# Patient Record
Sex: MALE | Race: WHITE | NOT HISPANIC OR LATINO | Employment: OTHER | URBAN - NONMETROPOLITAN AREA
[De-identification: names, ages, dates, MRNs, and addresses within clinical notes are randomized per-mention and may not be internally consistent; named-entity substitution may affect disease eponyms.]

---

## 2021-01-13 ENCOUNTER — IMMUNIZATIONS (OUTPATIENT)
Dept: FAMILY MEDICINE CLINIC | Facility: HOSPITAL | Age: 56
End: 2021-01-13

## 2021-01-13 DIAGNOSIS — Z23 ENCOUNTER FOR IMMUNIZATION: ICD-10-CM

## 2021-01-13 PROCEDURE — 91301 SARS-COV-2 / COVID-19 MRNA VACCINE (MODERNA) 100 MCG: CPT

## 2021-01-13 PROCEDURE — 0011A SARS-COV-2 / COVID-19 MRNA VACCINE (MODERNA) 100 MCG: CPT

## 2021-02-08 ENCOUNTER — IMMUNIZATIONS (OUTPATIENT)
Dept: FAMILY MEDICINE CLINIC | Facility: HOSPITAL | Age: 56
End: 2021-02-08

## 2021-02-08 DIAGNOSIS — Z23 ENCOUNTER FOR IMMUNIZATION: Primary | ICD-10-CM

## 2021-02-08 PROCEDURE — 0012A SARS-COV-2 / COVID-19 MRNA VACCINE (MODERNA) 100 MCG: CPT

## 2021-02-08 PROCEDURE — 91301 SARS-COV-2 / COVID-19 MRNA VACCINE (MODERNA) 100 MCG: CPT

## 2021-12-14 ENCOUNTER — IMMUNIZATIONS (OUTPATIENT)
Dept: FAMILY MEDICINE CLINIC | Facility: HOSPITAL | Age: 56
End: 2021-12-14

## 2021-12-14 DIAGNOSIS — Z23 ENCOUNTER FOR IMMUNIZATION: Primary | ICD-10-CM

## 2021-12-14 PROCEDURE — 0064A COVID-19 MODERNA VACC 0.25 ML BOOSTER: CPT

## 2021-12-14 PROCEDURE — 91306 COVID-19 MODERNA VACC 0.25 ML BOOSTER: CPT

## 2022-01-21 ENCOUNTER — HOSPITAL ENCOUNTER (EMERGENCY)
Facility: HOSPITAL | Age: 57
Discharge: HOME | End: 2022-01-21
Attending: EMERGENCY MEDICINE | Admitting: EMERGENCY MEDICINE
Payer: MEDICARE

## 2022-01-21 VITALS
TEMPERATURE: 98 F | SYSTOLIC BLOOD PRESSURE: 172 MMHG | WEIGHT: 180 LBS | HEART RATE: 71 BPM | DIASTOLIC BLOOD PRESSURE: 94 MMHG | RESPIRATION RATE: 17 BRPM | OXYGEN SATURATION: 99 %

## 2022-01-21 DIAGNOSIS — M54.41 ACUTE RIGHT-SIDED LOW BACK PAIN WITH RIGHT-SIDED SCIATICA: Primary | ICD-10-CM

## 2022-01-21 LAB
BACTERIA UR QL AUTO: NORMAL /HPF
BILIRUB UR QL STRIP: NEGATIVE
CLARITY UR: CLEAR
COLOR UR: YELLOW
GLUCOSE UR STRIP-MCNC: ABNORMAL MG/DL
HGB UR QL STRIP.AUTO: NEGATIVE
KETONES UR STRIP-MCNC: NEGATIVE MG/DL
LEUKOCYTE ESTERASE UR QL STRIP: ABNORMAL
NITRITE UR QL STRIP: NEGATIVE
NON-SQ EPI CELLS URNS QL MICRO: NORMAL /HPF
PH UR STRIP.AUTO: 5.5 [PH]
PROT UR STRIP-MCNC: ABNORMAL MG/DL
RBC #/AREA URNS AUTO: NORMAL /HPF
SP GR UR STRIP.AUTO: 1.02 (ref 1–1.03)
UROBILINOGEN UR QL STRIP.AUTO: 0.2 E.U./DL
WBC #/AREA URNS AUTO: NORMAL /HPF

## 2022-01-21 PROCEDURE — 99283 EMERGENCY DEPT VISIT LOW MDM: CPT

## 2022-01-21 PROCEDURE — 96372 THER/PROPH/DIAG INJ SC/IM: CPT

## 2022-01-21 PROCEDURE — 81001 URINALYSIS AUTO W/SCOPE: CPT | Performed by: EMERGENCY MEDICINE

## 2022-01-21 PROCEDURE — 99285 EMERGENCY DEPT VISIT HI MDM: CPT | Performed by: EMERGENCY MEDICINE

## 2022-01-21 RX ORDER — DIAZEPAM 2 MG/1
2 TABLET ORAL EVERY 12 HOURS PRN
Qty: 10 TABLET | Refills: 0 | Status: SHIPPED | OUTPATIENT
Start: 2022-01-21 | End: 2022-06-24

## 2022-01-21 RX ORDER — MORPHINE SULFATE 10 MG/ML
6 INJECTION, SOLUTION INTRAMUSCULAR; INTRAVENOUS ONCE
Status: COMPLETED | OUTPATIENT
Start: 2022-01-21 | End: 2022-01-21

## 2022-01-21 RX ORDER — DIAZEPAM 5 MG/ML
2.5 INJECTION, SOLUTION INTRAMUSCULAR; INTRAVENOUS ONCE
Status: COMPLETED | OUTPATIENT
Start: 2022-01-21 | End: 2022-01-21

## 2022-01-21 RX ORDER — HYDROCODONE BITARTRATE AND ACETAMINOPHEN 5; 325 MG/1; MG/1
1 TABLET ORAL EVERY 6 HOURS PRN
Qty: 12 TABLET | Refills: 0 | Status: SHIPPED | OUTPATIENT
Start: 2022-01-21 | End: 2022-06-24

## 2022-01-21 RX ADMIN — DIAZEPAM 2.5 MG: 10 INJECTION, SOLUTION INTRAMUSCULAR; INTRAVENOUS at 15:23

## 2022-01-21 RX ADMIN — MORPHINE SULFATE 6 MG: 10 INJECTION, SOLUTION INTRAMUSCULAR; INTRAVENOUS at 15:23

## 2022-01-21 NOTE — ED NOTES
Pt in no acute distress  Ambulates with a steady gait   Verbalizes understanding of discharge instructions       Fatou Mendez RN  01/21/22 1042

## 2022-01-21 NOTE — ED PROVIDER NOTES
History  Chief Complaint   Patient presents with    Back Pain     presents due to R lower back pain for 2 weeks, pt states was seen at [place] and dx with "urine in kidney", pt taking toradol for pain, 9/10 pain upon arrival  hx of multiple back surgeries, pt has limp during triage pt states has been there since pain started  intermittent nausea with x1 vomiting today  pt also reports having constipation, pt having jerking movements in triage, when questioned if normal pt states "it is because of the pain for 2 weeks"     Patient is a 51-year-old male presenting to the emergency department complaining of right lower back pain radiating down his leg at times, symptoms have been present for the past 2 weeks and started after 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, he denies any fall, no numbness or tingling, no bowel or bladder dysfunction, he does occasionally get nausea and had 1 episode of vomiting when the pain was severe, he was seen at outside hospital at onset of pain, prescribed tramadol and had imaging done at the time as well as labs, he does have a known history of chronic kidney disease, he is currently taking tramadol and prednisone which is not providing any relief of symptoms, states he has a history of a cyst on his back that was surgically removed in 2003, he has not had similar pain since the surgery, he denies any fever or chills, no dysuria or gross hematuria          None       Past Medical History:   Diagnosis Date    Diabetes mellitus ([facility] [address] )     Gout     Hypertension     Renal disorder        Past Surgical History:   Procedure Laterality Date    BACK SURGERY         History reviewed  No pertinent family history  I have reviewed and agree with the history as documented      E-Cigarette/Vaping     E-Cigarette/Vaping Substances     Social History     Tobacco Use    Smoking status: Current Every Day Smoker    Smokeless tobacco: Never Used   Substance Use Topics  Alcohol use: Not Currently    Drug use: Yes     Types: Marijuana       Review of Systems   Constitutional: Negative  HENT: Negative  Eyes: Negative  Respiratory: Negative  Cardiovascular: Negative  Gastrointestinal: Positive for nausea and vomiting  Endocrine: Negative  Genitourinary: Negative  Musculoskeletal: Positive for back pain  Skin: Negative  Allergic/Immunologic: Negative  Neurological: Negative  Hematological: Negative  Psychiatric/Behavioral: Negative  Physical Exam  Physical Exam  Constitutional:       Appearance: He is well-developed  HENT:      Head: Normocephalic and atraumatic  Eyes:      Conjunctiva/sclera: Conjunctivae normal       Pupils: Pupils are equal, round, and reactive to light  Cardiovascular:      Rate and Rhythm: Normal rate  Pulmonary:      Effort: Pulmonary effort is normal    Abdominal:      Palpations: Abdomen is soft  Musculoskeletal:         General: Normal range of motion  Cervical back: Normal range of motion and neck supple  Back:       Comments: Tenderness to palpate in the paraspinal musculature of the right lumbar area down into the buttocks, pain with straight leg raise on the right, distal sensation and motor is intact, no bony deformity   Skin:     General: Skin is warm and dry  Neurological:      Mental Status: He is alert and oriented to person, place, and time           Vital Signs  ED Triage Vitals   Temperature Pulse Respirations Blood Pressure SpO2   01/21/22 1519 01/21/22 1320 01/21/22 1320 01/21/22 1320 01/21/22 1320   98 °F (36 7 °C) 83 22 169/83 98 %      Temp Source Heart Rate Source Patient Position - Orthostatic VS BP Location FiO2 (%)   01/21/22 1519 01/21/22 1554 01/21/22 1320 01/21/22 1320 --   Temporal Monitor Sitting Right arm       Pain Score       01/21/22 1320       9           Vitals:    01/21/22 1320 01/21/22 1554   BP: 169/83 (!) 172/94   Pulse: 83 71   Patient Position - Orthostatic VS: Sitting Lying           ED Medications  Medications   morphine (PF) 10 mg/mL injection 6 mg (6 mg Intramuscular Given 1/21/22 1523)   diazepam (VALIUM) injection 2 5 mg (2 5 mg Intramuscular Given 1/21/22 1523)       Diagnostic Studies  Results Reviewed     Procedure Component Value Units Date/Time    Urine Microscopic [[id number]]  (Normal) Collected: 01/21/22 1522    Lab Status: Final result Specimen: Urine, Clean Catch Updated: 01/21/22 1538     RBC, UA 1-2 /hpf      WBC, UA 1-2 /hpf      Epithelial Cells Occasional /hpf      Bacteria, UA Occasional /hpf     UA w Reflex to Microscopic w Reflex to Culture [[id number]]  (Abnormal) Collected: 01/21/22 1522    Lab Status: Final result Specimen: Urine, Clean Catch Updated: 01/21/22 1530     Color, UA Yellow     Clarity, UA Clear     Specific Gravity, UA 1 025     pH, UA 5 5     Leukocytes, UA Elevated glucose may cause decreased leukocyte values   See urine microscopic for [facility] result/     Nitrite, UA Negative     Protein,  (2+) mg/dl      Glucose, UA >=1000 (1%) mg/dl      Ketones, UA Negative mg/dl      Urobilinogen, UA 0 2 E U /dl      Bilirubin, UA Negative     Blood, UA Negative                 No orders to display                 ED Course  ED Course as of 01/21/22 1628   Fri Jan 21, 2022   1542 Patient reports feeling much better   1627 I reviewed patient's chart in epic from his recent visit to outside hospital, imaging of the lumbar spine reveals degenerative changes, physical exam findings consistent with low back strain with sciatica, he did report significant relief after being treated in the emergency department, referral was placed for follow-up with pain management for further evaluation and treatment, patient given a prescription for a small amount of medications to help control symptoms and till he can obtain follow up, patient advised to return if symptoms worsen or any additional concerns, patient acknowledges understanding and agreement with this plan                               SBIRT 20yo+      Most Recent Value   SBIRT (24 yo +)    In order to provide better care to our patients, we are screening all of our patients for alcohol and drug use  Would it be okay to ask you these screening questions? No Filed at: 01/21/2022 1519                      Disposition  Final diagnoses:   Acute right-sided low back pain with right-sided sciatica     Time reflects when diagnosis was documented in both MDM as applicable and the Disposition within this note     Time User Action Codes Description Comment    1/21/2022  3:45 PM Candida Staggers Add [M54 41] Acute right-sided low back pain with right-sided sciatica       ED Disposition     ED Disposition Condition Date/Time Comment    Discharge Stable Fri Jan 21, 2022  3:45 PM Jonas Lee Sr  discharge to home/self care              Follow-up Information     Follow up With Specialties Details Why Contact Info    Elyssa Conway DO Internal Medicine, Pediatrics  As needed [address] [address]  [phone]      Stephanie Colindres MD Pain Medicine In 3 days  [address] [place] [address]  [phone]            Discharge Medication List as of 1/21/2022  3:48 PM      START taking these medications    Details   diazepam (VALIUM) 2 mg tablet Take 1 tablet (2 mg total) by mouth every 12 (twelve) hours as needed for muscle spasms, Starting Fri 1/21/2022, Normal      HYDROcodone-acetaminophen (Norco) 5-325 mg per tablet Take 1 tablet by mouth every 6 (six) hours as needed for pain Max Daily Amount: 4 tablets, Starting Fri 1/21/2022, Normal                 PDMP Review     None          ED Provider  Electronically Signed by           Martha Meek DO  01/21/22 2571

## 2022-03-09 ENCOUNTER — APPOINTMENT (EMERGENCY)
Dept: CT IMAGING | Facility: HOSPITAL | Age: 57
End: 2022-03-09
Payer: MEDICARE

## 2022-03-09 ENCOUNTER — HOSPITAL ENCOUNTER (EMERGENCY)
Facility: HOSPITAL | Age: 57
Discharge: ANOTHER HOSPITAL | End: 2022-03-10
Attending: STUDENT IN AN ORGANIZED HEALTH CARE EDUCATION/TRAINING PROGRAM | Admitting: STUDENT IN AN ORGANIZED HEALTH CARE EDUCATION/TRAINING PROGRAM
Payer: MEDICARE

## 2022-03-09 ENCOUNTER — APPOINTMENT (EMERGENCY)
Dept: ULTRASOUND IMAGING | Facility: HOSPITAL | Age: 57
End: 2022-03-09
Payer: MEDICARE

## 2022-03-09 DIAGNOSIS — M46.46 DISCITIS OF LUMBAR REGION: ICD-10-CM

## 2022-03-09 DIAGNOSIS — N17.9 ACUTE KIDNEY INJURY (HCC): ICD-10-CM

## 2022-03-09 DIAGNOSIS — R65.20 SEVERE SEPSIS (HCC): ICD-10-CM

## 2022-03-09 DIAGNOSIS — K68.19 RETROPERITONEAL ABSCESS (HCC): ICD-10-CM

## 2022-03-09 DIAGNOSIS — A41.9 SEVERE SEPSIS (HCC): ICD-10-CM

## 2022-03-09 DIAGNOSIS — M46.20 VERTEBRAL OSTEOMYELITIS, ACUTE (HCC): Primary | ICD-10-CM

## 2022-03-09 LAB
ALBUMIN SERPL BCP-MCNC: 2.2 G/DL (ref 3.5–5)
ALP SERPL-CCNC: 130 U/L (ref 46–116)
ALT SERPL W P-5'-P-CCNC: 21 U/L (ref 12–78)
AMORPH URATE CRY URNS QL MICRO: NORMAL /HPF
ANION GAP SERPL CALCULATED.3IONS-SCNC: 11 MMOL/L (ref 4–13)
AST SERPL W P-5'-P-CCNC: 11 U/L (ref 5–45)
BACTERIA UR QL AUTO: NORMAL /HPF
BASOPHILS # BLD AUTO: 0.05 THOUSANDS/ΜL (ref 0–0.1)
BASOPHILS NFR BLD AUTO: 0 % (ref 0–1)
BILIRUB SERPL-MCNC: 0.3 MG/DL (ref 0.2–1)
BILIRUB UR QL STRIP: NEGATIVE
BUN SERPL-MCNC: 82 MG/DL (ref 5–25)
CALCIUM ALBUM COR SERPL-MCNC: 10.8 MG/DL (ref 8.3–10.1)
CALCIUM SERPL-MCNC: 9.4 MG/DL (ref 8.3–10.1)
CHLORIDE SERPL-SCNC: 96 MMOL/L (ref 100–108)
CLARITY UR: CLEAR
CO2 SERPL-SCNC: 27 MMOL/L (ref 21–32)
COLOR UR: YELLOW
CREAT SERPL-MCNC: 3.05 MG/DL (ref 0.6–1.3)
CRP SERPL QL: 60.4 MG/L
EOSINOPHIL # BLD AUTO: 0 THOUSAND/ΜL (ref 0–0.61)
EOSINOPHIL NFR BLD AUTO: 0 % (ref 0–6)
ERYTHROCYTE [DISTWIDTH] IN BLOOD BY AUTOMATED COUNT: 16.2 % (ref 11.6–15.1)
ERYTHROCYTE [SEDIMENTATION RATE] IN BLOOD: 103 MM/HOUR (ref 0–19)
GFR SERPL CREATININE-BSD FRML MDRD: 21 ML/MIN/1.73SQ M
GLUCOSE SERPL-MCNC: 266 MG/DL (ref 65–140)
GLUCOSE UR STRIP-MCNC: NEGATIVE MG/DL
HCT VFR BLD AUTO: 30.1 % (ref 36.5–49.3)
HGB BLD-MCNC: 10.1 G/DL (ref 12–17)
HGB UR QL STRIP.AUTO: NEGATIVE
IMM GRANULOCYTES # BLD AUTO: 0.44 THOUSAND/UL (ref 0–0.2)
IMM GRANULOCYTES NFR BLD AUTO: 1 % (ref 0–2)
KETONES UR STRIP-MCNC: NEGATIVE MG/DL
LACTATE SERPL-SCNC: 2.2 MMOL/L (ref 0.5–2)
LACTATE SERPL-SCNC: 3.4 MMOL/L (ref 0.5–2)
LEUKOCYTE ESTERASE UR QL STRIP: NEGATIVE
LYMPHOCYTES # BLD AUTO: 0.65 THOUSANDS/ΜL (ref 0.6–4.47)
LYMPHOCYTES NFR BLD AUTO: 2 % (ref 14–44)
MAGNESIUM SERPL-MCNC: 2.4 MG/DL (ref 1.6–2.6)
MCH RBC QN AUTO: 28 PG (ref 26.8–34.3)
MCHC RBC AUTO-ENTMCNC: 33.6 G/DL (ref 31.4–37.4)
MCV RBC AUTO: 83 FL (ref 82–98)
MONOCYTES # BLD AUTO: 0.93 THOUSAND/ΜL (ref 0.17–1.22)
MONOCYTES NFR BLD AUTO: 3 % (ref 4–12)
NEUTROPHILS # BLD AUTO: 28.8 THOUSANDS/ΜL (ref 1.85–7.62)
NEUTS SEG NFR BLD AUTO: 94 % (ref 43–75)
NITRITE UR QL STRIP: NEGATIVE
NON-SQ EPI CELLS URNS QL MICRO: NORMAL /HPF
NRBC BLD AUTO-RTO: 0 /100 WBCS
PH UR STRIP.AUTO: 5.5 [PH]
PLATELET # BLD AUTO: 409 THOUSANDS/UL (ref 149–390)
PMV BLD AUTO: 10.5 FL (ref 8.9–12.7)
POTASSIUM SERPL-SCNC: 5.9 MMOL/L (ref 3.5–5.3)
PROCALCITONIN SERPL-MCNC: 0.58 NG/ML
PROT SERPL-MCNC: 7.2 G/DL (ref 6.4–8.2)
PROT UR STRIP-MCNC: ABNORMAL MG/DL
RBC # BLD AUTO: 3.61 MILLION/UL (ref 3.88–5.62)
RBC #/AREA URNS AUTO: NORMAL /HPF
SODIUM SERPL-SCNC: 134 MMOL/L (ref 136–145)
SP GR UR STRIP.AUTO: 1.02 (ref 1–1.03)
UROBILINOGEN UR QL STRIP.AUTO: 0.2 E.U./DL
WBC # BLD AUTO: 30.87 THOUSAND/UL (ref 4.31–10.16)
WBC #/AREA URNS AUTO: NORMAL /HPF

## 2022-03-09 PROCEDURE — 80053 COMPREHEN METABOLIC PANEL: CPT | Performed by: STUDENT IN AN ORGANIZED HEALTH CARE EDUCATION/TRAINING PROGRAM

## 2022-03-09 PROCEDURE — 83605 ASSAY OF LACTIC ACID: CPT | Performed by: STUDENT IN AN ORGANIZED HEALTH CARE EDUCATION/TRAINING PROGRAM

## 2022-03-09 PROCEDURE — 99291 CRITICAL CARE FIRST HOUR: CPT | Performed by: STUDENT IN AN ORGANIZED HEALTH CARE EDUCATION/TRAINING PROGRAM

## 2022-03-09 PROCEDURE — 87040 BLOOD CULTURE FOR BACTERIA: CPT | Performed by: STUDENT IN AN ORGANIZED HEALTH CARE EDUCATION/TRAINING PROGRAM

## 2022-03-09 PROCEDURE — 96375 TX/PRO/DX INJ NEW DRUG ADDON: CPT

## 2022-03-09 PROCEDURE — 99285 EMERGENCY DEPT VISIT HI MDM: CPT

## 2022-03-09 PROCEDURE — 84145 PROCALCITONIN (PCT): CPT | Performed by: STUDENT IN AN ORGANIZED HEALTH CARE EDUCATION/TRAINING PROGRAM

## 2022-03-09 PROCEDURE — 85025 COMPLETE CBC W/AUTO DIFF WBC: CPT | Performed by: STUDENT IN AN ORGANIZED HEALTH CARE EDUCATION/TRAINING PROGRAM

## 2022-03-09 PROCEDURE — 83735 ASSAY OF MAGNESIUM: CPT | Performed by: STUDENT IN AN ORGANIZED HEALTH CARE EDUCATION/TRAINING PROGRAM

## 2022-03-09 PROCEDURE — G1004 CDSM NDSC: HCPCS

## 2022-03-09 PROCEDURE — 85652 RBC SED RATE AUTOMATED: CPT | Performed by: STUDENT IN AN ORGANIZED HEALTH CARE EDUCATION/TRAINING PROGRAM

## 2022-03-09 PROCEDURE — 36415 COLL VENOUS BLD VENIPUNCTURE: CPT | Performed by: STUDENT IN AN ORGANIZED HEALTH CARE EDUCATION/TRAINING PROGRAM

## 2022-03-09 PROCEDURE — 81001 URINALYSIS AUTO W/SCOPE: CPT | Performed by: STUDENT IN AN ORGANIZED HEALTH CARE EDUCATION/TRAINING PROGRAM

## 2022-03-09 PROCEDURE — 96365 THER/PROPH/DIAG IV INF INIT: CPT

## 2022-03-09 PROCEDURE — 87076 CULTURE ANAEROBE IDENT EACH: CPT | Performed by: STUDENT IN AN ORGANIZED HEALTH CARE EDUCATION/TRAINING PROGRAM

## 2022-03-09 PROCEDURE — 96366 THER/PROPH/DIAG IV INF ADDON: CPT

## 2022-03-09 PROCEDURE — 86140 C-REACTIVE PROTEIN: CPT | Performed by: STUDENT IN AN ORGANIZED HEALTH CARE EDUCATION/TRAINING PROGRAM

## 2022-03-09 PROCEDURE — 76870 US EXAM SCROTUM: CPT

## 2022-03-09 PROCEDURE — 87181 SC STD AGAR DILUTION PER AGT: CPT | Performed by: STUDENT IN AN ORGANIZED HEALTH CARE EDUCATION/TRAINING PROGRAM

## 2022-03-09 PROCEDURE — 96376 TX/PRO/DX INJ SAME DRUG ADON: CPT

## 2022-03-09 PROCEDURE — 51798 US URINE CAPACITY MEASURE: CPT

## 2022-03-09 PROCEDURE — 96361 HYDRATE IV INFUSION ADD-ON: CPT

## 2022-03-09 PROCEDURE — 74176 CT ABD & PELVIS W/O CONTRAST: CPT

## 2022-03-09 RX ORDER — ONDANSETRON 2 MG/ML
4 INJECTION INTRAMUSCULAR; INTRAVENOUS ONCE
Status: COMPLETED | OUTPATIENT
Start: 2022-03-09 | End: 2022-03-09

## 2022-03-09 RX ORDER — HYDROMORPHONE HCL/PF 1 MG/ML
1 SYRINGE (ML) INJECTION ONCE
Status: COMPLETED | OUTPATIENT
Start: 2022-03-09 | End: 2022-03-09

## 2022-03-09 RX ORDER — SODIUM CHLORIDE, SODIUM LACTATE, POTASSIUM CHLORIDE, CALCIUM CHLORIDE 600; 310; 30; 20 MG/100ML; MG/100ML; MG/100ML; MG/100ML
125 INJECTION, SOLUTION INTRAVENOUS CONTINUOUS
Status: DISCONTINUED | OUTPATIENT
Start: 2022-03-10 | End: 2022-03-10 | Stop reason: HOSPADM

## 2022-03-09 RX ORDER — CEFEPIME HYDROCHLORIDE 2 G/50ML
2000 INJECTION, SOLUTION INTRAVENOUS ONCE
Status: COMPLETED | OUTPATIENT
Start: 2022-03-09 | End: 2022-03-09

## 2022-03-09 RX ORDER — MORPHINE SULFATE 10 MG/ML
6 INJECTION, SOLUTION INTRAMUSCULAR; INTRAVENOUS ONCE
Status: COMPLETED | OUTPATIENT
Start: 2022-03-09 | End: 2022-03-09

## 2022-03-09 RX ADMIN — VANCOMYCIN HYDROCHLORIDE 1250 MG: 5 INJECTION, POWDER, LYOPHILIZED, FOR SOLUTION INTRAVENOUS at 22:09

## 2022-03-09 RX ADMIN — MORPHINE SULFATE 6 MG: 10 INJECTION INTRAVENOUS at 19:35

## 2022-03-09 RX ADMIN — CEFEPIME HYDROCHLORIDE 2000 MG: 2 INJECTION, SOLUTION INTRAVENOUS at 21:55

## 2022-03-09 RX ADMIN — HYDROMORPHONE HYDROCHLORIDE 1 MG: 1 INJECTION, SOLUTION INTRAMUSCULAR; INTRAVENOUS; SUBCUTANEOUS at 23:05

## 2022-03-09 RX ADMIN — SODIUM CHLORIDE 1000 ML: 0.9 INJECTION, SOLUTION INTRAVENOUS at 19:37

## 2022-03-09 RX ADMIN — ONDANSETRON 4 MG: 2 INJECTION INTRAMUSCULAR; INTRAVENOUS at 23:19

## 2022-03-09 RX ADMIN — SODIUM CHLORIDE 1000 ML: 0.9 INJECTION, SOLUTION INTRAVENOUS at 22:08

## 2022-03-09 RX ADMIN — HYDROMORPHONE HYDROCHLORIDE 1 MG: 1 INJECTION, SOLUTION INTRAMUSCULAR; INTRAVENOUS; SUBCUTANEOUS at 20:59

## 2022-03-09 RX ADMIN — MORPHINE SULFATE 2 MG: 2 INJECTION, SOLUTION INTRAMUSCULAR; INTRAVENOUS at 20:27

## 2022-03-10 VITALS
TEMPERATURE: 97.9 F | HEART RATE: 76 BPM | WEIGHT: 175 LBS | OXYGEN SATURATION: 91 % | SYSTOLIC BLOOD PRESSURE: 163 MMHG | HEIGHT: 72 IN | BODY MASS INDEX: 23.7 KG/M2 | RESPIRATION RATE: 15 BRPM | DIASTOLIC BLOOD PRESSURE: 73 MMHG

## 2022-03-10 LAB
FLUAV RNA RESP QL NAA+PROBE: NEGATIVE
FLUBV RNA RESP QL NAA+PROBE: NEGATIVE
SARS-COV-2 AG UPPER RESP QL IA: NORMAL
SARS-COV-2 RNA RESP QL NAA+PROBE: NEGATIVE

## 2022-03-10 PROCEDURE — 96361 HYDRATE IV INFUSION ADD-ON: CPT

## 2022-03-10 PROCEDURE — 96376 TX/PRO/DX INJ SAME DRUG ADON: CPT

## 2022-03-10 PROCEDURE — 87636 SARSCOV2 & INF A&B AMP PRB: CPT | Performed by: STUDENT IN AN ORGANIZED HEALTH CARE EDUCATION/TRAINING PROGRAM

## 2022-03-10 RX ORDER — HYDROMORPHONE HCL/PF 1 MG/ML
1 SYRINGE (ML) INJECTION ONCE
Status: COMPLETED | OUTPATIENT
Start: 2022-03-10 | End: 2022-03-10

## 2022-03-10 RX ADMIN — SODIUM CHLORIDE, SODIUM LACTATE, POTASSIUM CHLORIDE, AND CALCIUM CHLORIDE 125 ML/HR: .6; .31; .03; .02 INJECTION, SOLUTION INTRAVENOUS at 00:05

## 2022-03-10 RX ADMIN — HYDROMORPHONE HYDROCHLORIDE 1 MG: 1 INJECTION, SOLUTION INTRAMUSCULAR; INTRAVENOUS; SUBCUTANEOUS at 01:16

## 2022-03-10 NOTE — ED NOTES
Ortiz cath attempt unsuccessful  This RN and SALENA Talamantes both attempted with several different styles and sizes  Provider made aware        Marcelino Beyer RN  03/09/22 2129

## 2022-03-10 NOTE — ED NOTES
Family member in room  Pad alarm in place  Yellow socks and fall risk bracelet placed on pt  Pt instructed to call for assistance  Call bell within reach  AOx4        [facility]  03/09/22 2867 Normal rate, regular rhythm, normal S1, S2 heart sounds heard.

## 2022-03-10 NOTE — ED NOTES
Tx to [facility] room Alyssa Rae [address] Dr Vicenta Martini - [facility] @ 0100 report # - rapid covid, call PAC with results NELDA Garcia RN  03/10/22 0000

## 2022-03-10 NOTE — ED PROVIDER NOTES
History  Chief Complaint   Patient presents with    Back Pain     pt states he has had back pain since his stroke  states today the pain got so severe that he passed out  also c/o groin pain and painful sacral ulcers       History provided by:  Patient  Back Pain  Location:  Lumbar spine  Quality:  Shooting and stabbing  Pain severity:  Severe  Pain is:  Unable to specify  Onset quality:  Gradual  Duration:  3 days  Timing:  Constant  Progression:  Worsening  Chronicity:  New  Context: not recent illness, not recent injury and not twisting    Relieved by:  Nothing  Worsened by: Movement  Ineffective treatments:  Narcotics  Associated symptoms: abdominal pain, bowel incontinence, fever, leg pain, numbness, paresthesias, tingling and weakness    Associated symptoms: no abdominal swelling, no bladder incontinence, no chest pain, no dysuria, no headaches, no pelvic pain and no perianal numbness       64year old M  Presents to the ED with worsening lower back pain, sacral ulcers, left groin pain  The patient was admitted at an OSH in January 2022 for intractable back pain  MRI at that time showed DDD and disc herniation along with muscle spasm  Since discharge from the hospital, the patient has mostly been bed bound due to intractable lower back pain  He takes Norco with moderate relief  He is scheduled to be evaluated by a back surgeon tomorrow  He expresses mild numbness/tingling in his scrotum but denies urinary incontinence/retention  The patient had one episode of bowel incontinence last week but denies episodes of incontinence since  S/p epidural injection approximately 3 weeks ago  The patient also expresses LLQ abdominal/groin pain  Noticed a bulge in the LLQ a few days ago  Expresses decreases flatus with nausea  PO intake has been decreased  Expresses chills without documented fevers  Prior to Admission Medications   Prescriptions Last Dose Informant Patient Reported? Taking? HYDROcodone-acetaminophen (Norco) 5-325 mg per tablet   No No   Sig: Take 1 tablet by mouth every 6 (six) hours as needed for pain Max Daily Amount: 4 tablets   diazepam (VALIUM) 2 mg tablet   No No   Sig: Take 1 tablet (2 mg total) by mouth every 12 (twelve) hours as needed for muscle spasms      Facility-Administered Medications: None       Past Medical History:   Diagnosis Date    Diabetes mellitus ([facility] [address] )     Gout     Hypertension     Renal disorder     Stroke [facility])        Past Surgical History:   Procedure Laterality Date    BACK SURGERY         History reviewed  No pertinent family history  I have reviewed and agree with the history as documented  E-Cigarette/Vaping     E-Cigarette/Vaping Substances     Social History     Tobacco Use    Smoking status: Current Every Day Smoker     Packs/day: 0 50     Types: Cigarettes    Smokeless tobacco: Never Used   Substance Use Topics    Alcohol use: Not Currently    Drug use: Yes     Types: Marijuana     Review of Systems   Constitutional: Positive for activity change, appetite change, chills, fatigue and fever  HENT: Negative for congestion, rhinorrhea, sinus pressure and sinus pain  Respiratory: Negative for cough, chest tightness, shortness of breath and wheezing  Cardiovascular: Negative for chest pain and palpitations  Gastrointestinal: Positive for abdominal pain and bowel incontinence  Negative for diarrhea, nausea and vomiting  Genitourinary: Positive for decreased urine volume, difficulty urinating, penile pain and testicular pain  Negative for bladder incontinence, dysuria, flank pain, frequency, pelvic pain and urgency  Musculoskeletal: Positive for arthralgias, back pain and gait problem  Negative for neck pain and neck stiffness  Skin: Positive for color change and wound  Negative for pallor and rash  Neurological: Positive for tingling, weakness, numbness and paresthesias  Negative for headaches     Hematological: Does not bruise/bleed easily  Psychiatric/Behavioral: Negative for confusion  All other systems reviewed and are negative  Physical Exam  Physical Exam  Vitals and nursing note reviewed  Constitutional:       General: He is in acute distress  Appearance: He is not ill-appearing or toxic-appearing  HENT:      Head: Normocephalic and atraumatic  Right Ear: External ear normal       Left Ear: External ear normal       Nose: No congestion or rhinorrhea  Mouth/Throat:      Mouth: Mucous membranes are dry  Pharynx: No oropharyngeal exudate or posterior oropharyngeal erythema  Eyes:      General:         Right eye: No discharge  Left eye: No discharge  Extraocular Movements: Extraocular movements intact  Conjunctiva/sclera: Conjunctivae normal    Cardiovascular:      Rate and Rhythm: Normal rate and regular rhythm  Pulses: Normal pulses  Heart sounds: Normal heart sounds  Pulmonary:      Effort: Pulmonary effort is normal       Breath sounds: No wheezing, rhonchi or rales  Comments: Course breath sounds bilaterally   Chest:      Chest wall: No tenderness  Abdominal:      General: Bowel sounds are normal       Palpations: Abdomen is soft  Tenderness: There is abdominal tenderness  There is no right CVA tenderness, left CVA tenderness, guarding or rebound  Comments: +TTP along the left inguinal region    Genitourinary:     Comments: No testicular swelling  Penis normal  No cellulitic changes  Normal rectal tone  Stage I-II sacral ulcers  No purulence noted  Musculoskeletal:         General: Tenderness present  Cervical back: Neck supple  No tenderness  Back:       Right lower leg: No edema  Left lower leg: No edema  Comments: TTP along the midline lumbar spine with radiation of the pain into the bilateral paraspinal musculature  No cellulitic changes noted  Skin:     General: Skin is warm and dry        Capillary Refill: Capillary refill takes less than 2 seconds  Coloration: Skin is not jaundiced or pale  Findings: Erythema and lesion present  Neurological:      General: No focal deficit present  Mental Status: He is alert and oriented to person, place, and time  Mental status is at baseline  Cranial Nerves: No cranial nerve deficit  Sensory: No sensory deficit  Motor: Weakness present  Gait: Gait abnormal       Comments: 2+ bilateral patellar DTRs  5/5 muscle strength of the LLE with 4/5 muscle strength of the RLE  Limited exam due to pain  Psychiatric:         Mood and Affect: Mood normal          Behavior: Behavior normal          Thought Content:  Thought content normal          Judgment: Judgment normal        Vital Signs  ED Triage Vitals   Temperature Pulse Respirations Blood Pressure SpO2   03/09/22 1921 03/09/22 1920 03/09/22 1920 03/09/22 1920 03/09/22 1920   97 9 °F (36 6 °C) (!) 129 20 (!) 185/99 96 %      Temp Source Heart Rate Source Patient Position - Orthostatic VS BP Location FiO2 (%)   03/09/22 1921 03/09/22 1920 03/09/22 1920 03/09/22 1920 --   Temporal Monitor Lying Left arm       Pain Score       03/09/22 1920       10 - Worst Possible Pain         Vitals:    03/09/22 2245 03/09/22 2330 03/10/22 0000 03/10/22 0100   BP:  (!) 186/85 (!) 171/80 163/73   Pulse: 87 89 83 76   Patient Position - Orthostatic VS:         ED Medications  Medications   lactated ringers infusion (125 mL/hr Intravenous New Bag 3/10/22 0005)   morphine (PF) 10 mg/mL injection 6 mg (6 mg Intravenous Given 3/9/22 1935)   sodium chloride 0 9 % bolus 1,000 mL (0 mL Intravenous Stopped 3/9/22 2029)   morphine injection 2 mg (2 mg Intravenous Given 3/9/22 2027)   HYDROmorphone (DILAUDID) injection 1 mg (1 mg Intravenous Given 3/9/22 2059)   vancomycin (VANCOCIN) 1250 mg in sodium chloride 0 9% 250 mL IVPB (0 mg/kg × 79 4 kg Intravenous Stopped 3/9/22 2356)   cefepime (MAXIPIME) IVPB (premix in dextrose) 2,000 mg 50 mL (0 mg Intravenous Stopped 3/9/22 2210)   sodium chloride 0 9 % bolus 1,000 mL (0 mL Intravenous Stopped 3/9/22 2321)   HYDROmorphone (DILAUDID) injection 1 mg (1 mg Intravenous Given 3/9/22 2305)   ondansetron (ZOFRAN) injection 4 mg (4 mg Intravenous Given 3/9/22 2319)   HYDROmorphone (DILAUDID) injection 1 mg (1 mg Intravenous Given 3/10/22 0116)     Diagnostic Studies  Results Reviewed     Procedure Component Value Units Date/Time    COVID Rapid Antigen [[id number]]  (Normal) Collected: 03/10/22 0001    Lab Status: Final result Specimen: Nares from Nose Updated: 03/10/22 0101     SARS-CoV-2 Ag Negative Presumptive    Covid19 and INFLUENZA A/B PCR [[id number]] Collected: 03/10/22 0001    Lab Status: In process Specimen: Nares from Nose Updated: 03/10/22 0101    Lactic acid 2 Hours [[id number]]  (Abnormal) Collected: 03/09/22 2207    Lab Status: Final result Specimen: Blood from Arm, Left Updated: 03/09/22 2247     LACTIC ACID 2 2 mmol/L     Narrative:      Result may be elevated if tourniquet was used during collection      Urine Microscopic [[id number]] Collected: 03/09/22 2138    Lab Status: Final result Specimen: Urine, Clean Catch Updated: 03/09/22 2151     RBC, UA 0-1 /hpf      WBC, UA 0-1 /hpf      Epithelial Cells Occasional /hpf      Bacteria, UA Occasional /hpf      AMORPH URATES Occasional /hpf     UA w Reflex to Microscopic w Reflex to Culture [[id number]]  (Abnormal) Collected: 03/09/22 2138    Lab Status: Final result Specimen: Urine, Clean Catch Updated: 03/09/22 2142     Color, UA Yellow     Clarity, UA Clear     Specific Gravity, UA 1 025     pH, UA 5 5     Leukocytes, UA Negative     Nitrite, UA Negative     Protein,  (2+) mg/dl      Glucose, UA Negative mg/dl      Ketones, UA Negative mg/dl      Urobilinogen, UA 0 2 E U /dl      Bilirubin, UA Negative     Blood, UA Negative    Procalcitonin with AM Reflex [[id number]]  (Abnormal) Collected: 03/09/22 1939    Lab Status: Final result Specimen: Blood from Arm, Left Updated: 03/09/22 2013     Procalcitonin 0 58 ng/ml     Lactic acid, plasma [[id number]]  (Abnormal) Collected: 03/09/22 1939    Lab Status: Final result Specimen: Blood from Arm, Left Updated: 03/09/22 2013     LACTIC ACID 3 4 mmol/L     Narrative:      Result may be elevated if tourniquet was used during collection      Procalcitonin Reflex [[id number]]     Lab Status: No result Specimen: Blood     CBC and differential [[id number]]  (Abnormal) Collected: 03/09/22 1939    Lab Status: Final result Specimen: Blood from Arm, Left Updated: 03/09/22 2010     WBC 30 87 Thousand/uL      RBC 3 61 Million/uL      Hemoglobin 10 1 g/dL      Hematocrit 30 1 %      MCV 83 fL      MCH 28 0 pg      MCHC 33 6 g/dL      RDW 16 2 %      MPV 10 5 fL      Platelets 273 Thousands/uL      nRBC 0 /100 WBCs      Neutrophils Relative 94 %      Immat GRANS % 1 %      Lymphocytes Relative 2 %      Monocytes Relative 3 %      Eosinophils Relative 0 %      Basophils Relative 0 %      Neutrophils Absolute 28 80 Thousands/µL      Immature Grans Absolute 0 44 Thousand/uL      Lymphocytes Absolute 0 65 Thousands/µL      Monocytes Absolute 0 93 Thousand/µL      Eosinophils Absolute 0 00 Thousand/µL      Basophils Absolute 0 05 Thousands/µL     Comprehensive metabolic panel [[id number]]  (Abnormal) Collected: 03/09/22 1939    Lab Status: Final result Specimen: Blood from Arm, Left Updated: 03/09/22 2003     Sodium 134 mmol/L      Potassium 5 9 mmol/L      Chloride 96 mmol/L      CO2 27 mmol/L      ANION GAP 11 mmol/L      BUN 82 mg/dL      Creatinine 3 05 mg/dL      Glucose 266 mg/dL      Calcium 9 4 mg/dL      Corrected Calcium 10 8 mg/dL      AST 11 U/L      ALT 21 U/L      Alkaline Phosphatase 130 U/L      Total Protein 7 2 g/dL      Albumin 2 2 g/dL      Total Bilirubin 0 30 mg/dL      eGFR 21 ml/min/1 73sq m     Narrative:      [place] guidelines for Chronic Kidney Disease (CKD):     Stage 1 with normal or high GFR (GFR > 90 mL/min/1 73 square meters)    Stage 2 Mild CKD (GFR = 60-89 mL/min/1 73 square meters)    Stage 3A Moderate CKD (GFR = 45-59 mL/min/1 73 square meters)    Stage 3B Moderate CKD (GFR = 30-44 mL/min/1 73 square meters)    Stage 4 Severe CKD (GFR = 15-29 mL/min/1 73 square meters)    Stage 5 End Stage CKD (GFR <15 mL/min/1 73 square meters)  Note: GFR calculation is accurate only with a steady state creatinine    Magnesium [[id number]]  (Normal) Collected: 03/09/22 1939    Lab Status: Final result Specimen: Blood from Arm, Left Updated: 03/09/22 2003     Magnesium 2 4 mg/dL     C-reactive protein [[id number]]  (Abnormal) Collected: 03/09/22 1939    Lab Status: Final result Specimen: Blood from Arm, Left Updated: 03/09/22 2003     CRP 60 4 mg/L     Narrative:      Note: Unit of Measure change to mg/L at [facility] will show at 10 fold increase in CRP result to match network standards  Sedimentation rate, automated [[id number]]  (Abnormal) Collected: 03/09/22 1939    Lab Status: Final result Specimen: Blood from Arm, Left Updated: 03/09/22 1953     Sed Rate 103 mm/hour     Blood culture #2 [[id number]] Collected: 03/09/22 1939    Lab Status: In process Specimen: Blood from Arm, Left Updated: 03/09/22 1942    Blood culture #1 [[id number]] Collected: 03/09/22 1941    Lab Status: In process Specimen: Blood from Arm, Right Updated: 03/09/22 1942             US scrotum and testicles   Final Result by Bridget Mercedes DO (03/09 2351)       Increased Doppler flow within the left testicle suspicious for orchitis  Complex echogenic area medial to the left testicle of unclear etiology  Cannot rule out infection/abscess or mass  I personally discussed this study with Carlos Oshea on 3/9/2022 at 11:46 PM                 Workstation performed: [id number]         CT abdomen pelvis wo contrast   Final Result by Triston Damon MD (03/09 2255)         1    Findings consistent with L4-5 discitis and osteomyelitis  Faint punctate foci of gas in the dorsal epidural space are concerning for epidural abscess  Recommend emergent MRI of the lower thoracic and lumbar spine with gadolinium and neurosurgical    consultation  2   Anterolateral paraspinal inflammation and gas suggesting phlegmon at the L4-5 level  Large left retroperitoneal collection of fluid and gas extending from the diaphragmatic crura  to the inguinal canal and distending the left hemiscrotum, measuring    approximately 33 x 9 x 4 5 cm  Smaller collection in the right retroperitoneum along the psoas muscle, possibly also communicating with the L4-5 paraspinal phlegmon, measuring 15 x 4 x 7 cm, also compatible with an abscess  3   No evidence of diverticulitis, colitis, bowel obstruction or perforation  4   Few punctate foci of gas in the right gluteal muscles may be venous  No clear evidence of myositis or cellulitis  I personally discussed this study with Adriana Beal on 3/9/2022 at 10:50 PM                Workstation performed: [id number]                Procedures  CriticalCare Time  Performed by: Summer Patel DO  Authorized by:  Summer Patel DO     Critical care provider statement:     Critical care time (minutes):  65    Critical care was necessary to treat or prevent imminent or life-threatening deterioration of the following conditions:  Sepsis and dehydration    Critical care was time spent personally by me on the following activities:  Blood draw for specimens, obtaining history from patient or surrogate, development of treatment plan with patient or surrogate, discussions with consultants, evaluation of patient's response to treatment, examination of patient, review of old charts, re-evaluation of patient's condition, ordering and review of radiographic studies, ordering and review of laboratory studies and ordering and performing treatments and interventions         ED Course  ED Course as of 03/10/22 0203   Wed Mar 09, 2022   2138 PVR greater than 400   2245 If requiring transfer, the patient and wife are requesting transfer to [facility]  They were offered transfer to either [facility] or [facility]     8701 Spoke with Dr Asher Luong from Ortho spine  In addition to the spine evaluation, he recommends General Surgery evaluation  Likely admission to the IM service  Thu Mar 10, 2022   0016 Air medical declined due to weather  Ground transport scheduled for 0100       MDM     64year old M  Hx of lumbar spine DDD and herniated discs  Has been pretty much bed bound for the past few weeks  Had an epidural approximately three weeks ago by Pain Management  Presents to the ED with worsening lumbar spine pain  Mild decrease in motor function of the LLE which may be due to pain  +sacral/decubitus pressure sores  Normal rectal tone  PVR >400 mL without urinary retention discomfort  The patient expressed an episode of bowel incontinence last week but denies incontinent since  Developed left scrotal numbness today  W/u sig for leukocytosis, elevated inflammatory marks, lactic acid, procalcitonin  CMP sig for KELLY in the setting of decreased oral intake  IVF administered and pain controlled with IV opioids  Given the patient's hx of recent epidural and clinical exam, there was high suspicion for cauda equina syndrome and/or epidural abscess  IV Vancomycin and Cefepime administered  CT interpretation above  The patient and his wife were offered transfer to either [facility] or [facility]; they elected [facility]  The case was discussed with Spine Surgery and IM  Requested that the patient be transferred ASAP as he require the OR  Priority one transfer requested  [facility] medical declined due to inclement weather  Transported by ground ALS  The patient was stable while under my care       Disposition  Final diagnoses:   Vertebral osteomyelitis, acute [facility])   Discitis of lumbar region   Severe sepsis ([address] )   Retroperitoneal abscess ([address] )   Acute kidney injury ([facility] [address] )     Time reflects when diagnosis was documented in both MDM as applicable and the Disposition within this note     Time User Action Codes Description Comment    3/9/2022 11:39 PM Dierdre Pata Add [M46 20] Vertebral osteomyelitis, acute ([facility] [address] )     3/9/2022 11:40 PM Dierdre Pata Add [M46 46] Discitis of lumbar region     3/9/2022 11:52 PM Dierdre Pata Add [A41 9,  R65 20] Severe sepsis ([facility] [address] )     3/9/2022 11:52 PM Dierdre Pata Add [V17 71] Retroperitoneal abscess ([facility] [address] )     3/9/2022 11:53 PM Dierdre Pata Add [N17 9] Acute kidney injury [facility])       ED Disposition     ED Disposition Condition Date/Time Comment    Transfer to Another [address] [place] [address]  Wed Mar 9, 2022 11:39 PM Inocencio Davis Sr  should be transferred out to [facility]          MD Documentation      Most Recent Value   Patient Condition The patient has been stabilized such that within reasonable medical probability, no material deterioration of the patient condition or the condition of the unborn child(gee) is likely to result from the transfer   Reason for Transfer Level of Care needed not available at this facility   Benefits of Transfer Specialized equipment and/or services available at the receiving facility (Include comment)________________________  [facility]]   Accepting Physician [address] Name, Cristofer Camargo   Sending  [facility] [address] Name, Cristofer Camargo   Bed Assignment 657   Report Given to [facility]   Transport Mode Ambulance   Level of Care Advanced life support      Follow-up Information    None         Discharge Medication List as of 3/10/2022  1:59 AM      CONTINUE these medications which have NOT CHANGED    Details   diazepam (VALIUM) 2 mg tablet Take 1 tablet (2 mg total) by mouth every 12 (twelve) hours as needed for muscle spasms, Starting Fri 1/21/2022, Normal      HYDROcodone-acetaminophen (Norco) 5-325 mg per tablet Take 1 tablet by mouth every 6 (six) hours as needed for pain Max Daily Amount: 4 tablets, Starting Fri 1/21/2022, Normal             No discharge procedures on file      PDMP Review     None          ED Provider  Electronically Signed by           Margie Michele DO  03/10/22 9258

## 2022-03-10 NOTE — EMTALA/ACUTE CARE TRANSFER
[address]  [facility] [address]  Dept: [phone]      EMTALA TRANSFER CONSENT    NAME Cholo Sood Sr                                          1965                              MRN [id number]    I have been informed of my rights regarding examination, treatment, and transfer   by Dr Aiden Pereyra DO    Benefits: Specialized equipment and/or services available at the receiving facility (Include comment)________________________ (Spine surgery)    Risks:        Consent for Transfer:  I acknowledge that my medical condition has been evaluated and explained to me by the emergency department physician or other qualified medical person and/or my attending physician, who has recommended that I be transferred to the service of  Accepting Physician: Dr Saurabh Serrano at [address]  Name, [address] : [facility], [place], [place]  The above potential benefits of such transfer, the potential risks associated with such transfer, and the probable risks of not being transferred have been explained to me, and I fully understand them  The doctor has explained that, in my case, the benefits of transfer outweigh the risks  I agree to be transferred  I authorize the performance of emergency medical procedures and treatments upon me in both transit and upon arrival at the receiving facility  Additionally, I authorize the release of any and all medical records to the receiving facility and request they be transported with me, if possible  I understand that the safest mode of transportation during a medical emergency is an ambulance and that the Hospital advocates the use of this mode of transport  Risks of traveling to the receiving facility by car, including absence of medical control, life sustaining equipment, such as oxygen, and medical personnel has been explained to me and I fully understand them      ([address])  [  ]  I consent to the stated transfer and to be transported by ambulance/helicopter  [  ]  I consent to the stated transfer, but refuse transportation by ambulance and accept full responsibility for my transportation by car  I understand the risks of non-ambulance transfers and I exonerate the Hospital and its staff from any deterioration in my condition that results from this refusal     X___________________________________________    DATE  22  TIME________  Signature of patient or legally responsible individual signing on patient behalf           RELATIONSHIP TO PATIENT_________________________          Provider Certification    NAME Radha Mcintosh                                           1965                              MRN [id number]    A medical screening exam was performed on the above named patient  Based on the examination:    Condition Necessitating Transfer The primary encounter diagnosis was Vertebral osteomyelitis, acute ([address] )  A diagnosis of Discitis of lumbar region was also pertinent to this visit      Patient Condition: The patient has been stabilized such that within reasonable medical probability, no material deterioration of the patient condition or the condition of the unborn child(gee) is likely to result from the transfer    Reason for Transfer: Level of Care needed not available at this facility    Transfer Requirements: Micaela Anaya [address] [place] [address]   · Space available and qualified personnel available for treatment as acknowledged by    · Agreed to accept transfer and to provide appropriate medical treatment as acknowledged by       Dr Mary Cramer  · Appropriate medical records of the examination and treatment of the patient are provided at the time of transfer   [address] _______  · Transfer will be performed by qualified personnel from    and appropriate transfer equipment as required, including the use of necessary and appropriate life support measures  Provider Certification: I have examined the patient and explained the following risks and benefits of being transferred/refusing transfer to the patient/family:         Based on these reasonable risks and benefits to the patient and/or the unborn child(gee), and based upon the information available at the time of the patients examination, I certify that the medical benefits reasonably to be expected from the provision of appropriate medical treatments at another medical facility outweigh the increasing risks, if any, to the individuals medical condition, and in the case of labor to the unborn child, from effecting the transfer      X____________________________________________ DATE 03/09/22        TIME_______      ORIGINAL - SEND TO MEDICAL RECORDS   COPY - SEND WITH PATIENT DURING TRANSFER

## 2022-03-10 NOTE — ED NOTES
Rounded on pt and pt stated that he fell trying to get back into bed after using the bedside toilet       Ana Maria Back RN  03/09/22 7760

## 2022-03-10 NOTE — ED NOTES
PT requesting Toradol, provider made aware   Pt made aware we are waiting on kidney function lab to come back      Sebastian Melgar, [address] [facility]  03/09/22 3115

## 2022-03-10 NOTE — ED NOTES
Ultrasound at bedside      Helio November, [address] [facility]  03/09/22 4655

## 2022-03-14 LAB
BACTERIA BLD CULT: ABNORMAL
BACTERIA BLD CULT: ABNORMAL
GRAM STN SPEC: ABNORMAL
GRAM STN SPEC: ABNORMAL

## 2022-06-20 ENCOUNTER — APPOINTMENT (EMERGENCY)
Dept: CT IMAGING | Facility: HOSPITAL | Age: 57
DRG: 682 | End: 2022-06-20
Payer: MEDICARE

## 2022-06-20 ENCOUNTER — APPOINTMENT (EMERGENCY)
Dept: RADIOLOGY | Facility: HOSPITAL | Age: 57
DRG: 682 | End: 2022-06-20
Payer: MEDICARE

## 2022-06-20 ENCOUNTER — HOSPITAL ENCOUNTER (INPATIENT)
Facility: HOSPITAL | Age: 57
LOS: 4 days | Discharge: HOME HEALTH | DRG: 682 | End: 2022-06-24
Attending: EMERGENCY MEDICINE | Admitting: FAMILY MEDICINE
Payer: MEDICARE

## 2022-06-20 DIAGNOSIS — Z97.8 FOLEY CATHETER IN PLACE: ICD-10-CM

## 2022-06-20 DIAGNOSIS — Z79.4 TYPE 2 DIABETES MELLITUS WITH OTHER SPECIFIED COMPLICATION, WITH LONG-TERM CURRENT USE OF INSULIN (HCC): ICD-10-CM

## 2022-06-20 DIAGNOSIS — M10.9 GOUT, UNSPECIFIED CAUSE, UNSPECIFIED CHRONICITY, UNSPECIFIED SITE: ICD-10-CM

## 2022-06-20 DIAGNOSIS — R33.9 URINARY RETENTION: ICD-10-CM

## 2022-06-20 DIAGNOSIS — R11.2 NAUSEA AND VOMITING: Primary | ICD-10-CM

## 2022-06-20 DIAGNOSIS — E11.69 TYPE 2 DIABETES MELLITUS WITH OTHER SPECIFIED COMPLICATION, WITH LONG-TERM CURRENT USE OF INSULIN (HCC): ICD-10-CM

## 2022-06-20 DIAGNOSIS — M25.552 HIP PAIN, ACUTE, LEFT: ICD-10-CM

## 2022-06-20 DIAGNOSIS — R33.9 RETENTION OF URINE: ICD-10-CM

## 2022-06-20 DIAGNOSIS — Z45.2 PICC (PERIPHERALLY INSERTED CENTRAL CATHETER) REMOVAL: ICD-10-CM

## 2022-06-20 DIAGNOSIS — M46.26 OSTEOMYELITIS OF VERTEBRA OF LUMBAR REGION (HCC): ICD-10-CM

## 2022-06-20 DIAGNOSIS — N18.9 CHRONIC KIDNEY DISEASE, UNSPECIFIED CKD STAGE: ICD-10-CM

## 2022-06-20 DIAGNOSIS — E87.5 HYPERKALEMIA: ICD-10-CM

## 2022-06-20 DIAGNOSIS — N17.9 AKI (ACUTE KIDNEY INJURY) (HCC): ICD-10-CM

## 2022-06-20 PROBLEM — K52.9 GASTROENTERITIS: Status: ACTIVE | Noted: 2022-06-20

## 2022-06-20 LAB
2HR DELTA HS TROPONIN: 1 NG/L
4HR DELTA HS TROPONIN: 0 NG/L
ALBUMIN SERPL BCP-MCNC: 2.8 G/DL (ref 3.5–5)
ALBUMIN SERPL BCP-MCNC: 3.2 G/DL (ref 3.5–5)
ALP SERPL-CCNC: 109 U/L (ref 46–116)
ALP SERPL-CCNC: 138 U/L (ref 46–116)
ALT SERPL W P-5'-P-CCNC: 24 U/L (ref 12–78)
ALT SERPL W P-5'-P-CCNC: 29 U/L (ref 12–78)
AMORPH URATE CRY URNS QL MICRO: NORMAL /HPF
ANION GAP SERPL CALCULATED.3IONS-SCNC: 9 MMOL/L (ref 4–13)
ANION GAP SERPL CALCULATED.3IONS-SCNC: 9 MMOL/L (ref 4–13)
APTT PPP: 29 SECONDS (ref 23–37)
AST SERPL W P-5'-P-CCNC: 21 U/L (ref 5–45)
AST SERPL W P-5'-P-CCNC: 23 U/L (ref 5–45)
BACTERIA UR QL AUTO: NORMAL /HPF
BASE EX.OXY STD BLDV CALC-SCNC: 74.8 % (ref 60–80)
BASE EXCESS BLDV CALC-SCNC: -0.7 MMOL/L
BASOPHILS # BLD AUTO: 0.07 THOUSANDS/ΜL (ref 0–0.1)
BASOPHILS NFR BLD AUTO: 1 % (ref 0–1)
BILIRUB SERPL-MCNC: 0.32 MG/DL (ref 0.2–1)
BILIRUB SERPL-MCNC: 0.32 MG/DL (ref 0.2–1)
BILIRUB UR QL STRIP: NEGATIVE
BUN SERPL-MCNC: 54 MG/DL (ref 5–25)
BUN SERPL-MCNC: 56 MG/DL (ref 5–25)
CALCIUM ALBUM COR SERPL-MCNC: 10.9 MG/DL (ref 8.3–10.1)
CALCIUM ALBUM COR SERPL-MCNC: 11.6 MG/DL (ref 8.3–10.1)
CALCIUM SERPL-MCNC: 11 MG/DL (ref 8.3–10.1)
CALCIUM SERPL-MCNC: 9.9 MG/DL (ref 8.3–10.1)
CARDIAC TROPONIN I PNL SERPL HS: 2 NG/L
CARDIAC TROPONIN I PNL SERPL HS: 2 NG/L
CARDIAC TROPONIN I PNL SERPL HS: 3 NG/L
CHLORIDE SERPL-SCNC: 102 MMOL/L (ref 100–108)
CHLORIDE SERPL-SCNC: 99 MMOL/L (ref 100–108)
CK SERPL-CCNC: 30 U/L (ref 39–308)
CLARITY UR: CLEAR
CO2 SERPL-SCNC: 23 MMOL/L (ref 21–32)
CO2 SERPL-SCNC: 25 MMOL/L (ref 21–32)
COLOR UR: YELLOW
CREAT SERPL-MCNC: 3.89 MG/DL (ref 0.6–1.3)
CREAT SERPL-MCNC: 4.06 MG/DL (ref 0.6–1.3)
CREAT UR-MCNC: 24.6 MG/DL
EOSINOPHIL # BLD AUTO: 0.35 THOUSAND/ΜL (ref 0–0.61)
EOSINOPHIL NFR BLD AUTO: 5 % (ref 0–6)
ERYTHROCYTE [DISTWIDTH] IN BLOOD BY AUTOMATED COUNT: 15.9 % (ref 11.6–15.1)
FLUAV RNA RESP QL NAA+PROBE: NEGATIVE
FLUBV RNA RESP QL NAA+PROBE: NEGATIVE
GFR SERPL CREATININE-BSD FRML MDRD: 15 ML/MIN/1.73SQ M
GFR SERPL CREATININE-BSD FRML MDRD: 16 ML/MIN/1.73SQ M
GLUCOSE SERPL-MCNC: 173 MG/DL (ref 65–140)
GLUCOSE SERPL-MCNC: 210 MG/DL (ref 65–140)
GLUCOSE SERPL-MCNC: 217 MG/DL (ref 65–140)
GLUCOSE UR STRIP-MCNC: NEGATIVE MG/DL
HCO3 BLDV-SCNC: 23.9 MMOL/L (ref 24–30)
HCT VFR BLD AUTO: 32.1 % (ref 36.5–49.3)
HGB BLD-MCNC: 9.8 G/DL (ref 12–17)
HGB UR QL STRIP.AUTO: NEGATIVE
IMM GRANULOCYTES # BLD AUTO: 0.03 THOUSAND/UL (ref 0–0.2)
IMM GRANULOCYTES NFR BLD AUTO: 0 % (ref 0–2)
INR PPP: 0.97 (ref 0.84–1.19)
KETONES UR STRIP-MCNC: NEGATIVE MG/DL
LACTATE SERPL-SCNC: 1.1 MMOL/L (ref 0.5–2)
LEUKOCYTE ESTERASE UR QL STRIP: NEGATIVE
LIPASE SERPL-CCNC: 70 U/L (ref 73–393)
LYMPHOCYTES # BLD AUTO: 1.49 THOUSANDS/ΜL (ref 0.6–4.47)
LYMPHOCYTES NFR BLD AUTO: 20 % (ref 14–44)
MAGNESIUM SERPL-MCNC: 2.4 MG/DL (ref 1.6–2.6)
MCH RBC QN AUTO: 28 PG (ref 26.8–34.3)
MCHC RBC AUTO-ENTMCNC: 30.5 G/DL (ref 31.4–37.4)
MCV RBC AUTO: 92 FL (ref 82–98)
MICROALBUMIN UR-MCNC: 522 MG/L (ref 0–20)
MICROALBUMIN/CREAT 24H UR: 2122 MG/G CREATININE (ref 0–30)
MONOCYTES # BLD AUTO: 0.77 THOUSAND/ΜL (ref 0.17–1.22)
MONOCYTES NFR BLD AUTO: 10 % (ref 4–12)
NEUTROPHILS # BLD AUTO: 4.76 THOUSANDS/ΜL (ref 1.85–7.62)
NEUTS SEG NFR BLD AUTO: 64 % (ref 43–75)
NITRITE UR QL STRIP: NEGATIVE
NON-SQ EPI CELLS URNS QL MICRO: NORMAL /HPF
NRBC BLD AUTO-RTO: 0 /100 WBCS
O2 CT BLDV-SCNC: 11.5 ML/DL
OSMOLALITY UR: 338 MMOL/KG
PCO2 BLDV: 39.5 MM HG (ref 42–50)
PH BLDV: 7.4 [PH] (ref 7.3–7.4)
PH UR STRIP.AUTO: 8 [PH]
PLATELET # BLD AUTO: 243 THOUSANDS/UL (ref 149–390)
PLATELET # BLD AUTO: 266 THOUSANDS/UL (ref 149–390)
PMV BLD AUTO: 10.9 FL (ref 8.9–12.7)
PMV BLD AUTO: 10.9 FL (ref 8.9–12.7)
PO2 BLDV: 41.6 MM HG (ref 35–45)
POTASSIUM SERPL-SCNC: 4.9 MMOL/L (ref 3.5–5.3)
POTASSIUM SERPL-SCNC: 5 MMOL/L (ref 3.5–5.3)
POTASSIUM SERPL-SCNC: 6 MMOL/L (ref 3.5–5.3)
PROCALCITONIN SERPL-MCNC: 0.3 NG/ML
PROT SERPL-MCNC: 6.2 G/DL (ref 6.4–8.2)
PROT SERPL-MCNC: 7.3 G/DL (ref 6.4–8.2)
PROT UR STRIP-MCNC: ABNORMAL MG/DL
PROTHROMBIN TIME: 12.8 SECONDS (ref 11.6–14.5)
RBC # BLD AUTO: 3.5 MILLION/UL (ref 3.88–5.62)
RBC #/AREA URNS AUTO: NORMAL /HPF
RSV RNA RESP QL NAA+PROBE: NEGATIVE
SARS-COV-2 RNA RESP QL NAA+PROBE: NEGATIVE
SODIUM 24H UR-SCNC: 93 MOL/L
SODIUM SERPL-SCNC: 133 MMOL/L (ref 136–145)
SODIUM SERPL-SCNC: 134 MMOL/L (ref 136–145)
SP GR UR STRIP.AUTO: 1.02 (ref 1–1.03)
UROBILINOGEN UR QL STRIP.AUTO: 0.2 E.U./DL
WBC # BLD AUTO: 7.47 THOUSAND/UL (ref 4.31–10.16)
WBC #/AREA URNS AUTO: NORMAL /HPF

## 2022-06-20 PROCEDURE — 99285 EMERGENCY DEPT VISIT HI MDM: CPT

## 2022-06-20 PROCEDURE — 96375 TX/PRO/DX INJ NEW DRUG ADDON: CPT

## 2022-06-20 PROCEDURE — 84484 ASSAY OF TROPONIN QUANT: CPT | Performed by: PHYSICIAN ASSISTANT

## 2022-06-20 PROCEDURE — 85730 THROMBOPLASTIN TIME PARTIAL: CPT | Performed by: PHYSICIAN ASSISTANT

## 2022-06-20 PROCEDURE — 96365 THER/PROPH/DIAG IV INF INIT: CPT

## 2022-06-20 PROCEDURE — 94760 N-INVAS EAR/PLS OXIMETRY 1: CPT

## 2022-06-20 PROCEDURE — 36415 COLL VENOUS BLD VENIPUNCTURE: CPT | Performed by: PHYSICIAN ASSISTANT

## 2022-06-20 PROCEDURE — 96361 HYDRATE IV INFUSION ADD-ON: CPT

## 2022-06-20 PROCEDURE — 99285 EMERGENCY DEPT VISIT HI MDM: CPT | Performed by: PHYSICIAN ASSISTANT

## 2022-06-20 PROCEDURE — G1004 CDSM NDSC: HCPCS

## 2022-06-20 PROCEDURE — 84145 PROCALCITONIN (PCT): CPT | Performed by: PHYSICIAN ASSISTANT

## 2022-06-20 PROCEDURE — 82043 UR ALBUMIN QUANTITATIVE: CPT | Performed by: STUDENT IN AN ORGANIZED HEALTH CARE EDUCATION/TRAINING PROGRAM

## 2022-06-20 PROCEDURE — 84300 ASSAY OF URINE SODIUM: CPT | Performed by: STUDENT IN AN ORGANIZED HEALTH CARE EDUCATION/TRAINING PROGRAM

## 2022-06-20 PROCEDURE — 82805 BLOOD GASES W/O2 SATURATION: CPT | Performed by: PHYSICIAN ASSISTANT

## 2022-06-20 PROCEDURE — 0241U HB NFCT DS VIR RESP RNA 4 TRGT: CPT | Performed by: PHYSICIAN ASSISTANT

## 2022-06-20 PROCEDURE — 82948 REAGENT STRIP/BLOOD GLUCOSE: CPT

## 2022-06-20 PROCEDURE — 80053 COMPREHEN METABOLIC PANEL: CPT | Performed by: PHYSICIAN ASSISTANT

## 2022-06-20 PROCEDURE — 85049 AUTOMATED PLATELET COUNT: CPT | Performed by: STUDENT IN AN ORGANIZED HEALTH CARE EDUCATION/TRAINING PROGRAM

## 2022-06-20 PROCEDURE — 96372 THER/PROPH/DIAG INJ SC/IM: CPT

## 2022-06-20 PROCEDURE — 82550 ASSAY OF CK (CPK): CPT | Performed by: PHYSICIAN ASSISTANT

## 2022-06-20 PROCEDURE — 99223 1ST HOSP IP/OBS HIGH 75: CPT | Performed by: STUDENT IN AN ORGANIZED HEALTH CARE EDUCATION/TRAINING PROGRAM

## 2022-06-20 PROCEDURE — 71045 X-RAY EXAM CHEST 1 VIEW: CPT

## 2022-06-20 PROCEDURE — 83935 ASSAY OF URINE OSMOLALITY: CPT | Performed by: STUDENT IN AN ORGANIZED HEALTH CARE EDUCATION/TRAINING PROGRAM

## 2022-06-20 PROCEDURE — 85025 COMPLETE CBC W/AUTO DIFF WBC: CPT | Performed by: PHYSICIAN ASSISTANT

## 2022-06-20 PROCEDURE — 74176 CT ABD & PELVIS W/O CONTRAST: CPT

## 2022-06-20 PROCEDURE — 83735 ASSAY OF MAGNESIUM: CPT | Performed by: PHYSICIAN ASSISTANT

## 2022-06-20 PROCEDURE — 83690 ASSAY OF LIPASE: CPT | Performed by: PHYSICIAN ASSISTANT

## 2022-06-20 PROCEDURE — 81001 URINALYSIS AUTO W/SCOPE: CPT | Performed by: PHYSICIAN ASSISTANT

## 2022-06-20 PROCEDURE — 84132 ASSAY OF SERUM POTASSIUM: CPT | Performed by: STUDENT IN AN ORGANIZED HEALTH CARE EDUCATION/TRAINING PROGRAM

## 2022-06-20 PROCEDURE — 96374 THER/PROPH/DIAG INJ IV PUSH: CPT

## 2022-06-20 PROCEDURE — 94640 AIRWAY INHALATION TREATMENT: CPT

## 2022-06-20 PROCEDURE — 82570 ASSAY OF URINE CREATININE: CPT | Performed by: STUDENT IN AN ORGANIZED HEALTH CARE EDUCATION/TRAINING PROGRAM

## 2022-06-20 PROCEDURE — 93005 ELECTROCARDIOGRAM TRACING: CPT

## 2022-06-20 PROCEDURE — 83605 ASSAY OF LACTIC ACID: CPT | Performed by: PHYSICIAN ASSISTANT

## 2022-06-20 PROCEDURE — 0T9B70Z DRAINAGE OF BLADDER WITH DRAINAGE DEVICE, VIA NATURAL OR ARTIFICIAL OPENING: ICD-10-PCS | Performed by: FAMILY MEDICINE

## 2022-06-20 PROCEDURE — 87040 BLOOD CULTURE FOR BACTERIA: CPT | Performed by: PHYSICIAN ASSISTANT

## 2022-06-20 PROCEDURE — 85610 PROTHROMBIN TIME: CPT | Performed by: PHYSICIAN ASSISTANT

## 2022-06-20 RX ORDER — ALLOPURINOL 100 MG/1
200 TABLET ORAL DAILY
Status: ON HOLD | COMMUNITY
Start: 2021-11-30 | End: 2022-06-24 | Stop reason: SDUPTHER

## 2022-06-20 RX ORDER — OXYCODONE HYDROCHLORIDE 5 MG/1
5 CAPSULE ORAL EVERY 4 HOURS PRN
COMMUNITY
End: 2022-06-24

## 2022-06-20 RX ORDER — PREGABALIN 75 MG/1
75 CAPSULE ORAL 2 TIMES DAILY
Status: DISCONTINUED | OUTPATIENT
Start: 2022-06-21 | End: 2022-06-24 | Stop reason: HOSPADM

## 2022-06-20 RX ORDER — PREGABALIN 150 MG/1
150 CAPSULE ORAL 2 TIMES DAILY
COMMUNITY
Start: 2022-03-16

## 2022-06-20 RX ORDER — INSULIN GLARGINE 100 [IU]/ML
14 INJECTION, SOLUTION SUBCUTANEOUS 2 TIMES DAILY
Status: DISCONTINUED | OUTPATIENT
Start: 2022-06-21 | End: 2022-06-24 | Stop reason: HOSPADM

## 2022-06-20 RX ORDER — ALLOPURINOL 100 MG/1
200 TABLET ORAL DAILY
Status: DISCONTINUED | OUTPATIENT
Start: 2022-06-21 | End: 2022-06-24 | Stop reason: HOSPADM

## 2022-06-20 RX ORDER — ATORVASTATIN CALCIUM 40 MG/1
40 TABLET, FILM COATED ORAL
Status: DISCONTINUED | OUTPATIENT
Start: 2022-06-21 | End: 2022-06-24 | Stop reason: HOSPADM

## 2022-06-20 RX ORDER — ASPIRIN 81 MG/1
81 TABLET ORAL DAILY
COMMUNITY
Start: 2022-01-26 | End: 2022-06-24

## 2022-06-20 RX ORDER — ATORVASTATIN CALCIUM 40 MG/1
40 TABLET, FILM COATED ORAL
COMMUNITY
Start: 2022-01-03

## 2022-06-20 RX ORDER — OXYCODONE HYDROCHLORIDE 5 MG/1
5 TABLET ORAL EVERY 4 HOURS PRN
Status: DISCONTINUED | OUTPATIENT
Start: 2022-06-20 | End: 2022-06-24 | Stop reason: HOSPADM

## 2022-06-20 RX ORDER — ONDANSETRON 2 MG/ML
4 INJECTION INTRAMUSCULAR; INTRAVENOUS ONCE
Status: DISCONTINUED | OUTPATIENT
Start: 2022-06-20 | End: 2022-06-24 | Stop reason: HOSPADM

## 2022-06-20 RX ORDER — HEPARIN SODIUM 5000 [USP'U]/ML
5000 INJECTION, SOLUTION INTRAVENOUS; SUBCUTANEOUS EVERY 8 HOURS SCHEDULED
Status: DISCONTINUED | OUTPATIENT
Start: 2022-06-20 | End: 2022-06-24 | Stop reason: HOSPADM

## 2022-06-20 RX ORDER — TAMSULOSIN HYDROCHLORIDE 0.4 MG/1
CAPSULE ORAL
COMMUNITY
Start: 2022-05-26

## 2022-06-20 RX ORDER — DEXTROSE MONOHYDRATE 25 G/50ML
50 INJECTION, SOLUTION INTRAVENOUS ONCE
Status: COMPLETED | OUTPATIENT
Start: 2022-06-20 | End: 2022-06-20

## 2022-06-20 RX ORDER — SODIUM CHLORIDE 9 MG/ML
100 INJECTION, SOLUTION INTRAVENOUS CONTINUOUS
Status: DISCONTINUED | OUTPATIENT
Start: 2022-06-20 | End: 2022-06-21

## 2022-06-20 RX ORDER — LOSARTAN POTASSIUM 50 MG/1
25 TABLET ORAL DAILY
COMMUNITY
Start: 2022-03-17 | End: 2022-06-24

## 2022-06-20 RX ORDER — AMLODIPINE BESYLATE 10 MG/1
1 TABLET ORAL DAILY
COMMUNITY
Start: 2022-02-22

## 2022-06-20 RX ORDER — INSULIN GLARGINE 100 [IU]/ML
14 INJECTION, SOLUTION SUBCUTANEOUS 2 TIMES DAILY
COMMUNITY
Start: 2022-03-31 | End: 2022-06-24

## 2022-06-20 RX ORDER — AMLODIPINE BESYLATE 10 MG/1
10 TABLET ORAL DAILY
Status: DISCONTINUED | OUTPATIENT
Start: 2022-06-21 | End: 2022-06-24 | Stop reason: HOSPADM

## 2022-06-20 RX ORDER — COLCHICINE 0.6 MG/1
0.6 TABLET ORAL DAILY
COMMUNITY
End: 2022-06-24

## 2022-06-20 RX ORDER — ACETAMINOPHEN 325 MG/1
650 TABLET ORAL EVERY 4 HOURS PRN
COMMUNITY

## 2022-06-20 RX ORDER — INSULIN LISPRO 100 [IU]/ML
1-5 INJECTION, SOLUTION INTRAVENOUS; SUBCUTANEOUS
Status: DISCONTINUED | OUTPATIENT
Start: 2022-06-20 | End: 2022-06-24 | Stop reason: HOSPADM

## 2022-06-20 RX ORDER — FUROSEMIDE 10 MG/ML
20 INJECTION INTRAMUSCULAR; INTRAVENOUS ONCE
Status: COMPLETED | OUTPATIENT
Start: 2022-06-20 | End: 2022-06-20

## 2022-06-20 RX ORDER — TAMSULOSIN HYDROCHLORIDE 0.4 MG/1
0.4 CAPSULE ORAL
Status: DISCONTINUED | OUTPATIENT
Start: 2022-06-21 | End: 2022-06-24 | Stop reason: HOSPADM

## 2022-06-20 RX ORDER — TRAMADOL HYDROCHLORIDE 50 MG/1
50 TABLET ORAL 2 TIMES DAILY PRN
Status: DISCONTINUED | OUTPATIENT
Start: 2022-06-20 | End: 2022-06-24 | Stop reason: HOSPADM

## 2022-06-20 RX ORDER — ASPIRIN 81 MG/1
81 TABLET ORAL DAILY
Status: DISCONTINUED | OUTPATIENT
Start: 2022-06-21 | End: 2022-06-24 | Stop reason: HOSPADM

## 2022-06-20 RX ORDER — TRAMADOL HYDROCHLORIDE 50 MG/1
50 TABLET ORAL EVERY 4 HOURS PRN
COMMUNITY
End: 2022-06-24

## 2022-06-20 RX ORDER — SODIUM CHLORIDE 9 MG/ML
125 INJECTION, SOLUTION INTRAVENOUS CONTINUOUS
Status: DISCONTINUED | OUTPATIENT
Start: 2022-06-20 | End: 2022-06-20

## 2022-06-20 RX ADMIN — INSULIN LISPRO 2 UNITS: 100 INJECTION, SOLUTION INTRAVENOUS; SUBCUTANEOUS at 22:36

## 2022-06-20 RX ADMIN — OXYCODONE HYDROCHLORIDE 5 MG: 5 TABLET ORAL at 22:36

## 2022-06-20 RX ADMIN — DEXTROSE MONOHYDRATE 50 ML: 25 INJECTION, SOLUTION INTRAVENOUS at 15:34

## 2022-06-20 RX ADMIN — HEPARIN SODIUM 5000 UNITS: 5000 INJECTION INTRAVENOUS; SUBCUTANEOUS at 22:38

## 2022-06-20 RX ADMIN — INSULIN LISPRO 2 UNITS: 100 INJECTION, SOLUTION INTRAVENOUS; SUBCUTANEOUS at 17:10

## 2022-06-20 RX ADMIN — SODIUM CHLORIDE 1000 ML: 0.9 INJECTION, SOLUTION INTRAVENOUS at 15:31

## 2022-06-20 RX ADMIN — ALBUTEROL SULFATE 10 MG: 2.5 SOLUTION RESPIRATORY (INHALATION) at 15:25

## 2022-06-20 RX ADMIN — HEPARIN SODIUM 5000 UNITS: 5000 INJECTION INTRAVENOUS; SUBCUTANEOUS at 17:11

## 2022-06-20 RX ADMIN — PIPERACILLIN AND TAZOBACTAM 3.38 G: 36; 4.5 INJECTION, POWDER, FOR SOLUTION INTRAVENOUS at 15:32

## 2022-06-20 RX ADMIN — FUROSEMIDE 20 MG: 10 INJECTION, SOLUTION INTRAVENOUS at 15:34

## 2022-06-20 RX ADMIN — SODIUM CHLORIDE 100 ML/HR: 0.9 INJECTION, SOLUTION INTRAVENOUS at 17:11

## 2022-06-20 RX ADMIN — INSULIN HUMAN 10 UNITS: 100 INJECTION, SOLUTION PARENTERAL at 15:29

## 2022-06-20 RX ADMIN — SODIUM CHLORIDE 1000 ML: 0.9 INJECTION, SOLUTION INTRAVENOUS at 13:48

## 2022-06-20 NOTE — ASSESSMENT & PLAN NOTE
Potassium on admission noted to be 6 0  Patient received albuterol inhaler, insulin 10 units, and Lasix 20 mg IV in the ED  Repeat potassium noted to be a 5 0  Continue trending potassium every 4 hours until normal x2  Telemetry

## 2022-06-20 NOTE — ASSESSMENT & PLAN NOTE
Patient presents to the ED for nausea vomiting x1 and diarrhea x2 over the last 24 hours with associated chills and subjective fevers  CT abdomen pelvis without contrast done in the ED negative for acute GI findings    Follow-up blood cultures  Follow-up C diff and stool parasite PCR  Patient received 1 dose of Zosyn in the ED  Infectious workup so for including viral PCR, COVID, urinalysis, chest x-ray, CT abdomen negative for source of infection  No leukocytosis, afebrile, will hold off on further antibiotics for now

## 2022-06-20 NOTE — ED PROVIDER NOTES
History  Chief Complaint   Patient presents with    Nausea     Nausea, vomiting and diarrhea since yesterday  Pt due to have PICC line pulled 1 week ago but did not go to appt      The patient is a 62year old male with a past medical history of diabetes, hypertension, who presents emergency department today with the chief complaint of generalized weakness with nausea vomiting diarrhea since yesterday via EMS from home  Recent discharge from [address] [place] [address] 6/13  Patient has a history of L5/S1 discitis, osteomyletlitis  Patient was admitted to [address] [place] [address] from 4/, patient was found to a right paraspinal abscess from L1-L3 and diskitis/osteomyelitis from L5-S1 patient also had associated epidural abscess  Patient was ultimately discharged with central line to continue vancomycin until 6/13    Patient was then admitted again from 6/9-6/13, patient was found to have KELLY  Patient was switched from vancomycin to daptomycin secondary to worsening kidney function  Patient was then discharged home  Patient is scheduled tomorrow to have central line removed from IR    Came in today via EMS, spouse states last 2 days he has had multiple episodes nausea with vomiting, diarrhea  Overwhelmingly weak today  Complaining of feeling cold and having chills  Denies any abdominal pain , chest pain, back pain, shortness of breath  Admits to having some leg cramping  Patient states he has been able to urinate at home  History provided by:  Patient and EMS personnel  Nausea  The primary symptoms include nausea and vomiting  Primary symptoms do not include fever, weight loss, fatigue, abdominal pain, diarrhea, melena, hematemesis, jaundice, hematochezia, dysuria, myalgias, arthralgias or rash  The illness began yesterday  The onset was gradual  The problem has not changed since onset  Nausea began yesterday  The vomiting began yesterday  Vomiting occurs 2 to 5 times per day   The emesis contains stomach contents, undigested food and bilious material    The illness is also significant for chills  The illness does not include anorexia, dysphagia, odynophagia, bloating, constipation or back pain  Prior to Admission Medications   Prescriptions Last Dose Informant Patient Reported? Taking? HYDROcodone-acetaminophen (Norco) 5-325 mg per tablet   No No   Sig: Take 1 tablet by mouth every 6 (six) hours as needed for pain Max Daily Amount: 4 tablets   diazepam (VALIUM) 2 mg tablet   No No   Sig: Take 1 tablet (2 mg total) by mouth every 12 (twelve) hours as needed for muscle spasms      Facility-Administered Medications: None       Past Medical History:   Diagnosis Date    Diabetes mellitus ([facility] [address] )     Gout     Hypertension     Renal disorder     Stroke [facility])        Past Surgical History:   Procedure Laterality Date    BACK SURGERY         History reviewed  No pertinent family history  I have reviewed and agree with the history as documented  E-Cigarette/Vaping     E-Cigarette/Vaping Substances     Social History     Tobacco Use    Smoking status: Current Every Day Smoker     Packs/day: 0 50     Types: Cigarettes    Smokeless tobacco: Never Used   Substance Use Topics    Alcohol use: Not Currently    Drug use: Yes     Types: Marijuana       Review of Systems   Constitutional: Positive for chills  Negative for fatigue, fever and weight loss  Gastrointestinal: Positive for nausea and vomiting  Negative for abdominal pain, anorexia, bloating, constipation, diarrhea, dysphagia, hematemesis, hematochezia, jaundice and melena  Genitourinary: Negative for dysuria  Musculoskeletal: Negative for arthralgias, back pain and myalgias  Skin: Negative for rash  All other systems reviewed and are negative  Physical Exam  Physical Exam  Vitals and nursing note reviewed  Constitutional:       Appearance: He is well-developed and underweight  He is ill-appearing  HENT:      Head: Normocephalic and atraumatic        Mouth/Throat: Mouth: Mucous membranes are dry  Eyes:      Extraocular Movements: Extraocular movements intact  Conjunctiva/sclera: Conjunctivae normal       Pupils: Pupils are equal, round, and reactive to light  Cardiovascular:      Rate and Rhythm: Normal rate and regular rhythm  Pulses: Normal pulses  Heart sounds: No murmur heard  Pulmonary:      Effort: Pulmonary effort is normal  No respiratory distress  Breath sounds: Normal breath sounds  Abdominal:      Palpations: Abdomen is soft  Tenderness: There is no abdominal tenderness  Musculoskeletal:      Cervical back: Neck supple  Right lower leg: No edema  Left lower leg: No edema  Skin:     General: Skin is warm and dry  Capillary Refill: Capillary refill takes less than 2 seconds  Comments: Surgical incision healed well    Neurological:      General: No focal deficit present  Mental Status: He is alert and oriented to person, place, and time           Vital Signs  ED Triage Vitals [06/20/22 1335]   Temperature Pulse Respirations Blood Pressure SpO2   97 5 °F (36 4 °C) 81 16 (!) 182/88 98 %      Temp Source Heart Rate Source Patient Position - Orthostatic VS BP Location FiO2 (%)   Temporal Monitor Sitting Left arm --      Pain Score       7           Vitals:    06/20/22 1335 06/20/22 1350 06/20/22 1400   BP: (!) 182/88 166/84 156/93   Pulse: 81 78 81   Patient Position - Orthostatic VS: Sitting           Visual Acuity      ED Medications  Medications   ondansetron (ZOFRAN) injection 4 mg (0 mg Intravenous Hold 6/20/22 1350)   sodium chloride 0 9 % bolus 1,000 mL (1,000 mL Intravenous New Bag 6/20/22 1531)   sodium chloride 0 9 % bolus 1,000 mL (0 mL Intravenous Stopped 6/20/22 1528)   piperacillin-tazobactam (ZOSYN) 3 375 g in sodium chloride 0 9 % 100 mL IVPB (3 375 g Intravenous New Bag 6/20/22 1532)   furosemide (LASIX) injection 20 mg (20 mg Intravenous Given 6/20/22 1534)   albuterol inhalation solution 10 mg (10 mg Nebulization Given 6/20/22 1525)   insulin regular (HumuLIN R,NovoLIN R) injection 10 Units (10 Units Subcutaneous Given 6/20/22 1529)   dextrose 50 % IV solution 50 mL (50 mL Intravenous Given 6/20/22 1534)       Diagnostic Studies  Results Reviewed     Procedure Component Value Units Date/Time    Sodium, urine, random [[id number]] Collected: 06/20/22 1343    Lab Status: In process Specimen: Urine Updated: 06/20/22 1612    Osmolality, urine [[id number]] Collected: 06/20/22 1343    Lab Status: In process Specimen: Urine Updated: 06/20/22 1612    Microalbumin / creatinine urine ratio [[id number]] Collected: 06/20/22 1343    Lab Status:  In process Specimen: Urine Updated: 06/20/22 1612    Urine Microscopic [[id number]] Collected: 06/20/22 1343    Lab Status: Final result Specimen: Urine Updated: 06/20/22 1611     RBC, UA 1-2 /hpf      WBC, UA 0-5 /hpf      Epithelial Cells Occasional /hpf      Bacteria, UA Occasional /hpf      AMORPH URATES Occasional /hpf     Comprehensive metabolic panel [[id number]]     Lab Status: No result Specimen: Blood     HS Troponin I 4hr [[id number]]     Lab Status: No result Specimen: Blood     UA w Reflex to Microscopic w Reflex to Culture [[id number]]  (Abnormal) Collected: 06/20/22 1343    Lab Status: Final result Specimen: Urine Updated: 06/20/22 1533     Color, UA Yellow     Clarity, UA Clear     Specific [place], UA 1 020     pH, UA 8 0     Leukocytes, UA Negative     Nitrite, UA Negative     Protein,  (2+) mg/dl      Glucose, UA Negative mg/dl      Ketones, UA Negative mg/dl      Urobilinogen, UA 0 2 E U /dl      Bilirubin, UA Negative     Blood, UA Negative    Procalcitonin [[id number]]  (Abnormal) Collected: 06/20/22 1338    Lab Status: Final result Specimen: Blood from Arm, Right Updated: 06/20/22 1432     Procalcitonin 0 30 ng/ml     COVID/FLU/RSV - 2 hour TAT [[id number]]  (Normal) Collected: 06/20/22 1338    Lab Status: Final result Specimen: Nares from Nose Updated: 06/20/22 1430     SARS-CoV-2 Negative     INFLUENZA A PCR Negative     INFLUENZA B PCR Negative     RSV PCR Negative    Narrative:      FOR PEDIATRIC PATIENTS - copy/paste COVID Guidelines URL to browser: [email or web address]  [email or web address]    SARS-CoV-2 assay is a Nucleic Acid Amplification assay intended for the  qualitative detection of nucleic acid from SARS-CoV-2 in nasopharyngeal  swabs  Results are for the presumptive identification of SARS-CoV-2 RNA  Positive results are indicative of infection with SARS-CoV-2, the virus  causing COVID-19, but do not rule out bacterial infection or co-infection  with other viruses  Laboratories within the [place] and its  territories are required to report all positive results to the appropriate  public health authorities  Negative results do not preclude SARS-CoV-2  infection and should not be used as the sole basis for treatment or other  patient management decisions  Negative results must be combined with  clinical observations, patient history, and epidemiological information  This test has not been FDA cleared or approved  This test has been authorized by FDA under an Emergency Use Authorization  (EUA)  This test is only authorized for the duration of time the  declaration that circumstances exist justifying the authorization of the  emergency use of an in vitro diagnostic tests for detection of SARS-CoV-2  virus and/or diagnosis of COVID-19 infection under section 564(b)(1) of  the Act, 21 U  S C  201BGY-3(B)(1), unless the authorization is terminated  or revoked sooner  The test has been validated but independent review by FDA  and CLIA is pending  Test performed using [employer] GeneXpert: This RT-PCR assay targets N2,  a region unique to SARS-CoV-2  A conserved region in the E-gene was chosen  for pan-Sarbecovirus detection which includes SARS-CoV-2      Comprehensive metabolic panel [[id number]]  (Abnormal) Collected: 06/20/22 1338    Lab Status: Final result Specimen: Blood from Arm, Left Updated: 06/20/22 1417     Sodium 133 mmol/L      Potassium 6 0 mmol/L      Chloride 99 mmol/L      CO2 25 mmol/L      ANION GAP 9 mmol/L      BUN 56 mg/dL      Creatinine 4 06 mg/dL      Glucose 173 mg/dL      Calcium 11 0 mg/dL      Corrected Calcium 11 6 mg/dL      AST 23 U/L      ALT 29 U/L      Alkaline Phosphatase 138 U/L      Total Protein 7 3 g/dL      Albumin 3 2 g/dL      Total Bilirubin 0 32 mg/dL      eGFR 15 ml/min/1 73sq m     Narrative:      National Kidney Disease Foundation guidelines for Chronic Kidney Disease (CKD):     Stage 1 with normal or high GFR (GFR > 90 mL/min/1 73 square meters)    Stage 2 Mild CKD (GFR = 60-89 mL/min/1 73 square meters)    Stage 3A Moderate CKD (GFR = 45-59 mL/min/1 73 square meters)    Stage 3B Moderate CKD (GFR = 30-44 mL/min/1 73 square meters)    Stage 4 Severe CKD (GFR = 15-29 mL/min/1 73 square meters)    Stage 5 End Stage CKD (GFR <15 mL/min/1 73 square meters)  Note: GFR calculation is accurate only with a steady state creatinine    Magnesium [[id number]]  (Normal) Collected: 06/20/22 1338    Lab Status: Final result Specimen: Blood from Arm, Left Updated: 06/20/22 1417     Magnesium 2 4 mg/dL     Lipase [[id number]]  (Abnormal) Collected: 06/20/22 1338    Lab Status: Final result Specimen: Blood from Arm, Left Updated: 06/20/22 1417     Lipase 70 u/L     HS Troponin I 2hr [[id number]]     Lab Status: No result Specimen: Blood     HS Troponin 0hr (reflex protocol) [[id number]]  (Normal) Collected: 06/20/22 1338    Lab Status: Final result Specimen: Blood from Arm, Right Updated: 06/20/22 1412     hs TnI 0hr 2 ng/L     Lactic acid [[id number]]  (Normal) Collected: 06/20/22 1338    Lab Status: Final result Specimen: Blood from Arm, Right Updated: 06/20/22 1408     LACTIC ACID 1 1 mmol/L     Narrative:      Result may be elevated if tourniquet was used during collection  John Short [[id number]]  (Normal) Collected: 06/20/22 1338    Lab Status: Final result Specimen: Blood from Arm, Right Updated: 06/20/22 1400     Protime 12 8 seconds      INR 0 97    APTT [[id number]]  (Normal) Collected: 06/20/22 1338    Lab Status: Final result Specimen: Blood from Arm, Right Updated: 06/20/22 1400     PTT 29 seconds     CK Total with Reflex CKMB [[id number]] Collected: 06/20/22 1338    Lab Status: In process Specimen: Blood from Arm, Left Updated: 06/20/22 1358    Blood gas, Venous [[id number]]  (Abnormal) Collected: 06/20/22 1338    Lab Status: Final result Specimen: Blood from Arm, Right Updated: 06/20/22 1350     pH, Gen 7 400     pCO2, Gen 39 5 mm Hg      pO2, Gen 41 6 mm Hg      HCO3, Gen 23 9 mmol/L      Base Excess, Gen -0 7 mmol/L      O2 Content, Gen 11 5 ml/dL      O2 HGB, VENOUS 74 8 %     CBC and differential [[id number]]  (Abnormal) Collected: 06/20/22 1338    Lab Status: Final result Specimen: Blood from Arm, Right Updated: 06/20/22 1349     WBC 7 47 Thousand/uL      RBC 3 50 Million/uL      Hemoglobin 9 8 g/dL      Hematocrit 32 1 %      MCV 92 fL      MCH 28 0 pg      MCHC 30 5 g/dL      RDW 15 9 %      MPV 10 9 fL      Platelets 182 Thousands/uL      nRBC 0 /100 WBCs      Neutrophils Relative 64 %      Immat GRANS % 0 %      Lymphocytes Relative 20 %      Monocytes Relative 10 %      Eosinophils Relative 5 %      Basophils Relative 1 %      Neutrophils Absolute 4 76 Thousands/µL      Immature Grans Absolute 0 03 Thousand/uL      Lymphocytes Absolute 1 49 Thousands/µL      Monocytes Absolute 0 77 Thousand/µL      Eosinophils Absolute 0 35 Thousand/µL      Basophils Absolute 0 07 Thousands/µL     Blood culture #2 [[id number]] Collected: 06/20/22 1341    Lab Status: In process Specimen: Blood from Arm, Left Updated: 06/20/22 1347    Blood culture [[id number]] Collected: 06/20/22 1338    Lab Status:  In process Specimen: Blood from Arm, Right Updated: 06/20/22 1347    Blood culture #1 [[id number]] Collected: 06/20/22 1341    Lab Status: In process Specimen: Blood from Central Venous Line Updated: 06/20/22 1346                 CT abdomen pelvis wo contrast   Final Result by Zehra Braden MD (06/20 4420)      Compared to 3/9/2022, changes at L5-S1 may be sequela of prior discitis and osteomyelitis  Resolved paraspinal changes of acute infection  If there is clinical concern for recurrent spine infection, MR is recommended  New bladder distention and hydronephrosis suggesting bladder outlet obstruction  No etiology identified  Small, increased pericardial effusion  Workstation performed: [id number]         XR chest portable   Final Result by Luke Dinh MD (06/20 3489)      No acute cardiopulmonary disease  Workstation performed: [id number]                    Procedures  ECG 12 Lead Documentation Only    Date/Time: 6/20/2022 2:01 PM  Performed by: Júnior Hernandez PA-C  Authorized by: Júnior Hernandez PA-C     Indications / Diagnosis:  Gen weakness  ECG reviewed by me, the ED Provider: yes    Patient location:  ED  Previous ECG:     Previous ECG:  Unavailable  Interpretation:     Interpretation: abnormal    Rate:     ECG rate assessment: tachycardic    Rhythm:     Rhythm: sinus tachycardia               ED Course  ED Course as of 06/20/22 1615   Mon Jun 20, 2022   1517 Patient's bladder scan, revealed 800+ cc in bladder, Ortiz placed  1518 Creatinine(!): 4 06  Patient's creatinine approximately 1 week ago was 3 3 and bun 29    1518 Hemoglobin(!): 9 8  Was 8 7 on 6/13/22                               SBIRT 20yo+    Flowsheet Row Most Recent Value   SBIRT (23 yo +)    In order to provide better care to our patients, we are screening all of our patients for alcohol and drug use  Would it be okay to ask you these screening questions?  Unable to answer at this time Filed at: 06/20/2022 1344                    MDM  Number of Diagnoses or Management Options     Amount and/or Complexity of Data Reviewed  Clinical lab tests: ordered and reviewed  Tests in the radiology section of CPT®: ordered and reviewed  Decide to obtain previous medical records or to obtain history from someone other than the patient: yes  Obtain history from someone other than the patient: yes  Review and summarize past medical records: yes  Discuss the patient with other providers: yes  Independent visualization of images, tracings, or specimens: yes    Risk of Complications, Morbidity, and/or Mortality  Presenting problems: moderate  Diagnostic procedures: moderate  Management options: moderate    Patient Progress  Patient progress: stable      Disposition  Final diagnoses:   Nausea and vomiting   KELLY (acute kidney injury) ([facility] )   Hyperkalemia   Urinary retention     Time reflects when diagnosis was documented in both MDM as applicable and the Disposition within this note     Time User Action Codes Description Comment    6/20/2022  3:16 PM Waunita Lav Add [R11 2] Nausea and vomiting     6/20/2022  3:17 PM Waunita Lav Add [N17 9] KELLY (acute kidney injury) ([facility] [address] )     6/20/2022  3:17 PM Waunita Lav Add [E87 5] Hyperkalemia     6/20/2022  3:17 PM Waunita Lav Add [R33 9] Urinary retention       ED Disposition     ED Disposition   Admit    Condition   Stable    Date/Time   Mon Jun 20, 2022  3:56 PM    Comment   Case was discussed with liliana  and the patient's admission status was agreed to be Admission Status: inpatient status to the service of Dr Romy Stubbs    Follow-up Information    None         Patient's Medications   Discharge Prescriptions    No medications on file       No discharge procedures on file      PDMP Review     None          ED Provider  Electronically Signed by           Gurpreet Middleton PA-C  06/20/22 4921

## 2022-06-20 NOTE — PLAN OF CARE
Problem: Potential for Falls  Goal: Patient will remain free of falls  Description: INTERVENTIONS:  - Educate patient/family on patient safety including physical limitations  - Instruct patient to call for assistance with activity   - Consult OT/PT to assist with strengthening/mobility   - Keep Call bell within reach  - Keep bed low and locked with side rails adjusted as appropriate  - Keep care items and personal belongings within reach  - Initiate and maintain comfort rounds  - Make Fall Risk Sign visible to staff  - Offer Toileting every  Hours, in advance of need  - Initiate/Maintain alarm  - Obtain necessary fall risk management equipment:   - Apply yellow socks and bracelet for high fall risk patients  - Consider moving patient to room near nurses station  Outcome: Not Progressing     Problem: PAIN - ADULT  Goal: Verbalizes/displays adequate comfort level or baseline comfort level  Description: Interventions:  - Encourage patient to monitor pain and request assistance  - Assess pain using appropriate pain scale  - Administer analgesics based on type and severity of pain and evaluate response  - Implement non-pharmacological measures as appropriate and evaluate response  - Consider cultural and social influences on pain and pain management  - Notify physician/advanced practitioner if interventions unsuccessful or patient reports new pain  Outcome: Not Progressing     Problem: INFECTION - ADULT  Goal: Absence or prevention of progression during hospitalization  Description: INTERVENTIONS:  - Assess and monitor for signs and symptoms of infection  - Monitor lab/diagnostic results  - Monitor all insertion sites, i e  indwelling lines, tubes, and drains  - Monitor endotracheal if appropriate and nasal secretions for changes in amount and color  - [place] appropriate cooling/warming therapies per order  - Administer medications as ordered  - Instruct and encourage patient and family to use good hand hygiene technique  - Identify and instruct in appropriate isolation precautions for identified infection/condition  Outcome: Not Progressing  Goal: Absence of fever/infection during neutropenic period  Description: INTERVENTIONS:  - Monitor WBC    Outcome: Not Progressing     Problem: SAFETY ADULT  Goal: Patient will remain free of falls  Description: INTERVENTIONS:  - Educate patient/family on patient safety including physical limitations  - Instruct patient to call for assistance with activity   - Consult OT/PT to assist with strengthening/mobility   - Keep Call bell within reach  - Keep bed low and locked with side rails adjusted as appropriate  - Keep care items and personal belongings within reach  - Initiate and maintain comfort rounds  - Make Fall Risk Sign visible to staff  - Offer Toileting every  Hours, in advance of need  - Initiate/Maintain alarm  - Obtain necessary fall risk management equipment:   - Apply yellow socks and bracelet for high fall risk patients  - Consider moving patient to room near nurses station  Outcome: Not Progressing  Goal: Maintain or return to baseline ADL function  Description: INTERVENTIONS:  -  Assess patient's ability to carry out ADLs; assess patient's baseline for ADL function and identify physical deficits which impact ability to perform ADLs (bathing, care of mouth/teeth, toileting, grooming, dressing, etc )  - Assess/evaluate cause of self-care deficits   - Assess range of motion  - Assess patient's mobility; develop plan if impaired  - Assess patient's need for assistive devices and provide as appropriate  - Encourage maximum independence but intervene and supervise when necessary  - Involve family in performance of ADLs  - Assess for home care needs following discharge   - Consider OT consult to assist with ADL evaluation and planning for discharge  - Provide patient education as appropriate  Outcome: Not Progressing  Goal: Maintains/Returns to pre admission functional level  Description: INTERVENTIONS:  - Perform BMAT or MOVE assessment daily    - Set and communicate daily mobility goal to care team and patient/family/caregiver  - Collaborate with rehabilitation services on mobility goals if consulted  - Perform Range of Motion  times a day  - Reposition patient every  hours  - Dangle patient  times a day  - Stand patient  times a day  - Ambulate patient  times a day  - Out of bed to chair  times a day   - Out of bed for meals  times a day  - Out of bed for toileting  - Record patient progress and toleration of activity level   Outcome: Not Progressing     Problem: DISCHARGE PLANNING  Goal: Discharge to home or other facility with appropriate resources  Description: INTERVENTIONS:  - Identify barriers to discharge w/patient and caregiver  - Arrange for needed discharge resources and transportation as appropriate  - Identify discharge learning needs (meds, wound care, etc )  - Arrange for interpretive services to assist at discharge as needed  - Refer to Case Management Department for coordinating discharge planning if the patient needs post-hospital services based on physician/advanced practitioner order or complex needs related to functional status, cognitive ability, or social support system  Outcome: Not Progressing     Problem: Knowledge Deficit  Goal: Patient/family/caregiver demonstrates understanding of disease process, treatment plan, medications, and discharge instructions  Description: Complete learning assessment and assess knowledge base    Interventions:  - Provide teaching at level of understanding  - Provide teaching via preferred learning methods  Outcome: Not Progressing     Problem: GASTROINTESTINAL - ADULT  Goal: Minimal or absence of nausea and/or vomiting  Description: INTERVENTIONS:  - Administer IV fluids if ordered to ensure adequate hydration  - Maintain NPO status until nausea and vomiting are resolved  - Nasogastric tube if ordered  - Administer ordered antiemetic medications as needed  - Provide nonpharmacologic comfort measures as appropriate  - Advance diet as tolerated, if ordered  - Consider nutrition services referral to assist patient with adequate nutrition and appropriate food choices  Outcome: Not Progressing  Goal: Maintains or returns to baseline bowel function  Description: INTERVENTIONS:  - Assess bowel function  - Encourage oral fluids to ensure adequate hydration  - Administer IV fluids if ordered to ensure adequate hydration  - Administer ordered medications as needed  - Encourage mobilization and activity  - Consider nutritional services referral to assist patient with adequate nutrition and appropriate food choices  Outcome: Not Progressing  Goal: Maintains adequate nutritional intake  Description: INTERVENTIONS:  - Monitor percentage of each meal consumed  - Identify factors contributing to decreased intake, treat as appropriate  - Assist with meals as needed  - Monitor I&O, weight, and lab values if indicated  - Obtain nutrition services referral as needed  Outcome: Not Progressing  Goal: Oral mucous membranes remain intact  Description: INTERVENTIONS  - Assess oral mucosa and hygiene practices  - Implement preventative oral hygiene regimen  - Implement oral medicated treatments as ordered  - Initiate Nutrition services referral as needed  Outcome: Not Progressing

## 2022-06-20 NOTE — ASSESSMENT & PLAN NOTE
Lab Results   Component Value Date    HGBA1C 8 4 (H) 01/24/2022       No results for input(s): POCGLU in the last 72 hours  Blood Sugar Average: Last 72 hrs:      Will place on insulin sliding scale and adjust sugars accordingly  Resume home lantus 14 units bid  Carb consistent diet

## 2022-06-20 NOTE — ASSESSMENT & PLAN NOTE
Patient presents with acute kidney injury on CKD IV  Baseline Cr is around 2 0, Patient recently discharged from [facility] for acute kidney injury with creatinine of 3 0  Creatinine on admission noted to be 4 0  The KELLY on admission at [address] was attributed to patient being on vancomycin for osteomyelitis  He was then discharged on daptomycin which patient finished his course of antibiotics on 06/13/2022  CT abdomen showing New bladder distention and hydronephrosis suggesting bladder outlet obstruction    Suspected KELLY secondary to postrenal urinary retention  Ortiz placed in the ER  Patient received 1 L bolus in the ED will continue with gentle hydration overnight follow-up AM Cr  F/U Urine studies  Nephrology consulted for evaluation

## 2022-06-20 NOTE — H&P
[address]  H&P- Josie Carmen Sr  1965, 62 y o  male MRN: [id number]  Unit/Bed#: ED 06 Encounter: [id number]  Primary Care Provider: Edwin Flores DO   Date and time admitted to hospital: 6/20/2022  1:33 PM    * Gastroenteritis  Assessment & Plan  Patient presents to the ED for nausea vomiting x1 and diarrhea x2 over the last 24 hours with associated chills and subjective fevers  CT abdomen pelvis without contrast done in the ED negative for acute GI findings  Follow-up blood cultures  Follow-up C diff and stool parasite PCR  Patient received 1 dose of Zosyn in the ED  Infectious workup so for including viral PCR, COVID, urinalysis, chest x-ray, CT abdomen negative for source of infection  No leukocytosis, afebrile, will hold off on further antibiotics for now    KELLY (acute kidney injury) [facility])  Assessment & Plan  Patient presents with acute kidney injury on CKD IV  Baseline Cr is around 2 0, Patient recently discharged from [facility] for acute kidney injury with creatinine of 3 0  Creatinine on admission noted to be 4 0  The KELLY on admission at [facility] was attributed to patient being on vancomycin for osteomyelitis  He was then discharged on daptomycin which patient finished his course of antibiotics on 06/13/2022  CT abdomen showing New bladder distention and hydronephrosis suggesting bladder outlet obstruction    Suspected KELLY secondary to postrenal urinary retention  Ortiz placed in the ER  Patient received 1 L bolus in the ED will continue with gentle hydration overnight follow-up AM Cr  F/U Urine studies  Nephrology consulted for evaluation    Hyperkalemia  Assessment & Plan  Potassium on admission noted to be 6 0  Patient received albuterol inhaler, insulin 10 units, and Lasix 20 mg IV in the ED  Repeat potassium noted to be a 5 0  Continue trending potassium every 4 hours until normal x2  Telemetry      Osteomyelitis of vertebra of lumbar region [facility])  Assessment & Plan  Patient was recently discharged from [facility] where he was evaluated for L4-L5 diskitis/osteomyelitis  Patient was discharged on IV vancomycin, then transition to daptomycin due to [facility] abx course on 6/13  Was set to have central line removed tomorrow    History of CVA (cerebrovascular accident)  Assessment & Plan  Continue statin, aspirin 81 mg daily    Hypertension  Assessment & Plan  Resume home amlodipine  Hold losartan in setting of KELLY    Gout  Assessment & Plan  Resume home allopurinol    Diabetes mellitus ([facility] [address] )  Assessment & Plan  Lab Results   Component Value Date    HGBA1C 8 4 (H) 01/24/2022       No results for input(s): POCGLU in the last 72 hours  Blood Sugar Average: Last 72 hrs: Will place on insulin sliding scale and adjust sugars accordingly  Resume home lantus 14 units bid  Carb consistent diet    VTE Pharmacologic Prophylaxis:   Moderate Risk (Score 3-4) - Pharmacological DVT Prophylaxis Ordered: heparin  Code Status: Level 1 - Full Code   Discussion with family: Attempted to update  (wife) via phone  Unable to contact  Anticipated Length of Stay: Patient will be admitted on an inpatient basis with an anticipated length of stay of greater than 2 midnights secondary to gastroenteritis, kelly  Total Time for Visit, including Counseling / Coordination of Care: 45 minutes Greater than 50% of this total time spent on direct patient counseling and coordination of care  Chief Complaint:  Chills, nausea vomiting diarrhea    History of Present Illness:  Rivas Bailey  is a 62 y o  male with a PMH of CVA, hypertension, diabetes, vertebral osteomyelitis, gout, who presents with chills nausea, 1 episode of vomiting and 2 episodes of diarrhea in the last 24 hours  Patient unable to describe color/consistency of vomiting or diarrhea  Patient states that he has also been having chills and admits to having subjective fevers    Patient denies having any abdominal pain  Patient denies having any sick contacts or changes in diet  CT abdomen pelvis was done which was negative for any acute GI findings  On admission patient noted to have a creatinine of 4 and potassium of 6  Ortiz was placed for urinary tension noted on CT abdomen  Patient was also given 20 mg of IV Lasix and 10 units insulin with potassium improvement down to 5  Review of Systems:  Review of Systems   Constitutional: Positive for chills and fever  Gastrointestinal: Positive for diarrhea, nausea and vomiting  All other systems reviewed and are negative  Past Medical and Surgical History:   Past Medical History:   Diagnosis Date    Diabetes mellitus ([facility] [address] )     Gout     Hypertension     Renal disorder     Stroke [facility])        Past Surgical History:   Procedure Laterality Date    BACK SURGERY         Meds/Allergies:  Prior to Admission medications    Medication Sig Start Date End Date Taking?  Authorizing Provider   allopurinol (ZYLOPRIM) 100 mg tablet Take 200 mg by mouth daily 11/30/21  Yes Historical Provider, MD   amLODIPine (NORVASC) 10 mg tablet Take 1 tablet by mouth daily 2/22/22  Yes Historical Provider, MD   aspirin (ECOTRIN LOW STRENGTH) 81 mg EC tablet Take 81 mg by mouth daily 1/26/22 1/26/23 Yes Historical Provider, MD   atorvastatin (LIPITOR) 40 mg tablet Take 40 mg by mouth 1/3/22  Yes Historical Provider, MD   insulin glargine (LANTUS) 100 units/mL subcutaneous injection Inject 14 Units under the skin 2 (two) times a day 3/31/22  Yes Historical Provider, MD   losartan (COZAAR) 50 mg tablet Take 25 mg by mouth daily 3/17/22  Yes Historical Provider, MD   pregabalin (LYRICA) 150 mg capsule Take 150 mg by mouth 2 (two) times a day 3/16/22  Yes Historical Provider, MD   tamsulosin (Flomax) 0 4 mg Take by mouth 5/26/22  Yes Historical Provider, MD   diazepam (VALIUM) 2 mg tablet Take 1 tablet (2 mg total) by mouth every 12 (twelve) hours as needed for muscle spasms 1/21/22   Evens Biggs DO   HYDROcodone-acetaminophen (Norco) 5-325 mg per tablet Take 1 tablet by mouth every 6 (six) hours as needed for pain Max Daily Amount: 4 tablets 1/21/22   Evens Biggs DO     I have reviewed home medications using recent Epic encounter  Allergies: Allergies   Allergen Reactions    Metformin Other (See Comments)     Other reaction(s): kidney disease  Other reaction(s): kidney disease  Other reaction(s): kidney disease         Social History:  Marital Status: /Civil Union   Occupation: retired  Patient Pre-hospital Living Situation: Home  Patient Pre-hospital Level of Mobility: walks with walker  Patient Pre-hospital Diet Restrictions: none  Substance Use History:   Social History     Substance and Sexual Activity   Alcohol Use Not Currently     Social History     Tobacco Use   Smoking Status Current Every Day Smoker    Packs/day: 0 50    Types: Cigarettes   Smokeless Tobacco Never Used     Social History     Substance and Sexual Activity   Drug Use Yes    Types: Marijuana       Family History:  History reviewed  No pertinent family history  Physical Exam:     Vitals:   Blood Pressure: 117/61 (06/20/22 1630)  Pulse: 100 (06/20/22 1630)  Temperature: 97 7 °F (36 5 °C) (06/20/22 1502)  Temp Source: Oral (06/20/22 1502)  Respirations: 19 (06/20/22 1630)  Height: 5' 11" (180 3 cm) (06/20/22 1335)  Weight - Scale: 70 9 kg (156 lb 4 9 oz) (06/20/22 1335)  SpO2: 99 % (06/20/22 1630)    Physical Exam  Vitals reviewed  Constitutional:       Appearance: He is ill-appearing  HENT:      Head: Normocephalic and atraumatic  Right Ear: External ear normal       Left Ear: External ear normal       Nose: Nose normal       Mouth/Throat:      Mouth: Mucous membranes are moist       Pharynx: Oropharynx is clear  Eyes:      Extraocular Movements: Extraocular movements intact  Cardiovascular:      Rate and Rhythm: Regular rhythm  Tachycardia present  Pulses: Normal pulses  Heart sounds: Normal heart sounds  Pulmonary:      Effort: Pulmonary effort is normal       Breath sounds: Normal breath sounds  Abdominal:      General: Abdomen is flat  Palpations: Abdomen is soft  Tenderness: There is no abdominal tenderness  Musculoskeletal:      Cervical back: Normal range of motion  Right lower leg: No edema  Left lower leg: No edema  Skin:     General: Skin is warm and dry  Neurological:      General: No focal deficit present  Mental Status: He is alert  Mental status is at baseline  Psychiatric:         Mood and Affect: Mood normal          Behavior: Behavior normal           Additional Data:     Lab Results:  Results from last 7 days   Lab Units 06/20/22  1338   WBC Thousand/uL 7 47   HEMOGLOBIN g/dL 9 8*   HEMATOCRIT % 32 1*   PLATELETS Thousands/uL 266   NEUTROS PCT % 64   LYMPHS PCT % 20   MONOS PCT % 10   EOS PCT % 5     Results from last 7 days   Lab Units 06/20/22  1618   SODIUM mmol/L 134*   POTASSIUM mmol/L 5 0   CHLORIDE mmol/L 102   CO2 mmol/L 23   BUN mg/dL 54*   CREATININE mg/dL 3 89*   ANION GAP mmol/L 9   CALCIUM mg/dL 9 9   ALBUMIN g/dL 2 8*   TOTAL BILIRUBIN mg/dL 0 32   ALK PHOS U/L 109   ALT U/L 24   AST U/L 21   GLUCOSE RANDOM mg/dL 217*     Results from last 7 days   Lab Units 06/20/22  1338   INR  0 97             Results from last 7 days   Lab Units 06/20/22  1338   LACTIC ACID mmol/L 1 1   PROCALCITONIN ng/ml 0 30*       Imaging: Reviewed radiology reports from this admission including: abdominal/pelvic CT  CT abdomen pelvis wo contrast   Final Result by Sami Romero MD (06/20 2397)      Compared to 3/9/2022, changes at L5-S1 may be sequela of prior discitis and osteomyelitis  Resolved paraspinal changes of acute infection  If there is clinical concern for recurrent spine infection, MR is recommended  New bladder distention and hydronephrosis suggesting bladder outlet obstruction    No etiology identified  Small, increased pericardial effusion  Workstation performed: [id number]         XR chest portable   Final Result by Jackelin Giles MD (06/20 8791)      No acute cardiopulmonary disease  Workstation performed: [id number]             EKG and Other Studies Reviewed on Admission:   · EKG: No EKG obtained  ** Please Note: This note has been constructed using a voice recognition system   **

## 2022-06-20 NOTE — ASSESSMENT & PLAN NOTE
Patient was recently discharged from [facility] where he was evaluated for L4-L5 diskitis/osteomyelitis    Patient was discharged on IV vancomycin, then transition to daptomycin due to [facility] abx course on 6/13  Was set to have central line removed tomorrow

## 2022-06-21 ENCOUNTER — APPOINTMENT (INPATIENT)
Dept: ULTRASOUND IMAGING | Facility: HOSPITAL | Age: 57
DRG: 682 | End: 2022-06-21
Payer: MEDICARE

## 2022-06-21 LAB
ALBUMIN SERPL BCP-MCNC: 2.8 G/DL (ref 3.5–5)
ALP SERPL-CCNC: 105 U/L (ref 46–116)
ALT SERPL W P-5'-P-CCNC: 26 U/L (ref 12–78)
ANION GAP SERPL CALCULATED.3IONS-SCNC: 12 MMOL/L (ref 4–13)
AST SERPL W P-5'-P-CCNC: 15 U/L (ref 5–45)
BILIRUB SERPL-MCNC: 0.28 MG/DL (ref 0.2–1)
BUN SERPL-MCNC: 49 MG/DL (ref 5–25)
CALCIUM ALBUM COR SERPL-MCNC: 10.8 MG/DL (ref 8.3–10.1)
CALCIUM SERPL-MCNC: 9.8 MG/DL (ref 8.3–10.1)
CHLORIDE SERPL-SCNC: 102 MMOL/L (ref 100–108)
CO2 SERPL-SCNC: 20 MMOL/L (ref 21–32)
CREAT SERPL-MCNC: 3.8 MG/DL (ref 0.6–1.3)
ERYTHROCYTE [DISTWIDTH] IN BLOOD BY AUTOMATED COUNT: 15.7 % (ref 11.6–15.1)
GFR SERPL CREATININE-BSD FRML MDRD: 16 ML/MIN/1.73SQ M
GLUCOSE SERPL-MCNC: 131 MG/DL (ref 65–140)
GLUCOSE SERPL-MCNC: 147 MG/DL (ref 65–140)
GLUCOSE SERPL-MCNC: 160 MG/DL (ref 65–140)
GLUCOSE SERPL-MCNC: 174 MG/DL (ref 65–140)
GLUCOSE SERPL-MCNC: 192 MG/DL (ref 65–140)
HCT VFR BLD AUTO: 25.4 % (ref 36.5–49.3)
HGB BLD-MCNC: 8 G/DL (ref 12–17)
MAGNESIUM SERPL-MCNC: 2.4 MG/DL (ref 1.6–2.6)
MCH RBC QN AUTO: 28.8 PG (ref 26.8–34.3)
MCHC RBC AUTO-ENTMCNC: 31.5 G/DL (ref 31.4–37.4)
MCV RBC AUTO: 91 FL (ref 82–98)
PHOSPHATE SERPL-MCNC: 6.3 MG/DL (ref 2.7–4.5)
PLATELET # BLD AUTO: 214 THOUSANDS/UL (ref 149–390)
PMV BLD AUTO: 11 FL (ref 8.9–12.7)
POTASSIUM SERPL-SCNC: 5.3 MMOL/L (ref 3.5–5.3)
PROT SERPL-MCNC: 6.3 G/DL (ref 6.4–8.2)
RBC # BLD AUTO: 2.78 MILLION/UL (ref 3.88–5.62)
SODIUM SERPL-SCNC: 134 MMOL/L (ref 136–145)
WBC # BLD AUTO: 6.91 THOUSAND/UL (ref 4.31–10.16)

## 2022-06-21 PROCEDURE — 97163 PT EVAL HIGH COMPLEX 45 MIN: CPT

## 2022-06-21 PROCEDURE — 82948 REAGENT STRIP/BLOOD GLUCOSE: CPT

## 2022-06-21 PROCEDURE — 84132 ASSAY OF SERUM POTASSIUM: CPT | Performed by: STUDENT IN AN ORGANIZED HEALTH CARE EDUCATION/TRAINING PROGRAM

## 2022-06-21 PROCEDURE — 84100 ASSAY OF PHOSPHORUS: CPT | Performed by: STUDENT IN AN ORGANIZED HEALTH CARE EDUCATION/TRAINING PROGRAM

## 2022-06-21 PROCEDURE — 97167 OT EVAL HIGH COMPLEX 60 MIN: CPT

## 2022-06-21 PROCEDURE — 80053 COMPREHEN METABOLIC PANEL: CPT | Performed by: STUDENT IN AN ORGANIZED HEALTH CARE EDUCATION/TRAINING PROGRAM

## 2022-06-21 PROCEDURE — 76770 US EXAM ABDO BACK WALL COMP: CPT

## 2022-06-21 PROCEDURE — 99223 1ST HOSP IP/OBS HIGH 75: CPT | Performed by: NURSE PRACTITIONER

## 2022-06-21 PROCEDURE — 83735 ASSAY OF MAGNESIUM: CPT | Performed by: STUDENT IN AN ORGANIZED HEALTH CARE EDUCATION/TRAINING PROGRAM

## 2022-06-21 PROCEDURE — 99232 SBSQ HOSP IP/OBS MODERATE 35: CPT | Performed by: STUDENT IN AN ORGANIZED HEALTH CARE EDUCATION/TRAINING PROGRAM

## 2022-06-21 PROCEDURE — 85027 COMPLETE CBC AUTOMATED: CPT | Performed by: STUDENT IN AN ORGANIZED HEALTH CARE EDUCATION/TRAINING PROGRAM

## 2022-06-21 RX ORDER — SODIUM CHLORIDE, SODIUM GLUCONATE, SODIUM ACETATE, POTASSIUM CHLORIDE, MAGNESIUM CHLORIDE, SODIUM PHOSPHATE, DIBASIC, AND POTASSIUM PHOSPHATE .53; .5; .37; .037; .03; .012; .00082 G/100ML; G/100ML; G/100ML; G/100ML; G/100ML; G/100ML; G/100ML
100 INJECTION, SOLUTION INTRAVENOUS CONTINUOUS
Status: DISCONTINUED | OUTPATIENT
Start: 2022-06-21 | End: 2022-06-24 | Stop reason: HOSPADM

## 2022-06-21 RX ORDER — SEVELAMER HYDROCHLORIDE 800 MG/1
800 TABLET, FILM COATED ORAL
Status: DISCONTINUED | OUTPATIENT
Start: 2022-06-21 | End: 2022-06-24 | Stop reason: HOSPADM

## 2022-06-21 RX ADMIN — ASPIRIN 81 MG: 81 TABLET, COATED ORAL at 09:35

## 2022-06-21 RX ADMIN — SODIUM CHLORIDE, SODIUM GLUCONATE, SODIUM ACETATE, POTASSIUM CHLORIDE, MAGNESIUM CHLORIDE, SODIUM PHOSPHATE, DIBASIC, AND POTASSIUM PHOSPHATE 100 ML/HR: .53; .5; .37; .037; .03; .012; .00082 INJECTION, SOLUTION INTRAVENOUS at 10:24

## 2022-06-21 RX ADMIN — SODIUM CHLORIDE 100 ML/HR: 0.9 INJECTION, SOLUTION INTRAVENOUS at 05:07

## 2022-06-21 RX ADMIN — OXYCODONE HYDROCHLORIDE 5 MG: 5 TABLET ORAL at 10:25

## 2022-06-21 RX ADMIN — SEVELAMER HYDROCHLORIDE 800 MG: 800 TABLET, FILM COATED PARENTERAL at 16:54

## 2022-06-21 RX ADMIN — SODIUM CHLORIDE, SODIUM GLUCONATE, SODIUM ACETATE, POTASSIUM CHLORIDE, MAGNESIUM CHLORIDE, SODIUM PHOSPHATE, DIBASIC, AND POTASSIUM PHOSPHATE 100 ML/HR: .53; .5; .37; .037; .03; .012; .00082 INJECTION, SOLUTION INTRAVENOUS at 20:21

## 2022-06-21 RX ADMIN — OXYCODONE HYDROCHLORIDE 5 MG: 5 TABLET ORAL at 02:22

## 2022-06-21 RX ADMIN — INSULIN GLARGINE 14 UNITS: 100 INJECTION, SOLUTION SUBCUTANEOUS at 09:36

## 2022-06-21 RX ADMIN — TRAMADOL HYDROCHLORIDE 50 MG: 50 TABLET ORAL at 05:08

## 2022-06-21 RX ADMIN — AMLODIPINE BESYLATE 10 MG: 10 TABLET ORAL at 09:35

## 2022-06-21 RX ADMIN — INSULIN GLARGINE 14 UNITS: 100 INJECTION, SOLUTION SUBCUTANEOUS at 16:54

## 2022-06-21 RX ADMIN — INSULIN LISPRO 1 UNITS: 100 INJECTION, SOLUTION INTRAVENOUS; SUBCUTANEOUS at 16:55

## 2022-06-21 RX ADMIN — OXYCODONE HYDROCHLORIDE 5 MG: 5 TABLET ORAL at 06:15

## 2022-06-21 RX ADMIN — PREGABALIN 75 MG: 75 CAPSULE ORAL at 16:54

## 2022-06-21 RX ADMIN — ATORVASTATIN CALCIUM 40 MG: 40 TABLET, FILM COATED ORAL at 16:54

## 2022-06-21 RX ADMIN — ALLOPURINOL 200 MG: 100 TABLET ORAL at 09:35

## 2022-06-21 RX ADMIN — TAMSULOSIN HYDROCHLORIDE 0.4 MG: 0.4 CAPSULE ORAL at 16:54

## 2022-06-21 RX ADMIN — INSULIN LISPRO 1 UNITS: 100 INJECTION, SOLUTION INTRAVENOUS; SUBCUTANEOUS at 21:07

## 2022-06-21 RX ADMIN — PREGABALIN 75 MG: 75 CAPSULE ORAL at 09:35

## 2022-06-21 RX ADMIN — OXYCODONE HYDROCHLORIDE 5 MG: 5 TABLET ORAL at 16:59

## 2022-06-21 NOTE — ASSESSMENT & PLAN NOTE
Lab Results   Component Value Date    HGBA1C 8 4 (H) 01/24/2022       Recent Labs     06/20/22  2130 06/21/22  0800 06/21/22  1105   POCGLU 210* 147* 131       Blood Sugar Average: Last 72 hrs:  (P) 743 [id number]   Will place on insulin sliding scale and adjust sugars accordingly  Resume home lantus 14 units bid  Carb consistent diet

## 2022-06-21 NOTE — ASSESSMENT & PLAN NOTE
Patient presents with acute kidney injury on CKD IV  Baseline Cr is around 2 0, Patient recently discharged from [facility] for acute kidney injury with creatinine of 3 0  Creatinine on admission noted to be 4 0  The KELLY on admission at [facility] was attributed to patient being on vancomycin for osteomyelitis  He was then discharged on daptomycin which patient finished his course of antibiotics on 06/13/2022  CT abdomen showing New bladder distention and hydronephrosis suggesting bladder outlet obstruction  Suspected KELLY secondary to postrenal urinary retention  Ortiz placed in the ER  Patient received 1 L bolus in the ED will continue with gentle hydration   Nephro consulted, appreciate recommendations  Renal US - Showing Interval improvement of previous hydronephrosis  Mild residual hydronephrosis of the lower pole of the left kidney

## 2022-06-21 NOTE — CONSULTS
CONSULTATION-NEPHROLOGY   [facility]  62 [facility]  male MRN: [id number]  Unit/Bed#: -01 Encounter: [id number]        Assessment and Plan:    1  Acute kidney injury (POA) atop chronic kidney disease  · Friable kidney function due to recent acute kidney injury presumably due to vancomycin now presents with volume depletion and hydronephrosis  · Recommend continuing volume expansion, continuing indwelling urinary catheter and repeating imaging to evaluate status of hydronephrosis  Avoid potential nephrotoxins  · Hold losartan  2  Stage 4 chronic kidney disease with baseline creatinine around 2 5-2 9 mg/dL  · Follows with Dr Jacqui Pete  Undergoing evaluation for hemodialysis permanent access   3  Obstructive uropathy, bilateral hydronephrosis  · Prostate normal in size on CT scan  ? Neurogenic bladder in setting of longstanding diabetes  Indwelling urinary catheter was placed  Not much improvement in kidney function  Repeat kidney bladder ultrasound  Continue Ortiz/Flomax  Consider Urology consult  Monitor for postobstructive diuresis  4  Diabetic nephropathy  · Losartan appropriately on hold  Blood sugar management per primary team   Monoclonal gammopathy ruled out 2021  5  Mineral bone disease of chronic kidney disease  · Start Renvela 1 tab with dinner  Check PTH in a m  Due to hypercalcemia  Continue low-potassium diet  6  Recent osteomyelitis and diskitis  · Completed course of vancomycin-> daptomycin  7  Diarrheal illness  · Per primary team    HPI:    Katina Eden  is a 62 y o  male with CKD 4, recent hospitalization for L5/S1 diskitis, osteomyelitis, right paraspinal abscess L1-L3 and epidural abscess treated with vancomycin until 613 development of KELLY-> daptomycin who presented to [address] [place] [address] emergency department 6/24 weakness, nausea, vomiting and diarrhea    Initial imaging significant for mild bilateral left greater than right hydronephrosis with distended bladder status post indwelling urinary catheter placement, small pericardial effusion, sequela of diskitis and osteomyelitis noted  In addition of catheter he was volume expanded, given small dose of Lasix and Zosyn  A nephrology consultation was requested today for acute kidney injury atop chronic kidney disease  Other pertinent medical problems include suspected diabetic nephropathy, hypertension, history of CVA, gout, urinary retention  Upon discussion with the patient he relates HPI as above in addition:  Patient states he had chills but no fever  Denies NSAID use  Did notice decreased urine production  Denies shortness of breath, chest pain, dizziness  From a Nephrology perspective follows with Dr Chavarria Divers  Has stage 4 chronic kidney disease likely due to diabetic nephropathy  Was being evaluated as outpatient for permanent dialysis access  There is a right limb alert bracelet on  Patient states he has not had any intervention yet  Reason for Consult:  KELLY on CKD 4, hydronephrosis    Review of Systems:  A complete 10-point review of systems was performed  Aside from what was mentioned in the HPI, it is otherwise negative  Historical Information   Past Medical History:   Diagnosis Date    Diabetes mellitus ([facility] [address] )     Gout     Hypertension     Renal disorder     Stroke [facility])      Past Surgical History:   Procedure Laterality Date    BACK SURGERY       Social History   Social History     Substance and Sexual Activity   Alcohol Use Not Currently     Social History     Substance and Sexual Activity   Drug Use Yes    Types: Marijuana     Social History     Tobacco Use   Smoking Status Current Every Day Smoker    Packs/day: 0 50    Types: Cigarettes   Smokeless Tobacco Never Used       Family History:   History reviewed  No pertinent family history      Medications:  Pertinent medications were reviewed  Current Facility-Administered Medications   Medication Dose Route Frequency Provider Last Rate    allopurinol  200 mg Oral Daily Pandi Todhe, DO      amLODIPine  10 mg Oral Daily Pandi Todhe, DO      aspirin  81 mg Oral Daily Pandi Todhe, DO      atorvastatin  40 mg Oral Daily With Nicholas Oil, DO      heparin (porcine)  5,000 Units Subcutaneous Q8H [address] Pandi Todhe, DO      insulin glargine  14 Units Subcutaneous BID Pandi Todhe, DO      insulin lispro  1-5 Units Subcutaneous TID AC Pandi Todhe, DO      insulin lispro  1-5 Units Subcutaneous HS Pandi Todhe, DO      ondansetron  4 mg Intravenous Once Bib Horn PA-C      oxyCODONE  5 mg Oral Q4H PRN SHOAIB SimonsNP      pregabalin  75 mg Oral BID Pandi Todhe, DO      sodium chloride  100 mL/hr Intravenous Continuous Pandi Todhe,  mL/hr (06/21/22 0507)    tamsulosin  0 4 mg Oral Daily With Nicholas Oil, DO      traMADol  50 mg Oral BID PRN Suyapa Ross, SHOAIBNP           Allergies   Allergen Reactions    Metformin Other (See Comments)     Other reaction(s): kidney disease  Other reaction(s): kidney disease  Other reaction(s): kidney disease           Vitals:   /79   Pulse 79   Temp 97 7 °F (36 5 °C)   Resp (!) 25   Ht 5' 11" (1 803 m)   Wt 70 8 kg (156 lb 1 4 oz)   SpO2 96%   BMI 21 77 kg/m²   Body mass index is 21 77 kg/m²    SpO2: 96 %,   SpO2 Activity: At Rest,   O2 Device: None (Room air)      Intake/Output Summary (Last 24 hours) at 6/21/2022 0930  Last data filed at 6/21/2022 0556  Gross per 24 hour   Intake 2050 ml   Output 3900 ml   Net -1850 ml     Invasive Devices  Report    Peripherally Inserted Central Catheter Line  Duration           PICC Line 06/20/22 Right <1 day          Peripheral Intravenous Line  Duration           Peripheral IV 06/20/22 Left;Ventral (anterior) Forearm <1 day    Peripheral IV 06/20/22 Right Antecubital <1 day          Drain  Duration           Urethral Catheter Coude 16 Fr  103 days    Urethral Catheter Coude 16 Fr  <1 day                Physical Exam:  General: conscious, cooperative, in no acute distress  Eyes: conjunctivae pink, anicteric sclerae  ENT: lips and mucous membranes moist  Neck: supple, no JVD, no masses  Chest: clear breath sounds bilateral, no crackles, ronchus or wheezings  CVS: distinct S1 & S2, normal rate, regular rhythm  Abdomen: soft, non-tender, non-distended, normoactive bowel sounds  Extremities: no edema of both legs  Skin: no rash right chest wall port  Neuro: awake, alert, oriented  :  Indwelling urinary catheter intact draining clear yellow urine    Diagnostic Data:  Lab: I have personally reviewed pertinent lab results  ,   CBC:  Results from last 7 days   Lab Units 06/21/22  0559   WBC Thousand/uL 6 91   HEMOGLOBIN g/dL 8 0*   HEMATOCRIT % 25 4*   PLATELETS Thousands/uL 214      CMP:   Lab Results   Component Value Date    SODIUM 134 (L) 06/21/2022    K 5 3 06/21/2022    K 5 3 06/21/2022     06/21/2022    CO2 20 (L) 06/21/2022    BUN 49 (H) 06/21/2022    CREATININE 3 80 (H) 06/21/2022    CALCIUM 9 8 06/21/2022    AST 15 06/21/2022    ALT 26 06/21/2022    ALKPHOS 105 06/21/2022    EGFR 16 06/21/2022   ,   PT/INR:   Lab Results   Component Value Date    INR 0 97 06/20/2022   ,   Magnesium: No components found for: MAG,  Phosphorous:   Lab Results   Component Value Date    PHOS 6 3 (H) 06/21/2022       Microbiology:  @[facility],(urinecx:7)@        YURIDIA Stanley    Portions of the record may have been created with voice recognition software  Occasional wrong word or "sound a like" substitutions may have occurred due to the inherent limitations of voice recognition software  Read the chart carefully and recognize, using context, where substitutions have occurred

## 2022-06-21 NOTE — PLAN OF CARE
Problem: OCCUPATIONAL THERAPY ADULT  Goal: Performs self-care activities at highest level of function for planned discharge setting  See evaluation for individualized goals  Description: Treatment Interventions: ADL retraining, Functional transfer training, UE strengthening/ROM, Patient/family training, Cognitive reorientation, Endurance training, Equipment evaluation/education, Neuromuscular reeducation, Compensatory technique education, Continued evaluation, Energy conservation, Activityengagement          See flowsheet documentation for full assessment, interventions and recommendations  Note: Limitation: Decreased ADL status, Decreased UE strength, Decreased Safe judgement during ADL, Decreased cognition, Decreased endurance, Decreased self-care trans, Decreased high-level ADLs  Prognosis: Good  Assessment: Pt is a 62 y o  male, admitted to [address] [place] [address] 6/20/2022 d/t experiencing chills, nausea and vomiting  Dx: gastroenteritis  Pt with PMHx impacting their performance during ADL tasks, including: CVA, HTN, DM, vertebral osteomyelitis, gout  Pt with recent hospitalization and was d/c'ed on IV antibiotics  Prior to admission to the hospital Pt was performing ADLs with physical assistance  IADLs with physical assistance  Functional transfers/ambulation with physical assistance  Cognitive status was PTA was intact  OT order placed to assess Pt's ADLs, cognitive status, and performance during functional tasks in order to maximize safety and independence while making most appropriate d/c recommendations   Pt's clinical presentation is currently unstable/unpredictable given new onset deficits that effect Pt's occupational performance and ability to safely return to OF including decrease activity tolerance, decrease standing balance, decrease sitting balance, decrease performance during ADL tasks, decrease cognition, decrease safety awareness , increase impulsiveness, decrease UB MS, increased pain, decrease generalized strength, decrease activity engagement, decrease performance during functional transfers, high fall risk and limited insight to deficits combined with medical complications of pain impacting overall mobility status, abnormal renal lab values, abnormal H&H, abnormal CBC, abnormal potassium values, decreased skin integrity, multiple readmissions, impulsivity during admission and need for input for mobility technique/safety  Personal factors affecting Pt at time of initial evaluation include: inaccessible home environment, step(s) to enter environment, past experience, behavioral pattern, inability to perform current job functions, inability to perform IADLs, inability to perform ADLs, new need for AD, inability to ambulate household distances, inability to navigate community distances, limited insight into impairments, decreased initiation and engagement and questionable non-compliance  Pt will benefit from continued skilled OT services to address deficits as defined above and to maximize level independence/participation during ADLs and functional tasks to facilitate return toward PLOF and improved quality of life  From an occupational therapy standpoint, recommendation at time of d/c would be post acute rehab       OT Discharge Recommendation: Post acute rehabilitation services 1. I was told the name of the doctor(s) who took care of my child while in the hospital.    2. I have been told about any important findings on my child's plan of care.    3. The doctor clearly explained my child's diagnosis and other possible diagnoses that were considered.    4. My child's doctor explained all the tests that were done and their results (if available). I understand that some of the test results may not be ready before we go home and I was told how I can get these results. I understand that a summary of my child's hospitalization and important test results will be shared with my child's outpatient doctor.    5. My child's doctor talked to me about what I need to do when we go home.    6. I understand what signs and symptoms to watch for. I understand what symptoms I would need to call my doctor for and/or return to the hospital.    7. I have the phone number to call the hospital for results and/or questions after I leave the hospital.

## 2022-06-21 NOTE — ASSESSMENT & PLAN NOTE
Potassium on admission noted to be 6 0  Patient received albuterol inhaler, insulin 10 units, and Lasix 20 mg IV in the ED  Repeat potassium noted to be a 5 0  Continue trending potassium every 4 hours until normal x2  Telemetry  Potassium at 5 3 this morning

## 2022-06-21 NOTE — PHYSICAL THERAPY NOTE
PHYSICAL THERAPY EVALUATION  NAME:  Feliz Roach Sr  DATE: 06/21/22    AGE:   62 y o  Mrn:   [id number]  ADMIT DX:  Hyperkalemia [E87 5]  Urinary retention [R33 9]  Nausea [R11 0]  Nausea and vomiting [R11 2]  KELLY (acute kidney injury) ([facility] [address] ) [N17 9]    Past Medical History:   Diagnosis Date    Diabetes mellitus ([facility] )     Gout     Hypertension     Renal disorder     Stroke ([place] [address] )      Length Of Stay: 1  Performed at least 2 patient identifiers during session: Name and Birthday  PHYSICAL THERAPY EVALUATION :    06/21/22 0928   PT Last Visit   PT Visit Date 06/21/22   Note Type   Note type Evaluation   Pain Assessment   Pain Location/Orientation Orientation: Lower; Location: Back;Orientation: Left;Orientation: Right;Location: Leg   Pain Rating: FLACC (Rest) - Face 0   Pain Rating: FLACC (Rest) - Legs 0   Pain Rating: FLACC (Rest) - Activity 0   Pain Rating: FLACC (Rest) - Cry 0   Pain Rating: FLACC (Rest) - Consolability 0   Score: FLACC (Rest) 0   Pain Rating: FLACC (Activity) - Face 1   Pain Rating: FLACC (Activity) - Legs 1   Pain Rating: FLACC (Activity) - Activity 1   Pain Rating: FLACC (Activity) - Cry 1   Pain Rating: FLACC (Activity) - Consolability 0   Score: FLACC (Activity) 4   Restrictions/Precautions   Other Precautions Chair Alarm; Bed Alarm; Fall Risk;Pain;Multiple lines   Home Living   Type of Home House  (FF to enter R HR)   Home Layout One level;Performs ADLs on one level; Able to live on main level with bedroom/bathroom   Bathroom Shower/Tub Walk-in shower   Bathroom Toilet Raised   Bathroom Equipment Shower chair;Grab bars in [facility]; Wheelchair-electric; Wheelchair-manual   Additional Comments Reports livign in a 1 SH with FF to enter with R HR and uses a RW for mobility  Per patient's spouse, Lois Headings, she and her son assist patient with stair management and pt ambulates short distances in the home with a RW   Spouse reports typically providing CGA for patient with mobility prn  Prior Function   Level of [place] Needs assistance with ADLs and functional mobility   Lives With Spouse   Receives Help From Family   ADL Assistance Needs assistance   IADLs Needs assistance   Falls in the last 6 months 0   Vocational On disability   Comments Reports having assistance with ADLs and IADLs  Reports he is capable of completing ADL tasks, but it is "quicker and easier if she just helps me"   General   Additional Pertinent History Recently discharged from [facility]  Was diagnosed with Osteomyelitis of vertebra of lumbar region and has central line that was to be removed day after admission to this facility  Cognition   Orientation Level Oriented X4   Following Commands Follows one step commands with increased time or repetition   Comments Impulsive, decreased insight to deficits, resistive to education for safety  Subjective   Subjective "I have to lay down "   RLE Assessment   RLE Assessment WFL  (exceot hip flexion to 90 degrees  strength 3+/5 except hip flexion 2/5 and ankle DF 3-/5)   LLE Assessment   LLE Assessment WFL  (except knee ext just < 0 degrees, hip flexion > 90 degrees  strength 3-/5 except ankle DF and hip flexion 2+/5)   Coordination   Movements are Fluid and Coordinated   (impaired with ambulation)   Rapid Alternating Movements Intact   Light Touch   RLE Light Touch Impaired   RLE Light Touch Comments diminished R LE compared to left   LLE Light Touch Grossly intact   Bed Mobility   Supine to Sit 5  Supervision   Additional items Increased time required;Verbal cues;HOB elevated   Sit to Supine 5  Supervision   Additional items Impulsive;Verbal cues; Increased time required   Additional Comments HOB elevated < 30 degrees  completed supine to sit with supervision with inc time with min cues for technique and saefty  sit to supine pt impulsive to intiiate requiring cues for saftey  increased time to elevate LEs into bed     Transfers   Sit to Stand   (SBA)   Additional items Increased time required;Verbal cues   Stand to Sit   (SBA)   Additional items Increased time required;Verbal cues   Stand pivot   (minAx1-->modAx1)   Additional items Assist x 1; Impulsive;Verbal cues   Additional Comments use of RW  sit<>stand with SBA with inc time to achieve standing  cues for upright posture  spt with RW with minAx1 initially then requiring modAx1 for RW management, balance and wt shifting and safe completion as pt impulsively attempted to sit prematurely  manual cues for safe completion  Ambulation/Elevation   Gait pattern Forward Flexion; Wide NCIK; Short stride; Excessively slow; Improper Weight shift   Gait Assistance   (min-->modAx1)   Additional items Assist x 1;Verbal cues   Assistive Device Rolling walker   Distance 8'x1 with RW with min-->modAx1 with manual cues for wt shigitn and balance and mod verbal cues to stay within NICK of RW for increased UE support with LE stance  step to pattern leading with L LE  increasign assistance needed as ambulaiton increased due to fatigue and increased pain with patient reporting "I need to sit, I need to sit " max cues for safe completion  Balance   Static Sitting Good   Dynamic Sitting Fair +   Static Standing Fair -   Dynamic Standing Poor +   Ambulatory Poor   Activity Tolerance   Activity Tolerance Patient limited by fatigue;Patient limited by pain   Medical Staff Made Aware Miguel TABOR   Nurse Made Aware Snehal MARTINO   Assessment   Prognosis Fair   Problem List Decreased strength;Decreased endurance; Impaired balance;Decreased mobility; Decreased coordination; Impaired judgement;Decreased safety awareness;Pain   Barriers to Discharge Decreased caregiver support; Inaccessible home environment   Barriers to Discharge Comments stairs to enter home, increased assistance with mobility   Goals   Patient Goals "Go home"   [facility] Expiration Date 07/05/22   PT Treatment Day 0   Plan   Treatment/Interventions ADL retraining;Functional transfer training;LE strengthening/ROM; Elevations; Therapeutic exercise; Endurance training;Patient/family training;Equipment eval/education; Bed mobility;Gait training; Compensatory technique education;Spoke to nursing;Spoke to case management;OT   PT Frequency 3-5x/wk   Recommendation   PT Discharge Recommendation Post acute rehabilitation services   Equipment Recommended   (TBD by rehab)   Additional Comments should patient decline post acute rehab, will require consistent assistance with all mobility and use of wheelchair for safe access to home environment  AM-PAC Basic Mobility Inpatient   Turning in Bed Without Bedrails 3   Lying on Back to Sitting on Edge of Flat Bed 3   Moving Bed to Chair 2   Standing Up From Chair 3   Walk in Room 2   Climb 3-5 Stairs 1   Basic Mobility Inpatient Raw Score 14   Basic Mobility Standardized Score 35 55   Highest Level Of Mobility   JH-HLM Goal 4: Move to chair/commode   JH-HLM Achieved 4: Move to chair/commode   End of Consult   Patient Position at End of Consult Supine;Bed/Chair alarm activated; All needs within reach     (Please find full objective findings from PT assessment regarding body systems outlined above)  Assessment: Pt is a 62 y o  male seen for PT evaluation s/p admission to [address] [place] [address] on 6/20/2022 with Gastroenteritis  Order placed for PT services    Upon evaluation: Pt is presenting with impaired functional mobility due to pain, decreased strength, decreased endurance, impaired balance, impaired coordination, gait deviations, decreased safety awareness, impaired judgment, and fall risk requiring  supervision assistance for bed mobility, stand by to moderate assistance for transfers, and minimal to moderate assistance for ambulation with RW   Pt's clinical presentation is currently unpredictable given the functional mobility deficits above, especially weakness, decreased endurance, gait deviations, pain, decreased activity tolerance, decreased functional mobility tolerance, altered sensation, decreased safety awareness, and impaired judgement, coupled with fall risks as indicated by -PAC 6-Clicks: 38/77 as well as impulsivity, impaired balance, polypharmacy, impaired coordination, impaired judgement, decreased safety awareness and limited sensation/neuropathy and combined with medical complications of pain impacting overall mobility status, abnormal renal lab values, abnormal H&H, abnormal blood sugars, abnormal potassium values, multiple readmissions, impulsivity during admission and need for input for mobility technique/safety, gastroenteritis, KELLY  Pt's PMHx and comorbidities that may affect physical performance and progress include: DM, HTN, CVA and gout, CKD, recent osteomyelitis of lumbar vertebra L4-L5 and diskitis  Personal factors affecting pt at time of IE include: inaccessible home environment, step(s) to enter environment, inability to perform IADLs, inability to perform ADLs, inability to navigate level surfaces without external assistance, inability to ambulate household distances and limited insight into impairments  Pt will benefit from continued skilled PT services to address deficits as defined above and to maximize level of functional mobility to facilitate return toward PLOF and improved QOL  From PT/mobility standpoint, recommendation at time of d/c would be Short term rehab pending progress in order to reduce fall risk and maximize pt's functional independence and consistency with mobility in order to facilitate return to PLOF  Recommend trial with walker next 1-2 sessions and ther ex next 1-2 sessions  The patient's -[facility] Basic Mobility Inpatient Short Form Raw Score is 14  A Raw score of less than or equal to 16 suggests the patient may benefit from discharge to post-acute rehabilitation services  Please also refer to the recommendation of the Physical Therapist for safe discharge planning         Goals: Pt will: Perform bed mobility tasks with modified I to reposition in bed and prepare for transfers  Pt will perform transfers with Supervision to decrease burden of care, decrease risk for falls and improve activity tolerance and prepare for ambulation  Pt will ambulate with RW for >/= 100' with  Supervision  to decrease burden of care, decrease risk for falls, improve activity tolerance and improve gait quality and to access home environment  Pt will complete >/= 12 steps with with unilateral handrail and cane with steadying assistance to decrease burden of care, return to home with BRUCE, decrease risk for falls and improve activity tolerance  Pt will participate in objective balance assessment to determine baseline fall risk  Pt will increase B LE strength >/= 1/2 MMT grade to facilitate functional mobility        Fred Dalia, PT,DPT

## 2022-06-21 NOTE — PLAN OF CARE
Problem: PHYSICAL THERAPY ADULT  Goal: Performs mobility at highest level of function for planned discharge setting  See evaluation for individualized goals  Description: Treatment/Interventions: ADL retraining, Functional transfer training, LE strengthening/ROM, Elevations, Therapeutic exercise, Endurance training, Patient/family training, Equipment eval/education, Bed mobility, Gait training, Compensatory technique education, Spoke to nursing, Spoke to case management, OT  Equipment Recommended:  (TBD by rehab)       See flowsheet documentation for full assessment, interventions and recommendations  Note: Prognosis: Fair  Problem List: Decreased strength, Decreased endurance, Impaired balance, Decreased mobility, Decreased coordination, Impaired judgement, Decreased safety awareness, Pain  Assessment: Pt is a 62 y o  male seen for PT evaluation s/p admission to [address] [place] [address] on 6/20/2022 with Gastroenteritis  Order placed for PT services    Upon evaluation: Pt is presenting with impaired functional mobility due to pain, decreased strength, decreased endurance, impaired balance, impaired coordination, gait deviations, decreased safety awareness, impaired judgment, and fall risk requiring  supervision assistance for bed mobility, stand by to moderate assistance for transfers, and minimal to moderate assistance for ambulation with RW   Pt's clinical presentation is currently unpredictable given the functional mobility deficits above, especially weakness, decreased endurance, gait deviations, pain, decreased activity tolerance, decreased functional mobility tolerance, altered sensation, decreased safety awareness, and impaired judgement, coupled with fall risks as indicated by -PAC 6-Clicks: 36/57 as well as impulsivity, impaired balance, polypharmacy, impaired coordination, impaired judgement, decreased safety awareness and limited sensation/neuropathy and combined with medical complications of pain impacting overall mobility status, abnormal renal lab values, abnormal H&H, abnormal blood sugars, abnormal potassium values, multiple readmissions, impulsivity during admission and need for input for mobility technique/safety, gastroenteritis, KELLY  Pt's PMHx and comorbidities that may affect physical performance and progress include: DM, HTN, CVA and gout, CKD, recent osteomyelitis of lumbar vertebra L4-L5 and diskitis  Personal factors affecting pt at time of IE include: inaccessible home environment, step(s) to enter environment, inability to perform IADLs, inability to perform ADLs, inability to navigate level surfaces without external assistance, inability to ambulate household distances and limited insight into impairments  Pt will benefit from continued skilled PT services to address deficits as defined above and to maximize level of functional mobility to facilitate return toward PLOF and improved QOL  From PT/mobility standpoint, recommendation at time of d/c would be Short term rehab pending progress in order to reduce fall risk and maximize pt's functional independence and consistency with mobility in order to facilitate return to PLOF  Recommend trial with walker next 1-2 sessions and ther ex next 1-2 sessions  Barriers to Discharge: Decreased caregiver support, Inaccessible home environment  Barriers to Discharge Comments: stairs to enter home, increased assistance with mobility     PT Discharge Recommendation: Post acute rehabilitation services          See flowsheet documentation for full assessment

## 2022-06-21 NOTE — ASSESSMENT & PLAN NOTE
Patient was recently discharged from [facility] where he was evaluated for L4-L5 diskitis/osteomyelitis    Patient was discharged on IV vancomycin, then transition to daptomycin due to [facility] abx course on 6/13  Patient was supposed to have central line removed today, may need IR to remove tunneled picc

## 2022-06-21 NOTE — PLAN OF CARE
Problem: Potential for Falls  Goal: Patient will remain free of falls  Description: INTERVENTIONS:  - Educate patient/family on patient safety including physical limitations  - Instruct patient to call for assistance with activity   - Consult OT/PT to assist with strengthening/mobility   - Keep Call bell within reach  - Keep bed low and locked with side rails adjusted as appropriate  - Keep care items and personal belongings within reach  - Initiate and maintain comfort rounds  - Make Fall Risk Sign visible to staff  - Offer Toileting every 2 Hours, in advance of need  - Initiate/Maintain bed/chair alarm  - Obtain necessary fall risk management equipment:   - Apply yellow socks and bracelet for high fall risk patients  - Consider moving patient to room near nurses station  Outcome: Progressing     Problem: PAIN - ADULT  Goal: Verbalizes/displays adequate comfort level or baseline comfort level  Description: Interventions:  - Encourage patient to monitor pain and request assistance  - Assess pain using appropriate pain scale  - Administer analgesics based on type and severity of pain and evaluate response  - Implement non-pharmacological measures as appropriate and evaluate response  - Consider cultural and social influences on pain and pain management  - Notify physician/advanced practitioner if interventions unsuccessful or patient reports new pain  Outcome: Progressing     Problem: INFECTION - ADULT  Goal: Absence or prevention of progression during hospitalization  Description: INTERVENTIONS:  - Assess and monitor for signs and symptoms of infection  - Monitor lab/diagnostic results  - Monitor all insertion sites, i e  indwelling lines, tubes, and drains  - Monitor endotracheal if appropriate and nasal secretions for changes in amount and color  - [place] appropriate cooling/warming therapies per order  - Administer medications as ordered  - Instruct and encourage patient and family to use good hand hygiene technique  - Identify and instruct in appropriate isolation precautions for identified infection/condition  Outcome: Progressing  Goal: Absence of fever/infection during neutropenic period  Description: INTERVENTIONS:  - Monitor WBC    Outcome: Progressing     Problem: SAFETY ADULT  Goal: Patient will remain free of falls  Description: INTERVENTIONS:  - Educate patient/family on patient safety including physical limitations  - Instruct patient to call for assistance with activity   - Consult OT/PT to assist with strengthening/mobility   - Keep Call bell within reach  - Keep bed low and locked with side rails adjusted as appropriate  - Keep care items and personal belongings within reach  - Initiate and maintain comfort rounds  - Make Fall Risk Sign visible to staff  - Offer Toileting every 2 Hours, in advance of need  - Initiate/Maintain bed/chair alarm  - Obtain necessary fall risk management equipment:   - Apply yellow socks and bracelet for high fall risk patients  - Consider moving patient to room near nurses station  Outcome: Progressing  Goal: Maintain or return to baseline ADL function  Description: INTERVENTIONS:  - Educate patient/family on patient safety including physical limitations  - Instruct patient to call for assistance with activity   - Consult OT/PT to assist with strengthening/mobility   - Keep Call bell within reach  - Keep bed low and locked with side rails adjusted as appropriate  - Keep care items and personal belongings within reach  - Initiate and maintain comfort rounds  - Make Fall Risk Sign visible to staff  - Offer Toileting every 2 Hours, in advance of need  - Initiate/Maintain bed/chair alarm  - Obtain necessary fall risk management equipment:   - Apply yellow socks and bracelet for high fall risk patients  - Consider moving patient to room near nurses station  Outcome: Progressing  Goal: Maintains/Returns to pre admission functional level  Description: INTERVENTIONS:  - Perform BMAT or MOVE assessment daily    - Set and communicate daily mobility goal to care team and patient/family/caregiver  - Collaborate with rehabilitation services on mobility goals if consulted  - Perform Range of Motion 3 times a day  - Reposition patient every 2 hours  - Dangle patient 3 times a day  - Stand patient 3 times a day  - Ambulate patient 3 times a day  - Out of bed to chair 3 times a day   - Out of bed for meals 3 times a day  - Out of bed for toileting  - Record patient progress and toleration of activity level   Outcome: Progressing     Problem: DISCHARGE PLANNING  Goal: Discharge to home or other facility with appropriate resources  Description: INTERVENTIONS:  - Identify barriers to discharge w/patient and caregiver  - Arrange for needed discharge resources and transportation as appropriate  - Identify discharge learning needs (meds, wound care, etc )  - Arrange for interpretive services to assist at discharge as needed  - Refer to Case Management Department for coordinating discharge planning if the patient needs post-hospital services based on physician/advanced practitioner order or complex needs related to functional status, cognitive ability, or social support system  Outcome: Progressing     Problem: Knowledge Deficit  Goal: Patient/family/caregiver demonstrates understanding of disease process, treatment plan, medications, and discharge instructions  Description: Complete learning assessment and assess knowledge base    Interventions:  - Provide teaching at level of understanding  - Provide teaching via preferred learning methods  Outcome: Progressing     Problem: GASTROINTESTINAL - ADULT  Goal: Minimal or absence of nausea and/or vomiting  Description: INTERVENTIONS:  - Administer IV fluids if ordered to ensure adequate hydration  - Maintain NPO status until nausea and vomiting are resolved  - Nasogastric tube if ordered  - Administer ordered antiemetic medications as needed  - Provide nonpharmacologic comfort measures as appropriate  - Advance diet as tolerated, if ordered  - Consider nutrition services referral to assist patient with adequate nutrition and appropriate food choices  Outcome: Progressing  Goal: Maintains or returns to baseline bowel function  Description: INTERVENTIONS:  - Assess bowel function  - Encourage oral fluids to ensure adequate hydration  - Administer IV fluids if ordered to ensure adequate hydration  - Administer ordered medications as needed  - Encourage mobilization and activity  - Consider nutritional services referral to assist patient with adequate nutrition and appropriate food choices  Outcome: Progressing  Goal: Maintains adequate nutritional intake  Description: INTERVENTIONS:  - Monitor percentage of each meal consumed  - Identify factors contributing to decreased intake, treat as appropriate  - Assist with meals as needed  - Monitor I&O, weight, and lab values if indicated  - Obtain nutrition services referral as needed  Outcome: Progressing  Goal: Oral mucous membranes remain intact  Description: INTERVENTIONS  - Assess oral mucosa and hygiene practices  - Implement preventative oral hygiene regimen  - Implement oral medicated treatments as ordered  - Initiate Nutrition services referral as needed  Outcome: Progressing     Problem: Nutrition/Hydration-ADULT  Goal: Nutrient/Hydration intake appropriate for improving, restoring or maintaining nutritional needs  Description: Monitor and assess patient's nutrition/hydration status for malnutrition  Collaborate with interdisciplinary team and initiate plan and interventions as ordered  Monitor patient's weight and dietary intake as ordered or per policy  Utilize nutrition screening tool and intervene as necessary  Determine patient's food preferences and provide high-protein, high-caloric foods as appropriate       INTERVENTIONS:  - Monitor oral intake, urinary output, labs, and treatment plans  - Assess nutrition and hydration status and recommend course of action  - Evaluate amount of meals eaten  - Assist patient with eating if necessary   - Allow adequate time for meals  - Recommend/ encourage appropriate diets, oral nutritional supplements, and vitamin/mineral supplements  - Order, calculate, and assess calorie counts as needed  - Recommend, monitor, and adjust tube feedings and TPN/PPN based on assessed needs  - Assess need for intravenous fluids  - Provide specific nutrition/hydration education as appropriate  - Include patient/family/caregiver in decisions related to nutrition  Outcome: Progressing     Problem: MOBILITY - ADULT  Goal: Maintain or return to baseline ADL function  Description: INTERVENTIONS:  - Educate patient/family on patient safety including physical limitations  - Instruct patient to call for assistance with activity   - Consult OT/PT to assist with strengthening/mobility   - Keep Call bell within reach  - Keep bed low and locked with side rails adjusted as appropriate  - Keep care items and personal belongings within reach  - Initiate and maintain comfort rounds  - Make Fall Risk Sign visible to staff  - Offer Toileting every 2 Hours, in advance of need  - Initiate/Maintain bed/chair alarm  - Obtain necessary fall risk management equipment:   - Apply yellow socks and bracelet for high fall risk patients  - Consider moving patient to room near nurses station  Outcome: Progressing  Goal: Maintains/Returns to pre admission functional level  Description: INTERVENTIONS:  - Perform BMAT or MOVE assessment daily    - Set and communicate daily mobility goal to care team and patient/family/caregiver  - Collaborate with rehabilitation services on mobility goals if consulted  - Perform Range of Motion 3 times a day  - Reposition patient every 2 hours    - Dangle patient 3 times a day  - Stand patient 3 times a day  - Ambulate patient 3 times a day  - Out of bed to chair 3 times a day   - Out of bed for meals 3 times a day  - Out of bed for toileting  - Record patient progress and toleration of activity level   Outcome: Progressing     Problem: Prexisting or High Potential for Compromised Skin Integrity  Goal: Skin integrity is maintained or improved  Description: INTERVENTIONS:  - Identify patients at risk for skin breakdown  - Assess and monitor skin integrity  - Assess and monitor nutrition and hydration status  - Monitor labs   - Assess for incontinence   - Turn and reposition patient  - Assist with mobility/ambulation  - Relieve pressure over bony prominences  - Avoid friction and shearing  - Provide appropriate hygiene as needed including keeping skin clean and dry  - Evaluate need for skin moisturizer/barrier cream  - Collaborate with interdisciplinary team   - Patient/family teaching  - Consider wound care consult   Outcome: Progressing

## 2022-06-21 NOTE — PROGRESS NOTES
[address]  Progress Note - Ulysees Sheets 1965, 62 y o  male MRN: [id number]  Unit/Bed#: -01 Encounter: [id number]  Primary Care Provider: Last Alcantara DO   Date and time admitted to hospital: 6/20/2022  1:33 PM    Gastroenteritis  Assessment & Plan  Patient presents to the ED for nausea vomiting x1 and diarrhea x2 over the last 24 hours with associated chills and subjective fevers  CT abdomen pelvis without contrast done in the ED negative for acute GI findings  Follow-up blood cultures  No diarrhea since being admitted, cancelled c-diff  Patient received 1 dose of Zosyn in the ED  Infectious workup so for including viral PCR, COVID, urinalysis, chest x-ray, CT abdomen negative for source of infection  No leukocytosis, afebrile, will hold off on further antibiotics for now    * KELLY (acute kidney injury) [facility])  Assessment & Plan  Patient presents with acute kidney injury on CKD IV  Baseline Cr is around 2 0, Patient recently discharged from [facility] for acute kidney injury with creatinine of 3 0  Creatinine on admission noted to be 4 0  The KELLY on admission at [facility] was attributed to patient being on vancomycin for osteomyelitis  He was then discharged on daptomycin which patient finished his course of antibiotics on 06/13/2022  CT abdomen showing New bladder distention and hydronephrosis suggesting bladder outlet obstruction  Suspected KELLY secondary to postrenal urinary retention  Ortiz placed in the ER  Patient received 1 L bolus in the ED will continue with gentle hydration   Nephro consulted, appreciate recommendations  Renal US - Showing Interval improvement of previous hydronephrosis  Mild residual hydronephrosis of the lower pole of the left kidney       Hyperkalemia  Assessment & Plan  Potassium on admission noted to be 6 0  Patient received albuterol inhaler, insulin 10 units, and Lasix 20 mg IV in the ED  Repeat potassium noted to be a 5 0  Continue trending potassium every 4 hours until normal x2  Telemetry  Potassium at 5 3 this morning      Osteomyelitis of vertebra of lumbar region [facility])  Assessment & Plan  Patient was recently discharged from [facility] where he was evaluated for L4-L5 diskitis/osteomyelitis  Patient was discharged on IV vancomycin, then transition to daptomycin due to [facility] abx course on 6/13  Patient was supposed to have central line removed today, may need IR to remove tunneled picc    History of CVA (cerebrovascular accident)  Assessment & Plan  Continue statin, aspirin 81 mg daily    Hypertension  Assessment & Plan  Resume home amlodipine  Hold losartan in setting of KELLY    Gout  Assessment & Plan  Resume home allopurinol    Diabetes mellitus [facility])  Assessment & Plan  Lab Results   Component Value Date    HGBA1C 8 4 (H) 01/24/2022       Recent Labs     06/20/22  2130 06/21/22  0800 06/21/22  1105   POCGLU 210* 147* 131       Blood Sugar Average: Last 72 hrs:  (P) 934 [id number]   Will place on insulin sliding scale and adjust sugars accordingly  Resume home lantus 14 units bid  Carb consistent diet          VTE Pharmacologic Prophylaxis:   Moderate Risk (Score 3-4) - Pharmacological DVT Prophylaxis Ordered: heparin  Patient Centered Rounds: I performed bedside rounds with nursing staff today  Discussions with Specialists or Other Care Team Provider: nephro    Education and Discussions with Family / Patient: Updated  (wife) at bedside  Time Spent for Care: 30 minutes  More than 50% of total time spent on counseling and coordination of care as described above  Current Length of Stay: 1 day(s)  Current Patient Status: Inpatient   Certification Statement: The patient will continue to require additional inpatient hospital stay [facility]  Discharge Plan: Anticipate discharge in 48-72 hrs to rehab facility      Code Status: Level 1 - Full Code    Subjective:   Patient seen examined at bedside  No acute events overnight  Patient states that chills have resolved denies any nausea vomiting or diarrhea  Objective:     Vitals:   Temp (24hrs), Av 8 °F (36 6 °C), Min:97 4 °F (36 3 °C), Max:98 °F (36 7 °C)    Temp:  [97 4 °F (36 3 °C)-98 °F (36 7 °C)] 98 °F (36 7 °C)  HR:  [] 76  Resp:  [18-25] 23  BP: (116-152)/(61-79) 152/74  SpO2:  [95 %-100 %] 95 %  Body mass index is 21 77 kg/m²  Input and Output Summary (last 24 hours): Intake/Output Summary (Last 24 hours) at 2022 1511  Last data filed at 2022 1024  Gross per 24 hour   Intake 2730 ml   Output 4650 ml   Net -1920 ml       Physical Exam:   Physical Exam  Vitals reviewed  HENT:      Head: Normocephalic and atraumatic  Right Ear: External ear normal       Left Ear: External ear normal       Nose: Nose normal       Mouth/Throat:      Mouth: Mucous membranes are moist       Pharynx: Oropharynx is clear  Eyes:      Extraocular Movements: Extraocular movements intact  Cardiovascular:      Rate and Rhythm: Normal rate and regular rhythm  Pulses: Normal pulses  Heart sounds: Normal heart sounds  Pulmonary:      Effort: Pulmonary effort is normal       Breath sounds: Normal breath sounds  Abdominal:      General: Abdomen is flat  Palpations: Abdomen is soft  Tenderness: There is no abdominal tenderness  Musculoskeletal:      Cervical back: Normal range of motion  Right lower leg: No edema  Left lower leg: No edema  Skin:     General: Skin is warm and dry  Neurological:      Mental Status: He is alert  Mental status is at baseline     Psychiatric:         Mood and Affect: Mood normal          Behavior: Behavior normal           Additional Data:     Labs:  Results from last 7 days   Lab Units 22  0559 22  1833 22  1338   WBC Thousand/uL 6 91  --  7 47   HEMOGLOBIN g/dL 8 0*  --  9 8*   HEMATOCRIT % 25 4*  --  32 1*   PLATELETS Thousands/uL 214   < > 266   NEUTROS PCT %  --   --  64   LYMPHS PCT %  --   --  20   MONOS PCT %  --   --  10   EOS PCT %  --   --  5    < > = values in this interval not displayed  Results from last 7 days   Lab Units 06/21/22  0559   SODIUM mmol/L 134*   POTASSIUM mmol/L 5 3  5 3   CHLORIDE mmol/L 102   CO2 mmol/L 20*   BUN mg/dL 49*   CREATININE mg/dL 3 80*   ANION GAP mmol/L 12   CALCIUM mg/dL 9 8   ALBUMIN g/dL 2 8*   TOTAL BILIRUBIN mg/dL 0 28   ALK PHOS U/L 105   ALT U/L 26   AST U/L 15   GLUCOSE RANDOM mg/dL 160*     Results from last 7 days   Lab Units 06/20/22  1338   INR  0 97     Results from last 7 days   Lab Units 06/21/22  1105 06/21/22  0800 06/20/22  2130   POC GLUCOSE mg/dl 131 147* 210*         Results from last 7 days   Lab Units 06/20/22  1338   LACTIC ACID mmol/L 1 1   PROCALCITONIN ng/ml 0 30*       Lines/Drains:  Invasive Devices  Report    Peripherally Inserted Central Catheter Line  Duration           PICC Line 06/20/22 Right 1 day          Peripheral Intravenous Line  Duration           Peripheral IV 06/20/22 Right Antecubital 1 day    Peripheral IV 06/20/22 Left;Ventral (anterior) Forearm <1 day          Drain  Duration           Urethral Catheter Coude 16 Fr  <1 day              Urinary Catheter:  Goal for removal: once mary resolves         Central Line:  Goal for removal: Will need IR to remove midline         Telemetry:  Telemetry Orders (From admission, onward)             24 Hour Telemetry Monitoring  Continuous x 24 Hours (Telem)        Expiring   References:    Telemetry Guidelines   Question:  Reason for 24 Hour Telemetry  Answer:  Metabolic/Electrolyte Disturbance with High Probability of Dysrhythmia (K level <3 or >6, or KCL infusion >=10mEq/hr)                 Telemetry Reviewed: Normal Sinus Rhythm and PVCs  Indication for Continued Telemetry Use: No indication for continued use  Will discontinue                Imaging: Reviewed radiology reports from this admission including: ultrasound(s)    Recent Cultures (last 7 days):   Results from last 7 days   Lab Units 06/20/22  1341 06/20/22  1338   BLOOD CULTURE  Received in Microbiology Lab  Culture in Progress  Received in Microbiology Lab  Culture in Progress  Received in Microbiology Lab  Culture in Progress  Last 24 Hours Medication List:   Current Facility-Administered Medications   Medication Dose Route Frequency Provider Last Rate    allopurinol  200 mg Oral Daily Pandi Todhe, DO      amLODIPine  10 mg Oral Daily Pandi Todhe, DO      aspirin  81 mg Oral Daily Pandi Todhe, DO      atorvastatin  40 mg Oral Daily With Nicholas Oil, DO      heparin (porcine)  5,000 Units Subcutaneous Q8H [facility] Pandi Todhe, DO      insulin glargine  14 Units Subcutaneous BID Pandi Todhe, DO      insulin lispro  1-5 Units Subcutaneous TID AC Pandi Todhe, DO      insulin lispro  1-5 Units Subcutaneous HS Pandi Todhe, DO      multi-electrolyte  100 mL/hr Intravenous Continuous SHOAIB MarkNP 100 mL/hr (06/21/22 1024)    ondansetron  4 mg Intravenous Once Vicenta Neil PA-C      oxyCODONE  5 mg Oral Q4H PRN YURIDIA Shukla      pregabalin  75 mg Oral BID Pandi Todhe,       sevelamer  800 mg Oral Daily With FINXI, YURIDIA      tamsulosin  0 4 mg Oral Daily With [place] Oil, DO      traMADol  50 mg Oral BID PRN YURIDIA Shukla          Today, Patient Was Seen By: Romeo Grimm DO    **Please Note: This note may have been constructed using a voice recognition system  **

## 2022-06-21 NOTE — OCCUPATIONAL THERAPY NOTE
Occupational Therapy Evaluation     Patient Name: Roberto Johnston  Today's Date: 6/21/2022  Problem List  Principal Problem:    Gastroenteritis  Active Problems:    KELLY (acute kidney injury) ([facility] [address] )    Hyperkalemia    Diabetes mellitus ([email or web address] )    Gout    Hypertension    History of CVA (cerebrovascular accident)    Osteomyelitis of vertebra of lumbar region [facility])    Past Medical History  Past Medical History:   Diagnosis Date    Diabetes mellitus ([email or web address] )     Gout     Hypertension     Renal disorder     Stroke [facility])      Past Surgical History  Past Surgical History:   Procedure Laterality Date    BACK SURGERY           06/21/22 0935   Note Type   Note type Evaluation   Restrictions/Precautions   Other Precautions Bed Alarm; Fall Risk   Pain Assessment   Pain Assessment Tool FLACC   Pain Rating: FLACC (Rest) - Face 0   Pain Rating: FLACC (Rest) - Legs 0   Pain Rating: FLACC (Rest) - Activity 0   Pain Rating: FLACC (Rest) - Cry 0   Pain Rating: FLACC (Rest) - Consolability 0   Score: FLACC (Rest) 0   Pain Rating: FLACC (Activity) - Face 1   Pain Rating: FLACC (Activity) - Legs 0   Pain Rating: FLACC (Activity) - Activity 0   Pain Rating: FLACC (Activity) - Cry 1   Pain Rating: FLACC (Activity) - Consolability 0   Score: FLACC (Activity) 2   Home Living   Type of Home House  (FF to enter R HR)   Home Layout One level;Performs ADLs on one level; Able to live on main level with bedroom/bathroom   Bathroom Shower/Tub Walk-in shower   Bathroom Toilet Raised   Bathroom Equipment Shower chair;Grab bars in [facility] Walker;Electric scooter; Wheelchair-manual   Additional Comments Pt reprots living in a 1 story home with 11 BRUCE with R HR  Per Pt's wife, her self and their son are present to assist Pt with stair management at all times  Pt ambulates short distances via RW although also has a w/c and electric scooter     Prior Function   Level of [place] Needs assistance with ADLs and functional mobility   Lives With Spouse   Receives Help From Family   ADL Assistance Needs assistance   IADLs Needs assistance   Falls in the last 6 months 0   Comments Pt reports having assistance for LB ADLs and bathing tasks as well as for IADLs  pt reports "i can do it but its quicker and easier if she just helps me"  ADL   Where Assessed Edge of bed   Grooming Assistance 5  Supervision/Setup   Grooming Deficit Setup   UB Dressing Assistance 5  Supervision/Setup   UB Dressing Deficit Setup   LB Dressing Assistance 3  Moderate Assistance   LB Dressing Deficit Steadying; Requires assistive device for steadying;Verbal cueing;Supervision/safety; Increased time to complete; Don/doff R sock; Don/doff L sock; Thread RLE into pants; Thread LLE into pants;Pull up over hips   Additional Comments Pt completing ADL tasks while seated at EOB  UB Dressing and grooming tasks @ S after set-up  LB Dressing @ Mod A for balance/safety and thoroughness during CM around waist while standing with UB supported from RW  Pt able to herb socks/pants around feet while seated @ Min A    Bed Mobility   Supine to Sit 5  Supervision   Additional items Assist x 1; Increased time required;Verbal cues;HOB elevated   Sit to Supine 5  Supervision   Additional items Assist x 1; Increased time required;Verbal cues   Additional Comments Pt completing bed mobility @ S with increased time PRN and vc'ing for technique   Transfers   Sit to Stand   (SBA)   Additional items Increased time required;Verbal cues   Stand to Sit   (SBA)   Additional items Increased time required;Verbal cues   Stand pivot   (Min A degressing to Mod A wiht fatigue )   Additional items Assist x 1; Increased time required;Verbal cues   Additional Comments Pt completing functional transfers with use of RW for UB support  SBA for STS from EOB>RW with vc'ing for hand placement  Min A progressing to Mod A for SPT d/t fatigue, poor RW managemetn and decreased balance/safety   Pt noted ot be inpulsive and slightly resistive towards safety cues/techniques   Balance   Static Sitting Good   Dynamic Sitting Fair +   Static Standing Fair -   Dynamic Standing Poor +   Activity Tolerance   Activity Tolerance Patient limited by fatigue;Patient limited by pain   Medical Staff Made Aware Spoke with PTZion   Nurse Made Aware Spoke with RN, Snehal KERN Assessment   RUE Assessment WFL   LUE Assessment   LUE Assessment WFL   Hand Function   Gross Motor Coordination Functional   Fine Motor Coordination Functional   Sensation   Light Touch No apparent deficits   Additional Comments Pt slightly guarded of BUE, would not fully allow MMT d/t "it hurts too much"   Cognition   Arousal/Participation Alert; Responsive; Cooperative   Attention Attends with cues to redirect   Orientation Level Oriented X4   Memory Decreased long term memory   Following Commands Follows one step commands with increased time or repetition   Comments Pt noted to be impulsive and slightly resistive towards safety cues  pt with limited insight to deficits  Assessment   Limitation Decreased ADL status; Decreased UE strength;Decreased Safe judgement during ADL;Decreased cognition;Decreased endurance;Decreased self-care trans;Decreased high-level ADLs   Prognosis Good   Assessment Pt is a 62 y o  male, admitted to [address] [place] [address] 6/20/2022 d/t experiencing chills, nausea and vomiting  Dx: gastroenteritis  Pt with PMHx impacting their performance during ADL tasks, including: CVA, HTN, DM, vertebral osteomyelitis, gout  Pt with recent hospitalization and was d/c'ed on IV antibiotics  Prior to admission to the hospital Pt was performing ADLs with physical assistance  IADLs with physical assistance  Functional transfers/ambulation with physical assistance  Cognitive status was PTA was intact  OT order placed to assess Pt's ADLs, cognitive status, and performance during functional tasks in order to maximize safety and independence while making most appropriate d/c recommendations   Pt's clinical presentation is currently unstable/unpredictable given new onset deficits that effect Pt's occupational performance and ability to safely return to PLOF including decrease activity tolerance, decrease standing balance, decrease sitting balance, decrease performance during ADL tasks, decrease cognition, decrease safety awareness , increase impulsiveness, decrease UB MS, increased pain, decrease generalized strength, decrease activity engagement, decrease performance during functional transfers, high fall risk and limited insight to deficits combined with medical complications of pain impacting overall mobility status, abnormal renal lab values, abnormal H&H, abnormal CBC, abnormal potassium values, decreased skin integrity, multiple readmissions, impulsivity during admission and need for input for mobility technique/safety  Personal factors affecting Pt at time of initial evaluation include: inaccessible home environment, step(s) to enter environment, past experience, behavioral pattern, inability to perform current job functions, inability to perform IADLs, inability to perform ADLs, new need for AD, inability to ambulate household distances, inability to navigate community distances, limited insight into impairments, decreased initiation and engagement and questionable non-compliance  Pt will benefit from continued skilled OT services to address deficits as defined above and to maximize level independence/participation during ADLs and functional tasks to facilitate return toward PLOF and improved quality of life  From an occupational therapy standpoint, recommendation at time of d/c would be post acute rehab  Plan   Treatment Interventions ADL retraining;Functional transfer training;UE strengthening/ROM; Patient/family training;Cognitive reorientation; Endurance training;Equipment evaluation/education; Neuromuscular reeducation; Compensatory technique education;Continued evaluation; Energy conservation; Activityengagement   Goal Expiration Date 07/05/22   OT Frequency 3-5x/wk   Recommendation   OT Discharge Recommendation Post acute rehabilitation services   [facility] Daily [facility] Inpatient   Lower Body Dressing 2   Bathing 2   Toileting 2   Upper Body Dressing 3   Grooming 3   Eating 4   Daily Activity Raw Score 16   Daily Activity Standardized Score (Calc for Raw Score >=11) 35 96   AM-[facility] Applied Cognition Inpatient   Following a Speech/Presentation 2   Understanding Ordinary Conversation 3   Taking Medications 2   Remembering Where Things Are Placed or Put Away 3   Remembering List of 4-5 Errands 3   Taking Care of Complicated Tasks 2   Applied Cognition Raw Score 15   Applied Cognition Standardized Score 33 54     The patient's raw score on the AM-PAC Daily Activity inpatient short form is 16, standardized score is 35 96, less than 39 4  Patients at this level are likely to benefit from DC to post-acute rehabilitation services  Please refer to the recommendation of the Occupational Therapist for safe DC planning  Pt goals to be met by /5/2022    Pt will demonstrate ability to complete LB dressing @ S in order to increase safety and independence during meaningful tasks  Pt will demonstrate ability to herb/doff socks/shoes while sitting EOB @ S in order to increase safety and independence during meaningful tasks  Pt will demonstrate ability to complete toileting tasks including CM and pericare @ S in order to increase safety and independence during meaningful tasks  Pt will demonstrate ability to complete EOB, chair, toilet/commode transfers @ S in order to increase performance and participation during functional tasks  Pt will demonstrate ability to stand for 3-4 minutes while maintaining F+ balance with use of RW for UB support PRN    Pt will demonstrate ability to tolerate 30-35 minute OT session with no vc'ing for deep breathing or use of energy conservation techniques in order to increase activity tolerance during functional tasks  Pt will demonstrate Good carryover of use of energy conservation/compensatory strategies during ADLs and functional tasks in order to increase safety and reduce risk for falls  Pt will demonstrate Good attention and participation in continued evaluation of functional ambulation house hold distances in order to assist with safe d/c planning  Pt will attend to continued cognitive assessments 100% of the time in order to provide most appropriate d/c recommendations  Pt will follow 100% simple 2-step commands and be A&O x4 consistently with environmental cues to increase participation in functional activities  Pt will identify 3 areas of interest/hobbies and 1 intervention on how to incorporate into daily life in order to increase interaction with environment and peers as well as increase participation in meaningful tasks  Pt will demonstrate 100% carryover of BUE HEP in order to increase BUE MS and increase performance during functional tasks upon d/c home      Nba Segura OTR/L

## 2022-06-21 NOTE — ASSESSMENT & PLAN NOTE
Patient presents to the ED for nausea vomiting x1 and diarrhea x2 over the last 24 hours with associated chills and subjective fevers  CT abdomen pelvis without contrast done in the ED negative for acute GI findings    Follow-up blood cultures  No diarrhea since being admitted, cancelled c-diff  Patient received 1 dose of Zosyn in the ED  Infectious workup so for including viral PCR, COVID, urinalysis, chest x-ray, CT abdomen negative for source of infection  No leukocytosis, afebrile, will hold off on further antibiotics for now

## 2022-06-22 ENCOUNTER — APPOINTMENT (INPATIENT)
Dept: RADIOLOGY | Facility: HOSPITAL | Age: 57
DRG: 682 | End: 2022-06-22
Payer: MEDICARE

## 2022-06-22 PROBLEM — M25.552 HIP PAIN, ACUTE, LEFT: Status: ACTIVE | Noted: 2022-06-22

## 2022-06-22 PROBLEM — E43 SEVERE PROTEIN-CALORIE MALNUTRITION (HCC): Status: ACTIVE | Noted: 2022-06-22

## 2022-06-22 PROBLEM — D50.8 IRON DEFICIENCY ANEMIA SECONDARY TO INADEQUATE DIETARY IRON INTAKE: Status: ACTIVE | Noted: 2022-06-22

## 2022-06-22 LAB
ALBUMIN SERPL BCP-MCNC: 2.6 G/DL (ref 3.5–5)
ALP SERPL-CCNC: 94 U/L (ref 46–116)
ALT SERPL W P-5'-P-CCNC: 22 U/L (ref 12–78)
ANION GAP SERPL CALCULATED.3IONS-SCNC: 14 MMOL/L (ref 4–13)
AST SERPL W P-5'-P-CCNC: 13 U/L (ref 5–45)
ATRIAL RATE: 92 BPM
BILIRUB SERPL-MCNC: 0.21 MG/DL (ref 0.2–1)
BUN SERPL-MCNC: 45 MG/DL (ref 5–25)
CALCIUM ALBUM COR SERPL-MCNC: 10.1 MG/DL (ref 8.3–10.1)
CALCIUM SERPL-MCNC: 9 MG/DL (ref 8.3–10.1)
CHLORIDE SERPL-SCNC: 102 MMOL/L (ref 100–108)
CO2 SERPL-SCNC: 21 MMOL/L (ref 21–32)
CREAT SERPL-MCNC: 3.7 MG/DL (ref 0.6–1.3)
ERYTHROCYTE [DISTWIDTH] IN BLOOD BY AUTOMATED COUNT: 15.7 % (ref 11.6–15.1)
FERRITIN SERPL-MCNC: 21 NG/ML (ref 8–388)
GFR SERPL CREATININE-BSD FRML MDRD: 17 ML/MIN/1.73SQ M
GLUCOSE SERPL-MCNC: 118 MG/DL (ref 65–140)
GLUCOSE SERPL-MCNC: 134 MG/DL (ref 65–140)
GLUCOSE SERPL-MCNC: 139 MG/DL (ref 65–140)
GLUCOSE SERPL-MCNC: 142 MG/DL (ref 65–140)
GLUCOSE SERPL-MCNC: 171 MG/DL (ref 65–140)
HCT VFR BLD AUTO: 22.3 % (ref 36.5–49.3)
HCT VFR BLD AUTO: 27.2 % (ref 36.5–49.3)
HGB BLD-MCNC: 7 G/DL (ref 12–17)
HGB BLD-MCNC: 8.5 G/DL (ref 12–17)
IRON SATN MFR SERPL: 16 % (ref 20–50)
IRON SERPL-MCNC: 41 UG/DL (ref 65–175)
MAGNESIUM SERPL-MCNC: 2.5 MG/DL (ref 1.6–2.6)
MCH RBC QN AUTO: 28.5 PG (ref 26.8–34.3)
MCHC RBC AUTO-ENTMCNC: 31.4 G/DL (ref 31.4–37.4)
MCV RBC AUTO: 91 FL (ref 82–98)
P AXIS: 58 DEGREES
PHOSPHATE SERPL-MCNC: 7.1 MG/DL (ref 2.7–4.5)
PLATELET # BLD AUTO: 202 THOUSANDS/UL (ref 149–390)
PMV BLD AUTO: 10.9 FL (ref 8.9–12.7)
POTASSIUM SERPL-SCNC: 5.2 MMOL/L (ref 3.5–5.3)
PR INTERVAL: 140 MS
PROT SERPL-MCNC: 5.8 G/DL (ref 6.4–8.2)
PTH-INTACT SERPL-MCNC: 24.6 PG/ML (ref 18.4–80.1)
QRS AXIS: -41 DEGREES
QRSD INTERVAL: 92 MS
QT INTERVAL: 352 MS
QTC INTERVAL: 435 MS
RBC # BLD AUTO: 2.46 MILLION/UL (ref 3.88–5.62)
SODIUM SERPL-SCNC: 137 MMOL/L (ref 136–145)
T WAVE AXIS: 74 DEGREES
TIBC SERPL-MCNC: 260 UG/DL (ref 250–450)
VENTRICULAR RATE: 92 BPM
WBC # BLD AUTO: 5.39 THOUSAND/UL (ref 4.31–10.16)

## 2022-06-22 PROCEDURE — 83970 ASSAY OF PARATHORMONE: CPT | Performed by: NURSE PRACTITIONER

## 2022-06-22 PROCEDURE — 93010 ELECTROCARDIOGRAM REPORT: CPT | Performed by: STUDENT IN AN ORGANIZED HEALTH CARE EDUCATION/TRAINING PROGRAM

## 2022-06-22 PROCEDURE — 85014 HEMATOCRIT: CPT | Performed by: STUDENT IN AN ORGANIZED HEALTH CARE EDUCATION/TRAINING PROGRAM

## 2022-06-22 PROCEDURE — 84100 ASSAY OF PHOSPHORUS: CPT | Performed by: STUDENT IN AN ORGANIZED HEALTH CARE EDUCATION/TRAINING PROGRAM

## 2022-06-22 PROCEDURE — 83540 ASSAY OF IRON: CPT | Performed by: STUDENT IN AN ORGANIZED HEALTH CARE EDUCATION/TRAINING PROGRAM

## 2022-06-22 PROCEDURE — 02PYX3Z REMOVAL OF INFUSION DEVICE FROM GREAT VESSEL, EXTERNAL APPROACH: ICD-10-PCS | Performed by: FAMILY MEDICINE

## 2022-06-22 PROCEDURE — 82728 ASSAY OF FERRITIN: CPT | Performed by: STUDENT IN AN ORGANIZED HEALTH CARE EDUCATION/TRAINING PROGRAM

## 2022-06-22 PROCEDURE — 99232 SBSQ HOSP IP/OBS MODERATE 35: CPT | Performed by: INTERNAL MEDICINE

## 2022-06-22 PROCEDURE — 83550 IRON BINDING TEST: CPT | Performed by: STUDENT IN AN ORGANIZED HEALTH CARE EDUCATION/TRAINING PROGRAM

## 2022-06-22 PROCEDURE — 82948 REAGENT STRIP/BLOOD GLUCOSE: CPT

## 2022-06-22 PROCEDURE — 83735 ASSAY OF MAGNESIUM: CPT | Performed by: STUDENT IN AN ORGANIZED HEALTH CARE EDUCATION/TRAINING PROGRAM

## 2022-06-22 PROCEDURE — 80053 COMPREHEN METABOLIC PANEL: CPT | Performed by: NURSE PRACTITIONER

## 2022-06-22 PROCEDURE — 85018 HEMOGLOBIN: CPT | Performed by: STUDENT IN AN ORGANIZED HEALTH CARE EDUCATION/TRAINING PROGRAM

## 2022-06-22 PROCEDURE — 99232 SBSQ HOSP IP/OBS MODERATE 35: CPT | Performed by: STUDENT IN AN ORGANIZED HEALTH CARE EDUCATION/TRAINING PROGRAM

## 2022-06-22 PROCEDURE — 73522 X-RAY EXAM HIPS BI 3-4 VIEWS: CPT

## 2022-06-22 PROCEDURE — 85027 COMPLETE CBC AUTOMATED: CPT | Performed by: NURSE PRACTITIONER

## 2022-06-22 RX ORDER — METHOCARBAMOL 500 MG/1
500 TABLET, FILM COATED ORAL EVERY 6 HOURS PRN
Status: DISCONTINUED | OUTPATIENT
Start: 2022-06-22 | End: 2022-06-24 | Stop reason: HOSPADM

## 2022-06-22 RX ORDER — LIDOCAINE HYDROCHLORIDE AND EPINEPHRINE 10; 10 MG/ML; UG/ML
1 INJECTION, SOLUTION INFILTRATION; PERINEURAL ONCE
Status: COMPLETED | OUTPATIENT
Start: 2022-06-22 | End: 2022-06-22

## 2022-06-22 RX ORDER — DIAZEPAM 2 MG/1
2 TABLET ORAL ONCE
Status: COMPLETED | OUTPATIENT
Start: 2022-06-22 | End: 2022-06-22

## 2022-06-22 RX ADMIN — INSULIN GLARGINE 14 UNITS: 100 INJECTION, SOLUTION SUBCUTANEOUS at 16:06

## 2022-06-22 RX ADMIN — PREGABALIN 75 MG: 75 CAPSULE ORAL at 16:05

## 2022-06-22 RX ADMIN — OXYCODONE HYDROCHLORIDE 5 MG: 5 TABLET ORAL at 10:34

## 2022-06-22 RX ADMIN — SODIUM CHLORIDE, SODIUM GLUCONATE, SODIUM ACETATE, POTASSIUM CHLORIDE, MAGNESIUM CHLORIDE, SODIUM PHOSPHATE, DIBASIC, AND POTASSIUM PHOSPHATE 100 ML/HR: .53; .5; .37; .037; .03; .012; .00082 INJECTION, SOLUTION INTRAVENOUS at 17:58

## 2022-06-22 RX ADMIN — SEVELAMER HYDROCHLORIDE 800 MG: 800 TABLET, FILM COATED PARENTERAL at 16:05

## 2022-06-22 RX ADMIN — SODIUM CHLORIDE, SODIUM GLUCONATE, SODIUM ACETATE, POTASSIUM CHLORIDE, MAGNESIUM CHLORIDE, SODIUM PHOSPHATE, DIBASIC, AND POTASSIUM PHOSPHATE 100 ML/HR: .53; .5; .37; .037; .03; .012; .00082 INJECTION, SOLUTION INTRAVENOUS at 06:05

## 2022-06-22 RX ADMIN — HEPARIN SODIUM 5000 UNITS: 5000 INJECTION INTRAVENOUS; SUBCUTANEOUS at 21:00

## 2022-06-22 RX ADMIN — HEPARIN SODIUM 5000 UNITS: 5000 INJECTION INTRAVENOUS; SUBCUTANEOUS at 13:11

## 2022-06-22 RX ADMIN — INSULIN LISPRO 1 UNITS: 100 INJECTION, SOLUTION INTRAVENOUS; SUBCUTANEOUS at 22:00

## 2022-06-22 RX ADMIN — METHOCARBAMOL TABLETS 500 MG: 500 TABLET, COATED ORAL at 09:42

## 2022-06-22 RX ADMIN — METHOCARBAMOL TABLETS 500 MG: 500 TABLET, COATED ORAL at 21:01

## 2022-06-22 RX ADMIN — OXYCODONE HYDROCHLORIDE 5 MG: 5 TABLET ORAL at 06:04

## 2022-06-22 RX ADMIN — LIDOCAINE HYDROCHLORIDE,EPINEPHRINE BITARTRATE 1 ML: 10; .01 INJECTION, SOLUTION INFILTRATION; PERINEURAL at 14:26

## 2022-06-22 RX ADMIN — PREGABALIN 75 MG: 75 CAPSULE ORAL at 08:00

## 2022-06-22 RX ADMIN — ATORVASTATIN CALCIUM 40 MG: 40 TABLET, FILM COATED ORAL at 16:05

## 2022-06-22 RX ADMIN — INSULIN GLARGINE 14 UNITS: 100 INJECTION, SOLUTION SUBCUTANEOUS at 08:00

## 2022-06-22 RX ADMIN — OXYCODONE HYDROCHLORIDE 5 MG: 5 TABLET ORAL at 20:06

## 2022-06-22 RX ADMIN — DIAZEPAM 2 MG: 2 TABLET ORAL at 02:43

## 2022-06-22 RX ADMIN — TAMSULOSIN HYDROCHLORIDE 0.4 MG: 0.4 CAPSULE ORAL at 16:05

## 2022-06-22 RX ADMIN — ASPIRIN 81 MG: 81 TABLET, COATED ORAL at 08:00

## 2022-06-22 RX ADMIN — ALLOPURINOL 200 MG: 100 TABLET ORAL at 08:00

## 2022-06-22 RX ADMIN — AMLODIPINE BESYLATE 10 MG: 10 TABLET ORAL at 08:00

## 2022-06-22 RX ADMIN — TRAMADOL HYDROCHLORIDE 50 MG: 50 TABLET ORAL at 07:58

## 2022-06-22 NOTE — ASSESSMENT & PLAN NOTE
Malnutrition Findings:   Adult Malnutrition type: Chronic illness  Adult Degree of Malnutrition: Other severe protein calorie malnutrition  Malnutrition Characteristics: Muscle loss, Weight loss                  360 Statement: Chronic severe malnutrition related to poor appetite as evidenced by  26 8% weight loss x 9 mo (6/17/21 198lb, 3/31/22 145lb), severe muscle loss to quadriceps, gastrocnemius, interroseous muscles  Treated with: Increase CCD to level 3, order renal liberal diet for modest K/Phos restrictions  Trial Nepro daily and ensure pudding BID  Provided with diet instruction for low K/Phos diets, homemade shakes for home  Recommend daily weights  BMI Findings: Body mass index is 21 77 kg/m²

## 2022-06-22 NOTE — ASSESSMENT & PLAN NOTE
Lab Results   Component Value Date    HGBA1C 8 4 (H) 01/24/2022       Recent Labs     06/21/22  1556 06/21/22 2058 06/22/22  0711 06/22/22  1110   POCGLU 192* 174* 134 118       Blood Sugar Average: Last 72 hrs:  (P) 158   Will place on insulin sliding scale and adjust sugars accordingly  Resume home lantus 14 units bid  Carb consistent diet

## 2022-06-22 NOTE — PROCEDURES
Date: 6/22/22  Procedure: Removal of Samano catheter  Details: Patient identified by name and bracelet  Procedure discussed with patient  Time out performed  3ml 1% lidocaine with epi injected for local anesthesia  Skin suture removed  While applying nikia traction to catheter the cuff was dissected away from subcutaneous tissue with an 11 blade and a hemostat  Catheter removed intact without incident  Direct pressure applied to area for 5 minutes  4x4 and Tegaderm dressing placed  Patient tolerated procedure well       Abby Quiroz PA-C

## 2022-06-22 NOTE — ASSESSMENT & PLAN NOTE
Hemoglobin noted to be at 7 this morning  Order repeat hemoglobin which came back at 8 5    No blood was given  Follow-up iron studies

## 2022-06-22 NOTE — PROGRESS NOTES
[address]  Progress Note - Lc Ferrari 1965, 62 y o  male MRN: [id number]  Unit/Bed#: -01 Encounter: [id number]  Primary Care Provider: Tala Gunderson DO   Date and time admitted to hospital: 6/20/2022  1:33 PM    * KELLY (acute kidney injury) [facility])  Assessment & Plan  Patient presents with acute kidney injury on CKD IV  Baseline Cr is around 2 0, Patient recently discharged from [facility] for acute kidney injury with creatinine of 3 0  Creatinine on admission noted to be 4 0  The KELLY on admission at [facility] was attributed to patient being on vancomycin for osteomyelitis  He was then discharged on daptomycin which patient finished his course of antibiotics on 06/13/2022  CT abdomen showing New bladder distention and hydronephrosis suggesting bladder outlet obstruction  Suspected KELLY secondary to postrenal urinary retention  Ortiz placed in the ER  Patient received 1 L bolus in the ED will continue with gentle hydration   Nephro consulted, appreciate recommendations  Renal US - Showing Interval improvement of previous hydronephrosis  Mild residual hydronephrosis of the lower pole of the left kidney  Creatinine slowly improving down to 3 7    Gastroenteritis  Assessment & Plan  Patient presents to the ED for nausea vomiting x1 and diarrhea x2 over the last 24 hours with associated chills and subjective fevers  CT abdomen pelvis without contrast done in the ED negative for acute GI findings  Follow-up blood cultures  No diarrhea since being admitted, cancelled c-diff  Patient received 1 dose of Zosyn in the ED  Infectious workup so for including viral PCR, COVID, urinalysis, chest x-ray, CT abdomen negative for source of infection  No leukocytosis, afebrile, will hold off on further antibiotics for now  Patient has not had diarrhea will discontinue COVID PCR    Hip pain, acute, left  Assessment & Plan  Patient complaining of left hip pain    Patient had similar symptoms while admitted to [facility] where CT was negative for osteo  Will obtain hip x-rays    Hyperkalemia  Assessment & Plan  Potassium on admission noted to be 6 0  Patient received albuterol inhaler, insulin 10 units, and Lasix 20 mg IV in the ED  Repeat potassium noted to be a 5 0  Continue trending potassium every 4 hours until normal x2  Telemetry  Potassium at 5 2 this morning      Iron deficiency anemia secondary to inadequate dietary iron intake  Assessment & Plan  Hemoglobin noted to be at 7 this morning  Order repeat hemoglobin which came back at 8 5  No blood was given  Follow-up iron studies    Osteomyelitis of vertebra of lumbar region [facility])  Assessment & Plan  Patient was recently discharged from [facility] where he was evaluated for L4-L5 diskitis/osteomyelitis  Patient was discharged on IV vancomycin, then transition to daptomycin due to [facility] abx course on 6/13  Patient was supposed to have central line removed on 6/21  Discussed with IR no plan to have patient transferred for removal, will attempt to remove it while hospitalized, if unable will have outpatient f/u        Severe protein-calorie malnutrition ([address] )  Assessment & Plan  Malnutrition Findings:   Adult Malnutrition type: Chronic illness  Adult Degree of Malnutrition: Other severe protein calorie malnutrition  Malnutrition Characteristics: Muscle loss, Weight loss                  360 Statement: Chronic severe malnutrition related to poor appetite as evidenced by  26 8% weight loss x 9 mo (6/17/21 198lb, 3/31/22 145lb), severe muscle loss to quadriceps, gastrocnemius, interroseous muscles  Treated with: Increase CCD to level 3, order renal liberal diet for modest K/Phos restrictions  Trial Nepro daily and ensure pudding BID  Provided with diet instruction for low K/Phos diets, homemade shakes for home  Recommend daily weights  BMI Findings: Body mass index is 21 77 kg/m²         History of CVA (cerebrovascular accident)  Assessment & Plan  Continue statin, aspirin 81 mg daily    Hypertension  Assessment & Plan  Resume home amlodipine  Hold losartan in setting of KELLY    Gout  Assessment & Plan  Resume home allopurinol    Diabetes mellitus [facility])  Assessment & Plan  Lab Results   Component Value Date    HGBA1C 8 4 (H) 2022       Recent Labs     22  1556 22  2058 22  0711 22  1110   POCGLU 192* 174* 134 118       Blood Sugar Average: Last 72 hrs:  (P) 158   Will place on insulin sliding scale and adjust sugars accordingly  Resume home lantus 14 units bid  Carb consistent diet          VTE Pharmacologic Prophylaxis:   High Risk (Score >/= 5) - Pharmacological DVT Prophylaxis Ordered: heparin  Sequential Compression Devices Ordered  Patient Centered Rounds: I performed bedside rounds with nursing staff today  Discussions with Specialists or Other Care Team Provider: Nephro    Education and Discussions with Family / Patient: Updated  (wife) at bedside  Time Spent for Care: 30 minutes  More than 50% of total time spent on counseling and coordination of care as described above  Current Length of Stay: 2 day(s)  Current Patient Status: Inpatient   Certification Statement: The patient will continue to require additional inpatient hospital stay due to H[facility]P [facility]  Discharge Plan: Anticipate discharge in 48-72 hrs to discharge location to be determined pending rehab evaluations  Code Status: Level 1 - Full Code    Subjective:   Patient seen examined at bedside  No acute events overnight  Patient complained of left hip pain will obtain x-rays  Objective:     Vitals:   Temp (24hrs), Av 7 °F (36 5 °C), Min:97 4 °F (36 3 °C), Max:98 2 °F (36 8 °C)    Temp:  [97 4 °F (36 3 °C)-98 2 °F (36 8 °C)] 98 2 °F (36 8 °C)  HR:  [69-78] 75  Resp:  [18-19] 18  BP: (128-139)/(61-75) 132/61  SpO2:  [94 %-97 %] 96 %  Body mass index is 21 77 kg/m²       Input and Output Summary (last 24 hours): Intake/Output Summary (Last 24 hours) at 6/22/2022 1234  Last data filed at 6/22/2022 1032  Gross per 24 hour   Intake 180 ml   Output 4225 ml   Net -4045 ml       Physical Exam:   Physical Exam  Vitals reviewed  HENT:      Head: Normocephalic and atraumatic  Right Ear: External ear normal       Left Ear: External ear normal       Nose: Nose normal       Mouth/Throat:      Mouth: Mucous membranes are moist       Pharynx: Oropharynx is clear  Eyes:      Extraocular Movements: Extraocular movements intact  Cardiovascular:      Rate and Rhythm: Normal rate and regular rhythm  Pulses: Normal pulses  Heart sounds: Normal heart sounds  Pulmonary:      Effort: Pulmonary effort is normal       Breath sounds: Normal breath sounds  Abdominal:      General: Abdomen is flat  Palpations: Abdomen is soft  Tenderness: There is no abdominal tenderness  Musculoskeletal:      Cervical back: Normal range of motion  Right lower leg: No edema  Left lower leg: No edema  Skin:     General: Skin is warm and dry  Neurological:      General: No focal deficit present  Mental Status: He is alert  Mental status is at baseline  Psychiatric:         Mood and Affect: Mood normal          Behavior: Behavior normal           Additional Data:     Labs:  Results from last 7 days   Lab Units 06/22/22  0911 06/22/22  0513 06/20/22  1833 06/20/22  1338   WBC Thousand/uL  --  5 39   < > 7 47   HEMOGLOBIN g/dL 8 5* 7 0*   < > 9 8*   HEMATOCRIT % 27 2* 22 3*   < > 32 1*   PLATELETS Thousands/uL  --  202   < > 266   NEUTROS PCT %  --   --   --  64   LYMPHS PCT %  --   --   --  20   MONOS PCT %  --   --   --  10   EOS PCT %  --   --   --  5    < > = values in this interval not displayed       Results from last 7 days   Lab Units 06/22/22  0513   SODIUM mmol/L 137   POTASSIUM mmol/L 5 2   CHLORIDE mmol/L 102   CO2 mmol/L 21   BUN mg/dL 45*   CREATININE mg/dL 3 70*   ANION GAP mmol/L 14*   CALCIUM mg/dL 9 0   ALBUMIN g/dL 2 6*   TOTAL BILIRUBIN mg/dL 0 21   ALK PHOS U/L 94   ALT U/L 22   AST U/L 13   GLUCOSE RANDOM mg/dL 142*     Results from last 7 days   Lab Units 06/20/22  1338   INR  0 97     Results from last 7 days   Lab Units 06/22/22  1110 06/22/22  0711 06/21/22  2058 06/21/22  1556 06/21/22  1105 06/21/22  0800 06/20/22  2130   POC GLUCOSE mg/dl 118 134 174* 192* 131 147* 210*         Results from last 7 days   Lab Units 06/20/22  1338   LACTIC ACID mmol/L 1 1   PROCALCITONIN ng/ml 0 30*       Lines/Drains:  Invasive Devices  Report    Peripherally Inserted Central Catheter Line  Duration           PICC Line 06/20/22 Right 1 day          Peripheral Intravenous Line  Duration           Peripheral IV 06/20/22 Left;Ventral (anterior) Forearm 1 day    Peripheral IV 06/20/22 Right Antecubital 1 day          Drain  Duration           Urethral Catheter Coude 16 Fr  1 day              Urinary Catheter:  Goal for removal: once mary resolves         Central Line:  Goal for removal: No longer needed  Will place order to discontinue  Imaging: Reviewed radiology reports from this admission including: xray(s)    Recent Cultures (last 7 days):   Results from last 7 days   Lab Units 06/20/22  1341 06/20/22  1338   BLOOD CULTURE  No Growth at 24 hrs  No Growth at 24 hrs  No Growth at 24 hrs         Last 24 Hours Medication List:   Current Facility-Administered Medications   Medication Dose Route Frequency Provider Last Rate    allopurinol  200 mg Oral Daily Neymar Lind, DO      amLODIPine  10 mg Oral Daily Neymar Lind, DO      aspirin  81 mg Oral Daily Neymar Lind, DO      atorvastatin  40 mg Oral Daily With Nicholas Oil, DO      heparin (porcine)  5,000 Units Subcutaneous Q8H [facility] Neymar Lind DO      insulin glargine  14 Units Subcutaneous BID Neymar Lind DO      insulin lispro  1-5 Units Subcutaneous TID AC Neymar Lind DO      insulin lispro  1-5 Units Subcutaneous HS Neymar Lind,       methocarbamol  500 mg Oral Q6H PRN Neymar Lind DO      multi-electrolyte  100 mL/hr Intravenous Continuous YURIDIA Parisi 100 mL/hr (06/22/22 0605)    ondansetron  4 mg Intravenous Once [facility], YORDAN      oxyCODONE  5 mg Oral Q4H PRN YURIDIA Muniz      pregabalin  75 mg Oral BID Neymar Lind DO      sevelamer  800 mg Oral Daily With YURIDIA Mohr      tamsulosin  0 4 mg Oral Daily With Nicholas Oil,       traMADol  50 mg Oral BID PRN YURIDIA Muniz          Today, Patient Was Seen By: Buffy Trejo DO    **Please Note: This note may have been constructed using a voice recognition system  **

## 2022-06-22 NOTE — ASSESSMENT & PLAN NOTE
Patient was recently discharged from [facility] where he was evaluated for L4-L5 diskitis/osteomyelitis    Patient was discharged on IV vancomycin, then transition to daptomycin due to [facility] abx course on 6/13  Patient was supposed to have central line removed on 6/21  Discussed with IR no plan to have patient transferred for removal, will attempt to remove it while hospitalized, if unable will have outpatient f/u

## 2022-06-22 NOTE — ASSESSMENT & PLAN NOTE
Patient presents to the ED for nausea vomiting x1 and diarrhea x2 over the last 24 hours with associated chills and subjective fevers  CT abdomen pelvis without contrast done in the ED negative for acute GI findings    Follow-up blood cultures  No diarrhea since being admitted, cancelled c-diff  Patient received 1 dose of Zosyn in the ED  Infectious workup so for including viral PCR, COVID, urinalysis, chest x-ray, CT abdomen negative for source of infection  No leukocytosis, afebrile, will hold off on further antibiotics for now  Patient has not had diarrhea will discontinue COVID PCR

## 2022-06-22 NOTE — ASSESSMENT & PLAN NOTE
Patient presents with acute kidney injury on CKD IV  Baseline Cr is around 2 0, Patient recently discharged from [facility] for acute kidney injury with creatinine of 3 0  Creatinine on admission noted to be 4 0  The KELLY on admission at [facility] was attributed to patient being on vancomycin for osteomyelitis  He was then discharged on daptomycin which patient finished his course of antibiotics on 06/13/2022  CT abdomen showing New bladder distention and hydronephrosis suggesting bladder outlet obstruction  Suspected KELLY secondary to postrenal urinary retention  Ortiz placed in the ER  Patient received 1 L bolus in the ED will continue with gentle hydration   Nephro consulted, appreciate recommendations  Renal US - Showing Interval improvement of previous hydronephrosis  Mild residual hydronephrosis of the lower pole of the left kidney     Creatinine slowly improving down to 3 7

## 2022-06-22 NOTE — CASE MANAGEMENT
Case Management Discharge Planning Note    Patient name Michelle Olivera  Location /-69 MRN [id number]  : 1965 Date 2022       Current Admission Date: 2022  Current Admission Diagnosis:KELLY (acute kidney injury) [facility])   Patient Active Problem List    Diagnosis Date Noted    Severe protein-calorie malnutrition ([facility] ) 2022    Iron deficiency anemia secondary to inadequate dietary iron intake 2022    Hip pain, acute, left 2022    Gastroenteritis 2022    KELLY (acute kidney injury) ([facility] ) 2022    Hyperkalemia 2022    Diabetes mellitus ([facility] )     Gout     Hypertension     History of CVA (cerebrovascular accident)     Osteomyel[facility] of [facility] of [facility] ([facility] )       LOS (days): 2  Geometric Mean LOS (GMLOS) (days): 2 60  Days to GMLOS:0 5     OBJECTIVE:  Risk of Unplanned Readmission Score: 20 5         Current admission status: Inpatient   Preferred Pharmacy:   [address] [place] - [address]   [place] [address]  Phone: [phone] Fax: [phone]    Primary Care Provider: William Christine DO    Primary Insurance: MEDICARE  Secondary Insurance:     DISCHARGE DETAILS:        CM met with patient at the bedside,baseline information  was obtained  CM discussed the role of CM in helping the patient develop a discharge plan and assist the patient in carry out their plan  CM discussed FOC with patient and caretaker PT/OT recommendations for STR post hospital patient declined  Pt [facility] to [facility] was discharged lorena trivedi 22 had home health for one week and medical problem rubina him back to er  Both patient and wife want pt to return to home with [facility] home health therapy and nursing  CM concern 11 steps with rail into home      POA info provided at bedside , wife request     CM opened ecin to [facility]     Picc line removed this afternoon by surgery at bedside  CM called Joaquín Doll at [facility] infusion left message picc was pulled today in hospital at [address] [place] [address]  Discharge planning discussed with[de-identified] patient and wife at bedside  Freedom of Choice: Yes  Comments - Freedom of Choice: CM discussed FOC with patient and family  discussed pt/ot recommendations for STR post hospital and patient declined  patient wife state patient was just in [facility], discharged 6/13/22,  had home health for one week, and back to hospital with medical issues  wife agreeable with patient returning home with home health  safety risk with 11 a steps into home at this point   patient didnt walk well for therapy on eval   CM contacted family/caregiver?: Yes  Were Treatment Team discharge recommendations reviewed with patient/caregiver?: Yes  Did patient/caregiver verbalize understanding of patient care needs?: Yes  Were patient/caregiver advised of the risks associated with not following Treatment Team discharge recommendations?: Yes    Contacts  Patient Contacts: Arlyn Barillas wife  Relationship to Patient[de-identified] Family  Contact Method:  In Person  Reason/Outcome: Discharge [address]         Is the patient interested in [facility] at discharge?: Yes  [address] requested[de-identified] Nursing, Physical Therapy, Occupational [address] Name[de-identified] [address] [facility] Provider[de-identified] PCP  Home Health Services Needed[de-identified] Evaluate Functional Status and Safety, Strengthening/Theraputic Exercises to Improve Function, Diabetes Management, Other (comment) (SN assessment and medication managment)  Homebound Criteria Met[de-identified] Requires the Assistance of Another Person for Safe Ambulation or to Leave the Home, Uses an Assist Device (i e  cane, walker, etc) (deconditioned multi hospitalization recently declining STR recommendation on discharge from hospital )  Supporting Clincal Findings[de-identified] Fatigues Easliy in [employer], Limited Endurance         Other Referral/Resources/Interventions Provided:  Programs[de-identified]  (DM)

## 2022-06-22 NOTE — PLAN OF CARE
Problem: PAIN - ADULT  Goal: Verbalizes/displays adequate comfort level or baseline comfort level  Description: Interventions:  - Encourage patient to monitor pain and request assistance  - Assess pain using appropriate pain scale  - Administer analgesics based on type and severity of pain and evaluate response  - Implement non-pharmacological measures as appropriate and evaluate response  - Consider cultural and social influences on pain and pain management  - Notify physician/advanced practitioner if interventions unsuccessful or patient reports new pain  Outcome: Progressing     Problem: SAFETY ADULT  Goal: Patient will remain free of falls  Description: INTERVENTIONS:  - Educate patient/family on patient safety including physical limitations  - Instruct patient to call for assistance with activity   - Consult OT/PT to assist with strengthening/mobility   - Keep Call bell within reach  - Keep bed low and locked with side rails adjusted as appropriate  - Keep care items and personal belongings within reach  - Initiate and maintain comfort rounds  - Make Fall Risk Sign visible to staff  - Offer Toileting every 2 Hours, in advance of need  - Initiate/Maintain bed/chair alarm  - Obtain necessary fall risk management equipment:   - Apply yellow socks and bracelet for high fall risk patients  - Consider moving patient to room near nurses station  Outcome: Progressing  Goal: Maintain or return to baseline ADL function  Description: INTERVENTIONS:  - Educate patient/family on patient safety including physical limitations  - Instruct patient to call for assistance with activity   - Consult OT/PT to assist with strengthening/mobility   - Keep Call bell within reach  - Keep bed low and locked with side rails adjusted as appropriate  - Keep care items and personal belongings within reach  - Initiate and maintain comfort rounds  - Make Fall Risk Sign visible to staff  - Offer Toileting every 2 Hours, in advance of need  - Initiate/Maintain bed/chair alarm  - Obtain necessary fall risk management equipment:   - Apply yellow socks and bracelet for high fall risk patients  - Consider moving patient to room near nurses station  Outcome: Progressing  Goal: Maintains/Returns to pre admission functional level  Description: INTERVENTIONS:  - Perform BMAT or MOVE assessment daily    - Set and communicate daily mobility goal to care team and patient/family/caregiver  - Collaborate with rehabilitation services on mobility goals if consulted  - Perform Range of Motion  times a day  - Reposition patient every  hours    - Dangle patient  times a day  - Stand patient times a day  - Ambulate patient  times a day  - Out of bed to chair  times a day   - Out of bed for meals  times a day  - Out of bed for toileting  - Record patient progress and toleration of activity level   Outcome: Progressing

## 2022-06-22 NOTE — PROGRESS NOTES
Pt reports he had met with a dietitian and has been following low K, consistent carb diet  Does admit that his appetite has decreased and noticing subsequent weight loss  Says poor appetite now resolved "I ate like a moose this morning " Pt consumed 100% of omelet, toast at B  Chart review: 6/17/21 198lb, 11/30/21 185lb, 3/31/22 145lb, 6/22/22 156lb  26 8% weight loss x 9 mo significant  Noting severe muscle loss  Meets criteria for severe malnutrition  Increase CCD to level 3, order renal liberal diet for modest K/Phos restrictions  Trial Nepro daily and ensure pudding BID  Provided with diet instruction for low K/Phos diets, homemade shakes for home  Recommend daily weights

## 2022-06-22 NOTE — MALNUTRITION/BMI
This medical record reflects one or more clinical indicators suggestive of malnutrition  Malnutrition Findings:   Adult Malnutrition type: Chronic illness  Adult Degree of Malnutrition: Other severe protein calorie malnutrition  Malnutrition Characteristics: Muscle loss, Weight loss                  360 Statement: Chronic severe malnutrition related to poor appetite as evidenced by  26 8% weight loss x 9 mo (6/17/21 198lb, 3/31/22 145lb), severe muscle loss to quadriceps, gastrocnemius, interroseous muscles  Treated with: Increase CCD to level 3, order renal liberal diet for modest K/Phos restrictions  Trial Nepro daily and ensure pudding BID  Provided with diet instruction for low K/Phos diets, homemade shakes for home  Recommend daily weights  BMI Findings: Body mass index is 21 77 kg/m²  See Nutrition note dated 6/22/22 for additional details  Completed nutrition assessment is viewable in the nutrition documentation

## 2022-06-22 NOTE — PLAN OF CARE
Problem: Potential for Falls  Goal: Patient will remain free of falls  Description: INTERVENTIONS:  - Educate patient/family on patient safety including physical limitations  - Instruct patient to call for assistance with activity   - Consult OT/PT to assist with strengthening/mobility   - Keep Call bell within reach  - Keep bed low and locked with side rails adjusted as appropriate  - Keep care items and personal belongings within reach  - Initiate and maintain comfort rounds  - Make Fall Risk Sign visible to staff  - Offer Toileting every 2 Hours, in advance of need  - Initiate/Maintain bed/chair alarm  - Obtain necessary fall risk management equipment:   - Apply yellow socks and bracelet for high fall risk patients  - Consider moving patient to room near nurses station  Outcome: Progressing     Problem: PAIN - ADULT  Goal: Verbalizes/displays adequate comfort level or baseline comfort level  Description: Interventions:  - Encourage patient to monitor pain and request assistance  - Assess pain using appropriate pain scale  - Administer analgesics based on type and severity of pain and evaluate response  - Implement non-pharmacological measures as appropriate and evaluate response  - Consider cultural and social influences on pain and pain management  - Notify physician/advanced practitioner if interventions unsuccessful or patient reports new pain  Outcome: Progressing     Problem: INFECTION - ADULT  Goal: Absence or prevention of progression during hospitalization  Description: INTERVENTIONS:  - Assess and monitor for signs and symptoms of infection  - Monitor lab/diagnostic results  - Monitor all insertion sites, i e  indwelling lines, tubes, and drains  - Monitor endotracheal if appropriate and nasal secretions for changes in amount and color  - [place] appropriate cooling/warming therapies per order  - Administer medications as ordered  - Instruct and encourage patient and family to use good hand hygiene technique  - Identify and instruct in appropriate isolation precautions for identified infection/condition  Outcome: Progressing  Goal: Absence of fever/infection during neutropenic period  Description: INTERVENTIONS:  - Monitor WBC    Outcome: Progressing     Problem: SAFETY ADULT  Goal: Patient will remain free of falls  Description: INTERVENTIONS:  - Educate patient/family on patient safety including physical limitations  - Instruct patient to call for assistance with activity   - Consult OT/PT to assist with strengthening/mobility   - Keep Call bell within reach  - Keep bed low and locked with side rails adjusted as appropriate  - Keep care items and personal belongings within reach  - Initiate and maintain comfort rounds  - Make Fall Risk Sign visible to staff  - Offer Toileting every 2 Hours, in advance of need  - Initiate/Maintain bed/chair alarm  - Obtain necessary fall risk management equipment:   - Apply yellow socks and bracelet for high fall risk patients  - Consider moving patient to room near nurses station  Outcome: Progressing  Goal: Maintain or return to baseline ADL function  Description: INTERVENTIONS:  - Educate patient/family on patient safety including physical limitations  - Instruct patient to call for assistance with activity   - Consult OT/PT to assist with strengthening/mobility   - Keep Call bell within reach  - Keep bed low and locked with side rails adjusted as appropriate  - Keep care items and personal belongings within reach  - Initiate and maintain comfort rounds  - Make Fall Risk Sign visible to staff  - Offer Toileting every 2 Hours, in advance of need  - Initiate/Maintain bed/chair alarm  - Obtain necessary fall risk management equipment:   - Apply yellow socks and bracelet for high fall risk patients  - Consider moving patient to room near nurses station  Outcome: Progressing  Goal: Maintains/Returns to pre admission functional level  Description: INTERVENTIONS:  - Perform BMAT or MOVE assessment daily    - Set and communicate daily mobility goal to care team and patient/family/caregiver  - Collaborate with rehabilitation services on mobility goals if consulted  - Perform Range of Motion 3 times a day  - Reposition patient every 2 hours  - Dangle patient 3 times a day  - Stand patient 3 times a day  - Ambulate patient 3 times a day  - Out of bed to chair 3 times a day   - Out of bed for meals 3 times a day  - Out of bed for toileting  - Record patient progress and toleration of activity level   Outcome: Progressing     Problem: DISCHARGE PLANNING  Goal: Discharge to home or other facility with appropriate resources  Description: INTERVENTIONS:  - Identify barriers to discharge w/patient and caregiver  - Arrange for needed discharge resources and transportation as appropriate  - Identify discharge learning needs (meds, wound care, etc )  - Arrange for interpretive services to assist at discharge as needed  - Refer to Case Management Department for coordinating discharge planning if the patient needs post-hospital services based on physician/advanced practitioner order or complex needs related to functional status, cognitive ability, or social support system  Outcome: Progressing     Problem: Knowledge Deficit  Goal: Patient/family/caregiver demonstrates understanding of disease process, treatment plan, medications, and discharge instructions  Description: Complete learning assessment and assess knowledge base    Interventions:  - Provide teaching at level of understanding  - Provide teaching via preferred learning methods  Outcome: Progressing     Problem: GASTROINTESTINAL - ADULT  Goal: Minimal or absence of nausea and/or vomiting  Description: INTERVENTIONS:  - Administer IV fluids if ordered to ensure adequate hydration  - Maintain NPO status until nausea and vomiting are resolved  - Nasogastric tube if ordered  - Administer ordered antiemetic medications as needed  - Provide nonpharmacologic comfort measures as appropriate  - Advance diet as tolerated, if ordered  - Consider nutrition services referral to assist patient with adequate nutrition and appropriate food choices  Outcome: Progressing  Goal: Maintains or returns to baseline bowel function  Description: INTERVENTIONS:  - Assess bowel function  - Encourage oral fluids to ensure adequate hydration  - Administer IV fluids if ordered to ensure adequate hydration  - Administer ordered medications as needed  - Encourage mobilization and activity  - Consider nutritional services referral to assist patient with adequate nutrition and appropriate food choices  Outcome: Progressing  Goal: Maintains adequate nutritional intake  Description: INTERVENTIONS:  - Monitor percentage of each meal consumed  - Identify factors contributing to decreased intake, treat as appropriate  - Assist with meals as needed  - Monitor I&O, weight, and lab values if indicated  - Obtain nutrition services referral as needed  Outcome: Progressing  Goal: Oral mucous membranes remain intact  Description: INTERVENTIONS  - Assess oral mucosa and hygiene practices  - Implement preventative oral hygiene regimen  - Implement oral medicated treatments as ordered  - Initiate Nutrition services referral as needed  Outcome: Progressing     Problem: Nutrition/Hydration-ADULT  Goal: Nutrient/Hydration intake appropriate for improving, restoring or maintaining nutritional needs  Description: Monitor and assess patient's nutrition/hydration status for malnutrition  Collaborate with interdisciplinary team and initiate plan and interventions as ordered  Monitor patient's weight and dietary intake as ordered or per policy  Utilize nutrition screening tool and intervene as necessary  Determine patient's food preferences and provide high-protein, high-caloric foods as appropriate       INTERVENTIONS:  - Monitor oral intake, urinary output, labs, and treatment plans  - Assess nutrition and hydration status and recommend course of action  - Evaluate amount of meals eaten  - Assist patient with eating if necessary   - Allow adequate time for meals  - Recommend/ encourage appropriate diets, oral nutritional supplements, and vitamin/mineral supplements  - Order, calculate, and assess calorie counts as needed  - Recommend, monitor, and adjust tube feedings and TPN/PPN based on assessed needs  - Assess need for intravenous fluids  - Provide specific nutrition/hydration education as appropriate  - Include patient/family/caregiver in decisions related to nutrition  Outcome: Progressing     Problem: MOBILITY - ADULT  Goal: Maintain or return to baseline ADL function  Description: INTERVENTIONS:  - Educate patient/family on patient safety including physical limitations  - Instruct patient to call for assistance with activity   - Consult OT/PT to assist with strengthening/mobility   - Keep Call bell within reach  - Keep bed low and locked with side rails adjusted as appropriate  - Keep care items and personal belongings within reach  - Initiate and maintain comfort rounds  - Make Fall Risk Sign visible to staff  - Offer Toileting every 2 Hours, in advance of need  - Initiate/Maintain bed/chair alarm  - Obtain necessary fall risk management equipment:   - Apply yellow socks and bracelet for high fall risk patients  - Consider moving patient to room near nurses station  Outcome: Progressing  Goal: Maintains/Returns to pre admission functional level  Description: INTERVENTIONS:  - Perform BMAT or MOVE assessment daily    - Set and communicate daily mobility goal to care team and patient/family/caregiver  - Collaborate with rehabilitation services on mobility goals if consulted  - Perform Range of Motion 3 times a day  - Reposition patient every 2 hours    - Dangle patient 3 times a day  - Stand patient 3 times a day  - Ambulate patient 3 times a day  - Out of bed to chair 3 times a day   - Out of bed for meals 3 times a day  - Out of bed for toileting  - Record patient progress and toleration of activity level   Outcome: Progressing     Problem: Prexisting or High Potential for Compromised Skin Integrity  Goal: Skin integrity is maintained or improved  Description: INTERVENTIONS:  - Identify patients at risk for skin breakdown  - Assess and monitor skin integrity  - Assess and monitor nutrition and hydration status  - Monitor labs   - Assess for incontinence   - Turn and reposition patient  - Assist with mobility/ambulation  - Relieve pressure over bony prominences  - Avoid friction and shearing  - Provide appropriate hygiene as needed including keeping skin clean and dry  - Evaluate need for skin moisturizer/barrier cream  - Collaborate with interdisciplinary team   - Patient/family teaching  - Consider wound care consult   Outcome: Progressing

## 2022-06-22 NOTE — PROGRESS NOTES
Progress Note - Nephrology   Jaime Lopez Sr  62 y o  male MRN: [id number]  Unit/Bed#: -01 Encounter: [id number]    A/P:  1  Acute kidney injury present on admission superimposed on chronic kidney disease   - patient presented with volume depletion and hydronephrosis  - had been on antibiotics vancomycin --> daptomycin for diskiits   - has a Ortiz draining clear yellow urine for obstructive uropathy   -  nonoliguric :680/3975  (-5 145)    - kidney function has improved slightly from 3 89-- > 3 7 mg/dL     - continue to monitor and avoid nephrotoxins   - BP appropriate   - Fena 14% indicating acute on chronic tubulointerstitial disease   - appears euvolemic today    2  Stage 4 chronic kidney disease   - baseline creatinine is 2 5-2 9 mg/dL   - follows with Dr Rhonda Javier  -  Undergoing dialysis education/fistula creation   - has underlying longstanding diabetic nephropathy   - workup for monoclonal gammopathy was negative  3  Obstructive uropathy   -   Requiring Ortiz drainage - possibly due to neurogenic bladder   - will need to be discharged with a leg  Bag and follow-up with Urology    4  Left hip pain    - anterior iliac crest tenderness last night which was unremitting and severe  Radiates down his left thigh anteriorly   - will need imaging  Case discussed with primary team    5  Mineral bone disease of chronic kidney disease   - corrected calcium improved   - phosphorus was 7 1 mg/dL and started on sevelamer  with meals    6  Anemia   - received 1 unit of packed cells and hemoglobin is 8 5   - check an iron saturation     7  Gastroenteritis   - improved    8  Hyperkalemia   - potassium dropped from 6 --> 5 2 today -    - Will need potassium restriction in the diet    - continue to hold losartan on discharge    9  Osteomyelitis of vertebrae of lumbar region   - status post treatment with vancomycin and daptomycin    10  Hypertension   - blood pressure is controlled at 132/61 today    11   Diabetes mellitus type 2   - sugars are being covered with Humalog          Follow up reason for today's visit:     KELLY (acute kidney injury) [facility])    Patient Active Problem List   Diagnosis    Gastroenteritis    KE[facility]) [facility] [address] )    Hyperkalemia    Diabetes mellitus ([facility] [address] )    Gout    Hypertension    History of CVA (cerebrovascular accident)    Osteomyelitis of vertebra of lumbar region [facility])         Subjective:   Denies headaches, dizziness, chest pain, shortness of breath, abdominal pain, nausea vomiting says diarrhea is better  He has obstructive uropathy and has a Ortiz draining  He has severe left hip pain and he points to his anterior iliac crest   He says it radiates down his leg and started last night and is unrelenting    Objective:     Vitals: Blood pressure 132/61, pulse 75, temperature 98 2 °F (36 8 °C), resp  rate 18, height 5' 11" (1 803 m), weight 70 8 kg (156 lb 1 4 oz), SpO2 96 %  ,Body mass index is 21 77 kg/m²  Weight (last 2 days)     Date/Time Weight    06/22/22 0808 70 8 (156 09)    06/22/22 0600 70 8 (156 09)    06/21/22 0600 70 8 (156 09)    06/20/22 1335 70 9 (156 31)            Intake/Output Summary (Last 24 hours) at 6/22/2022 0934  Last data filed at 6/22/2022 0507  Gross per 24 hour   Intake 500 ml   Output 3975 ml   Net -3475 ml     I/O last 3 completed shifts: In: 65 [P O :180; I V :500]  Out: 6775 [Urine:6775]    Urethral Catheter Coude 16 Fr   (Active)   Reasons to continue Urinary Catheter  Acute urinary retention/obstruction failing urinary retention protocol 06/22/22 0718   Goal for Removal Voiding trial when ambulation improves 06/22/22 0718   Site Assessment Clean;Skin intact 06/22/22 0718   Ortiz Care Done 06/22/22 0808   Collection Container Standard drainage bag 06/22/22 0718   Securement Method Other (Comment) 06/22/22 0718   Output (mL) 1400 mL 06/22/22 0507       Physical Exam: /61   Pulse 75   Temp 98 2 °F (36 8 °C)   Resp 18   Ht 5' 11" (1 803 m)   Wt 70 8 kg (156 lb 1 4 oz)   SpO2 96%   BMI 21 77 kg/m²     General Appearance:    Alert, cooperative, no distress, appears stated age   Head:    Normocephalic, without obvious abnormality, atraumatic   Eyes:    Conjunctiva/corneas clear   Ears:    Normal external ears   Nose:   Nares normal, septum midline, mucosa normal, no drainage    or sinus tenderness   Throat:   Lips, mucosa, and tongue normal; teeth and gums normal   Neck:   Supple, symmetrical, trachea midline, no adenopathy;        thyroid:  No enlargement/tenderness/nodules; no carotid    bruit or JVD   Back:     Symmetric, no curvature, ROM normal, no CVA tenderness   Lungs:     Clear to auscultation bilaterally, respirations unlabored   Chest wall:    No tenderness or deformity   Heart:    Regular rate and rhythm, S1 and S2 normal, no murmur, rub   or gallop   Abdomen:     Soft, non-tender, bowel sounds active Ortiz draining clear yellow urine  Extremities:   Extremities normal, atraumatic, no cyanosis or edema Has pain of left anterior hip   Skin:   Skin color, texture, turgor normal, no rashes or lesions   Lymph nodes:   Cervical normal   Neurologic:   CNII-XII intact            Lab, Imaging and other studies: I have personally reviewed pertinent labs  CBC:   Lab Results   Component Value Date    WBC 5 39 06/22/2022    HGB 8 5 (L) 06/22/2022    HCT 27 2 (L) 06/22/2022    MCV 91 06/22/2022     06/22/2022    MCH 28 5 06/22/2022    MCHC 31 4 06/22/2022    RDW 15 7 (H) 06/22/2022    MPV 10 9 06/22/2022     CMP:   Lab Results   Component Value Date    K 5 2 06/22/2022     06/22/2022    CO2 21 06/22/2022    BUN 45 (H) 06/22/2022    CREATININE 3 70 (H) 06/22/2022    CALCIUM 9 0 06/22/2022    AST 13 06/22/2022    ALT 22 06/22/2022    ALKPHOS 94 06/22/2022    EGFR 17 06/22/2022           Results from last 7 days   Lab Units 06/22/22  0513 06/21/22  0559 06/21/22  0205 06/20/22 2012 06/20/22  1618   POTASSIUM mmol/L 5 2 5 3  5 3 5 3 < > 5 0   CHLORIDE mmol/L 102 102  --   --  102   CO2 mmol/L 21 20*  --   --  23   BUN mg/dL 45* 49*  --   --  54*   CREATININE mg/dL 3 70* 3 80*  --   --  3 89*   CALCIUM mg/dL 9 0 9 8  --   --  9 9   ALK PHOS U/L 94 105  --   --  109   ALT U/L 22 26  --   --  24   AST U/L 13 15  --   --  21    < > = values in this interval not displayed  Phosphorus:   Lab Results   Component Value Date    PHOS 7 1 (H) 06/22/2022     Magnesium:   Lab Results   Component Value Date    MG 2 5 06/22/2022     Urinalysis: No results found for: Dewaine Mailman, SPECGRAV, PHUR, LEUKOCYTESUR, NITRITE, PROTEINUA, GLUCOSEU, KETONESU, BILIRUBINUR, BLOODU  Ionized Calcium: No results found for: CAION  Coagulation: No results found for: PT, INR, APTT  Troponin: No results found for: TROPONINI  ABG: No results found for: PHART, DOC7NHB, PO2ART, KWY9NST, K8VCVYXD, BEART, SOURCE  Radiology review:     IMAGING  Procedure: CT abdomen pelvis wo contrast    Result Date: 6/20/2022  Narrative: CT ABDOMEN AND PELVIS WITHOUT IV CONTRAST INDICATION:   Sepsis n/v/d, sepsis work up  Per ED notes, history of L5-S1 discitis and osteomyelitis, history of diabetes  Chief complaint of generalized weakness with nausea and vomiting and diarrhea since yesterday  Patient to have PICC line pulled one week ago, but did not go to the appointment  COMPARISON:  3/9/2022 TECHNIQUE:  CT examination of the abdomen and pelvis was performed  Axial, sagittal, and coronal 2D reformatted images were created from the source data and submitted for interpretation  Radiation dose length product (DLP) for this visit:  687 mGy-cm   This examination, like all CT scans performed in the [facility], was performed utilizing techniques to minimize radiation dose exposure, including the use of iterative reconstruction and automated exposure control   IV Contrast:  Because of a critical nationwide intravenous contrast shortage, the study was performed without intravenous contrast  Enteric Contrast:  Enteric contrast was not administered  FINDINGS: ABDOMEN LOWER CHEST:  Mild dependent atelectasis  Atherosclerosis  Small, increased pericardial effusion  LIVER/BILIARY TREE:  Within normal limits  GALLBLADDER:  Within normal limits  SPLEEN:  Within normal limits  PANCREAS:  Within normal limits  ADRENAL GLANDS:  Within normal limits  KIDNEYS/URETERS:  New, mild, bilateral, left greater than right hydronephrosis, likely secondary to distended bladder  No ureteral stone or other obstructing lesion  No perinephric fluid or fatty stranding  STOMACH AND BOWEL:  Within normal limits  APPENDIX:  Within normal limits  ABDOMINOPELVIC CAVITY:  No abnormal air, fluid or enlarged lymph nodes  Resolved bilateral retroperitoneal fluid collections  VESSELS:  Limited evaluation without intravenous contrast   No aneurysm  Atherosclerosis  PELVIS: REPRODUCTIVE ORGANS:  prostate and seminal vesicles within normal limits  URINARY BLADDER:  Distended  No wall thickening, stone or mass identified  The penis is within normal limits  ABDOMINAL WALL/INGUINAL REGIONS:  Surgical changes  OSSEOUS STRUCTURES: There are 5 nonrib-bearing vertebral bodies  Increased loss of disc height with destructive endplate changes at O8-U9  Improved paraspinal findings of infection  New decompressive surgical change  Opacity in the central canal is not well evaluated by CT  The study was marked in [facility] for immediate notification  Impression: Compared to 3/9/2022, changes at L5-S1 may be sequela of prior discitis and osteomyelitis  Resolved paraspinal changes of acute infection  If there is clinical concern for recurrent spine infection, MR is recommended  New bladder distention and hydronephrosis suggesting bladder outlet obstruction  No etiology identified  Small, increased pericardial effusion   Workstation performed: [id number]     Procedure: XR chest portable    Result Date: 6/20/2022  Narrative: CHEST INDICATION:   sepsis  COMPARISON:  None EXAM PERFORMED/VIEWS:  XR CHEST PORTABLE FINDINGS: Cardiomediastinal silhouette appears unremarkable  A right chest venous catheter with tip overlying the distal SVC region noted  Mild aortic arch calcific lesions are also seen  The lungs are clear  No pneumothorax or pleural effusion  Osseous structures appear within normal limits for patient age  Old healed left posterior 6th rib fracture deformity with callus formation is seen  No free air in the upper abdomen noted  Impression: No acute cardiopulmonary disease  Workstation performed: [id number]     Procedure: US kidney and bladder    Result Date: 6/21/2022  Narrative: RENAL ULTRASOUND INDICATION:   mary, bilateral hydro s/p camarena  COMPARISON: CT scan from yesterday TECHNIQUE:   Ultrasound of the retroperitoneum was performed with a curvilinear transducer utilizing volumetric sweeps and still imaging techniques  FINDINGS: KIDNEYS: Symmetric and normal size  Right kidney:  11 9 x 5 9 x 6 7 cm  Volume 243 0 mL Left kidney:  11 3 x 6 3 x 5 5 cm  Volume 206 5 mL Right kidney Normal echogenicity and contour  No mass is identified  No hydronephrosis  No shadowing calculi  No perinephric fluid collections  Left kidney Normal echogenicity and contour  No mass is identified  1 4 cm cyst Mild hydronephrosis of the lower pole  No shadowing calculi  No perinephric fluid collections  URETERS: Nonvisualized  BLADDER: A camarena catheter is in place, decompressing the bladder and limiting its evaluation  Impression: Interval improvement of previous hydronephrosis  Mild residual hydronephrosis of the lower pole of the left kidney   Workstation performed: [id number]       Current Facility-Administered Medications   Medication Dose Route Frequency    allopurinol (ZYLOPRIM) tablet 200 mg  200 mg Oral Daily    amLODIPine (NORVASC) tablet 10 mg  10 mg Oral Daily    aspirin (ECOTRIN LOW STRENGTH) EC tablet 81 mg  81 mg Oral Daily  atorvastatin (LIPITOR) tablet 40 mg  40 mg Oral Daily With Dinner    heparin (porcine) subcutaneous injection 5,000 Units  5,000 Units Subcutaneous Q8H [address]    insulin glargine (LANTUS) subcutaneous injection 14 Units 0 14 mL  14 Units Subcutaneous BID    insulin lispro (HumaLOG) 100 units/mL subcutaneous injection 1-5 Units  1-5 Units Subcutaneous TID AC    insulin lispro (HumaLOG) 100 units/mL subcutaneous injection 1-5 Units  1-5 Units Subcutaneous HS    methocarbamol (ROBAXIN) tablet 500 mg  500 mg Oral Q6H PRN    multi-electrolyte (PLASMALYTE-A/ISOLYTE-S PH 7 4) IV solution  100 mL/hr Intravenous Continuous    ondansetron (ZOFRAN) injection 4 mg  4 mg Intravenous Once    oxyCODONE (ROXICODONE) IR tablet 5 mg  5 mg Oral Q4H PRN    pregabalin (LYRICA) capsule 75 mg  75 mg Oral BID    sevelamer (RENAGEL) tablet 800 mg  800 mg Oral Daily With Dinner    tamsulosin (FLOMAX) capsule 0 4 mg  0 4 mg Oral Daily With Dinner    traMADol (ULTRAM) tablet 50 mg  50 mg Oral BID PRN     Medications Discontinued During This Encounter   Medication Reason    sodium chloride 0 9 % bolus 1,000 mL     sodium chloride 0 9 % bolus 1,000 mL     sodium chloride 0 9 % bolus 1,000 mL     sodium chloride 0 9 % infusion     insulin detemir (LEVEMIR) 100 units/mL subcutaneous injection Error    sodium chloride 0 9 % infusion        Samantha Pan MD      This progress note was produced in part using a dictation device which may document imprecise wording from author's original intent

## 2022-06-22 NOTE — ASSESSMENT & PLAN NOTE
Patient complaining of left hip pain    Patient had similar symptoms while admitted to [facility] where CT was negative for osteo  Will obtain hip x-rays

## 2022-06-22 NOTE — ASSESSMENT & PLAN NOTE
Potassium on admission noted to be 6 0  Patient received albuterol inhaler, insulin 10 units, and Lasix 20 mg IV in the ED  Repeat potassium noted to be a 5 0  Continue trending potassium every 4 hours until normal x2  Telemetry  Potassium at 5 2 this morning

## 2022-06-23 LAB
ALBUMIN SERPL BCP-MCNC: 3.2 G/DL (ref 3.5–5)
ALP SERPL-CCNC: 116 U/L (ref 46–116)
ALT SERPL W P-5'-P-CCNC: 30 U/L (ref 12–78)
ANION GAP SERPL CALCULATED.3IONS-SCNC: 14 MMOL/L (ref 4–13)
AST SERPL W P-5'-P-CCNC: 15 U/L (ref 5–45)
BASOPHILS # BLD AUTO: 0.08 THOUSANDS/ΜL (ref 0–0.1)
BASOPHILS NFR BLD AUTO: 1 % (ref 0–1)
BILIRUB SERPL-MCNC: 0.35 MG/DL (ref 0.2–1)
BUN SERPL-MCNC: 45 MG/DL (ref 5–25)
CALCIUM ALBUM COR SERPL-MCNC: 11.2 MG/DL (ref 8.3–10.1)
CALCIUM SERPL-MCNC: 10.6 MG/DL (ref 8.3–10.1)
CHLORIDE SERPL-SCNC: 101 MMOL/L (ref 100–108)
CO2 SERPL-SCNC: 24 MMOL/L (ref 21–32)
CREAT SERPL-MCNC: 4.07 MG/DL (ref 0.6–1.3)
EOSINOPHIL # BLD AUTO: 0.33 THOUSAND/ΜL (ref 0–0.61)
EOSINOPHIL NFR BLD AUTO: 5 % (ref 0–6)
ERYTHROCYTE [DISTWIDTH] IN BLOOD BY AUTOMATED COUNT: 15.6 % (ref 11.6–15.1)
GFR SERPL CREATININE-BSD FRML MDRD: 15 ML/MIN/1.73SQ M
GLUCOSE SERPL-MCNC: 113 MG/DL (ref 65–140)
GLUCOSE SERPL-MCNC: 115 MG/DL (ref 65–140)
GLUCOSE SERPL-MCNC: 149 MG/DL (ref 65–140)
GLUCOSE SERPL-MCNC: 156 MG/DL (ref 65–140)
GLUCOSE SERPL-MCNC: 90 MG/DL (ref 65–140)
HCT VFR BLD AUTO: 28.7 % (ref 36.5–49.3)
HGB BLD-MCNC: 9 G/DL (ref 12–17)
IMM GRANULOCYTES # BLD AUTO: 0.02 THOUSAND/UL (ref 0–0.2)
IMM GRANULOCYTES NFR BLD AUTO: 0 % (ref 0–2)
LYMPHOCYTES # BLD AUTO: 1.62 THOUSANDS/ΜL (ref 0.6–4.47)
LYMPHOCYTES NFR BLD AUTO: 26 % (ref 14–44)
MCH RBC QN AUTO: 28 PG (ref 26.8–34.3)
MCHC RBC AUTO-ENTMCNC: 31.4 G/DL (ref 31.4–37.4)
MCV RBC AUTO: 89 FL (ref 82–98)
MONOCYTES # BLD AUTO: 0.6 THOUSAND/ΜL (ref 0.17–1.22)
MONOCYTES NFR BLD AUTO: 10 % (ref 4–12)
NEUTROPHILS # BLD AUTO: 3.67 THOUSANDS/ΜL (ref 1.85–7.62)
NEUTS SEG NFR BLD AUTO: 58 % (ref 43–75)
NRBC BLD AUTO-RTO: 0 /100 WBCS
PLATELET # BLD AUTO: 256 THOUSANDS/UL (ref 149–390)
PMV BLD AUTO: 10.4 FL (ref 8.9–12.7)
POTASSIUM SERPL-SCNC: 4.4 MMOL/L (ref 3.5–5.3)
PROT SERPL-MCNC: 7.1 G/DL (ref 6.4–8.2)
RBC # BLD AUTO: 3.22 MILLION/UL (ref 3.88–5.62)
SODIUM SERPL-SCNC: 139 MMOL/L (ref 136–145)
WBC # BLD AUTO: 6.32 THOUSAND/UL (ref 4.31–10.16)

## 2022-06-23 PROCEDURE — 82948 REAGENT STRIP/BLOOD GLUCOSE: CPT

## 2022-06-23 PROCEDURE — 97530 THERAPEUTIC ACTIVITIES: CPT

## 2022-06-23 PROCEDURE — 80053 COMPREHEN METABOLIC PANEL: CPT | Performed by: PHYSICIAN ASSISTANT

## 2022-06-23 PROCEDURE — 85025 COMPLETE CBC W/AUTO DIFF WBC: CPT | Performed by: PHYSICIAN ASSISTANT

## 2022-06-23 PROCEDURE — 99232 SBSQ HOSP IP/OBS MODERATE 35: CPT | Performed by: FAMILY MEDICINE

## 2022-06-23 PROCEDURE — 99232 SBSQ HOSP IP/OBS MODERATE 35: CPT | Performed by: PHYSICIAN ASSISTANT

## 2022-06-23 RX ORDER — HYDROMORPHONE HCL IN WATER/PF 6 MG/30 ML
0.2 PATIENT CONTROLLED ANALGESIA SYRINGE INTRAVENOUS ONCE
Status: DISCONTINUED | OUTPATIENT
Start: 2022-06-23 | End: 2022-06-23

## 2022-06-23 RX ORDER — LIDOCAINE 50 MG/G
1 PATCH TOPICAL DAILY
Status: DISCONTINUED | OUTPATIENT
Start: 2022-06-23 | End: 2022-06-24 | Stop reason: HOSPADM

## 2022-06-23 RX ADMIN — OXYCODONE HYDROCHLORIDE 5 MG: 5 TABLET ORAL at 13:42

## 2022-06-23 RX ADMIN — INSULIN LISPRO 1 UNITS: 100 INJECTION, SOLUTION INTRAVENOUS; SUBCUTANEOUS at 17:13

## 2022-06-23 RX ADMIN — AMLODIPINE BESYLATE 10 MG: 10 TABLET ORAL at 09:25

## 2022-06-23 RX ADMIN — PREGABALIN 75 MG: 75 CAPSULE ORAL at 17:11

## 2022-06-23 RX ADMIN — SODIUM CHLORIDE, SODIUM GLUCONATE, SODIUM ACETATE, POTASSIUM CHLORIDE, MAGNESIUM CHLORIDE, SODIUM PHOSPHATE, DIBASIC, AND POTASSIUM PHOSPHATE 100 ML/HR: .53; .5; .37; .037; .03; .012; .00082 INJECTION, SOLUTION INTRAVENOUS at 10:04

## 2022-06-23 RX ADMIN — OXYCODONE HYDROCHLORIDE 5 MG: 5 TABLET ORAL at 05:21

## 2022-06-23 RX ADMIN — OXYCODONE HYDROCHLORIDE 5 MG: 5 TABLET ORAL at 23:49

## 2022-06-23 RX ADMIN — SODIUM CHLORIDE, SODIUM GLUCONATE, SODIUM ACETATE, POTASSIUM CHLORIDE, MAGNESIUM CHLORIDE, SODIUM PHOSPHATE, DIBASIC, AND POTASSIUM PHOSPHATE 100 ML/HR: .53; .5; .37; .037; .03; .012; .00082 INJECTION, SOLUTION INTRAVENOUS at 21:58

## 2022-06-23 RX ADMIN — SODIUM CHLORIDE, SODIUM GLUCONATE, SODIUM ACETATE, POTASSIUM CHLORIDE, MAGNESIUM CHLORIDE, SODIUM PHOSPHATE, DIBASIC, AND POTASSIUM PHOSPHATE 100 ML/HR: .53; .5; .37; .037; .03; .012; .00082 INJECTION, SOLUTION INTRAVENOUS at 04:15

## 2022-06-23 RX ADMIN — METHOCARBAMOL TABLETS 500 MG: 500 TABLET, COATED ORAL at 03:38

## 2022-06-23 RX ADMIN — PREGABALIN 75 MG: 75 CAPSULE ORAL at 09:25

## 2022-06-23 RX ADMIN — ALLOPURINOL 200 MG: 100 TABLET ORAL at 09:25

## 2022-06-23 RX ADMIN — SEVELAMER HYDROCHLORIDE 800 MG: 800 TABLET, FILM COATED PARENTERAL at 17:11

## 2022-06-23 RX ADMIN — INSULIN GLARGINE 14 UNITS: 100 INJECTION, SOLUTION SUBCUTANEOUS at 17:13

## 2022-06-23 RX ADMIN — LIDOCAINE 5% 1 PATCH: 700 PATCH TOPICAL at 12:53

## 2022-06-23 RX ADMIN — TAMSULOSIN HYDROCHLORIDE 0.4 MG: 0.4 CAPSULE ORAL at 17:11

## 2022-06-23 RX ADMIN — HEPARIN SODIUM 5000 UNITS: 5000 INJECTION INTRAVENOUS; SUBCUTANEOUS at 05:21

## 2022-06-23 RX ADMIN — OXYCODONE HYDROCHLORIDE 5 MG: 5 TABLET ORAL at 00:51

## 2022-06-23 RX ADMIN — OXYCODONE HYDROCHLORIDE 5 MG: 5 TABLET ORAL at 18:42

## 2022-06-23 RX ADMIN — ATORVASTATIN CALCIUM 40 MG: 40 TABLET, FILM COATED ORAL at 17:11

## 2022-06-23 RX ADMIN — HEPARIN SODIUM 5000 UNITS: 5000 INJECTION INTRAVENOUS; SUBCUTANEOUS at 12:56

## 2022-06-23 RX ADMIN — ASPIRIN 81 MG: 81 TABLET, COATED ORAL at 09:25

## 2022-06-23 RX ADMIN — OXYCODONE HYDROCHLORIDE 5 MG: 5 TABLET ORAL at 09:42

## 2022-06-23 NOTE — PLAN OF CARE
Problem: PHYSICAL THERAPY ADULT  Goal: Performs mobility at highest level of function for planned discharge setting  See evaluation for individualized goals  Description: Treatment/Interventions: ADL retraining, Functional transfer training, LE strengthening/ROM, Elevations, Therapeutic exercise, Endurance training, Patient/family training, Equipment eval/education, Bed mobility, Gait training, Compensatory technique education, Spoke to nursing, Spoke to case management, OT  Equipment Recommended:  (TBD by rehab)       See flowsheet documentation for full assessment, interventions and recommendations  Outcome: Not Progressing  Note: Prognosis: Fair  Problem List: Decreased strength, Decreased endurance, Impaired balance, Decreased mobility, Decreased safety awareness, Pain  Assessment: Pt is a 62 y o  male seen for PT evaluation s/p admission to [address] [place] [address] on 6/20/2022 with Gastroenteritis  Order placed for PT services    Upon evaluation: Pt is presenting with impaired functional mobility due to pain, decreased strength, decreased endurance, impaired balance, impaired coordination, gait deviations, decreased safety awareness, impaired judgment, and fall risk requiring  supervision assistance for bed mobility, stand by to moderate assistance for transfers, and minimal to moderate assistance for ambulation with RW   Pt's clinical presentation is currently unpredictable given the functional mobility deficits above, especially weakness, decreased endurance, gait deviations, pain, decreased activity tolerance, decreased functional mobility tolerance, altered sensation, decreased safety awareness, and impaired judgement, coupled with fall risks as indicated by AM-PAC 6-Clicks: 94/49 as well as impulsivity, impaired balance, polypharmacy, impaired coordination, impaired judgement, decreased safety awareness and limited sensation/neuropathy and combined with medical complications of pain impacting overall mobility status, abnormal renal lab values, abnormal H&H, abnormal blood sugars, abnormal potassium values, multiple readmissions, impulsivity during admission and need for input for mobility technique/safety, gastroenteritis, KELLY  Pt's PMHx and comorbidities that may affect physical performance and progress include: DM, HTN, CVA and gout, CKD, recent osteomyelitis of lumbar vertebra L4-L5 and diskitis  Personal factors affecting pt at time of IE include: inaccessible home environment, step(s) to enter environment, inability to perform IADLs, inability to perform ADLs, inability to navigate level surfaces without external assistance, inability to ambulate household distances and limited insight into impairments  Pt will benefit from continued skilled PT services to address deficits as defined above and to maximize level of functional mobility to facilitate return toward PLOF and improved QOL  From PT/mobility standpoint, recommendation at time of d/c would be Short term rehab pending progress in order to reduce fall risk and maximize pt's functional independence and consistency with mobility in order to facilitate return to PLOF  Recommend trial with walker next 1-2 sessions and ther ex next 1-2 sessions  Barriers to Discharge: Inaccessible home environment, Decreased caregiver support  Barriers to Discharge Comments: BRUCE, increased pain     PT Discharge Recommendation: Post acute rehabilitation services          See flowsheet documentation for full assessment

## 2022-06-23 NOTE — ASSESSMENT & PLAN NOTE
Malnutrition Findings:   Adult Malnutrition type: Chronic illness  Adult Degree of Malnutrition: Other severe protein calorie malnutrition  Malnutrition Characteristics: Muscle loss, Weight loss                  360 Statement: Chronic severe malnutrition related to poor appetite as evidenced by  26 8% weight loss x 9 mo (6/17/21 198lb, 3/31/22 145lb), severe muscle loss to quadriceps, gastrocnemius, interroseous muscles  Treated with: Increase CCD to level 3, order renal liberal diet for modest K/Phos restrictions  Trial Nepro daily and ensure pudding BID  Provided with diet instruction for low K/Phos diets, homemade shakes for home  Recommend daily weights  BMI Findings: Body mass index is 21 25 kg/m²

## 2022-06-23 NOTE — PLAN OF CARE
Problem: Potential for Falls  Goal: Patient will remain free of falls  Description: INTERVENTIONS:  - Educate patient/family on patient safety including physical limitations  - Instruct patient to call for assistance with activity   - Consult OT/PT to assist with strengthening/mobility   - Keep Call bell within reach  - Keep bed low and locked with side rails adjusted as appropriate  - Keep care items and personal belongings within reach  - Initiate and maintain comfort rounds  - Make Fall Risk Sign visible to staff  - Offer Toileting every 2 Hours, in advance of need  - Initiate/Maintain bed/chair alarm  - Obtain necessary fall risk management equipment:   - Apply yellow socks and bracelet for high fall risk patients  - Consider moving patient to room near nurses station  Outcome: Progressing     Problem: PAIN - ADULT  Goal: Verbalizes/displays adequate comfort level or baseline comfort level  Description: Interventions:  - Encourage patient to monitor pain and request assistance  - Assess pain using appropriate pain scale  - Administer analgesics based on type and severity of pain and evaluate response  - Implement non-pharmacological measures as appropriate and evaluate response  - Consider cultural and social influences on pain and pain management  - Notify physician/advanced practitioner if interventions unsuccessful or patient reports new pain  Outcome: Progressing     Problem: INFECTION - ADULT  Goal: Absence or prevention of progression during hospitalization  Description: INTERVENTIONS:  - Assess and monitor for signs and symptoms of infection  - Monitor lab/diagnostic results  - Monitor all insertion sites, i e  indwelling lines, tubes, and drains  - Monitor endotracheal if appropriate and nasal secretions for changes in amount and color  - Hurley appropriate cooling/warming therapies per order  - Administer medications as ordered  - Instruct and encourage patient and family to use good hand hygiene technique  - Identify and instruct in appropriate isolation precautions for identified infection/condition  Outcome: Progressing  Goal: Absence of fever/infection during neutropenic period  Description: INTERVENTIONS:  - Monitor WBC    Outcome: Progressing     Problem: SAFETY ADULT  Goal: Patient will remain free of falls  Description: INTERVENTIONS:  - Educate patient/family on patient safety including physical limitations  - Instruct patient to call for assistance with activity   - Consult OT/PT to assist with strengthening/mobility   - Keep Call bell within reach  - Keep bed low and locked with side rails adjusted as appropriate  - Keep care items and personal belongings within reach  - Initiate and maintain comfort rounds  - Make Fall Risk Sign visible to staff  - Offer Toileting every 2 Hours, in advance of need  - Initiate/Maintain bed/chair alarm  - Obtain necessary fall risk management equipment:   - Apply yellow socks and bracelet for high fall risk patients  - Consider moving patient to room near nurses station  Outcome: Progressing  Goal: Maintain or return to baseline ADL function  Description: INTERVENTIONS:  - Educate patient/family on patient safety including physical limitations  - Instruct patient to call for assistance with activity   - Consult OT/PT to assist with strengthening/mobility   - Keep Call bell within reach  - Keep bed low and locked with side rails adjusted as appropriate  - Keep care items and personal belongings within reach  - Initiate and maintain comfort rounds  - Make Fall Risk Sign visible to staff  - Offer Toileting every 2 Hours, in advance of need  - Initiate/Maintain bed/chair alarm  - Obtain necessary fall risk management equipment:   - Apply yellow socks and bracelet for high fall risk patients  - Consider moving patient to room near nurses station  Outcome: Progressing  Goal: Maintains/Returns to pre admission functional level  Description: INTERVENTIONS:  - Perform BMAT or MOVE assessment daily    - Set and communicate daily mobility goal to care team and patient/family/caregiver  - Collaborate with rehabilitation services on mobility goals if consulted  - Perform Range of Motion 4 times a day  - Reposition patient every 2 hours  - Dangle patient 3 times a day  - Stand patient 3 times a day  - Ambulate patient 3 times a day  - Out of bed to chair 3 times a day   - Out of bed for meals 3 times a day  - Out of bed for toileting  - Record patient progress and toleration of activity level   Outcome: Progressing     Problem: DISCHARGE PLANNING  Goal: Discharge to home or other facility with appropriate resources  Description: INTERVENTIONS:  - Identify barriers to discharge w/patient and caregiver  - Arrange for needed discharge resources and transportation as appropriate  - Identify discharge learning needs (meds, wound care, etc )  - Arrange for interpretive services to assist at discharge as needed  - Refer to Case Management Department for coordinating discharge planning if the patient needs post-hospital services based on physician/advanced practitioner order or complex needs related to functional status, cognitive ability, or social support system  Outcome: Progressing     Problem: Knowledge Deficit  Goal: Patient/family/caregiver demonstrates understanding of disease process, treatment plan, medications, and discharge instructions  Description: Complete learning assessment and assess knowledge base    Interventions:  - Provide teaching at level of understanding  - Provide teaching via preferred learning methods  Outcome: Progressing     Problem: GASTROINTESTINAL - ADULT  Goal: Minimal or absence of nausea and/or vomiting  Description: INTERVENTIONS:  - Administer IV fluids if ordered to ensure adequate hydration  - Maintain NPO status until nausea and vomiting are resolved  - Nasogastric tube if ordered  - Administer ordered antiemetic medications as needed  - Provide nonpharmacologic comfort measures as appropriate  - Advance diet as tolerated, if ordered  - Consider nutrition services referral to assist patient with adequate nutrition and appropriate food choices  Outcome: Progressing  Goal: Maintains or returns to baseline bowel function  Description: INTERVENTIONS:  - Assess bowel function  - Encourage oral fluids to ensure adequate hydration  - Administer IV fluids if ordered to ensure adequate hydration  - Administer ordered medications as needed  - Encourage mobilization and activity  - Consider nutritional services referral to assist patient with adequate nutrition and appropriate food choices  Outcome: Progressing  Goal: Maintains adequate nutritional intake  Description: INTERVENTIONS:  - Monitor percentage of each meal consumed  - Identify factors contributing to decreased intake, treat as appropriate  - Assist with meals as needed  - Monitor I&O, weight, and lab values if indicated  - Obtain nutrition services referral as needed  Outcome: Progressing  Goal: Oral mucous membranes remain intact  Description: INTERVENTIONS  - Assess oral mucosa and hygiene practices  - Implement preventative oral hygiene regimen  - Implement oral medicated treatments as ordered  - Initiate Nutrition services referral as needed  Outcome: Progressing     Problem: Nutrition/Hydration-ADULT  Goal: Nutrient/Hydration intake appropriate for improving, restoring or maintaining nutritional needs  Description: Monitor and assess patient's nutrition/hydration status for malnutrition  Collaborate with interdisciplinary team and initiate plan and interventions as ordered  Monitor patient's weight and dietary intake as ordered or per policy  Utilize nutrition screening tool and intervene as necessary  Determine patient's food preferences and provide high-protein, high-caloric foods as appropriate       INTERVENTIONS:  - Monitor oral intake, urinary output, labs, and treatment plans  - Assess nutrition and hydration status and recommend course of action  - Evaluate amount of meals eaten  - Assist patient with eating if necessary   - Allow adequate time for meals  - Recommend/ encourage appropriate diets, oral nutritional supplements, and vitamin/mineral supplements  - Order, calculate, and assess calorie counts as needed  - Recommend, monitor, and adjust tube feedings and TPN/PPN based on assessed needs  - Assess need for intravenous fluids  - Provide specific nutrition/hydration education as appropriate  - Include patient/family/caregiver in decisions related to nutrition  Outcome: Progressing     Problem: MOBILITY - ADULT  Goal: Maintain or return to baseline ADL function  Description: INTERVENTIONS:  - Educate patient/family on patient safety including physical limitations  - Instruct patient to call for assistance with activity   - Consult OT/PT to assist with strengthening/mobility   - Keep Call bell within reach  - Keep bed low and locked with side rails adjusted as appropriate  - Keep care items and personal belongings within reach  - Initiate and maintain comfort rounds  - Make Fall Risk Sign visible to staff  - Offer Toileting every 2 Hours, in advance of need  - Initiate/Maintain bed/chair alarm  - Obtain necessary fall risk management equipment:   - Apply yellow socks and bracelet for high fall risk patients  - Consider moving patient to room near nurses station  Outcome: Progressing  Goal: Maintains/Returns to pre admission functional level  Description: INTERVENTIONS:  - Perform BMAT or MOVE assessment daily    - Set and communicate daily mobility goal to care team and patient/family/caregiver  - Collaborate with rehabilitation services on mobility goals if consulted  - Perform Range of Motion 4 times a day  - Reposition patient every 2 hours    - Dangle patient 3 times a day  - Stand patient 3 times a day  - Ambulate patient 3 times a day  - Out of bed to chair 3 times a day   - Out of bed for meals 3 times a day  - Out of bed for toileting  - Record patient progress and toleration of activity level   Outcome: Progressing     Problem: Prexisting or High Potential for Compromised Skin Integrity  Goal: Skin integrity is maintained or improved  Description: INTERVENTIONS:  - Identify patients at risk for skin breakdown  - Assess and monitor skin integrity  - Assess and monitor nutrition and hydration status  - Monitor labs   - Assess for incontinence   - Turn and reposition patient  - Assist with mobility/ambulation  - Relieve pressure over bony prominences  - Avoid friction and shearing  - Provide appropriate hygiene as needed including keeping skin clean and dry  - Evaluate need for skin moisturizer/barrier cream  - Collaborate with interdisciplinary team   - Patient/family teaching  - Consider wound care consult   Outcome: Progressing

## 2022-06-23 NOTE — PROGRESS NOTES
Progress Note - Nephrology   Lonne Estimable Sr  62 y o  male MRN: [id number]  Unit/Bed#: -01 Encounter: [id number]    Assessment and Plan    1  Acute kidney injury, present on admission, superimposed on chronic kidney disease  · Will request updated labs today  Overall, renal function is improving from peak creatinine 4 06 mg/dL on 06/20/2022 down to 3 7 mg/dL  Fraction excretion of sodium was 14%, consistent with acute on chronic tubular interstitial disease  In addition, patient has obstructive uropathy and has been on vancomycin  He denies any poor p o  intake or GI losses  He is nonoliguric with 2 3 L urine output  He is hemodynamically appropriate, but continue to monitor hemoglobin and transfuse if needed  Okay to continue Plasmalyte 100 mL/hr  Monitor for signs of volume overload  Continue to optimize overall care, avoid nephrotoxins, renally dose medications and monitor for urinary retention  2  Chronic kidney disease, stage IV baseline creatinine 2 5-2 9 mg/dL with diabetic nephropathy  · Will need outpatient nephrology follow-up with primary nephrologist, Dr Jennifer Ramos  3  Obstructive uropathy  · Renal ultrasound on 06/21/2022 reveals interval improvement of prior hydronephrosis with only mild residual hydronephrosis of lower pole of left kidney since Ortiz catheter placement  On tamsulosin  4  Neurogenic bladder possibly  · Ortiz catheter per Urology  5  Anemia  · Continue to monitor and replete as needed  IV iron contraindicated given recent infection  Should have outpatient consideration of IV iron and/or initiation of erythropoietin stimulating agent, as indicated  Will defer to primary nephrologist   Continue transfuse as needed for hemoglobin less than 7 0 grams/deciliter  6  Hypertension  · At goal   Continue amlodipine 10 mg daily  7  Mineral bone disease  · Continue sevelamer with meals      Follow up reason for today's visit:     EKLLY (acute kidney injury) [facility])    Patient Active Problem List   Diagnosis    Gastroenteritis    KELLY (acute kidney injury) ([facility] )    Hyperkalemia    Diabetes mellitus ([place] [address] )    Gout    Hypertension    History of CVA (cerebrovascular accident)    Osteomyelitis of vertebra of lumbar region ([place] [address] )    Severe protein-calorie malnutrition (HCC)    Iron deficiency anemia secondary to inadequate dietary iron intake    Hip pain, acute, left         Subjective:   "Sad" and "overwhelmed" from "months of back pain" and his general condition  A complete 10 point review of systems was performed and is otherwise negative  Objective:     Vitals: Blood pressure 134/78, pulse 68, temperature 98 1 °F (36 7 °C), resp  rate 18, height 5' 11" (1 803 m), weight 69 1 kg (152 lb 5 4 oz), SpO2 99 %  ,Body mass index is 21 25 kg/m²  Weight (last 2 days)     Date/Time Weight    06/23/22 0600 69 1 (152 34)    06/22/22 0808 70 8 (156 09)    06/22/22 0600 70 8 (156 09)    06/21/22 0600 70 8 (156 09)            Intake/Output Summary (Last 24 hours) at 6/23/2022 0902  Last data filed at 6/23/2022 0208  Gross per 24 hour   Intake 1380 ml   Output 2350 ml   Net -970 ml     I/O last 3 completed shifts: In: 1380 [P O :480; I V :900]  Out: 5575 [Urine:5575]    Urethral Catheter Coude 16 Fr  (Active)   Reasons to continue Urinary Catheter  Acute urinary retention/obstruction failing urinary retention protocol 06/22/22 0718   Goal for Removal Voiding trial when ambulation improves 06/22/22 0718   Site Assessment Clean;Skin intact 06/22/22 0718   Ortiz Care Done 06/23/22 0814   Collection Container Standard drainage bag 06/22/22 0718   Securement Method Other (Comment) 06/22/22 0718   Output (mL) 750 mL 06/23/22 0208       Physical Exam: /78   Pulse 68   Temp 98 1 °F (36 7 °C)   Resp 18   Ht 5' 11" (1 803 m)   Wt 69 1 kg (152 lb 5 4 oz)   SpO2 99%   BMI 21 25 kg/m²     General Appearance:    No acute distress  Cooperative  Appears stated age  Head:    Normocephalic  Atraumatic  Normal jaw occlusion  Eyes:    Lids, conjunctiva normal  No scleral icterus  Ears:    Normal external ears  Nose:   Nares normal  No drainage  Mouth:   Lips, tongue normal  Mucosa normal  Phonation normal    Neck:   Supple  Symmetrical    Back:     Symmetric  No CVA tenderness  Lungs:     Normal respiratory effort  Clear to auscultation bilaterally  Chest wall:    No tenderness or deformity  Heart:    Regular rate and rhythm  Normal S1 and S2  No murmur  No JVD  No edema  Abdomen:     Soft  Non-tender  Bowel sounds active  Genitourinary: Ortiz catheter present with yellow urine output  Extremities:   Extremities normal  Atraumatic  No cyanosis  Skin:   Warm and dry  No pallor, jaundice, rash, ecchymoses  Neurologic:   Alert and oriented to person, place, time  No focal deficit  Lab, Imaging and other studies: I have personally reviewed pertinent labs  CBC:   Lab Results   Component Value Date    HGB 8 5 (L) 06/22/2022    HCT 27 2 (L) 06/22/2022     CMP: No results found for: NA, K, CL, CO2, ANIONGAP, BUN, CREATININE, GLUCOSE, CALCIUM, AST, ALT, ALKPHOS, PROT, BILITOT, EGFR      Results from last 7 days   Lab Units 06/22/22  0513 06/21/22  0559 06/21/22  0205 06/20/22 2012 06/20/22  1618   POTASSIUM mmol/L 5 2 5 3  5 3 5 3   < > 5 0   CHLORIDE mmol/L 102 102  --   --  102   CO2 mmol/L 21 20*  --   --  23   BUN mg/dL 45* 49*  --   --  54*   CREATININE mg/dL 3 70* 3 80*  --   --  3 89*   CALCIUM mg/dL 9 0 9 8  --   --  9 9   ALK PHOS U/L 94 105  --   --  109   ALT U/L 22 26  --   --  24   AST U/L 13 15  --   --  21    < > = values in this interval not displayed           Phosphorus: No results found for: PHOS  Magnesium: No results found for: MG  Urinalysis: No results found for: COLORU, CLARITYU, SPECGRAV, PHUR, LEUKOCYTESUR, NITRITE, PROTEINUA, GLUCOSEU, KETONESU, BILIRUBINUR, BLOODU  Ionized Calcium: No results found for: CAION  Coagulation: No results found for: PT, INR, APTT  Troponin: No results found for: TROPONINI  ABG: No results found for: PHART, ADO0FLU, PO2ART, NAR3YBK, Y6XTSEWN, BEART, SOURCE  Radiology review:     IMAGING  Procedure: CT abdomen pelvis wo contrast    Result Date: 6/20/2022  Narrative: CT ABDOMEN AND PELVIS WITHOUT IV CONTRAST INDICATION:   Sepsis n/v/d, sepsis work up  Per ED notes, history of L5-S1 discitis and osteomyelitis, history of diabetes  Chief complaint of generalized weakness with nausea and vomiting and diarrhea since yesterday  Patient to have PICC line pulled one week ago, but did not go to the appointment  COMPARISON:  3/9/2022 TECHNIQUE:  CT examination of the abdomen and pelvis was performed  Axial, sagittal, and coronal 2D reformatted images were created from the source data and submitted for interpretation  Radiation dose length product (DLP) for this visit:  687 mGy-cm   This examination, like all CT scans performed in the [facility], was performed utilizing techniques to minimize radiation dose exposure, including the use of iterative reconstruction and automated exposure control  IV Contrast:  Because of a critical nationwide intravenous contrast shortage, the study was performed without intravenous contrast  Enteric Contrast:  Enteric contrast was not administered  FINDINGS: ABDOMEN LOWER CHEST:  Mild dependent atelectasis  Atherosclerosis  Small, increased pericardial effusion  LIVER/BILIARY TREE:  Within normal limits  GALLBLADDER:  Within normal limits  SPLEEN:  Within normal limits  PANCREAS:  Within normal limits  ADRENAL GLANDS:  Within normal limits  KIDNEYS/URETERS:  New, mild, bilateral, left greater than right hydronephrosis, likely secondary to distended bladder  No ureteral stone or other obstructing lesion  No perinephric fluid or fatty stranding  STOMACH AND BOWEL:  Within normal limits  APPENDIX:  Within normal limits   ABDOMINOPELVIC CAVITY:  No abnormal air, fluid or enlarged lymph nodes  Resolved bilateral retroperitoneal fluid collections  VESSELS:  Limited evaluation without intravenous contrast   No aneurysm  Atherosclerosis  PELVIS: REPRODUCTIVE ORGANS:  prostate and seminal vesicles within normal limits  URINARY BLADDER:  Distended  No wall thickening, stone or mass identified  The penis is within normal limits  ABDOMINAL WALL/INGUINAL REGIONS:  Surgical changes  OSSEOUS STRUCTURES: There are 5 nonrib-bearing vertebral bodies  Increased loss of disc height with destructive endplate changes at O4-X3  Improved paraspinal findings of infection  New decompressive surgical change  Opacity in the central canal is not well evaluated by CT  The study was marked in [facility] for immediate notification  Impression: Compared to 3/9/2022, changes at L5-S1 may be sequela of prior discitis and osteomyelitis  Resolved paraspinal changes of acute infection  If there is clinical concern for recurrent spine infection, MR is recommended  New bladder distention and hydronephrosis suggesting bladder outlet obstruction  No etiology identified  Small, increased pericardial effusion  Workstation performed: [id number]     Procedure: XR chest portable    Result Date: 6/20/2022  Narrative: CHEST INDICATION:   sepsis  COMPARISON:  None EXAM PERFORMED/VIEWS:  XR CHEST PORTABLE FINDINGS: Cardiomediastinal silhouette appears unremarkable  A right chest venous catheter with tip overlying the distal SVC region noted  Mild aortic arch calcific lesions are also seen  The lungs are clear  No pneumothorax or pleural effusion  Osseous structures appear within normal limits for patient age  Old healed left posterior 6th rib fracture deformity with callus formation is seen  No free air in the upper abdomen noted  Impression: No acute cardiopulmonary disease   Workstation performed: [id number]     Procedure: US kidney and bladder    Result Date: 6/21/2022  Narrative: RENAL ULTRASOUND INDICATION:   mary, bilateral hydro s/p camarena  COMPARISON: CT scan from yesterday TECHNIQUE:   Ultrasound of the retroperitoneum was performed with a curvilinear transducer utilizing volumetric sweeps and still imaging techniques  FINDINGS: KIDNEYS: Symmetric and normal size  Right kidney:  11 9 x 5 9 x 6 7 cm  Volume 243 0 mL Left kidney:  11 3 x 6 3 x 5 5 cm  Volume 206 5 mL Right kidney Normal echogenicity and contour  No mass is identified  No hydronephrosis  No shadowing calculi  No perinephric fluid collections  Left kidney Normal echogenicity and contour  No mass is identified  1 4 cm cyst Mild hydronephrosis of the lower pole  No shadowing calculi  No perinephric fluid collections  URETERS: Nonvisualized  BLADDER: A camarena catheter is in place, decompressing the bladder and limiting its evaluation  Impression: Interval improvement of previous hydronephrosis  Mild residual hydronephrosis of the lower pole of the left kidney  Workstation performed: [id number]     Procedure: XR hips bilateral 3-4 vw w pelvis if performed    Result Date: 6/22/2022  Narrative: BILATERAL HIPS AND PELVIS INDICATION:   Hip pain, history of bacteremia  COMPARISON:  CT 6/20/2022 and 3/9/2022 VIEWS:  XR HIPS BILATERAL 3-4 VW W PELVIS IF PERFORMED  FINDINGS: The bony pelvis appears intact  Known L4-L5 discitis osteomyelitis is better evaluated on prior CT  Partially visualized bladder catheter  Retroperitoneal surgical clips  LEFT HIP: There is no acute fracture or dislocation  Bony protuberance of the superolateral femoral head suggestive of CAM type femoro-acetabular impingement  Mild degenerative changes  No lytic or blastic osseous lesion  There are atherosclerotic calcifications  Soft tissues are otherwise unremarkable  RIGHT HIP: There is no acute fracture or dislocation  Bony protuberance of the superolateral femoral head suggestive of CAM type femoro-acetabular impingement  Mild degenerative changes  No lytic or blastic osseous lesion   There are atherosclerotic calcifications  Soft tissues are otherwise unremarkable  Impression: No acute osseous abnormality  Additional findings as above   Workstation performed: [id number]       Current Facility-Administered Medications   Medication Dose Route Frequency    allopurinol (ZYLOPRIM) tablet 200 mg  200 mg Oral Daily    amLODIPine (NORVASC) tablet 10 mg  10 mg Oral Daily    aspirin (ECOTRIN LOW STRENGTH) EC tablet 81 mg  81 mg Oral Daily    atorvastatin (LIPITOR) tablet 40 mg  40 mg Oral Daily With Dinner    heparin (porcine) subcutaneous injection 5,000 Units  5,000 Units Subcutaneous Q8H [address]    insulin glargine (LANTUS) subcutaneous injection 14 Units 0 14 mL  14 Units Subcutaneous BID    insulin lispro (HumaLOG) 100 units/mL subcutaneous injection 1-5 Units  1-5 Units Subcutaneous TID AC    insulin lispro (HumaLOG) 100 units/mL subcutaneous injection 1-5 Units  1-5 Units Subcutaneous HS    methocarbamol (ROBAXIN) tablet 500 mg  500 mg Oral Q6H PRN    multi-electrolyte (PLASMALYTE-A/ISOLYTE-S PH 7 4) IV solution  100 mL/hr Intravenous Continuous    ondansetron (ZOFRAN) injection 4 mg  4 mg Intravenous Once    oxyCODONE (ROXICODONE) IR tablet 5 mg  5 mg Oral Q4H PRN    pregabalin (LYRICA) capsule 75 mg  75 mg Oral BID    sevelamer (RENAGEL) tablet 800 mg  800 mg Oral Daily With Dinner    tamsulosin (FLOMAX) capsule 0 4 mg  0 4 mg Oral Daily With Dinner    traMADol (ULTRAM) tablet 50 mg  50 mg Oral BID PRN     Medications Discontinued During This Encounter   Medication Reason    sodium chloride 0 9 % bolus 1,000 mL     sodium chloride 0 9 % bolus 1,000 mL     sodium chloride 0 9 % bolus 1,000 mL     sodium chloride 0 9 % infusion     insulin detemir (LEVEMIR) 100 units/mL subcutaneous injection Error    sodium chloride 0 9 % infusion     HYDROmorphone HCl (DILAUDID) injection 0 2 mg        Radha Jackson PA-C    Portions of the record may have been created with voice recognition software  Occasional wrong word or "sound a like" substitutions may have occurred due to the inherent limitations of voice recognition software  Read the chart carefully and recognize, using context, where substitutions have occurred

## 2022-06-23 NOTE — PROGRESS NOTES
[address]  Progress Note - Sameer Patten 1965, 62 y o  male MRN: [id number]  Unit/Bed#: -01 Encounter: [id number]  Primary Care Provider: Jayden Mancilla DO   Date and time admitted to hospital: 6/20/2022  1:33 PM    * KELLY (acute kidney injury) [facility])  Assessment & Plan  Patient presents with acute kidney injury on CKD IV  Baseline Cr is around 2 0, Patient recently discharged from [facility] for acute kidney injury with creatinine of 3 0  Creatinine on admission noted to be 4 0  The KELLY on admission at [facility] was attributed to patient being on vancomycin for osteomyelitis  He was then discharged on daptomycin which patient finished his course of antibiotics on 06/13/2022  CT abdomen showing New bladder distention and hydronephrosis suggesting bladder outlet obstruction  Suspected KELLY secondary to postrenal urinary retention  Ortiz placed in the ER  Patient received 1 L bolus in the ED will continue with gentle hydration   Nephro consulted, appreciate recommendations  Renal US - Showing Interval improvement of previous hydronephrosis  Mild residual hydronephrosis of the lower pole of the left kidney  Creatinine slowly improving down to 3 7    Hip pain, acute, left  Assessment & Plan  Patient complaining of left hip pain  Patient had similar symptoms while admitted to [facility] where CT was negative for osteo  Hip x-ray:Bony protuberance of the superolateral femoral head suggestive of CAM type femoro-acetabular impingement   Mild degenerative changes  No lytic or blastic osseous lesion  Iron deficiency anemia secondary to inadequate dietary iron intake  Assessment & Plan  Hemoglobin noted to be at 7 this morning  Order repeat hemoglobin which came back at 8 5    No blood was given  Follow-up iron studies    Severe protein-calorie malnutrition [facility])  Assessment & Plan  Malnutrition Findings:   Adult Malnutrition type: Chronic illness  Adult Degree of Malnutrition: Other severe protein calorie malnutrition  Malnutrition Characteristics: Muscle loss, Weight loss                  360 Statement: Chronic severe malnutrition related to poor appetite as evidenced by  26 8% weight loss x 9 mo (6/17/21 198lb, 3/31/22 145lb), severe muscle loss to quadriceps, gastrocnemius, interroseous muscles  Treated with: Increase CCD to level 3, order renal liberal diet for modest K/Phos restrictions  Trial Nepro daily and ensure pudding BID  Provided with diet instruction for low K/Phos diets, homemade shakes for home  Recommend daily weights  BMI Findings: Body mass index is 21 25 kg/m²  Osteomyelitis of vertebra of lumbar region [facility])  Assessment & Plan  Patient was recently discharged from [facility] where he was evaluated for L4-L5 diskitis/osteomyelitis  Patient was discharged on IV vancomycin, then transition to daptomycin due to [facility] abx course on 6/13  Patient was supposed to have central line removed on 6/21  Discussed with IR no plan to have patient transferred for removal, will attempt to remove it while hospitalized, if unable will have outpatient f/u        History of CVA (cerebrovascular accident)  Assessment & Plan  Continue statin, aspirin 81 mg daily    Hypertension  Assessment & Plan  Resume home amlodipine    Hold losartan in setting of KELLY    Gout  Assessment & Plan  Resume home allopurinol    Diabetes mellitus [facility])  Assessment & Plan  Lab Results   Component Value Date    HGBA1C 8 4 (H) 01/24/2022       Recent Labs     06/22/22  2113 06/23/22  0702 06/23/22  1106 06/23/22  1609   POCGLU 171* 90 113 156*       Blood Sugar Average: Last 72 hrs:  (P) 270 [id number]   Will place on insulin sliding scale and adjust sugars accordingly  Resume home lantus 14 units bid  Carb consistent diet    Hyperkalemia  Assessment & Plan  Potassium on admission noted to be 6 0  Patient received albuterol inhaler, insulin 10 units, and Lasix 20 mg IV in the ED  Potassium normalized    Gastroenteritis  Assessment & Plan  Patient presents to the ED for nausea vomiting x1 and diarrhea x2 over the last 24 hours with associated chills and subjective fevers  CT abdomen pelvis without contrast done in the ED negative for acute GI findings  Follow-up blood cultures  No diarrhea since being admitted, cancelled c-diff  Patient received 1 dose of Zosyn in the ED  Infectious workup so for including viral PCR, COVID, urinalysis, chest x-ray, CT abdomen negative for source of infection  No leukocytosis, afebrile, will hold off on further antibiotics for now  Patient has not had diarrhea will discontinue COVID PCR          VTE Pharmacologic Prophylaxis:   Moderate Risk (Score 3-4) - Pharmacological DVT Prophylaxis Ordered: heparin  Patient Centered Rounds: I performed bedside rounds with nursing staff today  Discussions with Specialists or Other Care Team Provider:  Nephrology    Education and Discussions with Family / Patient: Updated  (wife) at bedside  Time Spent for Care: 15 minutes  More than 50% of total time spent on counseling and coordination of care as described above  Current Length of Stay: 3 day(s)  Current Patient Status: Inpatient   Certification Statement: The patient will continue to require additional inpatient hospital stay due to To monitor above condition  Discharge Plan: Anticipate discharge in 24-48 hrs to home  Code Status: Level 1 - Full Code    Subjective:   Seen and evaluated during the round  Patient is still complaining of left hip pain, sharp in nature, nonradiating, deep-seated  Denies any trouble with urination or defecation      Objective:     Vitals:   Temp (24hrs), Av 8 °F (36 6 °C), Min:97 5 °F (36 4 °C), Max:98 1 °F (36 7 °C)    Temp:  [97 5 °F (36 4 °C)-98 1 °F (36 7 °C)] 97 5 °F (36 4 °C)  HR:  [66-69] 66  Resp:  [18] 18  BP: (134-140)/(70-82) 134/82  SpO2:  [97 %-99 %] 97 %  Body mass index is 21 25 kg/m²  Input and Output Summary (last 24 hours): Intake/Output Summary (Last 24 hours) at 6/23/2022 1652  Last data filed at 6/23/2022 1134  Gross per 24 hour   Intake 2825 ml   Output 2050 ml   Net 775 ml       Physical Exam:   Physical Exam  Vitals and nursing note reviewed  Exam conducted with a chaperone present  Constitutional:       Appearance: Normal appearance  He is not ill-appearing or diaphoretic  HENT:      Head: Normocephalic  Nose: Nose normal  No congestion  Mouth/Throat:      Mouth: Mucous membranes are moist       Pharynx: Oropharynx is clear  No oropharyngeal exudate  Eyes:      General: No scleral icterus  Left eye: No discharge  Conjunctiva/sclera: Conjunctivae normal       Pupils: Pupils are equal, round, and reactive to light  Cardiovascular:      Rate and Rhythm: Normal rate  Heart sounds: No murmur heard  No friction rub  No gallop  Pulmonary:      Effort: Pulmonary effort is normal  No respiratory distress  Breath sounds: No stridor  No wheezing or rhonchi  Abdominal:      General: Abdomen is flat  Bowel sounds are normal  There is no distension  Palpations: There is no mass  Tenderness: There is no abdominal tenderness  Hernia: No hernia is present  Musculoskeletal:         General: Tenderness (Left groin and hip area) present  Cervical back: Normal range of motion  Right lower leg: No edema  Left lower leg: No edema  Skin:     General: Skin is warm  Capillary Refill: Capillary refill takes less than 2 seconds  Coloration: Skin is not jaundiced or pale  Findings: No bruising or erythema  Neurological:      General: No focal deficit present  Mental Status: He is alert and oriented to person, place, and time  Cranial Nerves: No cranial nerve deficit  Sensory: No sensory deficit  Motor: No weakness        Coordination: Coordination normal  Additional Data:     Labs:  Results from last 7 days   Lab Units 06/23/22  0935   WBC Thousand/uL 6 32   HEMOGLOBIN g/dL 9 0*   HEMATOCRIT % 28 7*   PLATELETS Thousands/uL 256   NEUTROS PCT % 58   LYMPHS PCT % 26   MONOS PCT % 10   EOS PCT % 5     Results from last 7 days   Lab Units 06/23/22  0935   SODIUM mmol/L 139   POTASSIUM mmol/L 4 4   CHLORIDE mmol/L 101   CO2 mmol/L 24   BUN mg/dL 45*   CREATININE mg/dL 4 07*   ANION GAP mmol/L 14*   CALCIUM mg/dL 10 6*   ALBUMIN g/dL 3 2*   TOTAL BILIRUBIN mg/dL 0 35   ALK PHOS U/L 116   ALT U/L 30   AST U/L 15   GLUCOSE RANDOM mg/dL 115     Results from last 7 days   Lab Units 06/20/22  1338   INR  0 97     Results from last 7 days   Lab Units 06/23/22  1609 06/23/22  1106 06/23/22  0702 06/22/22  2113 06/22/22  1600 06/22/22  1110 06/22/22  0711 06/21/22  2058 06/21/22  1556 06/21/22  1105 06/21/22  0800 06/20/22  2130   POC GLUCOSE mg/dl 156* 113 90 171* 139 118 134 174* 192* 131 147* 210*         Results from last 7 days   Lab Units 06/20/22  1338   LACTIC ACID mmol/L 1 1   PROCALCITONIN ng/ml 0 30*       Lines/Drains:  Invasive Devices  Report    Peripheral Intravenous Line  Duration           Peripheral IV 06/23/22 Left Antecubital <1 day          Drain  Duration           Urethral Catheter Coude 16 Fr  3 days              Urinary Catheter:  Goal for removal: Voiding trial when ambulation improves               Imaging: No pertinent imaging reviewed  Recent Cultures (last 7 days):   Results from last 7 days   Lab Units 06/20/22  1341 06/20/22  1338   BLOOD CULTURE  No Growth at 48 hrs  No Growth at 48 hrs  No Growth at 48 hrs         Last 24 Hours Medication List:   Current Facility-Administered Medications   Medication Dose Route Frequency Provider Last Rate    allopurinol  200 mg Oral Daily Pandi Todhe, DO      amLODIPine  10 mg Oral Daily Pandi Todhe, DO      aspirin  81 mg Oral Daily Pandi Todhe, DO      atorvastatin  40 mg Oral Daily With [employer] Pandi Tonico, DO      heparin (porcine)  5,000 Units Subcutaneous Q8H [address] Pandi Totroye, DO      insulin glargine  14 Units Subcutaneous BID Twylai Diogo, DO      insulin lispro  1-5 Units Subcutaneous TID AC Pandi Totroye, DO      insulin lispro  1-5 Units Subcutaneous HS Twylai Tonico, DO      lidocaine  1 patch Topical Daily Madison Ely MD      methocarbamol  500 mg Oral Q6H PRN Neymar Lind, DO      multi-electrolyte  100 mL/hr Intravenous Continuous Johnny NickelSHOAIB caceresNP 100 mL/hr (06/23/22 1004)    ondansetron  4 mg Intravenous Once Kamala Ferguson PA-C      oxyCODONE  5 mg Oral Q4H PRN YURIDIA Pennington      pregabalin  75 mg Oral BID Neymar Lind,       sevelamer  800 mg Oral Daily With [employer]      tamsulosin  0 4 mg Oral Daily With Nicholas Oil, DO      traMADol  50 mg Oral BID PRN YURIDIA Pennington          Today, Patient Was Seen By: Terrell Spear MD    **Please Note: This note may have been constructed using a voice recognition system  **

## 2022-06-23 NOTE — PLAN OF CARE
Problem: OCCUPATIONAL THERAPY ADULT  Goal: Performs self-care activities at highest level of function for planned discharge setting  See evaluation for individualized goals  Description: Treatment Interventions: ADL retraining, Functional transfer training, UE strengthening/ROM, Patient/family training, Cognitive reorientation, Endurance training, Equipment evaluation/education, Neuromuscular reeducation, Compensatory technique education, Continued evaluation, Energy conservation, Activityengagement          See flowsheet documentation for full assessment, interventions and recommendations  Outcome: Not Progressing  Note: Limitation: Decreased ADL status, Decreased UE strength, Decreased Safe judgement during ADL, Decreased cognition, Decreased endurance, Decreased self-care trans, Decreased high-level ADLs  Prognosis: Good  Assessment: Pt completed OT tx session #1 on this date focused on functional mobility  Pt initally agreeable to participate but then declining ADLs and functional mobility after 2 steps d/t increased back pain  Pt provided encouragement, education on importance of completing therapy and concerns for returning to home at current functional status  Pt continued to decline further tasks but agreeable to complete STS while bed was being made and lidocaine patch was being placed  Pt currently performing UB ADLs @ S/set-up and LB ADLs @ Mod A  Spouse assists with LB ADLs and functional mobility at home PRN  Pt demonstrates Fair potential to achieve goals and is currently limited by increased pain, decreased motivation, decreased standing balance/tolerance, decreased activity tolerance, decreased endurance and decreased LB ADL performance  Continue to recommend post acute rehabilitation services at this time to increase safety and independence in ADL tasks and funcitonal mobility  If family continues to decline post acute rehabilitation services, recommend [address] [place] [address]'[facility] services upon discharge       OT Discharge Recommendation: Post acute rehabilitation services

## 2022-06-23 NOTE — ASSESSMENT & PLAN NOTE
Patient was recently discharged from [facility] where he was evaluated for L4-L5 diskitis/osteomyelitis    Patient was discharged on IV vancomycin, then transition to daptomycin due to [facility] abx course on 6/13  Patient was supposed to have central line removed on 6/21  Discussed with IR no plan to have patient transferred for removal, will attempt to remove it while hospitalized, if unable will have outpatient f/u

## 2022-06-23 NOTE — ASSESSMENT & PLAN NOTE
Patient complaining of left hip pain  Patient had similar symptoms while admitted to [facility] where CT was negative for osteo  Hip x-ray:Bony protuberance of the superolateral femoral head suggestive of CAM type femoro-acetabular impingement   Mild degenerative changes  No lytic or blastic osseous lesion

## 2022-06-23 NOTE — PLAN OF CARE
Problem: Potential for Falls  Goal: Patient will remain free of falls  Description: INTERVENTIONS:  - Educate patient/family on patient safety including physical limitations  - Instruct patient to call for assistance with activity   - Consult OT/PT to assist with strengthening/mobility   - Keep Call bell within reach  - Keep bed low and locked with side rails adjusted as appropriate  - Keep care items and personal belongings within reach  - Initiate and maintain comfort rounds  - Make Fall Risk Sign visible to staff  - Offer Toileting every 2 Hours, in advance of need  - Initiate/Maintain bed/chair alarm  - Obtain necessary fall risk management equipment:   - Apply yellow socks and bracelet for high fall risk patients  - Consider moving patient to room near nurses station  Outcome: Progressing     Problem: PAIN - ADULT  Goal: Verbalizes/displays adequate comfort level or baseline comfort level  Description: Interventions:  - Encourage patient to monitor pain and request assistance  - Assess pain using appropriate pain scale  - Administer analgesics based on type and severity of pain and evaluate response  - Implement non-pharmacological measures as appropriate and evaluate response  - Consider cultural and social influences on pain and pain management  - Notify physician/advanced practitioner if interventions unsuccessful or patient reports new pain  Outcome: Progressing     Problem: INFECTION - ADULT  Goal: Absence or prevention of progression during hospitalization  Description: INTERVENTIONS:  - Assess and monitor for signs and symptoms of infection  - Monitor lab/diagnostic results  - Monitor all insertion sites, i e  indwelling lines, tubes, and drains  - Monitor endotracheal if appropriate and nasal secretions for changes in amount and color  - [place] appropriate cooling/warming therapies per order  - Administer medications as ordered  - Instruct and encourage patient and family to use good hand hygiene technique  - Identify and instruct in appropriate isolation precautions for identified infection/condition  Outcome: Progressing  Goal: Absence of fever/infection during neutropenic period  Description: INTERVENTIONS:  - Monitor WBC    Outcome: Progressing     Problem: SAFETY ADULT  Goal: Patient will remain free of falls  Description: INTERVENTIONS:  - Educate patient/family on patient safety including physical limitations  - Instruct patient to call for assistance with activity   - Consult OT/PT to assist with strengthening/mobility   - Keep Call bell within reach  - Keep bed low and locked with side rails adjusted as appropriate  - Keep care items and personal belongings within reach  - Initiate and maintain comfort rounds  - Make Fall Risk Sign visible to staff  - Offer Toileting every 2 Hours, in advance of need  - Initiate/Maintain bed/chair alarm  - Obtain necessary fall risk management equipment:   - Apply yellow socks and bracelet for high fall risk patients  - Consider moving patient to room near nurses station  Outcome: Progressing  Goal: Maintain or return to baseline ADL function  Description: INTERVENTIONS:  - Educate patient/family on patient safety including physical limitations  - Instruct patient to call for assistance with activity   - Consult OT/PT to assist with strengthening/mobility   - Keep Call bell within reach  - Keep bed low and locked with side rails adjusted as appropriate  - Keep care items and personal belongings within reach  - Initiate and maintain comfort rounds  - Make Fall Risk Sign visible to staff  - Offer Toileting every 2 Hours, in advance of need  - Initiate/Maintain bed/chair alarm  - Obtain necessary fall risk management equipment:   - Apply yellow socks and bracelet for high fall risk patients  - Consider moving patient to room near nurses station  Outcome: Progressing  Goal: Maintains/Returns to pre admission functional level  Description: INTERVENTIONS:  - Perform BMAT or MOVE assessment daily    - Set and communicate daily mobility goal to care team and patient/family/caregiver  - Collaborate with rehabilitation services on mobility goals if consulted  - Perform Range of Motion 4 times a day  - Reposition patient every 2 hours  - Dangle patient 3 times a day  - Stand patient 3 times a day  - Ambulate patient 3 times a day  - Out of bed to chair 3 times a day   - Out of bed for meals 3 times a day  - Out of bed for toileting  - Record patient progress and toleration of activity level   Outcome: Progressing     Problem: DISCHARGE PLANNING  Goal: Discharge to home or other facility with appropriate resources  Description: INTERVENTIONS:  - Identify barriers to discharge w/patient and caregiver  - Arrange for needed discharge resources and transportation as appropriate  - Identify discharge learning needs (meds, wound care, etc )  - Arrange for interpretive services to assist at discharge as needed  - Refer to Case Management Department for coordinating discharge planning if the patient needs post-hospital services based on physician/advanced practitioner order or complex needs related to functional status, cognitive ability, or social support system  Outcome: Progressing     Problem: Knowledge Deficit  Goal: Patient/family/caregiver demonstrates understanding of disease process, treatment plan, medications, and discharge instructions  Description: Complete learning assessment and assess knowledge base    Interventions:  - Provide teaching at level of understanding  - Provide teaching via preferred learning methods  Outcome: Progressing     Problem: GASTROINTESTINAL - ADULT  Goal: Minimal or absence of nausea and/or vomiting  Description: INTERVENTIONS:  - Administer IV fluids if ordered to ensure adequate hydration  - Maintain NPO status until nausea and vomiting are resolved  - Nasogastric tube if ordered  - Administer ordered antiemetic medications as needed  - Provide nonpharmacologic comfort measures as appropriate  - Advance diet as tolerated, if ordered  - Consider nutrition services referral to assist patient with adequate nutrition and appropriate food choices  Outcome: Progressing  Goal: Maintains or returns to baseline bowel function  Description: INTERVENTIONS:  - Assess bowel function  - Encourage oral fluids to ensure adequate hydration  - Administer IV fluids if ordered to ensure adequate hydration  - Administer ordered medications as needed  - Encourage mobilization and activity  - Consider nutritional services referral to assist patient with adequate nutrition and appropriate food choices  Outcome: Progressing  Goal: Maintains adequate nutritional intake  Description: INTERVENTIONS:  - Monitor percentage of each meal consumed  - Identify factors contributing to decreased intake, treat as appropriate  - Assist with meals as needed  - Monitor I&O, weight, and lab values if indicated  - Obtain nutrition services referral as needed  Outcome: Progressing  Goal: Oral mucous membranes remain intact  Description: INTERVENTIONS  - Assess oral mucosa and hygiene practices  - Implement preventative oral hygiene regimen  - Implement oral medicated treatments as ordered  - Initiate Nutrition services referral as needed  Outcome: Progressing     Problem: Nutrition/Hydration-ADULT  Goal: Nutrient/Hydration intake appropriate for improving, restoring or maintaining nutritional needs  Description: Monitor and assess patient's nutrition/hydration status for malnutrition  Collaborate with interdisciplinary team and initiate plan and interventions as ordered  Monitor patient's weight and dietary intake as ordered or per policy  Utilize nutrition screening tool and intervene as necessary  Determine patient's food preferences and provide high-protein, high-caloric foods as appropriate       INTERVENTIONS:  - Monitor oral intake, urinary output, labs, and treatment plans  - Assess nutrition and hydration status and recommend course of action  - Evaluate amount of meals eaten  - Assist patient with eating if necessary   - Allow adequate time for meals  - Recommend/ encourage appropriate diets, oral nutritional supplements, and vitamin/mineral supplements  - Order, calculate, and assess calorie counts as needed  - Recommend, monitor, and adjust tube feedings and TPN/PPN based on assessed needs  - Assess need for intravenous fluids  - Provide specific nutrition/hydration education as appropriate  - Include patient/family/caregiver in decisions related to nutrition  Outcome: Progressing     Problem: MOBILITY - ADULT  Goal: Maintain or return to baseline ADL function  Description: INTERVENTIONS:  - Educate patient/family on patient safety including physical limitations  - Instruct patient to call for assistance with activity   - Consult OT/PT to assist with strengthening/mobility   - Keep Call bell within reach  - Keep bed low and locked with side rails adjusted as appropriate  - Keep care items and personal belongings within reach  - Initiate and maintain comfort rounds  - Make Fall Risk Sign visible to staff  - Offer Toileting every 2 Hours, in advance of need  - Initiate/Maintain bed/chair alarm  - Obtain necessary fall risk management equipment:   - Apply yellow socks and bracelet for high fall risk patients  - Consider moving patient to room near nurses station  Outcome: Progressing  Goal: Maintains/Returns to pre admission functional level  Description: INTERVENTIONS:  - Perform BMAT or MOVE assessment daily    - Set and communicate daily mobility goal to care team and patient/family/caregiver  - Collaborate with rehabilitation services on mobility goals if consulted  - Perform Range of Motion 4 times a day  - Reposition patient every 2 hours    - Dangle patient 3 times a day  - Stand patient 3 times a day  - Ambulate patient 3 times a day  - Out of bed to chair 3 times a day   - Out of bed for meals 3 times a day  - Out of bed for toileting  - Record patient progress and toleration of activity level   Outcome: Progressing     Problem: Prexisting or High Potential for Compromised Skin Integrity  Goal: Skin integrity is maintained or improved  Description: INTERVENTIONS:  - Identify patients at risk for skin breakdown  - Assess and monitor skin integrity  - Assess and monitor nutrition and hydration status  - Monitor labs   - Assess for incontinence   - Turn and reposition patient  - Assist with mobility/ambulation  - Relieve pressure over bony prominences  - Avoid friction and shearing  - Provide appropriate hygiene as needed including keeping skin clean and dry  - Evaluate need for skin moisturizer/barrier cream  - Collaborate with interdisciplinary team   - Patient/family teaching  - Consider wound care consult   Outcome: Progressing

## 2022-06-23 NOTE — OCCUPATIONAL THERAPY NOTE
Occupational Therapy Progress Note     Patient Name: Edwige Sosa  Today's Date: 6/23/2022  Problem List  Principal Problem:    KELLY (acute kidney injury) ([facility] [address] )  Active Problems:    Gastroenteritis    Hyperkalemia    Diabetes mellitus ([facility] )    Gout    Hypertension    History of CVA (cerebrovascular accident)    Osteomyelitis of vertebra of lumbar region [facility])    Severe protein-calorie malnutrition ([facility] [address] )    Iron deficiency anemia secondary to inadequate dietary iron intake    Hip pain, acute, left       06/23/22 1246   Note Type   Note Type Treatment   Restrictions/Precautions   Other Precautions Chair Alarm; Bed Alarm; Fall Risk;Pain;Multiple lines  (camarena catheter)   Pain Assessment   Pain Assessment Tool 0-10   Pain Score 10 - Worst Possible Pain   Pain Location/Orientation Orientation: Lower;Orientation: Right;Location: Back; Location: Buttocks   Pain Onset/Description Frequency: Constant/Continuous  (RN aware and administered lidocaine patch)   Bed Mobility   Supine to Sit 5  Supervision   Additional items Increased time required;Verbal cues; Bedrails   Sit to Supine 5  Supervision   Additional items Increased time required;Verbal cues   Additional Comments Pt supine in bed at beginning of session  Supine <> sit @ S with HOB flat and increased time  Pt supine in bed with RN present in room at end of session, all needs met  Transfers   Sit to Stand   (SBA)   Additional items Increased time required;Verbal cues   Stand to Sit   (SBA)   Additional items Increased time required;Verbal cues   Additional Comments STS from EOB to RW @ SBA  Pt initally agreeable to walk into bathroom to toilet  Pt taking two steps and reporting increased back pain and unable to complete further functional mobility  Pt declining ADLs seated at EOB but completed multiple STS from EOB to RW @ SBA for bed to be made and lidocaine patch to be placed  Cognition   Overall Cognitive Status WFL   Arousal/Participation Alert; Responsive Attention Attends with cues to redirect   Orientation Level Oriented X4   Memory Decreased long term memory   Following Commands Follows one step commands with increased time or repetition   Activity Tolerance   Activity Tolerance Patient limited by pain   Medical Staff Made Aware Spoke with PT Henry Duran and SALENA Gray   Assessment   Assessment Pt completed OT tx session #1 on this date focused on functional mobility  Pt initally agreeable to participate but then declining ADLs and functional mobility after 2 steps d/t increased back pain  Pt provided encouragement, education on importance of completing therapy and concerns for returning to home at current functional status  Pt continued to decline further tasks but agreeable to complete STS while bed was being made and lidocaine patch was being placed  Pt currently performing UB ADLs @ S/set-up and LB ADLs @ Mod A  Spouse assists with LB ADLs and functional mobility at home PRN  Pt demonstrates Fair potential to achieve goals and is currently limited by increased pain, decreased motivation, decreased standing balance/tolerance, decreased activity tolerance, decreased endurance and decreased LB ADL performance  Continue to recommend post acute rehabilitation services at this time to increase safety and independence in ADL tasks and funcitonal mobility  If family continues to decline post acute rehabilitation services, recommend [address] [place] [address] services upon discharge     Plan   Treatment Interventions Functional transfer training   Goal Expiration Date 07/05/22   OT Treatment Day 1   OT Frequency 3-5x/wk   Recommendation   OT Discharge Recommendation Post acute rehabilitation services   AM-PAC Daily Activity Inpatient   Lower Body Dressing 2   Bathing 2   Toileting 2   Upper Body Dressing 3   Grooming 3   Eating 4   Daily Activity Raw Score 16   Daily Activity Standardized Score (Calc for Raw Score >=11) 35 96   AM-PAC Applied Cognition Inpatient   Following a Speech/Presentation 3 Understanding Ordinary Conversation 3   Taking Medications 2   Remembering Where Things Are Placed or Put Away 3   Remembering List of 4-5 Errands 3   Taking Care of Complicated Tasks 2   Applied Cognition Raw Score 16   Applied Cognition Standardized Score 35 03        Pt goals to be met by /5/2022     Pt will demonstrate ability to complete LB dressing @ S in order to increase safety and independence during meaningful tasks  Pt will demonstrate ability to herb/doff socks/shoes while sitting EOB @ S in order to increase safety and independence during meaningful tasks  Pt will demonstrate ability to complete toileting tasks including CM and pericare @ S in order to increase safety and independence during meaningful tasks  Pt will demonstrate ability to complete EOB, chair, toilet/commode transfers @ S in order to increase performance and participation during functional tasks  Pt will demonstrate ability to stand for 3-4 minutes while maintaining F+ balance with use of RW for UB support PRN  Pt will demonstrate ability to tolerate 30-35 minute OT session with no vc'ing for deep breathing or use of energy conservation techniques in order to increase activity tolerance during functional tasks  Pt will demonstrate Good carryover of use of energy conservation/compensatory strategies during ADLs and functional tasks in order to increase safety and reduce risk for falls  Pt will demonstrate Good attention and participation in continued evaluation of functional ambulation house hold distances in order to assist with safe d/c planning  Pt will attend to continued cognitive assessments 100% of the time in order to provide most appropriate d/c recommendations  Pt will follow 100% simple 2-step commands and be A&O x4 consistently with environmental cues to increase participation in functional activities     Pt will identify 3 areas of interest/hobbies and 1 intervention on how to incorporate into daily life in order to increase interaction with environment and peers as well as increase participation in meaningful tasks  Pt will demonstrate 100% carryover of BUE HEP in order to increase BUE MS and increase performance during functional tasks upon d/c home      Jennifer Teague OTR/L

## 2022-06-23 NOTE — ASSESSMENT & PLAN NOTE
Potassium on admission noted to be 6 0  Patient received albuterol inhaler, insulin 10 units, and Lasix 20 mg IV in the ED  Potassium normalized

## 2022-06-23 NOTE — ASSESSMENT & PLAN NOTE
Patient presents with acute kidney injury on CKD IV  Baseline Cr is around 2 0, Patient recently discharged from [facility] for acute kidney injury with creatinine of 3 0  Creatinine on admission noted to be 4 0  The KELLY on admission at [facility] was attributed to patient being on vancomycin for osteomyelitis  He was then discharged on daptomycin which patient finished his course of antibiotics on 06/13/2022  CT abdomen showing New bladder distention and hydronephrosis suggesting bladder outlet obstruction  Suspected KELLY secondary to postrenal urinary retention  Ortiz placed in the ER  Patient received 1 L bolus in the ED will continue with gentle hydration   Nephro consulted, appreciate recommendations  Renal US - Showing Interval improvement of previous hydronephrosis  Mild residual hydronephrosis of the lower pole of the left kidney     Creatinine slowly improving down to 3 7

## 2022-06-23 NOTE — PHYSICAL THERAPY NOTE
PHYSICAL THERAPY TREATMENT NOTE  NAME:  Ben Lainez Sr  DATE: 06/23/22    Length Of Stay: 3  Performed at least 2 patient identifiers during session: Name and Birthday    TREATMENT FLOW SHEET:    06/23/22 1245   PT Last Visit   PT Visit Date 06/23/22   Note Type   Note Type Treatment   Pain Assessment   Pain Assessment Tool 0-10   Pain Score 9   Pain Location/Orientation Location: Buttocks;Orientation: Left;Orientation: Lower; Location: Back   Pain Rating: FLACC (Rest) - Face 0   Pain Rating: FLACC (Rest) - Legs 0   Pain Rating: FLACC (Rest) - Activity 0   Pain Rating: FLACC (Rest) - Cry 0   Pain Rating: FLACC (Rest) - Consolability 0   Score: FLACC (Rest) 0   Pain Rating: FLACC (Activity) - Face 1   Pain Rating: FLACC (Activity) - Legs 0   Pain Rating: FLACC (Activity) - Activity 1   Pain Rating: FLACC (Activity) - Cry 1   Pain Rating: FLACC (Activity) - Consolability 0   Score: FLACC (Activity) 3   Restrictions/Precautions   Other Precautions Chair Alarm; Bed Alarm; Fall Risk;Pain   General   Chart Reviewed Yes   Cognition   Overall Cognitive Status WFL   Arousal/Participation Alert;Arousable   Attention Attends with cues to redirect   Orientation Level Oriented X4   Memory Within functional limits   Following Commands Follows one step commands without difficulty   Subjective   Subjective "I had a really bad morning"   Bed Mobility   Rolling R 5  Supervision   Additional items Assist x 1;Bedrails; Impulsive   Rolling L 5  Supervision   Additional items Assist x 1; Impulsive; Bedrails   Supine to Sit 5  Supervision   Additional items Assist x 1;Bedrails; Impulsive;Verbal cues   Sit to Supine 5  Supervision   Additional items Assist x 1;Bedrails; Impulsive;Verbal cues   Additional Comments Patient completed rolling MI/S with bedrails  Supine <> sit with bed rail and VC's S level  Patient somwhat impulsive with mobility  Transfers   Sit to Stand   (SBA)   Additional items Assist x 1; Increased time required; Impulsive;Verbal cues   Stand to Sit 5  Supervision   Additional items Assist x 1; Impulsive;Verbal cues   Additional Comments Patient completed STS EOB <> RW x 3 trials at S level  VC's for impulsivity and proper hand placement  Patient declined SPT EOB <> bedside commode   Ambulation/Elevation   Gait pattern Improper Weight shift;Decreased foot clearance; Foward flexed   Gait Assistance   (steadying assist)   Additional items Assist x 1;Verbal cues   Assistive Device Rolling walker   Distance Gait training with RW 2-3' forward SBA x 1  Patient began leaning on the RW with forward felxion and stated " I don't think I can right now"  Patient referencing 10/10 pain in the L buttock/hip area/LB area  Patient then provided with steadying assist D/T decreased safety and began ambulating backward to EOB  Patient declined additional ambulation attempts referencing pain, fatigue and "having had a bad moring"   Balance   Static Sitting Good   Dynamic Sitting Fair   Static Standing Fair   Dynamic Standing Fair -   Ambulatory Poor +   Activity Tolerance   Activity Tolerance Patient limited by fatigue;Patient limited by pain   Medical Staff Made Aware OT, [address] [place] [address]   Nurse Made Aware RN   Assessment   Prognosis Fair   Problem List Decreased strength;Decreased endurance; Impaired balance;Decreased mobility; Decreased safety awareness;Pain   Barriers to Discharge Inaccessible home environment;Decreased caregiver support   Barriers to Discharge Comments BRUCE, increased pain   Goals   Patient Goals "to go home"   STG Expiration Date 07/05/22   PT Treatment Day 1   Plan   Treatment/Interventions ADL retraining;Functional transfer training;LE strengthening/ROM; Therapeutic exercise;Elevations; Endurance training;Equipment eval/education;Patient/family training;Bed mobility;Gait training;OT   Progress   (slow progress, limited by pain    Somewhat self limiting)   PT Frequency 3-5x/wk   Recommendation   PT Discharge Recommendation Post acute rehabilitation services   Equipment Recommended   (has RW, powerchair)   Additional Comments Patient will require assistance nad use of RW for transition to home should he decline PAR  HHPT recommended if PAR declined   AM-PAC Basic Mobility Inpatient   Turning in Bed Without Bedrails 3   Lying on Back to Sitting on Edge of Flat Bed 3   Moving Bed to Chair 3   Standing Up From Chair 3   Walk in Room 2   Climb 3-5 Stairs 1   Basic Mobility Inpatient Raw Score 15   Basic Mobility Standardized Score 36 97   Highest Level Of Mobility   [facility] Goal 4: Move to chair/commode   -[facility] Achieved 5: Stand (1 or more minutes)   End of Consult   Patient Position at End of Consult Supine;Bed/Chair alarm activated; All needs within reach       The patient's AM-PAC Basic Mobility Inpatient Short Form Raw Score is 15  A Raw score of less than or equal to 16 suggests the patient may benefit from discharge to post-acute rehabilitation services  Should patient decline PAR, he would require consistent A with mobility and benefit from HHPT  Patient has all needed equipment  Please also refer to the recommendation of the Physical Therapist for safe discharge planning  Upon arrival this pm patient initially requesting therapy come back later d/t pain and fatigue  Pt  however, agreeable with encouragement  Pt completed BM S level with bed rails and VC's for sequencing and safety  Gait training with RW 2-3' forward SBA x 1  Patient began leaning on the RW with forward flexion and stated " I don't think I can right now"  Patient referencing 10/10 pain in the L buttock/hip area/LB area  Patient then provided with steadying assist D/T decreased safety and began ambulating backward to EOB  Patient declined additional ambulation attempts referencing pain, fatigue and "having had a bad morning"    Patient completed additional  STS with S to RW for greater than 1' each time and was able to tolerate weight shifting R <> L   Nurse provided patient with Lidocane patch and patient returned to bed  Alarm in place and all needs within reach            Andria Stahl, PT, MSPT

## 2022-06-23 NOTE — ASSESSMENT & PLAN NOTE
Lab Results   Component Value Date    HGBA1C 8 4 (H) 01/24/2022       Recent Labs     06/22/22  2113 06/23/22  0702 06/23/22  1106 06/23/22  1609   POCGLU 171* 90 113 156*       Blood Sugar Average: Last 72 hrs:  (P) 808 [id number]   Will place on insulin sliding scale and adjust sugars accordingly  Resume home lantus 14 units bid  Carb consistent diet

## 2022-06-24 ENCOUNTER — TELEPHONE (OUTPATIENT)
Dept: UROLOGY | Facility: MEDICAL CENTER | Age: 57
End: 2022-06-24

## 2022-06-24 VITALS
HEIGHT: 71 IN | SYSTOLIC BLOOD PRESSURE: 132 MMHG | TEMPERATURE: 97.8 F | OXYGEN SATURATION: 98 % | RESPIRATION RATE: 19 BRPM | DIASTOLIC BLOOD PRESSURE: 69 MMHG | BODY MASS INDEX: 22.47 KG/M2 | WEIGHT: 160.5 LBS | HEART RATE: 67 BPM

## 2022-06-24 PROBLEM — R33.9 RETENTION OF URINE: Status: ACTIVE | Noted: 2022-06-24

## 2022-06-24 LAB
ANION GAP SERPL CALCULATED.3IONS-SCNC: 13 MMOL/L (ref 4–13)
BASOPHILS # BLD AUTO: 0.06 THOUSANDS/ΜL (ref 0–0.1)
BASOPHILS NFR BLD AUTO: 1 % (ref 0–1)
BUN SERPL-MCNC: 43 MG/DL (ref 5–25)
CALCIUM SERPL-MCNC: 9.7 MG/DL (ref 8.3–10.1)
CHLORIDE SERPL-SCNC: 102 MMOL/L (ref 100–108)
CO2 SERPL-SCNC: 22 MMOL/L (ref 21–32)
CREAT SERPL-MCNC: 3.69 MG/DL (ref 0.6–1.3)
EOSINOPHIL # BLD AUTO: 0.28 THOUSAND/ΜL (ref 0–0.61)
EOSINOPHIL NFR BLD AUTO: 6 % (ref 0–6)
ERYTHROCYTE [DISTWIDTH] IN BLOOD BY AUTOMATED COUNT: 15.6 % (ref 11.6–15.1)
GFR SERPL CREATININE-BSD FRML MDRD: 17 ML/MIN/1.73SQ M
GLUCOSE SERPL-MCNC: 103 MG/DL (ref 65–140)
GLUCOSE SERPL-MCNC: 130 MG/DL (ref 65–140)
GLUCOSE SERPL-MCNC: 154 MG/DL (ref 65–140)
HCT VFR BLD AUTO: 25.3 % (ref 36.5–49.3)
HGB BLD-MCNC: 7.9 G/DL (ref 12–17)
IMM GRANULOCYTES # BLD AUTO: 0.02 THOUSAND/UL (ref 0–0.2)
IMM GRANULOCYTES NFR BLD AUTO: 0 % (ref 0–2)
LYMPHOCYTES # BLD AUTO: 1.46 THOUSANDS/ΜL (ref 0.6–4.47)
LYMPHOCYTES NFR BLD AUTO: 30 % (ref 14–44)
MCH RBC QN AUTO: 28 PG (ref 26.8–34.3)
MCHC RBC AUTO-ENTMCNC: 31.2 G/DL (ref 31.4–37.4)
MCV RBC AUTO: 90 FL (ref 82–98)
MONOCYTES # BLD AUTO: 0.7 THOUSAND/ΜL (ref 0.17–1.22)
MONOCYTES NFR BLD AUTO: 15 % (ref 4–12)
NEUTROPHILS # BLD AUTO: 2.31 THOUSANDS/ΜL (ref 1.85–7.62)
NEUTS SEG NFR BLD AUTO: 48 % (ref 43–75)
NRBC BLD AUTO-RTO: 0 /100 WBCS
PLATELET # BLD AUTO: 223 THOUSANDS/UL (ref 149–390)
PMV BLD AUTO: 11 FL (ref 8.9–12.7)
POTASSIUM SERPL-SCNC: 4.5 MMOL/L (ref 3.5–5.3)
RBC # BLD AUTO: 2.82 MILLION/UL (ref 3.88–5.62)
SODIUM SERPL-SCNC: 137 MMOL/L (ref 136–145)
WBC # BLD AUTO: 4.83 THOUSAND/UL (ref 4.31–10.16)

## 2022-06-24 PROCEDURE — 99232 SBSQ HOSP IP/OBS MODERATE 35: CPT | Performed by: PHYSICIAN ASSISTANT

## 2022-06-24 PROCEDURE — 99239 HOSP IP/OBS DSCHRG MGMT >30: CPT | Performed by: FAMILY MEDICINE

## 2022-06-24 PROCEDURE — 85025 COMPLETE CBC W/AUTO DIFF WBC: CPT | Performed by: PHYSICIAN ASSISTANT

## 2022-06-24 PROCEDURE — 82948 REAGENT STRIP/BLOOD GLUCOSE: CPT

## 2022-06-24 PROCEDURE — 80048 BASIC METABOLIC PNL TOTAL CA: CPT | Performed by: PHYSICIAN ASSISTANT

## 2022-06-24 RX ORDER — LIDOCAINE 50 MG/G
1 PATCH TOPICAL DAILY
Qty: 6 PATCH | Refills: 0 | Status: SHIPPED | OUTPATIENT
Start: 2022-06-25

## 2022-06-24 RX ORDER — TRAMADOL HYDROCHLORIDE 50 MG/1
50 TABLET ORAL 2 TIMES DAILY PRN
Qty: 20 TABLET | Refills: 0 | Status: SHIPPED | OUTPATIENT
Start: 2022-06-24

## 2022-06-24 RX ORDER — SEVELAMER HYDROCHLORIDE 800 MG/1
800 TABLET, FILM COATED ORAL
Qty: 30 TABLET | Refills: 0 | Status: SHIPPED | OUTPATIENT
Start: 2022-06-24 | End: 2022-07-24

## 2022-06-24 RX ORDER — ALLOPURINOL 100 MG/1
200 TABLET ORAL DAILY
Qty: 60 TABLET | Refills: 0 | Status: SHIPPED | OUTPATIENT
Start: 2022-06-24 | End: 2022-07-24

## 2022-06-24 RX ORDER — OXYCODONE HYDROCHLORIDE 5 MG/1
5 TABLET ORAL EVERY 4 HOURS PRN
Qty: 18 TABLET | Refills: 0 | Status: SHIPPED | OUTPATIENT
Start: 2022-06-24

## 2022-06-24 RX ORDER — METHOCARBAMOL 500 MG/1
500 TABLET, FILM COATED ORAL EVERY 6 HOURS PRN
Qty: 30 TABLET | Refills: 0 | Status: SHIPPED | OUTPATIENT
Start: 2022-06-24

## 2022-06-24 RX ADMIN — LIDOCAINE 5% 1 PATCH: 700 PATCH TOPICAL at 08:56

## 2022-06-24 RX ADMIN — INSULIN LISPRO 1 UNITS: 100 INJECTION, SOLUTION INTRAVENOUS; SUBCUTANEOUS at 11:48

## 2022-06-24 RX ADMIN — OXYCODONE HYDROCHLORIDE 5 MG: 5 TABLET ORAL at 04:10

## 2022-06-24 RX ADMIN — AMLODIPINE BESYLATE 10 MG: 10 TABLET ORAL at 08:56

## 2022-06-24 RX ADMIN — OXYCODONE HYDROCHLORIDE 5 MG: 5 TABLET ORAL at 08:56

## 2022-06-24 RX ADMIN — ALLOPURINOL 200 MG: 100 TABLET ORAL at 08:56

## 2022-06-24 RX ADMIN — ASPIRIN 81 MG: 81 TABLET, COATED ORAL at 08:56

## 2022-06-24 RX ADMIN — INSULIN GLARGINE 14 UNITS: 100 INJECTION, SOLUTION SUBCUTANEOUS at 08:56

## 2022-06-24 RX ADMIN — PREGABALIN 75 MG: 75 CAPSULE ORAL at 08:56

## 2022-06-24 NOTE — CASE MANAGEMENT
Case Management Discharge Planning Note    Patient name Alex Morfin  Location /-77 MRN [id number]  : 1965 Date 2022       Current Admission Date: 2022  Current Admission Diagnosis:KELLY (acute kidney injury) [facility])   Patient Active Problem List    Diagnosis Date Noted    Severe protein-calorie malnutrition ([facility] [address] ) 2022    Iron deficiency anemia secondary to inadequate dietary iron intake 2022    Hip pain, acute, left 2022    Gastroenteritis 2022    KELLY (acute kidney injury) ([facility] [address] ) 2022    Hyperkalemia 2022    Diabetes mellitus ([place] [address] )     Gout     Hypertension     History of CVA (cerebrovascular accident)     Osteomyelitis of vertebra of lumbar region ([facility] )       LOS (days): 4  Geometric Mean LOS (GMLOS) (days): 4 30  Days to GMLOS:0 5     OBJECTIVE:  Risk of Unplanned Readmission Score: 21 19         Current admission status: Inpatient   Preferred Pharmacy:   [address]  [address] [place] [address]  Phone: [phone] Fax: [phone]    Primary Care Provider: Benson Corral DO    Primary Insurance: MEDICARE  Secondary Insurance:     DISCHARGE DETAILS:          Patient discussed in huddle, stable for discharge today  CM spoke with patient at bedside, he still refuses STR post hospitalization  PT is agreeable with resuming  [facility]  CM updated [facility] via ecin, aware patient has camarena and to watch for trial voiding orders  Patient stated he will be good with steps into his home has rail and wife will assist        CM placed call to Dr Darrian Gonzales urology office in [facility] for nursing appt follow up camarena management with nursing post hospital   CM spoke with Tal and she will have to call patient back with appt date and time for nursing appt        Patient asking for ortho physicians at [facility] for his left hip follow up  Cm provided Dr Pallavi Roland information  Patient stated he will call and make appt                                                                              IMM Given (Date):: 06/24/22  IMM Given to[de-identified] Patient

## 2022-06-24 NOTE — ASSESSMENT & PLAN NOTE
Patient presents to the ED for nausea vomiting x1 and diarrhea x2 over the last 24 hours with associated chills and subjective fevers  CT abdomen pelvis without contrast done in the ED negative for acute GI findings    Resolved

## 2022-06-24 NOTE — DISCHARGE SUMMARY
[address]  Discharge- Violetta Bunn Sr  1965, 62 y o  male MRN: [id number]  Unit/Bed#: -01 Encounter: [id number]  Primary Care Provider: Nidia Miller DO   Date and time admitted to hospital: 6/20/2022  1:33 PM    * KELLY (acute kidney injury) [facility])  Assessment & Plan  Patient presents with acute kidney injury on CKD IV  Baseline Cr is around 2 5-2 9, Patient recently discharged from [facility] for acute kidney injury with creatinine of 3 0  Creatinine on admission noted to be 4 0  The KELLY on admission at [address] was attributed to patient being on vancomycin for osteomyelitis  He was then discharged on daptomycin which patient finished his course of antibiotics on 06/13/2022  CT abdomen showing New bladder distention and hydronephrosis suggesting bladder outlet obstruction  Suspected KELLY secondary to postrenal urinary retention-status post Camarena in place  Nephro consulted, appreciate recommendations  Renal US - Showing Interval improvement of previous hydronephrosis  Mild residual hydronephrosis of the lower pole of the left kidney  Nephrology consult appreciated, creatinine is trending down, as per Nephrology, slow improvement will be expected  Patient is stable from renal point, patient can be discharged home with follow-up with patient own nephrologist, Dr Mak Jones  Treatment plan of care discussed in details with patient and his wife over the phone  Verbalizes to understand and agrees  Retention of urine s/p camarena in place  Assessment & Plan  Continue Camarena  Patient be discharged with Camarena at home, can initiate voiding trial at home with home health nursing assistance  Also referral of urology provided-urology office also notified about the follow-up for Camarena check in 1 week  As per note, urology office will contact with patient to get an appointment      Hip pain, acute, left  Assessment & Plan  Patient complaining of left hip pain   Patient had similar symptoms while admitted to [facility] where CT was negative for osteo  Hip x-ray:Bony protuberance of the superolateral femoral head suggestive of CAM type femoro-acetabular impingement   Mild degenerative changes  No lytic or blastic osseous lesion  Patient advised to follow-up with orthopedic on outpatient basis  Patient evaluated by PT OT, recommendation was post acute rehab  Patient refused to go to rehab  Patient be discharged home with home health aide  Iron deficiency anemia secondary to inadequate dietary iron intake  Assessment & Plan  Hemoglobin noted to be at 7 this morning  Order repeat hemoglobin which came back at 8 5  No blood was given  Lab Results   Component Value Date    IRON 41 (L) 06/22/2022    TIBC 260 06/22/2022    FERRITIN 21 06/22/2022         Severe protein-calorie malnutrition ([address] )  Assessment & Plan  Malnutrition Findings:   Adult Malnutrition type: Chronic illness  Adult Degree of Malnutrition: Other severe protein calorie malnutrition  Malnutrition Characteristics: Muscle loss, Weight loss                  360 Statement: Chronic severe malnutrition related to poor appetite as evidenced by  26 8% weight loss x 9 mo (6/17/21 198lb, 3/31/22 145lb), severe muscle loss to quadriceps, gastrocnemius, interroseous muscles  Treated with: Increase CCD to level 3, order renal liberal diet for modest K/Phos restrictions  Trial Nepro daily and ensure pudding BID  Provided with diet instruction for low K/Phos diets, homemade shakes for home  Recommend daily weights  BMI Findings: Body mass index is 22 38 kg/m²  Osteomyelitis of vertebra of lumbar region [facility])  Assessment & Plan  Patient was recently discharged from [facility] where he was evaluated for L4-L5 diskitis/osteomyelitis    Patient was discharged on IV vancomycin, then transition to daptomycin due to [facility] abx course on 6/13  Central line removed on 6/22/22 by general surge          History of CVA (cerebrovascular accident)  Assessment & Plan  Continue statin, aspirin 81 mg daily    Hypertension  Assessment & Plan  Resume home amlodipine  Hold losartan in setting of KELLY    Gout  Assessment & Plan  Resume home allopurinol    Diabetes mellitus [facility])  Assessment & Plan  Lab Results   Component Value Date    HGBA1C 8 4 (H) 01/24/2022       Recent Labs     06/23/22  1609 06/23/22  2109 06/24/22  0708 06/24/22  1054   POCGLU 156* 149* 130 154*       Blood Sugar Average: Last 72 hrs:  (P) 142 [id number]   Resume home medication    Hyperkalemia  Assessment & Plan  Potassium on admission noted to be 6 0  Patient received albuterol inhaler, insulin 10 units, and Lasix 20 mg IV in the ED  Potassium normalized    Gastroenteritis  Assessment & Plan  Patient presents to the ED for nausea vomiting x1 and diarrhea x2 over the last 24 hours with associated chills and subjective fevers  CT abdomen pelvis without contrast done in the ED negative for acute GI findings  Resolved    Medical Problems             Resolved Problems  Date Reviewed: 6/22/2022   None               Discharging Physician / Practitioner: Brett Cuevas MD  PCP: Shilpa Maldonado DO  Admission Date:   Admission Orders (From admission, onward)     Ordered        06/20/22 1604  Inpatient Admission  Once                      Discharge Date: 06/24/22    Consultations During Hospital Stay:  · Nephrology  · PT/OT  · General surgery    Procedures Performed:   CT abdomen pelvis wo contrast    Result Date: 6/20/2022  Narrative: CT ABDOMEN AND PELVIS WITHOUT IV CONTRAST INDICATION:   Sepsis n/v/d, sepsis work up  Per ED notes, history of L5-S1 discitis and osteomyelitis, history of diabetes  Chief complaint of generalized weakness with nausea and vomiting and diarrhea since yesterday  Patient to have PICC line pulled one week ago, but did not go to the appointment   COMPARISON:  3/9/2022 TECHNIQUE:  CT examination of the abdomen and pelvis was performed  Axial, sagittal, and coronal 2D reformatted images were created from the source data and submitted for interpretation  Radiation dose length product (DLP) for this visit:  687 mGy-cm   This examination, like all CT scans performed in the [facility], was performed utilizing techniques to minimize radiation dose exposure, including the use of iterative reconstruction and automated exposure control  IV Contrast:  Because of a critical nationwide intravenous contrast shortage, the study was performed without intravenous contrast  Enteric Contrast:  Enteric contrast was not administered  FINDINGS: ABDOMEN LOWER CHEST:  Mild dependent atelectasis  Atherosclerosis  Small, increased pericardial effusion  LIVER/BILIARY TREE:  Within normal limits  GALLBLADDER:  Within normal limits  SPLEEN:  Within normal limits  PANCREAS:  Within normal limits  ADRENAL GLANDS:  Within normal limits  KIDNEYS/URETERS:  New, mild, bilateral, left greater than right hydronephrosis, likely secondary to distended bladder  No ureteral stone or other obstructing lesion  No perinephric fluid or fatty stranding  STOMACH AND BOWEL:  Within normal limits  APPENDIX:  Within normal limits  ABDOMINOPELVIC CAVITY:  No abnormal air, fluid or enlarged lymph nodes  Resolved bilateral retroperitoneal fluid collections  VESSELS:  Limited evaluation without intravenous contrast   No aneurysm  Atherosclerosis  PELVIS: REPRODUCTIVE ORGANS:  prostate and seminal vesicles within normal limits  URINARY BLADDER:  Distended  No wall thickening, stone or mass identified  The penis is within normal limits  ABDOMINAL WALL/INGUINAL REGIONS:  Surgical changes  OSSEOUS STRUCTURES: There are 5 nonrib-bearing vertebral bodies  Increased loss of disc height with destructive endplate changes at K0-K6  Improved paraspinal findings of infection  New decompressive surgical change    Opacity in the central canal is not well evaluated by CT  The study was marked in [facility] for immediate notification  Impression: Compared to 3/9/2022, changes at L5-S1 may be sequela of prior discitis and osteomyelitis  Resolved paraspinal changes of acute infection  If there is clinical concern for recurrent spine infection, MR is recommended  New bladder distention and hydronephrosis suggesting bladder outlet obstruction  No etiology identified  Small, increased pericardial effusion  Workstation performed: [id number]     XR chest portable    Result Date: 6/20/2022  Narrative: CHEST INDICATION:   sepsis  COMPARISON:  None EXAM PERFORMED/VIEWS:  XR CHEST PORTABLE FINDINGS: Cardiomediastinal silhouette appears unremarkable  A right chest venous catheter with tip overlying the distal SVC region noted  Mild aortic arch calcific lesions are also seen  The lungs are clear  No pneumothorax or pleural effusion  Osseous structures appear within normal limits for patient age  Old healed left posterior 6th rib fracture deformity with callus formation is seen  No free air in the upper abdomen noted  Impression: No acute cardiopulmonary disease  Workstation performed: [id number]     US kidney and bladder    Result Date: 6/21/2022  Narrative: RENAL ULTRASOUND INDICATION:   mary, bilateral hydro s/p camarena  COMPARISON: CT scan from yesterday TECHNIQUE:   Ultrasound of the retroperitoneum was performed with a curvilinear transducer utilizing volumetric sweeps and still imaging techniques  FINDINGS: KIDNEYS: Symmetric and normal size  Right kidney:  11 9 x 5 9 x 6 7 cm  Volume 243 0 mL Left kidney:  11 3 x 6 3 x 5 5 cm  Volume 206 5 mL Right kidney Normal echogenicity and contour  No mass is identified  No hydronephrosis  No shadowing calculi  No perinephric fluid collections  Left kidney Normal echogenicity and contour  No mass is identified  1 4 cm cyst Mild hydronephrosis of the lower pole  No shadowing calculi  No perinephric fluid collections   URETERS: Nonvisualized  BLADDER: A camarena catheter is in place, decompressing the bladder and limiting its evaluation  Impression: Interval improvement of previous hydronephrosis  Mild residual hydronephrosis of the lower pole of the left kidney  Workstation performed: [id number]     XR hips bilateral 3-4 vw w pelvis if performed    Result Date: 6/22/2022  Narrative: BILATERAL HIPS AND PELVIS INDICATION:   Hip pain, history of bacteremia  COMPARISON:  CT 6/20/2022 and 3/9/2022 VIEWS:  XR HIPS BILATERAL 3-4 VW W PELVIS IF PERFORMED  FINDINGS: The bony pelvis appears intact  Known L4-L5 discitis osteomyelitis is better evaluated on prior CT  Partially visualized bladder catheter  Retroperitoneal surgical clips  LEFT HIP: There is no acute fracture or dislocation  Bony protuberance of the superolateral femoral head suggestive of CAM type femoro-acetabular impingement  Mild degenerative changes  No lytic or blastic osseous lesion  There are atherosclerotic calcifications  Soft tissues are otherwise unremarkable  RIGHT HIP: There is no acute fracture or dislocation  Bony protuberance of the superolateral femoral head suggestive of CAM type femoro-acetabular impingement  Mild degenerative changes  No lytic or blastic osseous lesion  There are atherosclerotic calcifications  Soft tissues are otherwise unremarkable  · Impression: No acute osseous abnormality  Additional findings as above   Workstation performed: [id number]   ·     Significant Findings / Test Results:   Lab Results   Component Value Date    WBC 4 83 06/24/2022    HGB 7 9 (L) 06/24/2022    HCT 25 3 (L) 06/24/2022    MCV 90 06/24/2022     06/24/2022   ·   Lab Results   Component Value Date    SODIUM 137 06/24/2022    K 4 5 06/24/2022     06/24/2022    CO2 22 06/24/2022    AGAP 13 06/24/2022    BUN 43 (H) 06/24/2022    CREATININE 3 69 (H) 06/24/2022    GLUC 103 06/24/2022    CALCIUM 9 7 06/24/2022    AST 15 06/23/2022    ALT 30 06/23/2022    ALKPHOS 116 06/23/2022    TP 7 1 06/23/2022    TBILI 0 35 06/23/2022    EGFR 17 06/24/2022   ·   Lab Results   Component Value Date    IRON 41 (L) 06/22/2022    TIBC 260 06/22/2022    FERRITIN 21 06/22/2022   ·   ·     Incidental Findings:   · As mentioned above     Test Results Pending at Discharge (will require follow up): · None     Outpatient Tests Requested:  · None    Complications:  None    Reason for Admission:  Chills, nausea vomiting and diarrhea    Hospital Course:   Yesenia Tate  is a 62 y o  male patient who originally presented to the hospital on 6/20/2022 due to chills, nausea vomiting and diarrhea  Patient initially admitted under the diagnosis of KELLY secondary to medication side effect as well as recent gastroenteritis and hypokalemia  Patient also found retention of urine, status post Ortiz in place  Patient received treatment as per protocol, nephrology consulted, recommendation was followed  Patient medication losartan remain on hold  With the treatment, patient's condition and creatinine function was improving  Patient also complaining of lower back pain and hip pain, imaging done which shows femero-aceta tubular impingement  No signs or symptoms of infection  Patient evaluated by PT/OT, recommendation was to go to post acute rehab  Patient refuse  Patient prefers to go home with home health aide  During the hospitalization, patient PICC line removed by General surgery  Patient has Ortiz in place, patient be discharged with Ortiz, voiding trial can initiated at home with home health aide  Patient also provided a referral to did urology to follow up with Ortiz catheter in 1 week  Urologist office will call patient is to make an appointment  Patient advised to follow-up with pain management as well as orthopedic surgeon on outpatient basis, referral provided      All lab results, imaging findings, treatment plan of care discussed in details with patient and his wife on the bedside  Verbalizes to understand and agrees  Please see above list of diagnoses and related plan for additional information  Condition at Discharge: stable    Discharge Day Visit / Exam:   Subjective:  Seen and evaluated during the round  Patient resting comfortably  Still complaining of pain on the left hip but denies any tingling, numbness, weakness in the extremities  Vitals: Blood Pressure: 132/69 (06/24/22 0710)  Pulse: 67 (06/24/22 0710)  Temperature: 97 8 °F (36 6 °C) (06/24/22 0710)  Temp Source: Oral (06/20/22 2304)  Respirations: 19 (06/24/22 0710)  Height: 5' 11" (180 3 cm) (06/22/22 0946)  Weight - Scale: 72 8 kg (160 lb 7 9 oz) (06/24/22 0543)  SpO2: 98 % (06/24/22 0710)  Exam:   Physical Exam  Vitals and nursing note reviewed  Constitutional:       Appearance: Normal appearance  He is not ill-appearing or diaphoretic  HENT:      Nose: Nose normal  No congestion or rhinorrhea  Mouth/Throat:      Mouth: Mucous membranes are moist       Pharynx: Oropharynx is clear  No oropharyngeal exudate or posterior oropharyngeal erythema  Eyes:      General: No scleral icterus  Extraocular Movements: Extraocular movements intact  Conjunctiva/sclera: Conjunctivae normal       Pupils: Pupils are equal, round, and reactive to light  Neck:      Vascular: No carotid bruit  Cardiovascular:      Rate and Rhythm: Normal rate  Pulses: Normal pulses  Heart sounds: Normal heart sounds  No murmur heard  No friction rub  No gallop  Pulmonary:      Effort: Pulmonary effort is normal  No respiratory distress  Breath sounds: No stridor  No wheezing or rhonchi  Abdominal:      General: Abdomen is flat  Bowel sounds are normal  There is no distension  Palpations: There is no mass  Tenderness: There is no abdominal tenderness  Hernia: No hernia is present     Genitourinary:     Comments: Ortiz in place  Musculoskeletal:         General: Tenderness (Left hip) present  No swelling  Cervical back: Normal range of motion  Right lower leg: No edema  Left lower leg: No edema  Lymphadenopathy:      Cervical: No cervical adenopathy  Skin:     General: Skin is warm and dry  Capillary Refill: Capillary refill takes less than 2 seconds  Coloration: Skin is not jaundiced or pale  Findings: No erythema  Neurological:      General: No focal deficit present  Mental Status: He is alert and oriented to person, place, and time  Cranial Nerves: No cranial nerve deficit  Sensory: No sensory deficit  Motor: No weakness  Discussion with Family: Updated  (wife) via phone  Discharge instructions/Information to patient and family:   See after visit summary for information provided to patient and family  Provisions for Follow-Up Care:  See after visit summary for information related to follow-up care and any pertinent home health orders  Disposition:   Home with VNA Services (Reminder: Complete face to face encounter)    Planned Readmission:  If condition get worse     Discharge Statement:  I spent >35 minutes discharging the patient  This time was spent on the day of discharge  I had direct contact with the patient on the day of discharge  Greater than 50% of the total time was spent examining patient, answering all patient questions, arranging and discussing plan of care with patient as well as directly providing post-discharge instructions  Additional time then spent on discharge activities  Discharge Medications:  See after visit summary for reconciled discharge medications provided to patient and/or family        **Please Note: This note may have been constructed using a voice recognition system**

## 2022-06-24 NOTE — ASSESSMENT & PLAN NOTE
Lab Results   Component Value Date    HGBA1C 8 4 (H) 01/24/2022       Recent Labs     06/23/22  1609 06/23/22  2109 06/24/22  0708 06/24/22  1054   POCGLU 156* 149* 130 154*       Blood Sugar Average: Last 72 hrs:  (P) 142 [id number]   Resume home medication

## 2022-06-24 NOTE — PROGRESS NOTES
Pt with good intake, completing % of meals  Pt says he is not drinking the Nepro supplement "It tastes funny " Says the ensure pudding is tolerable  Will d/c Nepro and continue to send ensure pudding BID  K levels WNL, pending new Phos level, last noted to be 7 1 on 6/22  Continue diet as ordered  Pt had no questions regarding low K/low Phos diet for home

## 2022-06-24 NOTE — ASSESSMENT & PLAN NOTE
Patient complaining of left hip pain  Patient had similar symptoms while admitted to [facility] where CT was negative for osteo  Hip x-ray:Bony protuberance of the superolateral femoral head suggestive of CAM type femoro-acetabular impingement   Mild degenerative changes  No lytic or blastic osseous lesion  Patient advised to follow-up with orthopedic on outpatient basis  Patient evaluated by PT OT, recommendation was post acute rehab  Patient refused to go to rehab  Patient be discharged home with home health aide

## 2022-06-24 NOTE — ASSESSMENT & PLAN NOTE
Patient presents with acute kidney injury on CKD IV  Baseline Cr is around 2 5-2 9, Patient recently discharged from [facility] for acute kidney injury with creatinine of 3 0  Creatinine on admission noted to be 4 0  The KELLY on admission at [facility] was attributed to patient being on vancomycin for osteomyelitis  He was then discharged on daptomycin which patient finished his course of antibiotics on 06/13/2022  CT abdomen showing New bladder distention and hydronephrosis suggesting bladder outlet obstruction  Suspected KELLY secondary to postrenal urinary retention-status post Ortiz in place  Nephro consulted, appreciate recommendations  Renal US - Showing Interval improvement of previous hydronephrosis  Mild residual hydronephrosis of the lower pole of the left kidney  Nephrology consult appreciated, creatinine is trending down, as per Nephrology, slow improvement will be expected  Patient is stable from renal point, patient can be discharged home with follow-up with patient own nephrologist, Dr Patti Simon  Treatment plan of care discussed in details with patient and his wife over the phone  Verbalizes to understand and agrees

## 2022-06-24 NOTE — ASSESSMENT & PLAN NOTE
Malnutrition Findings:   Adult Malnutrition type: Chronic illness  Adult Degree of Malnutrition: Other severe protein calorie malnutrition  Malnutrition Characteristics: Muscle loss, Weight loss                  360 Statement: Chronic severe malnutrition related to poor appetite as evidenced by  26 8% weight loss x 9 mo (6/17/21 198lb, 3/31/22 145lb), severe muscle loss to quadriceps, gastrocnemius, interroseous muscles  Treated with: Increase CCD to level 3, order renal liberal diet for modest K/Phos restrictions  Trial Nepro daily and ensure pudding BID  Provided with diet instruction for low K/Phos diets, homemade shakes for home  Recommend daily weights  BMI Findings: Body mass index is 22 38 kg/m²

## 2022-06-24 NOTE — PROGRESS NOTES
Progress Note - Nephrology   Tobi Schaumann Sr  62 y o  male MRN: [id number]  Unit/Bed#: -01 Encounter: [id number]    Assessment and Plan    1  Acute kidney injury, present on admission, superimposed on chronic kidney disease  · Renal function is improved from peak creatinine 4 06 mg/dL on 06/20/2022 down to 3 69 mg/dL today  Fraction excretion of sodium is 14%, consistent with acute on chronic tubular interstitial disease  Expect slow improvement  Patient is stable for discharge from a renal standpoint  Recommend holding losartan at discharge and close follow-up with primary nephrologist   2  Chronic kidney disease, stage IV baseline creatinine 2 5-2 9 mg/dL with diabetic nephropathy  ? Will need outpatient nephrology follow-up with Dr Walt Boston  Hold losartan until outpatient follow-up  3  Obstructive uropathy  ? Renal ultrasound on 06/21/2022 reveals interval improvement of prior hydronephrosis with only mild residual hydronephrosis of lower pole of left kidney since Ortiz catheter placement  Continue tamsulosin  4  Neurogenic bladder possibly  ? Ortiz catheter management per Urology  5  Anemia  ? Hemoglobin decreased to 7 9 grams/deciliter on 06/24/2022  IV iron contraindicated given recent infection  Should have outpatient consideration of IV iron and/or initiation of erythropoietin stimulating agent, as indicated  Will defer to primary nephrologist   Transfuse as needed for hemoglobin less than 7 0 grams/deciliter  6  Hypertension  ? At goal   Continue amlodipine 10 mg daily  Hold pre-hospital losartan  7  Mineral bone disease  · Continue sevelamer phosphorus binders with meals      Follow up reason for today's visit:  Acute kidney injury    KELLY (acute kidney injury) [facility])    Patient Active Problem List   Diagnosis    Gastroenteritis    [facility]acute kidney injury) [facility]    Hyperkalemia    Diabetes mellitus ([facility] )    Gout    Hypertension    History of CVA (cerebrovascular accident)    Osteomyelitis of vertebra of lumbar region ([facility] )    Severe protein-calorie malnutrition (HCC)    Iron deficiency anemia secondary to inadequate dietary iron intake    Hip pain, acute, left         Subjective:   "Is my hip shot?" Patient is pleased to have some direction towards getting care  He tells me his plans to follow-up with a hip doctor  He is ready for discharge today  A complete 10 point review of systems was performed and is otherwise negative  Objective:     Vitals: Blood pressure 132/69, pulse 67, temperature 97 8 °F (36 6 °C), resp  rate 19, height 5' 11" (1 803 m), weight 72 8 kg (160 lb 7 9 oz), SpO2 98 %  ,Body mass index is 22 38 kg/m²  Weight (last 2 days)     Date/Time Weight    06/24/22 0543 72 8 (160 5)    06/23/22 0600 69 1 (152 34)    06/22/22 0808 70 8 (156 09)    06/22/22 0600 70 8 (156 09)            Intake/Output Summary (Last 24 hours) at 6/24/2022 0910  Last data filed at 6/24/2022 0300  Gross per 24 hour   Intake 1745 ml   Output 3500 ml   Net -1755 ml     I/O last 3 completed shifts: In: 2174 [I V :1745]  Out: 4250 [Urine:4250]    Urethral Catheter Coude 16 Fr  (Active)   Reasons to continue Urinary Catheter  Acute urinary retention/obstruction failing urinary retention protocol 06/23/22 2000   Goal for Removal N/A- discharging with camarena 06/23/22 2000   Site Assessment Clean;Skin intact 06/23/22 2000   Camarena Care Done 06/23/22 2100   Collection Container Standard drainage bag 06/23/22 2000   Securement Method Other (Comment) 06/23/22 2000   Output (mL) 550 mL 06/24/22 0300       Physical Exam: /69   Pulse 67   Temp 97 8 °F (36 6 °C)   Resp 19   Ht 5' 11" (1 803 m)   Wt 72 8 kg (160 lb 7 9 oz)   SpO2 98%   BMI 22 38 kg/m²     General Appearance:    No acute distress  Cooperative  Appears stated age  Head:    Normocephalic  Atraumatic  Normal jaw occlusion  Eyes:    Lids, conjunctiva normal  No scleral icterus  Ears:    Normal external ears     Nose:   Nares normal  No drainage  Mouth:   Lips, tongue normal  Mucosa normal  Phonation normal    Neck:   Supple  Symmetrical    Back:     Symmetric  No CVA tenderness  Lungs:     Normal respiratory effort  Clear to auscultation bilaterally  Chest wall:    No tenderness or deformity  Heart:    Regular rate and rhythm  Normal S1 and S2  No murmur  No JVD  No edema  Abdomen:     Soft  Non-tender  Bowel sounds active  Genitourinary: Ortiz catheter present with clear, yellow urine output  Extremities:   Extremities normal  Atraumatic  No cyanosis  Skin:   Warm and dry  No pallor, jaundice, rash, ecchymoses  Neurologic:   Alert and oriented to person, place, time  No focal deficit  Lab, Imaging and other studies: I have personally reviewed pertinent labs  CBC:   Lab Results   Component Value Date    WBC 4 83 06/24/2022    HGB 7 9 (L) 06/24/2022    HCT 25 3 (L) 06/24/2022    MCV 90 06/24/2022     06/24/2022    MCH 28 0 06/24/2022    MCHC 31 2 (L) 06/24/2022    RDW 15 6 (H) 06/24/2022    MPV 11 0 06/24/2022    NRBC 0 06/24/2022     CMP:   Lab Results   Component Value Date    K 4 5 06/24/2022     06/24/2022    CO2 22 06/24/2022    BUN 43 (H) 06/24/2022    CREATININE 3 69 (H) 06/24/2022    CALCIUM 9 7 06/24/2022    AST 15 06/23/2022    ALT 30 06/23/2022    ALKPHOS 116 06/23/2022    EGFR 17 06/24/2022           Results from last 7 days   Lab Units 06/24/22  0453 06/23/22  0935 06/22/22  0513 06/21/22  0559   POTASSIUM mmol/L 4 5 4 4 5 2 5 3  5 3   CHLORIDE mmol/L 102 101 102 102   CO2 mmol/L 22 24 21 20*   BUN mg/dL 43* 45* 45* 49*   CREATININE mg/dL 3 69* 4 07* 3 70* 3 80*   CALCIUM mg/dL 9 7 10 6* 9 0 9 8   ALK PHOS U/L  --  116 94 105   ALT U/L  --  30 22 26   AST U/L  --  15 13 15         Phosphorus: No results found for: PHOS  Magnesium: No results found for: MG  Urinalysis: No results found for: Robi Wells, [facility], [facility], LEUKOCYTESUR, NITRITE, PROTEINUA, GLUCOSEU, KETONESU, Reji Melgar  Ionized Calcium: No results found for: CAION  Coagulation: No results found for: PT, INR, APTT  Troponin: No results found for: TROPONINI  ABG: No results found for: PHART, IZC8LRY, PO2ART, ZGU2AAK, D4ITRPOG, BEART, SOURCE  Radiology review:     IMAGING  Procedure: US kidney and bladder    Result Date: 6/21/2022  Narrative: RENAL ULTRASOUND INDICATION:   mary, bilateral hydro s/p camarena  COMPARISON: CT scan from yesterday TECHNIQUE:   Ultrasound of the retroperitoneum was performed with a curvilinear transducer utilizing volumetric sweeps and still imaging techniques  FINDINGS: KIDNEYS: Symmetric and normal size  Right kidney:  11 9 x 5 9 x 6 7 cm  Volume 243 0 mL Left kidney:  11 3 x 6 3 x 5 5 cm  Volume 206 5 mL Right kidney Normal echogenicity and contour  No mass is identified  No hydronephrosis  No shadowing calculi  No perinephric fluid collections  Left kidney Normal echogenicity and contour  No mass is identified  1 4 cm cyst Mild hydronephrosis of the lower pole  No shadowing calculi  No perinephric fluid collections  URETERS: Nonvisualized  BLADDER: A camarena catheter is in place, decompressing the bladder and limiting its evaluation  Impression: Interval improvement of previous hydronephrosis  Mild residual hydronephrosis of the lower pole of the left kidney  Workstation performed: [id number]     Procedure: XR hips bilateral 3-4 vw w pelvis if performed    Result Date: 6/22/2022  Narrative: BILATERAL HIPS AND PELVIS INDICATION:   Hip pain, history of bacteremia  COMPARISON:  CT 6/20/2022 and 3/9/2022 VIEWS:  XR HIPS BILATERAL 3-4 VW W PELVIS IF PERFORMED  FINDINGS: The bony pelvis appears intact  Known L4-L5 discitis osteomyelitis is better evaluated on prior CT  Partially visualized bladder catheter  Retroperitoneal surgical clips  LEFT HIP: There is no acute fracture or dislocation   Bony protuberance of the superolateral femoral head suggestive of CAM type femoro-acetabular impingement  Mild degenerative changes  No lytic or blastic osseous lesion  There are atherosclerotic calcifications  Soft tissues are otherwise unremarkable  RIGHT HIP: There is no acute fracture or dislocation  Bony protuberance of the superolateral femoral head suggestive of CAM type femoro-acetabular impingement  Mild degenerative changes  No lytic or blastic osseous lesion  There are atherosclerotic calcifications  Soft tissues are otherwise unremarkable  Impression: No acute osseous abnormality  Additional findings as above   Workstation performed: [id number]       Current Facility-Administered Medications   Medication Dose Route Frequency    allopurinol (ZYLOPRIM) tablet 200 mg  200 mg Oral Daily    amLODIPine (NORVASC) tablet 10 mg  10 mg Oral Daily    aspirin (ECOTRIN LOW STRENGTH) EC tablet 81 mg  81 mg Oral Daily    atorvastatin (LIPITOR) tablet 40 mg  40 mg Oral Daily With Dinner    heparin (porcine) subcutaneous injection 5,000 Units  5,000 Units Subcutaneous Q8H [address]    insulin glargine (LANTUS) subcutaneous injection 14 Units 0 14 mL  14 Units Subcutaneous BID    insulin lispro (HumaLOG) 100 units/mL subcutaneous injection 1-5 Units  1-5 Units Subcutaneous TID AC    insulin lispro (HumaLOG) 100 units/mL subcutaneous injection 1-5 Units  1-5 Units Subcutaneous HS    lidocaine (LIDODERM) 5 % patch 1 patch  1 patch Topical Daily    methocarbamol (ROBAXIN) tablet 500 mg  500 mg Oral Q6H PRN    multi-electrolyte (PLASMALYTE-A/ISOLYTE-S PH 7 4) IV solution  100 mL/hr Intravenous Continuous    ondansetron (ZOFRAN) injection 4 mg  4 mg Intravenous Once    oxyCODONE (ROXICODONE) IR tablet 5 mg  5 mg Oral Q4H PRN    pregabalin (LYRICA) capsule 75 mg  75 mg Oral BID    sevelamer (RENAGEL) tablet 800 mg  800 mg Oral Daily With Dinner    tamsulosin (FLOMAX) capsule 0 4 mg  0 4 mg Oral Daily With Dinner    traMADol (ULTRAM) tablet 50 mg  50 mg Oral BID PRN     Medications Discontinued During This Encounter   Medication Reason    sodium chloride 0 9 % bolus 1,000 mL     sodium chloride 0 9 % bolus 1,000 mL     sodium chloride 0 9 % bolus 1,000 mL     sodium chloride 0 9 % infusion     insulin detemir (LEVEMIR) 100 units/mL subcutaneous injection Error    sodium chloride 0 9 % infusion     HYDROmorphone HCl (DILAUDID) injection 0 2 mg        Selena Sow PA-C    Portions of the record may have been created with voice recognition software  Occasional wrong word or "sound a like" substitutions may have occurred due to the inherent limitations of voice recognition software  Read the chart carefully and recognize, using context, where substitutions have occurred

## 2022-06-24 NOTE — INCIDENTAL FINDINGS
The following findings require follow up:  Radiographic finding   Finding: CT abdomen pelvis wo contrast: Compared to 3/9/2022, changes at L5-S1 may be sequela of prior discitis and osteomyelitis  Resolved paraspinal changes of acute infection  If there is clinical concern for recurrent spine infection, MR is recommended  , New bladder distention and hydronephrosis suggesting bladder outlet obstruction       Follow up required: yes   Follow up should be done within 1 week(s)    Please notify the following clinician to assist with the follow up:     Please for the report to PCP:  Cinda Miranda, [address] [place] [address] Internal Medicine    [phone]

## 2022-06-24 NOTE — NURSING NOTE
Camarena care reviewed w pt and spouse, emphasized gravity and keeping bag/leg bag below bladder, looking for kinks in flow and monitoring bag for urine output; also educated no lotions or creams to catheter and to keep it clean and intact until seen by urology on Monday-both verbalize understanding of same, copy of camarena care provided w TAMARA TY'ed to home w [facility] AT [facility] in stable condition

## 2022-06-24 NOTE — ASSESSMENT & PLAN NOTE
Continue Ortiz  Patient be discharged with Ortiz at home, can initiate voiding trial at home with home health nursing assistance  Also referral of urology provided-urology office also notified about the follow-up for Ortiz check in 1 week  As per note, urology office will contact with patient to get an appointment

## 2022-06-24 NOTE — TELEPHONE ENCOUNTER
LVM with patient regarding appointment changing to 6/27 with Dr Aisha Ross;  asked to call and confirm

## 2022-06-24 NOTE — ASSESSMENT & PLAN NOTE
Hemoglobin noted to be at 7 this morning  Order repeat hemoglobin which came back at 8 5    No blood was given  Lab Results   Component Value Date    IRON 41 (L) 06/22/2022    TIBC 260 06/22/2022    FERRITIN 21 06/22/2022

## 2022-06-24 NOTE — TELEPHONE ENCOUNTER
Mayra Peyton from [address] [place], [place] [address]joellefanny [facility]'s called to schedule and appt for Christopher camarena check  Pt discharged from the hospital today and needs a nurse visit in 1 week       Pt can be reached at [phone]

## 2022-06-24 NOTE — PLAN OF CARE
Problem: Potential for Falls  Goal: Patient will remain free of falls  Description: INTERVENTIONS:  - Educate patient/family on patient safety including physical limitations  - Instruct patient to call for assistance with activity   - Consult OT/PT to assist with strengthening/mobility   - Keep Call bell within reach  - Keep bed low and locked with side rails adjusted as appropriate  - Keep care items and personal belongings within reach  - Initiate and maintain comfort rounds  - Make Fall Risk Sign visible to staff  - Offer Toileting every 2 Hours, in advance of need  - Initiate/Maintain bed/chair alarm  - Obtain necessary fall risk management equipment:   - Apply yellow socks and bracelet for high fall risk patients  - Consider moving patient to room near nurses station  Outcome: Progressing     Problem: PAIN - ADULT  Goal: Verbalizes/displays adequate comfort level or baseline comfort level  Description: Interventions:  - Encourage patient to monitor pain and request assistance  - Assess pain using appropriate pain scale  - Administer analgesics based on type and severity of pain and evaluate response  - Implement non-pharmacological measures as appropriate and evaluate response  - Consider cultural and social influences on pain and pain management  - Notify physician/advanced practitioner if interventions unsuccessful or patient reports new pain  Outcome: Progressing     Problem: INFECTION - ADULT  Goal: Absence or prevention of progression during hospitalization  Description: INTERVENTIONS:  - Assess and monitor for signs and symptoms of infection  - Monitor lab/diagnostic results  - Monitor all insertion sites, i e  indwelling lines, tubes, and drains  - Monitor endotracheal if appropriate and nasal secretions for changes in amount and color  - [place] appropriate cooling/warming therapies per order  - Administer medications as ordered  - Instruct and encourage patient and family to use good hand hygiene technique  - Identify and instruct in appropriate isolation precautions for identified infection/condition  Outcome: Progressing  Goal: Absence of fever/infection during neutropenic period  Description: INTERVENTIONS:  - Monitor WBC    Outcome: Progressing     Problem: SAFETY ADULT  Goal: Patient will remain free of falls  Description: INTERVENTIONS:  - Educate patient/family on patient safety including physical limitations  - Instruct patient to call for assistance with activity   - Consult OT/PT to assist with strengthening/mobility   - Keep Call bell within reach  - Keep bed low and locked with side rails adjusted as appropriate  - Keep care items and personal belongings within reach  - Initiate and maintain comfort rounds  - Make Fall Risk Sign visible to staff  - Offer Toileting every 2 Hours, in advance of need  - Initiate/Maintain bed/chair alarm  - Obtain necessary fall risk management equipment:   - Apply yellow socks and bracelet for high fall risk patients  - Consider moving patient to room near nurses station  Outcome: Progressing  Goal: Maintain or return to baseline ADL function  Description: INTERVENTIONS:  - Educate patient/family on patient safety including physical limitations  - Instruct patient to call for assistance with activity   - Consult OT/PT to assist with strengthening/mobility   - Keep Call bell within reach  - Keep bed low and locked with side rails adjusted as appropriate  - Keep care items and personal belongings within reach  - Initiate and maintain comfort rounds  - Make Fall Risk Sign visible to staff  - Offer Toileting every 2 Hours, in advance of need  - Initiate/Maintain bed/chair alarm  - Obtain necessary fall risk management equipment:   - Apply yellow socks and bracelet for high fall risk patients  - Consider moving patient to room near nurses station  Outcome: Progressing  Goal: Maintains/Returns to pre admission functional level  Description: INTERVENTIONS:  - Perform BMAT or MOVE assessment daily    - Set and communicate daily mobility goal to care team and patient/family/caregiver  - Collaborate with rehabilitation services on mobility goals if consulted  - Perform Range of Motion 3 times a day  - Reposition patient every 2 hours  - Dangle patient 3 times a day  - Stand patient 3 times a day  - Ambulate patient 3 times a day  - Out of bed to chair 3 times a day   - Out of bed for meals 3 times a day  - Out of bed for toileting  - Record patient progress and toleration of activity level   Outcome: Progressing     Problem: DISCHARGE PLANNING  Goal: Discharge to home or other facility with appropriate resources  Description: INTERVENTIONS:  - Identify barriers to discharge w/patient and caregiver  - Arrange for needed discharge resources and transportation as appropriate  - Identify discharge learning needs (meds, wound care, etc )  - Arrange for interpretive services to assist at discharge as needed  - Refer to Case Management Department for coordinating discharge planning if the patient needs post-hospital services based on physician/advanced practitioner order or complex needs related to functional status, cognitive ability, or social support system  Outcome: Progressing     Problem: Knowledge Deficit  Goal: Patient/family/caregiver demonstrates understanding of disease process, treatment plan, medications, and discharge instructions  Description: Complete learning assessment and assess knowledge base    Interventions:  - Provide teaching at level of understanding  - Provide teaching via preferred learning methods  Outcome: Progressing     Problem: GASTROINTESTINAL - ADULT  Goal: Minimal or absence of nausea and/or vomiting  Description: INTERVENTIONS:  - Administer IV fluids if ordered to ensure adequate hydration  - Maintain NPO status until nausea and vomiting are resolved  - Nasogastric tube if ordered  - Administer ordered antiemetic medications as needed  - Provide nonpharmacologic comfort measures as appropriate  - Advance diet as tolerated, if ordered  - Consider nutrition services referral to assist patient with adequate nutrition and appropriate food choices  Outcome: Progressing  Goal: Maintains or returns to baseline bowel function  Description: INTERVENTIONS:  - Assess bowel function  - Encourage oral fluids to ensure adequate hydration  - Administer IV fluids if ordered to ensure adequate hydration  - Administer ordered medications as needed  - Encourage mobilization and activity  - Consider nutritional services referral to assist patient with adequate nutrition and appropriate food choices  Outcome: Progressing  Goal: Maintains adequate nutritional intake  Description: INTERVENTIONS:  - Monitor percentage of each meal consumed  - Identify factors contributing to decreased intake, treat as appropriate  - Assist with meals as needed  - Monitor I&O, weight, and lab values if indicated  - Obtain nutrition services referral as needed  Outcome: Progressing  Goal: Oral mucous membranes remain intact  Description: INTERVENTIONS  - Assess oral mucosa and hygiene practices  - Implement preventative oral hygiene regimen  - Implement oral medicated treatments as ordered  - Initiate Nutrition services referral as needed  Outcome: Progressing     Problem: Nutrition/Hydration-ADULT  Goal: Nutrient/Hydration intake appropriate for improving, restoring or maintaining nutritional needs  Description: Monitor and assess patient's nutrition/hydration status for malnutrition  Collaborate with interdisciplinary team and initiate plan and interventions as ordered  Monitor patient's weight and dietary intake as ordered or per policy  Utilize nutrition screening tool and intervene as necessary  Determine patient's food preferences and provide high-protein, high-caloric foods as appropriate       INTERVENTIONS:  - Monitor oral intake, urinary output, labs, and treatment plans  - Assess nutrition and hydration status and recommend course of action  - Evaluate amount of meals eaten  - Assist patient with eating if necessary   - Allow adequate time for meals  - Recommend/ encourage appropriate diets, oral nutritional supplements, and vitamin/mineral supplements  - Order, calculate, and assess calorie counts as needed  - Recommend, monitor, and adjust tube feedings and TPN/PPN based on assessed needs  - Assess need for intravenous fluids  - Provide specific nutrition/hydration education as appropriate  - Include patient/family/caregiver in decisions related to nutrition  Outcome: Progressing     Problem: MOBILITY - ADULT  Goal: Maintain or return to baseline ADL function  Description: INTERVENTIONS:  - Educate patient/family on patient safety including physical limitations  - Instruct patient to call for assistance with activity   - Consult OT/PT to assist with strengthening/mobility   - Keep Call bell within reach  - Keep bed low and locked with side rails adjusted as appropriate  - Keep care items and personal belongings within reach  - Initiate and maintain comfort rounds  - Make Fall Risk Sign visible to staff  - Offer Toileting every 2 Hours, in advance of need  - Initiate/Maintain bed/chair alarm  - Obtain necessary fall risk management equipment:   - Apply yellow socks and bracelet for high fall risk patients  - Consider moving patient to room near nurses station  Outcome: Progressing  Goal: Maintains/Returns to pre admission functional level  Description: INTERVENTIONS:  - Perform BMAT or MOVE assessment daily    - Set and communicate daily mobility goal to care team and patient/family/caregiver  - Collaborate with rehabilitation services on mobility goals if consulted  - Perform Range of Motion 3 times a day  - Reposition patient every 2 hours    - Dangle patient 3 times a day  - Stand patient 3 times a day  - Ambulate patient 3 times a day  - Out of bed to chair 3 times a day   - Out of bed for meals 3 times a day  - Out of bed for toileting  - Record patient progress and toleration of activity level   Outcome: Progressing     Problem: Prexisting or High Potential for Compromised Skin Integrity  Goal: Skin integrity is maintained or improved  Description: INTERVENTIONS:  - Identify patients at risk for skin breakdown  - Assess and monitor skin integrity  - Assess and monitor nutrition and hydration status  - Monitor labs   - Assess for incontinence   - Turn and reposition patient  - Assist with mobility/ambulation  - Relieve pressure over bony prominences  - Avoid friction and shearing  - Provide appropriate hygiene as needed including keeping skin clean and dry  - Evaluate need for skin moisturizer/barrier cream  - Collaborate with interdisciplinary team   - Patient/family teaching  - Consider wound care consult   Outcome: Progressing

## 2022-06-24 NOTE — ASSESSMENT & PLAN NOTE
Patient was recently discharged from [facility] where he was evaluated for L4-L5 diskitis/osteomyelitis    Patient was discharged on IV vancomycin, then transition to daptomycin due to [facility] abx course on 6/13  Central line removed on 6/22/22 by general surge

## 2022-06-25 LAB
BACTERIA BLD CULT: NORMAL

## 2022-06-26 ENCOUNTER — TELEPHONE (OUTPATIENT)
Dept: OTHER | Facility: OTHER | Age: 57
End: 2022-06-26

## 2022-06-27 NOTE — TELEPHONE ENCOUNTER
Appointment for tomorrow has been cancelled, patient would liketo be seen in [place] - please call to reschedule

## 2022-07-12 ENCOUNTER — TELEPHONE (OUTPATIENT)
Dept: CARDIOLOGY CLINIC | Facility: CLINIC | Age: 57
End: 2022-07-12

## 2022-09-12 ENCOUNTER — APPOINTMENT (EMERGENCY)
Dept: CT IMAGING | Facility: HOSPITAL | Age: 57
DRG: 698 | End: 2022-09-12
Payer: MEDICARE

## 2022-09-12 ENCOUNTER — HOSPITAL ENCOUNTER (INPATIENT)
Facility: HOSPITAL | Age: 57
LOS: 2 days | Discharge: HOME HEALTH | DRG: 698 | End: 2022-09-14
Attending: EMERGENCY MEDICINE | Admitting: FAMILY MEDICINE
Payer: MEDICARE

## 2022-09-12 DIAGNOSIS — N18.31 STAGE 3A CHRONIC KIDNEY DISEASE (HCC): ICD-10-CM

## 2022-09-12 DIAGNOSIS — R33.9 URINARY RETENTION: ICD-10-CM

## 2022-09-12 DIAGNOSIS — N18.9 CHRONIC KIDNEY DISEASE: ICD-10-CM

## 2022-09-12 DIAGNOSIS — M46.26 OSTEOMYELITIS OF VERTEBRA OF LUMBAR REGION (HCC): ICD-10-CM

## 2022-09-12 DIAGNOSIS — E83.41 HYPERMAGNESEMIA: ICD-10-CM

## 2022-09-12 DIAGNOSIS — M79.605 LEG PAIN, LEFT: ICD-10-CM

## 2022-09-12 DIAGNOSIS — N13.30 BILATERAL HYDRONEPHROSIS: Primary | ICD-10-CM

## 2022-09-12 DIAGNOSIS — M46.26 OSTEOMYELITIS OF LUMBAR SPINE (HCC): ICD-10-CM

## 2022-09-12 DIAGNOSIS — E83.52 HYPERCALCEMIA: ICD-10-CM

## 2022-09-12 DIAGNOSIS — M46.46 DISCITIS OF LUMBAR REGION: ICD-10-CM

## 2022-09-12 DIAGNOSIS — M54.42 LEFT-SIDED LOW BACK PAIN WITH LEFT-SIDED SCIATICA, UNSPECIFIED CHRONICITY: ICD-10-CM

## 2022-09-12 PROBLEM — R26.2 AMBULATORY DYSFUNCTION: Status: ACTIVE | Noted: 2022-09-12

## 2022-09-12 PROBLEM — M54.50 LOW BACK PAIN: Status: ACTIVE | Noted: 2022-09-12

## 2022-09-12 LAB
ALBUMIN SERPL BCP-MCNC: 3 G/DL (ref 3.5–5)
ALP SERPL-CCNC: 109 U/L (ref 46–116)
ALT SERPL W P-5'-P-CCNC: 9 U/L (ref 12–78)
ANION GAP SERPL CALCULATED.3IONS-SCNC: 5 MMOL/L (ref 4–13)
APTT PPP: 30 SECONDS (ref 23–37)
AST SERPL W P-5'-P-CCNC: 9 U/L (ref 5–45)
BACTERIA UR QL AUTO: ABNORMAL /HPF
BASOPHILS # BLD AUTO: 0.05 THOUSANDS/ΜL (ref 0–0.1)
BASOPHILS NFR BLD AUTO: 1 % (ref 0–1)
BILIRUB SERPL-MCNC: 0.16 MG/DL (ref 0.2–1)
BILIRUB UR QL STRIP: NEGATIVE
BUN SERPL-MCNC: 51 MG/DL (ref 5–25)
CALCIUM ALBUM COR SERPL-MCNC: 12.6 MG/DL (ref 8.3–10.1)
CALCIUM SERPL-MCNC: 11.8 MG/DL (ref 8.3–10.1)
CHLORIDE SERPL-SCNC: 97 MMOL/L (ref 96–108)
CK SERPL-CCNC: 24 U/L (ref 39–308)
CLARITY UR: ABNORMAL
CO2 SERPL-SCNC: 32 MMOL/L (ref 21–32)
COLOR UR: YELLOW
CREAT SERPL-MCNC: 3.26 MG/DL (ref 0.6–1.3)
CRP SERPL QL: 13.6 MG/L
EOSINOPHIL # BLD AUTO: 0.22 THOUSAND/ΜL (ref 0–0.61)
EOSINOPHIL NFR BLD AUTO: 3 % (ref 0–6)
ERYTHROCYTE [DISTWIDTH] IN BLOOD BY AUTOMATED COUNT: 18 % (ref 11.6–15.1)
ERYTHROCYTE [SEDIMENTATION RATE] IN BLOOD: 55 MM/HOUR (ref 0–19)
GFR SERPL CREATININE-BSD FRML MDRD: 19 ML/MIN/1.73SQ M
GLUCOSE SERPL-MCNC: 131 MG/DL (ref 65–140)
GLUCOSE SERPL-MCNC: 203 MG/DL (ref 65–140)
GLUCOSE SERPL-MCNC: 264 MG/DL (ref 65–140)
GLUCOSE UR STRIP-MCNC: NEGATIVE MG/DL
HCT VFR BLD AUTO: 36.4 % (ref 36.5–49.3)
HGB BLD-MCNC: 11.5 G/DL (ref 12–17)
HGB UR QL STRIP.AUTO: ABNORMAL
IMM GRANULOCYTES # BLD AUTO: 0.04 THOUSAND/UL (ref 0–0.2)
IMM GRANULOCYTES NFR BLD AUTO: 1 % (ref 0–2)
INR PPP: 0.89 (ref 0.84–1.19)
KETONES UR STRIP-MCNC: NEGATIVE MG/DL
LACTATE SERPL-SCNC: 0.9 MMOL/L (ref 0.5–2)
LEUKOCYTE ESTERASE UR QL STRIP: ABNORMAL
LYMPHOCYTES # BLD AUTO: 1.5 THOUSANDS/ΜL (ref 0.6–4.47)
LYMPHOCYTES NFR BLD AUTO: 23 % (ref 14–44)
MAGNESIUM SERPL-MCNC: 3.2 MG/DL (ref 1.6–2.6)
MCH RBC QN AUTO: 28.1 PG (ref 26.8–34.3)
MCHC RBC AUTO-ENTMCNC: 31.6 G/DL (ref 31.4–37.4)
MCV RBC AUTO: 89 FL (ref 82–98)
MONOCYTES # BLD AUTO: 0.73 THOUSAND/ΜL (ref 0.17–1.22)
MONOCYTES NFR BLD AUTO: 11 % (ref 4–12)
NEUTROPHILS # BLD AUTO: 4.14 THOUSANDS/ΜL (ref 1.85–7.62)
NEUTS SEG NFR BLD AUTO: 61 % (ref 43–75)
NITRITE UR QL STRIP: NEGATIVE
NON-SQ EPI CELLS URNS QL MICRO: ABNORMAL /HPF
NRBC BLD AUTO-RTO: 0 /100 WBCS
PH UR STRIP.AUTO: 7 [PH]
PLATELET # BLD AUTO: 248 THOUSANDS/UL (ref 149–390)
PMV BLD AUTO: 10.2 FL (ref 8.9–12.7)
POTASSIUM SERPL-SCNC: 4.1 MMOL/L (ref 3.5–5.3)
PROT SERPL-MCNC: 6.9 G/DL (ref 6.4–8.4)
PROT UR STRIP-MCNC: ABNORMAL MG/DL
PROTHROMBIN TIME: 12.1 SECONDS (ref 11.6–14.5)
RBC # BLD AUTO: 4.09 MILLION/UL (ref 3.88–5.62)
RBC #/AREA URNS AUTO: ABNORMAL /HPF
SODIUM SERPL-SCNC: 134 MMOL/L (ref 135–147)
SP GR UR STRIP.AUTO: 1.01 (ref 1–1.03)
UROBILINOGEN UR QL STRIP.AUTO: 0.2 E.U./DL
WBC # BLD AUTO: 6.68 THOUSAND/UL (ref 4.31–10.16)
WBC #/AREA URNS AUTO: ABNORMAL /HPF

## 2022-09-12 PROCEDURE — 99223 1ST HOSP IP/OBS HIGH 75: CPT | Performed by: FAMILY MEDICINE

## 2022-09-12 PROCEDURE — 86140 C-REACTIVE PROTEIN: CPT | Performed by: EMERGENCY MEDICINE

## 2022-09-12 PROCEDURE — 83735 ASSAY OF MAGNESIUM: CPT | Performed by: EMERGENCY MEDICINE

## 2022-09-12 PROCEDURE — 36415 COLL VENOUS BLD VENIPUNCTURE: CPT | Performed by: EMERGENCY MEDICINE

## 2022-09-12 PROCEDURE — 96374 THER/PROPH/DIAG INJ IV PUSH: CPT

## 2022-09-12 PROCEDURE — 87086 URINE CULTURE/COLONY COUNT: CPT

## 2022-09-12 PROCEDURE — 99285 EMERGENCY DEPT VISIT HI MDM: CPT

## 2022-09-12 PROCEDURE — 87186 SC STD MICRODIL/AGAR DIL: CPT

## 2022-09-12 PROCEDURE — 96361 HYDRATE IV INFUSION ADD-ON: CPT

## 2022-09-12 PROCEDURE — 85025 COMPLETE CBC W/AUTO DIFF WBC: CPT | Performed by: EMERGENCY MEDICINE

## 2022-09-12 PROCEDURE — 82948 REAGENT STRIP/BLOOD GLUCOSE: CPT

## 2022-09-12 PROCEDURE — 87077 CULTURE AEROBIC IDENTIFY: CPT

## 2022-09-12 PROCEDURE — 85652 RBC SED RATE AUTOMATED: CPT | Performed by: EMERGENCY MEDICINE

## 2022-09-12 PROCEDURE — 85730 THROMBOPLASTIN TIME PARTIAL: CPT | Performed by: EMERGENCY MEDICINE

## 2022-09-12 PROCEDURE — 87040 BLOOD CULTURE FOR BACTERIA: CPT | Performed by: EMERGENCY MEDICINE

## 2022-09-12 PROCEDURE — 85610 PROTHROMBIN TIME: CPT | Performed by: EMERGENCY MEDICINE

## 2022-09-12 PROCEDURE — 81001 URINALYSIS AUTO W/SCOPE: CPT

## 2022-09-12 PROCEDURE — 80053 COMPREHEN METABOLIC PANEL: CPT | Performed by: EMERGENCY MEDICINE

## 2022-09-12 PROCEDURE — 82550 ASSAY OF CK (CPK): CPT | Performed by: EMERGENCY MEDICINE

## 2022-09-12 PROCEDURE — 74176 CT ABD & PELVIS W/O CONTRAST: CPT

## 2022-09-12 PROCEDURE — 99285 EMERGENCY DEPT VISIT HI MDM: CPT | Performed by: EMERGENCY MEDICINE

## 2022-09-12 PROCEDURE — 83605 ASSAY OF LACTIC ACID: CPT | Performed by: EMERGENCY MEDICINE

## 2022-09-12 RX ORDER — CYCLOBENZAPRINE HCL 10 MG
5 TABLET ORAL 3 TIMES DAILY
Status: DISCONTINUED | OUTPATIENT
Start: 2022-09-12 | End: 2022-09-14 | Stop reason: HOSPADM

## 2022-09-12 RX ORDER — INSULIN LISPRO 100 [IU]/ML
4 INJECTION, SOLUTION INTRAVENOUS; SUBCUTANEOUS
Status: DISCONTINUED | OUTPATIENT
Start: 2022-09-12 | End: 2022-09-12

## 2022-09-12 RX ORDER — ACETAMINOPHEN 325 MG/1
650 TABLET ORAL EVERY 6 HOURS PRN
Status: DISCONTINUED | OUTPATIENT
Start: 2022-09-12 | End: 2022-09-14 | Stop reason: HOSPADM

## 2022-09-12 RX ORDER — AMOXICILLIN 250 MG
1 CAPSULE ORAL 2 TIMES DAILY
Status: DISCONTINUED | OUTPATIENT
Start: 2022-09-12 | End: 2022-09-14 | Stop reason: HOSPADM

## 2022-09-12 RX ORDER — SODIUM BICARBONATE 650 MG/1
650 TABLET ORAL 2 TIMES DAILY
Status: ON HOLD | COMMUNITY
End: 2022-09-14 | Stop reason: SDUPTHER

## 2022-09-12 RX ORDER — LANOLIN ALCOHOL/MO/W.PET/CERES
100 CREAM (GRAM) TOPICAL DAILY
Status: DISCONTINUED | OUTPATIENT
Start: 2022-09-13 | End: 2022-09-14 | Stop reason: HOSPADM

## 2022-09-12 RX ORDER — POLYETHYLENE GLYCOL 3350 17 G/17G
17 POWDER, FOR SOLUTION ORAL 2 TIMES DAILY
COMMUNITY

## 2022-09-12 RX ORDER — CARVEDILOL 6.25 MG/1
6.25 TABLET ORAL 2 TIMES DAILY WITH MEALS
Status: DISCONTINUED | OUTPATIENT
Start: 2022-09-12 | End: 2022-09-14 | Stop reason: HOSPADM

## 2022-09-12 RX ORDER — FENTANYL CITRATE 50 UG/ML
50 INJECTION, SOLUTION INTRAMUSCULAR; INTRAVENOUS ONCE
Status: COMPLETED | OUTPATIENT
Start: 2022-09-12 | End: 2022-09-12

## 2022-09-12 RX ORDER — AMLODIPINE BESYLATE 10 MG/1
10 TABLET ORAL DAILY
Status: DISCONTINUED | OUTPATIENT
Start: 2022-09-13 | End: 2022-09-14 | Stop reason: HOSPADM

## 2022-09-12 RX ORDER — ALLOPURINOL 100 MG/1
200 TABLET ORAL DAILY
Status: DISCONTINUED | OUTPATIENT
Start: 2022-09-13 | End: 2022-09-12

## 2022-09-12 RX ORDER — LIDOCAINE 50 MG/G
1 PATCH TOPICAL DAILY
Status: DISCONTINUED | OUTPATIENT
Start: 2022-09-13 | End: 2022-09-14 | Stop reason: HOSPADM

## 2022-09-12 RX ORDER — VENLAFAXINE HYDROCHLORIDE 37.5 MG/1
37.5 CAPSULE, EXTENDED RELEASE ORAL 3 TIMES DAILY
Status: DISCONTINUED | OUTPATIENT
Start: 2022-09-12 | End: 2022-09-14 | Stop reason: HOSPADM

## 2022-09-12 RX ORDER — SODIUM BICARBONATE 650 MG/1
650 TABLET ORAL 2 TIMES DAILY
Status: DISCONTINUED | OUTPATIENT
Start: 2022-09-12 | End: 2022-09-14 | Stop reason: HOSPADM

## 2022-09-12 RX ORDER — DOCUSATE SODIUM 100 MG/1
100 CAPSULE, LIQUID FILLED ORAL 2 TIMES DAILY
Status: DISCONTINUED | OUTPATIENT
Start: 2022-09-12 | End: 2022-09-14 | Stop reason: HOSPADM

## 2022-09-12 RX ORDER — CARVEDILOL 6.25 MG/1
6.25 TABLET ORAL 2 TIMES DAILY WITH MEALS
COMMUNITY

## 2022-09-12 RX ORDER — ONDANSETRON 2 MG/ML
4 INJECTION INTRAMUSCULAR; INTRAVENOUS EVERY 6 HOURS PRN
Status: DISCONTINUED | OUTPATIENT
Start: 2022-09-12 | End: 2022-09-14 | Stop reason: HOSPADM

## 2022-09-12 RX ORDER — OXYCODONE HYDROCHLORIDE 5 MG/1
5 TABLET ORAL EVERY 4 HOURS PRN
Status: DISCONTINUED | OUTPATIENT
Start: 2022-09-12 | End: 2022-09-14 | Stop reason: HOSPADM

## 2022-09-12 RX ORDER — DOCUSATE SODIUM 100 MG/1
100 CAPSULE, LIQUID FILLED ORAL 2 TIMES DAILY
COMMUNITY
End: 2022-10-24

## 2022-09-12 RX ORDER — INSULIN LISPRO 100 [IU]/ML
1-6 INJECTION, SOLUTION INTRAVENOUS; SUBCUTANEOUS
Status: DISCONTINUED | OUTPATIENT
Start: 2022-09-12 | End: 2022-09-14 | Stop reason: HOSPADM

## 2022-09-12 RX ORDER — FERROUS SULFATE 325(65) MG
325 TABLET ORAL
Status: DISCONTINUED | OUTPATIENT
Start: 2022-09-13 | End: 2022-09-14 | Stop reason: HOSPADM

## 2022-09-12 RX ORDER — INSULIN LISPRO 100 [IU]/ML
4 INJECTION, SOLUTION INTRAVENOUS; SUBCUTANEOUS
Status: DISCONTINUED | OUTPATIENT
Start: 2022-09-13 | End: 2022-09-14 | Stop reason: HOSPADM

## 2022-09-12 RX ORDER — LANOLIN ALCOHOL/MO/W.PET/CERES
6 CREAM (GRAM) TOPICAL DAILY PRN
Status: DISCONTINUED | OUTPATIENT
Start: 2022-09-12 | End: 2022-09-14 | Stop reason: HOSPADM

## 2022-09-12 RX ORDER — PREGABALIN 75 MG/1
150 CAPSULE ORAL 2 TIMES DAILY
Status: DISCONTINUED | OUTPATIENT
Start: 2022-09-12 | End: 2022-09-14 | Stop reason: HOSPADM

## 2022-09-12 RX ORDER — TAMSULOSIN HYDROCHLORIDE 0.4 MG/1
0.4 CAPSULE ORAL
Status: DISCONTINUED | OUTPATIENT
Start: 2022-09-12 | End: 2022-09-14 | Stop reason: HOSPADM

## 2022-09-12 RX ORDER — FERROUS SULFATE 325(65) MG
325 TABLET ORAL 2 TIMES DAILY WITH MEALS
Status: DISCONTINUED | OUTPATIENT
Start: 2022-09-12 | End: 2022-09-12

## 2022-09-12 RX ORDER — HYDRALAZINE HYDROCHLORIDE 25 MG/1
50 TABLET, FILM COATED ORAL EVERY 8 HOURS SCHEDULED
Status: DISCONTINUED | OUTPATIENT
Start: 2022-09-12 | End: 2022-09-12

## 2022-09-12 RX ORDER — SEVELAMER HYDROCHLORIDE 800 MG/1
800 TABLET, FILM COATED ORAL
Status: DISCONTINUED | OUTPATIENT
Start: 2022-09-12 | End: 2022-09-12

## 2022-09-12 RX ORDER — ATORVASTATIN CALCIUM 40 MG/1
40 TABLET, FILM COATED ORAL
Status: DISCONTINUED | OUTPATIENT
Start: 2022-09-12 | End: 2022-09-14 | Stop reason: HOSPADM

## 2022-09-12 RX ORDER — CYCLOBENZAPRINE HCL 5 MG
5 TABLET ORAL 3 TIMES DAILY
COMMUNITY

## 2022-09-12 RX ORDER — POLYETHYLENE GLYCOL 3350 17 G/17G
17 POWDER, FOR SOLUTION ORAL 2 TIMES DAILY
Status: DISCONTINUED | OUTPATIENT
Start: 2022-09-12 | End: 2022-09-14 | Stop reason: HOSPADM

## 2022-09-12 RX ORDER — HEPARIN SODIUM 5000 [USP'U]/ML
5000 INJECTION, SOLUTION INTRAVENOUS; SUBCUTANEOUS EVERY 8 HOURS SCHEDULED
Status: DISCONTINUED | OUTPATIENT
Start: 2022-09-12 | End: 2022-09-14 | Stop reason: HOSPADM

## 2022-09-12 RX ORDER — SODIUM CHLORIDE 9 MG/ML
125 INJECTION, SOLUTION INTRAVENOUS CONTINUOUS
Status: DISCONTINUED | OUTPATIENT
Start: 2022-09-12 | End: 2022-09-14 | Stop reason: HOSPADM

## 2022-09-12 RX ORDER — VENLAFAXINE HYDROCHLORIDE 37.5 MG/1
37.5 CAPSULE, EXTENDED RELEASE ORAL 3 TIMES DAILY
COMMUNITY

## 2022-09-12 RX ORDER — FERROUS SULFATE 325(65) MG
325 TABLET ORAL 2 TIMES DAILY WITH MEALS
COMMUNITY
End: 2022-10-24

## 2022-09-12 RX ORDER — METHOCARBAMOL 500 MG/1
500 TABLET, FILM COATED ORAL EVERY 6 HOURS PRN
Status: DISCONTINUED | OUTPATIENT
Start: 2022-09-12 | End: 2022-09-14 | Stop reason: HOSPADM

## 2022-09-12 RX ADMIN — POLYETHYLENE GLYCOL 3350 17 G: 17 POWDER, FOR SOLUTION ORAL at 17:25

## 2022-09-12 RX ADMIN — FERROUS SULFATE TAB 325 MG (65 MG ELEMENTAL FE) 325 MG: 325 (65 FE) TAB at 17:24

## 2022-09-12 RX ADMIN — PREGABALIN 150 MG: 75 CAPSULE ORAL at 17:11

## 2022-09-12 RX ADMIN — SODIUM CHLORIDE 1000 ML: 0.9 INJECTION, SOLUTION INTRAVENOUS at 15:15

## 2022-09-12 RX ADMIN — SODIUM BICARBONATE 650 MG TABLET 650 MG: at 17:25

## 2022-09-12 RX ADMIN — SODIUM CHLORIDE 100 ML/HR: 0.9 INJECTION, SOLUTION INTRAVENOUS at 17:18

## 2022-09-12 RX ADMIN — FENTANYL CITRATE 50 MCG: 0.05 INJECTION, SOLUTION INTRAMUSCULAR; INTRAVENOUS at 14:44

## 2022-09-12 RX ADMIN — ATORVASTATIN CALCIUM 40 MG: 40 TABLET, FILM COATED ORAL at 17:11

## 2022-09-12 RX ADMIN — CARVEDILOL 6.25 MG: 6.25 TABLET, FILM COATED ORAL at 17:24

## 2022-09-12 RX ADMIN — INSULIN LISPRO 4 UNITS: 100 INJECTION, SOLUTION INTRAVENOUS; SUBCUTANEOUS at 17:32

## 2022-09-12 RX ADMIN — TAMSULOSIN HYDROCHLORIDE 0.4 MG: 0.4 CAPSULE ORAL at 17:11

## 2022-09-12 RX ADMIN — DOCUSATE SODIUM 100 MG: 100 CAPSULE ORAL at 17:25

## 2022-09-12 RX ADMIN — INSULIN LISPRO 2 UNITS: 100 INJECTION, SOLUTION INTRAVENOUS; SUBCUTANEOUS at 17:31

## 2022-09-12 RX ADMIN — INSULIN DETEMIR 20 UNITS: 100 INJECTION, SOLUTION SUBCUTANEOUS at 21:22

## 2022-09-12 RX ADMIN — DOCUSATE SODIUM AND SENNOSIDES 1 TABLET: 8.6; 5 TABLET, FILM COATED ORAL at 17:11

## 2022-09-12 RX ADMIN — CYCLOBENZAPRINE 5 MG: 10 TABLET, FILM COATED ORAL at 21:22

## 2022-09-12 RX ADMIN — VENLAFAXINE HYDROCHLORIDE 37.5 MG: 37.5 CAPSULE, EXTENDED RELEASE ORAL at 21:22

## 2022-09-12 NOTE — ASSESSMENT & PLAN NOTE
Lab Results   Component Value Date    EGFR 19 09/12/2022    EGFR 17 06/24/2022    EGFR 15 06/23/2022    CREATININE 3 26 (H) 09/12/2022    CREATININE 3 69 (H) 06/24/2022    CREATININE 4 07 (H) 06/23/2022   Baseline creatinine 3 6-3 9  Currently below baseline  Continue to monitor  Avoid nephrotoxic agents, NSAIDs, hypotension

## 2022-09-12 NOTE — ASSESSMENT & PLAN NOTE
· Has history of same in June of this year - discharge with Ortiz and outpatient voiding trial  · ED Noted retention on bladder scan  · CT abd/pelvis - moderate urinary bladder distention with mild persistent bilateral hydronephrosis    No obstructing calculi  · Continue Flomax  · Never had a urology outpatient follow up after hospitalization - will ask urology to see

## 2022-09-12 NOTE — ASSESSMENT & PLAN NOTE
· History of chronic lumbar diskitis with chronic osteomyelitis - has chronic pain but last 2 weeks has been acute worse when radiation to left leg increased pain/numbness and weakness in left leg   · Since March has been unable to walk, for years has had back pain with history of multiple surgeries   · CT abdomen pelvis - L5-S1 diskitis/osteomyelitis seen again    Similar endplate destructive changes and irregularity, however decreased component of mineralized material long intervertebral disc space noted at this level compared to prior, unsure significance, if relevant consider MRI  · CT lumbar recon - no acute osseous abnormality of lumbar spine, chronic L4-L5 diskitis/osteomyelitis with unchanged mild vertebral body height loss of L4 on L5 s/p L4-L5 decompressive laminectomy changes  · 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

## 2022-09-12 NOTE — ED NOTES
Pt transported to unit   No s/s of distress, VS stable, A&Ox4     Elisabet Iglesias RN  09/12/22 [id number]

## 2022-09-12 NOTE — ASSESSMENT & PLAN NOTE
· Since March due to chronic osteomyelitis in disc changes  · History of chronic back pain and multiple surgeries    · Several hospitalizations/workups without specific cause  · Patient endorses he is not able to walk at all  · Pain is getting worse    · Will ask PT/OT to evaluate

## 2022-09-12 NOTE — ASSESSMENT & PLAN NOTE
· History of hypercalcemia-was recently worked up while inpatient in July at [facility]  · Workup negative, thought to be due to immobilization  · Will treat with fluid and monitor

## 2022-09-12 NOTE — PLAN OF CARE
Problem: MOBILITY - ADULT  Goal: Maintain or return to baseline ADL function  Description: INTERVENTIONS:  -  Assess patient's ability to carry out ADLs; assess patient's baseline for ADL function and identify physical deficits which impact ability to perform ADLs (bathing, care of mouth/teeth, toileting, grooming, dressing, etc )  - Assess/evaluate cause of self-care deficits   - Assess range of motion  - Assess patient's mobility; develop plan if impaired  - Assess patient's need for assistive devices and provide as appropriate  - Encourage maximum independence but intervene and supervise when necessary  - Involve family in performance of ADLs  - Assess for home care needs following discharge   - Consider OT consult to assist with ADL evaluation and planning for discharge  - Provide patient education as appropriate  Outcome: Progressing  Goal: Maintains/Returns to pre admission functional level  Description: INTERVENTIONS:  - Perform BMAT or MOVE assessment daily    - Set and communicate daily mobility goal to care team and patient/family/caregiver  - Collaborate with rehabilitation services on mobility goals if consulted  - Perform Range of Motion   times a day  - Reposition patient every   hours  - Dangle patient   times a day  - Stand patient   times a day  - Ambulate patient   times a day  - Out of bed to chair   times a day   - Out of bed for meals     times a day  - Out of bed for toileting  - Record patient progress and toleration of activity level   Outcome: Progressing     Problem: Nutrition/Hydration-ADULT  Goal: Nutrient/Hydration intake appropriate for improving, restoring or maintaining nutritional needs  Description: Monitor and assess patient's nutrition/hydration status for malnutrition  Collaborate with interdisciplinary team and initiate plan and interventions as ordered  Monitor patient's weight and dietary intake as ordered or per policy   Utilize nutrition screening tool and intervene as necessary  Determine patient's food preferences and provide high-protein, high-caloric foods as appropriate  INTERVENTIONS:  - Monitor oral intake, urinary output, labs, and treatment plans  - Assess nutrition and hydration status and recommend course of action  - Evaluate amount of meals eaten  - Assist patient with eating if necessary   - Allow adequate time for meals  - Recommend/ encourage appropriate diets, oral nutritional supplements, and vitamin/mineral supplements  - Order, calculate, and assess calorie counts as needed  - Recommend, monitor, and adjust tube feedings and TPN/PPN based on assessed needs  - Assess need for intravenous fluids  - Provide specific nutrition/hydration education as appropriate  - Include patient/family/caregiver in decisions related to nutrition  Outcome: Progressing     Problem: Potential for Falls  Goal: Patient will remain free of falls  Description: INTERVENTIONS:  - Educate patient/family on patient safety including physical limitations  - Instruct patient to call for assistance with activity   - Consult OT/PT to assist with strengthening/mobility   - Keep Call bell within reach  - Keep bed low and locked with side rails adjusted as appropriate  - Keep care items and personal belongings within reach  - Initiate and maintain comfort rounds  - Make Fall Risk Sign visible to staff  - Offer Toileting every   Hours, in advance of need  - Initiate/Maintain   alarm  - Obtain necessary fall risk management equipment:      - Apply yellow socks and bracelet for high fall risk patients  - Consider moving patient to room near nurses station  Outcome: Progressing     Problem: Prexisting or High Potential for Compromised Skin Integrity  Goal: Skin integrity is maintained or improved  Description: INTERVENTIONS:  - Identify patients at risk for skin breakdown  - Assess and monitor skin integrity  - Assess and monitor nutrition and hydration status  - Monitor labs   - Assess for incontinence   - Turn and reposition patient  - Assist with mobility/ambulation  - Relieve pressure over bony prominences  - Avoid friction and shearing  - Provide appropriate hygiene as needed including keeping skin clean and dry  - Evaluate need for skin moisturizer/barrier cream  - Collaborate with interdisciplinary team   - Patient/family teaching  - Consider wound care consult   Outcome: Progressing     Problem: GENITOURINARY - ADULT  Goal: Maintains or returns to baseline urinary function  Description: INTERVENTIONS:  - Assess urinary function  - Encourage oral fluids to ensure adequate hydration if ordered  - Administer IV fluids as ordered to ensure adequate hydration  - Administer ordered medications as needed  - Offer frequent toileting  - Follow urinary retention protocol if ordered  Outcome: Progressing  Goal: Absence of urinary retention  Description: INTERVENTIONS:  - Assess patient's ability to void and empty bladder  - Monitor I/O  - Bladder scan as needed  - Discuss with physician/AP medications to alleviate retention as needed  - Discuss catheterization for long term situations as appropriate  Outcome: Progressing  Goal: Urinary catheter remains patent  Description: INTERVENTIONS:  - Assess patency of urinary catheter  - If patient has a chronic camarena, consider changing catheter if non-functioning  - Follow guidelines for intermittent irrigation of non-functioning urinary catheter  Outcome: Progressing     Problem: METABOLIC, FLUID AND ELECTROLYTES - ADULT  Goal: Electrolytes maintained within normal limits  Description: INTERVENTIONS:  - Monitor labs and assess patient for signs and symptoms of electrolyte imbalances  - Administer electrolyte replacement as ordered  - Monitor response to electrolyte replacements, including repeat lab results as appropriate  - Instruct patient on fluid and nutrition as appropriate  Outcome: Progressing  Goal: Fluid balance maintained  Description: INTERVENTIONS:  - Monitor labs   - Monitor I/O and WT  - Instruct patient on fluid and nutrition as appropriate  - Assess for signs & symptoms of volume excess or deficit  Outcome: Progressing  Goal: Glucose maintained within target range  Description: INTERVENTIONS:  - Monitor Blood Glucose as ordered  - Assess for signs and symptoms of hyperglycemia and hypoglycemia  - Administer ordered medications to maintain glucose within target range  - Assess nutritional intake and initiate nutrition service referral as needed  Outcome: Progressing     Problem: SKIN/TISSUE INTEGRITY - ADULT  Goal: Skin Integrity remains intact(Skin Breakdown Prevention)  Description: Assess:  -Perform David assessment every    -Clean and moisturize skin every    -Inspect skin when repositioning, toileting, and assisting with ADLS  -Assess under medical devices such as   every    -Assess extremities for adequate circulation and sensation     Bed Management:  -Have minimal linens on bed & keep smooth, unwrinkled  -Change linens as needed when moist or perspiring  -Avoid sitting or lying in one position for more than   hours while in bed  -Keep HOB at  degrees     Toileting:  -Offer bedside commode  -Assess for incontinence every   -Use incontinent care products after each incontinent episode such as   Activity:  -Mobilize patient   times a day  -Encourage activity and walks on unit  -Encourage or provide ROM exercises   -Turn and reposition patient every   Hours  -Use appropriate equipment to lift or move patient in bed  -Instruct/ Assist with weight shifting every   when out of bed in chair  -Consider limitation of chair time    hour intervals    Skin Care:  -Avoid use of baby powder, tape, friction and shearing, hot water or constrictive clothing  -Relieve pressure over bony prominences using    -Do not massage red bony areas    Next Steps:  -Teach patient strategies to minimize risks such as     -Consider consults to  interdisciplinary teams such as    Outcome: Progressing  Goal: Incision(s), wounds(s) or drain site(s) healing without S/S of infection  Description: INTERVENTIONS  - Assess and document dressing, incision, wound bed, drain sites and surrounding tissue  - Provide patient and family education  - Perform skin care/dressing changes every   Outcome: Progressing  Goal: Pressure injury heals and does not worsen  Description: Interventions:  - Implement low air loss mattress or specialty surface (Criteria met)  - Apply silicone foam dressing  - Instruct/assist with weight shifting every   minutes when in chair   - Limit chair time to   hour intervals  - Use special pressure reducing interventions such as   when in chair   - Apply fecal or urinary incontinence containment device   - Perform passive or active ROM every   - Turn and reposition patient & offload bony prominences every   hours   - Utilize friction reducing device or surface for transfers   - Consider consults to  interdisciplinary teams such as    - Use incontinent care products after each incontinent episode such as      - Consider nutrition services referral as needed  Outcome: Progressing     Problem: MUSCULOSKELETAL - ADULT  Goal: Maintain or return mobility to safest level of function  Description: INTERVENTIONS:  - Assess patient's ability to carry out ADLs; assess patient's baseline for ADL function and identify physical deficits which impact ability to perform ADLs (bathing, care of mouth/teeth, toileting, grooming, dressing, etc )  - Assess/evaluate cause of self-care deficits   - Assess range of motion  - Assess patient's mobility  - Assess patient's need for assistive devices and provide as appropriate  - Encourage maximum independence but intervene and supervise when necessary  - Involve family in performance of ADLs  - Assess for home care needs following discharge   - Consider OT consult to assist with ADL evaluation and planning for discharge  - Provide patient education as appropriate  Outcome: Progressing  Goal: Maintain proper alignment of affected body part  Description: INTERVENTIONS:  - Support, maintain and protect limb and body alignment  - Provide patient/ family with appropriate education  Outcome: Progressing     Problem: PAIN - ADULT  Goal: Verbalizes/displays adequate comfort level or baseline comfort level  Description: Interventions:  - Encourage patient to monitor pain and request assistance  - Assess pain using appropriate pain scale  - Administer analgesics based on type and severity of pain and evaluate response  - Implement non-pharmacological measures as appropriate and evaluate response  - Consider cultural and social influences on pain and pain management  - Notify physician/advanced practitioner if interventions unsuccessful or patient reports new pain  Outcome: Progressing     Problem: INFECTION - ADULT  Goal: Absence or prevention of progression during hospitalization  Description: INTERVENTIONS:  - Assess and monitor for signs and symptoms of infection  - Monitor lab/diagnostic results  - Monitor all insertion sites, i e  indwelling lines, tubes, and drains  - Monitor endotracheal if appropriate and nasal secretions for changes in amount and color  - Wells appropriate cooling/warming therapies per order  - Administer medications as ordered  - Instruct and encourage patient and family to use good hand hygiene technique  - Identify and instruct in appropriate isolation precautions for identified infection/condition  Outcome: Progressing  Goal: Absence of fever/infection during neutropenic period  Description: INTERVENTIONS:  - Monitor WBC    Outcome: Progressing     Problem: SAFETY ADULT  Goal: Maintain or return to baseline ADL function  Description: INTERVENTIONS:  -  Assess patient's ability to carry out ADLs; assess patient's baseline for ADL function and identify physical deficits which impact ability to perform ADLs (bathing, care of mouth/teeth, toileting, grooming, dressing, etc )  - Assess/evaluate cause of self-care deficits   - Assess range of motion  - Assess patient's mobility; develop plan if impaired  - Assess patient's need for assistive devices and provide as appropriate  - Encourage maximum independence but intervene and supervise when necessary  - Involve family in performance of ADLs  - Assess for home care needs following discharge   - Consider OT consult to assist with ADL evaluation and planning for discharge  - Provide patient education as appropriate  Outcome: Progressing  Goal: Maintains/Returns to pre admission functional level  Description: INTERVENTIONS:  - Perform BMAT or MOVE assessment daily    - Set and communicate daily mobility goal to care team and patient/family/caregiver  - Collaborate with rehabilitation services on mobility goals if consulted  - Perform Range of Motion   times a day  - Reposition patient every   hours  - Dangle patient   times a day  - Stand patient   times a day  - Ambulate patient   times a day  - Out of bed to chair   times a day   - Out of bed for meals     times a day  - Out of bed for toileting  - Record patient progress and toleration of activity level   Outcome: Progressing  Goal: Patient will remain free of falls  Description: INTERVENTIONS:  - Educate patient/family on patient safety including physical limitations  - Instruct patient to call for assistance with activity   - Consult OT/PT to assist with strengthening/mobility   - Keep Call bell within reach  - Keep bed low and locked with side rails adjusted as appropriate  - Keep care items and personal belongings within reach  - Initiate and maintain comfort rounds  - Make Fall Risk Sign visible to staff  - Offer Toileting every   Hours, in advance of need  - Initiate/Maintain   alarm  - Obtain necessary fall risk management equipment:      - Apply yellow socks and bracelet for high fall risk patients  - Consider moving patient to room near nurses station  Outcome: Progressing     Problem: DISCHARGE PLANNING  Goal: Discharge to home or other facility with appropriate resources  Description: INTERVENTIONS:  - Identify barriers to discharge w/patient and caregiver  - Arrange for needed discharge resources and transportation as appropriate  - Identify discharge learning needs (meds, wound care, etc )  - Arrange for interpretive services to assist at discharge as needed  - Refer to Case Management Department for coordinating discharge planning if the patient needs post-hospital services based on physician/advanced practitioner order or complex needs related to functional status, cognitive ability, or social support system  Outcome: Progressing     Problem: Knowledge Deficit  Goal: Patient/family/caregiver demonstrates understanding of disease process, treatment plan, medications, and discharge instructions  Description: Complete learning assessment and assess knowledge base    Interventions:  - Provide teaching at level of understanding  - Provide teaching via preferred learning methods  Outcome: Progressing

## 2022-09-12 NOTE — ED NOTES
Ortiz catheter placed  Urine return started as clear and yellow, turned to cloudy and yellow  Cath clamped after 1000mL       Di SALENA Moran  09/12/22 [id number]

## 2022-09-12 NOTE — ASSESSMENT & PLAN NOTE
Has history of chronic osteomyelitis since March   CRP 13 6 - is decreased from last measured value in March of this year  CT imaging shows no acute osteomyelitis, same chronic osteomyelitis of L4-L5

## 2022-09-12 NOTE — H&P
[address]  H&P- Brain Nikkie Sr  1965, 62 y o  male MRN: [id number]  Unit/Bed#: -Rosmer[place] Encounter: [id number]  Primary Care Provider: Deanne Hines DO   Date and time admitted to hospital: 9/12/2022  2:02 PM    * Low back pain  Assessment & Plan  · History of chronic lumbar diskitis with chronic osteomyelitis - has chronic pain but last 2 weeks has been acute worse when radiation to left leg increased pain/numbness and weakness in left leg   · Since March has been unable to walk, for years has had back pain with history of multiple surgeries   · CT abdomen pelvis - L5-S1 diskitis/osteomyelitis seen again  Similar endplate destructive changes and irregularity, however decreased component of mineralized material long intervertebral disc space noted at this level compared to prior, unsure significance, if relevant consider MRI  · CT lumbar recon - no acute osseous abnormality of lumbar spine, chronic L4-L5 diskitis/osteomyelitis with unchanged mild vertebral body height loss of L4 on L5 s/p L4-L5 decompressive laminectomy changes  · 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  · Had similar admission in July of this year at [facility] - was found to have fluid collection in L4-L5 space which was initially [place] suspicious for epidural abscess and was ruled out    Will continue home pain med regimen - PDMP reviewed   · oxycodone 5 mg every 4 hours  · Belbuca film 150 mcg BID  · Pregabalin 75 mg BID  Will check MRI lumbar spine with and without contrast    Retention of urine s/p camarena in place  Assessment & Plan  · Has history of same in June of this year - discharge with Camarena and outpatient voiding trial  · ED Noted retention on bladder scan  · CT abd/pelvis - moderate urinary bladder distention with mild persistent bilateral hydronephrosis    No obstructing calculi  · Continue Flomax  · Never had a urology outpatient follow up after hospitalization - will ask urology to see    Hypercalcemia  Assessment & Plan  · History of hypercalcemia-was recently worked up while inpatient in July at [facility] AT THE [facility]  · Workup negative, thought to be due to immobilization  · Will treat with fluid and monitor    CKD (chronic kidney disease)  Assessment & Plan  Lab Results   Component Value Date    EGFR 19 09/12/2022    EGFR 17 06/24/2022    EGFR 15 06/23/2022    CREATININE 3 26 (H) 09/12/2022    CREATININE 3 69 (H) 06/24/2022    CREATININE 4 07 (H) 06/23/2022   Baseline creatinine 3 6-3 9  Currently below baseline  Continue to monitor  Avoid nephrotoxic agents, NSAIDs, hypotension    Ambulatory dysfunction  Assessment & Plan  · Since March due to chronic osteomyelitis in disc changes  · History of chronic back pain and multiple surgeries    · Several hospitalizations/workups without specific cause  · Patient endorses he is not able to walk at all  · Pain is getting worse    · Will ask PT/OT to evaluate    Iron deficiency anemia secondary to inadequate dietary iron intake  Assessment & Plan  Hemoglobin 11 5 - higher than baseline of 8-9      Osteomyelitis of vertebra of lumbar region [facility])  Assessment & Plan  Has history of chronic osteomyelitis since March   CRP 13 6 - is decreased from last measured value in March of this year  CT imaging shows no acute osteomyelitis, same chronic osteomyelitis of L4-L5    History of CVA (cerebrovascular accident)  Assessment & Plan  Continue aspirin and statin daily    Hypertension  Assessment & Plan  Continue coreg and amlodipine   Continue to monitor BP    Diabetes mellitus ([facility] [address] )  Assessment & Plan  Lab Results   Component Value Date    HGBA1C 6 0 (H) 07/05/2022       No results for input(s): POCGLU in the last 72 hours      Blood Sugar Average: Last 72 hrs:  ·  Outpatient regimen includes 36 units Levemir daily, 8 units with meals and bedtime with sliding scale  · Continue diabetic diet  · Continue insulin at slightly lower dose than outpatient    VTE Pharmacologic Prophylaxis: VTE Score: 3 High Risk (Score >/= 5) - Pharmacological DVT Prophylaxis Ordered: heparin  Sequential Compression Devices Ordered  Code Status: Level 1 - Full Code Level 1  Discussion with family: Updated  (wife) at bedside  Anticipated Length of Stay: Patient will be admitted on an inpatient basis with an anticipated length of stay of greater than 2 midnights secondary to acute low back pain  Total Time for Visit, including Counseling / Coordination of Care: 60 minutes Greater than 50% of this total time spent on direct patient counseling and coordination of care  Chief Complaint: low back pain     History of Present Illness:  Kaycee Bravo  is a 62 y o  male with a PMH of diabetes, CKD, hypertension, chronic lumbar osteomyelitis who presents with worsening of chronic low back pain in the last 2 weeks  Has been having sacral pain with radiation to left leg and increased numbness and weakness in left leg  No fevers or chills at home  No injury  History begins at the beginning of the year when started to have worsening of chronic lower back pain  In March developed osteomyelitis and since has not been able to ambulate  Has history of longstanding chronic low back pain with multiple surgeries  Has had exacerbations of low back pain in the past requiring hospitalization without any acute findings  Currently maintained on outpatient pain regimen of Oxycodone, Belbuca and pregabalin  Has not noticed out right urinary tension however has had decrease in amount of urine made/frequency  Review of Systems:  Review of Systems   Constitutional: Negative for activity change, chills and fever  HENT: Negative for congestion, rhinorrhea and sore throat  Eyes: Negative for visual disturbance  Respiratory: Negative for cough, chest tightness and shortness of breath  Cardiovascular: Negative for chest pain and palpitations     Gastrointestinal: Positive for constipation  Negative for abdominal pain, diarrhea, nausea and vomiting  Genitourinary: Positive for decreased urine volume  Negative for difficulty urinating, dysuria, frequency and urgency  Musculoskeletal: Positive for back pain  Negative for arthralgias and myalgias  Skin: Negative for rash  Neurological: Positive for weakness and numbness  Negative for seizures, syncope and headaches  All other systems reviewed and are negative  Past Medical and Surgical History:   Past Medical History:   Diagnosis Date    Diabetes mellitus ([facility] [address] )     Gout     Hypertension     Renal disorder     Stroke [facility])        Past Surgical History:   Procedure Laterality Date    BACK SURGERY         Meds/Allergies:  Prior to Admission medications    Medication Sig Start Date End Date Taking? Authorizing Provider   acetaminophen (TYLENOL) 325 mg tablet Take 650 mg by mouth every 4 (four) hours as needed for mild pain    Historical Provider, MD   allopurinol (ZYLOPRIM) 100 mg tablet Take 2 tablets (200 mg total) by mouth daily 6/24/22 7/24/22  Valeriano Ren MD   amLODIPine (NORVASC) 10 mg tablet Take 1 tablet by mouth daily 2/22/22   Historical Provider, MD   atorvastatin (LIPITOR) 40 mg tablet Take 40 mg by mouth 1/3/22   Historical Provider, MD   colchicine (COLCRYS) 0 6 mg tablet Take 1 tablet by mouth as needed    Historical Provider, MD   hydrALAZINE (APRESOLINE) 50 mg tablet Take 50 mg by mouth Three times a day 7/14/22 7/14/23  Historical Provider, MD   insulin detemir (LEVEMIR) 100 units/mL subcutaneous injection Inject 36 Units under the skin    Historical Provider, MD   insulin lispro (HumaLOG) 100 units/mL injection Inject 8 Units under the skin 4 (four) times a day (before meals and at bedtime) 2 units per 50 mg/dl over 150   Hold insulin if fingerstick blood glucoses < 90 6/24/22   Madison Ely MD   lidocaine (LIDODERM) 5 % Apply 1 patch topically daily Remove & Discard patch within 12 hours or as directed by MD 6/25/22   Estella Comer MD   melatonin 3 mg Take 10 5 mg by mouth daily as needed 3/16/22   Historical Provider, MD   methocarbamol (ROBAXIN) 500 mg tablet Take 1 tablet (500 mg total) by mouth every 6 (six) hours as needed for muscle spasms 6/24/22   Estella Comer MD   oxyCODONE (ROXICODONE) 5 immediate release tablet Take 1 tablet (5 mg total) by mouth every 4 (four) hours as needed for severe pain Max Daily Amount: 30 mg 6/24/22   Estella Comer MD   pregabalin (LYRICA) 150 mg capsule Take 150 mg by mouth 2 (two) times a day 3/16/22   Historical Provider, MD   senna-docusate sodium (SENOKOT S) 8 6-50 mg per tablet Take 1 tablet by mouth 2 (two) times a day 3/17/22   Historical Provider, MD   sevelamer (RENAGEL) 800 mg tablet Take 1 tablet (800 mg total) by mouth daily with dinner 6/24/22 7/24/22  Estella Comer MD   tamsulosin (FLOMAX) 0 4 mg Take by mouth 5/26/22   Historical Provider, MD   thiamine 100 MG tablet Take 100 mg by mouth daily 3/17/22   Historical Provider, MD   traMADol (ULTRAM) 50 mg tablet Take 1 tablet (50 mg total) by mouth 2 (two) times a day as needed for moderate pain 6/24/22   Estella Comer MD     I have reviewed home medications with patient personally  Allergies:    Allergies   Allergen Reactions    Bupropion Delirium     "went nuts," prior to 2012  "went nuts," prior to 2012  "went nuts," prior to 2012  "went nuts," prior to 2012      Metformin Other (See Comments)     Other reaction(s): kidney disease  Other reaction(s): kidney disease  Other reaction(s): kidney disease      Medical Tape Rash       Social History:  Marital Status: /Civil Union   Occupation: None  Patient Pre-hospital Living Situation: Home  Patient Pre-hospital Level of Mobility: non-ambulatory/bed bound  Patient Pre-hospital Diet Restrictions: Diabetic  Substance Use History:   Social History     Substance and Sexual Activity   Alcohol Use Not Currently     Social History Tobacco Use   Smoking Status Current Every Day Smoker    Packs/day: 0 50    Types: Cigarettes   Smokeless Tobacco Never Used     Social History     Substance and Sexual Activity   Drug Use Yes    Types: Marijuana       Family History:  History reviewed  No pertinent family history  Physical Exam:     Vitals:   Blood Pressure: (!) 173/95 (09/12/22 1627)  Pulse: 58 (09/12/22 1627)  Temperature: 97 9 °F (36 6 °C) (09/12/22 1627)  Temp Source: Temporal (09/12/22 1405)  Respirations: 18 (09/12/22 1627)  Height: 5' 11" (180 3 cm) (09/12/22 1405)  Weight - Scale: 71 1 kg (156 lb 12 oz) (09/12/22 1405)  SpO2: 96 % (09/12/22 1638)    Physical Exam  Vitals and nursing note reviewed  Constitutional:       General: He is not in acute distress  Appearance: Normal appearance  HENT:      Head: Normocephalic and atraumatic  Nose: No congestion  Mouth/Throat:      Mouth: Mucous membranes are moist    Eyes:      Conjunctiva/sclera: Conjunctivae normal    Cardiovascular:      Rate and Rhythm: Normal rate and regular rhythm  Pulses: Normal pulses  Heart sounds: Normal heart sounds  No murmur heard  Pulmonary:      Effort: Pulmonary effort is normal  No respiratory distress  Breath sounds: Normal breath sounds  Abdominal:      General: Bowel sounds are normal       Palpations: Abdomen is soft  Tenderness: There is no abdominal tenderness  Musculoskeletal:         General: Normal range of motion  Right lower leg: No edema  Left lower leg: No edema  Skin:     General: Skin is warm and dry  Neurological:      Mental Status: He is alert and oriented to person, place, and time  Sensory: Sensation is intact  Motor: Weakness (L>R lower extremity weakness) present        Comments: Able to move bilateral extremities, strength is less in left lower extremity, upper extremity unaffected           Additional Data:     Lab Results:  Results from last 7 days   Lab Units 09/12/22  1422   WBC Thousand/uL 6 68   HEMOGLOBIN g/dL 11 5*   HEMATOCRIT % 36 4*   PLATELETS Thousands/uL 248   NEUTROS PCT % 61   LYMPHS PCT % 23   MONOS PCT % 11   EOS PCT % 3     Results from last 7 days   Lab Units 09/12/22  1422   SODIUM mmol/L 134*   POTASSIUM mmol/L 4 1   CHLORIDE mmol/L 97   CO2 mmol/L 32   BUN mg/dL 51*   CREATININE mg/dL 3 26*   ANION GAP mmol/L 5   CALCIUM mg/dL 11 8*   ALBUMIN g/dL 3 0*   TOTAL BILIRUBIN mg/dL 0 16*   ALK PHOS U/L 109   ALT U/L 9*   AST U/L 9   GLUCOSE RANDOM mg/dL 264*     Results from last 7 days   Lab Units 09/12/22  1422   INR  0 89             Results from last 7 days   Lab Units 09/12/22  1422   LACTIC ACID mmol/L 0 9       Imaging: Reviewed radiology reports from this admission including: abdominal/pelvic CT and lumbar CT  CT abdomen pelvis wo contrast   Final Result by Elly Aleman DO (09/12 1530)      Findings of L5-S1 discitis/osteomyelitis again seen  Overall similar endplate destructive changes and irregularity, however, decreased component of mineralized material along the intervertebral disc space noted at this level compared to prior exam      Significance of this change, if any, is uncertain  If relevant, consider MRI with and without IV contrast for further evaluation  Moderate urinary bladder distention with mild persistent bilateral hydronephrosis, the latter perhaps related to bladder distention  No obstructing calculi  Cannot exclude some debris within the urinary bladder  Correlate with urinalysis  Moderate amount of colonic stool may reflect constipation  Otherwise, no acute intra-abdominal or pelvic pathology  Limited study without IV or oral contrast       Workstation performed: [id number]         CT recon only lumbar spine   Final Result by Vega Napier MD (09/12 6079)      No acute osseous abnormality of lumbar spine        Chronic L4-L5 discitis/osteomyelitis with unchanged mild vertebral body height loss of L4 and L5 status post L4-L5 decompressive laminectomy changes  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

## 2022-09-12 NOTE — ASSESSMENT & PLAN NOTE
Lab Results   Component Value Date    HGBA1C 6 0 (H) 07/05/2022       No results for input(s): POCGLU in the last 72 hours      Blood Sugar Average: Last 72 hrs:  ·  Outpatient regimen includes 36 units Levemir daily, 8 units with meals and bedtime with sliding scale  · Continue diabetic diet  · Continue insulin at slightly lower dose than outpatient

## 2022-09-12 NOTE — ED PROVIDER NOTES
History  Chief Complaint   Patient presents with    Numbness     Pt reports LLE numbness and pain for the past 2 weeks, pt states it started near his sacrum and moved to his leg  Patient is a 44-year-old male with history of diabetes chronic kidney disease prior stroke chronically bedbound with history of lumbar diskitis and osteomyelitis who presents emergency department complaining of lower back pain in the sacral region radiating into left leg with increased pain numbness and weakness in the left leg over the past 2 weeks  Patient denies any fevers or chills  History provided by:  Patient  Back Pain  Location:  Lumbar spine and sacro-iliac joint  Quality:  Aching and cramping  Radiates to:  L posterior upper leg, L thigh and L knee  Pain severity:  Moderate  Onset quality:  Gradual  Duration:  2 weeks  Timing:  Constant  Progression:  Worsening  Chronicity:  Recurrent  Associated symptoms: weakness    Associated symptoms: no abdominal pain, no chest pain, no dysuria, no fever, no headaches and no numbness        Prior to Admission Medications   Prescriptions Last Dose Informant Patient Reported?  Taking?   acetaminophen (TYLENOL) 325 mg tablet   Yes No   Sig: Take 650 mg by mouth every 4 (four) hours as needed for mild pain   allopurinol (ZYLOPRIM) 100 mg tablet   No No   Sig: Take 2 tablets (200 mg total) by mouth daily   amLODIPine (NORVASC) 10 mg tablet   Yes No   Sig: Take 1 tablet by mouth daily   atorvastatin (LIPITOR) 40 mg tablet   Yes No   Sig: Take 40 mg by mouth   colchicine (COLCRYS) 0 6 mg tablet   Yes No   Sig: Take 1 tablet by mouth as needed   hydrALAZINE (APRESOLINE) 50 mg tablet   Yes No   Sig: Take 50 mg by mouth Three times a day   insulin detemir (LEVEMIR) 100 units/mL subcutaneous injection   Yes No   Sig: Inject 36 Units under the skin   insulin lispro (HumaLOG) 100 units/mL injection   No No   Sig: Inject 8 Units under the skin 4 (four) times a day (before meals and at bedtime) 2 units per 50 mg/dl over 150  Hold insulin if fingerstick blood glucoses < 90   lidocaine (LIDODERM) 5 %   No No   Sig: Apply 1 patch topically daily Remove & Discard patch within 12 hours or as directed by MD   melatonin 3 mg   Yes No   Sig: Take 10 5 mg by mouth daily as needed   methocarbamol (ROBAXIN) 500 mg tablet   No No   Sig: Take 1 tablet (500 mg total) by mouth every 6 (six) hours as needed for muscle spasms   oxyCODONE (ROXICODONE) 5 immediate release tablet   No No   Sig: Take 1 tablet (5 mg total) by mouth every 4 (four) hours as needed for severe pain Max Daily Amount: 30 mg   pregabalin (LYRICA) 150 mg capsule   Yes No   Sig: Take 150 mg by mouth 2 (two) times a day   senna-docusate sodium (SENOKOT S) 8 6-50 mg per tablet   Yes No   Sig: Take 1 tablet by mouth 2 (two) times a day   sevelamer (RENAGEL) 800 mg tablet   No No   Sig: Take 1 tablet (800 mg total) by mouth daily with dinner   tamsulosin (FLOMAX) 0 4 mg   Yes No   Sig: Take by mouth   thiamine 100 MG tablet   Yes No   Sig: Take 100 mg by mouth daily   traMADol (ULTRAM) 50 mg tablet   No No   Sig: Take 1 tablet (50 mg total) by mouth 2 (two) times a day as needed for moderate pain      Facility-Administered Medications: None       Past Medical History:   Diagnosis Date    Diabetes mellitus ([facility] [address] )     Gout     Hypertension     Renal disorder     Stroke [facility])        Past Surgical History:   Procedure Laterality Date    BACK SURGERY         History reviewed  No pertinent family history  I have reviewed and agree with the history as documented  E-Cigarette/Vaping     E-Cigarette/Vaping Substances     Social History     Tobacco Use    Smoking status: Current Every Day Smoker     Packs/day: 0 50     Types: Cigarettes    Smokeless tobacco: Never Used   Substance Use Topics    Alcohol use: Not Currently    Drug use: Yes     Types: Marijuana       Review of Systems   Constitutional: Positive for fatigue   Negative for activity change, appetite change, chills and fever  HENT: Negative for congestion, ear pain, rhinorrhea and sore throat  Eyes: Negative for discharge, redness and visual disturbance  Respiratory: Negative for cough, chest tightness, shortness of breath and wheezing  Cardiovascular: Negative for chest pain and palpitations  Gastrointestinal: Negative for abdominal pain, constipation, diarrhea, nausea and vomiting  Endocrine: Negative for polydipsia and polyuria  Genitourinary: Negative for difficulty urinating, dysuria, frequency, hematuria and urgency  Musculoskeletal: Positive for back pain  Negative for arthralgias and myalgias  Left leg pain   Skin: Negative for color change, pallor and rash  Neurological: Positive for weakness  Negative for dizziness, light-headedness, numbness and headaches  Hematological: Negative for adenopathy  Does not bruise/bleed easily  All other systems reviewed and are negative  Physical Exam  Physical Exam  Vitals and nursing note reviewed  Constitutional:       Appearance: He is well-developed  HENT:      Head: Normocephalic and atraumatic  Right Ear: External ear normal       Left Ear: External ear normal       Nose: Nose normal    Eyes:      Conjunctiva/sclera: Conjunctivae normal       Pupils: Pupils are equal, round, and reactive to light  Cardiovascular:      Rate and Rhythm: Normal rate and regular rhythm  Heart sounds: Normal heart sounds  Pulmonary:      Effort: Pulmonary effort is normal  No respiratory distress  Breath sounds: Normal breath sounds  No wheezing or rales  Chest:      Chest wall: No tenderness  Abdominal:      General: Bowel sounds are normal  There is no distension  Palpations: Abdomen is soft  Tenderness: There is no abdominal tenderness  There is no guarding  Musculoskeletal:         General: Normal range of motion  Cervical back: Normal range of motion and neck supple        Comments: Decreased range of motion in the left leg patient able to move and lift leg on bed slightly with low Strength chronically bedbound  Sensation intact  Peripheral pulses normal      Skin:     General: Skin is warm and dry  Neurological:      Mental Status: He is alert and oriented to person, place, and time  Cranial Nerves: No cranial nerve deficit  Sensory: No sensory deficit  Motor: Weakness present  Vital Signs  ED Triage Vitals [09/12/22 1405]   Temperature Pulse Respirations Blood Pressure SpO2   (!) 97 °F (36 1 °C) 74 20 131/77 97 %      Temp Source Heart Rate Source Patient Position - Orthostatic VS BP Location FiO2 (%)   Temporal Monitor Sitting Left arm --      Pain Score       8           Vitals:    09/12/22 1430 09/12/22 1500 09/12/22 1515 09/12/22 1530   BP: 144/81      Pulse: 65 59 60 58   Patient Position - Orthostatic VS:             Visual Acuity      ED Medications  Medications   sodium chloride 0 9 % bolus 1,000 mL (1,000 mL Intravenous New Bag 9/12/22 1515)   fentanyl citrate (PF) 100 MCG/2ML 50 mcg (50 mcg Intravenous Given 9/12/22 1444)       Diagnostic Studies  Results Reviewed     Procedure Component Value Units Date/Time    Lactic acid [[id number]]  (Normal) Collected: 09/12/22 1422    Lab Status: Final result Specimen: Blood from Arm, Left Updated: 09/12/22 1453     LACTIC ACID 0 9 mmol/L     Narrative:      Result may be elevated if tourniquet was used during collection      Comprehensive metabolic panel [[id number]]  (Abnormal) Collected: 09/12/22 1422    Lab Status: Final result Specimen: Blood from Arm, Left Updated: 09/12/22 1453     Sodium 134 mmol/L      Potassium 4 1 mmol/L      Chloride 97 mmol/L      CO2 32 mmol/L      ANION GAP 5 mmol/L      BUN 51 mg/dL      Creatinine 3 26 mg/dL      Glucose 264 mg/dL      Calcium 11 8 mg/dL      Corrected Calcium 12 6 mg/dL      AST 9 U/L      ALT 9 U/L      Alkaline Phosphatase 109 U/L      Total Protein 6 9 g/dL      Albumin 3 0 g/dL      Total Bilirubin 0 16 mg/dL      eGFR 19 ml/min/1 73sq m     Narrative:      [place] guidelines for Chronic Kidney Disease (CKD):     Stage 1 with normal or high GFR (GFR > 90 mL/min/1 73 square meters)    Stage 2 Mild CKD (GFR = 60-89 mL/min/1 73 square meters)    Stage 3A Moderate CKD (GFR = 45-59 mL/min/1 73 square meters)    Stage 3B Moderate CKD (GFR = 30-44 mL/min/1 73 square meters)    Stage 4 Severe CKD (GFR = 15-29 mL/min/1 73 square meters)    Stage 5 End Stage CKD (GFR <15 mL/min/1 73 square meters)  Note: GFR calculation is accurate only with a steady state creatinine    C-reactive protein [[id number]]  (Abnormal) Collected: 09/12/22 1422    Lab Status: Final result Specimen: Blood from Arm, Left Updated: 09/12/22 1447     CRP 13 6 mg/L     Magnesium [[id number]]  (Abnormal) Collected: 09/12/22 1422    Lab Status: Final result Specimen: Blood from Arm, Left Updated: 09/12/22 1447     Magnesium 3 2 mg/dL     CK Total with Reflex CKMB [[id number]]  (Abnormal) Collected: 09/12/22 1422    Lab Status: Final result Specimen: Blood from Arm, Left Updated: 09/12/22 1447     Total CK 24 U/L     Sedimentation rate, automated [[id number]]  (Abnormal) Collected: 09/12/22 1422    Lab Status: Final result Specimen: Blood from Arm, Left Updated: 09/12/22 1447     Sed Rate 55 mm/hour     Protime-INR [[id number]]  (Normal) Collected: 09/12/22 1422    Lab Status: Final result Specimen: Blood from Arm, Left Updated: 09/12/22 1441     Protime 12 1 seconds      INR 0 89    APTT [[id number]]  (Normal) Collected: 09/12/22 1422    Lab Status: Final result Specimen: Blood from Arm, Left Updated: 09/12/22 1441     PTT 30 seconds     CBC and differential [[id number]]  (Abnormal) Collected: 09/12/22 1422    Lab Status: Final result Specimen: Blood from Arm, Left Updated: 09/12/22 1429     WBC 6 68 Thousand/uL      RBC 4 09 Million/uL      Hemoglobin 11 5 g/dL      Hematocrit 36 4 %      MCV 89 fL MCH 28 1 pg      MCHC 31 6 g/dL      RDW 18 0 %      MPV 10 2 fL      Platelets 150 Thousands/uL      nRBC 0 /100 WBCs      Neutrophils Relative 61 %      Immat GRANS % 1 %      Lymphocytes Relative 23 %      Monocytes Relative 11 %      Eosinophils Relative 3 %      Basophils Relative 1 %      Neutrophils Absolute 4 14 Thousands/µL      Immature Grans Absolute 0 04 Thousand/uL      Lymphocytes Absolute 1 50 Thousands/µL      Monocytes Absolute 0 73 Thousand/µL      Eosinophils Absolute 0 22 Thousand/µL      Basophils Absolute 0 05 Thousands/µL     Blood culture #1 [[id number]] Collected: 09/12/22 1422    Lab Status: In process Specimen: Blood from Arm, Left Updated: 09/12/22 1427    Blood culture #2 [[id number]] Collected: 09/12/22 1422    Lab Status: In process Specimen: Blood from Arm, Right Updated: 09/12/22 1426    UA w Reflex to Microscopic w Reflex to Culture [[id number]]     Lab Status: No result Specimen: Urine                  CT abdomen pelvis wo contrast   Final Result by Hilary aNylor DO (09/12 1530)      Findings of L5-S1 discitis/osteomyelitis again seen  Overall similar endplate destructive changes and irregularity, however, decreased component of mineralized material along the intervertebral disc space noted at this level compared to prior exam      Significance of this change, if any, is uncertain  If relevant, consider MRI with and without IV contrast for further evaluation  Moderate urinary bladder distention with mild persistent bilateral hydronephrosis, the latter perhaps related to bladder distention  No obstructing calculi  Cannot exclude some debris within the urinary bladder  Correlate with urinalysis  Moderate amount of colonic stool may reflect constipation  Otherwise, no acute intra-abdominal or pelvic pathology        Limited study without IV or oral contrast       Workstation performed: [id number]         CT recon only lumbar spine   Final Result by Sheri Andreina Reno MD (09/12 1514)      No acute osseous abnormality of lumbar spine  Chronic L4-L5 discitis/osteomyelitis with unchanged mild vertebral body height loss of L4 and L5 status post L4-L5 decompressive laminectomy changes  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  Please see same-day CT abdomen pelvis for further evaluation  The study was marked in [facility] for immediate notification  Workstation performed: [id number]                    Procedures  ECG 12 Lead Documentation Only    Date/Time: 9/12/2022 2:38 PM  Performed by: Lorenzo Monsalve DO  Authorized by: Lorenzo Monsalve DO     ECG reviewed by me, the ED Provider: yes    Patient location:  ED  Previous ECG:     Comparison to cardiac monitor: Yes    Quality:     Tracing quality:  Limited by artifact  Rate:     ECG rate:  63    ECG rate assessment: normal    Rhythm:     Rhythm: sinus rhythm    QRS:     QRS axis:  Left    QRS intervals:  Normal  Conduction:     Conduction: normal    ST segments:     ST segments:  Normal  T waves:     T waves: non-specific and flattening               ED Course  ED Course as of 09/12/22 1554   Mon Sep 12, 2022   1525 CT findings in the lumbar spine show chronic diskitis/osteomyelitis no acute osseous abnormality  Will hold on antibiotics at this time no leukocytosis or fever will continue with IV hydration for electrolyte abnormalities and pain control as needed and place Ortiz catheter in the ED for urinary retention  36 Spoke with Dr Todd Strickland hospitalist on-call reviewed case and findings in the emergency department accepts for admission                                               MDM  Number of Diagnoses or Management Options  Bilateral hydronephrosis: new and requires workup  Chronic kidney disease: new and requires workup  Discitis of lumbar region: new and requires workup  Hypercalcemia: new and requires workup  Hypermagnesemia: new and requires workup  Leg pain, left: new and requires workup  Osteomyelitis of lumbar spine [facility]): new and requires workup  Urinary retention: new and requires workup     Amount and/or Complexity of Data Reviewed  Clinical lab tests: reviewed and ordered  Tests in the radiology section of CPT®: ordered and reviewed  Tests in the medicine section of CPT®: ordered and reviewed  Decide to obtain previous medical records or to obtain history from someone other than the patient: yes  Review and summarize past medical records: yes  Independent visualization of images, tracings, or specimens: yes    Risk of Complications, Morbidity, and/or Mortality  Presenting problems: moderate  Diagnostic procedures: moderate  Management options: moderate    Patient Progress  Patient progress: stable      Disposition  Final diagnoses:   Bilateral hydronephrosis   Urinary retention   Discitis of lumbar region - Chronic   Osteomyelitis of lumbar spine (HCC) - Chronic   Leg pain, left   Hypercalcemia   Hypermagnesemia   Chronic kidney disease     Time reflects when diagnosis was documented in both MDM as applicable and the Disposition within this note     Time User Action Codes Description Comment    9/12/2022  3:50 PM Tien Perone Add [N13 30] Bilateral hydronephrosis     9/12/2022  3:50 PM [place]oxi Perone Add [R33 9] Urinary retention     9/12/2022  3:50 PM [place] Perone Add [M46 46] Discitis of lumbar region     9/12/2022  3:50 PM Tien Perone Modify [M46 46] Discitis of lumbar region Chronic    9/12/2022  3:50 PM [place] Perone Add [M46 26] Osteomyelitis of lumbar spine ([facility] [address] )     9/12/2022  3:50 PM [place] Perone Modify [M46 26] Osteomyelitis of lumbar spine ([facility] [address] ) Chronic    9/12/2022  3:50 PM Tien Perone Add [M79 605] Leg pain, left     9/12/2022  3:50 PM Tien Perone Add [E83 52] Hypercalcemia     9/12/2022  3:50 PM Tien Perone Add [E83 41] Hypermagnesemia     9/12/2022  3:51 PM [place] Perone Add [N18 9] Chronic kidney disease       ED Disposition     ED Disposition   Admit    Condition   Stable    Date/Time   Mon Sep 12, 2022  3:51 PM    Comment   Case was discussed with Dr Elgin Mendoza and the patient's admission status was agreed to be Admission Status: inpatient status to the service of Dr Elgin Mendoza  Follow-up Information    None         Patient's Medications   Discharge Prescriptions    No medications on file       No discharge procedures on file      PDMP Review       Value Time User    PDMP Reviewed  Yes 6/24/2022 12:14 PM Savita Jacob MD          ED Provider  Electronically Signed by           Philip Paulino DO  09/12/22 1554

## 2022-09-13 ENCOUNTER — APPOINTMENT (OUTPATIENT)
Dept: MRI IMAGING | Facility: HOSPITAL | Age: 57
DRG: 698 | End: 2022-09-13
Payer: MEDICARE

## 2022-09-13 ENCOUNTER — TELEPHONE (OUTPATIENT)
Dept: OTHER | Facility: HOSPITAL | Age: 57
End: 2022-09-13

## 2022-09-13 LAB
25(OH)D3 SERPL-MCNC: 30.8 NG/ML (ref 30–100)
ALBUMIN SERPL BCP-MCNC: 2.6 G/DL (ref 3.5–5)
ALP SERPL-CCNC: 104 U/L (ref 46–116)
ALT SERPL W P-5'-P-CCNC: 7 U/L (ref 12–78)
ANION GAP SERPL CALCULATED.3IONS-SCNC: 7 MMOL/L (ref 4–13)
AST SERPL W P-5'-P-CCNC: 8 U/L (ref 5–45)
BILIRUB SERPL-MCNC: 0.2 MG/DL (ref 0.2–1)
BUN SERPL-MCNC: 43 MG/DL (ref 5–25)
CALCIUM ALBUM COR SERPL-MCNC: 11.7 MG/DL (ref 8.3–10.1)
CALCIUM SERPL-MCNC: 10.6 MG/DL (ref 8.3–10.1)
CHLORIDE SERPL-SCNC: 103 MMOL/L (ref 96–108)
CO2 SERPL-SCNC: 28 MMOL/L (ref 21–32)
CREAT SERPL-MCNC: 2.9 MG/DL (ref 0.6–1.3)
ERYTHROCYTE [DISTWIDTH] IN BLOOD BY AUTOMATED COUNT: 18.2 % (ref 11.6–15.1)
GFR SERPL CREATININE-BSD FRML MDRD: 22 ML/MIN/1.73SQ M
GLUCOSE SERPL-MCNC: 105 MG/DL (ref 65–140)
GLUCOSE SERPL-MCNC: 114 MG/DL (ref 65–140)
GLUCOSE SERPL-MCNC: 176 MG/DL (ref 65–140)
GLUCOSE SERPL-MCNC: 228 MG/DL (ref 65–140)
GLUCOSE SERPL-MCNC: 92 MG/DL (ref 65–140)
HCT VFR BLD AUTO: 32.5 % (ref 36.5–49.3)
HGB BLD-MCNC: 10.3 G/DL (ref 12–17)
MAGNESIUM SERPL-MCNC: 2.6 MG/DL (ref 1.6–2.6)
MCH RBC QN AUTO: 28.4 PG (ref 26.8–34.3)
MCHC RBC AUTO-ENTMCNC: 31.7 G/DL (ref 31.4–37.4)
MCV RBC AUTO: 90 FL (ref 82–98)
PLATELET # BLD AUTO: 228 THOUSANDS/UL (ref 149–390)
PMV BLD AUTO: 10.3 FL (ref 8.9–12.7)
POTASSIUM SERPL-SCNC: 4.3 MMOL/L (ref 3.5–5.3)
PROT SERPL-MCNC: 6 G/DL (ref 6.4–8.4)
PTH-INTACT SERPL-MCNC: 18.8 PG/ML (ref 18.4–80.1)
RBC # BLD AUTO: 3.63 MILLION/UL (ref 3.88–5.62)
SODIUM SERPL-SCNC: 138 MMOL/L (ref 135–147)
WBC # BLD AUTO: 7.35 THOUSAND/UL (ref 4.31–10.16)

## 2022-09-13 PROCEDURE — 99222 1ST HOSP IP/OBS MODERATE 55: CPT | Performed by: PHYSICIAN ASSISTANT

## 2022-09-13 PROCEDURE — 99232 SBSQ HOSP IP/OBS MODERATE 35: CPT

## 2022-09-13 PROCEDURE — NC001 PR NO CHARGE: Performed by: PHYSICIAN ASSISTANT

## 2022-09-13 PROCEDURE — 83970 ASSAY OF PARATHORMONE: CPT | Performed by: FAMILY MEDICINE

## 2022-09-13 PROCEDURE — 85027 COMPLETE CBC AUTOMATED: CPT

## 2022-09-13 PROCEDURE — 97167 OT EVAL HIGH COMPLEX 60 MIN: CPT

## 2022-09-13 PROCEDURE — 72148 MRI LUMBAR SPINE W/O DYE: CPT

## 2022-09-13 PROCEDURE — 82948 REAGENT STRIP/BLOOD GLUCOSE: CPT

## 2022-09-13 PROCEDURE — 83735 ASSAY OF MAGNESIUM: CPT

## 2022-09-13 PROCEDURE — 82306 VITAMIN D 25 HYDROXY: CPT | Performed by: FAMILY MEDICINE

## 2022-09-13 PROCEDURE — 80053 COMPREHEN METABOLIC PANEL: CPT

## 2022-09-13 PROCEDURE — G1004 CDSM NDSC: HCPCS

## 2022-09-13 PROCEDURE — 97163 PT EVAL HIGH COMPLEX 45 MIN: CPT

## 2022-09-13 RX ORDER — CEFTRIAXONE 1 G/50ML
1000 INJECTION, SOLUTION INTRAVENOUS EVERY 24 HOURS
Status: DISCONTINUED | OUTPATIENT
Start: 2022-09-13 | End: 2022-09-14 | Stop reason: HOSPADM

## 2022-09-13 RX ORDER — PREDNISONE 20 MG/1
40 TABLET ORAL DAILY
Status: DISCONTINUED | OUTPATIENT
Start: 2022-09-13 | End: 2022-09-14 | Stop reason: HOSPADM

## 2022-09-13 RX ADMIN — INSULIN LISPRO 2 UNITS: 100 INJECTION, SOLUTION INTRAVENOUS; SUBCUTANEOUS at 21:12

## 2022-09-13 RX ADMIN — POLYETHYLENE GLYCOL 3350 17 G: 17 POWDER, FOR SOLUTION ORAL at 08:14

## 2022-09-13 RX ADMIN — THIAMINE HCL TAB 100 MG 100 MG: 100 TAB at 08:13

## 2022-09-13 RX ADMIN — DOCUSATE SODIUM 100 MG: 100 CAPSULE ORAL at 08:13

## 2022-09-13 RX ADMIN — LIDOCAINE 5% 1 PATCH: 700 PATCH TOPICAL at 08:14

## 2022-09-13 RX ADMIN — CARVEDILOL 6.25 MG: 6.25 TABLET, FILM COATED ORAL at 16:48

## 2022-09-13 RX ADMIN — SODIUM BICARBONATE 650 MG TABLET 650 MG: at 16:51

## 2022-09-13 RX ADMIN — AMLODIPINE BESYLATE 10 MG: 10 TABLET ORAL at 08:14

## 2022-09-13 RX ADMIN — DOCUSATE SODIUM AND SENNOSIDES 1 TABLET: 8.6; 5 TABLET, FILM COATED ORAL at 16:51

## 2022-09-13 RX ADMIN — INSULIN LISPRO 4 UNITS: 100 INJECTION, SOLUTION INTRAVENOUS; SUBCUTANEOUS at 16:50

## 2022-09-13 RX ADMIN — VENLAFAXINE HYDROCHLORIDE 37.5 MG: 37.5 CAPSULE, EXTENDED RELEASE ORAL at 08:13

## 2022-09-13 RX ADMIN — HEPARIN SODIUM 5000 UNITS: 5000 INJECTION INTRAVENOUS; SUBCUTANEOUS at 13:05

## 2022-09-13 RX ADMIN — CARVEDILOL 6.25 MG: 6.25 TABLET, FILM COATED ORAL at 08:13

## 2022-09-13 RX ADMIN — CYCLOBENZAPRINE 5 MG: 10 TABLET, FILM COATED ORAL at 21:07

## 2022-09-13 RX ADMIN — CEFTRIAXONE 1000 MG: 1 INJECTION, SOLUTION INTRAVENOUS at 08:14

## 2022-09-13 RX ADMIN — CYCLOBENZAPRINE 5 MG: 10 TABLET, FILM COATED ORAL at 16:49

## 2022-09-13 RX ADMIN — SODIUM CHLORIDE 125 ML/HR: 0.9 INJECTION, SOLUTION INTRAVENOUS at 01:37

## 2022-09-13 RX ADMIN — SODIUM CHLORIDE 125 ML/HR: 0.9 INJECTION, SOLUTION INTRAVENOUS at 18:33

## 2022-09-13 RX ADMIN — VENLAFAXINE HYDROCHLORIDE 37.5 MG: 37.5 CAPSULE, EXTENDED RELEASE ORAL at 21:07

## 2022-09-13 RX ADMIN — PREDNISONE 40 MG: 20 TABLET ORAL at 13:05

## 2022-09-13 RX ADMIN — HEPARIN SODIUM 5000 UNITS: 5000 INJECTION INTRAVENOUS; SUBCUTANEOUS at 21:07

## 2022-09-13 RX ADMIN — BUPRENORPHINE HYDROCHLORIDE 150 MCG: 150 FILM, SOLUBLE BUCCAL at 21:07

## 2022-09-13 RX ADMIN — ATORVASTATIN CALCIUM 40 MG: 40 TABLET, FILM COATED ORAL at 16:48

## 2022-09-13 RX ADMIN — VENLAFAXINE HYDROCHLORIDE 37.5 MG: 37.5 CAPSULE, EXTENDED RELEASE ORAL at 16:49

## 2022-09-13 RX ADMIN — TAMSULOSIN HYDROCHLORIDE 0.4 MG: 0.4 CAPSULE ORAL at 16:50

## 2022-09-13 RX ADMIN — PREGABALIN 150 MG: 75 CAPSULE ORAL at 08:13

## 2022-09-13 RX ADMIN — SODIUM BICARBONATE 650 MG TABLET 650 MG: at 08:13

## 2022-09-13 RX ADMIN — CYCLOBENZAPRINE 5 MG: 10 TABLET, FILM COATED ORAL at 08:13

## 2022-09-13 RX ADMIN — DOCUSATE SODIUM AND SENNOSIDES 1 TABLET: 8.6; 5 TABLET, FILM COATED ORAL at 08:13

## 2022-09-13 RX ADMIN — BUPRENORPHINE HYDROCHLORIDE 150 MCG: 150 FILM, SOLUBLE BUCCAL at 11:00

## 2022-09-13 RX ADMIN — INSULIN DETEMIR 20 UNITS: 100 INJECTION, SOLUTION SUBCUTANEOUS at 21:11

## 2022-09-13 RX ADMIN — FERROUS SULFATE TAB 325 MG (65 MG ELEMENTAL FE) 325 MG: 325 (65 FE) TAB at 08:13

## 2022-09-13 RX ADMIN — HEPARIN SODIUM 5000 UNITS: 5000 INJECTION INTRAVENOUS; SUBCUTANEOUS at 05:16

## 2022-09-13 RX ADMIN — PREGABALIN 150 MG: 75 CAPSULE ORAL at 16:50

## 2022-09-13 RX ADMIN — DOCUSATE SODIUM 100 MG: 100 CAPSULE ORAL at 16:51

## 2022-09-13 RX ADMIN — INSULIN LISPRO 1 UNITS: 100 INJECTION, SOLUTION INTRAVENOUS; SUBCUTANEOUS at 16:50

## 2022-09-13 NOTE — ASSESSMENT & PLAN NOTE
Malnutrition Findings:   Adult Malnutrition type: Chronic illness  Adult Degree of Malnutrition: Other severe protein calorie malnutrition  Malnutrition Characteristics: Inadequate energy, Weight loss                  360 Statement: Chronic severe malnutrition related to decreased appetite, stomach upset, constipation as evidenced by < 75% energy intake > 1 mo, 21 6% weight loss x 5 mo (11/30/21 185lb, 4/11/22 145lb)  Treated with: Will maintain CCD 2 at this time, consider CCD 3 at follow up  +Glucerna TID, vanilla flavor per pt preference  Recommend daily weights for nutrition monitoring  Pt believes stomach upset and constipation due to medication regimen, adjustment of meds as indicated per MD, noting pt is on ferrous sulfate  BMI Findings: Body mass index is 21 76 kg/m²

## 2022-09-13 NOTE — MALNUTRITION/BMI
This medical record reflects one or more clinical indicators suggestive of malnutrition  Malnutrition Findings:   Adult Malnutrition type: Chronic illness  Adult Degree of Malnutrition: Other severe protein calorie malnutrition  Malnutrition Characteristics: Inadequate energy, Weight loss                  360 Statement: Chronic severe malnutrition related to decreased appetite, stomach upset, constipation as evidenced by < 75% energy intake > 1 mo, 21 6% weight loss x 5 mo (11/30/21 185lb, 4/11/22 145lb)  Treated with: Will maintain CCD 2 at this time, consider CCD 3 at follow up  +Glucerna TID, vanilla flavor per pt preference  Recommend daily weights for nutrition monitoring  Pt believes stomach upset and constipation due to medication regimen, adjustment of meds as indicated per MD, noting pt is on ferrous sulfate  BMI Findings: Body mass index is 21 76 kg/m²  See Nutrition note dated 9/13/22 for additional details  Completed nutrition assessment is viewable in the nutrition documentation

## 2022-09-13 NOTE — ASSESSMENT & PLAN NOTE
Lab Results   Component Value Date    EGFR 22 09/13/2022    EGFR 19 09/12/2022    EGFR 17 06/24/2022    CREATININE 2 90 (H) 09/13/2022    CREATININE 3 26 (H) 09/12/2022    CREATININE 3 69 (H) 06/24/2022   Baseline creatinine 3 6-3 9  Currently below baseline  Continue to monitor  Avoid nephrotoxic agents, NSAIDs, hypotension

## 2022-09-13 NOTE — CASE MANAGEMENT
Case Management Progress Note    Patient name Jaswinder Foley  Location /-49 MRN [id number]  : 1965 Date 2022       LOS (days): 1  Geometric Mean LOS (GMLOS) (days): 2 90  Days to GMLOS:2        OBJECTIVE:        Current admission status: Inpatient  Preferred Pharmacy:   [address]  [address] [id number]  Phone: [phone] Fax: [phone]    Primary Care Provider: Rebeca Jones DO    Primary Insurance: MEDICARE  Secondary Insurance:     PROGRESS NOTE:  Pt will require a semi electric hospital bed  Pt requires frequent and immediate changes in body positioning secondary to pain, which is not possible with a ordinary bed    Pt require a trapeze, as pt is unable to reposition self related to pain

## 2022-09-13 NOTE — ASSESSMENT & PLAN NOTE
· History of chronic lumbar diskitis with chronic osteomyelitis - has chronic pain but last 2 weeks has been acute worse when radiation to left leg increased pain/numbness and weakness in left leg   · Since March has been unable to walk, for years has had back pain with history of multiple surgeries   · CT abdomen pelvis - L5-S1 diskitis/osteomyelitis seen again  Similar endplate destructive changes and irregularity, however decreased component of mineralized material long intervertebral disc space noted at this level compared to prior, unsure significance, if relevant consider MRI  · CT lumbar recon - no acute osseous abnormality of lumbar spine, chronic L4-L5 diskitis/osteomyelitis with unchanged mild vertebral body height loss of L4 on L5 s/p L4-L5 decompressive laminectomy changes  · 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  · Had similar admission in July of this year at [address] [place] [address] [address] - was found to have fluid collection in L4-L5 space which was initially [facility] suspicious for epidural abscess and was ruled out  · MRI lumbar without contrast (contast contraindicated due to GFR)   · Stable postoperative change at the L4-5 level consistent with endplate sclerosis, as well as a postop seroma posteriorly  The possibility of residual discitis and osteomyelitis not excluded  · Overall there does appear to be improving compared to the prior examination  · There is dural thickening within the thecal sac thinning of the L4-5 level and extending into the sacral canal with an abnormal appearance of the nerve roots and an internal septation within the thecal sac at the level of S1, suggestive of scarring and arachnoiditis, 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      Will continue home pain med regimen - PDMP reviewed   · oxycodone 5 mg every 4 hours  · Bel[place] film 150 mcg BID  · Pregabalin 75 mg BID  Patient has reported having no pain yesterday and this morning to nursing staff  Continue PT/OT  Trial of prednisone

## 2022-09-13 NOTE — PLAN OF CARE
Problem: MOBILITY - ADULT  Goal: Maintain or return to baseline ADL function  Description: INTERVENTIONS:  -  Assess patient's ability to carry out ADLs; assess patient's baseline for ADL function and identify physical deficits which impact ability to perform ADLs (bathing, care of mouth/teeth, toileting, grooming, dressing, etc )  - Assess/evaluate cause of self-care deficits   - Assess range of motion  - Assess patient's mobility; develop plan if impaired  - Assess patient's need for assistive devices and provide as appropriate  - Encourage maximum independence but intervene and supervise when necessary  - Involve family in performance of ADLs  - Assess for home care needs following discharge   - Consider OT consult to assist with ADL evaluation and planning for discharge  - Provide patient education as appropriate  9/13/2022 0721 by Glo Salas RN  Outcome: Progressing  9/12/2022 1730 by Glo Salas RN  Outcome: Progressing  Goal: Maintains/Returns to pre admission functional level  Description: INTERVENTIONS:  - Perform BMAT or MOVE assessment daily    - Set and communicate daily mobility goal to care team and patient/family/caregiver  - Collaborate with rehabilitation services on mobility goals if consulted  - Perform Range of Motion   times a day  - Reposition patient every   hours  - Dangle patient   times a day  - Stand patient   times a day  - Ambulate patient   times a day  - Out of bed to chair   times a day   - Out of bed for meals     times a day  - Out of bed for toileting  - Record patient progress and toleration of activity level   9/13/2022 0721 by Glo Salas RN  Outcome: Progressing  9/12/2022 1730 by Glo Salas RN  Outcome: Progressing     Problem: Nutrition/Hydration-ADULT  Goal: Nutrient/Hydration intake appropriate for improving, restoring or maintaining nutritional needs  Description: Monitor and assess patient's nutrition/hydration status for malnutrition   Collaborate with interdisciplinary team and initiate plan and interventions as ordered  Monitor patient's weight and dietary intake as ordered or per policy  Utilize nutrition screening tool and intervene as necessary  Determine patient's food preferences and provide high-protein, high-caloric foods as appropriate  INTERVENTIONS:  - Monitor oral intake, urinary output, labs, and treatment plans  - Assess nutrition and hydration status and recommend course of action  - Evaluate amount of meals eaten  - Assist patient with eating if necessary   - Allow adequate time for meals  - Recommend/ encourage appropriate diets, oral nutritional supplements, and vitamin/mineral supplements  - Order, calculate, and assess calorie counts as needed  - Recommend, monitor, and adjust tube feedings and TPN/PPN based on assessed needs  - Assess need for intravenous fluids  - Provide specific nutrition/hydration education as appropriate  - Include patient/family/caregiver in decisions related to nutrition  9/13/2022 0721 by Toyin Zambrano RN  Outcome: Progressing  9/12/2022 1730 by Toyin Zambrano RN  Outcome: Progressing     Problem: Potential for Falls  Goal: Patient will remain free of falls  Description: INTERVENTIONS:  - Educate patient/family on patient safety including physical limitations  - Instruct patient to call for assistance with activity   - Consult OT/PT to assist with strengthening/mobility   - Keep Call bell within reach  - Keep bed low and locked with side rails adjusted as appropriate  - Keep care items and personal belongings within reach  - Initiate and maintain comfort rounds  - Make Fall Risk Sign visible to staff  - Offer Toileting every   Hours, in advance of need  - Initiate/Maintain   alarm  - Obtain necessary fall risk management equipment:      - Apply yellow socks and bracelet for high fall risk patients  - Consider moving patient to room near nurses station  9/13/2022 0721 by Toyin Zambrano RN  Outcome: Progressing  9/12/2022 1730 by Chauncey Birch RN  Outcome: Progressing     Problem: Prexisting or High Potential for Compromised Skin Integrity  Goal: Skin integrity is maintained or improved  Description: INTERVENTIONS:  - Identify patients at risk for skin breakdown  - Assess and monitor skin integrity  - Assess and monitor nutrition and hydration status  - Monitor labs   - Assess for incontinence   - Turn and reposition patient  - Assist with mobility/ambulation  - Relieve pressure over bony prominences  - Avoid friction and shearing  - Provide appropriate hygiene as needed including keeping skin clean and dry  - Evaluate need for skin moisturizer/barrier cream  - Collaborate with interdisciplinary team   - Patient/family teaching  - Consider wound care consult   9/13/2022 0721 by Chauncey Birch RN  Outcome: Progressing  9/12/2022 1730 by Chauncey Birch RN  Outcome: Progressing     Problem: GENITOURINARY - ADULT  Goal: Maintains or returns to baseline urinary function  Description: INTERVENTIONS:  - Assess urinary function  - Encourage oral fluids to ensure adequate hydration if ordered  - Administer IV fluids as ordered to ensure adequate hydration  - Administer ordered medications as needed  - Offer frequent toileting  - Follow urinary retention protocol if ordered  9/13/2022 0721 by Chauncey Birch RN  Outcome: Progressing  9/12/2022 1730 by Chauncey Birch RN  Outcome: Progressing  Goal: Absence of urinary retention  Description: INTERVENTIONS:  - Assess patient's ability to void and empty bladder  - Monitor I/O  - Bladder scan as needed  - Discuss with physician/AP medications to alleviate retention as needed  - Discuss catheterization for long term situations as appropriate  9/13/2022 0721 by Chauncey Birch RN  Outcome: Progressing  9/12/2022 1730 by Chauncey Birch RN  Outcome: Progressing  Goal: Urinary catheter remains patent  Description: INTERVENTIONS:  - Assess patency of urinary catheter  - If patient has a chronic camarena, consider changing catheter if non-functioning  - Follow guidelines for intermittent irrigation of non-functioning urinary catheter  9/13/2022 4421 by Briseida Phan RN  Outcome: Progressing  9/12/2022 1730 by Briseida Phan RN  Outcome: Progressing     Problem: METABOLIC, FLUID AND ELECTROLYTES - ADULT  Goal: Electrolytes maintained within normal limits  Description: INTERVENTIONS:  - Monitor labs and assess patient for signs and symptoms of electrolyte imbalances  - Administer electrolyte replacement as ordered  - Monitor response to electrolyte replacements, including repeat lab results as appropriate  - Instruct patient on fluid and nutrition as appropriate  9/13/2022 0721 by Briseida Phan RN  Outcome: Progressing  9/12/2022 1730 by Briseida Phan RN  Outcome: Progressing  Goal: Fluid balance maintained  Description: INTERVENTIONS:  - Monitor labs   - Monitor I/O and WT  - Instruct patient on fluid and nutrition as appropriate  - Assess for signs & symptoms of volume excess or deficit  9/13/2022 0721 by Briseida Phan RN  Outcome: Progressing  9/12/2022 1730 by Briseida Phan RN  Outcome: Progressing  Goal: Glucose maintained within target range  Description: INTERVENTIONS:  - Monitor Blood Glucose as ordered  - Assess for signs and symptoms of hyperglycemia and hypoglycemia  - Administer ordered medications to maintain glucose within target range  - Assess nutritional intake and initiate nutrition service referral as needed  9/13/2022 0721 by Briseida Phan RN  Outcome: Progressing  9/12/2022 1730 by Briseida Phan RN  Outcome: Progressing     Problem: SKIN/TISSUE INTEGRITY - ADULT  Goal: Skin Integrity remains intact(Skin Breakdown Prevention)  Description: Assess:  -Perform David assessment every    -Clean and moisturize skin every    -Inspect skin when repositioning, toileting, and assisting with ADLS  -Assess under medical devices such as   every    -Assess extremities for adequate circulation and sensation     Bed Management:  -Have minimal linens on bed & keep smooth, unwrinkled  -Change linens as needed when moist or perspiring  -Avoid sitting or lying in one position for more than   hours while in bed  -Keep HOB at  degrees     Toileting:  -Offer bedside commode  -Assess for incontinence every   -Use incontinent care products after each incontinent episode such as   Activity:  -Mobilize patient   times a day  -Encourage activity and walks on unit  -Encourage or provide ROM exercises   -Turn and reposition patient every   Hours  -Use appropriate equipment to lift or move patient in bed  -Instruct/ Assist with weight shifting every   when out of bed in chair  -Consider limitation of chair time   hour intervals    Skin Care:  -Avoid use of baby powder, tape, friction and shearing, hot water or constrictive clothing  -Relieve pressure over bony prominences using    -Do not massage red bony areas    Next Steps:  -Teach patient strategies to minimize risks such as     -Consider consults to  interdisciplinary teams such as     9/13/2022 0721 by Luann Lewis RN  Outcome: Progressing  9/12/2022 1730 by Luann Lewis RN  Outcome: Progressing  Goal: Incision(s), wounds(s) or drain site(s) healing without S/S of infection  Description: INTERVENTIONS  - Assess and document dressing, incision, wound bed, drain sites and surrounding tissue  - Provide patient and family education  - Perform skin care/dressing changes every   9/13/2022 0721 by Luann Lewis RN  Outcome: Progressing  9/12/2022 1730 by Luann Lewis RN  Outcome: Progressing  Goal: Pressure injury heals and does not worsen  Description: Interventions:  - Implement low air loss mattress or specialty surface (Criteria met)  - Apply silicone foam dressing  - Instruct/assist with weight shifting every    minutes when in chair   - Limit chair time to   hour intervals  - Use special pressure reducing interventions such as   when in chair   - Apply fecal or urinary incontinence containment device   - Perform passive or active ROM every   - Turn and reposition patient & offload bony prominences every   hours   - Utilize friction reducing device or surface for transfers   - Consider consults to  interdisciplinary teams such as    - Use incontinent care products after each incontinent episode such as      - Consider nutrition services referral as needed  9/13/2022 0721 by Toyin Zambrano RN  Outcome: Progressing  9/12/2022 1730 by Toyin Zambrano RN  Outcome: Progressing     Problem: MUSCULOSKELETAL - ADULT  Goal: Maintain or return mobility to safest level of function  Description: INTERVENTIONS:  - Assess patient's ability to carry out ADLs; assess patient's baseline for ADL function and identify physical deficits which impact ability to perform ADLs (bathing, care of mouth/teeth, toileting, grooming, dressing, etc )  - Assess/evaluate cause of self-care deficits   - Assess range of motion  - Assess patient's mobility  - Assess patient's need for assistive devices and provide as appropriate  - Encourage maximum independence but intervene and supervise when necessary  - Involve family in performance of ADLs  - Assess for home care needs following discharge   - Consider OT consult to assist with ADL evaluation and planning for discharge  - Provide patient education as appropriate  9/13/2022 0721 by Toyin Zambrano RN  Outcome: Progressing  9/12/2022 1730 by Toyin Zambrano RN  Outcome: Progressing  Goal: Maintain proper alignment of affected body part  Description: INTERVENTIONS:  - Support, maintain and protect limb and body alignment  - Provide patient/ family with appropriate education  9/13/2022 0721 by Toyin Zambrano RN  Outcome: Progressing  9/12/2022 1730 by Toyin Zambrano RN  Outcome: Progressing     Problem: PAIN - ADULT  Goal: Verbalizes/displays adequate comfort level or baseline comfort level  Description: Interventions:  - Encourage patient to monitor pain and request assistance  - Assess pain using appropriate pain scale  - Administer analgesics based on type and severity of pain and evaluate response  - Implement non-pharmacological measures as appropriate and evaluate response  - Consider cultural and social influences on pain and pain management  - Notify physician/advanced practitioner if interventions unsuccessful or patient reports new pain  9/13/2022 0721 by Briseida Phan RN  Outcome: Progressing  9/12/2022 1730 by Briseida Phan RN  Outcome: Progressing     Problem: INFECTION - ADULT  Goal: Absence or prevention of progression during hospitalization  Description: INTERVENTIONS:  - Assess and monitor for signs and symptoms of infection  - Monitor lab/diagnostic results  - Monitor all insertion sites, i e  indwelling lines, tubes, and drains  - Monitor endotracheal if appropriate and nasal secretions for changes in amount and color  - Gilchrist appropriate cooling/warming therapies per order  - Administer medications as ordered  - Instruct and encourage patient and family to use good hand hygiene technique  - Identify and instruct in appropriate isolation precautions for identified infection/condition  9/13/2022 0721 by Briseida Phan RN  Outcome: Progressing  9/12/2022 1730 by Briseida Phan RN  Outcome: Progressing  Goal: Absence of fever/infection during neutropenic period  Description: INTERVENTIONS:  - Monitor WBC    9/13/2022 0721 by Briseida Phan RN  Outcome: Progressing  9/12/2022 1730 by Briseida Phan RN  Outcome: Progressing     Problem: SAFETY ADULT  Goal: Maintain or return to baseline ADL function  Description: INTERVENTIONS:  -  Assess patient's ability to carry out ADLs; assess patient's baseline for ADL function and identify physical deficits which impact ability to perform ADLs (bathing, care of mouth/teeth, toileting, grooming, dressing, etc )  - Assess/evaluate cause of self-care deficits   - Assess range of motion  - Assess patient's mobility; develop plan if impaired  - Assess patient's need for assistive devices and provide as appropriate  - Encourage maximum independence but intervene and supervise when necessary  - Involve family in performance of ADLs  - Assess for home care needs following discharge   - Consider OT consult to assist with ADL evaluation and planning for discharge  - Provide patient education as appropriate  9/13/2022 0721 by Monty Moses RN  Outcome: Progressing  9/12/2022 1730 by Monty Moses RN  Outcome: Progressing  Goal: Maintains/Returns to pre admission functional level  Description: INTERVENTIONS:  - Perform BMAT or MOVE assessment daily    - Set and communicate daily mobility goal to care team and patient/family/caregiver  - Collaborate with rehabilitation services on mobility goals if consulted  - Perform Range of Motion   times a day  - Reposition patient every   hours  - Dangle patient   times a day  - Stand patient   times a day  - Ambulate patient   times a day  - Out of bed to chair   times a day   - Out of bed for meals     times a day  - Out of bed for toileting  - Record patient progress and toleration of activity level   9/13/2022 0721 by Monty Moses RN  Outcome: Progressing  9/12/2022 1730 by Monty Moses RN  Outcome: Progressing  Goal: Patient will remain free of falls  Description: INTERVENTIONS:  - Educate patient/family on patient safety including physical limitations  - Instruct patient to call for assistance with activity   - Consult OT/PT to assist with strengthening/mobility   - Keep Call bell within reach  - Keep bed low and locked with side rails adjusted as appropriate  - Keep care items and personal belongings within reach  - Initiate and maintain comfort rounds  - Make Fall Risk Sign visible to staff  - Offer Toileting every   Hours, in advance of need  - Initiate/Maintain   alarm  - Obtain necessary fall risk management equipment:      - Apply yellow socks and bracelet for high fall risk patients  - Consider moving patient to room near nurses station  9/13/2022 0721 by Geetha Kumar RN  Outcome: Progressing  9/12/2022 1730 by Geetha Kumar RN  Outcome: Progressing     Problem: DISCHARGE PLANNING  Goal: Discharge to home or other facility with appropriate resources  Description: INTERVENTIONS:  - Identify barriers to discharge w/patient and caregiver  - Arrange for needed discharge resources and transportation as appropriate  - Identify discharge learning needs (meds, wound care, etc )  - Arrange for interpretive services to assist at discharge as needed  - Refer to Case Management Department for coordinating discharge planning if the patient needs post-hospital services based on physician/advanced practitioner order or complex needs related to functional status, cognitive ability, or social support system  9/13/2022 0721 by Geetha Kumar RN  Outcome: Progressing  9/12/2022 1730 by Geetha Kumar RN  Outcome: Progressing     Problem: Knowledge Deficit  Goal: Patient/family/caregiver demonstrates understanding of disease process, treatment plan, medications, and discharge instructions  Description: Complete learning assessment and assess knowledge base    Interventions:  - Provide teaching at level of understanding  - Provide teaching via preferred learning methods  9/13/2022 0721 by Geetha Kumar RN  Outcome: Progressing  9/12/2022 1730 by Geetha Kumar RN  Outcome: Progressing

## 2022-09-13 NOTE — CASE MANAGEMENT
Case Management Progress Note    Patient name Monica Holly  Location /-91 MRN [id number]  : 1965 Date 2022       LOS (days): 1  Geometric Mean LOS (GMLOS) (days): 2 90  Days to GMLOS:2        OBJECTIVE:        Current admission status: Inpatient  Preferred Pharmacy:   [address] [place] - [address]  [address] [facility] [address]  Phone: [phone] Fax: [phone]    Primary Care Provider: Sergey Ibarra DO    Primary Insurance: MEDICARE  Secondary Insurance:     PROGRESS NOTE:      A hospital bed with trapeze has been recommended for pt at time of discharge  CM discussing DME with patient, patient does not have a preference to any DME company  CM placing order in [place]  [employer] of choice was provided in regards to needing a home medical equipment company, pt does not have preference  Pt is aware that we frequently utilized [employer] as we have a working relationship with this company  Patients choice is to use [employer]  [facility] has been recommended for pt at time of dc  Pt sts he is active with [employer] and would like a KELSY placed in [place], as per request, referral placed     A post acute care recommendation was made by your care team for [facility]  Discussed [employer] of [employer] with patient  List of agencies given to patient via in person  patient aware the list is custom filtered for them by preference  and that [facility] post acute providers are designated      [facility] has accepted pt at time of discharge

## 2022-09-13 NOTE — CASE MANAGEMENT
Case Management Assessment & Discharge Planning Note    Patient name Opal Crespo  Location /-96 MRN [id number]  : 1965 Date 2022       Current Admission Date: 2022  Current Admission Diagnosis:Low back pain   Patient Active Problem List    Diagnosis Date Noted    Hypercalcemia 2022    Low back pain 2022    Ambulatory dysfunction 2022    CKD (chronic kidney disease) 2022    Retention of urine s/p camarena in place 2022    Severe protein-calorie malnutrition ([facility] ) 2022    Iron deficiency anemia secondary to inadequate dietary iron intake 2022    Hip pain, acute, left 2022    Gastroenteritis 2022    KELLY (acute kidney injury) ([facility] ) 2022    Hyperkalemia 2022    Diabetes mellitus ([place] [address] )     Gout     Hypertension     History of CVA (cerebrovascular accident)     Osteomyelitis of vertebra of lumbar region ([facility] )       LOS (days): 1  Geometric Mean LOS (GMLOS) (days): 2 90  Days to GMLOS:2     OBJECTIVE:    Risk of Unplanned Readmission Score: 22 68         Current admission status: Inpatient       Preferred Pharmacy:   [facility]ERNON Khan [address] [place] [id number]  Phone: [phone] Fax: [phone]    Primary Care Provider: Iglesia Colunga DO    Primary Insurance: MEDICARE  Secondary Insurance:     ASSESSMENT:  [address] Proxies    There are no active Health Care Proxies on file         Advance Directives  Does patient have a [address] [place] [address] Avenue?: No  Does patient currently have a Health Care decision maker?: Yes, please see Health Care Proxy section (wife, Yovanny Marin)  Does patient have Advance Directives?: No  Primary Contact: moi owens              Patient Information  Admitted from[de-identified] Home  Mental Status: Alert  During Assessment patient was accompanied by: Not accompanied during assessment  Assessment information provided by[de-identified] Patient  Primary Caregiver: Family  Caregiver's Name[de-identified] roman, wife  Caregiver's Relationship to Patient[de-identified] Family Member  Support Systems: Spouse/significant other, Children, Family members  [place] of Residence: [address] [place] [address] entry access options   Select all that apply : Stairs  Number of steps to enter home : One Flight (pt has stair glide)  Type of Current Residence: [address] [place] [address]  Upon entering residence, is there a bedroom on the main floor (no further steps)?: Yes  Upon entering residence, is there a bathroom on the main floor (no further steps)?: Yes  In the last 12 months, was there a time when you were not able to pay the mortgage or rent on time?: No  In the last 12 months, how many places have you lived?: 1  In the last 12 months, was there a time when you did not have a steady place to sleep or slept in a shelter (including now)?: No  Living Arrangements: Lives w/ Spouse/significant other    Activities of Daily Living Prior to Admission  Functional Status: Assistance  Completes ADLs independently?: No  Level of ADL dependence: Assistance  Ambulates independently?: Yes  Does patient use assisted devices?: Yes  Assisted Devices (DME) used: Alicia Estephanie, Wheelchair  Does patient currently own DME?: Yes  What DME does the patient currently own?: Alicia Estephanie, Wheelchair  Does patient have a history of Outpatient Therapy (PT/OT)?: No  Does the patient have a history of Short-Term Rehab?: No  Does patient have a history of HHC?: Yes  Does patient currently have [facility]?: Yes (nancy)    [facility]  Type of Current Home Care Services: Home OT, Home PT, Nurse visit  [address][de-identified] [facility] Provider[de-identified] PCP    Patient Information Continued  Does patient have prescription coverage?: Yes  Within the past 12 months, you worried that your food would run out before you got the money to buy more : Never true  Within the past 12 months, the food you bought just didn't last and you didn't have money to get more : Never true  Food insecurity resource given?: No  Does patient receive dialysis treatments?: No  Does patient have a history of substance abuse?: No  Does patient have a history of Mental Health Diagnosis?: No         Means of Transportation  Means of Transport to Appts[de-identified] Family transport  In the past 12 months, has lack of transportation kept you from medical appointments or from getting medications?: No  In the past 12 months, has lack of transportation kept you from meetings, work, or from getting things needed for daily living?: No        DISCHARGE DETAILS:    Discharge planning discussed with[de-identified] pt and pts wife  Freedom of Choice: Yes     CM contacted family/caregiver?: Yes  Were Treatment Team discharge recommendations reviewed with patient/caregiver?: Yes  Did patient/caregiver verbalize understanding of patient care needs?: Yes  Were patient/caregiver advised of the risks associated with not following Treatment Team discharge recommendations?: Yes    Contacts  Patient Contacts: roman wife  Relationship to Patient[de-identified] Family  Contact Method:  In Person  Reason/Outcome: Discharge [address]         Is the patient interested in [place] [address] at discharge?: Yes  [address] requested[de-identified] Nursing, Occupational Therapy, Physical [address] Name[de-identified] [address] [facility] Provider[de-identified] PCP  Home Health Services Needed[de-identified] Evaluate Functional Status and Safety, Gait/ADL Training, Strengthening/Theraputic Exercises to Improve Function, Other (comment) (medication management)  Homebound Criteria Met[de-identified] Requires the Assistance of Another Person for Safe Ambulation or to Leave the Home  Supporting Clincal Findings[de-identified] Limited Endurance    DME Referral Provided  Referral made for DME?: Yes  DME referral completed for the following items[de-identified] Hospital Bed  DME Supplier Name[de-identified] [employer]         Would you like to participate in our [address] service program?  : No - Declined    Treatment Team Recommendation: Home with [facility]  Discharge Destination Plan[de-identified] Home with [facility]

## 2022-09-13 NOTE — PLAN OF CARE
Problem: OCCUPATIONAL THERAPY ADULT  Goal: Performs self-care activities at highest level of function for planned discharge setting  See evaluation for individualized goals  Description: Treatment Interventions: ADL retraining, Functional transfer training, UE strengthening/ROM, Endurance training, Patient/family training, Equipment evaluation/education, Neuromuscular reeducation, Compensatory technique education, Continued evaluation, Energy conservation, Activityengagement          See flowsheet documentation for full assessment, interventions and recommendations  Note: Limitation: Decreased ADL status, Decreased UE strength, Decreased Safe judgement during ADL, Decreased endurance, Decreased high-level ADLs  Prognosis: Good  Assessment: Pt is a 62 y o  male, admitted to [address] [place] [address] 9/12/2022 d/t experiencing worsening chronic low back pain  Dx: low back pain  Pt with PMHx impacting their performance during ADL tasks, including: DM, CKD, HTN, chronic lumbar osteomyelitis, gout, hx CVA  Prior to admission to the hospital Pt was performing ADLs with  physical assistance  IADLs with physical assistance  Functional transfers/ambulation without physical assistance  Cognitive status was PTA was intact  OT order placed to assess Pt's ADLs, cognitive status, and performance during functional tasks in order to maximize safety and independence while making most appropriate d/c recommendations   Pt's clinical presentation is currently unstable/unpredictable given new onset deficits that effect Pt's occupational performance and ability to safely return to OF including decrease activity tolerance, decrease standing balance, decrease sitting balance, decrease performance during ADL tasks, decrease safety awareness , decrease UB MS, increased pain, decrease generalized strength, decrease activity engagement, decrease performance during functional transfers, high fall risk and limited insight to deficits combined with medical complications of hypertension , pain impacting overall mobility status, abnormal renal lab values, abnormal CBC, wounds, decreased skin integrity, incontinence, fear/retreat and need for input for mobility technique/safety  Personal factors affecting Pt at time of initial evaluation include: availability as recommended, past experience, behavioral pattern, inability to perform current job functions, inability to perform IADLs, inability to perform ADLs, new need for AD, inability to ambulate household distances, inability to navigate community distances, limited insight into impairments, decreased initiation and engagement, recent fall(s)/fall history and questionable non-compliance  Pt will benefit from continued skilled OT services to address deficits as defined above and to maximize level independence/participation during ADLs and functional tasks to facilitate return toward PLOF and improved quality of life  From an occupational therapy standpoint, recommendation at time of d/c would be HHOT       OT Discharge Recommendation: Home with home health rehabilitation

## 2022-09-13 NOTE — ASSESSMENT & PLAN NOTE
· Since March due to chronic osteomyelitis in disc changes  · History of chronic back pain and multiple surgeries    · Several hospitalizations/workups without specific cause  · Patient endorses he is not able to walk at all  · Pain is getting worse    · Will ask PT/OT to evaluate  · [facility]

## 2022-09-13 NOTE — PLAN OF CARE
Problem: PHYSICAL THERAPY ADULT  Goal: Performs mobility at highest level of function for planned discharge setting  See evaluation for individualized goals  Description: Treatment/Interventions: ADL retraining, Functional transfer training, LE strengthening/ROM, Therapeutic exercise, Endurance training, Patient/family training, Equipment eval/education, Bed mobility, Compensatory technique education, Spoke to nursing, Spoke to case management, OT  Equipment Recommended: (S) Other (Comment) [facility] bed  pt has wheelchair)       See flowsheet documentation for full assessment, interventions and recommendations  Note: Prognosis: Fair  Problem List: Decreased strength, Decreased range of motion, Decreased endurance, Impaired balance, Decreased mobility, Decreased coordination, Decreased safety awareness, Pain  Assessment: Pt is a 62 y o  male seen for PT evaluation s/p admission to [address] [place] [address] on 9/12/2022 with Low back pain  Order placed for PT services  Upon evaluation: Pt is presenting with impaired functional mobility due to pain, decreased strength, decreased ROM, decreased endurance, impaired balance, impaired coordination, decreased safety awareness, and fall risk requiring  stand by to moderate assistance for bed mobility and stand by assistance for transfers   Pt's clinical presentation is currently unpredictable given the functional mobility deficits above, especially weakness, decreased ROM, decreased endurance, impaired coordination, pain, decreased activity tolerance, decreased functional mobility tolerance, and decreased safety awareness, coupled with fall risks as indicated by AM-PAC 6-Clicks: 31/84 as well as hx of falls, impaired balance, polypharmacy, and decreased safety awareness and combined with medical complications of abnormal renal lab values, abnormal H&H, abnormal sodium values, need for input for mobility technique/safety and abnormal CRP, acute urinary retention s/p camarena, L1-S1 diskitis/osteomyelitis, PATTY, acute hypercalcemia, MRI L spine showing possibility of residual discitis and osteomyelitis not excluded and dural thickening within the thecal sac thinning of the L4-5 level and extending into the sacral canal with an abnormal appearance of the nerve roots and an internal septation within the thecal sac at the level of S1, suggestive of scarring and Arachnoiditis, 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  Pt's PMHx and comorbidities that may affect physical performance and progress include: CKD, DM, HTN, CVA and osteomyelitis of lumbar spine L4-L5  Personal factors affecting pt at time of IE include: limited home support, inability to perform IADLs, inability to perform ADLs, inability to navigate level surfaces without external assistance and recent fall(s)/fall history  Pt will benefit from continued skilled PT services to address deficits as defined above and to maximize level of functional mobility to facilitate return toward PLOF and improved QOL  From PT/mobility standpoint, recommendation at time of d/c would be Home PT, home with family support and with wheelchair and hospital bed pending progress in order to reduce fall risk and maximize pt's functional independence and consistency with mobility in order to facilitate return to PLOF  Recommend ther ex next 1-2 sessions and trial with WC next 1-2 sessions  Barriers to Discharge: Decreased caregiver support  Barriers to Discharge Comments: pt reports spouse is available to assist prn, but there are times she is unavailable  PT Discharge Recommendation: Home with home health rehabilitation    See flowsheet documentation for full assessment

## 2022-09-13 NOTE — ASSESSMENT & PLAN NOTE
· Potential neurogenic bladder   · Has history of same in June of this year - discharge with Camarena and outpatient voiding trial  · ED Noted retention on bladder scan  · CT abd/pelvis - moderate urinary bladder distention with mild persistent bilateral hydronephrosis    No obstructing calculi  · Continue Flomax  · Never had a urology outpatient follow up after hospitalization - will ask urology to see  · Will set up outpatient follow up   · DC with camarena

## 2022-09-13 NOTE — CONSULTS
Consultation - Urology   Jian Hui  62 y o  male MRN: [id number]  Unit/Bed#: -01 Encounter: [id number]      Assessment/Plan      Assessment:  Urinary retention  Mild persistent bilateral hydronephrosis on CT  CKD4, follow with [facility]  DM Type 2  Chronic HepC  Hx of CVA  Chronic lumbar osteo  Chronic opioid dependence d/t low back pain  Tobacco use      Plan:  Maintain camarena catheter at time of discharge  Continue Flomax 0 4mg daily  Follow up with Dr Madeline Alcantara at [address], 10-14 days after discharge for voiding trial  Management of co-morbidities per primary team      History of Present Illness   Attending: Demi Muro *  Reason for Consult / Principal Problem: urinary retention  HPI: Jian Hui  is a 62y o  year old male who presented to the ED yesterday with complaint of low back pain  While in the ED he was bladder scanned and a camarena was placed d/t high volume  Over 1L clear yellow urine drained into bag at that time  The patient is known to the urology service from a previous admission requiring camarena placement  He had a camarena placed at [address] [place] [address] in June d/t retention  Prior to being seen for voiding trial after discharge he was admitted to [address] [address] with a UTI  The camarena was removed at that visit  Since camarena removal the patient complains of urinary frequency, decreased volume and incomplete emptying  He denies dysuria or hematuria  Inpatient consult to Urology  Consult performed by: Mary Doan PA-C  Consult ordered by: Tamanna Taylor PA-C        Review of Systems   Constitutional: Negative  HENT: Negative  Eyes: Negative  Respiratory: Negative  Cardiovascular: Negative  Gastrointestinal: Positive for constipation  Endocrine: Negative  Genitourinary: Positive for decreased urine volume, difficulty urinating and frequency  Musculoskeletal: Positive for back pain  Skin: Negative      Allergic/Immunologic: Negative  Neurological: Positive for weakness  Hematological: Negative  Psychiatric/Behavioral: Negative          Historical Information   Past Medical History:   Diagnosis Date    Diabetes mellitus ([facility] [address] )     Gout     Hypertension     Renal disorder     Stroke [facility])      Past Surgical History:   Procedure Laterality Date    BACK SURGERY       Social History   Social History     Substance and Sexual Activity   Alcohol Use Not Currently     @DRUGHX  E-Cigarette/Vaping     E-Cigarette/Vaping Substances     Social History     Tobacco Use   Smoking Status Current Every Day Smoker    Packs/day: 0 50    Types: Cigarettes   Smokeless Tobacco Never Used     Family History: non-contributory    Meds/Allergies   all current active meds have been reviewed,   current meds:   Current Facility-Administered Medications   Medication Dose Route Frequency    acetaminophen (TYLENOL) tablet 650 mg  650 mg Oral Q6H PRN    amLODIPine (NORVASC) tablet 10 mg  10 mg Oral Daily    atorvastatin (LIPITOR) tablet 40 mg  40 mg Oral Daily With Dinner    carvedilol (COREG) tablet 6 25 mg  6 25 mg Oral BID With Meals    cefTRIAXone (ROCEPHIN) IVPB (premix in dextrose) 1,000 mg 50 mL  1,000 mg Intravenous Q24H    cyclobenzaprine (FLEXERIL) tablet 5 mg  5 mg Oral TID    docusate sodium (COLACE) capsule 100 mg  100 mg Oral BID    ferrous sulfate tablet 325 mg  325 mg Oral Daily With Breakfast    heparin (porcine) subcutaneous injection 5,000 Units  5,000 Units Subcutaneous Q8H [address]    insulin detemir (LEVEMIR) subcutaneous injection 20 Units  20 Units Subcutaneous HS    insulin lispro (HumaLOG) 100 units/mL subcutaneous injection 1-6 Units  1-6 Units Subcutaneous 4x Daily (AC & HS)    insulin lispro (HumaLOG) 100 units/mL subcutaneous injection 4 Units  4 Units Subcutaneous TID With Meals    lidocaine (LIDODERM) 5 % patch 1 patch  1 patch Topical Daily    melatonin tablet 6 mg  6 mg Oral Daily PRN    methocarbamol (ROBAXIN) tablet 500 mg  500 mg Oral Q6H PRN    nicotine (NICODERM CQ) 7 mg/24hr TD 24 hr patch 1 patch  1 patch Transdermal Daily    NON FORMULARY  150 mcg Sublingual BID    ondansetron (ZOFRAN) injection 4 mg  4 mg Intravenous Q6H PRN    oxyCODONE (ROXICODONE) IR tablet 5 mg  5 mg Oral Q4H PRN    polyethylene glycol (MIRALAX) packet 17 g  17 g Oral BID    pregabalin (LYRICA) capsule 150 mg  150 mg Oral BID    senna-docusate sodium (SENOKOT S) 8 6-50 mg per tablet 1 tablet  1 tablet Oral BID    sodium bicarbonate tablet 650 mg  650 mg Oral BID    sodium chloride 0 9 % infusion  125 mL/hr Intravenous Continuous    tamsulosin (FLOMAX) capsule 0 4 mg  0 4 mg Oral Daily With Dinner    thiamine tablet 100 mg  100 mg Oral Daily    venlafaxine (EFFEXOR-XR) 24 hr capsule 37 5 mg  37 5 mg Oral TID     and PTA meds:   Prior to Admission Medications   Prescriptions Last Dose Informant Patient Reported?  Taking?   acetaminophen (TYLENOL) 325 mg tablet More than a month at Unknown time  Yes No   Sig: Take 650 mg by mouth every 4 (four) hours as needed for mild pain   allopurinol (ZYLOPRIM) 100 mg tablet   No No   Sig: Take 2 tablets (200 mg total) by mouth daily   amLODIPine (NORVASC) 10 mg tablet 9/12/2022 at Unknown time  Yes Yes   Sig: Take 1 tablet by mouth daily   atorvastatin (LIPITOR) 40 mg tablet 9/11/2022 at Unknown time  Yes Yes   Sig: Take 40 mg by mouth   carvedilol (COREG) 6 25 mg tablet 9/12/2022 at Unknown time  Yes Yes   Sig: Take 6 25 mg by mouth 2 (two) times a day with meals   colchicine (COLCRYS) 0 6 mg tablet More than a month at Unknown time  Yes No   Sig: Take 1 tablet by mouth as needed   cyclobenzaprine (FLEXERIL) 5 mg tablet 9/12/2022 at Unknown time  Yes Yes   Sig: Take 5 mg by mouth 3 (three) times a day   docusate sodium (COLACE) 100 mg capsule 9/12/2022 at Unknown time  Yes Yes   Sig: Take 100 mg by mouth 2 (two) times a day   ferrous sulfate 325 (65 Fe) mg tablet 9/12/2022 at Unknown time  Yes Yes   Sig: Take 325 mg by mouth 2 (two) times a day with meals Lunch and dinner   insulin detemir (LEVEMIR) 100 units/mL subcutaneous injection 9/11/2022 at Unknown time  Yes Yes   Sig: Inject 36 Units under the skin   insulin lispro (HumaLOG) 100 units/mL injection 9/11/2022 at Unknown time  No Yes   Sig: Inject 8 Units under the skin 4 (four) times a day (before meals and at bedtime) 2 units per 50 mg/dl over 150   Hold insulin if fingerstick blood glucoses < 90   lidocaine (LIDODERM) 5 % Unknown at Unknown time  No No   Sig: Apply 1 patch topically daily Remove & Discard patch within 12 hours or as directed by MD   melatonin 3 mg More than a month at Unknown time  Yes No   Sig: Take 10 5 mg by mouth daily as needed   oxyCODONE (ROXICODONE) 5 immediate release tablet 9/12/2022 at Unknown time  No Yes   Sig: Take 1 tablet (5 mg total) by mouth every 4 (four) hours as needed for severe pain Max Daily Amount: 30 mg   polyethylene glycol (MIRALAX) 17 g packet 9/12/2022 at Unknown time  Yes Yes   Sig: Take 17 g by mouth 2 (two) times a day   pregabalin (LYRICA) 150 mg capsule 9/12/2022 at Unknown time  Yes Yes   Sig: Take 150 mg by mouth 2 (two) times a day   senna-docusate sodium (SENOKOT S) 8 6-50 mg per tablet 9/12/2022 at Unknown time  Yes Yes   Sig: Take 1 tablet by mouth 2 (two) times a day   sevelamer (RENAGEL) 800 mg tablet   No No   Sig: Take 1 tablet (800 mg total) by mouth daily with dinner   sodium bicarbonate 650 mg tablet 9/12/2022 at Unknown time  Yes Yes   Sig: Take 650 mg by mouth 2 (two) times a day   tamsulosin (FLOMAX) 0 4 mg 9/12/2022 at Unknown time  Yes Yes   Sig: Take by mouth   thiamine 100 MG tablet Unknown at Unknown time  Yes No   Sig: Take 100 mg by mouth daily   venlafaxine (EFFEXOR-XR) 37 5 mg 24 hr capsule 9/12/2022 at Unknown time Spouse/Significant Other Yes Yes   Sig: Take 37 5 mg by mouth 3 (three) times a day      Facility-Administered Medications: None     Allergies   Allergen Reactions    Bupropion Delirium     "went nuts," prior to 2012  "went nuts," prior to 2012  "went nuts," prior to 2012  "went nuts," prior to 2012      Metformin Other (See Comments)     Other reaction(s): kidney disease  Other reaction(s): kidney disease  Other reaction(s): kidney disease      Medical Tape Rash       Objective   Vitals: Blood pressure 145/78, pulse 65, temperature 98 2 °F (36 8 °C), resp  rate 18, height 5' 11" (1 803 m), weight 71 1 kg (156 lb 12 oz), SpO2 97 %  I/O last 24 hours: In: -   Out: 2250 [Urine:2250]    Invasive Devices  Report    Peripheral Intravenous Line  Duration           Peripheral IV 09/12/22 Left Antecubital <1 day          Drain  Duration           Urethral Catheter Latex 18 Fr  <1 day                Physical Exam  Vitals and nursing note reviewed  Constitutional:       General: He is not in acute distress  Appearance: Normal appearance  He is normal weight  He is not ill-appearing  HENT:      Head: Normocephalic and atraumatic  Right Ear: External ear normal       Left Ear: External ear normal       Nose: Nose normal       Mouth/Throat:      Mouth: Mucous membranes are moist       Pharynx: Oropharynx is clear  Eyes:      General:         Right eye: No discharge  Left eye: No discharge  Conjunctiva/sclera: Conjunctivae normal    Cardiovascular:      Rate and Rhythm: Normal rate and regular rhythm  Pulses: Normal pulses  Heart sounds: Normal heart sounds  Pulmonary:      Effort: Pulmonary effort is normal  No respiratory distress  Breath sounds: Normal breath sounds  Abdominal:      General: Abdomen is flat  Bowel sounds are normal  There is no distension  Palpations: Abdomen is soft  Tenderness: There is no abdominal tenderness  There is no right CVA tenderness or left CVA tenderness     Genitourinary:     Comments: Ortiz catheter in place draining clear yellow urine, no pyuria or hemturia  Musculoskeletal: Cervical back: Neck supple  Right lower leg: No edema  Left lower leg: No edema  Skin:     General: Skin is warm and dry  Capillary Refill: Capillary refill takes less than 2 seconds  Neurological:      General: No focal deficit present  Mental Status: He is alert and oriented to person, place, and time  Motor: Weakness present  Psychiatric:         Mood and Affect: Mood normal          Behavior: Behavior normal          Thought Content: Thought content normal          Judgment: Judgment normal          Lab Results:   I have personally reviewed pertinent reports  CBC:   Lab Results   Component Value Date    WBC 7 35 09/13/2022    HGB 10 3 (L) 09/13/2022    HCT 32 5 (L) 09/13/2022    MCV 90 09/13/2022     09/13/2022    MCH 28 4 09/13/2022    MCHC 31 7 09/13/2022    RDW 18 2 (H) 09/13/2022    MPV 10 3 09/13/2022    NRBC 0 09/12/2022     CMP:   Lab Results   Component Value Date    SODIUM 138 09/13/2022     09/13/2022    CO2 28 09/13/2022    BUN 43 (H) 09/13/2022    CREATININE 2 90 (H) 09/13/2022    CALCIUM 10 6 (H) 09/13/2022    AST 8 09/13/2022    ALT 7 (L) 09/13/2022    ALKPHOS 104 09/13/2022    EGFR 22 09/13/2022     Urinalysis:   Lab Results   Component Value Date    COLORU Yellow 09/12/2022    CLARITYU Turbid 09/12/2022    SPECGRAV 1 015 09/12/2022    PHUR 7 0 09/12/2022    LEUKOCYTESUR Large (A) 09/12/2022    NITRITE Negative 09/12/2022    GLUCOSEU Negative 09/12/2022    KETONESU Negative 09/12/2022    BILIRUBINUR Negative 09/12/2022    BLOODU Moderate (A) 09/12/2022       Imaging Studies: I have personally reviewed pertinent reports  EKG, Pathology, and Other Studies: I have personally reviewed pertinent reports  VTE Prophylaxis: Heparin    Code Status: Level 1 - Full Code    Counseling / Coordination of Care  Total floor / unit time spent today 40 minutes   Greater than 50% of total time was spent with the patient and / or family counseling and / or coordination of care  A description of the counseling / coordination of care: review of labs and imaging, obtaining history, performing exam, discussion of treatment plan      Krysten Ramírez PA-C

## 2022-09-13 NOTE — ASSESSMENT & PLAN NOTE
Has history of chronic osteomyelitis since March   · Has completed antibiotics and is no longer taking   CRP 13 6 - is decreased from last measured value in March of this year  CT imaging shows no acute osteomyelitis, same chronic osteomyelitis of L4-L5  MRI with continued osteomyelitis - unchanged/slightly improved from previous imaging

## 2022-09-13 NOTE — PROGRESS NOTES
Pt reports poor oral intake for at least 3-4 days due to upset stomach and constipation  Says constipation as been ongoing and believes it is related to medications he is taking  Reports because of this appetite has decreased for several months  Pt unable to describe usual intake  Chart review of weight hx: 7/7/21 184lb, 11/30/21 185lb, 4/11/22 145lb, 9/12/22 156lb  Previous 21 6% weight loss x 5 mo, weight increased as of recent  Pt unable to describe weight hx/UBW  Does have moderate fat/muscle loss to temporalis muscles and orbitals  Meets 2 criteria for severe chronic malnutrition  Will maintain CCD 2 at this time, consider CCD 3 at follow up    +Glucerna TID, vanilla flavor per pt preference  Recommend daily weights for nutrition monitoring  Pt believes stomach upset and constipation due to medication regimen, adjustment of meds as indicated per MD, noting pt is on ferrous sulfate

## 2022-09-13 NOTE — PLAN OF CARE
Problem: PAIN - ADULT  Goal: Verbalizes/displays adequate comfort level or baseline comfort level  Description: Interventions:  - Encourage patient to monitor pain and request assistance  - Assess pain using appropriate pain scale  - Administer analgesics based on type and severity of pain and evaluate response  - Implement non-pharmacological measures as appropriate and evaluate response  - Consider cultural and social influences on pain and pain management  - Notify physician/advanced practitioner if interventions unsuccessful or patient reports new pain  Outcome: Progressing     Problem: SAFETY ADULT  Goal: Maintain or return to baseline ADL function  Description: INTERVENTIONS:  -  Assess patient's ability to carry out ADLs; assess patient's baseline for ADL function and identify physical deficits which impact ability to perform ADLs (bathing, care of mouth/teeth, toileting, grooming, dressing, etc )  - Assess/evaluate cause of self-care deficits   - Assess range of motion  - Assess patient's mobility; develop plan if impaired  - Assess patient's need for assistive devices and provide as appropriate  - Encourage maximum independence but intervene and supervise when necessary  - Involve family in performance of ADLs  - Assess for home care needs following discharge   - Consider OT consult to assist with ADL evaluation and planning for discharge  - Provide patient education as appropriate  Outcome: Progressing  Goal: Maintains/Returns to pre admission functional level  Description: INTERVENTIONS:  - Perform BMAT or MOVE assessment daily    - Set and communicate daily mobility goal to care team and patient/family/caregiver  - Collaborate with rehabilitation services on mobility goals if consulted  - Perform Range of Motion   times a day  - Reposition patient every   hours  - Dangle patient   times a day  - Stand patient   times a day  - Ambulate patient   times a day  - Out of bed to chair    times a day   - Out of bed for meals     times a day  - Out of bed for toileting  - Record patient progress and toleration of activity level   Outcome: Progressing  Goal: Patient will remain free of falls  Description: INTERVENTIONS:  - Educate patient/family on patient safety including physical limitations  - Instruct patient to call for assistance with activity   - Consult OT/PT to assist with strengthening/mobility   - Keep Call bell within reach  - Keep bed low and locked with side rails adjusted as appropriate  - Keep care items and personal belongings within reach  - Initiate and maintain comfort rounds  - Make Fall Risk Sign visible to staff  - Offer Toileting every   Hours, in advance of need  - Initiate/Maintain   alarm  - Obtain necessary fall risk management equipment:      - Apply yellow socks and bracelet for high fall risk patients  - Consider moving patient to room near nurses station  Outcome: Progressing

## 2022-09-13 NOTE — PHYSICAL THERAPY NOTE
PHYSICAL THERAPY EVALUATION  NAME:  Radha Mcintosh Sr  DATE: 09/13/22    AGE:   62 y o  Mrn:   [id number]  ADMIT DX:  Hypercalcemia [E83 52]  Discitis of lumbar region [M46 46]  Hypermagnesemia [E83 41]  Urinary retention [R33 9]  Numbness [R20 0]  Chronic kidney disease [N18 9]  Bilateral hydronephrosis [N13 30]  Leg pain, left [M79 605]  Osteomyelitis of lumbar spine (HCC) [M46 26]    Past Medical History:   Diagnosis Date    Diabetes mellitus ([facility] )     Gout     Hypertension     Renal disorder     Stroke ([facility] )      Length Of Stay: 1  Performed at least 2 patient identifiers during session: Name and Birthday  PHYSICAL THERAPY EVALUATION :    09/13/22 1051   PT Last Visit   PT Visit Date 09/13/22   Note Type   Note type Evaluation   Pain Assessment   Pain Assessment Tool FLACC   Pain Score   (at rest no pain)   Pain Location/Orientation Orientation: Left; Location: Leg;Orientation: Lower; Location: Back   Pain Rating: FLACC (Rest) - Face 0   Pain Rating: FLACC (Rest) - Legs 0   Pain Rating: FLACC (Rest) - Activity 0   Pain Rating: FLACC (Rest) - Cry 0   Pain Rating: FLACC (Rest) - Consolability 0   Score: FLACC (Rest) 0   Pain Rating: FLACC (Activity) - Face 1   Pain Rating: FLACC (Activity) - Legs 1   Pain Rating: FLACC (Activity) - Activity 2   Pain Rating: FLACC (Activity) - Cry 1   Pain Rating: FLACC (Activity) - Consolability 1   Score: FLACC (Activity) 6   Restrictions/Precautions   Other Precautions Bed Alarm; Fall Risk;Pain;Multiple lines  (Ortiz catheter)   Home Living   Type of [facility] ADLs on one level; Able to live on main level with bedroom/bathroom   [facility]   Additional Comments Reports living with his wife in a 1st floor set up  Reports he has been nonambulatory since March 2022 and since then has been completing lateral squat pivot/scoot transfers to/from wheelchair and toilet  Reports increased pain with WB on LEs and increased weakness  Hasn't stood since March 2022  Prior Function   Level of [place] Modified independent with wheelchair   Lives With Spouse   Receives Help From Family   ADL Assistance Independent   IADLs Needs assistance   Falls in the last 6 months 1 to 4   Comments Pt reports completing transfers without assistance from spouse as he transfers at times when she isn't home, but she is available to provide assistance prn  Reports completing ADLs with assistance from spouse prn  Spouse assists with IADLs and community mobility  General   Additional Pertinent History MRI lumbar spine: Stable postoperative change at the L4-5 level consistent with endplate sclerosis, as well as a postop seroma posteriorly  The possibility of residual discitis and osteomyelitis not excluded  Overall there does appear to be improving compared to the   prior examination  There is dural thickening within the thecal sac thinning of the L4-5 level and extending into the sacral canal with an abnormal appearance of the nerve roots and an internal septation within the thecal sac at the level of S1, suggestive of scarring and   arachnoiditis, 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  Cognition   Orientation Level Oriented X4   Following Commands Follows one step commands without difficulty   Subjective   Subjective "I wish they can do something about this pain "   RLE Assessment   RLE Assessment X   RLE Overall AROM   R Hip Flexion < 90 degrees due to pain   R Hip ABduction supine AROM WFL   R Knee Flexion WFL supine   R Knee Extension supine 0 degrees   R Ankle Dorsiflexion just < neutral   Strength RLE   RLE Overall Strength 2/5   LLE Assessment   LLE Assessment X   LLE Overall AROM   L Hip Flexion significantly < 90 degrees due to pain   L Hip ABduction limited due to pain   L Knee Flexion < 60 degrees supine limited by pain     L Knee Extension supine 0 degrees   L Ankle Dorsiflexion minimal AROM   Strength LLE   LLE Overall Strength 2-/5  (significant muscle atrophy left LE > right LE)   Light Touch   RLE Light Touch Grossly intact   LLE Light Touch Grossly intact   Bed Mobility   Rolling R 6  Modified independent   Additional items Bedrails   Rolling L 6  Modified independent   Additional items Bedrails   Supine to Sit   (SBA)   Additional items Assist x 1; Increased time required;Verbal cues; Bedrails   Sit to Supine 3  Moderate assistance   Additional items Assist x 1; Increased time required;LE management   Additional Comments HOB flat with bedrail  SBA with bedrail with increased time to complete with increased c/o pain in low back and left LE  returned to supine with modAx1 of LEs  Transfers   Sliding Board transfer   (lateral scooting, SBA to patient's right and then left to/from drop arm recliner)   Additional items Increased time required;Verbal cues   Additional Comments significantly increased time to complete with multiple scoot transfers to achieve sitting in recliner and then back to bed due to increased pain  pt stopping transfer due to pain with reassurance and encouragement, able to complete with increased time  pt with wt shifting slihgtly onto LEs, and manual cues wiht UEs for LE management toward chair and back to bed  SBA for safe completion due to pt wiht increasing trunk flexion and right lateral lean quickly with onset of sharp pain  Ambulation/Elevation   Distance pt has been nonambulatory since March 2022  Balance   Static Sitting Fair +   Dynamic Sitting Fair   Activity Tolerance   Activity Tolerance Patient limited by fatigue;Patient limited by pain   Medical Staff Made Aware Edgar TABOR   Nurse Made Aware RN, Josie   Assessment   Prognosis Fair   Problem List Decreased strength;Decreased range of motion;Decreased endurance; Impaired balance;Decreased mobility; Decreased coordination;Decreased safety awareness;Pain   Barriers to Discharge Decreased caregiver support   Barriers to Discharge Comments pt reports spouse is available to assist prn, but there are times she is unavailable  Goals   Patient Goals "Figure out something for my pain "   PT Treatment Day 0   Plan   Treatment/Interventions ADL retraining;Functional transfer training;LE strengthening/ROM; Therapeutic exercise; Endurance training;Patient/family training;Equipment eval/education; Bed mobility; Compensatory technique education;Spoke to nursing;Spoke to case management;OT   PT Frequency 1-2x/wk   Recommendation   PT Discharge Recommendation Home with home health rehabilitation   Equipment Recommended (S)  Other (Comment)  [facility] bed  pt has wheelchair)   Additional Comments Pt would benefit from [facility] bed for respositioning  AM-PAC Basic Mobility Inpatient   Turning in Bed Without Bedrails 3   Lying on Back to Sitting on Edge of Flat Bed 3   Moving Bed to Chair 3   Standing Up From Chair 1   Walk in Room 1   Climb 3-5 Stairs 1   Basic Mobility Inpatient Raw Score 12   Basic Mobility Standardized Score 32 23   Highest Level Of Mobility   -[facility] Goal 4: Move to chair/commode   -[facility] Achieved 4: Move to chair/commode   End of Consult   Patient Position at End of Consult Supine;Bed/Chair alarm activated; All needs within reach     Pt may/will require a hospital bed for the following reasons:Pt has a medical condtion which requires positioning of the body in ways not feasible with an ordinary bed   (Elevation of the head/upper body less that 30 degrees does not usually require the use of hospital bed), Pt requries positioning of the body in ways not feasible with an ordinary bed in order to alleviate pain, Pt requires a bed height different than a fixed height hospital bed to permit transfers to chair, WC or standing position, Pt requires frequent change in body position and/or has an immediate need for a change in position, and A trapeze is recommended as records support that pt needs the device to sit up and to change body position for other medical reasons, or to get in or out of bed     (Please find full objective findings from PT assessment regarding body systems outlined above)  Assessment: Pt is a 62 y o  male seen for PT evaluation s/p admission to [address] [place] [address] on 9/12/2022 with Low back pain  Order placed for PT services  Upon evaluation: Pt is presenting with impaired functional mobility due to pain, decreased strength, decreased ROM, decreased endurance, impaired balance, impaired coordination, decreased safety awareness, and fall risk requiring  stand by to moderate assistance for bed mobility and stand by assistance for transfers  Pt's clinical presentation is currently unpredictable given the functional mobility deficits above, especially weakness, decreased ROM, decreased endurance, impaired coordination, pain, decreased activity tolerance, decreased functional mobility tolerance, and decreased safety awareness, coupled with fall risks as indicated by AM-PAC 6-Clicks: 09/40 as well as hx of falls, impaired balance, polypharmacy, and decreased safety awareness and combined with medical complications of abnormal renal lab values, abnormal H&H, abnormal sodium values, need for input for mobility technique/safety and abnormal CRP, acute urinary retention s/p camarena, L1-S1 diskitis/osteomyelitis, PATTY, acute hypercalcemia, MRI L spine showing possibility of residual discitis and osteomyelitis not excluded and dural thickening within the thecal sac thinning of the L4-5 level and extending into the sacral canal with an abnormal appearance of the nerve roots and an internal septation within the thecal sac at the level of S1, suggestive of scarring and Arachnoiditis, 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  Pt's PMHx and comorbidities that may affect physical performance and progress include: CKD, DM, HTN, CVA and osteomyelitis of lumbar spine L4-L5   Personal factors affecting pt at time of IE include: limited home support, inability to perform IADLs, inability to perform ADLs, inability to navigate level surfaces without external assistance and recent fall(s)/fall history  Pt will benefit from continued skilled PT services to address deficits as defined above and to maximize level of functional mobility to facilitate return toward PLOF and improved QOL  From PT/mobility standpoint, recommendation at time of d/c would be Home PT, home with family support and with wheelchair and hospital bed pending progress in order to reduce fall risk and maximize pt's functional independence and consistency with mobility in order to facilitate return to PLOF  Recommend ther ex next 1-2 sessions and trial with WC next 1-2 sessions  The patient's AM-PAC Basic Mobility Inpatient Short Form Raw Score is 12  A Raw score of less than or equal to 16 suggests the patient may benefit from discharge to post-acute rehabilitation services  Please also refer to the recommendation of the Physical Therapist for safe discharge planning  However, patient doesn't complete sit<>stand and spt transfers, only lateral scooting transfers since March 2022  Pt recommended to return to home with HHPT and use of wheelchair as he has been doing and increased assistance from spouse  Goals for 9/27/22: Pt will: Perform bed mobility tasks with modified I to reposition in bed and prepare for transfers  Pt will perform lateral transfers with modified I to decrease risk for falls, improve ease of transfers and improve activity tolerance  Pt will perform sit<>stand transfers with LRAD with modAx2 as pain allows to facilitate wt shifting for pressure relief  Pt will self propel manual wheelchair with B UEs >/= 150' modified independently to access home environment  Pt will increase B LE strength >/= 1/2 MMT grade to facilitate functional mobility        Jonh St, PT,DPT

## 2022-09-13 NOTE — TELEPHONE ENCOUNTER
Patient was seen in consultation by surgical PA for Urology  Patient needs TOV in 10-14 days (from now, not discharge date is fine)  Should be able to schedule since consult was performed       Thanks

## 2022-09-13 NOTE — ASSESSMENT & PLAN NOTE
Lab Results   Component Value Date    HGBA1C 6 0 (H) 07/05/2022       Recent Labs     09/12/22  1723 09/12/22  2054 09/13/22  0735 09/13/22  1118   POCGLU 203* 131 92 114       Blood Sugar Average: Last 72 hrs:  · (P) 135 Outpatient regimen includes 36 units Levemir daily, 8 units with meals and bedtime with sliding scale  · Continue diabetic diet  · Continue insulin at slightly lower dose than outpatient

## 2022-09-13 NOTE — OCCUPATIONAL THERAPY NOTE
Occupational Therapy Evaluation     Patient Name: Feliciano Wood  Today's Date: 9/13/2022  Problem List  Principal Problem:    Low back pain  Active Problems:    Diabetes mellitus ([facility] )    Hypertension    History of CVA (cerebrovascular accident)    Osteomyelitis of vertebra of lumbar region [facility])    Iron deficiency anemia secondary to inadequate dietary iron intake    Retention of urine s/p camarena in place    Hypercalcemia    Ambulatory dysfunction    CKD (chronic kidney disease)    Past Medical History  Past Medical History:   Diagnosis Date    Diabetes mellitus ([facility] )     Gout     Hypertension     Renal disorder     Stroke [facility])      Past Surgical History  Past Surgical History:   Procedure Laterality Date    BACK SURGERY          09/13/22 1050   Note Type   Note type Evaluation   Restrictions/Precautions   Other Precautions Bed Alarm; Fall Risk;Pain;Multiple lines  (camarena cath)   Pain Assessment   Pain Assessment Tool FLACC   Pain Rating: FLACC (Rest) - Face 0   Pain Rating: FLACC (Rest) - Legs 0   Pain Rating: FLACC (Rest) - Activity 0   Pain Rating: FLACC (Rest) - Cry 0   Pain Rating: FLACC (Rest) - Consolability 0   Score: FLACC (Rest) 0   Pain Rating: FLACC (Activity) - Face 2   Pain Rating: FLACC (Activity) - Legs 1   Pain Rating: FLACC (Activity) - Activity 1   Pain Rating: FLACC (Activity) - Cry 1   Pain Rating: FLACC (Activity) - Consolability 0   Score: FLACC (Activity) 5   Home Living   Type of Home House   Home Layout One level;Performs ADLs on one level; Able to live on main level with bedroom/bathroom   [facility]   Additional Comments Pt reports living at home with his wife with a 1st floor set-up  Pt has been non-ambulatory since March 2022  Since then has been completing lateral scoot transfers to<>from bed, w/c, and toilet  Pt reports it is too painful to stand on his legs and that they are too weak     Prior Function   Level of [place] Independent with ADLs and functional mobility   Lives With Spouse   Receives Help From Family   ADL Assistance Independent   IADLs Needs assistance   Falls in the last 6 months 1 to 4   Comments Pt reports he has been able to complete transfers and ADLs @ Mod I although it appears Pt may have assistance from his wife  Pt's wife assist with IADLs and community mobility  Pt is mobile via w/c at baseline   ADL   Where Assessed Edge of bed   Eating Assistance 6  Modified independent   Eating Deficit Setup   Grooming Assistance 6  Modified Independent   Grooming Deficit Setup   UB Dressing Assistance 6  Modified independent   UB Dressing Deficit Setup   LB Dressing Assistance 3  Moderate Assistance   LB Dressing Deficit Steadying;Verbal cueing;Supervision/safety; Increased time to complete; Don/doff R sock; Don/doff L sock; Thread RLE into pants; Thread LLE into pants;Pull up over hips   Additional Comments Pt completin ADL tasks while seated at EOB  UB Dressing and grooming tasks @ Mod I after set-up  LB Dressing @ Mod A for doning socks/pants around feet  Pt reports it causes increased pain with bending forward, his wife asist with LB dressing at home  Pt shifting from side to side to complete CM around waist @ Min A   Bed Mobility   Supine to Sit   (SBA)   Additional items Verbal cues; Increased time required   Sit to Supine 3  Moderate assistance   Additional items Increased time required;Verbal cues;LE management   Additional Comments Pt completing supine>sit @ SBA with increased time/effort noted and use of bedrails  Pt then sitting at EOB while maintaining F+ balance although Pt reports increased pain in BLE (L more so than R)  Pt then completing sit>supine @ Mod A for LE management   Transfers   Sliding Board transfer   (LATERAL SCOOT TRANSFER - SBA)   Additional items Assist x 1; Increased time required;Verbal cues   Additional Comments Pt completing lateral scoot transfer from EOB to drop arm recliner   Pt achieved midline in recliner with 3 scoots  SBA and occasional vc'ing for technique/safety although no physical assistance required  Pt c/o increased leg pain and request getting back to bed  Pt then compelting lateral scoot back to bed @ SBA  Pt deferred from attempting to stand this date d/t "I cant"   Balance   Static Sitting Fair +   Dynamic Sitting Fair   Activity Tolerance   Activity Tolerance Patient limited by fatigue;Patient limited by pain   Medical Staff Made Aware Spoke with PT, Arlene Galvez   Nurse Made Aware Spoke with RNJosie   RUE Assessment   RUE Assessment WFL   LUE Assessment   LUE Assessment WFL   Hand Function   Gross Motor Coordination Functional   Fine Motor Coordination Functional   Sensation   Light Touch No apparent deficits   Cognition   Overall Cognitive Status WFL   Arousal/Participation Alert; Responsive; Cooperative   Attention Attends with cues to redirect   Orientation Level Oriented X4   Memory Within functional limits   Following Commands Follows one step commands without difficulty   Assessment   Limitation Decreased ADL status; Decreased UE strength;Decreased Safe judgement during ADL;Decreased endurance;Decreased high-level ADLs   Prognosis Good   Assessment Pt is a 62 y o  male, admitted to [address] [place] [address] 9/12/2022 d/t experiencing worsening chronic low back pain  Dx: low back pain  Pt with PMHx impacting their performance during ADL tasks, including: DM, CKD, HTN, chronic lumbar osteomyelitis, gout, hx CVA  Prior to admission to the hospital Pt was performing ADLs with  physical assistance  IADLs with physical assistance  Functional transfers/ambulation without physical assistance  Cognitive status was PTA was intact  OT order placed to assess Pt's ADLs, cognitive status, and performance during functional tasks in order to maximize safety and independence while making most appropriate d/c recommendations   Pt's clinical presentation is currently unstable/unpredictable given new onset deficits that effect Pt's occupational performance and ability to safely return to PLOF including decrease activity tolerance, decrease standing balance, decrease sitting balance, decrease performance during ADL tasks, decrease safety awareness , decrease UB MS, increased pain, decrease generalized strength, decrease activity engagement, decrease performance during functional transfers, high fall risk and limited insight to deficits combined with medical complications of hypertension , pain impacting overall mobility status, abnormal renal lab values, abnormal CBC, wounds, decreased skin integrity, incontinence, fear/retreat and need for input for mobility technique/safety  Personal factors affecting Pt at time of initial evaluation include: availability as recommended, past experience, behavioral pattern, inability to perform current job functions, inability to perform IADLs, inability to perform ADLs, new need for AD, inability to ambulate household distances, inability to navigate community distances, limited insight into impairments, decreased initiation and engagement, recent fall(s)/fall history and questionable non-compliance  Pt will benefit from continued skilled OT services to address deficits as defined above and to maximize level independence/participation during ADLs and functional tasks to facilitate return toward PLOF and improved quality of life  From an occupational therapy standpoint, recommendation at time of d/c would be HHOT  Plan   Treatment Interventions ADL retraining;Functional transfer training;UE strengthening/ROM; Endurance training;Patient/family training;Equipment evaluation/education; Neuromuscular reeducation; Compensatory technique education;Continued evaluation; Energy conservation; Activityengagement   Goal Expiration Date 09/27/22   OT Frequency 1-2x/wk   Recommendation   OT Discharge Recommendation Home with home health rehabilitation   AM-PAC Daily Activity Inpatient   Lower Body Dressing 2   Bathing 2   Toileting 3 Upper Body Dressing 4   Grooming 4   Eating 4   Daily Activity Raw Score 19   Daily Activity Standardized Score (Calc for Raw Score >=11) 40 22   AM-[facility] Applied Cognition Inpatient   Following a Speech/Presentation 4   Understanding Ordinary Conversation 4   Taking Medications 4   Remembering Where Things Are Placed or Put Away 4   Remembering List of 4-5 Errands 4   Taking Care of Complicated Tasks 4   Applied Cognition Raw Score 24   Applied Cognition Standardized Score 62 21        The patient's raw score on the AM-PAC Daily Activity inpatient short form is 19, standardized score is 40 22, greater than 39 4  Patients at this level are likely to benefit from DC to home  Please refer to the recommendation of the Occupational Therapist for safe DC planning  Pt goals to be met by 9/27/2022    1  Pt will demonstrate ability to complete LB dressing @ S in order to increase safety and independence during meaningful tasks  2  Pt will demonstrate ability to herb/doff socks/shoes while sitting EOB @ S in order to increase safety and independence during meaningful tasks  3  Pt will demonstrate ability to complete toileting tasks including CM and pericare @ S in order to increase safety and independence during meaningful tasks  4  Pt will demonstrate ability to complete EOB, w/c, chair, toilet/commode transfers @ S in order to increase performance and participation during functional tasks  5  Pt will demonstrate ability to tolerate 30-35 minute OT session with no vc'ing for deep breathing or use of energy conservation techniques in order to increase activity tolerance during functional tasks  6  Pt will demonstrate Good carryover of use of energy conservation/compensatory strategies during ADLs and functional tasks in order to increase safety and reduce risk for falls     7  Pt will demonstrate Good attention and participation in continued evaluation of functional ambulation house hold distances in order to assist with safe d/c planning  8  Pt will attend to continued cognitive assessments 100% of the time in order to provide most appropriate d/c recommendations  9  Pt will follow 100% simple 2-step commands and be A&O x4 consistently with environmental cues to increase participation in functional activities  10  Pt will identify 3 areas of interest/hobbies and 1 intervention on how to incorporate into daily life in order to increase interaction with environment and peers as well as increase participation in meaningful tasks  11  Pt will demonstrate 100% carryover of BUE HEP in order to increase BUE MS and increase performance during functional tasks upon d/c home      Caron Livings OTR/L

## 2022-09-13 NOTE — PROGRESS NOTES
[address]  Progress Note - Emi Brooke 1965, 62 y o  male MRN: [id number]  Unit/Bed#: -[facility] Encounter: [id number]  Primary Care Provider: Olivia Russell DO   Date and time admitted to hospital: 9/12/2022  2:02 PM    * Low back pain  Assessment & Plan  · History of chronic lumbar diskitis with chronic osteomyelitis - has chronic pain but last 2 weeks has been acute worse when radiation to left leg increased pain/numbness and weakness in left leg   · Since March has been unable to walk, for years has had back pain with history of multiple surgeries   · CT abdomen pelvis - L5-S1 diskitis/osteomyelitis seen again  Similar endplate destructive changes and irregularity, however decreased component of mineralized material long intervertebral disc space noted at this level compared to prior, unsure significance, if relevant consider MRI  · CT lumbar recon - no acute osseous abnormality of lumbar spine, chronic L4-L5 diskitis/osteomyelitis with unchanged mild vertebral body height loss of L4 on L5 s/p L4-L5 decompressive laminectomy changes  · 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 THE [facility] - was found to have fluid collection in L4-L5 space which was initially [place] suspicious for epidural abscess and was ruled out  · MRI lumbar without contrast (contast contraindicated due to GFR)   · Stable postoperative change at the L4-5 level consistent with endplate sclerosis, as well as a postop seroma posteriorly  The possibility of residual discitis and osteomyelitis not excluded  · Overall there does appear to be improving compared to the prior examination    · There is dural thickening within the thecal sac thinning of the L4-5 level and extending into the sacral canal with an abnormal appearance of the nerve roots and an internal septation within the thecal sac at the level of S1, suggestive of scarring and arachnoiditis, 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  Will continue home pain med regimen - PDMP reviewed   · oxycodone 5 mg every 4 hours  · Belbuca film 150 mcg BID  · Pregabalin 75 mg BID  Patient has reported having no pain yesterday and this morning to nursing staff  Continue PT/OT  Trial of prednisone     Retention of urine s/p camarena in place  Assessment & Plan  · Potential neurogenic bladder   · Has history of same in June of this year - discharge with Camarena and outpatient voiding trial  · ED Noted retention on bladder scan  · CT abd/pelvis - moderate urinary bladder distention with mild persistent bilateral hydronephrosis    No obstructing calculi  · Continue Flomax  · Never had a urology outpatient follow up after hospitalization - will ask urology to see  · Will set up outpatient follow up   · DC with camarena     Hypercalcemia  Assessment & Plan  · History of hypercalcemia-was recently worked up while inpatient in July at [facility] AT THE [facility]  · Workup negative, thought to be due to immobilization - PTH intact this admission wnl  · Will treat with fluid and monitor, improving     CKD (chronic kidney disease)  Assessment & Plan  Lab Results   Component Value Date    EGFR 22 09/13/2022    EGFR 19 09/12/2022    EGFR 17 06/24/2022    CREATININE 2 90 (H) 09/13/2022    CREATININE 3 26 (H) 09/12/2022    CREATININE 3 69 (H) 06/24/2022   Baseline creatinine 3 6-3 9  Currently below baseline  Continue to monitor  Avoid nephrotoxic agents, NSAIDs, hypotension    Ambulatory dysfunction  Assessment & Plan  · Since March due to chronic osteomyelitis in disc changes  · History of chronic back pain and multiple surgeries    · Several hospitalizations/workups without specific cause  · Patient endorses he is not able to walk at all  · Pain is getting worse    · Will ask PT/OT to evaluate  · Recommending [facility]    Iron deficiency anemia secondary to inadequate dietary iron intake  Assessment & Plan  Hemoglobin 11 5 - higher than baseline of 8-9 likely due to dehydration   Stable       Severe protein-calorie malnutrition ([address] )  Assessment & Plan  Malnutrition Findings:   Adult Malnutrition type: Chronic illness  Adult Degree of Malnutrition: Other severe protein calorie malnutrition  Malnutrition Characteristics: Inadequate energy, Weight loss                  360 Statement: Chronic severe malnutrition related to decreased appetite, stomach upset, constipation as evidenced by < 75% energy intake > 1 mo, 21 6% weight loss x 5 mo (11/30/21 185lb, 4/11/22 145lb)  Treated with: Will maintain CCD 2 at this time, consider CCD 3 at follow up  +Glucerna TID, vanilla flavor per pt preference  Recommend daily weights for nutrition monitoring  Pt believes stomach upset and constipation due to medication regimen, adjustment of meds as indicated per MD, noting pt is on ferrous sulfate  BMI Findings: Body mass index is 21 76 kg/m²         Osteomyelitis of vertebra of lumbar region [facility])  Assessment & Plan  Has history of chronic osteomyelitis since March   · Has completed antibiotics and is no longer taking   CRP 13 6 - is decreased from last measured value in March of this year  CT imaging shows no acute osteomyelitis, same chronic osteomyelitis of L4-L5  MRI with continued osteomyelitis - unchanged/slightly improved from previous imaging     History of CVA (cerebrovascular accident)  Assessment & Plan  Continue aspirin and statin daily    Hypertension  Assessment & Plan  Continue coreg and amlodipine   Continue to monitor BP    Diabetes mellitus [facility])  Assessment & Plan  Lab Results   Component Value Date    HGBA1C 6 0 (H) 07/05/2022       Recent Labs     09/12/22  1723 09/12/22  2054 09/13/22  0735 09/13/22  1118   POCGLU 203* 131 92 114       Blood Sugar Average: Last 72 hrs:  · (P) 135 Outpatient regimen includes 36 units Levemir daily, 8 units with meals and bedtime with sliding scale  · Continue diabetic diet  · Continue insulin at slightly lower dose than outpatient        VTE Pharmacologic Prophylaxis: VTE Score: 3 Moderate Risk (Score 3-4) - Pharmacological DVT Prophylaxis Ordered: heparin  Patient Centered Rounds: I performed bedside rounds with nursing staff today  Discussions with Specialists or Other Care Team Provider: JOHN    Education and Discussions with Family / Patient: Attempted to call patient's wife, work phone only number available but she was not at work        Time Spent for Care: 30 minutes  More than 50% of total time spent on counseling and coordination of care as described above  Current Length of Stay: 1 day(s)  Current Patient Status: Inpatient   Certification Statement: The patient will continue to require additional inpatient hospital stay due to MRI, pending therapy evals  Discharge Plan: Anticipate discharge in 48 hrs to discharge location to be determined pending rehab evaluations  Code Status: Level 1 - Full Code    Subjective:   Seen and examined  Endorsing continued pain to me hover to nursing overnight and this morning denied pain and has not asked for pain medication  Endorses frustration with his situation and is looking forward to MRI  Objective:     Vitals:   Temp (24hrs), Av 8 °F (36 6 °C), Min:97 °F (36 1 °C), Max:98 2 °F (36 8 °C)    Temp:  [97 °F (36 1 °C)-98 2 °F (36 8 °C)] 98 2 °F (36 8 °C)  HR:  [58-74] 65  Resp:  [18-20] 18  BP: (131-173)/(75-95) 145/78  SpO2:  [94 %-97 %] 97 %  Body mass index is 21 76 kg/m²  Input and Output Summary (last 24 hours): Intake/Output Summary (Last 24 hours) at 2022 1319  Last data filed at 2022 0900  Gross per 24 hour   Intake 180 ml   Output 2250 ml   Net -2070 ml       Physical Exam:   Physical Exam  Vitals and nursing note reviewed  Constitutional:       General: He is not in acute distress  Appearance: Normal appearance  HENT:      Head: Normocephalic and atraumatic  Nose: No congestion        Mouth/Throat:      Mouth: Mucous membranes are moist    Eyes:      Conjunctiva/sclera: Conjunctivae normal    Cardiovascular:      Rate and Rhythm: Normal rate and regular rhythm  Pulses: Normal pulses  Heart sounds: Normal heart sounds  No murmur heard  Pulmonary:      Effort: Pulmonary effort is normal  No respiratory distress  Breath sounds: Normal breath sounds  Abdominal:      General: Bowel sounds are normal       Palpations: Abdomen is soft  Tenderness: There is no abdominal tenderness  Musculoskeletal:         General: Normal range of motion  Right lower leg: No edema  Left lower leg: No edema  Skin:     General: Skin is warm and dry  Neurological:      Mental Status: He is alert and oriented to person, place, and time  Sensory: Sensation is intact  Motor: Weakness (L>R) present            Additional Data:     Labs:  Results from last 7 days   Lab Units 09/13/22  0533 09/12/22  1422   WBC Thousand/uL 7 35 6 68   HEMOGLOBIN g/dL 10 3* 11 5*   HEMATOCRIT % 32 5* 36 4*   PLATELETS Thousands/uL 228 248   NEUTROS PCT %  --  61   LYMPHS PCT %  --  23   MONOS PCT %  --  11   EOS PCT %  --  3     Results from last 7 days   Lab Units 09/13/22  0533   SODIUM mmol/L 138   POTASSIUM mmol/L 4 3   CHLORIDE mmol/L 103   CO2 mmol/L 28   BUN mg/dL 43*   CREATININE mg/dL 2 90*   ANION GAP mmol/L 7   CALCIUM mg/dL 10 6*   ALBUMIN g/dL 2 6*   TOTAL BILIRUBIN mg/dL 0 20   ALK PHOS U/L 104   ALT U/L 7*   AST U/L 8   GLUCOSE RANDOM mg/dL 105     Results from last 7 days   Lab Units 09/12/22  1422   INR  0 89     Results from last 7 days   Lab Units 09/13/22  1118 09/13/22  0735 09/12/22  2054 09/12/22  1723   POC GLUCOSE mg/dl 114 92 131 203*         Results from last 7 days   Lab Units 09/12/22  1422   LACTIC ACID mmol/L 0 9       Lines/Drains:  Invasive Devices  Report    Peripheral Intravenous Line  Duration           Peripheral IV 09/12/22 Left Antecubital <1 day          Drain  Duration           Urethral Catheter Latex 18 Fr  <1 day              Urinary Catheter:  Goal for removal: N/A- Discharging with Ortiz               Imaging: Reviewed radiology reports from this admission including: abdominal/pelvic CT    Recent Cultures (last 7 days):   Results from last 7 days   Lab Units 09/12/22  1422   BLOOD CULTURE  Received in Microbiology Lab  Culture in Progress  Received in Microbiology Lab  Culture in Progress         Last 24 Hours Medication List:   Current Facility-Administered Medications   Medication Dose Route Frequency Provider Last Rate    acetaminophen  650 mg Oral Q6H PRN Ashish Larson PA-C      amLODIPine  10 mg Oral Daily Ashish Larson PA-C      atorvastatin  40 mg Oral Daily With Nic BlackYORDAN      Buprenorphine HCl  150 mcg Sublingual BID Ashish Larson PA-C      carvedilol  6 25 mg Oral BID With Meals Ashish Larson PA-C      cefTRIAXone  1,000 mg Intravenous Q24H VERNON Mcgrath-C 1,000 mg (09/13/22 0814)    cyclobenzaprine  5 mg Oral TID Ashish Larson PA-C      docusate sodium  100 mg Oral BID Ashish Larson PA-C      ferrous sulfate  325 mg Oral Daily With Breakfast Mary Cullen MD      heparin (porcine)  5,000 Units Subcutaneous [facility] Ashish Larson PA-C      insulin detemir  20 Units Subcutaneous HS Ashish Larson PA-C      insulin lispro  1-6 Units Subcutaneous 4x Daily (AC & HS) Ashish Larson PA-C      insulin lispro  4 Units Subcutaneous TID With Meals Janece Blades, CRNP      lidocaine  1 patch Topical Daily Ashish Larson PA-C      melatonin  6 mg Oral Daily PRN Ashish Larson PA-C      methocarbamol  500 mg Oral Q6H PRN Ashish Larson PA-C      nicotine  1 patch Transdermal Daily Ashish Larson PA-C      ondansetron  4 mg Intravenous Q6H PRN Ashish Larson PA-C      oxyCODONE  5 mg Oral Q4H PRN Ashish Larson PA-C      polyethylene glycol  17 g Oral BID Ashish Larson PA-C      predniSONE  40 mg Oral Daily Ashish Larson PA-C      pregabalin  150 mg Oral BID Rebecca Cope Hussain Briscoe PA-C      senna-docusate sodium  1 tablet Oral BID Sharyl Aase, PA-C      sodium bicarbonate  650 mg Oral BID Sharyl Aase, PA-C      sodium chloride  125 mL/hr Intravenous Continuous Bernadine Pompa  mL/hr (09/13/22 0137)    tamsulosin  0 4 mg Oral Daily With Richard Quan PA-C      thiamine  100 mg Oral Daily Sharyl Aase, PA-C      venlafaxine  37 5 mg Oral TID Sharyl Aase, PA-C          Today, Patient Was Seen By: Sharyl Aase, PA-C    **Please Note: This note may have been constructed using a voice recognition system  **

## 2022-09-14 VITALS
SYSTOLIC BLOOD PRESSURE: 176 MMHG | WEIGHT: 156.75 LBS | RESPIRATION RATE: 18 BRPM | HEART RATE: 71 BPM | HEIGHT: 71 IN | OXYGEN SATURATION: 94 % | DIASTOLIC BLOOD PRESSURE: 79 MMHG | TEMPERATURE: 99 F | BODY MASS INDEX: 21.94 KG/M2

## 2022-09-14 PROBLEM — N18.31 STAGE 3A CHRONIC KIDNEY DISEASE (HCC): Status: ACTIVE | Noted: 2022-09-12

## 2022-09-14 PROBLEM — R33.8 ACUTE URINARY RETENTION: Status: ACTIVE | Noted: 2022-06-24

## 2022-09-14 LAB
ALBUMIN SERPL BCP-MCNC: 2.5 G/DL (ref 3.5–5)
ALP SERPL-CCNC: 93 U/L (ref 46–116)
ALT SERPL W P-5'-P-CCNC: 11 U/L (ref 12–78)
ANION GAP SERPL CALCULATED.3IONS-SCNC: 9 MMOL/L (ref 4–13)
AST SERPL W P-5'-P-CCNC: 5 U/L (ref 5–45)
BILIRUB SERPL-MCNC: 0.14 MG/DL (ref 0.2–1)
BUN SERPL-MCNC: 41 MG/DL (ref 5–25)
CALCIUM ALBUM COR SERPL-MCNC: 11.6 MG/DL (ref 8.3–10.1)
CALCIUM SERPL-MCNC: 10.4 MG/DL (ref 8.3–10.1)
CHLORIDE SERPL-SCNC: 105 MMOL/L (ref 96–108)
CO2 SERPL-SCNC: 24 MMOL/L (ref 21–32)
CREAT SERPL-MCNC: 2.87 MG/DL (ref 0.6–1.3)
DME PARACHUTE DELIVERY DATE ACTUAL: NORMAL
DME PARACHUTE DELIVERY DATE EXPECTED: NORMAL
DME PARACHUTE DELIVERY DATE REQUESTED: NORMAL
DME PARACHUTE DELIVERY NOTE: NORMAL
DME PARACHUTE ITEM DESCRIPTION: NORMAL
DME PARACHUTE ORDER STATUS: NORMAL
DME PARACHUTE SUPPLIER NAME: NORMAL
DME PARACHUTE SUPPLIER PHONE: NORMAL
ERYTHROCYTE [DISTWIDTH] IN BLOOD BY AUTOMATED COUNT: 17.6 % (ref 11.6–15.1)
GFR SERPL CREATININE-BSD FRML MDRD: 23 ML/MIN/1.73SQ M
GLUCOSE SERPL-MCNC: 101 MG/DL (ref 65–140)
GLUCOSE SERPL-MCNC: 125 MG/DL (ref 65–140)
GLUCOSE SERPL-MCNC: 129 MG/DL (ref 65–140)
GLUCOSE SERPL-MCNC: 222 MG/DL (ref 65–140)
HCT VFR BLD AUTO: 31 % (ref 36.5–49.3)
HGB BLD-MCNC: 9.8 G/DL (ref 12–17)
MCH RBC QN AUTO: 27.8 PG (ref 26.8–34.3)
MCHC RBC AUTO-ENTMCNC: 31.6 G/DL (ref 31.4–37.4)
MCV RBC AUTO: 88 FL (ref 82–98)
PLATELET # BLD AUTO: 217 THOUSANDS/UL (ref 149–390)
PMV BLD AUTO: 10.8 FL (ref 8.9–12.7)
POTASSIUM SERPL-SCNC: 4.1 MMOL/L (ref 3.5–5.3)
PROT SERPL-MCNC: 5.9 G/DL (ref 6.4–8.4)
RBC # BLD AUTO: 3.52 MILLION/UL (ref 3.88–5.62)
SODIUM SERPL-SCNC: 138 MMOL/L (ref 135–147)
WBC # BLD AUTO: 7.26 THOUSAND/UL (ref 4.31–10.16)

## 2022-09-14 PROCEDURE — 82948 REAGENT STRIP/BLOOD GLUCOSE: CPT

## 2022-09-14 PROCEDURE — 85027 COMPLETE CBC AUTOMATED: CPT

## 2022-09-14 PROCEDURE — 99239 HOSP IP/OBS DSCHRG MGMT >30: CPT | Performed by: FAMILY MEDICINE

## 2022-09-14 PROCEDURE — 80053 COMPREHEN METABOLIC PANEL: CPT

## 2022-09-14 RX ORDER — SODIUM BICARBONATE 650 MG/1
650 TABLET ORAL DAILY
Qty: 30 TABLET | Refills: 0 | Status: SHIPPED | OUTPATIENT
Start: 2022-09-14 | End: 2022-10-14

## 2022-09-14 RX ORDER — CEPHALEXIN 500 MG/1
500 CAPSULE ORAL EVERY 12 HOURS SCHEDULED
Qty: 10 CAPSULE | Refills: 0 | Status: SHIPPED | OUTPATIENT
Start: 2022-09-14 | End: 2022-09-19

## 2022-09-14 RX ORDER — PREDNISONE 20 MG/1
40 TABLET ORAL DAILY
Qty: 10 TABLET | Refills: 0 | Status: SHIPPED | OUTPATIENT
Start: 2022-09-15 | End: 2022-09-20

## 2022-09-14 RX ORDER — METHOCARBAMOL 500 MG/1
500 TABLET, FILM COATED ORAL EVERY 6 HOURS PRN
Qty: 30 TABLET | Refills: 0 | Status: SHIPPED | OUTPATIENT
Start: 2022-09-14 | End: 2022-10-24

## 2022-09-14 RX ADMIN — HEPARIN SODIUM 5000 UNITS: 5000 INJECTION INTRAVENOUS; SUBCUTANEOUS at 05:00

## 2022-09-14 RX ADMIN — PREGABALIN 150 MG: 75 CAPSULE ORAL at 08:36

## 2022-09-14 RX ADMIN — CARVEDILOL 6.25 MG: 6.25 TABLET, FILM COATED ORAL at 08:37

## 2022-09-14 RX ADMIN — LIDOCAINE 5% 1 PATCH: 700 PATCH TOPICAL at 08:37

## 2022-09-14 RX ADMIN — PREDNISONE 40 MG: 20 TABLET ORAL at 08:37

## 2022-09-14 RX ADMIN — SODIUM CHLORIDE 125 ML/HR: 0.9 INJECTION, SOLUTION INTRAVENOUS at 02:31

## 2022-09-14 RX ADMIN — AMLODIPINE BESYLATE 10 MG: 10 TABLET ORAL at 08:36

## 2022-09-14 RX ADMIN — THIAMINE HCL TAB 100 MG 100 MG: 100 TAB at 08:36

## 2022-09-14 RX ADMIN — DOCUSATE SODIUM AND SENNOSIDES 1 TABLET: 8.6; 5 TABLET, FILM COATED ORAL at 08:37

## 2022-09-14 RX ADMIN — INSULIN LISPRO 4 UNITS: 100 INJECTION, SOLUTION INTRAVENOUS; SUBCUTANEOUS at 13:16

## 2022-09-14 RX ADMIN — VENLAFAXINE HYDROCHLORIDE 37.5 MG: 37.5 CAPSULE, EXTENDED RELEASE ORAL at 08:37

## 2022-09-14 RX ADMIN — CYCLOBENZAPRINE 5 MG: 10 TABLET, FILM COATED ORAL at 08:37

## 2022-09-14 RX ADMIN — CEFTRIAXONE 1000 MG: 1 INJECTION, SOLUTION INTRAVENOUS at 08:37

## 2022-09-14 RX ADMIN — BUPRENORPHINE HYDROCHLORIDE 150 MCG: 150 FILM, SOLUBLE BUCCAL at 08:37

## 2022-09-14 RX ADMIN — FERROUS SULFATE TAB 325 MG (65 MG ELEMENTAL FE) 325 MG: 325 (65 FE) TAB at 08:36

## 2022-09-14 RX ADMIN — SODIUM BICARBONATE 650 MG TABLET 650 MG: at 08:36

## 2022-09-14 NOTE — ASSESSMENT & PLAN NOTE
Hemoglobin stable around 9 8-10    Patient probably has element of iron deficiency anemia and also anemia of chronic disease due to CKD stage 3

## 2022-09-14 NOTE — ASSESSMENT & PLAN NOTE
· History of chronic lumbar diskitis with chronic osteomyelitis - has chronic pain but last 2 weeks has been acute worse when radiation to left leg increased pain/numbness and weakness in left leg   · Since March has been unable to walk, for years has had back pain with history of multiple surgeries   · CT abdomen pelvis - L5-S1 diskitis/osteomyelitis seen again  Similar endplate destructive changes and irregularity, however decreased component of mineralized material long intervertebral disc space noted at this level compared to prior, unsure significance, if relevant consider MRI  · CT lumbar recon - no acute osseous abnormality of lumbar spine, chronic L4-L5 diskitis/osteomyelitis with unchanged mild vertebral body height loss of L4 on L5 s/p L4-L5 decompressive laminectomy changes  · 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  · Had similar admission in July of this year at [facility] - was found to have fluid collection in L4-L5 space which was initially [facility] suspicious for epidural abscess and was ruled out  · MRI lumbar without contrast (contast contraindicated due to GFR)   · Stable postoperative change at the L4-5 level consistent with endplate sclerosis, as well as a postop seroma posteriorly  The possibility of residual discitis and osteomyelitis not excluded  · Overall there does appear to be improving compared to the prior examination  · There is dural thickening within the thecal sac thinning of the L4-5 level and extending into the sacral canal with an abnormal appearance of the nerve roots and an internal septation within the thecal sac at the level of S1, suggestive of scarring and arachnoiditis, 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  Will continue home pain med regimen - PDMP reviewed   · oxycodone 5 mg every 4 hours  · Belbuca film 150 mcg BID  · Pregabalin 75 mg BID  Patient has reported having no pain yesterday and this morning to nursing staff  Continue PT/OT    Discharge home with home health today  Trial of prednisone     Clinically improving

## 2022-09-14 NOTE — ASSESSMENT & PLAN NOTE
Continue coreg and amlodipine   Blood pressure little elevated today but otherwise usually controlled

## 2022-09-14 NOTE — ASSESSMENT & PLAN NOTE
Lab Results   Component Value Date    EGFR 23 09/14/2022    EGFR 22 09/13/2022    EGFR 19 09/12/2022    CREATININE 2 87 (H) 09/14/2022    CREATININE 2 90 (H) 09/13/2022    CREATININE 3 26 (H) 09/12/2022   Baseline creatinine 3 6-3 9    Admitted with creatinine of 3 2  Currently below baseline with creatinine of 2 8 since Ortiz catheter placed  Continue to monitor  Avoid nephrotoxic agents, NSAIDs, hypotension

## 2022-09-14 NOTE — DISCHARGE INSTR - AVS FIRST PAGE
Please avoid any calcium supplements and avoid any multivitamins  Avoid dairy products including milk, yogurt, cheese due to your high calcium levels    Please follow-up with your kidney doctor is in 2 weeks and also follow up with Urology  Continue Otriz catheter use at home until instructed otherwise

## 2022-09-14 NOTE — PLAN OF CARE
Problem: MOBILITY - ADULT  Goal: Maintain or return to baseline ADL function  Description: INTERVENTIONS:  -  Assess patient's ability to carry out ADLs; assess patient's baseline for ADL function and identify physical deficits which impact ability to perform ADLs (bathing, care of mouth/teeth, toileting, grooming, dressing, etc )  - Assess/evaluate cause of self-care deficits   - Assess range of motion  - Assess patient's mobility; develop plan if impaired  - Assess patient's need for assistive devices and provide as appropriate  - Encourage maximum independence but intervene and supervise when necessary  - Involve family in performance of ADLs  - Assess for home care needs following discharge   - Consider OT consult to assist with ADL evaluation and planning for discharge  - Provide patient education as appropriate  Outcome: Progressing  Goal: Maintains/Returns to pre admission functional level  Description: INTERVENTIONS:  - Perform BMAT or MOVE assessment daily    - Set and communicate daily mobility goal to care team and patient/family/caregiver  - Collaborate with rehabilitation services on mobility goals if consulted  - Perform Range of Motion   times a day  - Reposition patient every   hours  - Dangle patient   times a day  - Stand patient   times a day  - Ambulate patient   times a day  - Out of bed to chair   times a day   - Out of bed for meals     times a day  - Out of bed for toileting  - Record patient progress and toleration of activity level   Outcome: Progressing     Problem: Nutrition/Hydration-ADULT  Goal: Nutrient/Hydration intake appropriate for improving, restoring or maintaining nutritional needs  Description: Monitor and assess patient's nutrition/hydration status for malnutrition  Collaborate with interdisciplinary team and initiate plan and interventions as ordered  Monitor patient's weight and dietary intake as ordered or per policy   Utilize nutrition screening tool and intervene as necessary  Determine patient's food preferences and provide high-protein, high-caloric foods as appropriate  INTERVENTIONS:  - Monitor oral intake, urinary output, labs, and treatment plans  - Assess nutrition and hydration status and recommend course of action  - Evaluate amount of meals eaten  - Assist patient with eating if necessary   - Allow adequate time for meals  - Recommend/ encourage appropriate diets, oral nutritional supplements, and vitamin/mineral supplements  - Order, calculate, and assess calorie counts as needed  - Recommend, monitor, and adjust tube feedings and TPN/PPN based on assessed needs  - Assess need for intravenous fluids  - Provide specific nutrition/hydration education as appropriate  - Include patient/family/caregiver in decisions related to nutrition  Outcome: Progressing     Problem: Potential for Falls  Goal: Patient will remain free of falls  Description: INTERVENTIONS:  - Educate patient/family on patient safety including physical limitations  - Instruct patient to call for assistance with activity   - Consult OT/PT to assist with strengthening/mobility   - Keep Call bell within reach  - Keep bed low and locked with side rails adjusted as appropriate  - Keep care items and personal belongings within reach  - Initiate and maintain comfort rounds  - Make Fall Risk Sign visible to staff  - Offer Toileting every   Hours, in advance of need  - Initiate/Maintain   alarm  - Obtain necessary fall risk management equipment:      - Apply yellow socks and bracelet for high fall risk patients  - Consider moving patient to room near nurses station  Outcome: Progressing     Problem: Prexisting or High Potential for Compromised Skin Integrity  Goal: Skin integrity is maintained or improved  Description: INTERVENTIONS:  - Identify patients at risk for skin breakdown  - Assess and monitor skin integrity  - Assess and monitor nutrition and hydration status  - Monitor labs   - Assess for incontinence   - Turn and reposition patient  - Assist with mobility/ambulation  - Relieve pressure over bony prominences  - Avoid friction and shearing  - Provide appropriate hygiene as needed including keeping skin clean and dry  - Evaluate need for skin moisturizer/barrier cream  - Collaborate with interdisciplinary team   - Patient/family teaching  - Consider wound care consult   Outcome: Progressing     Problem: GENITOURINARY - ADULT  Goal: Maintains or returns to baseline urinary function  Description: INTERVENTIONS:  - Assess urinary function  - Encourage oral fluids to ensure adequate hydration if ordered  - Administer IV fluids as ordered to ensure adequate hydration  - Administer ordered medications as needed  - Offer frequent toileting  - Follow urinary retention protocol if ordered  Outcome: Progressing  Goal: Absence of urinary retention  Description: INTERVENTIONS:  - Assess patient's ability to void and empty bladder  - Monitor I/O  - Bladder scan as needed  - Discuss with physician/AP medications to alleviate retention as needed  - Discuss catheterization for long term situations as appropriate  Outcome: Progressing  Goal: Urinary catheter remains patent  Description: INTERVENTIONS:  - Assess patency of urinary catheter  - If patient has a chronic camarena, consider changing catheter if non-functioning  - Follow guidelines for intermittent irrigation of non-functioning urinary catheter  Outcome: Progressing     Problem: METABOLIC, FLUID AND ELECTROLYTES - ADULT  Goal: Electrolytes maintained within normal limits  Description: INTERVENTIONS:  - Monitor labs and assess patient for signs and symptoms of electrolyte imbalances  - Administer electrolyte replacement as ordered  - Monitor response to electrolyte replacements, including repeat lab results as appropriate  - Instruct patient on fluid and nutrition as appropriate  Outcome: Progressing  Goal: Fluid balance maintained  Description: INTERVENTIONS:  - Monitor labs   - Monitor I/O and WT  - Instruct patient on fluid and nutrition as appropriate  - Assess for signs & symptoms of volume excess or deficit  Outcome: Progressing  Goal: Glucose maintained within target range  Description: INTERVENTIONS:  - Monitor Blood Glucose as ordered  - Assess for signs and symptoms of hyperglycemia and hypoglycemia  - Administer ordered medications to maintain glucose within target range  - Assess nutritional intake and initiate nutrition service referral as needed  Outcome: Progressing     Problem: SKIN/TISSUE INTEGRITY - ADULT  Goal: Skin Integrity remains intact(Skin Breakdown Prevention)  Description: Assess:  -Perform David assessment every    -Clean and moisturize skin every    -Inspect skin when repositioning, toileting, and assisting with ADLS  -Assess under medical devices such as   every    -Assess extremities for adequate circulation and sensation     Bed Management:  -Have minimal linens on bed & keep smooth, unwrinkled  -Change linens as needed when moist or perspiring  -Avoid sitting or lying in one position for more than   hours while in bed  -Keep HOB at  degrees     Toileting:  -Offer bedside commode  -Assess for incontinence every   -Use incontinent care products after each incontinent episode such as   Activity:  -Mobilize patient   times a day  -Encourage activity and walks on unit  -Encourage or provide ROM exercises   -Turn and reposition patient every   Hours  -Use appropriate equipment to lift or move patient in bed  -Instruct/ Assist with weight shifting every   when out of bed in chair  -Consider limitation of chair time    hour intervals    Skin Care:  -Avoid use of baby powder, tape, friction and shearing, hot water or constrictive clothing  -Relieve pressure over bony prominences using    -Do not massage red bony areas    Next Steps:  -Teach patient strategies to minimize risks such as     -Consider consults to  interdisciplinary teams such as    Outcome: Progressing  Goal: Incision(s), wounds(s) or drain site(s) healing without S/S of infection  Description: INTERVENTIONS  - Assess and document dressing, incision, wound bed, drain sites and surrounding tissue  - Provide patient and family education  - Perform skin care/dressing changes every   Outcome: Progressing  Goal: Pressure injury heals and does not worsen  Description: Interventions:  - Implement low air loss mattress or specialty surface (Criteria met)  - Apply silicone foam dressing  - Instruct/assist with weight shifting every   minutes when in chair   - Limit chair time to   hour intervals  - Use special pressure reducing interventions such as   when in chair   - Apply fecal or urinary incontinence containment device   - Perform passive or active ROM every   - Turn and reposition patient & offload bony prominences every   hours   - Utilize friction reducing device or surface for transfers   - Consider consults to  interdisciplinary teams such as    - Use incontinent care products after each incontinent episode such as      - Consider nutrition services referral as needed  Outcome: Progressing     Problem: MUSCULOSKELETAL - ADULT  Goal: Maintain or return mobility to safest level of function  Description: INTERVENTIONS:  - Assess patient's ability to carry out ADLs; assess patient's baseline for ADL function and identify physical deficits which impact ability to perform ADLs (bathing, care of mouth/teeth, toileting, grooming, dressing, etc )  - Assess/evaluate cause of self-care deficits   - Assess range of motion  - Assess patient's mobility  - Assess patient's need for assistive devices and provide as appropriate  - Encourage maximum independence but intervene and supervise when necessary  - Involve family in performance of ADLs  - Assess for home care needs following discharge   - Consider OT consult to assist with ADL evaluation and planning for discharge  - Provide patient education as appropriate  Outcome: Progressing  Goal: Maintain proper alignment of affected body part  Description: INTERVENTIONS:  - Support, maintain and protect limb and body alignment  - Provide patient/ family with appropriate education  Outcome: Progressing     Problem: PAIN - ADULT  Goal: Verbalizes/displays adequate comfort level or baseline comfort level  Description: Interventions:  - Encourage patient to monitor pain and request assistance  - Assess pain using appropriate pain scale  - Administer analgesics based on type and severity of pain and evaluate response  - Implement non-pharmacological measures as appropriate and evaluate response  - Consider cultural and social influences on pain and pain management  - Notify physician/advanced practitioner if interventions unsuccessful or patient reports new pain  Outcome: Progressing     Problem: INFECTION - ADULT  Goal: Absence or prevention of progression during hospitalization  Description: INTERVENTIONS:  - Assess and monitor for signs and symptoms of infection  - Monitor lab/diagnostic results  - Monitor all insertion sites, i e  indwelling lines, tubes, and drains  - Monitor endotracheal if appropriate and nasal secretions for changes in amount and color  - Cerulean appropriate cooling/warming therapies per order  - Administer medications as ordered  - Instruct and encourage patient and family to use good hand hygiene technique  - Identify and instruct in appropriate isolation precautions for identified infection/condition  Outcome: Progressing  Goal: Absence of fever/infection during neutropenic period  Description: INTERVENTIONS:  - Monitor WBC    Outcome: Progressing     Problem: SAFETY ADULT  Goal: Maintain or return to baseline ADL function  Description: INTERVENTIONS:  -  Assess patient's ability to carry out ADLs; assess patient's baseline for ADL function and identify physical deficits which impact ability to perform ADLs (bathing, care of mouth/teeth, toileting, grooming, dressing, etc )  - Assess/evaluate cause of self-care deficits   - Assess range of motion  - Assess patient's mobility; develop plan if impaired  - Assess patient's need for assistive devices and provide as appropriate  - Encourage maximum independence but intervene and supervise when necessary  - Involve family in performance of ADLs  - Assess for home care needs following discharge   - Consider OT consult to assist with ADL evaluation and planning for discharge  - Provide patient education as appropriate  Outcome: Progressing  Goal: Maintains/Returns to pre admission functional level  Description: INTERVENTIONS:  - Perform BMAT or MOVE assessment daily    - Set and communicate daily mobility goal to care team and patient/family/caregiver  - Collaborate with rehabilitation services on mobility goals if consulted  - Perform Range of Motion   times a day  - Reposition patient every   hours  - Dangle patient   times a day  - Stand patient   times a day  - Ambulate patient   times a day  - Out of bed to chair   times a day   - Out of bed for meals     times a day  - Out of bed for toileting  - Record patient progress and toleration of activity level   Outcome: Progressing  Goal: Patient will remain free of falls  Description: INTERVENTIONS:  - Educate patient/family on patient safety including physical limitations  - Instruct patient to call for assistance with activity   - Consult OT/PT to assist with strengthening/mobility   - Keep Call bell within reach  - Keep bed low and locked with side rails adjusted as appropriate  - Keep care items and personal belongings within reach  - Initiate and maintain comfort rounds  - Make Fall Risk Sign visible to staff  - Offer Toileting every   Hours, in advance of need  - Initiate/Maintain   alarm  - Obtain necessary fall risk management equipment:      - Apply yellow socks and bracelet for high fall risk patients  - Consider moving patient to room near nurses station  Outcome: Progressing     Problem: DISCHARGE PLANNING  Goal: Discharge to home or other facility with appropriate resources  Description: INTERVENTIONS:  - Identify barriers to discharge w/patient and caregiver  - Arrange for needed discharge resources and transportation as appropriate  - Identify discharge learning needs (meds, wound care, etc )  - Arrange for interpretive services to assist at discharge as needed  - Refer to Case Management Department for coordinating discharge planning if the patient needs post-hospital services based on physician/advanced practitioner order or complex needs related to functional status, cognitive ability, or social support system  Outcome: Progressing     Problem: Knowledge Deficit  Goal: Patient/family/caregiver demonstrates understanding of disease process, treatment plan, medications, and discharge instructions  Description: Complete learning assessment and assess knowledge base    Interventions:  - Provide teaching at level of understanding  - Provide teaching via preferred learning methods  Outcome: Progressing

## 2022-09-14 NOTE — ASSESSMENT & PLAN NOTE
· Potential neurogenic bladder   · Has history of same in June of this year - discharge with Camarena and outpatient voiding trial  · ED Noted retention on bladder scan  · CT abd/pelvis - moderate urinary bladder distention with mild persistent bilateral hydronephrosis  No obstructing calculi  · Continue Flomax  · Never had a urology outpatient follow up after hospitalization -appreciate urology input  · Will set up outpatient follow up   · DC with camarena     May need long-term Camarena catheter use

## 2022-09-14 NOTE — NURSING NOTE
Instructions reviewed with patient and wife  Verbalized understanding  No questions or concerns at this time

## 2022-09-14 NOTE — ASSESSMENT & PLAN NOTE
Lab Results   Component Value Date    HGBA1C 6 0 (H) 07/05/2022       Recent Labs     09/13/22  1118 09/13/22  1548 09/13/22 2111 09/14/22  0728   POCGLU 114 176* 228* 101       Blood Sugar Average: Last 72 hrs:  · (P) 160 [id number] Outpatient regimen includes 36 units Levemir daily, 8 units with meals and bedtime with sliding scale  · Continue diabetic diet  · Continue insulin at slightly lower dose than outpatient    Blood sugars are controlled

## 2022-09-14 NOTE — ASSESSMENT & PLAN NOTE
· History of hypercalcemia-was recently worked up while inpatient in July at [facility]  · Workup negative, thought to be due to immobilization - PTH intact this admission wnl  · Will treat with fluid and monitor, improving   Avoid calcium in the diet and calcium supplements    Repeat BMP outpatient in 2 weeks encourage oral hydration

## 2022-09-14 NOTE — DISCHARGE SUMMARY
[address]  Discharge- Jordana Bending Sr  1965, 62 y o  male MRN: [id number]  Unit/Bed#: -01 Encounter: [id number]  Primary Care Provider: Iglesia Colunga DO   Date and time admitted to hospital: 9/12/2022  2:02 PM    * Low back pain  Assessment & Plan  · History of chronic lumbar diskitis with chronic osteomyelitis - has chronic pain but last 2 weeks has been acute worse when radiation to left leg increased pain/numbness and weakness in left leg   · Since March has been unable to walk, for years has had back pain with history of multiple surgeries   · CT abdomen pelvis - L5-S1 diskitis/osteomyelitis seen again  Similar endplate destructive changes and irregularity, however decreased component of mineralized material long intervertebral disc space noted at this level compared to prior, unsure significance, if relevant consider MRI  · CT lumbar recon - no acute osseous abnormality of lumbar spine, chronic L4-L5 diskitis/osteomyelitis with unchanged mild vertebral body height loss of L4 on L5 s/p L4-L5 decompressive laminectomy changes  · 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  · Had similar admission in July of this year at [address] [place] [address] [address] - was found to have fluid collection in L4-L5 space which was initially [place] suspicious for epidural abscess and was ruled out  · MRI lumbar without contrast (contast contraindicated due to GFR)   · Stable postoperative change at the L4-5 level consistent with endplate sclerosis, as well as a postop seroma posteriorly  The possibility of residual discitis and osteomyelitis not excluded  · Overall there does appear to be improving compared to the prior examination    · There is dural thickening within the thecal sac thinning of the L4-5 level and extending into the sacral canal with an abnormal appearance of the nerve roots and an internal septation within the thecal sac at the level of S1, suggestive of scarring and arachnoiditis, 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  Will continue home pain med regimen - PDMP reviewed   · oxycodone 5 mg every 4 hours  · Belbuca film 150 mcg BID  · Pregabalin 75 mg BID  Patient has reported having no pain yesterday and this morning to nursing staff  Continue PT/OT  Discharge home with home health today  Trial of prednisone   Clinically improving    Acute urinary retention  Assessment & Plan  · Potential neurogenic bladder   · Has history of same in June of this year - discharge with Camarena and outpatient voiding trial  · ED Noted retention on bladder scan  · CT abd/pelvis - moderate urinary bladder distention with mild persistent bilateral hydronephrosis  No obstructing calculi  · Continue Flomax  · Never had a urology outpatient follow up after hospitalization -appreciate urology input  · Will set up outpatient follow up   · DC with camarena   May need long-term Camarena catheter use    Osteomyelitis of vertebra of lumbar region [facility])  Assessment & Plan  Has history of chronic osteomyelitis since March   · Has completed antibiotics and is no longer taking   CRP 13 6 - is decreased from last measured value in March of this year  CT imaging shows no acute osteomyelitis, same chronic osteomyelitis of L4-L5  MRI with continued osteomyelitis - unchanged/slightly improved from previous imaging     Stage 3a chronic kidney disease [facility])  Assessment & Plan  Lab Results   Component Value Date    EGFR 23 09/14/2022    EGFR 22 09/13/2022    EGFR 19 09/12/2022    CREATININE 2 87 (H) 09/14/2022    CREATININE 2 90 (H) 09/13/2022    CREATININE 3 26 (H) 09/12/2022   Baseline creatinine 3 6-3 9    Admitted with creatinine of 3 2  Currently below baseline with creatinine of 2 8 since Camarena catheter placed  Continue to monitor  Avoid nephrotoxic agents, NSAIDs, hypotension    Hypercalcemia  Assessment & Plan  · History of hypercalcemia-was recently worked up while inpatient in July at [facility]  · Workup negative, thought to be due to immobilization - PTH intact this admission wnl  · Will treat with fluid and monitor, improving   Avoid calcium in the diet and calcium supplements  Repeat BMP outpatient in 2 weeks encourage oral hydration    Iron deficiency anemia secondary to inadequate dietary iron intake  Assessment & Plan  Hemoglobin stable around 9 8-10  Patient probably has element of iron deficiency anemia and also anemia of chronic disease due to CKD stage 3      Severe protein-calorie malnutrition (HCC)  Assessment & Plan  Malnutrition Findings:   Adult Malnutrition type: Chronic illness  Adult Degree of Malnutrition: Other severe protein calorie malnutrition  Malnutrition Characteristics: Inadequate energy, Weight loss                  360 Statement: Chronic severe malnutrition related to decreased appetite, stomach upset, constipation as evidenced by < 75% energy intake > 1 mo, 21 6% weight loss x 5 mo (11/30/21 185lb, 4/11/22 145lb)  Treated with: Will maintain CCD 2 at this time, consider CCD 3 at follow up  +Glucerna TID, vanilla flavor per pt preference  Recommend daily weights for nutrition monitoring  Pt believes stomach upset and constipation due to medication regimen, adjustment of meds as indicated per MD, noting pt is on ferrous sulfate  BMI Findings: Body mass index is 21 76 kg/m²         History of CVA (cerebrovascular accident)  Assessment & Plan  Continue aspirin and statin daily    Essential hypertension  Assessment & Plan  Continue coreg and amlodipine   Blood pressure little elevated today but otherwise usually controlled    Diabetes mellitus type 2, insulin dependent [facility])  Assessment & Plan  Lab Results   Component Value Date    HGBA1C 6 0 (H) 07/05/2022       Recent Labs     09/13/22  1118 09/13/22  1548 09/13/22  2111 09/14/22  0728   POCGLU 114 176* 228* 101       Blood Sugar Average: Last 72 hrs:  · (P) 149 [id number] Outpatient regimen includes 36 units Levemir daily, 8 units with meals and bedtime with sliding scale  · Continue diabetic diet  · Continue insulin at slightly lower dose than outpatient  Blood sugars are controlled      Discharging Physician / Practitioner: Mario Griffin MD  PCP: Yannick Tapia DO  Admission Date:   Admission Orders (From admission, onward)     Ordered        09/12/22 1551  INPATIENT ADMISSION  Once                      Discharge Date: 09/14/22    Medical Problems             Resolved Problems  Date Reviewed: 9/14/2022   None                 Consultations During Hospital Stay:  · Urology, physical therapy and occupational therapy    Procedures Performed:   · Ortiz catheter placement on admission    Significant Findings / Test Results:   CT abdomen pelvis wo contrast    Result Date: 9/12/2022  Impression: Findings of L5-S1 discitis/osteomyelitis again seen  Overall similar endplate destructive changes and irregularity, however, decreased component of mineralized material along the intervertebral disc space noted at this level compared to prior exam   Significance of this change, if any, is uncertain  If relevant, consider MRI with and without IV contrast for further evaluation  Moderate urinary bladder distention with mild persistent bilateral hydronephrosis, the latter perhaps related to bladder distention  No obstructing calculi  Cannot exclude some debris within the urinary bladder  Correlate with urinalysis  Moderate amount of colonic stool may reflect constipation  Otherwise, no acute intra-abdominal or pelvic pathology  Limited study without IV or oral contrast  Workstation performed: [id number]     MRI lumbar spine wo contrast    Result Date: 9/13/2022  Impression: Stable postoperative change at the L4-5 level consistent with endplate sclerosis, as well as a postop seroma posteriorly  The possibility of residual discitis and osteomyelitis not excluded  Overall there does appear to be improving compared to the prior examination   There is dural thickening within the thecal sac thinning of the L4-5 level and extending into the sacral canal with an abnormal appearance of the nerve roots and an internal septation within the thecal sac at the level of S1, suggestive of scarring and arachnoiditis, 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  Workstation performed: [id number]     CT recon only lumbar spine    Result Date: 9/12/2022  Impression: No acute osseous abnormality of lumbar spine  Chronic L4-L5 discitis/osteomyelitis with unchanged mild vertebral body height loss of L4 and L5 status post L4-L5 decompressive laminectomy changes  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  Please see same-day CT abdomen pelvis for further evaluation  The study was marked in [facility] for immediate notification  Workstation performed: [id number]     Incidental Findings:   · None     Test Results Pending at Discharge (will require follow up): · None     Outpatient Tests Requested:  · CMP in 2 weeks    Complications:  None    Reason for Admission:  Lower back pain    Hospital Course:     Karan Villalobos  is a 62 y o  male patient who originally presented to the hospital on 9/12/2022 due to acute on chronic lower back pain with left-sided weakness and neuropathy  Also having acute urinary retention with moderate bladder distention and hydronephrosis noted  Creatinine improved after Ortiz catheter placement  Also had hypercalcemia received IV fluid hydration and is now clinically improving  Recommend good oral intake hydration avoid calcium supplementation repeat labs in 2 weeks and outpatient follow-up with Urology as this is his 2nd episode of requiring a Ortiz catheter in the last few months    Will have outpatient voiding trial done but will need to be mindful as this might be secondary to neurogenic bladder and might need long-term urinary catheter    Patient's back pain is improving he had MRI lumbar spine done which was negative for acute infection or abscess  Shows findings of chronic osteomyelitis L4-L5 level        Please see above list of diagnoses and related plan for additional information  Condition at Discharge: good     Discharge Day Visit / Exam:     Subjective:  Patient denies any chest pain or shortness of breath  He feels his back pain is better and he is agreeable to going home with home health  Vitals: Blood Pressure: (!) 176/79 (09/14/22 0733)  Pulse: 71 (09/14/22 0733)  Temperature: 99 °F (37 2 °C) (09/14/22 0733)  Temp Source: Temporal (09/12/22 1405)  Respirations: 18 (09/14/22 0733)  Height: 5' 11" (180 3 cm) (09/13/22 1049)  Weight - Scale: 71 1 kg (156 lb 12 oz) (09/12/22 1405)  SpO2: 94 % (09/14/22 0733)  Exam:   Physical Exam  Vitals and nursing note reviewed  Constitutional:       Appearance: Normal appearance  HENT:      Head: Normocephalic and atraumatic  Right Ear: External ear normal       Left Ear: External ear normal       Nose: Nose normal       Mouth/Throat:      Pharynx: Oropharynx is clear  Eyes:      Pupils: Pupils are equal, round, and reactive to light  Cardiovascular:      Rate and Rhythm: Normal rate and regular rhythm  Heart sounds: Normal heart sounds  Pulmonary:      Effort: Pulmonary effort is normal       Breath sounds: Normal breath sounds  Abdominal:      General: Bowel sounds are normal       Palpations: Abdomen is soft  Tenderness: There is no abdominal tenderness  Genitourinary:     Comments: Ortiz catheter  Musculoskeletal:      Cervical back: Normal range of motion and neck supple  Comments: Mild tenderness in the lower back with radiation to the left leg   Skin:     General: Skin is warm and dry  Capillary Refill: Capillary refill takes less than 2 seconds  Neurological:      General: No focal deficit present  Mental Status: He is alert and oriented to person, place, and time     Psychiatric:         Mood and Affect: Mood normal          Discussion with Family:  Will update family    Discharge instructions/Information to patient and family:   See after visit summary for information provided to patient and family  Provisions for Follow-Up Care:  See after visit summary for information related to follow-up care and any pertinent home health orders  Disposition:     Home with [facility]NA Services (Reminder: Complete face to face encounter)    For Discharges to [facility]:   · Not Applicable to this Patient - Not Applicable to this Patient    Planned Readmission:  None     Discharge Statement:  I spent 35 minutes discharging the patient  This time was spent on the day of discharge  I had direct contact with the patient on the day of discharge  Greater than 50% of the total time was spent examining patient, answering all patient questions, arranging and discussing plan of care with patient as well as directly providing post-discharge instructions  Additional time then spent on discharge activities  Discharge Medications:  See after visit summary for reconciled discharge medications provided to patient and family        ** Please Note: This note has been constructed using a voice recognition system **

## 2022-09-14 NOTE — CASE MANAGEMENT
Case Management Discharge Planning Note    Patient name Edelmira Carrasquillo  Location /-54 MRN [id number]  : 1965 Date 2022       Current Admission Date: 2022  Current Admission Diagnosis:Low back pain   Patient Active Problem List    Diagnosis Date Noted    Hypercalcemia 2022    Low back pain 2022    Ambulatory dysfunction 2022    Stage 3a chronic kidney disease ([facility] [address] ) 2022    Acute urinary retention 2022    Severe protein-calorie malnutrition ([facility] [address] ) 2022    Iron deficiency anemia secondary to inadequate dietary iron intake 2022    Hip pain, acute, left 2022    Gastroenteritis 2022    KELLY (acute kidney injury) ([facility] ) 2022    Hyperkalemia 2022    Diabetes mellitus type 2, insulin dependent ([place] [address] )     Gout     Essential hypertension     History of CVA (cerebrovascular accident)     Osteomyelitis of vertebra of lumbar region ([facility] )       LOS (days): 2  Geometric Mean LOS (GMLOS) (days): 2 90  Days to GMLOS:1 1     OBJECTIVE:  Risk of Unplanned Readmission Score: 24 6         Current admission status: Inpatient   Preferred Pharmacy:   [address]  [address] [place] [address]  Phone: [phone] Fax: [phone]    Primary Care Provider: Gloria Hoff DO    Primary Insurance: MEDICARE  Secondary Insurance:     DISCHARGE DETAILS:                                                                                        IMM Given (Date):: 22  IMM Given to[de-identified] Patient                  CM was unaware of discharge today  CM spoke to patient at the bedside, reviewed DC IMM with patient and informed that patient can stay an additional 4 hours for reconsidering appealing the discharge as the medicare rights were review on the day of discharge  Pt verbalized understanding and feels ready to go home and does not intend to stay 4 hours to reconsider

## 2022-09-15 LAB — BACTERIA UR CULT: ABNORMAL

## 2022-09-17 LAB
BACTERIA BLD CULT: NORMAL
BACTERIA BLD CULT: NORMAL

## 2022-09-21 NOTE — TELEPHONE ENCOUNTER
Josep for nursing to remove Ortiz catheter in the morning and present in the afternoon for a void trial

## 2022-09-21 NOTE — TELEPHONE ENCOUNTER
Called and spoke with wife states he is in to much pain and unable to come in   States he does have a nurse that comes in daily to check on him   Wondering if camarena can be removed in the am and she just bring him back in the PM

## 2022-09-21 NOTE — TELEPHONE ENCOUNTER
Called and spoke with wife aware nurse can remove camarena in the am and apt scheduled for 9/26 2:30 for void trial

## 2022-09-21 NOTE — TELEPHONE ENCOUNTER
Pt's wife called the office stated that pt is in a lot of pain and it is hard for him to get up in the morning  Wife called to see if they can come later in the day       German Sheldon can be reached at [phone]

## 2022-09-26 NOTE — TELEPHONE ENCOUNTER
Patient wife called patient is complaining pain in his leg and hip  Does not think he will be able to make the trip to the office this afternoon for the bladder scan  Would like to reschedule that voiding trial   Patient has not had a catheter change though  The nurse is at the patients house so she is able to change the catheter is needing        Please advise and call back at [phone]

## 2022-09-26 NOTE — TELEPHONE ENCOUNTER
Plan was to perform void trial verses maintain indwelling Ortiz catheter  Can reschedule void trial due to issues with transportation

## 2022-09-26 NOTE — TELEPHONE ENCOUNTER
Called number listed below she was not a work, attempted to contact cell unable to leave message vm full

## 2022-09-26 NOTE — TELEPHONE ENCOUNTER
Patient was to come in the afternoon for void trial but unable to get into office  He has not had cath changed? Nurse there and wanted to know is they are able to change

## 2022-10-11 PROBLEM — K52.9 GASTROENTERITIS: Status: RESOLVED | Noted: 2022-06-20 | Resolved: 2022-10-11

## 2022-10-20 ENCOUNTER — APPOINTMENT (EMERGENCY)
Dept: CT IMAGING | Facility: HOSPITAL | Age: 57
DRG: 698 | End: 2022-10-20
Payer: MEDICARE

## 2022-10-20 ENCOUNTER — HOSPITAL ENCOUNTER (INPATIENT)
Facility: HOSPITAL | Age: 57
LOS: 4 days | Discharge: NURSING, REHAB OR LONG-TERM CARE | DRG: 698 | End: 2022-10-24
Attending: EMERGENCY MEDICINE | Admitting: FAMILY MEDICINE
Payer: MEDICARE

## 2022-10-20 ENCOUNTER — APPOINTMENT (EMERGENCY)
Dept: RADIOLOGY | Facility: HOSPITAL | Age: 57
DRG: 698 | End: 2022-10-20
Payer: MEDICARE

## 2022-10-20 DIAGNOSIS — L97.421 SKIN ULCER OF LEFT HEEL, LIMITED TO BREAKDOWN OF SKIN (HCC): ICD-10-CM

## 2022-10-20 DIAGNOSIS — M25.552 HIP PAIN, ACUTE, LEFT: ICD-10-CM

## 2022-10-20 DIAGNOSIS — L03.317 CELLULITIS OF RIGHT BUTTOCK: ICD-10-CM

## 2022-10-20 DIAGNOSIS — N39.0 UTI (URINARY TRACT INFECTION) DUE TO URINARY INDWELLING CATHETER (HCC): ICD-10-CM

## 2022-10-20 DIAGNOSIS — T83.511A UTI (URINARY TRACT INFECTION) DUE TO URINARY INDWELLING CATHETER (HCC): ICD-10-CM

## 2022-10-20 DIAGNOSIS — Z79.4 DIABETES MELLITUS TYPE 2, INSULIN DEPENDENT (HCC): ICD-10-CM

## 2022-10-20 DIAGNOSIS — N39.0 UTI (URINARY TRACT INFECTION): ICD-10-CM

## 2022-10-20 DIAGNOSIS — M54.42 LEFT-SIDED LOW BACK PAIN WITH LEFT-SIDED SCIATICA, UNSPECIFIED CHRONICITY: ICD-10-CM

## 2022-10-20 DIAGNOSIS — N18.31 STAGE 3A CHRONIC KIDNEY DISEASE (HCC): ICD-10-CM

## 2022-10-20 DIAGNOSIS — D50.8 IRON DEFICIENCY ANEMIA SECONDARY TO INADEQUATE DIETARY IRON INTAKE: ICD-10-CM

## 2022-10-20 DIAGNOSIS — M10.9 GOUT, UNSPECIFIED CAUSE, UNSPECIFIED CHRONICITY, UNSPECIFIED SITE: ICD-10-CM

## 2022-10-20 DIAGNOSIS — R11.10 VOMITING: Primary | ICD-10-CM

## 2022-10-20 DIAGNOSIS — E11.9 DIABETES MELLITUS TYPE 2, INSULIN DEPENDENT (HCC): ICD-10-CM

## 2022-10-20 DIAGNOSIS — M46.26 OSTEOMYELITIS OF VERTEBRA OF LUMBAR REGION (HCC): ICD-10-CM

## 2022-10-20 LAB
ALBUMIN SERPL BCP-MCNC: 3.3 G/DL (ref 3.5–5)
ALP SERPL-CCNC: 107 U/L (ref 46–116)
ALT SERPL W P-5'-P-CCNC: 16 U/L (ref 12–78)
AMORPH URATE CRY URNS QL MICRO: ABNORMAL /HPF
ANION GAP SERPL CALCULATED.3IONS-SCNC: 7 MMOL/L (ref 4–13)
AST SERPL W P-5'-P-CCNC: 10 U/L (ref 5–45)
BACTERIA UR QL AUTO: ABNORMAL /HPF
BASOPHILS # BLD AUTO: 0.08 THOUSANDS/ÂΜL (ref 0–0.1)
BASOPHILS NFR BLD AUTO: 1 % (ref 0–1)
BILIRUB SERPL-MCNC: 0.32 MG/DL (ref 0.2–1)
BILIRUB UR QL STRIP: NEGATIVE
BUN SERPL-MCNC: 38 MG/DL (ref 5–25)
CALCIUM ALBUM COR SERPL-MCNC: 12.1 MG/DL (ref 8.3–10.1)
CALCIUM SERPL-MCNC: 11.5 MG/DL (ref 8.3–10.1)
CHLORIDE SERPL-SCNC: 97 MMOL/L (ref 96–108)
CLARITY UR: ABNORMAL
CO2 SERPL-SCNC: 32 MMOL/L (ref 21–32)
COLOR UR: ABNORMAL
CREAT SERPL-MCNC: 3 MG/DL (ref 0.6–1.3)
CRP SERPL QL: 15.2 MG/L
EOSINOPHIL # BLD AUTO: 0.27 THOUSAND/ÂΜL (ref 0–0.61)
EOSINOPHIL NFR BLD AUTO: 3 % (ref 0–6)
ERYTHROCYTE [DISTWIDTH] IN BLOOD BY AUTOMATED COUNT: 18.6 % (ref 11.6–15.1)
ERYTHROCYTE [SEDIMENTATION RATE] IN BLOOD: 64 MM/HOUR (ref 0–19)
GFR SERPL CREATININE-BSD FRML MDRD: 22 ML/MIN/1.73SQ M
GLUCOSE SERPL-MCNC: 111 MG/DL (ref 65–140)
GLUCOSE SERPL-MCNC: 173 MG/DL (ref 65–140)
GLUCOSE SERPL-MCNC: 96 MG/DL (ref 65–140)
GLUCOSE UR STRIP-MCNC: NEGATIVE MG/DL
HCT VFR BLD AUTO: 41.6 % (ref 36.5–49.3)
HGB BLD-MCNC: 13.2 G/DL (ref 12–17)
HGB UR QL STRIP.AUTO: ABNORMAL
IMM GRANULOCYTES # BLD AUTO: 0.05 THOUSAND/UL (ref 0–0.2)
IMM GRANULOCYTES NFR BLD AUTO: 1 % (ref 0–2)
KETONES UR STRIP-MCNC: ABNORMAL MG/DL
LACTATE SERPL-SCNC: 0.8 MMOL/L (ref 0.5–2)
LEUKOCYTE ESTERASE UR QL STRIP: ABNORMAL
LYMPHOCYTES # BLD AUTO: 2.17 THOUSANDS/ÂΜL (ref 0.6–4.47)
LYMPHOCYTES NFR BLD AUTO: 21 % (ref 14–44)
MCH RBC QN AUTO: 29.1 PG (ref 26.8–34.3)
MCHC RBC AUTO-ENTMCNC: 31.7 G/DL (ref 31.4–37.4)
MCV RBC AUTO: 92 FL (ref 82–98)
MONOCYTES # BLD AUTO: 0.98 THOUSAND/ÂΜL (ref 0.17–1.22)
MONOCYTES NFR BLD AUTO: 9 % (ref 4–12)
NEUTROPHILS # BLD AUTO: 6.83 THOUSANDS/ÂΜL (ref 1.85–7.62)
NEUTS SEG NFR BLD AUTO: 65 % (ref 43–75)
NITRITE UR QL STRIP: NEGATIVE
NON-SQ EPI CELLS URNS QL MICRO: ABNORMAL /HPF
NRBC BLD AUTO-RTO: 0 /100 WBCS
PH UR STRIP.AUTO: 7 [PH]
PLATELET # BLD AUTO: 279 THOUSANDS/UL (ref 149–390)
PMV BLD AUTO: 10.8 FL (ref 8.9–12.7)
POTASSIUM SERPL-SCNC: 3.5 MMOL/L (ref 3.5–5.3)
PROT SERPL-MCNC: 7.2 G/DL (ref 6.4–8.4)
PROT UR STRIP-MCNC: ABNORMAL MG/DL
RBC # BLD AUTO: 4.54 MILLION/UL (ref 3.88–5.62)
RBC #/AREA URNS AUTO: ABNORMAL /HPF
SODIUM SERPL-SCNC: 136 MMOL/L (ref 135–147)
SP GR UR STRIP.AUTO: 1.01 (ref 1–1.03)
UROBILINOGEN UR QL STRIP.AUTO: 0.2 E.U./DL
WBC # BLD AUTO: 10.38 THOUSAND/UL (ref 4.31–10.16)
WBC #/AREA URNS AUTO: ABNORMAL /HPF

## 2022-10-20 PROCEDURE — 86140 C-REACTIVE PROTEIN: CPT | Performed by: EMERGENCY MEDICINE

## 2022-10-20 PROCEDURE — 99285 EMERGENCY DEPT VISIT HI MDM: CPT | Performed by: EMERGENCY MEDICINE

## 2022-10-20 PROCEDURE — 99285 EMERGENCY DEPT VISIT HI MDM: CPT

## 2022-10-20 PROCEDURE — 72131 CT LUMBAR SPINE W/O DYE: CPT

## 2022-10-20 PROCEDURE — 87040 BLOOD CULTURE FOR BACTERIA: CPT | Performed by: EMERGENCY MEDICINE

## 2022-10-20 PROCEDURE — 71045 X-RAY EXAM CHEST 1 VIEW: CPT

## 2022-10-20 PROCEDURE — 96365 THER/PROPH/DIAG IV INF INIT: CPT

## 2022-10-20 PROCEDURE — 82948 REAGENT STRIP/BLOOD GLUCOSE: CPT

## 2022-10-20 PROCEDURE — 96375 TX/PRO/DX INJ NEW DRUG ADDON: CPT

## 2022-10-20 PROCEDURE — 83605 ASSAY OF LACTIC ACID: CPT | Performed by: EMERGENCY MEDICINE

## 2022-10-20 PROCEDURE — 85652 RBC SED RATE AUTOMATED: CPT | Performed by: EMERGENCY MEDICINE

## 2022-10-20 PROCEDURE — 85025 COMPLETE CBC W/AUTO DIFF WBC: CPT | Performed by: EMERGENCY MEDICINE

## 2022-10-20 PROCEDURE — 99222 1ST HOSP IP/OBS MODERATE 55: CPT | Performed by: FAMILY MEDICINE

## 2022-10-20 PROCEDURE — 96368 THER/DIAG CONCURRENT INF: CPT

## 2022-10-20 PROCEDURE — 96376 TX/PRO/DX INJ SAME DRUG ADON: CPT

## 2022-10-20 PROCEDURE — 36415 COLL VENOUS BLD VENIPUNCTURE: CPT | Performed by: EMERGENCY MEDICINE

## 2022-10-20 PROCEDURE — 80053 COMPREHEN METABOLIC PANEL: CPT | Performed by: EMERGENCY MEDICINE

## 2022-10-20 PROCEDURE — 74176 CT ABD & PELVIS W/O CONTRAST: CPT

## 2022-10-20 PROCEDURE — 81001 URINALYSIS AUTO W/SCOPE: CPT | Performed by: EMERGENCY MEDICINE

## 2022-10-20 PROCEDURE — G1004 CDSM NDSC: HCPCS

## 2022-10-20 RX ORDER — ACETAMINOPHEN 325 MG/1
650 TABLET ORAL EVERY 6 HOURS PRN
Status: DISCONTINUED | OUTPATIENT
Start: 2022-10-20 | End: 2022-10-20 | Stop reason: SDUPTHER

## 2022-10-20 RX ORDER — ONDANSETRON 2 MG/ML
4 INJECTION INTRAMUSCULAR; INTRAVENOUS ONCE
Status: COMPLETED | OUTPATIENT
Start: 2022-10-20 | End: 2022-10-20

## 2022-10-20 RX ORDER — ONDANSETRON 2 MG/ML
4 INJECTION INTRAMUSCULAR; INTRAVENOUS EVERY 6 HOURS PRN
Status: DISCONTINUED | OUTPATIENT
Start: 2022-10-20 | End: 2022-10-24 | Stop reason: HOSPADM

## 2022-10-20 RX ORDER — ACETAMINOPHEN 325 MG/1
650 TABLET ORAL EVERY 6 HOURS PRN
Status: DISCONTINUED | OUTPATIENT
Start: 2022-10-20 | End: 2022-10-22

## 2022-10-20 RX ORDER — ALLOPURINOL 100 MG/1
200 TABLET ORAL DAILY
Status: DISCONTINUED | OUTPATIENT
Start: 2022-10-20 | End: 2022-10-24 | Stop reason: HOSPADM

## 2022-10-20 RX ORDER — DOCUSATE SODIUM 100 MG/1
100 CAPSULE, LIQUID FILLED ORAL 2 TIMES DAILY
Status: DISCONTINUED | OUTPATIENT
Start: 2022-10-20 | End: 2022-10-24 | Stop reason: HOSPADM

## 2022-10-20 RX ORDER — ATORVASTATIN CALCIUM 40 MG/1
40 TABLET, FILM COATED ORAL
Status: DISCONTINUED | OUTPATIENT
Start: 2022-10-20 | End: 2022-10-24 | Stop reason: HOSPADM

## 2022-10-20 RX ORDER — CEFTRIAXONE 1 G/50ML
1000 INJECTION, SOLUTION INTRAVENOUS EVERY 24 HOURS
Status: DISCONTINUED | OUTPATIENT
Start: 2022-10-21 | End: 2022-10-20

## 2022-10-20 RX ORDER — SEVELAMER HYDROCHLORIDE 800 MG/1
800 TABLET, FILM COATED ORAL
Status: DISCONTINUED | OUTPATIENT
Start: 2022-10-20 | End: 2022-10-24 | Stop reason: HOSPADM

## 2022-10-20 RX ORDER — VENLAFAXINE 37.5 MG/1
37.5 TABLET ORAL
Status: DISCONTINUED | OUTPATIENT
Start: 2022-10-20 | End: 2022-10-24 | Stop reason: HOSPADM

## 2022-10-20 RX ORDER — INSULIN LISPRO 100 [IU]/ML
1-6 INJECTION, SOLUTION INTRAVENOUS; SUBCUTANEOUS
Status: DISCONTINUED | OUTPATIENT
Start: 2022-10-20 | End: 2022-10-24 | Stop reason: HOSPADM

## 2022-10-20 RX ORDER — CALCIUM CARBONATE 200(500)MG
1000 TABLET,CHEWABLE ORAL DAILY PRN
Status: DISCONTINUED | OUTPATIENT
Start: 2022-10-20 | End: 2022-10-21

## 2022-10-20 RX ORDER — LANOLIN ALCOHOL/MO/W.PET/CERES
100 CREAM (GRAM) TOPICAL DAILY
Status: DISCONTINUED | OUTPATIENT
Start: 2022-10-20 | End: 2022-10-24 | Stop reason: HOSPADM

## 2022-10-20 RX ORDER — CARVEDILOL 6.25 MG/1
6.25 TABLET ORAL 2 TIMES DAILY WITH MEALS
Status: DISCONTINUED | OUTPATIENT
Start: 2022-10-20 | End: 2022-10-24 | Stop reason: HOSPADM

## 2022-10-20 RX ORDER — CEFTRIAXONE 1 G/50ML
1000 INJECTION, SOLUTION INTRAVENOUS ONCE
Status: COMPLETED | OUTPATIENT
Start: 2022-10-20 | End: 2022-10-20

## 2022-10-20 RX ORDER — VENLAFAXINE HYDROCHLORIDE 37.5 MG/1
37.5 CAPSULE, EXTENDED RELEASE ORAL 3 TIMES DAILY
Status: DISCONTINUED | OUTPATIENT
Start: 2022-10-20 | End: 2022-10-20 | Stop reason: CLARIF

## 2022-10-20 RX ORDER — SODIUM CHLORIDE 9 MG/ML
125 INJECTION, SOLUTION INTRAVENOUS CONTINUOUS
Status: DISCONTINUED | OUTPATIENT
Start: 2022-10-20 | End: 2022-10-21

## 2022-10-20 RX ORDER — PREGABALIN 75 MG/1
150 CAPSULE ORAL 2 TIMES DAILY
Status: DISCONTINUED | OUTPATIENT
Start: 2022-10-20 | End: 2022-10-24 | Stop reason: HOSPADM

## 2022-10-20 RX ORDER — FERROUS SULFATE 325(65) MG
325 TABLET ORAL
Status: DISCONTINUED | OUTPATIENT
Start: 2022-10-21 | End: 2022-10-24 | Stop reason: HOSPADM

## 2022-10-20 RX ORDER — OXYCODONE HYDROCHLORIDE 5 MG/1
5 TABLET ORAL EVERY 6 HOURS PRN
Status: DISCONTINUED | OUTPATIENT
Start: 2022-10-20 | End: 2022-10-24 | Stop reason: HOSPADM

## 2022-10-20 RX ORDER — TAMSULOSIN HYDROCHLORIDE 0.4 MG/1
0.4 CAPSULE ORAL
Status: DISCONTINUED | OUTPATIENT
Start: 2022-10-20 | End: 2022-10-24 | Stop reason: HOSPADM

## 2022-10-20 RX ORDER — AMLODIPINE BESYLATE 10 MG/1
10 TABLET ORAL DAILY
Status: DISCONTINUED | OUTPATIENT
Start: 2022-10-20 | End: 2022-10-24 | Stop reason: HOSPADM

## 2022-10-20 RX ORDER — COLCHICINE 0.6 MG/1
0.6 TABLET ORAL DAILY
Status: DISCONTINUED | OUTPATIENT
Start: 2022-10-20 | End: 2022-10-22

## 2022-10-20 RX ORDER — CEFEPIME HYDROCHLORIDE 1 G/50ML
1000 INJECTION, SOLUTION INTRAVENOUS EVERY 12 HOURS
Status: DISCONTINUED | OUTPATIENT
Start: 2022-10-20 | End: 2022-10-24 | Stop reason: HOSPADM

## 2022-10-20 RX ORDER — METHOCARBAMOL 500 MG/1
500 TABLET, FILM COATED ORAL EVERY 6 HOURS PRN
Status: DISCONTINUED | OUTPATIENT
Start: 2022-10-20 | End: 2022-10-22

## 2022-10-20 RX ORDER — BUPRENORPHINE HYDROCHLORIDE 150 UG/1
150 FILM, SOLUBLE BUCCAL EVERY MORNING
COMMUNITY
Start: 2022-08-15 | End: 2022-10-20 | Stop reason: CLARIF

## 2022-10-20 RX ORDER — CYCLOBENZAPRINE HCL 10 MG
5 TABLET ORAL 3 TIMES DAILY
Status: DISCONTINUED | OUTPATIENT
Start: 2022-10-20 | End: 2022-10-24 | Stop reason: HOSPADM

## 2022-10-20 RX ORDER — INSULIN LISPRO 100 [IU]/ML
2-12 INJECTION, SOLUTION INTRAVENOUS; SUBCUTANEOUS
Status: DISCONTINUED | OUTPATIENT
Start: 2022-10-20 | End: 2022-10-24 | Stop reason: HOSPADM

## 2022-10-20 RX ORDER — HEPARIN SODIUM 5000 [USP'U]/ML
5000 INJECTION, SOLUTION INTRAVENOUS; SUBCUTANEOUS EVERY 8 HOURS SCHEDULED
Status: DISCONTINUED | OUTPATIENT
Start: 2022-10-20 | End: 2022-10-24 | Stop reason: HOSPADM

## 2022-10-20 RX ORDER — SODIUM BICARBONATE 650 MG/1
650 TABLET ORAL DAILY
Status: DISCONTINUED | OUTPATIENT
Start: 2022-10-20 | End: 2022-10-24 | Stop reason: HOSPADM

## 2022-10-20 RX ORDER — BUPRENORPHINE HYDROCHLORIDE 300 UG/1
300 FILM, SOLUBLE BUCCAL EVERY MORNING
Status: ON HOLD | COMMUNITY
End: 2022-10-24 | Stop reason: SDUPTHER

## 2022-10-20 RX ADMIN — MORPHINE SULFATE 2 MG: 2 INJECTION, SOLUTION INTRAMUSCULAR; INTRAVENOUS at 13:05

## 2022-10-20 RX ADMIN — CYCLOBENZAPRINE 5 MG: 10 TABLET, FILM COATED ORAL at 20:04

## 2022-10-20 RX ADMIN — SODIUM BICARBONATE 650 MG TABLET 650 MG: at 16:24

## 2022-10-20 RX ADMIN — INSULIN LISPRO 1 UNITS: 100 INJECTION, SOLUTION INTRAVENOUS; SUBCUTANEOUS at 21:26

## 2022-10-20 RX ADMIN — ONDANSETRON 4 MG: 2 INJECTION INTRAMUSCULAR; INTRAVENOUS at 11:34

## 2022-10-20 RX ADMIN — SEVELAMER HYDROCHLORIDE 800 MG: 800 TABLET, FILM COATED PARENTERAL at 16:24

## 2022-10-20 RX ADMIN — OXYCODONE HYDROCHLORIDE 5 MG: 5 TABLET ORAL at 20:03

## 2022-10-20 RX ADMIN — DOCUSATE SODIUM 100 MG: 100 CAPSULE ORAL at 17:52

## 2022-10-20 RX ADMIN — CARVEDILOL 6.25 MG: 6.25 TABLET, FILM COATED ORAL at 16:24

## 2022-10-20 RX ADMIN — ATORVASTATIN CALCIUM 40 MG: 40 TABLET, FILM COATED ORAL at 16:24

## 2022-10-20 RX ADMIN — PREGABALIN 150 MG: 75 CAPSULE ORAL at 17:52

## 2022-10-20 RX ADMIN — CEFTRIAXONE 1000 MG: 1 INJECTION, SOLUTION INTRAVENOUS at 12:51

## 2022-10-20 RX ADMIN — VENLAFAXINE 37.5 MG: 37.5 TABLET ORAL at 16:24

## 2022-10-20 RX ADMIN — SODIUM CHLORIDE, SODIUM LACTATE, POTASSIUM CHLORIDE, AND CALCIUM CHLORIDE 1000 ML: .6; .31; .03; .02 INJECTION, SOLUTION INTRAVENOUS at 12:51

## 2022-10-20 RX ADMIN — SODIUM CHLORIDE 125 ML/HR: 0.9 INJECTION, SOLUTION INTRAVENOUS at 16:17

## 2022-10-20 RX ADMIN — THIAMINE HCL TAB 100 MG 100 MG: 100 TAB at 16:24

## 2022-10-20 RX ADMIN — CYCLOBENZAPRINE 5 MG: 10 TABLET, FILM COATED ORAL at 16:24

## 2022-10-20 RX ADMIN — CEFEPIME HYDROCHLORIDE 1000 MG: 1 INJECTION, SOLUTION INTRAVENOUS at 16:15

## 2022-10-20 RX ADMIN — AMLODIPINE BESYLATE 10 MG: 10 TABLET ORAL at 16:24

## 2022-10-20 RX ADMIN — MORPHINE SULFATE 2 MG: 2 INJECTION, SOLUTION INTRAMUSCULAR; INTRAVENOUS at 11:35

## 2022-10-20 RX ADMIN — TAMSULOSIN HYDROCHLORIDE 0.4 MG: 0.4 CAPSULE ORAL at 16:24

## 2022-10-20 RX ADMIN — COLCHICINE 0.6 MG: 0.6 TABLET ORAL at 16:24

## 2022-10-20 NOTE — ED PROVIDER NOTES
History  Chief Complaint   Patient presents with   • Vomiting     Presents due to vomiting for a few days and WBC 14, states does smoke marijuana      Patient is a 59-year-old male with a history of diabetes, gout, hypertension, renal disorder, stroke and history of epidural abscess in March this year for which he was treated at [facility]  Patient reports that he has been having nausea vomiting for the last for 5 days  Had laboratory testing performed by PCP  Was noted to have a leukocytosis and was sent to the emergency room for further evaluation  Patient reporting no fever or chills  He does report that he has some very localized right-sided chest pain  No ear pain  No sore throat  Patient does report to me that he has a left heel lesion  He also has a sacral excoriation  Patient also has an indwelling Ortiz catheter  Patient reports that he is essentially bed-bound since his operation in March  Patient does use marijuana  History provided by:  Patient  Vomiting  Severity:  Moderate  Timing:  Intermittent  Quality:  Unable to specify  Progression:  Unchanged  Chronicity:  New  Relieved by:  None tried  Worsened by:  Nothing  Ineffective treatments:  None tried  Associated symptoms: no abdominal pain, no arthralgias, no chills, no cough, no diarrhea, no fever, no headaches, no myalgias and no sore throat        Prior to Admission Medications   Prescriptions Last Dose Informant Patient Reported?  Taking?   acetaminophen (TYLENOL) 325 mg tablet   Yes No   Sig: Take 650 mg by mouth every 4 (four) hours as needed for mild pain   allopurinol (ZYLOPRIM) 100 mg tablet   No No   Sig: Take 2 tablets (200 mg total) by mouth daily   amLODIPine (NORVASC) 10 mg tablet   Yes No   Sig: Take 1 tablet by mouth daily   atorvastatin (LIPITOR) 40 mg tablet   Yes No   Sig: Take 40 mg by mouth   carvedilol (COREG) 6 25 mg tablet   Yes No   Sig: Take 6 25 mg by mouth 2 (two) times a day with meals   colchicine (COLCRYS) 0 6 mg tablet   Yes No   Sig: Take 1 tablet by mouth as needed   cyclobenzaprine (FLEXERIL) 5 mg tablet   Yes No   Sig: Take 5 mg by mouth 3 (three) times a day   docusate sodium (COLACE) 100 mg capsule   Yes No   Sig: Take 100 mg by mouth 2 (two) times a day   ferrous sulfate 325 (65 Fe) mg tablet   Yes No   Sig: Take 325 mg by mouth 2 (two) times a day with meals Lunch and dinner   insulin detemir (LEVEMIR) 100 units/mL subcutaneous injection   Yes No   Sig: Inject 36 Units under the skin   insulin lispro (HumaLOG) 100 units/mL injection   No No   Sig: Inject 8 Units under the skin 4 (four) times a day (before meals and at bedtime) 2 units per 50 mg/dl over 150   Hold insulin if fingerstick blood glucoses < 90   lidocaine (LIDODERM) 5 %   No No   Sig: Apply 1 patch topically daily Remove & Discard patch within 12 hours or as directed by MD   melatonin 3 mg   Yes No   Sig: Take 10 5 mg by mouth daily as needed   methocarbamol (ROBAXIN) 500 mg tablet   No No   Sig: Take 1 tablet (500 mg total) by mouth every 6 (six) hours as needed for muscle spasms   oxyCODONE (ROXICODONE) 5 immediate release tablet   No No   Sig: Take 1 tablet (5 mg total) by mouth every 4 (four) hours as needed for severe pain Max Daily Amount: 30 mg   polyethylene glycol (MIRALAX) 17 g packet   Yes No   Sig: Take 17 g by mouth 2 (two) times a day   pregabalin (LYRICA) 150 mg capsule   Yes No   Sig: Take 150 mg by mouth 2 (two) times a day   senna-docusate sodium (SENOKOT S) 8 6-50 mg per tablet   Yes No   Sig: Take 1 tablet by mouth 2 (two) times a day   sevelamer (RENAGEL) 800 mg tablet   No No   Sig: Take 1 tablet (800 mg total) by mouth daily with dinner   tamsulosin (FLOMAX) 0 4 mg   Yes No   Sig: Take by mouth   thiamine 100 MG tablet   Yes No   Sig: Take 100 mg by mouth daily   venlafaxine (EFFEXOR-XR) 37 5 mg 24 hr capsule  Spouse/Significant Other Yes No   Sig: Take 37 5 mg by mouth 3 (three) times a day Facility-Administered Medications: None       Past Medical History:   Diagnosis Date   • Diabetes mellitus ([facility] [address] )    • Gout    • Hypertension    • Renal disorder    • Stroke [facility])        Past Surgical History:   Procedure Laterality Date   • BACK SURGERY         History reviewed  No pertinent family history  I have reviewed and agree with the history as documented  E-Cigarette/Vaping     E-Cigarette/Vaping Substances     Social History     Tobacco Use   • Smoking status: Current Every Day Smoker     Packs/day: 0 50     Types: Cigarettes   • Smokeless tobacco: Never Used   Substance Use Topics   • Alcohol use: Not Currently   • Drug use: Yes     Types: Marijuana       Review of Systems   Constitutional: Negative  Negative for activity change, chills, fatigue and fever  HENT: Negative  Negative for congestion, ear pain, rhinorrhea, sinus pressure and sore throat  Eyes: Negative  Respiratory: Negative  Negative for cough, chest tightness, shortness of breath and wheezing  Cardiovascular: Positive for chest pain (Very localized right-sided chest pain)  Negative for palpitations and leg swelling  Gastrointestinal: Positive for vomiting  Negative for abdominal pain, diarrhea, nausea and rectal pain  Endocrine: Negative  Genitourinary: Negative  Indwelling Ortiz catheter   Musculoskeletal: Negative for arthralgias and myalgias  Skin: Positive for wound  Negative for color change and rash  Allergic/Immunologic: Negative  Neurological: Negative for dizziness, weakness, light-headedness and headaches  Hematological: Negative  Psychiatric/Behavioral: Negative  All other systems reviewed and are negative  Physical Exam  Physical Exam  Vitals and nursing note reviewed  Constitutional:       Appearance: Normal appearance  He is well-developed and normal weight  He is not ill-appearing or toxic-appearing  HENT:      Head: Normocephalic and atraumatic   Hair is normal  Jaw: No pain on movement  Right Ear: External ear normal       Left Ear: External ear normal       Nose: Nose normal  No congestion  Mouth/Throat:      Mouth: Mucous membranes are moist    Eyes:      General: Lids are normal  No scleral icterus  Extraocular Movements: Extraocular movements intact  Conjunctiva/sclera: Conjunctivae normal       Pupils: Pupils are equal, round, and reactive to light  Cardiovascular:      Rate and Rhythm: Normal rate and regular rhythm  Heart sounds: Normal heart sounds  No murmur heard  Pulmonary:      Effort: Pulmonary effort is normal  No respiratory distress  Breath sounds: Normal breath sounds  No decreased breath sounds, wheezing, rhonchi or rales  Abdominal:      General: Abdomen is flat  There is no distension  Palpations: Abdomen is soft  Abdomen is not rigid  Tenderness: There is no abdominal tenderness  There is no right CVA tenderness, left CVA tenderness, guarding or rebound  Musculoskeletal:         General: No swelling, deformity or signs of injury  Normal range of motion  Cervical back: Normal, normal range of motion and neck supple  Thoracic back: Normal       Lumbar back: Tenderness and bony tenderness present  Back:    Skin:     General: Skin is warm and dry  Coloration: Skin is not pale  Findings: Abrasion and wound present  No rash  Neurological:      General: No focal deficit present  Mental Status: He is alert and oriented to person, place, and time  Mental status is at baseline     Psychiatric:         Attention and Perception: Attention normal          Mood and Affect: Mood normal          Speech: Speech normal          Behavior: Behavior normal          Vital Signs  ED Triage Vitals [10/20/22 1117]   Temperature Pulse Respirations Blood Pressure SpO2   97 7 °F (36 5 °C) 78 18 148/94 96 %      Temp Source Heart Rate Source Patient Position - Orthostatic VS BP Location FiO2 (%)   Temporal -- Lying Left arm --      Pain Score       5           Vitals:    10/20/22 1117 10/20/22 1130 10/20/22 1345   BP: 148/94 (!) 184/89 143/77   Pulse: 78 63 65   Patient Position - Orthostatic VS: Lying           Visual Acuity      ED Medications  Medications   ondansetron (ZOFRAN) injection 4 mg (4 mg Intravenous Given 10/20/22 1134)   morphine injection 2 mg (2 mg Intravenous Given 10/20/22 1135)   lactated ringers bolus 1,000 mL (0 mL Intravenous Stopped 10/20/22 1351)   cefTRIAXone (ROCEPHIN) IVPB (premix in dextrose) 1,000 mg 50 mL (0 mg Intravenous Stopped 10/20/22 1321)   morphine injection 2 mg (2 mg Intravenous Given 10/20/22 1305)       Diagnostic Studies  Results Reviewed     Procedure Component Value Units Date/Time    Blood culture #1 [[id number]] Collected: 10/20/22 1248    Lab Status: In process Specimen: Blood from Arm, Right Updated: 10/20/22 1251    Blood culture #2 [[id number]] Collected: 10/20/22 1248    Lab Status: In process Specimen: Blood from Arm, Left Updated: 10/20/22 1251    Lactic acid [[id number]]  (Normal) Collected: 10/20/22 1135    Lab Status: Final result Specimen: Blood from Arm, Left Updated: 10/20/22 1223     LACTIC ACID 0 8 mmol/L     Narrative:      Result may be elevated if tourniquet was used during collection      Comprehensive metabolic panel [[id number]]  (Abnormal) Collected: 10/20/22 1135    Lab Status: Final result Specimen: Blood from Arm, Left Updated: 10/20/22 1214     Sodium 136 mmol/L      Potassium 3 5 mmol/L      Chloride 97 mmol/L      CO2 32 mmol/L      ANION GAP 7 mmol/L      BUN 38 mg/dL      Creatinine 3 00 mg/dL      Glucose 96 mg/dL      Calcium 11 5 mg/dL      Corrected Calcium 12 1 mg/dL      AST 10 U/L      ALT 16 U/L      Alkaline Phosphatase 107 U/L      Total Protein 7 2 g/dL      Albumin 3 3 g/dL      Total Bilirubin 0 32 mg/dL      eGFR 22 ml/min/1 73sq m     Narrative:      [place] guidelines for Chronic Kidney Disease (CKD):   •  Stage 1 with normal or high GFR (GFR > 90 mL/min/1 73 square meters)  •  Stage 2 Mild CKD (GFR = 60-89 mL/min/1 73 square meters)  •  Stage 3A Moderate CKD (GFR = 45-59 mL/min/1 73 square meters)  •  Stage 3B Moderate CKD (GFR = 30-44 mL/min/1 73 square meters)  •  Stage 4 Severe CKD (GFR = 15-29 mL/min/1 73 square meters)  •  Stage 5 End Stage CKD (GFR <15 mL/min/1 73 square meters)  Note: GFR calculation is accurate only with a steady state creatinine    UA (URINE) with reflex to Scope [[id number]]  (Abnormal) Collected: 10/20/22 1140    Lab Status: Final result Specimen: Urine, Indwelling Ortiz Catheter Updated: 10/20/22 1209     Color, UA Light Yellow     Clarity, UA Cloudy     Specific Gravity, UA 1 015     pH, UA 7 0     Leukocytes, UA Large     Nitrite, UA Negative     Protein,  (2+) mg/dl      Glucose, UA Negative mg/dl      Ketones, UA Trace mg/dl      Urobilinogen, UA 0 2 E U /dl      Bilirubin, UA Negative     Occult Blood, UA Moderate    Urine Microscopic [[id number]]  (Abnormal) Collected: 10/20/22 1140    Lab Status: Final result Specimen: Urine, Indwelling Ortiz Catheter Updated: 10/20/22 1208     RBC, UA       Field obscured, unable to enumerate     /hpf     WBC, UA Innumerable /hpf      Epithelial Cells       Field obscured, unable to enumerate     /hpf     Bacteria, UA Innumerable /hpf      AMORPH URATES Occasional /hpf     Sedimentation rate, automated [[id number]]  (Abnormal) Collected: 10/20/22 1135    Lab Status: Final result Specimen: Blood from Arm, Left Updated: 10/20/22 1204     Sed Rate 64 mm/hour     C-reactive protein [[id number]]  (Abnormal) Collected: 10/20/22 1135    Lab Status: Final result Specimen: Blood from Arm, Left Updated: 10/20/22 1159     CRP 15 2 mg/L     CBC and differential [[id number]]  (Abnormal) Collected: 10/20/22 1135    Lab Status: Final result Specimen: Blood from Arm, Left Updated: 10/20/22 1144     WBC 10 38 Thousand/uL      RBC 4 54 Million/uL      Hemoglobin 13 2 g/dL      Hematocrit 41 6 %      MCV 92 fL      MCH 29 1 pg      MCHC 31 7 g/dL      RDW 18 6 %      MPV 10 8 fL      Platelets 214 Thousands/uL      nRBC 0 /100 WBCs      Neutrophils Relative 65 %      Immat GRANS % 1 %      Lymphocytes Relative 21 %      Monocytes Relative 9 %      Eosinophils Relative 3 %      Basophils Relative 1 %      Neutrophils Absolute 6 83 Thousands/µL      Immature Grans Absolute 0 05 Thousand/uL      Lymphocytes Absolute 2 17 Thousands/µL      Monocytes Absolute 0 98 Thousand/µL      Eosinophils Absolute 0 27 Thousand/µL      Basophils Absolute 0 08 Thousands/µL                  CT abdomen pelvis wo contrast   Final Result by Wesly Wilson MD (10/20 3000)   Addendum 1 of 1 by Wesly Wilson MD (10/20 8510)   ADDENDUM:      Please note I neglected to add an additional finding in the IMPRESSION    section  There is a small pericardial effusion, increased compared to 9/12/2022  The study was marked in [facility] for immediate notification  Final      No acute findings in the abdomen and pelvis  Evidence of left medial buttock cellulitis and ulceration compared to    9/12/2022  No abscess  Stable osseous findings  Known, treated L4-L5 osteodiscitis  Other nonemergent findings above  Workstation performed: [id number]         CT spine lumbar without contrast   Final Result by Wesly Wilson MD (10/20 5930)      No acute change compared to 9/12/2022  Similar findings of chronic osteodiscitis at L4-L5  Workstation performed: [id number]         XR chest 1 view portable   ED Interpretation by Alberto Elliott DO (10/20 1246)   nad      Final Result by Wesly Wilson MD (10/20 1472)   No acute cardiopulmonary findings                     Workstation performed: [id number]                    Procedures  Procedures         ED Course  ED Course as of 10/20/22 1420   Thu Oct 20, 2022   1215 Leukocytes, UA(!): Large   1215 Bacteria, UA(!): Innumerable   1215 WBC(!): 10 38   1215 Urinary tract infection   1216 CORRECTED CALCIUM(!!): 12 1  Patient has chronic calcium elevation  46 Given laboratory findings, will obtain additional imaging of the patient's abdomen and pelvis  46 CT reveals the patient have cellulitis of his right buttocks  No new findings on lumbar spine imaging  Consistent with old findings  1358 Patient and wife updated with regards to findings  Discussing case with hospitalist service  [address] of this patient was discussed with hospitalist and patient will be admitted to their service  MDM  Number of Diagnoses or Management Options  Cellulitis of right buttock: new and requires workup  UTI (urinary tract infection): new and requires workup  Vomiting: new and requires workup  Diagnosis management comments: Patient presented to the emergency department and a MSE was performed  The patient was evaluated and diagnosed with acute cellulitis and urinary tract infection  This is a new issue that will require additional planned work-up and treatment in a hospitalized setting  As may have been required as part of this evaluation, clinical laboratory test, radiology imaging and medical testing (I e  EKG) were ordered as necessitated by the patient's presentation  I independently reviewed these studies, imaging and testing  This patient's case is considered to be a considerable risk secondary to the above listed disease process and poses a threat to the patient's well-being and baseline function  Further in-patient diagnostic testing and management, which may include the administration of parenteral medications, is required           Amount and/or Complexity of Data Reviewed  Clinical lab tests: ordered and reviewed  Tests in the radiology section of CPT®: ordered and reviewed  Decide to obtain previous medical records or to obtain history from someone other than the patient: yes  Independent visualization of images, tracings, or specimens: yes    Risk of Complications, Morbidity, and/or Mortality  Presenting problems: high  Diagnostic procedures: moderate  Management options: moderate    Patient Progress  Patient progress: stable      Disposition  Final diagnoses:   Vomiting   Cellulitis of right buttock   UTI (urinary tract infection)     Time reflects when diagnosis was documented in both MDM as applicable and the Disposition within this note     Time User Action Codes Description Comment    10/20/2022  1:59 PM Jordan Bo Add [R11 10] Vomiting     10/20/2022  1:59 PM Jordan Bo Add [L03 317] Cellulitis of right buttock     10/20/2022  1:59 PM Jordan Bo Add [N39 0] UTI (urinary tract infection)       ED Disposition     ED Disposition   Admit    Condition   Stable    Date/Time   u Oct 20, 2022  2:18 PM    Comment   --         Follow-up Information    None         Patient's Medications   Discharge Prescriptions    No medications on file       No discharge procedures on file      PDMP Review       Value Time User    PDMP Reviewed  Yes 9/12/2022  4:23 PM Cecil Cedillo PA-C          ED Provider  Electronically Signed by           Alberto Elliott DO  10/20/22 1422

## 2022-10-20 NOTE — ASSESSMENT & PLAN NOTE
Malnutrition Findings:                          bmi is 19 86  BMI Findings: Body mass index is 19 86 kg/m²

## 2022-10-20 NOTE — ASSESSMENT & PLAN NOTE
Lab Results   Component Value Date    EGFR 22 10/20/2022    EGFR 23 09/14/2022    EGFR 22 09/13/2022    CREATININE 3 00 (H) 10/20/2022    CREATININE 2 87 (H) 09/14/2022    CREATININE 2 90 (H) 09/13/2022   baseline creatinine 2 8-3  avoid nephrotoxic agents and hypotension

## 2022-10-20 NOTE — H&P
[address]  H&P- Manish Hyatt Sr  1965, 62 y o  male MRN: [id number]  Unit/Bed#: ED 09 Encounter: [id number]  Primary Care Provider: Neymar Hicks DO   Date and time admitted to hospital: 10/20/2022 11:16 AM    * UTI (urinary tract infection) due to urinary indwelling catheter [facility])  Assessment & Plan  Poa ua abnormal urine and blood culture pending  2 day history of nausea and ua abnormal change camarena catheter  Placed on iv cefepime  Hypercalcemia  Assessment & Plan  Calcium level is 12 1  Will place iv fluid hydration  recheck bmp tomorrow    Osteomyelitis of vertebra of lumbar region [facility])  Assessment & Plan  No acute change compared to 9/12/2022  Similar findings of chronic osteodiscitis at L4-L5  Skin ulcer of left heel, limited to breakdown of skin [facility])  Assessment & Plan  Consult podiatry for left heel ulcer  Stage 3a chronic kidney disease [facility])  Assessment & Plan  Lab Results   Component Value Date    EGFR 22 10/20/2022    EGFR 23 09/14/2022    EGFR 22 09/13/2022    CREATININE 3 00 (H) 10/20/2022    CREATININE 2 87 (H) 09/14/2022    CREATININE 2 90 (H) 09/13/2022   baseline creatinine 2 8-3  avoid nephrotoxic agents and hypotension    Ambulatory dysfunction  Assessment & Plan  Pt/ot eval family requesting rehab assessment    Severe protein-calorie malnutrition ([address] )  Assessment & Plan  Malnutrition Findings:                          bmi is 19 86  BMI Findings: Body mass index is 19 86 kg/m²  Essential hypertension  Assessment & Plan  Cont home regimen of norvasc and coreg    Diabetes mellitus type 2, insulin dependent (HCC)  Assessment & Plan  Lab Results   Component Value Date    HGBA1C 6 0 (H) 07/05/2022       No results for input(s): POCGLU in the last 72 hours      Blood Sugar Average: Last 72 hrs:  hold long acting insulin and place on iss and diabetic diet      VTE Prophylaxis: Heparin  / sequential compression device   Code Status: full code  POLST: There is no POLST form on file for this patient (pre-hospital)  Discussion with family: discussed with wife    Anticipated Length of Stay:  Patient will be admitted on an Inpatient basis with an anticipated length of stay of  More than 2 midnights  Justification for Hospital Stay: acute uti chronic indwelling camarena    Total Time for Visit, including Counseling / Coordination of Care: 60 minutes  Greater than 50% of this total time spent on direct patient counseling and coordination of care  Chief Complaint:   nausea    History of Present Illness:    Josiah Chi  is a 62 y o  male who presents with persistent nausea for the last 5 days  Episodes of 1-2 vomiting over the last 2 days  Patient seen by PCP and had labs which were abnormal and asked to go to pcp   no fevers or chills  Also has lesion on the heel and excoriation on the sacrum  Not on any antibiotic    Review of Systems:    Review of Systems   Constitutional: Positive for appetite change and fatigue  Negative for chills and fever  HENT: Negative for hearing loss, sore throat and trouble swallowing  Eyes: Negative for photophobia, discharge and visual disturbance  Respiratory: Negative for chest tightness and shortness of breath  Cardiovascular: Negative for chest pain and palpitations  Gastrointestinal: Positive for nausea and vomiting  Negative for abdominal pain and blood in stool  Endocrine: Negative for polydipsia and polyuria  Genitourinary: Negative for difficulty urinating, dysuria, flank pain and hematuria  Musculoskeletal: Negative for back pain and gait problem  Skin: Negative for rash  Allergic/Immunologic: Negative for environmental allergies and food allergies  Neurological: Positive for weakness  Negative for dizziness, seizures, syncope and headaches  Hematological: Does not bruise/bleed easily  Psychiatric/Behavioral: Negative for behavioral problems     All other systems reviewed and are negative  Past Medical and Surgical History:     Past Medical History:   Diagnosis Date   • Diabetes mellitus ([facility] [address] )    • Gout    • Hypertension    • Renal disorder    • Stroke [facility])        Past Surgical History:   Procedure Laterality Date   • BACK SURGERY         Meds/Allergies:    Prior to Admission medications    Medication Sig Start Date End Date Taking? Authorizing Provider   acetaminophen (TYLENOL) 325 mg tablet Take 650 mg by mouth every 4 (four) hours as needed for mild pain    Historical Provider, MD   allopurinol (ZYLOPRIM) 100 mg tablet Take 2 tablets (200 mg total) by mouth daily 6/24/22 7/24/22  Martínez Linder MD   amLODIPine (NORVASC) 10 mg tablet Take 1 tablet by mouth daily 2/22/22   Historical Provider, MD   atorvastatin (LIPITOR) 40 mg tablet Take 40 mg by mouth 1/3/22   Historical Provider, MD   carvedilol (COREG) 6 25 mg tablet Take 6 25 mg by mouth 2 (two) times a day with meals    Historical Provider, MD   colchicine (COLCRYS) 0 6 mg tablet Take 1 tablet by mouth as needed    Historical Provider, MD   cyclobenzaprine (FLEXERIL) 5 mg tablet Take 5 mg by mouth 3 (three) times a day    Historical Provider, MD   docusate sodium (COLACE) 100 mg capsule Take 100 mg by mouth 2 (two) times a day    Historical Provider, MD   ferrous sulfate 325 (65 Fe) mg tablet Take 325 mg by mouth 2 (two) times a day with meals Lunch and dinner    Historical Provider, MD   insulin detemir (LEVEMIR) 100 units/mL subcutaneous injection Inject 36 Units under the skin    Historical Provider, MD   insulin lispro (HumaLOG) 100 units/mL injection Inject 8 Units under the skin 4 (four) times a day (before meals and at bedtime) 2 units per 50 mg/dl over 150   Hold insulin if fingerstick blood glucoses < 90 6/24/22   Madison Ely MD   lidocaine (LIDODERM) 5 % Apply 1 patch topically daily Remove & Discard patch within 12 hours or as directed by MD 6/25/22   Martínez Linder MD   melatonin 3 mg Take 10 5 mg by mouth daily as needed 3/16/22   Historical Provider, MD   methocarbamol (ROBAXIN) 500 mg tablet Take 1 tablet (500 mg total) by mouth every 6 (six) hours as needed for muscle spasms 9/14/22   Tracie Feliz MD   oxyCODONE (ROXICODONE) 5 immediate release tablet Take 1 tablet (5 mg total) by mouth every 4 (four) hours as needed for severe pain Max Daily Amount: 30 mg 6/24/22   Aicha Deras MD   polyethylene glycol (MIRALAX) 17 g packet Take 17 g by mouth 2 (two) times a day    Historical Provider, MD   pregabalin (LYRICA) 150 mg capsule Take 150 mg by mouth 2 (two) times a day 3/16/22   Historical Provider, MD   senna-docusate sodium (SENOKOT S) 8 6-50 mg per tablet Take 1 tablet by mouth 2 (two) times a day 3/17/22   Historical Provider, MD   sevelamer (RENAGEL) 800 mg tablet Take 1 tablet (800 mg total) by mouth daily with dinner 6/24/22 7/24/22  Aicha Deras MD   tamsulosin (FLOMAX) 0 4 mg Take by mouth 5/26/22   Historical Provider, MD   thiamine 100 MG tablet Take 100 mg by mouth daily 3/17/22   Historical Provider, MD   venlafaxine (EFFEXOR-XR) 37 5 mg 24 hr capsule Take 37 5 mg by mouth 3 (three) times a day    Historical Provider, MD     I have reviewed home medications with patient personally  Allergies:    Allergies   Allergen Reactions   • Bupropion Delirium     "went nuts," prior to 2012  "went nuts," prior to 2012  "went nuts," prior to 2012  "went nuts," prior to 2012     • Metformin Other (See Comments)     Other reaction(s): kidney disease  Other reaction(s): kidney disease  Other reaction(s): kidney disease     • Medical Tape Rash       Social History:     Marital Status: /Civil Union   Social History     Substance and Sexual Activity   Alcohol Use Not Currently     Social History     Tobacco Use   Smoking Status Current Every Day Smoker   • Packs/day: 0 50   • Types: Cigarettes   Smokeless Tobacco Never Used     Social History     Substance and Sexual Activity   Drug Use Yes   • Types: Marijuana       Family History:    Family History   Problem Relation Age of Onset   • Hypertension Father        Physical Exam:     Vitals:   Blood Pressure: 160/85 (10/20/22 1400)  Pulse: 75 (10/20/22 1400)  Temperature: 97 7 °F (36 5 °C) (10/20/22 1117)  Temp Source: Temporal (10/20/22 1117)  Respirations: 18 (10/20/22 1117)  Weight - Scale: 64 6 kg (142 lb 6 7 oz) (10/20/22 1117)  SpO2: 96 % (10/20/22 1400)    Physical Exam  Vitals and nursing note reviewed  Constitutional:       Appearance: He is ill-appearing  HENT:      Head: Normocephalic and atraumatic  Right Ear: External ear normal       Left Ear: External ear normal       Nose: Nose normal       Mouth/Throat:      Pharynx: Oropharynx is clear  Eyes:      Pupils: Pupils are equal, round, and reactive to light  Cardiovascular:      Rate and Rhythm: Normal rate and regular rhythm  Heart sounds: Normal heart sounds  Pulmonary:      Effort: Pulmonary effort is normal       Breath sounds: Normal breath sounds  Abdominal:      General: Bowel sounds are normal       Palpations: Abdomen is soft  Tenderness: There is no abdominal tenderness  Genitourinary:     Comments: migue  Musculoskeletal:         General: Normal range of motion  Cervical back: Normal range of motion and neck supple  Comments: Left heel ulcer  Sacral ulcer   Skin:     General: Skin is warm and dry  Capillary Refill: Capillary refill takes less than 2 seconds  Neurological:      General: No focal deficit present  Mental Status: He is alert and oriented to person, place, and time  Psychiatric:         Mood and Affect: Mood normal            Additional Data:     Lab Results: I have personally reviewed pertinent reports        Results from last 7 days   Lab Units 10/20/22  1135   WBC Thousand/uL 10 38*   HEMOGLOBIN g/dL 13 2   HEMATOCRIT % 41 6   PLATELETS Thousands/uL 279   NEUTROS PCT % 65   LYMPHS PCT % 21   MONOS PCT % 9   EOS PCT % 3 Results from last 7 days   Lab Units 10/20/22  1135   SODIUM mmol/L 136   POTASSIUM mmol/L 3 5   CHLORIDE mmol/L 97   CO2 mmol/L 32   BUN mg/dL 38*   CREATININE mg/dL 3 00*   ANION GAP mmol/L 7   CALCIUM mg/dL 11 5*   ALBUMIN g/dL 3 3*   TOTAL BILIRUBIN mg/dL 0 32   ALK PHOS U/L 107   ALT U/L 16   AST U/L 10   GLUCOSE RANDOM mg/dL 96                 Results from last 7 days   Lab Units 10/20/22  1135   LACTIC ACID mmol/L 0 8       Imaging: I have personally reviewed pertinent reports  CT abdomen pelvis wo contrast   Final Result by Andre Cowan MD (10/20 1448)   Addendum 1 of 1 by Andre Cowan MD (10/20 5834)   ADDENDUM:      Please note I neglected to add an additional finding in the IMPRESSION    section  There is a small pericardial effusion, increased compared to 9/12/2022  The study was marked in [facility] for immediate notification  Final      No acute findings in the abdomen and pelvis  Evidence of left medial buttock cellulitis and ulceration compared to    9/12/2022  No abscess  Stable osseous findings  Known, treated L4-L5 osteodiscitis  Other nonemergent findings above  Workstation performed: [id number]         CT spine lumbar without contrast   Final Result by Andre Cowan MD (10/20 8232)      No acute change compared to 9/12/2022  Similar findings of chronic osteodiscitis at L4-L5  Workstation performed: [id number]         XR chest 1 view portable   ED Interpretation by Chucho Faustin DO (10/20 1249)   nad      Final Result by Andre Cowan MD (10/20 5383)   No acute cardiopulmonary findings  Workstation performed: [id number]             EKG, Pathology, and Other Studies Reviewed on Admission:   · EKG: sinus rhythm    Allscripts / Epic Records Reviewed: Yes     ** Please Note: This note has been constructed using a voice recognition system   **

## 2022-10-20 NOTE — ASSESSMENT & PLAN NOTE
Lab Results   Component Value Date    HGBA1C 6 0 (H) 07/05/2022       No results for input(s): POCGLU in the last 72 hours      Blood Sugar Average: Last 72 hrs:  hold long acting insulin and place on iss and diabetic diet

## 2022-10-21 ENCOUNTER — APPOINTMENT (INPATIENT)
Dept: RADIOLOGY | Facility: HOSPITAL | Age: 57
DRG: 698 | End: 2022-10-21
Payer: MEDICARE

## 2022-10-21 LAB
ALBUMIN SERPL BCP-MCNC: 2.7 G/DL (ref 3.5–5)
ALP SERPL-CCNC: 85 U/L (ref 46–116)
ALT SERPL W P-5'-P-CCNC: 7 U/L (ref 12–78)
ANION GAP SERPL CALCULATED.3IONS-SCNC: 6 MMOL/L (ref 4–13)
AST SERPL W P-5'-P-CCNC: 7 U/L (ref 5–45)
BILIRUB SERPL-MCNC: 0.3 MG/DL (ref 0.2–1)
BUN SERPL-MCNC: 34 MG/DL (ref 5–25)
CALCIUM ALBUM COR SERPL-MCNC: 12.1 MG/DL (ref 8.3–10.1)
CALCIUM SERPL-MCNC: 11.1 MG/DL (ref 8.3–10.1)
CHLORIDE SERPL-SCNC: 101 MMOL/L (ref 96–108)
CO2 SERPL-SCNC: 29 MMOL/L (ref 21–32)
CREAT SERPL-MCNC: 2.74 MG/DL (ref 0.6–1.3)
ERYTHROCYTE [DISTWIDTH] IN BLOOD BY AUTOMATED COUNT: 18.2 % (ref 11.6–15.1)
GFR SERPL CREATININE-BSD FRML MDRD: 24 ML/MIN/1.73SQ M
GLUCOSE SERPL-MCNC: 117 MG/DL (ref 65–140)
GLUCOSE SERPL-MCNC: 133 MG/DL (ref 65–140)
GLUCOSE SERPL-MCNC: 153 MG/DL (ref 65–140)
GLUCOSE SERPL-MCNC: 155 MG/DL (ref 65–140)
GLUCOSE SERPL-MCNC: 172 MG/DL (ref 65–140)
HCT VFR BLD AUTO: 33.9 % (ref 36.5–49.3)
HGB BLD-MCNC: 10.7 G/DL (ref 12–17)
MCH RBC QN AUTO: 28.8 PG (ref 26.8–34.3)
MCHC RBC AUTO-ENTMCNC: 31.6 G/DL (ref 31.4–37.4)
MCV RBC AUTO: 91 FL (ref 82–98)
PLATELET # BLD AUTO: 217 THOUSANDS/UL (ref 149–390)
PMV BLD AUTO: 10.7 FL (ref 8.9–12.7)
POTASSIUM SERPL-SCNC: 3.8 MMOL/L (ref 3.5–5.3)
PROT SERPL-MCNC: 5.8 G/DL (ref 6.4–8.4)
RBC # BLD AUTO: 3.72 MILLION/UL (ref 3.88–5.62)
SODIUM SERPL-SCNC: 136 MMOL/L (ref 135–147)
TSH SERPL DL<=0.05 MIU/L-ACNC: 0.54 UIU/ML (ref 0.45–4.5)
WBC # BLD AUTO: 8.85 THOUSAND/UL (ref 4.31–10.16)

## 2022-10-21 PROCEDURE — 82948 REAGENT STRIP/BLOOD GLUCOSE: CPT

## 2022-10-21 PROCEDURE — 99232 SBSQ HOSP IP/OBS MODERATE 35: CPT | Performed by: STUDENT IN AN ORGANIZED HEALTH CARE EDUCATION/TRAINING PROGRAM

## 2022-10-21 PROCEDURE — 80053 COMPREHEN METABOLIC PANEL: CPT | Performed by: FAMILY MEDICINE

## 2022-10-21 PROCEDURE — 97163 PT EVAL HIGH COMPLEX 45 MIN: CPT

## 2022-10-21 PROCEDURE — G0427 INPT/ED TELECONSULT70: HCPCS | Performed by: INTERNAL MEDICINE

## 2022-10-21 PROCEDURE — 99232 SBSQ HOSP IP/OBS MODERATE 35: CPT | Performed by: FAMILY MEDICINE

## 2022-10-21 PROCEDURE — 85027 COMPLETE CBC AUTOMATED: CPT | Performed by: FAMILY MEDICINE

## 2022-10-21 PROCEDURE — 97167 OT EVAL HIGH COMPLEX 60 MIN: CPT

## 2022-10-21 PROCEDURE — 73630 X-RAY EXAM OF FOOT: CPT

## 2022-10-21 PROCEDURE — 84443 ASSAY THYROID STIM HORMONE: CPT | Performed by: FAMILY MEDICINE

## 2022-10-21 RX ORDER — INSULIN GLARGINE 100 [IU]/ML
10 INJECTION, SOLUTION SUBCUTANEOUS
Status: DISCONTINUED | OUTPATIENT
Start: 2022-10-21 | End: 2022-10-24 | Stop reason: HOSPADM

## 2022-10-21 RX ADMIN — CEFEPIME HYDROCHLORIDE 1000 MG: 1 INJECTION, SOLUTION INTRAVENOUS at 02:22

## 2022-10-21 RX ADMIN — ATORVASTATIN CALCIUM 40 MG: 40 TABLET, FILM COATED ORAL at 17:01

## 2022-10-21 RX ADMIN — DOCUSATE SODIUM 100 MG: 100 CAPSULE ORAL at 17:01

## 2022-10-21 RX ADMIN — CARVEDILOL 6.25 MG: 6.25 TABLET, FILM COATED ORAL at 08:11

## 2022-10-21 RX ADMIN — CYCLOBENZAPRINE 5 MG: 10 TABLET, FILM COATED ORAL at 17:01

## 2022-10-21 RX ADMIN — SODIUM CHLORIDE 125 ML/HR: 0.9 INJECTION, SOLUTION INTRAVENOUS at 09:49

## 2022-10-21 RX ADMIN — VENLAFAXINE 37.5 MG: 37.5 TABLET ORAL at 17:01

## 2022-10-21 RX ADMIN — SEVELAMER HYDROCHLORIDE 800 MG: 800 TABLET, FILM COATED PARENTERAL at 17:01

## 2022-10-21 RX ADMIN — OXYCODONE HYDROCHLORIDE 5 MG: 5 TABLET ORAL at 21:40

## 2022-10-21 RX ADMIN — THIAMINE HCL TAB 100 MG 100 MG: 100 TAB at 08:11

## 2022-10-21 RX ADMIN — INSULIN LISPRO 2 UNITS: 100 INJECTION, SOLUTION INTRAVENOUS; SUBCUTANEOUS at 08:14

## 2022-10-21 RX ADMIN — VENLAFAXINE 37.5 MG: 37.5 TABLET ORAL at 08:11

## 2022-10-21 RX ADMIN — CYCLOBENZAPRINE 5 MG: 10 TABLET, FILM COATED ORAL at 08:10

## 2022-10-21 RX ADMIN — FERROUS SULFATE TAB 325 MG (65 MG ELEMENTAL FE) 325 MG: 325 (65 FE) TAB at 08:11

## 2022-10-21 RX ADMIN — HEPARIN SODIUM 5000 UNITS: 5000 INJECTION INTRAVENOUS; SUBCUTANEOUS at 05:07

## 2022-10-21 RX ADMIN — SODIUM BICARBONATE 650 MG TABLET 650 MG: at 08:11

## 2022-10-21 RX ADMIN — OXYCODONE HYDROCHLORIDE 5 MG: 5 TABLET ORAL at 08:10

## 2022-10-21 RX ADMIN — TAMSULOSIN HYDROCHLORIDE 0.4 MG: 0.4 CAPSULE ORAL at 17:01

## 2022-10-21 RX ADMIN — CEFEPIME HYDROCHLORIDE 1000 MG: 1 INJECTION, SOLUTION INTRAVENOUS at 14:37

## 2022-10-21 RX ADMIN — DOCUSATE SODIUM 100 MG: 100 CAPSULE ORAL at 08:11

## 2022-10-21 RX ADMIN — VENLAFAXINE 37.5 MG: 37.5 TABLET ORAL at 11:35

## 2022-10-21 RX ADMIN — CYCLOBENZAPRINE 5 MG: 10 TABLET, FILM COATED ORAL at 21:28

## 2022-10-21 RX ADMIN — AMLODIPINE BESYLATE 10 MG: 10 TABLET ORAL at 08:11

## 2022-10-21 RX ADMIN — PREGABALIN 150 MG: 75 CAPSULE ORAL at 17:01

## 2022-10-21 RX ADMIN — PREGABALIN 150 MG: 75 CAPSULE ORAL at 08:11

## 2022-10-21 RX ADMIN — COLCHICINE 0.6 MG: 0.6 TABLET ORAL at 08:11

## 2022-10-21 RX ADMIN — INSULIN LISPRO 2 UNITS: 100 INJECTION, SOLUTION INTRAVENOUS; SUBCUTANEOUS at 11:35

## 2022-10-21 RX ADMIN — CARVEDILOL 6.25 MG: 6.25 TABLET, FILM COATED ORAL at 17:01

## 2022-10-21 RX ADMIN — SODIUM CHLORIDE 125 ML/HR: 0.9 INJECTION, SOLUTION INTRAVENOUS at 01:22

## 2022-10-21 RX ADMIN — INSULIN GLARGINE 10 UNITS: 100 INJECTION, SOLUTION SUBCUTANEOUS at 21:30

## 2022-10-21 RX ADMIN — ALLOPURINOL 200 MG: 100 TABLET ORAL at 08:11

## 2022-10-21 RX ADMIN — OXYCODONE HYDROCHLORIDE 5 MG: 5 TABLET ORAL at 14:44

## 2022-10-21 NOTE — WOUND OSTOMY CARE
Consult Note - Wound   Ray Jackson  62 y o  male MRN: [id number]  Unit/Bed#: -01 Encounter: [id number]        History and Present Illness:  62year old male admitted UTI, stage 3 CKD  Patient seen by Podaitry for foot ulcer   Indwelling camarena catheter  Followed by ID  Independent repositioning inbed  Alert oriented   Assessment Findings:   1)Buttocks non blanchable red dry  flakey skin  2)Left heel POA unstageable pressure injury to left heel black eschar intact dry no drainage no evidence of edges lifting      Skin care Plan:  1-Protect sacrum w/Allevyn foam, iva with T for treatment , change q3d and PRN, check skin q-shift  2-Turn/reposition q2h or when medically stable for pressure re-distribution on skin   3-Elevate heels to offload pressure  4-Moisturize skin daily with skin nourishing cream  5-Ehob cushion in chair when out of bed  6-Hydraguard to bilateral heels BID and PRN  7- Left heel per podiatry betadine adaptic dsd strict off loading  prevalon boot or float heel with 2 pillows      Wounds:  Wound 10/20/22 Pressure Injury Heel Left (Active)   Wound Image   10/21/22 0955   Wound Description Intact; Black 10/21/22 0955   Pressure Injury Stage DTPI 10/21/22 0955   [place]-wound Assessment Erythema;Callus 10/21/22 0955   Wound Length (cm) 4 cm 10/21/22 0955   Wound Width (cm) 5 cm 10/21/22 0955   Wound Surface Area (cm^2) 20 cm^2 10/21/22 0955   Drainage Amount None 10/21/22 0955   Treatments Elevated;Site care 10/21/22 0955   Dressing Foam, Silicon (eg  Allevyn, etc) 10/21/22 0955   Dressing Changed New 10/21/22 0955   Patient Tolerance Tolerated well 10/21/22 0955   Dressing Status Clean;Dry; Intact 10/21/22 1009       Wound 10/21/22 Pressure Injury Coccyx (Active)   Wound Image   10/21/22 0958   Wound Description Beefy red;Non-blanchable erythema;Light purple 10/21/22 0958   Pressure Injury Stage 2 10/21/22 0958   [place]-wound Assessment Denuded;Scaly 10/21/22 0958   Wound Length (cm) 8 cm 10/21/22 0958   Wound Width (cm) 20 cm 10/21/22 0958   Wound Surface Area (cm^2) 160 cm^2 10/21/22 0958   Treatments Cleansed;Site care 10/21/22 0958   Dressing Foam, Silicon (eg   Allevyn, etc) 10/21/22 0958   Dressing Changed New 10/21/22 0958   Patient Tolerance Tolerated well 10/21/22 0958     Wound Care will sign off  Call or Tigertext with any questions    Tabitha OCHOAN RN

## 2022-10-21 NOTE — ASSESSMENT & PLAN NOTE
Lab Results   Component Value Date    HGBA1C 6 0 (H) 07/05/2022       Recent Labs     10/20/22  1614 10/20/22  2125 10/21/22  0728 10/21/22  1103   POCGLU 111 173* 153* 172*       Blood Sugar Average: Last 72 hrs:  (P) 152 25 place on iss and diabetic diet    Restart Lantus low-dose

## 2022-10-21 NOTE — MALNUTRITION/BMI
This medical record reflects one or more clinical indicators suggestive of malnutrition and/or morbid obesity  Malnutrition Findings:   Adult Malnutrition type: Chronic illness  Adult Degree of Malnutrition: Other severe protein calorie malnutrition  Malnutrition Characteristics: Weight loss, Inadequate energy                  360 Statement: malnutrition in the context of chronic illness, evidenced by poor po intake <75% energy intake compared to estimated energy needs for > 1 month,  weight loss of  25% %  x 9 mo (1/30/21 185 lbs  to 10/20/22 138 6 lbs), treated with diet and supplements    BMI Findings: Body mass index is 19 34 kg/m²  See Nutrition note dated 10/21/22 for additional details  Completed nutrition assessment is viewable in the nutrition documentation

## 2022-10-21 NOTE — PLAN OF CARE
Problem: OCCUPATIONAL THERAPY ADULT  Goal: Performs self-care activities at highest level of function for planned discharge setting  See evaluation for individualized goals  Description: Treatment Interventions: ADL retraining, Functional transfer training, UE strengthening/ROM, Endurance training, Patient/family training, Equipment evaluation/education, Neuromuscular reeducation, Compensatory technique education, Continued evaluation, Energy conservation, Activityengagement          See flowsheet documentation for full assessment, interventions and recommendations  Note: Limitation: Decreased ADL status, Decreased UE strength, Decreased Safe judgement during ADL, Decreased endurance, Decreased self-care trans, Decreased high-level ADLs  Prognosis: Good  Assessment: Pt is a 62 y o  male, admitted to [address] [place] [address] 10/20/2022 d/t experiencing nausea and increased time  Dx: UTI  Pt with PMHx impacting their performance during ADL tasks, including: DM, gout, HTn, renal disorder, CVA  Prior to admission to the hospital Pt was performing ADLs with physical assistance  IADLs with physical assistance  Functional transfers/ambulation with physical assistance  Cognitive status was PTA was inatct  OT order placed to assess Pt's ADLs, cognitive status, and performance during functional tasks in order to maximize safety and independence while making most appropriate d/c recommendations   Pt's clinical presentation is currently unstable/unpredictable given new onset deficits that effect Pt's occupational performance and ability to safely return to [facility] including decrease activity tolerance, decrease standing balance, decrease performance during ADL tasks, decrease safety awareness , decrease UB MS, increased pain, decrease generalized strength, decrease activity engagement, decrease performance during functional transfers, high fall risk and limited insight to deficits combined with medical complications of hypertension , pain impacting overall mobility status, abnormal renal lab values, decreased skin integrity, multiple readmissions, incontinence, fear/retreat and need for input for mobility technique/safety  Personal factors affecting Pt at time of initial evaluation include: availability as recommended, limited home support, past experience, behavioral pattern, inability to perform current job functions, inability to perform IADLs, inability to perform ADLs, inability to ambulate household distances, inability to navigate community distances, limited insight into impairments, decreased initiation and engagement and questionable non-compliance  Pt will benefit from continued skilled OT services to address deficits as defined above and to maximize level independence/participation during ADLs and functional tasks to facilitate return toward PLOF and improved quality of life  From an occupational therapy standpoint, recommendation at time of d/c would be post acute rehab       OT Discharge Recommendation: Post acute rehabilitation services

## 2022-10-21 NOTE — ASSESSMENT & PLAN NOTE
Malnutrition Findings:                          bmi is 19 86  BMI Findings: Body mass index is 19 34 kg/m²

## 2022-10-21 NOTE — CASE MANAGEMENT
Case Management Assessment & Discharge Planning Note    Patient name Sera Camargo  Location /-34 MRN [id number]  : 1965 Date 10/21/2022       Current Admission Date: 10/20/2022  Current Admission Diagnosis:UTI (urinary tract infection) due to urinary indwelling catheter [facility])   Patient Active Problem List    Diagnosis Date Noted   • UTI (urinary tract infection) due to urinary indwelling catheter ([facility] ) 10/20/2022   • Skin ulcer of left heel, limited to breakdown of skin ([facility] ) 10/20/2022   • Hypercalcemia 2022   • Low back pain 2022   • Ambulatory dysfunction 2022   • Stage 3a chronic kidney disease ([facility] ) 2022   • Acute urinary retention 2022   • Severe protein-calorie malnutrition ([facility] ) 2022   • Iron deficiency anemia secondary to inadequate dietary iron intake 2022   • Hip pain, acute, left 2022   • KELLY (acute kidney injury) ([facility] ) 2022   • Hyperkalemia 2022   • Diabetes mellitus type 2, insulin dependent ([facility] )    • Gout    • Essential hypertension    • History of CVA (cerebrovascular accident)    • Osteomyelitis of vertebra of lumbar region ([facility] )       LOS (days): 1  Geometric Mean LOS (GMLOS) (days): 4 80  Days to GMLOS:3 8     OBJECTIVE:    Risk of Unplanned Readmission Score: 24 63         Current admission status: Inpatient       Preferred Pharmacy:   [address] [place] - [address]  [address] [address]  Phone: [phone] Fax: [phone]    Primary Care Provider: Rachell Schultz DO    Primary Insurance: MEDICARE  Secondary Insurance:     ASSESSMENT:  [address] Proxies    There are no active Health Care Proxies on file         Advance Directives  Does patient have a [address] [place] [address] Avenue?: No  Was patient offered paperwork?: Yes (spouse declined)  Does patient currently have a Health Care decision maker?: Yes, please see Health Care Proxy section  Does patient have Advance Directives?: No  Was patient offered paperwork?: Yes (spouse declined)  Primary Contact: Jose Cuello, spouse         Readmission Root Cause  30 Day Readmission: No    Patient Information  Admitted from[de-identified] Home  Mental Status: Other (Comment)  During Assessment patient was accompanied by: Not accompanied during assessment  Assessment information provided by[de-identified] Spouse  Primary Caregiver: Spouse  Caregiver's Name[de-identified] Jose Cuello, spouse  Caregiver's Relationship to Patient[de-identified] Family Member  Caregiver's Telephone Number[de-identified] see face sheet  Support Systems: Spouse/significant other, Children, Family members  [place] of Residence: [facility] do you live in?: [address] entry access options  Select all that apply : Other access (Comment) (chair lift)  Type of Current Residence: 3 story home  Upon entering residence, is there a bedroom on the main floor (no further steps)?: Yes  Upon entering residence, is there a bathroom on the main floor (no further steps)?: No  Indicate which floors of current residence have a bathroom (select all the apply):: 2nd Floor (chair lift)  Number of steps to 2nd floor from main floor: One Flight (chair lift)  In the last 12 months, was there a time when you were not able to pay the mortgage or rent on time?: No  In the last 12 months, how many places have you lived?: 4  In the last 12 months, was there a time when you did not have a steady place to sleep or slept in a shelter (including now)?: No  Homeless/housing insecurity resource given?: N/A  Living Arrangements: Lives w/ Spouse/significant other  Is patient a ?: Yes ([employer])  Is patient active with [facility])?: No    Activities of Daily Living Prior to Admission  Functional Status: Total dependent  Completes ADLs independently?: No  Level of ADL dependence: Total Dependent  Ambulates independently?: No  Level of ambulatory dependence:  Total Dependent  Does patient use assisted devices?: Yes  Assisted Devices (DME) used: Stair Chair/Cheney, Wheelchair, [employer] wheelchair, [employer], Other (Comment) (trapeze)  Does patient currently own DME?: Yes  What DME does the patient currently own?: Electric Wheelchair, [employer], Stair Chair/Glide, Wheelchair, Rozann [place], Other (Comment) (trapeze)  Does patient have a history of Outpatient Therapy (PT/OT)?: No  Does the patient have a history of Short-Term Rehab?: Yes ([facility], [facility] acute rehab, [facility])  Does patient have a history of [facility]?: Yes ([address])  Does patient currently have [facility]?: Yes ([address])    [facility]  Type of Current Home Care Services: Home PT, Nurse visit  [address] [facility] [address][de-identified] [facility] Provider[de-identified] PCP    Patient Information Continued  Income Source: SSI/SSD  Does patient have prescription coverage?: Yes  Within the past 12 months, you worried that your food would run out before you got the money to buy more : Never true  Within the past 12 months, the food you bought just didn't last and you didn't have money to get more : Never true  Food insecurity resource given?: N/A  Does patient receive dialysis treatments?: No  Does patient have a history of substance abuse?: No  Does patient have a history of Mental Health Diagnosis?: No         Means of Transportation  Means of Transport to Appts[de-identified] Family transport  In the past 12 months, has lack of transportation kept you from medical appointments or from getting medications?: No  In the past 12 months, has lack of transportation kept you from meetings, work, or from getting things needed for daily living?: No  Was application for public transport provided?: N/A        DISCHARGE DETAILS:    Discharge planning discussed with[de-identified] spouse, Cintia Mackenzie of Choice: Yes     CM contacted family/caregiver?: Yes             Contacts  Patient Contacts: Eliseo Lantigua, spouse  Relationship to Patient[de-identified] Family  Contact Method: Phone  Phone Number: see face sheet  Reason/Outcome: Continuity of Care, Discharge Planning         CM contacted spouse, baseline information was obtained  CM discussed the role of CM in helping the patient develop a discharge plan and assist the patient in carry out their plan  Patient lives at home with spouse in 3 story home, chair lift for entrance and to 2nd floor of home  Patient has hospital bed on 1st floor of home with trapeze, WC and precious  Patient active with [address] for PT and VN  Spouse reports patient has had series of medical conditions since March 2022 requiring numerous hospitalizations and rehabs for patient  Patient has been bed bound since March 2022 although made progress at [facility] acute rehab but declined again following medical complication  Spouse feels patient has never fully received intense rehab, enough to address spinal cord issues, as patient continues to experience medical conditions resulting in set backs with therapy progress  CM discussed with spouse recommendation for SNF from PT/OT  Spouse requested blanket referrals to both SNFs and acute rehabs in attempt to secure therapy for patient priro to discharge home  A post acute care recommendation was made by your care team for SNF  Discussed Freedom of Choice with caregiver  Offered caregiver blanket referral for facilities via telephone  caregiver aware the list is custom by insurance and patient's medical needs  Spouse agreed to blanket SNF referrals and requested acute referrals  CM completed blanket SNF and acte rehab referrals in [address] [place] [address]  CM also made AIDIN referral to [address] who patient is active with

## 2022-10-21 NOTE — PLAN OF CARE
Problem: PHYSICAL THERAPY ADULT  Goal: Performs mobility at highest level of function for planned discharge setting  See evaluation for individualized goals  Description: Treatment/Interventions: ADL retraining, Functional transfer training, LE strengthening/ROM, Therapeutic exercise, Endurance training, Patient/family training, Equipment eval/education, Bed mobility, Compensatory technique education, Spoke to nursing, Spoke to case management, OT  Equipment Recommended:  (TBD by rehab)       See flowsheet documentation for full assessment, interventions and recommendations  Note: Prognosis: Fair  Problem List: Decreased strength, Decreased range of motion, Decreased endurance, Impaired balance, Decreased mobility, Decreased coordination, Impaired judgement, Decreased safety awareness, Decreased skin integrity, Pain  Assessment: Pt is a 62 y o  male seen for PT evaluation s/p admission to [address] [place] [address] on 10/20/2022 with UTI (urinary tract infection) due to urinary indwelling [facility]  Order placed for PT services  Upon evaluation: Pt is presenting with impaired functional mobility due to pain, decreased strength, decreased ROM, decreased endurance, impaired balance, altered sensation, decreased safety awareness, impaired judgment, fall risk, and impaired skin integrity requiring  supervision assistance for bed mobility and moderate assistance for transfers via lateral scooting   Pt's clinical presentation is currently unpredictable given the functional mobility deficits above, especially weakness, decreased ROM, decreased skin integrity, decreased endurance, impaired coordination, pain, decreased activity tolerance, decreased functional mobility tolerance, decreased safety awareness, and impaired judgement, coupled with fall risks as indicated by AM-PAC 6-Clicks: 00/42 as well as hx of falls, impaired balance, polypharmacy, impaired coordination, impaired judgement, decreased safety awareness and limited sensation/neuropathy and combined with medical complications of pain impacting overall mobility status, abnormal renal lab values, abnormal H&H, abnormal WBCs, abnormal blood sugars, multiple readmissions, need for input for mobility technique/safety and abnormal CRP, UTI due to urinary indwelling catheter, hypercalcemia, left heel ulcer  Pt's PMHx and comorbidities that may affect physical performance and progress include: CKD and osteromyelitis of lumbar region with chronic osteodiscitis at L4-L5, gout, HTN, CVA, DM  Personal factors affecting pt at time of IE include: inaccessible home environment, limited home support, inability to perform IADLs, inability to perform ADLs, inability to navigate level surfaces without external assistance and recent fall(s)/fall history  Pt will benefit from continued skilled PT services to address deficits as defined above and to maximize level of functional mobility to facilitate return toward PLOF and improved QOL  From PT/mobility standpoint, recommendation at time of d/c would be Short term rehab pending progress in order to reduce fall risk and maximize pt's functional independence and consistency with mobility in order to facilitate return to PLOF  Recommend ther ex next 1-2 sessions and mechanical conveyance from nursing standpoint for OOB mobility versus stretcher board  Barriers to Discharge: Decreased caregiver support, Inaccessible home environment  Barriers to Discharge Comments: poor sitting tolerance, requires increased assistance with all mobility  PT Discharge Recommendation: Post acute rehabilitation services    See flowsheet documentation for full assessment

## 2022-10-21 NOTE — CONSULTS
Consult - Podiatry   Abram Arreola Sr  62 y o  male MRN: [id number]  Unit/Bed#: -01 Encounter: [id number]    Assessment/Plan     Assessment:  1  Left heel pressure ulceration    Plan:  - Left heel wound appears with intact eschar, no surrounding SOI  Recommend daily betadine, adaptic, dsd and strict pressure offloading  XR left heel pending    - XR left foot negative for osseous erosion or BRUCE   - LEADS B/L LE ordered to assess for perfusion   - Strict heel offloading with prevalon boot or two pillows (float heels)  - Thank you for consultation  Rest of care per primary team     Please call with questions, change in clinical status or if tests recommended above are abnormal      History of Present Illness     HPI:  Jesus Mejia  is a 62 y o  male s/ PMH significant for DM, CKD 3, ambulatory dysfunction admitted for UTI and consulted podiatry for left heel wound  Unsure how long he has had the heel wound  Has not had arterial testing  Inpatient consult to Podiatry  Consult performed by: Shant Cheek DPM  Consult ordered by: Shirley Rhodes MD        Review of Systems   Constitutional: Negative  HENT: Negative  Eyes: Negative  Respiratory: Negative  Cardiovascular: Negative  Gastrointestinal: Negative  Musculoskeletal: Negative   Skin: left heel wound  Neurological: Negative  Psych: negative         Historical Information   Past Medical History:   Diagnosis Date   • Diabetes mellitus [facility]    • Gout    • Hypertension    • Renal disorder    • Stroke [facility])      Past Surgical History:   Procedure Laterality Date   • BACK SURGERY       Social History   Social History     Substance and Sexual Activity   Alcohol Use Not Currently     Social History     Substance and Sexual Activity   Drug Use Yes   • Types: Marijuana     Social History     Tobacco Use   Smoking Status Current Every Day Smoker   • Packs/day: 0 50   • Types: Cigarettes   Smokeless Tobacco Never Used     Family History: Family History   Problem Relation Age of Onset   • Hypertension Father        Meds/Allergies   Medications Prior to Admission   Medication   • acetaminophen (TYLENOL) 325 mg tablet   • allopurinol (ZYLOPRIM) 100 mg tablet   • amLODIPine (NORVASC) 10 mg tablet   • atorvastatin (LIPITOR) 40 mg tablet   • Buprenorphine HCl (Belbuca) 300 MCG FILM   • carvedilol (COREG) 6 25 mg tablet   • colchicine (COLCRYS) 0 6 mg tablet   • cyclobenzaprine (FLEXERIL) 5 mg tablet   • docusate sodium (COLACE) 100 mg capsule   • ferrous sulfate 325 (65 Fe) mg tablet   • insulin detemir (LEVEMIR) 100 units/mL subcutaneous injection   • insulin lispro (HumaLOG) 100 units/mL injection   • lidocaine (LIDODERM) 5 %   • melatonin 3 mg   • methocarbamol (ROBAXIN) 500 mg tablet   • oxyCODONE (ROXICODONE) 5 immediate release tablet   • polyethylene glycol (MIRALAX) 17 g packet   • pregabalin (LYRICA) 150 mg capsule   • senna-docusate sodium (SENOKOT S) 8 6-50 mg per tablet   • sevelamer (RENAGEL) 800 mg tablet   • tamsulosin (FLOMAX) 0 4 mg   • thiamine 100 MG tablet   • venlafaxine (EFFEXOR-XR) 37 5 mg 24 hr capsule     Allergies   Allergen Reactions   • Bupropion Delirium     "went nuts," prior to 2012  "went nuts," prior to 2012  "went nuts," prior to 2012  "went nuts," prior to 2012     • Metformin Other (See Comments)     Other reaction(s): kidney disease  Other reaction(s): kidney disease  Other reaction(s): kidney disease     • Medical Tape Rash       Objective   First Vitals:   Blood Pressure: 148/94 (10/20/22 1117)  Pulse: 78 (10/20/22 1117)  Temperature: 97 7 °F (36 5 °C) (10/20/22 1117)  Temp Source: Temporal (10/20/22 1117)  Respirations: 18 (10/20/22 1117)  Height: 5' 11" (180 3 cm) (10/20/22 2151)  Weight - Scale: 64 6 kg (142 lb 6 7 oz) (10/20/22 1117)  SpO2: 96 % (10/20/22 1117)    Current Vitals:   Blood Pressure: 138/79 (10/21/22 0859)  Pulse: 70 (10/21/22 0859)  Temperature: 98 1 °F (36 7 °C) (10/21/22 0859)  Temp Source: Temporal (10/20/22 2151)  Respirations: 19 (10/21/22 0735)  Height: 5' 11" (180 3 cm) (10/20/22 2151)  Weight - Scale: 62 9 kg (138 lb 10 7 oz) (10/21/22 0551)  SpO2: 94 % (10/21/22 0859)        /79   Pulse 70   Temp 98 1 °F (36 7 °C)   Resp 19   Ht 5' 11" (1 803 m)   Wt 62 9 kg (138 lb 10 7 oz)   SpO2 94%   BMI 19 34 kg/m²      General Appearance:    Alert, cooperative, no distress   Head:    Normocephalic, without obvious abnormality, atraumatic   Eyes:    PERRL, conjunctiva/corneas clear, EOM's intact        Nose:   Moist mucous membranes   Neck:   Supple, symmetrical, trachea midline   Back:     Ulceration   Lungs:     Respirations unlabored   Heart:    No chest pain   Abdomen:     Soft, non-tender    B/L LE EXAM   Extremities:   B/L LE are atrophic in appearance, thin  Nonambulatory  MMT deferred  Pulses:   Nonpalpable pedal pulses  Legs to toes warm to cool  Pedla hair absent  Mild varicosities noted  Skin:   B/L LE skin is atrophic, thin, dry and shiny  No open wounds to RLE   Left heel with pressure ulceration  Adhered eschar intact, no loosening, no SOI surrounding, no bogginess  Neurologic:   Gross sensation is diminished to feet  Protective sensation deferred                 Lab Results:   Admission on 10/20/2022   Component Date Value   • WBC 10/20/2022 10 38 (A)   • RBC 10/20/2022 4 54    • Hemoglobin 10/20/2022 13 2    • Hematocrit 10/20/2022 41 6    • MCV 10/20/2022 92    • MCH 10/20/2022 29 1    • MCHC 10/20/2022 31 7    • RDW 10/20/2022 18 6 (A)   • MPV 10/20/2022 10 8    • Platelets 75/36/5497 279    • nRBC 10/20/2022 0    • Neutrophils Relative 10/20/2022 65    • Immat GRANS % 10/20/2022 1    • Lymphocytes Relative 10/20/2022 21    • Monocytes Relative 10/20/2022 9    • Eosinophils Relative 10/20/2022 3    • Basophils Relative 10/20/2022 1    • Neutrophils Absolute 10/20/2022 6 83    • Immature Grans Absolute 10/20/2022 0 05    • Lymphocytes Absolute 10/20/2022 2 17    • Monocytes Absolute 10/20/2022 0 98    • Eosinophils Absolute 10/20/2022 0 27    • Basophils Absolute 10/20/2022 0 08    • Sodium 10/20/2022 136    • Potassium 10/20/2022 3 5    • Chloride 10/20/2022 97    • CO2 10/20/2022 32    • ANION GAP 10/20/2022 7    • BUN 10/20/2022 38 (A)   • Creatinine 10/20/2022 3 00 (A)   • Glucose 10/20/2022 96    • Calcium 10/20/2022 11 5 (A)   • Corrected Calcium 10/20/2022 12 1 (A)   • AST 10/20/2022 10    • ALT 10/20/2022 16    • Alkaline Phosphatase 10/20/2022 107    • Total Protein 10/20/2022 7 2    • Albumin 10/20/2022 3 3 (A)   • Total Bilirubin 10/20/2022 0 32    • eGFR 10/20/2022 22    • LACTIC ACID 10/20/2022 0 8    • Sed Rate 10/20/2022 64 (A)   • CRP 10/20/2022 15 2 (A)   • Color, UA 10/20/2022 Light Yellow    • Clarity, UA 10/20/2022 Cloudy    • Specific Gravity, UA 10/20/2022 1 015    • pH, UA 10/20/2022 7 0    • Leukocytes, UA 10/20/2022 Large (A)   • Nitrite, UA 10/20/2022 Negative    • Protein, UA 10/20/2022 100 (2+) (A)   • Glucose, UA 10/20/2022 Negative    • Ketones, UA 10/20/2022 Trace (A)   • Urobilinogen, UA 10/20/2022 0 2    • Bilirubin, UA 10/20/2022 Negative    • Occult Blood, UA 10/20/2022 Moderate (A)   • RBC, UA 10/20/2022 Field obscured, unable to enumerate (A)   • WBC, UA 10/20/2022 Innumerable (A)   • Epithelial Cells 10/20/2022 Field obscured, unable to enumerate (A)   • Bacteria, UA 10/20/2022 Innumerable (A)   • AMORPH URATES 10/20/2022 Occasional    • Blood Culture 10/20/2022 Received in Microbiology Lab  Culture in Progress  • Blood Culture 10/20/2022 Received in Microbiology Lab  Culture in Progress      • POC Glucose 10/20/2022 111    • POC Glucose 10/20/2022 173 (A)   • TSH 3RD GENERATON 10/21/2022 0 540    • Sodium 10/21/2022 136    • Potassium 10/21/2022 3 8    • Chloride 10/21/2022 101    • CO2 10/21/2022 29    • ANION GAP 10/21/2022 6    • BUN 10/21/2022 34 (A)   • Creatinine 10/21/2022 2 74 (A)   • Glucose 10/21/2022 155 (A)   • Calcium 10/21/2022 11 1 (A)   • Corrected Calcium 10/21/2022 12 1 (A)   • AST 10/21/2022 7    • ALT 10/21/2022 7 (A)   • Alkaline Phosphatase 10/21/2022 85    • Total Protein 10/21/2022 5 8 (A)   • Albumin 10/21/2022 2 7 (A)   • Total Bilirubin 10/21/2022 0 30    • eGFR 10/21/2022 24    • WBC 10/21/2022 8 85    • RBC 10/21/2022 3 72 (A)   • Hemoglobin 10/21/2022 10 7 (A)   • Hematocrit 10/21/2022 33 9 (A)   • MCV 10/21/2022 91    • MCH 10/21/2022 28 8    • MCHC 10/21/2022 31 6    • RDW 10/21/2022 18 2 (A)   • Platelets 75/61/8523 217    • MPV 10/21/2022 10 7    • POC Glucose 10/21/2022 153 (A)             Results from last 7 days   Lab Units 10/20/22  1248   BLOOD CULTURE  Received in Microbiology Lab  Culture in Progress  Received in Microbiology Lab  Culture in Progress  Invalid input(s): LABAEARO            Imaging: I have personally reviewed pertinent films in PACS  EKG, Pathology, and Other Studies: I have personally reviewed pertinent reports        Code Status: Level 1 - Full Code  Advance Directive and Living Will:      Power of :    POLST:

## 2022-10-21 NOTE — ASSESSMENT & PLAN NOTE
Lab Results   Component Value Date    EGFR 24 10/21/2022    EGFR 22 10/20/2022    EGFR 23 09/14/2022    CREATININE 2 74 (H) 10/21/2022    CREATININE 3 00 (H) 10/20/2022    CREATININE 2 87 (H) 09/14/2022   baseline creatinine 2 8-3  avoid nephrotoxic agents and hypotension

## 2022-10-21 NOTE — ASSESSMENT & PLAN NOTE
Calcium level is 12 1  Will place iv fluid hydration  recheck calcium level is still the same    Will stop IV fluids and place on lactose-free diet and observe for now

## 2022-10-21 NOTE — PLAN OF CARE
Problem: Nutrition/Hydration-ADULT  Goal: Nutrient/Hydration intake appropriate for improving, restoring or maintaining nutritional needs  Description: Monitor and assess patient's nutrition/hydration status for malnutrition  Collaborate with interdisciplinary team and initiate plan and interventions as ordered  Monitor patient's weight and dietary intake as ordered or per policy  Utilize nutrition screening tool and intervene as necessary  Determine patient's food preferences and provide high-protein, high-caloric foods as appropriate       INTERVENTIONS:  - Monitor oral intake, urinary output, labs, and treatment plans  - Assess nutrition and hydration status and recommend course of action  - Evaluate amount of meals eaten  - Assist patient with eating if necessary   - Allow adequate time for meals  - Recommend/ encourage appropriate diets, oral nutritional supplements, and vitamin/mineral supplements  - Order, calculate, and assess calorie counts as needed  - Recommend, monitor, and adjust tube feedings and TPN/PPN based on assessed needs  - Assess need for intravenous fluids  - Provide specific nutrition/hydration education as appropriate  - Include patient/family/caregiver in decisions related to nutrition  Outcome: Progressing     Problem: MOBILITY - ADULT  Goal: Maintain or return to baseline ADL function  Description: INTERVENTIONS:  -  Assess patient's ability to carry out ADLs; assess patient's baseline for ADL function and identify physical deficits which impact ability to perform ADLs (bathing, care of mouth/teeth, toileting, grooming, dressing, etc )  - Assess/evaluate cause of self-care deficits   - Assess range of motion  - Assess patient's mobility; develop plan if impaired  - Assess patient's need for assistive devices and provide as appropriate  - Encourage maximum independence but intervene and supervise when necessary  - Involve family in performance of ADLs  - Assess for home care needs following discharge   - Consider OT consult to assist with ADL evaluation and planning for discharge  - Provide patient education as appropriate  Outcome: Progressing  Goal: Maintains/Returns to pre admission functional level  Description: INTERVENTIONS:  - Perform BMAT or MOVE assessment daily    - Set and communicate daily mobility goal to care team and patient/family/caregiver  - Collaborate with rehabilitation services on mobility goals if consulted  - Perform Range of Motion  times a day  - Reposition patient every  hours    - Dangle patient  times a day  - Stand patient  times a day  - Ambulate patient  times a day  - Out of bed to chair times a day   - Out of bed for meals  times a day  - Out of bed for toileting  - Record patient progress and toleration of activity level   Outcome: Progressing     Problem: Potential for Falls  Goal: Patient will remain free of falls  Description: INTERVENTIONS:  - Educate patient/family on patient safety including physical limitations  - Instruct patient to call for assistance with activity   - Consult OT/PT to assist with strengthening/mobility   - Keep Call bell within reach  - Keep bed low and locked with side rails adjusted as appropriate  - Keep care items and personal belongings within reach  - Initiate and maintain comfort rounds  - Make Fall Risk Sign visible to staff  - Offer Toileting every  Hours, in advance of need  - Initiate/Maintain alarm  - Obtain necessary fall risk management equipment  - Apply yellow socks and bracelet for high fall risk patients  - Consider moving patient to room near nurses station  Outcome: Progressing     Problem: Prexisting or High Potential for Compromised Skin Integrity  Goal: Skin integrity is maintained or improved  Description: INTERVENTIONS:  - Identify patients at risk for skin breakdown  - Assess and monitor skin integrity  - Assess and monitor nutrition and hydration status  - Monitor labs   - Assess for incontinence   - Turn and reposition patient  - Assist with mobility/ambulation  - Relieve pressure over bony prominences  - Avoid friction and shearing  - Provide appropriate hygiene as needed including keeping skin clean and dry  - Evaluate need for skin moisturizer/barrier cream  - Collaborate with interdisciplinary team   - Patient/family teaching  - Consider wound care consult   Outcome: Progressing     Problem: PAIN - ADULT  Goal: Verbalizes/displays adequate comfort level or baseline comfort level  Description: Interventions:  - Encourage patient to monitor pain and request assistance  - Assess pain using appropriate pain scale  - Administer analgesics based on type and severity of pain and evaluate response  - Implement non-pharmacological measures as appropriate and evaluate response  - Consider cultural and social influences on pain and pain management  - Notify physician/advanced practitioner if interventions unsuccessful or patient reports new pain  Outcome: Progressing     Problem: INFECTION - ADULT  Goal: Absence or prevention of progression during hospitalization  Description: INTERVENTIONS:  - Assess and monitor for signs and symptoms of infection  - Monitor lab/diagnostic results  - Monitor all insertion sites, i e  indwelling lines, tubes, and drains  - Monitor endotracheal if appropriate and nasal secretions for changes in amount and color  - Moran appropriate cooling/warming therapies per order  - Administer medications as ordered  - Instruct and encourage patient and family to use good hand hygiene technique  - Identify and instruct in appropriate isolation precautions for identified infection/condition  Outcome: Progressing  Goal: Absence of fever/infection during neutropenic period  Description: INTERVENTIONS:  - Monitor WBC    Outcome: Progressing     Problem: SAFETY ADULT  Goal: Maintain or return to baseline ADL function  Description: INTERVENTIONS:  -  Assess patient's ability to carry out ADLs; assess patient's baseline for ADL function and identify physical deficits which impact ability to perform ADLs (bathing, care of mouth/teeth, toileting, grooming, dressing, etc )  - Assess/evaluate cause of self-care deficits   - Assess range of motion  - Assess patient's mobility; develop plan if impaired  - Assess patient's need for assistive devices and provide as appropriate  - Encourage maximum independence but intervene and supervise when necessary  - Involve family in performance of ADLs  - Assess for home care needs following discharge   - Consider OT consult to assist with ADL evaluation and planning for discharge  - Provide patient education as appropriate  Outcome: Progressing  Goal: Maintains/Returns to pre admission functional level  Description: INTERVENTIONS:  - Perform BMAT or MOVE assessment daily    - Set and communicate daily mobility goal to care team and patient/family/caregiver  - Collaborate with rehabilitation services on mobility goals if consulted  - Perform Range of Motion  times a day  - Reposition patient every  hours    - Dangle patient  times a day  - Stand patient  times a day  - Ambulate patient times a day  - Out of bed to chair  times a day   - Out of bed for meals  times a day  - Out of bed for toileting  - Record patient progress and toleration of activity level   Outcome: Progressing  Goal: Patient will remain free of falls  Description: INTERVENTIONS:  - Educate patient/family on patient safety including physical limitations  - Instruct patient to call for assistance with activity   - Consult OT/PT to assist with strengthening/mobility   - Keep Call bell within reach  - Keep bed low and locked with side rails adjusted as appropriate  - Keep care items and personal belongings within reach  - Initiate and maintain comfort rounds  - Make Fall Risk Sign visible to staff  - Offer Toileting every  Hours, in advance of need  - Initiate/Maintain alarm  - Obtain necessary fall risk management equipment:   - Apply yellow socks and bracelet for high fall risk patients  - Consider moving patient to room near nurses station  Outcome: Progressing     Problem: DISCHARGE PLANNING  Goal: Discharge to home or other facility with appropriate resources  Description: INTERVENTIONS:  - Identify barriers to discharge w/patient and caregiver  - Arrange for needed discharge resources and transportation as appropriate  - Identify discharge learning needs (meds, wound care, etc )  - Arrange for interpretive services to assist at discharge as needed  - Refer to Case Management Department for coordinating discharge planning if the patient needs post-hospital services based on physician/advanced practitioner order or complex needs related to functional status, cognitive ability, or social support system  Outcome: Progressing     Problem: Knowledge Deficit  Goal: Patient/family/caregiver demonstrates understanding of disease process, treatment plan, medications, and discharge instructions  Description: Complete learning assessment and assess knowledge base    Interventions:  - Provide teaching at level of understanding  - Provide teaching via preferred learning methods  Outcome: Progressing

## 2022-10-21 NOTE — PHYSICAL THERAPY NOTE
PHYSICAL THERAPY EVALUATION  NAME:  aNkul Daigle Sr  DATE: 10/21/22    AGE:   62 y o  Mrn:   [id number]  ADMIT DX:  Vomiting [R11 10]  UTI (urinary tract infection) [N39 0]  Cellulitis of right buttock [L03 317]  Skin ulcer of left heel, limited to breakdown of skin ([address] ) [L97 421]  UTI (urinary tract infection) due to urinary indwelling catheter ([address] ) [T83 511A, N39 0]    Past Medical History:   Diagnosis Date   • Diabetes mellitus ([address] )    • Gout    • Hypertension    • Renal disorder    • Stroke [facility])      Length Of Stay: 1  Performed at least 2 patient identifiers during session: Name and Birthday  PHYSICAL THERAPY EVALUATION :    10/21/22 0713   PT Last Visit   PT Visit Date 10/21/22   Note Type   Note type Evaluation   Pain Assessment   Pain Assessment Tool FLACC   Pain Location/Orientation Location: Generalized;Orientation: Lower; Location: Back; Location: Leg  ("just about everywhere")   Pain Rating: FLACC (Rest) - Face 0   Pain Rating: FLACC (Rest) - Legs 0   Pain Rating: FLACC (Rest) - Activity 0   Pain Rating: FLACC (Rest) - Cry 1   Pain Rating: FLACC (Rest) - Consolability 0   Score: FLACC (Rest) 1   Pain Rating: FLACC (Activity) - Face 2   Pain Rating: FLACC (Activity) - Legs 1   Pain Rating: FLACC (Activity) - Activity 2   Pain Rating: FLACC (Activity) - Cry 2   Pain Rating: FLACC (Activity) - Consolability 1   Score: FLACC (Activity) 8   Restrictions/Precautions   Other Precautions Chair Alarm; Bed Alarm; Fall Risk;Pain;Multiple lines  (camarena catheter)   Home Living   Type of Home House  (stair lift to enter home)   Home Layout One level;Performs ADLs on one level; Able to live on main level with bedroom/bathroom   Bathroom Shower/Tub   (sponge bathes)   Bathroom Toilet   ("I go in my diaper and my wife cleans me up ")   Home Equipment Electric scooter; Wheelchair-manual;Hospital bed   Additional Comments Reports living in a [facility] with stair lift to enter home and using a wheelchair for primary mode of mobility at baseline  He is nonambulatory since March 2022 at least (notes when seen in September 2022) and has assistance for all functional transfers OOB to wheelchair  Prior Function   Level of [place] Needs assistance with ADLs; Needs assistance with functional mobility; Needs assistance with IADLS   Lives With Spouse   Receives Help From Family   IADLs Family/Friend/Other provides transportation; Family/Friend/Other provides meals; Family/Friend/Other provides medication management   Falls in the last 6 months 1 to 4   Comments Reports having assistance with ADLs, IADLs and transfers from his wife and children prn   General   Additional Pertinent History CT abdomen and pelvis showing Evidence of left medial buttock cellulitis and ulceration compared to 9/12/2022  No abscess  Stable osseous findings  Known, treated L4-L5 osteodiscitis  Pt with left heel wound  Pt recently admitted 9/12/2022 to 9/14/22 due to acute on chronic lower back pain with left-sided weakness and neuropathy  Also having acute urinary retention  discharged to home with HHPT  Cognition   Orientation Level Oriented X4   Following Commands Follows one step commands without difficulty   Subjective   Subjective "I will try "   RLE Assessment   RLE Assessment X  (WFL except ankle DF < neutral  PROM WNL, strength 2+/5 except ankle DF 2-/5)   LLE Assessment   LLE Assessment X  (hip flexion and knee flexion WFL, hip abduction minimal <10 degrees, ankle DF no AROM, no trace contraction  PROM ankle DF WNL   strength 2/5 except hip abd 2-/5 and ankle DF 0/5)   Coordination   Movements are Fluid and Coordinated 0   Light Touch   RLE Light Touch Grossly intact   LLE Light Touch Impaired   LLE Light Touch Comments diminished left LE compared to right   Proprioception   RLE Proprioception Grossly intact   LLE Proprioception Grossly Intact   Bed Mobility   Rolling R 5  Supervision   Additional items Impulsive;Verbal cues   Rolling L 5  Supervision Additional items Impulsive;Verbal cues   Supine to Sit 5  Supervision   Additional items Increased time required;Verbal cues   Sit to Supine 4  Minimal assistance   Additional items Assist x 1; Increased time required;Verbal cues;LE management   Additional Comments HOB slightly elevated ~ 15 degrees  use of bedrails prn  Pt sleeps in hospital bed at home  Pt using R LE to advance L LE to EOB to complete bed mobility to his left  achieved sitting EOB wiht supervision  pt wiht inc lateral lean to right and then left due to pain  cues for use of bedrail for UE support when sitting  Transfers   Sit to Stand Unable to assess   Sliding Board transfer 3  Moderate assistance  (lateral scoot transfer  started with miNAx1--->modAx1, to pt's right to drop arm recliner, then modAx1-->minAx1 at best to patient's left  majority of transfer required modAx1)   Additional items Assist x 1; Increased time required;Verbal cues   Additional Comments transfer with drop arm recliner to patient's right and then left bed<>chair with mostly modAx1  minAx1 at times with improved wt shifting and technique with verbal cues for hand placement and manual cues for LE placement  required mod verbal cues for technique with trunk management due to inc lateral and posteiror lean  increased time with multiple scooting completed with increased verbal cues for safety and technique  pt delcined sitting in recliner chair due to significantly increased pain in back and legs R LE > L LE  returned to supine for safety  poor sitting tolerance due to pain  Pt with minimal use of LEs during transfer  Decreased use of R LE compared to left LE  Ambulation/Elevation   Distance not applicable   Balance   Static Sitting Fair -   Dynamic Sitting Poor +   Activity Tolerance   Activity Tolerance Patient limited by pain; Patient limited by fatigue   Medical Staff Made Aware Shima TABOR   Nurse Made Aware RN   Assessment   Prognosis Fair   Problem List Decreased strength;Decreased range of motion;Decreased endurance; Impaired balance;Decreased mobility; Decreased coordination; Impaired judgement;Decreased safety awareness;Decreased skin integrity;Pain   Barriers to Discharge Decreased caregiver support; Inaccessible home environment   Barriers to Discharge Comments poor sitting tolerance, requires increased assistance with all mobility   Goals   Patient Goals "Go back to bed "   [facility] Expiration Date 11/04/22   PT Treatment Day 0   Plan   Treatment/Interventions ADL retraining;Functional transfer training;LE strengthening/ROM; Therapeutic exercise; Endurance training;Patient/family training;Equipment eval/education; Bed mobility; Compensatory technique education;Spoke to nursing;Spoke to case management;OT   PT Frequency 2-3x/wk   Recommendation   PT Discharge Recommendation Post acute rehabilitation services   Equipment Recommended   (TBD by rehab)   Additional Comments should patient return to home would require assistance wiht all mobility   AM-PAC Basic Mobility Inpatient   Turning in Bed Without Bedrails 3   Lying on Back to Sitting on Edge of Flat Bed 3   Moving Bed to Chair 2   Standing Up From Chair 1   Walk in Room 1   Climb 3-5 Stairs 1   Basic Mobility Inpatient Raw Score 11   Basic Mobility Standardized Score 30 25   Highest Level Of Mobility   -HLM Goal 4: Move to chair/commode   -HLM Achieved 4: Move to chair/commode   End of Consult   Patient Position at End of Consult Supine;Bed/Chair alarm activated; All needs within reach     (Please find full objective findings from PT assessment regarding body systems outlined above)  Assessment: Pt is a 62 y o  male seen for PT evaluation s/p admission to [address] [place] [address] on 10/20/2022 with UTI (urinary tract infection) due to urinary indwelling catheter ([facility] [address] )  Order placed for PT services    Upon evaluation: Pt is presenting with impaired functional mobility due to pain, decreased strength, decreased ROM, decreased endurance, impaired balance, altered sensation, decreased safety awareness, impaired judgment, fall risk, and impaired skin integrity requiring  supervision assistance for bed mobility and moderate assistance for transfers via lateral scooting  Pt's clinical presentation is currently unpredictable given the functional mobility deficits above, especially weakness, decreased ROM, decreased skin integrity, decreased endurance, impaired coordination, pain, decreased activity tolerance, decreased functional mobility tolerance, decreased safety awareness, and impaired judgement, coupled with fall risks as indicated by AM-PAC 6-Clicks: 35/45 as well as hx of falls, impaired balance, polypharmacy, impaired coordination, impaired judgement, decreased safety awareness and limited sensation/neuropathy and combined with medical complications of pain impacting overall mobility status, abnormal renal lab values, abnormal H&H, abnormal WBCs, abnormal blood sugars, multiple readmissions, need for input for mobility technique/safety and abnormal CRP, UTI due to urinary indwelling catheter, hypercalcemia, left heel ulcer  Pt's PMHx and comorbidities that may affect physical performance and progress include: CKD and osteromyelitis of lumbar region with chronic osteodiscitis at L4-L5, gout, HTN, CVA, DM  Personal factors affecting pt at time of IE include: inaccessible home environment, limited home support, inability to perform IADLs, inability to perform ADLs, inability to navigate level surfaces without external assistance and recent fall(s)/fall history  Pt will benefit from continued skilled PT services to address deficits as defined above and to maximize level of functional mobility to facilitate return toward PLOF and improved QOL   From PT/mobility standpoint, recommendation at time of d/c would be Short term rehab pending progress in order to reduce fall risk and maximize pt's functional independence and consistency with mobility in order to facilitate return to PLOF  Recommend ther ex next 1-2 sessions and mechanical conveyance from nursing standpoint for OOB mobility versus stretcher board  The patient's AM-PAC Basic Mobility Inpatient Short Form Raw Score is 11  A Raw score of less than or equal to 16 suggests the patient may benefit from discharge to post-acute rehabilitation services  Please also refer to the recommendation of the Physical Therapist for safe discharge planning  Goals: Pt will: Perform bed mobility tasks with modified I to reposition in bed and prepare for transfers  Pt will perform lateral transfers with least restrictive device/technique with Supervision to decrease burden of care, decrease risk for falls, improve ease of transfers and improve activity tolerance  Pt will self propel manual wheelchair >/= 150' to access living environment  Pt will demonstrate Fair + static and dynamic sitting balance to facilitate transfers  Pt will increase B LE strength >/= 1/2 MMT grade to facilitate functional mobility        Valdene Counts

## 2022-10-21 NOTE — ASSESSMENT & PLAN NOTE
Poa ua abnormal urine and blood culture pending  2 day history of nausea and ua abnormal   Discontinue camarena catheter  Placed on voiding trial  Placed on iv cefepime

## 2022-10-21 NOTE — CONSULTS
Consultation - Infectious Disease   Alverto Queen  62 y o  male MRN: [id number]  Unit/Bed#: -01 Encounter: [id number]      Inpatient consult to Infectious Diseases  Consult performed by: Blaire Spangler MD  Consult ordered by: Karyle Peak, MD            REQUIRED DOCUMENTATION:     1  This service was provided via Telemedicine  2  Provider located at [facility]  3  TeleMed provider: Blaire Spangler MD   4  Identify all parties in room with patient during tele consult:RN  5  After connecting through [employer], patient was identified by name and date of birth and assistant checked wristband  Patient was then informed that this was a Telemedicine visit and that the exam was being conducted confidentially over secure lines  My office door was closed  No one else was in the room  Patient acknowledged consent and understanding of privacy and security of the Telemedicine visit, and gave us permission to have the assistant stay in the room in order to assist with the history and to conduct the exam   I informed the patient that I have reviewed their record in Epic and presented the opportunity for them to ask any questions regarding the visit today  The patient agreed to participate  Assessment/Recommendations     1  Acute GI illness, leukocytosis  - Infectious causes include viral GI illness v/s UTI  No evidence of progression of spinal osteo/recurrence of abscess  - Improving    · Continue cefepime  · Fu CX  · Continue supportive care  · Monitor clinical course    2  Possible acute complicated CA-UTI  - In the setting of neurogenic bladder  - Recent urine cx with Pseudomonas    · Continue cefepime  · FU bld cx  · Please check urine culture  · Recommend urology evaluation for camarena management v/s voiding trial   · Anticipate completing 3 days of therapy through 10/22    3   Acute on chronic left hip and low back pain  - CT L-spine and recent MRI without evidence of progression of infection, possible sequela from recent infection    · Consider neurosurgery evaluation  · Pain management per primary team    4  Hx anerobic bacteremia, 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, epidural and bilateral retroperitoneal abscess, left psoas and scrotal abscess  - s/p b/l retroperitoneal drain placement, inguinal washout and L orchiectomy, cx data not available  - Completed 6 weeks of meropenem/dapto   - CT and MRI findings stable without progression, small seroma s/p aspiration in July with negative cx  - ESR 64, ongoing back pain but uncertain if this is due to residual infection    · Continue to monitor, request records of previous culture results    5  Gluteal rash  - Likely moisture associated skin damage, no clinical evidence of cellulitis  - CT without abscess    · Recommend wound care consult, continue supportive care    6  Chronic left heel ulcer  - XR negative for osteo, gas  No local signs of infection    · Continue wound care, await podiatry evaluation    7  Type 2 diabetes, malnutrition  - Risk factor for infection, poor wound healing    · Recommend strict glycemic control to aid with wound healing    8  CKD stage 4  - creatinine stable    · Continue renal dosing of abx      Thank you for involving me in the care of your patient  Please call with questions, change in clinical status or if tests recommended above are abnormal      Discussed with the primary service  History     Reason for Consult: UTI, cellulitis  HPI: Sheri Richardson  is a 62y o  year old male with type 2 diabetes, CKD stage 4, TIA  He was admitted to [facility] in March with acute on chronic back pain and noted to have L4/5 osteo/disciitis, epidural and B/L retroperitoneal abscess with L psoas abscess extending to left scrotum  Initial bld cx grew Peptoniphilus He underwent bilateral IR guided drainage of retroperitoneal abscess and had left inguinal/scrotal washout and 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   He completed 6 weeks of iv meropenem in May and at some point was also started on vanc which was switched to daptomycin due to acute kidney injury, he completed therapy on 6/13  ID notes and culture data is not available in EMR  He was then admitted in July to [facility] for left hip pain  Spine imaging at the time raised concern for possibly fluid collection at L4-5 space, this was aspirated by IR, cx were negative and antibiotics were stopped  He was admitted in September for low back pain and urinary retention  Urine cx at the time grew Pseudomonas  Spinal imaging showed no new focus of infection  He has been bedbound since March with a chronic indwelling camarena for suspected neurogenic bladder  He reports a chronic heel wound  The patient presented to the ER on 10/20 with few days of nausea, vomiting with outpatient labs that noted new leukocytosis  In the ER, vitals were stable   Labs were notable for pyuria, leukocytosis and elevated creatinine with hypercalcemia  EKG noted no acute ST changes  CXR showed no infiltrate and CT abdomen showed a left medial buttock ulcer and cellulitis with stable osseus findings  No fluid collection or abscess was noted  Patient was admitted on Cefepime  Overnight has had no fever or leukocytosis  The patient currently has no complaints  Denies fevers, chills, or sweats  Denies nausea, vomiting, or diarrhea  Infectious disease is being consulted for diagnostic work up and antibiotic management  Review of Systems  Pertinent positives and negatives as noted in HPI  Rest complete 12 point system-based review of systems is otherwise negative      PAST MEDICAL HISTORY:  Past Medical History:   Diagnosis Date   • Diabetes mellitus ([facility] [address] )    • Gout    • Hypertension    • Renal disorder    • Stroke [facility])      Past Surgical History:   Procedure Laterality Date   • BACK SURGERY         FAMILY HISTORY:  Non-contributory    SOCIAL HISTORY:  Social History   /Civil Union  Social History     Substance and Sexual Activity   Alcohol Use Not Currently     Social History     Substance and Sexual Activity   Drug Use Yes   • Types: Marijuana     Social History     Tobacco Use   Smoking Status Current Every Day Smoker   • Packs/day: 0 50   • Types: Cigarettes   Smokeless Tobacco Never Used       ALLERGIES:  Allergies   Allergen Reactions   • Bupropion Delirium     "went nuts," prior to   "went nuts," prior to   "went nuts," prior to   "went nuts," prior to      • Metformin Other (See Comments)     Other reaction(s): kidney disease  Other reaction(s): kidney disease  Other reaction(s): kidney disease     • Medical Tape Rash       MEDICATIONS:  All current active medications have been reviewed      Physical Exam     Temp:  [97 7 °F (36 5 °C)-98 4 °F (36 9 °C)] 98 1 °F (36 7 °C)  HR:  [63-84] 70  Resp:  [18-19] 19  BP: (119-184)/(77-94) 138/79  SpO2:  [94 %-98 %] 94 %  Temp (24hrs), Av 1 °F (36 7 °C), Min:97 7 °F (36 5 °C), Max:98 4 °F (36 9 °C)  Current: Temperature: 98 1 °F (36 7 °C)    Intake/Output Summary (Last 24 hours) at 10/21/2022 1714  Last data filed at 10/21/2022 0200  Gross per 24 hour   Intake 1050 ml   Output 1200 ml   Net -150 ml         Physical exam findings reported by bedside and primary medical team staff    General Appearance:  Appearing chronically ill, nontoxic, and in distress from pain, appears stated age   Head:  Normocephalic, without obvious abnormality, atraumatic   Eyes:  PERRL, conjunctiva pink and sclera anicteric, both eyes   Nose: Nares normal, mucosa normal, no drainage   Throat: Oropharynx moist without lesions; lips, mucosa, and tongue normal; teeth and gums normal   Neck: Supple, symmetrical, trachea midline, no adenopathy, no tenderness/mass/nodules   Back:   Symmetric, no curvature, ROM normal, no CVA tenderness   Lungs:   Diminished bilaterally, no audible wheezes, rhonchi and rales, respirations unlabored   Chest Wall:  No tenderness or deformity   Heart:  Regular rate and rhythm, S1, S2 normal, no murmur, rub or gallop   Abdomen:   Soft, non-tender, non-distended, positive bowel sounds, no masses, no organomegaly    No CVA tenderness  Ortiz in place   Extremities: Extremities normal, atraumatic, no cyanosis, clubbing or edema   Skin: Skin color, texture, turgor normal  Excoriation and dry skin over sacrum and gluteal region  L heel ulcer , superficial without periwound erythema   Lymph nodes: Cervical, supraclavicular, and axillary nodes normal   Neurologic: Alert and oriented times 3, neuro exam not assessed due to pain       Invasive Devices:   Peripheral IV Left;Ventral (anterior) Forearm (Active)   Site Assessment Clean;WDL;Dry; Intact 10/21/22 0800   Dressing Type Transparent 10/21/22 0800   Line Status Infusing 10/21/22 0800   Dressing Status Clean;Dry; Intact 10/21/22 0800   Dressing Change Due 10/24/22 10/21/22 0800   Reason Not Rotated Not due 10/21/22 0800       Urethral Catheter Latex 18 Fr  (Active)   Output (mL) 1200 mL 10/21/22 0200       Labs, Imaging, & Other Studies     Lab Results:    I have personally reviewed pertinent labs  Results from last 7 days   Lab Units 10/21/22  0502 10/20/22  1135   WBC Thousand/uL 8 85 10 38*   HEMOGLOBIN g/dL 10 7* 13 2   PLATELETS Thousands/uL 217 279     Results from last 7 days   Lab Units 10/21/22  0502 10/20/22  1135   POTASSIUM mmol/L 3 8 3 5   CHLORIDE mmol/L 101 97   CO2 mmol/L 29 32   BUN mg/dL 34* 38*   CREATININE mg/dL 2 74* 3 00*   EGFR ml/min/1 73sq m 24 22   CALCIUM mg/dL 11 1* 11 5*   AST U/L 7 10   ALT U/L 7* 16   ALK PHOS U/L 85 107     Results from last 7 days   Lab Units 10/20/22  1248   BLOOD CULTURE  Received in Microbiology Lab  Culture in Progress  Received in Microbiology Lab  Culture in Progress  Imaging Studies:   I have personally reviewed pertinent imaging study reports and images in PACS        EKG, Pathology, and Other Studies:   I have personally reviewed pertinent reports  Counseling/Coordination of care: Total 70 minutes communication with the patient via telehealth  Labs, medical tests and imaging studies were independently and extensively reviewed by me as noted above in HPI and old records were obtained and summarized as noted above in HPI  My recommendations were discussed with the patient in detail who verbalized understanding

## 2022-10-21 NOTE — DISCHARGE INSTR - OTHER ORDERS
Skin care Plan:  1-Protect sacrum w/Allevyn foam, iva with T for treatment , change q3d and PRN, check skin q-shift  2-Turn/reposition q2h or when medically stable for pressure re-distribution on skin   3-Elevate heels to offload pressure  4-Moisturize skin daily with skin nourishing cream  5-Ehob cushion in chair when out of bed  6-Hydraguard to bilateral heels BID and PRN    7- Left heel per podiatry betadine adaptic dsd strict off loading  prevalon boot or float heel with 2 pillows

## 2022-10-21 NOTE — PROGRESS NOTES
[address]  Progress Note - Lawrence Lucio 1965, 62 y o  male MRN: [id number]  Unit/Bed#: -01 Encounter: [id number]  Primary Care Provider: Rashaun Plascencia DO   Date and time admitted to hospital: 10/20/2022 11:16 AM    * UTI (urinary tract infection) due to urinary indwelling catheter ([address] )  Assessment & Plan  Poa ua abnormal urine and blood culture pending  2 day history of nausea and ua abnormal   Discontinue camarena catheter  Placed on voiding trial  Placed on iv cefepime  Hypercalcemia  Assessment & Plan  Calcium level is 12 1  Will place iv fluid hydration  recheck calcium level is still the same  Will stop IV fluids and place on lactose-free diet and observe for now    Osteomyelitis of vertebra of lumbar region [facility])  Assessment & Plan  No acute change compared to 9/12/2022  Similar findings of chronic osteodiscitis at L4-L5  Complaining of weakness and difficulty ambulating at home  PT OT eval done recommend post acute rehab stay    Skin ulcer of left heel, limited to breakdown of skin [facility])  Assessment & Plan  Consult podiatry for left heel ulcer  Appreciate input    Stage 3a chronic kidney disease [facility])  Assessment & Plan  Lab Results   Component Value Date    EGFR 24 10/21/2022    EGFR 22 10/20/2022    EGFR 23 09/14/2022    CREATININE 2 74 (H) 10/21/2022    CREATININE 3 00 (H) 10/20/2022    CREATININE 2 87 (H) 09/14/2022   baseline creatinine 2 8-3  avoid nephrotoxic agents and hypotension    Ambulatory dysfunction  Assessment & Plan  Pt/ot eval   Need subacute rehab    Severe protein-calorie malnutrition (HCC)  Assessment & Plan  Malnutrition Findings:                          bmi is 19 86  BMI Findings: Body mass index is 19 34 kg/m²         Essential hypertension  Assessment & Plan  Cont home regimen of norvasc and coreg  Blood pressure controlled    Diabetes mellitus type 2, insulin dependent [facility])  Assessment & Plan  Lab Results Component Value Date    HGBA1C 6 0 (H) 2022       Recent Labs     10/20/22  1614 10/20/22  2125 10/21/22  0728 10/21/22  1103   POCGLU 111 173* 153* 172*       Blood Sugar Average: Last 72 hrs:  (P) 152 25 place on iss and diabetic diet  Restart Lantus low-dose      VTE Pharmacologic Prophylaxis:   Pharmacologic: Heparin  Mechanical VTE Prophylaxis in Place: Yes    Patient Centered Rounds: I have performed bedside rounds with nursing staff today  Discussions with Specialists or Other Care Team Provider:  Discussed with Infectious Disease    Education and Discussions with Family / Patient:  Discussed with patient at bedside will update wife  Time Spent for Care: 30 minutes  More than 50% of total time spent on counseling and coordination of care as described above  Current Length of Stay: 1 day(s)    Current Patient Status: Inpatient   Certification Statement: The patient will continue to require additional inpatient hospital stay due to Acute UTI due to chronic indwelling Ortiz catheter    Discharge Plan:  Anticipate discharge after 48 hours    Code Status: Level 1 - Full Code      Subjective:   Patient denies any chest pain or shortness of breath  Complains of weakness and back pain and inability to walk  States his nausea is much better today    Objective:     Vitals:   Temp (24hrs), Av 2 °F (36 8 °C), Min:98 1 °F (36 7 °C), Max:98 4 °F (36 9 °C)    Temp:  [98 1 °F (36 7 °C)-98 4 °F (36 9 °C)] 98 1 °F (36 7 °C)  HR:  [65-84] 70  Resp:  [18-19] 19  BP: (119-162)/(77-87) 138/79  SpO2:  [94 %-96 %] 94 %  Body mass index is 19 34 kg/m²  Input and Output Summary (last 24 hours): Intake/Output Summary (Last 24 hours) at 10/21/2022 1240  Last data filed at 10/21/2022 1053  Gross per 24 hour   Intake 1050 ml   Output 1600 ml   Net -550 ml       Physical Exam:     Physical Exam  Vitals and nursing note reviewed  Constitutional:       Appearance: Normal appearance     HENT:      Head: Normocephalic and atraumatic  Right Ear: External ear normal       Left Ear: External ear normal       Nose: Nose normal       Mouth/Throat:      Pharynx: Oropharynx is clear  Cardiovascular:      Rate and Rhythm: Normal rate and regular rhythm  Heart sounds: Normal heart sounds  Pulmonary:      Effort: Pulmonary effort is normal       Breath sounds: Normal breath sounds  Abdominal:      General: Bowel sounds are normal       Palpations: Abdomen is soft  Tenderness: There is no abdominal tenderness  Genitourinary:     Comments: Ortiz  Musculoskeletal:      Cervical back: Normal range of motion and neck supple  Comments: weakness of bilateral lower extremity reported   Skin:     General: Skin is warm and dry  Capillary Refill: Capillary refill takes less than 2 seconds  Neurological:      General: No focal deficit present  Mental Status: He is alert and oriented to person, place, and time  Psychiatric:         Mood and Affect: Mood normal            Additional Data:     Labs:    Results from last 7 days   Lab Units 10/21/22  0502 10/20/22  1135   WBC Thousand/uL 8 85 10 38*   HEMOGLOBIN g/dL 10 7* 13 2   HEMATOCRIT % 33 9* 41 6   PLATELETS Thousands/uL 217 279   NEUTROS PCT %  --  65   LYMPHS PCT %  --  21   MONOS PCT %  --  9   EOS PCT %  --  3     Results from last 7 days   Lab Units 10/21/22  0502   SODIUM mmol/L 136   POTASSIUM mmol/L 3 8   CHLORIDE mmol/L 101   CO2 mmol/L 29   BUN mg/dL 34*   CREATININE mg/dL 2 74*   ANION GAP mmol/L 6   CALCIUM mg/dL 11 1*   ALBUMIN g/dL 2 7*   TOTAL BILIRUBIN mg/dL 0 30   ALK PHOS U/L 85   ALT U/L 7*   AST U/L 7   GLUCOSE RANDOM mg/dL 155*         Results from last 7 days   Lab Units 10/21/22  1103 10/21/22  0728 10/20/22  2125 10/20/22  1614   POC GLUCOSE mg/dl 172* 153* 173* 111         Results from last 7 days   Lab Units 10/20/22  1135   LACTIC ACID mmol/L 0 8           * I Have Reviewed All Lab Data Listed Above    * Additional Pertinent Lab Tests Reviewed: [address] Admission Reviewed    Imaging:    Imaging Reports Reviewed Today Include:  CT abdomen pelvis and CT lumbar spine  Imaging Personally Reviewed by Myself Includes:  CT abd pelvis    Recent Cultures (last 7 days):     Results from last 7 days   Lab Units 10/20/22  1248   BLOOD CULTURE  Received in Microbiology Lab  Culture in Progress  Received in Microbiology Lab  Culture in Progress         Last 24 Hours Medication List:   Current Facility-Administered Medications   Medication Dose Route Frequency Provider Last Rate   • acetaminophen  650 mg Oral Q6H PRN Dagoberto Padilla MD     • allopurinol  200 mg Oral Daily Dagoberto Padilla MD     • amLODIPine  10 mg Oral Daily Dagoberto Padilla MD     • atorvastatin  40 mg Oral Daily With Andrew Horn MD     • calcium carbonate  1,000 mg Oral Daily PRN Dagoberto Padilla MD     • carvedilol  6 25 mg Oral BID With Meals Dagoberto Padilla MD     • cefepime  1,000 mg Intravenous Q12H Dagoberto Padilla MD 1,000 mg (10/21/22 0222)   • colchicine  0 6 mg Oral Daily Dagoberto Padilla MD     • cyclobenzaprine  5 mg Oral TID Dagoberto Padilla MD     • docusate sodium  100 mg Oral BID Dagoberto Padilla MD     • ferrous sulfate  325 mg Oral Daily With Breakfast Dagoberto Padilla MD     • heparin (porcine)  5,000 Units Subcutaneous Q8H [facility] Dagoberto Padilla MD     • insulin lispro  1-6 Units Subcutaneous HS Dagoberto Padilla MD     • insulin lispro  2-12 Units Subcutaneous TID AC Dagoberto Padilla MD     • methocarbamol  500 mg Oral Q6H PRN Dagoberto Padilla MD     • nicotine  1 patch Transdermal Daily Dagoberto Padilla MD     • ondansetron  4 mg Intravenous Q6H PRN Dagoberto Padilla MD     • oxyCODONE  5 mg Oral Q6H PRN Dagoberto Padilla MD     • pregabalin  150 mg Oral BID Dagoberto Padilla MD     • sevelamer  800 mg Oral Daily With Andrew Horn MD     • sodium bicarbonate  650 mg Oral Daily Dagoberto Padilla MD     • sodium chloride  125 mL/hr Intravenous Continuous Dagoberto Padilla  mL/hr (10/21/22 1210) • tamsulosin  0 4 mg Oral Daily With Blease Arlet Horn MD     • thiamine  100 mg Oral Daily Juan Kumar MD     • venlafaxine  37 5 mg Oral TID With Meals Juan Kumar MD          Today, Patient Was Seen By: Juan Kumar    ** Please Note: Dictation voice to text software may have been used in the creation of this document   **

## 2022-10-21 NOTE — ASSESSMENT & PLAN NOTE
No acute change compared to 9/12/2022  Similar findings of chronic osteodiscitis at L4-L5  Complaining of weakness and difficulty ambulating at home    PT OT eval done recommend post acute rehab stay

## 2022-10-21 NOTE — OCCUPATIONAL THERAPY NOTE
Occupational Therapy Evaluation     Patient Name: Jacob Cornell  Today's Date: 10/21/2022  Problem List  Principal Problem:    UTI (urinary tract infection) due to urinary indwelling catheter ([facility])  Active Problems:    Diabetes mellitus type 2, insulin dependent ([facility])    Essential hypertension    Osteomyelitis of vertebra of lumbar region ([facility] )    Severe protein-calorie malnutrition ([facility])    Hypercalcemia    Ambulatory dysfunction    Stage 3a chronic kidney disease ([place] [address] )    Skin ulcer of left heel, limited to breakdown of skin [facility])    Past Medical History  Past Medical History:   Diagnosis Date    Diabetes mellitus ([place] [address] )     Gout     Hypertension     Renal disorder     Stroke [facility])      Past Surgical History  Past Surgical History:   Procedure Laterality Date    BACK SURGERY           10/21/22 0712   Note Type   Note type Evaluation   Pain Assessment   Pain Assessment Tool FLACC   Pain Location/Orientation Location: Generalized  (" just about everywhere")   Pain Rating: FLACC (Rest) - Face 0   Pain Rating: FLACC (Rest) - Legs 0   Pain Rating: FLACC (Rest) - Activity 0   Pain Rating: FLACC (Rest) - Cry 0   Pain Rating: FLACC (Rest) - Consolability 0   Score: FLACC (Rest) 0   Pain Rating: FLACC (Activity) - Face 2   Pain Rating: FLACC (Activity) - Legs 1   Pain Rating: FLACC (Activity) - Activity 2   Pain Rating: FLACC (Activity) - Cry 2   Pain Rating: FLACC (Activity) - Consolability 1   Score: FLACC (Activity) 8   Restrictions/Precautions   Other Precautions Chair Alarm; Bed Alarm; Fall Risk;Multiple lines   Home Living   Type of Home House  (stair lift to enter home)   Home Layout One level;Performs ADLs on one level; Able to live on main level with bedroom/bathroom   Bathroom Shower/Tub   (sponge bathes)   Bathroom Toilet   ("i go in my diaper and my wife cleans me up")   Home Equipment Electric scooter; Wheelchair-manual;Hospital bed   Additional Comments Pt reports living in a 1 story home with stair lift to enter  Pt is non ambulatory at baseline and is mobile via w/c at baseline  Pt has assistance for functional transfers  Prior Function   Level of [place] Needs assistance with ADLs; Needs assistance with functional mobility; Needs assistance with IADLS   Lives With Spouse   Receives Help From Family   IADLs Family/Friend/Other provides transportation; Family/Friend/Other provides meals; Family/Friend/Other provides medication management   Falls in the last 6 months 1 to 4   Comments Pt reports having assistance with most ADLs, IADLs and functional transfers from his wife and his children PRN   ADL   Grooming Assistance 5  Supervision/Setup   Grooming Deficit Setup   UB Dressing Assistance 5  Supervision/Setup   UB Dressing Deficit Setup   LB Dressing Assistance 2  Maximal Assistance   LB Dressing Deficit Steadying; Requires assistive device for steadying;Verbal cueing;Supervision/safety; Increased time to complete; Don/doff R sock; Don/doff L sock; Thread RLE into pants; Thread LLE into pants;Pull up over hips   Additional Comments ADL tasks completed while seated at EOB  UB Dressing and grooming tasks @ S afte rset-up  LB Dressin @ Max A including donning socks/pants around feet and CM around waist  Pt reports he does not stand and typicall completes LB dressing at EOB or supine in bed and weight shifts/rolls side to side  Pt with increased difficulty completing tasks at this time and ultimately requiring Mod-Max A for LB tasks  Bed Mobility   Supine to Sit 5  Supervision   Additional items Increased time required;Verbal cues   Sit to Supine 4  Minimal assistance   Additional items Assist x 1; Increased time required;Verbal cues;LE management   Additional Comments HOB flat, no bedrails   Transfers   Sit to Stand Unable to assess   Sliding Board transfer 3  Moderate assistance  (lateral scoot tx)   Additional items Assist x 1; Increased time required;Verbal cues   Additional Comments Pt completing lateral scoot transfer from EOB to drop arm recliner with increased time and vc'ing forasfety/technique  Pt reports pain in back during task although still able to participate  Pt reuqirin g4 scoots to [facility] and wishes to return to bed  Pt then completing lateal scoot transfer back to EOB @ Mod A with increased time  Balance   Static Sitting Fair +   Dynamic Sitting Fair   Activity Tolerance   Activity Tolerance Patient limited by fatigue;Patient limited by pain   Medical Staff Made Aware Spoke with PT, Kaycee Lenz   Nurse Made Aware Spoke with RN   RUE Assessment   RUE Assessment WFL   LUE Assessment   LUE Assessment WFL   Hand Function   Gross Motor Coordination Functional   Fine Motor Coordination Functional   Sensation   Light Touch No apparent deficits   Cognition   Arousal/Participation Alert; Responsive; Cooperative   Attention Attends with cues to redirect   Orientation Level Oriented X4   Memory Within functional limits   Following Commands Follows one step commands without difficulty   Assessment   Limitation Decreased ADL status; Decreased UE strength;Decreased Safe judgement during ADL;Decreased endurance;Decreased self-care trans;Decreased high-level ADLs   Prognosis Good   Assessment Pt is a 62 y o  male, admitted to [address] [place] [address] 10/20/2022 d/t experiencing nausea and increased time  Dx: UTI  Pt with PMHx impacting their performance during ADL tasks, including: DM, gout, HTn, renal disorder, CVA  Prior to admission to the hospital Pt was performing ADLs with physical assistance  IADLs with physical assistance  Functional transfers/ambulation with physical assistance  Cognitive status was PTA was inatct  OT order placed to assess Pt's ADLs, cognitive status, and performance during functional tasks in order to maximize safety and independence while making most appropriate d/c recommendations   Pt's clinical presentation is currently unstable/unpredictable given new onset deficits that effect Pt's occupational performance and ability to safely return to PLOF including decrease activity tolerance, decrease standing balance, decrease performance during ADL tasks, decrease safety awareness , decrease UB MS, increased pain, decrease generalized strength, decrease activity engagement, decrease performance during functional transfers, high fall risk and limited insight to deficits combined with medical complications of hypertension , pain impacting overall mobility status, abnormal renal lab values, decreased skin integrity, multiple readmissions, incontinence, fear/retreat and need for input for mobility technique/safety  Personal factors affecting Pt at time of initial evaluation include: availability as recommended, limited home support, past experience, behavioral pattern, inability to perform current job functions, inability to perform IADLs, inability to perform ADLs, inability to ambulate household distances, inability to navigate community distances, limited insight into impairments, decreased initiation and engagement and questionable non-compliance  Pt will benefit from continued skilled OT services to address deficits as defined above and to maximize level independence/participation during ADLs and functional tasks to facilitate return toward PLOF and improved quality of life  From an occupational therapy standpoint, recommendation at time of d/c would be post acute rehab  Plan   Treatment Interventions ADL retraining;Functional transfer training;UE strengthening/ROM; Endurance training;Patient/family training;Equipment evaluation/education; Neuromuscular reeducation; Compensatory technique education;Continued evaluation; Energy conservation; Activityengagement   Goal Expiration Date 11/04/22   OT Frequency 3-5x/wk   Recommendation   OT Discharge Recommendation Post acute rehabilitation services   AM-PAC Daily Activity Inpatient   Lower Body Dressing 2   Bathing 2   Toileting 2 Upper Body Dressing 3   Grooming 3   Eating 4   Daily Activity Raw Score 16   Daily Activity Standardized Score (Calc for Raw Score >=11) 35 96   AM-PAC Applied Cognition Inpatient   Following a Speech/Presentation 4   Understanding Ordinary Conversation 4   Taking Medications 3   Remembering Where Things Are Placed or Put Away 4   Remembering List of 4-5 Errands 4   Taking Care of Complicated Tasks 3   Applied Cognition Raw Score 22   Applied Cognition Standardized Score 47 83     The patient's raw score on the AM-PAC Daily Activity inpatient short form is 16, standardized score is 35 96, less than 39 4  Patients at this level are likely to benefit from DC to post-acute rehabilitation services  Please refer to the recommendation of the Occupational Therapist for safe DC planning  Pt goals to be met by 11/4/2022    Pt will demonstrate ability to complete LB dressing @ Mod I in order to increase safety and independence during meaningful tasks  Pt will demonstrate ability to herb/doff socks/shoes while sitting EOB @ Mod I in order to increase safety and independence during meaningful tasks  Pt will demonstrate ability to complete toileting tasks including CM and pericare @ Mod I in order to increase safety and independence during meaningful tasks  Pt will demonstrate ability to complete EOB, chair, toilet/commode transfers @ Mod I in order to increase performance and participation during functional tasks  Pt will demonstrate ability to tolerate 30-35 minute OT session with no vc'ing for deep breathing or use of energy conservation techniques in order to increase activity tolerance during functional tasks  Pt will demonstrate Good carryover of use of energy conservation/compensatory strategies during ADLs and functional tasks in order to increase safety and reduce risk for falls     Pt will demonstrate Good attention and participation in continued evaluation of functional ambulation house hold distances in order to assist with safe d/c planning  Pt will attend to continued cognitive assessments 100% of the time in order to provide most appropriate d/c recommendations  Pt will follow 100% simple 2-step commands and be A&O x4 consistently with environmental cues to increase participation in functional activities  Pt will identify 3 areas of interest/hobbies and 1 intervention on how to incorporate into daily life in order to increase interaction with environment and peers as well as increase participation in meaningful tasks  Pt will demonstrate 100% carryover of BUE HEP in order to increase BUE MS and increase performance during functional tasks upon d/c home      Franko Camargo OTR/L

## 2022-10-22 LAB
ANION GAP SERPL CALCULATED.3IONS-SCNC: 7 MMOL/L (ref 4–13)
BUN SERPL-MCNC: 31 MG/DL (ref 5–25)
CALCIUM SERPL-MCNC: 10.1 MG/DL (ref 8.3–10.1)
CHLORIDE SERPL-SCNC: 104 MMOL/L (ref 96–108)
CO2 SERPL-SCNC: 29 MMOL/L (ref 21–32)
CREAT SERPL-MCNC: 2.73 MG/DL (ref 0.6–1.3)
GFR SERPL CREATININE-BSD FRML MDRD: 24 ML/MIN/1.73SQ M
GLUCOSE SERPL-MCNC: 104 MG/DL (ref 65–140)
GLUCOSE SERPL-MCNC: 133 MG/DL (ref 65–140)
GLUCOSE SERPL-MCNC: 155 MG/DL (ref 65–140)
GLUCOSE SERPL-MCNC: 162 MG/DL (ref 65–140)
GLUCOSE SERPL-MCNC: 167 MG/DL (ref 65–140)
POTASSIUM SERPL-SCNC: 3.5 MMOL/L (ref 3.5–5.3)
PTH-INTACT SERPL-MCNC: 17.2 PG/ML (ref 18.4–80.1)
SODIUM SERPL-SCNC: 140 MMOL/L (ref 135–147)
URATE SERPL-MCNC: 9.2 MG/DL (ref 3.5–8.5)

## 2022-10-22 PROCEDURE — 83970 ASSAY OF PARATHORMONE: CPT | Performed by: FAMILY MEDICINE

## 2022-10-22 PROCEDURE — 80048 BASIC METABOLIC PNL TOTAL CA: CPT | Performed by: FAMILY MEDICINE

## 2022-10-22 PROCEDURE — 84550 ASSAY OF BLOOD/URIC ACID: CPT | Performed by: FAMILY MEDICINE

## 2022-10-22 PROCEDURE — 82948 REAGENT STRIP/BLOOD GLUCOSE: CPT

## 2022-10-22 PROCEDURE — 99232 SBSQ HOSP IP/OBS MODERATE 35: CPT | Performed by: FAMILY MEDICINE

## 2022-10-22 RX ORDER — ACETAMINOPHEN 325 MG/1
650 TABLET ORAL EVERY 8 HOURS SCHEDULED
Status: DISCONTINUED | OUTPATIENT
Start: 2022-10-22 | End: 2022-10-24 | Stop reason: HOSPADM

## 2022-10-22 RX ORDER — COLCHICINE 0.6 MG/1
0.3 TABLET ORAL DAILY
Status: DISCONTINUED | OUTPATIENT
Start: 2022-10-23 | End: 2022-10-24 | Stop reason: HOSPADM

## 2022-10-22 RX ADMIN — TAMSULOSIN HYDROCHLORIDE 0.4 MG: 0.4 CAPSULE ORAL at 17:11

## 2022-10-22 RX ADMIN — DOCUSATE SODIUM 100 MG: 100 CAPSULE ORAL at 17:13

## 2022-10-22 RX ADMIN — SEVELAMER HYDROCHLORIDE 800 MG: 800 TABLET, FILM COATED PARENTERAL at 17:11

## 2022-10-22 RX ADMIN — INSULIN LISPRO 2 UNITS: 100 INJECTION, SOLUTION INTRAVENOUS; SUBCUTANEOUS at 11:54

## 2022-10-22 RX ADMIN — VENLAFAXINE 37.5 MG: 37.5 TABLET ORAL at 08:45

## 2022-10-22 RX ADMIN — CARVEDILOL 6.25 MG: 6.25 TABLET, FILM COATED ORAL at 17:11

## 2022-10-22 RX ADMIN — VENLAFAXINE 37.5 MG: 37.5 TABLET ORAL at 17:11

## 2022-10-22 RX ADMIN — INSULIN GLARGINE 10 UNITS: 100 INJECTION, SOLUTION SUBCUTANEOUS at 21:14

## 2022-10-22 RX ADMIN — CYCLOBENZAPRINE 5 MG: 10 TABLET, FILM COATED ORAL at 15:00

## 2022-10-22 RX ADMIN — SODIUM BICARBONATE 650 MG TABLET 650 MG: at 08:45

## 2022-10-22 RX ADMIN — PREGABALIN 150 MG: 75 CAPSULE ORAL at 17:11

## 2022-10-22 RX ADMIN — CEFEPIME HYDROCHLORIDE 1000 MG: 1 INJECTION, SOLUTION INTRAVENOUS at 14:59

## 2022-10-22 RX ADMIN — ATORVASTATIN CALCIUM 40 MG: 40 TABLET, FILM COATED ORAL at 17:11

## 2022-10-22 RX ADMIN — VENLAFAXINE 37.5 MG: 37.5 TABLET ORAL at 11:56

## 2022-10-22 RX ADMIN — HEPARIN SODIUM 5000 UNITS: 5000 INJECTION INTRAVENOUS; SUBCUTANEOUS at 05:27

## 2022-10-22 RX ADMIN — CARVEDILOL 6.25 MG: 6.25 TABLET, FILM COATED ORAL at 08:30

## 2022-10-22 RX ADMIN — ACETAMINOPHEN 650 MG: 325 TABLET ORAL at 21:14

## 2022-10-22 RX ADMIN — FERROUS SULFATE TAB 325 MG (65 MG ELEMENTAL FE) 325 MG: 325 (65 FE) TAB at 08:30

## 2022-10-22 RX ADMIN — PREGABALIN 150 MG: 75 CAPSULE ORAL at 08:40

## 2022-10-22 RX ADMIN — CYCLOBENZAPRINE 5 MG: 10 TABLET, FILM COATED ORAL at 21:14

## 2022-10-22 RX ADMIN — OXYCODONE HYDROCHLORIDE 5 MG: 5 TABLET ORAL at 04:09

## 2022-10-22 RX ADMIN — ONDANSETRON 4 MG: 2 INJECTION INTRAMUSCULAR; INTRAVENOUS at 11:52

## 2022-10-22 RX ADMIN — CEFEPIME HYDROCHLORIDE 1000 MG: 1 INJECTION, SOLUTION INTRAVENOUS at 02:40

## 2022-10-22 RX ADMIN — COLCHICINE 0.3 MG: 0.6 TABLET ORAL at 08:38

## 2022-10-22 RX ADMIN — OXYCODONE HYDROCHLORIDE 5 MG: 5 TABLET ORAL at 18:05

## 2022-10-22 RX ADMIN — CYCLOBENZAPRINE 5 MG: 10 TABLET, FILM COATED ORAL at 08:40

## 2022-10-22 RX ADMIN — DOCUSATE SODIUM 100 MG: 100 CAPSULE ORAL at 08:41

## 2022-10-22 RX ADMIN — OXYCODONE HYDROCHLORIDE 5 MG: 5 TABLET ORAL at 11:56

## 2022-10-22 RX ADMIN — AMLODIPINE BESYLATE 10 MG: 10 TABLET ORAL at 08:40

## 2022-10-22 RX ADMIN — THIAMINE HCL TAB 100 MG 100 MG: 100 TAB at 08:45

## 2022-10-22 NOTE — ASSESSMENT & PLAN NOTE
Consult podiatry for left heel ulcer    Appreciate input  Foot x-ray reviewed no acute infection at this time  Arterial Doppler pending at this time

## 2022-10-22 NOTE — ASSESSMENT & PLAN NOTE
No acute change compared to 9/12/2022  Similar findings of chronic osteodiscitis at L4-L5  Complaining of weakness and difficulty ambulating at home    PT OT eval done recommend post acute rehab stay  Also recommend outpatient follow-up with Neurosurgery at [facility]

## 2022-10-22 NOTE — ASSESSMENT & PLAN NOTE
Lab Results   Component Value Date    HGBA1C 6 0 (H) 07/05/2022       Recent Labs     10/21/22  1103 10/21/22  1611 10/21/22  2114 10/22/22  0729   POCGLU 172* 117 133 133       Blood Sugar Average: Last 72 hrs:  (P) 141 [id number] place on iss and diabetic diet  Restart Lantus low-dose    Blood sugars are well controlled

## 2022-10-22 NOTE — PLAN OF CARE
Problem: Nutrition/Hydration-ADULT  Goal: Nutrient/Hydration intake appropriate for improving, restoring or maintaining nutritional needs  Description: Monitor and assess patient's nutrition/hydration status for malnutrition  Collaborate with interdisciplinary team and initiate plan and interventions as ordered  Monitor patient's weight and dietary intake as ordered or per policy  Utilize nutrition screening tool and intervene as necessary  Determine patient's food preferences and provide high-protein, high-caloric foods as appropriate       INTERVENTIONS:  - Monitor oral intake, urinary output, labs, and treatment plans  - Assess nutrition and hydration status and recommend course of action  - Evaluate amount of meals eaten  - Assist patient with eating if necessary   - Allow adequate time for meals  - Recommend/ encourage appropriate diets, oral nutritional supplements, and vitamin/mineral supplements  - Order, calculate, and assess calorie counts as needed  - Recommend, monitor, and adjust tube feedings and TPN/PPN based on assessed needs  - Assess need for intravenous fluids  - Provide specific nutrition/hydration education as appropriate  - Include patient/family/caregiver in decisions related to nutrition  Outcome: Progressing     Problem: MOBILITY - ADULT  Goal: Maintain or return to baseline ADL function  Description: INTERVENTIONS:  -  Assess patient's ability to carry out ADLs; assess patient's baseline for ADL function and identify physical deficits which impact ability to perform ADLs (bathing, care of mouth/teeth, toileting, grooming, dressing, etc )  - Assess/evaluate cause of self-care deficits   - Assess range of motion  - Assess patient's mobility; develop plan if impaired  - Assess patient's need for assistive devices and provide as appropriate  - Encourage maximum independence but intervene and supervise when necessary  - Involve family in performance of ADLs  - Assess for home care needs following discharge   - Consider OT consult to assist with ADL evaluation and planning for discharge  - Provide patient education as appropriate  Outcome: Progressing  Goal: Maintains/Returns to pre admission functional level  Description: INTERVENTIONS:  - Perform BMAT or MOVE assessment daily    - Set and communicate daily mobility goal to care team and patient/family/caregiver  - Collaborate with rehabilitation services on mobility goals if consulted  - Perform Range of Motion 3 times a day  - Reposition patient every 2 hours    - Dangle patient 3 times a day  - Stand patient 3 times a day  - Ambulate patient 3 times a day  - Out of bed to chair 3 times a day   - Out of bed for meals 3 times a day  - Out of bed for toileting  - Record patient progress and toleration of activity level   Outcome: Progressing     Problem: Potential for Falls  Goal: Patient will remain free of falls  Description: INTERVENTIONS:  - Educate patient/family on patient safety including physical limitations  - Instruct patient to call for assistance with activity   - Consult OT/PT to assist with strengthening/mobility   - Keep Call bell within reach  - Keep bed low and locked with side rails adjusted as appropriate  - Keep care items and personal belongings within reach  - Initiate and maintain comfort rounds  - Make Fall Risk Sign visible to staff  - Offer Toileting every 2 Hours, in advance of need  - Initiate/Maintain bed alarm  - Obtain necessary fall risk management equipment: fall bracelet, non-skid socks  - Apply yellow socks and bracelet for high fall risk patients  - Consider moving patient to room near nurses station  Outcome: Progressing     Problem: Prexisting or High Potential for Compromised Skin Integrity  Goal: Skin integrity is maintained or improved  Description: INTERVENTIONS:  - Identify patients at risk for skin breakdown  - Assess and monitor skin integrity  - Assess and monitor nutrition and hydration status  - Monitor labs   - Assess for incontinence   - Turn and reposition patient  - Assist with mobility/ambulation  - Relieve pressure over bony prominences  - Avoid friction and shearing  - Provide appropriate hygiene as needed including keeping skin clean and dry  - Evaluate need for skin moisturizer/barrier cream  - Collaborate with interdisciplinary team   - Patient/family teaching  - Consider wound care consult   Outcome: Progressing     Problem: PAIN - ADULT  Goal: Verbalizes/displays adequate comfort level or baseline comfort level  Description: Interventions:  - Encourage patient to monitor pain and request assistance  - Assess pain using appropriate pain scale  - Administer analgesics based on type and severity of pain and evaluate response  - Implement non-pharmacological measures as appropriate and evaluate response  - Consider cultural and social influences on pain and pain management  - Notify physician/advanced practitioner if interventions unsuccessful or patient reports new pain  Outcome: Progressing     Problem: INFECTION - ADULT  Goal: Absence or prevention of progression during hospitalization  Description: INTERVENTIONS:  - Assess and monitor for signs and symptoms of infection  - Monitor lab/diagnostic results  - Monitor all insertion sites, i e  indwelling lines, tubes, and drains  - Monitor endotracheal if appropriate and nasal secretions for changes in amount and color  - Wilbur appropriate cooling/warming therapies per order  - Administer medications as ordered  - Instruct and encourage patient and family to use good hand hygiene technique  - Identify and instruct in appropriate isolation precautions for identified infection/condition  Outcome: Progressing  Goal: Absence of fever/infection during neutropenic period  Description: INTERVENTIONS:  - Monitor WBC    Outcome: Progressing     Problem: SAFETY ADULT  Goal: Maintain or return to baseline ADL function  Description: INTERVENTIONS:  - Assess patient's ability to carry out ADLs; assess patient's baseline for ADL function and identify physical deficits which impact ability to perform ADLs (bathing, care of mouth/teeth, toileting, grooming, dressing, etc )  - Assess/evaluate cause of self-care deficits   - Assess range of motion  - Assess patient's mobility; develop plan if impaired  - Assess patient's need for assistive devices and provide as appropriate  - Encourage maximum independence but intervene and supervise when necessary  - Involve family in performance of ADLs  - Assess for home care needs following discharge   - Consider OT consult to assist with ADL evaluation and planning for discharge  - Provide patient education as appropriate  Outcome: Progressing  Goal: Maintains/Returns to pre admission functional level  Description: INTERVENTIONS:  - Perform BMAT or MOVE assessment daily    - Set and communicate daily mobility goal to care team and patient/family/caregiver  - Collaborate with rehabilitation services on mobility goals if consulted  - Perform Range of Motion 3 times a day  - Reposition patient every 2 hours    - Dangle patient 3 times a day  - Stand patient 3 times a day  - Ambulate patient 3 times a day  - Out of bed to chair 3 times a day   - Out of bed for meals 3 times a day  - Out of bed for toileting  - Record patient progress and toleration of activity level   Outcome: Progressing  Goal: Patient will remain free of falls  Description: INTERVENTIONS:  - Educate patient/family on patient safety including physical limitations  - Instruct patient to call for assistance with activity   - Consult OT/PT to assist with strengthening/mobility   - Keep Call bell within reach  - Keep bed low and locked with side rails adjusted as appropriate  - Keep care items and personal belongings within reach  - Initiate and maintain comfort rounds  - Make Fall Risk Sign visible to staff  - Offer Toileting every 2 Hours, in advance of need  - Initiate/Maintain bed alarm  - Obtain necessary fall risk management equipment: fall bracelet, nonskid socks  - Apply yellow socks and bracelet for high fall risk patients  - Consider moving patient to room near nurses station  Outcome: Progressing     Problem: DISCHARGE PLANNING  Goal: Discharge to home or other facility with appropriate resources  Description: INTERVENTIONS:  - Identify barriers to discharge w/patient and caregiver  - Arrange for needed discharge resources and transportation as appropriate  - Identify discharge learning needs (meds, wound care, etc )  - Arrange for interpretive services to assist at discharge as needed  - Refer to Case Management Department for coordinating discharge planning if the patient needs post-hospital services based on physician/advanced practitioner order or complex needs related to functional status, cognitive ability, or social support system  Outcome: Progressing     Problem: Knowledge Deficit  Goal: Patient/family/caregiver demonstrates understanding of disease process, treatment plan, medications, and discharge instructions  Description: Complete learning assessment and assess knowledge base    Interventions:  - Provide teaching at level of understanding  - Provide teaching via preferred learning methods  Outcome: Progressing

## 2022-10-22 NOTE — ASSESSMENT & PLAN NOTE
Calcium level is 12 1 on admission  Will place iv fluid hydration  recheck calcium level is improving    Off IV fluids now  Check vitamin-D level intact PTH level and repeat BMP tomorrow

## 2022-10-22 NOTE — CASE MANAGEMENT
Case Management Discharge Planning Note    Patient name Ozzie Askew  Location /-70 MRN [id number]  : 1965 Date 10/22/2022       Current Admission Date: 10/20/2022  Current Admission Diagnosis:UTI (urinary tract infection) due to urinary indwelling catheter [facility])   Patient Active Problem List    Diagnosis Date Noted   • UTI (urinary tract infection) due to urinary indwelling catheter ([facility] ) 10/20/2022   • Skin ulcer of left heel, limited to breakdown of skin ([facility] ) 10/20/2022   • Hypercalcemia 2022   • Low back pain 2022   • Ambulatory dysfunction 2022   • Stage 3a chronic kidney disease ([facility] ) 2022   • Acute urinary retention 2022   • Severe protein-calorie malnutrition ([facility] ) 2022   • Iron deficiency anemia secondary to inadequate dietary iron intake 2022   • Hip pain, acute, left 2022   • KELLY (acute kidney injury) ([facility] ) 2022   • Hyperkalemia 2022   • Diabetes mellitus type 2, insulin dependent ([facility] )    • Gout    • Essential hypertension    • History of CVA (cerebrovascular accident)    • Osteomyelitis of vertebra of lumbar region ([facility] )       LOS (days): 2  Geometric Mean LOS (GMLOS) (days): 4 80  Days to GMLOS:2 7     OBJECTIVE:  Risk of Unplanned Readmission Score: 24 54         Current admission status: Inpatient   Preferred Pharmacy:   [facility]/pharmacy #[id number] - [address] [facility] [place]  Brando Varela [address] [place] [address]  Phone: [phone] Fax: [phone]    Primary Care Provider: Elvin Weldon DO    Primary Insurance: MEDICARE  Secondary Insurance:     DISCHARGE DETAILS:        CM contacted spouse to review available SNF providers thus far: [address] and [address] and [facility] reviewing  Spouse does not want [address] as patient has already been  CM sent facilities [facility] response inquiring on bed availability for Monday  CM to follow for response

## 2022-10-22 NOTE — ASSESSMENT & PLAN NOTE
Lab Results   Component Value Date    EGFR 24 10/22/2022    EGFR 24 10/21/2022    EGFR 22 10/20/2022    CREATININE 2 73 (H) 10/22/2022    CREATININE 2 74 (H) 10/21/2022    CREATININE 3 00 (H) 10/20/2022   baseline creatinine 2 8-3  avoid nephrotoxic agents and hypotension

## 2022-10-22 NOTE — ASSESSMENT & PLAN NOTE
Malnutrition Findings:   Adult Malnutrition type: Chronic illness  Adult Degree of Malnutrition: Other severe protein calorie malnutrition  Malnutrition Characteristics: Weight loss, Inadequate energy                bmi is 19 86   360 Statement: malnutrition in the context of chronic illness, evidenced by poor po intake <75% energy intake compared to estimated energy needs for > 1 month,  weight loss of  25% %  x 9 mo (1/30/21 185 lbs  to 10/20/22 138 6 lbs), treated with diet and supplements    BMI Findings: Body mass index is 18 97 kg/m²

## 2022-10-22 NOTE — PROGRESS NOTES
[address]  Progress Note - Khoa Villanueva 1965, 62 y o  male MRN: [id number]  Unit/Bed#: -Rosmery Encounter: [id number]  Primary Care Provider: Jefferson Urrutia DO   Date and time admitted to hospital: 10/20/2022 11:16 AM    * UTI (urinary tract infection) due to urinary indwelling catheter ([address] )  Assessment & Plan  Poa ua abnormal urine and blood culture pending  2 day history of nausea and ua abnormal   Discontinue camarena catheter  Placed on voiding trial  Placed on iv cefepime  Hypercalcemia  Assessment & Plan  Calcium level is 12 1 on admission  Will place iv fluid hydration  recheck calcium level is improving  Off IV fluids now  Check vitamin-D level intact PTH level and repeat BMP tomorrow    Osteomyelitis of vertebra of lumbar region [facility])  Assessment & Plan  No acute change compared to 9/12/2022  Similar findings of chronic osteodiscitis at L4-L5  Complaining of weakness and difficulty ambulating at home  PT OT eval done recommend post acute rehab stay  Also recommend outpatient follow-up with Neurosurgery at [facility]    Skin ulcer of left heel, limited to breakdown of skin [facility])  Norma [facility] podiatry for left heel ulcer  Appreciate input  Foot x-ray reviewed no acute infection at this time  Arterial Doppler pending at this time    Stage 3a chronic kidney disease [facility])  Assessment & Plan  Lab Results   Component Value Date    EGFR 24 10/22/2022    EGFR 24 10/21/2022    EGFR 22 10/20/2022    CREATININE 2 73 (H) 10/22/2022    CREATININE 2 74 (H) 10/21/2022    CREATININE 3 00 (H) 10/20/2022   baseline creatinine 2 8-3  avoid nephrotoxic agents and hypotension    Ambulatory dysfunction  Assessment & Plan  Pt/ot eval   Need subacute rehab    Severe protein-calorie malnutrition (HCC)  Assessment & Plan  Malnutrition Findings:   Adult Malnutrition type: Chronic illness  Adult Degree of Malnutrition: Other severe protein calorie malnutrition  Malnutrition Characteristics: Weight loss, Inadequate energy                bmi is 19 86   360 Statement: malnutrition in the context of chronic illness, evidenced by poor po intake <75% energy intake compared to estimated energy needs for > 1 month,  weight loss of  25% %  x 9 mo (1/30/21 185 lbs  to 10/20/22 138 6 lbs), treated with diet and supplements    BMI Findings: Body mass index is 18 97 kg/m²  Essential hypertension  Assessment & Plan  Cont home regimen of norvasc and coreg  Blood pressure controlled    Diabetes mellitus type 2, insulin dependent [facility])  Assessment & Plan  Lab Results   Component Value Date    HGBA1C 6 0 (H) 07/05/2022       Recent Labs     10/21/22  1103 10/21/22  1611 10/21/22  2114 10/22/22  0729   POCGLU 172* 117 133 133       Blood Sugar Average: Last 72 hrs:  (P) 141 [id number] place on iss and diabetic diet  Restart Lantus low-dose  Blood sugars are well controlled      VTE Pharmacologic Prophylaxis:   Pharmacologic: Heparin  Mechanical VTE Prophylaxis in Place: Yes    Patient Centered Rounds: I have performed bedside rounds with nursing staff today  Discussions with Specialists or Other Care Team Provider:  None    Education and Discussions with Family / Patient:  Discussed with patient and wife     Time Spent for Care: 30 minutes  More than 50% of total time spent on counseling and coordination of care as described above  Current Length of Stay: 2 day(s)    Current Patient Status: Inpatient   Certification Statement: The patient will continue to require additional inpatient hospital stay due to Acute UTI with chronic indwelling Ortiz catheter    Discharge Plan:  Anticipate discharge on Monday to rehab    Code Status: Level 1 - Full Code      Subjective:   Patient denies any chest pain or shortness of breath or abdominal pain    Complains that he has lower back pain which is a little bit better today compared to previous    Objective:     Vitals:   Temp (24hrs), Av 4 °F (36 9 °C), Min:98 1 °F (36 7 °C), Max:98 6 °F (37 °C)    Temp:  [98 1 °F (36 7 °C)-98 6 °F (37 °C)] 98 4 °F (36 9 °C)  HR:  [65-67] 66  Resp:  [17-18] 18  BP: (117-154)/(67-83) 154/83  SpO2:  [93 %-95 %] 93 %  Body mass index is 18 97 kg/m²  Input and Output Summary (last 24 hours): Intake/Output Summary (Last 24 hours) at 10/22/2022 1028  Last data filed at 10/22/2022 0830  Gross per 24 hour   Intake 300 ml   Output 1525 ml   Net -1225 ml       Physical Exam:     Physical Exam  Vitals and nursing note reviewed  Constitutional:       Appearance: Normal appearance  HENT:      Head: Normocephalic and atraumatic  Right Ear: External ear normal       Left Ear: External ear normal       Nose: Nose normal       Mouth/Throat:      Pharynx: Oropharynx is clear  Cardiovascular:      Rate and Rhythm: Normal rate and regular rhythm  Heart sounds: Normal heart sounds  Pulmonary:      Effort: Pulmonary effort is normal       Breath sounds: Normal breath sounds  Abdominal:      General: Bowel sounds are normal       Palpations: Abdomen is soft  Tenderness: There is no abdominal tenderness  Musculoskeletal:      Cervical back: Normal range of motion and neck supple  Comments: Number range of motion of bilateral upper extremity  Right leg power is 4 x 5 and left leg is 3 5 x 5   Significant Muscle wasting of bilateral lower extremity noted   Skin:     General: Skin is warm and dry  Capillary Refill: Capillary refill takes less than 2 seconds  Neurological:      Mental Status: He is alert and oriented to person, place, and time  Mental status is at baseline     Psychiatric:         Mood and Affect: Mood normal            Additional Data:     Labs:    Results from last 7 days   Lab Units 10/21/22  0502 10/20/22  1135   WBC Thousand/uL 8 85 10 38*   HEMOGLOBIN g/dL 10 7* 13 2   HEMATOCRIT % 33 9* 41 6   PLATELETS Thousands/uL 217 279   NEUTROS PCT %  --  65   LYMPHS PCT % --  21   MONOS PCT %  --  9   EOS PCT %  --  3     Results from last 7 days   Lab Units 10/22/22  0531 10/21/22  0502   SODIUM mmol/L 140 136   POTASSIUM mmol/L 3 5 3 8   CHLORIDE mmol/L 104 101   CO2 mmol/L 29 29   BUN mg/dL 31* 34*   CREATININE mg/dL 2 73* 2 74*   ANION GAP mmol/L 7 6   CALCIUM mg/dL 10 1 11 1*   ALBUMIN g/dL  --  2 7*   TOTAL BILIRUBIN mg/dL  --  0 30   ALK PHOS U/L  --  85   ALT U/L  --  7*   AST U/L  --  7   GLUCOSE RANDOM mg/dL 155* 155*         Results from last 7 days   Lab Units 10/22/22  0729 10/21/22  2114 10/21/22  1611 10/21/22  1103 10/21/22  0728 10/20/22  2125 10/20/22  1614   POC GLUCOSE mg/dl 133 133 117 172* 153* 173* 111         Results from last 7 days   Lab Units 10/20/22  1135   LACTIC ACID mmol/L 0 8           * I Have Reviewed All Lab Data Listed Above  * Additional Pertinent Lab Tests Reviewed: [address] Admission Reviewed    Imaging:    Imaging Reports Reviewed Today Include:  X-ray left foot  Imaging Personally Reviewed by Myself Includes:  X-ray left foot    Recent Cultures (last 7 days):     Results from last 7 days   Lab Units 10/20/22  1248   BLOOD CULTURE  No Growth at 24 hrs  No Growth at 24 hrs         Last 24 Hours Medication List:   Current Facility-Administered Medications   Medication Dose Route Frequency Provider Last Rate   • acetaminophen  650 mg Oral Q8H [facility] Marielle Ortega MD     • allopurinol  200 mg Oral Daily Marielle Ortega MD     • amLODIPine  10 mg Oral Daily Marielle Ortega MD     • atorvastatin  40 mg Oral Daily With Gume Horn MD     • carvedilol  6 25 mg Oral BID With Meals Marielle Ortega MD     • cefepime  1,000 mg Intravenous Q12H Marielle Ortega MD 1,000 mg (10/22/22 0240)   • [START ON 10/23/2022] colchicine  0 3 mg Oral Daily Marielle Ortega MD     • cyclobenzaprine  5 mg Oral TID Marielle Ortega MD     • docusate sodium  100 mg Oral BID Marielle Ortega MD     • ferrous sulfate  325 mg Oral Daily With Breakfast Marielle Ortega MD     • heparin (porcine)  5,000 Units Subcutaneous Q8H [facility] Margarito Alvarez MD     • insulin glargine  10 Units Subcutaneous HS Margarito Alvarez MD     • insulin lispro  1-6 Units Subcutaneous HS Margarito Alvarez MD     • insulin lispro  2-12 Units Subcutaneous TID AC Margarito Alvarez MD     • nicotine  1 patch Transdermal Daily Margarito Alvarez MD     • ondansetron  4 mg Intravenous Q6H PRN Margarito Alvarez MD     • oxyCODONE  5 mg Oral Q6H PRN Margarito Alvarez MD     • pregabalin  150 mg Oral BID Margarito Alvarez MD     • sevelamer  800 mg Oral Daily With Hedy Horn MD     • sodium bicarbonate  650 mg Oral Daily Margarito Alvarez MD     • tamsulosin  0 4 mg Oral Daily With Hedy Horn MD     • thiamine  100 mg Oral Daily Margarito Alvarez MD     • venlafaxine  37 5 mg Oral TID With Meals Margarito Alvarez MD          Today, Patient Was Seen By: Margarito Alvarez    ** Please Note: Dictation voice to text software may have been used in the creation of this document   **

## 2022-10-23 LAB
25(OH)D3 SERPL-MCNC: 31.4 NG/ML (ref 30–100)
ANION GAP SERPL CALCULATED.3IONS-SCNC: 6 MMOL/L (ref 4–13)
BUN SERPL-MCNC: 28 MG/DL (ref 5–25)
CALCIUM SERPL-MCNC: 10.5 MG/DL (ref 8.3–10.1)
CHLORIDE SERPL-SCNC: 104 MMOL/L (ref 96–108)
CO2 SERPL-SCNC: 29 MMOL/L (ref 21–32)
CREAT SERPL-MCNC: 2.65 MG/DL (ref 0.6–1.3)
GFR SERPL CREATININE-BSD FRML MDRD: 25 ML/MIN/1.73SQ M
GLUCOSE SERPL-MCNC: 133 MG/DL (ref 65–140)
GLUCOSE SERPL-MCNC: 140 MG/DL (ref 65–140)
GLUCOSE SERPL-MCNC: 142 MG/DL (ref 65–140)
GLUCOSE SERPL-MCNC: 175 MG/DL (ref 65–140)
GLUCOSE SERPL-MCNC: 202 MG/DL (ref 65–140)
POTASSIUM SERPL-SCNC: 3.7 MMOL/L (ref 3.5–5.3)
SODIUM SERPL-SCNC: 139 MMOL/L (ref 135–147)

## 2022-10-23 PROCEDURE — 99232 SBSQ HOSP IP/OBS MODERATE 35: CPT | Performed by: FAMILY MEDICINE

## 2022-10-23 PROCEDURE — 82306 VITAMIN D 25 HYDROXY: CPT | Performed by: FAMILY MEDICINE

## 2022-10-23 PROCEDURE — 80048 BASIC METABOLIC PNL TOTAL CA: CPT | Performed by: FAMILY MEDICINE

## 2022-10-23 PROCEDURE — 82948 REAGENT STRIP/BLOOD GLUCOSE: CPT

## 2022-10-23 RX ADMIN — ACETAMINOPHEN 650 MG: 325 TABLET ORAL at 21:05

## 2022-10-23 RX ADMIN — DOCUSATE SODIUM 100 MG: 100 CAPSULE ORAL at 09:08

## 2022-10-23 RX ADMIN — CARVEDILOL 6.25 MG: 6.25 TABLET, FILM COATED ORAL at 15:34

## 2022-10-23 RX ADMIN — VENLAFAXINE 37.5 MG: 37.5 TABLET ORAL at 09:00

## 2022-10-23 RX ADMIN — FERROUS SULFATE TAB 325 MG (65 MG ELEMENTAL FE) 325 MG: 325 (65 FE) TAB at 08:30

## 2022-10-23 RX ADMIN — VENLAFAXINE 37.5 MG: 37.5 TABLET ORAL at 15:34

## 2022-10-23 RX ADMIN — CEFEPIME HYDROCHLORIDE 1000 MG: 1 INJECTION, SOLUTION INTRAVENOUS at 15:30

## 2022-10-23 RX ADMIN — PREGABALIN 150 MG: 75 CAPSULE ORAL at 17:14

## 2022-10-23 RX ADMIN — PREGABALIN 150 MG: 75 CAPSULE ORAL at 09:09

## 2022-10-23 RX ADMIN — DOCUSATE SODIUM 100 MG: 100 CAPSULE ORAL at 17:14

## 2022-10-23 RX ADMIN — COLCHICINE 0.3 MG: 0.6 TABLET ORAL at 09:10

## 2022-10-23 RX ADMIN — HEPARIN SODIUM 5000 UNITS: 5000 INJECTION INTRAVENOUS; SUBCUTANEOUS at 05:53

## 2022-10-23 RX ADMIN — CEFEPIME HYDROCHLORIDE 1000 MG: 1 INJECTION, SOLUTION INTRAVENOUS at 02:51

## 2022-10-23 RX ADMIN — VENLAFAXINE 37.5 MG: 37.5 TABLET ORAL at 12:53

## 2022-10-23 RX ADMIN — CYCLOBENZAPRINE 5 MG: 10 TABLET, FILM COATED ORAL at 09:08

## 2022-10-23 RX ADMIN — SEVELAMER HYDROCHLORIDE 800 MG: 800 TABLET, FILM COATED PARENTERAL at 15:34

## 2022-10-23 RX ADMIN — SODIUM BICARBONATE 650 MG TABLET 650 MG: at 09:07

## 2022-10-23 RX ADMIN — TAMSULOSIN HYDROCHLORIDE 0.4 MG: 0.4 CAPSULE ORAL at 15:34

## 2022-10-23 RX ADMIN — OXYCODONE HYDROCHLORIDE 5 MG: 5 TABLET ORAL at 09:18

## 2022-10-23 RX ADMIN — INSULIN GLARGINE 10 UNITS: 100 INJECTION, SOLUTION SUBCUTANEOUS at 21:05

## 2022-10-23 RX ADMIN — CYCLOBENZAPRINE 5 MG: 10 TABLET, FILM COATED ORAL at 21:05

## 2022-10-23 RX ADMIN — OXYCODONE HYDROCHLORIDE 5 MG: 5 TABLET ORAL at 21:05

## 2022-10-23 RX ADMIN — OXYCODONE HYDROCHLORIDE 5 MG: 5 TABLET ORAL at 03:03

## 2022-10-23 RX ADMIN — ACETAMINOPHEN 650 MG: 325 TABLET ORAL at 05:52

## 2022-10-23 RX ADMIN — THIAMINE HCL TAB 100 MG 100 MG: 100 TAB at 09:12

## 2022-10-23 RX ADMIN — ATORVASTATIN CALCIUM 40 MG: 40 TABLET, FILM COATED ORAL at 15:34

## 2022-10-23 RX ADMIN — ALLOPURINOL 200 MG: 100 TABLET ORAL at 09:12

## 2022-10-23 RX ADMIN — INSULIN LISPRO 2 UNITS: 100 INJECTION, SOLUTION INTRAVENOUS; SUBCUTANEOUS at 21:07

## 2022-10-23 RX ADMIN — CYCLOBENZAPRINE 5 MG: 10 TABLET, FILM COATED ORAL at 15:30

## 2022-10-23 RX ADMIN — ACETAMINOPHEN 650 MG: 325 TABLET ORAL at 15:00

## 2022-10-23 RX ADMIN — INSULIN LISPRO 2 UNITS: 100 INJECTION, SOLUTION INTRAVENOUS; SUBCUTANEOUS at 12:24

## 2022-10-23 NOTE — ASSESSMENT & PLAN NOTE
Malnutrition Findings:   Adult Malnutrition type: Chronic illness  Adult Degree of Malnutrition: Other severe protein calorie malnutrition  Malnutrition Characteristics: Weight loss, Inadequate energy                bmi is 19 86   360 Statement: malnutrition in the context of chronic illness, evidenced by poor po intake <75% energy intake compared to estimated energy needs for > 1 month,  weight loss of  25% %  x 9 mo (1/30/21 185 lbs  to 10/20/22 138 6 lbs), treated with diet and supplements    BMI Findings: Body mass index is 19 09 kg/m²

## 2022-10-23 NOTE — PROGRESS NOTES
[address]  Progress Note - Phillip Huerta 1965, 62 y o  male MRN: [id number]  Unit/Bed#: -Rosmery Encounter: [id number]  Primary Care Provider: Padmini Caraballo DO   Date and time admitted to hospital: 10/20/2022 11:16 AM    * UTI (urinary tract infection) due to urinary indwelling catheter ([address] )  Assessment & Plan  Poa ua abnormal urine and blood culture pending  2 day history of nausea and ua abnormal   Discontinue camarena catheter  Placed on voiding trial  Placed on iv cefepime  Urine culture did not reflex  Will place on oral antibiotics upon discharge    Hypercalcemia  Assessment & Plan  Calcium level is 12 1 on admission  Initially received iv fluid hydration  recheck calcium level is improving  Off IV fluids now  Check vitamin-D level  intact PTH level is low at 17 and repeat BMP tomorrow  No history of underlying malignancy  Normal TSH, normal protein level  No history of myeloma  Monitor for now and place on low calcium diet    Osteomyelitis of vertebra of lumbar region [facility])  Assessment & Plan  No acute change compared to 9/12/2022  Similar findings of chronic osteodiscitis at L4-L5  Complaining of weakness and difficulty ambulating at home  PT OT eval done recommend post acute rehab stay  Also recommend outpatient follow-up with Neurosurgery at [facility]    Skin ulcer of left heel, limited to breakdown of skin [facility])  Norma [facility] [address] [facility] podiatry for left heel ulcer  Appreciate input  Foot x-ray reviewed no acute infection at this time  Arterial Doppler pending at this time    Stage 3a chronic kidney disease [facility])  Assessment & Plan  Lab Results   Component Value Date    EGFR 25 10/23/2022    EGFR 24 10/22/2022    EGFR 24 10/21/2022    CREATININE 2 65 (H) 10/23/2022    CREATININE 2 73 (H) 10/22/2022    CREATININE 2 74 (H) 10/21/2022   baseline creatinine 2 8-3  avoid nephrotoxic agents and hypotension  Creatinine plateau to 2 6    Ambulatory dysfunction  Assessment & Plan  Pt/ot eval   Need subacute rehab    Severe protein-calorie malnutrition (HCC)  Assessment & Plan  Malnutrition Findings:   Adult Malnutrition type: Chronic illness  Adult Degree of Malnutrition: Other severe protein calorie malnutrition  Malnutrition Characteristics: Weight loss, Inadequate energy                bmi is 19 86   360 Statement: malnutrition in the context of chronic illness, evidenced by poor po intake <75% energy intake compared to estimated energy needs for > 1 month,  weight loss of  25% %  x 9 mo (1/30/21 185 lbs  to 10/20/22 138 6 lbs), treated with diet and supplements    BMI Findings: Body mass index is 19 09 kg/m²  Essential hypertension  Assessment & Plan  Cont home regimen of norvasc and coreg  Blood pressure controlled    Diabetes mellitus type 2, insulin dependent [facility])  Assessment & Plan  Lab Results   Component Value Date    HGBA1C 6 0 (H) 07/05/2022       Recent Labs     10/22/22  1114 10/22/22  1559 10/22/22  2123 10/23/22  0727   POCGLU 167* 104 162* 133       Blood Sugar Average: Last 72 hrs:  (P) 141 [id number] place on iss and diabetic diet  Restart Lantus low-dose  Blood sugars are well controlled      VTE Pharmacologic Prophylaxis:   Pharmacologic: Heparin  Mechanical VTE Prophylaxis in Place: Yes    Patient Centered Rounds: I have performed bedside rounds with nursing staff today  Discussions with Specialists or Other Care Team Provider:  None    Education and Discussions with Family / Patient:  Discussed with patient bedside    Time Spent for Care: 30 minutes  More than 50% of total time spent on counseling and coordination of care as described above      Current Length of Stay: 3 day(s)    Current Patient Status: Inpatient   Certification Statement: The patient will continue to require additional inpatient hospital stay due to Acute cystitis    Discharge Plan:  Anticipate discharge to rehab tomorrow    Code Status: Level 1 - Full Code      Subjective:   Patient denies any complaints today  Feeling well agreeable to rehab tomorrow  Angel was removed this admission and currently on voiding trial which appears to be successful    Objective:     Vitals:   Temp (24hrs), Av 3 °F (36 8 °C), Min:98 1 °F (36 7 °C), Max:98 8 °F (37 1 °C)    Temp:  [98 1 °F (36 7 °C)-98 8 °F (37 1 °C)] 98 1 °F (36 7 °C)  HR:  [66-69] 66  Resp:  [18-19] 18  BP: (113-146)/(61-85) 113/61  SpO2:  [87 %-96 %] 92 %  Body mass index is 19 09 kg/m²  Input and Output Summary (last 24 hours): Intake/Output Summary (Last 24 hours) at 10/23/2022 0938  Last data filed at 10/23/2022 1353  Gross per 24 hour   Intake 1320 ml   Output 1100 ml   Net 220 ml       Physical Exam:     Physical Exam  Vitals and nursing note reviewed  Constitutional:       Appearance: Normal appearance  HENT:      Head: Normocephalic and atraumatic  Right Ear: External ear normal       Left Ear: External ear normal       Nose: Nose normal       Mouth/Throat:      Pharynx: Oropharynx is clear  Cardiovascular:      Rate and Rhythm: Normal rate and regular rhythm  Heart sounds: Normal heart sounds  Pulmonary:      Effort: Pulmonary effort is normal       Breath sounds: Normal breath sounds  Abdominal:      General: Bowel sounds are normal       Palpations: Abdomen is soft  Tenderness: There is no abdominal tenderness  Musculoskeletal:      Cervical back: Normal range of motion and neck supple  Comments: Weakness bilateral lower extremity   Skin:     General: Skin is warm and dry  Capillary Refill: Capillary refill takes less than 2 seconds  Neurological:      Mental Status: He is alert and oriented to person, place, and time  Mental status is at baseline     Psychiatric:         Mood and Affect: Mood normal            Additional Data:     Labs:    Results from last 7 days   Lab Units 10/21/22  0502 10/20/22  1135   WBC Thousand/uL 8 85 10 38* HEMOGLOBIN g/dL 10 7* 13 2   HEMATOCRIT % 33 9* 41 6   PLATELETS Thousands/uL 217 279   NEUTROS PCT %  --  65   LYMPHS PCT %  --  21   MONOS PCT %  --  9   EOS PCT %  --  3     Results from last 7 days   Lab Units 10/23/22  0556 10/22/22  0531 10/21/22  0502   SODIUM mmol/L 139   < > 136   POTASSIUM mmol/L 3 7   < > 3 8   CHLORIDE mmol/L 104   < > 101   CO2 mmol/L 29   < > 29   BUN mg/dL 28*   < > 34*   CREATININE mg/dL 2 65*   < > 2 74*   ANION GAP mmol/L 6   < > 6   CALCIUM mg/dL 10 5*   < > 11 1*   ALBUMIN g/dL  --   --  2 7*   TOTAL BILIRUBIN mg/dL  --   --  0 30   ALK PHOS U/L  --   --  85   ALT U/L  --   --  7*   AST U/L  --   --  7   GLUCOSE RANDOM mg/dL 142*   < > 155*    < > = values in this interval not displayed  Results from last 7 days   Lab Units 10/23/22  0727 10/22/22  2123 10/22/22  1559 10/22/22  1114 10/22/22  0729 10/21/22  2114 10/21/22  1611 10/21/22  1103 10/21/22  0728 10/20/22  2125 10/20/22  1614   POC GLUCOSE mg/dl 133 162* 104 167* 133 133 117 172* 153* 173* 111         Results from last 7 days   Lab Units 10/20/22  1135   LACTIC ACID mmol/L 0 8           * I Have Reviewed All Lab Data Listed Above  * Additional Pertinent Lab Tests Reviewed: [address] Admission Reviewed    Imaging:    Imaging Reports Reviewed Today Include:  None today  Imaging Personally Reviewed by Myself Includes:  None today    Recent Cultures (last 7 days):     Results from last 7 days   Lab Units 10/20/22  1248   BLOOD CULTURE  No Growth at 48 hrs  No Growth at 48 hrs         Last 24 Hours Medication List:   Current Facility-Administered Medications   Medication Dose Route Frequency Provider Last Rate   • acetaminophen  650 mg Oral Q8H [address] Marian Romero MD     • allopurinol  200 mg Oral Daily Marian Romero MD     • amLODIPine  10 mg Oral Daily Marian Romero MD     • atorvastatin  40 mg Oral Daily With [place] Kory, MD     • carvedilol  6 25 mg Oral BID With Meals Marian Romero MD • cefepime  1,000 mg Intravenous Q12H Maldonado Conley MD 1,000 mg (10/23/22 0251)   • colchicine  0 3 mg Oral Daily Maldonado Conley MD     • cyclobenzaprine  5 mg Oral TID Maldonado Conley MD     • docusate sodium  100 mg Oral BID Maldonado Conley MD     • ferrous sulfate  325 mg Oral Daily With Breakfast Maldonado Conley MD     • heparin (porcine)  5,000 Units Subcutaneous Q8H [address] Maldonado Conley MD     • insulin glargine  10 Units Subcutaneous HS Maldonado Conley MD     • insulin lispro  1-6 Units Subcutaneous HS Maldonado Conley MD     • insulin lispro  2-12 Units Subcutaneous TID AC Maldonado Conley MD     • nicotine  1 patch Transdermal Daily Maldonado Conley MD     • ondansetron  4 mg Intravenous Q6H PRN Maldonado Conley MD     • oxyCODONE  5 mg Oral Q6H PRN Maldonado Conley MD     • pregabalin  150 mg Oral BID Maldonado Conley MD     • sevelamer  800 mg Oral Daily With Arnold Solomon MD     • sodium bicarbonate  650 mg Oral Daily Maldonado Conley MD     • tamsulosin  0 4 mg Oral Daily With Arnold Solomon MD     • thiamine  100 mg Oral Daily Maldonado Conley MD     • venlafaxine  37 5 mg Oral TID With Meals Maldonado Conley MD          Today, Patient Was Seen By: Maldonado Conley    ** Please Note: Dictation voice to text software may have been used in the creation of this document   **

## 2022-10-23 NOTE — ASSESSMENT & PLAN NOTE
Lab Results   Component Value Date    HGBA1C 6 0 (H) 07/05/2022       Recent Labs     10/22/22  1114 10/22/22  1559 10/22/22  2123 10/23/22  0727   POCGLU 167* 104 162* 133       Blood Sugar Average: Last 72 hrs:  (P) 416 [id number] place on iss and diabetic diet  Restart Lantus low-dose    Blood sugars are well controlled

## 2022-10-23 NOTE — ASSESSMENT & PLAN NOTE
Calcium level is 12 1 on admission  Initially received iv fluid hydration  recheck calcium level is improving  Off IV fluids now  Check vitamin-D level  intact PTH level is low at 17 and repeat BMP tomorrow  No history of underlying malignancy  Normal TSH, normal protein level  No history of myeloma    Monitor for now and place on low calcium diet

## 2022-10-23 NOTE — PLAN OF CARE
Problem: Potential for Falls  Goal: Patient will remain free of falls  Description: INTERVENTIONS:  -  Assess patient's ability to carry out ADLs; assess patient's baseline for ADL function and identify physical deficits which impact ability to perform ADLs (bathing, care of mouth/teeth, toileting, grooming, dressing, etc )  - Assess/evaluate cause of self-care deficits   - Assess range of motion  - Assess patient's mobility; develop plan if impaired  - Assess patient's need for assistive devices and provide as appropriate  - Encourage maximum independence but intervene and supervise when necessary  - Involve family in performance of ADLs  - Assess for home care needs following discharge   - Consider OT consult to assist with ADL evaluation and planning for discharge  - Provide patient education as appropriate  Outcome: Progressing     Problem: PAIN - ADULT  Goal: Verbalizes/displays adequate comfort level or baseline comfort level  Description: Interventions:  - Encourage patient to monitor pain and request assistance  - Assess pain using appropriate pain scale0-10  - Administer analgesics based on type and severity of pain and evaluate response  - Implement non-pharmacological measures as appropriate and evaluate response  - Consider cultural and social influences on pain and pain management  - Notify physician/advanced practitioner if interventions unsuccessful or patient reports new pain  Outcome: Progressing     Problem: Knowledge Deficit  Goal: Patient/family/caregiver demonstrates understanding of disease process, treatment plan, medications, and discharge instructions  Description: Complete learning assessment and assess knowledge base    Interventions:  - Provide teaching at level of understanding  - Provide teaching via preferred learning methods  Outcome: Progressing

## 2022-10-23 NOTE — ASSESSMENT & PLAN NOTE
Poa ua abnormal urine and blood culture pending  2 day history of nausea and ua abnormal   Discontinue camarena catheter  Placed on voiding trial  Placed on iv cefepime  Urine culture did not reflex    Will place on oral antibiotics upon discharge

## 2022-10-23 NOTE — ASSESSMENT & PLAN NOTE
Lab Results   Component Value Date    EGFR 25 10/23/2022    EGFR 24 10/22/2022    EGFR 24 10/21/2022    CREATININE 2 65 (H) 10/23/2022    CREATININE 2 73 (H) 10/22/2022    CREATININE 2 74 (H) 10/21/2022   baseline creatinine 2 8-3  avoid nephrotoxic agents and hypotension  Creatinine plateau to 2 6

## 2022-10-24 ENCOUNTER — APPOINTMENT (INPATIENT)
Dept: NON INVASIVE DIAGNOSTICS | Facility: HOSPITAL | Age: 57
DRG: 698 | End: 2022-10-24
Payer: MEDICARE

## 2022-10-24 VITALS
DIASTOLIC BLOOD PRESSURE: 78 MMHG | RESPIRATION RATE: 18 BRPM | HEART RATE: 69 BPM | TEMPERATURE: 98.4 F | WEIGHT: 138.01 LBS | BODY MASS INDEX: 19.32 KG/M2 | OXYGEN SATURATION: 93 % | HEIGHT: 71 IN | SYSTOLIC BLOOD PRESSURE: 141 MMHG

## 2022-10-24 LAB
FLUAV RNA RESP QL NAA+PROBE: NEGATIVE
FLUBV RNA RESP QL NAA+PROBE: NEGATIVE
GLUCOSE SERPL-MCNC: 103 MG/DL (ref 65–140)
GLUCOSE SERPL-MCNC: 181 MG/DL (ref 65–140)
RSV RNA RESP QL NAA+PROBE: NEGATIVE
SARS-COV-2 RNA RESP QL NAA+PROBE: NEGATIVE

## 2022-10-24 PROCEDURE — 99239 HOSP IP/OBS DSCHRG MGMT >30: CPT | Performed by: FAMILY MEDICINE

## 2022-10-24 PROCEDURE — G0408 INPT/TELE FOLLOW UP 35: HCPCS | Performed by: INTERNAL MEDICINE

## 2022-10-24 PROCEDURE — 0241U HB NFCT DS VIR RESP RNA 4 TRGT: CPT | Performed by: FAMILY MEDICINE

## 2022-10-24 PROCEDURE — 82948 REAGENT STRIP/BLOOD GLUCOSE: CPT

## 2022-10-24 RX ORDER — ALLOPURINOL 100 MG/1
100 TABLET ORAL DAILY
Qty: 30 TABLET | Refills: 0
Start: 2022-10-24 | End: 2022-11-23

## 2022-10-24 RX ORDER — INSULIN GLARGINE 100 [IU]/ML
10 INJECTION, SOLUTION SUBCUTANEOUS
Qty: 10 ML | Refills: 0
Start: 2022-10-24

## 2022-10-24 RX ORDER — OXYCODONE HYDROCHLORIDE 5 MG/1
5 TABLET ORAL EVERY 4 HOURS PRN
Qty: 18 TABLET | Refills: 0 | Status: SHIPPED | OUTPATIENT
Start: 2022-10-24 | End: 2022-11-03

## 2022-10-24 RX ORDER — OXYCODONE HYDROCHLORIDE 5 MG/1
5 TABLET ORAL EVERY 4 HOURS PRN
Status: DISCONTINUED | OUTPATIENT
Start: 2022-10-24 | End: 2022-10-24 | Stop reason: HOSPADM

## 2022-10-24 RX ORDER — INSULIN LISPRO 100 [IU]/ML
2-12 INJECTION, SOLUTION INTRAVENOUS; SUBCUTANEOUS
Refills: 0
Start: 2022-10-24

## 2022-10-24 RX ORDER — BUPRENORPHINE HYDROCHLORIDE 300 UG/1
300 FILM, SOLUBLE BUCCAL EVERY MORNING
Qty: 10 EACH | Refills: 0 | Status: SHIPPED | OUTPATIENT
Start: 2022-10-24 | End: 2022-11-03

## 2022-10-24 RX ORDER — CEPHALEXIN 500 MG/1
500 CAPSULE ORAL EVERY 12 HOURS SCHEDULED
Qty: 8 CAPSULE | Refills: 0
Start: 2022-10-24 | End: 2022-10-28

## 2022-10-24 RX ORDER — OXYCODONE HYDROCHLORIDE 5 MG/1
5 TABLET ORAL EVERY 4 HOURS PRN
Qty: 18 TABLET | Refills: 0 | Status: CANCELLED | OUTPATIENT
Start: 2022-10-24 | End: 2022-11-03

## 2022-10-24 RX ORDER — COLCHICINE 0.6 MG/1
0.3 TABLET ORAL DAILY
Refills: 0
Start: 2022-10-25

## 2022-10-24 RX ORDER — FERROUS SULFATE 325(65) MG
325 TABLET ORAL
Refills: 0
Start: 2022-10-25

## 2022-10-24 RX ORDER — SODIUM BICARBONATE 650 MG/1
650 TABLET ORAL DAILY
Refills: 0
Start: 2022-10-25

## 2022-10-24 RX ADMIN — THIAMINE HCL TAB 100 MG 100 MG: 100 TAB at 08:28

## 2022-10-24 RX ADMIN — FERROUS SULFATE TAB 325 MG (65 MG ELEMENTAL FE) 325 MG: 325 (65 FE) TAB at 08:31

## 2022-10-24 RX ADMIN — DOCUSATE SODIUM 100 MG: 100 CAPSULE ORAL at 08:27

## 2022-10-24 RX ADMIN — SODIUM BICARBONATE 650 MG TABLET 650 MG: at 08:28

## 2022-10-24 RX ADMIN — CEFEPIME HYDROCHLORIDE 1000 MG: 1 INJECTION, SOLUTION INTRAVENOUS at 02:25

## 2022-10-24 RX ADMIN — INSULIN LISPRO 2 UNITS: 100 INJECTION, SOLUTION INTRAVENOUS; SUBCUTANEOUS at 12:08

## 2022-10-24 RX ADMIN — VENLAFAXINE 37.5 MG: 37.5 TABLET ORAL at 08:28

## 2022-10-24 RX ADMIN — CARVEDILOL 6.25 MG: 6.25 TABLET, FILM COATED ORAL at 08:28

## 2022-10-24 RX ADMIN — PREGABALIN 150 MG: 75 CAPSULE ORAL at 08:28

## 2022-10-24 RX ADMIN — OXYCODONE HYDROCHLORIDE 5 MG: 5 TABLET ORAL at 08:27

## 2022-10-24 RX ADMIN — ACETAMINOPHEN 650 MG: 325 TABLET ORAL at 06:05

## 2022-10-24 RX ADMIN — COLCHICINE 0.3 MG: 0.6 TABLET ORAL at 08:29

## 2022-10-24 RX ADMIN — AMLODIPINE BESYLATE 10 MG: 10 TABLET ORAL at 08:31

## 2022-10-24 RX ADMIN — ALLOPURINOL 200 MG: 100 TABLET ORAL at 08:28

## 2022-10-24 RX ADMIN — VENLAFAXINE 37.5 MG: 37.5 TABLET ORAL at 12:07

## 2022-10-24 RX ADMIN — CYCLOBENZAPRINE 5 MG: 10 TABLET, FILM COATED ORAL at 08:32

## 2022-10-24 NOTE — ASSESSMENT & PLAN NOTE
Consult podiatry for left heel ulcer    Appreciate input  Foot x-ray reviewed no acute infection at this time  Arterial Doppler outpt

## 2022-10-24 NOTE — ASSESSMENT & PLAN NOTE
Lab Results   Component Value Date    EGFR 25 10/23/2022    EGFR 24 10/22/2022    EGFR 24 10/21/2022    CREATININE 2 65 (H) 10/23/2022    CREATININE 2 73 (H) 10/22/2022    CREATININE 2 74 (H) 10/21/2022   baseline creatinine 2 8-3  avoid nephrotoxic agents and hypotension  Creatinine plateau to 2 6  Follow up outpatient with Nephrology

## 2022-10-24 NOTE — CASE MANAGEMENT
Case Management Discharge Planning Note    Patient name Patsy Jimenez  Location /-78 MRN [id number]  : 1965 Date 10/24/2022       Current Admission Date: 10/20/2022  Current Admission Diagnosis:UTI (urinary tract infection) due to urinary indwelling catheter [facility])   Patient Active Problem List    Diagnosis Date Noted   • UTI (urinary tract infection) due to urinary indwelling catheter ([facility] ) 10/20/2022   • Skin ulcer of left heel, limited to breakdown of skin ([facility] ) 10/20/2022   • Hypercalcemia 2022   • Low back pain 2022   • Ambulatory dysfunction 2022   • Stage 3a chronic kidney disease ([facility] ) 2022   • Acute urinary retention 2022   • Severe protein-calorie malnutrition ([facility] ) 2022   • Iron deficiency anemia secondary to inadequate dietary iron intake 2022   • Hip pain, acute, left 2022   • KELLY (acute kidney injury) ([facility] ) 2022   • Hyperkalemia 2022   • Diabetes mellitus type 2, insulin dependent ([facility] )    • Gout    • Essential hypertension    • History of CVA (cerebrovascular accident)    • Osteomyelitis of vertebra of lumbar region ([facility] )       LOS (days): 4  Geometric Mean LOS (GMLOS) (days): 4 80  Days to GMLOS:1     OBJECTIVE:  Risk of Unplanned Readmission Score: 23 1         Current admission status: Inpatient   Preferred Pharmacy:   [address] [place] [facility] [address]  [address] [place] [address]  Phone: [phone] Fax: [phone]    Primary Care Provider: Mitchel Mcintosh DO    Primary Insurance: MEDICARE  Secondary Insurance:     DISCHARGE DETAILS:    Discharge planning discussed with[de-identified] spouse, Silver Contras, and patient  Freedom of Choice: Yes  Comments - [facility] of Choice: [facility]  CM contacted family/caregiver?: Yes  Were Treatment Team discharge recommendations reviewed with patient/caregiver?: Yes  Did patient/caregiver verbalize understanding of patient care needs?: Yes  Were patient/caregiver advised of the risks associated with not following Treatment Team discharge recommendations?: Yes    Contacts  Patient Contacts: Bong Aguayo, spouse  Relationship to Patient[de-identified] Family  Contact Method: Phone  Phone Number: see face sheet  Reason/Outcome: Continuity of Care, Discharge [address]         Is the patient interested in [facility] at discharge?: No    DME Referral Provided  Referral made for DME?: No    Other Referral/Resources/Interventions Provided:  Interventions: Short Term Rehab  Referral Comments: [facility]    Would you like to participate in our [address] service program?  : No - Declined    Treatment Team Recommendation: SNF  Discharge Destination Plan[de-identified] SNF  Transport at Discharge : BLS Ambulance              IMM Given (Date):: 10/24/22  IMM Given to[de-identified] Patient  Family notified[de-identified] appeal rights reviewed with patient       Accepting Facility Name, [address] : [facility]  Receiving Facility/Agency Phone Number: [phone]  Facility/Agency Fax Number: [phone]     CM contacted spouse to let her know of SNF bed availability  [facility] has bed available for today, [facility] would have bed available later in week, [facility] and [address] [facility] has not responded, [address] [facility] [address] of [address] [facility] [address] and [address] cannot accept patient  Spouse agreeable to [facility]  CM submitted [employer] referral for BLS transport to [facility] today  CM met with patient to discuss discharge today to SNF  Patient agreeable  CM was unaware of discharge today  CM spoke to patient at the bedside, reviewed DC IMM with patient and informed that patient can stay an additional 4 hours for reconsidering appealing the discharge as the medicare rights were review on the day of discharge  Pt verbalized understanding and feels ready to go SNF and does not intend to stay 4 hours to reconsider   IMM placed in bin for filing

## 2022-10-24 NOTE — ASSESSMENT & PLAN NOTE
Poa ua abnormal urine and blood culture pending  2 day history of nausea and ua abnormal   Discontinue camarena catheter  Placed on voiding trial   So far successful  Placed on iv cefepime  Urine culture did not reflex    Will place on oral antibiotics upon discharge for 5 more days

## 2022-10-24 NOTE — DISCHARGE SUMMARY
[address]  Discharge- Edwlidia Hernandez Sr  1965, 62 y o  male MRN: [id number]  Unit/Bed#: -01 Encounter: [id number]  Primary Care Provider: Xiomara Ann DO   Date and time admitted to hospital: 10/20/2022 11:16 AM    * UTI (urinary tract infection) due to urinary indwelling catheter [facility])  Assessment & Plan  Poa ua abnormal urine and blood culture pending  2 day history of nausea and ua abnormal   Discontinue camarena catheter  Placed on voiding trial   So far successful  Placed on iv cefepime  Urine culture did not reflex  Will place on oral antibiotics upon discharge for 5 more days    Hypercalcemia  Assessment & Plan  Calcium level is 12 1 on admission  Initially received iv fluid hydration  recheck calcium level is improving  Off IV fluids now  Check vitamin-D level  intact PTH level is low at 17 and repeat BMP tomorrow  No history of underlying malignancy  Normal TSH, normal protein level  No history of myeloma  Monitor for now and place on low calcium diet    Osteomyelitis of vertebra of lumbar region [facility])  Assessment & Plan  No acute change compared to 9/12/2022  Similar findings of chronic osteodiscitis at L4-L5  Complaining of weakness and difficulty ambulating at home  PT OT eval done recommend post acute rehab stay  Also recommend outpatient follow-up with Neurosurgery at [facility]    Skin ulcer of left heel, limited to breakdown of skin [facility])  [facility] podiatry for left heel ulcer  Appreciate input  Foot x-ray reviewed no acute infection at this time  Arterial Doppler outpt    Stage 3a chronic kidney disease [facility])  Assessment & Plan  Lab Results   Component Value Date    EGFR 25 10/23/2022    EGFR 24 10/22/2022    EGFR 24 10/21/2022    CREATININE 2 65 (H) 10/23/2022    CREATININE 2 73 (H) 10/22/2022    CREATININE 2 74 (H) 10/21/2022   baseline creatinine 2 8-3  avoid nephrotoxic agents and hypotension  Creatinine plateau to 2 6  Follow up outpatient with Nephrology    Ambulatory dysfunction  Assessment & Plan  Pt/ot eval   Need subacute rehab    Severe protein-calorie malnutrition ([address] )  Assessment & Plan  Malnutrition Findings:   Adult Malnutrition type: Chronic illness  Adult Degree of Malnutrition: Other severe protein calorie malnutrition  Malnutrition Characteristics: Weight loss, Inadequate energy                bmi is 19 86   360 Statement: malnutrition in the context of chronic illness, evidenced by poor po intake <75% energy intake compared to estimated energy needs for > 1 month,  weight loss of  25% %  x 9 mo (1/30/21 185 lbs  to 10/20/22 138 6 lbs), treated with diet and supplements    BMI Findings: Body mass index is 19 25 kg/m²  Essential hypertension  Assessment & Plan  Cont home regimen of norvasc and coreg  Blood pressure controlled    Diabetes mellitus type 2, insulin dependent [facility])  Assessment & Plan  Lab Results   Component Value Date    HGBA1C 6 0 (H) 07/05/2022       Recent Labs     10/23/22  1114 10/23/22  1616 10/23/22  2102 10/24/22  0747   POCGLU 175* 140 202* 103       Blood Sugar Average: Last 72 hrs:  (P) 145 [id number] place on iss and diabetic diet  Restart Lantus low-dose  Blood sugars are well controlled      Discharging Physician / Practitioner: Marielle Ortega  PCP: Elvin Weldon DO  Admission Date:   Admission Orders (From admission, onward)     Ordered        10/20/22 1419  [facility] [place]  Once                      Discharge Date: 10/24/22    Medical Problems             Resolved Problems  Date Reviewed: 10/24/2022   None                 Consultations During Hospital Stay:  · Infectious disease    Procedures Performed:   · none    Significant Findings / Test Results:   CT abdomen pelvis wo contrast    Addendum Date: 10/20/2022    ADDENDUM: Please note I neglected to add an additional finding in the IMPRESSION section   There is a small pericardial effusion, increased compared to 9/12/2022  The study was marked in [facility] for immediate notification  Result Date: 10/20/2022  Impression: No acute findings in the abdomen and pelvis  Evidence of left medial buttock cellulitis and ulceration compared to 9/12/2022  No abscess  Stable osseous findings  Known, treated L4-L5 osteodiscitis  Other nonemergent findings above  Workstation performed: [id number]     XR chest 1 view portable    Result Date: 10/20/2022  Impression: No acute cardiopulmonary findings  Workstation performed: [id number]     XR foot 3+ vw left    Result Date: 10/21/2022  Impression: No acute osseous abnormality  Mild soft tissue swelling at the left heel without soft tissue gas  Workstation performed: [id number]     CT spine lumbar without contrast    Result Date: 10/20/2022  Impression: No acute change compared to 9/12/2022  Similar findings of chronic osteodiscitis at L4-L5  Workstation performed: [id number]     Incidental Findings:   · none    Test Results Pending at Discharge (will require follow up):   · none     Outpatient Tests Requested:  · outpt follow up with neurosurgery     Complications:  none    Reason for Admission:  Acute cystitis due to chronic indwelling Ortiz catheter    Hospital Course:     Belen Galaviz  is a 62 y o  male patient who originally presented to the hospital on 10/20/2022 due to acute UTI/cystitis due to chronic indwelling Ortiz catheter  Patient's Ortiz was removed was placed on voiding trial which she has completed successfully  Initially placed on IV cefepime   UA was abnormal but did not reflex to culture  Clinically he is doing better with receiving IV antibiotics  Will discharge on a course of oral antibiotics  Please note patient has history of osteomyelitis of lumbar region L4-L5  Since then he has issues with weakness of his legs and back pain  Recommend outpatient follow-up with Neurosurgery to see if anything can be done to help improve his functional status  Will send to subacute rehab for course of rehab at this time    Please see above list of diagnoses and related plan for additional information  Condition at Discharge: good     Discharge Day Visit / Exam:     Subjective:  Patient denies any chest pain or shortness of breath or abdominal pain  complains of some back pain and feeling a little shaky today  Vitals: Blood Pressure: 141/78 (10/24/22 0747)  Pulse: 69 (10/24/22 0747)  Temperature: 98 4 °F (36 9 °C) (10/24/22 0747)  Temp Source: Temporal (10/22/22 2252)  Respirations: 18 (10/24/22 0747)  Height: 5' 11" (180 3 cm) (10/20/22 2151)  Weight - Scale: 62 6 kg (138 lb 0 1 oz) (10/24/22 0549)  SpO2: 93 % (10/24/22 0747)  Exam:   Physical Exam  Vitals and nursing note reviewed  Constitutional:       Appearance: Normal appearance  HENT:      Head: Normocephalic and atraumatic  Right Ear: External ear normal       Left Ear: External ear normal       Nose: Nose normal       Mouth/Throat:      Pharynx: Oropharynx is clear  Eyes:      Pupils: Pupils are equal, round, and reactive to light  Cardiovascular:      Rate and Rhythm: Normal rate and regular rhythm  Heart sounds: Normal heart sounds  Pulmonary:      Effort: Pulmonary effort is normal       Breath sounds: Normal breath sounds  Abdominal:      General: Bowel sounds are normal       Palpations: Abdomen is soft  Tenderness: There is no abdominal tenderness  Musculoskeletal:      Cervical back: Normal range of motion and neck supple  Comments: Power is 3 5 x 5 bilateral lower extremity   Skin:     General: Skin is warm and dry  Capillary Refill: Capillary refill takes less than 2 seconds  Neurological:      General: No focal deficit present  Mental Status: He is alert and oriented to person, place, and time     Psychiatric:         Mood and Affect: Mood normal          Discussion with Family:  Updated wife    Discharge instructions/Information to patient and family: See after visit summary for information provided to patient and family  Provisions for Follow-Up Care:  See after visit summary for information related to follow-up care and any pertinent home health orders  Disposition:     Other [facility] at  [address] [place] [address] to [facility] SNF:   · Not Applicable to this Patient - Not Applicable to this Patient    Planned Readmission:  None     Discharge Statement:  I spent 35 minutes discharging the patient  This time was spent on the day of discharge  I had direct contact with the patient on the day of discharge  Greater than 50% of the total time was spent examining patient, answering all patient questions, arranging and discussing plan of care with patient as well as directly providing post-discharge instructions  Additional time then spent on discharge activities  Discharge Medications:  See after visit summary for reconciled discharge medications provided to patient and family        ** Please Note: This note has been constructed using a voice recognition system **

## 2022-10-24 NOTE — ASSESSMENT & PLAN NOTE
Lab Results   Component Value Date    HGBA1C 6 0 (H) 07/05/2022       Recent Labs     10/23/22  1114 10/23/22  1616 10/23/22  2102 10/24/22  0747   POCGLU 175* 140 202* 103       Blood Sugar Average: Last 72 hrs:  (P) 326 [id number] place on iss and diabetic diet  Restart Lantus low-dose    Blood sugars are well controlled

## 2022-10-24 NOTE — CASE MANAGEMENT
Case Management Discharge Planning Note    Patient name Ananya Subramanian  Location /-96 MRN [id number]  : 1965 Date 10/24/2022       Current Admission Date: 10/20/2022  Current Admission Diagnosis:UTI (urinary tract infection) due to urinary indwelling catheter [facility])   Patient Active Problem List    Diagnosis Date Noted   • UTI (urinary tract infection) due to urinary indwelling catheter ([facility] ) 10/20/2022   • Skin ulcer of left heel, limited to breakdown of skin ([facility] ) 10/20/2022   • Hypercalcemia 2022   • Low back pain 2022   • Ambulatory dysfunction 2022   • Stage 3a chronic kidney disease ([facility] ) 2022   • Acute urinary retention 2022   • Severe protein-calorie malnutrition ([facility] ) 2022   • Iron deficiency anemia secondary to inadequate dietary iron intake 2022   • Hip pain, acute, left 2022   • KELLY (acute kidney injury) ([facility] ) 2022   • Hyperkalemia 2022   • Diabetes mellitus type 2, insulin dependent ([facility] )    • Gout    • Essential hypertension    • History of CVA (cerebrovascular accident)    • Osteomyelitis of vertebra of lumbar region ([facility] )       LOS (days): 4  Geometric Mean LOS (GMLOS) (days): 4 80  Days to GMLOS:0 9     OBJECTIVE:  Risk of Unplanned Readmission Score: 23 1         Current admission status: Inpatient   Preferred Pharmacy:   [address]  [address] [address]  Phone: [phone] Fax: [phone]    Primary Care Provider: Kayla Cardoza DO    Primary Insurance: MEDICARE  Secondary Insurance:     DISCHARGE DETAILS:        CM contacted spouse to let her know patient will be picked up at 2:00 PM for transport to [facility]  CM updated [facility] in [place] on transport time

## 2022-10-24 NOTE — PLAN OF CARE
Problem: Nutrition/Hydration-ADULT  Goal: Nutrient/Hydration intake appropriate for improving, restoring or maintaining nutritional needs  Description: Monitor and assess patient's nutrition/hydration status for malnutrition  Collaborate with interdisciplinary team and initiate plan and interventions as ordered  Monitor patient's weight and dietary intake as ordered or per policy  Utilize nutrition screening tool and intervene as necessary  Determine patient's food preferences and provide high-protein, high-caloric foods as appropriate       INTERVENTIONS:  - Monitor oral intake, urinary output, labs, and treatment plans  - Assess nutrition and hydration status and recommend course of action  - Evaluate amount of meals eaten  - Assist patient with eating if necessary   - Allow adequate time for meals  - Recommend/ encourage appropriate diets, oral nutritional supplements, and vitamin/mineral supplements  - Order, calculate, and assess calorie counts as needed  - Recommend, monitor, and adjust tube feedings and TPN/PPN based on assessed needs  - Assess need for intravenous fluids  - Provide specific nutrition/hydration education as appropriate  - Include patient/family/caregiver in decisions related to nutrition  Outcome: Progressing     Problem: MOBILITY - ADULT  Goal: Maintain or return to baseline ADL function  Description: INTERVENTIONS:  -  Assess patient's ability to carry out ADLs; assess patient's baseline for ADL function and identify physical deficits which impact ability to perform ADLs (bathing, care of mouth/teeth, toileting, grooming, dressing, etc )  - Assess/evaluate cause of self-care deficits   - Assess range of motion  - Assess patient's mobility; develop plan if impaired  - Assess patient's need for assistive devices and provide as appropriate  - Encourage maximum independence but intervene and supervise when necessary  - Involve family in performance of ADLs  - Assess for home care needs following discharge   - Consider OT consult to assist with ADL evaluation and planning for discharge  - Provide patient education as appropriate  Outcome: Progressing  Goal: Maintains/Returns to pre admission functional level  Description: INTERVENTIONS:  - Perform BMAT or MOVE assessment daily    - Set and communicate daily mobility goal to care team and patient/family/caregiver  - Collaborate with rehabilitation services on mobility goals if consulted  - Perform Range of Motion - times a day  - Reposition patient every - hours    - Dangle patient - times a day  - Stand patient - times a day  - Ambulate patient - times a day  - Out of bed to chair - times a day   - Out of bed for meals - times a day  - Out of bed for toileting  - Record patient progress and toleration of activity level   Outcome: Progressing     Problem: Potential for Falls  Goal: Patient will remain free of falls  Description: INTERVENTIONS:  - Educate patient/family on patient safety including physical limitations  - Instruct patient to call for assistance with activity   - Consult OT/PT to assist with strengthening/mobility   - Keep Call bell within reach  - Keep bed low and locked with side rails adjusted as appropriate  - Keep care items and personal belongings within reach  - Initiate and maintain comfort rounds  - Make Fall Risk Sign visible to staff  - Offer Toileting every 2 Hours, in advance of need  - Initiate/Maintain alarm  - Obtain necessary fall risk management equipment  - Apply yellow socks and bracelet for high fall risk patients  - Consider moving patient to room near nurses station  Outcome: Progressing     Problem: Prexisting or High Potential for Compromised Skin Integrity  Goal: Skin integrity is maintained or improved  Description: INTERVENTIONS:  - Identify patients at risk for skin breakdown  - Assess and monitor skin integrity  - Assess and monitor nutrition and hydration status  - Monitor labs   - Assess for incontinence   - Turn and reposition patient  - Assist with mobility/ambulation  - Relieve pressure over bony prominences  - Avoid friction and shearing  - Provide appropriate hygiene as needed including keeping skin clean and dry  - Evaluate need for skin moisturizer/barrier cream  - Collaborate with interdisciplinary team   - Patient/family teaching  - Consider wound care consult   Outcome: Progressing     Problem: PAIN - ADULT  Goal: Verbalizes/displays adequate comfort level or baseline comfort level  Description: Interventions:  - Encourage patient to monitor pain and request assistance  - Assess pain using appropriate pain scale0-10  - Administer analgesics based on type and severity of pain and evaluate response  - Implement non-pharmacological measures as appropriate and evaluate response  - Consider cultural and social influences on pain and pain management  - Notify physician/advanced practitioner if interventions unsuccessful or patient reports new pain  Outcome: Progressing     Problem: INFECTION - ADULT  Goal: Absence or prevention of progression during hospitalization  Description: INTERVENTIONS:  - Assess and monitor for signs and symptoms of infection  - Monitor lab/diagnostic results  - Monitor all insertion sites, i e  indwelling lines, tubes, and drains  - Monitor endotracheal if appropriate and nasal secretions for changes in amount and color  - [place] appropriate cooling/warming therapies per order  - Administer medications as ordered  - Instruct and encourage patient and family to use good hand hygiene technique  - Identify and instruct in appropriate isolation precautions for identified infection/condition  Outcome: Progressing  Goal: Absence of fever/infection during neutropenic period  Description: INTERVENTIONS:  - Monitor WBC    Outcome: Progressing     Problem: SAFETY ADULT  Goal: Maintain or return to baseline ADL function  Description: INTERVENTIONS:  -  Assess patient's ability to carry out ADLs; assess patient's baseline for ADL function and identify physical deficits which impact ability to perform ADLs (bathing, care of mouth/teeth, toileting, grooming, dressing, etc )  - Assess/evaluate cause of self-care deficits   - Assess range of motion  - Assess patient's mobility; develop plan if impaired  - Assess patient's need for assistive devices and provide as appropriate  - Encourage maximum independence but intervene and supervise when necessary  - Involve family in performance of ADLs  - Assess for home care needs following discharge   - Consider OT consult to assist with ADL evaluation and planning for discharge  - Provide patient education as appropriate  Outcome: Progressing  Goal: Maintains/Returns to pre admission functional level  Description: INTERVENTIONS:  - Perform BMAT or MOVE assessment daily    - Set and communicate daily mobility goal to care team and patient/family/caregiver  - Collaborate with rehabilitation services on mobility goals if consulted  - Perform Range of Motion - times a day  - Reposition patient every - hours    - Dangle patient - times a day  - Stand patient - times a day  - Ambulate patient - times a day  - Out of bed to chair - times a day   - Out of bed for meals - times a day  - Out of bed for toileting  - Record patient progress and toleration of activity level   Outcome: Progressing  Goal: Patient will remain free of falls  Description: INTERVENTIONS:  - Educate patient/family on patient safety including physical limitations  - Instruct patient to call for assistance with activity   - Consult OT/PT to assist with strengthening/mobility   - Keep Call bell within reach  - Keep bed low and locked with side rails adjusted as appropriate  - Keep care items and personal belongings within reach  - Initiate and maintain comfort rounds  - Make Fall Risk Sign visible to staff  - Offer Toileting every 2 Hours, in advance of need  - Initiate/Maintain alarm  - Obtain necessary fall risk management equipment  - Apply yellow socks and bracelet for high fall risk patients  - Consider moving patient to room near nurses station  Outcome: Progressing     Problem: DISCHARGE PLANNING  Goal: Discharge to home or other facility with appropriate resources  Description: INTERVENTIONS:  - Identify barriers to discharge w/patient and caregiver  - Arrange for needed discharge resources and transportation as appropriate  - Identify discharge learning needs (meds, wound care, etc )  - Arrange for interpretive services to assist at discharge as needed  - Refer to Case Management Department for coordinating discharge planning if the patient needs post-hospital services based on physician/advanced practitioner order or complex needs related to functional status, cognitive ability, or social support system  Outcome: Progressing     Problem: Knowledge Deficit  Goal: Patient/family/caregiver demonstrates understanding of disease process, treatment plan, medications, and discharge instructions  Description: Complete learning assessment and assess knowledge base    Interventions:  - Provide teaching at level of understanding  - Provide teaching via preferred learning methods  Outcome: Progressing

## 2022-10-24 NOTE — PROGRESS NOTES
Progress Note - Infectious Disease   Ros Oquendo  62 y o  male MRN: [id number]  Unit/Bed#: -01 Encounter: [id number]        REQUIRED DOCUMENTATION:     1  This service was provided via Telemedicine  2  Provider located at [facility]  3  TeleMed provider: Jennifer Llanos MD   4  Identify all parties in room with patient during tele consult:RN  5  After connecting through [employer], patient was identified by name and date of birth and assistant checked wristband  Patient was then informed that this was a Telemedicine visit and that the exam was being conducted confidentially over secure lines  My office door was closed  No one else was in the room  Patient acknowledged consent and understanding of privacy and security of the Telemedicine visit, and gave us permission to have the assistant stay in the room in order to assist with the history and to conduct the exam   I informed the patient that I have reviewed their record in Epic and presented the opportunity for them to ask any questions regarding the visit today  The patient agreed to participate  Assessment/Recommendations     1  Acute GI illness, leukocytosis  - Infectious causes include viral GI illness v/s UTI  No evidence of progression of spinal osteo/recurrence of abscess  - Resolved    · Management as below     2  Possible acute complicated CA-UTI  - In the setting of neurogenic bladder, indwelling camarena  - Recent urine cx with Pseudomonas, UA with pyuria but did not reflex to cx  - Improved     · Completed 3 days of iv cefepime , no additional therapy recommended  · Tolerating voiding trial, recommend outpatient urology fu     3   Acute on chronic left hip and low back pain  - CT L-spine and recent MRI without evidence of progression of infection, possible sequela from recent infection  - ESR elevated at 64 but no evidence of residual infection with negative recent aspirate cx     · Recommend outpatient neurosurgery and pain management evaluation     4  Hx anerobic bacteremia, 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, epidural and bilateral retroperitoneal abscess, left psoas and scrotal abscess  - s/p b/l retroperitoneal drain placement, inguinal washout and L orchiectomy, cx data not available  - Completed 6 weeks of meropenem/dapto   - CT and MRI findings stable without progression, small seroma s/p aspiration in July with negative cx     · Continue to monitor, request records of previous culture results, recommend fu with [facility]      5  Gluteal rash  - Likely moisture associated skin damage, no clinical evidence of cellulitis  - CT without abscess     · Recommend wound care consult, continue supportive care     6  Chronic left heel ulcer  - XR negative for osteo, gas  No local signs of infection     · Continue wound care, await podiatry evaluation     7  Type 2 diabetes, malnutrition  - A1C 6 0  - Risk factor for infection, poor wound healing     · Recommend strict glycemic control to aid with wound healing     8  CKD stage 4  - creatinine stable     · Continue renal dosing of abx       Discussed with the primary service  History       Subjective: The patient has no new complaints  States his back and leg pain is mildly improved  Voiding well without camarena  Denies fevers, chills, or sweats  Denies nausea, vomiting, or diarrhea      Antibiotics:  cefepime      Physical Exam     Temp:  [97 9 °F (36 6 °C)-98 4 °F (36 9 °C)] 98 4 °F (36 9 °C)  HR:  [69] 69  Resp:  [18-19] 18  BP: (129-153)/(71-81) 141/78  SpO2:  [87 %-95 %] 93 %  Temp (24hrs), Av 2 °F (36 8 °C), Min:97 9 °F (36 6 °C), Max:98 4 °F (36 9 °C)  Current: Temperature: 98 4 °F (36 9 °C)    Intake/Output Summary (Last 24 hours) at 10/24/2022 1117  Last data filed at 10/24/2022 0548  Gross per 24 hour   Intake 597 ml   Output 1650 ml   Net -1053 ml       Physical exam findings reported by bedside and primary medical team staff      General Appearance:  Appearing chronically illl, nontoxic, and in no distress, appears stated age   Throat: Oropharynx moist without lesions; lips, mucosa, and tongue normal; teeth and gums normal   Lungs:   Diminished bilaterally, no audible wheezes, rhonchi and rales, respirations unlabored   Heart:  Regular rate and rhythm, S1, S2 normal, no murmur, rub or gallop   Abdomen:   Soft, non-tender, non-distended, positive bowel sounds, no masses, no organomegaly    No CVA tenderness   Extremities: Extremities normal, atraumatic, no cyanosis, clubbing or edema   Skin: Skin color, texture, turgor normal, no rashes   L heel ulcer, stable  Excoriation and dry skin over sacrum and gluteal region  Neurologic: Alert and oriented times 3, neuro exam not assessed today's visit         Invasive Devices:   Peripheral IV (Ped) 10/22/22 Right Forearm (Active)   Site Assessment WDL 10/23/22 2115   Line Status Flushed 10/23/22 2115   Dressing Type Transparent 10/23/22 2115   Dressing Status Clean;Dry; Intact 10/23/22 2115   Dressing Change Due 10/26/22 10/23/22 2115       Labs, Imaging, & Other Studies     Lab Results:    I have personally reviewed pertinent labs  Results from last 7 days   Lab Units 10/21/22  0502 10/20/22  1135   WBC Thousand/uL 8 85 10 38*   HEMOGLOBIN g/dL 10 7* 13 2   PLATELETS Thousands/uL 217 279     Results from last 7 days   Lab Units 10/23/22  0556 10/22/22  0531 10/21/22  0502 10/20/22  1135   POTASSIUM mmol/L 3 7   < > 3 8 3 5   CHLORIDE mmol/L 104   < > 101 97   CO2 mmol/L 29   < > 29 32   BUN mg/dL 28*   < > 34* 38*   CREATININE mg/dL 2 65*   < > 2 74* 3 00*   EGFR ml/min/1 73sq m 25   < > 24 22   CALCIUM mg/dL 10 5*   < > 11 1* 11 5*   AST U/L  --   --  7 10   ALT U/L  --   --  7* 16   ALK PHOS U/L  --   --  85 107    < > = values in this interval not displayed  Results from last 7 days   Lab Units 10/20/22  1248   BLOOD CULTURE  No Growth at 72 hrs  No Growth at 72 hrs         Imaging Studies:   I have personally reviewed pertinent imaging study reports and images in PACS  EKG, Pathology, and Other Studies:   I have personally reviewed pertinent reports  Counseling/Coordination of care: Total 35 minutes communication with the patient via telehealth  Labs, medical tests and imaging studies were independently reviewed by me as noted above

## 2022-10-24 NOTE — NURSING NOTE
Patients IV Removed for discharge  Patient belongings packed in a bag and patient has discharge instructions and paper script  Discharge instructions faxed to [facility] and report called to [facility] LPN  All questions answered  Patient scheduled to be picked up at 1400 via transportation

## 2022-10-24 NOTE — ASSESSMENT & PLAN NOTE
Malnutrition Findings:   Adult Malnutrition type: Chronic illness  Adult Degree of Malnutrition: Other severe protein calorie malnutrition  Malnutrition Characteristics: Weight loss, Inadequate energy                bmi is 19 86   360 Statement: malnutrition in the context of chronic illness, evidenced by poor po intake <75% energy intake compared to estimated energy needs for > 1 month,  weight loss of  25% %  x 9 mo (1/30/21 185 lbs  to 10/20/22 138 6 lbs), treated with diet and supplements    BMI Findings: Body mass index is 19 25 kg/m²

## 2022-10-25 LAB
BACTERIA BLD CULT: NORMAL
BACTERIA BLD CULT: NORMAL

## 2022-11-16 ENCOUNTER — HOSPITAL ENCOUNTER (INPATIENT)
Facility: HOSPITAL | Age: 57
LOS: 6 days | Discharge: HOME HEALTH | End: 2022-11-23
Attending: EMERGENCY MEDICINE | Admitting: FAMILY MEDICINE

## 2022-11-16 ENCOUNTER — APPOINTMENT (EMERGENCY)
Dept: RADIOLOGY | Facility: HOSPITAL | Age: 57
End: 2022-11-16

## 2022-11-16 DIAGNOSIS — S91.309A OPEN WOUND OF HEEL: ICD-10-CM

## 2022-11-16 DIAGNOSIS — B33.8 RESPIRATORY SYNCYTIAL VIRUS: ICD-10-CM

## 2022-11-16 DIAGNOSIS — M46.26 OSTEOMYELITIS OF VERTEBRA OF LUMBAR REGION (HCC): ICD-10-CM

## 2022-11-16 DIAGNOSIS — R79.81 LOW OXYGEN SATURATION: Primary | ICD-10-CM

## 2022-11-16 DIAGNOSIS — E11.9 DIABETES MELLITUS TYPE 2, INSULIN DEPENDENT (HCC): ICD-10-CM

## 2022-11-16 DIAGNOSIS — Z86.73 HISTORY OF CVA (CEREBROVASCULAR ACCIDENT): ICD-10-CM

## 2022-11-16 DIAGNOSIS — N39.0 UTI (URINARY TRACT INFECTION): ICD-10-CM

## 2022-11-16 DIAGNOSIS — E87.5 HYPERKALEMIA: ICD-10-CM

## 2022-11-16 DIAGNOSIS — E83.52 HYPERCALCEMIA: ICD-10-CM

## 2022-11-16 DIAGNOSIS — N17.9 AKI (ACUTE KIDNEY INJURY) (HCC): ICD-10-CM

## 2022-11-16 DIAGNOSIS — Z79.4 DIABETES MELLITUS TYPE 2, INSULIN DEPENDENT (HCC): ICD-10-CM

## 2022-11-16 DIAGNOSIS — E44.0 MODERATE PROTEIN-CALORIE MALNUTRITION (HCC): ICD-10-CM

## 2022-11-16 DIAGNOSIS — L97.429 CHRONIC HEEL ULCER, LEFT, WITH UNSPECIFIED SEVERITY (HCC): ICD-10-CM

## 2022-11-16 PROBLEM — D64.9 CHRONIC ANEMIA: Status: ACTIVE | Noted: 2022-11-16

## 2022-11-16 PROBLEM — J96.01 ACUTE RESPIRATORY FAILURE WITH HYPOXIA (HCC): Status: ACTIVE | Noted: 2022-11-16

## 2022-11-16 LAB
4HR DELTA HS TROPONIN: 0 NG/L
ALBUMIN SERPL BCP-MCNC: 2.8 G/DL (ref 3.5–5)
ALP SERPL-CCNC: 95 U/L (ref 46–116)
ALT SERPL W P-5'-P-CCNC: 13 U/L (ref 12–78)
ANION GAP SERPL CALCULATED.3IONS-SCNC: 6 MMOL/L (ref 4–13)
AST SERPL W P-5'-P-CCNC: 11 U/L (ref 5–45)
BACTERIA UR QL AUTO: ABNORMAL /HPF
BASOPHILS # BLD AUTO: 0.07 THOUSANDS/ÂΜL (ref 0–0.1)
BASOPHILS NFR BLD AUTO: 1 % (ref 0–1)
BILIRUB SERPL-MCNC: 0.27 MG/DL (ref 0.2–1)
BILIRUB UR QL STRIP: NEGATIVE
BUN SERPL-MCNC: 44 MG/DL (ref 5–25)
CA-I BLD-SCNC: 1.57 MMOL/L (ref 1.12–1.32)
CALCIUM ALBUM COR SERPL-MCNC: 13.7 MG/DL (ref 8.3–10.1)
CALCIUM SERPL-MCNC: 12.7 MG/DL (ref 8.3–10.1)
CARDIAC TROPONIN I PNL SERPL HS: 4 NG/L
CARDIAC TROPONIN I PNL SERPL HS: 4 NG/L
CHLORIDE SERPL-SCNC: 100 MMOL/L (ref 96–108)
CK SERPL-CCNC: 18 U/L (ref 39–308)
CLARITY UR: ABNORMAL
CO2 SERPL-SCNC: 32 MMOL/L (ref 21–32)
COLOR UR: YELLOW
CREAT SERPL-MCNC: 3.48 MG/DL (ref 0.6–1.3)
CRP SERPL QL: 79.7 MG/L
D DIMER PPP FEU-MCNC: 0.97 UG/ML FEU
EOSINOPHIL # BLD AUTO: 0.49 THOUSAND/ÂΜL (ref 0–0.61)
EOSINOPHIL NFR BLD AUTO: 6 % (ref 0–6)
ERYTHROCYTE [DISTWIDTH] IN BLOOD BY AUTOMATED COUNT: 17.4 % (ref 11.6–15.1)
FLUAV RNA RESP QL NAA+PROBE: NEGATIVE
FLUBV RNA RESP QL NAA+PROBE: NEGATIVE
GFR SERPL CREATININE-BSD FRML MDRD: 18 ML/MIN/1.73SQ M
GLUCOSE SERPL-MCNC: 115 MG/DL (ref 65–140)
GLUCOSE SERPL-MCNC: 94 MG/DL (ref 65–140)
GLUCOSE UR STRIP-MCNC: NEGATIVE MG/DL
HCT VFR BLD AUTO: 34.8 % (ref 36.5–49.3)
HGB BLD-MCNC: 10.9 G/DL (ref 12–17)
HGB UR QL STRIP.AUTO: ABNORMAL
IMM GRANULOCYTES # BLD AUTO: 0.03 THOUSAND/UL (ref 0–0.2)
IMM GRANULOCYTES NFR BLD AUTO: 0 % (ref 0–2)
INR PPP: 0.99 (ref 0.84–1.19)
KETONES UR STRIP-MCNC: NEGATIVE MG/DL
LACTATE SERPL-SCNC: 0.9 MMOL/L (ref 0.5–2)
LEUKOCYTE ESTERASE UR QL STRIP: ABNORMAL
LYMPHOCYTES # BLD AUTO: 0.77 THOUSANDS/ÂΜL (ref 0.6–4.47)
LYMPHOCYTES NFR BLD AUTO: 10 % (ref 14–44)
MAGNESIUM SERPL-MCNC: 2.4 MG/DL (ref 1.6–2.6)
MCH RBC QN AUTO: 29.1 PG (ref 26.8–34.3)
MCHC RBC AUTO-ENTMCNC: 31.3 G/DL (ref 31.4–37.4)
MCV RBC AUTO: 93 FL (ref 82–98)
MONOCYTES # BLD AUTO: 1.21 THOUSAND/ÂΜL (ref 0.17–1.22)
MONOCYTES NFR BLD AUTO: 15 % (ref 4–12)
NEUTROPHILS # BLD AUTO: 5.31 THOUSANDS/ÂΜL (ref 1.85–7.62)
NEUTS SEG NFR BLD AUTO: 68 % (ref 43–75)
NITRITE UR QL STRIP: POSITIVE
NON-SQ EPI CELLS URNS QL MICRO: ABNORMAL /HPF
NRBC BLD AUTO-RTO: 0 /100 WBCS
NT-PROBNP SERPL-MCNC: 763 PG/ML
PH UR STRIP.AUTO: 7 [PH]
PLATELET # BLD AUTO: 170 THOUSANDS/UL (ref 149–390)
PMV BLD AUTO: 11.6 FL (ref 8.9–12.7)
POTASSIUM SERPL-SCNC: 3.5 MMOL/L (ref 3.5–5.3)
PROCALCITONIN SERPL-MCNC: 0.47 NG/ML
PROT SERPL-MCNC: 6.9 G/DL (ref 6.4–8.4)
PROT UR STRIP-MCNC: ABNORMAL MG/DL
PROTHROMBIN TIME: 13.2 SECONDS (ref 11.6–14.5)
RBC # BLD AUTO: 3.75 MILLION/UL (ref 3.88–5.62)
RBC #/AREA URNS AUTO: ABNORMAL /HPF
RSV RNA RESP QL NAA+PROBE: POSITIVE
SARS-COV-2 RNA RESP QL NAA+PROBE: NEGATIVE
SODIUM SERPL-SCNC: 138 MMOL/L (ref 135–147)
SP GR UR STRIP.AUTO: 1.01 (ref 1–1.03)
UROBILINOGEN UR QL STRIP.AUTO: 0.2 E.U./DL
WBC # BLD AUTO: 7.88 THOUSAND/UL (ref 4.31–10.16)
WBC #/AREA URNS AUTO: ABNORMAL /HPF

## 2022-11-16 RX ORDER — INSULIN LISPRO 100 [IU]/ML
1-6 INJECTION, SOLUTION INTRAVENOUS; SUBCUTANEOUS
Status: DISCONTINUED | OUTPATIENT
Start: 2022-11-16 | End: 2022-11-23 | Stop reason: HOSPADM

## 2022-11-16 RX ORDER — AMOXICILLIN 250 MG
1 CAPSULE ORAL 2 TIMES DAILY
Status: DISCONTINUED | OUTPATIENT
Start: 2022-11-16 | End: 2022-11-23 | Stop reason: HOSPADM

## 2022-11-16 RX ORDER — ACETAMINOPHEN 325 MG/1
650 TABLET ORAL EVERY 6 HOURS PRN
Status: DISCONTINUED | OUTPATIENT
Start: 2022-11-16 | End: 2022-11-23 | Stop reason: HOSPADM

## 2022-11-16 RX ORDER — ATORVASTATIN CALCIUM 40 MG/1
40 TABLET, FILM COATED ORAL
Status: DISCONTINUED | OUTPATIENT
Start: 2022-11-17 | End: 2022-11-23 | Stop reason: HOSPADM

## 2022-11-16 RX ORDER — SODIUM BICARBONATE 650 MG/1
650 TABLET ORAL DAILY
Status: DISCONTINUED | OUTPATIENT
Start: 2022-11-16 | End: 2022-11-17

## 2022-11-16 RX ORDER — CEFTRIAXONE 1 G/50ML
1000 INJECTION, SOLUTION INTRAVENOUS EVERY 24 HOURS
Status: DISCONTINUED | OUTPATIENT
Start: 2022-11-17 | End: 2022-11-19

## 2022-11-16 RX ORDER — TAMSULOSIN HYDROCHLORIDE 0.4 MG/1
0.4 CAPSULE ORAL
Status: DISCONTINUED | OUTPATIENT
Start: 2022-11-17 | End: 2022-11-23 | Stop reason: HOSPADM

## 2022-11-16 RX ORDER — HEPARIN SODIUM 5000 [USP'U]/ML
5000 INJECTION, SOLUTION INTRAVENOUS; SUBCUTANEOUS EVERY 8 HOURS SCHEDULED
Status: DISCONTINUED | OUTPATIENT
Start: 2022-11-17 | End: 2022-11-23 | Stop reason: HOSPADM

## 2022-11-16 RX ORDER — INSULIN LISPRO 100 [IU]/ML
1-6 INJECTION, SOLUTION INTRAVENOUS; SUBCUTANEOUS
Status: DISCONTINUED | OUTPATIENT
Start: 2022-11-17 | End: 2022-11-23 | Stop reason: HOSPADM

## 2022-11-16 RX ORDER — ALLOPURINOL 100 MG/1
100 TABLET ORAL DAILY
Status: DISCONTINUED | OUTPATIENT
Start: 2022-11-17 | End: 2022-11-17

## 2022-11-16 RX ORDER — FERROUS SULFATE 325(65) MG
325 TABLET ORAL
Status: DISCONTINUED | OUTPATIENT
Start: 2022-11-17 | End: 2022-11-23 | Stop reason: HOSPADM

## 2022-11-16 RX ORDER — POLYETHYLENE GLYCOL 3350 17 G/17G
17 POWDER, FOR SOLUTION ORAL DAILY
Status: DISCONTINUED | OUTPATIENT
Start: 2022-11-17 | End: 2022-11-23 | Stop reason: HOSPADM

## 2022-11-16 RX ORDER — CARVEDILOL 6.25 MG/1
6.25 TABLET ORAL 2 TIMES DAILY WITH MEALS
Status: DISCONTINUED | OUTPATIENT
Start: 2022-11-17 | End: 2022-11-23 | Stop reason: HOSPADM

## 2022-11-16 RX ORDER — OXYCODONE HYDROCHLORIDE AND ACETAMINOPHEN 5; 325 MG/1; MG/1
1 TABLET ORAL ONCE
Status: COMPLETED | OUTPATIENT
Start: 2022-11-16 | End: 2022-11-16

## 2022-11-16 RX ORDER — IPRATROPIUM BROMIDE AND ALBUTEROL SULFATE 2.5; .5 MG/3ML; MG/3ML
3 SOLUTION RESPIRATORY (INHALATION) ONCE
Status: COMPLETED | OUTPATIENT
Start: 2022-11-16 | End: 2022-11-16

## 2022-11-16 RX ORDER — POLYETHYLENE GLYCOL 3350 17 G/17G
17 POWDER, FOR SOLUTION ORAL DAILY PRN
Status: DISCONTINUED | OUTPATIENT
Start: 2022-11-16 | End: 2022-11-16

## 2022-11-16 RX ORDER — ONDANSETRON 2 MG/ML
4 INJECTION INTRAMUSCULAR; INTRAVENOUS EVERY 8 HOURS PRN
Status: DISCONTINUED | OUTPATIENT
Start: 2022-11-16 | End: 2022-11-23 | Stop reason: HOSPADM

## 2022-11-16 RX ORDER — LANOLIN ALCOHOL/MO/W.PET/CERES
100 CREAM (GRAM) TOPICAL DAILY
Status: DISCONTINUED | OUTPATIENT
Start: 2022-11-17 | End: 2022-11-23 | Stop reason: HOSPADM

## 2022-11-16 RX ORDER — OXYCODONE HYDROCHLORIDE 5 MG/1
5 CAPSULE ORAL EVERY 4 HOURS PRN
COMMUNITY

## 2022-11-16 RX ORDER — OXYCODONE HYDROCHLORIDE 5 MG/1
5 TABLET ORAL EVERY 4 HOURS PRN
Status: DISCONTINUED | OUTPATIENT
Start: 2022-11-16 | End: 2022-11-23 | Stop reason: HOSPADM

## 2022-11-16 RX ORDER — SODIUM CHLORIDE 9 MG/ML
150 INJECTION, SOLUTION INTRAVENOUS CONTINUOUS
Status: DISCONTINUED | OUTPATIENT
Start: 2022-11-16 | End: 2022-11-17

## 2022-11-16 RX ORDER — PREGABALIN 75 MG/1
150 CAPSULE ORAL 2 TIMES DAILY
Status: DISCONTINUED | OUTPATIENT
Start: 2022-11-16 | End: 2022-11-23 | Stop reason: HOSPADM

## 2022-11-16 RX ORDER — BUPRENORPHINE HYDROCHLORIDE 300 UG/1
300 FILM, SOLUBLE BUCCAL 2 TIMES DAILY
COMMUNITY
Start: 2022-10-24

## 2022-11-16 RX ORDER — AMLODIPINE BESYLATE 10 MG/1
10 TABLET ORAL DAILY
Status: DISCONTINUED | OUTPATIENT
Start: 2022-11-17 | End: 2022-11-17

## 2022-11-16 RX ORDER — SEVELAMER HYDROCHLORIDE 800 MG/1
800 TABLET, FILM COATED ORAL
Status: DISCONTINUED | OUTPATIENT
Start: 2022-11-17 | End: 2022-11-17

## 2022-11-16 RX ORDER — VENLAFAXINE HYDROCHLORIDE 37.5 MG/1
37.5 CAPSULE, EXTENDED RELEASE ORAL 3 TIMES DAILY
Status: DISCONTINUED | OUTPATIENT
Start: 2022-11-16 | End: 2022-11-23 | Stop reason: HOSPADM

## 2022-11-16 RX ORDER — CEFTRIAXONE 1 G/50ML
1000 INJECTION, SOLUTION INTRAVENOUS ONCE
Status: COMPLETED | OUTPATIENT
Start: 2022-11-16 | End: 2022-11-16

## 2022-11-16 RX ADMIN — SODIUM CHLORIDE 150 ML/HR: 0.9 INJECTION, SOLUTION INTRAVENOUS at 19:57

## 2022-11-16 RX ADMIN — SENNOSIDES, DOCUSATE SODIUM 1 TABLET: 8.6; 5 TABLET ORAL at 21:00

## 2022-11-16 RX ADMIN — CEFTRIAXONE 1000 MG: 1 INJECTION, SOLUTION INTRAVENOUS at 19:58

## 2022-11-16 RX ADMIN — OXYCODONE HYDROCHLORIDE 5 MG: 5 TABLET ORAL at 21:05

## 2022-11-16 RX ADMIN — SODIUM BICARBONATE 650 MG TABLET 650 MG: at 21:00

## 2022-11-16 RX ADMIN — IPRATROPIUM BROMIDE AND ALBUTEROL SULFATE 3 ML: .5; 3 SOLUTION RESPIRATORY (INHALATION) at 18:41

## 2022-11-16 RX ADMIN — BUPRENORPHINE HYDROCHLORIDE 300 MCG: 300 FILM, SOLUBLE BUCCAL at 22:38

## 2022-11-16 RX ADMIN — VENLAFAXINE HYDROCHLORIDE 37.5 MG: 37.5 CAPSULE, EXTENDED RELEASE ORAL at 21:00

## 2022-11-16 RX ADMIN — OXYCODONE HYDROCHLORIDE AND ACETAMINOPHEN 1 TABLET: 5; 325 TABLET ORAL at 19:57

## 2022-11-16 RX ADMIN — SODIUM CHLORIDE 1000 ML: 0.9 INJECTION, SOLUTION INTRAVENOUS at 18:41

## 2022-11-16 RX ADMIN — PREGABALIN 150 MG: 75 CAPSULE ORAL at 21:00

## 2022-11-16 NOTE — ED PROVIDER NOTES
History  Chief Complaint   Patient presents with   • Decreased Oxygen Level     Pt presented to this ED via EMS c/o low O2 levels at home in the 80's  Per EMS upon arrival, o2 84% and placed on 2L NC -95%  Patient is a 58-year-old male brought to the emergency department by EMS secondary to cough and congestion with shortness of breath and low pulse ox measured at today, he denies chest pain, he does report that he has coughing fits which occasionally lead to vomiting but has no abdominal discomfort, no diarrhea, he is a smoker, having cut down to 1 cigarette a month at this time, patient denies any sick contacts, he does not wear oxygen at home, he does have a diagnosis of COPD          Prior to Admission Medications   Prescriptions Last Dose Informant Patient Reported? Taking? Buprenorphine HCl (Belbuca) 300 MCG FILM   Yes Yes   Sig: Apply 300 mcg to cheek 2 (two) times a day   acetaminophen (TYLENOL) 325 mg tablet   Yes No   Sig: Take 650 mg by mouth every 4 (four) hours as needed for mild pain   allopurinol (ZYLOPRIM) 100 mg tablet   No No   Sig: Take 1 tablet (100 mg total) by mouth daily   amLODIPine (NORVASC) 10 mg tablet   Yes No   Sig: Take 1 tablet by mouth daily   atorvastatin (LIPITOR) 40 mg tablet   Yes No   Sig: Take 40 mg by mouth   carvedilol (COREG) 6 25 mg tablet   Yes No   Sig: Take 6 25 mg by mouth 2 (two) times a day with meals   colchicine (COLCRYS) 0 6 mg tablet   No No   Sig: Take 0 5 tablets (0 3 mg total) by mouth daily Do not start before October 25, 2022  cyclobenzaprine (FLEXERIL) 5 mg tablet   Yes No   Sig: Take 5 mg by mouth 3 (three) times a day   ferrous sulfate 325 (65 Fe) mg tablet   No No   Sig: Take 1 tablet (325 mg total) by mouth daily with breakfast Do not start before October 25, 2022     insulin glargine (LANTUS) 100 units/mL subcutaneous injection   No No   Sig: Inject 10 Units under the skin daily at bedtime   insulin lispro (HumaLOG) 100 units/mL injection   No No Sig: Inject 2-12 Units under the skin 3 (three) times a day before meals   lidocaine (LIDODERM) 5 %   No No   Sig: Apply 1 patch topically daily Remove & Discard patch within 12 hours or as directed by MD   nicotine (NICODERM CQ) 7 mg/24hr TD 24 hr patch   No No   Sig: Place 1 patch on the skin daily Do not start before October 25, 2022  oxyCODONE (OXY-IR) 5 MG capsule   Yes Yes   Sig: Take 5 mg by mouth every 4 (four) hours as needed for moderate pain or severe pain   polyethylene glycol (MIRALAX) 17 g packet   Yes No   Sig: Take 17 g by mouth 2 (two) times a day   pregabalin (LYRICA) 150 mg capsule   Yes No   Sig: Take 150 mg by mouth 2 (two) times a day   senna-docusate sodium (SENOKOT S) 8 6-50 mg per tablet   Yes No   Sig: Take 1 tablet by mouth 2 (two) times a day   sevelamer (RENAGEL) 800 mg tablet   No No   Sig: Take 1 tablet (800 mg total) by mouth daily with dinner   sodium bicarbonate 650 mg tablet   No No   Sig: Take 1 tablet (650 mg total) by mouth in the morning Do not start before October 25, 2022  tamsulosin (FLOMAX) 0 4 mg   Yes No   Sig: Take by mouth   thiamine 100 MG tablet   Yes No   Sig: Take 100 mg by mouth daily   venlafaxine (EFFEXOR-XR) 37 5 mg 24 hr capsule   Yes No   Sig: Take 37 5 mg by mouth 3 (three) times a day      Facility-Administered Medications: None       Past Medical History:   Diagnosis Date   • Chronic kidney disease    • Diabetes mellitus ([facility] [address] )    • Gout    • Hypertension    • Renal disorder    • Retroperitoneal abscess (HCC)    • Stroke (HCC)    • Vertebral osteomyelitis (HCC)        Past Surgical History:   Procedure Laterality Date   • BACK SURGERY     • ORCHIECTOMY         Family History   Problem Relation Age of Onset   • Hypertension Father      I have reviewed and agree with the history as documented      E-Cigarette/Vaping   • E-Cigarette Use Never User      E-Cigarette/Vaping Substances     Social History     Tobacco Use   • Smoking status: Every Day Packs/day: 0 25     Types: Cigarettes   • Smokeless tobacco: Never   Vaping Use   • Vaping Use: Never used   Substance Use Topics   • Alcohol use: Not Currently   • Drug use: Yes     Types: Marijuana       Review of Systems   Constitutional: Negative  HENT: Positive for congestion  Eyes: Negative  Respiratory: Positive for cough, shortness of breath and wheezing  Cardiovascular: Negative  Gastrointestinal: Positive for vomiting  Endocrine: Negative  Genitourinary: Negative  Musculoskeletal: Negative  Skin: Negative  Allergic/Immunologic: Negative  Neurological: Negative  Hematological: Negative  Psychiatric/Behavioral: Negative  Physical Exam  Physical Exam  Constitutional:       Appearance: He is well-developed and well-nourished  HENT:      Head: Normocephalic and atraumatic  Eyes:      Extraocular Movements: EOM normal       Conjunctiva/sclera: Conjunctivae normal       Pupils: Pupils are equal, round, and reactive to light  Cardiovascular:      Rate and Rhythm: Normal rate  Pulmonary:      Effort: Pulmonary effort is normal       Breath sounds: Transmitted upper airway sounds present  Wheezing present  Abdominal:      Palpations: Abdomen is soft  Musculoskeletal:         General: Normal range of motion  Cervical back: Normal range of motion and neck supple  Skin:     General: Skin is warm and dry  Neurological:      Mental Status: He is alert and oriented to person, place, and time     Psychiatric:         Mood and Affect: Mood and affect normal          Vital Signs  ED Triage Vitals   Temperature Pulse Respirations Blood Pressure SpO2   11/16/22 1643 11/16/22 1643 11/16/22 1643 11/16/22 1643 11/16/22 1640   98 4 °F (36 9 °C) 74 20 134/66 95 %      Temp Source Heart Rate Source Patient Position - Orthostatic VS BP Location FiO2 (%)   11/16/22 1643 11/16/22 1643 11/16/22 1643 11/16/22 1643 --   Oral Monitor Lying Left arm       Pain Score 11/16/22 1643       No Pain           Vitals:    11/16/22 1915 11/16/22 1930 11/16/22 2000 11/16/22 2043   BP: 160/75 155/72 134/58 149/69   Pulse: 80 78 83 81   Patient Position - Orthostatic VS: Lying Lying Lying          Visual Acuity      ED Medications  Medications   sodium chloride 0 9 % infusion (150 mL/hr Intravenous New Bag 11/16/22 1957)   cefTRIAXone (ROCEPHIN) IVPB (premix in dextrose) 1,000 mg 50 mL (has no administration in time range)   heparin (porcine) subcutaneous injection 5,000 Units (has no administration in time range)   insulin lispro (HumaLOG) 100 units/mL subcutaneous injection 1-6 Units (1 Units Subcutaneous Not Given 11/16/22 2100)   insulin lispro (HumaLOG) 100 units/mL subcutaneous injection 1-6 Units (has no administration in time range)   acetaminophen (TYLENOL) tablet 650 mg (has no administration in time range)   allopurinol (ZYLOPRIM) tablet 100 mg (has no administration in time range)   amLODIPine (NORVASC) tablet 10 mg (has no administration in time range)   atorvastatin (LIPITOR) tablet 40 mg (has no administration in time range)   carvedilol (COREG) tablet 6 25 mg (has no administration in time range)   ferrous sulfate tablet 325 mg (has no administration in time range)   pregabalin (LYRICA) capsule 150 mg (150 mg Oral Given 11/16/22 2100)   senna-docusate sodium (SENOKOT S) 8 6-50 mg per tablet 1 tablet (1 tablet Oral Given 11/16/22 2100)   sevelamer (RENAGEL) tablet 800 mg (has no administration in time range)   sodium bicarbonate tablet 650 mg (650 mg Oral Given 11/16/22 2100)   tamsulosin (FLOMAX) capsule 0 4 mg (has no administration in time range)   thiamine tablet 100 mg (has no administration in time range)   venlafaxine (EFFEXOR-XR) 24 hr capsule 37 5 mg (37 5 mg Oral Given 11/16/22 2100)   oxyCODONE (ROXICODONE) IR tablet 5 mg (5 mg Oral Given 11/16/22 2105)   polyethylene glycol (MIRALAX) packet 17 g (has no administration in time range)   ondansetron (ZOFRAN) injection 4 mg (has no administration in time range)   sodium chloride 0 9 % bolus 1,000 mL (0 mL Intravenous Stopped 11/16/22 1956)   ipratropium-albuterol (DUO-NEB) 0 5-2 5 mg/3 mL inhalation solution 3 mL (3 mL Nebulization Given 11/16/22 1841)   cefTRIAXone (ROCEPHIN) IVPB (premix in dextrose) 1,000 mg 50 mL (1,000 mg Intravenous New Bag 11/16/22 1958)   oxyCODONE-acetaminophen (PERCOCET) 5-325 mg per tablet 1 tablet (1 tablet Oral Given 11/16/22 1957)       Diagnostic Studies  Results Reviewed     Procedure Component Value Units Date/Time    Calcium, ionized [[id number]] Collected: 11/16/22 2101    Lab Status: In process Specimen: Blood from Arm, Right Updated: 11/16/22 2107    HS Troponin I 4hr [[id number]] Collected: 11/16/22 2101    Lab Status:  In process Specimen: Blood from Arm, Right Updated: 11/16/22 2107    Urine Microscopic [[id number]]  (Abnormal) Collected: 11/16/22 1838    Lab Status: Final result Specimen: Urine, Straight Cath Updated: 11/16/22 1855     RBC, UA 0-1 /hpf      WBC, UA 4-10 /hpf      Epithelial Cells Occasional /hpf      Bacteria, UA Moderate /hpf     UA w Reflex to Microscopic w Reflex to Culture [[id number]]  (Abnormal) Collected: 11/16/22 1838    Lab Status: Final result Specimen: Urine, Straight Cath Updated: 11/16/22 1844     Color, UA Yellow     Clarity, UA Slightly Cloudy     Specific Gravity, UA 1 015     pH, UA 7 0     Leukocytes, UA Large     Nitrite, UA Positive     Protein,  (2+) mg/dl      Glucose, UA Negative mg/dl      Ketones, UA Negative mg/dl      Urobilinogen, UA 0 2 E U /dl      Bilirubin, UA Negative     Occult Blood, UA Small    Procalcitonin [[id number]]  (Abnormal) Collected: 11/16/22 1713    Lab Status: Final result Specimen: Blood from Arm, Left Updated: 11/16/22 1819     Procalcitonin 0 47 ng/ml     HS Troponin 0hr (reflex protocol) [[id number]]  (Normal) Collected: 11/16/22 1713    Lab Status: Final result Specimen: Blood from Arm, Left Updated: 11/16/22 1816     hs TnI 0hr 4 ng/L     D-dimer, quantitative [[id number]]  (Abnormal) Collected: 11/16/22 1713    Lab Status: Final result Specimen: Blood from Arm, Left Updated: 11/16/22 1802     D-Dimer, Quant 0 97 ug/ml FEU     Narrative: In the evaluation for possible pulmonary embolism, in the appropriate (Well's Score of 4 or less) patient, the age adjusted d-dimer cutoff for this patient can be calculated as:    Age x 0 01 (in ug/mL) for Age-adjusted D-dimer exclusion threshold for a patient over 50 years  FLU/RSV/COVID - if FLU/RSV clinically relevant [[id number]]  (Abnormal) Collected: 11/16/22 1711    Lab Status: Final result Specimen: Nares from Nose Updated: 11/16/22 1801     SARS-CoV-2 Negative     INFLUENZA A PCR Negative     INFLUENZA B PCR Negative     RSV PCR Positive    Narrative:      FOR PEDIATRIC PATIENTS - copy/paste COVID Guidelines URL to browser: [email or web address]  [facility]    SARS-CoV-2 assay is a Nucleic Acid Amplification assay intended for the  qualitative detection of nucleic acid from SARS-CoV-2 in nasopharyngeal  swabs  Results are for the presumptive identification of SARS-CoV-2 RNA  Positive results are indicative of infection with SARS-CoV-2, the virus  causing COVID-19, but do not rule out bacterial infection or co-infection  with other viruses  Laboratories within the [place] and its  territories are required to report all positive results to the appropriate  public health authorities  Negative results do not preclude SARS-CoV-2  infection and should not be used as the sole basis for treatment or other  patient management decisions  Negative results must be combined with  clinical observations, patient history, and epidemiological information  This test has not been FDA cleared or approved  This test has been authorized by FDA under an Emergency Use Authorization  (EUA)   This test is only authorized for the duration of time the  declaration that circumstances exist justifying the authorization of the  emergency use of an in vitro diagnostic tests for detection of SARS-CoV-2  virus and/or diagnosis of COVID-19 infection under section 564(b)(1) of  the Act, 21 U  S C  617JVE-1(V)(3), unless the authorization is terminated  or revoked sooner  The test has been validated but independent review by FDA  and CLIA is pending  Test performed using [employer] GeneXpert: This RT-PCR assay targets N2,  a region unique to SARS-CoV-2  A conserved region in the E-gene was chosen  for pan-Sarbecovirus detection which includes SARS-CoV-2  According to CMS-2020-01-R, this platform meets the definition of high-throughput technology      Comprehensive metabolic panel [[id number]]  (Abnormal) Collected: 11/16/22 1713    Lab Status: Final result Specimen: Blood from Arm, Left Updated: 11/16/22 1756     Sodium 138 mmol/L      Potassium 3 5 mmol/L      Chloride 100 mmol/L      CO2 32 mmol/L      ANION GAP 6 mmol/L      BUN 44 mg/dL      Creatinine 3 48 mg/dL      Glucose 94 mg/dL      Calcium 12 7 mg/dL      Corrected Calcium 13 7 mg/dL      AST 11 U/L      ALT 13 U/L      Alkaline Phosphatase 95 U/L      Total Protein 6 9 g/dL      Albumin 2 8 g/dL      Total Bilirubin 0 27 mg/dL      eGFR 18 ml/min/1 73sq m     Narrative:      [place] guidelines for Chronic Kidney Disease (CKD):   •  Stage 1 with normal or high GFR (GFR > 90 mL/min/1 73 square meters)  •  Stage 2 Mild CKD (GFR = 60-89 mL/min/1 73 square meters)  •  Stage 3A Moderate CKD (GFR = 45-59 mL/min/1 73 square meters)  •  Stage 3B Moderate CKD (GFR = 30-44 mL/min/1 73 square meters)  •  Stage 4 Severe CKD (GFR = 15-29 mL/min/1 73 square meters)  •  Stage 5 End Stage CKD (GFR <15 mL/min/1 73 square meters)  Note: GFR calculation is accurate only with a steady state creatinine    Magnesium [[id number]]  (Normal) Collected: 11/16/22 1713    Lab Status: Final result Specimen: Blood from Arm, Left Updated: 11/16/22 1752     Magnesium 2 4 mg/dL     C-reactive protein [[id number]]  (Abnormal) Collected: 11/16/22 1713    Lab Status: Final result Specimen: Blood from Arm, Left Updated: 11/16/22 1752     CRP 79 7 mg/L     NT-BNP PRO [[id number]]  (Abnormal) Collected: 11/16/22 1713    Lab Status: Final result Specimen: Blood from Arm, Left Updated: 11/16/22 1752     NT-proBNP 763 pg/mL     CK (with reflex to MB) [[id number]]  (Abnormal) Collected: 11/16/22 1713    Lab Status: Final result Specimen: Blood from Arm, Left Updated: 11/16/22 1752     Total CK 18 U/L     Lactic acid, plasma [[id number]]  (Normal) Collected: 11/16/22 1713    Lab Status: Final result Specimen: Blood from Arm, Left Updated: 11/16/22 1748     LACTIC ACID 0 9 mmol/L     Narrative:      Result may be elevated if tourniquet was used during collection  Protime-INR [[id number]]  (Normal) Collected: 11/16/22 1713    Lab Status: Final result Specimen: Blood from Arm, Left Updated: 11/16/22 1736     Protime 13 2 seconds      INR 0 99    CBC and differential [[id number]]  (Abnormal) Collected: 11/16/22 1713    Lab Status: Final result Specimen: Blood from Arm, Left Updated: 11/16/22 1724     WBC 7 88 Thousand/uL      RBC 3 75 Million/uL      Hemoglobin 10 9 g/dL      Hematocrit 34 8 %      MCV 93 fL      MCH 29 1 pg      MCHC 31 3 g/dL      RDW 17 4 %      MPV 11 6 fL      Platelets 784 Thousands/uL      nRBC 0 /100 WBCs      Neutrophils Relative 68 %      Immat GRANS % 0 %      Lymphocytes Relative 10 %      Monocytes Relative 15 %      Eosinophils Relative 6 %      Basophils Relative 1 %      Neutrophils Absolute 5 31 Thousands/µL      Immature Grans Absolute 0 03 Thousand/uL      Lymphocytes Absolute 0 77 Thousands/µL      Monocytes Absolute 1 21 Thousand/µL      Eosinophils Absolute 0 49 Thousand/µL      Basophils Absolute 0 07 Thousands/µL     Blood culture #1 [[id number]] Collected: 11/16/22 1713    Lab Status:  In process Specimen: Blood from Arm, Left Updated: 11/16/22 1722    Blood culture #2 [[id number]] Collected: 11/16/22 1715    Lab Status: In process Specimen: Blood from Arm, Left Updated: 11/16/22 1722                 XR chest 1 view portable   Final Result by Kelly England MD (11/16 1735)      No acute cardiopulmonary disease  Workstation performed: [id number]                    Procedures  ECG 12 Lead Documentation Only    Date/Time: 11/16/2022 7:30 PM  Performed by: Elian Beard DO  Authorized by: Elian Beard DO     Indications / Diagnosis:  Shortness of breath  ECG reviewed by me, the ED Provider: yes    Patient location:  ED  Previous ECG:     Previous ECG:  Unavailable    Comparison to cardiac monitor: Yes    Interpretation:     Interpretation: normal    Rate:     ECG rate:  79    ECG rate assessment: normal    Rhythm:     Rhythm: sinus rhythm    Ectopy:     Ectopy: none    QRS:     QRS axis:  Left    QRS intervals:  Normal  Conduction:     Conduction: normal    ST segments:     ST segments:  Normal  T waves:     T waves: inverted      Inverted:  V4, V5 and V6             ED Course  ED Course as of 11/16/22 2110   Wed Nov 16, 2022 2108 Patient with evidence of UTI and other lab abnormalities with worsening KELLY, therefore observation admission recommended, hospitals in agreement, patient and agreement                               SBIRT 22yo+    Flowsheet Row Most Recent Value   SBIRT (23 yo +)    In order to provide better care to our patients, we are screening all of our patients for alcohol and drug use  Would it be okay to ask you these screening questions? Yes Filed at: 11/16/2022 1722   Initial Alcohol Screen: US AUDIT-C     1  How often do you have a drink containing alcohol? 0 Filed at: 11/16/2022 1722   2  How many drinks containing alcohol do you have on a typical day you are drinking? 0 Filed at: 11/16/2022 1722   3a  Male UNDER 65:  How often do you have five or more drinks on one occasion? 0 Filed at: 11/16/2022 1722   3b  FEMALE Any Age, or MALE 65+: How often do you have 4 or more drinks on one occassion? 0 Filed at: 11/16/2022 1722   Audit-C Score 0 Filed at: 11/16/2022 1722   JOANNE: How many times in the past year have you    Used an illegal drug or used a prescription medication for non-medical reasons?  Never Filed at: 11/16/2022 1722                      Disposition  Final diagnoses:   Low oxygen saturation   KELLY (acute kidney injury) ([facility] [address] )   Respiratory syncytial virus   Hypercalcemia   UTI (urinary tract infection)     Time reflects when diagnosis was documented in both MDM as applicable and the Disposition within this note     Time User Action Codes Description Comment    11/16/2022  7:31 PM Baldomero Punt Add [R79 81] Low oxygen saturation     11/16/2022  7:31 PM Baldomero Punt Add [N17 9] KELLY (acute kidney injury) ([facility] )     11/16/2022  7:31 PM PolanczykRolando Dasen Add [B33 8] Respiratory syncytial virus     11/16/2022  7:32 PM Polanczyk Rolando Dasen Add [E83 52] Hypercalcemia     11/16/2022  7:32 PM PolanczykRolando Dasen Add [N39 0] UTI (urinary tract infection)       ED Disposition     ED Disposition   Admit    Condition   Stable    Date/Time   Wed Nov 16, 2022  7:31 PM    Comment   Case was discussed with Dr Joshua Hoover and the patient's admission status was agreed to be Admission Status: observation status to the service of Dr Joshua Hoover             Follow-up Information    None         Current Discharge Medication List      CONTINUE these medications which have NOT CHANGED    Details   Buprenorphine HCl (Belbuca) 300 MCG FILM Apply 300 mcg to cheek 2 (two) times a day      oxyCODONE (OXY-IR) 5 MG capsule Take 5 mg by mouth every 4 (four) hours as needed for moderate pain or severe pain      acetaminophen (TYLENOL) 325 mg tablet Take 650 mg by mouth every 4 (four) hours as needed for mild pain      allopurinol (ZYLOPRIM) 100 mg tablet Take 1 tablet (100 mg total) by mouth daily  Qty: 30 tablet, Refills: 0    Associated Diagnoses: Gout, unspecified cause, unspecified chronicity, unspecified site      amLODIPine (NORVASC) 10 mg tablet Take 1 tablet by mouth daily      atorvastatin (LIPITOR) 40 mg tablet Take 40 mg by mouth      carvedilol (COREG) 6 25 mg tablet Take 6 25 mg by mouth 2 (two) times a day with meals      colchicine (COLCRYS) 0 6 mg tablet Take 0 5 tablets (0 3 mg total) by mouth daily Do not start before October 25, 2022  Refills: 0    Associated Diagnoses: Left-sided low back pain with left-sided sciatica, unspecified chronicity      cyclobenzaprine (FLEXERIL) 5 mg tablet Take 5 mg by mouth 3 (three) times a day      ferrous sulfate 325 (65 Fe) mg tablet Take 1 tablet (325 mg total) by mouth daily with breakfast Do not start before October 25, 2022  Refills: 0    Associated Diagnoses: Iron deficiency anemia secondary to inadequate dietary iron intake      insulin glargine (LANTUS) 100 units/mL subcutaneous injection Inject 10 Units under the skin daily at bedtime  Qty: 10 mL, Refills: 0    Associated Diagnoses: Diabetes mellitus type 2, insulin dependent (HCC)      insulin lispro (HumaLOG) 100 units/mL injection Inject 2-12 Units under the skin 3 (three) times a day before meals  Refills: 0    Associated Diagnoses: Diabetes mellitus type 2, insulin dependent (HCC)      lidocaine (LIDODERM) 5 % Apply 1 patch topically daily Remove & Discard patch within 12 hours or as directed by MD  Qty: 6 patch, Refills: 0    Associated Diagnoses: Hip pain, acute, left; Osteomyelitis of vertebra of lumbar region ([facility] [address] )      nicotine (NICODERM CQ) 7 mg/24hr TD 24 hr patch Place 1 patch on the skin daily Do not start before October 25, 2022    Qty: 28 patch, Refills: 0    Associated Diagnoses: Diabetes mellitus type 2, insulin dependent (HCC)      polyethylene glycol (MIRALAX) 17 g packet Take 17 g by mouth 2 (two) times a day      pregabalin (LYRICA) 150 mg capsule Take 150 mg by mouth 2 (two) times a day      senna-docusate sodium (SENOKOT S) 8 6-50 mg per tablet Take 1 tablet by mouth 2 (two) times a day      sevelamer (RENAGEL) 800 mg tablet Take 1 tablet (800 mg total) by mouth daily with dinner  Qty: 30 tablet, Refills: 0    Associated Diagnoses: Chronic kidney disease, unspecified CKD stage      sodium bicarbonate 650 mg tablet Take 1 tablet (650 mg total) by mouth in the morning Do not start before October 25, 2022  Refills: 0    Associated Diagnoses: Stage 3a chronic kidney disease (HCC)      tamsulosin (FLOMAX) 0 4 mg Take by mouth      thiamine 100 MG tablet Take 100 mg by mouth daily      venlafaxine (EFFEXOR-XR) 37 5 mg 24 hr capsule Take 37 5 mg by mouth 3 (three) times a day             No discharge procedures on file      PDMP Review       Value Time User    PDMP Reviewed  Yes 11/16/2022  8:48 PM Gabby Watkins, [address] [facility] Provider  Electronically Signed by           Elian Beard DO  11/16/22 9014

## 2022-11-17 PROBLEM — N30.00 ACUTE CYSTITIS: Status: ACTIVE | Noted: 2022-11-17

## 2022-11-17 LAB
ALBUMIN SERPL BCP-MCNC: 2.3 G/DL (ref 3.5–5)
ALP SERPL-CCNC: 78 U/L (ref 46–116)
ALT SERPL W P-5'-P-CCNC: <6 U/L (ref 12–78)
ANION GAP SERPL CALCULATED.3IONS-SCNC: 11 MMOL/L (ref 4–13)
AST SERPL W P-5'-P-CCNC: 10 U/L (ref 5–45)
ATRIAL RATE: 79 BPM
BASOPHILS # BLD AUTO: 0.04 THOUSANDS/ÂΜL (ref 0–0.1)
BASOPHILS NFR BLD AUTO: 1 % (ref 0–1)
BILIRUB SERPL-MCNC: 0.23 MG/DL (ref 0.2–1)
BUN SERPL-MCNC: 41 MG/DL (ref 5–25)
CALCIUM ALBUM COR SERPL-MCNC: 12.8 MG/DL (ref 8.3–10.1)
CALCIUM SERPL-MCNC: 11.4 MG/DL (ref 8.3–10.1)
CHLORIDE SERPL-SCNC: 106 MMOL/L (ref 96–108)
CO2 SERPL-SCNC: 24 MMOL/L (ref 21–32)
CREAT SERPL-MCNC: 3.09 MG/DL (ref 0.6–1.3)
EOSINOPHIL # BLD AUTO: 0.39 THOUSAND/ÂΜL (ref 0–0.61)
EOSINOPHIL NFR BLD AUTO: 6 % (ref 0–6)
ERYTHROCYTE [DISTWIDTH] IN BLOOD BY AUTOMATED COUNT: 17.3 % (ref 11.6–15.1)
EST. AVERAGE GLUCOSE BLD GHB EST-MCNC: 143 MG/DL
GFR SERPL CREATININE-BSD FRML MDRD: 21 ML/MIN/1.73SQ M
GLUCOSE P FAST SERPL-MCNC: 143 MG/DL (ref 65–99)
GLUCOSE SERPL-MCNC: 126 MG/DL (ref 65–140)
GLUCOSE SERPL-MCNC: 135 MG/DL (ref 65–140)
GLUCOSE SERPL-MCNC: 143 MG/DL (ref 65–140)
GLUCOSE SERPL-MCNC: 166 MG/DL (ref 65–140)
GLUCOSE SERPL-MCNC: 167 MG/DL (ref 65–140)
HBA1C MFR BLD: 6.6 %
HCT VFR BLD AUTO: 28.7 % (ref 36.5–49.3)
HGB BLD-MCNC: 9 G/DL (ref 12–17)
IMM GRANULOCYTES # BLD AUTO: 0.02 THOUSAND/UL (ref 0–0.2)
IMM GRANULOCYTES NFR BLD AUTO: 0 % (ref 0–2)
LYMPHOCYTES # BLD AUTO: 0.88 THOUSANDS/ÂΜL (ref 0.6–4.47)
LYMPHOCYTES NFR BLD AUTO: 14 % (ref 14–44)
MAGNESIUM SERPL-MCNC: 2.1 MG/DL (ref 1.6–2.6)
MCH RBC QN AUTO: 29.2 PG (ref 26.8–34.3)
MCHC RBC AUTO-ENTMCNC: 31.4 G/DL (ref 31.4–37.4)
MCV RBC AUTO: 93 FL (ref 82–98)
MONOCYTES # BLD AUTO: 1.19 THOUSAND/ÂΜL (ref 0.17–1.22)
MONOCYTES NFR BLD AUTO: 20 % (ref 4–12)
NEUTROPHILS # BLD AUTO: 3.57 THOUSANDS/ÂΜL (ref 1.85–7.62)
NEUTS SEG NFR BLD AUTO: 59 % (ref 43–75)
NRBC BLD AUTO-RTO: 0 /100 WBCS
P AXIS: 81 DEGREES
PHOSPHATE SERPL-MCNC: 4.3 MG/DL (ref 2.7–4.5)
PLATELET # BLD AUTO: 143 THOUSANDS/UL (ref 149–390)
PMV BLD AUTO: 11.2 FL (ref 8.9–12.7)
POTASSIUM SERPL-SCNC: 3.4 MMOL/L (ref 3.5–5.3)
PR INTERVAL: 154 MS
PROT SERPL-MCNC: 5.5 G/DL (ref 6.4–8.4)
QRS AXIS: -37 DEGREES
QRSD INTERVAL: 110 MS
QT INTERVAL: 396 MS
QTC INTERVAL: 454 MS
RBC # BLD AUTO: 3.08 MILLION/UL (ref 3.88–5.62)
SODIUM SERPL-SCNC: 141 MMOL/L (ref 135–147)
T WAVE AXIS: 81 DEGREES
VENTRICULAR RATE: 79 BPM
WBC # BLD AUTO: 6.09 THOUSAND/UL (ref 4.31–10.16)

## 2022-11-17 RX ORDER — ALLOPURINOL 100 MG/1
50 TABLET ORAL DAILY
Status: DISCONTINUED | OUTPATIENT
Start: 2022-11-18 | End: 2022-11-23 | Stop reason: HOSPADM

## 2022-11-17 RX ORDER — POTASSIUM CHLORIDE 20 MEQ/1
20 TABLET, EXTENDED RELEASE ORAL ONCE
Status: COMPLETED | OUTPATIENT
Start: 2022-11-17 | End: 2022-11-17

## 2022-11-17 RX ORDER — AMLODIPINE BESYLATE 10 MG/1
10 TABLET ORAL DAILY
Status: DISCONTINUED | OUTPATIENT
Start: 2022-11-18 | End: 2022-11-23 | Stop reason: HOSPADM

## 2022-11-17 RX ORDER — SODIUM CHLORIDE, SODIUM GLUCONATE, SODIUM ACETATE, POTASSIUM CHLORIDE, MAGNESIUM CHLORIDE, SODIUM PHOSPHATE, DIBASIC, AND POTASSIUM PHOSPHATE .53; .5; .37; .037; .03; .012; .00082 G/100ML; G/100ML; G/100ML; G/100ML; G/100ML; G/100ML; G/100ML
125 INJECTION, SOLUTION INTRAVENOUS CONTINUOUS
Status: DISCONTINUED | OUTPATIENT
Start: 2022-11-17 | End: 2022-11-18

## 2022-11-17 RX ADMIN — VENLAFAXINE HYDROCHLORIDE 37.5 MG: 37.5 CAPSULE, EXTENDED RELEASE ORAL at 08:21

## 2022-11-17 RX ADMIN — SENNOSIDES, DOCUSATE SODIUM 1 TABLET: 8.6; 5 TABLET ORAL at 08:21

## 2022-11-17 RX ADMIN — INSULIN LISPRO 1 UNITS: 100 INJECTION, SOLUTION INTRAVENOUS; SUBCUTANEOUS at 16:49

## 2022-11-17 RX ADMIN — SODIUM CHLORIDE 150 ML/HR: 0.9 INJECTION, SOLUTION INTRAVENOUS at 03:58

## 2022-11-17 RX ADMIN — VENLAFAXINE HYDROCHLORIDE 37.5 MG: 37.5 CAPSULE, EXTENDED RELEASE ORAL at 20:34

## 2022-11-17 RX ADMIN — ATORVASTATIN CALCIUM 40 MG: 40 TABLET, FILM COATED ORAL at 16:48

## 2022-11-17 RX ADMIN — HEPARIN SODIUM 5000 UNITS: 5000 INJECTION INTRAVENOUS; SUBCUTANEOUS at 05:25

## 2022-11-17 RX ADMIN — BUPRENORPHINE HYDROCHLORIDE 300 MCG: 300 FILM, SOLUBLE BUCCAL at 20:34

## 2022-11-17 RX ADMIN — CARVEDILOL 6.25 MG: 6.25 TABLET, FILM COATED ORAL at 08:21

## 2022-11-17 RX ADMIN — PREGABALIN 150 MG: 75 CAPSULE ORAL at 08:21

## 2022-11-17 RX ADMIN — FERROUS SULFATE TAB 325 MG (65 MG ELEMENTAL FE) 325 MG: 325 (65 FE) TAB at 08:21

## 2022-11-17 RX ADMIN — SODIUM CHLORIDE 150 ML/HR: 0.9 INJECTION, SOLUTION INTRAVENOUS at 10:48

## 2022-11-17 RX ADMIN — VENLAFAXINE HYDROCHLORIDE 37.5 MG: 37.5 CAPSULE, EXTENDED RELEASE ORAL at 16:49

## 2022-11-17 RX ADMIN — THIAMINE HCL TAB 100 MG 100 MG: 100 TAB at 08:21

## 2022-11-17 RX ADMIN — OXYCODONE HYDROCHLORIDE 5 MG: 5 TABLET ORAL at 13:02

## 2022-11-17 RX ADMIN — BUPRENORPHINE HYDROCHLORIDE 300 MCG: 300 FILM, SOLUBLE BUCCAL at 09:57

## 2022-11-17 RX ADMIN — INSULIN LISPRO 1 UNITS: 100 INJECTION, SOLUTION INTRAVENOUS; SUBCUTANEOUS at 11:43

## 2022-11-17 RX ADMIN — SODIUM BICARBONATE 650 MG TABLET 650 MG: at 08:21

## 2022-11-17 RX ADMIN — CARVEDILOL 6.25 MG: 6.25 TABLET, FILM COATED ORAL at 16:48

## 2022-11-17 RX ADMIN — SENNOSIDES, DOCUSATE SODIUM 1 TABLET: 8.6; 5 TABLET ORAL at 16:47

## 2022-11-17 RX ADMIN — HEPARIN SODIUM 5000 UNITS: 5000 INJECTION INTRAVENOUS; SUBCUTANEOUS at 22:38

## 2022-11-17 RX ADMIN — POLYETHYLENE GLYCOL 3350 17 G: 17 POWDER, FOR SOLUTION ORAL at 08:20

## 2022-11-17 RX ADMIN — SODIUM CHLORIDE, SODIUM GLUCONATE, SODIUM ACETATE, POTASSIUM CHLORIDE, MAGNESIUM CHLORIDE, SODIUM PHOSPHATE, DIBASIC, AND POTASSIUM PHOSPHATE 125 ML/HR: .53; .5; .37; .037; .03; .012; .00082 INJECTION, SOLUTION INTRAVENOUS at 13:20

## 2022-11-17 RX ADMIN — OXYCODONE HYDROCHLORIDE 5 MG: 5 TABLET ORAL at 18:43

## 2022-11-17 RX ADMIN — TAMSULOSIN HYDROCHLORIDE 0.4 MG: 0.4 CAPSULE ORAL at 16:48

## 2022-11-17 RX ADMIN — HEPARIN SODIUM 5000 UNITS: 5000 INJECTION INTRAVENOUS; SUBCUTANEOUS at 13:02

## 2022-11-17 RX ADMIN — CEFTRIAXONE 1000 MG: 1 INJECTION, SOLUTION INTRAVENOUS at 20:34

## 2022-11-17 RX ADMIN — SODIUM CHLORIDE, SODIUM GLUCONATE, SODIUM ACETATE, POTASSIUM CHLORIDE, MAGNESIUM CHLORIDE, SODIUM PHOSPHATE, DIBASIC, AND POTASSIUM PHOSPHATE 125 ML/HR: .53; .5; .37; .037; .03; .012; .00082 INJECTION, SOLUTION INTRAVENOUS at 22:01

## 2022-11-17 RX ADMIN — POTASSIUM CHLORIDE 20 MEQ: 1500 TABLET, EXTENDED RELEASE ORAL at 13:28

## 2022-11-17 RX ADMIN — ALLOPURINOL 100 MG: 100 TABLET ORAL at 08:21

## 2022-11-17 RX ADMIN — PREGABALIN 150 MG: 75 CAPSULE ORAL at 16:48

## 2022-11-17 RX ADMIN — AMLODIPINE BESYLATE 10 MG: 10 TABLET ORAL at 08:21

## 2022-11-17 NOTE — ASSESSMENT & PLAN NOTE
· Creatinine 3 48 on admission  · Baseline creatinine 2 65  · Renally dose medication  · UTI-empiric ceftriaxone  · Urine culture pending  · Trend creatinine  · Appreciate urology consultation  · Intake output, daily weights

## 2022-11-17 NOTE — ASSESSMENT & PLAN NOTE
Lab Results   Component Value Date    HGBA1C 6 0 (H) 07/05/2022       Recent Labs     11/16/22 2049   POCGLU 115       Blood Sugar Average: Last 72 hrs:  (P) 115     · PTA basal insulin 10 units HS- on hold due to poor oral intake, resume when able  · Sliding scale insulin coverage with Accu-Cheks  · Hypoglycemia protocol  · Carbohydrate controlled diet

## 2022-11-17 NOTE — ASSESSMENT & PLAN NOTE
· Creatinine 3 48 on admission, improved to 3 09 to near baseline with IV fluids  · Baseline creatinine 2 5-2 9  · In setting of mild volume depletion, UTI  · UTI-empiric ceftriaxone, urine culture pending  · Appreciate Nephroloy consultation  · Monitor BMP

## 2022-11-17 NOTE — PLAN OF CARE
Problem: Potential for Falls  Goal: Patient will remain free of falls  Description: INTERVENTIONS:  - Educate patient/family on patient safety including physical limitations  - Instruct patient to call for assistance with activity   - Consult OT/PT to assist with strengthening/mobility   - Keep Call bell within reach  - Keep bed low and locked with side rails adjusted as appropriate  - Keep care items and personal belongings within reach  - Initiate and maintain comfort rounds  - Make Fall Risk Sign visible to staff  - Offer Toileting every  Hours, in advance of need  - Initiate/Maintain alarm  - Obtain necessary fall risk management equipment  - Apply yellow socks and bracelet for high fall risk patients  - Consider moving patient to room near nurses station  Outcome: Progressing     Problem: Nutrition/Hydration-ADULT  Goal: Nutrient/Hydration intake appropriate for improving, restoring or maintaining nutritional needs  Description: Monitor and assess patient's nutrition/hydration status for malnutrition  Collaborate with interdisciplinary team and initiate plan and interventions as ordered  Monitor patient's weight and dietary intake as ordered or per policy  Utilize nutrition screening tool and intervene as necessary  Determine patient's food preferences and provide high-protein, high-caloric foods as appropriate       INTERVENTIONS:  - Monitor oral intake, urinary output, labs, and treatment plans  - Assess nutrition and hydration status and recommend course of action  - Evaluate amount of meals eaten  - Assist patient with eating if necessary   - Allow adequate time for meals  - Recommend/ encourage appropriate diets, oral nutritional supplements, and vitamin/mineral supplements  - Order, calculate, and assess calorie counts as needed  - Recommend, monitor, and adjust tube feedings and TPN/PPN based on assessed needs  - Assess need for intravenous fluids  - Provide specific nutrition/hydration education as appropriate  - Include patient/family/caregiver in decisions related to nutrition  Outcome: Progressing     Problem: MOBILITY - ADULT  Goal: Maintain or return to baseline ADL function  Description: INTERVENTIONS:  -  Assess patient's ability to carry out ADLs; assess patient's baseline for ADL function and identify physical deficits which impact ability to perform ADLs (bathing, care of mouth/teeth, toileting, grooming, dressing, etc )  - Assess/evaluate cause of self-care deficits   - Assess range of motion  - Assess patient's mobility; develop plan if impaired  - Assess patient's need for assistive devices and provide as appropriate  - Encourage maximum independence but intervene and supervise when necessary  - Involve family in performance of ADLs  - Assess for home care needs following discharge   - Consider OT consult to assist with ADL evaluation and planning for discharge  - Provide patient education as appropriate  Outcome: Progressing  Goal: Maintains/Returns to pre admission functional level  Description: INTERVENTIONS:  - Perform BMAT or MOVE assessment daily    - Set and communicate daily mobility goal to care team and patient/family/caregiver  - Collaborate with rehabilitation services on mobility goals if consulted  - Perform Range of Motion  times a day  - Reposition patient every  hours    - Dangle patient  times a day  - Stand patient  times a day  - Ambulate patient  times a day  - Out of bed to chair  times a day   - Out of bed for meals  times a day  - Out of bed for toileting  - Record patient progress and toleration of activity level   Outcome: Progressing     Problem: Prexisting or High Potential for Compromised Skin Integrity  Goal: Skin integrity is maintained or improved  Description: INTERVENTIONS:  - Identify patients at risk for skin breakdown  - Assess and monitor skin integrity  - Assess and monitor nutrition and hydration status  - Monitor labs   - Assess for incontinence   - Turn and reposition patient  - Assist with mobility/ambulation  - Relieve pressure over bony prominences  - Avoid friction and shearing  - Provide appropriate hygiene as needed including keeping skin clean and dry  - Evaluate need for skin moisturizer/barrier cream  - Collaborate with interdisciplinary team   - Patient/family teaching  - Consider wound care consult   Outcome: Progressing

## 2022-11-17 NOTE — CONSULTS
CONSULTATION-NEPHROLOGY   Husam Vyas Sr  62 y o  male MRN: [id number]  Unit/Bed#: -01 Encounter: [id number]        Assessment and Plan:    1  Acute kidney injury (POA) atop chronic kidney disease  · Creatinine improved to high side of baseline  Etiology likely prerenal azotemia from moderate hypercalcemia, decreased oral intake  Continue volume expansion with balanced salt solution  Hold binder and sodium bicarbonate tablets at this time as neither indicated  Continue to avoid potential nephrotoxins  See below regarding hypercalcemia  · Does have a history of obstructive uropathy requiring indwelling urinary catheter placement  Per patient, catheter was removed yesterday  Monitor for urinary retention  Bladder scan ordered  2  Stage 4 chronic kidney disease with baseline creatinine around 2 5-2 9 mg/dL  · Outpatient follow-up with Dr Carlita Slaughter  3  Moderate symptomatic acute on chronic hypercalcemia  · Will conduct complete workup including PTH, PTHrP, SPEP/UPEP, vitamin-D 1, 25 dihydroxy, skeletal survey  He has severe bone pain to back and hip  He does have a history of significant spinal issues including osteomyelitis and diskitis however there is lymphadenopathy on imaging and suppressed PTH  Concern for underlying malignancy  Continue aggressive volume expansion with balanced salt solution  Avoid thiazide diuretics and exogenous calcium  · Addendum: Discussed imaging with Dr Bella Cope radiologist   We went over all of his recent imaging  Will defer bone scan for now and continue with serological evaluation  4  Volume depletion  · Continue volume expansion with Isolyte  5  Probable diabetic nephropathy  · Losartan remains on hold  Blood sugar management per primary team  6  Acute cystitis  · Receiving ceftriaxone per primary team  7  RSV  · On room air  8   Anemia  · Monitor for continued hemoglobin decrease with aggressive volume expansion    HPI:    Jeferson Foreman  is a 62 y o  male with stage 4 chronic kidney disease follows with Dr Jarad Perez, probable diabetic nephropathy in the setting of longstanding type 2 diabetes mellitus, history of osteomyelitis and L5/S1 diskitis, right paraspinal abscess and epidural abscess, history obstructive uropathy presented 11/16 per advice of his daughter for respiratory distress  He is found to be acutely hypoxic with oxygen saturations 80% by EMS  RSV positive in ER with concomitant acute kidney injury and hypercalcemia  Received aggressive volume expansion with normal saline  Nephrology consultation requested today for same  Upon discussion with the patient he relates HPI as above in addition:  Patient states he had a lot of shortness of breath prior to presentation which is now improved  He did vomit a few times  Denies diarrhea or trouble urinating  Does endorse severe constant bone pain in back and hip  Complains of weakness    From a Nephrology perspective, follows with Dr Jarad Perez for stage 4 chronic kidney disease with baseline creatinine around 2 5-2 9 mg/dL  Will need outpatient follow-up once discharged  Reason for Consult:  Acute kidney injury atop chronic kidney disease, hypercalcemia    Review of Systems:  A complete 10-point review of systems was performed  Aside from what was mentioned in the HPI, it is otherwise negative        Historical Information   Past Medical History:   Diagnosis Date   • Chronic kidney disease    • Diabetes mellitus ([facility] [address] )    • Gout    • Hypertension    • Renal disorder    • Retroperitoneal abscess ([facility] [address] )    • Stroke ([facility] [address] )    • Vertebral osteomyelitis ([facility] [address] )      Past Surgical History:   Procedure Laterality Date   • BACK SURGERY     • ORCHIECTOMY       Social History   Social History     Substance and Sexual Activity   Alcohol Use Not Currently     Social History     Substance and Sexual Activity   Drug Use Yes   • Types: Marijuana     Social History     Tobacco Use   Smoking Status Every Day   • Packs/day: 0 25   • Types: Cigarettes   Smokeless Tobacco Never       Family History:   Family History   Problem Relation Age of Onset   • Hypertension Father        Medications:  Pertinent medications were reviewed  Current Facility-Administered Medications   Medication Dose Route Frequency Provider Last Rate   • acetaminophen  650 mg Oral Q6H PRN Thao S June, CRNP     • [START ON 11/18/2022] allopurinol  50 mg Oral Daily Atif Schooling, CRNP     • [START ON 11/18/2022] amLODIPine  10 mg Oral Daily Atif Schooling, CRNP     • atorvastatin  40 mg Oral Daily With Dinner Thao S June, CRNP     • Buprenorphine HCl  300 mcg Buccal BID Thao S June, CRNP     • carvedilol  6 25 mg Oral BID With Meals Thao S June, CRNP     • cefTRIAXone  1,000 mg Intravenous Q24H Thao S June, CRNP     • ferrous sulfate  325 mg Oral Daily With Breakfast Thao S June, CRNP     • heparin (porcine)  5,000 Units Subcutaneous Q8H [address] Thao S June, CRNP     • insulin lispro  1-6 Units Subcutaneous HS Thao S June, CRNP     • insulin lispro  1-6 Units Subcutaneous TID AC Thao S June, CRNP     • multi-electrolyte  125 mL/hr Intravenous Continuous Atif Schooling, CRNP     • ondansetron  4 mg Intravenous Q8H PRN Thao S June, CRNP     • oxyCODONE  5 mg Oral Q4H PRN Thao S June, CRNP     • polyethylene glycol  17 g Oral Daily Thao S June, CRNP     • potassium chloride  20 mEq Oral Once Atif Schooling, CRNP     • pregabalin  150 mg Oral BID Thao S June, CRNP     • senna-docusate sodium  1 tablet Oral BID Thao S June, CRNP     • tamsulosin  0 4 mg Oral Daily With Dinner Thao S June, CRNP     • thiamine  100 mg Oral Daily Thao S June, CRNP     • venlafaxine  37 5 mg Oral TID Thao S June, CRNP           Allergies   Allergen Reactions   • Bupropion Delirium     "went nuts," prior to 2012  "went nuts," prior to 2012  "went nuts," prior to 2012  "went nuts," prior to 2012     • Metformin Other (See Comments)     Other reaction(s): kidney disease  Other reaction(s): kidney disease  Other reaction(s): kidney disease     • Medical Tape Rash         Vitals:   /69   Pulse 69   Temp 98 8 °F (37 1 °C)   Resp 18   Ht 5' 11" (1 803 m)   Wt 63 5 kg (139 lb 15 9 oz)   SpO2 91%   BMI 19 52 kg/m²   Body mass index is 19 52 kg/m²  SpO2: 91 %,   SpO2 Activity: At Rest,   O2 Device: None (Room air)      Intake/Output Summary (Last 24 hours) at 11/17/2022 1312  Last data filed at 11/17/2022 1304  Gross per 24 hour   Intake 1120 ml   Output 3183 ml   Net -2063 ml     Invasive Devices     Peripheral Intravenous Line  Duration           Peripheral IV 11/16/22 Dorsal (posterior); Left Forearm <1 day    Peripheral IV 11/16/22 Left Antecubital <1 day                Physical Exam:  General: conscious, cooperative, in no acute distress  Eyes: conjunctivae pink, anicteric sclerae  ENT: lips and mucous membranes moist  Neck: supple, no JVD, no masses  Chest: clear breath sounds bilateral, no crackles, ronchus or wheezings  CVS: distinct S1 & S2, normal rate, regular rhythm  Abdomen: soft, non-tender, non-distended, normoactive bowel sounds cachectic  Extremities: no edema of both legs  Skin: no rash  Neuro: awake, alert, oriented      Diagnostic Data:  Lab: I have personally reviewed pertinent lab results  ,   CBC:  Results from last 7 days   Lab Units 11/17/22  0526   WBC Thousand/uL 6 09   HEMOGLOBIN g/dL 9 0*   HEMATOCRIT % 28 7*   PLATELETS Thousands/uL 143*      CMP:   Lab Results   Component Value Date    SODIUM 141 11/17/2022    K 3 4 (L) 11/17/2022     11/17/2022    CO2 24 11/17/2022    BUN 41 (H) 11/17/2022    CREATININE 3 09 (H) 11/17/2022    CALCIUM 11 4 (H) 11/17/2022    AST 10 11/17/2022    ALT <6 (L) 11/17/2022    ALKPHOS 78 11/17/2022    EGFR 21 11/17/2022   ,   PT/INR:   Lab Results   Component Value Date    INR 0 99 11/16/2022   ,   Magnesium: No components found for: MAG,  Phosphorous:   Lab Results   Component Value Date    PHOS 4 3 11/17/2022 Microbiology:  @LABNT,(urinecx:7)@        Judi Severe, CRNP    Portions of the record may have been created with voice recognition software  Occasional wrong word or "sound a like" substitutions may have occurred due to the inherent limitations of voice recognition software  Read the chart carefully and recognize, using context, where substitutions have occurred

## 2022-11-17 NOTE — ASSESSMENT & PLAN NOTE
Lab Results   Component Value Date    HGBA1C 6 6 (H) 11/17/2022       Recent Labs     11/16/22 2049 11/17/22  0621 11/17/22  1056   POCGLU 115 126 166*       Blood Sugar Average: Last 72 hrs:  (P) 370 [id number]     · PTA basal insulin 10 units HS- on hold due to poor oral intake, resume when able  · Sliding scale insulin coverage with Accu-Cheks  · Hypoglycemia protocol  · Carbohydrate controlled diet

## 2022-11-17 NOTE — ASSESSMENT & PLAN NOTE
· RSV positive on admission  · Presented with respiratory failure requiring 2 L supplemental oxygen, cough and hypoxia at home  · Continue supportive care  · Maintain isolation

## 2022-11-17 NOTE — PROGRESS NOTES
[address]  Progress Note - Fatoumata Cord  1965, 62 y o  male MRN: [id number]  Unit/Bed#: -01 Encounter: [id number]  Primary Care Provider: Angelique Berrios DO   Date and time admitted to hospital: 11/16/2022  4:34 PM    * KELLY (acute kidney injury) ([facility] [address] )  Assessment & Plan  · Creatinine 3 48 on admission, improved to 3 09 to near baseline with IV fluids  · Baseline creatinine 2 5-2 9  · In setting of mild volume depletion, UTI  · UTI-empiric ceftriaxone, urine culture pending  · Appreciate Nephroloy consultation  · Monitor BMP      Acute cystitis  Assessment & Plan  · Recurrent UTI due to urinary retention   · Has had Ortiz catheter placement in the past  · Urinary retention protocol  · Empiric ceftriaxone  · Urine culture pending    RSV infection  Assessment & Plan  · RSV positive on admission  · Exposure via family members  · Presented with respiratory failure requiring 2 L supplemental oxygen, cough and hypoxia at home - hypoxia has resolved  · Continue supportive care  · Maintain isolation    Hypercalcemia  Assessment & Plan  · Corrected calcium 13 2 on admission with hypovolemia  · Mildly elevated ionized calcium  · Improving with IV hydration, continue  · No history of underlying malignancy/multiple myeloma    · Normal TSH, normal protein level during last admission  · Trend calcium levels  · EKG no changes  · Appreciate nephrology input, continue to follow recommendations    Essential hypertension  Assessment & Plan  · Continue carvedilol and amlodipine home regimen  · Trend blood pressures    Diabetes mellitus type 2, insulin dependent [facility])  Assessment & Plan  Lab Results   Component Value Date    HGBA1C 6 6 (H) 11/17/2022       Recent Labs     11/16/22  2049 11/17/22  0621 11/17/22  1056   POCGLU 115 126 166*       Blood Sugar Average: Last 72 hrs:  (P) 858 [id number]     · PTA basal insulin 10 units HS- on hold due to poor oral intake, resume when able  · Sliding scale insulin coverage with Accu-Cheks  · Hypoglycemia protocol  · Carbohydrate controlled diet        VTE Pharmacologic Prophylaxis: VTE Score: 5 High Risk (Score >/= 5) - Pharmacological DVT Prophylaxis Ordered: heparin  Sequential Compression Devices Ordered  Patient Centered Rounds: I performed bedside rounds with nursing staff today  Discussions with Specialists or Other Care Team Provider:  Nephrology    Education and Discussions with Family / Patient: Patient  Time Spent for Care: 30 minutes  More than 50% of total time spent on counseling and coordination of care as described above  Current Length of Stay: 0 day(s)  Current Patient Status: Inpatient   Certification Statement: The patient, admitted on an observation basis, will now require > 2 midnight hospital stay due to Need for close monitoring, infectious workup, IV treatments  Discharge Plan: Anticipate discharge in 24-48 hrs to home  Code Status: Level 1 - Full Code    Subjective:   Patient seen and examined  He reports feeling improved since admission  He was able to transition to room air  Objective:     Vitals:   Temp (24hrs), Av 7 °F (37 1 °C), Min:98 4 °F (36 9 °C), Max:99 °F (37 2 °C)    Temp:  [98 4 °F (36 9 °C)-99 °F (37 2 °C)] 98 8 °F (37 1 °C)  HR:  [69-83] 69  Resp:  [16-20] 18  BP: (116-160)/(58-76) 116/69  SpO2:  [87 %-95 %] 91 %  Body mass index is 19 52 kg/m²  Input and Output Summary (last 24 hours): Intake/Output Summary (Last 24 hours) at 2022 1505  Last data filed at 2022 1304  Gross per 24 hour   Intake 1342 ml   Output 3183 ml   Net -1841 ml       Physical Exam:   Physical Exam  Constitutional:       General: He is not in acute distress  HENT:      Head: Normocephalic and atraumatic  Nose: No congestion  Mouth/Throat:      Pharynx: Oropharynx is clear     Eyes:      Conjunctiva/sclera: Conjunctivae normal    Cardiovascular:      Rate and Rhythm: Normal rate and regular rhythm  Heart sounds: No murmur heard  Pulmonary:      Effort: No respiratory distress  Breath sounds: No wheezing or rales  Abdominal:      General: There is no distension  Tenderness: There is no abdominal tenderness  There is no guarding  Musculoskeletal:      Right lower leg: No edema  Left lower leg: No edema  Skin:     General: Skin is warm and dry  Neurological:      Mental Status: He is oriented to person, place, and time  Psychiatric:         Mood and Affect: Mood normal           Additional Data:     Labs:  Results from last 7 days   Lab Units 11/17/22  0526   WBC Thousand/uL 6 09   HEMOGLOBIN g/dL 9 0*   HEMATOCRIT % 28 7*   PLATELETS Thousands/uL 143*   NEUTROS PCT % 59   LYMPHS PCT % 14   MONOS PCT % 20*   EOS PCT % 6     Results from last 7 days   Lab Units 11/17/22  0526   SODIUM mmol/L 141   POTASSIUM mmol/L 3 4*   CHLORIDE mmol/L 106   CO2 mmol/L 24   BUN mg/dL 41*   CREATININE mg/dL 3 09*   ANION GAP mmol/L 11   CALCIUM mg/dL 11 4*   ALBUMIN g/dL 2 3*   TOTAL BILIRUBIN mg/dL 0 23   ALK PHOS U/L 78   ALT U/L <6*   AST U/L 10   GLUCOSE RANDOM mg/dL 143*     Results from last 7 days   Lab Units 11/16/22  1713   INR  0 99     Results from last 7 days   Lab Units 11/17/22  1056 11/17/22  0621 11/16/22  2049   POC GLUCOSE mg/dl 166* 126 115     Results from last 7 days   Lab Units 11/17/22  0526   HEMOGLOBIN A1C % 6 6*     Results from last 7 days   Lab Units 11/16/22  1713   LACTIC ACID mmol/L 0 9   PROCALCITONIN ng/ml 0 47*       Lines/Drains:  Invasive Devices     Peripheral Intravenous Line  Duration           Peripheral IV 11/16/22 Dorsal (posterior); Left Forearm <1 day    Peripheral IV 11/16/22 Left Antecubital <1 day                      Imaging: Reviewed radiology reports from this admission including: chest xray    Recent Cultures (last 7 days):   Results from last 7 days   Lab Units 11/16/22  1715 11/16/22  1713   BLOOD CULTURE  Received in Microbiology Lab  Culture in Progress  Received in Microbiology Lab  Culture in Progress  Last 24 Hours Medication List:   Current Facility-Administered Medications   Medication Dose Route Frequency Provider Last Rate   • acetaminophen  650 mg Oral Q6H PRN Thao S June, CRNP     • [START ON 11/18/2022] allopurinol  50 mg Oral Daily Jessica Halt, CRNP     • [START ON 11/18/2022] amLODIPine  10 mg Oral Daily Crenshaw Halt, CRNP     • atorvastatin  40 mg Oral Daily With Dinner Thao S June, CRNP     • Buprenorphine HCl  300 mcg Buccal BID Thao S June, CRNP     • carvedilol  6 25 mg Oral BID With Meals Thao S June, CRNP     • cefTRIAXone  1,000 mg Intravenous Q24H Thao S June, CRNP     • ferrous sulfate  325 mg Oral Daily With Breakfast Thao S June, CRNP     • heparin (porcine)  5,000 Units Subcutaneous Q8H [facility] Thao S June, CRNP     • insulin lispro  1-6 Units Subcutaneous HS Thao S June, CRNP     • insulin lispro  1-6 Units Subcutaneous TID AC Thao S June, CRNP     • multi-electrolyte  125 mL/hr Intravenous Continuous Crenshaw Halt, CRNP 125 mL/hr (11/17/22 1320)   • ondansetron  4 mg Intravenous Q8H PRN Thao S June, CRNP     • oxyCODONE  5 mg Oral Q4H PRN Thao S June, CRNP     • polyethylene glycol  17 g Oral Daily Thao S June, CRNP     • pregabalin  150 mg Oral BID Thao S June, CRNP     • senna-docusate sodium  1 tablet Oral BID Thao S June, CRNP     • tamsulosin  0 4 mg Oral Daily With Dinner Thao S June, CRNP     • thiamine  100 mg Oral Daily Thao S June, CRNP     • venlafaxine  37 5 mg Oral TID Thao S June, CRNP          Today, Patient Was Seen By: Selena Trujillo MD    **Please Note: This note may have been constructed using a voice recognition system  **

## 2022-11-17 NOTE — PLAN OF CARE
Problem: Potential for Falls  Goal: Patient will remain free of falls  Description: INTERVENTIONS:  - Educate patient/family on patient safety including physical limitations  - Instruct patient to call for assistance with activity   - Consult OT/PT to assist with strengthening/mobility   - Keep Call bell within reach  - Keep bed low and locked with side rails adjusted as appropriate  - Keep care items and personal belongings within reach  - Initiate and maintain comfort rounds  - Make Fall Risk Sign visible to staff  - Offer Toileting every   Hours, in advance of need  - Initiate/Maintain   alarm  - Obtain necessary fall risk management equipment:      - Apply yellow socks and bracelet for high fall risk patients  - Consider moving patient to room near nurses station  Outcome: Progressing     Problem: Nutrition/Hydration-ADULT  Goal: Nutrient/Hydration intake appropriate for improving, restoring or maintaining nutritional needs  Description: Monitor and assess patient's nutrition/hydration status for malnutrition  Collaborate with interdisciplinary team and initiate plan and interventions as ordered  Monitor patient's weight and dietary intake as ordered or per policy  Utilize nutrition screening tool and intervene as necessary  Determine patient's food preferences and provide high-protein, high-caloric foods as appropriate       INTERVENTIONS:  - Monitor oral intake, urinary output, labs, and treatment plans  - Assess nutrition and hydration status and recommend course of action  - Evaluate amount of meals eaten  - Assist patient with eating if necessary   - Allow adequate time for meals  - Recommend/ encourage appropriate diets, oral nutritional supplements, and vitamin/mineral supplements  - Order, calculate, and assess calorie counts as needed  - Recommend, monitor, and adjust tube feedings and TPN/PPN based on assessed needs  - Assess need for intravenous fluids  - Provide specific nutrition/hydration education as appropriate  - Include patient/family/caregiver in decisions related to nutrition  Outcome: Progressing     Problem: MOBILITY - ADULT  Goal: Maintain or return to baseline ADL function  Description: INTERVENTIONS:  -  Assess patient's ability to carry out ADLs; assess patient's baseline for ADL function and identify physical deficits which impact ability to perform ADLs (bathing, care of mouth/teeth, toileting, grooming, dressing, etc )  - Assess/evaluate cause of self-care deficits   - Assess range of motion  - Assess patient's mobility; develop plan if impaired  - Assess patient's need for assistive devices and provide as appropriate  - Encourage maximum independence but intervene and supervise when necessary  - Involve family in performance of ADLs  - Assess for home care needs following discharge   - Consider OT consult to assist with ADL evaluation and planning for discharge  - Provide patient education as appropriate  Outcome: Progressing  Goal: Maintains/Returns to pre admission functional level  Description: INTERVENTIONS:  - Perform BMAT or MOVE assessment daily    - Set and communicate daily mobility goal to care team and patient/family/caregiver  - Collaborate with rehabilitation services on mobility goals if consulted  - Perform Range of Motion   times a day  - Reposition patient every   hours  - Dangle patient   times a day  - Stand patient   times a day  - Ambulate patient   times a day  - Out of bed to chair   times a day   - Out of bed for meals     times a day  - Out of bed for toileting  - Record patient progress and toleration of activity level   Outcome: Progressing     Problem: Prexisting or High Potential for Compromised Skin Integrity  Goal: Skin integrity is maintained or improved  Description: INTERVENTIONS:  - Identify patients at risk for skin breakdown  - Assess and monitor skin integrity  - Assess and monitor nutrition and hydration status  - Monitor labs   - Assess for incontinence   - Turn and reposition patient  - Assist with mobility/ambulation  - Relieve pressure over bony prominences  - Avoid friction and shearing  - Provide appropriate hygiene as needed including keeping skin clean and dry  - Evaluate need for skin moisturizer/barrier cream  - Collaborate with interdisciplinary team   - Patient/family teaching  - Consider wound care consult   Outcome: Progressing     Problem: GENITOURINARY - ADULT  Goal: Maintains or returns to baseline urinary function  Description: INTERVENTIONS:  - Assess urinary function  - Encourage oral fluids to ensure adequate hydration if ordered  - Administer IV fluids as ordered to ensure adequate hydration  - Administer ordered medications as needed  - Offer frequent toileting  - Follow urinary retention protocol if ordered  Outcome: Progressing  Goal: Absence of urinary retention  Description: INTERVENTIONS:  - Assess patient's ability to void and empty bladder  - Monitor I/O  - Bladder scan as needed  - Discuss with physician/AP medications to alleviate retention as needed  - Discuss catheterization for long term situations as appropriate  Outcome: Progressing  Goal: Urinary catheter remains patent  Description: INTERVENTIONS:  - Assess patency of urinary catheter  - If patient has a chronic camarena, consider changing catheter if non-functioning  - Follow guidelines for intermittent irrigation of non-functioning urinary catheter  Outcome: Progressing     Problem: METABOLIC, FLUID AND ELECTROLYTES - ADULT  Goal: Electrolytes maintained within normal limits  Description: INTERVENTIONS:  - Monitor labs and assess patient for signs and symptoms of electrolyte imbalances  - Administer electrolyte replacement as ordered  - Monitor response to electrolyte replacements, including repeat lab results as appropriate  - Instruct patient on fluid and nutrition as appropriate  Outcome: Progressing  Goal: Fluid balance maintained  Description: INTERVENTIONS:  - Monitor labs   - Monitor I/O and WT  - Instruct patient on fluid and nutrition as appropriate  - Assess for signs & symptoms of volume excess or deficit  Outcome: Progressing  Goal: Glucose maintained within target range  Description: INTERVENTIONS:  - Monitor Blood Glucose as ordered  - Assess for signs and symptoms of hyperglycemia and hypoglycemia  - Administer ordered medications to maintain glucose within target range  - Assess nutritional intake and initiate nutrition service referral as needed  Outcome: Progressing

## 2022-11-17 NOTE — CASE MANAGEMENT
Case Management Discharge Planning Note    Patient name Vince Sweet  Location /-10 MRN [id number]  : 1965 Date 2022       Current Admission Date: 2022  Current Admission Diagnosis:KELLY (acute kidney injury) [facility])   Patient Active Problem List    Diagnosis Date Noted   • Acute cystitis 2022   • Chronic anemia 2022   • RSV infection 2022   • Acute respiratory failure with hypoxia ([address] ) 2022   • UTI (urinary tract infection) due to urinary indwelling catheter ([address] ) 10/20/2022   • Skin ulcer of left heel, limited to breakdown of skin ([address] ) 10/20/2022   • Hypercalcemia 2022   • Low back pain 2022   • Ambulatory dysfunction 2022   • Stage 3a chronic kidney disease ([address] ) 2022   • Acute urinary retention 2022   • Severe protein-calorie malnutrition ([address] ) 2022   • Iron deficiency anemia secondary to inadequate dietary iron intake 2022   • Hip pain, acute, left 2022   • KELLY (acute kidney injury) ([address] ) 2022   • Hyperkalemia 2022   • Diabetes mellitus type 2, insulin dependent ([address] )    • Gout    • Essential hypertension    • History of CVA (cerebrovascular accident)    • Osteomyelitis of vertebra of lumbar region ([address] )       LOS (days): 0  Geometric Mean LOS (GMLOS) (days):   Days to GMLOS:     OBJECTIVE:            Current admission status: Inpatient   Preferred Pharmacy:   [address] [place], [place] - [address] [address]  Phone: [phone] Fax: [phone]    Primary Care Provider: Aurora Hercules DO    Primary Insurance: MEDICARE  Secondary Insurance:     DISCHARGE DETAILS:    Discharge planning discussed with[de-identified] spouse, [place] of Choice: Yes     CM contacted family/caregiver?: Yes             Contacts  Patient Contacts: Melecio, spouse  Relationship to Patient[de-identified] Family  Contact Method: Phone  Phone Number: see face sheet  Reason/Outcome: Continuity of Care, Discharge [address]         Is the patient interested in [facility] at discharge?: Yes  [address] requested[de-identified] Nursing, Occupational Therapy, Physical [address]  Provider[de-identified] PCP  Home Health Services Needed[de-identified] Evaluate Functional Status and Safety, Gait/ADL Training, Other (comment) (Medication)  Homebound Criteria Met[de-identified] Requires the Assistance of Another Person for Safe Ambulation or to Leave the Home, Uses an Assist Device (i e  cane, walker, etc)  Supporting Clincal Findings[de-identified] Fatigues Easliy in Short Distances    DME Referral Provided  Referral made for DME?: No    Other Referral/Resources/Interventions Provided:  Interventions: [facility]    Would you like to participate in our [address] service program?  : No - Declined    [facility]: [facility]  Discharge Destination Plan[de-identified] Home with Tiffanie at Discharge : BLS Ambulance            CM received Dr Dolores Rivera office, inquiring if patient is able to go to acute rehab following discharge from [facility]  CM explained patient does not meet criteria for acute rehab admission and was already in [facility] and acute rehab this year  Mitchel Sargent also inquired if [facility] has local podiatry care for patient  [place] [facility]

## 2022-11-17 NOTE — ASSESSMENT & PLAN NOTE
· Presents with 2 L supplemental oxygen for hypoxia ED% on room air at home  · Daughter is a nurse and found him in respiratory distress, checked his pulse oximetry it was 80%, this was verified by EMS  · No history of supplemental oxygen use  · Wean to room air as able

## 2022-11-17 NOTE — CASE MANAGEMENT
Case Management Assessment & Discharge Planning Note    Patient name Ariana Rick  Location /-77 MRN [id number]  : 1965 Date 2022       Current Admission Date: 2022  Current Admission Diagnosis:KELLY (acute kidney injury) [facility])   Patient Active Problem List    Diagnosis Date Noted   • Acute cystitis 2022   • Chronic anemia 2022   • RSV infection 2022   • Acute respiratory failure with hypoxia ([address] ) 2022   • UTI (urinary tract infection) due to urinary indwelling catheter ([address] ) 10/20/2022   • Skin ulcer of left heel, limited to breakdown of skin ([address] ) 10/20/2022   • Hypercalcemia 2022   • Low back pain 2022   • Ambulatory dysfunction 2022   • Stage 3a chronic kidney disease ([address] ) 2022   • Acute urinary retention 2022   • Severe protein-calorie malnutrition ([address] ) 2022   • Iron deficiency anemia secondary to inadequate dietary iron intake 2022   • Hip pain, acute, left 2022   • KELLY (acute kidney injury) ([address] ) 2022   • Hyperkalemia 2022   • Diabetes mellitus type 2, insulin dependent ([address] )    • Gout    • Essential hypertension    • History of CVA (cerebrovascular accident)    • Osteomyelitis of vertebra of lumbar region ([address] )       LOS (days): 0  Geometric Mean LOS (GMLOS) (days):   Days to GMLOS:     OBJECTIVE:  PATIENT READMITTED TO HOSPITAL            Current admission status: Inpatient       Preferred Pharmacy:   [id number] [address] [facility] - [address] [place] [address]  Phone: [phone] Fax: [phone]    Primary Care Provider: Eric Forbes DO    Primary Insurance: MEDICARE  Secondary Insurance:     ASSESSMENT:  Meghna Sarah Proxies    There are no active Health Care Proxies on file         Advance Directives  Does patient have a Health Care POA?: No  Was patient offered paperwork?: Yes (spouse declined)  Does patient currently have a Health Care decision maker?: Yes, please see Health Care Proxy section  Does patient have Advance Directives?: No  Was patient offered paperwork?: Yes (spouse declined)  Primary Contact: Meghan Aly, spouse         Readmission Root Cause  30 Day Readmission: Yes  Who directed you to return to the hospital?: Family  Did you understand whom to contact if you had questions or problems?: Yes  Did you get your prescriptions before you left the hospital?: Yes  Were you able to get your prescriptions filled when you left the hospital?: Yes  Did you take your medications as prescribed?: Yes  Were you able to get to your follow-up appointments?: Yes  During previous admission, was a post-acute recommendation made?: Yes  What post-acute resources were offered?: STR  Patient was readmitted due to: UTI, RSV infection  Action Plan: home with [facility] [facility]    Patient Information  Admitted from[de-identified] Home  Mental Status: Other (Comment)  During Assessment patient was accompanied by: Other-Comment  Assessment information provided by[de-identified] Spouse  Primary Caregiver: Spouse  Caregiver's Name[de-identified] Meghan Marsvielka, spouse  Caregiver's Relationship to Patient[de-identified] Family Member  Caregiver's Telephone Number[de-identified] see face sheet  Support Systems: Spouse/significant other, Children, Family members  [place] of Residence: [address] do you live in?: [address] entry access options   Select all that apply : Other access (Comment) (stair chair)  Type of Current Residence: 3 story home  Upon entering residence, is there a bedroom on the main floor (no further steps)?: Yes  Upon entering residence, is there a bathroom on the main floor (no further steps)?: Yes  In the last 12 months, was there a time when you were not able to pay the mortgage or rent on time?: No  In the last 12 months, how many places have you lived?: 5  In the last 12 months, was there a time when you did not have a steady place to sleep or slept in a shelter (including now)?: No  Homeless/housing insecurity resource given?: N/A  Living Arrangements: Lives w/ Spouse/significant other  Is patient a ?: No    Activities of Daily Living Prior to Admission  Functional Status: Total dependent  Completes ADLs independently?: No  Level of ADL dependence: Total Dependent  Ambulates independently?: No  Level of ambulatory dependence:  Total Dependent  Does patient use assisted devices?: Yes  Assisted Devices (DME) used: Stair Chair/Buckner, Wheelchair, [employer] wheelchair, [employer], Dayday Jimy, Other (Comment) (Trapeze)  Does patient currently own DME?: Yes  What DME does the patient currently own?: Electric Wheelchair, [employer], Stair Chair/Glide, Other (Comment), Wheelchair, Dayday Ahn (Trapeze)  Does patient have a history of Outpatient Therapy (PT/OT)?: No  Does the patient have a history of Short-Term Rehab?: Yes ([facility], [facility] acute rehab, [facility], "[employer]")  Does patient have a history of HHC?: Yes ([address])  Does patient currently have [address]?: No         Patient Information Continued  Income Source: SSI/SSD  Does patient have prescription coverage?: Yes  Within the past 12 months, you worried that your food would run out before you got the money to buy more : Never true  Within the past 12 months, the food you bought just didn't last and you didn't have money to get more : Never true  Food insecurity resource given?: N/A  Does patient receive dialysis treatments?: No  Does patient have a history of substance abuse?: No  Does patient have a history of Mental Health Diagnosis?: No    PHQ 2/9 Screening   Reviewed PHQ 2/9 Depression Screening Score?: No    Means of Transportation  Means of Transport to Appts[de-identified] Family transport  In the past 12 months, has lack of transportation kept you from medical appointments or from getting medications?: No  In the past 12 months, has lack of transportation kept you from meetings, work, or from getting things needed for daily living?: No  Was application for public transport provided?: N/A        DISCHARGE DETAILS:    Discharge planning discussed with[de-identified] spouse, [place] of Choice: Yes     CM contacted family/caregiver?: Yes             Contacts  Patient Contacts: Lambert Bosworth, spouse  Relationship to Patient[de-identified] Family  Contact Method: Phone  Phone Number: see face sheet  Reason/Outcome: Continuity of Care, Discharge [address]         Is the patient interested in [facility] at discharge?: Yes  [address] requested[de-identified] Nursing, Occupational Therapy, Physical [address] [facility] Provider[de-identified] PCP  Home Health Services Needed[de-identified] Evaluate Functional Status and Safety, Gait/ADL Training, Other (comment) (Medication)  Homebound Criteria Met[de-identified] Requires the Assistance of Another Person for Safe Ambulation or to Leave the Home, Uses an Assist Device (i e  cane, walker, etc)  Supporting Clincal Findings[de-identified] Fatigues Easliy in Short Distances    DME Referral Provided  Referral made for DME?: No    Other Referral/Resources/Interventions Provided:  Interventions: [facility]    Would you like to participate in our [address] service program?  : No - Declined    Treatment Team Recommendation: Home with [address]  Discharge Destination Plan[de-identified] Home with Ethelyanpawel at Discharge : BLS Ambulance                  CM contacted spouse,baseline information was obtained for patient  CM discussed the role of CM in helping the patient develop a discharge plan and assist the patient in carry out their plan  Patient lives at home with spouse in 3 story home, chair lift for entrance into home  Patient has hospital bed on 1st floor of home with chiquis, TETO and precious  Patient has had series of medical conditions since March 2022 requiring numerous hospitalizations and rehabs for patient  Patient has been bed bound since March 2022   Patient was last discharged from [address] [place] [address] to [facility] where spouse indicated he remained for 9 days  [facility] assisted patient with balancing on edge of bed and transfer to [facility]  Spouse reported [facility] finished services this week for patient  Spouse requested resumption of [facility] services with another agency, if available, upon discharge          CM will follow for discharge referrals and submit [facility] referral when patient closer to discharge and recommendations known for patient

## 2022-11-17 NOTE — ASSESSMENT & PLAN NOTE
· Recurrent UTI due to urinary retention   · Has had Ortiz catheter placement in the past  · Urinary retention protocol  · Empiric ceftriaxone  · Urine culture pending

## 2022-11-17 NOTE — ASSESSMENT & PLAN NOTE
· RSV positive on admission  · Exposure via family members  · Presented with respiratory failure requiring 2 L supplemental oxygen, cough and hypoxia at home - hypoxia has resolved  · Continue supportive care  · Maintain isolation

## 2022-11-17 NOTE — ASSESSMENT & PLAN NOTE
Malnutrition Findings: Loss of subcutaneous fat in orbital, triceps, ribcage areas  Loss of muscle at temples, clavicles, scapula, extremities  Consultation to dietitian       BMI Findings: Body mass index is 19 52 kg/m²

## 2022-11-17 NOTE — H&P
[address]  H&P- Devon Mccormick Sr  1965, 62 y o  male MRN: [id number]  Unit/Bed#: -Ros[facility] Encounter: [id number]  Primary Care Provider: Shilpa Maldonado DO   Date and time admitted to hospital: 11/16/2022  4:34 PM    * KELLY (acute kidney injury) ([facility] )  Assessment & Plan  · Creatinine 3 48 on admission  · Baseline creatinine 2 65  · Renally dose medication  · UTI-empiric ceftriaxone  · Urine culture pending  · Trend creatinine  · Appreciate urology consultation  · Intake output, daily weights    Acute cystitis  Assessment & Plan  · Recurrent UTI due to urinary retention   · Has had Ortiz catheter placement in the past  · Urinary retention protocol  · Empiric ceftriaxone  · Urine culture pending    Acute respiratory failure with hypoxia ([facility] )  Assessment & Plan  · Presents with 2 L supplemental oxygen for hypoxia ED% on room air at home  · Daughter is a nurse and found him in respiratory distress, checked his pulse oximetry it was 80%, this was verified by EMS  · No history of supplemental oxygen use  · Wean to room air as able    RSV infection  Assessment & Plan  · RSV positive on admission  · Presented with respiratory failure requiring 2 L supplemental oxygen, cough and hypoxia at home  · Continue supportive care  · Maintain isolation    Chronic anemia  Assessment & Plan  · Hemoglobin 10 9 on admission in setting of chronic kidney disease  · Hemoglobin at baseline  · Trend hemoglobin    Hypercalcemia  Assessment & Plan  · Corrected calcium 13 2 on admission with hypovolemia  · Check ionized calcium  · Continue IV hydration- this has been successful in the past for his hypercalcemia  · No history of underlying malignancy/multiple myeloma    · Normal TSH, normal protein level during last admission  · Trend calcium levels  · EKG no changes    Severe protein-calorie malnutrition (HCC)  Assessment & Plan  Malnutrition Findings: Loss of subcutaneous fat in orbital, triceps, ribcage areas   Loss of muscle at temples, clavicles, scapula, extremities  Consultation to dietitian       BMI Findings: Body mass index is 19 52 kg/m²  Essential hypertension  Assessment & Plan  · Continue carvedilol and amlodipine home regimen  · Trend blood pressures    Gout  Assessment & Plan  · Continue allopurinol    Diabetes mellitus type 2, insulin dependent (HCC)  Assessment & Plan  Lab Results   Component Value Date    HGBA1C 6 0 (H) 07/05/2022       Recent Labs     11/16/22 2049   POCGLU 115       Blood Sugar Average: Last 72 hrs:  (P) 115     · PTA basal insulin 10 units HS- on hold due to poor oral intake, resume when able  · Sliding scale insulin coverage with Accu-Cheks  · Hypoglycemia protocol  · Carbohydrate controlled diet    VTE Pharmacologic Prophylaxis: VTE Score: 5 High Risk (Score >/= 5) - Pharmacological DVT Prophylaxis Ordered: heparin  Sequential Compression Devices Ordered  Code Status: Level 1 - Full Code   Discussion with family: Patient declined call to   Anticipated Length of Stay: Patient will be admitted on an inpatient basis with an anticipated length of stay of greater than 2 midnights secondary to Respiratory failure  Total Time for Visit, including Counseling / Coordination of Care: 30 minutes Greater than 50% of this total time spent on direct patient counseling and coordination of care  Chief Complaint:  Hypoxia    History of Present Illness:  Eduarda Greenfield  is a 62 y o  male with a PMH of recent multiple hospital admissions, urinary retention, recurrent urinary tract infection, hypercalcemia, chronic osteomyelitis of lumbar region, left heel ulcer, CKD 4, ambulatory dysfunction, protein calorie malnutrition, hypertension, diabetes mellitus who presents from home after being found by his daughter who is a nurse in respiratory distress    Patient reports his daughter was visiting and was concerned due to his breathing, checked his pulse oximetry and found to 80 %  EMS was called and verified this finding placement patient on supplemental oxygen  In the emergency department patient was found to have RSV positive and again have elevation of his creatinine and calcium levels due to poor oral intake over the past few days  Presented to the hospital service for further evaluation and treatment  Review of Systems:  Review of Systems   Constitutional: Positive for activity change, appetite change and fatigue  Negative for chills and fever  HENT: Negative for ear pain and sore throat  Eyes: Negative for pain and visual disturbance  Respiratory: Positive for cough and shortness of breath  Cardiovascular: Negative for chest pain and palpitations  Gastrointestinal: Negative for abdominal pain and vomiting  Genitourinary: Negative for dysuria and hematuria  Musculoskeletal: Negative for arthralgias and back pain  Skin: Negative for color change and rash  Neurological: Positive for weakness  Negative for seizures and syncope  All other systems reviewed and are negative  Past Medical and Surgical History:   Past Medical History:   Diagnosis Date   • Chronic kidney disease    • Diabetes mellitus ([place] [address] )    • Gout    • Hypertension    • Renal disorder    • Retroperitoneal abscess ([place] [address] )    • Stroke [facility])    • Vertebral osteomyelitis ([place] [address] )        Past Surgical History:   Procedure Laterality Date   • BACK SURGERY     • ORCHIECTOMY         Meds/Allergies:  Prior to Admission medications    Medication Sig Start Date End Date Taking?  Authorizing Provider   Buprenorphine HCl (Belbuca) 300 MCG FILM Apply 300 mcg to cheek 2 (two) times a day 10/24/22  Yes Historical Provider, MD   oxyCODONE (OXY-IR) 5 MG capsule Take 5 mg by mouth every 4 (four) hours as needed for moderate pain or severe pain   Yes Historical Provider, MD   acetaminophen (TYLENOL) 325 mg tablet Take 650 mg by mouth every 4 (four) hours as needed for mild pain    Historical Provider, MD   allopurinol (ZYLOPRIM) 100 mg tablet Take 1 tablet (100 mg total) by mouth daily 10/24/22 11/23/22  Juan Gómez MD   amLODIPine (NORVASC) 10 mg tablet Take 1 tablet by mouth daily 2/22/22   Historical Provider, MD   atorvastatin (LIPITOR) 40 mg tablet Take 40 mg by mouth 1/3/22   Historical Provider, MD   carvedilol (COREG) 6 25 mg tablet Take 6 25 mg by mouth 2 (two) times a day with meals    Historical Provider, MD   colchicine (COLCRYS) 0 6 mg tablet Take 0 5 tablets (0 3 mg total) by mouth daily Do not start before October 25, 2022  10/25/22   Juan Gómez MD   cyclobenzaprine (FLEXERIL) 5 mg tablet Take 5 mg by mouth 3 (three) times a day    Historical Provider, MD   ferrous sulfate 325 (65 Fe) mg tablet Take 1 tablet (325 mg total) by mouth daily with breakfast Do not start before October 25, 2022  10/25/22   Juan Gómez MD   insulin glargine (LANTUS) 100 units/mL subcutaneous injection Inject 10 Units under the skin daily at bedtime 10/24/22   Juan Gómez MD   insulin lispro (HumaLOG) 100 units/mL injection Inject 2-12 Units under the skin 3 (three) times a day before meals 10/24/22   Juan Gómez MD   lidocaine (LIDODERM) 5 % Apply 1 patch topically daily Remove & Discard patch within 12 hours or as directed by MD 6/25/22   Sowmya Mclaughlin MD   nicotine (NICODERM CQ) 7 mg/24hr TD 24 hr patch Place 1 patch on the skin daily Do not start before October 25, 2022  10/25/22   Juan Gómez MD   polyethylene glycol (MIRALAX) 17 g packet Take 17 g by mouth 2 (two) times a day    Historical Provider, MD   pregabalin (LYRICA) 150 mg capsule Take 150 mg by mouth 2 (two) times a day 3/16/22   Historical Provider, MD   senna-docusate sodium (SENOKOT S) 8 6-50 mg per tablet Take 1 tablet by mouth 2 (two) times a day 3/17/22   Historical Provider, MD   sevelamer (RENAGEL) 800 mg tablet Take 1 tablet (800 mg total) by mouth daily with dinner 6/24/22 7/24/22  Sowmya Mclaughlin MD   sodium bicarbonate 650 mg tablet Take 1 tablet (650 mg total) by mouth in the morning Do not start before October 25, 2022  10/25/22   Elkin Luevano MD   tamsulosin [facility]) 0 4 mg Take by mouth 5/26/22   Historical Provider, MD   thiamine 100 MG tablet Take 100 mg by mouth daily 3/17/22   Historical Provider, MD   venlafaxine (EFFEXOR-XR) 37 5 mg 24 hr capsule Take 37 5 mg by mouth 3 (three) times a day    Historical Provider, MD     I have reviewed home medications with patient personally  Allergies: Allergies   Allergen Reactions   • Bupropion Delirium     "went nuts," prior to 2012  "went nuts," prior to 2012  "went nuts," prior to 2012  "went nuts," prior to 2012     • Metformin Other (See Comments)     Other reaction(s): kidney disease  Other reaction(s): kidney disease  Other reaction(s): kidney disease     • Medical Tape Rash       Social History:  Marital Status: /Civil Union   Occupation: retired  Patient Pre-hospital Living Situation: With spouse  Patient Pre-hospital Level of Mobility: unable to be assessed at time of evaluation  Patient Pre-hospital Diet Restrictions: none  Substance Use History:   Social History     Substance and Sexual Activity   Alcohol Use Not Currently     Social History     Tobacco Use   Smoking Status Every Day   • Packs/day: 0 25   • Types: Cigarettes   Smokeless Tobacco Never     Social History     Substance and Sexual Activity   Drug Use Yes   • Types: Marijuana       Family History:  Family History   Problem Relation Age of Onset   • Hypertension Father        Physical Exam:     Vitals:   Blood Pressure: 138/69 (11/16/22 2351)  Pulse: 74 (11/16/22 2351)  Temperature: 98 6 °F (37 °C) (11/16/22 2351)  Temp Source: Oral (11/16/22 1643)  Respirations: 18 (11/16/22 2351)  Height: 5' 11" (180 3 cm) (11/16/22 1643)  Weight - Scale: 63 5 kg (139 lb 15 9 oz) (11/17/22 0339)  SpO2: 91 % (11/16/22 2351)    Physical Exam  Vitals and nursing note reviewed     Constitutional:       General: He is not in acute distress  Appearance: He is cachectic  He is ill-appearing  HENT:      Head: Normocephalic and atraumatic  Eyes:      Conjunctiva/sclera: Conjunctivae normal    Cardiovascular:      Rate and Rhythm: Normal rate and regular rhythm  Heart sounds: No murmur heard  Pulmonary:      Effort: Pulmonary effort is normal  No respiratory distress  Breath sounds: Rhonchi present  Abdominal:      Palpations: Abdomen is soft  Tenderness: There is no abdominal tenderness  Musculoskeletal:         General: No swelling  Cervical back: Neck supple  Skin:     General: Skin is warm and dry  Capillary Refill: Capillary refill takes less than 2 seconds  Neurological:      General: No focal deficit present  Mental Status: He is alert and oriented to person, place, and time  Psychiatric:         Mood and Affect: Mood normal          Behavior: Behavior normal           Additional Data:     Lab Results:  Results from last 7 days   Lab Units 11/16/22  1713   WBC Thousand/uL 7 88   HEMOGLOBIN g/dL 10 9*   HEMATOCRIT % 34 8*   PLATELETS Thousands/uL 170   NEUTROS PCT % 68   LYMPHS PCT % 10*   MONOS PCT % 15*   EOS PCT % 6     Results from last 7 days   Lab Units 11/16/22  1713   SODIUM mmol/L 138   POTASSIUM mmol/L 3 5   CHLORIDE mmol/L 100   CO2 mmol/L 32   BUN mg/dL 44*   CREATININE mg/dL 3 48*   ANION GAP mmol/L 6   CALCIUM mg/dL 12 7*   ALBUMIN g/dL 2 8*   TOTAL BILIRUBIN mg/dL 0 27   ALK PHOS U/L 95   ALT U/L 13   AST U/L 11   GLUCOSE RANDOM mg/dL 94     Results from last 7 days   Lab Units 11/16/22  1713   INR  0 99     Results from last 7 days   Lab Units 11/16/22  2049   POC GLUCOSE mg/dl 115         Results from last 7 days   Lab Units 11/16/22  1713   LACTIC ACID mmol/L 0 9   PROCALCITONIN ng/ml 0 47*       Lines/Drains:  Invasive Devices     Peripheral Intravenous Line  Duration           Peripheral IV 11/16/22 Dorsal (posterior); Left Forearm <1 day    Peripheral IV 11/16/22 Left Antecubital <1 day                    Imaging: Reviewed radiology reports from this admission including: chest xray  XR chest 1 view portable   Final Result by Jay Quigley MD (11/16 0308)      No acute cardiopulmonary disease  Workstation performed: [id number]             EKG and Other Studies Reviewed on Admission:   · EKG: NSR  HR No ST elevation  ** Please Note: This note has been constructed using a voice recognition system   **

## 2022-11-17 NOTE — ASSESSMENT & PLAN NOTE
· Corrected calcium 13 2 on admission with hypovolemia  · Mildly elevated ionized calcium  · Improving with IV hydration, continue  · No history of underlying malignancy/multiple myeloma    · Normal TSH, normal protein level during last admission  · Trend calcium levels  · EKG no changes  · Appreciate nephrology input, continue to follow recommendations

## 2022-11-17 NOTE — ASSESSMENT & PLAN NOTE
· Hemoglobin 10 9 on admission in setting of chronic kidney disease  · Hemoglobin at baseline  · Trend hemoglobin

## 2022-11-17 NOTE — ASSESSMENT & PLAN NOTE
· Corrected calcium 13 2 on admission with hypovolemia  · Check ionized calcium  · Continue IV hydration- this has been successful in the past for his hypercalcemia  · No history of underlying malignancy/multiple myeloma    · Normal TSH, normal protein level during last admission  · Trend calcium levels  · EKG no changes

## 2022-11-18 ENCOUNTER — APPOINTMENT (INPATIENT)
Dept: RADIOLOGY | Facility: HOSPITAL | Age: 57
End: 2022-11-18

## 2022-11-18 PROBLEM — L97.429 CHRONIC ULCER OF LEFT HEEL (HCC): Status: ACTIVE | Noted: 2022-10-20

## 2022-11-18 LAB
ANION GAP SERPL CALCULATED.3IONS-SCNC: 7 MMOL/L (ref 4–13)
BASOPHILS # BLD AUTO: 0.05 THOUSANDS/ÂΜL (ref 0–0.1)
BASOPHILS NFR BLD AUTO: 1 % (ref 0–1)
BUN SERPL-MCNC: 35 MG/DL (ref 5–25)
CA-I BLD-SCNC: 1.4 MMOL/L (ref 1.12–1.32)
CALCIUM SERPL-MCNC: 10.5 MG/DL (ref 8.3–10.1)
CHLORIDE SERPL-SCNC: 104 MMOL/L (ref 96–108)
CO2 SERPL-SCNC: 27 MMOL/L (ref 21–32)
CREAT SERPL-MCNC: 2.69 MG/DL (ref 0.6–1.3)
CREAT UR-MCNC: 20.7 MG/DL
EOSINOPHIL # BLD AUTO: 0.43 THOUSAND/ÂΜL (ref 0–0.61)
EOSINOPHIL NFR BLD AUTO: 6 % (ref 0–6)
ERYTHROCYTE [DISTWIDTH] IN BLOOD BY AUTOMATED COUNT: 17 % (ref 11.6–15.1)
GFR SERPL CREATININE-BSD FRML MDRD: 25 ML/MIN/1.73SQ M
GLUCOSE SERPL-MCNC: 125 MG/DL (ref 65–140)
GLUCOSE SERPL-MCNC: 138 MG/DL (ref 65–140)
GLUCOSE SERPL-MCNC: 152 MG/DL (ref 65–140)
GLUCOSE SERPL-MCNC: 154 MG/DL (ref 65–140)
GLUCOSE SERPL-MCNC: 165 MG/DL (ref 65–140)
HCT VFR BLD AUTO: 32 % (ref 36.5–49.3)
HGB BLD-MCNC: 10.2 G/DL (ref 12–17)
IMM GRANULOCYTES # BLD AUTO: 0.04 THOUSAND/UL (ref 0–0.2)
IMM GRANULOCYTES NFR BLD AUTO: 1 % (ref 0–2)
LYMPHOCYTES # BLD AUTO: 1.07 THOUSANDS/ÂΜL (ref 0.6–4.47)
LYMPHOCYTES NFR BLD AUTO: 16 % (ref 14–44)
MAGNESIUM SERPL-MCNC: 1.9 MG/DL (ref 1.6–2.6)
MCH RBC QN AUTO: 28.8 PG (ref 26.8–34.3)
MCHC RBC AUTO-ENTMCNC: 31.9 G/DL (ref 31.4–37.4)
MCV RBC AUTO: 90 FL (ref 82–98)
MONOCYTES # BLD AUTO: 1.19 THOUSAND/ÂΜL (ref 0.17–1.22)
MONOCYTES NFR BLD AUTO: 18 % (ref 4–12)
NEUTROPHILS # BLD AUTO: 3.94 THOUSANDS/ÂΜL (ref 1.85–7.62)
NEUTS SEG NFR BLD AUTO: 58 % (ref 43–75)
NRBC BLD AUTO-RTO: 0 /100 WBCS
PHOSPHATE SERPL-MCNC: 3.5 MG/DL (ref 2.7–4.5)
PLATELET # BLD AUTO: 163 THOUSANDS/UL (ref 149–390)
PMV BLD AUTO: 11.4 FL (ref 8.9–12.7)
POTASSIUM SERPL-SCNC: 3.4 MMOL/L (ref 3.5–5.3)
PTH-INTACT SERPL-MCNC: 19.6 PG/ML (ref 18.4–80.1)
RBC # BLD AUTO: 3.54 MILLION/UL (ref 3.88–5.62)
SODIUM 24H UR-SCNC: 100 MOL/L
SODIUM SERPL-SCNC: 138 MMOL/L (ref 135–147)
WBC # BLD AUTO: 6.72 THOUSAND/UL (ref 4.31–10.16)

## 2022-11-18 RX ORDER — POTASSIUM CHLORIDE 20 MEQ/1
20 TABLET, EXTENDED RELEASE ORAL
Status: COMPLETED | OUTPATIENT
Start: 2022-11-18 | End: 2022-11-18

## 2022-11-18 RX ORDER — SODIUM CHLORIDE, SODIUM GLUCONATE, SODIUM ACETATE, POTASSIUM CHLORIDE, MAGNESIUM CHLORIDE, SODIUM PHOSPHATE, DIBASIC, AND POTASSIUM PHOSPHATE .53; .5; .37; .037; .03; .012; .00082 G/100ML; G/100ML; G/100ML; G/100ML; G/100ML; G/100ML; G/100ML
75 INJECTION, SOLUTION INTRAVENOUS CONTINUOUS
Status: DISCONTINUED | OUTPATIENT
Start: 2022-11-18 | End: 2022-11-20

## 2022-11-18 RX ADMIN — INSULIN LISPRO 1 UNITS: 100 INJECTION, SOLUTION INTRAVENOUS; SUBCUTANEOUS at 12:09

## 2022-11-18 RX ADMIN — POTASSIUM CHLORIDE 20 MEQ: 1500 TABLET, EXTENDED RELEASE ORAL at 14:13

## 2022-11-18 RX ADMIN — SODIUM CHLORIDE, SODIUM GLUCONATE, SODIUM ACETATE, POTASSIUM CHLORIDE, MAGNESIUM CHLORIDE, SODIUM PHOSPHATE, DIBASIC, AND POTASSIUM PHOSPHATE 75 ML/HR: .53; .5; .37; .037; .03; .012; .00082 INJECTION, SOLUTION INTRAVENOUS at 12:08

## 2022-11-18 RX ADMIN — VENLAFAXINE HYDROCHLORIDE 37.5 MG: 37.5 CAPSULE, EXTENDED RELEASE ORAL at 17:05

## 2022-11-18 RX ADMIN — PREGABALIN 150 MG: 75 CAPSULE ORAL at 17:05

## 2022-11-18 RX ADMIN — FERROUS SULFATE TAB 325 MG (65 MG ELEMENTAL FE) 325 MG: 325 (65 FE) TAB at 08:35

## 2022-11-18 RX ADMIN — HEPARIN SODIUM 5000 UNITS: 5000 INJECTION INTRAVENOUS; SUBCUTANEOUS at 14:13

## 2022-11-18 RX ADMIN — AMLODIPINE BESYLATE 10 MG: 10 TABLET ORAL at 08:35

## 2022-11-18 RX ADMIN — BUPRENORPHINE HYDROCHLORIDE 300 MCG: 300 FILM, SOLUBLE BUCCAL at 22:16

## 2022-11-18 RX ADMIN — HEPARIN SODIUM 5000 UNITS: 5000 INJECTION INTRAVENOUS; SUBCUTANEOUS at 22:16

## 2022-11-18 RX ADMIN — BUPRENORPHINE HYDROCHLORIDE 300 MCG: 300 FILM, SOLUBLE BUCCAL at 12:06

## 2022-11-18 RX ADMIN — HEPARIN SODIUM 5000 UNITS: 5000 INJECTION INTRAVENOUS; SUBCUTANEOUS at 05:03

## 2022-11-18 RX ADMIN — THIAMINE HCL TAB 100 MG 100 MG: 100 TAB at 08:35

## 2022-11-18 RX ADMIN — VENLAFAXINE HYDROCHLORIDE 37.5 MG: 37.5 CAPSULE, EXTENDED RELEASE ORAL at 08:34

## 2022-11-18 RX ADMIN — VENLAFAXINE HYDROCHLORIDE 37.5 MG: 37.5 CAPSULE, EXTENDED RELEASE ORAL at 22:16

## 2022-11-18 RX ADMIN — SENNOSIDES, DOCUSATE SODIUM 1 TABLET: 8.6; 5 TABLET ORAL at 17:04

## 2022-11-18 RX ADMIN — INSULIN LISPRO 1 UNITS: 100 INJECTION, SOLUTION INTRAVENOUS; SUBCUTANEOUS at 17:03

## 2022-11-18 RX ADMIN — CEFTRIAXONE 1000 MG: 1 INJECTION, SOLUTION INTRAVENOUS at 22:16

## 2022-11-18 RX ADMIN — POTASSIUM CHLORIDE 20 MEQ: 1500 TABLET, EXTENDED RELEASE ORAL at 17:05

## 2022-11-18 RX ADMIN — ALLOPURINOL 50 MG: 100 TABLET ORAL at 08:34

## 2022-11-18 RX ADMIN — ATORVASTATIN CALCIUM 40 MG: 40 TABLET, FILM COATED ORAL at 17:05

## 2022-11-18 RX ADMIN — PREGABALIN 150 MG: 75 CAPSULE ORAL at 08:34

## 2022-11-18 RX ADMIN — SENNOSIDES, DOCUSATE SODIUM 1 TABLET: 8.6; 5 TABLET ORAL at 08:34

## 2022-11-18 RX ADMIN — CARVEDILOL 6.25 MG: 6.25 TABLET, FILM COATED ORAL at 08:35

## 2022-11-18 RX ADMIN — POLYETHYLENE GLYCOL 3350 17 G: 17 POWDER, FOR SOLUTION ORAL at 08:32

## 2022-11-18 RX ADMIN — OXYCODONE HYDROCHLORIDE 5 MG: 5 TABLET ORAL at 08:47

## 2022-11-18 RX ADMIN — POTASSIUM CHLORIDE 20 MEQ: 1500 TABLET, EXTENDED RELEASE ORAL at 12:11

## 2022-11-18 RX ADMIN — CARVEDILOL 6.25 MG: 6.25 TABLET, FILM COATED ORAL at 17:05

## 2022-11-18 RX ADMIN — TAMSULOSIN HYDROCHLORIDE 0.4 MG: 0.4 CAPSULE ORAL at 17:05

## 2022-11-18 RX ADMIN — INSULIN LISPRO 1 UNITS: 100 INJECTION, SOLUTION INTRAVENOUS; SUBCUTANEOUS at 22:18

## 2022-11-18 NOTE — ASSESSMENT & PLAN NOTE
Lab Results   Component Value Date    HGBA1C 6 6 (H) 11/17/2022       Recent Labs     11/17/22  1545 11/17/22  2153 11/18/22  0652 11/18/22  1132   POCGLU 167* 135 138 165*       Blood Sugar Average: Last 72 hrs:  (P) 144 [id number]     · PTA basal insulin 10 units HS- on hold due to poor oral intake, resume when able  · Sliding scale insulin coverage with Accu-Cheks  · Hypoglycemia protocol  · Carbohydrate controlled diet

## 2022-11-18 NOTE — ASSESSMENT & PLAN NOTE
· Creatinine 3 48 on admission, improved to 2 69 to baseline  · Baseline creatinine 2 5-2 9  · In setting of mild volume depletion, UTI  · UTI-empiric ceftriaxone, urine culture pending  · Appreciate Nephroloy consultation  · Monitor BMP

## 2022-11-18 NOTE — DISCHARGE INSTR - OTHER ORDERS
Skin care plans:  1-Cleanse sacro-buttocks with soap and water  Apply TRIAD paste to open areas BID  2-Hydraguard to bilateral heel BID and PRN  3-Elevate heels to offload pressure use 2 pillows  4-Ehob cushion when out of bed  5-Turn/repoisiton q2h or when medically stable for pressure re-distribution on skin    6-Moisturize skin daily with skin nourishing cream   7-Care of left heel per podiatry   Wound care will follow weekly for sacrum

## 2022-11-18 NOTE — WOUND OSTOMY CARE
Consult Note - Wound   Yesenia Tate  62 y o  male MRN: [id number]  Unit/Bed#: -01 Encounter: [id number]        History and Present Illness:62year old male admitted with KELLY  Wound consult for left heel  Patient has chronic wound to left heel   Was seen by Podiatry at this campus 10/21/22   Left heel wound appears unchanged   Patient states he follows with his PCP for care of wound, with his spouse applying dsd daily  Assessment Findings:   Supine in bed, patient alert and oriented x4  Urinal with urine, but patient linens saturated with urine and also incontinent of bowel  Pt did not communicate he was soaked with urine  Dependent for bed change  1)POA Left heel dry intact black eschar  No evidence of lifting  Distal end red beefy tissue  No active drainage  cleansed and with normal saline and betadine then adaptic non adherent applied as previously ordered by Podiatry  Covered with dsd and wrapped with emmanuel  Offloaded on 2 pillows  2)POA mix of MASD and partial thickness skin loss  Painful to palpation  Pink tissue with white maceration  Cleansed and Triad paste applied  positioned on side with green foam wedge  Skin care plans:  1-Cleanse sacro-buttocks with soap and water  Apply TRIAD paste to open areas BID  2-Hydraguard to bilateral heel BID and PRN  3-Elevate heels to offload pressure use 2 pillows  4-Ehob cushion when out of bed  5-Turn/repoisiton q2h or when medically stable for pressure re-distribution on skin  6-Moisturize skin daily with skin nourishing cream   7-Care of left heel per podiatry   Wound care will follow weekly for sacrum       Wounds:  Wound 10/20/22 Pressure Injury Heel Left (Active)   Wound Image   11/18/22 1035   Wound Description Intact; Black 11/18/22 1035   Pressure Injury Stage U 11/18/22 1035   [place]-wound Assessment Dry; Intact 11/18/22 1035   Wound Length (cm) 2 5 cm 11/18/22 1035   Wound Width (cm) 2 5 cm 11/18/22 1035   Wound Surface Area (cm^2) 6 25 cm^2 11/18/22 1035   Drainage Amount None 11/18/22 1035   Treatments Elevated;Site care 11/18/22 1035   Dressing Dry dressing;Non adherent; Other (Comment) 11/18/22 1035   Dressing Changed Changed 11/18/22 1035   Patient Tolerance Tolerated well 11/18/22 1035   Dressing Status Clean;Dry; Intact 11/18/22 1035       Wound 10/21/22 Pressure Injury Coccyx (Active)   Wound Image    11/18/22 0946   Wound Description Beefy red;Fragile;Pink;Pale;Non-blanchable erythema; White 11/18/22 0946   Pressure Injury Stage 2 11/18/22 0946   [place]-wound Assessment Maceration; White 11/18/22 0946   Wound Length (cm) 2 cm 11/18/22 0946   Wound Width (cm) 0 5 cm 11/18/22 0946   Wound Depth (cm) 0 1 cm 11/18/22 0946   Wound Surface Area (cm^2) 1 cm^2 11/18/22 0946   Wound Volume (cm^3) 0 1 cm^3 11/18/22 0946   Calculated Wound Volume (cm^3) 0 1 cm^3 11/18/22 0946   Drainage Amount Scant 11/18/22 0946   Drainage Description [place] 11/18/22 0946   Treatments Other (Comment) 11/18/22 0946   Dressing Other (Comment) 11/18/22 0946   Dressing Changed New 11/18/22 0946   Patient Tolerance Tolerated well 11/18/22 0946   Dressing Status Remoistened 11/18/22 0946         Call or tigertext with any questions  Wound Care will continue to follow      Kelly OCHOAN RN

## 2022-11-18 NOTE — PROGRESS NOTES
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 and not taking care of himself well  Pt reports he is eating well at home now, 3 meals per day  Pt did not describe timeframe to author  Pt says he "loves" glucerna supplements, particularly the vanilla flavor  Pt says does not follow diet at home because he feels it is too restrictive  Chart review of weight hx: 7/29/21 185lb, 4/11/22 145lb, 7/5/22 151lb, 11/17/22 139lb, total of 24 9% weight loss x > 1 year including 7 9% weight loss x 3 mo, significant  Pt says he previously weighed 250-260lb, unintentionally lost this weight though believes overworking has contributed to this  Noting moderate muscle and fat loss  Pt meets criteria for chronic moderate malnutrition  Increase diet to CCD to level 3  Will add glucerna BID  Encouraged PRO rich foods at home, pt declining further diet ed discussion  Recommend daily weights for nutrition monitoring

## 2022-11-18 NOTE — PROGRESS NOTES
[address]  Progress Note - Dahiana Hollins  1965, 62 y o  male MRN: [id number]  Unit/Bed#: -01 Encounter: [id number]  Primary Care Provider: Esther Quintanilla DO   Date and time admitted to hospital: 11/16/2022  4:34 PM    * KELLY (acute kidney injury) ([facility] )  Assessment & Plan  · Creatinine 3 48 on admission, improved to 2 69 to baseline  · Baseline creatinine 2 5-2 9  · In setting of mild volume depletion, UTI  · UTI-empiric ceftriaxone, urine culture pending  · Appreciate Nephroloy consultation  · Monitor BMP      Acute cystitis  Assessment & Plan  · Recurrent UTI due to urinary retention   · Has had Ortiz catheter placement in the past  · Urinary retention protocol  · Empiric ceftriaxone  · Urine culture pending    Acute respiratory failure with hypoxia (HCC)  Assessment & Plan  · Presents with 2 L supplemental oxygen for hypoxia ED% on room air at home  · Daughter is a nurse and found him in respiratory distress, checked his pulse oximetry it was 80%, this was verified by EMS  · No history of supplemental oxygen use  · Weaned off to room air    RSV infection  Assessment & Plan  · RSV positive on admission  · Exposure via family members  · Presented with respiratory failure requiring 2 L supplemental oxygen, cough and hypoxia at home - hypoxia has resolved  · Continue supportive care  · Maintain isolation    Chronic anemia  Assessment & Plan  · In setting of chronic kidney disease  · Hemoglobin at baseline  · Trend hemoglobin    Chronic ulcer of left heel ([facility] )  Assessment & Plan  Appreciate Podiatry input  Chronic ulcer, however increased in size  Will follow Podiatry recommendations for wound care  Monitor with serial exams    Ambulatory dysfunction  Assessment & Plan  Appears due to deconditioning  No focal deficits on examination  Patient has been in and out of rehabs since March  Will request PT/OT evaluation    Hypercalcemia  Assessment & Plan  · Corrected calcium 13 2 on admission with hypovolemia  · Mildly elevated ionized calcium  · Improving with IV hydration, however still elevated  · No history of underlying malignancy/multiple myeloma    · Normal TSH, normal protein level during last admission  · PTHrP, SPEP/UPEP ordered by Nephrology and pending  · Continue to monitor calcium level    Severe protein-calorie malnutrition ([address] )  Assessment & Plan  Malnutrition Findings: Loss of subcutaneous fat in orbital, triceps, ribcage areas  Loss of muscle at temples, clavicles, scapula, extremities  Consultation to dietitian       BMI Findings: Body mass index is 19 52 kg/m²  Essential hypertension  Assessment & Plan  · Continue carvedilol and amlodipine home regimen  · Monitor blood pressure trends     Gout  Assessment & Plan  · Continue allopurinol    Diabetes mellitus type 2, insulin dependent [facility])  Assessment & Plan  Lab Results   Component Value Date    HGBA1C 6 6 (H) 11/17/2022       Recent Labs     11/17/22  1545 11/17/22  2153 11/18/22  0652 11/18/22  1132   POCGLU 167* 135 138 165*       Blood Sugar Average: Last 72 hrs:  (P) 144 [id number]     · PTA basal insulin 10 units HS- on hold due to poor oral intake, resume when able  · Sliding scale insulin coverage with Accu-Cheks  · Hypoglycemia protocol  · Carbohydrate controlled diet      VTE Pharmacologic Prophylaxis: VTE Score: 5 High Risk (Score >/= 5) - Pharmacological DVT Prophylaxis Ordered: heparin  Sequential Compression Devices Ordered  Patient Centered Rounds: I performed bedside rounds with nursing staff today  Discussions with Specialists or Other Care Team Provider:  Multidisciplinary meeting    Education and Discussions with Family / Patient: Patient  Time Spent for Care: 30 minutes  More than 50% of total time spent on counseling and coordination of care as described above      Current Length of Stay: 1 day(s)  Current Patient Status: Inpatient   Certification Statement: The patient will continue to require additional inpatient hospital stay due to Close monitoring  Discharge Plan: Anticipate discharge in 24-48 hrs to home with home services  versus rehab    Code Status: Level 1 - Full Code    Subjective:   Patient seen and examined  Overall he reports feeling improved and voices no significant complaints  No overnight events  Objective:     Vitals:   Temp (24hrs), Av 3 °F (36 8 °C), Min:98 1 °F (36 7 °C), Max:98 6 °F (37 °C)    Temp:  [98 1 °F (36 7 °C)-98 6 °F (37 °C)] 98 2 °F (36 8 °C)  HR:  [62-74] 74  Resp:  [12-18] 16  BP: (129-149)/(70-76) 137/75  SpO2:  [90 %-95 %] 91 %  Body mass index is 19 52 kg/m²  Input and Output Summary (last 24 hours): Intake/Output Summary (Last 24 hours) at 2022 1523  Last data filed at 2022 1139  Gross per 24 hour   Intake 3250 34 ml   Output 2050 ml   Net 1200 34 ml       Physical Exam:   Physical Exam  Constitutional:       General: He is not in acute distress  HENT:      Head: Normocephalic and atraumatic  Nose: No congestion  Mouth/Throat:      Pharynx: Oropharynx is clear  Cardiovascular:      Rate and Rhythm: Normal rate and regular rhythm  Pulmonary:      Effort: No respiratory distress  Breath sounds: No wheezing or rales  Abdominal:      General: There is no distension  Tenderness: There is no abdominal tenderness  There is no guarding  Musculoskeletal:      Right lower leg: No edema  Left lower leg: No edema  Skin:     General: Skin is warm and dry  Comments: L heel necrotic circular lesion    Neurological:      Mental Status: He is oriented to person, place, and time  Cranial Nerves: No cranial nerve deficit  Motor: No weakness     Psychiatric:         Mood and Affect: Mood normal           Additional Data:     Labs:  Results from last 7 days   Lab Units 22  0512   WBC Thousand/uL 6 72   HEMOGLOBIN g/dL 10 2*   HEMATOCRIT % 32 0*   PLATELETS Thousands/uL 163 NEUTROS PCT % 58   LYMPHS PCT % 16   MONOS PCT % 18*   EOS PCT % 6     Results from last 7 days   Lab Units 11/18/22  0512 11/17/22  0526   SODIUM mmol/L 138 141   POTASSIUM mmol/L 3 4* 3 4*   CHLORIDE mmol/L 104 106   CO2 mmol/L 27 24   BUN mg/dL 35* 41*   CREATININE mg/dL 2 69* 3 09*   ANION GAP mmol/L 7 11   CALCIUM mg/dL 10 5* 11 4*   ALBUMIN g/dL  --  2 3*   TOTAL BILIRUBIN mg/dL  --  0 23   ALK PHOS U/L  --  78   ALT U/L  --  <6*   AST U/L  --  10   GLUCOSE RANDOM mg/dL 125 143*     Results from last 7 days   Lab Units 11/16/22  1713   INR  0 99     Results from last 7 days   Lab Units 11/18/22  1132 11/18/22  0652 11/17/22  2153 11/17/22  1545 11/17/22  1056 11/17/22  0621 11/16/22  2049   POC GLUCOSE mg/dl 165* 138 135 167* 166* 126 115     Results from last 7 days   Lab Units 11/17/22  0526   HEMOGLOBIN A1C % 6 6*     Results from last 7 days   Lab Units 11/16/22  1713   LACTIC ACID mmol/L 0 9   PROCALCITONIN ng/ml 0 47*       Lines/Drains:  Invasive Devices     Peripheral Intravenous Line  Duration           Peripheral IV 11/16/22 Dorsal (posterior); Left Forearm 1 day    Peripheral IV 11/16/22 Left Antecubital 1 day                Imaging: no new    Recent Cultures (last 7 days):   Results from last 7 days   Lab Units 11/16/22  1838 11/16/22  1715 11/16/22  1713   BLOOD CULTURE   --  No Growth at 24 hrs  No Growth at 24 hrs  URINE CULTURE  Culture results to follow    --   --        Last 24 Hours Medication List:   Current Facility-Administered Medications   Medication Dose Route Frequency Provider Last Rate   • acetaminophen  650 mg Oral Q6H PRN Thao S June, CRNP     • allopurinol  50 mg Oral Daily Joe Purpura, CRNP     • amLODIPine  10 mg Oral Daily Joe Purpura, CRNP     • atorvastatin  40 mg Oral Daily With Dinner Thao S June, CRNP     • Buprenorphine HCl  300 mcg Buccal BID Thao S June, CRNP     • carvedilol  6 25 mg Oral BID With Meals Thao S June, YURIDIA     • cefTRIAXone  1,000 mg Intravenous Q24H Thao S June, CRNP 1,000 mg (11/17/22 2034)   • ferrous sulfate  325 mg Oral Daily With Breakfast Thao S June, CRNP     • heparin (porcine)  5,000 Units Subcutaneous Q8H [address] Thao S June, CRNP     • insulin lispro  1-6 Units Subcutaneous HS Thao S June, CRNP     • insulin lispro  1-6 Units Subcutaneous TID AC Thao S June, CRNP     • multi-electrolyte  75 mL/hr Intravenous Continuous Venia [place], DO 75 mL/hr (11/18/22 1208)   • ondansetron  4 mg Intravenous Q8H PRN Thao S June, CRNP     • oxyCODONE  5 mg Oral Q4H PRN Thao S June, CRNP     • polyethylene glycol  17 g Oral Daily Thao S June, CRNP     • potassium chloride  20 mEq Oral Q3H Raz Amado, DO     • pregabalin  150 mg Oral BID Thao S June, CRNP     • senna-docusate sodium  1 tablet Oral BID Thao S June, CRNP     • tamsulosin  0 4 mg Oral Daily With Dinner Thao S June, CRNP     • thiamine  100 mg Oral Daily Thao S June, CRNP     • venlafaxine  37 5 mg Oral TID Thao S June, CRNP          Today, Patient Was Seen By: Mariel Chen MD    **Please Note: This note may have been constructed using a voice recognition system  **

## 2022-11-18 NOTE — PROGRESS NOTES
NEPHROLOGY PROGRESS NOTE   Tin Lennon Sr  62 y o  male MRN: [id number]  Unit/Bed#: -01 Encounter: [id number]    Assessment/Plan:    61 yo man with CKD 4 follows with Dr Boyd Seen, history of osteomyelitis and L5/S1 diskitis, paraspinal abscess and epidural abscess, history of obstructive uropathy, presented 11/16 due to shortness of breath found to be acutely hypoxic  RSV positive in ER  KELLY and hypercalcemia noted on lab work prompting a Nephrology consultation-plan outlined below    1  Acute kidney injury (POA) atop chronic kidney disease  ? Acute kidney injury resolved and is within typical baseline creatinine  He follows with Dr Boyd Seen as an outpatient will need referral back once discharged from hospital stay  Not a candidate for Ace/Arb or SGL T2 given advanced kidney dysfunction  Continue to periodically monitor bladder scans as he does have a history of obstructive uropathy and bilateral hydronephrosis  Continue to avoid potential nephrotoxins, maximize hemodynamics  ? Binder therapy and sodium bicarbonate tablets remain on hold due to appropriate phosphorus and acid/base balance, respectively  2  Stage 4 chronic kidney disease with baseline creatinine around 2 5-2 9 mg/dL  3  Moderate symptomatic acute on chronic hypercalcemia  ? Calcium slowly improving with aggressive volume expansion however still remains elevated  Patient complaining of severe back and hip pain yesterday  Discussed previous imaging with radiologist to noted no suspicious osteolytic lesions and felt that lymphadenopathy merely reactive  ? Workup thus far reveals PTH 19 6 pg/mL, elevated ionized calcium but improve main, normal phosphorus, normal magnesium  PTHrP, vitamin-D 1, dihydroxy, SPEP/UPEP in process  ? May require further Endocrinology versus Hematology depending on results of workup  ? Continue volume expansion however reduced to rate of 75 mL/hour  ?  Continue to avoid endogenous calcium and thiazide diuretics  4  Volume depletion  ? Isolate reduced to 75 mL/hour  5  Probable diabetic nephropathy  ? Losartan remains on hold and was on hold as an outpatient  Continue follow-up with primary nephrologist   SPEP and UPEP remain pending due to persistent hypercalcemia however was previously negative in 2021  6  Acute cystitis  ? Ceftriaxone per primary team  7  RSV  ? On room air  8  Hypokalemia  ? Potassium supplementation per primary team        ROS  Examined lying in bed  Denies any further nausea, vomiting, diarrhea  Denies dysuria urgency, frequency  States fatigue is mildly improved  A complete 10 point review of systems have been performed and are otherwise negative         Historical Information   Past Medical History:   Diagnosis Date   • Chronic kidney disease    • Diabetes mellitus ([facility] )    • Gout    • Hypertension    • Renal disorder    • Retroperitoneal abscess ([facility] )    • Stroke ([facility] )    • Vertebral osteomyelitis ([facility] )      Past Surgical History:   Procedure Laterality Date   • BACK SURGERY     • ORCHIECTOMY       Social History   Social History     Substance and Sexual Activity   Alcohol Use Not Currently     Social History     Substance and Sexual Activity   Drug Use Yes   • Types: Marijuana     Social History     Tobacco Use   Smoking Status Every Day   • Packs/day: 0 25   • Types: Cigarettes   Smokeless Tobacco Never       Family History:   Family History   Problem Relation Age of Onset   • Hypertension Father        Medications:  Pertinent medications were reviewed  Current Facility-Administered Medications   Medication Dose Route Frequency Provider Last Rate   • acetaminophen  650 mg Oral Q6H PRN Thao Watkins, CRNP     • allopurinol  50 mg Oral Daily Maury Salcido, CRNP     • amLODIPine  10 mg Oral Daily Maury Berumena, CRNP     • atorvastatin  40 mg Oral Daily With Dinner Thao Leonex, CRNP     • Buprenorphine HCl  300 mcg Buccal BID Thao Watkins, CRNP     • carvedilol  6 25 mg Oral BID With Meals Thao S June, CRNP     • cefTRIAXone  1,000 mg Intravenous Q24H Thao S June, CRNP 1,000 mg (11/17/22 2034)   • ferrous sulfate  325 mg Oral Daily With Breakfast Thao S June, CRNP     • heparin (porcine)  5,000 Units Subcutaneous Q8H [facility] Thao S June, CRNP     • insulin lispro  1-6 Units Subcutaneous HS Thao S June, CRNP     • insulin lispro  1-6 Units Subcutaneous TID AC Thao S June, CRNP     • multi-electrolyte  75 mL/hr Intravenous Continuous Razmorris Ruizs, DO     • ondansetron  4 mg Intravenous Q8H PRN Thao S June, CRNP     • oxyCODONE  5 mg Oral Q4H PRN Thao S June, CRNP     • polyethylene glycol  17 g Oral Daily Thao S June, CRNP     • potassium chloride  20 mEq Oral Q3H Raz Pimentelelis, DO     • pregabalin  150 mg Oral BID Thao S June, CRNP     • senna-docusate sodium  1 tablet Oral BID Thao S June, CRNP     • tamsulosin  0 4 mg Oral Daily With Dinner Thao S June, CRNP     • thiamine  100 mg Oral Daily Thao S June, CRNP     • venlafaxine  37 5 mg Oral TID Thao S June, CRNP           Allergies   Allergen Reactions   • Bupropion Delirium     "went nuts," prior to 2012  "went nuts," prior to 2012  "went nuts," prior to 2012  "went nuts," prior to 2012     • Metformin Other (See Comments)     Other reaction(s): kidney disease  Other reaction(s): kidney disease  Other reaction(s): kidney disease     • Medical Tape Rash         Vitals:   /75   Pulse 74   Temp 98 2 °F (36 8 °C)   Resp 16   Ht 5' 11" (1 803 m)   Wt 63 5 kg (139 lb 15 9 oz)   SpO2 91%   BMI 19 52 kg/m²   Body mass index is 19 52 kg/m²  SpO2: 91 %,   SpO2 Activity: At Rest,   O2 Device: None (Room air)      Intake/Output Summary (Last 24 hours) at 11/18/2022 1148  Last data filed at 11/18/2022 1139  Gross per 24 hour   Intake 3592 34 ml   Output 2983 ml   Net 609 34 ml     Invasive Devices     Peripheral Intravenous Line  Duration           Peripheral IV 11/16/22 Dorsal (posterior); Left Forearm 1 day    Peripheral IV 11/16/22 Left Antecubital 1 day                Physical Exam  General: conscious, cooperative, in no acute distress  Eyes: conjunctivae pink, anicteric sclerae  ENT: lips and mucous membranes moist  Neck: supple, no JVD, no masses  Chest: clear breath sounds bilaterally, no crackles, ronchus or wheezings  CVS: S1 & S2, normal rate, regular rhythm  Abdomen: soft, non-tender, non-distended, normoactive bowel sounds cachectic  Extremities: no edema of both legs  Skin: no rash  Neuro: awake, alert, oriented      Diagnostic Data:  Lab: I have personally reviewed pertinent lab results  ,   CBC:  Results from last 7 days   Lab Units 11/18/22  0512   WBC Thousand/uL 6 72   HEMOGLOBIN g/dL 10 2*   HEMATOCRIT % 32 0*   PLATELETS Thousands/uL 163      CMP:   Lab Results   Component Value Date    SODIUM 138 11/18/2022    K 3 4 (L) 11/18/2022     11/18/2022    CO2 27 11/18/2022    BUN 35 (H) 11/18/2022    CREATININE 2 69 (H) 11/18/2022    CALCIUM 10 5 (H) 11/18/2022    EGFR 25 11/18/2022   ,   PT/INR: No results found for: PT, INR,   Magnesium: No components found for: MAG,  Phosphorous:   Lab Results   Component Value Date    PHOS 3 5 11/18/2022       Microbiology:  @[email or web address],(urinecx:7)@        YURIDIA [place]    Portions of the record may have been created with voice recognition software  Occasional wrong word or "sound a like" substitutions may have occurred due to the inherent limitations of voice recognition software  Read the chart carefully and recognize, using context, where substitutions have occurred

## 2022-11-18 NOTE — ASSESSMENT & PLAN NOTE
· Corrected calcium 13 2 on admission with hypovolemia  · Mildly elevated ionized calcium  · Improving with IV hydration, however still elevated  · No history of underlying malignancy/multiple myeloma    · Normal TSH, normal protein level during last admission  · PTHrP, SPEP/UPEP ordered by Nephrology and pending  · Continue to monitor calcium level

## 2022-11-18 NOTE — ASSESSMENT & PLAN NOTE
Appears due to deconditioning  No focal deficits on examination  Patient has been in and out of rehabs since March  Will request PT/OT evaluation

## 2022-11-18 NOTE — CONSULTS
REQUIRED DOCUMENTATION:     1  This service was provided via Telemedicine  2  Provider located at [employer]  3  TeleMed provider: Sparkle Oliver DPM   4  Identify all parties in room with patient during tele consult: MA  5 Patient was then informed that this was a Telemedicine visit and that the exam was being conducted confidentially over secure lines  My office door was closed  No one else was in the room  Patient acknowledged consent and understanding of privacy and security of the Telemedicine visit, and gave us permission to have the assistant stay in the room in order to assist with the history and to conduct the exam   I informed the patient that I have reviewed their record in Epic and presented the opportunity for them to ask any questions regarding the visit today  The patient agreed to participate  Consult - Podiatry   [facility]  62 y o  male MRN: [id number]  Unit/Bed#: -01 Encounter: [id number]    Assessment/Plan     Assessment:  1  Left heel necrotic ulceration  2  DM 2    Plan:  - Left heel XR 11/18/22 (also compared to previous XR), no acute BRUCE/osseous erosion noted, vascular calcification is noted however  B/L LEADs pending    - Per pictures in media, looks stable however larger in size    - Recommend daily betadine, adaptic, dsd and strict pressure offloading   - Thank you for consultation, will see Monday if still in house    History of Present Illness     HPI:  Katina Eden  is a 62 y o  male who presents as consultation for left heel wound  I saw him last month for the same  Has not followed with a podiatrist at d/c  Has not been offloading as much as he should per his hx  Feels well  Patient denies nausea, vomiting, chest pain, shortness of breath, chills, fever  Inpatient consult to Podiatry  Consult performed by: Sparkle Oliver DPM  Consult ordered by: John Christianson MD        Review of Systems   Constitutional: Negative      HENT: Negative  Eyes: Negative  Respiratory: Negative  Cardiovascular: Negative  Gastrointestinal: Negative  Musculoskeletal: No left heel pain   Skin: Left heel wound   Neurological: Neuroapthy  Psych: negative         Historical Information   Past Medical History:   Diagnosis Date   • Chronic kidney disease    • Diabetes mellitus ([facility] )    • Gout    • Hypertension    • Renal disorder    • Retroperitoneal abscess ([facility] )    • Stroke ([facility] )    • Vertebral osteomyelitis ([place] [address] )      Past Surgical History:   Procedure Laterality Date   • BACK SURGERY     • ORCHIECTOMY       Social History   Social History     Substance and Sexual Activity   Alcohol Use Not Currently     Social History     Substance and Sexual Activity   Drug Use Yes   • Types: Marijuana     Social History     Tobacco Use   Smoking Status Every Day   • Packs/day: 0 25   • Types: Cigarettes   Smokeless Tobacco Never     Family History:   Family History   Problem Relation Age of Onset   • Hypertension Father        Meds/Allergies   Medications Prior to Admission   Medication   • Buprenorphine HCl (Belbuca) 300 MCG FILM   • oxyCODONE (OXY-IR) 5 MG capsule   • acetaminophen (TYLENOL) 325 mg tablet   • allopurinol (ZYLOPRIM) 100 mg tablet   • amLODIPine (NORVASC) 10 mg tablet   • atorvastatin (LIPITOR) 40 mg tablet   • carvedilol (COREG) 6 25 mg tablet   • colchicine (COLCRYS) 0 6 mg tablet   • cyclobenzaprine (FLEXERIL) 5 mg tablet   • ferrous sulfate 325 (65 Fe) mg tablet   • insulin glargine (LANTUS) 100 units/mL subcutaneous injection   • insulin lispro (HumaLOG) 100 units/mL injection   • lidocaine (LIDODERM) 5 %   • nicotine (NICODERM CQ) 7 mg/24hr TD 24 hr patch   • polyethylene glycol (MIRALAX) 17 g packet   • pregabalin (LYRICA) 150 mg capsule   • senna-docusate sodium (SENOKOT S) 8 6-50 mg per tablet   • sevelamer (RENAGEL) 800 mg tablet   • sodium bicarbonate 650 mg tablet   • tamsulosin (FLOMAX) 0 4 mg   • thiamine 100 MG tablet   • venlafaxine (EFFEXOR-XR) 37 5 mg 24 hr capsule     Allergies   Allergen Reactions   • Bupropion Delirium     "went nuts," prior to 2012  "went nuts," prior to 2012  "went nuts," prior to 2012  "went nuts," prior to 2012     • Metformin Other (See Comments)     Other reaction(s): kidney disease  Other reaction(s): kidney disease  Other reaction(s): kidney disease     • Medical Tape Rash       Objective   First Vitals:   Blood Pressure: 134/66 (11/16/22 1643)  Pulse: 74 (11/16/22 1643)  Temperature: 98 4 °F (36 9 °C) (11/16/22 1643)  Temp Source: Oral (11/16/22 1643)  Respirations: 20 (11/16/22 1643)  Height: 5' 11" (180 3 cm) (11/16/22 1643)  Weight - Scale: 66 2 kg (145 lb 15 1 oz) (11/16/22 1643)  SpO2: 95 % (11/16/22 1640)    Current Vitals:   Blood Pressure: 137/75 (11/18/22 0654)  Pulse: 74 (11/18/22 0654)  Temperature: 98 2 °F (36 8 °C) (11/18/22 0654)  Temp Source: Oral (11/16/22 1643)  Respirations: 16 (11/18/22 0654)  Height: 5' 11" (180 3 cm) (11/18/22 1034)  Weight - Scale: 63 5 kg (139 lb 15 9 oz) (11/17/22 0555)  SpO2: 91 % (11/18/22 0837)        /75   Pulse 74   Temp 98 2 °F (36 8 °C)   Resp 16   Ht 5' 11" (1 803 m)   Wt 63 5 kg (139 lb 15 9 oz)   SpO2 91%   BMI 19 52 kg/m²      General Appearance:    Alert, cooperative, no distress   Head:    Normocephalic, without obvious abnormality, atraumatic   Eyes:    PERRL, conjunctiva/corneas clear, EOM's intact        Nose:   Moist mucous membranes   Neck:   Supple, symmetrical, trachea midline   Back:     Symmetric   Lungs:     Respirations unlabored   Heart:    Regular rate and rhythm, S1 and S2 normal, no murmur, rub   or gallop    B/L LE EXAM    Extremities:   B/L LE are atrophic in appearance, thin  Nonambulatory  MMT deferred  Pulses:   Nonpalpable pedal pulses  Legs to toes warm to cool  Pedla hair absent  Mild varicosities noted  Skin:   B/L LE skin is atrophic, thin, dry and shiny   No open wounds to RLE   Left heel with pressure ulceration  Adhered eschar intact, no loosening, no SOI surrounding, no bogginess  Larger than previous exam (one month ago)  Neurologic:   Gross sensation is diminished to feet  Protective sensation deferred  left heel        Lab Results:   Admission on 11/16/2022   Component Date Value   • SARS-CoV-2 11/16/2022 Negative    • INFLUENZA A PCR 11/16/2022 Negative    • INFLUENZA B PCR 11/16/2022 Negative    • RSV PCR 11/16/2022 Positive (A)    • WBC 11/16/2022 7 88    • RBC 11/16/2022 3 75 (L)    • Hemoglobin 11/16/2022 10 9 (L)    • Hematocrit 11/16/2022 34 8 (L)    • MCV 11/16/2022 93    • MCH 11/16/2022 29 1    • MCHC 11/16/2022 31 3 (L)    • RDW 11/16/2022 17 4 (H)    • MPV 11/16/2022 11 6    • Platelets 90/24/0291 170    • nRBC 11/16/2022 0    • Neutrophils Relative 11/16/2022 68    • Immat GRANS % 11/16/2022 0    • Lymphocytes Relative 11/16/2022 10 (L)    • Monocytes Relative 11/16/2022 15 (H)    • Eosinophils Relative 11/16/2022 6    • Basophils Relative 11/16/2022 1    • Neutrophils Absolute 11/16/2022 5 31    • Immature Grans Absolute 11/16/2022 0 03    • Lymphocytes Absolute 11/16/2022 0 77    • Monocytes Absolute 11/16/2022 1 21    • Eosinophils Absolute 11/16/2022 0 49    • Basophils Absolute 11/16/2022 0 07    • Sodium 11/16/2022 138    • Potassium 11/16/2022 3 5    • Chloride 11/16/2022 100    • CO2 11/16/2022 32    • ANION GAP 11/16/2022 6    • BUN 11/16/2022 44 (H)    • Creatinine 11/16/2022 3 48 (H)    • Glucose 11/16/2022 94    • Calcium 11/16/2022 12 7 (HH)    • Corrected Calcium 11/16/2022 13 7 (HH)    • AST 11/16/2022 11    • ALT 11/16/2022 13    • Alkaline Phosphatase 11/16/2022 95    • Total Protein 11/16/2022 6 9    • Albumin 11/16/2022 2 8 (L)    • Total Bilirubin 11/16/2022 0 27    • eGFR 11/16/2022 18    • LACTIC ACID 11/16/2022 0 9    • Blood Culture 11/16/2022 No Growth at 24 hrs  • Blood Culture 11/16/2022 No Growth at 24 hrs      • Magnesium 11/16/2022 2  4    • CRP 11/16/2022 79 7 (H)    • NT-proBNP 11/16/2022 763 (H)    • hs TnI 0hr 11/16/2022 4    • Protime 11/16/2022 13 2    • INR 11/16/2022 0 99    • Total CK 11/16/2022 18 (L)    • Procalcitonin 11/16/2022 0 47 (H)    • D-Dimer, Quant 11/16/2022 0 97 (H)    • Color, UA 11/16/2022 Yellow    • Clarity, UA 11/16/2022 Slightly Cloudy    • Specific Gravity, UA 11/16/2022 1 015    • pH, UA 11/16/2022 7 0    • Leukocytes, UA 11/16/2022 Large (A)    • Nitrite, UA 11/16/2022 Positive (A)    • Protein, UA 11/16/2022 100 (2+) (A)    • Glucose, UA 11/16/2022 Negative    • Ketones, UA 11/16/2022 Negative    • Urobilinogen, UA 11/16/2022 0 2    • Bilirubin, UA 11/16/2022 Negative    • Occult Blood, UA 11/16/2022 Small (A)    • RBC, UA 11/16/2022 0-1    • WBC, UA 11/16/2022 4-10 (A)    • Epithelial Cells 11/16/2022 Occasional    • Bacteria, UA 11/16/2022 Moderate (A)    • hs TnI 4hr 11/16/2022 4    • Delta 4hr hsTnI 11/16/2022 0    • Calcium, Ionized 11/16/2022 1 57 (H)    • POC Glucose 11/16/2022 115    • Magnesium 11/17/2022 2 1    • Sodium 11/17/2022 141    • Potassium 11/17/2022 3 4 (L)    • Chloride 11/17/2022 106    • CO2 11/17/2022 24    • ANION GAP 11/17/2022 11    • BUN 11/17/2022 41 (H)    • Creatinine 11/17/2022 3 09 (H)    • Glucose 11/17/2022 143 (H)    • Glucose, Fasting 11/17/2022 143 (H)    • Calcium 11/17/2022 11 4 (H)    • Corrected Calcium 11/17/2022 12 8 (HH)    • AST 11/17/2022 10    • ALT 11/17/2022 <6 (L)    • Alkaline Phosphatase 11/17/2022 78    • Total Protein 11/17/2022 5 5 (L)    • Albumin 11/17/2022 2 3 (L)    • Total Bilirubin 11/17/2022 0 23    • eGFR 11/17/2022 21    • WBC 11/17/2022 6 09    • RBC 11/17/2022 3 08 (L)    • Hemoglobin 11/17/2022 9 0 (L)    • Hematocrit 11/17/2022 28 7 (L)    • MCV 11/17/2022 93    • MCH 11/17/2022 29 2    • MCHC 11/17/2022 31 4    • RDW 11/17/2022 17 3 (H)    • MPV 11/17/2022 11 2    • Platelets 57/91/0917 143 (L)    • nRBC 11/17/2022 0    • Neutrophils Relative 11/17/2022 59    • Immat GRANS % 11/17/2022 0    • Lymphocytes Relative 11/17/2022 14    • Monocytes Relative 11/17/2022 20 (H)    • Eosinophils Relative 11/17/2022 6    • Basophils Relative 11/17/2022 1    • Neutrophils Absolute 11/17/2022 3 57    • Immature Grans Absolute 11/17/2022 0 02    • Lymphocytes Absolute 11/17/2022 0 88    • Monocytes Absolute 11/17/2022 1 19    • Eosinophils Absolute 11/17/2022 0 39    • Basophils Absolute 11/17/2022 0 04    • Phosphorus 11/17/2022 4 3    • Hemoglobin A1C 11/17/2022 6 6 (H)    • EAG 11/17/2022 143    • Urine Culture 11/16/2022 Culture results to follow      • POC Glucose 11/17/2022 126    • Ventricular Rate 11/16/2022 79    • Atrial Rate 11/16/2022 79    • TN Interval 11/16/2022 154    • QRSD Interval 11/16/2022 110    • QT Interval 11/16/2022 396    • QTC Interval 11/16/2022 454    • P Axis 11/16/2022 81    • QRS Axis 11/16/2022 -37    • T Wave Axis 11/16/2022 81    • POC Glucose 11/17/2022 166 (H)    • Sodium, Ur 11/18/2022 100    • Creatinine, Ur 11/18/2022 20 7    • POC Glucose 11/17/2022 167 (H)    • POC Glucose 11/17/2022 135    • Sodium 11/18/2022 138    • Potassium 11/18/2022 3 4 (L)    • Chloride 11/18/2022 104    • CO2 11/18/2022 27    • ANION GAP 11/18/2022 7    • BUN 11/18/2022 35 (H)    • Creatinine 11/18/2022 2 69 (H)    • Glucose 11/18/2022 125    • Calcium 11/18/2022 10 5 (H)    • eGFR 11/18/2022 25    • Magnesium 11/18/2022 1 9    • Phosphorus 11/18/2022 3 5    • Calcium, Ionized 11/18/2022 1 40 (H)    • WBC 11/18/2022 6 72    • RBC 11/18/2022 3 54 (L)    • Hemoglobin 11/18/2022 10 2 (L)    • Hematocrit 11/18/2022 32 0 (L)    • MCV 11/18/2022 90    • MCH 11/18/2022 28 8    • MCHC 11/18/2022 31 9    • RDW 11/18/2022 17 0 (H)    • MPV 11/18/2022 11 4    • Platelets 38/83/7812 163    • nRBC 11/18/2022 0    • Neutrophils Relative 11/18/2022 58    • Immat GRANS % 11/18/2022 1    • Lymphocytes Relative 11/18/2022 16    • Monocytes Relative 11/18/2022 18 (H) • Eosinophils Relative 11/18/2022 6    • Basophils Relative 11/18/2022 1    • Neutrophils Absolute 11/18/2022 3 94    • Immature Grans Absolute 11/18/2022 0 04    • Lymphocytes Absolute 11/18/2022 1 07    • Monocytes Absolute 11/18/2022 1 19    • Eosinophils Absolute 11/18/2022 0 43    • Basophils Absolute 11/18/2022 0 05    • PTH 11/18/2022 19 6    • POC Glucose 11/18/2022 138    • POC Glucose 11/18/2022 165 (H)              Results from last 7 days   Lab Units 11/16/22  1715 11/16/22  1713   BLOOD CULTURE  No Growth at 24 hrs  No Growth at 24 hrs  Invalid input(s): LABAEARO            Imaging: I have personally reviewed pertinent films in PACS  EKG, Pathology, and Other Studies: I have personally reviewed pertinent reports        Code Status: Level 1 - Full Code  Advance Directive and Living Will:      Power of :    POLST:

## 2022-11-18 NOTE — ASSESSMENT & PLAN NOTE
Appreciate Podiatry input  Chronic ulcer, however increased in size  Will follow Podiatry recommendations for wound care  Monitor with serial exams

## 2022-11-18 NOTE — MALNUTRITION/BMI
This medical record reflects one or more clinical indicators suggestive of malnutrition  Malnutrition Findings:   Adult Malnutrition type: Chronic illness  Adult Degree of Malnutrition: Malnutrition of moderate degree  Malnutrition Characteristics: Muscle loss, Fat loss, Weight loss                  360 Statement: Chronic moderate malnutrition related to suspected inadequate intake as evidenced by 7 9% weight loss x 3 mo (7/5/22 151lb, 11/17/22 139lb), moderate muscle loss to temporalis, pectoralis, deltoid, interroseous muscles; moderate fat loss to buccal pads and triceps  Treated with: Increase diet to CCD to level 3  Will add glucerna BID  Encouraged PRO rich foods at home, pt declining further diet ed discussion  Recommend daily weights for nutrition monitoring  BMI Findings: Body mass index is 19 52 kg/m²  See Nutrition note dated 11/18/22 for additional details  Completed nutrition assessment is viewable in the nutrition documentation

## 2022-11-19 PROBLEM — E44.0 MODERATE PROTEIN-CALORIE MALNUTRITION (HCC): Status: ACTIVE | Noted: 2022-11-19

## 2022-11-19 LAB
ANION GAP SERPL CALCULATED.3IONS-SCNC: 6 MMOL/L (ref 4–13)
BACTERIA UR CULT: ABNORMAL
BACTERIA UR CULT: ABNORMAL
BASOPHILS # BLD AUTO: 0.04 THOUSANDS/ÂΜL (ref 0–0.1)
BASOPHILS NFR BLD AUTO: 1 % (ref 0–1)
BUN SERPL-MCNC: 33 MG/DL (ref 5–25)
CA-I BLD-SCNC: 1.36 MMOL/L (ref 1.12–1.32)
CALCIUM SERPL-MCNC: 9.9 MG/DL (ref 8.3–10.1)
CHLORIDE SERPL-SCNC: 105 MMOL/L (ref 96–108)
CO2 SERPL-SCNC: 27 MMOL/L (ref 21–32)
CREAT SERPL-MCNC: 2.6 MG/DL (ref 0.6–1.3)
EOSINOPHIL # BLD AUTO: 0.41 THOUSAND/ÂΜL (ref 0–0.61)
EOSINOPHIL NFR BLD AUTO: 7 % (ref 0–6)
ERYTHROCYTE [DISTWIDTH] IN BLOOD BY AUTOMATED COUNT: 16.8 % (ref 11.6–15.1)
GFR SERPL CREATININE-BSD FRML MDRD: 26 ML/MIN/1.73SQ M
GLUCOSE SERPL-MCNC: 118 MG/DL (ref 65–140)
GLUCOSE SERPL-MCNC: 121 MG/DL (ref 65–140)
GLUCOSE SERPL-MCNC: 126 MG/DL (ref 65–140)
GLUCOSE SERPL-MCNC: 134 MG/DL (ref 65–140)
HCT VFR BLD AUTO: 31.4 % (ref 36.5–49.3)
HGB BLD-MCNC: 10.2 G/DL (ref 12–17)
IMM GRANULOCYTES # BLD AUTO: 0.02 THOUSAND/UL (ref 0–0.2)
IMM GRANULOCYTES NFR BLD AUTO: 0 % (ref 0–2)
LYMPHOCYTES # BLD AUTO: 1.21 THOUSANDS/ÂΜL (ref 0.6–4.47)
LYMPHOCYTES NFR BLD AUTO: 20 % (ref 14–44)
MCH RBC QN AUTO: 28.7 PG (ref 26.8–34.3)
MCHC RBC AUTO-ENTMCNC: 32.5 G/DL (ref 31.4–37.4)
MCV RBC AUTO: 89 FL (ref 82–98)
MONOCYTES # BLD AUTO: 0.88 THOUSAND/ÂΜL (ref 0.17–1.22)
MONOCYTES NFR BLD AUTO: 15 % (ref 4–12)
NEUTROPHILS # BLD AUTO: 3.38 THOUSANDS/ÂΜL (ref 1.85–7.62)
NEUTS SEG NFR BLD AUTO: 57 % (ref 43–75)
NRBC BLD AUTO-RTO: 0 /100 WBCS
PLATELET # BLD AUTO: 170 THOUSANDS/UL (ref 149–390)
PMV BLD AUTO: 10.3 FL (ref 8.9–12.7)
POTASSIUM SERPL-SCNC: 4.5 MMOL/L (ref 3.5–5.3)
PROCALCITONIN SERPL-MCNC: 0.32 NG/ML
RBC # BLD AUTO: 3.55 MILLION/UL (ref 3.88–5.62)
SODIUM SERPL-SCNC: 138 MMOL/L (ref 135–147)
WBC # BLD AUTO: 5.94 THOUSAND/UL (ref 4.31–10.16)

## 2022-11-19 RX ORDER — CEPHALEXIN 500 MG/1
500 CAPSULE ORAL EVERY 6 HOURS SCHEDULED
Status: DISCONTINUED | OUTPATIENT
Start: 2022-11-19 | End: 2022-11-19 | Stop reason: DRUGHIGH

## 2022-11-19 RX ORDER — CEPHALEXIN 500 MG/1
500 CAPSULE ORAL EVERY 8 HOURS SCHEDULED
Status: DISCONTINUED | OUTPATIENT
Start: 2022-11-19 | End: 2022-11-21

## 2022-11-19 RX ADMIN — VENLAFAXINE HYDROCHLORIDE 37.5 MG: 37.5 CAPSULE, EXTENDED RELEASE ORAL at 21:06

## 2022-11-19 RX ADMIN — BUPRENORPHINE HYDROCHLORIDE 300 MCG: 300 FILM, SOLUBLE BUCCAL at 08:27

## 2022-11-19 RX ADMIN — CARVEDILOL 6.25 MG: 6.25 TABLET, FILM COATED ORAL at 08:10

## 2022-11-19 RX ADMIN — SODIUM CHLORIDE, SODIUM GLUCONATE, SODIUM ACETATE, POTASSIUM CHLORIDE, MAGNESIUM CHLORIDE, SODIUM PHOSPHATE, DIBASIC, AND POTASSIUM PHOSPHATE 75 ML/HR: .53; .5; .37; .037; .03; .012; .00082 INJECTION, SOLUTION INTRAVENOUS at 12:07

## 2022-11-19 RX ADMIN — HEPARIN SODIUM 5000 UNITS: 5000 INJECTION INTRAVENOUS; SUBCUTANEOUS at 06:50

## 2022-11-19 RX ADMIN — TAMSULOSIN HYDROCHLORIDE 0.4 MG: 0.4 CAPSULE ORAL at 15:54

## 2022-11-19 RX ADMIN — VENLAFAXINE HYDROCHLORIDE 37.5 MG: 37.5 CAPSULE, EXTENDED RELEASE ORAL at 08:09

## 2022-11-19 RX ADMIN — HEPARIN SODIUM 5000 UNITS: 5000 INJECTION INTRAVENOUS; SUBCUTANEOUS at 21:06

## 2022-11-19 RX ADMIN — PREGABALIN 150 MG: 75 CAPSULE ORAL at 17:37

## 2022-11-19 RX ADMIN — HEPARIN SODIUM 5000 UNITS: 5000 INJECTION INTRAVENOUS; SUBCUTANEOUS at 13:54

## 2022-11-19 RX ADMIN — ALLOPURINOL 50 MG: 100 TABLET ORAL at 08:09

## 2022-11-19 RX ADMIN — AMLODIPINE BESYLATE 10 MG: 10 TABLET ORAL at 08:09

## 2022-11-19 RX ADMIN — SENNOSIDES, DOCUSATE SODIUM 1 TABLET: 8.6; 5 TABLET ORAL at 08:10

## 2022-11-19 RX ADMIN — CARVEDILOL 6.25 MG: 6.25 TABLET, FILM COATED ORAL at 15:53

## 2022-11-19 RX ADMIN — OXYCODONE HYDROCHLORIDE 5 MG: 5 TABLET ORAL at 21:14

## 2022-11-19 RX ADMIN — VENLAFAXINE HYDROCHLORIDE 37.5 MG: 37.5 CAPSULE, EXTENDED RELEASE ORAL at 15:54

## 2022-11-19 RX ADMIN — CEPHALEXIN 500 MG: 500 CAPSULE ORAL at 21:06

## 2022-11-19 RX ADMIN — ATORVASTATIN CALCIUM 40 MG: 40 TABLET, FILM COATED ORAL at 15:53

## 2022-11-19 RX ADMIN — POLYETHYLENE GLYCOL 3350 17 G: 17 POWDER, FOR SOLUTION ORAL at 08:15

## 2022-11-19 RX ADMIN — PREGABALIN 150 MG: 75 CAPSULE ORAL at 08:09

## 2022-11-19 RX ADMIN — THIAMINE HCL TAB 100 MG 100 MG: 100 TAB at 08:10

## 2022-11-19 RX ADMIN — SENNOSIDES, DOCUSATE SODIUM 1 TABLET: 8.6; 5 TABLET ORAL at 17:37

## 2022-11-19 RX ADMIN — BUPRENORPHINE HYDROCHLORIDE 300 MCG: 300 FILM, SOLUBLE BUCCAL at 21:02

## 2022-11-19 RX ADMIN — FERROUS SULFATE TAB 325 MG (65 MG ELEMENTAL FE) 325 MG: 325 (65 FE) TAB at 08:10

## 2022-11-19 RX ADMIN — CEPHALEXIN 500 MG: 500 CAPSULE ORAL at 13:54

## 2022-11-19 RX ADMIN — OXYCODONE HYDROCHLORIDE 5 MG: 5 TABLET ORAL at 12:05

## 2022-11-19 RX ADMIN — OXYCODONE HYDROCHLORIDE 5 MG: 5 TABLET ORAL at 08:08

## 2022-11-19 NOTE — ASSESSMENT & PLAN NOTE
· Recurrent UTI due to urinary retention   · Has had Ortiz catheter placement in the past  · Urinary retention protocol  · Keflex thru the 21st and then stop

## 2022-11-19 NOTE — ASSESSMENT & PLAN NOTE
Malnutrition Findings: Loss of subcutaneous fat in orbital, triceps, ribcage areas  Loss of muscle at temples, clavicles, scapula, extremities  Consultation to dietitian       BMI Findings: Body mass index is 19 77 kg/m²

## 2022-11-19 NOTE — ASSESSMENT & PLAN NOTE
Lab Results   Component Value Date    HGBA1C 6 6 (H) 11/17/2022       Recent Labs     11/18/22  0652 11/18/22  1132 11/18/22  1551 11/18/22 2202   POCGLU 138 165* 154* 152*       Blood Sugar Average: Last 72 hrs:  (P) 869 [id number]     · PTA basal insulin 10 units HS- on hold due to poor oral intake, resume when able  · Sliding scale insulin coverage with Accu-Cheks  · Hypoglycemia protocol  · Carbohydrate controlled diet

## 2022-11-19 NOTE — ASSESSMENT & PLAN NOTE
Malnutrition Findings:   Adult Malnutrition type: Chronic illness  Adult Degree of Malnutrition: Malnutrition of moderate degree  Malnutrition Characteristics: Muscle loss, Fat loss, Weight loss                  360 Statement: Chronic moderate malnutrition related to suspected inadequate intake as evidenced by 7 9% weight loss x 3 mo (7/5/22 151lb, 11/17/22 139lb), moderate muscle loss to temporalis, pectoralis, deltoid, interroseous muscles; moderate fat loss to buccal pads and triceps  Treated with: Increase diet to CCD to level 3  Will add glucerna BID  Encouraged PRO rich foods at home, pt declining further diet ed discussion  Recommend daily weights for nutrition monitoring  BMI Findings: Body mass index is 19 77 kg/m²

## 2022-11-19 NOTE — CASE MANAGEMENT
Case Management Discharge Planning Note    Patient name Deejay Brown  Location /-02 MRN [id number]  : 1965 Date 2022       Current Admission Date: 2022  Current Admission Diagnosis:KELLY (acute kidney injury) [facility])   Patient Active Problem List    Diagnosis Date Noted   • Moderate protein-calorie malnutrition ([address] ) 2022   • Acute cystitis 2022   • Chronic anemia 2022   • RSV infection 2022   • Acute respiratory failure with hypoxia ([address] ) 2022   • UTI (urinary tract infection) due to urinary indwelling catheter ([address] ) 10/20/2022   • Chronic ulcer of left heel ([address] ) 10/20/2022   • Hypercalcemia 2022   • Low back pain 2022   • Ambulatory dysfunction 2022   • Stage 3a chronic kidney disease ([address] ) 2022   • Acute urinary retention 2022   • Severe protein-calorie malnutrition ([address] ) 2022   • Iron deficiency anemia secondary to inadequate dietary iron intake 2022   • Hip pain, acute, left 2022   • KELLY (acute kidney injury) ([address] ) 2022   • Hyperkalemia 2022   • Diabetes mellitus type 2, insulin dependent ([address] )    • Gout    • Essential hypertension    • History of CVA (cerebrovascular accident)    • Osteomyelitis of vertebra of lumbar region ([address] )       LOS (days): 2  Geometric Mean LOS (GMLOS) (days): 4 30  Days to GMLOS:2 4     OBJECTIVE:  Risk of Unplanned Readmission Score: 29 59         Current admission status: Inpatient   Preferred Pharmacy:   [address] [place] - [address]  [address] [place] [address]  Phone: [phone] Fax: [phone]    Primary Care Provider: Declan Gerber DO    Primary Insurance: MEDICARE  Secondary Insurance:     DISCHARGE DETAILS:           CM met with patient and spouse to discuss therapy recommendation for STR  Spouse not sure if she wants to pursue STR for patient vs outpatient PT with a provider in the home  Patient very happy with [facility] used by therapy this AM   CM to explore DME through [place]  A post acute care recommendation was made by your care team for STR  Discussed Freedom of Choice with both patient and caregiver  Offered both patient and caregiver blanket referral for facilities via in person  both patient and caregiver aware the list is custom by insurance and patient's medical needs  Spouse and patient agreeable to blanket STR referrals for patient to review options  CM submitted blanket STR referral for patient in  [address], larger mileage area

## 2022-11-19 NOTE — OCCUPATIONAL THERAPY NOTE
Occupational Therapy Evaluation     Evaluation: [id number]  Treatment: [id number]    Patient Name: Heather Torre  Today's Date: 11/19/2022  Problem List  Principal Problem:    KELLY (acute kidney injury) ([facility] )  Active Problems:    Diabetes mellitus type 2, insulin dependent ([facility] )    Gout    Essential hypertension    Severe protein-calorie malnutrition (HCC)    Hypercalcemia    Ambulatory dysfunction    Chronic ulcer of left heel (HCC)    Chronic anemia    RSV infection    Acute respiratory failure with hypoxia (HCC)    Acute cystitis    Moderate protein-calorie malnutrition (HCC)    Past Medical History  Past Medical History:   Diagnosis Date    Chronic kidney disease     Diabetes mellitus ([facility] )     Gout     Hypertension     Renal disorder     Retroperitoneal abscess ([facility] )     Stroke ([facility] )     Vertebral osteomyelitis ([facility] )      Past Surgical History  Past Surgical History:   Procedure Laterality Date    BACK SURGERY      ORCHIECTOMY             11/19/22 1052   Note Type   Note type Evaluation   Pain Assessment   Pain Assessment Tool FLACC   Pain Location/Orientation Orientation: Left; Location: Hip;Location: Leg;Location: Buttocks; Location: Back   Pain Rating: FLACC (Rest) - Face 0   Pain Rating: FLACC (Rest) - Legs 0   Pain Rating: FLACC (Rest) - Activity 0   Pain Rating: FLACC (Rest) - Cry 0   Pain Rating: FLACC (Rest) - Consolability 0   Score: FLACC (Rest) 0   Pain Rating: FLACC (Activity) - Face 1   Pain Rating: FLACC (Activity) - Legs 1   Pain Rating: FLACC (Activity) - Activity 1   Pain Rating: FLACC (Activity) - Cry 1   Pain Rating: FLACC (Activity) - Consolability 0   Score: FLACC (Activity) 4   Restrictions/Precautions   Weight Bearing Precautions Per Order Yes   LLE Weight Bearing Per Order NWB  (through heel)   Other Precautions Bed Alarm; Fall Risk;Pain;Multiple lines;Droplet precautions;Contact/isolation   Home Living   Type of Home House  (stair glide to enter)   Home Layout One level;1/2 bath on main level;Performs ADLs on one level; Able to live on main level with bedroom/bathroom   Bathroom Shower/Tub   (has been sponge bathing on 1st floor)   [address] Wheelchair-manual;Wheelchair-electric;Electric scooter   Additional Comments Pt reports living at home with his wife in a 2 story home with stair glide to enter  Pt reports staying on 1st floor with 1/2 bath available  Prior Function   Level of [place] Needs assistance with ADLs; Needs assistance with functional mobility   Lives With Spouse   Receives Help From Family   IADLs Family/Friend/Other provides transportation; Family/Friend/Other provides meals; Family/Friend/Other provides medication management   Falls in the last 6 months 1 to 4   Comments Pt reports having assistance from his wife for ADLs, IADLs, and functional transfers  ADL   Where Assessed Edge of bed   Grooming Assistance 5  Supervision/Setup   Grooming Deficit Setup   UB Dressing Assistance 5  Supervision/Setup   UB Dressing Deficit Setup   LB Dressing Assistance 3  Moderate Assistance   LB Dressing Deficit Steadying; Requires assistive device for steadying;Verbal cueing;Supervision/safety; Increased time to complete; Don/doff R sock; Don/doff L sock; Thread RLE into pants; Thread LLE into pants;Pull up over hips   Additional Comments Pt completing ADL tasks while seated at EOB  UB Dressing and grooming tasks @ S after set-up  LB Dressing @ Mod A for donning socks/pants around feet and for CM around waist while weight shifting from side to side at EOB  Pt also standing at [facility] device with BUE supported while therapist completed CM around waist    Bed Mobility   Supine to Sit 5  Supervision   Additional items Increased time required;Verbal cues   Sit to Supine 5  Supervision   Additional items Increased time required;Verbal cues   Additional Comments S when pain is controlled   towards end of session when pain was exacerbated, Pt requiring Min A for LE management for sit>supine   Transfers   Sit to Stand 4  Minimal assistance  (with use of Quick Move)   Additional items Assist x 2; Increased time required;Verbal cues   Stand to Sit 4  Minimal assistance   Additional items Assist x 2; Increased time required;Verbal cues   Stand pivot Unable to assess   Additional Comments Pt initially completing lateral scooting at EOB with Min A for safety/balance and to maintain heel offloading on LLE  Pt easily fatigued and pain trigered by movement of LE and weight baring  d/t spastic movemetns at times and sudden onset of pain, STS with use of RW was deferred d/t safety concerns  please refer to treatment note for performance during quick move transfers  Balance   Static Sitting Good   Dynamic Sitting Fair +   Activity Tolerance   Activity Tolerance Patient limited by fatigue;Patient limited by pain   Medical Staff Made Aware Spoke with JOSÉ, Kenji Hwang   Nurse Made Aware Spoke with RN, Majorie Boas Assessment   RUE Assessment WFL   LUE Assessment   LUE Assessment WFL   Hand Function   Gross Motor Coordination Functional   Fine Motor Coordination Functional   Sensation   Light Touch No apparent deficits   Cognition   Overall Cognitive Status WFL   Arousal/Participation Alert; Responsive; Cooperative   Attention Attends with cues to redirect   Orientation Level Oriented X4   Memory Within functional limits   Following Commands Follows one step commands without difficulty   Assessment   Limitation Decreased ADL status; Decreased UE strength;Decreased Safe judgement during ADL;Decreased endurance;Decreased self-care trans;Decreased high-level ADLs   Prognosis Good;Fair   Assessment Pt is a 62 y o  male, admitted to [address] [place] [address] 11/16/2022 d/t experiencing respiratory distress  Dx: KELLY   Pt with PMHx impacting their performance during ADL tasks, including: multiple hospitalizations, urinary retention, recurrent UTis, hypercapnia, chronic osteomyelitis of lumbar region, L heel ulcer, CKD, ambulatory dysfunction, protein calorie malnutrition, HTN, DM  Prior to admission to the hospital Pt was performing ADLs with physical assistance  IADLs with physical assistance  Functional transfers/ambulation with physical assistance  Cognitive status was PTA was intact  OT order placed to assess Pt's ADLs, cognitive status, and performance during functional tasks in order to maximize safety and independence while making most appropriate d/c recommendations  Pt's clinical presentation is currently unstable/unpredictable given new onset deficits that effect Pt's occupational performance and ability to safely return to OF including decrease activity tolerance, decrease standing balance, decrease performance during ADL tasks, decrease safety awareness , decrease UB MS, increased pain, decrease generalized strength, decrease activity engagement, decrease performance during functional transfers, high fall risk and limited insight to deficits combined with medical complications of pain impacting overall mobility status, abnormal renal lab values, abnormal CBC, wounds, decreased skin integrity, multiple readmissions, incontinence, fear/retreat and need for input for mobility technique/safety  Pt is significantly limited by pain in L leg, hip and buttocks/back when weight baring occurs  Pt's difficulties do not appears to be d/t muscle deficits or ROM  Personal factors affecting Pt at time of initial evaluation include: past experience, behavioral pattern, inability to perform current job functions, inability to perform IADLs, inability to perform ADLs, new need for AD, inability to ambulate household distances, inability to navigate community distances, limited insight into impairments, decreased initiation and engagement and questionable non-compliance   Pt will benefit from continued skilled OT services to address deficits as defined above and to maximize level independence/participation during ADLs and functional tasks to facilitate return toward PLOF and improved quality of life  From an occupational therapy standpoint, recommendation at time of d/c would be post acute rehab  Plan   Treatment Interventions ADL retraining;Functional transfer training;UE strengthening/ROM; Endurance training;Patient/family training;Equipment evaluation/education; Neuromuscular reeducation; Compensatory technique education;Continued evaluation; Energy conservation; Activityengagement   Goal Expiration Date 12/03/22   OT Treatment Day 1   OT Frequency 3-5x/wk   Recommendation   OT Discharge Recommendation Post acute rehabilitation services   Additional Comments  (S)  Should Pt return home, Pt would benefit from [facility] services  Pt would also benefit from a Quick Move device in the home to assist with functional transfers and to facilitate WB through legs (offloading L heel)   AM-PAC Daily Activity Inpatient   Lower Body Dressing 2   Bathing 2   Toileting 2   Upper Body Dressing 3   Grooming 3   Eating 4   Daily Activity Raw Score 16   Daily Activity Standardized Score (Calc for Raw Score >=11) 35 96   AM-PAC Applied Cognition Inpatient   Following a Speech/Presentation 4   Understanding Ordinary Conversation 4   Taking Medications 4   Remembering Where Things Are Placed or Put Away 4   Remembering List of 4-5 Errands 4   Taking Care of Complicated Tasks 4   Applied Cognition Raw Score 24   Applied Cognition Standardized Score 62 21   Additional Treatment Session   Start Time 1112   End Time 1125   Treatment Assessment Pt seen for treatment session #1 this date  Pt alert and agreeable to participate at this time  Pt seated at EOB and completing STS transfer with use of Quick Move device  Pt able to place B feet on device and pull from grab bar with B feet and B knees supported  Pt then completing full stand with Min x's 2 although d/t sudden onset of pain, Pt impulsively sitting down back to EOB  Pt was able to regroup and attempt for a 2nd time   Pt then completing STS again with Min x's 2 and able to maintain standing for 30-45 seconds with BUE and SBA for balance/safety  cues to maintain heel offloading on LUE  Pt tolerating well ans then requiring a seated rest break back to EOB  Pt then completing sit>supine @ Min A for LE management d/t pain  Pt supine in bed at end of session with call bell in reach, bed alarm intact and all needs met at [facility] dav[facility]  Additional Treatment Day 1     The patient's raw score on the AM-PAC Daily Activity inpatient short form is 16, standardized score is 35 96, less than 39 4  Patients at this level are likely to benefit from DC to post-acute rehabilitation services  Please refer to the recommendation of the Occupational Therapist for safe DC planning  Pt goals to be met by 12/3/2022    Pt will demonstrate ability to complete LB dressing @ Min A in order to increase safety and independence during meaningful tasks  Pt will demonstrate ability to herb/doff socks/shoes @ Min A in order to increase safety and independence during meaningful tasks  Pt will demonstrate ability to complete toileting tasks including CM and pericare @ Min A in order to increase safety and independence during meaningful tasks  Pt will demonstrate ability to complete EOB, chair, toilet/commode transfers @ Min A in order to increase performance and participation during functional tasks  Pt will demonstrate ability to stand for 1-2 minutes while maintaining F+ balance with use of least restrictive device for UB support PRN  Pt will demonstrate ability to tolerate 30-35 minute OT session with no vc'ing for deep breathing or use of energy conservation techniques in order to increase activity tolerance during functional tasks  Pt will demonstrate Good carryover of use of energy conservation/compensatory strategies during ADLs and functional tasks in order to increase safety and reduce risk for falls     Pt will demonstrate Good attention and participation in continued evaluation of functional ambulation house hold distances in order to assist with safe d/c planning  Pt will attend to continued cognitive assessments 100% of the time in order to provide most appropriate d/c recommendations  Pt will follow 100% simple 2-step commands and be A&O x4 consistently with environmental cues to increase participation in functional activities  Pt will identify 3 areas of interest/hobbies and 1 intervention on how to incorporate into daily life in order to increase interaction with environment and peers as well as increase participation in meaningful tasks  Pt will demonstrate 100% carryover of BUE HEP in order to increase BUE MS and increase performance during functional tasks upon d/c home      Tone Solis OTR/L

## 2022-11-19 NOTE — PROGRESS NOTES
[address]  Progress Note - Reza Sánchez 1965, 62 y o  male MRN: [id number]  Unit/Bed#: -01 Encounter: [id number]  Primary Care Provider: Petra Guerrero DO   Date and time admitted to hospital: 11/16/2022  4:34 PM    * KELLY (acute kidney injury) [facility])  Assessment & Plan  · Creatinine 3 48 on admission, improved to 2 69 to baseline  · Baseline creatinine 2 5-2 9  · In setting of mild volume depletion, UTI  · Rx urinary tract infection  Antibiotics for 2 more days  · Appreciate Nephroloy consultation  · Monitor BMP      Moderate protein-calorie malnutrition ([address] )  Assessment & Plan  Malnutrition Findings:   Adult Malnutrition type: Chronic illness  Adult Degree of Malnutrition: Malnutrition of moderate degree  Malnutrition Characteristics: Muscle loss, Fat loss, Weight loss     360 Statement: Chronic moderate malnutrition related to suspected inadequate intake as evidenced by 7 9% weight loss x 3 mo (7/5/22 151lb, 11/17/22 139lb), moderate muscle loss to temporalis, pectoralis, deltoid, interroseous muscles; moderate fat loss to buccal pads and triceps  Treated with: Increase diet to CCD to level 3  Will add glucerna BID  Encouraged PRO rich foods at home, pt declining further diet ed discussion  Recommend daily weights for nutrition monitoring  BMI Findings: Body mass index is 19 77 kg/m²         Acute cystitis  Assessment & Plan  · Recurrent UTI due to urinary retention   · Has had Ortiz catheter placement in the past  · Urinary retention protocol  · Keflex thru the 21st and then stop    Acute respiratory failure with hypoxia (HCC)  Assessment & Plan  · Presents with 2 L supplemental oxygen for hypoxia ED% on room air at home  · Daughter is a nurse and found him in respiratory distress, checked his pulse oximetry it was 80%, this was verified by EMS  · No history of supplemental oxygen use  · Weaned off to room air    RSV infection  Assessment & Plan  · RSV positive on admission  · Exposure via family members  · Presented with respiratory failure requiring 2 L supplemental oxygen, cough and hypoxia at home - hypoxia has resolved  · Continue supportive care  · Maintain isolation    Chronic ulcer of left heel (HCC)  Assessment & Plan  Appreciate Podiatry input  Chronic ulcer, however increased in size  Will follow Podiatry recommendations for wound care  Monitor with serial exams    Hypercalcemia  Assessment & Plan  · Corrected calcium 13 2 on admission with hypovolemia  · Mildly elevated ionized calcium  · Improving with IV hydration, however still elevated  · No history of underlying malignancy/multiple myeloma    · Normal TSH, normal protein level during last admission  · PTHrP, SPEP/UPEP ordered by Nephrology and pending  · Continue to monitor calcium level    Severe protein-calorie malnutrition ([address] )  Assessment & Plan  Malnutrition Findings: Loss of subcutaneous fat in orbital, triceps, ribcage areas  Loss of muscle at temples, clavicles, scapula, extremities  Consultation to dietitian       BMI Findings: Body mass index is 19 77 kg/m²         Essential hypertension  Assessment & Plan  · Continue carvedilol and amlodipine home regimen  · Monitor blood pressure trends     Gout  Assessment & Plan  · Continue allopurinol    Diabetes mellitus type 2, insulin dependent [facility])  Assessment & Plan  Lab Results   Component Value Date    HGBA1C 6 6 (H) 11/17/2022       Recent Labs     11/18/22  0652 11/18/22  1132 11/18/22  1551 11/18/22  2202   POCGLU 138 165* 154* 152*       Blood Sugar Average: Last 72 hrs:  (P) 974 [id number]     · PTA basal insulin 10 units HS- on hold due to poor oral intake, resume when able  · Sliding scale insulin coverage with Accu-Cheks  · Hypoglycemia protocol  · Carbohydrate controlled diet      VTE Pharmacologic Prophylaxis:   Pharmacologic: Heparin  Mechanical VTE Prophylaxis in Place: No    Patient Centered Rounds: I have performed bedside rounds with nursing staff today  Time Spent for Care: 15 minutes  More than 50% of total time spent on counseling and coordination of care as described above  Current Length of Stay: 2 day(s)    Current Patient Status: Inpatient       Code Status: Level 1 - Full Code      Subjective:   nad    Objective:     Vitals:   Temp (24hrs), Av 3 °F (36 8 °C), Min:98 1 °F (36 7 °C), Max:98 4 °F (36 9 °C)    Temp:  [98 1 °F (36 7 °C)-98 4 °F (36 9 °C)] 98 1 °F (36 7 °C)  HR:  [64-74] 74  Resp:  [18-20] 20  BP: (137-168)/(75-76) 137/75  SpO2:  [91 %-94 %] 94 %  Body mass index is 19 77 kg/m²  Input and Output Summary (last 24 hours): Intake/Output Summary (Last 24 hours) at 2022 1147  Last data filed at 2022 0900  Gross per 24 hour   Intake 1190 ml   Output 2680 ml   Net -1490 ml       Physical Exam:     Physical Exam  Constitutional:       Appearance: Normal appearance  Cardiovascular:      Rate and Rhythm: Normal rate and regular rhythm  Pulmonary:      Effort: Pulmonary effort is normal       Breath sounds: Wheezing present  Skin:     General: Skin is warm and dry  Neurological:      General: No focal deficit present  Mental Status: He is alert and oriented to person, place, and time     Psychiatric:         Mood and Affect: Mood normal          Additional Data:     Labs:    Results from last 7 days   Lab Units 22  0612   WBC Thousand/uL 5 94   HEMOGLOBIN g/dL 10 2*   HEMATOCRIT % 31 4*   PLATELETS Thousands/uL 170   NEUTROS PCT % 57   LYMPHS PCT % 20   MONOS PCT % 15*   EOS PCT % 7*     Results from last 7 days   Lab Units 22  0612 22  0512 22  0526   POTASSIUM mmol/L 4 5   < > 3 4*   CHLORIDE mmol/L 105   < > 106   CO2 mmol/L 27   < > 24   BUN mg/dL 33*   < > 41*   CREATININE mg/dL 2 60*   < > 3 09*   CALCIUM mg/dL 9 9   < > 11 4*   ALK PHOS U/L  --   --  78   ALT U/L  --   --  <6*   AST U/L  --   --  10    < > = values in this interval not displayed  Results from last 7 days   Lab Units 11/16/22  1713   INR  0 99       * I Have Reviewed All Lab Data Listed Above  * Additional Pertinent Lab Tests Reviewed: All Labs Within Last 24 Hours Reviewed        Recent Cultures (last 7 days):     Results from last 7 days   Lab Units 11/16/22  1838 11/16/22  1715 11/16/22  1713   BLOOD CULTURE   --  No Growth at 48 hrs  No Growth at 48 hrs     URINE CULTURE  20,000-29,000 cfu/ml Klebsiella pneumoniae*  <10,000 cfu/ml Klebsiella pneumoniae*  --   --        Last 24 Hours Medication List:   Current Facility-Administered Medications   Medication Dose Route Frequency Provider Last Rate   • acetaminophen  650 mg Oral Q6H PRN Thao S June, CRNP     • allopurinol  50 mg Oral Daily Atif Schooling, CRNP     • amLODIPine  10 mg Oral Daily Atif Schooling, CRNP     • atorvastatin  40 mg Oral Daily With Dinner Thao S June, CRNP     • Buprenorphine HCl  300 mcg Buccal BID Thao S June, CRNP     • carvedilol  6 25 mg Oral BID With Meals Thao S June, CRNP     • cephalexin  500 mg Oral Q6H [facility] Hetul Klein, DO     • ferrous sulfate  325 mg Oral Daily With Breakfast Thao S June, CRNP     • heparin (porcine)  5,000 Units Subcutaneous Q8H [facility] Thao S June, CRNP     • insulin lispro  1-6 Units Subcutaneous HS Thao S June, CRNP     • insulin lispro  1-6 Units Subcutaneous TID AC Thao S June, CRNP     • multi-electrolyte  75 mL/hr Intravenous Continuous Tamsen Saint, DO 75 mL/hr (11/18/22 1208)   • ondansetron  4 mg Intravenous Q8H PRN Thao S Jnue, CRNP     • oxyCODONE  5 mg Oral Q4H PRN Thao S June, CRNP     • polyethylene glycol  17 g Oral Daily Thao S June, CRNP     • pregabalin  150 mg Oral BID Thao S June, CRNP     • senna-docusate sodium  1 tablet Oral BID Thao S June, CRNP     • tamsulosin  0 4 mg Oral Daily With Dinner Thao S June, CRNP     • thiamine  100 mg Oral Daily Thao S June, CRNP     • venlafaxine  37 5 mg Oral TID Thao S YURIDIA Watkins          Today, Patient Was Seen By: Tony Gore DO    ** Please Note: Dictation voice to text software may have been used in the creation of this document   **

## 2022-11-19 NOTE — ASSESSMENT & PLAN NOTE
· Presents with 2 L supplemental oxygen for hypoxia ED% on room air at home  · Daughter is a nurse and found him in respiratory distress, checked his pulse oximetry it was 80%, this was verified by EMS  · No history of supplemental oxygen use  · Weaned off to room air

## 2022-11-19 NOTE — PLAN OF CARE
Problem: OCCUPATIONAL THERAPY ADULT  Goal: Performs self-care activities at highest level of function for planned discharge setting  See evaluation for individualized goals  Description: Treatment Interventions: ADL retraining, Functional transfer training, UE strengthening/ROM, Endurance training, Patient/family training, Equipment evaluation/education, Neuromuscular reeducation, Compensatory technique education, Continued evaluation, Energy conservation, Activityengagement          See flowsheet documentation for full assessment, interventions and recommendations  Note: Limitation: Decreased ADL status, Decreased UE strength, Decreased Safe judgement during ADL, Decreased endurance, Decreased self-care trans, Decreased high-level ADLs  Prognosis: Good, Fair  Assessment: Pt is a 62 y o  male, admitted to [address] [place] [address] 11/16/2022 d/t experiencing respiratory distress  Dx: KELLY  Pt with PMHx impacting their performance during ADL tasks, including: multiple hospitalizations, urinary retention, recurrent UTis, hypercapnia, chronic osteomyelitis of lumbar region, L heel ulcer, CKD, ambulatory dysfunction, protein calorie malnutrition, HTN, DM  Prior to admission to the hospital Pt was performing ADLs with physical assistance  IADLs with physical assistance  Functional transfers/ambulation with physical assistance  Cognitive status was PTA was intact  OT order placed to assess Pt's ADLs, cognitive status, and performance during functional tasks in order to maximize safety and independence while making most appropriate d/c recommendations   Pt's clinical presentation is currently unstable/unpredictable given new onset deficits that effect Pt's occupational performance and ability to safely return to OF including decrease activity tolerance, decrease standing balance, decrease performance during ADL tasks, decrease safety awareness , decrease UB MS, increased pain, decrease generalized strength, decrease activity engagement, decrease performance during functional transfers, high fall risk and limited insight to deficits combined with medical complications of pain impacting overall mobility status, abnormal renal lab values, abnormal CBC, wounds, decreased skin integrity, multiple readmissions, incontinence, fear/retreat and need for input for mobility technique/safety  Pt is significantly limited by pain in L leg, hip and buttocks/back when weight baring occurs  Pt's difficulties do not appears to be d/t muscle deficits or ROM  Personal factors affecting Pt at time of initial evaluation include: past experience, behavioral pattern, inability to perform current job functions, inability to perform IADLs, inability to perform ADLs, new need for AD, inability to ambulate household distances, inability to navigate community distances, limited insight into impairments, decreased initiation and engagement and questionable non-compliance  Pt will benefit from continued skilled OT services to address deficits as defined above and to maximize level independence/participation during ADLs and functional tasks to facilitate return toward PLOF and improved quality of life  From an occupational therapy standpoint, recommendation at time of d/c would be post acute rehab       OT Discharge Recommendation: Post acute rehabilitation services

## 2022-11-19 NOTE — PROGRESS NOTES
Discussed case with case management  Discussed with wife and patient at bedside  Physical therapy recommending rehab to recover from RSV/UTI   Unfortunately, given patient's multiple rehab placements this year he may be out of days for therapy with insurance  Case Management to investigate availability and coverage with insurance  Patient's wife reports that she currently is in the process of investigating home physical therapy as well  Will likely discharge in next 1 to 2 days pending disposition planning regarding the above  Wife reports that up until May he was very active prior to his epidural abscess/surgery at [facility] in [place]  She wishes to pursue aggressive neuro rehab to optimize mobility

## 2022-11-19 NOTE — NURSING NOTE
Patient has intermittently been refusing turn and repositioning throughout the day  Patient is able to reposition self slightly, has accepted some turn and repositioning by nursing

## 2022-11-20 PROBLEM — J96.01 ACUTE RESPIRATORY FAILURE WITH HYPOXIA (HCC): Status: RESOLVED | Noted: 2022-11-16 | Resolved: 2022-11-20

## 2022-11-20 LAB
ANION GAP SERPL CALCULATED.3IONS-SCNC: 7 MMOL/L (ref 4–13)
BASOPHILS # BLD AUTO: 0.06 THOUSANDS/ÂΜL (ref 0–0.1)
BASOPHILS NFR BLD AUTO: 1 % (ref 0–1)
BUN SERPL-MCNC: 32 MG/DL (ref 5–25)
CALCIUM SERPL-MCNC: 10.9 MG/DL (ref 8.3–10.1)
CHLORIDE SERPL-SCNC: 104 MMOL/L (ref 96–108)
CO2 SERPL-SCNC: 29 MMOL/L (ref 21–32)
CREAT SERPL-MCNC: 2.54 MG/DL (ref 0.6–1.3)
EOSINOPHIL # BLD AUTO: 0.39 THOUSAND/ÂΜL (ref 0–0.61)
EOSINOPHIL NFR BLD AUTO: 6 % (ref 0–6)
ERYTHROCYTE [DISTWIDTH] IN BLOOD BY AUTOMATED COUNT: 16.8 % (ref 11.6–15.1)
GFR SERPL CREATININE-BSD FRML MDRD: 26 ML/MIN/1.73SQ M
GLUCOSE SERPL-MCNC: 100 MG/DL (ref 65–140)
GLUCOSE SERPL-MCNC: 111 MG/DL (ref 65–140)
GLUCOSE SERPL-MCNC: 118 MG/DL (ref 65–140)
GLUCOSE SERPL-MCNC: 137 MG/DL (ref 65–140)
GLUCOSE SERPL-MCNC: 200 MG/DL (ref 65–140)
HCT VFR BLD AUTO: 33.5 % (ref 36.5–49.3)
HGB BLD-MCNC: 10.6 G/DL (ref 12–17)
IMM GRANULOCYTES # BLD AUTO: 0.02 THOUSAND/UL (ref 0–0.2)
IMM GRANULOCYTES NFR BLD AUTO: 0 % (ref 0–2)
LYMPHOCYTES # BLD AUTO: 1.55 THOUSANDS/ÂΜL (ref 0.6–4.47)
LYMPHOCYTES NFR BLD AUTO: 25 % (ref 14–44)
MCH RBC QN AUTO: 28.3 PG (ref 26.8–34.3)
MCHC RBC AUTO-ENTMCNC: 31.6 G/DL (ref 31.4–37.4)
MCV RBC AUTO: 89 FL (ref 82–98)
MONOCYTES # BLD AUTO: 0.6 THOUSAND/ÂΜL (ref 0.17–1.22)
MONOCYTES NFR BLD AUTO: 10 % (ref 4–12)
NEUTROPHILS # BLD AUTO: 3.58 THOUSANDS/ÂΜL (ref 1.85–7.62)
NEUTS SEG NFR BLD AUTO: 58 % (ref 43–75)
NRBC BLD AUTO-RTO: 0 /100 WBCS
PLATELET # BLD AUTO: 184 THOUSANDS/UL (ref 149–390)
PMV BLD AUTO: 10.2 FL (ref 8.9–12.7)
POTASSIUM SERPL-SCNC: 4.6 MMOL/L (ref 3.5–5.3)
RBC # BLD AUTO: 3.75 MILLION/UL (ref 3.88–5.62)
SODIUM SERPL-SCNC: 140 MMOL/L (ref 135–147)
WBC # BLD AUTO: 6.2 THOUSAND/UL (ref 4.31–10.16)

## 2022-11-20 RX ADMIN — OXYCODONE HYDROCHLORIDE 5 MG: 5 TABLET ORAL at 08:11

## 2022-11-20 RX ADMIN — PREGABALIN 150 MG: 75 CAPSULE ORAL at 16:17

## 2022-11-20 RX ADMIN — INSULIN LISPRO 2 UNITS: 100 INJECTION, SOLUTION INTRAVENOUS; SUBCUTANEOUS at 16:20

## 2022-11-20 RX ADMIN — BUPRENORPHINE HYDROCHLORIDE 300 MCG: 300 FILM, SOLUBLE BUCCAL at 21:15

## 2022-11-20 RX ADMIN — CEPHALEXIN 500 MG: 500 CAPSULE ORAL at 14:09

## 2022-11-20 RX ADMIN — BUPRENORPHINE HYDROCHLORIDE 300 MCG: 300 FILM, SOLUBLE BUCCAL at 09:31

## 2022-11-20 RX ADMIN — HEPARIN SODIUM 5000 UNITS: 5000 INJECTION INTRAVENOUS; SUBCUTANEOUS at 14:09

## 2022-11-20 RX ADMIN — AMLODIPINE BESYLATE 10 MG: 10 TABLET ORAL at 08:12

## 2022-11-20 RX ADMIN — HEPARIN SODIUM 5000 UNITS: 5000 INJECTION INTRAVENOUS; SUBCUTANEOUS at 05:40

## 2022-11-20 RX ADMIN — VENLAFAXINE HYDROCHLORIDE 37.5 MG: 37.5 CAPSULE, EXTENDED RELEASE ORAL at 21:13

## 2022-11-20 RX ADMIN — CARVEDILOL 6.25 MG: 6.25 TABLET, FILM COATED ORAL at 16:17

## 2022-11-20 RX ADMIN — OXYCODONE HYDROCHLORIDE 5 MG: 5 TABLET ORAL at 21:13

## 2022-11-20 RX ADMIN — THIAMINE HCL TAB 100 MG 100 MG: 100 TAB at 08:12

## 2022-11-20 RX ADMIN — CEPHALEXIN 500 MG: 500 CAPSULE ORAL at 21:13

## 2022-11-20 RX ADMIN — VENLAFAXINE HYDROCHLORIDE 37.5 MG: 37.5 CAPSULE, EXTENDED RELEASE ORAL at 16:18

## 2022-11-20 RX ADMIN — ALLOPURINOL 50 MG: 100 TABLET ORAL at 08:11

## 2022-11-20 RX ADMIN — HEPARIN SODIUM 5000 UNITS: 5000 INJECTION INTRAVENOUS; SUBCUTANEOUS at 21:14

## 2022-11-20 RX ADMIN — SENNOSIDES, DOCUSATE SODIUM 1 TABLET: 8.6; 5 TABLET ORAL at 08:12

## 2022-11-20 RX ADMIN — FERROUS SULFATE TAB 325 MG (65 MG ELEMENTAL FE) 325 MG: 325 (65 FE) TAB at 08:11

## 2022-11-20 RX ADMIN — CEPHALEXIN 500 MG: 500 CAPSULE ORAL at 05:40

## 2022-11-20 RX ADMIN — ATORVASTATIN CALCIUM 40 MG: 40 TABLET, FILM COATED ORAL at 16:18

## 2022-11-20 RX ADMIN — TAMSULOSIN HYDROCHLORIDE 0.4 MG: 0.4 CAPSULE ORAL at 16:18

## 2022-11-20 RX ADMIN — SENNOSIDES, DOCUSATE SODIUM 1 TABLET: 8.6; 5 TABLET ORAL at 16:17

## 2022-11-20 RX ADMIN — VENLAFAXINE HYDROCHLORIDE 37.5 MG: 37.5 CAPSULE, EXTENDED RELEASE ORAL at 08:11

## 2022-11-20 RX ADMIN — CARVEDILOL 6.25 MG: 6.25 TABLET, FILM COATED ORAL at 08:11

## 2022-11-20 RX ADMIN — POLYETHYLENE GLYCOL 3350 17 G: 17 POWDER, FOR SOLUTION ORAL at 08:10

## 2022-11-20 RX ADMIN — PREGABALIN 150 MG: 75 CAPSULE ORAL at 08:11

## 2022-11-20 RX ADMIN — OXYCODONE HYDROCHLORIDE 5 MG: 5 TABLET ORAL at 16:17

## 2022-11-20 NOTE — PROGRESS NOTES
[address]  Progress Note - Alex Morfin 1965, 62 y o  male MRN: [id number]  Unit/Bed#: -01 Encounter: [id number]  Primary Care Provider: Benson Corral DO   Date and time admitted to hospital: 11/16/2022  4:34 PM    * KELLY (acute kidney injury) [facility])  Assessment & Plan  · Creatinine 3 48 on admission  · Baseline creatinine 2 5-2 9  Lab Results   Component Value Date    CREATININE 2 54 (H) 11/20/2022    CREATININE 2 60 (H) 11/19/2022    CREATININE 2 69 (H) 11/18/2022     · In setting of mild volume depletion, UTI  · UTI-empiric ceftriaxone, urine culture with klebsiella pneumonia  · Susceptible to keflex- transition to oral 11/19  · Day 5 of abx  · Appreciate Nephroloy consultation  · DC IVF  · Monitor BMP      Hypercalcemia  Assessment & Plan  · Corrected calcium 13 2 on admission with hypovolemia  · Mildly elevated ionized calcium  · Improving with IV hydration, however still elevated  Lab Results   Component Value Date    CALCIUM 10 9 (H) 11/20/2022    CALCIUM 9 9 11/19/2022    CALCIUM 10 5 (H) 11/18/2022     · No history of underlying malignancy/multiple myeloma    · Normal TSH, normal protein level during last admission  · PTHrP, SPEP/UPEP ordered by Nephrology and pending  · Continue to monitor calcium level    Moderate protein-calorie malnutrition ([address] )  Assessment & Plan  Malnutrition Findings:   Adult Malnutrition type: Chronic illness  Adult Degree of Malnutrition: Malnutrition of moderate degree  Malnutrition Characteristics: Muscle loss, Fat loss, Weight loss                  360 Statement: Chronic moderate malnutrition related to suspected inadequate intake as evidenced by 7 9% weight loss x 3 mo (7/5/22 151lb, 11/17/22 139lb), moderate muscle loss to temporalis, pectoralis, deltoid, interroseous muscles; moderate fat loss to buccal pads and triceps  Treated with: Increase diet to CCD to level 3  Will add glucerna BID   Encouraged PRO rich foods at home, pt declining further diet ed discussion  Recommend daily weights for nutrition monitoring  BMI Findings: Body mass index is 19 53 kg/m²  Acute cystitis  Assessment & Plan  · Recurrent UTI due to urinary retention   · Has had Ortiz catheter placement in the past  · Urinary retention protocol  · Keflex thru the 21st and then stop    RSV infection  Assessment & Plan  · RSV positive on admission  · Exposure via family members  · Presented with respiratory failure requiring 2 L supplemental oxygen, cough and hypoxia at home - hypoxia has resolved  · Continue supportive care  · Maintain isolation    Chronic anemia  Assessment & Plan  · In setting of chronic kidney disease  · Hemoglobin at baseline  · Trend hemoglobin    Chronic ulcer of left heel (HCC)  Assessment & Plan  · Appreciate Podiatry input  · Chronic ulcer, however increased in size  · Will follow Podiatry recommendations for wound care  · Monitor with serial exams    Ambulatory dysfunction  Assessment & Plan  · Appears due to deconditioning  · No focal deficits on examination  · Patient has been in and out of rehabs since March  · Will request PT/OT evaluation    Severe protein-calorie malnutrition ([address] )  Assessment & Plan  Malnutrition Findings: Loss of subcutaneous fat in orbital, triceps, ribcage areas  Loss of muscle at temples, clavicles, scapula, extremities  Consultation to dietitian       BMI Findings: Body mass index is 19 53 kg/m²         Essential hypertension  Assessment & Plan  · Continue carvedilol and amlodipine home regimen  · Monitor blood pressure trends     Gout  Assessment & Plan  · Continue allopurinol    Diabetes mellitus type 2, insulin dependent [facility])  Assessment & Plan  Lab Results   Component Value Date    HGBA1C 6 6 (H) 11/17/2022       Recent Labs     11/19/22  1114 11/19/22  1535 11/19/22  2041 11/20/22  0728   POCGLU 134 118 121 100       Blood Sugar Average: Last 72 hrs:  (P) 546 [id number] · PTA basal insulin 10 units HS- on hold due to poor oral intake, resume when able  · Intake improved slightly but still without hyperglycemia - continue to hold insulin   · Sliding scale insulin coverage with Accu-Cheks  · Hypoglycemia protocol  · Carbohydrate controlled diet    Acute respiratory failure with hypoxia (HCC)-resolved as of 2022  Assessment & Plan  · Presents with 2 L supplemental oxygen for hypoxia ED% on room air at home  · Daughter is a nurse and found him in respiratory distress, checked his pulse oximetry it was 80%, this was verified by EMS  · No history of supplemental oxygen use  · Weaned off to room air      VTE Pharmacologic Prophylaxis: VTE Score: 5 High Risk (Score >/= 5) - Pharmacological DVT Prophylaxis Ordered: heparin  Sequential Compression Devices Ordered  Patient Centered Rounds: I performed bedside rounds with nursing staff today  Discussions with Specialists or Other Care Team Provider: None    Education and Discussions with Family / Patient: Updated  (wife) via phone  Time Spent for Care: 30 minutes  More than 50% of total time spent on counseling and coordination of care as described above  Current Length of Stay: 3 day(s)  Current Patient Status: Inpatient   Certification Statement: The patient will continue to require additional inpatient hospital stay due to discharge planning  Discharge Plan: Anticipate discharge in 24-48 hrs to rehab vs home with [facility]    Code Status: Level 1 - Full Code    Subjective:   Seen examined, endorses no acute concerns  Still having difficulty with ambulation  Objective:     Vitals:   Temp (24hrs), Av 6 °F (37 °C), Min:98 2 °F (36 8 °C), Max:98 8 °F (37 1 °C)    Temp:  [98 2 °F (36 8 °C)-98 8 °F (37 1 °C)] 98 2 °F (36 8 °C)  HR:  [67-78] 67  Resp:  [18-20] 19  BP: (131-155)/(78-86) 155/86  SpO2:  [93 %-97 %] 94 %  Body mass index is 19 53 kg/m²       Input and Output Summary (last 24 hours): Intake/Output Summary (Last 24 hours) at 11/20/2022 1232  Last data filed at 11/20/2022 0813  Gross per 24 hour   Intake 1927 ml   Output 2875 ml   Net -948 ml       Physical Exam:   Physical Exam  Vitals and nursing note reviewed  Constitutional:       General: He is not in acute distress  Appearance: Normal appearance  HENT:      Head: Normocephalic and atraumatic  Nose: No congestion  Mouth/Throat:      Mouth: Mucous membranes are moist    Eyes:      Conjunctiva/sclera: Conjunctivae normal    Cardiovascular:      Rate and Rhythm: Normal rate and regular rhythm  Pulses: Normal pulses  Heart sounds: Normal heart sounds  No murmur heard  Pulmonary:      Effort: Pulmonary effort is normal  No respiratory distress  Breath sounds: Normal breath sounds  Abdominal:      General: Bowel sounds are normal       Palpations: Abdomen is soft  Tenderness: There is no abdominal tenderness  Musculoskeletal:         General: Normal range of motion  Right lower leg: No edema  Left lower leg: No edema  Comments: Right heel ucler   Skin:     General: Skin is warm and dry  Neurological:      Mental Status: He is alert and oriented to person, place, and time            Additional Data:     Labs:  Results from last 7 days   Lab Units 11/20/22  0446   WBC Thousand/uL 6 20   HEMOGLOBIN g/dL 10 6*   HEMATOCRIT % 33 5*   PLATELETS Thousands/uL 184   NEUTROS PCT % 58   LYMPHS PCT % 25   MONOS PCT % 10   EOS PCT % 6     Results from last 7 days   Lab Units 11/20/22  0446 11/18/22  0512 11/17/22  0526   SODIUM mmol/L 140   < > 141   POTASSIUM mmol/L 4 6   < > 3 4*   CHLORIDE mmol/L 104   < > 106   CO2 mmol/L 29   < > 24   BUN mg/dL 32*   < > 41*   CREATININE mg/dL 2 54*   < > 3 09*   ANION GAP mmol/L 7   < > 11   CALCIUM mg/dL 10 9*   < > 11 4*   ALBUMIN g/dL  --   --  2 3*   TOTAL BILIRUBIN mg/dL  --   --  0 23   ALK PHOS U/L  --   --  78   ALT U/L  --   --  <6*   AST U/L  --   -- 10   GLUCOSE RANDOM mg/dL 111   < > 143*    < > = values in this interval not displayed  Results from last 7 days   Lab Units 11/16/22  1713   INR  0 99     Results from last 7 days   Lab Units 11/20/22  1123 11/20/22  0728 11/19/22  2041 11/19/22  1535 11/19/22  1114 11/18/22  2202 11/18/22  1551 11/18/22  1132 11/18/22  0652 11/17/22  2153 11/17/22  1545 11/17/22  1056   POC GLUCOSE mg/dl 118 100 121 118 134 152* 154* 165* 138 135 167* 166*     Results from last 7 days   Lab Units 11/17/22  0526   HEMOGLOBIN A1C % 6 6*     Results from last 7 days   Lab Units 11/19/22  0612 11/16/22  1713   LACTIC ACID mmol/L  --  0 9   PROCALCITONIN ng/ml 0 32* 0 47*       Lines/Drains:  Invasive Devices     Peripheral Intravenous Line  Duration           Peripheral IV 11/16/22 Dorsal (posterior); Left Forearm 3 days    Peripheral IV 11/16/22 Left Antecubital 3 days                      Imaging: Reviewed radiology reports from this admission including: xray(s)    Recent Cultures (last 7 days):   Results from last 7 days   Lab Units 11/16/22  1838 11/16/22  1715 11/16/22  1713   BLOOD CULTURE   --  No Growth at 72 hrs  No Growth at 72 hrs     URINE CULTURE  20,000-29,000 cfu/ml Klebsiella pneumoniae*  <10,000 cfu/ml Klebsiella pneumoniae*  --   --        Last 24 Hours Medication List:   Current Facility-Administered Medications   Medication Dose Route Frequency Provider Last Rate   • acetaminophen  650 mg Oral Q6H PRN Thao S June, CRNP     • allopurinol  50 mg Oral Daily Jessica Marie, CRNP     • amLODIPine  10 mg Oral Daily YURIDIA Shrestha     • atorvastatin  40 mg Oral Daily With Dinner Thao S June, CRNP     • Buprenorphine HCl  300 mcg Buccal BID Thao S June, CRNP     • carvedilol  6 25 mg Oral BID With Meals Thao S June, CRNP     • cephalexin  500 mg Oral Q8H [facility] Jaya Klein DO     • ferrous sulfate  325 mg Oral Daily With Breakfast Thao S June, CRNP     • heparin (porcine)  5,000 Units Subcutaneous [facility] Thao S June, CRNP     • insulin lispro  1-6 Units Subcutaneous HS Thao S June, CRNP     • insulin lispro  1-6 Units Subcutaneous TID AC Thao S June, CRNP     • ondansetron  4 mg Intravenous Q8H PRN Thao S June, CRNP     • oxyCODONE  5 mg Oral Q4H PRN Thao S June, CRNP     • polyethylene glycol  17 g Oral Daily Thao S June, CRNP     • pregabalin  150 mg Oral BID Thao S June, CRNP     • senna-docusate sodium  1 tablet Oral BID Thao S June, CRNP     • tamsulosin  0 4 mg Oral Daily With Dinner Thao S June, CRNP     • thiamine  100 mg Oral Daily Thao S June, CRNP     • venlafaxine  37 5 mg Oral TID Thao S June, CRNP          Today, Patient Was Seen By: Agapito Enciso PA-C    **Please Note: This note may have been constructed using a voice recognition system  **

## 2022-11-20 NOTE — ASSESSMENT & PLAN NOTE
· Corrected calcium 13 2 on admission with hypovolemia  · Mildly elevated ionized calcium  · Improving with IV hydration, however still elevated  Lab Results   Component Value Date    CALCIUM 10 9 (H) 11/20/2022    CALCIUM 9 9 11/19/2022    CALCIUM 10 5 (H) 11/18/2022     · No history of underlying malignancy/multiple myeloma    · Normal TSH, normal protein level during last admission  · PTHrP, SPEP/UPEP ordered by Nephrology and pending  · Continue to monitor calcium level

## 2022-11-20 NOTE — ASSESSMENT & PLAN NOTE
· Appreciate Podiatry input  · Chronic ulcer, however increased in size  · Will follow Podiatry recommendations for wound care  · Monitor with serial exams

## 2022-11-20 NOTE — PLAN OF CARE
Problem: Potential for Falls  Goal: Patient will remain free of falls  Description: INTERVENTIONS:  - Educate patient/family on patient safety including physical limitations  - Instruct patient to call for assistance with activity   - Consult OT/PT to assist with strengthening/mobility   - Keep Call bell within reach  - Keep bed low and locked with side rails adjusted as appropriate  - Keep care items and personal belongings within reach  - Initiate and maintain comfort rounds  - Make Fall Risk Sign visible to staff  - Offer Toileting every   Hours, in advance of need  - Initiate/Maintain   alarm  - Obtain necessary fall risk management equipment:      - Apply yellow socks and bracelet for high fall risk patients  - Consider moving patient to room near nurses station  Outcome: Progressing     Problem: Prexisting or High Potential for Compromised Skin Integrity  Goal: Skin integrity is maintained or improved  Description: INTERVENTIONS:  - Identify patients at risk for skin breakdown  - Assess and monitor skin integrity  - Assess and monitor nutrition and hydration status  - Monitor labs   - Assess for incontinence   - Turn and reposition patient  - Assist with mobility/ambulation  - Relieve pressure over bony prominences  - Avoid friction and shearing  - Provide appropriate hygiene as needed including keeping skin clean and dry  - Evaluate need for skin moisturizer/barrier cream  - Collaborate with interdisciplinary team   - Patient/family teaching  - Consider wound care consult   Outcome: Progressing     Problem: GENITOURINARY - ADULT  Goal: Maintains or returns to baseline urinary function  Description: INTERVENTIONS:  - Assess urinary function  - Encourage oral fluids to ensure adequate hydration if ordered  - Administer IV fluids as ordered to ensure adequate hydration  - Administer ordered medications as needed  - Offer frequent toileting  - Follow urinary retention protocol if ordered  Outcome: Progressing

## 2022-11-20 NOTE — ASSESSMENT & PLAN NOTE
· Recurrent UTI due to urinary retention   · Has had Ortiz catheter placement in the past  · Urinary retention protocol  · Keflex thru the 21st and then stop no

## 2022-11-20 NOTE — ASSESSMENT & PLAN NOTE
· Creatinine 3 48 on admission  · Baseline creatinine 2 5-2 9  Lab Results   Component Value Date    CREATININE 2 54 (H) 11/20/2022    CREATININE 2 60 (H) 11/19/2022    CREATININE 2 69 (H) 11/18/2022     · In setting of mild volume depletion, UTI  · UTI-empiric ceftriaxone, urine culture with klebsiella pneumonia  · Susceptible to keflex- transition to oral 11/19  · Day 5 of abx  · Appreciate Nephroloy consultation  · DC IVF  · Monitor BMP

## 2022-11-20 NOTE — ASSESSMENT & PLAN NOTE
Lab Results   Component Value Date    HGBA1C 6 6 (H) 11/17/2022       Recent Labs     11/19/22  1114 11/19/22  1535 11/19/22  2041 11/20/22  0728   POCGLU 134 118 121 100       Blood Sugar Average: Last 72 hrs:  (P) 506 [id number]     · PTA basal insulin 10 units HS- on hold due to poor oral intake, resume when able  · Intake improved slightly but still without hyperglycemia - continue to hold insulin   · Sliding scale insulin coverage with Accu-Cheks  · Hypoglycemia protocol  · Carbohydrate controlled diet

## 2022-11-20 NOTE — PROGRESS NOTES
Progress Note - Nephrology   Flori Gaspar  62 y o  male MRN: [id number]  Unit/Bed#: -71 Encounter: [id number]    1  Acute kidney injury on top of chronic kidney disease                resolved at this time, patient about his baseline  2  Chronic kidney disease stage 4 with baseline creatinine between 2 5-2 9 mg/dL          as mentioned previously, patient follows with Nephrology in outpatient setting, continue to optimize care and avoid potential nephrotoxins  3  Hypercalcemia      calcium level had improved, now slightly elevated once again at 10 9 mg/dL, continue monitor for now, avoid thiazide or thiazide like diuretics  4  Volume depletion     patient appears to be euvolemic at this time will discontinue IV fluids  5  Proteinuria       continue monitor patient labs is a return, being ruled out for monoclonal gammopathy at this time  6  Acidosis    Bicarbonate level appropriate, continue to defer re-initiation of sodium bicarbonate supplement at this time  7  RSV infection  8  Acute cystitis   Patient transition to Keflex 500 mg Every 8 hours, likely treatment according to hospitalist colleagues      Follow up reason for today's visit:  Acute kidney injury/chronic kidney disease/mild hypercalcemia/volume depletion    KELLY (acute kidney injury) [facility])    Patient Active Problem List   Diagnosis   • KELLY (acute kidney injury) ([facility] )   • Hyperkalemia   • Diabetes mellitus type 2, insulin dependent ([facility] [address] )   • Gout   • Essential hypertension   • History of CVA (cerebrovascular accident)   • Osteomyelitis of vertebra of lumbar region [facility])   • Severe protein-calorie malnutrition (HCC)   • Iron deficiency anemia secondary to inadequate dietary iron intake   • Hip pain, acute, left   • Acute urinary retention   • Hypercalcemia   • Low back pain   • Ambulatory dysfunction   • Stage 3a chronic kidney disease (HCC)   • UTI (urinary tract infection) due to urinary indwelling catheter (HCC)   • Chronic ulcer of left heel ([facility] )   • Chronic anemia   • RSV infection   • Acute respiratory failure with hypoxia (HCC)   • Acute cystitis   • Moderate protein-calorie malnutrition (HCC)         Subjective:   Patient continues have a poor appetite, eating and drinking with no nausea vomiting however  Objective:     Vitals: Blood pressure 155/86, pulse 67, temperature 98 2 °F (36 8 °C), resp  rate 19, height 5' 11" (1 803 m), weight 63 5 kg (140 lb), SpO2 94 %  ,Body mass index is 19 53 kg/m²  Weight (last 2 days)     Date/Time Weight    11/20/22 0600 63 5 (140)    11/20/22 0500 63 5 (140)    11/19/22 0613 64 3 (141 76)            Intake/Output Summary (Last 24 hours) at 11/20/2022 0923  Last data filed at 11/20/2022 0813  Gross per 24 hour   Intake 3135 75 ml   Output 3175 ml   Net -39 25 ml     I/O last 3 completed shifts:   In: 2578 8 [P O :780; I V :1798 8]  Out: 0 [Urine:4280]         Physical Exam: /86   Pulse 67   Temp 98 2 °F (36 8 °C)   Resp 19   Ht 5' 11" (1 803 m)   Wt 63 5 kg (140 lb)   SpO2 94%   BMI 19 53 kg/m²     General Appearance:    Alert, cooperative, no distress, appears stated age   Head:    Normocephalic, without obvious abnormality, atraumatic   Eyes:    Conjunctiva/corneas clear   Ears:    Normal external ears   Nose:   Nares normal, septum midline, mucosa normal, no drainage    or sinus tenderness   Throat:   Lips, mucosa, and tongue normal; teeth and gums normal   Neck:   Supple   Back:     Symmetric, no curvature, ROM normal, no CVA tenderness   Lungs:     Clear to auscultation bilaterally, respirations unlabored   Chest wall:    No tenderness or deformity   Heart:    Regular rate and rhythm, S1 and S2 normal, no murmur, rub   or gallop   Abdomen:     Soft, non-tender, bowel sounds active   Extremities:   Extremities normal, atraumatic, no cyanosis or edema   Skin:   Skin color, texture, turgor normal, no rashes or lesions   Lymph nodes:   Cervical normal   Neurologic:   CNII-XII intact Lab, Imaging and other studies: I have personally reviewed pertinent labs  CBC:   Lab Results   Component Value Date    WBC 6 20 11/20/2022    HGB 10 6 (L) 11/20/2022    HCT 33 5 (L) 11/20/2022    MCV 89 11/20/2022     11/20/2022    MCH 28 3 11/20/2022    MCHC 31 6 11/20/2022    RDW 16 8 (H) 11/20/2022    MPV 10 2 11/20/2022    NRBC 0 11/20/2022     CMP:   Lab Results   Component Value Date    K 4 6 11/20/2022     11/20/2022    CO2 29 11/20/2022    BUN 32 (H) 11/20/2022    CREATININE 2 54 (H) 11/20/2022    CALCIUM 10 9 (H) 11/20/2022    EGFR 26 11/20/2022         Results from last 7 days   Lab Units 11/20/22  0446 11/19/22  0612 11/18/22  0512 11/17/22  0526 11/16/22  1713   POTASSIUM mmol/L 4 6 4 5 3 4* 3 4* 3 5   CHLORIDE mmol/L 104 105 104 106 100   CO2 mmol/L 29 27 27 24 32   BUN mg/dL 32* 33* 35* 41* 44*   CREATININE mg/dL 2 54* 2 60* 2 69* 3 09* 3 48*   CALCIUM mg/dL 10 9* 9 9 10 5* 11 4* 12 7*   ALK PHOS U/L  --   --   --  78 95   ALT U/L  --   --   --  <6* 13   AST U/L  --   --   --  10 11         Phosphorus: No results found for: PHOS  Magnesium: No results found for: MG  Urinalysis: No results found for: COLORU, CLARITYU, SPECGRAV, PHUR, LEUKOCYTESUR, NITRITE, PROTEINUA, GLUCOSEU, KETONESU, BILIRUBINUR, BLOODU  Ionized Calcium: No results found for: CAION  Coagulation: No results found for: PT, INR, APTT  Troponin: No results found for: TROPONINI  ABG: No results found for: PHART, DRB2WGW, PO2ART, CPS8QCU, J0ZBQWVV, BEART, SOURCE  Radiology review:     IMAGING  Procedure: XR heel / calcaneus 2+ vw left    Result Date: 11/18/2022  Narrative: LEFT HEEL INDICATION:   wound  Left necrotic heel wound  COMPARISON:  10/21/2022 foot radiographs VIEWS:  XR HEEL / CALCANEUS 2+ VW LEFT FINDINGS: Heel soft tissue swelling  No radiopaque foreign body or soft tissue gas  Vascular calcification  No osseous erosive or destructive change  Bones are intact  Alignment is preserved   No acute change identified  Impression: No acute osseous abnormality   Workstation performed: [id number]       Current Facility-Administered Medications   Medication Dose Route Frequency   • acetaminophen (TYLENOL) tablet 650 mg  650 mg Oral Q6H PRN   • allopurinol (ZYLOPRIM) tablet 50 mg  50 mg Oral Daily   • amLODIPine (NORVASC) tablet 10 mg  10 mg Oral Daily   • atorvastatin (LIPITOR) tablet 40 mg  40 mg Oral Daily With Dinner   • Buprenorphine HCl FILM 300 mcg  300 mcg Buccal BID   • carvedilol (COREG) tablet 6 25 mg  6 25 mg Oral BID With Meals   • cephalexin (KEFLEX) capsule 500 mg  500 mg Oral Q8H [address]   • ferrous sulfate tablet 325 mg  325 mg Oral Daily With Breakfast   • heparin (porcine) subcutaneous injection 5,000 Units  5,000 Units Subcutaneous Q8H [address]   • insulin lispro (HumaLOG) 100 units/mL subcutaneous injection 1-6 Units  1-6 Units Subcutaneous HS   • insulin lispro (HumaLOG) 100 units/mL subcutaneous injection 1-6 Units  1-6 Units Subcutaneous TID AC   • multi-electrolyte (PLASMALYTE-A/ISOLYTE-S PH 7 4) IV solution  75 mL/hr Intravenous Continuous   • ondansetron (ZOFRAN) injection 4 mg  4 mg Intravenous Q8H PRN   • oxyCODONE (ROXICODONE) IR tablet 5 mg  5 mg Oral Q4H PRN   • polyethylene glycol (MIRALAX) packet 17 g  17 g Oral Daily   • pregabalin (LYRICA) capsule 150 mg  150 mg Oral BID   • senna-docusate sodium (SENOKOT S) 8 6-50 mg per tablet 1 tablet  1 tablet Oral BID   • tamsulosin (FLOMAX) capsule 0 4 mg  0 4 mg Oral Daily With Dinner   • thiamine tablet 100 mg  100 mg Oral Daily   • venlafaxine (EFFEXOR-XR) 24 hr capsule 37 5 mg  37 5 mg Oral TID     Medications Discontinued During This Encounter   Medication Reason   • polyethylene glycol (MIRALAX) packet 17 g    • sodium chloride 0 9 % infusion    • sevelamer (RENAGEL) tablet 800 mg    • allopurinol (ZYLOPRIM) tablet 100 mg    • amLODIPine (NORVASC) tablet 10 mg    • sodium bicarbonate tablet 650 mg    • multi-electrolyte (PLASMALYTE-A/ISOLYTE-S PH 7  4) IV solution    • cefTRIAXone (ROCEPHIN) IVPB (premix in dextrose) 1,000 mg 50 mL    • cephalexin (KEFLEX) capsule 500 mg Dose adjustment       Juanita Power,       This progress note was produced in part using a dictation device which may document imprecise wording from author's original intent

## 2022-11-20 NOTE — ASSESSMENT & PLAN NOTE
Malnutrition Findings:   Adult Malnutrition type: Chronic illness  Adult Degree of Malnutrition: Malnutrition of moderate degree  Malnutrition Characteristics: Muscle loss, Fat loss, Weight loss                  360 Statement: Chronic moderate malnutrition related to suspected inadequate intake as evidenced by 7 9% weight loss x 3 mo (7/5/22 151lb, 11/17/22 139lb), moderate muscle loss to temporalis, pectoralis, deltoid, interroseous muscles; moderate fat loss to buccal pads and triceps  Treated with: Increase diet to CCD to level 3  Will add glucerna BID  Encouraged PRO rich foods at home, pt declining further diet ed discussion  Recommend daily weights for nutrition monitoring  BMI Findings: Body mass index is 19 53 kg/m²

## 2022-11-20 NOTE — ASSESSMENT & PLAN NOTE
· Appears due to deconditioning  · No focal deficits on examination  · Patient has been in and out of rehabs since March  · Will request PT/OT evaluation

## 2022-11-20 NOTE — ASSESSMENT & PLAN NOTE
Malnutrition Findings: Loss of subcutaneous fat in orbital, triceps, ribcage areas  Loss of muscle at temples, clavicles, scapula, extremities  Consultation to dietitian       BMI Findings: Body mass index is 19 53 kg/m²

## 2022-11-21 ENCOUNTER — APPOINTMENT (INPATIENT)
Dept: NON INVASIVE DIAGNOSTICS | Facility: HOSPITAL | Age: 57
End: 2022-11-21

## 2022-11-21 LAB
1,25(OH)2D3 SERPL-MCNC: 6.5 PG/ML (ref 24.8–81.5)
ALBUMIN SERPL ELPH-MCNC: 3.36 G/DL (ref 3.5–5)
ALBUMIN SERPL ELPH-MCNC: 61.1 % (ref 52–65)
ALBUMIN UR ELPH-MCNC: 62.9 %
ALPHA1 GLOB MFR UR ELPH: 13.1 %
ALPHA1 GLOB SERPL ELPH-MCNC: 0.39 G/DL (ref 0.1–0.4)
ALPHA1 GLOB SERPL ELPH-MCNC: 7.1 % (ref 2.5–5)
ALPHA2 GLOB MFR UR ELPH: 6.6 %
ALPHA2 GLOB SERPL ELPH-MCNC: 1.01 G/DL (ref 0.4–1.2)
ALPHA2 GLOB SERPL ELPH-MCNC: 18.3 % (ref 7–13)
ANION GAP SERPL CALCULATED.3IONS-SCNC: 6 MMOL/L (ref 4–13)
B-GLOBULIN MFR UR ELPH: 9.2 %
BETA GLOB ABNORMAL SERPL ELPH-MCNC: 0.23 G/DL (ref 0.4–0.8)
BETA1 GLOB SERPL ELPH-MCNC: 4.2 % (ref 5–13)
BETA2 GLOB SERPL ELPH-MCNC: 4 % (ref 2–8)
BETA2+GAMMA GLOB SERPL ELPH-MCNC: 0.22 G/DL (ref 0.2–0.5)
BUN SERPL-MCNC: 33 MG/DL (ref 5–25)
CALCIUM SERPL-MCNC: 10.7 MG/DL (ref 8.3–10.1)
CHLORIDE SERPL-SCNC: 104 MMOL/L (ref 96–108)
CO2 SERPL-SCNC: 28 MMOL/L (ref 21–32)
CREAT SERPL-MCNC: 2.76 MG/DL (ref 0.6–1.3)
GAMMA GLOB ABNORMAL SERPL ELPH-MCNC: 0.29 G/DL (ref 0.5–1.6)
GAMMA GLOB MFR UR ELPH: 8.2 %
GAMMA GLOB SERPL ELPH-MCNC: 5.3 % (ref 12–22)
GFR SERPL CREATININE-BSD FRML MDRD: 24 ML/MIN/1.73SQ M
GLUCOSE SERPL-MCNC: 132 MG/DL (ref 65–140)
GLUCOSE SERPL-MCNC: 157 MG/DL (ref 65–140)
GLUCOSE SERPL-MCNC: 157 MG/DL (ref 65–140)
GLUCOSE SERPL-MCNC: 171 MG/DL (ref 65–140)
GLUCOSE SERPL-MCNC: 96 MG/DL (ref 65–140)
IGG/ALB SER: 1.57 {RATIO} (ref 1.1–1.8)
POTASSIUM SERPL-SCNC: 4.9 MMOL/L (ref 3.5–5.3)
PROT PATTERN SERPL ELPH-IMP: ABNORMAL
PROT PATTERN UR ELPH-IMP: ABNORMAL
PROT SERPL-MCNC: 5.5 G/DL (ref 6.4–8.2)
PROT UR-MCNC: 67 MG/DL
SODIUM SERPL-SCNC: 138 MMOL/L (ref 135–147)

## 2022-11-21 RX ORDER — CEPHALEXIN 500 MG/1
500 CAPSULE ORAL EVERY 8 HOURS SCHEDULED
Status: COMPLETED | OUTPATIENT
Start: 2022-11-21 | End: 2022-11-21

## 2022-11-21 RX ADMIN — CEPHALEXIN 500 MG: 500 CAPSULE ORAL at 05:04

## 2022-11-21 RX ADMIN — AMLODIPINE BESYLATE 10 MG: 10 TABLET ORAL at 09:24

## 2022-11-21 RX ADMIN — ATORVASTATIN CALCIUM 40 MG: 40 TABLET, FILM COATED ORAL at 18:09

## 2022-11-21 RX ADMIN — PREGABALIN 150 MG: 75 CAPSULE ORAL at 18:09

## 2022-11-21 RX ADMIN — ALLOPURINOL 50 MG: 100 TABLET ORAL at 09:25

## 2022-11-21 RX ADMIN — INSULIN LISPRO 1 UNITS: 100 INJECTION, SOLUTION INTRAVENOUS; SUBCUTANEOUS at 18:10

## 2022-11-21 RX ADMIN — CARVEDILOL 6.25 MG: 6.25 TABLET, FILM COATED ORAL at 18:09

## 2022-11-21 RX ADMIN — INSULIN LISPRO 1 UNITS: 100 INJECTION, SOLUTION INTRAVENOUS; SUBCUTANEOUS at 09:25

## 2022-11-21 RX ADMIN — PREGABALIN 150 MG: 75 CAPSULE ORAL at 09:24

## 2022-11-21 RX ADMIN — HEPARIN SODIUM 5000 UNITS: 5000 INJECTION INTRAVENOUS; SUBCUTANEOUS at 21:00

## 2022-11-21 RX ADMIN — INSULIN LISPRO 1 UNITS: 100 INJECTION, SOLUTION INTRAVENOUS; SUBCUTANEOUS at 21:00

## 2022-11-21 RX ADMIN — SENNOSIDES, DOCUSATE SODIUM 1 TABLET: 8.6; 5 TABLET ORAL at 18:09

## 2022-11-21 RX ADMIN — CARVEDILOL 6.25 MG: 6.25 TABLET, FILM COATED ORAL at 09:25

## 2022-11-21 RX ADMIN — BUPRENORPHINE HYDROCHLORIDE 300 MCG: 300 FILM, SOLUBLE BUCCAL at 09:24

## 2022-11-21 RX ADMIN — THIAMINE HCL TAB 100 MG 100 MG: 100 TAB at 09:25

## 2022-11-21 RX ADMIN — VENLAFAXINE HYDROCHLORIDE 37.5 MG: 37.5 CAPSULE, EXTENDED RELEASE ORAL at 09:25

## 2022-11-21 RX ADMIN — FERROUS SULFATE TAB 325 MG (65 MG ELEMENTAL FE) 325 MG: 325 (65 FE) TAB at 09:25

## 2022-11-21 RX ADMIN — VENLAFAXINE HYDROCHLORIDE 37.5 MG: 37.5 CAPSULE, EXTENDED RELEASE ORAL at 18:09

## 2022-11-21 RX ADMIN — CEPHALEXIN 500 MG: 500 CAPSULE ORAL at 21:00

## 2022-11-21 RX ADMIN — BUPRENORPHINE HYDROCHLORIDE 300 MCG: 300 FILM, SOLUBLE BUCCAL at 20:55

## 2022-11-21 RX ADMIN — HEPARIN SODIUM 5000 UNITS: 5000 INJECTION INTRAVENOUS; SUBCUTANEOUS at 13:43

## 2022-11-21 RX ADMIN — VENLAFAXINE HYDROCHLORIDE 37.5 MG: 37.5 CAPSULE, EXTENDED RELEASE ORAL at 20:55

## 2022-11-21 RX ADMIN — HEPARIN SODIUM 5000 UNITS: 5000 INJECTION INTRAVENOUS; SUBCUTANEOUS at 05:04

## 2022-11-21 RX ADMIN — POLYETHYLENE GLYCOL 3350 17 G: 17 POWDER, FOR SOLUTION ORAL at 09:24

## 2022-11-21 RX ADMIN — OXYCODONE HYDROCHLORIDE 5 MG: 5 TABLET ORAL at 13:43

## 2022-11-21 RX ADMIN — CEPHALEXIN 500 MG: 500 CAPSULE ORAL at 13:43

## 2022-11-21 RX ADMIN — SENNOSIDES, DOCUSATE SODIUM 1 TABLET: 8.6; 5 TABLET ORAL at 09:25

## 2022-11-21 RX ADMIN — TAMSULOSIN HYDROCHLORIDE 0.4 MG: 0.4 CAPSULE ORAL at 18:09

## 2022-11-21 NOTE — PLAN OF CARE
Problem: PHYSICAL THERAPY ADULT  Goal: Performs mobility at highest level of function for planned discharge setting  See evaluation for individualized goals  Description: Treatment/Interventions: Functional transfer training, LE strengthening/ROM, Therapeutic exercise, Endurance training, Patient/family training, Equipment eval/education, Bed mobility, Gait training, Compensatory technique education, Spoke to nursing, OT  Equipment Recommended:  (TBD by rehab)       See flowsheet documentation for full assessment, interventions and recommendations  Note: Prognosis: Fair  Problem List: Decreased strength, Decreased range of motion, Decreased endurance, Impaired balance, Decreased mobility, Impaired judgement, Decreased safety awareness, Pain, Decreased skin integrity  Assessment: Pt is a 62 y o  male seen for PT evaluation s/p admission to [address] [place] [address] on 11/16/2022 with KELLY (acute kidney injury) ([facility] )  Order placed for PT services  Upon evaluation: Pt is presenting with impaired functional mobility due to pain, decreased strength, decreased ROM, decreased endurance, impaired balance, decreased safety awareness, impaired judgment, orthopedic restrictions, fall risk and impaired skin integrity requiring supervision assistance for bed mobility and mod x 1 assistance for transfers  Pt's clinical presentation is currently unstable/unpredictable given the functional mobility deficits above coupled with fall risks as indicated by AM-PAC 6-Clicks: 15/95 as well as hx of falls, impaired balance, polypharmacy, impaired judgement and decreased safety awareness and combined with medical complications of hypertension , abnormal renal lab values, abnormal H&H and need for input for mobility technique/safety  Pt's PMHx and comorbidities that may affect physical performance and progress include: CKD, DM, HTN, CVA and hx of vertebral osteomyelitis and chronic L heel ulcer   Personal factors affecting pt at time of IE include: multi-level environment, past experience, inability to perform IADLs, inability to perform ADLs, inability to navigate level surfaces without external assistance, inability to ambulate household distances, inability to navigate community distances, decreased initiation and engagement and recent fall(s)/fall history  Pt will benefit from continued skilled PT services to address deficits as defined above and to maximize level of functional mobility to facilitate return toward PLOF and improved QOL  From PT/mobility standpoint, recommendation at time of d/c would be Short term rehab pending progress in order to reduce fall risk and maximize pt's functional independence and consistency with mobility in order to facilitate return to PLOF  Recommend ther ex next 1-2 sessions, trial with WC next 1-2 sessions and trial with quick move next 1-2 sessions  Barriers to Discharge: Inaccessible home environment, Decreased caregiver support  Barriers to Discharge Comments: Pt requires increased assistance for mobility, currently very pain limited  PT Discharge Recommendation: Post acute rehabilitation services    See flowsheet documentation for full assessment

## 2022-11-21 NOTE — PROGRESS NOTES
NEPHROLOGY PROGRESS NOTE   Suzbobbynnsimón Friday Sr  62 y o  male MRN: [id number]  Unit/Bed#: -01 Encounter: [id number]    Assessment/Plan:    63 yo man with CKD 4 follows with Dr Edwige Lu, history of osteomyelitis and L5/S1 diskitis, paraspinal abscess and epidural abscess, history of obstructive uropathy, presented 11/16 due to shortness of breath being treated for RSV and UTI  Nephrology following along for KELLY, CKD 4, hypercalcemia, volume status, mineral bone disease, acid-base     1  Acute kidney injury (POA) atop chronic kidney disease  ? Creatinine remains within typical baseline  Appears to be eating and drinking appropriately, if this should decline, offer volume expansion  Continue to encourage appropriate nutrition  Periodically monitor PVR  ? Of note, binder and sodium bicarbonate tablets remain on hold  ? Outpatient follow-up with Dr Edwige Lu  2  Stage 4 chronic kidney disease with baseline creatinine around 2 5-2 9 mg/dL  3  Moderate symptomatic acute on chronic hypercalcemia  ? PTHrP, vitamin-D 1, 25 dihydroxy, SPEP/UPEP remains pending at this time  Asymptomatic  Avoid exogenous calcium and thiazide diuretics  Avoid calcium containing binders  May warrant outpatient follow-up with Hematology versus Endocrinology depending on workup results  4  Volume depletion  ? Examines euvolemic  Continue to monitor off volume expansion  5  Probable diabetic nephropathy  ? Repeat SPEP/UPEP pending however likely negative  Previously was -21 21  Not currently a candidate for Ace/Arb or SGL T2 inhibitor given advanced chronic kidney disease  6  Acute cystitis  ? Management per primary team  7  RSV  ? On room air  Management per primary team  8  Acidosis  ? Bicarbonate remains stable  Monitor off of sodium bicarbonate tablets at this time              ROS  Complains of rectal pain and inability to walk  A complete 10 point review of systems have been performed and are otherwise negative         Historical Information   Past Medical History:   Diagnosis Date   • Chronic kidney disease    • Diabetes mellitus ([facility] )    • Gout    • Hypertension    • Renal disorder    • Retroperitoneal abscess ([facility] )    • Stroke ([facility] )    • Vertebral osteomyelitis ([facility] )      Past Surgical History:   Procedure Laterality Date   • BACK SURGERY     • ORCHIECTOMY       Social History   Social History     Substance and Sexual Activity   Alcohol Use Not Currently     Social History     Substance and Sexual Activity   Drug Use Yes   • Types: Marijuana     Social History     Tobacco Use   Smoking Status Every Day   • Packs/day: 0 25   • Types: Cigarettes   Smokeless Tobacco Never       Family History:   Family History   Problem Relation Age of Onset   • Hypertension Father        Medications:  Pertinent medications were reviewed  Current Facility-Administered Medications   Medication Dose Route Frequency Provider Last Rate   • acetaminophen  650 mg Oral Q6H PRN Thao S June, CRNP     • allopurinol  50 mg Oral Daily Nellene Pipes, CRNP     • amLODIPine  10 mg Oral Daily Nellene Pipes, CRNP     • atorvastatin  40 mg Oral Daily With Dinner Thao S June, CRNP     • Buprenorphine HCl  300 mcg Buccal BID Thao S June, CRNP     • carvedilol  6 25 mg Oral BID With Meals Thao S June, CRNP     • cephalexin  500 mg Oral Q8H [facility] Arthurul Klein, DO     • ferrous sulfate  325 mg Oral Daily With Breakfast Thao S June, CRNP     • heparin (porcine)  5,000 Units Subcutaneous Q8H [facility] Thao S June, CRNP     • insulin lispro  1-6 Units Subcutaneous HS Thao S June, CRNP     • insulin lispro  1-6 Units Subcutaneous TID AC Thao S June, CRNP     • ondansetron  4 mg Intravenous Q8H PRN Thao S June, CRNP     • oxyCODONE  5 mg Oral Q4H PRN Thao S June, CRNP     • polyethylene glycol  17 g Oral Daily Thao S June, CRNP     • pregabalin  150 mg Oral BID Thao S June, CRNP     • senna-docusate sodium  1 tablet Oral BID Thao S June, CRNP     • tamsulosin  0 4 mg Oral Daily With [employer] S June, CRNP     • thiamine  100 mg Oral Daily Thao S June, CRNP     • venlafaxine  37 5 mg Oral TID Thao S June, CRNP           Allergies   Allergen Reactions   • Bupropion Delirium     "went nuts," prior to 2012  "went nuts," prior to 2012  "went nuts," prior to 2012  "went nuts," prior to 2012     • Metformin Other (See Comments)     Other reaction(s): kidney disease  Other reaction(s): kidney disease  Other reaction(s): kidney disease     • Medical Tape Rash         Vitals:   /79   Pulse 72   Temp 98 1 °F (36 7 °C)   Resp 16   Ht 5' 11" (1 803 m)   Wt 63 3 kg (139 lb 8 8 oz)   SpO2 94%   BMI 19 46 kg/m²   Body mass index is 19 46 kg/m²  SpO2: 94 %,   SpO2 Activity: At Rest,   O2 Device: None (Room air)      Intake/Output Summary (Last 24 hours) at 11/21/2022 0841  Last data filed at 11/21/2022 0816  Gross per 24 hour   Intake 480 ml   Output 2200 ml   Net -1720 ml     Invasive Devices     Peripheral Intravenous Line  Duration           Peripheral IV 11/20/22 Distal;Dorsal (posterior); Right Forearm <1 day                Physical Exam  General: conscious, cooperative, in no acute distress  Eyes: conjunctivae pink, anicteric sclerae  ENT: lips and mucous membranes moist  Neck: supple, no JVD, no masses  Chest:  Diminished breath sounds bilaterally, no crackles, ronchus or wheezings  CVS: S1 & S2, normal rate, regular rhythm  Abdomen: soft, non-tender, non-distended, normoactive bowel sounds cachectic  Extremities: no edema of both legs  Skin: no rash  Neuro: awake, alert, oriented      Diagnostic Data:  Lab: I have personally reviewed pertinent lab results  ,   CBC:  Results from last 7 days   Lab Units 11/20/22  0446   WBC Thousand/uL 6 20   HEMOGLOBIN g/dL 10 6*   HEMATOCRIT % 33 5*   PLATELETS Thousands/uL 184      CMP:   Lab Results   Component Value Date    SODIUM 138 11/21/2022    K 4 9 11/21/2022     11/21/2022    CO2 28 11/21/2022    BUN 33 (H) 11/21/2022 CREATININE 2 76 (H) 11/21/2022    CALCIUM 10 7 (H) 11/21/2022    EGFR 24 11/21/2022   ,   PT/INR: No results found for: PT, INR,   Magnesium: No components found for: MAG,  Phosphorous: No results found for: PHOS    Microbiology:  @LABRCNTIP,(urinecx:7)@        YURIDIA Parisi    Portions of the record may have been created with voice recognition software  Occasional wrong word or "sound a like" substitutions may have occurred due to the inherent limitations of voice recognition software  Read the chart carefully and recognize, using context, where substitutions have occurred

## 2022-11-21 NOTE — ASSESSMENT & PLAN NOTE
· Creatinine 3 48 on admission  · Baseline creatinine 2 5-2 9  Lab Results   Component Value Date    CREATININE 2 76 (H) 11/21/2022    CREATININE 2 54 (H) 11/20/2022    CREATININE 2 60 (H) 11/19/2022     · In setting of mild volume depletion, UTI  · UTI-empiric ceftriaxone, urine culture with klebsiella pneumonia  · Susceptible to keflex- transition to oral 11/19  · Day 6 of abx  · Appreciate Nephroloy consultation  · Now remaining within his baseline  · DC IVF  · Monitor BMP

## 2022-11-21 NOTE — ASSESSMENT & PLAN NOTE
Lab Results   Component Value Date    HGBA1C 6 6 (H) 11/17/2022       Recent Labs     11/20/22  1615 11/20/22  2051 11/21/22  0737 11/21/22  1120   POCGLU 200* 137 157* 96       Blood Sugar Average: Last 72 hrs:  (P) 441 [id number]     · PTA basal insulin 10 units HS- on hold due to poor oral intake, resume when able  · Intake improved slightly but still without hyperglycemia - continue to hold insulin   · Sliding scale insulin coverage with Accu-Cheks  · Hypoglycemia protocol  · Carbohydrate controlled diet - liberated to regular diet due to mild nutrition

## 2022-11-21 NOTE — CASE MANAGEMENT
Case Management Discharge Planning Note    Patient name Beryle Frieze  Location /-65 MRN [id number]  : 1965 Date 2022       Current Admission Date: 2022  Current Admission Diagnosis:KELLY (acute kidney injury) [facility])   Patient Active Problem List    Diagnosis Date Noted   • Moderate protein-calorie malnutrition ([address] ) 2022   • Acute cystitis 2022   • Chronic anemia 2022   • RSV infection 2022   • UTI (urinary tract infection) due to urinary indwelling catheter ([address] ) 10/20/2022   • Chronic ulcer of left heel ([address] ) 10/20/2022   • Hypercalcemia 2022   • Low back pain 2022   • Ambulatory dysfunction 2022   • Stage 3a chronic kidney disease ([address] ) 2022   • Acute urinary retention 2022   • Severe protein-calorie malnutrition ([address] ) 2022   • Iron deficiency anemia secondary to inadequate dietary iron intake 2022   • Hip pain, acute, left 2022   • KELLY (acute kidney injury) ([address] ) 2022   • Hyperkalemia 2022   • Diabetes mellitus type 2, insulin dependent ([address] )    • Gout    • Essential hypertension    • History of CVA (cerebrovascular accident)    • Osteomyelitis of vertebra of lumbar region ([facility] )       LOS (days): 4  Geometric Mean LOS (GMLOS) (days): 4 30  Days to GMLOS:0 3     OBJECTIVE:  Risk of Unplanned Readmission Score: 27 83         Current admission status: Inpatient   Preferred Pharmacy:   [address] [place] - [address] [place] [address]  Phone: [phone] Fax: [phone]    Primary Care Provider: Luis Enrique Valles DO    Primary Insurance: MEDICARE  Secondary Insurance:     DISCHARGE DETAILS:        CM received information from [facility] referrals indicating patient has 2 days left at 100% copay for SNF, next 80 days $194 50 per day  CM reviewed above information with spouse   Spouse does not feel financially able to pursue SNF for patient at this time  CM encouraged spouse to pursue secondary health insurance for patient with SNF day benefits  CM discussed with spouse [facility] referral vs outpatient PT referral with [facility]  [facility] was referred to spouse as a provider who provides outpatient PT in the home  Spouse feels patient will benefit most from [facility] at this time due to need for VN for foot  Spouse will pursue [facility] services at a later time  CM submitted blanket [facility] referral for patient in [address]  CM discussed with patient option for [facility]/Sit to Stand    CM provided spouse with contact number for [facility] [phone] #[phone] to discuss this DME and cost

## 2022-11-21 NOTE — PROGRESS NOTES
[address] Jennifer Gan  Progress Note - [facility] 1965, 62 y o  male MRN: [id number]  Unit/Bed#: -01 Encounter: [id number]  Primary Care Provider: William Christine DO   Date and time admitted to hospital: 11/16/2022  4:34 PM    * KELLY (acute kidney injury) [facility])  Assessment & Plan  · Creatinine 3 48 on admission  · Baseline creatinine 2 5-2 9  Lab Results   Component Value Date    CREATININE 2 76 (H) 11/21/2022    CREATININE 2 54 (H) 11/20/2022    CREATININE 2 60 (H) 11/19/2022     · In setting of mild volume depletion, UTI  · UTI-empiric ceftriaxone, urine culture with klebsiella pneumonia  · Susceptible to keflex- transition to oral 11/19  · Day 6 of abx  · Appreciate Nephroloy consultation  · Now remaining within his baseline  · DC IVF  · Monitor BMP      Hypercalcemia  Assessment & Plan  · Corrected calcium 13 2 on admission with hypovolemia  · Mildly elevated ionized calcium  · Improving with IV hydration, however still elevated  Lab Results   Component Value Date    CALCIUM 10 7 (H) 11/21/2022    CALCIUM 10 9 (H) 11/20/2022    CALCIUM 9 9 11/19/2022     · No history of underlying malignancy/multiple myeloma    · Normal TSH, normal protein level during last admission  · PTHrP, SPEP/UPEP ordered by Nephrology and pending  · Continue to monitor calcium level  · May warrant outpatient follow-up with Hematology versus Endocrinology depending on workup results       Acute cystitis  Assessment & Plan  · Recurrent UTI due to urinary retention   · Has had Ortiz catheter placement in the past  · Urinary retention protocol  · Keflex thru the 21st and then stop    RSV infection  Assessment & Plan  · RSV positive on admission  · Exposure via family members  · Presented with respiratory failure requiring 2 L supplemental oxygen, cough and hypoxia at home - hypoxia has resolved  · Continue supportive care  · Maintain isolation    Chronic anemia  Assessment & Plan  · In setting of chronic kidney disease  · Hemoglobin at baseline  · Trend hemoglobin    Chronic ulcer of left heel (HCC)  Assessment & Plan  · Appreciate Podiatry input  · Chronic ulcer, however increased in size  · Will follow Podiatry recommendations for wound care  -LEADs pending  · Monitor with serial exams    Ambulatory dysfunction  Assessment & Plan  · Appears due to deconditioning  · No focal deficits on examination  · Patient has been in and out of rehabs since March  · Will request PT/OT evaluation- recommending rehab however patient has no more cough for days by insurance, price of rehab stay per day is too expensive for patient to bear  · Patient's wife is looking into an outpatient PT service that is done in home vs a new [employer] agency, as well as a quick move device for which they will pay out-of-pocket  · Will make referrals on discharge    Severe protein-calorie malnutrition ([facility] [address] )  Assessment & Plan  Malnutrition Findings: Loss of subcutaneous fat in orbital, triceps, ribcage areas  Loss of muscle at temples, clavicles, scapula, extremities  Consultation to dietitian     Previously noted to have moderate calorie malnutrition, has been upgraded to severe  Liberate diet to regular from carb controlled    BMI Findings: Body mass index is 19 46 kg/m²         Essential hypertension  Assessment & Plan  · Continue carvedilol and amlodipine home regimen  · Monitor blood pressure trends     Gout  Assessment & Plan  · Continue allopurinol    Diabetes mellitus type 2, insulin dependent [facility])  Assessment & Plan  Lab Results   Component Value Date    HGBA1C 6 6 (H) 11/17/2022       Recent Labs     11/20/22  1615 11/20/22  2051 11/21/22  0737 11/21/22  1120   POCGLU 200* 137 157* 96       Blood Sugar Average: Last 72 hrs:  (P) 204 [id number]     · PTA basal insulin 10 units HS- on hold due to poor oral intake, resume when able  · Intake improved slightly but still without hyperglycemia - continue to hold insulin   · Sliding scale insulin coverage with Accu-Cheks  · Hypoglycemia protocol  · Carbohydrate controlled diet - liberated to regular diet due to mild nutrition      VTE Pharmacologic Prophylaxis: VTE Score: 5 High Risk (Score >/= 5) - Pharmacological DVT Prophylaxis Ordered: heparin  Sequential Compression Devices Ordered  Patient Centered Rounds: I performed bedside rounds with nursing staff today  Discussions with Specialists or Other Care Team Provider: JOHN    Education and Discussions with Family / Patient: Updated  (wife) at bedside  Time Spent for Care: 30 minutes  More than 50% of total time spent on counseling and coordination of care as described above  Current Length of Stay: 4 day(s)  Current Patient Status: Inpatient   Certification Statement: The patient will continue to require additional inpatient hospital stay due to Discharge planning, lower extremity ulceration, pending arterial studies  Discharge Plan: Anticipate discharge tomorrow to home with home services  Code Status: Level 1 - Full Code    Subjective:   Seen examined, feeling overwhelmed due to frequent hospitalizations  Slight persistent cough  Endorsing continued back pain today    Objective:     Vitals:   Temp (24hrs), Av 2 °F (36 8 °C), Min:98 1 °F (36 7 °C), Max:98 2 °F (36 8 °C)    Temp:  [98 1 °F (36 7 °C)-98 2 °F (36 8 °C)] 98 1 °F (36 7 °C)  HR:  [72-81] 72  Resp:  [16-17] 16  BP: (131)/(79) 131/79  SpO2:  [91 %-95 %] 94 %  Body mass index is 19 46 kg/m²  Input and Output Summary (last 24 hours): Intake/Output Summary (Last 24 hours) at 2022 1543  Last data filed at 2022 1343  Gross per 24 hour   Intake 600 ml   Output 2400 ml   Net -1800 ml       Physical Exam:   Physical Exam  Vitals and nursing note reviewed  Constitutional:       General: He is not in acute distress  Appearance: Normal appearance  He is ill-appearing  HENT:      Head: Normocephalic and atraumatic        Nose: No congestion  Mouth/Throat:      Mouth: Mucous membranes are moist    Eyes:      Conjunctiva/sclera: Conjunctivae normal    Cardiovascular:      Rate and Rhythm: Normal rate and regular rhythm  Pulses: Normal pulses  Heart sounds: Normal heart sounds  No murmur heard  Pulmonary:      Effort: Pulmonary effort is normal  No respiratory distress  Breath sounds: Normal breath sounds  Abdominal:      General: Bowel sounds are normal       Palpations: Abdomen is soft  Tenderness: There is no abdominal tenderness  Musculoskeletal:         General: Normal range of motion  Right lower leg: No edema  Left lower leg: No edema  Skin:     General: Skin is warm and dry  Findings: Lesion present  Neurological:      Mental Status: He is alert and oriented to person, place, and time  Additional Data:     Labs:  Results from last 7 days   Lab Units 11/20/22  0446   WBC Thousand/uL 6 20   HEMOGLOBIN g/dL 10 6*   HEMATOCRIT % 33 5*   PLATELETS Thousands/uL 184   NEUTROS PCT % 58   LYMPHS PCT % 25   MONOS PCT % 10   EOS PCT % 6     Results from last 7 days   Lab Units 11/21/22  0507 11/18/22  0512 11/17/22  0526   SODIUM mmol/L 138   < > 141   POTASSIUM mmol/L 4 9   < > 3 4*   CHLORIDE mmol/L 104   < > 106   CO2 mmol/L 28   < > 24   BUN mg/dL 33*   < > 41*   CREATININE mg/dL 2 76*   < > 3 09*   ANION GAP mmol/L 6   < > 11   CALCIUM mg/dL 10 7*   < > 11 4*   ALBUMIN g/dL  --   --  2 3*   TOTAL BILIRUBIN mg/dL  --   --  0 23   ALK PHOS U/L  --   --  78   ALT U/L  --   --  <6*   AST U/L  --   --  10   GLUCOSE RANDOM mg/dL 132   < > 143*    < > = values in this interval not displayed       Results from last 7 days   Lab Units 11/16/22  1713   INR  0 99     Results from last 7 days   Lab Units 11/21/22  1120 11/21/22  0737 11/20/22  2051 11/20/22  1615 11/20/22  1123 11/20/22  0728 11/19/22  2041 11/19/22  1535 11/19/22  1114 11/18/22  2202 11/18/22  1551 11/18/22  1132   POC GLUCOSE mg/dl 96 157* 137 200* 118 100 121 118 134 152* 154* 165*     Results from last 7 days   Lab Units 11/17/22  0526   HEMOGLOBIN A1C % 6 6*     Results from last 7 days   Lab Units 11/19/22  0612 11/16/22  1713   LACTIC ACID mmol/L  --  0 9   PROCALCITONIN ng/ml 0 32* 0 47*       Lines/Drains:  Invasive Devices     Peripheral Intravenous Line  Duration           Peripheral IV 11/20/22 Distal;Dorsal (posterior); Right Forearm 1 day                      Imaging: No pertinent imaging reviewed  Recent Cultures (last 7 days):   Results from last 7 days   Lab Units 11/16/22  1838 11/16/22  1715 11/16/22  1713   BLOOD CULTURE   --  No Growth After 4 Days  No Growth After 4 Days     URINE CULTURE  20,000-29,000 cfu/ml Klebsiella pneumoniae*  <10,000 cfu/ml Klebsiella pneumoniae*  --   --        Last 24 Hours Medication List:   Current Facility-Administered Medications   Medication Dose Route Frequency Provider Last Rate   • acetaminophen  650 mg Oral Q6H PRN Thao S June, CRNP     • allopurinol  50 mg Oral Daily Jsesica Horn, CRNP     • amLODIPine  10 mg Oral Daily Jessica Marie, CRNP     • atorvastatin  40 mg Oral Daily With Dinner Thao S June, CRNP     • Buprenorphine HCl  300 mcg Buccal BID Thao S June, CRNP     • carvedilol  6 25 mg Oral BID With Meals Thao S June, CRNP     • cephalexin  500 mg Oral [facility] Maine Sierra PA-C     • ferrous sulfate  325 mg Oral Daily With Breakfast Thao S June, CRNP     • heparin (porcine)  5,000 Units Subcutaneous Q8H [facility] Thao S June, CRNP     • insulin lispro  1-6 Units Subcutaneous HS Thao S June, CRNP     • insulin lispro  1-6 Units Subcutaneous TID AC Thao S June, CRNP     • ondansetron  4 mg Intravenous Q8H PRN Thao S June, CRNP     • oxyCODONE  5 mg Oral Q4H PRN Thao S June, CRNP     • polyethylene glycol  17 g Oral Daily Thao S June, CRNP     • pregabalin  150 mg Oral BID Thao S June, CRNP     • senna-docusate sodium  1 tablet Oral BID Thao S June, CRNP • tamsulosin  0 4 mg Oral Daily With Dinner Thao S June, CRNP     • thiamine  100 mg Oral Daily Thao S June, CRNP     • venlafaxine  37 5 mg Oral TID Thao S June, YURIDIA          Today, Patient Was Seen By: Maine Sierra PA-C    **Please Note: This note may have been constructed using a voice recognition system  **

## 2022-11-21 NOTE — PROGRESS NOTES
Progress Note - Podiatry  Varsha Newton Sr  62 y o  male MRN: [id number]  Unit/Bed#: -63 Encounter: [id number]    Assessment:  1  Left heel necrotic ulceration  2  DM 2    Plan:  - Left heel necrotic pressure/DM wound appears dry and stable without bogginess or surrounding SOI  F/u LEADs to assess perfusion  - XR left heel 11/18/22 negative for BRUCE or OM  - LEADs pending  - Recommend daily betadine, adaptic, dsd and strict pressure offloading  Subjective/Objective   Chief Complaint:   Chief Complaint   Patient presents with   • Decreased Oxygen Level     Pt presented to this ED via EMS c/o low O2 levels at home in the 80's  Per EMS upon arrival, o2 84% and placed on 2L NC -95%  Subjective: 62 y o  y/o male was seen and evaluated at bedside  Blood pressure 131/79, pulse 72, temperature 98 1 °F (36 7 °C), resp  rate 16, height 5' 11" (1 803 m), weight 63 3 kg (139 lb 8 8 oz), SpO2 94 %  ,Body mass index is 19 46 kg/m²  Invasive Devices     Peripheral Intravenous Line  Duration           Peripheral IV 11/20/22 Distal;Dorsal (posterior); Right Forearm <1 day                Physical Exam:   General: Alert, cooperative and no distress  Lungs: Non labored breathing  Heart: Positive S1, S2  Abdomen: Soft, non-tender  Extremity: Gross msk, nvs baseline B/L LE  No open wounds RLE  B/L LE skin is atrophic  B/L LE muscle tone diminished  Left heel with dry, necrotic wound  No bogginess, malodor, erythema, purulence, crepitus, fluctuance                Lab, Imaging and other studies:   CBC: No results found for: WBC, HGB, HCT, MCV, PLT, ADJUSTEDWBC, MCH, MCHC, RDW, MPV, NRBC, CMP:   Lab Results   Component Value Date    SODIUM 138 11/21/2022    K 4 9 11/21/2022     11/21/2022    CO2 28 11/21/2022    BUN 33 (H) 11/21/2022    CREATININE 2 76 (H) 11/21/2022    CALCIUM 10 7 (H) 11/21/2022    EGFR 24 11/21/2022       Imaging: I have personally reviewed pertinent films in PACS  EKG, Pathology, and Other Studies: I have personally reviewed pertinent reports

## 2022-11-21 NOTE — ASSESSMENT & PLAN NOTE
Malnutrition Findings: Loss of subcutaneous fat in orbital, triceps, ribcage areas  Loss of muscle at temples, clavicles, scapula, extremities  Consultation to dietitian     Previously noted to have moderate calorie malnutrition, has been upgraded to severe  Liberate diet to regular from carb controlled    BMI Findings: Body mass index is 19 46 kg/m²

## 2022-11-21 NOTE — DISCHARGE INSTRUCTIONS
Apply betadine soaked adaptic (nonadherant), 4x4, light emmanuel to left heel wound  Change daily or every other day    Elevate/float heels off of bed with 2 pillows or prevalon boot  Report to [facility] wound care center for f/u

## 2022-11-21 NOTE — ASSESSMENT & PLAN NOTE
· Appreciate Podiatry input  · Chronic ulcer, however increased in size  · Will follow Podiatry recommendations for wound care  -LEADs pending  · Monitor with serial exams

## 2022-11-21 NOTE — PROGRESS NOTES
Pt reports he enjoys the meals offered at [address] [place] [address] though appetite is not the best  Says was craving particular foods not offered on [facility] restricted diet  Fair BG levels, intermittently receives sliding scale insulin  Discussed with PA, will d/c carb restriction and order regular diet to optimize oral intake  Pt enjoys glucerna supplement, only consuming 0-75% meals at this time, will continue with glucerna BID  If intake continues to be inadequate will consider ensure pudding v ensure clear at follow up, pt is agreeable to both  Pt is also interested in appetite stimulant if medically appropriate, relayed to PA

## 2022-11-21 NOTE — ASSESSMENT & PLAN NOTE
· Appears due to deconditioning  · No focal deficits on examination  · Patient has been in and out of rehabs since March  · Will request PT/OT evaluation- recommending rehab however patient has no more cough for days by insurance, price of rehab stay per day is too expensive for patient to bear  · Patient's wife is looking into an outpatient PT service that is done in home vs a new [facility], as well as a quick move device for which they will pay out-of-pocket  · Will make referrals on discharge

## 2022-11-21 NOTE — ASSESSMENT & PLAN NOTE
· Corrected calcium 13 2 on admission with hypovolemia  · Mildly elevated ionized calcium  · Improving with IV hydration, however still elevated  Lab Results   Component Value Date    CALCIUM 10 7 (H) 11/21/2022    CALCIUM 10 9 (H) 11/20/2022    CALCIUM 9 9 11/19/2022     · No history of underlying malignancy/multiple myeloma    · Normal TSH, normal protein level during last admission  · PTHrP, SPEP/UPEP ordered by Nephrology and pending  · Continue to monitor calcium level  · May warrant outpatient follow-up with Hematology versus Endocrinology depending on workup results

## 2022-11-21 NOTE — PHYSICAL THERAPY NOTE
PHYSICAL THERAPY EVALUATION  NAME:  Carolina Jackson Sr  DATE: 11/21/22    AGE:   62 y o  Mrn:   [id number]  ADMIT DX:  Hypercalcemia [E83 52]  UTI (urinary tract infection) [N39 0]  Respiratory syncytial virus [B33 8]  KELLY (acute kidney injury) ([place] [address] ) [N17 9]  Low oxygen saturation [R79 81]  Abnormal blood oxygen level [R79 81]    Past Medical History:   Diagnosis Date   • Chronic kidney disease    • Diabetes mellitus ([place] [address] )    • Gout    • Hypertension    • Renal disorder    • Retroperitoneal abscess ([place] [address] )    • Stroke [facility])    • Vertebral osteomyelitis ([place] [address] )      Length Of Stay: 4  Performed at least 2 patient identifiers during session: Name and Birthday  PHYSICAL THERAPY EVALUATION :        11/21/22 5764   Note Type   Note type Evaluation   Pain Assessment   Pain Assessment Tool 0-10   Pain Score 8   Pain Location/Orientation Orientation: Left; Location: Back; Location: Hip;Location: Leg   Restrictions/Precautions   Weight Bearing Precautions Per Order Yes   LLE Weight Bearing Per Order NWB  (through heel)   Other Precautions Bed Alarm; Fall Risk;Pain;contact/isolation;droplet ( pt is positive for RSV)   Home Living   Type of Home House  (stairglide to enter)   Home Layout Performs ADLs on one level; Able to live on main level with bedroom/bathroom; Two level   Bathroom Shower/Tub   (sponge bathing)   Bathroom Toilet Raised   Home Equipment Wheelchair-manual;Wheelchair-electric;Electric scooter   Additional Comments Pt reports living with spouse in 2  with first floor setup, pt reports he has not walked in months but unable to state specifics of length  Primarily completes SPT to/from wheelchair with assistance from spouse   Prior Function   Level of [place] Needs assistance with ADLs; Needs assistance with functional mobility; Needs assistance with IADLS   Lives With Spouse   Receives Help From Family   IADLs Family/Friend/Other provides meals; Family/Friend/Other provides transportation; Family/Friend/Other provides medication management   Falls in the last 6 months 1 to 4   Comments Pt reports his spouse assists with ADLs, IADLs, mobility, and transportation, "she helps me with everything"   Cognition   Overall Cognitive Status WFL   Orientation Level Oriented to person;Oriented to place; Disoriented to time;Oriented to situation   Following Commands Follows one step commands without difficulty   RLE Assessment   RLE Assessment X  (grossly assessed due to increased back pain ~2/5 strength, 25-50% AROM)   LLE Assessment   LLE Assessment WFL  (grossly assessed due to increased back pain ~2/5 strength, 25-50% AROM)   Bed Mobility   Supine to Sit 5  Supervision   Additional items Increased time required;Verbal cues;HOB elevated; Bedrails   Sit to Supine 5  Supervision   Additional items Bedrails; Increased time required;Verbal cues   Additional Comments HOB elevated for supine > sit, supervision for safety, increased time due to pain, VC for safety, educated pt in log roll technique with pt reporting he does it sometimes but it doesn't always help, pt declining log roll technique this session   Transfers   Sit to Stand 3  Moderate assistance   Additional items Assist x 1; Increased time required;Verbal cues   Stand to Sit 3  Moderate assistance   Additional items Assist x 1; Increased time required;Verbal cues   Stand pivot Unable to assess   Additional Comments Educated pt on NWB on L heel prior to OOB mobility  Pt completed STS with mod x 1 for force production to quick move  VC for weight shifting and to maintain NWB to L heel, able to maintain for majority of session  3 STS trials completed able to maintain for 10-30 sec each trial with BUE support on quick move and maintain NWB during standing with min VC   Deferred RW transfer this session due to safety concerns   Balance   Static Sitting Good   Dynamic Sitting Fair   Static Standing Poor +   Endurance Deficit   Endurance Deficit Yes   Endurance Deficit Description pain, fatigue   Activity Tolerance   Activity Tolerance Patient limited by pain; Patient limited by fatigue   Nurse Made Aware RN, Sharmaine   Assessment   Prognosis Fair   Problem List Decreased strength;Decreased range of motion;Decreased endurance; Impaired balance;Decreased mobility; Impaired judgement;Decreased safety awareness;Pain;Decreased skin integrity   Barriers to Discharge Inaccessible home environment;Decreased caregiver support   Barriers to Discharge Comments Pt requires increased assistance for mobility, currently very pain limited   Goals   Patient Goals Get pain down   STG Expiration Date 12/05/22   Plan   Treatment/Interventions Functional transfer training;LE strengthening/ROM; Therapeutic exercise; Endurance training;Patient/family training;Equipment eval/education; Bed mobility;Gait training; Compensatory technique education;Spoke to nursing;OT   PT Frequency 3-5x/wk   Recommendation   PT Discharge Recommendation Post acute rehabilitation services   Equipment Recommended   (TBD by rehab)   AM-PAC Basic Mobility Inpatient   Turning in Bed Without Bedrails 3   Lying on Back to Sitting on Edge of Flat Bed 3   Moving Bed to Chair 1   Standing Up From Chair 2   Walk in Room 1   Climb 3-5 Stairs 1   Basic Mobility Inpatient Raw Score 11   Basic Mobility Standardized Score 30 25   Highest Level Of Mobility   -HL Goal 4: Move to chair/commode   JH-HLM Achieved 3: Sit at edge of bed   End of Consult   Patient Position at End of Consult Supine;Bed/Chair alarm activated; All needs within reach     The patient's AM-PAC Basic Mobility Inpatient Short Form Raw Score is 11    A Raw score of less than or equal to 16 suggests the patient may benefit from discharge to post-acute rehabilitation services  Please also refer to the recommendation of the Physical Therapist for safe discharge planning  (Please find full objective findings from PT assessment regarding body systems outlined above)       Assessment: Pt is a 57 y o  male seen for PT evaluation s/p admission to [address] [place] [address] 18 on 11/16/2022 with KELLY (acute kidney injury) ([facility] [address] )  Order placed for PT services  Upon evaluation: Pt is presenting with impaired functional mobility due to pain, decreased strength, decreased ROM, decreased endurance, impaired balance, decreased safety awareness, impaired judgment, orthopedic restrictions, fall risk and impaired skin integrity requiring supervision assistance for bed mobility and mod x 1 assistance for transfers  Pt's clinical presentation is currently unstable/unpredictable given the functional mobility deficits above coupled with fall risks as indicated by AM-PAC 6-Clicks: 28/31 as well as hx of falls, impaired balance, polypharmacy, impaired judgement and decreased safety awareness and combined with medical complications of hypertension , abnormal renal lab values, abnormal H&H and need for input for mobility technique/safety  Pt's PMHx and comorbidities that may affect physical performance and progress include: CKD, DM, HTN, CVA and hx of vertebral osteomyelitis and chronic L heel ulcer  Personal factors affecting pt at time of IE include: multi-level environment, past experience, inability to perform IADLs, inability to perform ADLs, inability to navigate level surfaces without external assistance, inability to ambulate household distances, inability to navigate community distances, decreased initiation and engagement and recent fall(s)/fall history  Pt will benefit from continued skilled PT services to address deficits as defined above and to maximize level of functional mobility to facilitate return toward PLOF and improved QOL  From PT/mobility standpoint, recommendation at time of d/c would be Short term rehab pending progress in order to reduce fall risk and maximize pt's functional independence and consistency with mobility in order to facilitate return to PLOF    Recommend ther ex next 1-2 sessions, trial with WC next 1-2 sessions and trial with quick move next 1-2 sessions  Goals: Pt will: Perform bed mobility tasks with modified I to reposition in bed and prepare for transfers  Pt will perform transfers with Supervision to increase Indep in home environment and prepare for ambulation  Pt will ambulate with RW for >/= 10' with  consistent min A of 1  to improve gait quality and promote proper use of assistive device and to access home environment  Increase bilateral LE strength 1/2 grade to facilitate independent mobility and Increase all balance 1/2 grade to decrease risk for falls          Kim Charles, PT,DPT

## 2022-11-22 LAB
ANION GAP SERPL CALCULATED.3IONS-SCNC: 4 MMOL/L (ref 4–13)
ANION GAP SERPL CALCULATED.3IONS-SCNC: 7 MMOL/L (ref 4–13)
ANION GAP SERPL CALCULATED.3IONS-SCNC: 7 MMOL/L (ref 4–13)
ATRIAL RATE: 69 BPM
BACTERIA BLD CULT: NORMAL
BACTERIA BLD CULT: NORMAL
BUN SERPL-MCNC: 41 MG/DL (ref 5–25)
BUN SERPL-MCNC: 42 MG/DL (ref 5–25)
BUN SERPL-MCNC: 42 MG/DL (ref 5–25)
CALCIUM SERPL-MCNC: 10.7 MG/DL (ref 8.3–10.1)
CALCIUM SERPL-MCNC: 11 MG/DL (ref 8.3–10.1)
CALCIUM SERPL-MCNC: 11.1 MG/DL (ref 8.3–10.1)
CHLORIDE SERPL-SCNC: 100 MMOL/L (ref 96–108)
CHLORIDE SERPL-SCNC: 102 MMOL/L (ref 96–108)
CHLORIDE SERPL-SCNC: 104 MMOL/L (ref 96–108)
CO2 SERPL-SCNC: 26 MMOL/L (ref 21–32)
CO2 SERPL-SCNC: 28 MMOL/L (ref 21–32)
CO2 SERPL-SCNC: 29 MMOL/L (ref 21–32)
CREAT SERPL-MCNC: 3.21 MG/DL (ref 0.6–1.3)
CREAT SERPL-MCNC: 3.24 MG/DL (ref 0.6–1.3)
CREAT SERPL-MCNC: 3.29 MG/DL (ref 0.6–1.3)
GFR SERPL CREATININE-BSD FRML MDRD: 19 ML/MIN/1.73SQ M
GFR SERPL CREATININE-BSD FRML MDRD: 20 ML/MIN/1.73SQ M
GFR SERPL CREATININE-BSD FRML MDRD: 20 ML/MIN/1.73SQ M
GLUCOSE SERPL-MCNC: 142 MG/DL (ref 65–140)
GLUCOSE SERPL-MCNC: 149 MG/DL (ref 65–140)
GLUCOSE SERPL-MCNC: 152 MG/DL (ref 65–140)
GLUCOSE SERPL-MCNC: 153 MG/DL (ref 65–140)
GLUCOSE SERPL-MCNC: 172 MG/DL (ref 65–140)
GLUCOSE SERPL-MCNC: 172 MG/DL (ref 65–140)
GLUCOSE SERPL-MCNC: 181 MG/DL (ref 65–140)
GLUCOSE SERPL-MCNC: 208 MG/DL (ref 65–140)
P AXIS: 51 DEGREES
POTASSIUM SERPL-SCNC: 5.6 MMOL/L (ref 3.5–5.3)
POTASSIUM SERPL-SCNC: 5.7 MMOL/L (ref 3.5–5.3)
POTASSIUM SERPL-SCNC: 5.7 MMOL/L (ref 3.5–5.3)
PR INTERVAL: 152 MS
QRS AXIS: -46 DEGREES
QRSD INTERVAL: 96 MS
QT INTERVAL: 384 MS
QTC INTERVAL: 411 MS
SODIUM SERPL-SCNC: 135 MMOL/L (ref 135–147)
SODIUM SERPL-SCNC: 135 MMOL/L (ref 135–147)
SODIUM SERPL-SCNC: 137 MMOL/L (ref 135–147)
T WAVE AXIS: 81 DEGREES
VENTRICULAR RATE: 69 BPM

## 2022-11-22 RX ORDER — SODIUM POLYSTYRENE SULFONATE 4.1 MEQ/G
15 POWDER, FOR SUSPENSION ORAL; RECTAL ONCE
Status: COMPLETED | OUTPATIENT
Start: 2022-11-22 | End: 2022-11-22

## 2022-11-22 RX ORDER — DEXTROSE MONOHYDRATE 25 G/50ML
25 INJECTION, SOLUTION INTRAVENOUS ONCE
Status: COMPLETED | OUTPATIENT
Start: 2022-11-22 | End: 2022-11-22

## 2022-11-22 RX ORDER — SODIUM CHLORIDE 9 MG/ML
100 INJECTION, SOLUTION INTRAVENOUS CONTINUOUS
Status: DISCONTINUED | OUTPATIENT
Start: 2022-11-22 | End: 2022-11-23 | Stop reason: HOSPADM

## 2022-11-22 RX ORDER — CALCIUM GLUCONATE 20 MG/ML
1 INJECTION, SOLUTION INTRAVENOUS ONCE
Status: COMPLETED | OUTPATIENT
Start: 2022-11-22 | End: 2022-11-22

## 2022-11-22 RX ORDER — BISACODYL 10 MG
10 SUPPOSITORY, RECTAL RECTAL ONCE
Status: DISCONTINUED | OUTPATIENT
Start: 2022-11-22 | End: 2022-11-22

## 2022-11-22 RX ADMIN — INSULIN LISPRO 2 UNITS: 100 INJECTION, SOLUTION INTRAVENOUS; SUBCUTANEOUS at 12:25

## 2022-11-22 RX ADMIN — ATORVASTATIN CALCIUM 40 MG: 40 TABLET, FILM COATED ORAL at 16:30

## 2022-11-22 RX ADMIN — BUPRENORPHINE HYDROCHLORIDE 300 MCG: 300 FILM, SOLUBLE BUCCAL at 08:45

## 2022-11-22 RX ADMIN — SODIUM CHLORIDE 100 ML/HR: 0.9 INJECTION, SOLUTION INTRAVENOUS at 19:24

## 2022-11-22 RX ADMIN — OXYCODONE HYDROCHLORIDE 5 MG: 5 TABLET ORAL at 12:24

## 2022-11-22 RX ADMIN — ALLOPURINOL 50 MG: 100 TABLET ORAL at 08:44

## 2022-11-22 RX ADMIN — CARVEDILOL 6.25 MG: 6.25 TABLET, FILM COATED ORAL at 08:45

## 2022-11-22 RX ADMIN — HEPARIN SODIUM 5000 UNITS: 5000 INJECTION INTRAVENOUS; SUBCUTANEOUS at 21:47

## 2022-11-22 RX ADMIN — SENNOSIDES, DOCUSATE SODIUM 1 TABLET: 8.6; 5 TABLET ORAL at 16:30

## 2022-11-22 RX ADMIN — DEXTROSE MONOHYDRATE 25 ML: 25 INJECTION, SOLUTION INTRAVENOUS at 08:46

## 2022-11-22 RX ADMIN — HEPARIN SODIUM 5000 UNITS: 5000 INJECTION INTRAVENOUS; SUBCUTANEOUS at 05:13

## 2022-11-22 RX ADMIN — VENLAFAXINE HYDROCHLORIDE 37.5 MG: 37.5 CAPSULE, EXTENDED RELEASE ORAL at 16:30

## 2022-11-22 RX ADMIN — VENLAFAXINE HYDROCHLORIDE 37.5 MG: 37.5 CAPSULE, EXTENDED RELEASE ORAL at 21:47

## 2022-11-22 RX ADMIN — SODIUM POLYSTYRENE SULFONATE 15 G: 1 POWDER ORAL; RECTAL at 17:32

## 2022-11-22 RX ADMIN — THIAMINE HCL TAB 100 MG 100 MG: 100 TAB at 08:45

## 2022-11-22 RX ADMIN — OXYCODONE HYDROCHLORIDE 5 MG: 5 TABLET ORAL at 05:13

## 2022-11-22 RX ADMIN — PREGABALIN 150 MG: 75 CAPSULE ORAL at 16:31

## 2022-11-22 RX ADMIN — CARVEDILOL 6.25 MG: 6.25 TABLET, FILM COATED ORAL at 16:30

## 2022-11-22 RX ADMIN — CALCIUM GLUCONATE 1 G: 20 INJECTION, SOLUTION INTRAVENOUS at 08:45

## 2022-11-22 RX ADMIN — OXYCODONE HYDROCHLORIDE 5 MG: 5 TABLET ORAL at 19:24

## 2022-11-22 RX ADMIN — FERROUS SULFATE TAB 325 MG (65 MG ELEMENTAL FE) 325 MG: 325 (65 FE) TAB at 08:45

## 2022-11-22 RX ADMIN — VENLAFAXINE HYDROCHLORIDE 37.5 MG: 37.5 CAPSULE, EXTENDED RELEASE ORAL at 08:44

## 2022-11-22 RX ADMIN — TAMSULOSIN HYDROCHLORIDE 0.4 MG: 0.4 CAPSULE ORAL at 16:31

## 2022-11-22 RX ADMIN — HEPARIN SODIUM 5000 UNITS: 5000 INJECTION INTRAVENOUS; SUBCUTANEOUS at 14:12

## 2022-11-22 RX ADMIN — SENNOSIDES, DOCUSATE SODIUM 1 TABLET: 8.6; 5 TABLET ORAL at 08:44

## 2022-11-22 RX ADMIN — INSULIN LISPRO 1 UNITS: 100 INJECTION, SOLUTION INTRAVENOUS; SUBCUTANEOUS at 21:48

## 2022-11-22 RX ADMIN — POLYETHYLENE GLYCOL 3350 17 G: 17 POWDER, FOR SOLUTION ORAL at 08:45

## 2022-11-22 RX ADMIN — SODIUM CHLORIDE 100 ML/HR: 0.9 INJECTION, SOLUTION INTRAVENOUS at 08:44

## 2022-11-22 RX ADMIN — AMLODIPINE BESYLATE 10 MG: 10 TABLET ORAL at 08:44

## 2022-11-22 RX ADMIN — INSULIN HUMAN 10 UNITS: 100 INJECTION, SOLUTION PARENTERAL at 08:45

## 2022-11-22 RX ADMIN — BUPRENORPHINE HYDROCHLORIDE 300 MCG: 300 FILM, SOLUBLE BUCCAL at 21:46

## 2022-11-22 RX ADMIN — PREGABALIN 150 MG: 75 CAPSULE ORAL at 08:44

## 2022-11-22 NOTE — PROGRESS NOTES
[address]  Progress Note - Carlos Presleycaleb 1965, 62 y o  male MRN: [id number]  Unit/Bed#: -01 Encounter: [id number]  Primary Care Provider: Marisol Ruvalcaba DO   Date and time admitted to hospital: 11/16/2022  4:34 PM    * KELLY (acute kidney injury) [facility])  Assessment & Plan  · Creatinine 3 48 on admission  · Baseline creatinine 2 5-2 9  Lab Results   Component Value Date    CREATININE 3 24 (H) 11/22/2022    CREATININE 2 76 (H) 11/21/2022    CREATININE 2 54 (H) 11/20/2022     · In setting of mild volume depletion, UTI  · UTI-empiric ceftriaxone, urine culture with klebsiella pneumonia  · Susceptible to keflex- transition to oral 11/19 - finished course  · Appreciate Nephroloy consultation  · Had trended down and was remaining within his baseline, IV fluids were discontinued  · Today had increased to 3 24 - potentially in setting of urinary retention, dehydration, constipation (although did have large BM this AM)  · Will monitor on lab work this afternoon after IV fluids  · Checking bladder scan - PVR around 50  · Continue IV fluids  · Monitor BMP      Hyperkalemia  Assessment & Plan  Noted to be 5 7 on morning labs  EKG done today without any acute abnormalities or changes from hyperkalemia  Given insulin, dextrose and calcium gluconate - repeat done is again 5 7  Continue IV fluids  Recheck again this afternoon until less than 5  Nephrology recommending potassium restricted diet    Hypercalcemia  Assessment & Plan  · Corrected calcium 13 2 on admission with hypovolemia  · Mildly elevated ionized calcium  · Improving with IV hydration, however still elevated  Lab Results   Component Value Date    CALCIUM 10 7 (H) 11/22/2022    CALCIUM 10 7 (H) 11/21/2022    CALCIUM 10 9 (H) 11/20/2022     · No history of underlying malignancy/multiple myeloma    · Normal TSH, normal protein level during last admission  · PTHrP, SPEP/UPEP ordered by Nephrology and pending  · Continue to monitor calcium level  · May warrant outpatient follow-up with Hematology versus Endocrinology depending on workup results  · Remaining stable    Acute cystitis  Assessment & Plan  · Recurrent UTI due to urinary retention   · Has had Ortiz catheter placement in the past  · Urinary retention protocol  · Keflex thru the 21st and then stop - finished course    RSV infection  Assessment & Plan  · RSV positive on admission  · Exposure via family members  · Presented with respiratory failure requiring 2 L supplemental oxygen, cough and hypoxia at home - hypoxia has resolved  · Continue supportive care  · Maintain isolation    Chronic anemia  Assessment & Plan  · In setting of chronic kidney disease  · Hemoglobin at baseline  · Trend hemoglobin    Chronic ulcer of left heel (HCC)  Assessment & Plan  · Appreciate Podiatry input  · Chronic ulcer, however increased in size  · Will follow Podiatry recommendations for wound care  -LEADs pending  · Monitor with serial exams    Ambulatory dysfunction  Assessment & Plan  · Appears due to deconditioning  · No focal deficits on examination  · Patient has been in and out of rehabs since March  · Will request PT/OT evaluation- recommending rehab however patient has no more cough for days by insurance, price of rehab stay per day is too expensive for patient to bear  · After discussion with patient's wife, will pursue home health - she is looking into other options as far as outpatient physical therapy in the home however will hold off at this time due to patient's continued pain and likelihood of getting out of the house is low  · Will make referrals on discharge    Severe protein-calorie malnutrition ([address] )  Assessment & Plan  Malnutrition Findings: Loss of subcutaneous fat in orbital, triceps, ribcage areas  Loss of muscle at temples, clavicles, scapula, extremities    Consultation to dietitian     Previously noted to have moderate calorie malnutrition, has been upgraded to severe  Liberate diet to regular from carb controlled    BMI Findings: Body mass index is 19 46 kg/m²  Essential hypertension  Assessment & Plan  · Continue carvedilol and amlodipine home regimen  · Monitor blood pressure trends     Gout  Assessment & Plan  · Continue allopurinol    Diabetes mellitus type 2, insulin dependent [facility])  Assessment & Plan  Lab Results   Component Value Date    HGBA1C 6 6 (H) 11/17/2022       Recent Labs     11/21/22  2053 11/22/22  0707 11/22/22  0834 11/22/22  1202   POCGLU 171* 142* 172* 208*       Blood Sugar Average: Last 72 hrs:  (P) 145 [id number]     · PTA basal insulin 10 units HS- on hold due to poor oral intake, resume when able  · Intake improved slightly but still without hyperglycemia - continue to hold insulin   · Sliding scale insulin coverage with Accu-Cheks  · Hypoglycemia protocol  · Carbohydrate controlled diet - liberated to regular diet due to mild nutrition      VTE Pharmacologic Prophylaxis: VTE Score: 5 High Risk (Score >/= 5) - Pharmacological DVT Prophylaxis Ordered: heparin  Sequential Compression Devices Ordered  Patient Centered Rounds: I performed bedside rounds with nursing staff today  Discussions with Specialists or Other Care Team Provider: CM, neph    Education and Discussions with Family / Patient: Updated  (wife) via phone  Time Spent for Care: 30 minutes  More than 50% of total time spent on counseling and coordination of care as described above  Current Length of Stay: 5 day(s)  Current Patient Status: Inpatient   Certification Statement: The patient will continue to require additional inpatient hospital stay due to KELLY  Discharge Plan: Anticipate discharge tomorrow to home with home services  Code Status: Level 1 - Full Code    Subjective:   Seen and examined  Disappointed about not going home  understanding of needing to stay for KELLY and hyperkalemia   No acute concerns actually feels better today had large BM this AM    Objective:     Vitals:   Temp (24hrs), Av 2 °F (36 8 °C), Min:97 °F (36 1 °C), Max:99 °F (37 2 °C)    Temp:  [97 °F (36 1 °C)-99 °F (37 2 °C)] 99 °F (37 2 °C)  HR:  [76-79] 76  Resp:  [14-20] 14  BP: (119-132)/(72-81) 119/72  SpO2:  [88 %-93 %] 88 %  Body mass index is 19 46 kg/m²  Input and Output Summary (last 24 hours): Intake/Output Summary (Last 24 hours) at 2022 1447  Last data filed at 2022 1316  Gross per 24 hour   Intake 1040 ml   Output 2200 ml   Net -1160 ml       Physical Exam:   Physical Exam  Vitals and nursing note reviewed  Constitutional:       General: He is not in acute distress  Appearance: Normal appearance  HENT:      Head: Normocephalic and atraumatic  Nose: No congestion  Mouth/Throat:      Mouth: Mucous membranes are moist    Eyes:      Conjunctiva/sclera: Conjunctivae normal    Cardiovascular:      Rate and Rhythm: Normal rate and regular rhythm  Pulses: Normal pulses  Heart sounds: Normal heart sounds  No murmur heard  Pulmonary:      Effort: Pulmonary effort is normal  No respiratory distress  Breath sounds: Normal breath sounds  Abdominal:      General: Bowel sounds are normal       Palpations: Abdomen is soft  Tenderness: There is no abdominal tenderness  Musculoskeletal:         General: Normal range of motion  Right lower leg: No edema  Left lower leg: No edema  Skin:     General: Skin is warm and dry  Neurological:      Mental Status: He is alert and oriented to person, place, and time            Additional Data:     Labs:  Results from last 7 days   Lab Units 22  0446   WBC Thousand/uL 6 20   HEMOGLOBIN g/dL 10 6*   HEMATOCRIT % 33 5*   PLATELETS Thousands/uL 184   NEUTROS PCT % 58   LYMPHS PCT % 25   MONOS PCT % 10   EOS PCT % 6     Results from last 7 days   Lab Units 22  1201 22  0512 22  0526   SODIUM mmol/L 135   < > 141   POTASSIUM mmol/L 5 7*   < > 3 4*   CHLORIDE mmol/L 100   < > 106   CO2 mmol/L 28   < > 24   BUN mg/dL 42*   < > 41*   CREATININE mg/dL 3 29*   < > 3 09*   ANION GAP mmol/L 7   < > 11   CALCIUM mg/dL 11 1*   < > 11 4*   ALBUMIN g/dL  --   --  2 3*   TOTAL BILIRUBIN mg/dL  --   --  0 23   ALK PHOS U/L  --   --  78   ALT U/L  --   --  <6*   AST U/L  --   --  10   GLUCOSE RANDOM mg/dL 181*   < > 143*    < > = values in this interval not displayed  Results from last 7 days   Lab Units 11/16/22  1713   INR  0 99     Results from last 7 days   Lab Units 11/22/22  1202 11/22/22  0834 11/22/22  0707 11/21/22  2053 11/21/22  1547 11/21/22  1120 11/21/22  0737 11/20/22  2051 11/20/22  1615 11/20/22  1123 11/20/22  0728 11/19/22  2041   POC GLUCOSE mg/dl 208* 172* 142* 171* 157* 96 157* 137 200* 118 100 121     Results from last 7 days   Lab Units 11/17/22  0526   HEMOGLOBIN A1C % 6 6*     Results from last 7 days   Lab Units 11/19/22  0612 11/16/22  1713   LACTIC ACID mmol/L  --  0 9   PROCALCITONIN ng/ml 0 32* 0 47*       Lines/Drains:  Invasive Devices     Peripheral Intravenous Line  Duration           Peripheral IV 11/20/22 Distal;Dorsal (posterior); Right Forearm 2 days                      Imaging: No pertinent imaging reviewed  Recent Cultures (last 7 days):   Results from last 7 days   Lab Units 11/16/22  1838 11/16/22  1715 11/16/22  1713   BLOOD CULTURE   --  No Growth After 5 Days  No Growth After 5 Days     URINE CULTURE  20,000-29,000 cfu/ml Klebsiella pneumoniae*  <10,000 cfu/ml Klebsiella pneumoniae*  --   --        Last 24 Hours Medication List:   Current Facility-Administered Medications   Medication Dose Route Frequency Provider Last Rate   • acetaminophen  650 mg Oral Q6H PRN Thao S June, CRNP     • allopurinol  50 mg Oral Daily Johnny Nickels, CRNP     • amLODIPine  10 mg Oral Daily Johnny Nickels, CRNP     • atorvastatin  40 mg Oral Daily With Dinner YURIDIA Chirinos     • Buprenorphine HCl  300 mcg Buccal BID Thao Watkins, CRNP     • carvedilol  6 25 mg Oral BID With Meals Thao S June, CRNP     • ferrous sulfate  325 mg Oral Daily With Breakfast Thao S June, CRNP     • heparin (porcine)  5,000 Units Subcutaneous Q8H [address] Thao S June, CRNP     • insulin lispro  1-6 Units Subcutaneous HS Thao S June, CRNP     • insulin lispro  1-6 Units Subcutaneous TID AC Thao S June, CRNP     • ondansetron  4 mg Intravenous Q8H PRN Thao S June, CRNP     • oxyCODONE  5 mg Oral Q4H PRN Thao S June, CRNP     • polyethylene glycol  17 g Oral Daily Thao S June, CRNP     • pregabalin  150 mg Oral BID Thao S June, CRNP     • senna-docusate sodium  1 tablet Oral BID Thao S June, CRNP     • sodium chloride  100 mL/hr Intravenous Continuous Jesus Chicas PA-C 100 mL/hr (11/22/22 6072)   • tamsulosin  0 4 mg Oral Daily With Dinner Thao S June, CRNP     • thiamine  100 mg Oral Daily Thao S June, CRNP     • venlafaxine  37 5 mg Oral TID Thao S June, CRNP          Today, Patient Was Seen By: Jesus Chicas PA-C    **Please Note: This note may have been constructed using a voice recognition system  **

## 2022-11-22 NOTE — ASSESSMENT & PLAN NOTE
· Appears due to deconditioning  · No focal deficits on examination  · Patient has been in and out of rehabs since March  · Will request PT/OT evaluation- recommending rehab however patient has no more cough for days by insurance, price of rehab stay per day is too expensive for patient to bear  · After discussion with patient's wife, will pursue home health - she is looking into other options as far as outpatient physical therapy in the home however will hold off at this time due to patient's continued pain and likelihood of getting out of the house is low  · Will make referrals on discharge

## 2022-11-22 NOTE — ASSESSMENT & PLAN NOTE
· Corrected calcium 13 2 on admission with hypovolemia  · Mildly elevated ionized calcium  · Improving with IV hydration, however still elevated  Lab Results   Component Value Date    CALCIUM 10 7 (H) 11/22/2022    CALCIUM 10 7 (H) 11/21/2022    CALCIUM 10 9 (H) 11/20/2022     · No history of underlying malignancy/multiple myeloma    · Normal TSH, normal protein level during last admission  · PTHrP, SPEP/UPEP ordered by Nephrology and pending  · Continue to monitor calcium level  · May warrant outpatient follow-up with Hematology versus Endocrinology depending on workup results     · Remaining stable

## 2022-11-22 NOTE — PLAN OF CARE
Problem: Potential for Falls  Goal: Patient will remain free of falls  Description: INTERVENTIONS:  - Educate patient/family on patient safety including physical limitations  - Instruct patient to call for assistance with activity   - Consult OT/PT to assist with strengthening/mobility   - Keep Call bell within reach  - Keep bed low and locked with side rails adjusted as appropriate  - Keep care items and personal belongings within reach  - Initiate and maintain comfort rounds  - Make Fall Risk Sign visible to staff  - Offer Toileting every   Hours, in advance of need  - Initiate/Maintain   alarm  - Obtain necessary fall risk management equipment:      - Apply yellow socks and bracelet for high fall risk patients  - Consider moving patient to room near nurses station  Outcome: Progressing     Problem: Nutrition/Hydration-ADULT  Goal: Nutrient/Hydration intake appropriate for improving, restoring or maintaining nutritional needs  Description: Monitor and assess patient's nutrition/hydration status for malnutrition  Collaborate with interdisciplinary team and initiate plan and interventions as ordered  Monitor patient's weight and dietary intake as ordered or per policy  Utilize nutrition screening tool and intervene as necessary  Determine patient's food preferences and provide high-protein, high-caloric foods as appropriate       INTERVENTIONS:  - Monitor oral intake, urinary output, labs, and treatment plans  - Assess nutrition and hydration status and recommend course of action  - Evaluate amount of meals eaten  - Assist patient with eating if necessary   - Allow adequate time for meals  - Recommend/ encourage appropriate diets, oral nutritional supplements, and vitamin/mineral supplements  - Order, calculate, and assess calorie counts as needed  - Recommend, monitor, and adjust tube feedings and TPN/PPN based on assessed needs  - Assess need for intravenous fluids  - Provide specific nutrition/hydration education as appropriate  - Include patient/family/caregiver in decisions related to nutrition  Outcome: Progressing

## 2022-11-22 NOTE — ASSESSMENT & PLAN NOTE
· Recurrent UTI due to urinary retention   · Has had Ortiz catheter placement in the past  · Urinary retention protocol  · Keflex thru the 21st and then stop - finished course

## 2022-11-22 NOTE — CASE MANAGEMENT
Case Management Discharge Planning Note    Patient name Jeane Varela  Location /-86 MRN [id number]  : 1965 Date 2022       Current Admission Date: 2022  Current Admission Diagnosis:KELLY (acute kidney injury) [facility])   Patient Active Problem List    Diagnosis Date Noted   • Moderate protein-calorie malnutrition ([address] ) 2022   • Acute cystitis 2022   • Chronic anemia 2022   • RSV infection 2022   • UTI (urinary tract infection) due to urinary indwelling catheter ([address] ) 10/20/2022   • Chronic ulcer of left heel ([address] ) 10/20/2022   • Hypercalcemia 2022   • Low back pain 2022   • Ambulatory dysfunction 2022   • Stage 3a chronic kidney disease ([address] ) 2022   • Acute urinary retention 2022   • Severe protein-calorie malnutrition ([address] ) 2022   • Iron deficiency anemia secondary to inadequate dietary iron intake 2022   • Hip pain, acute, left 2022   • KELLY (acute kidney injury) ([address] ) 2022   • Hyperkalemia 2022   • Diabetes mellitus type 2, insulin dependent ([address] )    • Gout    • Essential hypertension    • History of CVA (cerebrovascular accident)    • Osteomyelitis of vertebra of lumbar region ([address] )       LOS (days): 5  Geometric Mean LOS (GMLOS) (days): 4 30  Days to GMLOS:-0 5     OBJECTIVE:  Risk of Unplanned Readmission Score: 28 89         Current admission status: Inpatient   Preferred Pharmacy:   [address] [place] - [place] [address] [place], [place] [address]  Phone: [phone] Fax: [phone]    Primary Care Provider: Ricky Jaffe DO    Primary Insurance: MEDICARE  Secondary Insurance:     DISCHARGE DETAILS:    Discharge planning discussed with[de-identified] spouse, Sherkorina Lee of Choice: Yes  Comments - Freedom of Choice: [facility]  CM contacted family/caregiver?: Yes  Were Treatment Team discharge recommendations reviewed with patient/caregiver?: Yes  Did patient/caregiver verbalize understanding of patient care needs?: Yes  Were patient/caregiver advised of the risks associated with not following Treatment Team discharge recommendations?: Yes    Contacts  Patient Contacts: Lucrecia Herbert, spouse  Relationship to Patient[de-identified] Family  Contact Method: Phone  Phone Number: see face sheet  Reason/Outcome: Continuity of Care, Discharge [address]         Is the patient interested in [facility] at discharge?: Yes  [address] requested[de-identified] Physical Therapy, Nursing, Occupational [address] Name[de-identified] Other ([facility])  [address] Provider[de-identified] PCP  Home Health Services Needed[de-identified] Evaluate Functional Status and Safety, Other (comment), Strengthening/Theraputic Exercises to Improve Function (Medication, Wound Care)  Homebound Criteria Met[de-identified] Requires the Assistance of Another Person for Safe Ambulation or to Leave the Home, Uses an Assist Device (i e  cane, walker, etc)  Supporting Clincal Findings[de-identified] Fatigues Easliy in Short Distances    DME Referral Provided  Referral made for DME?: Yes  DME referral completed for the following items[de-identified] Other (CM provided spouse with Rony DouglassLoomis telephone number to discuss [employer])  DME Supplier Name[de-identified] [employer]    Other Referral/Resources/Interventions Provided:  Interventions: [facility]  Referral Comments: [facility]    Would you like to participate in our [address] service program?  : No - Declined    Treatment Team Recommendation: Home with [facility]  Discharge Destination Plan[de-identified] Home with Tiffanie at Discharge : [facility]             CM contacted spouse to discuss [facility] Provider options: Isabel Taylor, [facility] and [facility]  Spouse chose [facility]  CM made [address] providers aware of spouse choice

## 2022-11-22 NOTE — ASSESSMENT & PLAN NOTE
Lab Results   Component Value Date    HGBA1C 6 6 (H) 11/17/2022       Recent Labs     11/21/22  2053 11/22/22  0707 11/22/22  0834 11/22/22  1202   POCGLU 171* 142* 172* 208*       Blood Sugar Average: Last 72 hrs:  (P) 145 [id number]     · PTA basal insulin 10 units HS- on hold due to poor oral intake, resume when able  · Intake improved slightly but still without hyperglycemia - continue to hold insulin   · Sliding scale insulin coverage with Accu-Cheks  · Hypoglycemia protocol  · Carbohydrate controlled diet - liberated to regular diet due to mild nutrition

## 2022-11-22 NOTE — ASSESSMENT & PLAN NOTE
· Creatinine 3 48 on admission  · Baseline creatinine 2 5-2 9  Lab Results   Component Value Date    CREATININE 3 24 (H) 11/22/2022    CREATININE 2 76 (H) 11/21/2022    CREATININE 2 54 (H) 11/20/2022     · In setting of mild volume depletion, UTI  · UTI-empiric ceftriaxone, urine culture with klebsiella pneumonia  · Susceptible to keflex- transition to oral 11/19 - finished course  · Appreciate Nephroloy consultation  · Had trended down and was remaining within his baseline, IV fluids were discontinued  · Today had increased to 3 24 - potentially in setting of urinary retention, dehydration, constipation (although did have large BM this AM)  · Will monitor on lab work this afternoon after IV fluids  · Checking bladder scan - PVR around 50  · Continue IV fluids  · Monitor BMP

## 2022-11-22 NOTE — ASSESSMENT & PLAN NOTE
Noted to be 5 7 on morning labs  EKG done today without any acute abnormalities or changes from hyperkalemia  Given insulin, dextrose and calcium gluconate - repeat done is again 5 7  Continue IV fluids  Recheck again this afternoon until less than 5  Nephrology recommending potassium restricted diet

## 2022-11-22 NOTE — PROGRESS NOTES
Progress Note - Nephrology   Roberto Clock  62 y o  male MRN: [id number]  Unit/Bed#: MS [facility]-40 Encounter: [id number]    A/P:  1  Hyperkalemia, mild, treated with IV calcium and insulin this AM  No EKG from today  Started on  IVF to help with kailuresis  Recheck this afternoon until < 5 5    2 gram K diet   Check bladder scan for UR    2  KELLY on CKD   Cr trending up 2 79->3 2 etiology 2/2 prerenal state vs UR with constipation  Appears euvolemic-no LE edema, no rales, MMM  Repeat chem this afternoon to assess potassium  UOP 1 6 L/24 hr       3  Mild -mod chronic hypercalcemia  Workup ordered  PTH suppressed which is appropriate response  1,25 vitamin D low which goes against granulomatous process of hypercalcemia-sarcoid  upep nonselective proteinemia   spep pending  Follow uncorrected Ca  Would screen for paraproteinemia,malignancy   Not on calcium binders  4  Proteinuria--2+ proteinuria  Not an SGLT-2i or ACE/ARB candidate at this time with KELLY/hyperkalemia    5  CKD IV baseline 2 5-2 9   Follow with Dr Garza Primes  6  HTN renal disease under reasonable control   Follow on coreg, amlodipine     Follow up reason for today's visit:     KELLY (acute kidney injury) ([facility] [address] )      Subjective:   Patient seen and examined today  Denies cp/sob/n/v/d  He is hopeful to go home for thanksgiving  Potassium checked x 2 this AM and was 5 7  He was constipated until this AM    A complete 10 point review of systems was performed and is otherwise negative  Objective:     Vitals: Blood pressure 132/81, pulse 79, temperature (!) 97 °F (36 1 °C), resp  rate 19, height 5' 11" (1 803 m), weight 63 3 kg (139 lb 8 8 oz), SpO2 90 %  ,Body mass index is 19 46 kg/m²      Weight (last 2 days)     Date/Time Weight    11/22/22 0550 63 3 (139 55)    11/21/22 0501 63 3 (139 55)    11/20/22 0600 63 5 (140)    11/20/22 0500 63 5 (140)            Intake/Output Summary (Last 24 hours) at 11/22/2022 1404  Last data filed at 11/22/2022 1316  Gross per 24 hour   Intake 1040 ml   Output 2200 ml   Net -1160 ml     I/O last 3 completed shifts: In: 1160 [P O :1160]  Out: 2200 [Urine:2200]         Physical Exam: /81   Pulse 79   Temp (!) 97 °F (36 1 °C)   Resp 19   Ht 5' 11" (1 803 m)   Wt 63 3 kg (139 lb 8 8 oz)   SpO2 90%   BMI 19 46 kg/m²     General Appearance:    Alert, cooperative, no distress, appears stated age   Head:    Normocephalic, without obvious abnormality, atraumatic   Eyes:    Conjunctiva/corneas clear   Ears:    Normal external ears   Nose:   Nares normal, septum midline, mucosa normal, no drainage    or sinus tenderness   Throat:   Lips, mucosa, and tongue normal; teeth and gums normal   Neck:    Back:      Lungs:     Clear to auscultation bilaterally, respirations unlabored   Chest wall:    No tenderness or deformity   Heart:    Regular rate and rhythm, S1 and S2 normal, no murmur, rub   or gallop   Abdomen:     Soft, non-tender, bowel sounds active   Extremities:   Extremities normal, atraumatic, no cyanosis or edema   Skin:   Skin color, texture, turgor normal, no rashes or lesions   Lymph nodes:   Cervical normal   Neurologic:   CNII-XII intact            Lab, Imaging and other studies: I have personally reviewed pertinent labs  CBC: No results found for: WBC, HGB, HCT, MCV, PLT, ADJUSTEDWBC, MCH, MCHC, RDW, MPV, NRBC  CMP:   Lab Results   Component Value Date    K 5 7 (H) 11/22/2022     11/22/2022    CO2 28 11/22/2022    BUN 42 (H) 11/22/2022    CREATININE 3 29 (H) 11/22/2022    CALCIUM 11 1 (H) 11/22/2022    EGFR 19 11/22/2022           Results from last 7 days   Lab Units 11/22/22  1201 11/22/22  0450 11/21/22  0507 11/18/22  0512 11/17/22  0526 11/16/22  1713   POTASSIUM mmol/L 5 7* 5 7* 4 9   < > 3 4* 3 5   CHLORIDE mmol/L 100 102 104   < > 106 100   CO2 mmol/L 28 26 28   < > 24 32   BUN mg/dL 42* 41* 33*   < > 41* 44*   CREATININE mg/dL 3 29* 3 24* 2 76*   < > 3 09* 3 48*   CALCIUM mg/dL 11 1* 10 7* 10 7*   < > 11 4* 12 7*   ALK PHOS U/L  --   --   --   --  78 95   ALT U/L  --   --   --   --  <6* 13   AST U/L  --   --   --   --  10 11    < > = values in this interval not displayed  Phosphorus: No results found for: PHOS  Magnesium: No results found for: MG  Urinalysis: No results found for: Kellen Kendall, SPECGRAV, PHUR, LEUKOCYTESUR, NITRITE, PROTEINUA, GLUCOSEU, KETONESU, BILIRUBINUR, BLOODU  Ionized Calcium: No results found for: CAION  Coagulation: No results found for: PT, INR, APTT  Troponin: No results found for: TROPONINI  ABG: No results found for: PHART, ZAL8KWB, PO2ART, NLQ2ULX, A1JETDIQ, BEART, SOURCE  Radiology review:     IMAGING  No results found      Current Facility-Administered Medications   Medication Dose Route Frequency   • acetaminophen (TYLENOL) tablet 650 mg  650 mg Oral Q6H PRN   • allopurinol (ZYLOPRIM) tablet 50 mg  50 mg Oral Daily   • amLODIPine (NORVASC) tablet 10 mg  10 mg Oral Daily   • atorvastatin (LIPITOR) tablet 40 mg  40 mg Oral Daily With Dinner   • Buprenorphine HCl FILM 300 mcg  300 mcg Buccal BID   • carvedilol (COREG) tablet 6 25 mg  6 25 mg Oral BID With Meals   • ferrous sulfate tablet 325 mg  325 mg Oral Daily With Breakfast   • heparin (porcine) subcutaneous injection 5,000 Units  5,000 Units Subcutaneous Q8H [facility]   • insulin lispro (HumaLOG) 100 units/mL subcutaneous injection 1-6 Units  1-6 Units Subcutaneous HS   • insulin lispro (HumaLOG) 100 units/mL subcutaneous injection 1-6 Units  1-6 Units Subcutaneous TID AC   • ondansetron (ZOFRAN) injection 4 mg  4 mg Intravenous Q8H PRN   • oxyCODONE (ROXICODONE) IR tablet 5 mg  5 mg Oral Q4H PRN   • polyethylene glycol (MIRALAX) packet 17 g  17 g Oral Daily   • pregabalin (LYRICA) capsule 150 mg  150 mg Oral BID   • senna-docusate sodium (SENOKOT S) 8 6-50 mg per tablet 1 tablet  1 tablet Oral BID   • sodium chloride 0 9 % infusion  100 mL/hr Intravenous Continuous   • tamsulosin (FLOMAX) capsule 0 4 mg 0 4 mg Oral Daily With Dinner   • thiamine tablet 100 mg  100 mg Oral Daily   • venlafaxine (EFFEXOR-XR) 24 hr capsule 37 5 mg  37 5 mg Oral TID     Medications Discontinued During This Encounter   Medication Reason   • polyethylene glycol (MIRALAX) packet 17 g    • sodium chloride 0 9 % infusion    • sevelamer (RENAGEL) tablet 800 mg    • allopurinol (ZYLOPRIM) tablet 100 mg    • amLODIPine (NORVASC) tablet 10 mg    • sodium bicarbonate tablet 650 mg    • multi-electrolyte (PLASMALYTE-A/ISOLYTE-S PH 7 4) IV solution    • cefTRIAXone (ROCEPHIN) IVPB (premix in dextrose) 1,000 mg 50 mL    • cephalexin (KEFLEX) capsule 500 mg Dose adjustment   • multi-electrolyte (PLASMALYTE-A/ISOLYTE-S PH 7 4) IV solution    • cephalexin (KEFLEX) capsule 500 mg        [place], DO      This progress note was produced in part using a dictation device which may document imprecise wording from author's original intent

## 2022-11-22 NOTE — PROGRESS NOTES
-- Patient:  -- MRN: [id number]  -- Aidin Request ID: [id number]  -- Level of care reserved: [address] [place] [address]  -- Partner Reserved: / [address], [place], [address] Moris Marci [phone]  -- Clinical needs requested:  -- Geography searched: [id number]  -- Start of Service:  -- Request sent: 3:48pm EST on 11/21/2022 by Anderson Canseco  -- Partner reserved: 8:52am EST on 11/22/2022 by Anderson Canseco  -- Choice list shared:

## 2022-11-23 VITALS
OXYGEN SATURATION: 97 % | RESPIRATION RATE: 18 BRPM | SYSTOLIC BLOOD PRESSURE: 130 MMHG | DIASTOLIC BLOOD PRESSURE: 65 MMHG | HEART RATE: 72 BPM | TEMPERATURE: 98.1 F | HEIGHT: 71 IN | BODY MASS INDEX: 19.17 KG/M2 | WEIGHT: 136.91 LBS

## 2022-11-23 LAB
ANION GAP SERPL CALCULATED.3IONS-SCNC: 8 MMOL/L (ref 4–13)
BUN SERPL-MCNC: 40 MG/DL (ref 5–25)
CALCIUM SERPL-MCNC: 10.4 MG/DL (ref 8.3–10.1)
CHLORIDE SERPL-SCNC: 104 MMOL/L (ref 96–108)
CO2 SERPL-SCNC: 24 MMOL/L (ref 21–32)
CREAT SERPL-MCNC: 3.1 MG/DL (ref 0.6–1.3)
GFR SERPL CREATININE-BSD FRML MDRD: 21 ML/MIN/1.73SQ M
GLUCOSE SERPL-MCNC: 122 MG/DL (ref 65–140)
GLUCOSE SERPL-MCNC: 123 MG/DL (ref 65–140)
GLUCOSE SERPL-MCNC: 152 MG/DL (ref 65–140)
POTASSIUM SERPL-SCNC: 5.2 MMOL/L (ref 3.5–5.3)
SODIUM SERPL-SCNC: 136 MMOL/L (ref 135–147)

## 2022-11-23 RX ORDER — INSULIN GLARGINE 100 [IU]/ML
5 INJECTION, SOLUTION SUBCUTANEOUS
Qty: 10 ML | Refills: 0 | Status: SHIPPED | OUTPATIENT
Start: 2022-11-23

## 2022-11-23 RX ADMIN — BUPRENORPHINE HYDROCHLORIDE 300 MCG: 300 FILM, SOLUBLE BUCCAL at 08:26

## 2022-11-23 RX ADMIN — SENNOSIDES, DOCUSATE SODIUM 1 TABLET: 8.6; 5 TABLET ORAL at 08:26

## 2022-11-23 RX ADMIN — SODIUM CHLORIDE 100 ML/HR: 0.9 INJECTION, SOLUTION INTRAVENOUS at 05:23

## 2022-11-23 RX ADMIN — OXYCODONE HYDROCHLORIDE 5 MG: 5 TABLET ORAL at 09:55

## 2022-11-23 RX ADMIN — FERROUS SULFATE TAB 325 MG (65 MG ELEMENTAL FE) 325 MG: 325 (65 FE) TAB at 08:26

## 2022-11-23 RX ADMIN — OXYCODONE HYDROCHLORIDE 5 MG: 5 TABLET ORAL at 05:23

## 2022-11-23 RX ADMIN — ALLOPURINOL 50 MG: 100 TABLET ORAL at 08:26

## 2022-11-23 RX ADMIN — POLYETHYLENE GLYCOL 3350 17 G: 17 POWDER, FOR SOLUTION ORAL at 08:27

## 2022-11-23 RX ADMIN — AMLODIPINE BESYLATE 10 MG: 10 TABLET ORAL at 08:26

## 2022-11-23 RX ADMIN — PREGABALIN 150 MG: 75 CAPSULE ORAL at 08:26

## 2022-11-23 RX ADMIN — CARVEDILOL 6.25 MG: 6.25 TABLET, FILM COATED ORAL at 08:26

## 2022-11-23 RX ADMIN — VENLAFAXINE HYDROCHLORIDE 37.5 MG: 37.5 CAPSULE, EXTENDED RELEASE ORAL at 08:26

## 2022-11-23 RX ADMIN — INSULIN LISPRO 1 UNITS: 100 INJECTION, SOLUTION INTRAVENOUS; SUBCUTANEOUS at 08:31

## 2022-11-23 RX ADMIN — THIAMINE HCL TAB 100 MG 100 MG: 100 TAB at 08:26

## 2022-11-23 NOTE — DISCHARGE INSTR - AVS FIRST PAGE
Follow a low-potassium diet, continue to hydrate well  Have BMP - labwork on Monday  Follow-up Dr Bishop Cha your appointment with your neurosurgeon    Your being discharged with home health care    Follow-up with podiatry as an outpatient

## 2022-11-23 NOTE — ASSESSMENT & PLAN NOTE
· Creatinine 3 48 on admission  · Baseline creatinine 2 5-2 9  Lab Results   Component Value Date    CREATININE 3 10 (H) 11/23/2022    CREATININE 3 21 (H) 11/22/2022    CREATININE 3 29 (H) 11/22/2022     · In setting of mild volume depletion, UTI  · UTI-empiric ceftriaxone, urine culture with klebsiella pneumonia  · Susceptible to keflex- transition to oral 11/19 - finished course  · Appreciate Nephroloy consultation  · Had trended down and was remaining within his baseline, IV fluids were discontinued  · yesterday had increased to 3 24 - potentially in setting of urinary retention, dehydration, constipation (although did have large BM this AM)  · Slightly improved today, spoke with patient about needing better hydration orally and to follow-up with outpatient nephrologist as well as get labs by by Monday - patient agreeable  · Checking bladder scan - PVR around 50  · Continue IV fluids  · Monitor BMP

## 2022-11-23 NOTE — ASSESSMENT & PLAN NOTE
Malnutrition Findings: Loss of subcutaneous fat in orbital, triceps, ribcage areas  Loss of muscle at temples, clavicles, scapula, extremities  Consultation to dietitian     Previously noted to have moderate calorie malnutrition, has been upgraded to severe  Liberate diet to regular from carb controlled    BMI Findings: Body mass index is 19 09 kg/m²

## 2022-11-23 NOTE — PROGRESS NOTES
NEPHROLOGY PROGRESS NOTE   Yovana Desai Sr  62 y o  male MRN: [id number]  Unit/Bed#: -01 Encounter: [id number]    Assessment/Plan:    61 yo man with CKD 4 follows with Dr Ambrose, history of osteomyelitis and L5/S1 diskitis, paraspinal abscess and epidural abscess, history of obstructive uropathy, presented 11/16 due to shortness of breath being treated for RSV and UTI  Nephrology following along for KELLY, CKD 4, hypercalcemia, volume status, mineral bone disease, acid-base     1  Acute kidney injury (POA) atop chronic kidney disease  ? Secondary rise in creatinine noted likely due to volume depletion status post volume expansion  Patient requesting to be discharged today  This is fine from a Nephrology perspective the following recommendations:  Encourage appropriate hydration, lab work no later than Monday, low-potassium diet  He needs close follow-up with Dr Jacqui Peet  2  Stage 4 chronic kidney disease with baseline creatinine around 2 5-2 9 mg/dL  3  Moderate symptomatic acute on chronic hypercalcemia  ? PTHrP pending  Recommend close follow-up with his nephrologist and leave to the discretion whether to refer to endocrinology versus Hematology for further evaluation  Continue to encourage adequate hydration  Avoid exogenous calcium including avoiding calcium based binders  4  Volume depletion  ? Patient wishes to leave today  I have encouraged him to hydrate appropriately  5  Hyperkalemia  · Due to decreased distal delivery of sodium  Improved with volume expansion and Kayexalate  Avoid further Kayexalate if able  Recommend low-potassium diet into the outpatient setting-discussed with patient  6  Acidosis  · Acid-base balance remains stable bicarbonate tablets  Lab work no later than Monday  7  Hyperphosphatemia  · Binders have been on hold due to appropriate phosphorus level  Avoid calcium based binders        ROS  Patient states he wants to leave today    A complete 10 point review of systems have been performed and are otherwise negative         Historical Information   Past Medical History:   Diagnosis Date   • Chronic kidney disease    • Diabetes mellitus ([facility] )    • Gout    • Hypertension    • Renal disorder    • Retroperitoneal abscess ([facility] )    • Stroke ([facility] )    • Vertebral osteomyelitis ([facility] )      Past Surgical History:   Procedure Laterality Date   • BACK SURGERY     • ORCHIECTOMY       Social History   Social History     Substance and Sexual Activity   Alcohol Use Not Currently     Social History     Substance and Sexual Activity   Drug Use Yes   • Types: Marijuana     Social History     Tobacco Use   Smoking Status Every Day   • Packs/day: 0 25   • Types: Cigarettes   Smokeless Tobacco Never       Family History:   Family History   Problem Relation Age of Onset   • Hypertension Father        Medications:  Pertinent medications were reviewed  Current Facility-Administered Medications   Medication Dose Route Frequency Provider Last Rate   • acetaminophen  650 mg Oral Q6H PRN Thao S June, CRNP     • allopurinol  50 mg Oral Daily Kayla Megan, CRNP     • amLODIPine  10 mg Oral Daily Kayla Megan, CRNP     • atorvastatin  40 mg Oral Daily With Dinner Thao S June, CRNP     • Buprenorphine HCl  300 mcg Buccal BID Thao S June, CRNP     • carvedilol  6 25 mg Oral BID With Meals Thao S June, CRNP     • ferrous sulfate  325 mg Oral Daily With Breakfast Thao S June, CRNP     • heparin (porcine)  5,000 Units Subcutaneous Q8H [address] Thao S June, CRNP     • insulin lispro  1-6 Units Subcutaneous HS Thao S June, CRNP     • insulin lispro  1-6 Units Subcutaneous TID AC Thao S June, CRNP     • ondansetron  4 mg Intravenous Q8H PRN Thao S June, CRNP     • oxyCODONE  5 mg Oral Q4H PRN Thao S June, CRNP     • polyethylene glycol  17 g Oral Daily Thao S June, CRNP     • pregabalin  150 mg Oral BID Thao S June, CRNP     • senna-docusate sodium  1 tablet Oral BID Thao S June, CRNP     • sodium chloride  100 mL/hr Intravenous Continuous Jesus Chicas PA-C 100 mL/hr (11/23/22 3405)   • tamsulosin  0 4 mg Oral Daily With Dinner Thao S June, CRNP     • thiamine  100 mg Oral Daily Thao S June, CRNP     • venlafaxine  37 5 mg Oral TID Thao S June, CRNP           Allergies   Allergen Reactions   • Bupropion Delirium     "went nuts," prior to 2012  "went nuts," prior to 2012  "went nuts," prior to 2012  "went nuts," prior to 2012     • Metformin Other (See Comments)     Other reaction(s): kidney disease  Other reaction(s): kidney disease  Other reaction(s): kidney disease     • Medical Tape Rash         Vitals:   /65   Pulse 72   Temp 98 1 °F (36 7 °C)   Resp 18   Ht 5' 11" (1 803 m)   Wt 62 1 kg (136 lb 14 5 oz)   SpO2 97%   BMI 19 09 kg/m²   Body mass index is 19 09 kg/m²  SpO2: 97 %,   SpO2 Activity: At Rest,   O2 Device: None (Room air)      Intake/Output Summary (Last 24 hours) at 11/23/2022 1005  Last data filed at 11/23/2022 0831  Gross per 24 hour   Intake 600 ml   Output 2350 ml   Net -1750 ml     Invasive Devices     Peripheral Intravenous Line  Duration           Peripheral IV 11/20/22 Distal;Dorsal (posterior); Right Forearm 2 days                Physical Exam  General: conscious, cooperative, in no acute distress  Eyes: conjunctivae pink, anicteric sclerae  ENT: lips and mucous membranes moist  Neck: supple, no JVD, no masses  Chest: clear breath sounds bilaterally, no crackles, ronchus or wheezings  CVS: S1 & S2, normal rate, regular rhythm  Abdomen: soft, non-tender, non-distended, normoactive bowel sounds  Extremities: no edema of both legs  Skin: no rash  Neuro: awake, alert, oriented      Diagnostic Data:  Lab: I have personally reviewed pertinent lab results  ,   CBC:  Results from last 7 days   Lab Units 11/20/22  0446   WBC Thousand/uL 6 20   HEMOGLOBIN g/dL 10 6*   HEMATOCRIT % 33 5*   PLATELETS Thousands/uL 184      CMP:   Lab Results   Component Value Date    SODIUM 136 11/23/2022    K 5 2 11/23/2022     11/23/2022    CO2 24 11/23/2022    BUN 40 (H) 11/23/2022    CREATININE 3 10 (H) 11/23/2022    CALCIUM 10 4 (H) 11/23/2022    EGFR 21 11/23/2022   ,   PT/INR: No results found for: PT, INR,   Magnesium: No components found for: MAG,  Phosphorous: No results found for: PHOS    Microbiology:  @LABRCNTIP,(urinecx:7)@        YURIDIA Villarreal    Portions of the record may have been created with voice recognition software  Occasional wrong word or "sound a like" substitutions may have occurred due to the inherent limitations of voice recognition software  Read the chart carefully and recognize, using context, where substitutions have occurred

## 2022-11-23 NOTE — ASSESSMENT & PLAN NOTE
· Corrected calcium 13 2 on admission with hypovolemia  · Mildly elevated ionized calcium  · Improving with IV hydration, however still elevated  Lab Results   Component Value Date    CALCIUM 10 4 (H) 11/23/2022    CALCIUM 11 0 (H) 11/22/2022    CALCIUM 11 1 (H) 11/22/2022     · No history of underlying malignancy/multiple myeloma    · Normal TSH, normal protein level during last admission  · PTHrP, SPEP/UPEP ordered by Nephrology and pending  · Continue to monitor calcium level  · May warrant outpatient follow-up with Hematology versus Endocrinology depending on workup results     · Remaining stable

## 2022-11-23 NOTE — PLAN OF CARE
Problem: Potential for Falls  Goal: Patient will remain free of falls  Description: INTERVENTIONS:  - Educate patient/family on patient safety including physical limitations  - Instruct patient to call for assistance with activity   - Consult OT/PT to assist with strengthening/mobility   - Keep Call bell within reach  - Keep bed low and locked with side rails adjusted as appropriate  - Keep care items and personal belongings within reach  - Initiate and maintain comfort rounds  - Make Fall Risk Sign visible to staff  - Offer Toileting every   Hours, in advance of need  - Initiate/Maintain   alarm  - Obtain necessary fall risk management equipment:      - Apply yellow socks and bracelet for high fall risk patients  - Consider moving patient to room near nurses station  11/22/2022 2032 by Tatiana Adamson, RN  Outcome: Progressing  11/22/2022 2032 by Tatiana Adamson, RN  Outcome: Progressing

## 2022-11-23 NOTE — ASSESSMENT & PLAN NOTE
Noted to be 5 7 on morning labs  EKG done today without any acute abnormalities or changes from hyperkalemia  Given insulin, dextrose and calcium gluconate - repeat done is again 5 7  Continue IV fluids  Recheck again this afternoon until less than 5  Nephrology recommending potassium restricted diet    Resolved, continue potassium strict diet

## 2022-11-23 NOTE — ASSESSMENT & PLAN NOTE
Lab Results   Component Value Date    HGBA1C 6 6 (H) 11/17/2022       Recent Labs     11/22/22  1624 11/22/22  2114 11/23/22  0626 11/23/22  1054   POCGLU 149* 172* 152* 123       Blood Sugar Average: Last 72 hrs:  (P) 164 [id number]     · PTA basal insulin 10 units HS- on hold due to poor oral intake, resume when able  · Intake improved slightly but still without hyperglycemia - continue to hold insulin   · Sliding scale insulin coverage with Accu-Cheks  · Hypoglycemia protocol  · Carbohydrate controlled diet - liberated to regular diet due to mild nutrition    Patient can resume 5 units insulin HS on discharge and follow up with PCP

## 2022-11-23 NOTE — NURSING NOTE
Reviewed discharge instructions with patient  Verbalized understanding of same  Prescriptions escribed  Referral for home health and lab prescritpion given to patient  Belonging returned to patient  Questions answered

## 2022-11-23 NOTE — DISCHARGE SUMMARY
[address]  Discharge- Devon Mccormick Sr  1965, 62 y o  male MRN: [id number]  Unit/Bed#: -01 Encounter: [id number]  Primary Care Provider: Shilpa Maldonado DO   Date and time admitted to hospital: 11/16/2022  4:34 PM    * KELLY (acute kidney injury) [facility])  Assessment & Plan  · Creatinine 3 48 on admission  · Baseline creatinine 2 5-2 9  Lab Results   Component Value Date    CREATININE 3 10 (H) 11/23/2022    CREATININE 3 21 (H) 11/22/2022    CREATININE 3 29 (H) 11/22/2022     · In setting of mild volume depletion, UTI  · UTI-empiric ceftriaxone, urine culture with klebsiella pneumonia  · Susceptible to keflex- transition to oral 11/19 - finished course  · Appreciate Nephroloy consultation  · Had trended down and was remaining within his baseline, IV fluids were discontinued  · yesterday had increased to 3 24 - potentially in setting of urinary retention, dehydration, constipation (although did have large BM this AM)  · Slightly improved today, spoke with patient about needing better hydration orally and to follow-up with outpatient nephrologist as well as get labs by by Monday - patient agreeable  · Checking bladder scan - PVR around 50  · Continue IV fluids  · Monitor BMP      Hyperkalemia  Assessment & Plan  Noted to be 5 7 on morning labs  EKG done today without any acute abnormalities or changes from hyperkalemia  Given insulin, dextrose and calcium gluconate - repeat done is again 5 7  Continue IV fluids  Recheck again this afternoon until less than 5  Nephrology recommending potassium restricted diet    Resolved, continue potassium strict diet    Hypercalcemia  Assessment & Plan  · Corrected calcium 13 2 on admission with hypovolemia  · Mildly elevated ionized calcium  · Improving with IV hydration, however still elevated  Lab Results   Component Value Date    CALCIUM 10 4 (H) 11/23/2022    CALCIUM 11 0 (H) 11/22/2022    CALCIUM 11 1 (H) 11/22/2022     · No history of underlying malignancy/multiple myeloma    · Normal TSH, normal protein level during last admission  · PTHrP, SPEP/UPEP ordered by Nephrology and pending  · Continue to monitor calcium level  · May warrant outpatient follow-up with Hematology versus Endocrinology depending on workup results  · Remaining stable    Acute cystitis  Assessment & Plan  · Recurrent UTI due to urinary retention   · Has had Ortiz catheter placement in the past  · Urinary retention protocol  · Keflex thru the 21st and then stop - finished course    RSV infection  Assessment & Plan  · RSV positive on admission  · Exposure via family members  · Presented with respiratory failure requiring 2 L supplemental oxygen, cough and hypoxia at home - hypoxia has resolved  · Continue supportive care  · Maintain isolation    Chronic anemia  Assessment & Plan  · In setting of chronic kidney disease  · Hemoglobin at baseline  · Trend hemoglobin    Chronic ulcer of left heel (HCC)  Assessment & Plan  · Appreciate Podiatry input  · Chronic ulcer, however increased in size  · Will follow Podiatry recommendations for wound care  -LEADs pending  · Monitor with serial exams    Ambulatory dysfunction  Assessment & Plan  · Appears due to deconditioning  · No focal deficits on examination  · Patient has been in and out of rehabs since March  · Will request PT/OT evaluation- recommending rehab however patient has no more cough for days by insurance, price of rehab stay per day is too expensive for patient to bear  · After discussion with patient's wife, will pursue home health - she is looking into other options as far as outpatient physical therapy in the home however will hold off at this time due to patient's continued pain and likelihood of getting out of the house is low  · Will make referrals on discharge    Severe protein-calorie malnutrition ([address] )  Assessment & Plan  Malnutrition Findings: Loss of subcutaneous fat in orbital, triceps, ribcage areas    Loss of muscle at temples, clavicles, scapula, extremities  Consultation to dietitian     Previously noted to have moderate calorie malnutrition, has been upgraded to severe  Liberate diet to regular from carb controlled    BMI Findings: Body mass index is 19 09 kg/m²  Essential hypertension  Assessment & Plan  · Continue carvedilol and amlodipine home regimen  · Monitor blood pressure trends     Gout  Assessment & Plan  · Continue allopurinol    Diabetes mellitus type 2, insulin dependent [facility])  Assessment & Plan  Lab Results   Component Value Date    HGBA1C 6 6 (H) 11/17/2022       Recent Labs     11/22/22  1624 11/22/22  2114 11/23/22  0626 11/23/22  1054   POCGLU 149* 172* 152* 123       Blood Sugar Average: Last 72 hrs:  (P) 352 [id number]     · PTA basal insulin 10 units HS- on hold due to poor oral intake, resume when able  · Intake improved slightly but still without hyperglycemia - continue to hold insulin   · Sliding scale insulin coverage with Accu-Cheks  · Hypoglycemia protocol  · Carbohydrate controlled diet - liberated to regular diet due to mild nutrition    Patient can resume 5 units insulin HS on discharge and follow up with PCP    Medical Problems     Resolved Problems  Date Reviewed: 11/18/2022          Resolved    Acute respiratory failure with hypoxia ([address] ) 11/20/2022     Resolved by  Maine Sierra PA-C        Discharging Physician / Practitioner: Maine Sierra PA-C  PCP: Javon Mcdaniel DO  Admission Date:   Admission Orders (From admission, onward)     Ordered        11/17/22 1450  Inpatient Admission  Once            11/16/22 1933  Place in Observation  Once                      Discharge Date: 11/23/22    Consultations During Hospital Stay:  · Nephrology    Procedures Performed:   · None    Significant Findings / Test Results:   XR chest 1 view portable - Result Date: 11/16/2022  · No acute cardiopulmonary disease   Workstation performed: [id number]     Lab Results Component Value Date    CALCIUM 10 4 (H) 11/23/2022    CALCIUM 11 0 (H) 11/22/2022    CALCIUM 11 1 (H) 11/22/2022    CALCIUM 10 7 (H) 11/22/2022    CALCIUM 10 7 (H) 11/21/2022     Lab Results   Component Value Date    CREATININE 3 10 (H) 11/23/2022    CREATININE 3 21 (H) 11/22/2022    CREATININE 3 29 (H) 11/22/2022    CREATININE 3 24 (H) 11/22/2022    CREATININE 2 76 (H) 11/21/2022       Incidental Findings:   · None    Test Results Pending at Discharge (will require follow up): · None     Outpatient Tests Requested:  · BMP in 1 week    Complications:  None    Reason for Admission:  KELLY    Hospital Course:   Moralesraad Russell  is a 62 y o  male patient past medical history of chronic anemia, hypertension, diabetes type 2, chronic back pain who originally presented to the hospital on 11/16/2022 due to respiratory distress, pulse ox tract at home and found to be 80%  Tested positive for RSV in the ER and had elevation of creatinine and calcium levels due to poor oral intake over the past 2 days  Was admitted and given IV fluids, started on oxygen and respiratory protocol  Patient's respiratory status continued to improve as well as his kidney function slowly improved  Nephrology was following due to hypercalcemia and KELLY  Patient continued to slowly improve and was medically ready on day of discharge  Was seen by PT and OT and recommended again for acute rehab however due to difficulties with insurance and is having used all of cover days, patient not able to go to rehab at this time  Cm work with patient, and will discharge with home health at this time  Please see above list of diagnoses and related plan for additional information  Condition at Discharge: fair    Discharge Day Visit / Exam:   Subjective:  Seen examined    Endorses wished to go home today, gave instructions as far as maintaining oral hydration, low-potassium diet and having close follow-up with his nephrologist, patient agreeable  Vitals: Blood Pressure: 130/65 (11/23/22 0626)  Pulse: 72 (11/23/22 0626)  Temperature: 98 1 °F (36 7 °C) (11/23/22 0626)  Temp Source: Temporal (11/19/22 2225)  Respirations: 18 (11/23/22 0626)  Height: 5' 11" (180 3 cm) (11/18/22 1034)  Weight - Scale: 62 1 kg (136 lb 14 5 oz) (11/23/22 0600)  SpO2: 97 % (11/23/22 5096)  Exam:   Physical Exam  Vitals and nursing note reviewed  Constitutional:       General: He is not in acute distress  Appearance: Normal appearance  He is ill-appearing  HENT:      Head: Normocephalic and atraumatic  Nose: No congestion  Mouth/Throat:      Mouth: Mucous membranes are moist    Eyes:      Conjunctiva/sclera: Conjunctivae normal    Cardiovascular:      Rate and Rhythm: Normal rate and regular rhythm  Pulses: Normal pulses  Heart sounds: Normal heart sounds  No murmur heard  Pulmonary:      Effort: Pulmonary effort is normal  No respiratory distress  Breath sounds: Normal breath sounds  Abdominal:      General: Bowel sounds are normal       Palpations: Abdomen is soft  Tenderness: There is no abdominal tenderness  Musculoskeletal:         General: Normal range of motion  Right lower leg: No edema  Left lower leg: No edema  Skin:     General: Skin is warm and dry  Neurological:      Mental Status: He is alert and oriented to person, place, and time  Discussion with Family: Updated  (wife) via phone  Discharge instructions/Information to patient and family:   See after visit summary for information provided to patient and family  Provisions for Follow-Up Care:  See after visit summary for information related to follow-up care and any pertinent home health orders  Disposition:   Home with VNA Services (Reminder: Complete face to face encounter)    Planned Readmission: None     Discharge Statement:  I spent 45 minutes discharging the patient   This time was spent on the day of discharge  I had direct contact with the patient on the day of discharge  Greater than 50% of the total time was spent examining patient, answering all patient questions, arranging and discussing plan of care with patient as well as directly providing post-discharge instructions  Additional time then spent on discharge activities  Discharge Medications:  See after visit summary for reconciled discharge medications provided to patient and/or family        **Please Note: This note may have been constructed using a voice recognition system**

## 2022-11-28 ENCOUNTER — LAB REQUISITION (OUTPATIENT)
Dept: LAB | Facility: HOSPITAL | Age: 57
End: 2022-11-28

## 2022-11-28 DIAGNOSIS — N17.9 ACUTE KIDNEY FAILURE, UNSPECIFIED (HCC): ICD-10-CM

## 2022-11-28 DIAGNOSIS — E11.8 TYPE 2 DIABETES MELLITUS WITH UNSPECIFIED COMPLICATIONS (HCC): ICD-10-CM

## 2022-11-28 LAB
ANION GAP SERPL CALCULATED.3IONS-SCNC: 11 MMOL/L (ref 4–13)
BUN SERPL-MCNC: 39 MG/DL (ref 5–25)
CALCIUM SERPL-MCNC: 12 MG/DL (ref 8.3–10.1)
CHLORIDE SERPL-SCNC: 101 MMOL/L (ref 96–108)
CO2 SERPL-SCNC: 28 MMOL/L (ref 21–32)
CREAT SERPL-MCNC: 3.41 MG/DL (ref 0.6–1.3)
GFR SERPL CREATININE-BSD FRML MDRD: 18 ML/MIN/1.73SQ M
GLUCOSE P FAST SERPL-MCNC: 154 MG/DL (ref 65–99)
POTASSIUM SERPL-SCNC: 4.3 MMOL/L (ref 3.5–5.3)
SODIUM SERPL-SCNC: 140 MMOL/L (ref 135–147)

## 2022-11-29 LAB — PTH RELATED PROT SERPL-SCNC: <2 PMOL/L

## 2022-12-06 ENCOUNTER — TELEPHONE (OUTPATIENT)
Dept: PODIATRY | Age: 57
End: 2022-12-06

## 2022-12-06 NOTE — TELEPHONE ENCOUNTER
Caller: Brigette Kraft    Doctor/Office: [facility]    CB#: [phone]    Escalation: This is an update from [address] needs to see a Neurosurgeon, Dr Catrachito Villegas with [facility]  He will then need to see Vascular  Yahaira Maza can only handle one doctor appointment at a time  Corin with [facility] assured me that she is taking care of him and she said he will come to see Podiatry after his other appointments  If his foot looks worse she will contact the office

## 2022-12-19 PROBLEM — T83.511A UTI (URINARY TRACT INFECTION) DUE TO URINARY INDWELLING CATHETER (HCC): Status: RESOLVED | Noted: 2022-10-20 | Resolved: 2022-12-19

## 2022-12-19 PROBLEM — N39.0 UTI (URINARY TRACT INFECTION) DUE TO URINARY INDWELLING CATHETER (HCC): Status: RESOLVED | Noted: 2022-10-20 | Resolved: 2022-12-19

## 2022-12-23 ENCOUNTER — APPOINTMENT (INPATIENT)
Dept: CT IMAGING | Facility: HOSPITAL | Age: 57
End: 2022-12-23

## 2022-12-23 ENCOUNTER — HOSPITAL ENCOUNTER (INPATIENT)
Facility: HOSPITAL | Age: 57
LOS: 5 days | Discharge: HOME HEALTH | End: 2022-12-28
Attending: EMERGENCY MEDICINE | Admitting: FAMILY MEDICINE

## 2022-12-23 ENCOUNTER — APPOINTMENT (EMERGENCY)
Dept: RADIOLOGY | Facility: HOSPITAL | Age: 57
End: 2022-12-23

## 2022-12-23 DIAGNOSIS — G89.29 CHRONIC BACK PAIN, UNSPECIFIED BACK LOCATION, UNSPECIFIED BACK PAIN LATERALITY: ICD-10-CM

## 2022-12-23 DIAGNOSIS — D64.9 CHRONIC ANEMIA: ICD-10-CM

## 2022-12-23 DIAGNOSIS — J18.9 PNEUMONIA: ICD-10-CM

## 2022-12-23 DIAGNOSIS — R33.8 ACUTE URINARY RETENTION: ICD-10-CM

## 2022-12-23 DIAGNOSIS — J44.1 COPD EXACERBATION (HCC): ICD-10-CM

## 2022-12-23 DIAGNOSIS — L97.429 CHRONIC HEEL ULCER, LEFT, WITH UNSPECIFIED SEVERITY (HCC): ICD-10-CM

## 2022-12-23 DIAGNOSIS — E44.0 MODERATE PROTEIN-CALORIE MALNUTRITION (HCC): Primary | ICD-10-CM

## 2022-12-23 DIAGNOSIS — K66.8 PNEUMOPERITONEUM: ICD-10-CM

## 2022-12-23 DIAGNOSIS — M54.42 LEFT-SIDED LOW BACK PAIN WITH LEFT-SIDED SCIATICA, UNSPECIFIED CHRONICITY: ICD-10-CM

## 2022-12-23 DIAGNOSIS — N17.9 AKI (ACUTE KIDNEY INJURY) (HCC): ICD-10-CM

## 2022-12-23 DIAGNOSIS — M54.9 CHRONIC BACK PAIN, UNSPECIFIED BACK LOCATION, UNSPECIFIED BACK PAIN LATERALITY: ICD-10-CM

## 2022-12-23 DIAGNOSIS — E83.52 HYPERCALCEMIA: ICD-10-CM

## 2022-12-23 DIAGNOSIS — R26.2 AMBULATORY DYSFUNCTION: ICD-10-CM

## 2022-12-23 DIAGNOSIS — R09.02 HYPOXIA: ICD-10-CM

## 2022-12-23 DIAGNOSIS — J15.212 PNEUMONIA OF BOTH LUNGS DUE TO METHICILLIN RESISTANT STAPHYLOCOCCUS AUREUS (MRSA), UNSPECIFIED PART OF LUNG (HCC): ICD-10-CM

## 2022-12-23 DIAGNOSIS — N18.31 STAGE 3A CHRONIC KIDNEY DISEASE (HCC): ICD-10-CM

## 2022-12-23 LAB
ALBUMIN SERPL BCP-MCNC: 2.5 G/DL (ref 3.5–5)
ALP SERPL-CCNC: 92 U/L (ref 46–116)
ALT SERPL W P-5'-P-CCNC: 13 U/L (ref 12–78)
ANION GAP SERPL CALCULATED.3IONS-SCNC: 10 MMOL/L (ref 4–13)
AST SERPL W P-5'-P-CCNC: 12 U/L (ref 5–45)
BASOPHILS # BLD AUTO: 0.08 THOUSANDS/ÂΜL (ref 0–0.1)
BASOPHILS NFR BLD AUTO: 1 % (ref 0–1)
BILIRUB SERPL-MCNC: 0.25 MG/DL (ref 0.2–1)
BUN SERPL-MCNC: 54 MG/DL (ref 5–25)
CA-I BLD-SCNC: 1.44 MMOL/L (ref 1.12–1.32)
CALCIUM ALBUM COR SERPL-MCNC: 13 MG/DL (ref 8.3–10.1)
CALCIUM SERPL-MCNC: 11.8 MG/DL (ref 8.3–10.1)
CARDIAC TROPONIN I PNL SERPL HS: 4 NG/L
CHLORIDE SERPL-SCNC: 99 MMOL/L (ref 96–108)
CK SERPL-CCNC: 12 U/L (ref 39–308)
CO2 SERPL-SCNC: 28 MMOL/L (ref 21–32)
CREAT SERPL-MCNC: 4.1 MG/DL (ref 0.6–1.3)
CRP SERPL QL: 107.5 MG/L
D DIMER PPP FEU-MCNC: 2.45 UG/ML FEU
EOSINOPHIL # BLD AUTO: 0.9 THOUSAND/ÂΜL (ref 0–0.61)
EOSINOPHIL NFR BLD AUTO: 6 % (ref 0–6)
ERYTHROCYTE [DISTWIDTH] IN BLOOD BY AUTOMATED COUNT: 16.4 % (ref 11.6–15.1)
FLUAV RNA RESP QL NAA+PROBE: NEGATIVE
FLUBV RNA RESP QL NAA+PROBE: NEGATIVE
GFR SERPL CREATININE-BSD FRML MDRD: 15 ML/MIN/1.73SQ M
GLUCOSE SERPL-MCNC: 199 MG/DL (ref 65–140)
HCT VFR BLD AUTO: 31.5 % (ref 36.5–49.3)
HGB BLD-MCNC: 9.9 G/DL (ref 12–17)
IMM GRANULOCYTES # BLD AUTO: 0.11 THOUSAND/UL (ref 0–0.2)
IMM GRANULOCYTES NFR BLD AUTO: 1 % (ref 0–2)
INR PPP: 1.05 (ref 0.84–1.19)
LACTATE SERPL-SCNC: 1 MMOL/L (ref 0.5–2)
LYMPHOCYTES # BLD AUTO: 1.23 THOUSANDS/ÂΜL (ref 0.6–4.47)
LYMPHOCYTES NFR BLD AUTO: 8 % (ref 14–44)
MAGNESIUM SERPL-MCNC: 2.4 MG/DL (ref 1.6–2.6)
MCH RBC QN AUTO: 29.1 PG (ref 26.8–34.3)
MCHC RBC AUTO-ENTMCNC: 31.4 G/DL (ref 31.4–37.4)
MCV RBC AUTO: 93 FL (ref 82–98)
MONOCYTES # BLD AUTO: 1.44 THOUSAND/ÂΜL (ref 0.17–1.22)
MONOCYTES NFR BLD AUTO: 9 % (ref 4–12)
NEUTROPHILS # BLD AUTO: 12.68 THOUSANDS/ÂΜL (ref 1.85–7.62)
NEUTS SEG NFR BLD AUTO: 75 % (ref 43–75)
NRBC BLD AUTO-RTO: 0 /100 WBCS
NT-PROBNP SERPL-MCNC: 815 PG/ML
PLATELET # BLD AUTO: 244 THOUSANDS/UL (ref 149–390)
PMV BLD AUTO: 10.9 FL (ref 8.9–12.7)
POTASSIUM SERPL-SCNC: 4.6 MMOL/L (ref 3.5–5.3)
PROCALCITONIN SERPL-MCNC: 0.58 NG/ML
PROT SERPL-MCNC: 6.4 G/DL (ref 6.4–8.4)
PROTHROMBIN TIME: 13.8 SECONDS (ref 11.6–14.5)
RBC # BLD AUTO: 3.4 MILLION/UL (ref 3.88–5.62)
RSV RNA RESP QL NAA+PROBE: NEGATIVE
SARS-COV-2 RNA RESP QL NAA+PROBE: NEGATIVE
SODIUM SERPL-SCNC: 137 MMOL/L (ref 135–147)
WBC # BLD AUTO: 16.44 THOUSAND/UL (ref 4.31–10.16)

## 2022-12-23 RX ORDER — POLYETHYLENE GLYCOL 3350 17 G/17G
17 POWDER, FOR SOLUTION ORAL 2 TIMES DAILY
Status: DISCONTINUED | OUTPATIENT
Start: 2022-12-24 | End: 2022-12-24

## 2022-12-23 RX ORDER — INSULIN GLARGINE 100 [IU]/ML
5 INJECTION, SOLUTION SUBCUTANEOUS
Status: DISCONTINUED | OUTPATIENT
Start: 2022-12-24 | End: 2022-12-24

## 2022-12-23 RX ORDER — AMOXICILLIN 250 MG
1 CAPSULE ORAL 2 TIMES DAILY
Status: DISCONTINUED | OUTPATIENT
Start: 2022-12-24 | End: 2022-12-24

## 2022-12-23 RX ORDER — CYCLOBENZAPRINE HCL 10 MG
5 TABLET ORAL 3 TIMES DAILY
Status: DISCONTINUED | OUTPATIENT
Start: 2022-12-24 | End: 2022-12-24

## 2022-12-23 RX ORDER — AMLODIPINE BESYLATE 10 MG/1
10 TABLET ORAL DAILY
Status: DISCONTINUED | OUTPATIENT
Start: 2022-12-24 | End: 2022-12-24

## 2022-12-23 RX ORDER — DEXAMETHASONE SODIUM PHOSPHATE 4 MG/ML
6 INJECTION, SOLUTION INTRA-ARTICULAR; INTRALESIONAL; INTRAMUSCULAR; INTRAVENOUS; SOFT TISSUE ONCE
Status: COMPLETED | OUTPATIENT
Start: 2022-12-23 | End: 2022-12-23

## 2022-12-23 RX ORDER — CEFTRIAXONE 1 G/50ML
1000 INJECTION, SOLUTION INTRAVENOUS ONCE
Status: COMPLETED | OUTPATIENT
Start: 2022-12-23 | End: 2022-12-23

## 2022-12-23 RX ORDER — HEPARIN SODIUM 5000 [USP'U]/ML
5000 INJECTION, SOLUTION INTRAVENOUS; SUBCUTANEOUS EVERY 8 HOURS SCHEDULED
Status: DISCONTINUED | OUTPATIENT
Start: 2022-12-24 | End: 2022-12-28 | Stop reason: HOSPADM

## 2022-12-23 RX ORDER — ALLOPURINOL 100 MG/1
100 TABLET ORAL DAILY
Status: DISCONTINUED | OUTPATIENT
Start: 2022-12-24 | End: 2022-12-24

## 2022-12-23 RX ORDER — OXYCODONE HYDROCHLORIDE AND ACETAMINOPHEN 5; 325 MG/1; MG/1
1 TABLET ORAL ONCE
Status: COMPLETED | OUTPATIENT
Start: 2022-12-23 | End: 2022-12-23

## 2022-12-23 RX ORDER — ALBUTEROL SULFATE 2.5 MG/3ML
2.5 SOLUTION RESPIRATORY (INHALATION) EVERY 4 HOURS PRN
Status: DISCONTINUED | OUTPATIENT
Start: 2022-12-23 | End: 2022-12-28 | Stop reason: HOSPADM

## 2022-12-23 RX ORDER — INSULIN LISPRO 100 [IU]/ML
1-6 INJECTION, SOLUTION INTRAVENOUS; SUBCUTANEOUS
Status: DISCONTINUED | OUTPATIENT
Start: 2022-12-24 | End: 2022-12-24

## 2022-12-23 RX ORDER — SEVELAMER CARBONATE 800 MG/1
800 TABLET, FILM COATED ORAL DAILY
COMMUNITY

## 2022-12-23 RX ORDER — SODIUM BICARBONATE 650 MG/1
650 TABLET ORAL 2 TIMES DAILY
COMMUNITY

## 2022-12-23 RX ORDER — CEFTRIAXONE 1 G/50ML
1000 INJECTION, SOLUTION INTRAVENOUS EVERY 24 HOURS
Status: DISCONTINUED | OUTPATIENT
Start: 2022-12-24 | End: 2022-12-28

## 2022-12-23 RX ORDER — TAMSULOSIN HYDROCHLORIDE 0.4 MG/1
0.4 CAPSULE ORAL
Status: DISCONTINUED | OUTPATIENT
Start: 2022-12-24 | End: 2022-12-24

## 2022-12-23 RX ORDER — ACETAMINOPHEN 325 MG/1
650 TABLET ORAL EVERY 4 HOURS PRN
Status: DISCONTINUED | OUTPATIENT
Start: 2022-12-23 | End: 2022-12-24

## 2022-12-23 RX ORDER — IPRATROPIUM BROMIDE AND ALBUTEROL SULFATE 2.5; .5 MG/3ML; MG/3ML
3 SOLUTION RESPIRATORY (INHALATION) ONCE
Status: COMPLETED | OUTPATIENT
Start: 2022-12-23 | End: 2022-12-23

## 2022-12-23 RX ORDER — INSULIN LISPRO 100 [IU]/ML
1-6 INJECTION, SOLUTION INTRAVENOUS; SUBCUTANEOUS
Status: DISCONTINUED | OUTPATIENT
Start: 2022-12-23 | End: 2022-12-24

## 2022-12-23 RX ORDER — OXYCODONE HYDROCHLORIDE 5 MG/1
5 TABLET ORAL EVERY 4 HOURS PRN
Status: DISCONTINUED | OUTPATIENT
Start: 2022-12-23 | End: 2022-12-24

## 2022-12-23 RX ORDER — CARVEDILOL 6.25 MG/1
6.25 TABLET ORAL 2 TIMES DAILY WITH MEALS
Status: DISCONTINUED | OUTPATIENT
Start: 2022-12-24 | End: 2022-12-24

## 2022-12-23 RX ORDER — VENLAFAXINE HYDROCHLORIDE 37.5 MG/1
37.5 CAPSULE, EXTENDED RELEASE ORAL 3 TIMES DAILY
Status: DISCONTINUED | OUTPATIENT
Start: 2022-12-24 | End: 2022-12-24

## 2022-12-23 RX ORDER — SODIUM CHLORIDE 9 MG/ML
100 INJECTION, SOLUTION INTRAVENOUS CONTINUOUS
Status: DISCONTINUED | OUTPATIENT
Start: 2022-12-24 | End: 2022-12-24

## 2022-12-23 RX ORDER — COLCHICINE 0.6 MG/1
0.6 TABLET ORAL DAILY
COMMUNITY

## 2022-12-23 RX ORDER — ATORVASTATIN CALCIUM 40 MG/1
40 TABLET, FILM COATED ORAL
Status: DISCONTINUED | OUTPATIENT
Start: 2022-12-24 | End: 2022-12-24

## 2022-12-23 RX ORDER — DOXYCYCLINE HYCLATE 100 MG/1
100 CAPSULE ORAL ONCE
Status: COMPLETED | OUTPATIENT
Start: 2022-12-23 | End: 2022-12-23

## 2022-12-23 RX ORDER — PREGABALIN 75 MG/1
75 CAPSULE ORAL DAILY
Status: DISCONTINUED | OUTPATIENT
Start: 2022-12-24 | End: 2022-12-24

## 2022-12-23 RX ADMIN — DOXYCYCLINE 100 MG: 100 CAPSULE ORAL at 23:14

## 2022-12-23 RX ADMIN — IPRATROPIUM BROMIDE AND ALBUTEROL SULFATE 3 ML: .5; 3 SOLUTION RESPIRATORY (INHALATION) at 22:13

## 2022-12-23 RX ADMIN — OXYCODONE HYDROCHLORIDE AND ACETAMINOPHEN 1 TABLET: 5; 325 TABLET ORAL at 22:39

## 2022-12-23 RX ADMIN — DEXAMETHASONE SODIUM PHOSPHATE 6 MG: 4 INJECTION, SOLUTION INTRAMUSCULAR; INTRAVENOUS at 22:15

## 2022-12-23 RX ADMIN — CEFTRIAXONE 1000 MG: 1 INJECTION, SOLUTION INTRAVENOUS at 23:15

## 2022-12-23 RX ADMIN — SODIUM CHLORIDE 1000 ML: 0.9 INJECTION, SOLUTION INTRAVENOUS at 23:16

## 2022-12-23 NOTE — Clinical Note
Case was discussed with NP June and the patient's admission status was agreed to be Admission Status: inpatient status to the service of Dr Annamaria Gavin   PATs 130

## 2022-12-24 ENCOUNTER — APPOINTMENT (INPATIENT)
Dept: CT IMAGING | Facility: HOSPITAL | Age: 57
End: 2022-12-24

## 2022-12-24 PROBLEM — M54.9 CHRONIC BACK PAIN: Status: ACTIVE | Noted: 2022-12-24

## 2022-12-24 PROBLEM — G89.29 CHRONIC BACK PAIN: Status: ACTIVE | Noted: 2022-12-24

## 2022-12-24 PROBLEM — J18.9 PNEUMONIA: Status: ACTIVE | Noted: 2022-12-24

## 2022-12-24 LAB
4HR DELTA HS TROPONIN: -1 NG/L
ALBUMIN SERPL BCP-MCNC: 2.4 G/DL (ref 3.5–5)
ALBUMIN SERPL BCP-MCNC: 2.4 G/DL (ref 3.5–5)
ALP SERPL-CCNC: 91 U/L (ref 46–116)
ALT SERPL W P-5'-P-CCNC: 13 U/L (ref 12–78)
ANION GAP SERPL CALCULATED.3IONS-SCNC: 11 MMOL/L (ref 4–13)
ANISOCYTOSIS BLD QL SMEAR: PRESENT
AST SERPL W P-5'-P-CCNC: 10 U/L (ref 5–45)
BACTERIA UR QL AUTO: ABNORMAL /HPF
BASOPHILS # BLD MANUAL: 0 THOUSAND/UL (ref 0–0.1)
BASOPHILS # BLD MANUAL: 0 THOUSAND/UL (ref 0–0.1)
BASOPHILS NFR MAR MANUAL: 0 % (ref 0–1)
BASOPHILS NFR MAR MANUAL: 0 % (ref 0–1)
BILIRUB SERPL-MCNC: 0.21 MG/DL (ref 0.2–1)
BILIRUB UR QL STRIP: NEGATIVE
BUN SERPL-MCNC: 53 MG/DL (ref 5–25)
CALCIUM ALBUM COR SERPL-MCNC: 12.8 MG/DL (ref 8.3–10.1)
CALCIUM PRE 500 MG CA PO UR-SCNC: <5 MG/DL
CALCIUM SERPL-MCNC: 11.5 MG/DL (ref 8.3–10.1)
CARDIAC TROPONIN I PNL SERPL HS: 3 NG/L
CHLORIDE SERPL-SCNC: 100 MMOL/L (ref 96–108)
CLARITY UR: CLEAR
CO2 SERPL-SCNC: 25 MMOL/L (ref 21–32)
COLOR UR: YELLOW
CREAT SERPL-MCNC: 3.98 MG/DL (ref 0.6–1.3)
CREAT UR-MCNC: 47.3 MG/DL
EOSINOPHIL # BLD MANUAL: 0 THOUSAND/UL (ref 0–0.4)
EOSINOPHIL # BLD MANUAL: 0 THOUSAND/UL (ref 0–0.4)
EOSINOPHIL NFR BLD MANUAL: 0 % (ref 0–6)
EOSINOPHIL NFR BLD MANUAL: 0 % (ref 0–6)
ERYTHROCYTE [DISTWIDTH] IN BLOOD BY AUTOMATED COUNT: 16.1 % (ref 11.6–15.1)
ERYTHROCYTE [DISTWIDTH] IN BLOOD BY AUTOMATED COUNT: 16.3 % (ref 11.6–15.1)
GFR SERPL CREATININE-BSD FRML MDRD: 15 ML/MIN/1.73SQ M
GLUCOSE SERPL-MCNC: 102 MG/DL (ref 65–140)
GLUCOSE SERPL-MCNC: 166 MG/DL (ref 65–140)
GLUCOSE SERPL-MCNC: 194 MG/DL (ref 65–140)
GLUCOSE SERPL-MCNC: 213 MG/DL (ref 65–140)
GLUCOSE SERPL-MCNC: 213 MG/DL (ref 65–140)
GLUCOSE UR STRIP-MCNC: NEGATIVE MG/DL
HCT VFR BLD AUTO: 30.1 % (ref 36.5–49.3)
HCT VFR BLD AUTO: 33.4 % (ref 36.5–49.3)
HGB BLD-MCNC: 10.7 G/DL (ref 12–17)
HGB BLD-MCNC: 9.5 G/DL (ref 12–17)
HGB UR QL STRIP.AUTO: ABNORMAL
KETONES UR STRIP-MCNC: NEGATIVE MG/DL
LEUKOCYTE ESTERASE UR QL STRIP: ABNORMAL
LYMPHOCYTES # BLD AUTO: 1.08 THOUSAND/UL (ref 0.6–4.47)
LYMPHOCYTES # BLD AUTO: 1.27 THOUSAND/UL (ref 0.6–4.47)
LYMPHOCYTES # BLD AUTO: 8 % (ref 14–44)
LYMPHOCYTES # BLD AUTO: 8 % (ref 14–44)
MACROCYTES BLD QL AUTO: PRESENT
MAGNESIUM SERPL-MCNC: 2.4 MG/DL (ref 1.6–2.6)
MCH RBC QN AUTO: 29.1 PG (ref 26.8–34.3)
MCH RBC QN AUTO: 29.4 PG (ref 26.8–34.3)
MCHC RBC AUTO-ENTMCNC: 31.6 G/DL (ref 31.4–37.4)
MCHC RBC AUTO-ENTMCNC: 32 G/DL (ref 31.4–37.4)
MCV RBC AUTO: 92 FL (ref 82–98)
MCV RBC AUTO: 92 FL (ref 82–98)
MONOCYTES # BLD AUTO: 0.16 THOUSAND/UL (ref 0–1.22)
MONOCYTES # BLD AUTO: 0.41 THOUSAND/UL (ref 0–1.22)
MONOCYTES NFR BLD: 1 % (ref 4–12)
MONOCYTES NFR BLD: 3 % (ref 4–12)
NEUTROPHILS # BLD MANUAL: 12.06 THOUSAND/UL (ref 1.85–7.62)
NEUTROPHILS # BLD MANUAL: 14.42 THOUSAND/UL (ref 1.85–7.62)
NEUTS SEG NFR BLD AUTO: 89 % (ref 43–75)
NEUTS SEG NFR BLD AUTO: 91 % (ref 43–75)
NITRITE UR QL STRIP: NEGATIVE
NON-SQ EPI CELLS URNS QL MICRO: ABNORMAL /HPF
OVALOCYTES BLD QL SMEAR: PRESENT
PH UR STRIP.AUTO: 7.5 [PH]
PHOSPHATE SERPL-MCNC: 5.5 MG/DL (ref 2.7–4.5)
PLATELET # BLD AUTO: 236 THOUSANDS/UL (ref 149–390)
PLATELET # BLD AUTO: 239 THOUSANDS/UL (ref 149–390)
PLATELET BLD QL SMEAR: ADEQUATE
PLATELET BLD QL SMEAR: ADEQUATE
PMV BLD AUTO: 10.6 FL (ref 8.9–12.7)
PMV BLD AUTO: 10.7 FL (ref 8.9–12.7)
POIKILOCYTOSIS BLD QL SMEAR: PRESENT
POLYCHROMASIA BLD QL SMEAR: PRESENT
POTASSIUM SERPL-SCNC: 5.2 MMOL/L (ref 3.5–5.3)
PROT SERPL-MCNC: 6.5 G/DL (ref 6.4–8.4)
PROT UR STRIP-MCNC: ABNORMAL MG/DL
RBC # BLD AUTO: 3.26 MILLION/UL (ref 3.88–5.62)
RBC # BLD AUTO: 3.64 MILLION/UL (ref 3.88–5.62)
RBC #/AREA URNS AUTO: ABNORMAL /HPF
RBC MORPH BLD: PRESENT
ROULEAUX BLD QL SMEAR: PRESENT
SODIUM 24H UR-SCNC: 42 MOL/L
SODIUM SERPL-SCNC: 136 MMOL/L (ref 135–147)
SP GR UR STRIP.AUTO: 1.01 (ref 1–1.03)
UROBILINOGEN UR QL STRIP.AUTO: 0.2 E.U./DL
WBC # BLD AUTO: 13.55 THOUSAND/UL (ref 4.31–10.16)
WBC # BLD AUTO: 15.85 THOUSAND/UL (ref 4.31–10.16)
WBC #/AREA URNS AUTO: ABNORMAL /HPF

## 2022-12-24 PROCEDURE — 0T9B70Z DRAINAGE OF BLADDER WITH DRAINAGE DEVICE, VIA NATURAL OR ARTIFICIAL OPENING: ICD-10-PCS | Performed by: INTERNAL MEDICINE

## 2022-12-24 RX ORDER — HYDROMORPHONE HCL/PF 1 MG/ML
0.5 SYRINGE (ML) INJECTION EVERY 4 HOURS PRN
Status: DISCONTINUED | OUTPATIENT
Start: 2022-12-24 | End: 2022-12-28

## 2022-12-24 RX ORDER — SODIUM CHLORIDE 9 MG/ML
125 INJECTION, SOLUTION INTRAVENOUS CONTINUOUS
Status: DISCONTINUED | OUTPATIENT
Start: 2022-12-24 | End: 2022-12-25

## 2022-12-24 RX ORDER — HYDROMORPHONE HCL/PF 1 MG/ML
0.5 SYRINGE (ML) INJECTION EVERY 4 HOURS PRN
Status: DISCONTINUED | OUTPATIENT
Start: 2022-12-24 | End: 2022-12-24

## 2022-12-24 RX ORDER — INSULIN LISPRO 100 [IU]/ML
1-6 INJECTION, SOLUTION INTRAVENOUS; SUBCUTANEOUS EVERY 6 HOURS
Status: DISCONTINUED | OUTPATIENT
Start: 2022-12-24 | End: 2022-12-26

## 2022-12-24 RX ORDER — METRONIDAZOLE 500 MG/100ML
500 INJECTION, SOLUTION INTRAVENOUS EVERY 8 HOURS
Status: DISCONTINUED | OUTPATIENT
Start: 2022-12-24 | End: 2022-12-28

## 2022-12-24 RX ADMIN — METRONIDAZOLE 500 MG: 500 INJECTION, SOLUTION INTRAVENOUS at 23:06

## 2022-12-24 RX ADMIN — SODIUM CHLORIDE 125 ML/HR: 0.9 INJECTION, SOLUTION INTRAVENOUS at 22:15

## 2022-12-24 RX ADMIN — SODIUM CHLORIDE 100 ML/HR: 0.9 INJECTION, SOLUTION INTRAVENOUS at 01:10

## 2022-12-24 RX ADMIN — HYDROMORPHONE HYDROCHLORIDE 0.5 MG: 1 INJECTION, SOLUTION INTRAMUSCULAR; INTRAVENOUS; SUBCUTANEOUS at 10:01

## 2022-12-24 RX ADMIN — METRONIDAZOLE 500 MG: 500 INJECTION, SOLUTION INTRAVENOUS at 08:22

## 2022-12-24 RX ADMIN — INSULIN LISPRO 1 UNITS: 100 INJECTION, SOLUTION INTRAVENOUS; SUBCUTANEOUS at 12:15

## 2022-12-24 RX ADMIN — HEPARIN SODIUM 5000 UNITS: 5000 INJECTION INTRAVENOUS; SUBCUTANEOUS at 22:19

## 2022-12-24 RX ADMIN — HEPARIN SODIUM 5000 UNITS: 5000 INJECTION INTRAVENOUS; SUBCUTANEOUS at 14:22

## 2022-12-24 RX ADMIN — CEFTRIAXONE 1000 MG: 1 INJECTION, SOLUTION INTRAVENOUS at 22:15

## 2022-12-24 RX ADMIN — HYDROMORPHONE HYDROCHLORIDE 0.5 MG: 1 INJECTION, SOLUTION INTRAMUSCULAR; INTRAVENOUS; SUBCUTANEOUS at 15:52

## 2022-12-24 RX ADMIN — BUPRENORPHINE HYDROCHLORIDE 450 MCG: 450 FILM, SOLUBLE BUCCAL at 20:08

## 2022-12-24 RX ADMIN — INSULIN GLARGINE 5 UNITS: 100 INJECTION, SOLUTION SUBCUTANEOUS at 01:22

## 2022-12-24 RX ADMIN — SODIUM CHLORIDE 125 ML/HR: 0.9 INJECTION, SOLUTION INTRAVENOUS at 14:20

## 2022-12-24 RX ADMIN — METRONIDAZOLE 500 MG: 500 INJECTION, SOLUTION INTRAVENOUS at 15:54

## 2022-12-24 RX ADMIN — INSULIN LISPRO 2 UNITS: 100 INJECTION, SOLUTION INTRAVENOUS; SUBCUTANEOUS at 01:22

## 2022-12-24 RX ADMIN — CYCLOBENZAPRINE 5 MG: 10 TABLET, FILM COATED ORAL at 01:23

## 2022-12-24 RX ADMIN — HYDROMORPHONE HYDROCHLORIDE 0.5 MG: 1 INJECTION, SOLUTION INTRAMUSCULAR; INTRAVENOUS; SUBCUTANEOUS at 22:19

## 2022-12-24 RX ADMIN — OXYCODONE HYDROCHLORIDE 5 MG: 5 TABLET ORAL at 04:04

## 2022-12-24 NOTE — ASSESSMENT & PLAN NOTE
· Requiring nasal cannula oxygen 4 L on admission  · Recently required supplemental oxygen in November during RSV bronchitis was but discharged home on room air  · Secondary to pneumonia  · Elevated D-dimer but unable to obtain CTA chest secondary to KELLY  · Check CT chest without contrast

## 2022-12-24 NOTE — CONSULTS
Consultation - General Surgery   Nichole Hernandes  62 y o  male MRN: [id number]  Unit/Bed#: -01 Encounter: [id number]    Assessment/Plan     Assessment:  Small amount of free air on imaging  Possibly secondary to a sigmoid colon diverticulum  History of renal insufficiency  History of hypercalcemia  Admitted with pneumonia  History of chronic back pain with recent back surgery  Plan:  The patient was thoroughly examined and imaging was reviewed by me  He is completely pain-free in his abdomen  He has no abdominal pain he has no guarding he has no rigidity he has no masses  In light of his completely benign physical exam I am recommending observation at this time  The patient is an extremely high risk for surgery  I will closely watch him throughout the next 24 hours  If the patient's exam changes at all then we will proceed with urgent surgery  We will also monitor his labs  This was discussed at length with the patient and his wife  They understand and agree with the plan  History of Present Illness     HPI:  Nichole Hernandes is a 62 y o  male who presents with complaint of respiratory issues  He has had cough and chest pain  He denies any abdominal pain nausea or vomiting  He does have a history of constipation  He denies any diarrhea  He denies any stomach ulcers  He denies any prior abdominal surgery  He did have back surgery and also approached through the abdomen with a retroperitoneal approach  This was done approximately a month ago       Consults    Review of Systems   Constitutional: Positive for activity change  Respiratory: Positive for cough and shortness of breath  Cardiovascular: Negative  Gastrointestinal: Positive for constipation  Negative for abdominal distention, abdominal pain, blood in stool, diarrhea, nausea and vomiting  Genitourinary: Negative  Musculoskeletal: Positive for back pain  Psychiatric/Behavioral: Negative          Historical Information   Past Medical History:   Diagnosis Date   • Chronic kidney disease    • Diabetes mellitus ([place] [address] )    • Gout    • Hypertension    • Renal disorder    • Retroperitoneal abscess ([place] [address] )    • Stroke ([place] [address] )    • Vertebral osteomyelitis ([place] [address] )      Past Surgical History:   Procedure Laterality Date   • BACK SURGERY     • ORCHIECTOMY       Social History   Social History     Substance and Sexual Activity   Alcohol Use Not Currently     Social History     Substance and Sexual Activity   Drug Use Yes   • Types: Marijuana     E-Cigarette/Vaping   • E-Cigarette Use Never User      E-Cigarette/Vaping Substances     Social History     Tobacco Use   Smoking Status Every Day   • Packs/day: 0 25   • Types: Cigarettes   Smokeless Tobacco Never     Family History:   Family History   Problem Relation Age of Onset   • Hypertension Father        Meds/Allergies   current meds:   Current Facility-Administered Medications   Medication Dose Route Frequency   • acetaminophen (TYLENOL) tablet 650 mg  650 mg Oral Q4H PRN   • albuterol inhalation solution 2 5 mg  2 5 mg Nebulization Q4H PRN   • allopurinol (ZYLOPRIM) tablet 100 mg  100 mg Oral Daily   • amLODIPine (NORVASC) tablet 10 mg  10 mg Oral Daily   • atorvastatin (LIPITOR) tablet 40 mg  40 mg Oral Daily With Dinner   • Buprenorphine HCl FILM 300 mcg  300 mcg Buccal BID   • carvedilol (COREG) tablet 6 25 mg  6 25 mg Oral BID With Meals   • cefTRIAXone (ROCEPHIN) IVPB (premix in dextrose) 1,000 mg 50 mL  1,000 mg Intravenous Q24H   • cyclobenzaprine (FLEXERIL) tablet 5 mg  5 mg Oral TID   • heparin (porcine) subcutaneous injection 5,000 Units  5,000 Units Subcutaneous Q8H [address]   • insulin glargine (LANTUS) subcutaneous injection 5 Units 0 05 mL  5 Units Subcutaneous HS   • insulin lispro (HumaLOG) 100 units/mL subcutaneous injection 1-6 Units  1-6 Units Subcutaneous TID AC   • insulin lispro (HumaLOG) 100 units/mL subcutaneous injection 1-6 Units  1-6 Units Subcutaneous HS   • metroNIDAZOLE (FLAGYL) IVPB (premix) 500 mg 100 mL  500 mg Intravenous Q8H   • oxyCODONE (ROXICODONE) IR tablet 5 mg  5 mg Oral Q4H PRN   • polyethylene glycol (MIRALAX) packet 17 g  17 g Oral BID   • pregabalin (LYRICA) capsule 75 mg  75 mg Oral Daily   • senna-docusate sodium (SENOKOT S) 8 6-50 mg per tablet 1 tablet  1 tablet Oral BID   • sodium chloride 0 9 % infusion  100 mL/hr Intravenous Continuous   • tamsulosin (FLOMAX) capsule 0 4 mg  0 4 mg Oral Daily With Dinner   • venlafaxine (EFFEXOR-XR) 24 hr capsule 37 5 mg  37 5 mg Oral TID    and PTA meds:   Prior to Admission Medications   Prescriptions Last Dose Informant Patient Reported? Taking? Buprenorphine HCl ([place]) 300 MCG FILM 12/23/2022  Yes Yes   Sig: Apply 300 mcg to cheek 2 (two) times a day   acetaminophen (TYLENOL) 325 mg tablet 12/23/2022  Yes Yes   Sig: Take 650 mg by mouth every 4 (four) hours as needed for mild pain   allopurinol (ZYLOPRIM) 100 mg tablet   No No   Sig: Take 1 tablet (100 mg total) by mouth daily   amLODIPine (NORVASC) 10 mg tablet 12/23/2022  Yes Yes   Sig: Take 1 tablet by mouth daily   atorvastatin (LIPITOR) 40 mg tablet 12/23/2022  Yes Yes   Sig: Take 40 mg by mouth   carvedilol (COREG) 6 25 mg tablet 12/23/2022  Yes Yes   Sig: Take 6 25 mg by mouth 2 (two) times a day with meals   colchicine (COLCRYS) 0 6 mg tablet 12/23/2022  Yes Yes   Sig: Take 0 6 mg by mouth daily   cyclobenzaprine (FLEXERIL) 5 mg tablet 12/23/2022  Yes Yes   Sig: Take 5 mg by mouth 3 (three) times a day   ferrous sulfate 325 (65 Fe) mg tablet Not Taking  No No   Sig: Take 1 tablet (325 mg total) by mouth daily with breakfast Do not start before October 25, 2022     Patient not taking: Reported on 12/23/2022   insulin glargine (LANTUS) 100 units/mL subcutaneous injection 12/23/2022  No Yes   Sig: Inject 5 Units under the skin daily at bedtime   insulin lispro (HumaLOG) 100 units/mL injection 12/23/2022  No Yes   Sig: Inject 2-12 Units under the skin 3 (three) times a day before meals   lidocaine (LIDODERM) 5 % 12/23/2022  No Yes   Sig: Apply 1 patch topically daily Remove & Discard patch within 12 hours or as directed by MD   nicotine (NICODERM CQ) 7 mg/24hr TD 24 hr patch Not Taking  No No   Sig: Place 1 patch on the skin daily Do not start before October 25, 2022     Patient not taking: Reported on 12/23/2022   oxyCODONE (OXY-IR) 5 MG capsule 12/23/2022  Yes Yes   Sig: Take 5 mg by mouth every 4 (four) hours as needed for moderate pain or severe pain   polyethylene glycol (MIRALAX) 17 g packet 12/23/2022  Yes Yes   Sig: Take 17 g by mouth 2 (two) times a day   pregabalin (LYRICA) 150 mg capsule 12/23/2022  Yes Yes   Sig: Take 150 mg by mouth 2 (two) times a day   senna-docusate sodium (SENOKOT S) 8 6-50 mg per tablet 12/23/2022  Yes Yes   Sig: Take 1 tablet by mouth 2 (two) times a day   sevelamer carbonate (RENVELA) 800 mg tablet 12/23/2022  Yes Yes   Sig: Take 800 mg by mouth in the morning   sodium bicarbonate 650 mg tablet 12/23/2022  Yes Yes   Sig: Take 650 mg by mouth 2 (two) times a day   tamsulosin (FLOMAX) 0 4 mg 12/23/2022  Yes Yes   Sig: Take by mouth   thiamine 100 MG tablet Not Taking  Yes No   Sig: Take 100 mg by mouth daily   Patient not taking: Reported on 12/23/2022   venlafaxine (EFFEXOR-XR) 37 5 mg 24 hr capsule 12/23/2022  Yes Yes   Sig: Take 37 5 mg by mouth 3 (three) times a day      Facility-Administered Medications: None     Allergies   Allergen Reactions   • Bupropion Delirium     "went nuts," prior to 2012  "went nuts," prior to 2012  "went nuts," prior to 2012  "went nuts," prior to 2012     • Metformin Other (See Comments)     Other reaction(s): kidney disease  Other reaction(s): kidney disease  Other reaction(s): kidney disease     • Medical Tape Rash       Objective   First Vitals:   Blood Pressure: 124/68 (12/23/22 2153)  Pulse: 89 (12/23/22 2150)  Temperature: 98 °F (36 7 °C) (12/23/22 2150)  Temp Source: Temporal (12/24/22 0022)  Respirations: 19 (12/23/22 2150)  Height: 5' 11" (180 3 cm) (12/23/22 2150)  Weight - Scale: 64 kg (141 lb 1 5 oz) (12/23/22 2150)  SpO2: (!) 87 % (12/23/22 2150)    Current Vitals:   Blood Pressure: 126/68 (12/24/22 0022)  Pulse: 80 (12/24/22 0022)  Temperature: 97 9 °F (36 6 °C) (12/24/22 0022)  Temp Source: Temporal (12/24/22 0022)  Respirations: 18 (12/24/22 0022)  Height: 5' 11" (180 3 cm) (12/23/22 2150)  Weight - Scale: 63 kg (138 lb 14 2 oz) (12/23/22 2346)  SpO2: 94 % (12/24/22 0022)      Intake/Output Summary (Last 24 hours) at 12/24/2022 0655  Last data filed at 12/24/2022 0504  Gross per 24 hour   Intake --   Output 1125 ml   Net -1125 ml       Invasive Devices     Peripheral Intravenous Line  Duration           Peripheral IV 12/23/22 Left Antecubital <1 day          Drain  Duration           Urethral Catheter Coude 16 Fr  <1 day                Physical Exam  Constitutional:       Comments: Patient appears frail and cachectic  HENT:      Head: Normocephalic and atraumatic  Mouth/Throat:      Mouth: Mucous membranes are dry  Cardiovascular:      Rate and Rhythm: Normal rate and regular rhythm  Pulses: Normal pulses  Heart sounds: Normal heart sounds  Pulmonary:      Effort: Pulmonary effort is normal       Breath sounds: Rhonchi present  Abdominal:      General: Abdomen is flat  There is no distension  Palpations: Abdomen is soft  There is no mass  Tenderness: There is no abdominal tenderness  There is no guarding or rebound  Hernia: No hernia is present  Musculoskeletal:         General: Normal range of motion  Skin:     General: Skin is warm and dry  Neurological:      General: No focal deficit present  Mental Status: He is oriented to person, place, and time           Lab Results:   CBC:   Lab Results   Component Value Date    WBC 15 85 (H) 12/24/2022    HGB 10 7 (L) 12/24/2022    HCT 33 4 (L) 12/24/2022    MCV 92 12/24/2022     12/24/2022 MCH 29 4 12/24/2022    MCHC 32 0 12/24/2022    RDW 16 1 (H) 12/24/2022    MPV 10 6 12/24/2022    NRBC 0 12/23/2022   , CMP:   Lab Results   Component Value Date    SODIUM 137 12/23/2022    K 4 6 12/23/2022    CL 99 12/23/2022    CO2 28 12/23/2022    BUN 54 (H) 12/23/2022    CREATININE 4 10 (H) 12/23/2022    CALCIUM 11 8 (H) 12/23/2022    AST 12 12/23/2022    ALT 13 12/23/2022    ALKPHOS 92 12/23/2022    EGFR 15 12/23/2022     Imaging: I have personally reviewed pertinent films in PACS  EKG, Pathology, and Other Studies: I have personally reviewed pertinent reports  Counseling / Coordination of Care  Total floor / unit time spent today 40 minutes  Greater than 50% of total time was spent with the patient and / or family counseling and / or coordination of care  A description of the counseling / coordination of care: This was discussed with the advanced practitioner and with the patient and the patient's wife  Sancho Thony

## 2022-12-24 NOTE — PLAN OF CARE
Problem: Nutrition/Hydration-ADULT  Goal: Nutrient/Hydration intake appropriate for improving, restoring or maintaining nutritional needs  Description: Monitor and assess patient's nutrition/hydration status for malnutrition  Collaborate with interdisciplinary team and initiate plan and interventions as ordered  Monitor patient's weight and dietary intake as ordered or per policy  Utilize nutrition screening tool and intervene as necessary  Determine patient's food preferences and provide high-protein, high-caloric foods as appropriate       INTERVENTIONS:  - Monitor oral intake, urinary output, labs, and treatment plans  - Assess nutrition and hydration status and recommend course of action  - Evaluate amount of meals eaten  - Assist patient with eating if necessary   - Allow adequate time for meals  - Recommend/ encourage appropriate diets, oral nutritional supplements, and vitamin/mineral supplements  - Order, calculate, and assess calorie counts as needed  - Recommend, monitor, and adjust tube feedings and TPN/PPN based on assessed needs  - Assess need for intravenous fluids  - Provide specific nutrition/hydration education as appropriate  - Include patient/family/caregiver in decisions related to nutrition  Outcome: Progressing     Problem: RESPIRATORY - ADULT  Goal: Achieves optimal ventilation and oxygenation  Description: INTERVENTIONS:  - Assess for changes in respiratory status  - Assess for changes in mentation and behavior  - Position to facilitate oxygenation and minimize respiratory effort  - Oxygen administered by appropriate delivery if ordered  - Initiate smoking cessation education as indicated  - Encourage broncho-pulmonary hygiene including cough, deep breathe, Incentive Spirometry  - Assess the need for suctioning and aspirate as needed  - Assess and instruct to report SOB or any respiratory difficulty  - Respiratory Therapy support as indicated  Outcome: Progressing     Problem: DISCHARGE PLANNING  Goal: Discharge to home or other facility with appropriate resources  Description: INTERVENTIONS:  - Identify barriers to discharge w/patient and caregiver  - Arrange for needed discharge resources and transportation as appropriate  - Identify discharge learning needs (meds, wound care, etc )  - Arrange for interpretive services to assist at discharge as needed  - Refer to Case Management Department for coordinating discharge planning if the patient needs post-hospital services based on physician/advanced practitioner order or complex needs related to functional status, cognitive ability, or social support system  Outcome: Progressing     Problem: INFECTION - ADULT  Goal: Absence or prevention of progression during hospitalization  Description: INTERVENTIONS:  - Assess and monitor for signs and symptoms of infection  - Monitor lab/diagnostic results  - Monitor all insertion sites, i e  indwelling lines, tubes, and drains  - Monitor endotracheal if appropriate and nasal secretions for changes in amount and color  - Merritt appropriate cooling/warming therapies per order  - Administer medications as ordered  - Instruct and encourage patient and family to use good hand hygiene technique  - Identify and instruct in appropriate isolation precautions for identified infection/condition  Outcome: Progressing  Goal: Absence of fever/infection during neutropenic period  Description: INTERVENTIONS:  - Monitor WBC    Outcome: Progressing

## 2022-12-24 NOTE — CONSULTS
Consultation - Nephrology   Alea Chapin  62 y o  male MRN: [id number]  Unit/Bed#: -01 Encounter: [id number]      A/P:  1  Acute kidney injury chronic kidney disease stage IV   - He follows with Dr Cheng Mosley and was to be evaluated for a permanent access for hemodialysis   - Baseline creatinine 2 5 to 2 9 mg/dL    -has a history of obstructive uropathy with bilateral nephrosis in the past -he has a history of a neurogenic bladder due to diabetes  - Longstanding history of with diabetic nephropathy   - Check urine studies and continue IV fluids     2  Hypercalcemia   - ionized calcium 1 4    - PTH suppressed, PTHrP low   - 1,25 D normal   - may be related to volume depletion   - will continue normal saline and check for urinary retention   - has a history  of of urinary retention   - if calcium not lowered by tomorrow consider calcitonin   - SPEP and UPEP were normal in the past    3  Chronic kidney disease stage IV   - due to diabetic nephropathy    4  Acute on chronic anemia   - hemoglobin adequate at 10 4     5  Pneumonia  Groundglass, reticulonodular, and alveolar opacities noted bilaterally, most prominently in the inferior left lingula and left lower lobe, as described  Suspected multifocal infectious or inflammatory pneumonitis  Correlation with the patient's   symptoms and laboratory values recommended    continue doxycycline    6  Abdominal pain    - has a ruptured divertic and has a small amount of free air   - evaluated by General Surgery                 Thank you for allowing us to participate in the care of your patient  Please feel free to contact us regarding the care of this patient, or any other questions/concerns that may be applicable      Patient Active Problem List   Diagnosis   • KELLY (acute kidney injury) ([address] )   • Hyperkalemia   • Diabetes mellitus type 2, insulin dependent ([address] )   • Gout   • Essential hypertension   • History of CVA (cerebrovascular accident)   • Osteomyelitis of vertebra of lumbar region [facility])   • Severe protein-calorie malnutrition ([address] )   • Iron deficiency anemia secondary to inadequate dietary iron intake   • Hip pain, acute, left   • Acute urinary retention   • Hypercalcemia   • Low back pain   • Ambulatory dysfunction   • Stage 3a chronic kidney disease (HCC)   • Chronic ulcer of left heel (HCC)   • Acute on chronic anemia   • RSV infection   • Acute respiratory failure with hypoxia (HCC)   • Acute cystitis   • Moderate protein-calorie malnutrition (HCC)   • Pneumonia   • Chronic back pain       History of Present Illness   Physician Requesting Consult: Yamila Sigala MD  Reason for Consult / Principal Problem: acute kidney injury with hypercalcemia  Hx and PE limited by:   HPI: Lida Clemons  is a 62y o  year old male who presents with increasing dyspnea, productive cough with brown mucus and abdominal pain with in the rectal area on coughing  He has severe chronic low back pain and does not walk  He noted difficulty urinating and constipation as well as cough and shortness of breath and increasing weakness  He says his appetite was worse  On admission he was found to have hypercalcemia  His creatinine had risen to 4 1 mg/dL    Has a history of recent hospitalization for L5/S1 discitis, osteomyelitis, right paraspinal abscess L1-L3 and epidural abscess treated with vancomycin until June 13  He developed acute kidney injury and was changed to daptomycin  He has a history of a CVA, gout and urinary retention as well as primary hypertension    History obtained from chart review and the patient    Constitutional ROS- Has fatigue, , weight changes  HEENT ROS- Denies history of eye surgeries, glaucoma, headaches or history of trauma, has blurred vision     Endocrine ROS- Long history diabetes mellitus no thyroid disease  Cardiovascular ROS- Denies chest pain, palpitation, had dyspnea exertion, no orthopnea, claudication    Pulmonary ROS- Denies history of COPD, asthma  Complains of cough and shortness of breath   GI ROS-Had rectal pain on coughing No , diarrhea, nausea, , vomiting, had constipation, no blood in stools, had decreased appetite f  Hematological ROS- Denies history of easy bruising,   Genitourinary ROS-had  problems urinating  Lymphatic ROS- Denies lymphadenopathy  Musculoskeletal ROS- Denies history of muscle weakness, joint pain  Dermatological ROS- Denies rash, wounds, ulcers, itching, jaundice  Psychiatric ROS- Denies anxiety, depression,   Neurological ROS- No stroke or TIA symptoms        Historical Information   Past Medical History:   Diagnosis Date   • Chronic kidney disease    • Diabetes mellitus ([facility] )    • Gout    • Hypertension    • Renal disorder    • Retroperitoneal abscess ([place] [address] )    • Stroke ([place] [address] )    • Vertebral osteomyelitis ([place] [address] )      Past Surgical History:   Procedure Laterality Date   • BACK SURGERY     • ORCHIECTOMY       Social History   Social History     Substance and Sexual Activity   Alcohol Use Not Currently     Social History     Substance and Sexual Activity   Drug Use Yes   • Types: Marijuana     Social History     Tobacco Use   Smoking Status Every Day   • Packs/day: 0 25   • Types: Cigarettes   Smokeless Tobacco Never     Family History   Problem Relation Age of Onset   • Hypertension Father        Meds/Allergies   all current active meds have been reviewed, current meds:   Current Facility-Administered Medications   Medication Dose Route Frequency   • acetaminophen (TYLENOL) tablet 650 mg  650 mg Oral Q4H PRN   • albuterol inhalation solution 2 5 mg  2 5 mg Nebulization Q4H PRN   • atorvastatin (LIPITOR) tablet 40 mg  40 mg Oral Daily With Dinner   • Buprenorphine HCl FILM 450 mcg  450 mcg Buccal BID   • cefTRIAXone (ROCEPHIN) IVPB (premix in dextrose) 1,000 mg 50 mL  1,000 mg Intravenous Q24H   • heparin (porcine) subcutaneous injection 5,000 Units  5,000 Units Subcutaneous Q8H [address]   • insulin lispro (HumaLOG) 100 units/mL subcutaneous injection 1-6 Units  1-6 Units Subcutaneous Q6H   • metroNIDAZOLE (FLAGYL) IVPB (premix) 500 mg 100 mL  500 mg Intravenous Q8H   • sodium chloride 0 9 % infusion  100 mL/hr Intravenous Continuous    and PTA meds:    Medications Prior to Admission   Medication   • acetaminophen (TYLENOL) 325 mg tablet   • amLODIPine (NORVASC) 10 mg tablet   • atorvastatin (LIPITOR) 40 mg tablet   • Buprenorphine HCl (Belbuca) 300 MCG FILM   • carvedilol (COREG) 6 25 mg tablet   • colchicine (COLCRYS) 0 6 mg tablet   • cyclobenzaprine (FLEXERIL) 5 mg tablet   • insulin glargine (LANTUS) 100 units/mL subcutaneous injection   • insulin lispro (HumaLOG) 100 units/mL injection   • lidocaine (LIDODERM) 5 %   • oxyCODONE (OXY-IR) 5 MG capsule   • polyethylene glycol (MIRALAX) 17 g packet   • pregabalin (LYRICA) 150 mg capsule   • senna-docusate sodium (SENOKOT S) 8 6-50 mg per tablet   • sevelamer carbonate (RENVELA) 800 mg tablet   • sodium bicarbonate 650 mg tablet   • tamsulosin (FLOMAX) 0 4 mg   • venlafaxine (EFFEXOR-XR) 37 5 mg 24 hr capsule   • allopurinol (ZYLOPRIM) 100 mg tablet   • ferrous sulfate 325 (65 Fe) mg tablet   • nicotine (NICODERM CQ) 7 mg/24hr TD 24 hr patch   • thiamine 100 MG tablet         Allergies   Allergen Reactions   • Bupropion Delirium     "went nuts," prior to 2012  "went nuts," prior to 2012  "went nuts," prior to 2012  "went nuts," prior to 2012     • Metformin Other (See Comments)     Other reaction(s): kidney disease  Other reaction(s): kidney disease  Other reaction(s): kidney disease     • Medical Tape Rash       Objective     Intake/Output Summary (Last 24 hours) at 12/24/2022 1140  Last data filed at 12/24/2022 0504  Gross per 24 hour   Intake --   Output 1125 ml   Net -1125 ml       Invasive Devices:   Urethral Catheter Coude 16 Fr   (Active)   Amt returned on insertion(mL) 125 mL 12/24/22 0450   Reasons to continue Urinary Catheter  Acute urinary retention/obstruction failing urinary retention protocol 12/24/22 0450   Goal for Removal Will consult urology 12/24/22 0450   Site Assessment Clean;Skin intact 12/24/22 0450   Ortiz Care Done 12/24/22 0450   Collection Container Standard drainage bag 12/24/22 0450   Securement Method Securing device (Describe) 12/24/22 0450   Securing Device Change Date 12/31/22 12/24/22 0450   Output (mL) 1000 mL 12/24/22 0504   CAUTI Time-out performed before Urine Culture Yes 12/24/22 0450       Physical Exam      I/O last 3 completed shifts:  In: -   Out: 1125 [Urine:1125]    Vitals:    12/24/22 0746   BP: 125/68   Pulse: 76   Resp: 16   Temp: 97 7 °F (36 5 °C)   SpO2: 95%       General Appearance:    No acute distress  Cooperative  Appears stated age  Head:    Normocephalic  Atraumatic  Normal jaw occlusion  Eyes:    Lids, conjunctiva normal  No scleral icterus  Ears:    Normal external ears  Nose:   Nares normal  No drainage  Mouth:   Lips, tongue normal  Mucosa normal  Phonation normal    Neck:   Supple  Symmetrical    Back:     Symmetric  No CVA tenderness  Lungs:     Normal respiratory effort  Clear to auscultation bilaterally  Chest wall:    No tenderness or deformity  Heart:    Regular rate and rhythm  Normal S1 and S2  No murmur  No JVD  No edema  Abdomen:     Soft  -tender  Bowel sounds active  Genitourinary:   No Ortiz catheter present  Extremities:   Extremities wrapped left heel  Skin:   Warm and dry  No pallor, jaundice, rash, ecchymoses  Neurologic:   Alert and oriented to person, place, time  No focal deficit  Current Weight: Weight - Scale: 63 kg (138 lb 14 2 oz)  First Weight: Weight - Scale: 64 kg (141 lb 1 5 oz)    Lab Results:  I have personally reviewed pertinent labs      CBC:   Lab Results   Component Value Date    WBC 15 85 (H) 12/24/2022    HGB 10 7 (L) 12/24/2022    HCT 33 4 (L) 12/24/2022    MCV 92 12/24/2022     12/24/2022    MCH 29 4 12/24/2022    MCHC 32 0 12/24/2022    RDW 16 1 (H) 12/24/2022    MPV 10 6 12/24/2022    NRBC 0 12/23/2022     CMP:   Lab Results   Component Value Date    K 5 2 12/24/2022     12/24/2022    CO2 25 12/24/2022    BUN 53 (H) 12/24/2022    CREATININE 3 98 (H) 12/24/2022    CALCIUM 11 5 (H) 12/24/2022    AST 10 12/24/2022    ALT 13 12/24/2022    ALKPHOS 91 12/24/2022    EGFR 15 12/24/2022     Phosphorus:   Lab Results   Component Value Date    PHOS 5 5 (H) 12/24/2022     Magnesium:   Lab Results   Component Value Date    MG 2 4 12/24/2022     Urinalysis:   Lab Results   Component Value Date    COLORU Yellow 12/24/2022    CLARITYU Clear 12/24/2022    SPECGRAV 1 015 12/24/2022    PHUR 7 5 12/24/2022    LEUKOCYTESUR Large (A) 12/24/2022    NITRITE Negative 12/24/2022    GLUCOSEU Negative 12/24/2022    KETONESU Negative 12/24/2022    BILIRUBINUR Negative 12/24/2022    BLOODU Trace-Intact (A) 12/24/2022     Ionized Calcium: No results found for: CAION  Coagulation:   Lab Results   Component Value Date    INR 1 05 12/23/2022     Troponin: No results found for: TROPONINI  ABG: No results found for: PHART, OWX7DIX, PO2ART, FQU7UYU, X4EPPPCY, BEART, SOURCE    Results from last 7 days   Lab Units 12/24/22  3077 12/23/22  2217   POTASSIUM mmol/L 5 2 4 6   CHLORIDE mmol/L 100 99   CO2 mmol/L 25 28   BUN mg/dL 53* 54*   CREATININE mg/dL 3 98* 4 10*   CALCIUM mg/dL 11 5* 11 8*   ALK PHOS U/L 91 92   ALT U/L 13 13   AST U/L 10 12       Radiology review:  Procedure: CT abdomen pelvis wo contrast    Result Date: 12/24/2022  Narrative: CT ABDOMEN AND PELVIS WITHOUT IV CONTRAST INDICATION:   evaluate free air on CT chest  COMPARISON:  CT chest 12/23/2022 TECHNIQUE:  CT examination of the abdomen and pelvis was performed without intravenous contrast  Axial, sagittal, and coronal 2D reformatted images were created from the source data and submitted for interpretation  Radiation dose length product (DLP) for this visit:  638 mGy-cm     This examination, like all CT scans performed in the [facility], was performed utilizing techniques to minimize radiation dose exposure, including the use of iterative reconstruction and automated exposure control  Enteric contrast was not administered  FINDINGS: ABDOMEN LOWER CHEST:  Airspace disease redemonstrated in the bilateral lower lobes, left greater than right, suspicious for infectious or inflammatory pneumonitis; better evaluated on the recent prior chest CT  Mild coronary atherosclerosis  Small pericardial effusion  LIVER/BILIARY TREE:  Unremarkable  GALLBLADDER:  No calcified gallstones  No pericholecystic inflammatory change  SPLEEN:  Unremarkable  PANCREAS:  Unremarkable  ADRENAL GLANDS:  Unremarkable  KIDNEYS/URETERS:  Mild bilateral hydronephrosis 1 6 cm cyst in the midportion of the left kidney  Bilateral kidneys otherwise appear grossly unremarkable  STOMACH AND BOWEL:  Moderate amount of stool in the colon  Focal diverticulum suspected in the distal descending/proximal sigmoid colon (coronal image 68, series 601  Small amount of fluid and intraperitoneal air is seen in this region in the left hemipelvis, otherwise grossly unremarkable  APPENDIX:  Normal caliber appendix  ABDOMINOPELVIC CAVITY:  Small amount of intraperitoneal free air  Most prominent dependently, redemonstrated from recent prior CT  No bulky adenopathy  VESSELS:  Moderate atherosclerosis; no aortic aneurysm  PELVIS REPRODUCTIVE ORGANS:  Unremarkable for patient's age  URINARY BLADDER:  Distended bladder with bladder volume estimated at 1050 mL, consider urinary retention  If the patient cannot spontaneously void, consider catheter decompression ABDOMINAL WALL/INGUINAL REGIONS:  Mild body wall edema OSSEOUS STRUCTURES:  Prominent disc space space narrowing and erosive changes at L4/L5, suspect old discitis/osteomyelitis  Status post laminectomy at L4-S1  No acute fracture is seen  Impression: Pneumoperitoneum redemonstrated, as described   Focal diverticulum suspected in the distal descending/proximal sigmoid colon (coronal image 68, series 601)  Small amount of fluid and intraperitoneal air is seen in this region in the left hemipelvis, nonspecific but perforated diverticulum is considered as a source of the pneumoperitoneum  Airspace disease redemonstrated in the bilateral lower lobes, left greater than right, suspicious for infectious or inflammatory pneumonitis; better evaluated on the recent prior chest CT  Distended bladder with bladder volume estimated at 1050 mL, with bilateral mild hydroureteronephrosis; consider urinary retention  If the patient cannot spontaneously void, consider catheter decompression  Left renal cyst, coronary atherosclerosis, small pericardial effusion, and other findings as above  The study was marked in [facility] for immediate notification  Workstation performed: [id number]     Procedure: CT chest wo contrast    Result Date: 12/24/2022  Narrative: CT CHEST WITHOUT IV CONTRAST INDICATION:   Pneumonia, unresolved pneumonia  COMPARISON:  CT abdomen pelvis dated 10/20/2022 TECHNIQUE: CT examination of the chest was performed without intravenous contrast  Axial, sagittal, and coronal 2D reformatted images were created from the source data and submitted for interpretation  Radiation dose length product (DLP) for this visit:  214 mGy-cm   This examination, like all CT scans performed in the [facility], was performed utilizing techniques to minimize radiation dose exposure, including the use of iterative reconstruction and automated exposure control  FINDINGS: LUNGS:  Scattered groundglass and reticular nodular opacities are seen in the right lower lobe  There are more prominent reticulonodular, groundglass, alveolar opacities seen in the inferior left lingula as well as throughout the left lower lobe, suspect multifocal infectious or inflammatory pneumonitis    Correlation with the patient's symptoms and laboratory values recommended  PLEURA:  Unremarkable  HEART/GREAT VESSELS: Moderate coronary atherosclerosis  Heart appears normal in size  Mild aortic atherosclerosis  No aortic aneurysm  Small pericardial effusion  No thoracic aortic aneurysm  MEDIASTINUM AND ELTON:  Shotty mediastinal lymph nodes, nonspecific, possibly reactive  CHEST WALL AND LOWER NECK:  Unremarkable  VISUALIZED STRUCTURES IN THE UPPER ABDOMEN:  Small amount of intraperitoneal free air is noted, in the absence of recent surgical intervention, this raises the suspicion for perforated viscus  Correlation with the patient's symptoms and history recommended  OSSEOUS STRUCTURES:  No acute fracture or destructive osseous lesion  Impression: Groundglass, reticulonodular, and alveolar opacities noted bilaterally, most prominently in the inferior left lingula and left lower lobe, as described  Suspected multifocal infectious or inflammatory pneumonitis  Correlation with the patient's symptoms and laboratory values recommended  Small amount of intraperitoneal free air is noted, in the absence of recent surgical intervention, this raises the suspicion for perforated viscus  Correlation with the patient's symptoms and history recommended  Additional imaging may be considered as  clinically warranted  Coronary atherosclerosis, small pericardial effusion, and other findings as above  I personally discussed this study with VERNON Gutierrez on 12/24/2022 at 3:05 AM  Workstation performed: [id number]         EKG, Pathology, and Other Studies: Reviewed old studies and care everywhere for additional data  I reviewed Dr Janeen Duran note of December for      Counseling / Coordination of Care  Total ADDITIONAL floor / unit time spent today 40 minutes  Greater than 50% of total time was spent with the patient and / or family counseling and / or coordination of care   A description of the counseling / coordination of care: Discussed with patient vinh Moffett Ignacio Steiner MD      This consultation note was produced in part using a dictation device which may document imprecise wording from author's original intent

## 2022-12-24 NOTE — ASSESSMENT & PLAN NOTE
· POA with cough productive for brown mucus  · CXR with infiltrate  · Check CT chest   · Empiric ceftriaxone   · BLC and sputum cx pending   · MRSA bronchitis and November

## 2022-12-24 NOTE — H&P
[address] Amose Malik  H&P- [facility]  1965, 62 y o  male MRN: [id number]  Unit/Bed#: -01 Encounter: [id number]  Primary Care Provider: Kandi Mcarthur DO   Date and time admitted to hospital: 12/23/2022  9:51 PM    KELLY (acute kidney injury) [facility])  Assessment & Plan  · Acute kidney injury on chronic kidney disease  · Appreciate nephrology consultation  · IV hydration  · Trend creatinine  · Check urinalysis  · Renally dose medication  · Urinary retention  · Place Ortiz catheter    * Pneumonia  Assessment & Plan  · POA with cough productive for brown mucus  · CXR with infiltrate  · Check CT chest   · Empiric ceftriaxone   · BLC and sputum cx pending   · MRSA bronchitis and November    Chronic back pain  Assessment & Plan  · Chronic back pain, history osteomyelitis  · Continue home buprenorphine, oxycodone, Lyrica  · PDMP reviewed    Acute respiratory failure with hypoxia (HCC)  Assessment & Plan  · Requiring nasal cannula oxygen 4 L on admission  · Recently required supplemental oxygen in November during RSV bronchitis was but discharged home on room air  · Secondary to pneumonia  · Elevated D-dimer but unable to obtain CTA chest secondary to KELLY  · Check CT chest without contrast    Acute on chronic anemia  Assessment & Plan  · Chronic normocytic anemia in setting of chronic kidney disease  · Iron deficient anemia history of poor oral intake  · Hemoglobin 9 9 on admission slightly down from baseline 10 6  · Trend    Hypercalcemia  Assessment & Plan  · History of hypercalcemia  · Corrected calcium 13, ionized calcium 1 44 on admission  · IV hydration with saline  · Appreciate nephrology consultation  · SPEP interpretation from last admission revealed no monoclonal bands    Severe protein-calorie malnutrition (HCC)  Assessment & Plan  Malnutrition Findings: Loss of subcutaneous fat in orbital, triceps, ribcage areas    Loss of muscle at temples, clavicles, scapula, extremities  Consultation to dietitian        BMI Findings: Body mass index is 19 37 kg/m²  VTE Pharmacologic Prophylaxis: VTE Score: 3 Moderate Risk (Score 3-4) - Pharmacological DVT Prophylaxis Ordered: heparin  Code Status: Level 1 - Full Code     Anticipated Length of Stay: Patient will be admitted on an inpatient basis with an anticipated length of stay of greater than 2 midnights secondary to Pneumonia, respiratory failure  Total Time for Visit, including Counseling / Coordination of Care: 30 minutes Greater than 50% of this total time spent on direct patient counseling and coordination of care  Chief Complaint: Sary Corning sputum    History of Present Illness:  Snow Harmon  is a 62 y o  male with a PMH of CKD, chronic back pain on chronic narcotics, ambulatory dysfunction, urinary retention, recent admission for RSV bronchitis in November requiring supplemental oxygen but was discharged home on room air who presents with 2 to 3 days of dyspnea, wheezing, productive cough of brown mucus  Admits to vaping  RSV/flu/COVID-negative  Denies fevers, chills, nausea, vomiting, diarrhea  Appears significantly cachectic on exam     Discussed with wife chronically wasting due to severe chronic back pain, patient is bedbound with very poor oral intake and continues to lose weight  Review of Systems:  Review of Systems   Constitutional: Positive for activity change and appetite change  Negative for chills and fever  HENT: Negative for ear pain and sore throat  Eyes: Negative for pain and visual disturbance  Respiratory: Positive for cough, shortness of breath and wheezing  Cardiovascular: Negative for chest pain and palpitations  Gastrointestinal: Positive for constipation  Negative for abdominal pain, diarrhea, nausea and vomiting  Genitourinary: Positive for difficulty urinating  Negative for dysuria and hematuria  Musculoskeletal: Negative for arthralgias and back pain  Skin: Negative for color change and rash  Neurological: Positive for weakness  Negative for seizures and syncope  All other systems reviewed and are negative  Past Medical and Surgical History:   Past Medical History:   Diagnosis Date   • Chronic kidney disease    • Diabetes mellitus ([place] [address] )    • Gout    • Hypertension    • Renal disorder    • Retroperitoneal abscess ([place] [address] )    • Stroke [facility])    • Vertebral osteomyelitis ([place] [address] )        Past Surgical History:   Procedure Laterality Date   • BACK SURGERY     • ORCHIECTOMY         Meds/Allergies:  Prior to Admission medications    Medication Sig Start Date End Date Taking?  Authorizing Provider   acetaminophen (TYLENOL) 325 mg tablet Take 650 mg by mouth every 4 (four) hours as needed for mild pain   Yes Historical Provider, MD   amLODIPine (NORVASC) 10 mg tablet Take 1 tablet by mouth daily 2/22/22  Yes Historical Provider, MD   atorvastatin (LIPITOR) 40 mg tablet Take 40 mg by mouth 1/3/22  Yes Historical Provider, MD   Buprenorphine HCl (Belbuca) 300 MCG FILM Apply 300 mcg to cheek 2 (two) times a day 10/24/22  Yes Historical Provider, MD   carvedilol (COREG) 6 25 mg tablet Take 6 25 mg by mouth 2 (two) times a day with meals   Yes Historical Provider, MD   colchicine (COLCRYS) 0 6 mg tablet Take 0 6 mg by mouth daily   Yes Historical Provider, MD   cyclobenzaprine (FLEXERIL) 5 mg tablet Take 5 mg by mouth 3 (three) times a day   Yes Historical Provider, MD   insulin glargine (LANTUS) 100 units/mL subcutaneous injection Inject 5 Units under the skin daily at bedtime 11/23/22  Yes Shannan Pereyra PA-C   insulin lispro (HumaLOG) 100 units/mL injection Inject 2-12 Units under the skin 3 (three) times a day before meals 10/24/22  Yes Kady Carbajal MD   lidocaine (LIDODERM) 5 % Apply 1 patch topically daily Remove & Discard patch within 12 hours or as directed by MD 6/25/22  Yes Yesica Pedro MD   oxyCODONE (OXY-IR) 5 MG capsule Take 5 mg by mouth every 4 (four) hours as needed for moderate pain or severe pain   Yes Historical Provider, MD   polyethylene glycol (MIRALAX) 17 g packet Take 17 g by mouth 2 (two) times a day   Yes Historical Provider, MD   pregabalin (LYRICA) 150 mg capsule Take 150 mg by mouth 2 (two) times a day 3/16/22  Yes Historical Provider, MD   senna-docusate sodium (SENOKOT S) 8 6-50 mg per tablet Take 1 tablet by mouth 2 (two) times a day 3/17/22  Yes Historical Provider, MD   sevelamer carbonate (RENVELA) 800 mg tablet Take 800 mg by mouth in the morning   Yes Historical Provider, MD   sodium bicarbonate 650 mg tablet Take 650 mg by mouth 2 (two) times a day   Yes Historical Provider, MD   tamsulosin (FLOMAX) 0 4 mg Take by mouth 5/26/22  Yes Historical Provider, MD   venlafaxine (EFFEXOR-XR) 37 5 mg 24 hr capsule Take 37 5 mg by mouth 3 (three) times a day   Yes Historical Provider, MD   allopurinol (ZYLOPRIM) 100 mg tablet Take 1 tablet (100 mg total) by mouth daily 10/24/22 11/23/22  Kayleigh Lee MD   ferrous sulfate 325 (65 Fe) mg tablet Take 1 tablet (325 mg total) by mouth daily with breakfast Do not start before October 25, 2022  Patient not taking: Reported on 12/23/2022 10/25/22   Kayleigh Lee MD   nicotine (NICODERM CQ) 7 mg/24hr TD 24 hr patch Place 1 patch on the skin daily Do not start before October 25, 2022  Patient not taking: Reported on 12/23/2022 10/25/22   Kayleigh Lee MD   thiamine 100 MG tablet Take 100 mg by mouth daily  Patient not taking: Reported on 12/23/2022 3/17/22   Historical Provider, MD     I have reviewed home medications with patient personally  Allergies:    Allergies   Allergen Reactions   • Bupropion Delirium     "went nuts," prior to 2012  "went nuts," prior to 2012  "went nuts," prior to 2012  "went nuts," prior to 2012     • Metformin Other (See Comments)     Other reaction(s): kidney disease  Other reaction(s): kidney disease  Other reaction(s): kidney disease     • Medical Tape Rash       Social History:  Marital Status: /Civil Union   Patient Pre-hospital Living Situation: Home  Patient Pre-hospital Level of Mobility: unable to be assessed at time of evaluation  Patient Pre-hospital Diet Restrictions: none  Substance Use History:   Social History     Substance and Sexual Activity   Alcohol Use Not Currently     Social History     Tobacco Use   Smoking Status Every Day   • Packs/day: 0 25   • Types: Cigarettes   Smokeless Tobacco Never     Social History     Substance and Sexual Activity   Drug Use Yes   • Types: Marijuana       Family History:  Family History   Problem Relation Age of Onset   • Hypertension Father        Physical Exam:     Vitals:   Blood Pressure: 126/68 (12/24/22 0022)  Pulse: 80 (12/24/22 0022)  Temperature: 97 9 °F (36 6 °C) (12/24/22 0022)  Temp Source: Temporal (12/24/22 0022)  Respirations: 18 (12/24/22 0022)  Height: 5' 11" (180 3 cm) (12/23/22 2150)  Weight - Scale: 63 kg (138 lb 14 2 oz) (12/23/22 2346)  SpO2: 94 % (12/24/22 0022)    Physical Exam  Vitals and nursing note reviewed  Constitutional:       General: He is not in acute distress  Appearance: He is well-developed  He is cachectic  He is ill-appearing and toxic-appearing  HENT:      Head: Normocephalic and atraumatic  Eyes:      Conjunctiva/sclera: Conjunctivae normal    Cardiovascular:      Rate and Rhythm: Normal rate and regular rhythm  Heart sounds: No murmur heard  Pulmonary:      Effort: Pulmonary effort is normal  No respiratory distress  Breath sounds: Wheezing and rhonchi present  Abdominal:      General: There is distension  Palpations: Abdomen is soft  Tenderness: There is abdominal tenderness  There is no guarding  Comments: Palpable bladder   Musculoskeletal:         General: No swelling  Cervical back: Neck supple  Skin:     General: Skin is warm and dry  Capillary Refill: Capillary refill takes less than 2 seconds     Neurological:      General: No focal deficit present  Mental Status: He is alert and oriented to person, place, and time  Psychiatric:         Mood and Affect: Mood normal         Additional Data:     Lab Results:  Results from last 7 days   Lab Units 12/23/22  2217   WBC Thousand/uL 16 44*   HEMOGLOBIN g/dL 9 9*   HEMATOCRIT % 31 5*   PLATELETS Thousands/uL 244   NEUTROS PCT % 75   LYMPHS PCT % 8*   MONOS PCT % 9   EOS PCT % 6     Results from last 7 days   Lab Units 12/23/22  2217   SODIUM mmol/L 137   POTASSIUM mmol/L 4 6   CHLORIDE mmol/L 99   CO2 mmol/L 28   BUN mg/dL 54*   CREATININE mg/dL 4 10*   ANION GAP mmol/L 10   CALCIUM mg/dL 11 8*   ALBUMIN g/dL 2 5*   TOTAL BILIRUBIN mg/dL 0 25   ALK PHOS U/L 92   ALT U/L 13   AST U/L 12   GLUCOSE RANDOM mg/dL 199*     Results from last 7 days   Lab Units 12/23/22  2217   INR  1 05     Results from last 7 days   Lab Units 12/24/22  0103   POC GLUCOSE mg/dl 194*         Results from last 7 days   Lab Units 12/23/22  2217   LACTIC ACID mmol/L 1 0   PROCALCITONIN ng/ml 0 58*       Lines/Drains:  Invasive Devices     Peripheral Intravenous Line  Duration           Peripheral IV 12/23/22 Left Antecubital <1 day                    Imaging: Reviewed radiology reports from this admission including: chest xray and chest CT scan  XR chest 1 view portable    (Results Pending)   CT abdomen pelvis wo contrast    (Results Pending)       EKG and Other Studies Reviewed on Admission:   · EKG: NSR  HR 83     ** Please Note: This note has been constructed using a voice recognition system   **

## 2022-12-24 NOTE — ASSESSMENT & PLAN NOTE
· Chronic back pain, history osteomyelitis  · Continue home buprenorphine, oxycodone, Lyrica  · PDMP reviewed

## 2022-12-24 NOTE — CASE MANAGEMENT
Case Management Assessment & Discharge Planning Note    Patient name Jessica Arciniega  Location /-78 MRN [id number]  : 1965 Date 2022       Current Admission Date: 2022  Current Admission Diagnosis:Pneumonia   Patient Active Problem List    Diagnosis Date Noted   • Pneumonia 2022   • Chronic back pain 2022   • Moderate protein-calorie malnutrition ([address] ) 2022   • Acute cystitis 2022   • Acute on chronic anemia 2022   • RSV infection 2022   • Acute respiratory failure with hypoxia ([address] ) 2022   • Chronic ulcer of left heel ([address] ) 10/20/2022   • Hypercalcemia 2022   • Low back pain 2022   • Ambulatory dysfunction 2022   • Stage 3a chronic kidney disease ([address] ) 2022   • Acute urinary retention 2022   • Severe protein-calorie malnutrition ([address] ) 2022   • Iron deficiency anemia secondary to inadequate dietary iron intake 2022   • Hip pain, acute, left 2022   • KELLY (acute kidney injury) ([address] ) 2022   • Hyperkalemia 2022   • Diabetes mellitus type 2, insulin dependent ([address] )    • Gout    • Essential hypertension    • History of CVA (cerebrovascular accident)    • Osteomyelitis of vertebra of lumbar region ([address] )       LOS (days): 1  Geometric Mean LOS (GMLOS) (days):   Days to GMLOS:     OBJECTIVE:  PATIENT READMITTED TO HOSPITAL  Risk of Unplanned Readmission Score: 30 84         Current admission status: Inpatient       Preferred Pharmacy:   [address]  [address] [place] [address]  Phone: [phone] Fax: [phone]    Primary Care Provider: Julio Augustin DO    Primary Insurance: MEDICARE  Secondary Insurance:     ASSESSMENT:  [facility] [place] Representative - Spouse   Primary Phone: [phone] (Home)                         Readmission Root Cause  30 Day Readmission: Yes  Who directed you to return to the hospital?: Self  Did you understand whom to contact if you had questions or problems?: Yes  Did you get your prescriptions before you left the hospital?: No  Reason[de-identified] Not preferred pharmacy  Were you able to get your prescriptions filled when you left the hospital?: Yes  Did you take your medications as prescribed?: Yes  Were you able to get to your follow-up appointments?: No  Reason[de-identified] Readmitted prior to appointment  During previous admission, was a post-acute recommendation made?: Yes  What post-acute resources were offered?: [facility]  Patient was readmitted due to: KELLY, PNA  Action Plan: tbd    Patient Information  Admitted from[de-identified] Facility  Mental Status: Alert  During Assessment patient was accompanied by: Not accompanied during assessment  Assessment information provided by[de-identified] Patient  Primary Caregiver: Private caregiver  Caregiver's Name[de-identified] roman, wife  Caregiver's Relationship to Patient[de-identified] Family Member  Support Systems: Spouse/significant other, Children, Family members  Home entry access options  Select all that apply : Other access (Comment) (has stair chair)  Type of Current Residence: [address] home  Upon entering residence, is there a bedroom on the main floor (no further steps)?: Yes (pt has first floor set up)  Upon entering residence, is there a bathroom on the main floor (no further steps)?: Yes  In the last 12 months, was there a time when you were not able to pay the mortgage or rent on time?: No  In the last 12 months, was there a time when you did not have a steady place to sleep or slept in a shelter (including now)?: Yes  Homeless/housing insecurity resource given?: No  Living Arrangements: Lives w/ Spouse/significant other    Activities of Daily Living Prior to Admission  Functional Status: Total dependent  Completes ADLs independently?: No  Level of ADL dependence: Total Dependent  Ambulates independently?: No  Level of ambulatory dependence:  Total Dependent  Does patient use assisted devices?: Yes  Assisted Devices (DME) used:  Wheelchair  Does patient currently own DME?: Yes  What DME does the patient currently own?: Wheelchair, Hospital Bed, Walker  Does patient have a history of Outpatient Therapy (PT/OT)?: No  Does the patient have a history of Short-Term Rehab?: Yes ([facility], [place])  Does patient have a history of HHC?: Yes ([facility])  Does patient currently have [address]?: Yes ([facility])    Current Home Health Care  Type of Current Home Care Services: Nurse visit, [address][de-identified] Other (please enter name in comment) ([employer])  [facility] Provider[de-identified] PCP    Patient Information Continued  Does patient have prescription coverage?: Yes  Within the past 12 months, you worried that your food would run out before you got the money to buy more : Never true  Within the past 12 months, the food you bought just didn't last and you didn't have money to get more : Never true  Food insecurity resource given?: No  Does patient receive dialysis treatments?: No  Does patient have a history of Mental Health Diagnosis?: No         Means of Transportation  Means of Transport to Appts[de-identified] Family transport  In the past 12 months, has lack of transportation kept you from medical appointments or from getting medications?: No  In the past 12 months, has lack of transportation kept you from meetings, work, or from getting things needed for daily living?: No        DISCHARGE DETAILS:    Discharge planning discussed with[de-identified] patinet  Freedom of Choice: Yes     CM contacted family/caregiver?: No- see comments (declined)             Contacts  Patient Contacts: wife, roman  Relationship to Patient[de-identified] Family                   Would you like to participate in our [address] service program?  : No - Declined              pt is "unsure" is he is still active with [facility], CM placing a message to [facility] in [employer] to inquire if pt is active with their services            12/26/22 pt is active with [facility] for PT and nursing

## 2022-12-24 NOTE — QUICK NOTE
· CT abdomen pelvis with suspected ruptured diverticulum as source of pneumoperitoneum  · Remains hemodynamically stable  · Abdominal exam soft, mild tenderness generalized, no guarding  · Ortiz catheter placed for urinary retention  · Discussed with general surgery who will evaluate and provide further recommendations

## 2022-12-24 NOTE — ED PROVIDER NOTES
History  Chief Complaint   Patient presents with   • Shortness of Breath     Pt having wheezing and coughing up brown phlem x several days  Patient is a 55-year-old male brought to the emergency department by EMS for cough cold and congestion with shortness of breath this been going on for 2 to 3 days, states that he is recently switched from smoking cigarettes to vaping, he denies any fevers, no known sick contacts, he had a negative home COVID test today, he denies any previous knowledge of a diagnosis of COPD, he does not wear oxygen at home but EMS reports his pulse ox was 80 to 82% on room air, he arrived wearing nasal cannula at 4 L, denies any chest pain, no nausea vomiting or diarrhea, his cough is productive of yellowish or brownish phlegm          Prior to Admission Medications   Prescriptions Last Dose Informant Patient Reported? Taking? Buprenorphine HCl (Belbuca) 300 MCG FILM 12/23/2022  Yes Yes   Sig: Apply 300 mcg to cheek 2 (two) times a day   acetaminophen (TYLENOL) 325 mg tablet 12/23/2022  Yes Yes   Sig: Take 650 mg by mouth every 4 (four) hours as needed for mild pain   allopurinol (ZYLOPRIM) 100 mg tablet   No No   Sig: Take 1 tablet (100 mg total) by mouth daily   amLODIPine (NORVASC) 10 mg tablet 12/23/2022  Yes Yes   Sig: Take 1 tablet by mouth daily   atorvastatin (LIPITOR) 40 mg tablet 12/23/2022  Yes Yes   Sig: Take 40 mg by mouth   carvedilol (COREG) 6 25 mg tablet 12/23/2022  Yes Yes   Sig: Take 6 25 mg by mouth 2 (two) times a day with meals   colchicine (COLCRYS) 0 6 mg tablet 12/23/2022  Yes Yes   Sig: Take 0 6 mg by mouth daily   cyclobenzaprine (FLEXERIL) 5 mg tablet 12/23/2022  Yes Yes   Sig: Take 5 mg by mouth 3 (three) times a day   ferrous sulfate 325 (65 Fe) mg tablet Not Taking  No No   Sig: Take 1 tablet (325 mg total) by mouth daily with breakfast Do not start before October 25, 2022     Patient not taking: Reported on 12/23/2022   insulin glargine (LANTUS) 100 units/mL subcutaneous injection 12/23/2022  No Yes   Sig: Inject 5 Units under the skin daily at bedtime   insulin lispro (HumaLOG) 100 units/mL injection 12/23/2022  No Yes   Sig: Inject 2-12 Units under the skin 3 (three) times a day before meals   lidocaine (LIDODERM) 5 % 12/23/2022  No Yes   Sig: Apply 1 patch topically daily Remove & Discard patch within 12 hours or as directed by MD   nicotine (NICODERM CQ) 7 mg/24hr TD 24 hr patch Not Taking  No No   Sig: Place 1 patch on the skin daily Do not start before October 25, 2022     Patient not taking: Reported on 12/23/2022   oxyCODONE (OXY-IR) 5 MG capsule 12/23/2022  Yes Yes   Sig: Take 5 mg by mouth every 4 (four) hours as needed for moderate pain or severe pain   polyethylene glycol (MIRALAX) 17 g packet 12/23/2022  Yes Yes   Sig: Take 17 g by mouth 2 (two) times a day   pregabalin (LYRICA) 150 mg capsule 12/23/2022  Yes Yes   Sig: Take 150 mg by mouth 2 (two) times a day   senna-docusate sodium (SENOKOT S) 8 6-50 mg per tablet 12/23/2022  Yes Yes   Sig: Take 1 tablet by mouth 2 (two) times a day   sevelamer carbonate (RENVELA) 800 mg tablet 12/23/2022  Yes Yes   Sig: Take 800 mg by mouth in the morning   sodium bicarbonate 650 mg tablet 12/23/2022  Yes Yes   Sig: Take 650 mg by mouth 2 (two) times a day   tamsulosin (FLOMAX) 0 4 mg 12/23/2022  Yes Yes   Sig: Take by mouth   thiamine 100 MG tablet Not Taking  Yes No   Sig: Take 100 mg by mouth daily   Patient not taking: Reported on 12/23/2022   venlafaxine (EFFEXOR-XR) 37 5 mg 24 hr capsule 12/23/2022  Yes Yes   Sig: Take 37 5 mg by mouth 3 (three) times a day      Facility-Administered Medications: None       Past Medical History:   Diagnosis Date   • Chronic kidney disease    • Diabetes mellitus ([place] [address] )    • Gout    • Hypertension    • Renal disorder    • Retroperitoneal abscess ([place] [address] )    • Stroke [facility])    • Vertebral osteomyelitis ([address] )        Past Surgical History:   Procedure Laterality Date   • BACK SURGERY • ORCHIECTOMY         Family History   Problem Relation Age of Onset   • Hypertension Father      I have reviewed and agree with the history as documented  E-Cigarette/Vaping   • E-Cigarette Use Never User      E-Cigarette/Vaping Substances     Social History     Tobacco Use   • Smoking status: Every Day     Packs/day: 0 25     Types: Cigarettes   • Smokeless tobacco: Never   Vaping Use   • Vaping Use: Never used   Substance Use Topics   • Alcohol use: Not Currently   • Drug use: Yes     Types: Marijuana       Review of Systems   Constitutional: Positive for fatigue  HENT: Positive for congestion  Eyes: Negative  Respiratory: Positive for cough and shortness of breath  Cardiovascular: Negative  Gastrointestinal: Negative  Endocrine: Negative  Genitourinary: Negative  Musculoskeletal: Negative  Skin: Negative  Allergic/Immunologic: Negative  Neurological: Negative  Hematological: Negative  Psychiatric/Behavioral: Negative  Physical Exam  Physical Exam  Constitutional:       Appearance: He is well-developed  HENT:      Head: Normocephalic and atraumatic  Mouth/Throat:      Mouth: Mucous membranes are dry  Eyes:      Conjunctiva/sclera: Conjunctivae normal       Pupils: Pupils are equal, round, and reactive to light  Cardiovascular:      Rate and Rhythm: Normal rate  Pulmonary:      Effort: Pulmonary effort is normal       Breath sounds: Wheezing and rhonchi present  Abdominal:      Palpations: Abdomen is soft  Musculoskeletal:         General: Normal range of motion  Cervical back: Normal range of motion and neck supple  Skin:     General: Skin is warm and dry  Coloration: Skin is pale  Neurological:      Mental Status: He is alert and oriented to person, place, and time           Vital Signs  ED Triage Vitals   Temperature Pulse Respirations Blood Pressure SpO2   12/23/22 2150 12/23/22 2150 12/23/22 2150 12/23/22 2153 12/23/22 2150   98 °F (36 7 °C) 89 19 124/68 (!) 87 %      Temp src Heart Rate Source Patient Position - Orthostatic VS BP Location FiO2 (%)   -- 12/23/22 2150 12/23/22 2150 12/23/22 2150 --    Monitor Lying Right arm       Pain Score       12/23/22 2239       10 - Worst Possible Pain           Vitals:    12/23/22 2300 12/23/22 2315 12/23/22 2330 12/24/22 0022   BP:   120/66 126/68   Pulse: 80 81 78 80   Patient Position - Orthostatic VS:                   ED Medications  Medications   insulin lispro (HumaLOG) 100 units/mL subcutaneous injection 1-6 Units (has no administration in time range)   insulin lispro (HumaLOG) 100 units/mL subcutaneous injection 1-6 Units (has no administration in time range)   sodium chloride 0 9 % infusion (has no administration in time range)   heparin (porcine) subcutaneous injection 5,000 Units (has no administration in time range)   cefTRIAXone (ROCEPHIN) IVPB (premix in dextrose) 1,000 mg 50 mL (has no administration in time range)   acetaminophen (TYLENOL) tablet 650 mg (has no administration in time range)   allopurinol (ZYLOPRIM) tablet 100 mg (has no administration in time range)   amLODIPine (NORVASC) tablet 10 mg (has no administration in time range)   atorvastatin (LIPITOR) tablet 40 mg (has no administration in time range)   carvedilol (COREG) tablet 6 25 mg (has no administration in time range)   Buprenorphine HCl FILM 300 mcg (has no administration in time range)   cyclobenzaprine (FLEXERIL) tablet 5 mg (has no administration in time range)   insulin glargine (LANTUS) subcutaneous injection 5 Units 0 05 mL (has no administration in time range)   oxyCODONE (ROXICODONE) IR tablet 5 mg (has no administration in time range)   polyethylene glycol (MIRALAX) packet 17 g (has no administration in time range)   pregabalin (LYRICA) capsule 75 mg (has no administration in time range)   senna-docusate sodium (SENOKOT S) 8 6-50 mg per tablet 1 tablet (has no administration in time range)   tamsulosin [facility]) capsule 0 4 mg (has no administration in time range)   venlafaxine (EFFEXOR-XR) 24 hr capsule 37 5 mg (has no administration in time range)   albuterol inhalation solution 2 5 mg (has no administration in time range)   ipratropium-albuterol (DUO-NEB) 0 5-2 5 mg/3 mL inhalation solution 3 mL (3 mL Nebulization Given 12/23/22 2213)   dexamethasone (DECADRON) injection 6 mg (6 mg Intravenous Given 12/23/22 2215)   oxyCODONE-acetaminophen (PERCOCET) 5-325 mg per tablet 1 tablet (1 tablet Oral Given 12/23/22 2239)   cefTRIAXone (ROCEPHIN) IVPB (premix in dextrose) 1,000 mg 50 mL (1,000 mg Intravenous New Bag 12/23/22 2315)   doxycycline hyclate (VIBRAMYCIN) capsule 100 mg (100 mg Oral Given 12/23/22 2314)   sodium chloride 0 9 % bolus 1,000 mL (1,000 mL Intravenous New Bag 12/23/22 2316)       Diagnostic Studies  Results Reviewed     Procedure Component Value Units Date/Time    HS Troponin I 4hr [[id number]]     Lab Status: No result Specimen: Blood     Sputum culture and Gram stain [[id number]]     Lab Status: No result Specimen: Expectorated Sputum     UA w Reflex to Microscopic w Reflex to Culture [[id number]]     Lab Status: No result Specimen: Urine     FLU/RSV/COVID - if FLU/RSV clinically relevant [[id number]]  (Normal) Collected: 12/23/22 2217    Lab Status: Final result Specimen: Nares from Nose Updated: 12/23/22 2307     SARS-CoV-2 Negative     INFLUENZA A PCR Negative     INFLUENZA B PCR Negative     RSV PCR Negative    Narrative:      FOR PEDIATRIC PATIENTS - copy/paste COVID Guidelines URL to browser: [email or web address]    SARS-CoV-2 assay is a Nucleic Acid Amplification assay intended for the  qualitative detection of nucleic acid from SARS-CoV-2 in nasopharyngeal  swabs  Results are for the presumptive identification of SARS-CoV-2 RNA      Positive results are indicative of infection with SARS-CoV-2, the virus  causing COVID-19, but do not rule out bacterial infection or co-infection  with other viruses  Laboratories within the [place] and its  territories are required to report all positive results to the appropriate  public health authorities  Negative results do not preclude SARS-CoV-2  infection and should not be used as the sole basis for treatment or other  patient management decisions  Negative results must be combined with  clinical observations, patient history, and epidemiological information  This test has not been FDA cleared or approved  This test has been authorized by FDA under an Emergency Use Authorization  (EUA)  This test is only authorized for the duration of time the  declaration that circumstances exist justifying the authorization of the  emergency use of an in vitro diagnostic tests for detection of SARS-CoV-2  virus and/or diagnosis of COVID-19 infection under section 564(b)(1) of  the Act, 21 U  S C  062JOK-3(B)(4), unless the authorization is terminated  or revoked sooner  The test has been validated but independent review by FDA  and CLIA is pending  Test performed using [employer] GeneXpert: This RT-PCR assay targets N2,  a region unique to SARS-CoV-2  A conserved region in the E-gene was chosen  for pan-Sarbecovirus detection which includes SARS-CoV-2  According to CMS-2020-01-R, this platform meets the definition of high-throughput technology      Comprehensive metabolic panel [[id number]]  (Abnormal) Collected: 12/23/22 2217    Lab Status: Final result Specimen: Blood from Arm, Left Updated: 12/23/22 2304     Sodium 137 mmol/L      Potassium 4 6 mmol/L      Chloride 99 mmol/L      CO2 28 mmol/L      ANION GAP 10 mmol/L      BUN 54 mg/dL      Creatinine 4 10 mg/dL      Glucose 199 mg/dL      Calcium 11 8 mg/dL      Corrected Calcium 13 0 mg/dL      AST 12 U/L      ALT 13 U/L      Alkaline Phosphatase 92 U/L      Total Protein 6 4 g/dL      Albumin 2 5 g/dL      Total Bilirubin 0 25 mg/dL      eGFR 15 ml/min/1 73sq m Narrative:      National Kidney Disease Foundation guidelines for Chronic Kidney Disease (CKD):   •  Stage 1 with normal or high GFR (GFR > 90 mL/min/1 73 square meters)  •  Stage 2 Mild CKD (GFR = 60-89 mL/min/1 73 square meters)  •  Stage 3A Moderate CKD (GFR = 45-59 mL/min/1 73 square meters)  •  Stage 3B Moderate CKD (GFR = 30-44 mL/min/1 73 square meters)  •  Stage 4 Severe CKD (GFR = 15-29 mL/min/1 73 square meters)  •  Stage 5 End Stage CKD (GFR <15 mL/min/1 73 square meters)  Note: GFR calculation is accurate only with a steady state creatinine    HS Troponin I 2hr [[id number]]     Lab Status: No result Specimen: Blood     HS Troponin 0hr (reflex protocol) [[id number]]  (Normal) Collected: 12/23/22 2217    Lab Status: Final result Specimen: Blood from Arm, Left Updated: 12/23/22 2258     hs TnI 0hr 4 ng/L     Procalcitonin [[id number]]  (Abnormal) Collected: 12/23/22 2217    Lab Status: Final result Specimen: Blood from Arm, Left Updated: 12/23/22 2252     Procalcitonin 0 58 ng/ml     Lactic acid, plasma [[id number]]  (Normal) Collected: 12/23/22 2217    Lab Status: Final result Specimen: Blood from Arm, Left Updated: 12/23/22 2252     LACTIC ACID 1 0 mmol/L     Narrative:      Result may be elevated if tourniquet was used during collection  D-dimer, quantitative [[id number]]  (Abnormal) Collected: 12/23/22 2217    Lab Status: Final result Specimen: Blood from Arm, Left Updated: 12/23/22 2251     D-Dimer, Quant 2 45 ug/ml FEU     Narrative: In the evaluation for possible pulmonary embolism, in the appropriate (Well's Score of 4 or less) patient, the age adjusted d-dimer cutoff for this patient can be calculated as:    Age x 0 01 (in ug/mL) for Age-adjusted D-dimer exclusion threshold for a patient over 50 years      Magnesium [[id number]]  (Normal) Collected: 12/23/22 2217    Lab Status: Final result Specimen: Blood from Arm, Left Updated: 12/23/22 2251     Magnesium 2 4 mg/dL     C-reactive protein [[id number]]  (Abnormal) Collected: 12/23/22 2217    Lab Status: Final result Specimen: Blood from Arm, Left Updated: 12/23/22 2251      5 mg/L     NT-BNP PRO [[id number]]  (Abnormal) Collected: 12/23/22 2217    Lab Status: Final result Specimen: Blood from Arm, Left Updated: 12/23/22 2251     NT-proBNP 815 pg/mL     CK (with reflex to MB) [[id number]]  (Abnormal) Collected: 12/23/22 2217    Lab Status: Final result Specimen: Blood from Arm, Left Updated: 12/23/22 2251     Total CK 12 U/L     Protime-INR [[id number]]  (Normal) Collected: 12/23/22 2217    Lab Status: Final result Specimen: Blood from Arm, Left Updated: 12/23/22 2240     Protime 13 8 seconds      INR 1 05    Calcium, ionized [[id number]]  (Abnormal) Collected: 12/23/22 2217    Lab Status: Final result Specimen: Blood from Arm, Left Updated: 12/23/22 2231     Calcium, Ionized 1 44 mmol/L     CBC and differential [[id number]]  (Abnormal) Collected: 12/23/22 2217    Lab Status: Final result Specimen: Blood from Arm, Left Updated: 12/23/22 2228     WBC 16 44 Thousand/uL      RBC 3 40 Million/uL      Hemoglobin 9 9 g/dL      Hematocrit 31 5 %      MCV 93 fL      MCH 29 1 pg      MCHC 31 4 g/dL      RDW 16 4 %      MPV 10 9 fL      Platelets 396 Thousands/uL      nRBC 0 /100 WBCs      Neutrophils Relative 75 %      Immat GRANS % 1 %      Lymphocytes Relative 8 %      Monocytes Relative 9 %      Eosinophils Relative 6 %      Basophils Relative 1 %      Neutrophils Absolute 12 68 Thousands/µL      Immature Grans Absolute 0 11 Thousand/uL      Lymphocytes Absolute 1 23 Thousands/µL      Monocytes Absolute 1 44 Thousand/µL      Eosinophils Absolute 0 90 Thousand/µL      Basophils Absolute 0 08 Thousands/µL     Blood culture #1 [[id number]] Collected: 12/23/22 2217    Lab Status: In process Specimen: Blood from Arm, Left Updated: 12/23/22 2227    Blood culture #2 [[id number]] Collected: 12/23/22 2217    Lab Status:  In process Specimen: Blood from Hand, Left Updated: 12/23/22 2227                 XR chest 1 view portable    (Results Pending)   CT chest wo contrast    (Results Pending)              Procedures  ECG 12 Lead Documentation Only    Date/Time: 12/23/2022 9:57 PM  Performed by: Erik Leventhal, DO  Authorized by: Erik Leventhal, DO     Indications / Diagnosis:  Shortness of breath  ECG reviewed by me, the ED Provider: yes    Patient location:  ED  Previous ECG:     Previous ECG:  Unavailable    Comparison to cardiac monitor: Yes    Interpretation:     Interpretation: normal    Quality:     Tracing quality:  Limited by artifact  Rate:     ECG rate:  83    ECG rate assessment: normal    Rhythm:     Rhythm: sinus rhythm    QRS:     QRS axis:  Normal    QRS intervals:  Normal  Conduction:     Conduction: normal    ST segments:     ST segments:  Normal  T waves:     T waves: normal               ED Course  ED Course as of 12/24/22 0049   Sat Dec 24, 2022   0048 Patient discussed with hospitalist service who agrees to admit for further evaluation and treatment                               SBIRT 20yo+    Flowsheet Row Most Recent Value   SBIRT (23 yo +)    In order to provide better care to our patients, we are screening all of our patients for alcohol and drug use  Would it be okay to ask you these screening questions?  No Filed at: 12/23/2022 2153                    MDM  Number of Diagnoses or Management Options  KELLY (acute kidney injury) ([facility] [address] ): established and worsening  COPD exacerbation ([facility] [address] ): established and worsening  Hypercalcemia: established and worsening  Hypoxia: established and worsening  Pneumonia: new and requires workup  Diagnosis management comments: Patient was hypoxia on room air at home, requiring oxygen in route and to maintain oxygen saturation above 90%, imaging findings concerning for pneumonia, multiple lab abnormalities with worsening kidney dysfunction, acute on chronic kidney injury and hypercalcemia requiring admission to the hospital for further evaluation and treatment       Amount and/or Complexity of Data Reviewed  Clinical lab tests: ordered and reviewed  Tests in the radiology section of CPT®: ordered and reviewed  Review and summarize past medical records: yes  Discuss the patient with other providers: yes  Independent visualization of images, tracings, or specimens: yes    Risk of Complications, Morbidity, and/or Mortality  Presenting problems: high  Diagnostic procedures: high  Management options: high        Disposition  Final diagnoses:   Hypoxia   COPD exacerbation ([facility] )   Pneumonia   Hypercalcemia   KELLY (acute kidney injury) ([place] [address] )     Time reflects when diagnosis was documented in both MDM as applicable and the Disposition within this note     Time User Action Codes Description Comment    12/23/2022 11:29 PM Lulla Anon Add [R09 02] Hypoxia     12/23/2022 11:29 PM Lulla Anon Add [J44 1] COPD exacerbation ([facility] )     12/23/2022 11:29 PM Lulla Anon Add [J18 9] Pneumonia     12/23/2022 11:29 PM PolanczYoselin echevarria Add [E83 52] Hypercalcemia     12/23/2022 11:29 PM PolYoselin moya Add [N17 9] KELLY (acute kidney injury) [facility])       ED Disposition     ED Disposition   Admit    Condition   Stable    Date/Time   Fri Dec 23, 2022 11:30 PM    Comment   Case was discussed with NP June and the patient's admission status was agreed to be Admission Status: inpatient status to the service of Dr Valeriano Rico              Follow-up Information    None         Current Discharge Medication List      CONTINUE these medications which have NOT CHANGED    Details   acetaminophen (TYLENOL) 325 mg tablet Take 650 mg by mouth every 4 (four) hours as needed for mild pain      amLODIPine (NORVASC) 10 mg tablet Take 1 tablet by mouth daily      atorvastatin (LIPITOR) 40 mg tablet Take 40 mg by mouth      Buprenorphine HCl (Belbuca) 300 MCG FILM Apply 300 mcg to cheek 2 (two) times a day      carvedilol (COREG) 6 25 mg tablet Take 6 25 mg by mouth 2 (two) times a day with meals      colchicine (COLCRYS) 0 6 mg tablet Take 0 6 mg by mouth daily      cyclobenzaprine (FLEXERIL) 5 mg tablet Take 5 mg by mouth 3 (three) times a day      insulin glargine (LANTUS) 100 units/mL subcutaneous injection Inject 5 Units under the skin daily at bedtime  Qty: 10 mL, Refills: 0    Associated Diagnoses: Diabetes mellitus type 2, insulin dependent ([facility])      insulin lispro (HumaLOG) 100 units/mL injection Inject 2-12 Units under the skin 3 (three) times a day before meals  Refills: 0    Associated Diagnoses: Diabetes mellitus type 2, insulin dependent ([facility])      lidocaine (LIDODERM) 5 % Apply 1 patch topically daily Remove & Discard patch within 12 hours or as directed by MD  Qty: 6 patch, Refills: 0    Associated Diagnoses: Hip pain, acute, left; Osteomyelitis of vertebra of lumbar region ([facility])      oxyCODONE (OXY-IR) 5 MG capsule Take 5 mg by mouth every 4 (four) hours as needed for moderate pain or severe pain      polyethylene glycol (MIRALAX) 17 g packet Take 17 g by mouth 2 (two) times a day      pregabalin (LYRICA) 150 mg capsule Take 150 mg by mouth 2 (two) times a day      senna-docusate sodium (SENOKOT S) 8 6-50 mg per tablet Take 1 tablet by mouth 2 (two) times a day      sevelamer carbonate (RENVELA) 800 mg tablet Take 800 mg by mouth in the morning      sodium bicarbonate 650 mg tablet Take 650 mg by mouth 2 (two) times a day      tamsulosin (FLOMAX) 0 4 mg Take by mouth      venlafaxine (EFFEXOR-XR) 37 5 mg 24 hr capsule Take 37 5 mg by mouth 3 (three) times a day      allopurinol (ZYLOPRIM) 100 mg tablet Take 1 tablet (100 mg total) by mouth daily  Qty: 30 tablet, Refills: 0    Associated Diagnoses: Gout, unspecified cause, unspecified chronicity, unspecified site      ferrous sulfate 325 (65 Fe) mg tablet Take 1 tablet (325 mg total) by mouth daily with breakfast Do not start before October 25, 2022    Refills: 0    Associated Diagnoses: Iron deficiency anemia secondary to inadequate dietary iron intake      nicotine (NICODERM CQ) 7 mg/24hr TD 24 hr patch Place 1 patch on the skin daily Do not start before October 25, 2022  Qty: 28 patch, Refills: 0    Associated Diagnoses: Diabetes mellitus type 2, insulin dependent (HCC)      thiamine 100 MG tablet Take 100 mg by mouth daily             No discharge procedures on file      PDMP Review       Value Time User    PDMP Reviewed  Yes 11/16/2022  8:48 PM YURIDIA Arevalo          ED Provider  Electronically Signed by           Osmin Espinoza DO  12/24/22 736 [facility]   12/24/22 [id number]

## 2022-12-24 NOTE — ASSESSMENT & PLAN NOTE
· Chronic normocytic anemia in setting of chronic kidney disease  · Iron deficient anemia history of poor oral intake  · Hemoglobin 9 9 on admission slightly down from baseline 10 6  · Trend

## 2022-12-24 NOTE — RESPIRATORY THERAPY NOTE
RT Protocol Note  Jean Manuel Sr  62 y o  male MRN: [id number]  Unit/Bed#: -01 Encounter: [id number]    Assessment    Active Problems:    * No active hospital problems  *      Home Pulmonary Medications:  none  Home Devices/Therapy: Home O2    Past Medical History:   Diagnosis Date   • Chronic kidney disease    • Diabetes mellitus ([facility] )    • Gout    • Hypertension    • Renal disorder    • Retroperitoneal abscess (HCC)    • Stroke ([facility] )    • Vertebral osteomyelitis ([facility] )      Social History     Socioeconomic History   • Marital status: /Civil Union     Spouse name: Not on file   • Number of children: Not on file   • Years of education: Not on file   • Highest education level: Not on file   Occupational History   • Not on file   Tobacco Use   • Smoking status: Every Day     Packs/day: 0 25     Types: Cigarettes   • Smokeless tobacco: Never   Vaping Use   • Vaping Use: Never used   Substance and Sexual Activity   • Alcohol use: Not Currently   • Drug use: Yes     Types: Marijuana   • Sexual activity: Not on file   Other Topics Concern   • Not on file   Social History Narrative   • Not on file     Social Determinants of Health     Financial Resource Strain: Not on file   Food Insecurity: No Food Insecurity   • Worried About Running Out of Food in the Last Year: Never true   • Ran Out of Food in the Last Year: Never true   Transportation Needs: No Transportation Needs   • Lack of Transportation (Medical): No   • Lack of Transportation (Non-Medical): No   Physical Activity: Not on file   Stress: Not on file   Social Connections: Not on file   Intimate Partner Violence: Not on file   Housing Stability: High Risk   • Unable to Pay for Housing in the Last Year: No   • Number of Places Lived in the Last Year: 5   • Unstable Housing in the Last Year: No       Subjective     Patient is smoker who switched from cigarettes to vaping  He has a cough producing brown sputum for three days    Denied O2 use to ED physician but reported ot nurse he does use O2 at times  Curretnly sleeping soundly, no sign of disterss    Objective    Physical Exam:   Assessment Type: Assess only  General Appearance: Sleeping  Respiratory Pattern: Normal  Chest Assessment: Chest expansion symmetrical  Bilateral Breath Sounds: Coarse (at bases)  Cough: Productive  O2 Device: 4L nasal cannula    Vitals:  Blood pressure 126/68, pulse 80, temperature 97 9 °F (36 6 °C), temperature source Temporal, resp  rate 18, height 5' 11" (1 803 m), weight 63 kg (138 lb 14 2 oz), SpO2 94 %  Imaging and other studies: Scattered groundglass and reticular nodular opacities are seen in the right lower lobe  There are more prominent reticulonodular, groundglass, alveolar opacities seen in the inferior left lingula as well as throughout the left lower lobe,   suspect multifocal infectious or inflammatory pneumonitis       O2 Device: 4L nasal cannula     Plan    Respiratory Plan: Mild Distress pathway        Resp Comments: nurse reports patient was able to produce a large amount of sputum with minimum effort for lab

## 2022-12-24 NOTE — QUICK NOTE
The patient is reexamined  He is sitting in the chair and appears completely comfortable and denies any abdominal pain nausea or vomiting  Physical exam: The patient is awake alert and oriented x3 in no distress  His abdomen is soft  It is scaphoid with no tympany and no tenderness no rigidity guarding or rebound or masses noted  Plan: The patient's leukocytosis is slowly improving  I recommend keeping the patient n p o  and serial exams and serial labs  The patient understands and agrees with the plan

## 2022-12-24 NOTE — ASSESSMENT & PLAN NOTE
Malnutrition Findings: Loss of subcutaneous fat in orbital, triceps, ribcage areas  Loss of muscle at temples, clavicles, scapula, extremities  Consultation to dietitian        BMI Findings: Body mass index is 19 37 kg/m²

## 2022-12-24 NOTE — ASSESSMENT & PLAN NOTE
· History of hypercalcemia  · Corrected calcium 13, ionized calcium 1 44 on admission  · IV hydration with saline  · Appreciate nephrology consultation  · SPEP interpretation from last admission revealed no monoclonal bands

## 2022-12-25 PROBLEM — K66.8 PNEUMOPERITONEUM: Status: ACTIVE | Noted: 2022-12-25

## 2022-12-25 LAB
ANION GAP SERPL CALCULATED.3IONS-SCNC: 11 MMOL/L (ref 4–13)
BASOPHILS # BLD AUTO: 0.07 THOUSANDS/ÂΜL (ref 0–0.1)
BASOPHILS NFR BLD AUTO: 1 % (ref 0–1)
BUN SERPL-MCNC: 52 MG/DL (ref 5–25)
CA-I BLD-SCNC: 1.41 MMOL/L (ref 1.12–1.32)
CALCIUM SERPL-MCNC: 11.2 MG/DL (ref 8.3–10.1)
CHLORIDE SERPL-SCNC: 108 MMOL/L (ref 96–108)
CO2 SERPL-SCNC: 24 MMOL/L (ref 21–32)
CREAT SERPL-MCNC: 3.31 MG/DL (ref 0.6–1.3)
EOSINOPHIL # BLD AUTO: 0.12 THOUSAND/ÂΜL (ref 0–0.61)
EOSINOPHIL NFR BLD AUTO: 1 % (ref 0–6)
ERYTHROCYTE [DISTWIDTH] IN BLOOD BY AUTOMATED COUNT: 16.1 % (ref 11.6–15.1)
GFR SERPL CREATININE-BSD FRML MDRD: 19 ML/MIN/1.73SQ M
GLUCOSE SERPL-MCNC: 102 MG/DL (ref 65–140)
GLUCOSE SERPL-MCNC: 125 MG/DL (ref 65–140)
GLUCOSE SERPL-MCNC: 144 MG/DL (ref 65–140)
GLUCOSE SERPL-MCNC: 72 MG/DL (ref 65–140)
GLUCOSE SERPL-MCNC: 87 MG/DL (ref 65–140)
GLUCOSE SERPL-MCNC: 94 MG/DL (ref 65–140)
HCT VFR BLD AUTO: 31.6 % (ref 36.5–49.3)
HGB BLD-MCNC: 9.7 G/DL (ref 12–17)
IMM GRANULOCYTES # BLD AUTO: 0.09 THOUSAND/UL (ref 0–0.2)
IMM GRANULOCYTES NFR BLD AUTO: 1 % (ref 0–2)
LYMPHOCYTES # BLD AUTO: 1.91 THOUSANDS/ÂΜL (ref 0.6–4.47)
LYMPHOCYTES NFR BLD AUTO: 14 % (ref 14–44)
MCH RBC QN AUTO: 28.4 PG (ref 26.8–34.3)
MCHC RBC AUTO-ENTMCNC: 30.7 G/DL (ref 31.4–37.4)
MCV RBC AUTO: 92 FL (ref 82–98)
MONOCYTES # BLD AUTO: 0.81 THOUSAND/ÂΜL (ref 0.17–1.22)
MONOCYTES NFR BLD AUTO: 6 % (ref 4–12)
NEUTROPHILS # BLD AUTO: 10.27 THOUSANDS/ÂΜL (ref 1.85–7.62)
NEUTS SEG NFR BLD AUTO: 77 % (ref 43–75)
NRBC BLD AUTO-RTO: 0 /100 WBCS
PLATELET # BLD AUTO: 246 THOUSANDS/UL (ref 149–390)
PMV BLD AUTO: 9.7 FL (ref 8.9–12.7)
POTASSIUM SERPL-SCNC: 4.4 MMOL/L (ref 3.5–5.3)
RBC # BLD AUTO: 3.42 MILLION/UL (ref 3.88–5.62)
SODIUM SERPL-SCNC: 143 MMOL/L (ref 135–147)
WBC # BLD AUTO: 13.27 THOUSAND/UL (ref 4.31–10.16)

## 2022-12-25 RX ORDER — SODIUM CHLORIDE 9 MG/ML
125 INJECTION, SOLUTION INTRAVENOUS CONTINUOUS
Status: DISCONTINUED | OUTPATIENT
Start: 2022-12-25 | End: 2022-12-25

## 2022-12-25 RX ORDER — LORAZEPAM 2 MG/ML
0.5 INJECTION INTRAMUSCULAR EVERY 8 HOURS PRN
Status: DISCONTINUED | OUTPATIENT
Start: 2022-12-25 | End: 2022-12-28 | Stop reason: HOSPADM

## 2022-12-25 RX ORDER — LORAZEPAM 2 MG/ML
1 INJECTION INTRAMUSCULAR ONCE
Status: COMPLETED | OUTPATIENT
Start: 2022-12-25 | End: 2022-12-25

## 2022-12-25 RX ORDER — HYDROMORPHONE HCL/PF 1 MG/ML
0.5 SYRINGE (ML) INJECTION ONCE
Status: COMPLETED | OUTPATIENT
Start: 2022-12-25 | End: 2022-12-25

## 2022-12-25 RX ORDER — CALCITONIN SALMON 200 [IU]/.09ML
1 SPRAY, METERED NASAL DAILY
Status: DISCONTINUED | OUTPATIENT
Start: 2022-12-25 | End: 2022-12-28

## 2022-12-25 RX ORDER — DEXTROSE AND SODIUM CHLORIDE 5; .9 G/100ML; G/100ML
125 INJECTION, SOLUTION INTRAVENOUS CONTINUOUS
Status: DISCONTINUED | OUTPATIENT
Start: 2022-12-25 | End: 2022-12-26

## 2022-12-25 RX ADMIN — DEXTROSE AND SODIUM CHLORIDE 125 ML/HR: 5; .9 INJECTION, SOLUTION INTRAVENOUS at 13:38

## 2022-12-25 RX ADMIN — METRONIDAZOLE 500 MG: 500 INJECTION, SOLUTION INTRAVENOUS at 23:26

## 2022-12-25 RX ADMIN — BUPRENORPHINE HYDROCHLORIDE 450 MCG: 450 FILM, SOLUBLE BUCCAL at 20:01

## 2022-12-25 RX ADMIN — HYDROMORPHONE HYDROCHLORIDE 0.5 MG: 1 INJECTION, SOLUTION INTRAMUSCULAR; INTRAVENOUS; SUBCUTANEOUS at 10:47

## 2022-12-25 RX ADMIN — BUPRENORPHINE HYDROCHLORIDE 450 MCG: 450 FILM, SOLUBLE BUCCAL at 09:03

## 2022-12-25 RX ADMIN — HYDROMORPHONE HYDROCHLORIDE 0.5 MG: 1 INJECTION, SOLUTION INTRAMUSCULAR; INTRAVENOUS; SUBCUTANEOUS at 18:37

## 2022-12-25 RX ADMIN — CEFTRIAXONE 1000 MG: 1 INJECTION, SOLUTION INTRAVENOUS at 21:51

## 2022-12-25 RX ADMIN — HYDROMORPHONE HYDROCHLORIDE 0.5 MG: 1 INJECTION, SOLUTION INTRAMUSCULAR; INTRAVENOUS; SUBCUTANEOUS at 03:18

## 2022-12-25 RX ADMIN — SODIUM CHLORIDE 125 ML/HR: 0.9 INJECTION, SOLUTION INTRAVENOUS at 06:22

## 2022-12-25 RX ADMIN — HEPARIN SODIUM 5000 UNITS: 5000 INJECTION INTRAVENOUS; SUBCUTANEOUS at 05:48

## 2022-12-25 RX ADMIN — CALCITONIN SALMON 1 SPRAY: 200 SPRAY, METERED NASAL at 13:36

## 2022-12-25 RX ADMIN — HYDROMORPHONE HYDROCHLORIDE 0.5 MG: 1 INJECTION, SOLUTION INTRAMUSCULAR; INTRAVENOUS; SUBCUTANEOUS at 06:20

## 2022-12-25 RX ADMIN — DEXTROSE AND SODIUM CHLORIDE 125 ML/HR: 5; .9 INJECTION, SOLUTION INTRAVENOUS at 23:39

## 2022-12-25 RX ADMIN — HEPARIN SODIUM 5000 UNITS: 5000 INJECTION INTRAVENOUS; SUBCUTANEOUS at 13:34

## 2022-12-25 RX ADMIN — LORAZEPAM 0.5 MG: 2 INJECTION INTRAMUSCULAR; INTRAVENOUS at 15:32

## 2022-12-25 RX ADMIN — METRONIDAZOLE 500 MG: 500 INJECTION, SOLUTION INTRAVENOUS at 15:04

## 2022-12-25 RX ADMIN — METRONIDAZOLE 500 MG: 500 INJECTION, SOLUTION INTRAVENOUS at 09:04

## 2022-12-25 RX ADMIN — HEPARIN SODIUM 5000 UNITS: 5000 INJECTION INTRAVENOUS; SUBCUTANEOUS at 21:52

## 2022-12-25 RX ADMIN — LORAZEPAM 1 MG: 2 INJECTION INTRAMUSCULAR; INTRAVENOUS at 23:58

## 2022-12-25 RX ADMIN — HYDROMORPHONE HYDROCHLORIDE 0.5 MG: 1 INJECTION, SOLUTION INTRAMUSCULAR; INTRAVENOUS; SUBCUTANEOUS at 14:57

## 2022-12-25 RX ADMIN — HYDROMORPHONE HYDROCHLORIDE 0.5 MG: 1 INJECTION, SOLUTION INTRAMUSCULAR; INTRAVENOUS; SUBCUTANEOUS at 23:16

## 2022-12-25 NOTE — ASSESSMENT & PLAN NOTE
· History of hypercalcemia  · Corrected calcium 13, ionized calcium 1 44 on admission-trending down  · IV hydration with saline  · Appreciate nephrology consultation  · SPEP interpretation from last admission revealed no monoclonal bands

## 2022-12-25 NOTE — PLAN OF CARE
Problem: Potential for Falls  Goal: Patient will remain free of falls  Description: INTERVENTIONS:  - Educate patient/family on patient safety including physical limitations  - Instruct patient to call for assistance with activity   - Consult OT/PT to assist with strengthening/mobility   - Keep Call bell within reach  - Keep bed low and locked with side rails adjusted as appropriate  - Keep care items and personal belongings within reach  - Initiate and maintain comfort rounds  - Make Fall Risk Sign visible to staff  - Offer Toileting every 2 Hours, in advance of need  - Initiate/Maintain alarm  - Obtain necessary fall risk management equipment  - Apply yellow socks and bracelet for high fall risk patients  - Consider moving patient to room near nurses station  Outcome: Progressing     Problem: Nutrition/Hydration-ADULT  Goal: Nutrient/Hydration intake appropriate for improving, restoring or maintaining nutritional needs  Description: Monitor and assess patient's nutrition/hydration status for malnutrition  Collaborate with interdisciplinary team and initiate plan and interventions as ordered  Monitor patient's weight and dietary intake as ordered or per policy  Utilize nutrition screening tool and intervene as necessary  Determine patient's food preferences and provide high-protein, high-caloric foods as appropriate       INTERVENTIONS:  - Monitor oral intake, urinary output, labs, and treatment plans  - Assess nutrition and hydration status and recommend course of action  - Evaluate amount of meals eaten  - Assist patient with eating if necessary   - Allow adequate time for meals  - Recommend/ encourage appropriate diets, oral nutritional supplements, and vitamin/mineral supplements  - Order, calculate, and assess calorie counts as needed  - Recommend, monitor, and adjust tube feedings and TPN/PPN based on assessed needs  - Assess need for intravenous fluids  - Provide specific nutrition/hydration education as appropriate  - Include patient/family/caregiver in decisions related to nutrition  Outcome: Progressing     Problem: MOBILITY - ADULT  Goal: Maintain or return to baseline ADL function  Description: INTERVENTIONS:  -  Assess patient's ability to carry out ADLs; assess patient's baseline for ADL function and identify physical deficits which impact ability to perform ADLs (bathing, care of mouth/teeth, toileting, grooming, dressing, etc )  - Assess/evaluate cause of self-care deficits   - Assess range of motion  - Assess patient's mobility; develop plan if impaired  - Assess patient's need for assistive devices and provide as appropriate  - Encourage maximum independence but intervene and supervise when necessary  - Involve family in performance of ADLs  - Assess for home care needs following discharge   - Consider OT consult to assist with ADL evaluation and planning for discharge  - Provide patient education as appropriate  Outcome: Progressing     Problem: MOBILITY - ADULT  Goal: Maintain or return to baseline ADL function  Description: INTERVENTIONS:  -  Assess patient's ability to carry out ADLs; assess patient's baseline for ADL function and identify physical deficits which impact ability to perform ADLs (bathing, care of mouth/teeth, toileting, grooming, dressing, etc )  - Assess/evaluate cause of self-care deficits   - Assess range of motion  - Assess patient's mobility; develop plan if impaired  - Assess patient's need for assistive devices and provide as appropriate  - Encourage maximum independence but intervene and supervise when necessary  - Involve family in performance of ADLs  - Assess for home care needs following discharge   - Consider OT consult to assist with ADL evaluation and planning for discharge  - Provide patient education as appropriate  Outcome: Progressing  Goal: Maintains/Returns to pre admission functional level  Description: INTERVENTIONS:  - Perform BMAT or MOVE assessment daily     - Set and communicate daily mobility goal to care team and patient/family/caregiver  - Collaborate with rehabilitation services on mobility goals if consulted  - Perform Range of Motion - times a day  - Reposition patient every - hours    - Dangle patient - times a day  - Stand patient - times a day  - Ambulate patient - times a day  - Out of bed to chair - times a day   - Out of bed for meals - times a day  - Out of bed for toileting  - Record patient progress and toleration of activity level   Outcome: Progressing     Problem: Prexisting or High Potential for Compromised Skin Integrity  Goal: Skin integrity is maintained or improved  Description: INTERVENTIONS:  - Identify patients at risk for skin breakdown  - Assess and monitor skin integrity  - Assess and monitor nutrition and hydration status  - Monitor labs   - Assess for incontinence   - Turn and reposition patient  - Assist with mobility/ambulation  - Relieve pressure over bony prominences  - Avoid friction and shearing  - Provide appropriate hygiene as needed including keeping skin clean and dry  - Evaluate need for skin moisturizer/barrier cream  - Collaborate with interdisciplinary team   - Patient/family teaching  - Consider wound care consult   Outcome: Progressing     Problem: PAIN - ADULT  Goal: Verbalizes/displays adequate comfort level or baseline comfort level  Description: Interventions:  - Encourage patient to monitor pain and request assistance  - Assess pain using appropriate pain scale  - Administer analgesics based on type and severity of pain and evaluate response  - Implement non-pharmacological measures as appropriate and evaluate response  - Consider cultural and social influences on pain and pain management  - Notify physician/advanced practitioner if interventions unsuccessful or patient reports new pain  Outcome: Progressing     Problem: INFECTION - ADULT  Goal: Absence or prevention of progression during hospitalization  Description: INTERVENTIONS:  - Assess and monitor for signs and symptoms of infection  - Monitor lab/diagnostic results  - Monitor all insertion sites, i e  indwelling lines, tubes, and drains  - Monitor endotracheal if appropriate and nasal secretions for changes in amount and color  - [place] appropriate cooling/warming therapies per order  - Administer medications as ordered  - Instruct and encourage patient and family to use good hand hygiene technique  - Identify and instruct in appropriate isolation precautions for identified infection/condition  Outcome: Progressing  Goal: Absence of fever/infection during neutropenic period  Description: INTERVENTIONS:  - Monitor WBC    Outcome: Progressing     Problem: DISCHARGE PLANNING  Goal: Discharge to home or other facility with appropriate resources  Description: INTERVENTIONS:  - Identify barriers to discharge w/patient and caregiver  - Arrange for needed discharge resources and transportation as appropriate  - Identify discharge learning needs (meds, wound care, etc )  - Arrange for interpretive services to assist at discharge as needed  - Refer to Case Management Department for coordinating discharge planning if the patient needs post-hospital services based on physician/advanced practitioner order or complex needs related to functional status, cognitive ability, or social support system  Outcome: Progressing     Problem: Knowledge Deficit  Goal: Patient/family/caregiver demonstrates understanding of disease process, treatment plan, medications, and discharge instructions  Description: Complete learning assessment and assess knowledge base    Interventions:  - Provide teaching at level of understanding  - Provide teaching via preferred learning methods  Outcome: Progressing     Problem: RESPIRATORY - ADULT  Goal: Achieves optimal ventilation and oxygenation  Description: INTERVENTIONS:  - Assess for changes in respiratory status  - Assess for changes in mentation and behavior  - Position to facilitate oxygenation and minimize respiratory effort  - Oxygen administered by appropriate delivery if ordered  - Initiate smoking cessation education as indicated  - Encourage broncho-pulmonary hygiene including cough, deep breathe, Incentive Spirometry  - Assess the need for suctioning and aspirate as needed  - Assess and instruct to report SOB or any respiratory difficulty  - Respiratory Therapy support as indicated  Outcome: Progressing

## 2022-12-25 NOTE — ASSESSMENT & PLAN NOTE
· Chronic normocytic anemia in setting of chronic kidney disease  · Iron deficient anemia history of poor oral intake  · Hemoglobin remaining stable  · No active bleeding noted  · Continue to monitor since patient remained on heparin infusion

## 2022-12-25 NOTE — ASSESSMENT & PLAN NOTE
· Requiring nasal cannula oxygen 4 L on admission  · Recently required supplemental oxygen in November during RSV bronchitis was but discharged home on room air  · Secondary to pneumonia  · Elevated D-dimer but unable to obtain CTA chest secondary to KELLY  · Continue respiratory protocol

## 2022-12-25 NOTE — PLAN OF CARE
Problem: Potential for Falls  Goal: Patient will remain free of falls  Description: INTERVENTIONS:  - Educate patient/family on patient safety including physical limitations  - Instruct patient to call for assistance with activity   - Consult OT/PT to assist with strengthening/mobility   - Keep Call bell within reach  - Keep bed low and locked with side rails adjusted as appropriate  - Keep care items and personal belongings within reach  - Initiate and maintain comfort rounds  - Make Fall Risk Sign visible to staff  - Offer Toileting every 2 Hours, in advance of need  - Initiate/Maintain bed/chair alarm  - Apply yellow socks and bracelet for high fall risk patients  - Consider moving patient to room near nurses station  Outcome: Progressing     Problem: Nutrition/Hydration-ADULT  Goal: Nutrient/Hydration intake appropriate for improving, restoring or maintaining nutritional needs  Description: Monitor and assess patient's nutrition/hydration status for malnutrition  Collaborate with interdisciplinary team and initiate plan and interventions as ordered  Monitor patient's weight and dietary intake as ordered or per policy  Utilize nutrition screening tool and intervene as necessary  Determine patient's food preferences and provide high-protein, high-caloric foods as appropriate       INTERVENTIONS:  - Monitor oral intake, urinary output, labs, and treatment plans  - Assess nutrition and hydration status and recommend course of action  - Evaluate amount of meals eaten  - Assist patient with eating if necessary   - Allow adequate time for meals  - Recommend/ encourage appropriate diets, oral nutritional supplements, and vitamin/mineral supplements  - Order, calculate, and assess calorie counts as needed  - Recommend, monitor, and adjust tube feedings and TPN/PPN based on assessed needs  - Assess need for intravenous fluids  - Provide specific nutrition/hydration education as appropriate  - Include patient/family/caregiver in decisions related to nutrition  Outcome: Progressing     Problem: MOBILITY - ADULT  Goal: Maintain or return to baseline ADL function  Description: INTERVENTIONS:  -  Assess patient's ability to carry out ADLs; assess patient's baseline for ADL function and identify physical deficits which impact ability to perform ADLs (bathing, care of mouth/teeth, toileting, grooming, dressing, etc )  - Assess/evaluate cause of self-care deficits   - Assess range of motion  - Assess patient's mobility; develop plan if impaired  - Assess patient's need for assistive devices and provide as appropriate  - Encourage maximum independence but intervene and supervise when necessary  - Involve family in performance of ADLs  - Assess for home care needs following discharge   - Consider OT consult to assist with ADL evaluation and planning for discharge  - Provide patient education as appropriate  Outcome: Progressing  Goal: Maintains/Returns to pre admission functional level  Description: INTERVENTIONS:  - Perform BMAT or MOVE assessment daily    - Set and communicate daily mobility goal to care team and patient/family/caregiver  - Collaborate with rehabilitation services on mobility goals if consulted  - Perform Range of Motion 3 times a day  - Reposition patient every 2 hours    - Out of bed for toileting  - Record patient progress and toleration of activity level   Outcome: Progressing     Problem: Prexisting or High Potential for Compromised Skin Integrity  Goal: Skin integrity is maintained or improved  Description: INTERVENTIONS:  - Identify patients at risk for skin breakdown  - Assess and monitor skin integrity  - Assess and monitor nutrition and hydration status  - Monitor labs   - Assess for incontinence   - Turn and reposition patient  - Assist with mobility/ambulation  - Relieve pressure over bony prominences  - Avoid friction and shearing  - Provide appropriate hygiene as needed including keeping skin clean and dry  - Evaluate need for skin moisturizer/barrier cream  - Collaborate with interdisciplinary team   - Patient/family teaching  - Consider wound care consult   Outcome: Progressing     Problem: PAIN - ADULT  Goal: Verbalizes/displays adequate comfort level or baseline comfort level  Description: Interventions:  - Encourage patient to monitor pain and request assistance  - Assess pain using appropriate pain scale  - Administer analgesics based on type and severity of pain and evaluate response  - Implement non-pharmacological measures as appropriate and evaluate response  - Consider cultural and social influences on pain and pain management  - Notify physician/advanced practitioner if interventions unsuccessful or patient reports new pain  Outcome: Progressing     Problem: INFECTION - ADULT  Goal: Absence or prevention of progression during hospitalization  Description: INTERVENTIONS:  - Assess and monitor for signs and symptoms of infection  - Monitor lab/diagnostic results  - Monitor all insertion sites, i e  indwelling lines, tubes, and drains  - Monitor endotracheal if appropriate and nasal secretions for changes in amount and color  - [place] appropriate cooling/warming therapies per order  - Administer medications as ordered  - Instruct and encourage patient and family to use good hand hygiene technique  - Identify and instruct in appropriate isolation precautions for identified infection/condition  Outcome: Progressing  Goal: Absence of fever/infection during neutropenic period  Description: INTERVENTIONS:  - Monitor WBC    Outcome: Progressing     Problem: DISCHARGE PLANNING  Goal: Discharge to home or other facility with appropriate resources  Description: INTERVENTIONS:  - Identify barriers to discharge w/patient and caregiver  - Arrange for needed discharge resources and transportation as appropriate  - Identify discharge learning needs (meds, wound care, etc )  - Arrange for interpretive services to assist at discharge as needed  - Refer to Case Management Department for coordinating discharge planning if the patient needs post-hospital services based on physician/advanced practitioner order or complex needs related to functional status, cognitive ability, or social support system  Outcome: Progressing     Problem: Knowledge Deficit  Goal: Patient/family/caregiver demonstrates understanding of disease process, treatment plan, medications, and discharge instructions  Description: Complete learning assessment and assess knowledge base    Interventions:  - Provide teaching at level of understanding  - Provide teaching via preferred learning methods  Outcome: Progressing     Problem: RESPIRATORY - ADULT  Goal: Achieves optimal ventilation and oxygenation  Description: INTERVENTIONS:  - Assess for changes in respiratory status  - Assess for changes in mentation and behavior  - Position to facilitate oxygenation and minimize respiratory effort  - Oxygen administered by appropriate delivery if ordered  - Initiate smoking cessation education as indicated  - Encourage broncho-pulmonary hygiene including cough, deep breathe, Incentive Spirometry  - Assess the need for suctioning and aspirate as needed  - Assess and instruct to report SOB or any respiratory difficulty  - Respiratory Therapy support as indicated  Outcome: Progressing

## 2022-12-25 NOTE — PROGRESS NOTES
[address]  Progress Note - Syliva Ahumada 1965, 62 y o  male MRN: [id number]  Unit/Bed#: -01 Encounter: [id number]  Primary Care Provider: Quita Ceballos DO   Date and time admitted to hospital: 12/23/2022  9:51 PM    Pneumoperitoneum  Assessment & Plan  Secondary to perforated sigmoid diverticulum  Patient remained asymptomatic on clinical exam  Neurosurgery consult appreciated-commend continue conservative management since patient remains high risk for any kind of surgery  Since WBC is trending up, repeat CT scan abdomen in a m  for further planning      Pneumonia  Assessment & Plan  · POA with cough productive for brown mucus  · CXR with infiltrate  · Empiric ceftriaxone   · Pending blood culture  · From culture reviewed  · MRSA bronchitis and November    KELLY (acute kidney injury) [facility])  Assessment & Plan  · Acute kidney injury on chronic kidney disease  · Appreciate nephrology consultation  · IV hydration  · Place Ortiz catheter  · Continue treatment as per nephrology recommendation    * Acute respiratory failure with hypoxia ([address] )  Assessment & Plan  · Requiring nasal cannula oxygen 4 L on admission  · Recently required supplemental oxygen in November during RSV bronchitis was but discharged home on room air  · Secondary to pneumonia  · Elevated D-dimer but unable to obtain CTA chest secondary to KELLY  · Continue respiratory protocol    Chronic back pain  Assessment & Plan  · Chronic back pain, history osteomyelitis  · Continue home buprenorphine, oxycodone, Lyrica  · PDMP reviewed    Acute on chronic anemia  Assessment & Plan  · Chronic normocytic anemia in setting of chronic kidney disease  · Iron deficient anemia history of poor oral intake  · Hemoglobin remaining stable  · No active bleeding noted  · Continue to monitor since patient remained on heparin infusion    Hypercalcemia  Assessment & Plan  · History of hypercalcemia  · Corrected calcium 13, ionized calcium 1 44 on admission-trending down  · IV hydration with saline  · Appreciate nephrology consultation  · SPEP interpretation from last admission revealed no monoclonal bands    Severe protein-calorie malnutrition ([address] )  Assessment & Plan  Malnutrition Findings: Loss of subcutaneous fat in orbital, triceps, ribcage areas  Loss of muscle at temples, clavicles, scapula, extremities  Consultation to dietitian        BMI Findings: Body mass index is 19 74 kg/m²  VTE Pharmacologic Prophylaxis: VTE Score: 3 Moderate Risk (Score 3-4) - Pharmacological DVT Prophylaxis Ordered: heparin  Patient Centered Rounds: I performed bedside rounds with nursing staff today  Discussions with Specialists or Other Care Team Provider: General surgery, nephrology    Education and Discussions with Family / Patient: Updated  (wife) via phone  Time Spent for Care: 15 minutes  More than 50% of total time spent on counseling and coordination of care as described above  Current Length of Stay: 2 day(s)  Current Patient Status: Inpatient   Certification Statement: The patient will continue to require additional inpatient hospital stay due to To monitor overall condition  Discharge Plan: Anticipate discharge in >72 hrs to To be determined    Code Status: Level 1 - Full Code    Subjective:   Seen and ambulated during the run  Resting comfortably  Complaining of lower back pain  Denies any nausea, vomiting  Denies any abdominal pain, denies any bowel movement  Objective:     Vitals:   Temp (24hrs), Av 1 °F (36 7 °C), Min:97 7 °F (36 5 °C), Max:98 6 °F (37 °C)    Temp:  [97 7 °F (36 5 °C)-98 6 °F (37 °C)] 97 7 °F (36 5 °C)  HR:  [71-80] 71  Resp:  [18-19] 18  BP: (143-147)/(72-77) 147/77  SpO2:  [86 %-94 %] 93 %  Body mass index is 19 74 kg/m²  Input and Output Summary (last 24 hours):      Intake/Output Summary (Last 24 hours) at 2022 1223  Last data filed at 2022 1375  Gross per 24 hour   Intake 2737 5 ml   Output 1800 ml   Net 937 5 ml       Physical Exam:   Physical Exam  Vitals and nursing note reviewed  Constitutional:       Appearance: Normal appearance  He is not ill-appearing or diaphoretic  Comments: Cachectic   HENT:      Nose: Nose normal       Mouth/Throat:      Mouth: Mucous membranes are moist       Pharynx: Oropharynx is clear  No oropharyngeal exudate  Eyes:      General: No scleral icterus  Left eye: No discharge  Extraocular Movements: Extraocular movements intact  Conjunctiva/sclera: Conjunctivae normal       Pupils: Pupils are equal, round, and reactive to light  Cardiovascular:      Rate and Rhythm: Normal rate  Heart sounds: No murmur heard  No friction rub  No gallop  Pulmonary:      Effort: Pulmonary effort is normal  No respiratory distress  Breath sounds: No stridor  No wheezing or rhonchi  Abdominal:      General: Abdomen is flat  Bowel sounds are normal  There is no distension  Palpations: There is no mass  Tenderness: There is no abdominal tenderness  Hernia: No hernia is present  Musculoskeletal:         General: Tenderness (lower back) present  No swelling, deformity or signs of injury  Normal range of motion  Cervical back: Normal range of motion  No rigidity  Lymphadenopathy:      Cervical: No cervical adenopathy  Skin:     General: Skin is warm  Neurological:      General: No focal deficit present  Mental Status: He is alert and oriented to person, place, and time  Cranial Nerves: No cranial nerve deficit  Sensory: No sensory deficit  Motor: No weakness        Coordination: Coordination normal          Additional Data:     Labs:  Results from last 7 days   Lab Units 12/25/22  0643   WBC Thousand/uL 13 27*   HEMOGLOBIN g/dL 9 7*   HEMATOCRIT % 31 6*   PLATELETS Thousands/uL 246   NEUTROS PCT % 77*   LYMPHS PCT % 14   MONOS PCT % 6   EOS PCT % 1     Results from last 7 days   Lab Units 12/25/22  0537 12/24/22  0637   SODIUM mmol/L 143 136   POTASSIUM mmol/L 4 4 5 2   CHLORIDE mmol/L 108 100   CO2 mmol/L 24 25   BUN mg/dL 52* 53*   CREATININE mg/dL 3 31* 3 98*   ANION GAP mmol/L 11 11   CALCIUM mg/dL 11 2* 11 5*   ALBUMIN g/dL  --  2 4*  2 4*   TOTAL BILIRUBIN mg/dL  --  0 21   ALK PHOS U/L  --  91   ALT U/L  --  13   AST U/L  --  10   GLUCOSE RANDOM mg/dL 94 213*     Results from last 7 days   Lab Units 12/23/22  2217   INR  1 05     Results from last 7 days   Lab Units 12/25/22  0016 12/24/22  1842 12/24/22  1155 12/24/22  0747 12/24/22  0103   POC GLUCOSE mg/dl 102 102 166* 213* 194*         Results from last 7 days   Lab Units 12/23/22  2217   LACTIC ACID mmol/L 1 0   PROCALCITONIN ng/ml 0 58*       Lines/Drains:  Invasive Devices     Peripheral Intravenous Line  Duration           Peripheral IV 12/23/22 Left Antecubital 1 day    Peripheral IV 12/24/22 Right;Ventral (anterior) Forearm 1 day          Drain  Duration           Urethral Catheter Coude 16 Fr  1 day              Urinary Catheter:  Goal for removal: Voiding trial when ambulation improves               Imaging: No pertinent imaging reviewed  Recent Cultures (last 7 days):   Results from last 7 days   Lab Units 12/24/22  0519 12/24/22  0049 12/23/22 2217   BLOOD CULTURE   --   --  Received in Microbiology Lab  Culture in Progress  Received in Microbiology Lab  Culture in Progress     SPUTUM CULTURE   --  4+ Growth of Staphylococcus aureus*  --    GRAM STAIN RESULT   --  1+ Epithelial Cells*  Rare Polys*  2+ Gram positive cocci in pairs*  1+ Gram negative rods*  --    URINE CULTURE  Culture too young- will reincubate  --   --        Last 24 Hours Medication List:   Current Facility-Administered Medications   Medication Dose Route Frequency Provider Last Rate   • albuterol  2 5 mg Nebulization Q4H PRN YURIDIA Chirinos     • Buprenorphine HCl  450 mcg Buccal BID Karyle Mariner, MD     • calcitonin (salmon) 1 spray Alternating Nares Daily Zac Millan MD     • cefTRIAXone  1,000 mg Intravenous Q24H Thao S June, CRNP 1,000 mg (12/24/22 8815)   • heparin (porcine)  5,000 Units Subcutaneous Q8H [facility] Thao S June, CRNP     • HYDROmorphone  0 5 mg Intravenous Q4H PRN Yolanda Ely MD     • insulin lispro  1-6 Units Subcutaneous Q6H Thao S June, CRNP     • metroNIDAZOLE  500 mg Intravenous Q8H Thao S June, CRNP 500 mg (12/25/22 1412)   • sodium chloride  125 mL/hr Intravenous Continuous Eleazar Lim MD          Today, Patient Was Seen By: Eleazar Lim MD    **Please Note: This note may have been constructed using a voice recognition system  **

## 2022-12-25 NOTE — ASSESSMENT & PLAN NOTE
Malnutrition Findings: Loss of subcutaneous fat in orbital, triceps, ribcage areas  Loss of muscle at temples, clavicles, scapula, extremities  Consultation to dietitian        BMI Findings: Body mass index is 19 74 kg/m²

## 2022-12-25 NOTE — ASSESSMENT & PLAN NOTE
· Acute kidney injury on chronic kidney disease  · Appreciate nephrology consultation  · IV hydration  · Place Ortiz catheter  · Continue treatment as per nephrology recommendation

## 2022-12-25 NOTE — ASSESSMENT & PLAN NOTE
· POA with cough productive for brown mucus  · CXR with infiltrate  · Empiric ceftriaxone   · Pending blood culture  · From culture reviewed  · MRSA bronchitis and November

## 2022-12-25 NOTE — PROGRESS NOTES
Progress Note - General Surgery   Jean Manuel Sr  62 y o  male MRN: [id number]  Unit/Bed#: -01 Encounter: [id number]    Assessment:  61 yo M with free air on CT, likely 2/2 diverticulum  Pneumonia, AVSS on RA  WBC 13 (13, 15)  KELLY, Cr improving  3 3 (3 9, 4 1)    Plan:  - cont conservative management for pneumoperitoneum  - IV abx  - trend WBC  - serial abd exams  - monitor Cr  - prn pain, antiemetic regimen  - dvt ppx    Rest of care per primary    Subjective/Objective     Subjective: c/o back pain, extremity pain  Denies any abd pain, nausea, vomiting  Feels hungry    Objective:     Blood pressure 143/76, pulse 78, temperature 98 1 °F (36 7 °C), resp  rate 19, height 5' 11" (1 803 m), weight 64 2 kg (141 lb 8 6 oz), SpO2 92 %  ,Body mass index is 19 74 kg/m²  Intake/Output Summary (Last 24 hours) at 12/25/2022 2638  Last data filed at 12/25/2022 0546  Gross per 24 hour   Intake 938 33 ml   Output 2200 ml   Net -1261 67 ml       Invasive Devices     Peripheral Intravenous Line  Duration           Peripheral IV 12/23/22 Left Antecubital 1 day    Peripheral IV 12/24/22 Right;Ventral (anterior) Forearm <1 day          Drain  Duration           Urethral Catheter Coude 16 Fr  1 day                Physical Exam  Vitals and nursing note reviewed  Constitutional:       General: He is not in acute distress  Appearance: Normal appearance  He is normal weight  He is not ill-appearing, toxic-appearing or diaphoretic  HENT:      Head: Normocephalic and atraumatic  Eyes:      Extraocular Movements: Extraocular movements intact  Cardiovascular:      Rate and Rhythm: Normal rate  Pulmonary:      Effort: Pulmonary effort is normal  No respiratory distress  Abdominal:      General: Abdomen is flat  There is no distension  Palpations: Abdomen is soft  Tenderness: There is no abdominal tenderness  There is no guarding or rebound  Skin:     General: Skin is warm        Capillary Refill: Capillary refill takes less than 2 seconds  Neurological:      General: No focal deficit present  Mental Status: He is alert and oriented to person, place, and time  Psychiatric:         Mood and Affect: Mood normal          Behavior: Behavior normal             Scheduled Meds:  Current Facility-Administered Medications   Medication Dose Route Frequency Provider Last Rate   • albuterol  2 5 mg Nebulization Q4H PRN Thao S June, CRNP     • Buprenorphine HCl  450 mcg Buccal BID Marly Ely MD     • cefTRIAXone  1,000 mg Intravenous Q24H Thao S June, CRNP 1,000 mg (12/24/22 2215)   • heparin (porcine)  5,000 Units Subcutaneous Q8H [address] Thao S June, CRNP     • HYDROmorphone  0 5 mg Intravenous Q4H PRN Marly Ely MD     • insulin lispro  1-6 Units Subcutaneous Q6H Thao S June, CRNP     • metroNIDAZOLE  500 mg Intravenous Q8H Thao S June, CRNP 500 mg (12/24/22 2306)   • sodium chloride  125 mL/hr Intravenous Continuous Mariya Almonte  mL/hr (12/25/22 0622)     Continuous Infusions:sodium chloride, 125 mL/hr, Last Rate: 125 mL/hr (12/25/22 0622)      PRN Meds: •  albuterol  •  HYDROmorphone      Lab, Imaging and other studies:  I have personally reviewed pertinent lab results    , CBC:   Lab Results   Component Value Date    WBC 13 55 (H) 12/24/2022    HGB 9 5 (L) 12/24/2022    HCT 30 1 (L) 12/24/2022    MCV 92 12/24/2022     12/24/2022    MCH 29 1 12/24/2022    MCHC 31 6 12/24/2022    RDW 16 3 (H) 12/24/2022    MPV 10 7 12/24/2022   , CMP:   Lab Results   Component Value Date    SODIUM 143 12/25/2022    K 4 4 12/25/2022     12/25/2022    CO2 24 12/25/2022    BUN 52 (H) 12/25/2022    CREATININE 3 31 (H) 12/25/2022    CALCIUM 11 2 (H) 12/25/2022    AST 10 12/24/2022    ALT 13 12/24/2022    ALKPHOS 91 12/24/2022    EGFR 19 12/25/2022   , Coagulation: No results found for: PT, INR, APTT, Urinalysis: No results found for: Jennifer Gutierrez, [facility] [address] [facility], LEUKOCYTESUR, NITRITE, PROTEINUA, LUZ MARIA Melton, BLOODU  VTE Pharmacologic Prophylaxis: Heparin  VTE Mechanical Prophylaxis: sequential compression device      Ciara Magallanes PA-C  12/25/2022 6:27 AM

## 2022-12-25 NOTE — PROGRESS NOTES
Progress Note - Nephrology   Romina Marie Sr  62 y o  male MRN: [id number]  Unit/Bed#: -01 Encounter: [id number]    A/P:  1  Acute kidney injury superimposed on chronic kidney disease stage IV   - He follows with Dr Juan Castro and was to be evaluated for permanent access for dialysis   - His baseline creatinine is 2 5 to 2 9 mg/dL   - He does have a history of obstructive uropathy with bilateral hydronephrosis and a neurogenic bladder due to diabetes   - Long history of diabetic nephropathy   - Admitted with a creatinine of 4 31  Treated with IV fluids with improvement to 3 31 mg per test   - Continue IV fluids and daily studies     2  Hypercalcemia   -Work-up in the past was unrevealing   - Calcium is 11 2 today (11 8)   - Will administer Miacalcin nasal spray and recheck in the morning   - SPEP, UPEP, 120 5D, PTH and PTH RP were all low in the past    3  Chronic kidney disease stage IV   - Due to underlying diabetic nephropathy    4  Acute on chronic anemia   - Hemoglobin is adequate no indication for ZACKARY    5  Pneumonia   - Groundglass reticulonodular and alveolar opacities noted bilaterally most prominent in the inferior left lingula and left lower lobe as described   - Patient receiving doxycycline    6    Abdominal pain   - Ruptured diverticulum with a small amount of free   - Seen by general surgery-conservative treatment      Follow up reason for today's visit: KELLY and hypercalcemia    Pneumonia    Patient Active Problem List   Diagnosis   • KELLY (acute kidney injury) ([facility] [address] )   • Hyperkalemia   • Diabetes mellitus type 2, insulin dependent ([facility] [address] )   • Gout   • Essential hypertension   • History of CVA (cerebrovascular accident)   • Osteomyelitis of vertebra of lumbar region [facility])   • Severe protein-calorie malnutrition (HCC)   • Iron deficiency anemia secondary to inadequate dietary iron intake   • Hip pain, acute, left   • Acute urinary retention   • Hypercalcemia   • Low back pain   • Ambulatory dysfunction   • Stage 3a chronic kidney disease (HCC)   • Chronic ulcer of left heel (HCC)   • Acute on chronic anemia   • RSV infection   • Acute respiratory failure with hypoxia (HCC)   • Acute cystitis   • Moderate protein-calorie malnutrition (HCC)   • Pneumonia   • Chronic back pain         Subjective:   A 10 point review of systems is unremarkable  He is voiding well clear yellow urine    Objective:     Vitals: Blood pressure 147/77, pulse 71, temperature 97 7 °F (36 5 °C), resp  rate 18, height 5' 11" (1 803 m), weight 64 2 kg (141 lb 8 6 oz), SpO2 93 %  ,Body mass index is 19 74 kg/m²  Weight (last 2 days)     Date/Time Weight    12/25/22 0542 64 2 (141 54)    12/23/22 2346 63 (138 89)    12/23/22 2150 64 (141 09)            Intake/Output Summary (Last 24 hours) at 12/25/2022 1125  Last data filed at 12/25/2022 1862  Gross per 24 hour   Intake 3675 83 ml   Output 2200 ml   Net 1475 83 ml     I/O last 3 completed shifts: In: 938 3 [I V :938 3]  Out: 8113 [Urine:3325]    Urethral Catheter Coude 16 Fr   (Active)   Amt returned on insertion(mL) 125 mL 12/24/22 0450   Reasons to continue Urinary Catheter  Acute urinary retention/obstruction failing urinary retention protocol 12/25/22 0922   Goal for Removal Will consult urology 12/24/22 1952   Site Assessment Clean;Skin intact 12/25/22 0922   Ortiz Care Other (Comment) 12/25/22 0814   Collection Container Standard drainage bag 12/25/22 0922   Securement Method Securing device (Describe) 12/25/22 0922   Securing Device Change Date 12/31/22 12/25/22 0922   Output (mL) 1800 mL 12/25/22 0546   CAUTI Time-out performed before Urine Culture Yes 12/24/22 0450       Physical Exam: /77   Pulse 71   Temp 97 7 °F (36 5 °C)   Resp 18   Ht 5' 11" (1 803 m)   Wt 64 2 kg (141 lb 8 6 oz)   SpO2 93%   BMI 19 74 kg/m²     General Appearance:    Alert, cooperative, obese no distress, appears stated age   Head:    Normocephalic, without obvious abnormality, atraumatic   Eyes:    Conjunctiva/corneas clear   Ears:    Normal external ears   Nose:   Nares normal, septum midline, mucosa normal, no drainage    or sinus tenderness   Throat:   Lips, mucosa, and tongue normal; teeth and gums normal   Neck:   Supple, symmetrical, trachea midline, no adenopathy;        thyroid:  No enlargement/tenderness/nodules; no carotid    bruit or JVD   Back:     Symmetric, no curvature, ROM normal, no CVA tenderness   Lungs:     Clear to auscultation bilaterally, respirations unlabored   Chest wall:    No tenderness or deformity   Heart:    Regular rate and rhythm, S1 and S2 normal, no murmur, rub   or gallop   Abdomen:     Soft, non-tender, bowel sounds active   Extremities:   Extremities normal, atraumatic, no cyanosis or edema   Skin:   Skin color, texture, turgor normal, no rashes or lesions   Lymph nodes:   Cervical normal   Neurologic:   CNII-XII intact            Lab, Imaging and other studies: I have personally reviewed pertinent labs  CBC:   Lab Results   Component Value Date    WBC 13 27 (H) 12/25/2022    HGB 9 7 (L) 12/25/2022    HCT 31 6 (L) 12/25/2022    MCV 92 12/25/2022     12/25/2022    MCH 28 4 12/25/2022    MCHC 30 7 (L) 12/25/2022    RDW 16 1 (H) 12/25/2022    MPV 9 7 12/25/2022    NRBC 0 12/25/2022     CMP:   Lab Results   Component Value Date    K 4 4 12/25/2022     12/25/2022    CO2 24 12/25/2022    BUN 52 (H) 12/25/2022    CREATININE 3 31 (H) 12/25/2022    CALCIUM 11 2 (H) 12/25/2022    EGFR 19 12/25/2022           Results from last 7 days   Lab Units 12/25/22  0537 12/24/22  0637 12/23/22  2217   POTASSIUM mmol/L 4 4 5 2 4 6   CHLORIDE mmol/L 108 100 99   CO2 mmol/L 24 25 28   BUN mg/dL 52* 53* 54*   CREATININE mg/dL 3 31* 3 98* 4 10*   CALCIUM mg/dL 11 2* 11 5* 11 8*   ALK PHOS U/L  --  91 92   ALT U/L  --  13 13   AST U/L  --  10 12         Phosphorus: No results found for: PHOS  Magnesium: No results found for: MG  Urinalysis: No results found for: Shannan Safer, SPECGRAV, PHUR, LEUKOCYTESUR, NITRITE, PROTEINUA, GLUCOSEU, KETONESU, BILIRUBINUR, BLOODU  Ionized Calcium: No results found for: CAION  Coagulation: No results found for: PT, INR, APTT  Troponin: No results found for: TROPONINI  ABG: No results found for: PHART, DRQ1FHI, PO2ART, TLM5MHW, W1TNHEJG, BEART, SOURCE  Radiology review:     IMAGING  Procedure: CT abdomen pelvis wo contrast    Result Date: 12/24/2022  Narrative: CT ABDOMEN AND PELVIS WITHOUT IV CONTRAST INDICATION:   evaluate free air on CT chest  COMPARISON:  CT chest 12/23/2022 TECHNIQUE:  CT examination of the abdomen and pelvis was performed without intravenous contrast  Axial, sagittal, and coronal 2D reformatted images were created from the source data and submitted for interpretation  Radiation dose length product (DLP) for this visit:  638 mGy-cm   This examination, like all CT scans performed in the [facility], was performed utilizing techniques to minimize radiation dose exposure, including the use of iterative reconstruction and automated exposure control  Enteric contrast was not administered  FINDINGS: ABDOMEN LOWER CHEST:  Airspace disease redemonstrated in the bilateral lower lobes, left greater than right, suspicious for infectious or inflammatory pneumonitis; better evaluated on the recent prior chest CT  Mild coronary atherosclerosis  Small pericardial effusion  LIVER/BILIARY TREE:  Unremarkable  GALLBLADDER:  No calcified gallstones  No pericholecystic inflammatory change  SPLEEN:  Unremarkable  PANCREAS:  Unremarkable  ADRENAL GLANDS:  Unremarkable  KIDNEYS/URETERS:  Mild bilateral hydronephrosis 1 6 cm cyst in the midportion of the left kidney  Bilateral kidneys otherwise appear grossly unremarkable  STOMACH AND BOWEL:  Moderate amount of stool in the colon  Focal diverticulum suspected in the distal descending/proximal sigmoid colon (coronal image 68, series 601    Small amount of fluid and intraperitoneal air is seen in this region in the left hemipelvis, otherwise grossly unremarkable  APPENDIX:  Normal caliber appendix  ABDOMINOPELVIC CAVITY:  Small amount of intraperitoneal free air  Most prominent dependently, redemonstrated from recent prior CT  No bulky adenopathy  VESSELS:  Moderate atherosclerosis; no aortic aneurysm  PELVIS REPRODUCTIVE ORGANS:  Unremarkable for patient's age  URINARY BLADDER:  Distended bladder with bladder volume estimated at 1050 mL, consider urinary retention  If the patient cannot spontaneously void, consider catheter decompression ABDOMINAL WALL/INGUINAL REGIONS:  Mild body wall edema OSSEOUS STRUCTURES:  Prominent disc space space narrowing and erosive changes at L4/L5, suspect old discitis/osteomyelitis  Status post laminectomy at L4-S1  No acute fracture is seen  Impression: Pneumoperitoneum redemonstrated, as described  Focal diverticulum suspected in the distal descending/proximal sigmoid colon (coronal image 68, series 601)  Small amount of fluid and intraperitoneal air is seen in this region in the left hemipelvis, nonspecific but perforated diverticulum is considered as a source of the pneumoperitoneum  Airspace disease redemonstrated in the bilateral lower lobes, left greater than right, suspicious for infectious or inflammatory pneumonitis; better evaluated on the recent prior chest CT  Distended bladder with bladder volume estimated at 1050 mL, with bilateral mild hydroureteronephrosis; consider urinary retention  If the patient cannot spontaneously void, consider catheter decompression  Left renal cyst, coronary atherosclerosis, small pericardial effusion, and other findings as above  The study was marked in [facility] for immediate notification  Workstation performed: [id number]     Procedure: XR chest 1 view portable    Result Date: 12/24/2022  Narrative: CHEST INDICATION:   sob  COMPARISON:  Chest radiograph November 16, 2022    Correlation with subsequent CT chest and CT abdomen pelvis EXAM PERFORMED/VIEWS:  XR CHEST PORTABLE FINDINGS:  Pneumoperitoneum  Cardiomediastinal silhouette appears unremarkable  Reticular opacities in the lower lungs, left greater than right  No pleural effusion or pneumothorax  Osseous structures appear within normal limits for patient age  Impression: Pneumoperitoneum  Reticular opacities in the lower lungs correspond to bronchial wall thickening evident on the subsequently performed CT Workstation performed: [id number]     Procedure: CT chest wo contrast    Result Date: 12/24/2022  Narrative: CT CHEST WITHOUT IV CONTRAST INDICATION:   Pneumonia, unresolved pneumonia  COMPARISON:  CT abdomen pelvis dated 10/20/2022 TECHNIQUE: CT examination of the chest was performed without intravenous contrast  Axial, sagittal, and coronal 2D reformatted images were created from the source data and submitted for interpretation  Radiation dose length product (DLP) for this visit:  214 mGy-cm   This examination, like all CT scans performed in the [facility], was performed utilizing techniques to minimize radiation dose exposure, including the use of iterative reconstruction and automated exposure control  FINDINGS: LUNGS:  Scattered groundglass and reticular nodular opacities are seen in the right lower lobe  There are more prominent reticulonodular, groundglass, alveolar opacities seen in the inferior left lingula as well as throughout the left lower lobe, suspect multifocal infectious or inflammatory pneumonitis  Correlation with the patient's symptoms and laboratory values recommended  PLEURA:  Unremarkable  HEART/GREAT VESSELS: Moderate coronary atherosclerosis  Heart appears normal in size  Mild aortic atherosclerosis  No aortic aneurysm  Small pericardial effusion  No thoracic aortic aneurysm  MEDIASTINUM AND ELTON:  Shotty mediastinal lymph nodes, nonspecific, possibly reactive   CHEST WALL AND LOWER NECK: Unremarkable  VISUALIZED STRUCTURES IN THE UPPER ABDOMEN:  Small amount of intraperitoneal free air is noted, in the absence of recent surgical intervention, this raises the suspicion for perforated viscus  Correlation with the patient's symptoms and history recommended  OSSEOUS STRUCTURES:  No acute fracture or destructive osseous lesion  Impression: Groundglass, reticulonodular, and alveolar opacities noted bilaterally, most prominently in the inferior left lingula and left lower lobe, as described  Suspected multifocal infectious or inflammatory pneumonitis  Correlation with the patient's symptoms and laboratory values recommended  Small amount of intraperitoneal free air is noted, in the absence of recent surgical intervention, this raises the suspicion for perforated viscus  Correlation with the patient's symptoms and history recommended  Additional imaging may be considered as  clinically warranted  Coronary atherosclerosis, small pericardial effusion, and other findings as above    I personally discussed this study with VERNON Anderson on 12/24/2022 at 3:05 AM  Workstation performed: [id number]       Current Facility-Administered Medications   Medication Dose Route Frequency   • albuterol inhalation solution 2 5 mg  2 5 mg Nebulization Q4H PRN   • Buprenorphine HCl FILM 450 mcg  450 mcg Buccal BID   • calcitonin (salmon) (MIACALCIN) 200 units/act nasal spray 1 spray  1 spray Alternating Nares Daily   • cefTRIAXone (ROCEPHIN) IVPB (premix in dextrose) 1,000 mg 50 mL  1,000 mg Intravenous Q24H   • heparin (porcine) subcutaneous injection 5,000 Units  5,000 Units Subcutaneous Q8H [facility]   • HYDROmorphone (DILAUDID) injection 0 5 mg  0 5 mg Intravenous Q4H PRN   • insulin lispro (HumaLOG) 100 units/mL subcutaneous injection 1-6 Units  1-6 Units Subcutaneous Q6H   • metroNIDAZOLE (FLAGYL) IVPB (premix) 500 mg 100 mL  500 mg Intravenous Q8H   • sodium chloride 0 9 % infusion  125 mL/hr Intravenous Continuous Medications Discontinued During This Encounter   Medication Reason   • Buprenorphine HCl FILM 300 mcg    • oxyCODONE (ROXICODONE) IR tablet 5 mg    • allopurinol (ZYLOPRIM) tablet 100 mg    • amLODIPine (NORVASC) tablet 10 mg    • carvedilol (COREG) tablet 6 25 mg    • cyclobenzaprine (FLEXERIL) tablet 5 mg    • insulin glargine (LANTUS) subcutaneous injection 5 Units 0 05 mL    • polyethylene glycol (MIRALAX) packet 17 g    • pregabalin (LYRICA) capsule 75 mg    • senna-docusate sodium (SENOKOT S) 8 6-50 mg per tablet 1 tablet    • tamsulosin (FLOMAX) capsule 0 4 mg    • venlafaxine (EFFEXOR-XR) 24 hr capsule 37 5 mg    • insulin lispro (HumaLOG) 100 units/mL subcutaneous injection 1-6 Units    • insulin lispro (HumaLOG) 100 units/mL subcutaneous injection 1-6 Units    • HYDROmorphone (DILAUDID) injection 0 5 mg    • HYDROmorphone (DILAUDID) injection 0 5 mg    • Buprenorphine HCl FILM 300 mcg Formulary change   • sodium chloride 0 9 % infusion    • atorvastatin (LIPITOR) tablet 40 mg    • acetaminophen (TYLENOL) tablet 650 mg        Malik Patel MD      This progress note was produced in part using a dictation device which may document imprecise wording from author's original intent

## 2022-12-26 ENCOUNTER — APPOINTMENT (INPATIENT)
Dept: RADIOLOGY | Facility: HOSPITAL | Age: 57
End: 2022-12-26

## 2022-12-26 ENCOUNTER — APPOINTMENT (INPATIENT)
Dept: CT IMAGING | Facility: HOSPITAL | Age: 57
End: 2022-12-26

## 2022-12-26 LAB
ALBUMIN SERPL BCP-MCNC: 2.5 G/DL (ref 3.5–5)
ALP SERPL-CCNC: 77 U/L (ref 46–116)
ALT SERPL W P-5'-P-CCNC: <6 U/L (ref 12–78)
ANION GAP SERPL CALCULATED.3IONS-SCNC: 14 MMOL/L (ref 4–13)
AST SERPL W P-5'-P-CCNC: 8 U/L (ref 5–45)
ATRIAL RATE: 83 BPM
BACTERIA SPT RESP CULT: ABNORMAL
BACTERIA SPT RESP CULT: ABNORMAL
BASOPHILS # BLD AUTO: 0.05 THOUSANDS/ÂΜL (ref 0–0.1)
BASOPHILS NFR BLD AUTO: 1 % (ref 0–1)
BILIRUB SERPL-MCNC: 0.27 MG/DL (ref 0.2–1)
BUN SERPL-MCNC: 39 MG/DL (ref 5–25)
CA-I BLD-SCNC: 1.37 MMOL/L (ref 1.12–1.32)
CALCIUM ALBUM COR SERPL-MCNC: 11.7 MG/DL (ref 8.3–10.1)
CALCIUM SERPL-MCNC: 10.5 MG/DL (ref 8.3–10.1)
CHLORIDE SERPL-SCNC: 109 MMOL/L (ref 96–108)
CO2 SERPL-SCNC: 22 MMOL/L (ref 21–32)
CREAT SERPL-MCNC: 2.85 MG/DL (ref 0.6–1.3)
EOSINOPHIL # BLD AUTO: 0.2 THOUSAND/ÂΜL (ref 0–0.61)
EOSINOPHIL NFR BLD AUTO: 2 % (ref 0–6)
ERYTHROCYTE [DISTWIDTH] IN BLOOD BY AUTOMATED COUNT: 16 % (ref 11.6–15.1)
GFR SERPL CREATININE-BSD FRML MDRD: 23 ML/MIN/1.73SQ M
GLUCOSE SERPL-MCNC: 166 MG/DL (ref 65–140)
GLUCOSE SERPL-MCNC: 183 MG/DL (ref 65–140)
GLUCOSE SERPL-MCNC: 201 MG/DL (ref 65–140)
GLUCOSE SERPL-MCNC: 204 MG/DL (ref 65–140)
GLUCOSE SERPL-MCNC: 234 MG/DL (ref 65–140)
GRAM STN SPEC: ABNORMAL
HCT VFR BLD AUTO: 31.2 % (ref 36.5–49.3)
HGB BLD-MCNC: 10 G/DL (ref 12–17)
IMM GRANULOCYTES # BLD AUTO: 0.12 THOUSAND/UL (ref 0–0.2)
IMM GRANULOCYTES NFR BLD AUTO: 1 % (ref 0–2)
LYMPHOCYTES # BLD AUTO: 1.13 THOUSANDS/ÂΜL (ref 0.6–4.47)
LYMPHOCYTES NFR BLD AUTO: 11 % (ref 14–44)
MCH RBC QN AUTO: 29.3 PG (ref 26.8–34.3)
MCHC RBC AUTO-ENTMCNC: 32.1 G/DL (ref 31.4–37.4)
MCV RBC AUTO: 92 FL (ref 82–98)
MONOCYTES # BLD AUTO: 0.62 THOUSAND/ÂΜL (ref 0.17–1.22)
MONOCYTES NFR BLD AUTO: 6 % (ref 4–12)
NEUTROPHILS # BLD AUTO: 8.5 THOUSANDS/ÂΜL (ref 1.85–7.62)
NEUTS SEG NFR BLD AUTO: 79 % (ref 43–75)
NRBC BLD AUTO-RTO: 0 /100 WBCS
P AXIS: 79 DEGREES
PLATELET # BLD AUTO: 256 THOUSANDS/UL (ref 149–390)
PMV BLD AUTO: 10 FL (ref 8.9–12.7)
POTASSIUM SERPL-SCNC: 3.3 MMOL/L (ref 3.5–5.3)
PR INTERVAL: 170 MS
PROT SERPL-MCNC: 6.3 G/DL (ref 6.4–8.4)
QRS AXIS: -18 DEGREES
QRSD INTERVAL: 90 MS
QT INTERVAL: 358 MS
QTC INTERVAL: 420 MS
RBC # BLD AUTO: 3.41 MILLION/UL (ref 3.88–5.62)
SODIUM SERPL-SCNC: 145 MMOL/L (ref 135–147)
T WAVE AXIS: 100 DEGREES
VENTRICULAR RATE: 83 BPM
WBC # BLD AUTO: 10.62 THOUSAND/UL (ref 4.31–10.16)

## 2022-12-26 RX ORDER — INSULIN LISPRO 100 [IU]/ML
1-5 INJECTION, SOLUTION INTRAVENOUS; SUBCUTANEOUS
Status: DISCONTINUED | OUTPATIENT
Start: 2022-12-26 | End: 2022-12-28 | Stop reason: HOSPADM

## 2022-12-26 RX ORDER — TAMSULOSIN HYDROCHLORIDE 0.4 MG/1
0.4 CAPSULE ORAL
Status: DISCONTINUED | OUTPATIENT
Start: 2022-12-26 | End: 2022-12-28 | Stop reason: HOSPADM

## 2022-12-26 RX ORDER — AMLODIPINE BESYLATE 10 MG/1
10 TABLET ORAL DAILY
Status: DISCONTINUED | OUTPATIENT
Start: 2022-12-27 | End: 2022-12-28 | Stop reason: HOSPADM

## 2022-12-26 RX ORDER — ATORVASTATIN CALCIUM 40 MG/1
40 TABLET, FILM COATED ORAL
Status: DISCONTINUED | OUTPATIENT
Start: 2022-12-26 | End: 2022-12-28 | Stop reason: HOSPADM

## 2022-12-26 RX ORDER — CYCLOBENZAPRINE HCL 10 MG
5 TABLET ORAL 3 TIMES DAILY
Status: DISCONTINUED | OUTPATIENT
Start: 2022-12-26 | End: 2022-12-28 | Stop reason: HOSPADM

## 2022-12-26 RX ORDER — VENLAFAXINE HYDROCHLORIDE 37.5 MG/1
37.5 CAPSULE, EXTENDED RELEASE ORAL 3 TIMES DAILY
Status: DISCONTINUED | OUTPATIENT
Start: 2022-12-26 | End: 2022-12-28 | Stop reason: HOSPADM

## 2022-12-26 RX ORDER — VANCOMYCIN HYDROCHLORIDE 1 G/200ML
15 INJECTION, SOLUTION INTRAVENOUS EVERY 24 HOURS
Status: DISCONTINUED | OUTPATIENT
Start: 2022-12-26 | End: 2022-12-26

## 2022-12-26 RX ORDER — PREGABALIN 75 MG/1
75 CAPSULE ORAL DAILY
Status: DISCONTINUED | OUTPATIENT
Start: 2022-12-26 | End: 2022-12-28 | Stop reason: HOSPADM

## 2022-12-26 RX ORDER — MEGESTROL ACETATE 40 MG/ML
400 SUSPENSION ORAL DAILY
Status: DISCONTINUED | OUTPATIENT
Start: 2022-12-26 | End: 2022-12-28 | Stop reason: HOSPADM

## 2022-12-26 RX ORDER — OXYCODONE HYDROCHLORIDE 5 MG/1
5 TABLET ORAL EVERY 6 HOURS PRN
Status: DISCONTINUED | OUTPATIENT
Start: 2022-12-26 | End: 2022-12-28 | Stop reason: HOSPADM

## 2022-12-26 RX ORDER — SODIUM CHLORIDE, SODIUM GLUCONATE, SODIUM ACETATE, POTASSIUM CHLORIDE, MAGNESIUM CHLORIDE, SODIUM PHOSPHATE, DIBASIC, AND POTASSIUM PHOSPHATE .53; .5; .37; .037; .03; .012; .00082 G/100ML; G/100ML; G/100ML; G/100ML; G/100ML; G/100ML; G/100ML
100 INJECTION, SOLUTION INTRAVENOUS CONTINUOUS
Status: DISCONTINUED | OUTPATIENT
Start: 2022-12-26 | End: 2022-12-27

## 2022-12-26 RX ORDER — POTASSIUM CHLORIDE 20 MEQ/1
20 TABLET, EXTENDED RELEASE ORAL ONCE
Status: COMPLETED | OUTPATIENT
Start: 2022-12-26 | End: 2022-12-26

## 2022-12-26 RX ORDER — INSULIN LISPRO 100 [IU]/ML
1-6 INJECTION, SOLUTION INTRAVENOUS; SUBCUTANEOUS
Status: DISCONTINUED | OUTPATIENT
Start: 2022-12-26 | End: 2022-12-28 | Stop reason: HOSPADM

## 2022-12-26 RX ORDER — CARVEDILOL 6.25 MG/1
6.25 TABLET ORAL 2 TIMES DAILY WITH MEALS
Status: DISCONTINUED | OUTPATIENT
Start: 2022-12-26 | End: 2022-12-28 | Stop reason: HOSPADM

## 2022-12-26 RX ORDER — VANCOMYCIN HYDROCHLORIDE 500 MG/100ML
10 INJECTION, SOLUTION INTRAVENOUS ONCE AS NEEDED
Status: DISCONTINUED | OUTPATIENT
Start: 2022-12-27 | End: 2022-12-27

## 2022-12-26 RX ADMIN — HYDROMORPHONE HYDROCHLORIDE 0.5 MG: 1 INJECTION, SOLUTION INTRAMUSCULAR; INTRAVENOUS; SUBCUTANEOUS at 15:13

## 2022-12-26 RX ADMIN — INSULIN LISPRO 1 UNITS: 100 INJECTION, SOLUTION INTRAVENOUS; SUBCUTANEOUS at 06:15

## 2022-12-26 RX ADMIN — CEFTRIAXONE 1000 MG: 1 INJECTION, SOLUTION INTRAVENOUS at 22:02

## 2022-12-26 RX ADMIN — MEGESTROL ACETATE 400 MG: 400 SUSPENSION ORAL at 15:14

## 2022-12-26 RX ADMIN — BUPRENORPHINE HYDROCHLORIDE 450 MCG: 450 FILM, SOLUBLE BUCCAL at 08:27

## 2022-12-26 RX ADMIN — LORAZEPAM 0.5 MG: 2 INJECTION INTRAMUSCULAR; INTRAVENOUS at 15:50

## 2022-12-26 RX ADMIN — INSULIN LISPRO 1 UNITS: 100 INJECTION, SOLUTION INTRAVENOUS; SUBCUTANEOUS at 13:05

## 2022-12-26 RX ADMIN — METRONIDAZOLE 500 MG: 500 INJECTION, SOLUTION INTRAVENOUS at 15:14

## 2022-12-26 RX ADMIN — METRONIDAZOLE 500 MG: 500 INJECTION, SOLUTION INTRAVENOUS at 08:28

## 2022-12-26 RX ADMIN — HEPARIN SODIUM 5000 UNITS: 5000 INJECTION INTRAVENOUS; SUBCUTANEOUS at 05:32

## 2022-12-26 RX ADMIN — CYCLOBENZAPRINE 5 MG: 10 TABLET, FILM COATED ORAL at 21:54

## 2022-12-26 RX ADMIN — BUPRENORPHINE HYDROCHLORIDE 450 MCG: 450 FILM, SOLUBLE BUCCAL at 21:55

## 2022-12-26 RX ADMIN — VANCOMYCIN HYDROCHLORIDE 1500 MG: 1 INJECTION, POWDER, LYOPHILIZED, FOR SOLUTION INTRAVENOUS at 13:04

## 2022-12-26 RX ADMIN — HEPARIN SODIUM 5000 UNITS: 5000 INJECTION INTRAVENOUS; SUBCUTANEOUS at 21:55

## 2022-12-26 RX ADMIN — DEXTROSE AND SODIUM CHLORIDE 125 ML/HR: 5; .9 INJECTION, SOLUTION INTRAVENOUS at 06:39

## 2022-12-26 RX ADMIN — METRONIDAZOLE 500 MG: 500 INJECTION, SOLUTION INTRAVENOUS at 23:45

## 2022-12-26 RX ADMIN — SODIUM CHLORIDE, SODIUM GLUCONATE, SODIUM ACETATE, POTASSIUM CHLORIDE AND MAGNESIUM CHLORIDE 100 ML/HR: 526; 502; 368; 37; 30 INJECTION, SOLUTION INTRAVENOUS at 13:04

## 2022-12-26 RX ADMIN — POTASSIUM CHLORIDE 20 MEQ: 1500 TABLET, EXTENDED RELEASE ORAL at 13:04

## 2022-12-26 RX ADMIN — INSULIN LISPRO 3 UNITS: 100 INJECTION, SOLUTION INTRAVENOUS; SUBCUTANEOUS at 16:53

## 2022-12-26 RX ADMIN — VENLAFAXINE HYDROCHLORIDE 37.5 MG: 37.5 CAPSULE, EXTENDED RELEASE ORAL at 21:54

## 2022-12-26 RX ADMIN — HEPARIN SODIUM 5000 UNITS: 5000 INJECTION INTRAVENOUS; SUBCUTANEOUS at 13:04

## 2022-12-26 RX ADMIN — CALCITONIN SALMON 1 SPRAY: 200 SPRAY, METERED NASAL at 08:27

## 2022-12-26 RX ADMIN — INSULIN LISPRO 1 UNITS: 100 INJECTION, SOLUTION INTRAVENOUS; SUBCUTANEOUS at 21:55

## 2022-12-26 NOTE — ASSESSMENT & PLAN NOTE
· Acute kidney injury on chronic kidney disease  · Appreciate nephrology consultation  · IV hydration  · Place Ortiz catheter  · Continue treatment as per nephrology recommendation  · Improving with IVF    Lab Results   Component Value Date    CREATININE 2 85 (H) 12/26/2022    CREATININE 3 31 (H) 12/25/2022    CREATININE 3 98 (H) 12/24/2022

## 2022-12-26 NOTE — PROGRESS NOTES
[address]  Progress Note - Marc Schwab 1965, 62 y o  male MRN: [id number]  Unit/Bed#: -01 Encounter: [id number]  Primary Care Provider: Korene Aase, DO   Date and time admitted to hospital: 12/23/2022  9:51 PM    KELLY (acute kidney injury) [facility])  Assessment & Plan  · Acute kidney injury on chronic kidney disease  · Appreciate nephrology consultation  · IV hydration  · Place Ortiz catheter  · Continue treatment as per nephrology recommendation  · Improving with IVF    Lab Results   Component Value Date    CREATININE 2 85 (H) 12/26/2022    CREATININE 3 31 (H) 12/25/2022    CREATININE 3 98 (H) 12/24/2022         Hypercalcemia  Assessment & Plan  · History of hypercalcemia  · Corrected calcium 13, ionized calcium 1 44 on admission-trending down  · IV hydration with saline  · Appreciate nephrology consultation  · SPEP interpretation from last admission revealed no monoclonal bands  · Improving with calcitonin nasal spray     Lab Results   Component Value Date    CALCIUM 10 5 (H) 12/26/2022    CALCIUM 11 2 (H) 12/25/2022    CALCIUM 11 5 (H) 12/24/2022         Pneumoperitoneum  Assessment & Plan  · Secondary to perforated sigmoid diverticulum  · Patient remained asymptomatic on clinical exam  · Surgery consult appreciated-commend continue conservative management since patient remains high risk for any kind of surgery  · Advance to clear liquid diet pending results of CT   · Since WBC is trending up but now down  · Repeat CT scan abdomen in process    Chronic back pain  Assessment & Plan  · Chronic back pain, history osteomyelitis  · Continue home buprenorphine, oxycodone, Lyrica  · PDMP reviewed    Pneumonia  Assessment & Plan  · POA with cough productive for brown mucus  · CXR with infiltrate  · Empiric ceftriaxone   · Pending blood culture  · Sputum culture with MRSA  · MRSA bronchitis and November  · Add vanco for now, will likely transition to Bactrim to complete treatment     Acute on chronic anemia  Assessment & Plan  · Chronic normocytic anemia in setting of chronic kidney disease  · Iron deficient anemia history of poor oral intake  · Hemoglobin remaining stable  · No active bleeding noted  · Continue to monitor since patient remained on heparin infusion    Chronic ulcer of left heel ([address] )  Assessment & Plan  Patient with chronic left heel wound  Was seen by podiatry on last admission, wanted leads and conservative treatment with wound care  Patient missed outpatient appointment follow-up since, leads not done secondary to RSV status at last admission    Patient seen by podiatry and telemetry consult today, imaging studies ordered  Continue wound care until then  Continue to follow podiatry's recommendations after imaging studies  PT/OT consults canceled for today and pending podiatry recommendations    Severe protein-calorie malnutrition ([facility] )  Assessment & Plan  Malnutrition Findings: Loss of subcutaneous fat in orbital, triceps, ribcage areas  Loss of muscle at temples, clavicles, scapula, extremities  Consultation to dietitian  Will add appetite stimulant        Adult Malnutrition type: Chronic illnessBMI Findings:  Adult BMI Classifications: Underweight < 18 5        Body mass index is 18 36 kg/m²         Diabetes mellitus type 2, insulin dependent [facility])  Assessment & Plan  Lab Results   Component Value Date    HGBA1C 6 6 (H) 11/17/2022       Recent Labs     12/25/22  2055 12/25/22  2355 12/26/22  0602 12/26/22  1123   POCGLU 125 144* 183* 166*       Blood Sugar Average: Last 72 hrs:  (P) 141 [id number]   Patient is maintained as OP on lantus 5 units HS   Currently on correction scale only  Monitor with advancement of diet     * Acute respiratory failure with hypoxia (HCC)  Assessment & Plan  · Requiring nasal cannula oxygen 4 L on admission  · Now on room air  · Recently required supplemental oxygen in November during RSV bronchitis was but discharged home on room air  · Secondary to pneumonia  · Elevated D-dimer but unable to obtain CTA chest secondary to KELLY  · Continue respiratory protocol      VTE Pharmacologic Prophylaxis: VTE Score: 3 Moderate Risk (Score 3-4) - Pharmacological DVT Prophylaxis Ordered: heparin  Patient Centered Rounds: I performed bedside rounds with nursing staff today  Discussions with Specialists or Other Care Team Provider: CM, pod, wound care, nutrition, surgery      Education and Discussions with Family / Patient: Patient on phone with wife during exam      Time Spent for Care: 30 minutes  More than 50% of total time spent on counseling and coordination of care as described above  Current Length of Stay: 3 day(s)  Current Patient Status: Inpatient   Certification Statement: The patient will continue to require additional inpatient hospital stay due to Pneumonia, heel wound, pneumoperitoneum  Discharge Plan: Anticipate discharge in 48 hrs to home  Code Status: Level 1 - Full Code     Subjective:   Seen and examined, endorses feeling okay however is persistent about advancing diet to drink liquids  Discussed risk versus benefit, patient does not wish to wait until after CT is obtained, will order clear liquid diet  Objective:     Vitals:   Temp (24hrs), Av 6 °F (37 °C), Min:97 9 °F (36 6 °C), Max:99 °F (37 2 °C)    Temp:  [97 9 °F (36 6 °C)-99 °F (37 2 °C)] 99 °F (37 2 °C)  HR:  [] 85  Resp:  [18-20] 20  BP: (148-184)/() 165/84  SpO2:  [93 %-95 %] 95 %  Body mass index is 18 36 kg/m²  Input and Output Summary (last 24 hours): Intake/Output Summary (Last 24 hours) at 2022 1642  Last data filed at 2022 1611  Gross per 24 hour   Intake 3850 ml   Output 4600 ml   Net -750 ml       Physical Exam:   Physical Exam  Vitals and nursing note reviewed  Constitutional:       General: He is not in acute distress  Appearance: Normal appearance  He is cachectic     HENT:      Head: Normocephalic and atraumatic  Nose: No congestion  Mouth/Throat:      Mouth: Mucous membranes are moist    Eyes:      Conjunctiva/sclera: Conjunctivae normal    Cardiovascular:      Rate and Rhythm: Normal rate and regular rhythm  Pulses: Normal pulses  Heart sounds: Normal heart sounds  No murmur heard  Pulmonary:      Effort: Pulmonary effort is normal  No respiratory distress  Breath sounds: Normal breath sounds  Abdominal:      General: Bowel sounds are normal       Palpations: Abdomen is soft  Tenderness: There is no abdominal tenderness  Musculoskeletal:         General: Normal range of motion  Right lower leg: No edema  Left lower leg: No edema  Skin:     General: Skin is warm and dry  Comments: Left heel boggy, necrotic heel wound   Neurological:      Mental Status: He is alert and oriented to person, place, and time            Additional Data:     Labs:  Results from last 7 days   Lab Units 12/26/22  0536   WBC Thousand/uL 10 62*   HEMOGLOBIN g/dL 10 0*   HEMATOCRIT % 31 2*   PLATELETS Thousands/uL 256   NEUTROS PCT % 79*   LYMPHS PCT % 11*   MONOS PCT % 6   EOS PCT % 2     Results from last 7 days   Lab Units 12/26/22  0536   SODIUM mmol/L 145   POTASSIUM mmol/L 3 3*   CHLORIDE mmol/L 109*   CO2 mmol/L 22   BUN mg/dL 39*   CREATININE mg/dL 2 85*   ANION GAP mmol/L 14*   CALCIUM mg/dL 10 5*   ALBUMIN g/dL 2 5*   TOTAL BILIRUBIN mg/dL 0 27   ALK PHOS U/L 77   ALT U/L <6*   AST U/L 8   GLUCOSE RANDOM mg/dL 204*     Results from last 7 days   Lab Units 12/23/22  2217   INR  1 05     Results from last 7 days   Lab Units 12/26/22  1629 12/26/22  1123 12/26/22  0602 12/25/22  2355 12/25/22  2055 12/25/22  1746 12/25/22  1221 12/25/22  0016 12/24/22  1842 12/24/22  1155 12/24/22  0747 12/24/22  0103   POC GLUCOSE mg/dl 234* 166* 183* 144* 125 87 72 102 102 166* 213* 194*         Results from last 7 days   Lab Units 12/23/22  2217   LACTIC ACID mmol/L 1 0   PROCALCITONIN ng/ml 0 58* Lines/Drains:  Invasive Devices     Peripheral Intravenous Line  Duration           Peripheral IV 12/24/22 Right;Ventral (anterior) Forearm 2 days    Peripheral IV 12/25/22 Dorsal (posterior); Left Forearm <1 day          Drain  Duration           Urethral Catheter Coude 16 Fr  2 days              Urinary Catheter:  Goal for removal: N/A- Discharging with Ortiz               Imaging: Reviewed radiology reports from this admission including: abdominal/pelvic CT    Recent Cultures (last 7 days):   Results from last 7 days   Lab Units 12/24/22  0519 12/24/22  0049 12/23/22  2217   BLOOD CULTURE   --   --  No Growth at 48 hrs  No Growth at 48 hrs     SPUTUM CULTURE   --  4+ Growth of Methicillin Resistant Staphylococcus aureus*  3+ Growth of  --    GRAM STAIN RESULT   --  1+ Epithelial Cells*  Rare Polys*  2+ Gram positive cocci in pairs*  1+ Gram negative rods*  --    URINE CULTURE  10,000-19,000 cfu/ml Oxidase Positive gram negative apolinar*  --   --        Last 24 Hours Medication List:   Current Facility-Administered Medications   Medication Dose Route Frequency Provider Last Rate   • albuterol  2 5 mg Nebulization Q4H PRN YURIDIA Chirinos     • Buprenorphine HCl  450 mcg Buccal BID Missael Ely MD     • calcitonin (salmon)  1 spray Alternating Nares Daily Adriana Del Rio MD     • cefTRIAXone  1,000 mg Intravenous Q24H Thao Watkins, CRNP 1,000 mg (12/25/22 2151)   • heparin (porcine)  5,000 Units Subcutaneous Q8H [address] YURIDIA Chirinos     • HYDROmorphone  0 5 mg Intravenous Q4H PRN Missael Ely MD     • insulin lispro  1-5 Units Subcutaneous HS Mina Sr PA-C     • insulin lispro  1-6 Units Subcutaneous TID With Meals Mina Sr PA-C     • LORazepam  0 5 mg Intravenous Q8H PRN YURIDIA Javed     • megestrol  400 mg Oral Daily Mina Sr PA-C     • metroNIDAZOLE  500 mg Intravenous Q8H Thao S June CRNP 500 mg (12/26/22 1514)   • multi-electrolyte  100 mL/hr Intravenous Continuous Wesley Sams PA-C 100 mL/hr (12/26/22 1304)   • [START ON 12/27/2022] vancomycin  10 mg/kg Intravenous Once PRN Wesley Sams PA-C          Today, Patient Was Seen By: Wesley Sams PA-C    **Please Note: This note may have been constructed using a voice recognition system  **

## 2022-12-26 NOTE — ASSESSMENT & PLAN NOTE
Patient with chronic left heel wound  Was seen by podiatry on last admission, wanted leads and conservative treatment with wound care  Patient missed outpatient appointment follow-up since, leads not done secondary to RSV status at last admission    Patient seen by podiatry and telemetry consult today, imaging studies ordered  Continue wound care until then  Continue to follow podiatry's recommendations after imaging studies  PT/OT consults canceled for today and pending podiatry recommendations

## 2022-12-26 NOTE — PROGRESS NOTES
Progress Note - Nephrology   Ino Notice Sr  62 y o  male MRN: [id number]  Unit/Bed#: -01 Encounter: [id number]    A/P:  1  Acute kidney injury superimposed on chronic kidney disease stage i V   - He follows with Dr Feroz Herrera and has an appointment   - History of obstructive uropathy with bilateral hydronephrosis and a neurogenic bladder due to diabetes   - Long history of diabetic nephrosclerosis   -Admitted with a creatinine of 4 31 and treated with IV fluids with marked improvement to a creatinine of 2 85 mg/dL today    2  Hypokalemia   -K  Dur given    3  Hypercalcemia    - Work-up was totally negative in the past   - May be due to immobilization as the patient does not walk around   - Treated with Miacalcin nasal spray and corrected calcium is improved to 11 7   - Will need outpatient evaluation    4  Abdominal pain due to the ruptured diverticulum   - Resolved and he is drinking clear liquids    5  Pneumonia   - Groundglass reticulonodular and alveolar opacities noted bilaterally most prominent in the inferior left lingula and left lower lobe as described   - Treated with antibiotics with improvement    6    Acute on chronic anemia   -hemoglobin adequate at 10 g    Follow up reason for today's visit: Chronic kidney disease stage V    Acute respiratory failure with hypoxia [facility])    Patient Active Problem List   Diagnosis   • KELLY (acute kidney injury) ([facility] [address] )   • Hyperkalemia   • Diabetes mellitus type 2, insulin dependent ([facility] [address] )   • Gout   • Essential hypertension   • History of CVA (cerebrovascular accident)   • Osteomyelitis of vertebra of lumbar region [facility])   • Severe protein-calorie malnutrition (HCC)   • Iron deficiency anemia secondary to inadequate dietary iron intake   • Hip pain, acute, left   • Acute urinary retention   • Hypercalcemia   • Low back pain   • Ambulatory dysfunction   • Stage 3a chronic kidney disease (HCC)   • Chronic ulcer of left heel (HCC)   • Acute on chronic anemia   • RSV infection   • Acute respiratory failure with hypoxia (HCC)   • Acute cystitis   • Moderate protein-calorie malnutrition (HCC)   • Pneumonia   • Chronic back pain   • Pneumoperitoneum         Subjective:   A 10 point ROS is negative  He feels much improved and says he will increase his diet    Objective:     Vitals: Blood pressure 165/84, pulse 85, temperature 99 °F (37 2 °C), resp  rate 20, height 5' 11" (1 803 m), weight 59 7 kg (131 lb 9 8 oz), SpO2 95 %  ,Body mass index is 18 36 kg/m²  Weight (last 2 days)     Date/Time Weight    12/26/22 0600 59 7 (131 61)    12/25/22 0542 64 2 (141 54)            Intake/Output Summary (Last 24 hours) at 12/26/2022 1536  Last data filed at 12/26/2022 1303  Gross per 24 hour   Intake 3400 ml   Output 3650 ml   Net -250 ml     I/O last 3 completed shifts: In: 4587 5 [I V :4487 5; IV Piggyback:100]  Out: 5300 [Urine:5300]    Urethral Catheter Coude 16 Fr   (Active)   Amt returned on insertion(mL) 125 mL 12/24/22 0450   Reasons to continue Urinary Catheter  Acute urinary retention/obstruction failing urinary retention protocol 12/25/22 0922   Goal for Removal Will consult urology 12/24/22 1952   Site Assessment Clean;Skin intact 12/25/22 0922   Ortiz Care Done 12/26/22 0900   Collection Container Standard drainage bag 12/25/22 0922   Securement Method Securing device (Describe) 12/25/22 0922   Securing Device Change Date 12/31/22 12/25/22 8705   Output (mL) 1000 mL 12/26/22 1120   CAUTI Time-out performed before Urine Culture Yes 12/24/22 0450       Physical Exam: /84   Pulse 85   Temp 99 °F (37 2 °C)   Resp 20   Ht 5' 11" (1 803 m)   Wt 59 7 kg (131 lb 9 8 oz)   SpO2 95%   BMI 18 36 kg/m²     General Appearance:    Alert, cooperative, no distress, appears stated age   Head:    Normocephalic, without obvious abnormality, atraumatic   Eyes:    Conjunctiva/corneas clear   Ears:    Normal external ears   Nose:   Nares normal, septum midline, mucosa normal, no drainage or sinus tenderness   Throat:   Lips, mucosa, and tongue normal; teeth and gums normal   Neck:   Supple, symmetrical, trachea midline, no adenopathy;        thyroid:  No enlargement/tenderness/nodules; no carotid    bruit or JVD   Back:     Symmetric, no curvature, ROM normal, no CVA tenderness   Lungs:     Clear to auscultation bilaterally, respirations unlabored   Chest wall:    No tenderness or deformity   Heart:    Regular rate and rhythm, S1 and S2 normal, no murmur, rub   or gallop   Abdomen:     Soft, non-tender, bowel sounds active voiding clear yellow urine without difficulty via Ortiz   Extremities:   Extremities normal, atraumatic, no cyanosis or edema   Skin:   Skin color, texture, turgor normal, no rashes or lesions   Lymph nodes:   Cervical normal   Neurologic:   CNII-XII intact            Lab, Imaging and other studies: I have personally reviewed pertinent labs  CBC:   Lab Results   Component Value Date    WBC 10 62 (H) 12/26/2022    HGB 10 0 (L) 12/26/2022    HCT 31 2 (L) 12/26/2022    MCV 92 12/26/2022     12/26/2022    MCH 29 3 12/26/2022    MCHC 32 1 12/26/2022    RDW 16 0 (H) 12/26/2022    MPV 10 0 12/26/2022    NRBC 0 12/26/2022     CMP:   Lab Results   Component Value Date    K 3 3 (L) 12/26/2022     (H) 12/26/2022    CO2 22 12/26/2022    BUN 39 (H) 12/26/2022    CREATININE 2 85 (H) 12/26/2022    CALCIUM 10 5 (H) 12/26/2022    AST 8 12/26/2022    ALT <6 (L) 12/26/2022    ALKPHOS 77 12/26/2022    EGFR 23 12/26/2022           Results from last 7 days   Lab Units 12/26/22  0536 12/25/22  0537 12/24/22  0637 12/23/22  2217   POTASSIUM mmol/L 3 3* 4 4 5 2 4 6   CHLORIDE mmol/L 109* 108 100 99   CO2 mmol/L 22 24 25 28   BUN mg/dL 39* 52* 53* 54*   CREATININE mg/dL 2 85* 3 31* 3 98* 4 10*   CALCIUM mg/dL 10 5* 11 2* 11 5* 11 8*   ALK PHOS U/L 77  --  91 92   ALT U/L <6*  --  13 13   AST U/L 8  --  10 12         Phosphorus: No results found for: PHOS  Magnesium: No results found for: MG  Urinalysis: No results found for: Carolene Gato, SPECGRAV, PHUR, LEUKOCYTESUR, NITRITE, PROTEINUA, GLUCOSEU, KETONESU, BILIRUBINUR, BLOODU  Ionized Calcium: No results found for: CAION  Coagulation: No results found for: PT, INR, APTT  Troponin: No results found for: TROPONINI  ABG: No results found for: PHART, JFL4VAP, PO2ART, NRY1TMF, G1DRUDUT, BEART, SOURCE  Radiology review:     IMAGING  Procedure: CT abdomen pelvis wo contrast    Result Date: 12/24/2022  Narrative: CT ABDOMEN AND PELVIS WITHOUT IV CONTRAST INDICATION:   evaluate free air on CT chest  COMPARISON:  CT chest 12/23/2022 TECHNIQUE:  CT examination of the abdomen and pelvis was performed without intravenous contrast  Axial, sagittal, and coronal 2D reformatted images were created from the source data and submitted for interpretation  Radiation dose length product (DLP) for this visit:  638 mGy-cm   This examination, like all CT scans performed in the [facility], was performed utilizing techniques to minimize radiation dose exposure, including the use of iterative reconstruction and automated exposure control  Enteric contrast was not administered  FINDINGS: ABDOMEN LOWER CHEST:  Airspace disease redemonstrated in the bilateral lower lobes, left greater than right, suspicious for infectious or inflammatory pneumonitis; better evaluated on the recent prior chest CT  Mild coronary atherosclerosis  Small pericardial effusion  LIVER/BILIARY TREE:  Unremarkable  GALLBLADDER:  No calcified gallstones  No pericholecystic inflammatory change  SPLEEN:  Unremarkable  PANCREAS:  Unremarkable  ADRENAL GLANDS:  Unremarkable  KIDNEYS/URETERS:  Mild bilateral hydronephrosis 1 6 cm cyst in the midportion of the left kidney  Bilateral kidneys otherwise appear grossly unremarkable  STOMACH AND BOWEL:  Moderate amount of stool in the colon    Focal diverticulum suspected in the distal descending/proximal sigmoid colon (coronal image 68, series 601   Small amount of fluid and intraperitoneal air is seen in this region in the left hemipelvis, otherwise grossly unremarkable  APPENDIX:  Normal caliber appendix  ABDOMINOPELVIC CAVITY:  Small amount of intraperitoneal free air  Most prominent dependently, redemonstrated from recent prior CT  No bulky adenopathy  VESSELS:  Moderate atherosclerosis; no aortic aneurysm  PELVIS REPRODUCTIVE ORGANS:  Unremarkable for patient's age  URINARY BLADDER:  Distended bladder with bladder volume estimated at 1050 mL, consider urinary retention  If the patient cannot spontaneously void, consider catheter decompression ABDOMINAL WALL/INGUINAL REGIONS:  Mild body wall edema OSSEOUS STRUCTURES:  Prominent disc space space narrowing and erosive changes at L4/L5, suspect old discitis/osteomyelitis  Status post laminectomy at L4-S1  No acute fracture is seen  Impression: Pneumoperitoneum redemonstrated, as described  Focal diverticulum suspected in the distal descending/proximal sigmoid colon (coronal image 68, series 601)  Small amount of fluid and intraperitoneal air is seen in this region in the left hemipelvis, nonspecific but perforated diverticulum is considered as a source of the pneumoperitoneum  Airspace disease redemonstrated in the bilateral lower lobes, left greater than right, suspicious for infectious or inflammatory pneumonitis; better evaluated on the recent prior chest CT  Distended bladder with bladder volume estimated at 1050 mL, with bilateral mild hydroureteronephrosis; consider urinary retention  If the patient cannot spontaneously void, consider catheter decompression  Left renal cyst, coronary atherosclerosis, small pericardial effusion, and other findings as above  The study was marked in [facility] for immediate notification  Workstation performed: [id number]     Procedure: XR chest 1 view portable    Result Date: 12/24/2022  Narrative: CHEST INDICATION:   sob   COMPARISON:  Chest radiograph November 16, 2022  Correlation with subsequent CT chest and CT abdomen pelvis EXAM PERFORMED/VIEWS:  XR CHEST PORTABLE FINDINGS:  Pneumoperitoneum  Cardiomediastinal silhouette appears unremarkable  Reticular opacities in the lower lungs, left greater than right  No pleural effusion or pneumothorax  Osseous structures appear within normal limits for patient age  Impression: Pneumoperitoneum  Reticular opacities in the lower lungs correspond to bronchial wall thickening evident on the subsequently performed CT Workstation performed: [id number]     Procedure: CT chest wo contrast    Result Date: 12/24/2022  Narrative: CT CHEST WITHOUT IV CONTRAST INDICATION:   Pneumonia, unresolved pneumonia  COMPARISON:  CT abdomen pelvis dated 10/20/2022 TECHNIQUE: CT examination of the chest was performed without intravenous contrast  Axial, sagittal, and coronal 2D reformatted images were created from the source data and submitted for interpretation  Radiation dose length product (DLP) for this visit:  214 mGy-cm   This examination, like all CT scans performed in the [facility], was performed utilizing techniques to minimize radiation dose exposure, including the use of iterative reconstruction and automated exposure control  FINDINGS: LUNGS:  Scattered groundglass and reticular nodular opacities are seen in the right lower lobe  There are more prominent reticulonodular, groundglass, alveolar opacities seen in the inferior left lingula as well as throughout the left lower lobe, suspect multifocal infectious or inflammatory pneumonitis  Correlation with the patient's symptoms and laboratory values recommended  PLEURA:  Unremarkable  HEART/GREAT VESSELS: Moderate coronary atherosclerosis  Heart appears normal in size  Mild aortic atherosclerosis  No aortic aneurysm  Small pericardial effusion  No thoracic aortic aneurysm  MEDIASTINUM AND ELTON:  Shotty mediastinal lymph nodes, nonspecific, possibly reactive  CHEST WALL AND LOWER NECK:  Unremarkable  VISUALIZED STRUCTURES IN THE UPPER ABDOMEN:  Small amount of intraperitoneal free air is noted, in the absence of recent surgical intervention, this raises the suspicion for perforated viscus  Correlation with the patient's symptoms and history recommended  OSSEOUS STRUCTURES:  No acute fracture or destructive osseous lesion  Impression: Groundglass, reticulonodular, and alveolar opacities noted bilaterally, most prominently in the inferior left lingula and left lower lobe, as described  Suspected multifocal infectious or inflammatory pneumonitis  Correlation with the patient's symptoms and laboratory values recommended  Small amount of intraperitoneal free air is noted, in the absence of recent surgical intervention, this raises the suspicion for perforated viscus  Correlation with the patient's symptoms and history recommended  Additional imaging may be considered as  clinically warranted  Coronary atherosclerosis, small pericardial effusion, and other findings as above    I personally discussed this study with VERNON Hansen on 12/24/2022 at 3:05 AM  Workstation performed: [id number]       Current Facility-Administered Medications   Medication Dose Route Frequency   • albuterol inhalation solution 2 5 mg  2 5 mg Nebulization Q4H PRN   • Buprenorphine HCl FILM 450 mcg  450 mcg Buccal BID   • calcitonin (salmon) (MIACALCIN) 200 units/act nasal spray 1 spray  1 spray Alternating Nares Daily   • cefTRIAXone (ROCEPHIN) IVPB (premix in dextrose) 1,000 mg 50 mL  1,000 mg Intravenous Q24H   • heparin (porcine) subcutaneous injection 5,000 Units  5,000 Units Subcutaneous Q8H [address]   • HYDROmorphone (DILAUDID) injection 0 5 mg  0 5 mg Intravenous Q4H PRN   • insulin lispro (HumaLOG) 100 units/mL subcutaneous injection 1-5 Units  1-5 Units Subcutaneous HS   • insulin lispro (HumaLOG) 100 units/mL subcutaneous injection 1-6 Units  1-6 Units Subcutaneous TID With Meals   • LORazepam (ATIVAN) injection 0 5 mg  0 5 mg Intravenous Q8H PRN   • megestrol (MEGACE) oral suspension 400 mg  400 mg Oral Daily   • metroNIDAZOLE (FLAGYL) IVPB (premix) 500 mg 100 mL  500 mg Intravenous Q8H   • multi-electrolyte (PLASMALYTE-A/ISOLYTE-S PH 7 4) IV solution  100 mL/hr Intravenous Continuous   • [START ON 12/27/2022] vancomycin (VANCOCIN) IVPB (premix in dextrose) 500 mg 100 mL  10 mg/kg Intravenous Once PRN     Medications Discontinued During This Encounter   Medication Reason   • Buprenorphine HCl FILM 300 mcg    • oxyCODONE (ROXICODONE) IR tablet 5 mg    • allopurinol (ZYLOPRIM) tablet 100 mg    • amLODIPine (NORVASC) tablet 10 mg    • carvedilol (COREG) tablet 6 25 mg    • cyclobenzaprine (FLEXERIL) tablet 5 mg    • insulin glargine (LANTUS) subcutaneous injection 5 Units 0 05 mL    • polyethylene glycol (MIRALAX) packet 17 g    • pregabalin (LYRICA) capsule 75 mg    • senna-docusate sodium (SENOKOT S) 8 6-50 mg per tablet 1 tablet    • tamsulosin (FLOMAX) capsule 0 4 mg    • venlafaxine (EFFEXOR-XR) 24 hr capsule 37 5 mg    • insulin lispro (HumaLOG) 100 units/mL subcutaneous injection 1-6 Units    • insulin lispro (HumaLOG) 100 units/mL subcutaneous injection 1-6 Units    • HYDROmorphone (DILAUDID) injection 0 5 mg    • HYDROmorphone (DILAUDID) injection 0 5 mg    • Buprenorphine HCl FILM 300 mcg Formulary change   • sodium chloride 0 9 % infusion    • atorvastatin (LIPITOR) tablet 40 mg    • acetaminophen (TYLENOL) tablet 650 mg    • sodium chloride 0 9 % infusion    • sodium chloride 0 9 % infusion    • dextrose 5 % and sodium chloride 0 9 % infusion    • insulin lispro (HumaLOG) 100 units/mL subcutaneous injection 1-6 Units    • vancomycin (VANCOCIN) IVPB (premix in dextrose) 1,000 mg 200 mL        Gera Hawk MD      This progress note was produced in part using a dictation device which may document imprecise wording from author's original intent

## 2022-12-26 NOTE — WOUND OSTOMY CARE
Consult Note - Wound   Arian Perez  62 y o  male MRN: [id number]  Unit/Bed#: -01 Encounter: [id number]        History and Present Illness:62year old admitted with acute respiratory failure, PMH: KELLY  DM Type 2, gout, chronic ulcer of left heel  Moderate protein calorie malnutrition  Recently upgraded to clear liquid diet  Assessment Findings:   Alert and orient x4  Ortiz catheter in place, independent repositioning  Pt spouse present, pt had missed his scheduled F/U appointment with Podiatry  Had seen podiatry last hospitalization in November  1)Left heel, black eschar in softer with increased boggyness  Edges are slightly macerated and white with erythema  Adaptic non adherent removed no drainage or malodor noted, though adaptic was moist ? Clear drainage?   Betadine applied ABD and offloaded  Right heel dry intact no redness,Allevyn foam applied as preventative measure  2)Sacrum dry flaky peeling skin, distal end peeled partially off with cleansing reveal pink wound bed, no induration   Appears due to MASD  Allevyn applied   Pt positioned on side with foam wedge  Discussed MASD and preventative measure with pt and pt spouse  Skin care Plan:  1-Protect sacrum w/Allevyn foam, iva with P, change q3d and PRN, check skin q-shift  2-Turn/reposition q2h or when medically stable for pressure re-distribution on skin   3-Elevate heels to offload pressure, keep left heel offload at all times   4-Moisturize skin daily with skin nourishing cream  5-Ehob cushion in chair when out of bed  6-Gently cleanse with normal saline, apply Betadine, cover with DSD ABD wrap with emmanuel, change daily, Keep heel offloaded at all times  Recommending a podiatry consult due to changes of left heel  Wounds:  Wound 10/20/22 Pressure Injury Heel Left (Active)   Wound Image   12/26/22 1132   Wound Description Non-blanchable erythema;Slough; Beefy red;Edema; Necrotic 12/26/22 1132   Pressure Injury Stage U 12/26/22 1132   [place]-wound Assessment Denuded;Erythema; Maceration; Purple; Swelling 12/26/22 1132   Drainage Amount None 12/26/22 1132   Treatments Site care;Elevated 12/26/22 1132   Dressing Dry dressing;ABD;Other (Comment) 12/26/22 1132   Dressing Changed Changed 12/26/22 1132   Patient Tolerance Tolerated well 12/26/22 1132   Dressing Status Clean;Dry; Intact 12/26/22 1132       Wound 10/21/22 Pressure Injury Coccyx (Active)   Wound Image   12/26/22 1126   Wound Description Dry; Intact; Beefy red 12/26/22 1126   [place]-wound Assessment Maceration;Dry; White 12/26/22 1126   Wound Length (cm) 3 cm 12/26/22 1126   Wound Width (cm) 0 1 cm 12/26/22 1126   Wound Depth (cm) 0 1 cm 12/26/22 1126   Wound Surface Area (cm^2) 0 3 cm^2 12/26/22 1126   Wound Volume (cm^3) 0 03 cm^3 12/26/22 1126   Calculated Wound Volume (cm^3) 0 03 cm^3 12/26/22 1126   Change in Wound Size % 70 12/26/22 1126   Drainage Amount None 12/26/22 1126   Non-staged Wound Description Partial thickness 12/26/22 1126   Treatments Site care 12/26/22 1126   Dressing Foam, Silicon (eg  Allevyn, etc) 12/26/22 1126   Dressing Changed Changed 12/26/22 1126   Patient Tolerance Tolerated well 12/26/22 1126   Dressing Status Clean;Dry; Intact 12/26/22 1126    Call or tigertext with any questions  Wound Care will continue to follow    Amy OCHOAN RN

## 2022-12-26 NOTE — ASSESSMENT & PLAN NOTE
· POA with cough productive for brown mucus  · CXR with infiltrate  · Empiric ceftriaxone   · Pending blood culture  · Sputum culture with MRSA  · MRSA bronchitis and November  · Add vanco for now, will likely transition to Bactrim to complete treatment

## 2022-12-26 NOTE — PLAN OF CARE
Problem: Potential for Falls  Goal: Patient will remain free of falls  Description: INTERVENTIONS:  - Educate patient/family on patient safety including physical limitations  - Instruct patient to call for assistance with activity   - Consult OT/PT to assist with strengthening/mobility   - Keep Call bell within reach  - Keep bed low and locked with side rails adjusted as appropriate  - Keep care items and personal belongings within reach  - Initiate and maintain comfort rounds  - Make Fall Risk Sign visible to staff  - Offer Toileting every  Hours, in advance of need  - Initiate/Maintain alarm  - Obtain necessary fall risk management equipment:   - Apply yellow socks and bracelet for high fall risk patients  - Consider moving patient to room near nurses station  Outcome: Progressing     Problem: PAIN - ADULT  Goal: Verbalizes/displays adequate comfort level or baseline comfort level  Description: Interventions:  - Encourage patient to monitor pain and request assistance  - Assess pain using appropriate pain scale  - Administer analgesics based on type and severity of pain and evaluate response  - Implement non-pharmacological measures as appropriate and evaluate response  - Consider cultural and social influences on pain and pain management  - Notify physician/advanced practitioner if interventions unsuccessful or patient reports new pain  Outcome: Not Progressing

## 2022-12-26 NOTE — PLAN OF CARE
Problem: Potential for Falls  Goal: Patient will remain free of falls  Description: INTERVENTIONS:  - Educate patient/family on patient safety including physical limitations  - Instruct patient to call for assistance with activity   - Consult OT/PT to assist with strengthening/mobility   - Keep Call bell within reach  - Keep bed low and locked with side rails adjusted as appropriate  - Keep care items and personal belongings within reach  - Initiate and maintain comfort rounds  - Make Fall Risk Sign visible to staff  - Offer Toileting every 2 Hours, in advance of need  - Initiate/Maintain bed alarm  - Apply yellow socks and bracelet for high fall risk patients  - Consider moving patient to room near nurses station  Outcome: Progressing    Problem: Nutrition/Hydration-ADULT  Goal: Nutrient/Hydration intake appropriate for improving, restoring or maintaining nutritional needs  Description: Monitor and assess patient's nutrition/hydration status for malnutrition  Collaborate with interdisciplinary team and initiate plan and interventions as ordered  Monitor patient's weight and dietary intake as ordered or per policy  Utilize nutrition screening tool and intervene as necessary  Determine patient's food preferences and provide high-protein, high-caloric foods as appropriate       INTERVENTIONS:  - Monitor oral intake, urinary output, labs, and treatment plans  - Assess nutrition and hydration status and recommend course of action  - Evaluate amount of meals eaten  - Assist patient with eating if necessary   - Allow adequate time for meals  - Recommend/ encourage appropriate diets, oral nutritional supplements, and vitamin/mineral supplements  - Order, calculate, and assess calorie counts as needed  - Recommend, monitor, and adjust tube feedings and TPN/PPN based on assessed needs  - Assess need for intravenous fluids  - Provide specific nutrition/hydration education as appropriate  - Include patient/family/caregiver in decisions related to nutrition  Outcome: Progressing     Problem: MOBILITY - ADULT  Goal: Maintain or return to baseline ADL function  Description: INTERVENTIONS:  -  Assess patient's ability to carry out ADLs; assess patient's baseline for ADL function and identify physical deficits which impact ability to perform ADLs (bathing, care of mouth/teeth, toileting, grooming, dressing, etc )  - Assess/evaluate cause of self-care deficits   - Assess range of motion  - Assess patient's mobility; develop plan if impaired  - Assess patient's need for assistive devices and provide as appropriate  - Encourage maximum independence but intervene and supervise when necessary  - Involve family in performance of ADLs  - Assess for home care needs following discharge   - Consider OT consult to assist with ADL evaluation and planning for discharge  - Provide patient education as appropriate  Outcome: Progressing  Goal: Maintains/Returns to pre admission functional level  Description: INTERVENTIONS:  - Perform BMAT or MOVE assessment daily    - Set and communicate daily mobility goal to care team and patient/family/caregiver  - Collaborate with rehabilitation services on mobility goals if consulted  - Perform Range of Motion 4 times a day  - Reposition patient every 2 hours    - Out of bed for toileting  - Record patient progress and toleration of activity level   Outcome: Progressing     Problem: PAIN - ADULT  Goal: Verbalizes/displays adequate comfort level or baseline comfort level  Description: Interventions:  - Encourage patient to monitor pain and request assistance  - Assess pain using appropriate pain scale  - Administer analgesics based on type and severity of pain and evaluate response  - Implement non-pharmacological measures as appropriate and evaluate response  - Consider cultural and social influences on pain and pain management  - Notify physician/advanced practitioner if interventions unsuccessful or patient reports new pain  Outcome: Progressing     Problem: INFECTION - ADULT  Goal: Absence or prevention of progression during hospitalization  Description: INTERVENTIONS:  - Assess and monitor for signs and symptoms of infection  - Monitor lab/diagnostic results  - Monitor all insertion sites, i e  indwelling lines, tubes, and drains  - Monitor endotracheal if appropriate and nasal secretions for changes in amount and color  - [place] appropriate cooling/warming therapies per order  - Administer medications as ordered  - Instruct and encourage patient and family to use good hand hygiene technique  - Identify and instruct in appropriate isolation precautions for identified infection/condition  Outcome: Progressing     Problem: DISCHARGE PLANNING  Goal: Discharge to home or other facility with appropriate resources  Description: INTERVENTIONS:  - Identify barriers to discharge w/patient and caregiver  - Arrange for needed discharge resources and transportation as appropriate  - Identify discharge learning needs (meds, wound care, etc )  - Arrange for interpretive services to assist at discharge as needed  - Refer to Case Management Department for coordinating discharge planning if the patient needs post-hospital services based on physician/advanced practitioner order or complex needs related to functional status, cognitive ability, or social support system  Outcome: Progressing     Problem: Knowledge Deficit  Goal: Patient/family/caregiver demonstrates understanding of disease process, treatment plan, medications, and discharge instructions  Description: Complete learning assessment and assess knowledge base    Interventions:  - Provide teaching at level of understanding  - Provide teaching via preferred learning methods  Outcome: Progressing     Problem: RESPIRATORY - ADULT  Goal: Achieves optimal ventilation and oxygenation  Description: INTERVENTIONS:  - Assess for changes in respiratory status  - Assess for changes in mentation and behavior  - Position to facilitate oxygenation and minimize respiratory effort  - Oxygen administered by appropriate delivery if ordered  - Initiate smoking cessation education as indicated  - Encourage broncho-pulmonary hygiene including cough, deep breathe, Incentive Spirometry  - Assess the need for suctioning and aspirate as needed  - Assess and instruct to report SOB or any respiratory difficulty  - Respiratory Therapy support as indicated  Outcome: Progressing     Problem: GASTROINTESTINAL - ADULT  Goal: Maintains or returns to baseline bowel function  Description: INTERVENTIONS:  - Assess bowel function  - Encourage oral fluids to ensure adequate hydration  - Administer IV fluids if ordered to ensure adequate hydration  - Administer ordered medications as needed  - Encourage mobilization and activity  - Consider nutritional services referral to assist patient with adequate nutrition and appropriate food choices  Outcome: Progressing  Goal: Maintains adequate nutritional intake  Description: INTERVENTIONS:  - Monitor percentage of each meal consumed  - Identify factors contributing to decreased intake, treat as appropriate  - Assist with meals as needed  - Monitor I&O, weight, and lab values if indicated  - Obtain nutrition services referral as needed  Outcome: Progressing     Problem: METABOLIC, FLUID AND ELECTROLYTES - ADULT  Goal: Electrolytes maintained within normal limits  Description: INTERVENTIONS:  - Monitor labs and assess patient for signs and symptoms of electrolyte imbalances  - Administer electrolyte replacement as ordered  - Monitor response to electrolyte replacements, including repeat lab results as appropriate  - Instruct patient on fluid and nutrition as appropriate  Outcome: Progressing  Goal: Glucose maintained within target range  Description: INTERVENTIONS:  - Monitor Blood Glucose as ordered  - Assess for signs and symptoms of hyperglycemia and hypoglycemia  - Administer ordered medications to maintain glucose within target range  - Assess nutritional intake and initiate nutrition service referral as needed  Outcome: Progressing     Problem: SKIN/TISSUE INTEGRITY - ADULT  Goal: Incision(s), wounds(s) or drain site(s) healing without S/S of infection  Description: INTERVENTIONS  - Assess and document dressing, incision, wound bed, drain sites and surrounding tissue  - Provide patient and family education  - Perform skin care/dressing changes   Outcome: Progressing

## 2022-12-26 NOTE — PROGRESS NOTES
Howard Santos Sr  is a 62 y o  male who is currently ordered Vancomycin IV with management by the Pharmacy Consult service  Relevant clinical data and objective / subjective history reviewed  Vancomycin Assessment:  Indication and Goal AUC/Trough: Pneumonia (goal -600, trough >10)  Clinical Status:  improving  Micro:     Renal Function:  SCr: 2 85 mg/dL  CrCl: 24 1 mL/min  Renal replacement: Not on dialysis  Days of Therapy: 1  Current Dose: 1500 mg IV once  Vancomycin Plan:  New Dosin mg IV once prn random level <15  Estimated AUC: N/A for pulse dosing  Estimated Trough: N/A for pulse dosing  Next Level: 22 at 0600  Renal Function Monitoring: Daily BMP and [place] will continue to follow closely for s/sx of nephrotoxicity, infusion reactions and appropriateness of therapy  BMP and CBC will be ordered per protocol  We will continue to follow the patient’s culture results and clinical progress daily      Dong Hollins, Pharmacist

## 2022-12-26 NOTE — ASSESSMENT & PLAN NOTE
· Secondary to perforated sigmoid diverticulum  · Patient remained asymptomatic on clinical exam  · Surgery consult appreciated-commend continue conservative management since patient remains high risk for any kind of surgery  · Advance to clear liquid diet pending results of CT   · Since WBC is trending up but now down  · Repeat CT scan abdomen in process

## 2022-12-26 NOTE — ASSESSMENT & PLAN NOTE
Lab Results   Component Value Date    HGBA1C 6 6 (H) 11/17/2022       Recent Labs     12/25/22 2055 12/25/22  2355 12/26/22  0602 12/26/22  1123   POCGLU 125 144* 183* 166*       Blood Sugar Average: Last 72 hrs:  (P) 141 [id number]   Patient is maintained as OP on lantus 5 units HS   Currently on correction scale only  Monitor with advancement of diet

## 2022-12-26 NOTE — NURSING NOTE
Pt irritable, anxious, and emotional since beginning of shift  Pt threatening to leave AMA  Pt stating, "I am in agony"  This nurse calmly spoke with patient regarding POC and the importance of staying at hospital  Pt given emotional support  Pt given PRN and routine pain medication when due  Pt repositioned  Pt still expressing increased pain, wanting water, and missing family  On call SLIM into see patient  Education given why patient needs to be NPO  Mouth rendered  SLIM allowed pt to have a small amount of ice chips  Pt was given ativan IV  Pt afterwards, stated feeling better

## 2022-12-26 NOTE — CONSULTS
Consult - Podiatry   Sidra Franz   62 y o  male MRN: [id number]  Unit/Bed#: -01 Encounter: [id number]      REQUIRED DOCUMENTATION:     1  This service was provided via Telemedicine  2  Provider located at [place], [place]   3  TeleMed provider: Leda Best DPM   4  Identify all parties in room with patient during tele consult:  RN  5  Patient was then informed that this was a Telemedicine visit and that the exam was being conducted confidentially over secure lines  My office door was closed  No one else was in the room  Patient acknowledged consent and understanding of privacy and security of the Telemedicine visit, and gave us permission to have the assistant stay in the room in order to assist with the history and to conduct the exam   I informed the patient that I have reviewed their record in Epic and presented the opportunity for them to ask any questions regarding the visit today  The patient agreed to participate  Assessment:  1  Left diabetic heel ulcer  2  Type 2 diabetes with neuropathy    Plan:    -Left diabetic heel ulcer with some bogginess and necrosis noted  Will obtain an x-ray for evaluation of underlying bone involvement  Patient at risk of nonhealing ulceration, infection and limb loss given diabetes and constant pressure   -I recommend local wound care at the moment with betadine soaked gauze to the ulcer and dry sterile dressing   -Strict offloading of the left heels with Prevalon boots  -Left heel x-rays pending  - LEADs pending   -rest of care per primary service    History of Present Illness     HPI:  Eben Alexander  is a 62 y o  male with PMH significant for CKD, chronic back pain on chronic narcotics, ambulatory dysfunction, urinary retention, presents with left heel ulceration  Patient has had this ulceration since months  Unfortunately had no outpatient follow-ups       Inpatient consult to Podiatry  Consult performed by: Leda Best DPM  Consult ordered by: Gail Seth Mel Alexandre PA-C        Review of Systems   Constitutional: Negative  HENT: Negative  Eyes: Negative  Respiratory: Negative  Cardiovascular: Negative  Gastrointestinal: Negative  Musculoskeletal: Negative  Skin: Left heel ulcer  Neurological: Negative  Psych: negative         Historical Information   Past Medical History:   Diagnosis Date   • Chronic kidney disease    • Diabetes mellitus ([facility] )    • Gout    • Hypertension    • Renal disorder    • Retroperitoneal abscess ([facility] )    • Stroke ([facility] )    • Vertebral osteomyelitis ([facility] )      Past Surgical History:   Procedure Laterality Date   • BACK SURGERY     • ORCHIECTOMY       Social History   Social History     Substance and Sexual Activity   Alcohol Use Not Currently     Social History     Substance and Sexual Activity   Drug Use Yes   • Types: Marijuana     Social History     Tobacco Use   Smoking Status Every Day   • Packs/day: 0 25   • Types: Cigarettes   Smokeless Tobacco Never     Family History:   Family History   Problem Relation Age of Onset   • Hypertension Father        Meds/Allergies   Medications Prior to Admission   Medication   • acetaminophen (TYLENOL) 325 mg tablet   • amLODIPine (NORVASC) 10 mg tablet   • atorvastatin (LIPITOR) 40 mg tablet   • Buprenorphine HCl ([place]) 300 MCG FILM   • carvedilol (COREG) 6 25 mg tablet   • colchicine (COLCRYS) 0 6 mg tablet   • cyclobenzaprine (FLEXERIL) 5 mg tablet   • insulin glargine (LANTUS) 100 units/mL subcutaneous injection   • insulin lispro (HumaLOG) 100 units/mL injection   • lidocaine (LIDODERM) 5 %   • oxyCODONE (OXY-IR) 5 MG capsule   • polyethylene glycol (MIRALAX) 17 g packet   • pregabalin (LYRICA) 150 mg capsule   • senna-docusate sodium (SENOKOT S) 8 6-50 mg per tablet   • sevelamer carbonate (RENVELA) 800 mg tablet   • sodium bicarbonate 650 mg tablet   • tamsulosin (FLOMAX) 0 4 mg   • venlafaxine (EFFEXOR-XR) 37 5 mg 24 hr capsule   • allopurinol (ZYLOPRIM) 100 mg tablet   • ferrous sulfate 325 (65 Fe) mg tablet   • nicotine (NICODERM CQ) 7 mg/24hr TD 24 hr patch   • thiamine 100 MG tablet     Allergies   Allergen Reactions   • Bupropion Delirium     "went nuts," prior to 2012  "went nuts," prior to 2012  "went nuts," prior to 2012  "went nuts," prior to 2012     • Metformin Other (See Comments)     Other reaction(s): kidney disease  Other reaction(s): kidney disease  Other reaction(s): kidney disease     • Medical Tape Rash       Objective   First Vitals:   Blood Pressure: 124/68 (12/23/22 2153)  Pulse: 89 (12/23/22 2150)  Temperature: 98 °F (36 7 °C) (12/23/22 2150)  Temp Source: Temporal (12/24/22 0022)  Respirations: 19 (12/23/22 2150)  Height: 5' 11" (180 3 cm) (12/23/22 2150)  Weight - Scale: 64 kg (141 lb 1 5 oz) (12/23/22 2150)  SpO2: (!) 87 % (12/23/22 2150)    Current Vitals:   Blood Pressure: (!) 184/102 (12/26/22 0730)  Pulse: 102 (12/26/22 0730)  Temperature: 99 °F (37 2 °C) (12/26/22 0730)  Temp Source: Temporal (12/25/22 1514)  Respirations: 20 (12/26/22 0730)  Height: 5' 11" (180 3 cm) (12/26/22 1401)  Weight - Scale: 59 7 kg (131 lb 9 8 oz) (12/26/22 0600)  SpO2: 95 % (12/26/22 0730)    BP (!) 184/102   Pulse 102   Temp 99 °F (37 2 °C)   Resp 20   Ht 5' 11" (1 803 m)   Wt 59 7 kg (131 lb 9 8 oz)   SpO2 95%   BMI 18 36 kg/m²      Physical Exam  Physical exam as per RN under my instructions  General Appearance:    Alert, cooperative, no distress   Head:    Normocephalic, without obvious abnormality, atraumatic   Neck:   Supple, symmetrical, trachea midline   Lungs:     Respirations unlabored, no audible wheezes   Abdomen:     Soft, non-tender   Lower Extremities:    Vascular:   Right DP pulse is 0/4, Right PT pulse is 0/4, Left DP pulse is 0/4, Left PT pulse is 0/4  +2 pitting edema noted at bilateral lower extremities  Skin temperature is warm bilaterally  Musculoskeletal: B/L LE are atrophic in appearance, thin  Nonambulatory   MMT deferred  Dermatological: Left heel necrotic ulceration Posterior plantarly with mild bogginess and periwound erythema  Wound bed is 90% necrotic and 10% fibrotic  Necrotic tissue is well adhered to the wound bed  Neurological:   Protective sensation is diminished  Lab Results:   No results displayed because visit has over 200 results  Results from last 7 days   Lab Units 12/24/22  0049   GRAM STAIN RESULT  1+ Epithelial Cells*  Rare Polys*  2+ Gram positive cocci in pairs*  1+ Gram negative rods*       Results from last 7 days   Lab Units 12/23/22  2217   BLOOD CULTURE  No Growth at 24 hrs  No Growth at 24 hrs  Imaging: I have personally reviewed pertinent films in PACS  EKG, Pathology, and Other Studies: I have personally reviewed pertinent reports  Code Status: Level 1 - Full Code      Portions of the record may have been created with voice recognition software  Occasional wrong word or "sound a like" substitutions may have occurred due to the inherent limitations of voice recognition software  Read the chart carefully and recognize, using context, where substitutions have occurred

## 2022-12-26 NOTE — PHYSICAL THERAPY NOTE
PHYSICAL THERAPY NOTE          Patient Name: Dejah Brown Sr  Today's Date: 12/26/2022 12/26/22 2957   Note Type   Note type Evaluation; Cancelled Session   Cancel Reasons Medical status     Received order for PT consult  Chart reviewed  Pt admitted with diagnosis acute respiratory failure with hypoxia  Patient with black eschar left heel now pending podiatry consult for changes of chronic left heel ulcer  Will cancel this date an await podiatry recommendations      Vale Jose, PT,DPT

## 2022-12-26 NOTE — PROGRESS NOTES
Progress Note - General Surgery   Deveron Stairs Sr  62 y o  male MRN: [id number]  Unit/Bed#: -01 Encounter: [id number]    Assessment:  61 yo M with free air on CT, likely 2/2 perfed diverticulum  PNA, AVSS on RA  WBC 10 (13, 13)  KELLY, improving  Cr 2 8 (3 3, 3 9)    Plan:  - cont conservative management  will ck CTAP today  pending results, will consider advancement to CLD   - cont abx  - trend white count  - monitor for sx  - dvt ppx    Rest of care per primary    Subjective/Objective     Subjective: doing well, no acute events or complaints overnight  Asking for liquids  Denies abd pain  Objective:     Blood pressure (!) 184/102, pulse 102, temperature 99 °F (37 2 °C), resp  rate 20, height 5' 11" (1 803 m), weight 59 7 kg (131 lb 9 8 oz), SpO2 95 %  ,Body mass index is 18 36 kg/m²  Intake/Output Summary (Last 24 hours) at 12/26/2022 1002  Last data filed at 12/26/2022 0639  Gross per 24 hour   Intake 1850 ml   Output 3500 ml   Net -1650 ml       Invasive Devices     Peripheral Intravenous Line  Duration           Peripheral IV 12/24/22 Right;Ventral (anterior) Forearm 2 days    Peripheral IV 12/25/22 Dorsal (posterior); Left Forearm <1 day          Drain  Duration           Urethral Catheter Coude 16 Fr  2 days                Physical Exam  Vitals and nursing note reviewed  Constitutional:       General: He is not in acute distress  Appearance: Normal appearance  He is normal weight  He is not ill-appearing, toxic-appearing or diaphoretic  HENT:      Head: Normocephalic and atraumatic  Eyes:      Extraocular Movements: Extraocular movements intact  Cardiovascular:      Rate and Rhythm: Normal rate  Pulmonary:      Effort: No respiratory distress  Abdominal:      General: Abdomen is flat  There is no distension  Palpations: Abdomen is soft  Tenderness: There is no abdominal tenderness  There is no guarding or rebound  Musculoskeletal:         General: No swelling     Skin: General: Skin is warm  Capillary Refill: Capillary refill takes less than 2 seconds  Neurological:      General: No focal deficit present  Mental Status: He is alert and oriented to person, place, and time  Psychiatric:         Mood and Affect: Mood normal          Behavior: Behavior normal             Scheduled Meds:  Current Facility-Administered Medications   Medication Dose Route Frequency Provider Last Rate   • albuterol  2 5 mg Nebulization Q4H PRN Thao S June, CRNP     • Buprenorphine HCl  450 mcg Buccal BID Laina Willoughby MD     • calcitonin (salmon)  1 spray Alternating Nares Daily Yamile Godoy MD     • cefTRIAXone  1,000 mg Intravenous Q24H Thao S June, CRNP 1,000 mg (12/25/22 2151)   • dextrose 5 % and sodium chloride 0 9 %  125 mL/hr Intravenous Continuous Laina Willoughby  mL/hr (12/26/22 3201)   • heparin (porcine)  5,000 Units Subcutaneous Q8H [address] Thao S June, CRNP     • HYDROmorphone  0 5 mg Intravenous Q4H PRN Aditi Ely MD     • insulin lispro  1-6 Units Subcutaneous Q6H Thao S June, CRNP     • LORazepam  0 5 mg Intravenous Q8H PRN YURIDIA Dumont     • metroNIDAZOLE  500 mg Intravenous Q8H Thao S June, CRNP 500 mg (12/26/22 0828)     Continuous Infusions:dextrose 5 % and sodium chloride 0 9 %, 125 mL/hr, Last Rate: 125 mL/hr (12/26/22 0639)      PRN Meds: •  albuterol  •  HYDROmorphone  •  LORazepam      Lab, Imaging and other studies:  I have personally reviewed pertinent lab results    , CBC:   Lab Results   Component Value Date    WBC 10 62 (H) 12/26/2022    HGB 10 0 (L) 12/26/2022    HCT 31 2 (L) 12/26/2022    MCV 92 12/26/2022     12/26/2022    MCH 29 3 12/26/2022    MCHC 32 1 12/26/2022    RDW 16 0 (H) 12/26/2022    MPV 10 0 12/26/2022    NRBC 0 12/26/2022   , CMP:   Lab Results   Component Value Date    SODIUM 145 12/26/2022    K 3 3 (L) 12/26/2022     (H) 12/26/2022    CO2 22 12/26/2022    BUN 39 (H) 12/26/2022    CREATININE 2 85 (H) 12/26/2022    CALCIUM 10 5 (H) 12/26/2022    AST 8 12/26/2022    ALT <6 (L) 12/26/2022    ALKPHOS 77 12/26/2022    EGFR 23 12/26/2022   , Coagulation: No results found for: PT, INR, APTT, Urinalysis: No results found for: COLORU, CLARITYU, SPECGRAV, PHUR, LEUKOCYTESUR, NITRITE, PROTEINUA, GLUCOSEU, KETONESU, BILIRUBINUR, BLOODU  VTE Pharmacologic Prophylaxis: Heparin  VTE Mechanical Prophylaxis: sequential compression device      Arnav Gunderson PA-C  12/26/2022 10:02 AM

## 2022-12-26 NOTE — MALNUTRITION/BMI
This medical record reflects one or more clinical indicators suggestive of malnutrition and underweight  Malnutrition Findings:   Adult Malnutrition type: Chronic illness  Adult Degree of Malnutrition: Other severe protein calorie malnutrition  Malnutrition Characteristics: Muscle loss, Fat loss, Inadequate energy, Weight loss                  360 Statement: Chronic severe malnutrition related to decreased appetite, pain as evidenced by < 75% estimated energy needs > 1 mo; severe muscle loss to temporalis, pectoralis, deltoid, interroseous muscles; severe fat loss to orbitals, buccal pads, triceps; 13 2% weight loss x 5 mo (7/5/22 151lb, 11/17/22 139lb, 12/26/22 131lb)  Treated with: Advance diet as medically appropriate to CCD 3, consider regular diet to promote good oral intake  Will order ensure clear BID while on clear liquids, consider glucerna vanilla supplement as diet advances  Recommend daily weights for nutrition monitoring  Recommend appetite stimulant if medically appropriate  Did provide diet ed for high kcal/high PRO nutrition therapy, 75-90 grams PRO daily, discussed foods to incorporate into daily intake, aim for 3 meals + snacks/supplements daily  BMI Findings:  Adult BMI Classifications: Underweight < 18 5        Body mass index is 18 36 kg/m²  See Nutrition note dated 12/26/22 for additional details  Completed nutrition assessment is viewable in the nutrition documentation

## 2022-12-26 NOTE — ASSESSMENT & PLAN NOTE
· Requiring nasal cannula oxygen 4 L on admission  · Now on room air  · Recently required supplemental oxygen in November during RSV bronchitis was but discharged home on room air  · Secondary to pneumonia  · Elevated D-dimer but unable to obtain CTA chest secondary to KELLY  · Continue respiratory protocol

## 2022-12-26 NOTE — CONSULTS
Consult received for weight loss, wounds  Pt reports poor appetite along with pain from back and foot  Pt reports he wants to have surgery on both his back and foot to improve his pain, "Then I will start gaining weight for sure " Says he is eating 2 meals per day, suspect very limited amounts at meals  When asked pt what foods he believes may help with weight gain stated "Well, I like apples  I just got to eat more!" Previously noted to enjoy foods offered at [address] [place] [address] along with vanilla glucerna supplements  Chart review of weight hx: 7/29/21 185lb, 7/5/22 151lb, 11/17/22 139lb, 12/26/22 131lb  29 2% weight loss per chart review, includes 13 2% weight loss x 5 mo significant  Pt stating "I believe I have been gaining weight " Pt with severe fat and muscle wasting  Meets criteria for chronic severe malnutrition  Advance diet as medically appropriate to CCD 3, consider regular diet to promote good oral intake  Will order ensure clear BID while on clear liquids, consider glucerna vanilla supplement as diet advances  Recommend daily weights for nutrition monitoring  Recommend appetite stimulant if medically appropriate  Discussed with pt that he has not been gaining weight like he thinks, continues to lose weight each time Darnell Monroy sees him  Did provide diet ed for high kcal/high PRO nutrition therapy, 75-90 grams PRO daily, discussed foods to incorporate into daily intake, aim for 3 meals + snacks/supplements daily  Provided with high kcalhigh PRO nutrition therapy handout and written instruction  Thank you for the consult, will follow up

## 2022-12-26 NOTE — ASSESSMENT & PLAN NOTE
· History of hypercalcemia  · Corrected calcium 13, ionized calcium 1 44 on admission-trending down  · IV hydration with saline  · Appreciate nephrology consultation  · SPEP interpretation from last admission revealed no monoclonal bands  · Improving with calcitonin nasal spray     Lab Results   Component Value Date    CALCIUM 10 5 (H) 12/26/2022    CALCIUM 11 2 (H) 12/25/2022    CALCIUM 11 5 (H) 12/24/2022

## 2022-12-26 NOTE — ASSESSMENT & PLAN NOTE
Malnutrition Findings: Loss of subcutaneous fat in orbital, triceps, ribcage areas  Loss of muscle at temples, clavicles, scapula, extremities  Consultation to dietitian  Will add appetite stimulant        Adult Malnutrition type: Chronic illnessBMI Findings:  Adult BMI Classifications: Underweight < 18 5        Body mass index is 18 36 kg/m²

## 2022-12-26 NOTE — DISCHARGE INSTR - OTHER ORDERS
Skin care Plan:  1-Protect sacrum w/Allevyn foam, iva with P, change q3d and PRN, check skin q-shift  2-Turn/reposition q2h or when medically stable for pressure re-distribution on skin   3-Elevate heels to offload pressure, keep left heel offload at all times   4-Moisturize skin daily with skin nourishing cream  5-Ehob cushion in chair when out of bed  6-Gently cleanse with normal saline, apply Betadine, cover with DSD ABD wrap with emmanuel, change daily, Keep heel offloaded at all times

## 2022-12-27 ENCOUNTER — APPOINTMENT (INPATIENT)
Dept: NON INVASIVE DIAGNOSTICS | Facility: HOSPITAL | Age: 57
End: 2022-12-27

## 2022-12-27 LAB
ANION GAP SERPL CALCULATED.3IONS-SCNC: 11 MMOL/L (ref 4–13)
BACTERIA UR CULT: ABNORMAL
BUN SERPL-MCNC: 26 MG/DL (ref 5–25)
CA-I BLD-SCNC: 1.35 MMOL/L (ref 1.12–1.32)
CALCIUM SERPL-MCNC: 10.4 MG/DL (ref 8.3–10.1)
CHLORIDE SERPL-SCNC: 107 MMOL/L (ref 96–108)
CO2 SERPL-SCNC: 25 MMOL/L (ref 21–32)
CREAT SERPL-MCNC: 2.32 MG/DL (ref 0.6–1.3)
ERYTHROCYTE [DISTWIDTH] IN BLOOD BY AUTOMATED COUNT: 15.8 % (ref 11.6–15.1)
GFR SERPL CREATININE-BSD FRML MDRD: 30 ML/MIN/1.73SQ M
GLUCOSE SERPL-MCNC: 149 MG/DL (ref 65–140)
GLUCOSE SERPL-MCNC: 171 MG/DL (ref 65–140)
GLUCOSE SERPL-MCNC: 182 MG/DL (ref 65–140)
GLUCOSE SERPL-MCNC: 208 MG/DL (ref 65–140)
GLUCOSE SERPL-MCNC: 243 MG/DL (ref 65–140)
HCT VFR BLD AUTO: 32.3 % (ref 36.5–49.3)
HGB BLD-MCNC: 10.4 G/DL (ref 12–17)
MCH RBC QN AUTO: 28.9 PG (ref 26.8–34.3)
MCHC RBC AUTO-ENTMCNC: 32.2 G/DL (ref 31.4–37.4)
MCV RBC AUTO: 90 FL (ref 82–98)
PLATELET # BLD AUTO: 253 THOUSANDS/UL (ref 149–390)
PMV BLD AUTO: 9.9 FL (ref 8.9–12.7)
POTASSIUM SERPL-SCNC: 2.9 MMOL/L (ref 3.5–5.3)
POTASSIUM SERPL-SCNC: 3.3 MMOL/L (ref 3.5–5.3)
RBC # BLD AUTO: 3.6 MILLION/UL (ref 3.88–5.62)
SODIUM SERPL-SCNC: 143 MMOL/L (ref 135–147)
VANCOMYCIN SERPL-MCNC: 15 UG/ML (ref 10–20)
WBC # BLD AUTO: 10.72 THOUSAND/UL (ref 4.31–10.16)

## 2022-12-27 RX ORDER — POTASSIUM CHLORIDE 20 MEQ/1
40 TABLET, EXTENDED RELEASE ORAL ONCE
Status: COMPLETED | OUTPATIENT
Start: 2022-12-27 | End: 2022-12-27

## 2022-12-27 RX ORDER — INSULIN GLARGINE 100 [IU]/ML
5 INJECTION, SOLUTION SUBCUTANEOUS
Status: DISCONTINUED | OUTPATIENT
Start: 2022-12-27 | End: 2022-12-28 | Stop reason: HOSPADM

## 2022-12-27 RX ORDER — POTASSIUM CHLORIDE 14.9 MG/ML
20 INJECTION INTRAVENOUS
Status: DISCONTINUED | OUTPATIENT
Start: 2022-12-27 | End: 2022-12-27

## 2022-12-27 RX ADMIN — OXYCODONE HYDROCHLORIDE 5 MG: 5 TABLET ORAL at 15:14

## 2022-12-27 RX ADMIN — METRONIDAZOLE 500 MG: 500 INJECTION, SOLUTION INTRAVENOUS at 23:18

## 2022-12-27 RX ADMIN — HEPARIN SODIUM 5000 UNITS: 5000 INJECTION INTRAVENOUS; SUBCUTANEOUS at 21:12

## 2022-12-27 RX ADMIN — HYDROMORPHONE HYDROCHLORIDE 0.5 MG: 1 INJECTION, SOLUTION INTRAMUSCULAR; INTRAVENOUS; SUBCUTANEOUS at 12:47

## 2022-12-27 RX ADMIN — INSULIN LISPRO 2 UNITS: 100 INJECTION, SOLUTION INTRAVENOUS; SUBCUTANEOUS at 12:32

## 2022-12-27 RX ADMIN — VENLAFAXINE HYDROCHLORIDE 37.5 MG: 37.5 CAPSULE, EXTENDED RELEASE ORAL at 08:04

## 2022-12-27 RX ADMIN — POTASSIUM CHLORIDE 40 MEQ: 1500 TABLET, EXTENDED RELEASE ORAL at 08:15

## 2022-12-27 RX ADMIN — CALCITONIN SALMON 1 SPRAY: 200 SPRAY, METERED NASAL at 08:13

## 2022-12-27 RX ADMIN — INSULIN GLARGINE 5 UNITS: 100 INJECTION, SOLUTION SUBCUTANEOUS at 22:15

## 2022-12-27 RX ADMIN — AMLODIPINE BESYLATE 10 MG: 10 TABLET ORAL at 08:04

## 2022-12-27 RX ADMIN — CYCLOBENZAPRINE 5 MG: 10 TABLET, FILM COATED ORAL at 08:04

## 2022-12-27 RX ADMIN — INSULIN LISPRO 1 UNITS: 100 INJECTION, SOLUTION INTRAVENOUS; SUBCUTANEOUS at 22:15

## 2022-12-27 RX ADMIN — PREGABALIN 75 MG: 75 CAPSULE ORAL at 08:04

## 2022-12-27 RX ADMIN — TAMSULOSIN HYDROCHLORIDE 0.4 MG: 0.4 CAPSULE ORAL at 17:50

## 2022-12-27 RX ADMIN — CARVEDILOL 6.25 MG: 6.25 TABLET, FILM COATED ORAL at 08:05

## 2022-12-27 RX ADMIN — HEPARIN SODIUM 5000 UNITS: 5000 INJECTION INTRAVENOUS; SUBCUTANEOUS at 15:13

## 2022-12-27 RX ADMIN — HEPARIN SODIUM 5000 UNITS: 5000 INJECTION INTRAVENOUS; SUBCUTANEOUS at 05:38

## 2022-12-27 RX ADMIN — SODIUM CHLORIDE, SODIUM GLUCONATE, SODIUM ACETATE, POTASSIUM CHLORIDE AND MAGNESIUM CHLORIDE 100 ML/HR: 526; 502; 368; 37; 30 INJECTION, SOLUTION INTRAVENOUS at 08:02

## 2022-12-27 RX ADMIN — BUPRENORPHINE HYDROCHLORIDE 450 MCG: 450 FILM, SOLUBLE BUCCAL at 08:47

## 2022-12-27 RX ADMIN — OXYCODONE HYDROCHLORIDE 5 MG: 5 TABLET ORAL at 22:26

## 2022-12-27 RX ADMIN — MEGESTROL ACETATE 400 MG: 400 SUSPENSION ORAL at 08:13

## 2022-12-27 RX ADMIN — METRONIDAZOLE 500 MG: 500 INJECTION, SOLUTION INTRAVENOUS at 15:40

## 2022-12-27 RX ADMIN — METRONIDAZOLE 500 MG: 500 INJECTION, SOLUTION INTRAVENOUS at 07:59

## 2022-12-27 RX ADMIN — HYDROMORPHONE HYDROCHLORIDE 0.5 MG: 1 INJECTION, SOLUTION INTRAMUSCULAR; INTRAVENOUS; SUBCUTANEOUS at 00:25

## 2022-12-27 RX ADMIN — VENLAFAXINE HYDROCHLORIDE 37.5 MG: 37.5 CAPSULE, EXTENDED RELEASE ORAL at 21:10

## 2022-12-27 RX ADMIN — POTASSIUM CHLORIDE 40 MEQ: 1500 TABLET, EXTENDED RELEASE ORAL at 15:14

## 2022-12-27 RX ADMIN — VENLAFAXINE HYDROCHLORIDE 37.5 MG: 37.5 CAPSULE, EXTENDED RELEASE ORAL at 15:41

## 2022-12-27 RX ADMIN — OXYCODONE HYDROCHLORIDE 5 MG: 5 TABLET ORAL at 08:14

## 2022-12-27 RX ADMIN — ATORVASTATIN CALCIUM 40 MG: 40 TABLET, FILM COATED ORAL at 17:50

## 2022-12-27 RX ADMIN — BUPRENORPHINE HYDROCHLORIDE 450 MCG: 450 FILM, SOLUBLE BUCCAL at 20:29

## 2022-12-27 RX ADMIN — CARVEDILOL 6.25 MG: 6.25 TABLET, FILM COATED ORAL at 17:50

## 2022-12-27 RX ADMIN — HYDROMORPHONE HYDROCHLORIDE 0.5 MG: 1 INJECTION, SOLUTION INTRAMUSCULAR; INTRAVENOUS; SUBCUTANEOUS at 06:18

## 2022-12-27 RX ADMIN — INSULIN LISPRO 3 UNITS: 100 INJECTION, SOLUTION INTRAVENOUS; SUBCUTANEOUS at 08:07

## 2022-12-27 RX ADMIN — POTASSIUM CHLORIDE 20 MEQ: 14.9 INJECTION, SOLUTION INTRAVENOUS at 08:17

## 2022-12-27 RX ADMIN — CEFTRIAXONE 1000 MG: 1 INJECTION, SOLUTION INTRAVENOUS at 22:15

## 2022-12-27 RX ADMIN — CYCLOBENZAPRINE 5 MG: 10 TABLET, FILM COATED ORAL at 21:10

## 2022-12-27 RX ADMIN — VANCOMYCIN HYDROCHLORIDE 750 MG: 750 INJECTION, SOLUTION INTRAVENOUS at 10:27

## 2022-12-27 RX ADMIN — CYCLOBENZAPRINE 5 MG: 10 TABLET, FILM COATED ORAL at 15:41

## 2022-12-27 NOTE — OCCUPATIONAL THERAPY NOTE
Occupational Therapy Cancellation Note       Patient Name: Mike Delgado Sr  Today's Date: 12/27/2022  Problem List  Principal Problem:    Acute respiratory failure with hypoxia (HCC)  Active Problems:    KELLY (acute kidney injury) ([address] )    Diabetes mellitus type 2, insulin dependent (HCC)    Severe protein-calorie malnutrition (HCC)    Hypercalcemia    Chronic ulcer of left heel (HCC)    Acute on chronic anemia    Pneumonia    Chronic back pain    Pneumoperitoneum          12/27/22 1144   Note Type   Note type Evaluation; Cancelled Session   Cancel Reasons Medical status       OT orders received  Chart review completed  Pt admitted to [address] [place] [address] 12/23/2022 with Dx: acute respiratory failure with hypoxia  Podiatry consulted and recommended XR of L foot to rule out any underlying bone involvement - results currently pending  Pt also with arterial duplex pending  Will hold OT services until tests are resulted with updated podiatry notes with WB status if appropriate         Isauro Vazquez OTR/L

## 2022-12-27 NOTE — ASSESSMENT & PLAN NOTE
· Acute kidney injury on chronic kidney disease  · In setting of urinary retention and acute cystitis   · Appreciate nephrology consultation  · IV hydration  · Place Camarena catheter secondary to retention  · Continue treatment as per nephrology recommendation  · Improving with IVF and camarena decompression, will refer to urology for voiding trial in 1 week    Lab Results   Component Value Date    CREATININE 2 32 (H) 12/27/2022    CREATININE 2 85 (H) 12/26/2022    CREATININE 3 31 (H) 12/25/2022

## 2022-12-27 NOTE — PLAN OF CARE
Problem: Potential for Falls  Goal: Patient will remain free of falls  Description: INTERVENTIONS:  - Educate patient/family on patient safety including physical limitations  - Instruct patient to call for assistance with activity   - Consult OT/PT to assist with strengthening/mobility   - Keep Call bell within reach  - Keep bed low and locked with side rails adjusted as appropriate  - Keep care items and personal belongings within reach  - Initiate and maintain comfort rounds  - Make Fall Risk Sign visible to staff  - Offer Toileting every 2 Hours, in advance of need  - Initiate/Maintain bed alarm  - Obtain necessary fall risk management equipment  - Apply yellow socks and bracelet for high fall risk patients  - Consider moving patient to room near nurses station  Outcome: Progressing     Problem: Nutrition/Hydration-ADULT  Goal: Nutrient/Hydration intake appropriate for improving, restoring or maintaining nutritional needs  Description: Monitor and assess patient's nutrition/hydration status for malnutrition  Collaborate with interdisciplinary team and initiate plan and interventions as ordered  Monitor patient's weight and dietary intake as ordered or per policy  Utilize nutrition screening tool and intervene as necessary  Determine patient's food preferences and provide high-protein, high-caloric foods as appropriate       INTERVENTIONS:  - Monitor oral intake, urinary output, labs, and treatment plans  - Assess nutrition and hydration status and recommend course of action  - Evaluate amount of meals eaten  - Assist patient with eating if necessary   - Allow adequate time for meals  - Recommend/ encourage appropriate diets, oral nutritional supplements, and vitamin/mineral supplements  - Order, calculate, and assess calorie counts as needed  - Recommend, monitor, and adjust tube feedings and TPN/PPN based on assessed needs  - Assess need for intravenous fluids  - Provide specific nutrition/hydration education as appropriate  - Include patient/family/caregiver in decisions related to nutrition  Outcome: Progressing     Problem: MOBILITY - ADULT  Goal: Maintain or return to baseline ADL function  Description: INTERVENTIONS:  -  Assess patient's ability to carry out ADLs; assess patient's baseline for ADL function and identify physical deficits which impact ability to perform ADLs (bathing, care of mouth/teeth, toileting, grooming, dressing, etc )  - Assess/evaluate cause of self-care deficits   - Assess range of motion  - Assess patient's mobility; develop plan if impaired  - Assess patient's need for assistive devices and provide as appropriate  - Encourage maximum independence but intervene and supervise when necessary  - Involve family in performance of ADLs  - Assess for home care needs following discharge   - Consider OT consult to assist with ADL evaluation and planning for discharge  - Provide patient education as appropriate  Outcome: Progressing  Goal: Maintains/Returns to pre admission functional level  Description: INTERVENTIONS:  - Perform BMAT or MOVE assessment daily    - Set and communicate daily mobility goal to care team and patient/family/caregiver  - Collaborate with rehabilitation services on mobility goals if consulted  - Perform Range of Motion 4 times a day  - Reposition patient every 2 hours    - Out of bed for toileting  - Record patient progress and toleration of activity level   Outcome: Progressing     Problem: Prexisting or High Potential for Compromised Skin Integrity  Goal: Skin integrity is maintained or improved  Description: INTERVENTIONS:  - Identify patients at risk for skin breakdown  - Assess and monitor skin integrity  - Assess and monitor nutrition and hydration status  - Monitor labs   - Assess for incontinence   - Turn and reposition patient  - Assist with mobility/ambulation  - Relieve pressure over bony prominences  - Avoid friction and shearing  - Provide appropriate hygiene as needed including keeping skin clean and dry  - Evaluate need for skin moisturizer/barrier cream  - Collaborate with interdisciplinary team   - Patient/family teaching  - Consider wound care consult   Outcome: Progressing     Problem: PAIN - ADULT  Goal: Verbalizes/displays adequate comfort level or baseline comfort level  Description: Interventions:  - Encourage patient to monitor pain and request assistance  - Assess pain using appropriate pain scale  - Administer analgesics based on type and severity of pain and evaluate response  - Implement non-pharmacological measures as appropriate and evaluate response  - Consider cultural and social influences on pain and pain management  - Notify physician/advanced practitioner if interventions unsuccessful or patient reports new pain  Outcome: Progressing     Problem: INFECTION - ADULT  Goal: Absence or prevention of progression during hospitalization  Description: INTERVENTIONS:  - Assess and monitor for signs and symptoms of infection  - Monitor lab/diagnostic results  - Monitor all insertion sites, i e  indwelling lines, tubes, and drains  - Monitor endotracheal if appropriate and nasal secretions for changes in amount and color  - [place] appropriate cooling/warming therapies per order  - Administer medications as ordered  - Instruct and encourage patient and family to use good hand hygiene technique  - Identify and instruct in appropriate isolation precautions for identified infection/condition  Outcome: Progressing     Problem: DISCHARGE PLANNING  Goal: Discharge to home or other facility with appropriate resources  Description: INTERVENTIONS:  - Identify barriers to discharge w/patient and caregiver  - Arrange for needed discharge resources and transportation as appropriate  - Identify discharge learning needs (meds, wound care, etc )  - Arrange for interpretive services to assist at discharge as needed  - Refer to Case Management Department for coordinating discharge planning if the patient needs post-hospital services based on physician/advanced practitioner order or complex needs related to functional status, cognitive ability, or social support system  Outcome: Progressing     Problem: Knowledge Deficit  Goal: Patient/family/caregiver demonstrates understanding of disease process, treatment plan, medications, and discharge instructions  Description: Complete learning assessment and assess knowledge base    Interventions:  - Provide teaching at level of understanding  - Provide teaching via preferred learning methods  Outcome: Progressing     Problem: RESPIRATORY - ADULT  Goal: Achieves optimal ventilation and oxygenation  Description: INTERVENTIONS:  - Assess for changes in respiratory status  - Assess for changes in mentation and behavior  - Position to facilitate oxygenation and minimize respiratory effort  - Oxygen administered by appropriate delivery if ordered  - Initiate smoking cessation education as indicated  - Encourage broncho-pulmonary hygiene including cough, deep breathe, Incentive Spirometry  - Assess the need for suctioning and aspirate as needed  - Assess and instruct to report SOB or any respiratory difficulty  - Respiratory Therapy support as indicated  Outcome: Progressing     Problem: GASTROINTESTINAL - ADULT  Goal: Maintains or returns to baseline bowel function  Description: INTERVENTIONS:  - Assess bowel function  - Encourage oral fluids to ensure adequate hydration  - Administer IV fluids if ordered to ensure adequate hydration  - Administer ordered medications as needed  - Encourage mobilization and activity  - Consider nutritional services referral to assist patient with adequate nutrition and appropriate food choices  Outcome: Progressing  Goal: Maintains adequate nutritional intake  Description: INTERVENTIONS:  - Monitor percentage of each meal consumed  - Identify factors contributing to decreased intake, treat as appropriate  - Assist with meals as needed  - Monitor I&O, weight, and lab values if indicated  - Obtain nutrition services referral as needed  Outcome: Progressing     Problem: METABOLIC, FLUID AND ELECTROLYTES - ADULT  Goal: Electrolytes maintained within normal limits  Description: INTERVENTIONS:  - Monitor labs and assess patient for signs and symptoms of electrolyte imbalances  - Administer electrolyte replacement as ordered  - Monitor response to electrolyte replacements, including repeat lab results as appropriate  - Instruct patient on fluid and nutrition as appropriate  Outcome: Progressing  Goal: Glucose maintained within target range  Description: INTERVENTIONS:  - Monitor Blood Glucose as ordered  - Assess for signs and symptoms of hyperglycemia and hypoglycemia  - Administer ordered medications to maintain glucose within target range  - Assess nutritional intake and initiate nutrition service referral as needed  Outcome: Progressing     Problem: SKIN/TISSUE INTEGRITY - ADULT  Goal: Incision(s), wounds(s) or drain site(s) healing without S/S of infection  Description: INTERVENTIONS  - Assess and document dressing, incision, wound bed, drain sites and surrounding tissue  - Provide patient and family education  - Perform skin care/dressing changes   Outcome: Progressing

## 2022-12-27 NOTE — ASSESSMENT & PLAN NOTE
· Secondary to perforated sigmoid diverticulum  · Patient remained asymptomatic on clinical exam  · Surgery consult appreciated-commend continue conservative management since patient remains high risk for any kind of surgery  · Repeat CT abd still pending read, viewed by Dr Roney Franks and diet advanced due to benign exam  · Advanced to clear liquid diet yesterday, fulls for breakfast and now surgical soft without abdominal pain or nausea

## 2022-12-27 NOTE — PROGRESS NOTES
[address]  Progress Note - [place] 1965, 62 y o  male MRN: [id number]  Unit/Bed#: -01 Encounter: [id number]  Primary Care Provider: González Gracia DO   Date and time admitted to hospital: 12/23/2022  9:51 PM    * Acute respiratory failure with hypoxia [facility])  Assessment & Plan  · Requiring nasal cannula oxygen 4 L on admission  · Now on room air  · Recently required supplemental oxygen in November during RSV bronchitis was but discharged home on room air  · Secondary to pneumonia  · Elevated D-dimer but unable to obtain CTA chest secondary to KELLY  · Continue respiratory protocol    Hypercalcemia  Assessment & Plan  · History of hypercalcemia  · Corrected calcium 13, ionized calcium 1 44 on admission-trending down  · IV hydration with saline  · Appreciate nephrology consultation  · SPEP interpretation from last admission revealed no monoclonal bands  · Improving with calcitonin nasal spray     Lab Results   Component Value Date    CALCIUM 10 4 (H) 12/27/2022    CALCIUM 10 5 (H) 12/26/2022    CALCIUM 11 2 (H) 12/25/2022         KELLY (acute kidney injury) ([facility] [address] )  Assessment & Plan  · Acute kidney injury on chronic kidney disease  · In setting of urinary retention and acute cystitis   · Appreciate nephrology consultation  · IV hydration  · Place Camarena catheter secondary to retention  · Continue treatment as per nephrology recommendation  · Improving with IVF and camarena decompression, will refer to urology for voiding trial in 1 week    Lab Results   Component Value Date    CREATININE 2 32 (H) 12/27/2022    CREATININE 2 85 (H) 12/26/2022    CREATININE 3 31 (H) 12/25/2022         Pneumoperitoneum  Assessment & Plan  · Secondary to perforated sigmoid diverticulum  · Patient remained asymptomatic on clinical exam  · Surgery consult appreciated-commend continue conservative management since patient remains high risk for any kind of surgery  · Repeat CT abd still pending read, viewed by Dr Vianey Collins and diet advanced due to benign exam  · Advanced to clear liquid diet yesterday, fulls for breakfast and now surgical soft without abdominal pain or nausea       Chronic back pain  Assessment & Plan  · Chronic back pain, history osteomyelitis  · Continue home buprenorphine, oxycodone, Lyrica  · PDMP reviewed    Pneumonia  Assessment & Plan  · POA with cough productive for brown mucus  · CXR with infiltrate  · Empiric ceftriaxone   · Pending blood culture  · Sputum culture with MRSA  · MRSA bronchitis and November  · Add vanco for now, will likely transition to Bactrim to complete treatment     Acute cystitis  Assessment & Plan  POA  Urine culture with pseudomonas susceptible to cephalosporins   Continue ceftriaxone day 4 (as well as for pneumonia)    Acute on chronic anemia  Assessment & Plan  · Chronic normocytic anemia in setting of chronic kidney disease  · Iron deficient anemia history of poor oral intake  · Hemoglobin remaining stable  · No active bleeding noted      Chronic ulcer of left heel ([facility] [address] )  Assessment & Plan  Patient with chronic left heel wound  Was seen by podiatry on last admission, wanted leads and conservative treatment with wound care  Patient missed outpatient appointment follow-up since, leads not done secondary to RSV status at last admission    Patient seen by podiatry and telemetry consult today, imaging studies ordered  · LEADs in process, x-ray awaiting read  · Continue wound care   Continue to follow podiatry's recommendations after imaging studies  PT/OT consults    Severe protein-calorie malnutrition ([facility] [address] )  Assessment & Plan  Malnutrition Findings: Loss of subcutaneous fat in orbital, triceps, ribcage areas  Loss of muscle at temples, clavicles, scapula, extremities  Consultation to dietitian  Will add appetite stimulant        Adult Malnutrition type: Chronic illnessBMI Findings:  Adult BMI Classifications: Underweight < 18 5        Body mass index is 18 85 kg/m²  Diabetes mellitus type 2, insulin dependent [facility])  Assessment & Plan  Lab Results   Component Value Date    HGBA1C 6 6 (H) 2022       Recent Labs     22  1123 22  1629 22  2118 22  0804   POCGLU 166* 234* 201* 243*       Blood Sugar Average: Last 72 hrs:  (P) 802 [id number]   Patient is maintained as OP on lantus 5 units HS   Currently on correction scale only  Monitor with advancement of diet - will add lantus 5 units HS      VTE Pharmacologic Prophylaxis: VTE Score: 3 Moderate Risk (Score 3-4) - Pharmacological DVT Prophylaxis Ordered: heparin  Patient Centered Rounds: I performed bedside rounds with nursing staff today  Discussions with Specialists or Other Care Team Provider: CM, surgery    Education and Discussions with Family / Patient: Updated  (wife) via phone  Time Spent for Care: 30 minutes  More than 50% of total time spent on counseling and coordination of care as described above  Current Length of Stay: 4 day(s)  Current Patient Status: Inpatient   Certification Statement: The patient will continue to require additional inpatient hospital stay due to pending imaging studies, diet advancement   Discharge Plan: Anticipate discharge in 24-48 hrs to discharge location to be determined pending rehab evaluations  Code Status: Level 1 - Full Code    Subjective:   Seen and examined  Endorses feeling well after breakfast without abdominal pain or nausea, wanting to advance diet  Is aware of waiting for vascular studies and x-ray for plan for wound  Objective:     Vitals:   Temp (24hrs), Av 7 °F (37 1 °C), Min:98 4 °F (36 9 °C), Max:99 °F (37 2 °C)    Temp:  [98 4 °F (36 9 °C)-99 °F (37 2 °C)] 98 6 °F (37 °C)  HR:  [85-86] 86  Resp:  [18-20] 18  BP: (165-174)/(84-88) 167/88  SpO2:  [93 %-95 %] 93 %  Body mass index is 18 85 kg/m²  Input and Output Summary (last 24 hours):      Intake/Output Summary (Last 24 hours) at 2022 [facility] filed at 12/27/2022 1032  Gross per 24 hour   Intake 4346 67 ml   Output 3000 ml   Net 1346 67 ml       Physical Exam:   Physical Exam  Vitals and nursing note reviewed  Constitutional:       General: He is not in acute distress  Appearance: Normal appearance  HENT:      Head: Normocephalic and atraumatic  Nose: No congestion  Mouth/Throat:      Mouth: Mucous membranes are moist    Eyes:      Conjunctiva/sclera: Conjunctivae normal    Cardiovascular:      Rate and Rhythm: Normal rate and regular rhythm  Pulses: Normal pulses  Heart sounds: Normal heart sounds  No murmur heard  Pulmonary:      Effort: Pulmonary effort is normal  No respiratory distress  Breath sounds: Normal breath sounds  Abdominal:      General: Bowel sounds are normal       Palpations: Abdomen is soft  Tenderness: There is no abdominal tenderness  Musculoskeletal:         General: Normal range of motion  Right lower leg: No edema  Left lower leg: No edema  Skin:     General: Skin is warm and dry  Comments: Boggy black left heel wound   Neurological:      Mental Status: He is alert and oriented to person, place, and time            Additional Data:     Labs:  Results from last 7 days   Lab Units 12/27/22  0528 12/26/22  0536   WBC Thousand/uL 10 72* 10 62*   HEMOGLOBIN g/dL 10 4* 10 0*   HEMATOCRIT % 32 3* 31 2*   PLATELETS Thousands/uL 253 256   NEUTROS PCT %  --  79*   LYMPHS PCT %  --  11*   MONOS PCT %  --  6   EOS PCT %  --  2     Results from last 7 days   Lab Units 12/27/22  0528 12/26/22  0536   SODIUM mmol/L 143 145   POTASSIUM mmol/L 2 9* 3 3*   CHLORIDE mmol/L 107 109*   CO2 mmol/L 25 22   BUN mg/dL 26* 39*   CREATININE mg/dL 2 32* 2 85*   ANION GAP mmol/L 11 14*   CALCIUM mg/dL 10 4* 10 5*   ALBUMIN g/dL  --  2 5*   TOTAL BILIRUBIN mg/dL  --  0 27   ALK PHOS U/L  --  77   ALT U/L  --  <6*   AST U/L  --  8   GLUCOSE RANDOM mg/dL 182* 204*     Results from last 7 days   Lab Units 12/23/22  2217   INR  1 05     Results from last 7 days   Lab Units 12/27/22  0804 12/26/22  2118 12/26/22  1629 12/26/22  1123 12/26/22  0602 12/25/22  2355 12/25/22  2055 12/25/22  1746 12/25/22  1221 12/25/22  0016 12/24/22  1842 12/24/22  1155   POC GLUCOSE mg/dl 243* 201* 234* 166* 183* 144* 125 87 72 102 102 166*         Results from last 7 days   Lab Units 12/23/22  2217   LACTIC ACID mmol/L 1 0   PROCALCITONIN ng/ml 0 58*       Lines/Drains:  Invasive Devices     Peripheral Intravenous Line  Duration           Peripheral IV 12/25/22 Dorsal (posterior); Left Forearm 1 day    Peripheral IV 12/26/22 Right;Upper;Ventral (anterior) Arm <1 day          Drain  Duration           Urethral Catheter Coude 16 Fr  3 days              Urinary Catheter:  Goal for removal: N/A- Discharging with Ortiz               Imaging: No pertinent imaging reviewed  Recent Cultures (last 7 days):   Results from last 7 days   Lab Units 12/24/22  0519 12/24/22  0049 12/23/22 2217   BLOOD CULTURE   --   --  No Growth at 48 hrs  No Growth at 48 hrs     SPUTUM CULTURE   --  4+ Growth of Methicillin Resistant Staphylococcus aureus*  3+ Growth of  --    GRAM STAIN RESULT   --  1+ Epithelial Cells*  Rare Polys*  2+ Gram positive cocci in pairs*  1+ Gram negative rods*  --    URINE CULTURE  10,000-19,000 cfu/ml Pseudomonas aeruginosa*  --   --        Last 24 Hours Medication List:   Current Facility-Administered Medications   Medication Dose Route Frequency Provider Last Rate   • albuterol  2 5 mg Nebulization Q4H PRN YURIDIA Chirinos     • amLODIPine  10 mg Oral Daily Jade Rosales PA-C     • atorvastatin  40 mg Oral Daily With Linda Rodriguez PA-C     • Buprenorphine HCl  450 mcg Buccal BID Kyleigh Mckinney MD     • calcitonin (salmon)  1 spray Alternating Nares Daily Claudette Driscoll MD     • carvedilol  6 25 mg Oral BID With Meals Jade Rosales PA-C     • cefTRIAXone  1,000 mg Intravenous Q24H Thao MOON June, CRNP 1,000 mg (12/26/22 2202)   • cyclobenzaprine  5 mg Oral TID Ct Stern PA-C     • heparin (porcine)  5,000 Units Subcutaneous Q8H [facility] Thao MOON June, CRNP     • HYDROmorphone  0 5 mg Intravenous Q4H PRN Shweta Frost MD     • insulin glargine  5 Units Subcutaneous HS Ct Stern PA-C     • insulin lispro  1-5 Units Subcutaneous HS Ct Stern PA-C     • insulin lispro  1-6 Units Subcutaneous TID With Meals Ct Stern PA-C     • LORazepam  0 5 mg Intravenous Q8H PRN YURIDIA Sales     • megestrol  400 mg Oral Daily Ct Stern PA-C     • metroNIDAZOLE  500 mg Intravenous Q8H Thao MOON June, CRNP 500 mg (12/27/22 0759)   • multi-electrolyte  100 mL/hr Intravenous Continuous Ct Stern PA-C 100 mL/hr (12/27/22 0802)   • oxyCODONE  5 mg Oral Q6H PRN Ct Stern PA-C     • pregabalin  75 mg Oral Daily Ct Stern PA-C     • tamsulosin  0 4 mg Oral Daily With Raymond Weldon PA-C     • [START ON 12/28/2022] vancomycin  12 5 mg/kg Intravenous Once PRN Lizbeth Ceron MD     • venlafaxine  37 5 mg Oral TID Ct Stern PA-C          Today, Patient Was Seen By: Ct Stern PA-C    **Please Note: This note may have been constructed using a voice recognition system  **

## 2022-12-27 NOTE — ASSESSMENT & PLAN NOTE
· Chronic normocytic anemia in setting of chronic kidney disease  · Iron deficient anemia history of poor oral intake  · Hemoglobin remaining stable  · No active bleeding noted

## 2022-12-27 NOTE — PLAN OF CARE
Problem: Potential for Falls  Goal: Patient will remain free of falls  Description: INTERVENTIONS:  - Educate patient/family on patient safety including physical limitations  - Instruct patient to call for assistance with activity   - Consult OT/PT to assist with strengthening/mobility   - Keep Call bell within reach  - Keep bed low and locked with side rails adjusted as appropriate  - Keep care items and personal belongings within reach  - Initiate and maintain comfort rounds  - Make Fall Risk Sign visible to staff  Problem: Nutrition/Hydration-ADULT  Goal: Nutrient/Hydration intake appropriate for improving, restoring or maintaining nutritional needs  Description: Monitor and assess patient's nutrition/hydration status for malnutrition  Collaborate with interdisciplinary team and initiate plan and interventions as ordered  Monitor patient's weight and dietary intake as ordered or per policy  Utilize nutrition screening tool and intervene as necessary  Determine patient's food preferences and provide high-protein, high-caloric foods as appropriate       INTERVENTIONS:  - Monitor oral intake, urinary output, labs, and treatment plans  - Assess nutrition and hydration status and recommend course of action  - Evaluate amount of meals eaten  - Assist patient with eating if necessary   - Allow adequate time for meals  - Recommend/ encourage appropriate diets, oral nutritional supplements, and vitamin/mineral supplements  - Order, calculate, and assess calorie counts as needed  - Recommend, monitor, and adjust tube feedings and TPN/PPN based on assessed needs  - Assess need for intravenous fluids  - Provide specific nutrition/hydration education as appropriate  - Include patient/family/caregiver in decisions related to nutrition  Outcome: Progressing     Problem: MOBILITY - ADULT  Goal: Maintain or return to baseline ADL function  Description: INTERVENTIONS:  -  Assess patient's ability to carry out ADLs; assess patient's baseline for ADL function and identify physical deficits which impact ability to perform ADLs (bathing, care of mouth/teeth, toileting, grooming, dressing, etc )  - Assess/evaluate cause of self-care deficits   - Assess range of motion  - Assess patient's mobility; develop plan if impaired  - Assess patient's need for assistive devices and provide as appropriate  - Encourage maximum independence but intervene and supervise when necessary  - Involve family in performance of ADLs  - Assess for home care needs following discharge   - Consider OT consult to assist with ADL evaluation and planning for discharge  - Provide patient education as appropriate  Outcome: Progressing  Goal: Maintains/Returns to pre admission functional level  Description: INTERVENTIONS:  - Perform BMAT or MOVE assessment daily    - Set and communicate daily mobility goal to care team and patient/family/caregiver  - Collaborate with rehabilitation services on mobility goals if consulted  - Perform Range of Motion 4 times a day  - Reposition patient every 2 hours    - Out of bed for toileting  - Record patient progress and toleration of activity level   Outcome: Progressing     - Apply yellow socks and bracelet for high fall risk patients  - Consider moving patient to room near nurses station  Outcome: Progressing

## 2022-12-27 NOTE — ASSESSMENT & PLAN NOTE
Patient with chronic left heel wound  Was seen by podiatry on last admission, wanted leads and conservative treatment with wound care  Patient missed outpatient appointment follow-up since, leads not done secondary to RSV status at last admission    Patient seen by podiatry and telemetry consult today, imaging studies ordered  · LEADs in process, x-ray awaiting read  · Continue wound care   Continue to follow podiatry's recommendations after imaging studies  PT/OT consults

## 2022-12-27 NOTE — ASSESSMENT & PLAN NOTE
· History of hypercalcemia  · Corrected calcium 13, ionized calcium 1 44 on admission-trending down  · IV hydration with saline  · Appreciate nephrology consultation  · SPEP interpretation from last admission revealed no monoclonal bands  · Improving with calcitonin nasal spray     Lab Results   Component Value Date    CALCIUM 10 4 (H) 12/27/2022    CALCIUM 10 5 (H) 12/26/2022    CALCIUM 11 2 (H) 12/25/2022

## 2022-12-27 NOTE — PROGRESS NOTES
Progress Note - Nephrology   Radha Pompa Sr  62 y o  male MRN: [id number]  Unit/Bed#: -01 Encounter: [id number]    A/P:  1  Acute kidney injury superimposed on CKD 4, baseline Cr 2 5-2 9  Follows with Dr Kvng Rizzo as an outpatient  Etiology secondary to obstructive uropathy with hydronephrosis and neurogenic bladder due to diabetes  He has a long history of diabetic nephrosclerosis  Creatinine 4 3 on admission, improved with intravenous fluids  Creatinine 2 8-> 2 3 mg/dL today  Look to discontinue IVF as appropriate, diet being liberalized  2   Hypokalemia, moderate, asymptomatic secondary to fluid shifts, kailuresis and restricted diet  Continue to replace potassium to goal of 3 5-4    3  Hypercalcemia, mild, workup apparently negative in the past ( spep, upep, 1,25 vitamin D, PTH, PTHrp)  May be due to immobilization with limited mobility  Continue all appropriate cancer screening  Calcium 10 4 today which is uncorrected  No need for calcitonin unless symptomatic  Look to DC IVF as diet is liberalized  4   Pneumoperitoneum, etiology unclear  Surgery following and diet has been advanced  White blood cell count is being trended  5   Pneumonia, treated with antibiotics  Follow up reason for today's visit:     KELLY    Subjective:   Patient seen and examined today  Very eager for DC  Denies cp/sob/n/v  A complete 10 point review of systems was performed and is otherwise negative  Diet advanced per surgery  Objective:     Vitals: Blood pressure 167/88, pulse 86, temperature 98 6 °F (37 °C), resp  rate 18, height 5' 11" (1 803 m), weight 61 3 kg (135 lb 2 3 oz), SpO2 93 %  ,Body mass index is 18 85 kg/m²      Weight (last 2 days)     Date/Time Weight    12/27/22 0534 61 3 (135 14)    12/26/22 0600 59 7 (131 61)    12/25/22 0542 64 2 (141 54)            Intake/Output Summary (Last 24 hours) at 12/27/2022 1133  Last data filed at 12/27/2022 1032  Gross per 24 hour   Intake 4346 67 ml   Output 3000 ml   Net 1346 67 ml     I/O last 3 completed shifts: In: 3458 [P O :870; I V :2650; IV Piggyback:450]  Out: 4200 [Urine:4200]    Urethral Catheter Coude 16 Fr  (Active)   Amt returned on insertion(mL) 125 mL 12/24/22 0450   Reasons to continue Urinary Catheter  Acute urinary retention/obstruction failing urinary retention protocol 12/26/22 2301   Goal for Removal Voiding trial when ambulation improves 12/26/22 2301   Site Assessment Clean 12/26/22 2301   Ortiz Care Done 12/27/22 0900   Collection Container Standard drainage bag 12/26/22 1611   Securement Method Securing device (Describe) 12/26/22 1611   Securing Device Change Date 01/02/23 12/26/22 1611   Output (mL) 1650 mL 12/27/22 0807   CAUTI Time-out performed before Urine Culture Yes 12/24/22 0450       Physical Exam: /88   Pulse 86   Temp 98 6 °F (37 °C)   Resp 18   Ht 5' 11" (1 803 m)   Wt 61 3 kg (135 lb 2 3 oz)   SpO2 93%   BMI 18 85 kg/m²     General Appearance:    Alert, cooperative, no distress, appears stated age   Head:    Normocephalic, without obvious abnormality, atraumatic   Eyes:    Conjunctiva/corneas clear   Ears:    Normal external ears   Nose:   Nares normal, septum midline, mucosa normal, no drainage    or sinus tenderness   Throat:   Lips, mucosa, and tongue normal; teeth and gums normal   Neck:    Back:      Lungs:     Clear to auscultation bilaterally, respirations unlabored   Chest wall:    No tenderness or deformity   Heart:    Regular rate and rhythm, S1 and S2 normal, no murmur, rub   or gallop   Abdomen:     Soft, non-tender, bowel sounds active   Extremities:   Extremities normal, atraumatic, no cyanosis or edema   Skin:   Skin color, texture, turgor normal, no rashes or lesions   Lymph nodes:   Cervical normal   Neurologic:   CNII-XII intact            Lab, Imaging and other studies: I have personally reviewed pertinent labs    CBC:   Lab Results   Component Value Date    WBC 10 72 (H) 12/27/2022 HGB 10 4 (L) 12/27/2022    HCT 32 3 (L) 12/27/2022    MCV 90 12/27/2022     12/27/2022    MCH 28 9 12/27/2022    MCHC 32 2 12/27/2022    RDW 15 8 (H) 12/27/2022    MPV 9 9 12/27/2022     CMP:   Lab Results   Component Value Date    K 2 9 (L) 12/27/2022     12/27/2022    CO2 25 12/27/2022    BUN 26 (H) 12/27/2022    CREATININE 2 32 (H) 12/27/2022    CALCIUM 10 4 (H) 12/27/2022    EGFR 30 12/27/2022         Results from last 7 days   Lab Units 12/27/22  0528 12/26/22  0536 12/25/22  0537 12/24/22  0637 12/23/22  2217   POTASSIUM mmol/L 2 9* 3 3* 4 4 5 2 4 6   CHLORIDE mmol/L 107 109* 108 100 99   CO2 mmol/L 25 22 24 25 28   BUN mg/dL 26* 39* 52* 53* 54*   CREATININE mg/dL 2 32* 2 85* 3 31* 3 98* 4 10*   CALCIUM mg/dL 10 4* 10 5* 11 2* 11 5* 11 8*   ALK PHOS U/L  --  77  --  91 92   ALT U/L  --  <6*  --  13 13   AST U/L  --  8  --  10 12         Phosphorus: No results found for: PHOS  Magnesium: No results found for: MG  Urinalysis: No results found for: COLORU, CLARITYU, SPECGRAV, PHUR, LEUKOCYTESUR, NITRITE, PROTEINUA, GLUCOSEU, KETONESU, BILIRUBINUR, BLOODU  Ionized Calcium: No results found for: CAION  Coagulation: No results found for: PT, INR, APTT  Troponin: No results found for: TROPONINI  ABG: No results found for: PHART, WBA1JVF, PO2ART, NOI9BRU, K7ZEBUFU, BEART, SOURCE  Radiology review:     IMAGING  No results found      Current Facility-Administered Medications   Medication Dose Route Frequency   • albuterol inhalation solution 2 5 mg  2 5 mg Nebulization Q4H PRN   • amLODIPine (NORVASC) tablet 10 mg  10 mg Oral Daily   • atorvastatin (LIPITOR) tablet 40 mg  40 mg Oral Daily With Dinner   • Buprenorphine HCl FILM 450 mcg  450 mcg Buccal BID   • calcitonin (salmon) (MIACALCIN) 200 units/act nasal spray 1 spray  1 spray Alternating Nares Daily   • carvedilol (COREG) tablet 6 25 mg  6 25 mg Oral BID With Meals   • cefTRIAXone (ROCEPHIN) IVPB (premix in dextrose) 1,000 mg 50 mL  1,000 mg Intravenous Q24H   • cyclobenzaprine (FLEXERIL) tablet 5 mg  5 mg Oral TID   • heparin (porcine) subcutaneous injection 5,000 Units  5,000 Units Subcutaneous Q8H [address]   • HYDROmorphone (DILAUDID) injection 0 5 mg  0 5 mg Intravenous Q4H PRN   • insulin lispro (HumaLOG) 100 units/mL subcutaneous injection 1-5 Units  1-5 Units Subcutaneous HS   • insulin lispro (HumaLOG) 100 units/mL subcutaneous injection 1-6 Units  1-6 Units Subcutaneous TID With Meals   • LORazepam (ATIVAN) injection 0 5 mg  0 5 mg Intravenous Q8H PRN   • megestrol (MEGACE) oral suspension 400 mg  400 mg Oral Daily   • metroNIDAZOLE (FLAGYL) IVPB (premix) 500 mg 100 mL  500 mg Intravenous Q8H   • multi-electrolyte (PLASMALYTE-A/ISOLYTE-S PH 7 4) IV solution  100 mL/hr Intravenous Continuous   • oxyCODONE (ROXICODONE) IR tablet 5 mg  5 mg Oral Q6H PRN   • pregabalin (LYRICA) capsule 75 mg  75 mg Oral Daily   • tamsulosin (FLOMAX) capsule 0 4 mg  0 4 mg Oral Daily With Dinner   • [START ON 12/28/2022] vancomycin (VANCOCIN) IVPB (premix in dextrose) 750 mg 150 mL  12 5 mg/kg Intravenous Once PRN   • venlafaxine (EFFEXOR-XR) 24 hr capsule 37 5 mg  37 5 mg Oral TID     Medications Discontinued During This Encounter   Medication Reason   • Buprenorphine HCl FILM 300 mcg    • oxyCODONE (ROXICODONE) IR tablet 5 mg    • allopurinol (ZYLOPRIM) tablet 100 mg    • amLODIPine (NORVASC) tablet 10 mg    • carvedilol (COREG) tablet 6 25 mg    • cyclobenzaprine (FLEXERIL) tablet 5 mg    • insulin glargine (LANTUS) subcutaneous injection 5 Units 0 05 mL    • polyethylene glycol (MIRALAX) packet 17 g    • pregabalin (LYRICA) capsule 75 mg    • senna-docusate sodium (SENOKOT S) 8 6-50 mg per tablet 1 tablet    • tamsulosin (FLOMAX) capsule 0 4 mg    • venlafaxine (EFFEXOR-XR) 24 hr capsule 37 5 mg    • insulin lispro (HumaLOG) 100 units/mL subcutaneous injection 1-6 Units    • insulin lispro (HumaLOG) 100 units/mL subcutaneous injection 1-6 Units    • HYDROmorphone (DILAUDID) injection 0 5 mg    • HYDROmorphone (DILAUDID) injection 0 5 mg    • Buprenorphine HCl FILM 300 mcg Formulary change   • sodium chloride 0 9 % infusion    • atorvastatin (LIPITOR) tablet 40 mg    • acetaminophen (TYLENOL) tablet 650 mg    • sodium chloride 0 9 % infusion    • sodium chloride 0 9 % infusion    • dextrose 5 % and sodium chloride 0 9 % infusion    • insulin lispro (HumaLOG) 100 units/mL subcutaneous injection 1-6 Units    • vancomycin (VANCOCIN) IVPB (premix in dextrose) 1,000 mg 200 mL    • vancomycin (VANCOCIN) IVPB (premix in dextrose) 500 mg 100 mL        [place], DO      This progress note was produced in part using a dictation device which may document imprecise wording from author's original intent

## 2022-12-27 NOTE — ASSESSMENT & PLAN NOTE
Malnutrition Findings: Loss of subcutaneous fat in orbital, triceps, ribcage areas  Loss of muscle at temples, clavicles, scapula, extremities  Consultation to dietitian  Will add appetite stimulant        Adult Malnutrition type: Chronic illnessBMI Findings:  Adult BMI Classifications: Underweight < 18 5        Body mass index is 18 85 kg/m²

## 2022-12-27 NOTE — PROGRESS NOTES
Progress Note - General Surgery   Terrell Mu Sr  62 y o  male MRN: [id number]  Unit/Bed#: -01 Encounter: [id number]    Assessment:  61 yo M with free air on CT unclear etiology - Clinically benign without symptoms  PNA, AVSS on RA  WBC 10  KELLY    Plan:  - Advance diet as tolerated  Full liquid diet today - etiology of pneumoperitoneum is unclear, but his exam is clinically benign and he is passing flatus  - Cont abx  - trend white count  - monitor for sx  - dvt ppx    Rest of care per primary    Subjective/Objective     Subjective: Patient is having no abdominal pain  He is passing flatus  He states he feels well besides his chronic back pain  Objective:     Blood pressure (!) 174/88, pulse 85, temperature 98 4 °F (36 9 °C), resp  rate 18, height 5' 11" (1 803 m), weight 61 3 kg (135 lb 2 3 oz), SpO2 93 %  ,Body mass index is 18 85 kg/m²  Intake/Output Summary (Last 24 hours) at 12/27/2022 0721  Last data filed at 12/26/2022 2020  Gross per 24 hour   Intake 2120 ml   Output 2350 ml   Net -230 ml       Invasive Devices     Peripheral Intravenous Line  Duration           Peripheral IV 12/25/22 Dorsal (posterior); Left Forearm 1 day    Peripheral IV 12/26/22 Right;Upper;Ventral (anterior) Arm <1 day          Drain  Duration           Urethral Catheter Coude 16 Fr  3 days                Physical Exam  Vitals and nursing note reviewed  Constitutional:       General: He is not in acute distress  Appearance: Normal appearance  He is normal weight  He is not ill-appearing, toxic-appearing or diaphoretic  HENT:      Head: Normocephalic and atraumatic  Eyes:      Extraocular Movements: Extraocular movements intact  Cardiovascular:      Rate and Rhythm: Normal rate  Pulmonary:      Effort: No respiratory distress  Abdominal:      General: Abdomen is flat  There is no distension  Palpations: Abdomen is soft  Tenderness: There is no abdominal tenderness   There is no guarding or rebound  Musculoskeletal:         General: No swelling  Skin:     General: Skin is warm  Capillary Refill: Capillary refill takes less than 2 seconds  Neurological:      General: No focal deficit present  Mental Status: He is alert and oriented to person, place, and time     Psychiatric:         Mood and Affect: Mood normal          Behavior: Behavior normal             Scheduled Meds:  Current Facility-Administered Medications   Medication Dose Route Frequency Provider Last Rate   • albuterol  2 5 mg Nebulization Q4H PRN Thao S June, CRNP     • amLODIPine  10 mg Oral Daily Jonny Mensah PA-C     • atorvastatin  40 mg Oral Daily With Arlene Najera PA-C     • Buprenorphine HCl  450 mcg Buccal BID Reji Ramirez MD     • calcitonin (salmon)  1 spray Alternating Nares Daily Javier Sutton MD     • carvedilol  6 25 mg Oral BID With Meals Jonny Mensah PA-C     • cefTRIAXone  1,000 mg Intravenous Q24H Thao S June, CRNP 1,000 mg (12/26/22 2202)   • cyclobenzaprine  5 mg Oral TID Jonny Mensah PA-C     • heparin (porcine)  5,000 Units Subcutaneous Q8H [address] Thao S June, CRNP     • HYDROmorphone  0 5 mg Intravenous Q4H PRN Reji Ramirez MD     • insulin lispro  1-5 Units Subcutaneous HS Jonny Mensah PA-C     • insulin lispro  1-6 Units Subcutaneous TID With Meals Jonny Mensah PA-C     • LORazepam  0 5 mg Intravenous Q8H PRN YURIDIA Mg     • megestrol  400 mg Oral Daily Jonny Mensah PA-C     • metroNIDAZOLE  500 mg Intravenous Q8H Thao S June, CRNP 500 mg (12/26/22 2345)   • multi-electrolyte  100 mL/hr Intravenous Continuous Jonny Mensah PA-C 100 mL/hr (12/26/22 1304)   • oxyCODONE  5 mg Oral Q6H PRN Jonny Mensah PA-C     • potassium chloride  40 mEq Oral Once Fatimah Scot, CRNP     • potassium chloride  20 mEq Intravenous Q2H Fatimah SHOAIB EllisonNP     • pregabalin  75 mg Oral Daily Jonny Mensah PA-C     • tamsulosin  0 4 mg Oral Daily With Arlene Najera PA-C • [START ON 12/28/2022] vancomycin  12 5 mg/kg Intravenous Once PRN Kady Carbajal MD     • vancomycin  12 5 mg/kg Intravenous Once Kady Carbajal MD     • venlafaxine  37 5 mg Oral TID Shannan Pereyra PA-C       Continuous Infusions:multi-electrolyte, 100 mL/hr, Last Rate: 100 mL/hr (12/26/22 1304)      PRN Meds: •  albuterol  •  HYDROmorphone  •  LORazepam  •  oxyCODONE  •  [START ON 12/28/2022] vancomycin      Lab, Imaging and other studies:  I have personally reviewed pertinent lab results    , CBC:   Lab Results   Component Value Date    WBC 10 72 (H) 12/27/2022    HGB 10 4 (L) 12/27/2022    HCT 32 3 (L) 12/27/2022    MCV 90 12/27/2022     12/27/2022    MCH 28 9 12/27/2022    MCHC 32 2 12/27/2022    RDW 15 8 (H) 12/27/2022    MPV 9 9 12/27/2022   , CMP:   Lab Results   Component Value Date    SODIUM 143 12/27/2022    K 2 9 (L) 12/27/2022     12/27/2022    CO2 25 12/27/2022    BUN 26 (H) 12/27/2022    CREATININE 2 32 (H) 12/27/2022    CALCIUM 10 4 (H) 12/27/2022    EGFR 30 12/27/2022   , Coagulation: No results found for: PT, INR, APTT, Urinalysis: No results found for: COLORU, CLARITYU, SPECGRAV, PHUR, LEUKOCYTESUR, NITRITE, PROTEINUA, GLUCOSEU, KETONESU, BILIRUBINUR, BLOODU  VTE Pharmacologic Prophylaxis: Heparin  VTE Mechanical Prophylaxis: sequential compression device      Lorri Pastor DO  12/27/2022 7:21 AM

## 2022-12-27 NOTE — ASSESSMENT & PLAN NOTE
Lab Results   Component Value Date    HGBA1C 6 6 (H) 11/17/2022       Recent Labs     12/26/22  1123 12/26/22  1629 12/26/22  2118 12/27/22  0804   POCGLU 166* 234* 201* 243*       Blood Sugar Average: Last 72 hrs:  (P) 009 [id number]   Patient is maintained as OP on lantus 5 units HS   Currently on correction scale only  Monitor with advancement of diet - will add lantus 5 units HS

## 2022-12-27 NOTE — PHYSICAL THERAPY NOTE
PHYSICAL THERAPY NOTE          Patient Name: Bill Suero Sr  Today's Date: 12/27/2022 12/27/22 1145   Note Type   Note type Evaluation; Cancelled Session   Cancel Reasons Medical status     Chart reviewed  Result of left heel xray continues to be pending as well as LEADs  Strict offloading of left heel recommended from podiatry  Will cancel PT services at this time pending result of imaging and potential for further intervention and recommendations from podiatry         Karen Bryant, PT,DPT

## 2022-12-27 NOTE — PROGRESS NOTES
Royal Matthieu Viveros  is a 62 y o  male who is currently ordered Vancomycin IV with management by the Pharmacy Consult service  Relevant clinical data and objective / subjective history reviewed  Vancomycin Assessment:  Indication and Goal AUC/Trough: Pneumonia (goal -600, trough >10)  Clinical Status: improving  Micro:     Renal Function:  SCr: 2 32 mg/dL  CrCl: 30 5 mL/min  Renal replacement: Not on dialysis  Days of Therapy: 2  Current Dose: 500 mg IV once prn random level <15  Vancomycin Plan:  New Dosin mg IV once prn random level <15  Estimated AUC: N/A due to pulse dosing  Estimated Trough: N/A due to pulse dosing  Next Level: 22 at 0600  Renal Function Monitoring: Daily BMP and [place] will continue to follow closely for s/sx of nephrotoxicity, infusion reactions and appropriateness of therapy  BMP and CBC will be ordered per protocol  We will continue to follow the patient’s culture results and clinical progress daily      Lauren Villavicencio, Pharmacist

## 2022-12-27 NOTE — ASSESSMENT & PLAN NOTE
POA  Urine culture with pseudomonas susceptible to cephalosporins   Continue ceftriaxone day 4 (as well as for pneumonia)

## 2022-12-27 NOTE — NURSING NOTE
L heel wound care performed and prevalon boot applied per order  R heel is red and quick to renee, preventative allevyn foam applied and prevalon boot applied for prevention

## 2022-12-28 VITALS
HEART RATE: 80 BPM | OXYGEN SATURATION: 94 % | DIASTOLIC BLOOD PRESSURE: 81 MMHG | HEIGHT: 71 IN | WEIGHT: 136.24 LBS | BODY MASS INDEX: 19.07 KG/M2 | SYSTOLIC BLOOD PRESSURE: 150 MMHG | TEMPERATURE: 98.1 F | RESPIRATION RATE: 17 BRPM

## 2022-12-28 PROBLEM — K66.8 PNEUMOPERITONEUM: Status: RESOLVED | Noted: 2022-12-25 | Resolved: 2022-12-28

## 2022-12-28 PROBLEM — I73.9 PAD (PERIPHERAL ARTERY DISEASE) (HCC): Status: ACTIVE | Noted: 2022-12-28

## 2022-12-28 PROBLEM — N18.4 ACUTE RENAL FAILURE SUPERIMPOSED ON STAGE 4 CHRONIC KIDNEY DISEASE (HCC): Status: RESOLVED | Noted: 2022-06-20 | Resolved: 2022-12-28

## 2022-12-28 PROBLEM — N18.4 ACUTE RENAL FAILURE SUPERIMPOSED ON STAGE 4 CHRONIC KIDNEY DISEASE (HCC): Status: ACTIVE | Noted: 2022-06-20

## 2022-12-28 PROBLEM — J96.01 ACUTE RESPIRATORY FAILURE WITH HYPOXIA (HCC): Status: RESOLVED | Noted: 2022-11-16 | Resolved: 2022-12-28

## 2022-12-28 PROBLEM — J15.212 PNEUMONIA DUE TO METHICILLIN RESISTANT STAPHYLOCOCCUS AUREUS (MRSA) (HCC): Status: ACTIVE | Noted: 2022-12-24

## 2022-12-28 PROBLEM — N30.00 ACUTE CYSTITIS: Status: RESOLVED | Noted: 2022-11-17 | Resolved: 2022-12-28

## 2022-12-28 PROBLEM — N17.9 ACUTE RENAL FAILURE SUPERIMPOSED ON STAGE 4 CHRONIC KIDNEY DISEASE (HCC): Status: RESOLVED | Noted: 2022-06-20 | Resolved: 2022-12-28

## 2022-12-28 LAB
ANION GAP SERPL CALCULATED.3IONS-SCNC: 12 MMOL/L (ref 4–13)
BUN SERPL-MCNC: 21 MG/DL (ref 5–25)
CALCIUM SERPL-MCNC: 10.3 MG/DL (ref 8.3–10.1)
CHLORIDE SERPL-SCNC: 109 MMOL/L (ref 96–108)
CO2 SERPL-SCNC: 19 MMOL/L (ref 21–32)
CREAT SERPL-MCNC: 2.3 MG/DL (ref 0.6–1.3)
ERYTHROCYTE [DISTWIDTH] IN BLOOD BY AUTOMATED COUNT: 16 % (ref 11.6–15.1)
GFR SERPL CREATININE-BSD FRML MDRD: 30 ML/MIN/1.73SQ M
GLUCOSE SERPL-MCNC: 114 MG/DL (ref 65–140)
GLUCOSE SERPL-MCNC: 128 MG/DL (ref 65–140)
GLUCOSE SERPL-MCNC: 189 MG/DL (ref 65–140)
HCT VFR BLD AUTO: 29 % (ref 36.5–49.3)
HGB BLD-MCNC: 9.1 G/DL (ref 12–17)
MCH RBC QN AUTO: 27.8 PG (ref 26.8–34.3)
MCHC RBC AUTO-ENTMCNC: 31.4 G/DL (ref 31.4–37.4)
MCV RBC AUTO: 89 FL (ref 82–98)
PLATELET # BLD AUTO: 261 THOUSANDS/UL (ref 149–390)
PMV BLD AUTO: 10.3 FL (ref 8.9–12.7)
POTASSIUM SERPL-SCNC: 4.3 MMOL/L (ref 3.5–5.3)
RBC # BLD AUTO: 3.27 MILLION/UL (ref 3.88–5.62)
SODIUM SERPL-SCNC: 140 MMOL/L (ref 135–147)
VANCOMYCIN SERPL-MCNC: 18.6 UG/ML (ref 10–20)
WBC # BLD AUTO: 13.22 THOUSAND/UL (ref 4.31–10.16)

## 2022-12-28 RX ORDER — LANOLIN ALCOHOL/MO/W.PET/CERES
100 CREAM (GRAM) TOPICAL DAILY
Qty: 30 TABLET | Refills: 0 | Status: SHIPPED | OUTPATIENT
Start: 2022-12-28 | End: 2023-01-27

## 2022-12-28 RX ORDER — METRONIDAZOLE 500 MG/100ML
500 INJECTION, SOLUTION INTRAVENOUS EVERY 8 HOURS
Status: DISCONTINUED | OUTPATIENT
Start: 2022-12-28 | End: 2022-12-28 | Stop reason: HOSPADM

## 2022-12-28 RX ORDER — VANCOMYCIN HYDROCHLORIDE 500 MG/100ML
500 INJECTION, SOLUTION INTRAVENOUS ONCE AS NEEDED
Status: DISCONTINUED | OUTPATIENT
Start: 2022-12-29 | End: 2022-12-28 | Stop reason: HOSPADM

## 2022-12-28 RX ORDER — DOXYCYCLINE HYCLATE 100 MG/1
100 CAPSULE ORAL EVERY 12 HOURS SCHEDULED
Qty: 10 CAPSULE | Refills: 0 | Status: SHIPPED | OUTPATIENT
Start: 2022-12-28 | End: 2023-01-02

## 2022-12-28 RX ADMIN — OXYCODONE HYDROCHLORIDE 5 MG: 5 TABLET ORAL at 08:53

## 2022-12-28 RX ADMIN — CYCLOBENZAPRINE 5 MG: 10 TABLET, FILM COATED ORAL at 08:52

## 2022-12-28 RX ADMIN — HEPARIN SODIUM 5000 UNITS: 5000 INJECTION INTRAVENOUS; SUBCUTANEOUS at 04:45

## 2022-12-28 RX ADMIN — PREGABALIN 75 MG: 75 CAPSULE ORAL at 08:52

## 2022-12-28 RX ADMIN — METRONIDAZOLE 500 MG: 500 INJECTION, SOLUTION INTRAVENOUS at 08:55

## 2022-12-28 RX ADMIN — MEGESTROL ACETATE 400 MG: 400 SUSPENSION ORAL at 08:57

## 2022-12-28 RX ADMIN — CALCITONIN SALMON 1 SPRAY: 200 SPRAY, METERED NASAL at 08:57

## 2022-12-28 RX ADMIN — AMLODIPINE BESYLATE 10 MG: 10 TABLET ORAL at 08:52

## 2022-12-28 RX ADMIN — INSULIN LISPRO 1 UNITS: 100 INJECTION, SOLUTION INTRAVENOUS; SUBCUTANEOUS at 11:35

## 2022-12-28 RX ADMIN — BUPRENORPHINE HYDROCHLORIDE 450 MCG: 450 FILM, SOLUBLE BUCCAL at 08:54

## 2022-12-28 RX ADMIN — VENLAFAXINE HYDROCHLORIDE 37.5 MG: 37.5 CAPSULE, EXTENDED RELEASE ORAL at 08:52

## 2022-12-28 RX ADMIN — CARVEDILOL 6.25 MG: 6.25 TABLET, FILM COATED ORAL at 08:52

## 2022-12-28 NOTE — ASSESSMENT & PLAN NOTE
POA with cough productive for brown mucus  · CT chest on admission: Groundglass, reticulonodular, and alveolar opacities noted bilaterally, most prominently in the inferior left lingula and left lower lobe, as described   Suspected multifocal infectious or inflammatory pneumonitis     · Procalcitonin mildly elevated at 0 58 on admission  · Sputum culture resulted with 4+ MRSA  · Status post 5 days IV ceftriaxone, will discontinue  · Transition from IV vancomycin to doxycycline on discharge to complete total 7 day course

## 2022-12-28 NOTE — NURSING NOTE
Peripheral IV removed  AVS printed and reviewed with pt and spouse at bedside  All questions answered  Scripts escribed  Belongings reviewed and given to pt for discharge  Pt taken to car via [facility] accompanied by PCA

## 2022-12-28 NOTE — ASSESSMENT & PLAN NOTE
· History of hypercalcemia  · Corrected calcium 13, ionized calcium 1 44 on admission  · Appreciate nephrology consultation  · SPEP interpretation from last admission revealed no monoclonal bands  · Improving with calcitonin nasal spray     Lab Results   Component Value Date    CALCIUM 10 3 (H) 12/28/2022    CALCIUM 10 4 (H) 12/27/2022    CALCIUM 10 5 (H) 12/26/2022

## 2022-12-28 NOTE — PROGRESS NOTES
Progress Note - General Surgery   Viri Dash Sr  62 y o  male MRN: [id number]  Unit/Bed#: -01 Encounter: [id number]    Assessment:  Pneumoperitoneum with completely benign exam   Repeat CT scan revealed decrease in pneumoperitoneum and no new changes  Plan:  General diet and may discharge patient to home when medically stable  Subjective/Objective       Subjective: The patient states no nausea or vomiting  He is tolerating his general diet  He has had flatus  No other complaints other than his chronic back pain  Objective:     Blood pressure 150/81, pulse 80, temperature 98 1 °F (36 7 °C), resp  rate 17, height 5' 11" (1 803 m), weight 61 8 kg (136 lb 3 9 oz), SpO2 94 %  ,Body mass index is 19 kg/m²  Intake/Output Summary (Last 24 hours) at 12/28/2022 0802  Last data filed at 12/28/2022 0200  Gross per 24 hour   Intake 1910 ml   Output 3330 ml   Net -1420 ml       Invasive Devices     Peripheral Intravenous Line  Duration           Peripheral IV 12/25/22 Dorsal (posterior); Left Forearm 2 days    Peripheral IV 12/26/22 Right;Upper;Ventral (anterior) Arm 1 day          Drain  Duration           Urethral Catheter Coude 16 Fr  4 days                Physical Exam: abd: Soft and nontender no rigidity guarding rebound or masses  Completely benign exam on today's visit      Lab, Imaging and other studies:  CBC:   Lab Results   Component Value Date    WBC 13 22 (H) 12/28/2022    HGB 9 1 (L) 12/28/2022    HCT 29 0 (L) 12/28/2022    MCV 89 12/28/2022     12/28/2022    MCH 27 8 12/28/2022    MCHC 31 4 12/28/2022    RDW 16 0 (H) 12/28/2022    MPV 10 3 12/28/2022   , CMP:   Lab Results   Component Value Date    SODIUM 140 12/28/2022    K 4 3 12/28/2022     (H) 12/28/2022    CO2 19 (L) 12/28/2022    BUN 21 12/28/2022    CREATININE 2 30 (H) 12/28/2022    CALCIUM 10 3 (H) 12/28/2022    EGFR 30 12/28/2022

## 2022-12-28 NOTE — ASSESSMENT & PLAN NOTE
Chronic normocytic anemia in setting of chronic kidney disease  · Iron deficient anemia history of poor oral intake  · Patient noted side effect of constipation with iron supplementation, recommended trialing every other day  · Hemoglobin remaining stable  · No active bleeding noted

## 2022-12-28 NOTE — PROGRESS NOTES
Progress Note - Podiatry  Loree Herron Sr  62 y o  male MRN: [id number]  Unit/Bed#: -01 Encounter: [id number]    Assessment:  1  Left diabetic heel ulcer  2  Type 2 diabetes with neuropathy    Plan:  - LEADs within healing limits, XR without acute SOI  Clinically left heel wound appears necrotic however dry and stable without lifting or bogginess  No deep probe or acute SOI at this time  Recommend outpt f/u with vascular surgery and outpt wound care with me  - LEADs 12/27/22: Diffuse dz B/L throughout fem-pop w/o focal stenosis  RLE diffuse tibioperoneal dz w/ absence of flow to PTA, NC/153/129 within healing  LLE NC/130/100 within healing    - XR left heel 2v 12/26/22: no acute osseous abnormality nor BRUCE noted  - c/w local wound care with betadine soaked gauze to the ulcer and dry sterile dressing   -Strict offloading of the left heels with Prevalon boots    Subjective/Objective   Chief Complaint:   Chief Complaint   Patient presents with   • Shortness of Breath     Pt having wheezing and coughing up brown phlem x several days  Subjective: 62 y o  y/o male was seen and evaluated at bedside  Feels well  Blood pressure 150/81, pulse 80, temperature 98 1 °F (36 7 °C), resp  rate 17, height 5' 11" (1 803 m), weight 61 8 kg (136 lb 3 9 oz), SpO2 94 %  ,Body mass index is 19 kg/m²  Invasive Devices     Peripheral Intravenous Line  Duration           Peripheral IV 12/25/22 Dorsal (posterior); Left Forearm 2 days    Peripheral IV 12/26/22 Right;Upper;Ventral (anterior) Arm 1 day          Drain  Duration           Urethral Catheter Coude 16 Fr  4 days                Physical Exam:   General: Alert, cooperative and no distress  Lungs: Non labored breathing  Heart: Positive S1, S2  Abdomen: Soft, non-tender  Extremity: gross msk and nvs baseline  Right heel without open wound  Left heel with necrotic ulceration, unknown depth, hard and intact without bogginess nor acute SOI                 Lab, Imaging and other studies:   CBC:   Lab Results   Component Value Date    WBC 13 22 (H) 12/28/2022    HGB 9 1 (L) 12/28/2022    HCT 29 0 (L) 12/28/2022    MCV 89 12/28/2022     12/28/2022    MCH 27 8 12/28/2022    MCHC 31 4 12/28/2022    RDW 16 0 (H) 12/28/2022    MPV 10 3 12/28/2022   , CMP:   Lab Results   Component Value Date    SODIUM 140 12/28/2022    K 4 3 12/28/2022     (H) 12/28/2022    CO2 19 (L) 12/28/2022    BUN 21 12/28/2022    CREATININE 2 30 (H) 12/28/2022    CALCIUM 10 3 (H) 12/28/2022    EGFR 30 12/28/2022       Imaging: I have personally reviewed pertinent films in PACS  EKG, Pathology, and Other Studies: I have personally reviewed pertinent reports

## 2022-12-28 NOTE — ASSESSMENT & PLAN NOTE
Status post LEADs 12/27: RIGHT LOWER LIMB: Diffuse disease noted throughout the femoral-popliteal arteries without significant focal stenosis  Diffuse tibioperoneal disease, with absence of flow noted in the distal posterior tibial artery  LEFT LOWER LIMB: Diffuse disease noted throughout the femoral-popliteal arteries without significant focal stenosis  Diffuse tibioperoneal disease    · Not on ASA outpatient, consider initiating on discharge  · Continue with statin

## 2022-12-28 NOTE — ASSESSMENT & PLAN NOTE
Chronic back pain with history of lumbar osteomyelitis  Patient notes plan for surgical intervention in January 2023  · PDMP reviewed previously    · Continue home buprenorphine, oxycodone, Lyrica  · Note patient has also been receiving IV Dilaudid this admission - will d/c today, patient in agreement

## 2022-12-28 NOTE — ASSESSMENT & PLAN NOTE
Chronic back pain with history of lumbar osteomyelitis  Patient notes plan for surgical intervention in January 2023    · PDMP reviewed previously  · Continue home buprenorphine, oxycodone, Lyrica

## 2022-12-28 NOTE — ASSESSMENT & PLAN NOTE
Lab Results   Component Value Date    HGBA1C 6 6 (H) 11/17/2022       Recent Labs     12/27/22  1120 12/27/22  1537 12/27/22  2101 12/28/22  0738   POCGLU 208* 149* 171* 114       Blood Sugar Average: Last 72 hrs:  (P) 478 [id number]   · Resumed on home insulin regimen: Lantus 5 units HS   · 4 times daily Accu-Cheks with SSI coverage  · Monitor and adjust regimen as needed  · Diabetic diet

## 2022-12-28 NOTE — ASSESSMENT & PLAN NOTE
Patient with chronic left heel wound POA  · Appreciate podiatry consult and recommendations  · X-ray negative for osteomyelitis  · LEADs noting diffuse disease bilaterally but no focal significant stenosis; pressures within healing range  · Continue strict offloading and local wound care  · No need for intervention at this time   · [facility]

## 2022-12-28 NOTE — OCCUPATIONAL THERAPY NOTE
Occupational Therapy Evaluation     Patient Name: Alber Fernandez  Today's Date: 12/28/2022  Problem List  Principal Problem:    Acute respiratory failure with hypoxia (HCC)  Active Problems:    Acute renal failure superimposed on stage 4 chronic kidney disease (HCC)    Diabetes mellitus type 2, insulin dependent (HCC)    Severe protein-calorie malnutrition (HCC)    Acute urinary retention    Hypercalcemia    Chronic ulcer of left heel (HCC)    Acute on chronic anemia    Acute cystitis    Pneumonia due to methicillin resistant Staphylococcus aureus (MRSA) (HCC)    Chronic back pain    Pneumoperitoneum    PAD (peripheral artery disease) ([address] )    Past Medical History  Past Medical History:   Diagnosis Date    Chronic kidney disease     Diabetes mellitus ([address] )     Gout     Hypertension     Renal disorder     Retroperitoneal abscess ([address] )     Stroke ([address] )     Vertebral osteomyelitis ([address] )      Past Surgical History  Past Surgical History:   Procedure Laterality Date    BACK SURGERY      ORCHIECTOMY           12/28/22 1243   Note Type   Note type Evaluation   Pain Assessment   Pain Assessment Tool FLACC   Pain Location/Orientation Location: Back   Pain Rating: FLACC (Rest) - Face 0   Pain Rating: FLACC (Rest) - Legs 0   Pain Rating: FLACC (Rest) - Activity 0   Pain Rating: FLACC (Rest) - Cry 0   Pain Rating: FLACC (Rest) - Consolability 0   Score: FLACC (Rest) 0   Pain Rating: FLACC (Activity) - Face 1   Pain Rating: FLACC (Activity) - Legs 1   Pain Rating: FLACC (Activity) - Activity 1   Pain Rating: FLACC (Activity) - Cry 1   Pain Rating: FLACC (Activity) - Consolability 0   Score: FLACC (Activity) 4   Home Living   Type of Home House  ("steel lift into home")   Home Layout One level;Performs ADLs on one level; Able to live on main level with bedroom/bathroom   Bathroom Shower/Tub   (sponge bathes in bed)   [address] [facility] bed; Wheelchair-manual   Additional Comments Pt reports living at home with his wife  lift to get into home  Pt then remains on 1 floor  Pt reports "i rarely get out of bed, only if i absolutely have to"  Pt has a hospital bed and w/c   Prior Function   Level of [place] Needs assistance with ADLs; Needs assistance with functional mobility; Needs assistance with IADLS   Lives With Spouse   Receives Help From Family   IADLs Family/Friend/Other provides transportation; Family/Friend/Other provides meals; Family/Friend/Other provides medication management   Comments Pt reports he completes all ADLs (dressingand bathing) supine in bed and has assistance fromhis wife  Pt utilizes a urinal and a bedpan  Pt reports he is getting home health therapy and would like them to continue to come back  ADL   Where Assessed Edge of bed   Grooming Assistance 6  Modified Independent   Grooming Deficit Setup   UB Dressing Assistance 6  Modified independent   UB Dressing Deficit Setup   LB Dressing Assistance 3  Moderate Assistance   LB Dressing Deficit Don/doff R sock; Don/doff L sock; Thread RLE into pants; Thread LLE into pants;Pull up over hips   Additional Comments Pt completing ADL tasks while seated at EOB for UB Dressing and grooming tasks @ Mod I after se-tup  LB Dressing completed while supine in bed with side rolling to achieve CM aroudn waist    Bed Mobility   Supine to Sit 5  Supervision   Additional items Bedrails; Increased time required;HOB elevated   Sit to Supine 4  Minimal assistance   Additional items Assist x 1;Verbal cues;LE management   Additional Comments Pt rpeorts haing a hospial bed at home although does not has bedrails  supine>sit @ S and sit>supine @ Min A for LE crystal d/t increased back pain  Pt sitting at EOB while maintaining F+ balance although frequently readjusting self and leaning to the side to help wiht alleviating back pain   Transfers   Sit to Stand Unable to assess   Sit pivot 3  Moderate assistance   Additional items Assist x 1; Increased time required;Verbal cues   Additional Comments Pt reports completing squat pivots only to<>from w/c  Pt is only OOB when absoutely necessary at baseline  Pt demonstrating ability to complete squat pivot transfer from EOB<>w/c @ Mod A x's 1 for safety, technique, and B knees blocked  Pt unable to tolerate sitting OOB 2* to increased back pain  Balance   Static Sitting Fair +   Dynamic Sitting Fair   Activity Tolerance   Activity Tolerance Patient limited by fatigue;Patient limited by pain   Medical Staff Made Aware Spoke with PT, Stef Alvarez   Nurse Made Aware Spoke with RN   RUE Assessment   RUE Assessment WFL   LUE Assessment   LUE Assessment WFL   Hand Function   Gross Motor Coordination Functional   Fine Motor Coordination Functional   Sensation   Light Touch No apparent deficits   Cognition   Overall Cognitive Status WFL   Arousal/Participation Alert; Responsive; Cooperative   Attention Attends with cues to redirect   Memory Within functional limits   Following Commands Follows one step commands without difficulty   Assessment   Limitation Decreased ADL status; Decreased UE strength;Decreased Safe judgement during ADL;Decreased endurance;Decreased self-care trans;Decreased high-level ADLs   Prognosis Good   Assessment Pt is a 62 y o  male, admitted to [address] [place] [address] 12/23/2022 d/t experiencing increased SOB and cough  Dx: acute respiratory failure with hypoxia  Pt with L heel wound, Per podiatry Pt can bare weight with heel offloading shoe  Pt with PMHx impacting their performance during ADL tasks, including: CKD, DM, gout, HTN, renal disorder, CVA, vertebral osteomyelitis  Prior to admission to the hospital Pt was performing ADLs with physical assistance  IADLs with physical assistance  Functional transfers/ambulation with physical assistance  Cognitive status was PTA was intaact   OT order placed to assess Pt's ADLs, cognitive status, and performance during functional tasks in order to maximize safety and independence while making most appropriate d/c recommendations  Pt's clinical presentation is currently evolving given new onset deficits that effect Pt's occupational performance and ability to safely return to PLOF including decrease activity tolerance, decrease standing balance, decrease performance during ADL tasks, decrease safety awareness , decrease UB MS, increased pain, decrease generalized strength, decrease activity engagement, decrease performance during functional transfers, frequent falls and high fall risk combined with medical complications of hypertension , pain impacting overall mobility status, abnormal D-dimer, elevated BNP, wounds, decreased skin integrity, multiple readmissions, impulsivity during admission, fear/retreat and need for input for mobility technique/safety  Personal factors affecting Pt at time of initial evaluation include: inability to perform current job functions, inability to perform IADLs, inability to perform ADLs, inability to ambulate household distances, inability to navigate community distances, limited insight into impairments, decreased initiation and engagement, recent fall(s)/fall history and questionable non-compliance  Pt at this time appears to be at baseline for ADLs and functional transfers  Pt reports he does not frequently get out of bed and is requesting that [address]  continue when he gets d/c  No further acute OT needs at this time  D/c OT effective 12/28/2022     Plan   OT Frequency Eval only   Recommendation   OT Discharge Recommendation Home with home health rehabilitation   AM-PAC Daily Activity Inpatient   Lower Body Dressing 2   Bathing 2   Toileting 2   Upper Body Dressing 3   Grooming 3   Eating 4   Daily Activity Raw Score 16   Daily Activity Standardized Score (Calc for Raw Score >=11) 35 96   AM-PAC Applied Cognition Inpatient   Following a Speech/Presentation 4   Understanding Ordinary Conversation 4   Taking Medications 3   Remembering Where Things Are Placed or Put Away 4   Remembering List of 4-5 Errands 4   Taking Care of Complicated Tasks 3   Applied Cognition Raw Score 22   Applied Cognition Standardized Score 47 83     The patient's raw score on the AM-PAC Daily Activity inpatient short form is 16, standardized score is 35 96, less than 39 4  Patients at this level are likely to benefit from DC to post-acute rehabilitation services  Please refer to the recommendation of the Occupational Therapist for safe DC planning  Although AM-PAC recommends post acute rehab, PT is at baseline and has consistent assistance at home from his wife for ADLs and transfers  Home with HHOT upon d/c from [address] [place] [address] OTR/L

## 2022-12-28 NOTE — ASSESSMENT & PLAN NOTE
Chronic normocytic anemia in setting of chronic kidney disease  · Iron deficient anemia history of poor oral intake  · Hemoglobin remaining stable  · No active bleeding noted

## 2022-12-28 NOTE — ASSESSMENT & PLAN NOTE
POA  · Urine culture with pseudomonas susceptible to cephalosporins   · Status post 5 days ceftriaxone  · Monitor urine output

## 2022-12-28 NOTE — NURSING NOTE
During the night the patient removed his prevalon boots and placed them on the floor next to the bed  Education provided on the importance to keep the boots on and heels elevated, but the patient did not wish to do either at this time

## 2022-12-28 NOTE — ASSESSMENT & PLAN NOTE
POA  · Urine culture with pseudomonas susceptible to cephalosporins   · Status post 5 days ceftriaxone, will discontinue   · Monitor urine output

## 2022-12-28 NOTE — ASSESSMENT & PLAN NOTE
Lab Results   Component Value Date    HGBA1C 6 6 (H) 11/17/2022       Recent Labs     12/27/22  1537 12/27/22  2101 12/28/22  0738 12/28/22  1121   POCGLU 149* 171* 114 189*       Blood Sugar Average: Last 72 hrs:  (P) 159 2   · Resume home medical management on discharge

## 2022-12-28 NOTE — ASSESSMENT & PLAN NOTE
Secondary to perforated sigmoid diverticulum  · Clinical exam benign and patient asymptomatic  · Surgery consult and recommendations appreciated   · Conservative management given high risk for any kind of surgery  · Repeat CT scan revealed decrease in pneumoperitoneum and no new changes  · Advanced to surgical diet and tolerating without difficulty  · Cleared for discharge  · Currently on IV metronidazole (day 5), will discontinue after today

## 2022-12-28 NOTE — ASSESSMENT & PLAN NOTE
CT abdomen on admission noting: Distended bladder with bladder volume estimated at 1050 mL, with bilateral mild hydroureteronephrosis; consider urinary retention   If the patient cannot spontaneously void, consider catheter decompression     · Ortiz catheter inserted on 12/24  · Continue routine catheter care  · Referral to urology on discharge for outpatient void trial

## 2022-12-28 NOTE — DISCHARGE SUMMARY
[address]  Discharge- Juma Jean Baptiste   1965, 62 y o  male MRN: [id number]  Unit/Bed#: -01 Encounter: [id number]  Primary Care Provider: Pritesh Lo DO   Date and time admitted to hospital: 12/23/2022  9:51 PM    * Acute respiratory failure with hypoxia (HCC)-resolved as of 12/28/2022  Assessment & Plan  Requiring nasal cannula oxygen 4 L on admission in the setting of pneumonia, see related plan  · Now resolved and remains stable on room air  · Elevated D-dimer but unable to obtain CTA chest secondary to KELLY  · Monitor oxygen saturations closely  · Continue respiratory protocol    Pneumonia due to methicillin resistant Staphylococcus aureus (MRSA) (HCC)  Assessment & Plan  POA with cough productive for brown mucus  · CT chest on admission: Groundglass, reticulonodular, and alveolar opacities noted bilaterally, most prominently in the inferior left lingula and left lower lobe, as described   Suspected multifocal infectious or inflammatory pneumonitis     · Procalcitonin mildly elevated at 0 58 on admission  · Sputum culture resulted with 4+ MRSA  · Status post 5 days IV ceftriaxone, will discontinue  · Transition from IV vancomycin to doxycycline on discharge to complete total 7 day course     Chronic ulcer of left heel ([address] )  Assessment & Plan  Patient with chronic left heel wound POA  · Appreciate podiatry consult and recommendations  · X-ray negative for osteomyelitis  · LEADs noting diffuse disease bilaterally but no focal significant stenosis; pressures within healing range  · Continue strict offloading and local wound care  · No need for intervention at this time   · PT/OT recommending Alo Welch     Pneumoperitoneum-resolved as of 12/28/2022  Assessment & Plan  Secondary to perforated sigmoid diverticulum  · Clinical exam benign and patient asymptomatic  · Surgery consult and recommendations appreciated   · Conservative management given high risk for any kind of surgery  · Repeat CT scan revealed decrease in pneumoperitoneum and no new changes  · Advanced to surgical diet and tolerating without difficulty  · Cleared for discharge  · Status post 4 dull days IV metronidazole     Acute cystitis-resolved as of 12/28/2022  Assessment & Plan  POA  · Urine culture with pseudomonas susceptible to cephalosporins   · Status post 5 days ceftriaxone  · Monitor urine output     Acute renal failure superimposed on stage 4 chronic kidney disease (HCC)-resolved as of 12/28/2022  Assessment & Plan  POA with creatinine of 4 10 increased from baseline of 2 5-2 9   · Multifactorial in the setting of urinary retention and acute cystitis, see related plans  · Avoid nephrotoxins and hypotension  · Appreciate nephrology consultation  · Creatinine improved to baseline with IV fluids, antibiotics and Ortiz catheter decompression    Acute urinary retention  Assessment & Plan  CT abdomen on admission noting: Distended bladder with bladder volume estimated at 1050 mL, with bilateral mild hydroureteronephrosis; consider urinary retention   If the patient cannot spontaneously void, consider catheter decompression  · Ortiz catheter inserted on 12/24  · Continue routine catheter care  · Referral to urology on discharge for outpatient void trial    Chronic back pain  Assessment & Plan  Chronic back pain with history of lumbar osteomyelitis  Patient notes plan for surgical intervention in January 2023    · PDMP reviewed previously  · Continue home buprenorphine, oxycodone, Lyrica    Diabetes mellitus type 2, insulin dependent [facility])  Assessment & Plan  Lab Results   Component Value Date    HGBA1C 6 6 (H) 11/17/2022       Recent Labs     12/27/22  1537 12/27/22  2101 12/28/22  0738 12/28/22  1121   POCGLU 149* 171* 114 189*       Blood Sugar Average: Last 72 hrs:  (P) 159 2   · Resume home medical management on discharge     PAD (peripheral artery disease) [facility])  Assessment & Plan  Status post LEADs 12/27: RIGHT LOWER LIMB: Diffuse disease noted throughout the femoral-popliteal arteries without significant focal stenosis  Diffuse tibioperoneal disease, with absence of flow noted in the distal posterior tibial artery  LEFT LOWER LIMB: Diffuse disease noted throughout the femoral-popliteal arteries without significant focal stenosis  Diffuse tibioperoneal disease  · Not on ASA outpatient  · Continue with statin    Acute on chronic anemia  Assessment & Plan  Chronic normocytic anemia in setting of chronic kidney disease  · Iron deficient anemia history of poor oral intake  · Patient noted side effect of constipation with iron supplementation, recommended trialing every other day  · Hemoglobin remaining stable  · No active bleeding noted    Hypercalcemia  Assessment & Plan  · History of hypercalcemia  · Corrected calcium 13, ionized calcium 1 44 on admission  · Appreciate nephrology consultation  · SPEP interpretation from last admission revealed no monoclonal bands  · Improving with calcitonin nasal spray     Lab Results   Component Value Date    CALCIUM 10 3 (H) 12/28/2022    CALCIUM 10 4 (H) 12/27/2022    CALCIUM 10 5 (H) 12/26/2022         Severe protein-calorie malnutrition ([facility] )  Assessment & Plan  Malnutrition Findings: Loss of subcutaneous fat in orbital, triceps, ribcage areas  Loss of muscle at temples, clavicles, scapula, extremities  Adult Malnutrition type: Chronic illnessBMI Findings:  Adult BMI Classifications: Underweight < 18 5       Body mass index is 19 kg/m²       · Consultation to dietitian appreciated    Medical Problems     Resolved Problems  Date Reviewed: 12/28/2022          Resolved    Acute renal failure superimposed on stage 4 chronic kidney disease ([facility] ) 12/28/2022     Resolved by  Sergio Agrawal PA-C    * (Principal) Acute respiratory failure with hypoxia ([facility] ) 12/28/2022     Resolved by  Emily Palma PA-C    Acute cystitis 12/28/2022     Resolved by  Emily Palma PA-C Pneumoperitoneum 12/28/2022     Resolved by  Tolu Leone PA-C        Discharging Physician / Practitioner: Tolu Leone PA-C  PCP: Virginia Saxena DO  Admission Date:   Admission Orders (From admission, onward)     Ordered        12/23/22 2330  [facility] [place]  Once                      Discharge Date: 12/28/22    Consultations During Hospital Stay:  · General surgery  · Nephrology  · Podiatry    Procedures Performed:   · None    Significant Findings / Test Results:   · Outlined above    Incidental Findings:   · None    Test Results Pending at Discharge (will require follow up): · None     Outpatient Tests Requested:  · Follow-up with urology, neurosurgery    Complications: None    Reason for Admission: Acute hypoxic respiratory failure    Hospital Course:   Valencia Duran  is a 62 y o  male patient who originally presented to the hospital on 12/23/2022 due to progressive shortness of breath and productive cough  On admission was found to be hypoxic requiring 4 L nasal cannula  Chest imaging consistent with pneumonia  He was initiated on empiric ceftriaxone  CT chest was obtained and noting multifocal pneumonia  Sputum culture obtained and grew 4+ MRSA and patient was subsequently initiated on vancomycin  He was eventually weaned to room air  He was transitioned to doxycycline on discharge to complete a total 7-day course  Patient was incidentally found to have pneumoperitoneum due to perforated sigmoid diverticulum  He was evaluated by general surgery  They recommended conservative/nonoperative management  Patient's abdominal exam was benign throughout his hospitalization  He is tolerating a regular soft surgical diet  Surgery cleared him for discharge  KELLY on CKD present on admission  Also with acute urinary retention on abdominal imaging requiring Ortiz catheter placement  Urine culture grew Pseudomonas susceptible to cephalosporins    Patient completed 5-day course of ceftriaxone during his hospitalization  He was seen in consultation by nephrology  KELLY resolved with antibiotic, Ortiz catheter decompression and IV fluids  Creatinine is currently stable within baseline  Cleared for discharge from renal standpoint  Will need void trial as an outpatient  Referral to urology placed at time of discharge  Chronic left heel ulcer present on admission  Evaluated by podiatry  X-ray was negative for osteomyelitis  Lower extremity arterial duplexes were completed and showed to be within healing range  Podiatry recommending continue local wound care and offloading  Therapy cleared patient to return home with home health care  Patient and his family are in agreement with this plan of care  Please see above list of diagnoses and related plan for additional information  Condition at Discharge: fair    Discharge Day Visit / Exam:   * Please refer to separate progress note for these details *    Discussion with Family: Updated  (daughter) at bedside  Discharge instructions/Information to patient and family:   See after visit summary for information provided to patient and family  Provisions for Follow-Up Care:  See after visit summary for information related to follow-up care and any pertinent home health orders  Disposition:   Home with [facility] Services (Reminder: Complete face to face encounter)    Planned Readmission: no     Discharge Statement:  I spent 35 minutes discharging the patient  This time was spent on the day of discharge  I had direct contact with the patient on the day of discharge  Greater than 50% of the total time was spent examining patient, answering all patient questions, arranging and discussing plan of care with patient as well as directly providing post-discharge instructions  Additional time then spent on discharge activities      Discharge Medications:  See after visit summary for reconciled discharge medications provided to patient and/or family        **Please Note: This note may have been constructed using a voice recognition system**

## 2022-12-28 NOTE — ASSESSMENT & PLAN NOTE
POA with cough productive for brown mucus  · CT chest on admission: Groundglass, reticulonodular, and alveolar opacities noted bilaterally, most prominently in the inferior left lingula and left lower lobe, as described   Suspected multifocal infectious or inflammatory pneumonitis     · Procalcitonin mildly elevated at 0 58 on admission  · Sputum culture resulted with 4+ MRSA  · Status post 5 days IV ceftriaxone, will discontinue  · Continue with IV vancomycin (day 3); transition to doxycycline on discharge to complete total 7 day course

## 2022-12-28 NOTE — ASSESSMENT & PLAN NOTE
Status post LEADs 12/27: RIGHT LOWER LIMB: Diffuse disease noted throughout the femoral-popliteal arteries without significant focal stenosis  Diffuse tibioperoneal disease, with absence of flow noted in the distal posterior tibial artery  LEFT LOWER LIMB: Diffuse disease noted throughout the femoral-popliteal arteries without significant focal stenosis  Diffuse tibioperoneal disease    · Not on ASA outpatient  · Continue with statin

## 2022-12-28 NOTE — ASSESSMENT & PLAN NOTE
Malnutrition Findings: Loss of subcutaneous fat in orbital, triceps, ribcage areas  Loss of muscle at temples, clavicles, scapula, extremities  Adult Malnutrition type: Chronic illnessBMI Findings:  Adult BMI Classifications: Underweight < 18 5       Body mass index is 19 kg/m²       · Consultation to dietitian appreciated

## 2022-12-28 NOTE — ASSESSMENT & PLAN NOTE
Secondary to perforated sigmoid diverticulum  · Clinical exam benign and patient asymptomatic  · Surgery consult and recommendations appreciated   · Conservative management given high risk for any kind of surgery  · Repeat CT scan revealed decrease in pneumoperitoneum and no new changes  · Advanced to surgical diet and tolerating without difficulty  · Cleared for discharge  · Status post 4 dull days IV metronidazole

## 2022-12-28 NOTE — ASSESSMENT & PLAN NOTE
Requiring nasal cannula oxygen 4 L on admission in the setting of pneumonia, see related plan  · Now resolved and remains stable on room air  · Elevated D-dimer but unable to obtain CTA chest secondary to KELLY  · Monitor oxygen saturations closely  · Continue respiratory protocol

## 2022-12-28 NOTE — DISCHARGE INSTR - AVS FIRST PAGE
Discharge Instructions - Podiatry      Follow-up appointment instructions: Please make an appointment within one week of discharge with Dr Becki Su and Vascular surgery  Contact sooner if any increase in pain, or signs of infection occur    Patient Wound Care Instructions: change left heel dressing daily with betadine, nonadherent and dry dressing   Offload heels at all times with prevalon boots or 2 pillows

## 2022-12-28 NOTE — PROGRESS NOTES
Mike Delgado Sr  is a 62 y o  male who is currently ordered Vancomycin IV with management by the Pharmacy Consult service  Relevant clinical data and objective / subjective history reviewed  Vancomycin Assessment:  Indication and Goal AUC/Trough: Pneumonia (goal -600, trough >10)  Clinical Status: stable  Micro:       Renal Function:  SCr: 2 3 mg/dL  CrCl: 30 7 mL/min  Renal replacement: Not on dialysis  Days of Therapy: 3  Current Dose: 750 mg iv once prn random level < 15  Vancomycin Plan:  New Dosing: Dose on 12/28/22 was held due to random level = 18 6   500 mg iv once prn random level < 15  Estimated AUC: N/A due to pulse dosing   Estimated Trough: N/A due to pulse dosing   Next Level: 12/29/22 @ 0600  Renal Function Monitoring: Daily BMP and [place] will continue to follow closely for s/sx of nephrotoxicity, infusion reactions and appropriateness of therapy  BMP and CBC will be ordered per protocol  We will continue to follow the patient’s culture results and clinical progress daily      Deena Shane, Pharmacist

## 2022-12-28 NOTE — ASSESSMENT & PLAN NOTE
POA with creatinine of 4 10 increased from baseline of 2 5-2 9   · Multifactorial in the setting of urinary retention and acute cystitis, see related plans  · Avoid nephrotoxins and hypotension  · Appreciate nephrology consultation  · Creatinine improved to baseline with IV fluids, antibiotics and Ortiz catheter decompression

## 2022-12-28 NOTE — ASSESSMENT & PLAN NOTE
Patient with chronic left heel wound POA  · Appreciate podiatry consult and recommendations  · X-ray negative for osteomyelitis  · LEADs noting diffuse disease bilaterally but no focal significant stenosis; pressures within healing range  · Continue strict offloading and local wound care  · Awaiting final recommendations / need for intervention  · PT/OT evaluations pending

## 2022-12-28 NOTE — PROGRESS NOTES
[address]  Progress Note - Cristina Josue  1965, 62 y o  male MRN: [id number]  Unit/Bed#: -01 Encounter: [id number]  Primary Care Provider: Mandy Carlos DO   Date and time admitted to hospital: 12/23/2022  9:51 PM    * Acute respiratory failure with hypoxia [facility])  Assessment & Plan  Requiring nasal cannula oxygen 4 L on admission in the setting of pneumonia, see related plan  · Now resolved and remains stable on room air  · Elevated D-dimer but unable to obtain CTA chest secondary to KELLY  · Monitor oxygen saturations closely  · Continue respiratory protocol    Pneumonia due to methicillin resistant Staphylococcus aureus (MRSA) ([facility])  Assessment & Plan  POA with cough productive for brown mucus  · CT chest on admission: Groundglass, reticulonodular, and alveolar opacities noted bilaterally, most prominently in the inferior left lingula and left lower lobe, as described   Suspected multifocal infectious or inflammatory pneumonitis     · Procalcitonin mildly elevated at 0 58 on admission  · Sputum culture resulted with 4+ MRSA  · Status post 5 days IV ceftriaxone, will discontinue  · Continue with IV vancomycin (day 3); transition to doxycycline on discharge to complete total 7 day course     Chronic ulcer of left heel ([address] )  Assessment & Plan  Patient with chronic left heel wound POA  · Appreciate podiatry consult and recommendations  · X-ray negative for osteomyelitis  · LEADs noting diffuse disease bilaterally but no focal significant stenosis; pressures within healing range  · Continue strict offloading and local wound care  · Awaiting final recommendations / need for intervention  · PT/OT evaluations pending    Pneumoperitoneum  Assessment & Plan  Secondary to perforated sigmoid diverticulum  · Clinical exam benign and patient asymptomatic  · Surgery consult and recommendations appreciated   · Conservative management given high risk for any kind of surgery  · Repeat CT scan revealed decrease in pneumoperitoneum and no new changes  · Advanced to surgical diet and tolerating without difficulty  · Cleared for discharge  · Currently on IV metronidazole (day 5), will discontinue after today    Acute cystitis  Assessment & Plan  POA  · Urine culture with pseudomonas susceptible to cephalosporins   · Status post 5 days ceftriaxone, will discontinue   · Monitor urine output     Acute renal failure superimposed on stage 4 chronic kidney disease ([address] )  Assessment & Plan  POA with creatinine of 4 10 increased from baseline of 2 5-2 9   · Multifactorial in the setting of urinary retention and acute cystitis, see related plans  · Avoid nephrotoxins and hypotension  · Appreciate nephrology consultation  · Creatinine improved to baseline with IV fluids, antibiotics and Ortiz catheter decompression    Acute urinary retention  Assessment & Plan  CT abdomen on admission noting: Distended bladder with bladder volume estimated at 1050 mL, with bilateral mild hydroureteronephrosis; consider urinary retention   If the patient cannot spontaneously void, consider catheter decompression  · Ortiz catheter inserted on 12/24  · Continue routine catheter care  · Referral to urology on discharge for outpatient void trial    Chronic back pain  Assessment & Plan  Chronic back pain with history of lumbar osteomyelitis  Patient notes plan for surgical intervention in January 2023  · PDMP reviewed previously    · Continue home buprenorphine, oxycodone, Lyrica  · Note patient has also been receiving IV Dilaudid this admission - will d/c today, patient in agreement     Diabetes mellitus type 2, insulin dependent [facility])  Assessment & Plan  Lab Results   Component Value Date    HGBA1C 6 6 (H) 11/17/2022       Recent Labs     12/27/22  1120 12/27/22  1537 12/27/22  2101 12/28/22  0738   POCGLU 208* 149* 171* 114       Blood Sugar Average: Last 72 hrs:  (P) 157 [id number]   · Resumed on home insulin regimen: Lantus 5 units HS   · 4 times daily Accu-Cheks with SSI coverage  · Monitor and adjust regimen as needed  · Diabetic diet    PAD (peripheral artery disease) ([facility] )  Assessment & Plan  Status post LEADs 12/27: RIGHT LOWER LIMB: Diffuse disease noted throughout the femoral-popliteal arteries without significant focal stenosis  Diffuse tibioperoneal disease, with absence of flow noted in the distal posterior tibial artery  LEFT LOWER LIMB: Diffuse disease noted throughout the femoral-popliteal arteries without significant focal stenosis  Diffuse tibioperoneal disease  · Not on ASA outpatient, consider initiating on discharge  · Continue with statin    Acute on chronic anemia  Assessment & Plan  Chronic normocytic anemia in setting of chronic kidney disease  · Iron deficient anemia history of poor oral intake  · Hemoglobin remaining stable  · No active bleeding noted    Hypercalcemia  Assessment & Plan  · History of hypercalcemia  · Corrected calcium 13, ionized calcium 1 44 on admission  · Appreciate nephrology consultation  · SPEP interpretation from last admission revealed no monoclonal bands  · Improving with calcitonin nasal spray     Lab Results   Component Value Date    CALCIUM 10 3 (H) 12/28/2022    CALCIUM 10 4 (H) 12/27/2022    CALCIUM 10 5 (H) 12/26/2022         Severe protein-calorie malnutrition ([facility] )  Assessment & Plan  Malnutrition Findings: Loss of subcutaneous fat in orbital, triceps, ribcage areas  Loss of muscle at temples, clavicles, scapula, extremities  Adult Malnutrition type: Chronic illnessBMI Findings:  Adult BMI Classifications: Underweight < 18 5       Body mass index is 19 kg/m²  · Consultation to dietitian appreciated    VTE Pharmacologic Prophylaxis: VTE Score: 3 Moderate Risk (Score 3-4) - Pharmacological DVT Prophylaxis Ordered: heparin  Patient Centered Rounds: I performed bedside rounds with nursing staff today    Discussions with Specialists or Other Care Team Provider: primary RN, case management     Education and Discussions with Family / Patient: Patient declined call to   Time Spent for Care: 20 minutes  More than 50% of total time spent on counseling and coordination of care as described above  Current Length of Stay: 5 day(s)  Current Patient Status: Inpatient   Certification Statement: The patient will continue to require additional inpatient hospital stay due to Pending final podiatry and therapy recommendations, dispo planning  Discharge Plan: Anticipate discharge later today or tomorrow to home with home services  Patient tells me he would refuse rehab if recommended  Already arranged with home health care services  Code Status: Level 1 - Full Code    Subjective:   Patient offers no new complaints today, eager for discharge  Discussed awaiting final podiatry and therapy recommendations for discharge later today or tomorrow morning  Patient tells me he is already arranged with home health care and would not want to go to rehab  Tells me back pain is well controlled on home regimen and is agreeable to discontinuing IV Dilaudid  Notes that he has back surgery planned for 2023  Objective:     Vitals:   Temp (24hrs), Av 3 °F (36 8 °C), Min:97 9 °F (36 6 °C), Max:98 8 °F (37 1 °C)    Temp:  [97 9 °F (36 6 °C)-98 8 °F (37 1 °C)] 98 1 °F (36 7 °C)  HR:  [77-80] 80  Resp:  [17-18] 17  BP: (150-161)/(81-87) 150/81  SpO2:  [94 %-96 %] 94 %  Body mass index is 19 kg/m²  Input and Output Summary (last 24 hours): Intake/Output Summary (Last 24 hours) at 2022 1014  Last data filed at 2022 0855  Gross per 24 hour   Intake 2150 ml   Output 1680 ml   Net 470 ml       Physical Exam:   Physical Exam  Vitals and nursing note reviewed  Constitutional:       General: He is not in acute distress  Appearance: He is cachectic  Cardiovascular:      Rate and Rhythm: Normal rate     Pulmonary:      Effort: Pulmonary effort is normal  No respiratory distress  Genitourinary:     Comments: Ortiz catheter draining clear yellow urine  Neurological:      Mental Status: He is alert and oriented to person, place, and time  Mental status is at baseline  Additional Data:     Labs:  Results from last 7 days   Lab Units 12/28/22 0447 12/27/22 0528 12/26/22  0536   WBC Thousand/uL 13 22*   < > 10 62*   HEMOGLOBIN g/dL 9 1*   < > 10 0*   HEMATOCRIT % 29 0*   < > 31 2*   PLATELETS Thousands/uL 261   < > 256   NEUTROS PCT %  --   --  79*   LYMPHS PCT %  --   --  11*   MONOS PCT %  --   --  6   EOS PCT %  --   --  2    < > = values in this interval not displayed  Results from last 7 days   Lab Units 12/28/22 0447 12/27/22 0528 12/26/22  0536   SODIUM mmol/L 140   < > 145   POTASSIUM mmol/L 4 3   < > 3 3*   CHLORIDE mmol/L 109*   < > 109*   CO2 mmol/L 19*   < > 22   BUN mg/dL 21   < > 39*   CREATININE mg/dL 2 30*   < > 2 85*   ANION GAP mmol/L 12   < > 14*   CALCIUM mg/dL 10 3*   < > 10 5*   ALBUMIN g/dL  --   --  2 5*   TOTAL BILIRUBIN mg/dL  --   --  0 27   ALK PHOS U/L  --   --  77   ALT U/L  --   --  <6*   AST U/L  --   --  8   GLUCOSE RANDOM mg/dL 128   < > 204*    < > = values in this interval not displayed  Results from last 7 days   Lab Units 12/23/22  2217   INR  1 05     Results from last 7 days   Lab Units 12/28/22  0738 12/27/22  2101 12/27/22  1537 12/27/22  1120 12/27/22  0804 12/26/22  2118 12/26/22  1629 12/26/22  1123 12/26/22  0602 12/25/22  2355 12/25/22  2055 12/25/22  1746   POC GLUCOSE mg/dl 114 171* 149* 208* 243* 201* 234* 166* 183* 144* 125 87         Results from last 7 days   Lab Units 12/23/22  2217   LACTIC ACID mmol/L 1 0   PROCALCITONIN ng/ml 0 58*       Lines/Drains:  Invasive Devices     Peripheral Intravenous Line  Duration           Peripheral IV 12/25/22 Dorsal (posterior); Left Forearm 2 days    Peripheral IV 12/26/22 Right;Upper;Ventral (anterior) Arm 1 day          Drain  Duration Urethral Catheter Coude 16 Fr  4 days              Urinary Catheter:  Goal for removal: N/A- Discharging with Ortiz               Imaging: No pertinent imaging reviewed  Recent Cultures (last 7 days):   Results from last 7 days   Lab Units 12/24/22  0519 12/24/22  0049 12/23/22  2217   BLOOD CULTURE   --   --  No Growth at 72 hrs  No Growth at 72 hrs     SPUTUM CULTURE   --  4+ Growth of Methicillin Resistant Staphylococcus aureus*  3+ Growth of  --    GRAM STAIN RESULT   --  1+ Epithelial Cells*  Rare Polys*  2+ Gram positive cocci in pairs*  1+ Gram negative rods*  --    URINE CULTURE  10,000-19,000 cfu/ml Pseudomonas aeruginosa*  --   --        Last 24 Hours Medication List:   Current Facility-Administered Medications   Medication Dose Route Frequency Provider Last Rate   • albuterol  2 5 mg Nebulization Q4H PRN YURIDIA Chirinos     • amLODIPine  10 mg Oral Daily Nav Dominguez PA-C     • atorvastatin  40 mg Oral Daily With Hue Morel PA-C     • Buprenorphine HCl  450 mcg Buccal BID Naz Morrell MD     • calcitonin (salmon)  1 spray Alternating Nares Daily Valdo Pace MD     • carvedilol  6 25 mg Oral BID With Meals Nav Dominguez PA-C     • cyclobenzaprine  5 mg Oral TID Nav Dominguez PA-C     • heparin (porcine)  5,000 Units Subcutaneous Q8H [address] YURIDIA Chirinos     • insulin glargine  5 Units Subcutaneous HS Nav Dominguez PA-C     • insulin lispro  1-5 Units Subcutaneous HS Nav Dominguez PA-C     • insulin lispro  1-6 Units Subcutaneous TID With Meals Nav Dominguez PA-C     • LORazepam  0 5 mg Intravenous Q8H PRN YURIDIA Brock     • megestrol  400 mg Oral Daily Nav Dominguez PA-C     • metroNIDAZOLE  500 mg Intravenous Q8H Zully Agrawal PA-C     • oxyCODONE  5 mg Oral Q6H PRN Nav Dominguez PA-C     • pregabalin  75 mg Oral Daily Nav Dominguez PA-C     • tamsulosin  0 4 mg Oral Daily With Hue Morel PA-C     • [START ON 12/29/2022] vancomycin  500 mg Intravenous Once PRN Kaycee Gore PA-C     • venlafaxine  37 5 mg Oral TID Kaycee Gore PA-C          Today, Patient Was Seen By: Masood Joyner PA-C    **Please Note: This note may have been constructed using a voice recognition system  **

## 2022-12-28 NOTE — PLAN OF CARE
Problem: PHYSICAL THERAPY ADULT  Goal: Performs mobility at highest level of function for planned discharge setting  See evaluation for individualized goals  Description: Treatment/Interventions:  (D/C PT)  Equipment Recommended:  (none-pt has wheelchair and hospital bed)       See flowsheet documentation for full assessment, interventions and recommendations  Outcome: Completed  Note:    Problem List: Decreased strength, Decreased range of motion, Decreased skin integrity, Pain, Decreased mobility  Assessment: Pt is a 62 y o  male seen for PT evaluation s/p admission to [address] [place] [address] on 12/23/2022 with Acute respiratory failure with hypoxia ([facility] )  Order placed for PT services  Upon evaluation: Pt is presenting with impaired functional mobility due to pain, decreased strength, decreased ROM, decreased endurance, impaired balance, altered sensation and impaired skin integrity requiring supervision assistance for bed mobility and moderate assistance for transfers  Pt's clinical presentation is currently unpredictable given the functional mobility deficits above, especially weakness, decreased skin integrity, decreased endurance, pain, decreased activity tolerance, decreased functional mobility tolerance associated with increased pain and decreased safety awareness, coupled with fall risks as indicated by AM-PAC 6-Clicks: 55/06 as well as polypharmacy and limited sensation/neuropathy and combined with medical complications of pain impacting overall mobility status, abnormal renal lab values, abnormal H&H, abnormal WBCs, abnormal blood sugars, multiple readmissions and need for input for mobility technique/safety, acute respiratory failure with hypoxia on admission, PNA due to MRSA, pneumoperitoneum, acute cystitis on admission, acute urinary retention   Pt's PMHx and comorbidities include: CKD, DM, PAD and anemia, chronic back pain, chronic left heel ulcer, recent RSV bronchitis, CVA, vertebral osteomyelitis, gout, h/o retroperitoneal abscess  Personal factors affecting pt at time of IE include: inaccessible home environment, inability to perform IADLs, inability to perform ADLs and inability to navigate level surfaces without external assistance  Despite deficits and increased pain with mobility, patient is performing functional mobility near his baseline and has assistance from family at home  No acute care PT services indicated at this time  He has poor activity tolerance and tolerance in sitting due to increased pain  From PT/mobility standpoint, recommendation at time of d/c would be Home PT and home with family support  Barriers to Discharge: None  Barriers to Discharge Comments: has support from family  PT Discharge Recommendation: Home with home health rehabilitation (requesting HHPT services continue in the home for strengthening in preparation for post back surgery)    See flowsheet documentation for full assessment

## 2022-12-28 NOTE — PLAN OF CARE
Problem: Potential for Falls  Goal: Patient will remain free of falls  Description: INTERVENTIONS:  - Educate patient/family on patient safety including physical limitations  - Instruct patient to call for assistance with activity   - Consult OT/PT to assist with strengthening/mobility   - Keep Call bell within reach  - Keep bed low and locked with side rails adjusted as appropriate  - Keep care items and personal belongings within reach  - Initiate and maintain comfort rounds  - Make Fall Risk Sign visible to staff  - Apply yellow socks and bracelet for high fall risk patients  - Consider moving patient to room near nurses station  Outcome: Progressing     Problem: Nutrition/Hydration-ADULT  Goal: Nutrient/Hydration intake appropriate for improving, restoring or maintaining nutritional needs  Description: Monitor and assess patient's nutrition/hydration status for malnutrition  Collaborate with interdisciplinary team and initiate plan and interventions as ordered  Monitor patient's weight and dietary intake as ordered or per policy  Utilize nutrition screening tool and intervene as necessary  Determine patient's food preferences and provide high-protein, high-caloric foods as appropriate       INTERVENTIONS:  - Monitor oral intake, urinary output, labs, and treatment plans  - Assess nutrition and hydration status and recommend course of action  - Evaluate amount of meals eaten  - Assist patient with eating if necessary   - Allow adequate time for meals  - Recommend/ encourage appropriate diets, oral nutritional supplements, and vitamin/mineral supplements  - Order, calculate, and assess calorie counts as needed  - Recommend, monitor, and adjust tube feedings and TPN/PPN based on assessed needs  - Assess need for intravenous fluids  - Provide specific nutrition/hydration education as appropriate  - Include patient/family/caregiver in decisions related to nutrition  Outcome: Progressing     Problem: MOBILITY - ADULT  Goal: Maintain or return to baseline ADL function  Description: INTERVENTIONS:  -  Assess patient's ability to carry out ADLs; assess patient's baseline for ADL function and identify physical deficits which impact ability to perform ADLs (bathing, care of mouth/teeth, toileting, grooming, dressing, etc )  - Assess/evaluate cause of self-care deficits   - Assess range of motion  - Assess patient's mobility; develop plan if impaired  - Assess patient's need for assistive devices and provide as appropriate  - Encourage maximum independence but intervene and supervise when necessary  - Involve family in performance of ADLs  - Assess for home care needs following discharge   - Consider OT consult to assist with ADL evaluation and planning for discharge  - Provide patient education as appropriate  Outcome: Progressing  Goal: Maintains/Returns to pre admission functional level  Description: INTERVENTIONS:  - Perform BMAT or MOVE assessment daily    - Set and communicate daily mobility goal to care team and patient/family/caregiver  - Collaborate with rehabilitation services on mobility goals if consulted  - Perform Range of Motion 4 times a day  - Reposition patient every 2 hours    - Out of bed for toileting  - Record patient progress and toleration of activity level   Outcome: Progressing     Problem: Prexisting or High Potential for Compromised Skin Integrity  Goal: Skin integrity is maintained or improved  Description: INTERVENTIONS:  - Identify patients at risk for skin breakdown  - Assess and monitor skin integrity  - Assess and monitor nutrition and hydration status  - Monitor labs   - Assess for incontinence   - Turn and reposition patient  - Assist with mobility/ambulation  - Relieve pressure over bony prominences  - Avoid friction and shearing  - Provide appropriate hygiene as needed including keeping skin clean and dry  - Evaluate need for skin moisturizer/barrier cream  - Collaborate with interdisciplinary team   - Patient/family teaching  - Consider wound care consult   Outcome: Progressing     Problem: PAIN - ADULT  Goal: Verbalizes/displays adequate comfort level or baseline comfort level  Description: Interventions:  - Encourage patient to monitor pain and request assistance  - Assess pain using appropriate pain scale  - Administer analgesics based on type and severity of pain and evaluate response  - Implement non-pharmacological measures as appropriate and evaluate response  - Consider cultural and social influences on pain and pain management  - Notify physician/advanced practitioner if interventions unsuccessful or patient reports new pain  Outcome: Progressing     Problem: INFECTION - ADULT  Goal: Absence or prevention of progression during hospitalization  Description: INTERVENTIONS:  - Assess and monitor for signs and symptoms of infection  - Monitor lab/diagnostic results  - Monitor all insertion sites, i e  indwelling lines, tubes, and drains  - Monitor endotracheal if appropriate and nasal secretions for changes in amount and color  - [place] appropriate cooling/warming therapies per order  - Administer medications as ordered  - Instruct and encourage patient and family to use good hand hygiene technique  - Identify and instruct in appropriate isolation precautions for identified infection/condition  Outcome: Progressing     Problem: DISCHARGE PLANNING  Goal: Discharge to home or other facility with appropriate resources  Description: INTERVENTIONS:  - Identify barriers to discharge w/patient and caregiver  - Arrange for needed discharge resources and transportation as appropriate  - Identify discharge learning needs (meds, wound care, etc )  - Arrange for interpretive services to assist at discharge as needed  - Refer to Case Management Department for coordinating discharge planning if the patient needs post-hospital services based on physician/advanced practitioner order or complex needs related to functional status, cognitive ability, or social support system  Outcome: Progressing     Problem: Knowledge Deficit  Goal: Patient/family/caregiver demonstrates understanding of disease process, treatment plan, medications, and discharge instructions  Description: Complete learning assessment and assess knowledge base    Interventions:  - Provide teaching at level of understanding  - Provide teaching via preferred learning methods  Outcome: Progressing     Problem: RESPIRATORY - ADULT  Goal: Achieves optimal ventilation and oxygenation  Description: INTERVENTIONS:  - Assess for changes in respiratory status  - Assess for changes in mentation and behavior  - Position to facilitate oxygenation and minimize respiratory effort  - Oxygen administered by appropriate delivery if ordered  - Initiate smoking cessation education as indicated  - Encourage broncho-pulmonary hygiene including cough, deep breathe, Incentive Spirometry  - Assess the need for suctioning and aspirate as needed  - Assess and instruct to report SOB or any respiratory difficulty  - Respiratory Therapy support as indicated  Outcome: Progressing     Problem: GASTROINTESTINAL - ADULT  Goal: Maintains or returns to baseline bowel function  Description: INTERVENTIONS:  - Assess bowel function  - Encourage oral fluids to ensure adequate hydration  - Administer IV fluids if ordered to ensure adequate hydration  - Administer ordered medications as needed  - Encourage mobilization and activity  - Consider nutritional services referral to assist patient with adequate nutrition and appropriate food choices  Outcome: Progressing  Goal: Maintains adequate nutritional intake  Description: INTERVENTIONS:  - Monitor percentage of each meal consumed  - Identify factors contributing to decreased intake, treat as appropriate  - Assist with meals as needed  - Monitor I&O, weight, and lab values if indicated  - Obtain nutrition services referral as needed  Outcome: Progressing     Problem: METABOLIC, FLUID AND ELECTROLYTES - ADULT  Goal: Electrolytes maintained within normal limits  Description: INTERVENTIONS:  - Monitor labs and assess patient for signs and symptoms of electrolyte imbalances  - Administer electrolyte replacement as ordered  - Monitor response to electrolyte replacements, including repeat lab results as appropriate  - Instruct patient on fluid and nutrition as appropriate  Outcome: Progressing  Goal: Glucose maintained within target range  Description: INTERVENTIONS:  - Monitor Blood Glucose as ordered  - Assess for signs and symptoms of hyperglycemia and hypoglycemia  - Administer ordered medications to maintain glucose within target range  - Assess nutritional intake and initiate nutrition service referral as needed  Outcome: Progressing     Problem: SKIN/TISSUE INTEGRITY - ADULT  Goal: Incision(s), wounds(s) or drain site(s) healing without S/S of infection  Description: INTERVENTIONS  - Assess and document dressing, incision, wound bed, drain sites and surrounding tissue  - Provide patient and family education  - Perform skin care/dressing changes   Outcome: Progressing

## 2022-12-28 NOTE — PHYSICAL THERAPY NOTE
PHYSICAL THERAPY EVALUATION  NAME:  Bill Suero Sr  DATE: 12/28/22    AGE:   62 y o  Mrn:   [id number]  ADMIT DX:  Hypercalcemia [E83 52]  Pneumonia [J18 9]  SOB (shortness of breath) [R06 02]  Hypoxia [R09 02]  COPD exacerbation (HCC) [J44 1]  KELLY (acute kidney injury) ([facility] [address] ) [N17 9]    Past Medical History:   Diagnosis Date   • Chronic kidney disease    • Diabetes mellitus ([place] [address] )    • Gout    • Hypertension    • Renal disorder    • Retroperitoneal abscess ([place] [address] )    • Stroke [facility])    • Vertebral osteomyelitis ([place] [address] )      Length Of Stay: 5  Performed at least 2 patient identifiers during session: Name and Birthday  PHYSICAL THERAPY EVALUATION :    12/28/22 1242   PT Last Visit   PT Visit Date 12/28/22   Note Type   Note type Evaluation   Pain Assessment   Pain Assessment Tool FLACC   Pain Location/Orientation Orientation: Lower; Location: Back   Pain Rating: FLACC (Rest) - Face 0   Pain Rating: FLACC (Rest) - Legs 0   Pain Rating: FLACC (Rest) - Activity 0   Pain Rating: FLACC (Rest) - Cry 0   Pain Rating: FLACC (Rest) - Consolability 0   Score: FLACC (Rest) 0   Pain Rating: FLACC (Activity) - Face 1   Pain Rating: FLACC (Activity) - Legs 1   Pain Rating: FLACC (Activity) - Activity 1   Pain Rating: FLACC (Activity) - Cry 1   Pain Rating: FLACC (Activity) - Consolability 0   Score: FLACC (Activity) 4   Restrictions/Precautions   Weight Bearing Precautions Per Order   (strict offloading of heels with prevalon boots )   Braces or Orthoses   (heel offloading shoe left foot)   Other Precautions Bed Alarm; Fall Risk;Contact/isolation;Pain   Home Living   Type of Home House  ("steel lift into home")   Home Layout One level;Performs ADLs on one level; Able to live on main level with bedroom/bathroom   Bathroom Shower/Tub   (sponge bathes in bed)   [id number] [facility] bed; Wheelchair-manual   Additional Comments Reports living in a 1 SH with a lift to enter and primarily staying in bed unless he needs to go to an appointment then transfers with 2 people to his wheelchair  Prior Function   Level of [place] Needs assistance with ADLs; Needs assistance with functional mobility; Needs assistance with IADLS   Lives With Spouse   Receives Help From Family   IADLs Family/Friend/Other provides transportation; Family/Friend/Other provides meals; Family/Friend/Other provides medication management   Comments Reports he completes all ADLs in bed and has assistance from his spouse for all ADLs, IADLs and transfers OOB to/from wheelchair  Uses a urinal and bedpan  General   Additional Pertinent History Pt with impaired skin integrity left heel  Cognition   Orientation Level Oriented X4   Following Commands Follows one step commands without difficulty   Subjective   Subjective "I don't get out of bed"   RLE Assessment   RLE Assessment   (seated EOB minimal AROM, supine hip and knee flexion WFL  ankle DF < neutral  2/5 except ankle DF 2-/5)   LLE Assessment   LLE Assessment   (AROM supine hip and knee flexion WFL, ankle DF no AROM )   Light Touch   RLE Light Touch Grossly intact   LLE Light Touch Impaired   LLE Light Touch Comments diminished left foot compared to right LE   Bed Mobility   Supine to Sit 5  Supervision   Additional items Bedrails; Increased time required;HOB elevated   Sit to Supine 4  Minimal assistance   Additional items Assist x 1; Increased time required;Verbal cues;LE management   Additional Comments Reports having a hospital bed at home without bedrails  requires supervision for supine to sit EOB with increased time and effort  sit to supine with miNAx1 of LEs to return to supine  Transfers   Sit to Stand Unable to assess   Sit pivot 3  Moderate assistance   Additional items Assist x 1; Increased time required;Verbal cues   Additional Comments pt doesn't stand and hasn't been standing or ambulatory for some time   completed squat pivot transfer to his left to wheelchair and then to his right with modAx1 to complete each direction with cues for hand placement and inc time to complete  pt reports he typically requires assistance to complete transfer at baseline  Balance   Static Sitting Fair +   Dynamic Sitting Fair   Activity Tolerance   Activity Tolerance Patient limited by fatigue;Patient limited by pain   Medical Staff Made Aware OTEsme   Nurse Made Aware RN   Assessment   Problem List Decreased strength;Decreased range of motion;Decreased skin integrity;Pain;Decreased mobility   Barriers to Discharge None   Barriers to Discharge Comments has support from family   Goals   Patient Goals "Go home"   STG Expiration Date   (D/C PT)   Plan   Treatment/Interventions   (D/C PT)   PT Frequency   (D/C PT)   Recommendation   PT Discharge Recommendation Home with home health rehabilitation  (requesting HHPT services continue in the home for strengthening in preparation for post back surgery)   Equipment Recommended   (none-pt has wheelchair and hospital bed)   Additional Comments given patient's PLOF and current LOF no acute care services warranted  continue [facility] services upon D/C  AM-PAC Basic Mobility Inpatient   Turning in Bed Without Bedrails 4   Lying on Back to Sitting on Edge of Flat Bed 3   Moving Bed to Chair 2   Standing Up From Chair 1   Walk in Room 1   Climb 3-5 Stairs 1   Basic Mobility Inpatient Raw Score 12   Basic Mobility Standardized Score 32 23   Highest Level Of Mobility   JH-HLM Goal 4: Move to chair/commode   JH-HLM Achieved 4: Move to chair/commode   End of Consult   Patient Position at End of Consult Bed/Chair alarm activated;Supine; All needs within reach     (Please find full objective findings from PT assessment regarding body systems outlined above)  Assessment: Pt is a 62 y o  male seen for PT evaluation s/p admission to [address] [place] [address] on 12/23/2022 with Acute respiratory failure with hypoxia ([facility] [address] )  Order placed for PT services    Upon evaluation: Pt is presenting with impaired functional mobility due to pain, decreased strength, decreased ROM, decreased endurance, impaired balance, altered sensation and impaired skin integrity requiring supervision assistance for bed mobility and moderate assistance for transfers  Pt's clinical presentation is currently unpredictable given the functional mobility deficits above, especially weakness, decreased skin integrity, decreased endurance, pain, decreased activity tolerance, decreased functional mobility tolerance associated with increased pain and decreased safety awareness, coupled with fall risks as indicated by AM-PAC 6-Clicks: 83/28 as well as polypharmacy and limited sensation/neuropathy and combined with medical complications of pain impacting overall mobility status, abnormal renal lab values, abnormal H&H, abnormal WBCs, abnormal blood sugars, multiple readmissions and need for input for mobility technique/safety, acute respiratory failure with hypoxia on admission, PNA due to MRSA, pneumoperitoneum, acute cystitis on admission, acute urinary retention  Pt's PMHx and comorbidities include: CKD, DM, PAD and anemia, chronic back pain, chronic left heel ulcer, recent RSV bronchitis, CVA, vertebral osteomyelitis, gout, h/o retroperitoneal abscess  Personal factors affecting pt at time of IE include: inaccessible home environment, inability to perform IADLs, inability to perform ADLs and inability to navigate level surfaces without external assistance  Despite deficits and increased pain with mobility, patient is performing functional mobility near his baseline and has assistance from family at home  No acute care PT services indicated at this time  He has poor activity tolerance and tolerance in sitting due to increased pain  From PT/mobility standpoint, recommendation at time of d/c would be Home PT and home with family support  The patient's AM-PAC Basic Mobility Inpatient Short Form Raw Score is 12   A Raw score of less than or equal to 16 suggests the patient may benefit from discharge to post-acute rehabilitation services  Please also refer to the recommendation of the Physical Therapist for safe discharge planning  However, patient has assistance from family for all mobility  Recommend return to home with HHPT           Eh Gamino, PT,DPT

## 2022-12-28 NOTE — PROGRESS NOTES
Progress Note - Nephrology   Ino Notice Sr  62 y o  male MRN: [id number]  Unit/Bed#: -[facility] Encounter: [id number]    A/P:  1  Acute kidney injury superimposed on CKD 4, baseline creatinine 2 5-2 9  Follows with Dr Feroz Herrera as an outpatient  Etiology secondary to obstructive uropathy with hydronephrosis and neurogenic bladder due to diabetes  He has a long history of diabetic nephrosclerosis  Creatinine 4 3 on admission improved with isotonic IVF  Creatinine 2 3 today which is stable  Avoid intravenous fluids and encourage p o  intake  Patient cleared from renal perspective for discharge  Fu chem in 1 week at [facility]  He will need close fu with nephrology as an op  Did not receive bisphosphanate while here  Hold vitamin D/calcium supplements     2  Hypokalemia -resolved  Etiology 2/2 kaliuresis, restricted diet  Continue to replace potassium to goal 3 5-4      3  NAGMA due to IVF-- hold on sodium bicarb for now and follow trend off chloride IVF  Goal TCO2>22     4  Hypercalcemia, mild, work-up apparently negative in the past   May be due to immobilization with limited mobility  Continue all appropriate cancer screening  Uncorrected calcium 10 3 which is stable  No need for calcitonin unless patient is symptomatic--receiving intranasal calcitonin since 12/25  Previous SPEP, UPEP, 125 vitamin D, PTH and PTH RP were all unrevealing  5  Pneumoperitoneum   General diet per surgery  Patient cleared for discharge from surgery perspective  Follow up reason for today's visit:     mary    Subjective:   Patient seen and examined today  Reports diet improving  No n/v/cp/sob  Reports flatus  Diet advanced by surgery  Pt reports urinating without issue  A complete 10 point review of systems was performed and is otherwise negative  Objective:     Vitals: Blood pressure 150/81, pulse 80, temperature 98 1 °F (36 7 °C), resp   rate 17, height 5' 11" (1 803 m), weight 61 8 kg (136 lb 3 9 oz), SpO2 94 % ,Body mass index is 19 kg/m²  Weight (last 2 days)     Date/Time Weight    12/28/22 0600 61 8 (136 24)    12/27/22 0534 61 3 (135 14)    12/26/22 0600 59 7 (131 61)            Intake/Output Summary (Last 24 hours) at 12/28/2022 1229  Last data filed at 12/28/2022 1211  Gross per 24 hour   Intake 2480 ml   Output 2180 ml   Net 300 ml     I/O last 3 completed shifts: In: 3806 7 [P O :900; I V :2656 7; IV Piggyback:250]  Out: 8537 [Urine:3730]    Urethral Catheter Coude 16 Fr   (Active)   Amt returned on insertion(mL) 125 mL 12/24/22 0450   Reasons to continue Urinary Catheter  Acute urinary retention/obstruction failing urinary retention protocol 12/27/22 2005   Goal for Removal Voiding trial when ambulation improves 12/27/22 0825   Site Assessment Clean;Skin intact 12/27/22 2005   Ortiz Care Done 12/28/22 1031   Collection Container Standard drainage bag 12/27/22 2005   Securement Method Securing device (Describe) 12/27/22 2005   Securing Device Change Date 01/02/23 12/27/22 0825   Output (mL) 500 mL 12/28/22 0200   CAUTI Time-out performed before Urine Culture Yes 12/24/22 0450       Physical Exam: /81   Pulse 80   Temp 98 1 °F (36 7 °C)   Resp 17   Ht 5' 11" (1 803 m)   Wt 61 8 kg (136 lb 3 9 oz)   SpO2 94%   BMI 19 00 kg/m²     General Appearance:    Alert, cooperative, no distress, appears older than stated age  Muscle wasting noted    Head:    Normocephalic, without obvious abnormality, atraumatic   Eyes:    Conjunctiva/corneas clear   Ears:    Normal external ears   Nose:   Nares normal, septum midline, mucosa normal, no drainage    or sinus tenderness   Throat:   Lips, mucosa, and tongue normal; teeth and gums normal   Neck:    Back:      Lungs:     Clear to auscultation bilaterally, respirations unlabored   Chest wall:    No tenderness or deformity   Heart:    Regular rate and rhythm, S1 and S2 normal, no murmur, rub   or gallop   Abdomen:     Soft, non-tender, bowel sounds active Extremities:   Extremities normal, atraumatic, no cyanosis or edema     dressing BL feet noted   Lymph nodes:   Cervical normal   Neurologic:   CNII-XII intact            Lab, Imaging and other studies: I have personally reviewed pertinent labs  CBC:   Lab Results   Component Value Date    WBC 13 22 (H) 12/28/2022    HGB 9 1 (L) 12/28/2022    HCT 29 0 (L) 12/28/2022    MCV 89 12/28/2022     12/28/2022    MCH 27 8 12/28/2022    MCHC 31 4 12/28/2022    RDW 16 0 (H) 12/28/2022    MPV 10 3 12/28/2022     CMP:   Lab Results   Component Value Date    K 4 3 12/28/2022     (H) 12/28/2022    CO2 19 (L) 12/28/2022    BUN 21 12/28/2022    CREATININE 2 30 (H) 12/28/2022    CALCIUM 10 3 (H) 12/28/2022    EGFR 30 12/28/2022         Results from last 7 days   Lab Units 12/28/22  0447 12/27/22  1336 12/27/22  0528 12/26/22  0536 12/25/22  0537 12/24/22  0637 12/23/22  2217   POTASSIUM mmol/L 4 3 3 3* 2 9* 3 3*   < > 5 2 4 6   CHLORIDE mmol/L 109*  --  107 109*   < > 100 99   CO2 mmol/L 19*  --  25 22   < > 25 28   BUN mg/dL 21  --  26* 39*   < > 53* 54*   CREATININE mg/dL 2 30*  --  2 32* 2 85*   < > 3 98* 4 10*   CALCIUM mg/dL 10 3*  --  10 4* 10 5*   < > 11 5* 11 8*   ALK PHOS U/L  --   --   --  77  --  91 92   ALT U/L  --   --   --  <6*  --  13 13   AST U/L  --   --   --  8  --  10 12    < > = values in this interval not displayed           Phosphorus: No results found for: PHOS  Magnesium: No results found for: MG  Urinalysis: No results found for: [place]rey [place], SPECGRAV, PHUR, LEUKOCYTESUR, NITRITE, PROTEINUA, GLUCOSEU, KETONESU, BILIRUBINUR, BLOODU  Ionized Calcium: No results found for: CAION  Coagulation: No results found for: PT, INR, APTT  Troponin: No results found for: TROPONINI  ABG: No results found for: PHART, FLD7GAH, PO2ART, HLK6JXE, O4MUKKIG, BEART, SOURCE  Radiology review:     IMAGING  Procedure: CT abdomen pelvis wo contrast    Result Date: 12/27/2022  Narrative: CT ABDOMEN AND PELVIS WITHOUT IV CONTRAST INDICATION:   f/u 12/24 CT for free air  COMPARISON:  December 24, 2022, October 20, 2022 TECHNIQUE:  CT examination of the abdomen and pelvis was performed without intravenous contrast  Axial, sagittal, and coronal 2D reformatted images were created from the source data and submitted for interpretation  Radiation dose length product (DLP) for this visit:  825 mGy-cm   This examination, like all CT scans performed in the [facility], was performed utilizing techniques to minimize radiation dose exposure, including the use of iterative reconstruction and automated exposure control  Enteric contrast was administered  FINDINGS: ABDOMEN LOWER CHEST:  Bibasilar dependent interstitial infiltrates, improving compared to the prior, favoring atelectatic changes  LIVER/BILIARY TREE:  Unremarkable  GALLBLADDER:  No calcified gallstones  No pericholecystic inflammatory change  SPLEEN:  Unremarkable  PANCREAS:  Unremarkable  ADRENAL GLANDS:  Unremarkable  KIDNEYS/URETERS:  Mild fullness of the renal pelvis, improved compared to prior, post Ortiz catheter placement  STOMACH AND BOWEL:  Unremarkable  APPENDIX:  No findings to suggest appendicitis  ABDOMINOPELVIC CAVITY:  No ascites  Borderline abnormal retroperitoneal lymph nodes, precaval image 2/52 measuring 10 mm, left infrarenal 10 mm image 44  Stable compared to June 22, new compared to March 22  Etiology unclear  Persistent pneumoperitoneum, mostly in the upper anterior abdominal cavity, overall moderately decreasing in volume compared to 2 days ago  VESSELS:  Unremarkable for patient's age  PELVIS REPRODUCTIVE ORGANS:  Unremarkable for patient's age  URINARY BLADDER:  Unremarkable  ABDOMINAL WALL/INGUINAL REGIONS:  Unremarkable  OSSEOUS STRUCTURES:  No acute fracture or destructive osseous lesion  Impression: Interval decreasing volume of pneumoperitoneum  Bowel appears within normal limits   Improving bibasilar subsegmental opacities  Borderline abnormal retroperitoneal adenopathy, stable compared to June  Workstation performed: [id number]     Procedure: XR heel / calcaneus 2+ vw left    Result Date: 12/27/2022  Narrative: LEFT HEEL INDICATION:   left plantar heel ulcer, chronic please assess for [place]  Nivia Cushing COMPARISON:  11/18/2022 VIEWS:  XR HEEL / CALCANEUS 2+ VW LEFT FINDINGS: Bones are intact  Alignment and joint spaces preserved  No suspicious lytic or blastic bone lesion  No erosive or destructive changes  Soft tissue vascular and dystrophic calcification  Posterior -plantar swelling with evidence of ulceration  No radiopaque foreign body  Impression: No acute osseous abnormality  Workstation performed: [id number]     Procedure: VAS lower limb arterial duplex, complete bilateral    Result Date: 12/27/2022  Narrative:  [facility] REPORT [facility]INICAL: Indications: Patient presents with a chronic ulcer on his left heel  Patient denies any pain with walking  Operative History: No cardiovascular surgeries noted  Risk Factors The patient has history of HTN, Diabetes (IDDM), CVA, CKD and smoking (current) 0 3 ppd  Clinical Right Pressure: IV Site, Left Pressure:  147 mm Hg  FINDINGS:  Segment                Right                   Left                                          Impression  PSV (cm/s)  PSV (cm/s)  Common Femoral Artery                      73         141  Prox Profunda                              76          95  Prox SFA                                   90          75  Mid SFA                                   112          94  Dist SFA                                  110         151  Proximal Pop                               84          97  Distal Pop                                110          76  Tibioperoneal                              77              Dist Post Tibial       Occluded                        63  Prox  Ant  Tibial                          65          82  Dist  Ant   Tibial 49          43  Dist Peroneal                              34                 CONCLUSION: Impression: RIGHT LOWER LIMB: Diffuse disease noted throughout the femoral-popliteal arteries without significant focal stenosis  Diffuse tibioperoneal disease, with absence of flow noted in the distal posterior tibial artery  Ankle/Brachial index: Unreliable due to poorly compressible vessels  PVR/ PPG tracings are normal  Metatarsal pressure of 153 mmHg Great toe pressure of 129 mmHg, within the healing range  LEFT LOWER LIMB: Diffuse disease noted throughout the femoral-popliteal arteries without significant focal stenosis  Diffuse tibioperoneal disease  Ankle/Brachial index: Unreliable due to poorly compressible vessels  PVR/ PPG tracings are normal  Metatarsal pressure of 130 mmHg Great toe pressure of 100 mmHg, within the healing range  There are no prior studies available for comparison    SIGNATURE: Electronically Signed by: Montana Mendez MD, RPVI on 2022-12-27 04:44:49 PM      Current Facility-Administered Medications   Medication Dose Route Frequency   • albuterol inhalation solution 2 5 mg  2 5 mg Nebulization Q4H PRN   • amLODIPine (NORVASC) tablet 10 mg  10 mg Oral Daily   • atorvastatin (LIPITOR) tablet 40 mg  40 mg Oral Daily With Dinner   • Buprenorphine HCl FILM 450 mcg  450 mcg Buccal BID   • calcitonin (salmon) (MIACALCIN) 200 units/act nasal spray 1 spray  1 spray Alternating Nares Daily   • carvedilol (COREG) tablet 6 25 mg  6 25 mg Oral BID With Meals   • cyclobenzaprine (FLEXERIL) tablet 5 mg  5 mg Oral TID   • heparin (porcine) subcutaneous injection 5,000 Units  5,000 Units Subcutaneous Q8H [address]   • insulin glargine (LANTUS) subcutaneous injection 5 Units 0 05 mL  5 Units Subcutaneous HS   • insulin lispro (HumaLOG) 100 units/mL subcutaneous injection 1-5 Units  1-5 Units Subcutaneous HS   • insulin lispro (HumaLOG) 100 units/mL subcutaneous injection 1-6 Units  1-6 Units Subcutaneous TID With Meals • LORazepam (ATIVAN) injection 0 5 mg  0 5 mg Intravenous Q8H PRN   • megestrol (MEGACE) oral suspension 400 mg  400 mg Oral Daily   • metroNIDAZOLE (FLAGYL) IVPB (premix) 500 mg 100 mL  500 mg Intravenous Q8H   • oxyCODONE (ROXICODONE) IR tablet 5 mg  5 mg Oral Q6H PRN   • pregabalin (LYRICA) capsule 75 mg  75 mg Oral Daily   • tamsulosin (FLOMAX) capsule 0 4 mg  0 4 mg Oral Daily With Dinner   • [START ON 12/29/2022] vancomycin (VANCOCIN) IVPB (premix in dextrose) 500 mg 100 mL  500 mg Intravenous Once PRN   • venlafaxine (EFFEXOR-XR) 24 hr capsule 37 5 mg  37 5 mg Oral TID     Medications Discontinued During This Encounter   Medication Reason   • Buprenorphine HCl FILM 300 mcg    • oxyCODONE (ROXICODONE) IR tablet 5 mg    • allopurinol (ZYLOPRIM) tablet 100 mg    • amLODIPine (NORVASC) tablet 10 mg    • carvedilol (COREG) tablet 6 25 mg    • cyclobenzaprine (FLEXERIL) tablet 5 mg    • insulin glargine (LANTUS) subcutaneous injection 5 Units 0 05 mL    • polyethylene glycol (MIRALAX) packet 17 g    • pregabalin (LYRICA) capsule 75 mg    • senna-docusate sodium (SENOKOT S) 8 6-50 mg per tablet 1 tablet    • tamsulosin (FLOMAX) capsule 0 4 mg    • venlafaxine (EFFEXOR-XR) 24 hr capsule 37 5 mg    • insulin lispro (HumaLOG) 100 units/mL subcutaneous injection 1-6 Units    • insulin lispro (HumaLOG) 100 units/mL subcutaneous injection 1-6 Units    • HYDROmorphone (DILAUDID) injection 0 5 mg    • HYDROmorphone (DILAUDID) injection 0 5 mg    • Buprenorphine HCl FILM 300 mcg Formulary change   • sodium chloride 0 9 % infusion    • atorvastatin (LIPITOR) tablet 40 mg    • acetaminophen (TYLENOL) tablet 650 mg    • sodium chloride 0 9 % infusion    • sodium chloride 0 9 % infusion    • dextrose 5 % and sodium chloride 0 9 % infusion    • insulin lispro (HumaLOG) 100 units/mL subcutaneous injection 1-6 Units    • vancomycin (VANCOCIN) IVPB (premix in dextrose) 1,000 mg 200 mL    • vancomycin (VANCOCIN) IVPB (premix in dextrose) 500 mg 100 mL    • potassium chloride 20 mEq IVPB (premix)    • multi-electrolyte (PLASMALYTE-A/ISOLYTE-S PH 7 4) IV solution    • vancomycin (VANCOCIN) IVPB (premix in dextrose) 750 mg 150 mL Dose adjustment   • cefTRIAXone (ROCEPHIN) IVPB (premix in dextrose) 1,000 mg 50 mL    • metroNIDAZOLE (FLAGYL) IVPB (premix) 500 mg 100 mL    • HYDROmorphone (DILAUDID) injection 0 5 mg        [place], DO      This progress note was produced in part using a dictation device which may document imprecise wording from author's original intent

## 2022-12-28 NOTE — PLAN OF CARE
Problem: Potential for Falls  Goal: Patient will remain free of falls  Description: INTERVENTIONS:  - Educate patient/family on patient safety including physical limitations  - Instruct patient to call for assistance with activity   - Consult OT/PT to assist with strengthening/mobility   - Keep Call bell within reach  - Keep bed low and locked with side rails adjusted as appropriate  - Keep care items and personal belongings within reach  - Initiate and maintain comfort rounds  - Make Fall Risk Sign visible to staff  - Offer Toileting every 2 Hours, in advance of need  - Initiate/Maintain bed alarm  - Obtain necessary fall risk management equipment  - Apply yellow socks and bracelet for high fall risk patients  - Consider moving patient to room near nurses station  Outcome: Progressing     Problem: Nutrition/Hydration-ADULT  Goal: Nutrient/Hydration intake appropriate for improving, restoring or maintaining nutritional needs  Description: Monitor and assess patient's nutrition/hydration status for malnutrition  Collaborate with interdisciplinary team and initiate plan and interventions as ordered  Monitor patient's weight and dietary intake as ordered or per policy  Utilize nutrition screening tool and intervene as necessary  Determine patient's food preferences and provide high-protein, high-caloric foods as appropriate       INTERVENTIONS:  - Monitor oral intake, urinary output, labs, and treatment plans  - Assess nutrition and hydration status and recommend course of action  - Evaluate amount of meals eaten  - Assist patient with eating if necessary   - Allow adequate time for meals  - Recommend/ encourage appropriate diets, oral nutritional supplements, and vitamin/mineral supplements  - Order, calculate, and assess calorie counts as needed  - Recommend, monitor, and adjust tube feedings and TPN/PPN based on assessed needs  - Assess need for intravenous fluids  - Provide specific nutrition/hydration education as appropriate  - Include patient/family/caregiver in decisions related to nutrition  Outcome: Progressing     Problem: MOBILITY - ADULT  Goal: Maintain or return to baseline ADL function  Description: INTERVENTIONS:  -  Assess patient's ability to carry out ADLs; assess patient's baseline for ADL function and identify physical deficits which impact ability to perform ADLs (bathing, care of mouth/teeth, toileting, grooming, dressing, etc )  - Assess/evaluate cause of self-care deficits   - Assess range of motion  - Assess patient's mobility; develop plan if impaired  - Assess patient's need for assistive devices and provide as appropriate  - Encourage maximum independence but intervene and supervise when necessary  - Involve family in performance of ADLs  - Assess for home care needs following discharge   - Consider OT consult to assist with ADL evaluation and planning for discharge  - Provide patient education as appropriate  Outcome: Progressing  Goal: Maintains/Returns to pre admission functional level  Description: INTERVENTIONS:  - Perform BMAT or MOVE assessment daily    - Set and communicate daily mobility goal to care team and patient/family/caregiver  - Collaborate with rehabilitation services on mobility goals if consulted  - Perform Range of Motion 4 times a day  - Reposition patient every 2 hours    - Out of bed for toileting  - Record patient progress and toleration of activity level   Outcome: Progressing     Problem: Prexisting or High Potential for Compromised Skin Integrity  Goal: Skin integrity is maintained or improved  Description: INTERVENTIONS:  - Identify patients at risk for skin breakdown  - Assess and monitor skin integrity  - Assess and monitor nutrition and hydration status  - Monitor labs   - Assess for incontinence   - Turn and reposition patient  - Assist with mobility/ambulation  - Relieve pressure over bony prominences  - Avoid friction and shearing  - Provide appropriate hygiene as needed including keeping skin clean and dry  - Evaluate need for skin moisturizer/barrier cream  - Collaborate with interdisciplinary team   - Patient/family teaching  - Consider wound care consult   Outcome: Progressing     Problem: PAIN - ADULT  Goal: Verbalizes/displays adequate comfort level or baseline comfort level  Description: Interventions:  - Encourage patient to monitor pain and request assistance  - Assess pain using appropriate pain scale  - Administer analgesics based on type and severity of pain and evaluate response  - Implement non-pharmacological measures as appropriate and evaluate response  - Consider cultural and social influences on pain and pain management  - Notify physician/advanced practitioner if interventions unsuccessful or patient reports new pain  Outcome: Progressing     Problem: INFECTION - ADULT  Goal: Absence or prevention of progression during hospitalization  Description: INTERVENTIONS:  - Assess and monitor for signs and symptoms of infection  - Monitor lab/diagnostic results  - Monitor all insertion sites, i e  indwelling lines, tubes, and drains  - Monitor endotracheal if appropriate and nasal secretions for changes in amount and color  - [place] appropriate cooling/warming therapies per order  - Administer medications as ordered  - Instruct and encourage patient and family to use good hand hygiene technique  - Identify and instruct in appropriate isolation precautions for identified infection/condition  Outcome: Progressing     Problem: DISCHARGE PLANNING  Goal: Discharge to home or other facility with appropriate resources  Description: INTERVENTIONS:  - Identify barriers to discharge w/patient and caregiver  - Arrange for needed discharge resources and transportation as appropriate  - Identify discharge learning needs (meds, wound care, etc )  - Arrange for interpretive services to assist at discharge as needed  - Refer to Case Management Department for coordinating discharge planning if the patient needs post-hospital services based on physician/advanced practitioner order or complex needs related to functional status, cognitive ability, or social support system  Outcome: Progressing     Problem: Knowledge Deficit  Goal: Patient/family/caregiver demonstrates understanding of disease process, treatment plan, medications, and discharge instructions  Description: Complete learning assessment and assess knowledge base    Interventions:  - Provide teaching at level of understanding  - Provide teaching via preferred learning methods  Outcome: Progressing     Problem: RESPIRATORY - ADULT  Goal: Achieves optimal ventilation and oxygenation  Description: INTERVENTIONS:  - Assess for changes in respiratory status  - Assess for changes in mentation and behavior  - Position to facilitate oxygenation and minimize respiratory effort  - Oxygen administered by appropriate delivery if ordered  - Initiate smoking cessation education as indicated  - Encourage broncho-pulmonary hygiene including cough, deep breathe, Incentive Spirometry  - Assess the need for suctioning and aspirate as needed  - Assess and instruct to report SOB or any respiratory difficulty  - Respiratory Therapy support as indicated  Outcome: Progressing     Problem: GASTROINTESTINAL - ADULT  Goal: Maintains or returns to baseline bowel function  Description: INTERVENTIONS:  - Assess bowel function  - Encourage oral fluids to ensure adequate hydration  - Administer IV fluids if ordered to ensure adequate hydration  - Administer ordered medications as needed  - Encourage mobilization and activity  - Consider nutritional services referral to assist patient with adequate nutrition and appropriate food choices  Outcome: Progressing  Goal: Maintains adequate nutritional intake  Description: INTERVENTIONS:  - Monitor percentage of each meal consumed  - Identify factors contributing to decreased intake, treat as appropriate  - Assist with meals as needed  - Monitor I&O, weight, and lab values if indicated  - Obtain nutrition services referral as needed  Outcome: Progressing     Problem: METABOLIC, FLUID AND ELECTROLYTES - ADULT  Goal: Electrolytes maintained within normal limits  Description: INTERVENTIONS:  - Monitor labs and assess patient for signs and symptoms of electrolyte imbalances  - Administer electrolyte replacement as ordered  - Monitor response to electrolyte replacements, including repeat lab results as appropriate  - Instruct patient on fluid and nutrition as appropriate  Outcome: Progressing  Goal: Glucose maintained within target range  Description: INTERVENTIONS:  - Monitor Blood Glucose as ordered  - Assess for signs and symptoms of hyperglycemia and hypoglycemia  - Administer ordered medications to maintain glucose within target range  - Assess nutritional intake and initiate nutrition service referral as needed  Outcome: Progressing     Problem: SKIN/TISSUE INTEGRITY - ADULT  Goal: Incision(s), wounds(s) or drain site(s) healing without S/S of infection  Description: INTERVENTIONS  - Assess and document dressing, incision, wound bed, drain sites and surrounding tissue  - Provide patient and family education  - Perform skin care/dressing changes   Outcome: Progressing

## 2022-12-28 NOTE — CASE MANAGEMENT
Case Management Discharge Planning Note    Patient name Ethel Meierh  Location /-26 MRN [id number]  : 1965 Date 2022       Current Admission Date: 2022  Current Admission Diagnosis:Acute respiratory failure with hypoxia [facility])   Patient Active Problem List    Diagnosis Date Noted   • PAD (peripheral artery disease) ([address] ) 2022   • Pneumoperitoneum 2022   • Pneumonia due to methicillin resistant Staphylococcus aureus (MRSA) ([facility] ) 2022   • Chronic back pain 2022   • Moderate protein-calorie malnutrition ([facility] [address] ) 2022   • Acute cystitis 2022   • Acute on chronic anemia 2022   • RSV infection 2022   • Acute respiratory failure with hypoxia ([address] ) 2022   • Chronic ulcer of left heel ([facility] [address] ) 10/20/2022   • Hypercalcemia 2022   • Low back pain 2022   • Ambulatory dysfunction 2022   • Stage 3a chronic kidney disease ([address] ) 2022   • Acute urinary retention 2022   • Severe protein-calorie malnutrition ([address] ) 2022   • Iron deficiency anemia secondary to inadequate dietary iron intake 2022   • Hip pain, acute, left 2022   • Acute renal failure superimposed on stage 4 chronic kidney disease ([address] ) 2022   • Hyperkalemia 2022   • Diabetes mellitus type 2, insulin dependent ([address] )    • Gout    • Essential hypertension    • History of CVA (cerebrovascular accident)    • Osteomyelitis of vertebra of lumbar region ([facility] [address] )       LOS (days): 5  Geometric Mean LOS (GMLOS) (days): 4 30  Days to GMLOS:-0 3     OBJECTIVE:  Risk of Unplanned Readmission Score: 29 64         Current admission status: Inpatient   Preferred Pharmacy:   [facility]/pharmacy #[id number]- [id number] [facility] [place] [address] [place] [id number]  Phone: [phone] Fax: [phone]    Primary Care Provider: Lawrence Bradley DO    Primary Insurance: MEDICARE  Secondary Insurance:     DISCHARGE DETAILS: HHC has been recommended for pt at time of discharge    Pt is active with [facility] and they will resume services at time of discharge

## 2022-12-29 LAB
BACTERIA BLD CULT: NORMAL
BACTERIA BLD CULT: NORMAL

## 2023-01-04 ENCOUNTER — TELEPHONE (OUTPATIENT)
Dept: UROLOGY | Facility: AMBULATORY SURGERY CENTER | Age: 58
End: 2023-01-04

## 2023-01-04 NOTE — TELEPHONE ENCOUNTER
Complaint/Diagnosis:Urinary Retention    Insurance:[facility]    History of cancer:no    Previous urologist:? Outside Testing/where:epic    If yes,what kind:epic    Records requested/where:epic    Preferred location:[facility]/?

## 2023-01-10 NOTE — TELEPHONE ENCOUNTER
Patient's wife called stating patient has a camarena catheter and needs an appointment to be removed       Patient can be reached at [phone]

## 2023-02-17 ENCOUNTER — TELEPHONE (OUTPATIENT)
Dept: UROLOGY | Facility: CLINIC | Age: 58
End: 2023-02-17

## 2023-02-17 NOTE — TELEPHONE ENCOUNTER
Pt no showed for previous appt for urinary retention  Call placed to pt to rescheduled  Mailbox full   If pt calls back please reschedule

## 2023-02-26 PROBLEM — J15.212 PNEUMONIA DUE TO METHICILLIN RESISTANT STAPHYLOCOCCUS AUREUS (MRSA) (HCC): Status: RESOLVED | Noted: 2022-12-24 | Resolved: 2023-02-26

## 2023-03-02 ENCOUNTER — APPOINTMENT (EMERGENCY)
Dept: CT IMAGING | Facility: HOSPITAL | Age: 58
End: 2023-03-02

## 2023-03-02 ENCOUNTER — HOSPITAL ENCOUNTER (INPATIENT)
Facility: HOSPITAL | Age: 58
LOS: 8 days | Discharge: HOME HEALTH | End: 2023-03-10
Attending: EMERGENCY MEDICINE | Admitting: STUDENT IN AN ORGANIZED HEALTH CARE EDUCATION/TRAINING PROGRAM

## 2023-03-02 ENCOUNTER — APPOINTMENT (EMERGENCY)
Dept: RADIOLOGY | Facility: HOSPITAL | Age: 58
End: 2023-03-02

## 2023-03-02 DIAGNOSIS — M10.361 ACUTE GOUT DUE TO RENAL IMPAIRMENT INVOLVING RIGHT KNEE: ICD-10-CM

## 2023-03-02 DIAGNOSIS — L08.9 DIABETIC FOOT INFECTION (HCC): ICD-10-CM

## 2023-03-02 DIAGNOSIS — R26.2 AMBULATORY DYSFUNCTION: ICD-10-CM

## 2023-03-02 DIAGNOSIS — M25.561 ACUTE PAIN OF RIGHT KNEE: ICD-10-CM

## 2023-03-02 DIAGNOSIS — E11.9 DIABETES MELLITUS TYPE 2, INSULIN DEPENDENT (HCC): ICD-10-CM

## 2023-03-02 DIAGNOSIS — G93.41 ACUTE METABOLIC ENCEPHALOPATHY: ICD-10-CM

## 2023-03-02 DIAGNOSIS — R41.82 AMS (ALTERED MENTAL STATUS): Primary | ICD-10-CM

## 2023-03-02 DIAGNOSIS — N17.9 AKI (ACUTE KIDNEY INJURY) (HCC): ICD-10-CM

## 2023-03-02 DIAGNOSIS — Z79.4 DIABETES MELLITUS TYPE 2, INSULIN DEPENDENT (HCC): ICD-10-CM

## 2023-03-02 DIAGNOSIS — E11.628 DIABETIC FOOT INFECTION (HCC): ICD-10-CM

## 2023-03-02 DIAGNOSIS — S91.302A NON-HEALING WOUND OF LEFT HEEL: ICD-10-CM

## 2023-03-02 DIAGNOSIS — M25.469 KNEE EFFUSION: ICD-10-CM

## 2023-03-02 DIAGNOSIS — M25.461 KNEE EFFUSION, RIGHT: ICD-10-CM

## 2023-03-02 DIAGNOSIS — A41.9 SEPSIS WITHOUT ACUTE ORGAN DYSFUNCTION, DUE TO UNSPECIFIED ORGANISM (HCC): ICD-10-CM

## 2023-03-02 DIAGNOSIS — N18.9 CHRONIC KIDNEY DISEASE, UNSPECIFIED CKD STAGE: ICD-10-CM

## 2023-03-02 DIAGNOSIS — M54.42 LEFT-SIDED LOW BACK PAIN WITH LEFT-SIDED SCIATICA, UNSPECIFIED CHRONICITY: ICD-10-CM

## 2023-03-02 LAB
2HR DELTA HS TROPONIN: -1 NG/L
ALBUMIN SERPL BCP-MCNC: 3.8 G/DL (ref 3.5–5)
ALP SERPL-CCNC: 77 U/L (ref 34–104)
ALT SERPL W P-5'-P-CCNC: <3 U/L (ref 7–52)
AMMONIA PLAS-SCNC: 30 UMOL/L (ref 18–72)
ANION GAP SERPL CALCULATED.3IONS-SCNC: 6 MMOL/L (ref 4–13)
AST SERPL W P-5'-P-CCNC: 8 U/L (ref 13–39)
BACTERIA UR QL AUTO: NORMAL /HPF
BASE EX.OXY STD BLDV CALC-SCNC: 72.4 % (ref 60–80)
BASE EXCESS BLDV CALC-SCNC: 0.7 MMOL/L
BASOPHILS # BLD AUTO: 0.04 THOUSANDS/ÂΜL (ref 0–0.1)
BASOPHILS NFR BLD AUTO: 0 % (ref 0–1)
BILIRUB SERPL-MCNC: 0.48 MG/DL (ref 0.2–1)
BILIRUB UR QL STRIP: NEGATIVE
BUN SERPL-MCNC: 42 MG/DL (ref 5–25)
CALCIUM SERPL-MCNC: 10.7 MG/DL (ref 8.4–10.2)
CARDIAC TROPONIN I PNL SERPL HS: 3 NG/L
CARDIAC TROPONIN I PNL SERPL HS: 4 NG/L
CHLORIDE SERPL-SCNC: 105 MMOL/L (ref 96–108)
CLARITY UR: ABNORMAL
CO2 SERPL-SCNC: 30 MMOL/L (ref 21–32)
COLOR UR: YELLOW
CREAT SERPL-MCNC: 3.37 MG/DL (ref 0.6–1.3)
EOSINOPHIL # BLD AUTO: 0.04 THOUSAND/ÂΜL (ref 0–0.61)
EOSINOPHIL NFR BLD AUTO: 0 % (ref 0–6)
ERYTHROCYTE [DISTWIDTH] IN BLOOD BY AUTOMATED COUNT: 17.2 % (ref 11.6–15.1)
FLUAV RNA RESP QL NAA+PROBE: NEGATIVE
FLUBV RNA RESP QL NAA+PROBE: NEGATIVE
GFR SERPL CREATININE-BSD FRML MDRD: 19 ML/MIN/1.73SQ M
GLUCOSE SERPL-MCNC: 107 MG/DL (ref 65–140)
GLUCOSE SERPL-MCNC: 98 MG/DL (ref 65–140)
GLUCOSE UR STRIP-MCNC: NEGATIVE MG/DL
HCO3 BLDV-SCNC: 24.7 MMOL/L (ref 24–30)
HCT VFR BLD AUTO: 31.7 % (ref 36.5–49.3)
HGB BLD-MCNC: 10 G/DL (ref 12–17)
HGB UR QL STRIP.AUTO: ABNORMAL
IMM GRANULOCYTES # BLD AUTO: 0.08 THOUSAND/UL (ref 0–0.2)
IMM GRANULOCYTES NFR BLD AUTO: 1 % (ref 0–2)
KETONES UR STRIP-MCNC: NEGATIVE MG/DL
LACTATE SERPL-SCNC: 0.8 MMOL/L (ref 0.5–2)
LEUKOCYTE ESTERASE UR QL STRIP: ABNORMAL
LYMPHOCYTES # BLD AUTO: 0.91 THOUSANDS/ÂΜL (ref 0.6–4.47)
LYMPHOCYTES NFR BLD AUTO: 6 % (ref 14–44)
MCH RBC QN AUTO: 29.1 PG (ref 26.8–34.3)
MCHC RBC AUTO-ENTMCNC: 31.5 G/DL (ref 31.4–37.4)
MCV RBC AUTO: 92 FL (ref 82–98)
MONOCYTES # BLD AUTO: 1.3 THOUSAND/ÂΜL (ref 0.17–1.22)
MONOCYTES NFR BLD AUTO: 9 % (ref 4–12)
NEUTROPHILS # BLD AUTO: 12.14 THOUSANDS/ÂΜL (ref 1.85–7.62)
NEUTS SEG NFR BLD AUTO: 84 % (ref 43–75)
NITRITE UR QL STRIP: NEGATIVE
NON-SQ EPI CELLS URNS QL MICRO: NORMAL /HPF
NRBC BLD AUTO-RTO: 0 /100 WBCS
O2 CT BLDV-SCNC: 10.7 ML/DL
PCO2 BLDV: 36.9 MM HG (ref 42–50)
PH BLDV: 7.44 [PH] (ref 7.3–7.4)
PH UR STRIP.AUTO: 7.5 [PH]
PLATELET # BLD AUTO: 145 THOUSANDS/UL (ref 149–390)
PMV BLD AUTO: 12.4 FL (ref 8.9–12.7)
PO2 BLDV: 38.9 MM HG (ref 35–45)
POTASSIUM SERPL-SCNC: 4.7 MMOL/L (ref 3.5–5.3)
PROCALCITONIN SERPL-MCNC: 0.3 NG/ML
PROT SERPL-MCNC: 6.5 G/DL (ref 6.4–8.4)
PROT UR STRIP-MCNC: ABNORMAL MG/DL
RBC # BLD AUTO: 3.44 MILLION/UL (ref 3.88–5.62)
RBC #/AREA URNS AUTO: NORMAL /HPF
RSV RNA RESP QL NAA+PROBE: NEGATIVE
SARS-COV-2 RNA RESP QL NAA+PROBE: NEGATIVE
SODIUM SERPL-SCNC: 141 MMOL/L (ref 135–147)
SP GR UR STRIP.AUTO: 1.01 (ref 1–1.03)
UROBILINOGEN UR QL STRIP.AUTO: 0.2 E.U./DL
WBC # BLD AUTO: 14.51 THOUSAND/UL (ref 4.31–10.16)
WBC #/AREA URNS AUTO: NORMAL /HPF

## 2023-03-02 RX ORDER — TAMSULOSIN HYDROCHLORIDE 0.4 MG/1
0.4 CAPSULE ORAL
Status: DISCONTINUED | OUTPATIENT
Start: 2023-03-03 | End: 2023-03-10 | Stop reason: HOSPADM

## 2023-03-02 RX ORDER — PREGABALIN 75 MG/1
150 CAPSULE ORAL 2 TIMES DAILY
Status: DISCONTINUED | OUTPATIENT
Start: 2023-03-02 | End: 2023-03-09

## 2023-03-02 RX ORDER — OXYCODONE HYDROCHLORIDE 5 MG/1
5 TABLET ORAL EVERY 4 HOURS PRN
Status: DISCONTINUED | OUTPATIENT
Start: 2023-03-02 | End: 2023-03-03

## 2023-03-02 RX ORDER — ACETAMINOPHEN 325 MG/1
650 TABLET ORAL EVERY 4 HOURS PRN
Status: DISCONTINUED | OUTPATIENT
Start: 2023-03-02 | End: 2023-03-10 | Stop reason: HOSPADM

## 2023-03-02 RX ORDER — SODIUM BICARBONATE 650 MG/1
650 TABLET ORAL EVERY OTHER DAY
Status: DISCONTINUED | OUTPATIENT
Start: 2023-03-03 | End: 2023-03-10 | Stop reason: HOSPADM

## 2023-03-02 RX ORDER — VENLAFAXINE HYDROCHLORIDE 37.5 MG/1
37.5 CAPSULE, EXTENDED RELEASE ORAL 3 TIMES DAILY
Status: DISCONTINUED | OUTPATIENT
Start: 2023-03-02 | End: 2023-03-10 | Stop reason: HOSPADM

## 2023-03-02 RX ORDER — LANOLIN ALCOHOL/MO/W.PET/CERES
100 CREAM (GRAM) TOPICAL DAILY
Status: DISCONTINUED | OUTPATIENT
Start: 2023-03-03 | End: 2023-03-10 | Stop reason: HOSPADM

## 2023-03-02 RX ORDER — INSULIN GLARGINE 100 [IU]/ML
5 INJECTION, SOLUTION SUBCUTANEOUS
Status: DISCONTINUED | OUTPATIENT
Start: 2023-03-02 | End: 2023-03-10 | Stop reason: HOSPADM

## 2023-03-02 RX ORDER — INSULIN LISPRO 100 [IU]/ML
1-6 INJECTION, SOLUTION INTRAVENOUS; SUBCUTANEOUS
Status: DISCONTINUED | OUTPATIENT
Start: 2023-03-02 | End: 2023-03-10 | Stop reason: HOSPADM

## 2023-03-02 RX ORDER — AMOXICILLIN 250 MG
1 CAPSULE ORAL 2 TIMES DAILY
Status: DISCONTINUED | OUTPATIENT
Start: 2023-03-02 | End: 2023-03-10 | Stop reason: HOSPADM

## 2023-03-02 RX ORDER — FERROUS SULFATE 325(65) MG
325 TABLET ORAL
Status: DISCONTINUED | OUTPATIENT
Start: 2023-03-03 | End: 2023-03-10 | Stop reason: HOSPADM

## 2023-03-02 RX ORDER — VANCOMYCIN HYDROCHLORIDE 1 G/200ML
15 INJECTION, SOLUTION INTRAVENOUS EVERY 12 HOURS
Status: DISCONTINUED | OUTPATIENT
Start: 2023-03-02 | End: 2023-03-02

## 2023-03-02 RX ORDER — DOCUSATE SODIUM 100 MG/1
100 CAPSULE, LIQUID FILLED ORAL 2 TIMES DAILY
Status: DISCONTINUED | OUTPATIENT
Start: 2023-03-02 | End: 2023-03-10 | Stop reason: HOSPADM

## 2023-03-02 RX ORDER — AMLODIPINE BESYLATE 10 MG/1
10 TABLET ORAL DAILY
Status: DISCONTINUED | OUTPATIENT
Start: 2023-03-03 | End: 2023-03-10 | Stop reason: HOSPADM

## 2023-03-02 RX ORDER — POLYETHYLENE GLYCOL 3350 17 G/17G
17 POWDER, FOR SOLUTION ORAL 2 TIMES DAILY
Status: DISCONTINUED | OUTPATIENT
Start: 2023-03-03 | End: 2023-03-10 | Stop reason: HOSPADM

## 2023-03-02 RX ORDER — SEVELAMER HYDROCHLORIDE 800 MG/1
800 TABLET, FILM COATED ORAL
Status: DISCONTINUED | OUTPATIENT
Start: 2023-03-03 | End: 2023-03-10 | Stop reason: HOSPADM

## 2023-03-02 RX ORDER — ALLOPURINOL 100 MG/1
100 TABLET ORAL DAILY
Status: DISCONTINUED | OUTPATIENT
Start: 2023-03-03 | End: 2023-03-10 | Stop reason: HOSPADM

## 2023-03-02 RX ORDER — INSULIN GLARGINE 100 [IU]/ML
5 INJECTION, SOLUTION SUBCUTANEOUS
Status: DISCONTINUED | OUTPATIENT
Start: 2023-03-02 | End: 2023-03-02

## 2023-03-02 RX ORDER — HEPARIN SODIUM 5000 [USP'U]/ML
5000 INJECTION, SOLUTION INTRAVENOUS; SUBCUTANEOUS EVERY 8 HOURS SCHEDULED
Status: DISCONTINUED | OUTPATIENT
Start: 2023-03-02 | End: 2023-03-10 | Stop reason: HOSPADM

## 2023-03-02 RX ORDER — CARVEDILOL 6.25 MG/1
6.25 TABLET ORAL 2 TIMES DAILY WITH MEALS
Status: DISCONTINUED | OUTPATIENT
Start: 2023-03-02 | End: 2023-03-10 | Stop reason: HOSPADM

## 2023-03-02 RX ORDER — ATORVASTATIN CALCIUM 40 MG/1
40 TABLET, FILM COATED ORAL
Status: DISCONTINUED | OUTPATIENT
Start: 2023-03-03 | End: 2023-03-10 | Stop reason: HOSPADM

## 2023-03-02 RX ORDER — INSULIN LISPRO 100 [IU]/ML
1-6 INJECTION, SOLUTION INTRAVENOUS; SUBCUTANEOUS
Status: DISCONTINUED | OUTPATIENT
Start: 2023-03-03 | End: 2023-03-05

## 2023-03-02 RX ORDER — CYCLOBENZAPRINE HCL 10 MG
5 TABLET ORAL 3 TIMES DAILY
Status: DISCONTINUED | OUTPATIENT
Start: 2023-03-02 | End: 2023-03-09

## 2023-03-02 RX ADMIN — VENLAFAXINE HYDROCHLORIDE 37.5 MG: 37.5 CAPSULE, EXTENDED RELEASE ORAL at 22:19

## 2023-03-02 RX ADMIN — CARVEDILOL 6.25 MG: 6.25 TABLET, FILM COATED ORAL at 22:19

## 2023-03-02 RX ADMIN — HEPARIN SODIUM 5000 UNITS: 5000 INJECTION INTRAVENOUS; SUBCUTANEOUS at 23:34

## 2023-03-02 RX ADMIN — MEROPENEM 1000 MG: 1 INJECTION, POWDER, FOR SOLUTION INTRAVENOUS at 23:35

## 2023-03-02 RX ADMIN — DOCUSATE SODIUM AND SENNOSIDES 1 TABLET: 8.6; 5 TABLET, FILM COATED ORAL at 22:19

## 2023-03-02 RX ADMIN — CYCLOBENZAPRINE 5 MG: 10 TABLET, FILM COATED ORAL at 22:19

## 2023-03-02 RX ADMIN — PREGABALIN 150 MG: 75 CAPSULE ORAL at 22:19

## 2023-03-02 RX ADMIN — DOCUSATE SODIUM 100 MG: 100 CAPSULE ORAL at 22:20

## 2023-03-02 RX ADMIN — SODIUM CHLORIDE 1000 ML: 0.9 INJECTION, SOLUTION INTRAVENOUS at 15:48

## 2023-03-02 NOTE — ED PROVIDER NOTES
History  Chief Complaint   Patient presents with   • Possible UTI     Patient from home, family reports possible UTI  State he has an indwelling camarena and last time he had a UTI he acted the same  State he has been altered with tremors  22-year-old male presents the emergency department from home for evaluation of altered mental status  Per EMS family is concerned for UTI  Patient has no complaints  He is awake, alert to place  Confused on year  States he lives with his mother  Does not add any additional history due to mental status change     Wife now at bedside:  March 2022 patient was septic with oseto and discitis and sinal abscess and was hospitalized [facility] and then since has bed bound  Reports after his surgery he was retaining urine requiring camarena and has had indwelling camarena since  Notes he has had recurrent UTIs  Last treated 2 weeks ago with levoquin and keflex  States patient gets "cooky" every now and then typically when urine is infected but this is worse than normal  Also states he occasionally gets twitching movements when he is dehydrated  Wife states yesterday patient's mental status was a little "off" but not bad  He was acting his normal self  No vomiting or diarrhea  No fevers yesterday  No cough or congestion  Wife notes patient has a "necrotic heel"  - wife states "many scans on his foot with no infection in the bone and circulation tests which are fine"            Prior to Admission Medications   Prescriptions Last Dose Informant Patient Reported? Taking? Belbuca 450 MCG FILM   Yes No   Sig: ADMINISTER 1 FILM TO INSIDE OF CHEEK IN THE MORNING AND 1 FILM BEFORE BEDTIME     Buprenorphine HCl (Belbuca) 300 MCG FILM   Yes No   Sig: Apply 300 mcg to cheek 2 (two) times a day   acetaminophen (TYLENOL) 325 mg tablet   Yes No   Sig: Take 650 mg by mouth every 4 (four) hours as needed for mild pain   allopurinol (ZYLOPRIM) 100 mg tablet   No No   Sig: Take 1 tablet (100 mg total) by mouth daily   amLODIPine (NORVASC) 10 mg tablet   Yes No   Sig: Take 1 tablet by mouth daily   atorvastatin (LIPITOR) 40 mg tablet   Yes No   Sig: Take 40 mg by mouth   carvedilol (COREG) 6 25 mg tablet   Yes No   Sig: Take 6 25 mg by mouth 2 (two) times a day with meals   colchicine (COLCRYS) 0 6 mg tablet   Yes No   Sig: Take 0 6 mg by mouth daily   cyclobenzaprine (FLEXERIL) 5 mg tablet   Yes No   Sig: Take 5 mg by mouth 3 (three) times a day   docusate sodium (COLACE) 100 mg capsule   Yes No   Sig: TAKE BY MOUTH 1 CAPSULE IN THE MORNING AND 1 CAPSULE BEFORE BEDTIME  ferrous sulfate 325 (65 Fe) mg tablet   No No   Sig: Take 1 tablet (325 mg total) by mouth daily with breakfast Do not start before October 25, 2022     insulin glargine (LANTUS) 100 units/mL subcutaneous injection   No No   Sig: Inject 5 Units under the skin daily at bedtime   insulin lispro (HumaLOG) 100 units/mL injection   No No   Sig: Inject 2-12 Units under the skin 3 (three) times a day before meals   lidocaine (LIDODERM) 5 %   No No   Sig: Apply 1 patch topically daily Remove & Discard patch within 12 hours or as directed by MD   oxyCODONE (OXY-IR) 5 MG capsule   Yes No   Sig: Take 5 mg by mouth every 4 (four) hours as needed for moderate pain or severe pain   polyethylene glycol (MIRALAX) 17 g packet   Yes No   Sig: Take 17 g by mouth 2 (two) times a day   pregabalin (LYRICA) 150 mg capsule   Yes No   Sig: Take 150 mg by mouth 2 (two) times a day   pregabalin (LYRICA) 75 mg capsule   Yes No   Sig: Take 75 mg by mouth 2 (two) times a day   senna-docusate sodium (SENOKOT S) 8 6-50 mg per tablet   Yes No   Sig: Take 1 tablet by mouth 2 (two) times a day   sevelamer carbonate (RENVELA) 800 mg tablet   Yes No   Sig: Take 800 mg by mouth in the morning   sodium bicarbonate 650 mg tablet   Yes No   Sig: Take 650 mg by mouth 2 (two) times a day   tamsulosin (FLOMAX) 0 4 mg   Yes No   Sig: Take by mouth   thiamine 100 MG tablet   No No   Sig: Take 1 tablet (100 mg total) by mouth daily   venlafaxine (EFFEXOR-XR) 37 5 mg 24 hr capsule   Yes No   Sig: Take 37 5 mg by mouth 3 (three) times a day      Facility-Administered Medications: None       Past Medical History:   Diagnosis Date   • Chronic kidney disease    • Diabetes mellitus ([facility] )    • Gout    • Hypertension    • Renal disorder    • Retroperitoneal abscess (HCC)    • Stroke [facility])    • UTI (urinary tract infection)    • Vertebral osteomyelitis (HCC)        Past Surgical History:   Procedure Laterality Date   • BACK SURGERY     • ORCHIECTOMY         Family History   Problem Relation Age of Onset   • Hypertension Father      I have reviewed and agree with the history as documented  E-Cigarette/Vaping   • E-Cigarette Use Never User      E-Cigarette/Vaping Substances     Social History     Tobacco Use   • Smoking status: Every Day     Packs/day: 0 25     Types: Cigarettes   • Smokeless tobacco: Never   Vaping Use   • Vaping Use: Never used   Substance Use Topics   • Alcohol use: Not Currently   • Drug use: Yes     Types: Marijuana       Review of Systems   Unable to perform ROS: Mental status change   All other systems reviewed and are negative  Physical Exam  Physical Exam  Vitals and nursing note reviewed  Constitutional:       General: He is not in acute distress  Appearance: Normal appearance  He is ill-appearing  He is not toxic-appearing or diaphoretic  HENT:      Head: Normocephalic  Nose: Nose normal       Mouth/Throat:      Mouth: Mucous membranes are moist       Pharynx: Oropharynx is clear  No oropharyngeal exudate or posterior oropharyngeal erythema  Eyes:      Extraocular Movements: Extraocular movements intact  Conjunctiva/sclera: Conjunctivae normal       Pupils: Pupils are equal, round, and reactive to light  Cardiovascular:      Rate and Rhythm: Normal rate and regular rhythm  Pulmonary:      Effort: Pulmonary effort is normal  No respiratory distress  Breath sounds: Normal breath sounds  No stridor  No wheezing, rhonchi or rales  Chest:      Chest wall: No tenderness  Abdominal:      General: Abdomen is flat  Bowel sounds are normal  There is no distension  Palpations: Abdomen is soft  Tenderness: There is no abdominal tenderness  There is no guarding  Musculoskeletal:         General: Normal range of motion  Cervical back: Normal range of motion  Skin:     General: Skin is warm and dry  Findings: No rash  Neurological:      Mental Status: He is alert  He is disoriented and confused  GCS: GCS eye subscore is 4  GCS verbal subscore is 4  GCS motor subscore is 6  Cranial Nerves: No facial asymmetry  Motor: Tremor present           Vital Signs  ED Triage Vitals   Temperature Pulse Respirations Blood Pressure SpO2   03/02/23 1426 03/02/23 1426 03/02/23 1426 03/02/23 1426 03/02/23 1426   98 6 °F (37 °C) 93 16 122/92 95 %      Temp Source Heart Rate Source Patient Position - Orthostatic VS BP Location FiO2 (%)   03/02/23 1426 03/02/23 1426 03/02/23 1426 03/02/23 1426 --   Oral Monitor Lying Left arm       Pain Score       03/02/23 1830       No Pain           Vitals:    03/02/23 1530 03/02/23 1715 03/02/23 1830 03/02/23 2000   BP: 138/66 (!) 178/74 134/78 146/67   Pulse: 89 92 86 92   Patient Position - Orthostatic VS: Sitting  Lying Lying         Visual Acuity  Visual Acuity    Flowsheet Row Most Recent Value   L Pupil Size (mm) 3   R Pupil Size (mm) 3          ED Medications  Medications   sodium chloride 0 9 % bolus 1,000 mL (1,000 mL Intravenous New Bag 3/2/23 1548)       Diagnostic Studies  Results Reviewed     Procedure Component Value Units Date/Time    Procalcitonin [[id number]]     Lab Status: No result Specimen: Blood     HS Troponin I 2hr [[id number]]  (Normal) Collected: 03/02/23 1715    Lab Status: Final result Specimen: Blood from Arm, Left Updated: 03/02/23 1822     hs TnI 2hr 3 ng/L      Delta 2hr hsTnI -1 ng/L Ammonia [[id number]]  (Normal) Collected: 03/02/23 1715    Lab Status: Final result Specimen: Blood from Arm, Left Updated: 03/02/23 1802     Ammonia 30 umol/L     Blood gas, venous [[id number]]  (Abnormal) Collected: 03/02/23 1715    Lab Status: Final result Specimen: Blood from Arm, Left Updated: 03/02/23 1726     pH, Gen 7 443     pCO2, Gen 36 9 mm Hg      pO2, Gen 38 9 mm Hg      HCO3, Gen 24 7 mmol/L      Base Excess, Gen 0 7 mmol/L      O2 Content, Gen 10 7 ml/dL      O2 HGB, VENOUS 72 4 %     Urine Microscopic [[id number]]  (Normal) Collected: 03/02/23 1458    Lab Status: Final result Specimen: Urine, Indwelling Ortiz Catheter Updated: 03/02/23 1601     RBC, UA 0-1 /hpf      WBC, UA 2-4 /hpf      Epithelial Cells Occasional /hpf      Bacteria, UA Occasional /hpf     FLU/RSV/COVID - if FLU/RSV clinically relevant [[id number]]  (Normal) Collected: 03/02/23 1454    Lab Status: Final result Specimen: Nares from Nose Updated: 03/02/23 1554     SARS-CoV-2 Negative     INFLUENZA A PCR Negative     INFLUENZA B PCR Negative     RSV PCR Negative    Narrative:      FOR PEDIATRIC PATIENTS - copy/paste COVID Guidelines URL to browser: [email or web address]    SARS-CoV-2 assay is a Nucleic Acid Amplification assay intended for the  qualitative detection of nucleic acid from SARS-CoV-2 in nasopharyngeal  swabs  Results are for the presumptive identification of SARS-CoV-2 RNA  Positive results are indicative of infection with SARS-CoV-2, the virus  causing COVID-19, but do not rule out bacterial infection or co-infection  with other viruses  Laboratories within the [place] and its  territories are required to report all positive results to the appropriate  public health authorities  Negative results do not preclude SARS-CoV-2  infection and should not be used as the sole basis for treatment or other  patient management decisions   Negative results must be combined with  clinical observations, patient history, and epidemiological information  This test has not been FDA cleared or approved  This test has been authorized by FDA under an Emergency Use Authorization  (EUA)  This test is only authorized for the duration of time the  declaration that circumstances exist justifying the authorization of the  emergency use of an in vitro diagnostic tests for detection of SARS-CoV-2  virus and/or diagnosis of COVID-19 infection under section 564(b)(1) of  the Act, 21 U  S C  980FJZ-7(L)(4), unless the authorization is terminated  or revoked sooner  The test has been validated but independent review by FDA  and CLIA is pending  Test performed using [employer] GeneXpert: This RT-PCR assay targets N2,  a region unique to SARS-CoV-2  A conserved region in the E-gene was chosen  for pan-Sarbecovirus detection which includes SARS-CoV-2  According to CMS-2020-01-R, this platform meets the definition of high-throughput technology  HS Troponin 0hr (reflex protocol) [[id number]]  (Normal) Collected: 03/02/23 1452    Lab Status: Final result Specimen: Blood from Arm, Left Updated: 03/02/23 1536     hs TnI 0hr 4 ng/L     Blood culture #1 [[id number]] Collected: 03/02/23 1526    Lab Status:  In process Specimen: Blood from Arm, Right Updated: 03/02/23 1533    Comprehensive metabolic panel [[id number]]  (Abnormal) Collected: 03/02/23 1452    Lab Status: Final result Specimen: Blood from Arm, Left Updated: 03/02/23 1529     Sodium 141 mmol/L      Potassium 4 7 mmol/L      Chloride 105 mmol/L      CO2 30 mmol/L      ANION GAP 6 mmol/L      BUN 42 mg/dL      Creatinine 3 37 mg/dL      Glucose 107 mg/dL      Calcium 10 7 mg/dL      AST 8 U/L      ALT <3 U/L      Alkaline Phosphatase 77 U/L      Total Protein 6 5 g/dL      Albumin 3 8 g/dL      Total Bilirubin 0 48 mg/dL      eGFR 19 ml/min/1 73sq m     Narrative:      [place] guidelines for Chronic Kidney Disease (CKD): •  Stage 1 with normal or high GFR (GFR > 90 mL/min/1 73 square meters)  •  Stage 2 Mild CKD (GFR = 60-89 mL/min/1 73 square meters)  •  Stage 3A Moderate CKD (GFR = 45-59 mL/min/1 73 square meters)  •  Stage 3B Moderate CKD (GFR = 30-44 mL/min/1 73 square meters)  •  Stage 4 Severe CKD (GFR = 15-29 mL/min/1 73 square meters)  •  Stage 5 End Stage CKD (GFR <15 mL/min/1 73 square meters)  Note: GFR calculation is accurate only with a steady state creatinine    Lactic acid [[id number]]  (Normal) Collected: 03/02/23 1452    Lab Status: Final result Specimen: Blood from Arm, Left Updated: 03/02/23 1529     LACTIC ACID 0 8 mmol/L     Narrative:      Result may be elevated if tourniquet was used during collection      UA w Reflex to Microscopic w Reflex to Culture [[id number]]  (Abnormal) Collected: 03/02/23 1458    Lab Status: Final result Specimen: Urine, Indwelling Ortiz Catheter Updated: 03/02/23 1522     Color, UA Yellow     Clarity, UA Slightly Cloudy     Specific Gravity, UA 1 015     pH, UA 7 5     Leukocytes, UA Small     Nitrite, UA Negative     Protein,  (2+) mg/dl      Glucose, UA Negative mg/dl      Ketones, UA Negative mg/dl      Urobilinogen, UA 0 2 E U /dl      Bilirubin, UA Negative     Occult Blood, UA Small    CBC and differential [[id number]]  (Abnormal) Collected: 03/02/23 1452    Lab Status: Final result Specimen: Blood from Arm, Left Updated: 03/02/23 1516     WBC 14 51 Thousand/uL      RBC 3 44 Million/uL      Hemoglobin 10 0 g/dL      Hematocrit 31 7 %      MCV 92 fL      MCH 29 1 pg      MCHC 31 5 g/dL      RDW 17 2 %      MPV 12 4 fL      Platelets 195 Thousands/uL      nRBC 0 /100 WBCs      Neutrophils Relative 84 %      Immat GRANS % 1 %      Lymphocytes Relative 6 %      Monocytes Relative 9 %      Eosinophils Relative 0 %      Basophils Relative 0 %      Neutrophils Absolute 12 14 Thousands/µL      Immature Grans Absolute 0 08 Thousand/uL      Lymphocytes Absolute 0 91 Thousands/µL Monocytes Absolute 1 30 Thousand/µL      Eosinophils Absolute 0 04 Thousand/µL      Basophils Absolute 0 04 Thousands/µL     Blood culture #2 [[id number]] Collected: 03/02/23 1452    Lab Status: In process Specimen: Blood from Arm, Left Updated: 03/02/23 1512                 CT chest abdomen pelvis wo contrast   Final Result by Max George MD (03/02 1924)   Consolidation seen at the left lung base in the region of the previously noted parenchymal abnormality may be due to scarring or due to nonresolved consolidation, follow-up chest CT at 3 months suggested      No intra-abdominal fluid collection seen      No bowel obstruction      No obstructing calculus      Mild prominence of the left pelvicalyceal system and mild dilation of the left ureter without any obstructing calculus      The study was marked in EPIC for significant notification  Workstation performed: [id number]         CT head without contrast   Final Result by Vera Middleton MD (03/02 1642)      No acute intracranial abnormality  Workstation performed: [id number]         XR foot 2 views LEFT    (Results Pending)              Procedures  ECG 12 Lead Documentation Only    Date/Time: 3/2/2023 3:29 PM  Performed by: Holger Rand PA-C  Authorized by: Holger Rand PA-C     Patient location:  ED  Interpretation:     Interpretation: non-specific    Rate:     ECG rate:  91    ECG rate assessment: normal    Rhythm:     Rhythm: sinus rhythm    Ectopy:     Ectopy: none    QRS:     QRS axis:  Left    QRS intervals:  Normal  Conduction:     Conduction: normal               ED Course  ED Course as of 03/02/23 2014   Thu Mar 02, 2023   1520 WBC(!): 14 51   1544 Creatinine(!): 3 37  Elevated from previous   1650 Ct head:   No acute intracranial abnormality     9416 [id number] Discussed with medicine who requested CT chest, abd, pelvis   1822 Delta 2hr hsTnI: -1   1927 CT: Consolidation seen at the left lung base in the region of the previously noted parenchymal abnormality may be due to scarring or due to nonresolved consolidation, follow-up chest CT at 3 months suggested     No intra-abdominal fluid collection seen     No bowel obstruction     No obstructing calculus     Mild prominence of the left pelvicalyceal system and mild dilation of the left ureter without any obstructing calculus   1954 Discussed with medicine, accepted for admission                SBIRT 22yo+    Flowsheet Row Most Recent Value   SBIRT (25 yo +)    In order to provide better care to our patients, we are screening all of our patients for alcohol and drug use  Would it be okay to ask you these screening questions? Unable to answer at this time Filed at: 03/02/2023 1538                    Medical Decision Making  63 yo male presented for evaluation of altered mental status  Vitals and medical record reviewed  Differential diagnosis included but not limited to UTI, cephalopathy, hyponatremia  Exam patient is chronically ill-appearing  Alert to person and place, confused on year  Found to have leukocytosis and KELLY  UA not particularly suspicious for UTI  Electrolytes relatively unremarkable  Normal lactic acid  CT head, chest abdomen pelvis negative for acute abnormality, incidental finding of consolidation in the lung base was previously seen before noted  Discussed with medicine who accepted the patient for admission  KELLY (acute kidney injury) [facility]): acute illness or injury  AMS (altered mental status): acute illness or injury  Amount and/or Complexity of Data Reviewed  Independent Historian: spouse  Labs: ordered  Decision-making details documented in ED Course  Radiology: ordered  ECG/medicine tests: ordered  Discussion of management or test interpretation with external provider(s): [place] for admission    Risk  Decision regarding hospitalization            Disposition  Final diagnoses:   AMS (altered mental status)   KELLY (acute kidney injury) ([facility] [address] )     Time reflects when diagnosis was documented in both MDM as applicable and the Disposition within this note     Time User Action Codes Description Comment    3/2/2023  7:55 PM Akanksha Hercules [[id number]] AMS (altered mental status)     3/2/2023  8:09 PM Carlita Lopez [N17 [id number]] KELLY (acute kidney injury) [facility])       ED Disposition     ED Disposition   Admit    Condition   Stable    Date/Time   Thu Mar 2, 2023  7:55 PM    Comment   Case was discussed with Dr Lissa Garnica and the patient's admission status was agreed to be Admission Status: inpatient status to the service of Dr Lissa Garnica             Follow-up Information    None         Patient's Medications   Discharge Prescriptions    No medications on file       No discharge procedures on file      PDMP Review       Value Time User    PDMP Reviewed  Yes 12/25/2022  3:23 PM Blas Song          ED Provider  Electronically Signed by           Christoph Santana PA-C  03/02/23 2014

## 2023-03-03 ENCOUNTER — APPOINTMENT (INPATIENT)
Dept: MRI IMAGING | Facility: HOSPITAL | Age: 58
End: 2023-03-03

## 2023-03-03 PROBLEM — N18.9 CKD (CHRONIC KIDNEY DISEASE): Status: ACTIVE | Noted: 2023-03-03

## 2023-03-03 PROBLEM — A41.9 SEPSIS (HCC): Status: ACTIVE | Noted: 2023-03-03

## 2023-03-03 PROBLEM — E11.621 DIABETIC FOOT ULCER (HCC): Status: ACTIVE | Noted: 2023-03-03

## 2023-03-03 PROBLEM — L97.509 DIABETIC FOOT ULCER (HCC): Status: ACTIVE | Noted: 2023-03-03

## 2023-03-03 LAB
ANION GAP SERPL CALCULATED.3IONS-SCNC: 6 MMOL/L (ref 4–13)
BASOPHILS # BLD AUTO: 0.06 THOUSANDS/ÂΜL (ref 0–0.1)
BASOPHILS NFR BLD AUTO: 0 % (ref 0–1)
BUN SERPL-MCNC: 43 MG/DL (ref 5–25)
CALCIUM SERPL-MCNC: 10.2 MG/DL (ref 8.4–10.2)
CHLORIDE SERPL-SCNC: 108 MMOL/L (ref 96–108)
CO2 SERPL-SCNC: 27 MMOL/L (ref 21–32)
CREAT SERPL-MCNC: 3.36 MG/DL (ref 0.6–1.3)
EOSINOPHIL # BLD AUTO: 0.06 THOUSAND/ÂΜL (ref 0–0.61)
EOSINOPHIL NFR BLD AUTO: 0 % (ref 0–6)
ERYTHROCYTE [DISTWIDTH] IN BLOOD BY AUTOMATED COUNT: 17.2 % (ref 11.6–15.1)
EST. AVERAGE GLUCOSE BLD GHB EST-MCNC: 134 MG/DL
GFR SERPL CREATININE-BSD FRML MDRD: 19 ML/MIN/1.73SQ M
GLUCOSE SERPL-MCNC: 104 MG/DL (ref 65–140)
GLUCOSE SERPL-MCNC: 195 MG/DL (ref 65–140)
GLUCOSE SERPL-MCNC: 94 MG/DL (ref 65–140)
GLUCOSE SERPL-MCNC: 95 MG/DL (ref 65–140)
GLUCOSE SERPL-MCNC: 96 MG/DL (ref 65–140)
HBA1C MFR BLD: 6.3 %
HCT VFR BLD AUTO: 28.3 % (ref 36.5–49.3)
HGB BLD-MCNC: 9 G/DL (ref 12–17)
IMM GRANULOCYTES # BLD AUTO: 0.09 THOUSAND/UL (ref 0–0.2)
IMM GRANULOCYTES NFR BLD AUTO: 1 % (ref 0–2)
LYMPHOCYTES # BLD AUTO: 1.26 THOUSANDS/ÂΜL (ref 0.6–4.47)
LYMPHOCYTES NFR BLD AUTO: 9 % (ref 14–44)
MAGNESIUM SERPL-MCNC: 1.8 MG/DL (ref 1.9–2.7)
MCH RBC QN AUTO: 29.6 PG (ref 26.8–34.3)
MCHC RBC AUTO-ENTMCNC: 31.8 G/DL (ref 31.4–37.4)
MCV RBC AUTO: 93 FL (ref 82–98)
MONOCYTES # BLD AUTO: 1.6 THOUSAND/ÂΜL (ref 0.17–1.22)
MONOCYTES NFR BLD AUTO: 12 % (ref 4–12)
NEUTROPHILS # BLD AUTO: 10.49 THOUSANDS/ÂΜL (ref 1.85–7.62)
NEUTS SEG NFR BLD AUTO: 78 % (ref 43–75)
NRBC BLD AUTO-RTO: 0 /100 WBCS
PHOSPHATE SERPL-MCNC: 5.1 MG/DL (ref 2.7–4.5)
PLATELET # BLD AUTO: 130 THOUSANDS/UL (ref 149–390)
PMV BLD AUTO: 11.8 FL (ref 8.9–12.7)
POTASSIUM SERPL-SCNC: 4.4 MMOL/L (ref 3.5–5.3)
RBC # BLD AUTO: 3.04 MILLION/UL (ref 3.88–5.62)
SODIUM SERPL-SCNC: 141 MMOL/L (ref 135–147)
VANCOMYCIN SERPL-MCNC: 9.9 UG/ML (ref 10–20)
WBC # BLD AUTO: 13.56 THOUSAND/UL (ref 4.31–10.16)

## 2023-03-03 RX ORDER — VANCOMYCIN HYDROCHLORIDE 1 G/200ML
15 INJECTION, SOLUTION INTRAVENOUS ONCE
Status: COMPLETED | OUTPATIENT
Start: 2023-03-03 | End: 2023-03-03

## 2023-03-03 RX ORDER — HYDROMORPHONE HCL/PF 1 MG/ML
1 SYRINGE (ML) INJECTION EVERY 6 HOURS PRN
Status: DISCONTINUED | OUTPATIENT
Start: 2023-03-03 | End: 2023-03-09

## 2023-03-03 RX ORDER — LORAZEPAM 2 MG/ML
1 INJECTION INTRAMUSCULAR ONCE
Status: DISCONTINUED | OUTPATIENT
Start: 2023-03-03 | End: 2023-03-09

## 2023-03-03 RX ORDER — MAGNESIUM SULFATE HEPTAHYDRATE 40 MG/ML
2 INJECTION, SOLUTION INTRAVENOUS ONCE
Status: COMPLETED | OUTPATIENT
Start: 2023-03-03 | End: 2023-03-03

## 2023-03-03 RX ORDER — OXYCODONE HYDROCHLORIDE 5 MG/1
5 TABLET ORAL EVERY 4 HOURS PRN
Status: DISCONTINUED | OUTPATIENT
Start: 2023-03-03 | End: 2023-03-09

## 2023-03-03 RX ORDER — VANCOMYCIN HYDROCHLORIDE 1 G/200ML
15 INJECTION, SOLUTION INTRAVENOUS ONCE AS NEEDED
Status: DISCONTINUED | OUTPATIENT
Start: 2023-03-04 | End: 2023-03-04

## 2023-03-03 RX ADMIN — DOCUSATE SODIUM AND SENNOSIDES 1 TABLET: 8.6; 5 TABLET, FILM COATED ORAL at 17:58

## 2023-03-03 RX ADMIN — ALLOPURINOL 100 MG: 100 TABLET ORAL at 09:19

## 2023-03-03 RX ADMIN — POLYETHYLENE GLYCOL 3350 17 G: 17 POWDER, FOR SOLUTION ORAL at 17:58

## 2023-03-03 RX ADMIN — HYDROMORPHONE HYDROCHLORIDE 1 MG: 1 INJECTION, SOLUTION INTRAMUSCULAR; INTRAVENOUS; SUBCUTANEOUS at 14:55

## 2023-03-03 RX ADMIN — DOCUSATE SODIUM 100 MG: 100 CAPSULE ORAL at 17:58

## 2023-03-03 RX ADMIN — CARVEDILOL 6.25 MG: 6.25 TABLET, FILM COATED ORAL at 17:58

## 2023-03-03 RX ADMIN — HEPARIN SODIUM 5000 UNITS: 5000 INJECTION INTRAVENOUS; SUBCUTANEOUS at 14:24

## 2023-03-03 RX ADMIN — DOCUSATE SODIUM 100 MG: 100 CAPSULE ORAL at 09:19

## 2023-03-03 RX ADMIN — CYCLOBENZAPRINE 5 MG: 10 TABLET, FILM COATED ORAL at 17:58

## 2023-03-03 RX ADMIN — AMLODIPINE BESYLATE 10 MG: 10 TABLET ORAL at 09:19

## 2023-03-03 RX ADMIN — ATORVASTATIN CALCIUM 40 MG: 40 TABLET, FILM COATED ORAL at 17:58

## 2023-03-03 RX ADMIN — SEVELAMER HYDROCHLORIDE 800 MG: 800 TABLET ORAL at 12:40

## 2023-03-03 RX ADMIN — VENLAFAXINE HYDROCHLORIDE 37.5 MG: 37.5 CAPSULE, EXTENDED RELEASE ORAL at 22:12

## 2023-03-03 RX ADMIN — OXYCODONE HYDROCHLORIDE 5 MG: 5 TABLET ORAL at 17:59

## 2023-03-03 RX ADMIN — BUPRENORPHINE HYDROCHLORIDE 900 MCG: 900 FILM, SOLUBLE BUCCAL at 21:16

## 2023-03-03 RX ADMIN — VENLAFAXINE HYDROCHLORIDE 37.5 MG: 37.5 CAPSULE, EXTENDED RELEASE ORAL at 17:57

## 2023-03-03 RX ADMIN — CARVEDILOL 6.25 MG: 6.25 TABLET, FILM COATED ORAL at 09:20

## 2023-03-03 RX ADMIN — FERROUS SULFATE TAB 325 MG (65 MG ELEMENTAL FE) 325 MG: 325 (65 FE) TAB at 09:20

## 2023-03-03 RX ADMIN — MEROPENEM 1000 MG: 1 INJECTION, POWDER, FOR SOLUTION INTRAVENOUS at 14:25

## 2023-03-03 RX ADMIN — MAGNESIUM SULFATE HEPTAHYDRATE 2 G: 40 INJECTION, SOLUTION INTRAVENOUS at 14:27

## 2023-03-03 RX ADMIN — OXYCODONE HYDROCHLORIDE 5 MG: 5 TABLET ORAL at 06:15

## 2023-03-03 RX ADMIN — VANCOMYCIN HYDROCHLORIDE 750 MG: 750 INJECTION, SOLUTION INTRAVENOUS at 00:54

## 2023-03-03 RX ADMIN — CYCLOBENZAPRINE 5 MG: 10 TABLET, FILM COATED ORAL at 22:12

## 2023-03-03 RX ADMIN — INSULIN LISPRO 2 UNITS: 100 INJECTION, SOLUTION INTRAVENOUS; SUBCUTANEOUS at 21:17

## 2023-03-03 RX ADMIN — HEPARIN SODIUM 5000 UNITS: 5000 INJECTION INTRAVENOUS; SUBCUTANEOUS at 06:15

## 2023-03-03 RX ADMIN — THIAMINE HCL TAB 100 MG 100 MG: 100 TAB at 09:19

## 2023-03-03 RX ADMIN — TAMSULOSIN HYDROCHLORIDE 0.4 MG: 0.4 CAPSULE ORAL at 17:59

## 2023-03-03 RX ADMIN — VENLAFAXINE HYDROCHLORIDE 37.5 MG: 37.5 CAPSULE, EXTENDED RELEASE ORAL at 09:19

## 2023-03-03 RX ADMIN — HEPARIN SODIUM 5000 UNITS: 5000 INJECTION INTRAVENOUS; SUBCUTANEOUS at 21:16

## 2023-03-03 RX ADMIN — PREGABALIN 150 MG: 75 CAPSULE ORAL at 17:57

## 2023-03-03 RX ADMIN — SEVELAMER HYDROCHLORIDE 800 MG: 800 TABLET ORAL at 17:58

## 2023-03-03 RX ADMIN — INSULIN GLARGINE 5 UNITS: 100 INJECTION, SOLUTION SUBCUTANEOUS at 21:15

## 2023-03-03 RX ADMIN — VANCOMYCIN HYDROCHLORIDE 1000 MG: 1 INJECTION, SOLUTION INTRAVENOUS at 20:11

## 2023-03-03 RX ADMIN — PREGABALIN 150 MG: 75 CAPSULE ORAL at 09:19

## 2023-03-03 RX ADMIN — POLYETHYLENE GLYCOL 3350 17 G: 17 POWDER, FOR SOLUTION ORAL at 09:18

## 2023-03-03 RX ADMIN — BUPRENORPHINE HYDROCHLORIDE 900 MCG: 900 FILM, SOLUBLE BUCCAL at 09:24

## 2023-03-03 NOTE — CASE MANAGEMENT
Case Management Assessment & Discharge Planning Note    Patient name Nadine Alves  Location /-17 MRN [id number]  : 1965 Date 3/3/2023       Current Admission Date: 3/2/2023  Current Admission Diagnosis:Acute metabolic encephalopathy   Patient Active Problem List    Diagnosis Date Noted   • Sepsis ([facility] ) 2023   • Diabetic foot ulcer ([facility] [address] ) 2023   • CKD (chronic kidney disease) 2023   • Acute metabolic encephalopathy    • PAD (peripheral artery disease) ([facility] [address] ) 2022   • Chronic back pain 2022   • Moderate protein-calorie malnutrition ([facility] [facility] ) 2022   • Acute on chronic anemia 2022   • RSV infection 2022   • Chronic ulcer of left heel ([facility] [facility] ) 10/20/2022   • Hypercalcemia 2022   • Low back pain 2022   • Ambulatory dysfunction 2022   • Stage 3a chronic kidney disease ([facility] [address] ) 2022   • Retention of urine 2022   • Severe [facility]cal[facility] 2022   • Iron deficiency anemia secondary to inadequate dietary iron intake 2022   • Hip pain, acute, left 2022   • Hyperkalemia 2022   • Diabetes mellitus type 2, insulin dependent ([facility] )    • Gout    • Essential hypertension    • History of CVA (cerebrovascular accident)    • Osteomyelitis of vertebra of lumbar region ([facility] [address] )       LOS (days): 1  Geometric Mean LOS (GMLOS) (days): 5 00  Days to GMLOS:4 3     OBJECTIVE:    Risk of Unplanned Readmission Score: 38 08     CM called pt's wife with no answer, then called and spoke with pt's daughter  Baseline information  was obtained  CM discussed the role of CM in helping the patient develop a discharge plan and assist the patient in carrying out their plan       Current admission status: Inpatient       Preferred Pharmacy:   [address] [place] - [address]  [address] [address]  Phone: [phone] Fax: [phone]    Primary Care Provider: Barbara Ibarra, DO    Primary Insurance: Lizbet Gudelias [facility] HOSPITAL REP  Secondary Insurance:     ASSESSMENT:  [address], [address] [place] Representative - Spouse   Primary Phone: [phone] (Home)                         Readmission Root Cause  30 Day Readmission: No    Patient Information  Admitted from[de-identified] Home  Mental Status: Confused  During Assessment patient was accompanied by: Not accompanied during assessment  Assessment information provided by[de-identified] Daughter (CM attempted calling spouse with no answer)  Support Systems: Spouse/significant other, Daughter  [place] of Residence: [address] [facility] do you live in?: [address] entry access options  Select all that apply :  (Stair chair)  Type of Current Residence: 3 story home  Upon entering residence, is there a bedroom on the main floor (no further steps)?: Yes  Upon entering residence, is there a bathroom on the main floor (no further steps)?: Yes  In the last 12 months, was there a time when you were not able to pay the mortgage or rent on time?: No  In the last 12 months, how many places have you lived?: 5  In the last 12 months, was there a time when you did not have a steady place to sleep or slept in a shelter (including now)?: No  Homeless/housing insecurity resource given?: N/A  Living Arrangements: Lives w/ Spouse/significant other  Is patient a ?: No    Activities of Daily Living Prior to Admission  Functional Status: Total dependent  Completes ADLs independently?: No  Level of ADL dependence: Total Dependent  Ambulates independently?: No  Level of ambulatory dependence: Total Dependent  Does patient use assisted devices?: Yes  Assisted Devices (DME) used:  Wheelchair, Hospital Bed, Stair Chair/Glide, Electric wheelchair  Does patient currently own DME?: Yes  What DME does the patient currently own?: Stair Chair/Glide, Electric Wheelchair, [address]  Does patient have a history of Outpatient Therapy (PT/OT)?: No  Does the patient have a history of Short-Term Rehab?: Yes  Does patient have a history of HHC?: Yes  Does patient currently have [address]?: Yes    Current Home Health Care  Type of Current Home Care Services: Nurse visit  [address][de-identified] Other (please enter name in comment) ([facility])  [address] Provider[de-identified] PCP    Patient Information Continued  Income Source: SSI/SSD  Does patient have prescription coverage?: Yes  Within the past 12 months, you worried that your food would run out before you got the money to buy more : Never true  Within the past 12 months, the food you bought just didn't last and you didn't have money to get more : Never true  Food insecurity resource given?: N/A  Does patient receive dialysis treatments?: No  Does patient have a history of substance abuse?: No  Does patient have a history of Mental Health Diagnosis?: No         Means of Transportation  Means of Transport to Appts[de-identified] Family transport  In the past 12 months, has lack of transportation kept you from medical appointments or from getting medications?: No  In the past 12 months, has lack of transportation kept you from meetings, work, or from getting things needed for daily living?: No        DISCHARGE DETAILS:    Discharge planning discussed with[de-identified] Pt's daughter  Freedom of Choice: Yes  Comments - Freedom of Choice: Pt's daughter stated he gets [address] from [facility]  CM contacted family/caregiver?: Yes  Were Treatment Team discharge recommendations reviewed with patient/caregiver?: Yes  Did patient/caregiver verbalize understanding of patient care needs?: Yes  Were patient/caregiver advised of the risks associated with not following Treatment Team discharge recommendations?: Yes    Contacts  Patient Contacts: Nandini Spencer (spouse)  Relationship to Patient[de-identified] Family  Contact Method: Phone  Phone Number: see face sheet  Reason/Outcome: Continuity of Care, Discharge Planning    Requested [facility] Is the patient interested in [address] at discharge?: Yes  [address] requested[de-identified] [address] Name[de-identified]  ([facility] [address] [facility])  [address] Provider[de-identified] PCP         Other Referral/Resources/Interventions Provided:  Interventions: [address]         Treatment Team Recommendation: Home with [address] [employer]  Discharge Destination Plan[de-identified] Home with [address] [employer]         CM discussed post discharge options  Pt is currently receiving SN [facility]  CM will follow up post PT/OT recommendations

## 2023-03-03 NOTE — PROGRESS NOTES
Bill Suero Sr  is a 62 y o  male who is currently ordered Vancomycin IV with management by the Pharmacy Consult service  Relevant clinical data and objective / subjective history reviewed  Vancomycin Assessment:  Indication and Goal AUC/Trough: Soft tissue (goal -600, trough >10), soft tissue (goal random level < or = to 15)  Clinical Status: new start   Micro:     Renal Function:  SCr: 3 37 mg/dL  CrCl: 22 3 mL/min  Renal replacement: Not on dialysis  Days of Therapy: 1  Current Dose: 750 mg IV once  Vancomycin Plan:  New Dosin 5 mg/kg (750 mg) IV once prn random level < or = to 15  Estimated AUC: N/A pulse dosing   Estimated Trough: N/A pulse dosing   Next Level: 3/3 at 1800  Renal Function Monitoring: Daily BMP and [place] will continue to follow closely for s/sx of nephrotoxicity, infusion reactions and appropriateness of therapy  BMP and CBC will be ordered per protocol  We will continue to follow the patient’s culture results and clinical progress daily      Gabriella Kang, Pharmacist

## 2023-03-03 NOTE — PROGRESS NOTES
[address]  Progress Note - Syliva Ahumada 1965, 62 y o  male MRN: [id number]  Unit/Bed#: -01 Encounter: [id number]  Primary Care Provider: Quita Ceballos DO   Date and time admitted to hospital: 3/2/2023  2:23 PM    CKD (chronic kidney disease)  Assessment & Plan  Lab Results   Component Value Date    EGFR 19 03/03/2023    EGFR 19 03/02/2023    EGFR 30 12/28/2022    CREATININE 3 36 (H) 03/03/2023    CREATININE 3 37 (H) 03/02/2023    CREATININE 2 30 (H) 12/28/2022   · History CKD  · Presents with KELLY on chronic kidney disease in the setting of sepsis  · Gentle hydration  · Trend creatinine  · Continue Ortiz catheter  · Renally dose medications    Diabetic foot ulcer ([facility] )  Assessment & Plan  · Presents with nonhealing left diabetic foot wound on left heel that is being followed by outpatient wound care, home care nurse and podiatry  · Meeting criteria for sepsis with elevated procalcitonin and boggy left heel wound covered with eschar that appears moisture underneath the eschar  · X-ray possible osteomyelitis changes  · MRI showing ulcer but no evidence of osteomyelitis  · Continue abx for now: Empiric vancomycin for history MRSA pneumonia, empiric meropenem as patient was recently treated for Pseudomonas urinary tract infection with Levaquin in the outpatient setting  · Podiatry recommendations as per note  · Vascular surgery as outpatient    Sepsis ([facility] )  Assessment & Plan  · Criteria with tachycardia, leukocytosis  · Urine negative  · Blood cultures pending  · CT chest abdomen pelvis no obvious infection  · Left heel suspected source  · Empiric antibiotic therapy  · And fever curve, leukocytosis  · Procalcitonin 0 3    PAD (peripheral artery disease) ([facility] )  Assessment & Plan  · History of PAD  · Diabetes mellitus  · Nonhealing left heel ulcer  · Arterial duplex 12/27 with diffuse disease but no significant focal stenosis  · Continue atorvastatin    Retention of urine  Assessment & Plan  · Chronic Ortiz due to neurogenic bladder  · No pyuria on urinalysis      Essential hypertension  Assessment & Plan  · Continue PTA amlodipine, carvedilol  · Trend blood pressures    Diabetes mellitus type 2, insulin dependent [facility])  Assessment & Plan  Lab Results   Component Value Date    HGBA1C 6 3 (H) 03/03/2023       Recent Labs     03/02/23  2204 03/03/23  0656 03/03/23  1125   POCGLU 98 94 95       Blood Sugar Average: Last 72 hrs:  (P) 59 [id number]     · Basal insulin 5 units at bedtime  · Sliding scale insulin with Accu-Cheks  · Hypoglycemia protocol  · Carbohydrate controlled diet    * Acute metabolic encephalopathy  Assessment & Plan  · POA from home with wife with acute onset hallucinations, confusion  Wife was concerned he had an underlying infection as this is his normal response  · Chronic indwelling Ortiz catheter without evidence of pyuria  · CT brain no acute abnormality  · Left heel ulcer questionable source of infection  · Empiric antibiotics  · IV hydration  · Monitor neuro status        VTE Pharmacologic Prophylaxis: VTE Score: 3 Moderate Risk (Score 3-4) - Pharmacological DVT Prophylaxis Ordered: heparin  Patient Centered Rounds: I performed bedside rounds with nursing staff today  Discussions with Specialists or Other Care Team Provider: podiatry    Education and Discussions with Family / Patient: will call family later  Total Time Spent on Date of Encounter in care of patient: 35 minutes This time was spent on one or more of the following: performing physical exam; counseling and coordination of care; obtaining or reviewing history; documenting in the medical record; reviewing/ordering tests, medications or procedures; communicating with other healthcare professionals and discussing with patient's family/caregivers      Current Length of Stay: 1 day(s)  Current Patient Status: Inpatient   Certification Statement: The patient will continue to require additional inpatient hospital stay due to acute metabolic encephalopathy  Discharge Plan: Anticipate discharge in 48-72 hrs to discharge location to be determined pending rehab evaluations  Code Status: Level 1 - Full Code    Subjective:   Patient seen and examined at bedside  Endorsing mild pain over bilateral feet  States it feels like neuropathy    Objective:     Vitals:   Temp (24hrs), Av 5 °F (36 9 °C), Min:98 4 °F (36 9 °C), Max:98 6 °F (37 °C)    Temp:  [98 4 °F (36 9 °C)-98 6 °F (37 °C)] 98 6 °F (37 °C)  HR:  [76-93] 76  Resp:  [16-20] 18  BP: (106-178)/(66-92) 141/73  SpO2:  [93 %-98 %] 95 %  Body mass index is 19 74 kg/m²  Input and Output Summary (last 24 hours): Intake/Output Summary (Last 24 hours) at 3/3/2023 1407  Last data filed at 3/3/2023 1326  Gross per 24 hour   Intake 240 ml   Output 1125 ml   Net -885 ml       Physical Exam:   Physical Exam  Vitals and nursing note reviewed  Constitutional:       General: He is not in acute distress  Appearance: He is well-developed  HENT:      Head: Normocephalic and atraumatic  Eyes:      Conjunctiva/sclera: Conjunctivae normal    Cardiovascular:      Rate and Rhythm: Normal rate and regular rhythm  Heart sounds: No murmur heard  Pulmonary:      Effort: Pulmonary effort is normal  No respiratory distress  Breath sounds: Normal breath sounds  Abdominal:      Palpations: Abdomen is soft  Tenderness: There is no abdominal tenderness  Musculoskeletal:         General: No swelling  Cervical back: Neck supple  Skin:     General: Skin is warm and dry  Capillary Refill: Capillary refill takes less than 2 seconds  Neurological:      Mental Status: He is alert     Psychiatric:         Mood and Affect: Mood normal             Additional Data:     Labs:  Results from last 7 days   Lab Units 23  0458   WBC Thousand/uL 13 56*   HEMOGLOBIN g/dL 9 0*   HEMATOCRIT % 28 3*   PLATELETS Thousands/uL 130*   NEUTROS PCT % 78*   LYMPHS PCT % 9*   MONOS PCT % 12   EOS PCT % 0     Results from last 7 days   Lab Units 03/03/23  0458 03/02/23  1452   SODIUM mmol/L 141 141   POTASSIUM mmol/L 4 4 4 7   CHLORIDE mmol/L 108 105   CO2 mmol/L 27 30   BUN mg/dL 43* 42*   CREATININE mg/dL 3 36* 3 37*   ANION GAP mmol/L 6 6   CALCIUM mg/dL 10 2 10 7*   ALBUMIN g/dL  --  3 8   TOTAL BILIRUBIN mg/dL  --  0 48   ALK PHOS U/L  --  77   ALT U/L  --  <3*   AST U/L  --  8*   GLUCOSE RANDOM mg/dL 96 107         Results from last 7 days   Lab Units 03/03/23  1125 03/03/23  0656 03/02/23  2204   POC GLUCOSE mg/dl 95 94 98     Results from last 7 days   Lab Units 03/03/23  0458   HEMOGLOBIN A1C % 6 3*     Results from last 7 days   Lab Units 03/02/23  1452   LACTIC ACID mmol/L 0 8   PROCALCITONIN ng/ml 0 30*       Lines/Drains:  Invasive Devices     Drain  Duration           Urethral Catheter Coude 16 Fr  69 days              Urinary Catheter:  Goal for removal: N/A - Chronic Ortiz               Imaging: Reviewed radiology reports from this admission including: MRI, xrays    Recent Cultures (last 7 days):   Results from last 7 days   Lab Units 03/02/23  1526 03/02/23  1452   BLOOD CULTURE  Received in Microbiology Lab  Culture in Progress  Received in Microbiology Lab  Culture in Progress         Last 24 Hours Medication List:   Current Facility-Administered Medications   Medication Dose Route Frequency Provider Last Rate   • acetaminophen  650 mg Oral Q4H PRN Thao S June, CRNP     • allopurinol  100 mg Oral Daily Thao S June, CRNP     • amLODIPine  10 mg Oral Daily Thao S June, CRNP     • atorvastatin  40 mg Oral Daily With Dinner Thao S June, CRNP     • Buprenorphine HCl  900 mcg Buccal BID Thao S June, CRNP     • carvedilol  6 25 mg Oral BID With Meals Thao S June, CRNP     • cyclobenzaprine  5 mg Oral TID Thao S June, CRNP     • docusate sodium  100 mg Oral BID Thao S June, CRNP     • ferrous sulfate  325 mg Oral Daily With Breakfast Thao S June, CRNP     • heparin (porcine)  5,000 Units Subcutaneous Q8H [address] Thao S June, CRNP     • HYDROmorphone  1 mg Intravenous Q6H PRN Reji Marion MD     • insulin glargine  5 Units Subcutaneous HS Thao S June, CRNP     • insulin lispro  1-6 Units Subcutaneous TID AC Thao S June, CRNP     • insulin lispro  1-6 Units Subcutaneous HS Thao S June, CRNP     • LORazepam  1 mg Intravenous Once Reji Marion MD     • magnesium sulfate  2 g Intravenous Once Reji Marion MD     • meropenem  1,000 mg Intravenous Q12H Thao S June, CRNP 1,000 mg (03/02/23 0565)   • oxyCODONE  5 mg Oral Q4H PRN Reji Marion MD     • polyethylene glycol  17 g Oral BID Thao S June, CRNP     • pregabalin  150 mg Oral BID Thao S June, CRNP     • senna-docusate sodium  1 tablet Oral BID Thao S June, CRNP     • sevelamer  800 mg Oral TID With Meals Thao S June, CRNP     • sodium bicarbonate  650 mg Oral Every Other Day Thao S June, CRNP     • tamsulosin  0 4 mg Oral Daily With Dinner Thao S June, CRNP     • thiamine  100 mg Oral Daily Thao S June, CRNP     • vancomycin  12 5 mg/kg Intravenous Once PRN Pauly Hoffman MD     • venlafaxine  37 5 mg Oral TID Thao S June, CRNP          Today, Patient Was Seen By: Marlo Jacobs MD    **Please Note: This note may have been constructed using a voice recognition system  **

## 2023-03-03 NOTE — ASSESSMENT & PLAN NOTE
· Presents with nonhealing left diabetic foot wound on left heel that is being followed by outpatient wound care, home care nurse and podiatry  · Meeting criteria for sepsis with elevated procalcitonin and boggy left heel wound covered with eschar that appears moisture underneath the eschar  · X-ray possible osteomyelitis changes  · We will obtain MRI left heel  · Empiric vancomycin for history MRSA pneumonia, empiric meropenem as patient was recently treated for Pseudomonas urinary tract infection with Levaquin in the outpatient setting

## 2023-03-03 NOTE — QUICK NOTE
Updated wife on phone  Patient has not yet been able to see wound doctor at [facility]  He was scheduled to see wound care on Monday  As per wife, Dr Sherry Cantu clinic too far from where they live and it is currently very difficult to get him around  His home PT has been on hold until he gets his back surgery  According to wife, patient's mental status has significantly improved from yesterday

## 2023-03-03 NOTE — CONSULTS
Consult - Podiatry   Darlene Rodrigez Sr  62 y o  male MRN: [id number]  Unit/Bed#: -01 Encounter: [id number]    Assessment/Plan     Assessment:  1   Left diabetic heel ulcer w/ pressure component, POA  2   Type 2 diabetes with neuropathy, A1c 6 7% (2/7/23)  3  PAD    Plan:  - I (nor [facility] podiatry) have not seen him in the outpt setting and I do not believe he has f/u at [facility] wound care center as instructed  Ulceration to left heel does appear with relatively stable, connected dry eschar without bogginess or malodor  XR negative for evidence of OM or BRUCE  MRI pending  Will await results and f/u with formal recs  - LEADs 12/27/22: Diffuse dz B/L throughout fem-pop w/o focal stenosis  RLE diffuse tibioperoneal dz w/ absence of flow to PTA, NC/153/129 within healing  LLE NC/130/100 within healing  Still recommend vascular surgery consult for recs (likely ok to be performed outpt as long as MRI is negative)  - XR left foot 2v 3/2/23: ulceration to calcaneus without periosteal reaction/cortical erosion to suggest OM, no BRUCE   - Labs reviewed: WBC 14 51 3/2/23 and 13 56 today, LA wnl  VSS  Bcx pending  - Strict heel offloading with prevalon boot, green foam wedge or 2 pillows  Betadine soaked 4x4, dsd   - Abx and rest of care per SLIM  - Thank you for consultation, please reach out for questions/concerns or if acute changes area noted to feet  History of Present Illness     HPI:  Hermelinda Ayon  is a 62 y o  male w/ pmh sig for DM, CKD, PAD, HTN, chronic left heel diabetic ulcer, nonambulatory admitted for hallucinations/confusion w/ concern for left heel infection - I was consulted to assess the heel  I have not seen him in the outpt setting since his last hospital admission in December  He does have wound care nurses to home however per chart check, has missed his appointments with [facility] wound care center multiple times  Has not followed with vascular surgery outpt either         Inpatient consult to Podiatry  Consult performed by: Ela Valenzuela DPM  Consult ordered by: YURIDIA Augustin        Review of Systems   Constitutional: Negative  HENT: Negative  Eyes: Negative  Respiratory: Negative  Cardiovascular: Negative  Gastrointestinal: Negative  Musculoskeletal: B/L LE pain   Skin: left heel ulceration   Neurological: PN   Psych: negative         Historical Information   Past Medical History:   Diagnosis Date   • Chronic kidney disease    • Diabetes mellitus ([facility] )    • Gout    • Hypertension    • Renal disorder    • Retroperitoneal abscess ([facility] )    • Stroke ([facility] )    • UTI (urinary tract infection)    • Vertebral osteomyelitis (HCC)      Past Surgical History:   Procedure Laterality Date   • BACK SURGERY     • ORCHIECTOMY       Social History   Social History     Substance and Sexual Activity   Alcohol Use Not Currently     Social History     Substance and Sexual Activity   Drug Use Yes   • Types: Marijuana     Social History     Tobacco Use   Smoking Status Every Day   • Packs/day: 0 25   • Types: Cigarettes   Smokeless Tobacco Never     Family History:   Family History   Problem Relation Age of Onset   • Hypertension Father        Meds/Allergies   Medications Prior to Admission   Medication   • amLODIPine (NORVASC) 10 mg tablet   • atorvastatin (LIPITOR) 40 mg tablet   • Belbuca 450 MCG FILM   • Buprenorphine HCl (Belbuca) 300 MCG FILM   • carvedilol (COREG) 6 25 mg tablet   • colchicine (COLCRYS) 0 6 mg tablet   • cyclobenzaprine (FLEXERIL) 5 mg tablet   • docusate sodium (COLACE) 100 mg capsule   • ferrous sulfate 325 (65 Fe) mg tablet   • insulin glargine (LANTUS) 100 units/mL subcutaneous injection   • insulin lispro (HumaLOG) 100 units/mL injection   • oxyCODONE (OXY-IR) 5 MG capsule   • polyethylene glycol (MIRALAX) 17 g packet   • pregabalin (LYRICA) 150 mg capsule   • senna-docusate sodium (SENOKOT S) 8 6-50 mg per tablet   • sevelamer carbonate (RENVELA) 800 mg tablet   • sodium bicarbonate 650 mg tablet   • tamsulosin (FLOMAX) 0 4 mg   • venlafaxine (EFFEXOR-XR) 37 5 mg 24 hr capsule   • acetaminophen (TYLENOL) 325 mg tablet   • allopurinol (ZYLOPRIM) 100 mg tablet   • thiamine 100 MG tablet     Allergies   Allergen Reactions   • Bupropion Delirium     "went nuts," prior to 2012  "went nuts," prior to 2012  "went nuts," prior to 2012  "went nuts," prior to 2012     • Metformin Other (See Comments)     Other reaction(s): kidney disease  Other reaction(s): kidney disease  Other reaction(s): kidney disease     • Medical Tape Rash       Objective   First Vitals:   Blood Pressure: 122/92 (03/02/23 1426)  Pulse: 93 (03/02/23 1426)  Temperature: 98 6 °F (37 °C) (03/02/23 1426)  Temp Source: Oral (03/02/23 1426)  Respirations: 16 (03/02/23 1426)  Height: 5' 11 5" (181 6 cm) (03/02/23 1426)  Weight - Scale: 65 1 kg (143 lb 8 3 oz) (03/02/23 1426)  SpO2: 95 % (03/02/23 1426)    Current Vitals:   Blood Pressure: 141/73 (03/03/23 0918)  Pulse: 76 (03/03/23 0918)  Temperature: 98 6 °F (37 °C) (03/03/23 0655)  Temp Source: Oral (03/02/23 1426)  Respirations: 18 (03/03/23 0655)  Height: 5' 11 5" (181 6 cm) (03/02/23 1426)  Weight - Scale: 65 1 kg (143 lb 8 3 oz) (03/02/23 1426)  SpO2: 95 % (03/03/23 0918)        /73   Pulse 76   Temp 98 6 °F (37 °C)   Resp 18   Ht 5' 11 5" (1 816 m)   Wt 65 1 kg (143 lb 8 3 oz)   SpO2 95%   BMI 19 74 kg/m²      General Appearance:    Alert, cooperative, no distress   Head:    Normocephalic, without obvious abnormality, atraumatic   Eyes:    PERRL, conjunctiva/corneas clear, EOM's intact        Nose:   Moist mucous membranes   Neck:   Supple, symmetrical, trachea midline   Back:     Symmetric   Lungs:     Respirations unlabored   Heart:    Regular rate and rhythm, S1 and S2 normal, no murmur, rub   or gallop   Abdomen:     Soft, non-tender    B/L LE EXAM   Extremities:   Atrophic musculature, thin  Nonambulatory  Pulses:   Nonpalpable B/L DP & PT pulses  Skin temp warm to cool  Absent pedal hair   Skin:  Right LE without open wounds  Skin is atrophic to B/L LE, thin dry and shiny  Left heel ulceration, dry necrotic base without bogginess, purulence, edge lifting, erythema  No probe to bone   Neurologic:   Protective sensation is diminished  left heel              Lab Results:   Admission on 03/02/2023   Component Date Value   • WBC 03/02/2023 14 51 (H)    • RBC 03/02/2023 3 44 (L)    • Hemoglobin 03/02/2023 10 0 (L)    • Hematocrit 03/02/2023 31 7 (L)    • MCV 03/02/2023 92    • MCH 03/02/2023 29 1    • MCHC 03/02/2023 31 5    • RDW 03/02/2023 17 2 (H)    • MPV 03/02/2023 12 4    • Platelets 27/84/5219 145 (L)    • nRBC 03/02/2023 0    • Neutrophils Relative 03/02/2023 84 (H)    • Immat GRANS % 03/02/2023 1    • Lymphocytes Relative 03/02/2023 6 (L)    • Monocytes Relative 03/02/2023 9    • Eosinophils Relative 03/02/2023 0    • Basophils Relative 03/02/2023 0    • Neutrophils Absolute 03/02/2023 12 14 (H)    • Immature Grans Absolute 03/02/2023 0 08    • Lymphocytes Absolute 03/02/2023 0 91    • Monocytes Absolute 03/02/2023 1 30 (H)    • Eosinophils Absolute 03/02/2023 0 04    • Basophils Absolute 03/02/2023 0 04    • Sodium 03/02/2023 141    • Potassium 03/02/2023 4 7    • Chloride 03/02/2023 105    • CO2 03/02/2023 30    • ANION GAP 03/02/2023 6    • BUN 03/02/2023 42 (H)    • Creatinine 03/02/2023 3 37 (H)    • Glucose 03/02/2023 107    • Calcium 03/02/2023 10 7 (H)    • AST 03/02/2023 8 (L)    • ALT 03/02/2023 <3 (L)    • Alkaline Phosphatase 03/02/2023 77    • Total Protein 03/02/2023 6 5    • Albumin 03/02/2023 3 8    • Total Bilirubin 03/02/2023 0 48    • eGFR 03/02/2023 19    • LACTIC ACID 03/02/2023 0 8    • Blood Culture 03/02/2023 Received in Microbiology Lab  Culture in Progress  • Blood Culture 03/02/2023 Received in Microbiology Lab  Culture in Progress      • Color, UA 03/02/2023 Yellow    • Clarity, UA 03/02/2023 Slightly Cloudy    • Specific Gravity, UA 03/02/2023 1 015    • pH, UA 03/02/2023 7 5    • Leukocytes, UA 03/02/2023 Small (A)    • Nitrite, UA 03/02/2023 Negative    • Protein, UA 03/02/2023 100 (2+) (A)    • Glucose, UA 03/02/2023 Negative    • Ketones, UA 03/02/2023 Negative    • Urobilinogen, UA 03/02/2023 0 2    • Bilirubin, UA 03/02/2023 Negative    • Occult Blood, UA 03/02/2023 Small (A)    • SARS-CoV-2 03/02/2023 Negative    • INFLUENZA A PCR 03/02/2023 Negative    • INFLUENZA B PCR 03/02/2023 Negative    • RSV PCR 03/02/2023 Negative    • hs TnI 0hr 03/02/2023 4    • RBC, UA 03/02/2023 0-1    • WBC, UA 03/02/2023 2-4    • Epithelial Cells 03/02/2023 Occasional    • Bacteria, UA 03/02/2023 Occasional    • hs TnI 2hr 03/02/2023 3    • Delta 2hr hsTnI 03/02/2023 -1    • Ammonia 03/02/2023 30    • pH, Gen 03/02/2023 7 443 (H)    • pCO2, Gen 03/02/2023 36 9 (L)    • pO2, Gen 03/02/2023 38 9    • HCO3, Gen 03/02/2023 24 7    • Base Excess, Gen 03/02/2023 0 7    • O2 Content, Gen 03/02/2023 10 7    • O2 HGB, VENOUS 03/02/2023 72 4    • Procalcitonin 03/02/2023 0 30 (H)    • POC Glucose 03/02/2023 98    • Sodium 03/03/2023 141    • Potassium 03/03/2023 4 4    • Chloride 03/03/2023 108    • CO2 03/03/2023 27    • ANION GAP 03/03/2023 6    • BUN 03/03/2023 43 (H)    • Creatinine 03/03/2023 3 36 (H)    • Glucose 03/03/2023 96    • Calcium 03/03/2023 10 2    • eGFR 03/03/2023 19    • Magnesium 03/03/2023 1 8 (L)    • Phosphorus 03/03/2023 5 1 (H)    • WBC 03/03/2023 13 56 (H)    • RBC 03/03/2023 3 04 (L)    • Hemoglobin 03/03/2023 9 0 (L)    • Hematocrit 03/03/2023 28 3 (L)    • MCV 03/03/2023 93    • MCH 03/03/2023 29 6    • MCHC 03/03/2023 31 8    • RDW 03/03/2023 17 2 (H)    • MPV 03/03/2023 11 8    • Platelets 42/51/8907 130 (L)    • nRBC 03/03/2023 0    • Neutrophils Relative 03/03/2023 78 (H)    • Immat GRANS % 03/03/2023 1    • Lymphocytes Relative 03/03/2023 9 (L)    • Monocytes Relative 03/03/2023 12    • Eosinophils Relative 03/03/2023 0    • Basophils Relative 03/03/2023 0    • Neutrophils Absolute 03/03/2023 10 49 (H)    • Immature Grans Absolute 03/03/2023 0 09    • Lymphocytes Absolute 03/03/2023 1 26    • Monocytes Absolute 03/03/2023 1 60 (H)    • Eosinophils Absolute 03/03/2023 0 06    • Basophils Absolute 03/03/2023 0 06    • Hemoglobin A1C 03/03/2023 6 3 (H)    • EAG 03/03/2023 134    • POC Glucose 03/03/2023 94    • POC Glucose 03/03/2023 95              Results from last 7 days   Lab Units 03/02/23  1526 03/02/23  1452   BLOOD CULTURE  Received in Microbiology Lab  Culture in Progress  Received in Microbiology Lab  Culture in Progress  Invalid input(s): LABAEARO            Imaging: I have personally reviewed pertinent films in PACS  EKG, Pathology, and Other Studies: I have personally reviewed pertinent reports        Code Status: Level 1 - Full Code  Advance Directive and Living Will:      Power of :    POLST:

## 2023-03-03 NOTE — PLAN OF CARE
Problem: Nutrition/Hydration-ADULT  Goal: Nutrient/Hydration intake appropriate for improving, restoring or maintaining nutritional needs  Description: Monitor and assess patient's nutrition/hydration status for malnutrition  Collaborate with interdisciplinary team and initiate plan and interventions as ordered  Monitor patient's weight and dietary intake as ordered or per policy  Utilize nutrition screening tool and intervene as necessary  Determine patient's food preferences and provide high-protein, high-caloric foods as appropriate       INTERVENTIONS:  - Monitor oral intake, urinary output, labs, and treatment plans  - Assess nutrition and hydration status and recommend course of action  - Evaluate amount of meals eaten  - Assist patient with eating if necessary   - Allow adequate time for meals  - Recommend/ encourage appropriate diets, oral nutritional supplements, and vitamin/mineral supplements  - Order, calculate, and assess calorie counts as needed  - Recommend, monitor, and adjust tube feedings and TPN/PPN based on assessed needs  - Assess need for intravenous fluids  - Provide specific nutrition/hydration education as appropriate  - Include patient/family/caregiver in decisions related to nutrition  Outcome: Progressing     Problem: MOBILITY - ADULT  Goal: Maintain or return to baseline ADL function  Description: INTERVENTIONS:  -  Assess patient's ability to carry out ADLs; assess patient's baseline for ADL function and identify physical deficits which impact ability to perform ADLs (bathing, care of mouth/teeth, toileting, grooming, dressing, etc )  - Assess/evaluate cause of self-care deficits   - Assess range of motion  - Assess patient's mobility; develop plan if impaired  - Assess patient's need for assistive devices and provide as appropriate  - Encourage maximum independence but intervene and supervise when necessary  - Involve family in performance of ADLs  - Assess for home care needs following discharge   - Consider OT consult to assist with ADL evaluation and planning for discharge  - Provide patient education as appropriate  Outcome: Progressing  Goal: Maintains/Returns to pre admission functional level  Description: INTERVENTIONS:  - Perform BMAT or MOVE assessment daily    - Set and communicate daily mobility goal to care team and patient/family/caregiver  - Collaborate with rehabilitation services on mobility goals if consulted  - Perform Range of Motion  times a day  - Reposition patient every  hours    - Dangle patient  times a day  - Stand patient  times a day  - Ambulate patient  times a day  - Out of bed to chair  times a day   - Out of bed for meals  times a day  - Out of bed for toileting  - Record patient progress and toleration of activity level   Outcome: Progressing     Problem: PAIN - ADULT  Goal: Verbalizes/displays adequate comfort level or baseline comfort level  Description: Interventions:  - Encourage patient to monitor pain and request assistance  - Assess pain using appropriate pain scale  - Administer analgesics based on type and severity of pain and evaluate response  - Implement non-pharmacological measures as appropriate and evaluate response  - Consider cultural and social influences on pain and pain management  - Notify physician/advanced practitioner if interventions unsuccessful or patient reports new pain  Outcome: Progressing     Problem: INFECTION - ADULT  Goal: Absence or prevention of progression during hospitalization  Description: INTERVENTIONS:  - Assess and monitor for signs and symptoms of infection  - Monitor lab/diagnostic results  - Monitor all insertion sites, i e  indwelling lines, tubes, and drains  - Monitor endotracheal if appropriate and nasal secretions for changes in amount and color  - [place] appropriate cooling/warming therapies per order  - Administer medications as ordered  - Instruct and encourage patient and family to use good hand hygiene technique  - Identify and instruct in appropriate isolation precautions for identified infection/condition  Outcome: Progressing  Goal: Absence of fever/infection during neutropenic period  Description: INTERVENTIONS:  - Monitor WBC    Outcome: Progressing     Problem: SAFETY ADULT  Goal: Maintain or return to baseline ADL function  Description: INTERVENTIONS:  -  Assess patient's ability to carry out ADLs; assess patient's baseline for ADL function and identify physical deficits which impact ability to perform ADLs (bathing, care of mouth/teeth, toileting, grooming, dressing, etc )  - Assess/evaluate cause of self-care deficits   - Assess range of motion  - Assess patient's mobility; develop plan if impaired  - Assess patient's need for assistive devices and provide as appropriate  - Encourage maximum independence but intervene and supervise when necessary  - Involve family in performance of ADLs  - Assess for home care needs following discharge   - Consider OT consult to assist with ADL evaluation and planning for discharge  - Provide patient education as appropriate  Outcome: Progressing  Goal: Maintains/Returns to pre admission functional level  Description: INTERVENTIONS:  - Perform BMAT or MOVE assessment daily    - Set and communicate daily mobility goal to care team and patient/family/caregiver  - Collaborate with rehabilitation services on mobility goals if consulted  - Perform Range of Motion  times a day  - Reposition patient every  hours    - Dangle patient  times a day  - Stand patient  times a day  - Ambulate patient  times a day  - Out of bed to chair  times a day   - Out of bed for meals  times a day  - Out of bed for toileting  - Record patient progress and toleration of activity level   Outcome: Progressing  Goal: Patient will remain free of falls  Description: INTERVENTIONS:  - Educate patient/family on patient safety including physical limitations  - Instruct patient to call for assistance with activity   - Consult OT/PT to assist with strengthening/mobility   - Keep Call bell within reach  - Keep bed low and locked with side rails adjusted as appropriate  - Keep care items and personal belongings within reach  - Initiate and maintain comfort rounds  - Make Fall Risk Sign visible to staff  - Offer Toileting every  Hours, in advance of need  - Initiate/Maintain alarm  - Obtain necessary fall risk management equipment:   - Apply yellow socks and bracelet for high fall risk patients  - Consider moving patient to room near nurses station  Outcome: Progressing     Problem: DISCHARGE PLANNING  Goal: Discharge to home or other facility with appropriate resources  Description: INTERVENTIONS:  - Identify barriers to discharge w/patient and caregiver  - Arrange for needed discharge resources and transportation as appropriate  - Identify discharge learning needs (meds, wound care, etc )  - Arrange for interpretive services to assist at discharge as needed  - Refer to Case Management Department for coordinating discharge planning if the patient needs post-hospital services based on physician/advanced practitioner order or complex needs related to functional status, cognitive ability, or social support system  Outcome: Progressing     Problem: Knowledge Deficit  Goal: Patient/family/caregiver demonstrates understanding of disease process, treatment plan, medications, and discharge instructions  Description: Complete learning assessment and assess knowledge base    Interventions:  - Provide teaching at level of understanding  - Provide teaching via preferred learning methods  Outcome: Progressing

## 2023-03-03 NOTE — PROGRESS NOTES
Family at bedside reports pt was eating well at home  Was drinking glucerna or Boost supplements regularly, vanilla flavor preferred  No chewing/swallowing issues reported  Chart review of weight hx: 7/29/21 185lb, 7/5/22 151lb, 11/17/22 139lb, 12/26/22 131lb, 3/2/23 143lb  Previous 13 2% weight loss x 5 mo noted  Pt would benefit from standing scale weight as appropriate to confirm current weight  Will continue with CCD 2  Will order Felix BID and glucerna BID which family was agreeable to

## 2023-03-03 NOTE — ASSESSMENT & PLAN NOTE
Faxed to [facility] requested for Diabetic Eye Exam for Diabetic Retinopathy from Dr. Rob Jones 06/30/2017 and Dr Kiersten Jones 01/23/2017.   · Chronic Ortiz due to neurogenic bladder  · No pyuria on urinalysis

## 2023-03-03 NOTE — ASSESSMENT & PLAN NOTE
· History of PAD  · Diabetes mellitus  · Nonhealing left heel ulcer  · Arterial duplex 12/27 with diffuse disease but no significant focal stenosis  · Continue atorvastatin

## 2023-03-03 NOTE — ASSESSMENT & PLAN NOTE
· Criteria with tachycardia, leukocytosis  · Urine negative  · Blood cultures pending  · CT chest abdomen pelvis no obvious infection  · Left heel suspected source  · Empiric antibiotic therapy  · And fever curve, leukocytosis  · Procalcitonin 0 3

## 2023-03-03 NOTE — UTILIZATION REVIEW
Initial Clinical Review    Admission: Date/Time/Statement:   Admission Orders (From admission, onward)     Ordered        03/02/23 [address]  Once                      Orders Placed This Encounter   Procedures   • INPATIENT ADMISSION     Standing Status:   Standing     Number of Occurrences:   1     Order Specific Question:   Level of Care     Answer:   Med Surg [16]     Order Specific Question:   Estimated length of stay     Answer:   More than 2 Midnights     Order Specific Question:   Certification     Answer:   I certify that inpatient services are medically necessary for this patient for a duration of greater than two midnights  See H&P and MD Progress Notes for additional information about the patient's course of treatment  ED Arrival Information     Expected   -    Arrival   3/2/2023 14:23    Acuity   Urgent            Means of arrival   Ambulance    Escorted by   [id number]   Hospitalist    Admission type   Emergency            Arrival complaint   Possible UTI           Chief Complaint   Patient presents with   • Possible UTI     Patient from home, family reports possible UTI  State he has an indwelling camarena and last time he had a UTI he acted the same  State he has been altered with tremors  Initial Presentation: 62 y o  male with a PMH of failed back surgery now awaiting repeat surgery at [facility] with neurogenic bladder chronic indwelling Camarena catheter, diabetes mellitus, CKD, nonhealing left foot wound, recent MRSA pneumonia who over the past year has become bedridden  Patient was evaluated for neurosurgery at [facility] last month and was denied due to left heel is a high infection risk  Patient is following up for his chronic left heel wound in the outpatient setting but in the last 24 hours has become confused with active hallucinations which is his normal when he has an infection per his wife   UTI was suspected but UA was normal  Chest abdomen and pelvis CAT scans revealed no source of infection  Left heel is suspected source  Plan: Inpatient admission for evaluation and treatment of acute metabolic encephalopathy, CKD, diabetic foot ulcer, sepsis, PAD, neurogenic bladder, HTN, DM: IV meropenem, IV fluids, monitor neuro status, trend creatinine, continue Ortiz catheter, MRI L heel, continue atorvastatin, amlodipine, carvedilol, Carb controlled diet, Basal insulin 5 units at hs, SSI  Date: 3/3   Day 2:     Podiatry consult: Ulceration to left heel does appear with relatively stable, connected dry eschar without bogginess or malodor  XR negative for evidence of OM or BRUCE  MRI pending  Will await results and f/u with formal recs  Strict heel offloading with prevalon boot, green foam wedge or 2 pillows  Betadine soaked 4x4, dsd      ED Triage Vitals   Temperature Pulse Respirations Blood Pressure SpO2   03/02/23 1426 03/02/23 1426 03/02/23 1426 03/02/23 1426 03/02/23 1426   98 6 °F (37 °C) 93 16 122/92 95 %      Temp Source Heart Rate Source Patient Position - Orthostatic VS BP Location FiO2 (%)   03/02/23 1426 03/02/23 1426 03/02/23 1426 03/02/23 1426 --   Oral Monitor Lying Left arm       Pain Score       03/02/23 1830       No Pain          Wt Readings from Last 1 Encounters:   03/02/23 65 1 kg (143 lb 8 3 oz)     Additional Vital Signs:     Date/Time Temp Pulse Resp BP MAP (mmHg) SpO2 O2 Device   03/03/23 09:18:30 -- 76 -- 141/73 96 95 % --   03/03/23 06:55:59 98 6 °F (37 °C) 79 18 111/73 86 94 % --   03/02/23 23:30:22 98 4 °F (36 9 °C) 86 19 106/67 80 97 % --   03/02/23 2100 -- -- -- 127/74 -- -- --   03/02/23 20:35:32 -- 93 20 153/72 99 95 % None (Room air)   03/02/23 2000 -- 92 17 146/67 97 93 % None (Room air)   03/02/23 1830 -- 86 19 134/78 100 95 % None (Room air)   03/02/23 1715 -- 92 18 178/74 Abnormal  106 97 % None (Room air)   03/02/23 1530 -- 89 16 138/66 -- 98 % None (Room air)     Pertinent Labs/Diagnostic Test Results:   XR foot 2 views LEFT   Final Result by Aris Bond MD (03/03 3402)      No radiographic findings of osteomyelitis               Workstation performed: [id number]         CT chest abdomen pelvis wo contrast   Final Result by Kevin Santana MD (03/02 1924)   Consolidation seen at the left lung base in the region of the previously noted parenchymal abnormality may be due to scarring or due to nonresolved consolidation, follow-up chest CT at 3 months suggested      No intra-abdominal fluid collection seen      No bowel obstruction      No obstructing calculus      Mild prominence of the left pelvicalyceal system and mild dilation of the left ureter without any obstructing calculus      The study was marked in EPIC for significant notification  Workstation performed: [id number]         CT head without contrast   Final Result by Pili Mehta MD (03/02 1642)      No acute intracranial abnormality  Workstation performed: [id number]           3/3 MRI L ankle/heel:  IMPRESSION:     Exam is moderately limited due to motion      There is a posterior heel ulcer (series 9 image 15 ) There is no evidence of osteomyelitis in the adjacent calcaneal tubercle      Results from last 7 days   Lab Units 03/02/23  1454   SARS-COV-2  Negative     Results from last 7 days   Lab Units 03/03/23  0458 03/02/23  1452   WBC Thousand/uL 13 56* 14 51*   HEMOGLOBIN g/dL 9 0* 10 0*   HEMATOCRIT % 28 3* 31 7*   PLATELETS Thousands/uL 130* 145*   NEUTROS ABS Thousands/µL 10 49* 12 14*         Results from last 7 days   Lab Units 03/03/23  0458 03/02/23  1452   SODIUM mmol/L 141 141   POTASSIUM mmol/L 4 4 4 7   CHLORIDE mmol/L 108 105   CO2 mmol/L 27 30   ANION GAP mmol/L 6 6   BUN mg/dL 43* 42*   CREATININE mg/dL 3 36* 3 37*   EGFR ml/min/1 73sq m 19 19   CALCIUM mg/dL 10 2 10 7*   MAGNESIUM mg/dL 1 8*  --    PHOSPHORUS mg/dL 5 1*  --      Results from last 7 days   Lab Units 03/02/23  1715 03/02/23  1452   AST U/L  --  8*   ALT U/L --  <3*   ALK PHOS U/L  --  77   TOTAL PROTEIN g/dL  --  6 5   ALBUMIN g/dL  --  3 8   TOTAL BILIRUBIN mg/dL  --  0 48   AMMONIA umol/L 30  --      Results from last 7 days   Lab Units 03/03/23  1125 03/03/23  0656 03/02/23  2204   POC GLUCOSE mg/dl 95 94 98     Results from last 7 days   Lab Units 03/03/23  0458 03/02/23  1452   GLUCOSE RANDOM mg/dL 96 107         Results from last 7 days   Lab Units 03/03/23  0458   HEMOGLOBIN A1C % 6 3*   EAG mg/dl 134           Results from last 7 days   Lab Units 03/02/23  1715   PH CRISTOFER  7 443*   PCO2 CRISTOFER mm Hg 36 9*   PO2 CRISTOFER mm Hg 38 9   HCO3 CRISTOFER mmol/L 24 7   BASE EXC CRISTOFER mmol/L 0 7   O2 CONTENT CRISTOFER ml/dL 10 7   O2 HGB, VENOUS % 72 4             Results from last 7 days   Lab Units 03/02/23  1715 03/02/23  1452   HS TNI 0HR ng/L  --  4   HS TNI 2HR ng/L 3  --    HSTNI D2 ng/L -1  --                  Results from last 7 days   Lab Units 03/02/23  1452   PROCALCITONIN ng/ml 0 30*     Results from last 7 days   Lab Units 03/02/23  1452   LACTIC ACID mmol/L 0 8         Results from last 7 days   Lab Units 03/02/23  1458   CLARITY UA  Slightly Cloudy   COLOR UA  Yellow   SPEC GRAV UA  1 015   PH UA  7 5   GLUCOSE UA mg/dl Negative   KETONES UA mg/dl Negative   BLOOD UA  Small*   PROTEIN UA mg/dl 100 (2+)*   NITRITE UA  Negative   BILIRUBIN UA  Negative   UROBILINOGEN UA E U /dl 0 2   LEUKOCYTES UA  Small*   WBC UA /hpf 2-4   RBC UA /hpf 0-1   BACTERIA UA /hpf Occasional   EPITHELIAL CELLS WET PREP /hpf Occasional     Results from last 7 days   Lab Units 03/02/23  1454   INFLUENZA A PCR  Negative   INFLUENZA B PCR  Negative   RSV PCR  Negative           Results from last 7 days   Lab Units 03/02/23  1526 03/02/23  1452   BLOOD CULTURE  Received in Microbiology Lab  Culture in Progress  Received in Microbiology Lab  Culture in Progress                 ED Treatment:   Medication Administration from 03/02/2023 1423 to 03/02/2023 2023       Date/Time Order Dose Route Action 03/02/2023 1548 EST sodium chloride 0 9 % bolus 1,000 mL 1,000 mL Intravenous New Bag        Past Medical History:   Diagnosis Date   • Chronic kidney disease    • Diabetes mellitus ([facility] )    • Gout    • Hypertension    • Renal disorder    • Retroperitoneal abscess (HCC)    • Stroke [facility])    • UTI (urinary tract infection)    • Vertebral osteomyelitis (HCC)      Present on Admission:  • Acute metabolic encephalopathy  • PAD (peripheral artery disease) ([facility])  • Essential hypertension  • Retention of urine  • Sepsis (HCC)  • Diabetic foot ulcer (HCC)  • CKD (chronic kidney disease)      Admitting Diagnosis: UTI (urinary tract infection) [N39 0]  KELLY (acute kidney injury) ([facility] ) [N17 9]  AMS (altered mental status) [R41 82]  Age/Sex: 62 y o  male  Admission Orders:  Scheduled Medications:  allopurinol, 100 mg, Oral, Daily  amLODIPine, 10 mg, Oral, Daily  atorvastatin, 40 mg, Oral, Daily With Dinner  Buprenorphine HCl, 900 mcg, Buccal, BID  carvedilol, 6 25 mg, Oral, BID With Meals  cyclobenzaprine, 5 mg, Oral, TID  docusate sodium, 100 mg, Oral, BID  ferrous sulfate, 325 mg, Oral, Daily With Breakfast  heparin (porcine), 5,000 Units, Subcutaneous, Q8H [facility]  insulin glargine, 5 Units, Subcutaneous, HS  insulin lispro, 1-6 Units, Subcutaneous, TID AC  insulin lispro, 1-6 Units, Subcutaneous, HS  LORazepam, 1 mg, Intravenous, Once  magnesium sulfate, 2 g, Intravenous, Once  meropenem, 1,000 mg, Intravenous, Q12H  polyethylene glycol, 17 g, Oral, BID  pregabalin, 150 mg, Oral, BID  senna-docusate sodium, 1 tablet, Oral, BID  sevelamer, 800 mg, Oral, TID With Meals  sodium bicarbonate, 650 mg, Oral, Every Other Day  tamsulosin, 0 4 mg, Oral, Daily With Dinner  thiamine, 100 mg, Oral, Daily  venlafaxine, 37 5 mg, Oral, TID      Continuous IV Infusions:     PRN Meds:  acetaminophen, 650 mg, Oral, Q4H PRN  HYDROmorphone, 1 mg, Intravenous, Q6H PRN  oxyCODONE, 5 mg, Oral, Q4H PRN  vancomycin, 12 5 mg/kg, Intravenous, Once PRN        IP CONSULT TO PODIATRY  IP CONSULT TO CASE MANAGEMENT  IP CONSULT TO PHARMACY    Network Utilization Review Department  ATTENTION: Please call with any questions or concerns to [phone] and carefully listen to the prompts so that you are directed to the right person  All voicemails are confidential   Anastasia Munoz all requests for admission clinical reviews, approved or denied determinations and any other requests to dedicated fax number below belonging to the campus where the patient is receiving treatment   List of dedicated fax numbers for the Facilities:  [address] DENIALS (Administrative/Medical Necessity) [phone]   [address] (Maternity/NICU/Pediatrics) [phone]   8 [facility] [phone]   [place] [address] [phone]   [facility] [address] [facility] [phone]   1553 [facility] 134 8[address] [facility] [phone]

## 2023-03-03 NOTE — ASSESSMENT & PLAN NOTE
Lab Results   Component Value Date    HGBA1C 6 7 (H) 02/07/2023       Recent Labs     03/02/23  2204   POCGLU 98       Blood Sugar Average: Last 72 hrs:  (P) 98     · Basal insulin 5 units at bedtime  · Sliding scale insulin with Accu-Cheks  · Hypoglycemia protocol  · Carbohydrate controlled diet

## 2023-03-03 NOTE — ASSESSMENT & PLAN NOTE
Lab Results   Component Value Date    EGFR 19 03/03/2023    EGFR 19 03/02/2023    EGFR 30 12/28/2022    CREATININE 3 36 (H) 03/03/2023    CREATININE 3 37 (H) 03/02/2023    CREATININE 2 30 (H) 12/28/2022   · History CKD  · Presents with KELLY on chronic kidney disease in the setting of sepsis  · Gentle hydration  · Trend creatinine  · Continue Ortiz catheter  · Renally dose medications

## 2023-03-03 NOTE — ASSESSMENT & PLAN NOTE
· POA from home with wife with acute onset hallucinations, confusion  Wife was concerned he had an underlying infection as this is his normal response    · Chronic indwelling Ortiz catheter without evidence of pyuria  · CT brain no acute abnormality  · Left heel ulcer questionable source of infection  · Empiric antibiotics  · IV hydration  · Monitor neuro status

## 2023-03-03 NOTE — ASSESSMENT & PLAN NOTE
· Presents with nonhealing left diabetic foot wound on left heel that is being followed by outpatient wound care, home care nurse and podiatry  · Meeting criteria for sepsis with elevated procalcitonin and boggy left heel wound covered with eschar that appears moisture underneath the eschar  · X-ray possible osteomyelitis changes  · MRI showing ulcer but no evidence of osteomyelitis  · Continue abx for now: Empiric vancomycin for history MRSA pneumonia, empiric meropenem as patient was recently treated for Pseudomonas urinary tract infection with Levaquin in the outpatient setting  · Podiatry recommendations as per note  · Vascular surgery as outpatient

## 2023-03-03 NOTE — DISCHARGE INSTR - OTHER ORDERS
Skin care Plan:  1-Protect sacrum w/Allevyn foam, iva with P, change q3d and PRN, check skin q-shift  2-Turn/reposition q2h or when medically stable for pressure re-distribution on skin   Use foam wedges  3-Elevate heels to offload pressure  4-Moisturize skin daily with skin nourishing cream  5-Ehob cushion in chair when out of bed  6-Hydraguard to right  bilateral heel BID and PRN  Discharge Instructions - Podiatry    Weight Bearing Status: weight bear with heel unloading shoe    Follow-up appointment instructions: Please make an appointment within one week of discharge with [facility]'s wound care center  Contact sooner if any increase in pain, or signs of infection occur    Patient Wound Care Instructions: Change left foot dressing daily with betadine soaked 4x4, ABD, emmanuel  Keep heels off of bed at all times (ie float heels so there is no pressure at all)

## 2023-03-03 NOTE — ASSESSMENT & PLAN NOTE
Lab Results   Component Value Date    HGBA1C 6 3 (H) 03/03/2023       Recent Labs     03/02/23  2204 03/03/23  0656 03/03/23  1125   POCGLU 98 94 95       Blood Sugar Average: Last 72 hrs:  (P) 90 [id number]     · Basal insulin 5 units at bedtime  · Sliding scale insulin with Accu-Cheks  · Hypoglycemia protocol  · Carbohydrate controlled diet

## 2023-03-03 NOTE — H&P
[address]  H&P- Darlene Rodrigez Sr  1965, 62 y o  male MRN: [id number]  Unit/Bed#: -01 Encounter: [id number]  Primary Care Provider: Nicole Ramirez DO   Date and time admitted to hospital: 3/2/2023  2:23 PM    * Acute metabolic encephalopathy  Assessment & Plan  · POA from home with wife with acute onset hallucinations, confusion  Wife was concerned he had an underlying infection as this is his normal response    · Chronic indwelling Ortiz catheter without evidence of pyuria  · CT brain no acute abnormality  · Left heel ulcer questionable source of infection  · Empiric antibiotics  · IV hydration  · Monitor neuro status    CKD (chronic kidney disease)  Assessment & Plan  Lab Results   Component Value Date    EGFR 19 03/03/2023    EGFR 19 03/02/2023    EGFR 30 12/28/2022    CREATININE 3 36 (H) 03/03/2023    CREATININE 3 37 (H) 03/02/2023    CREATININE 2 30 (H) 12/28/2022   · History CKD  · Presents with KELLY on chronic kidney disease in the setting of sepsis  · Gentle hydration  · Trend creatinine  · Continue Ortiz catheter  · Renally dose medications    Diabetic foot ulcer ([address] )  Assessment & Plan  · Presents with nonhealing left diabetic foot wound on left heel that is being followed by outpatient wound care, home care nurse and podiatry  · Meeting criteria for sepsis with elevated procalcitonin and boggy left heel wound covered with eschar that appears moisture underneath the eschar  · X-ray possible osteomyelitis changes  · We will obtain MRI left heel  · Empiric vancomycin for history MRSA pneumonia, empiric meropenem as patient was recently treated for Pseudomonas urinary tract infection with Levaquin in the outpatient setting    Sepsis [facility])  Assessment & Plan  · Criteria with tachycardia, leukocytosis  · Urine negative  · Blood cultures pending  · CT chest abdomen pelvis no obvious infection  · Left heel suspected source  · Empiric antibiotic therapy  · And fever curve, leukocytosis  · Procalcitonin 0 3    PAD (peripheral artery disease) ([facility])  Assessment & Plan  · History of PAD  · Diabetes mellitus  · Nonhealing left heel ulcer  · Arterial duplex 12/27 with diffuse disease but no significant focal stenosis  · Continue atorvastatin    Retention of urine  Assessment & Plan  · Chronic Ortiz due to neurogenic bladder  · No pyuria on urinalysis      Essential hypertension  Assessment & Plan  · Continue PTA amlodipine, carvedilol  · Trend blood pressures    Diabetes mellitus type 2, insulin dependent [facility])  Assessment & Plan  Lab Results   Component Value Date    HGBA1C 6 7 (H) 02/07/2023       Recent Labs     03/02/23  2204   POCGLU 98       Blood Sugar Average: Last 72 hrs:  (P) 98     · Basal insulin 5 units at bedtime  · Sliding scale insulin with Accu-Cheks  · Hypoglycemia protocol  · Carbohydrate controlled diet    VTE Pharmacologic Prophylaxis: VTE Score: 3 Moderate Risk (Score 3-4) - Pharmacological DVT Prophylaxis Ordered: heparin  Code Status: Level 1 - Full Code   Discussion with family: Updated  (wife) at bedside  Anticipated Length of Stay: Patient will be admitted on an inpatient basis with an anticipated length of stay of greater than 2 midnights secondary to sepsis  Total Time Spent on Date of Encounter in care of patient: 65 minutes This time was spent on one or more of the following: performing physical exam; counseling and coordination of care; obtaining or reviewing history; documenting in the medical record; reviewing/ordering tests, medications or procedures; communicating with other healthcare professionals and discussing with patient's family/caregivers      Chief Complaint: Altered mental status    History of Present Illness:  Miladis Marin  is a 62 y o  male with a PMH of failed back surgery now awaiting repeat surgery at [facility] with neurogenic bladder chronic indwelling Ortiz catheter, diabetes mellitus, CKD, nonhealing left foot wound, recent MRSA pneumonia who over the past year has become bedridden  Patient was evaluated for neurosurgery at [facility] last month and was denied due to left heel is a high infection risk  Patient is following up for his chronic left heel wound in the outpatient setting but in the last 24 hours has become confused with active hallucinations which is his normal when he has an infection per his wife  UTI was expected but UA was normal   Chest abdomen and pelvis CAT scans revealed no source of infection  Left heel is suspected source  Presented medical service for evaluation treatment of sepsis, altered mental status  Review of Systems:  Review of Systems   Unable to perform ROS: Mental status change       Past Medical and Surgical History:   Past Medical History:   Diagnosis Date   • Chronic kidney disease    • Diabetes mellitus ([facility] )    • Gout    • Hypertension    • Renal disorder    • Retroperitoneal abscess ([place] [address] )    • Stroke [facility])    • UTI (urinary tract infection)    • Vertebral osteomyelitis ([place] [address] )        Past Surgical History:   Procedure Laterality Date   • BACK SURGERY     • ORCHIECTOMY         Meds/Allergies:  Prior to Admission medications    Medication Sig Start Date End Date Taking?  Authorizing Provider   amLODIPine (NORVASC) 10 mg tablet Take 1 tablet by mouth daily 2/22/22  Yes Historical Provider, MD   atorvastatin (LIPITOR) 40 mg tablet Take 40 mg by mouth 1/3/22  Yes Historical Provider, MD Desai 450 MCG FILM ADMINISTER 1 FILM TO INSIDE OF CHEEK IN THE MORNING AND 1 FILM BEFORE BEDTIME  12/15/22  Yes Historical Provider, MD   Buprenorphine HCl ([place]) 300 MCG FILM Apply 300 mcg to cheek 2 (two) times a day 10/24/22  Yes Historical Provider, MD   carvedilol (COREG) 6 25 mg tablet Take 6 25 mg by mouth 2 (two) times a day with meals   Yes Historical Provider, MD   colchicine (COLCRYS) 0 6 mg tablet Take 0 6 mg by mouth daily   Yes Historical Provider, MD   cyclobenzaprine (FLEXERIL) 5 mg tablet Take 5 mg by mouth 3 (three) times a day   Yes Historical Provider, MD   docusate sodium (COLACE) 100 mg capsule TAKE BY MOUTH 1 CAPSULE IN THE MORNING AND 1 CAPSULE BEFORE BEDTIME  12/21/22  Yes Historical Provider, MD   ferrous sulfate 325 (65 Fe) mg tablet Take 1 tablet (325 mg total) by mouth daily with breakfast Do not start before October 25, 2022  10/25/22  Yes Joe Baron MD   insulin glargine (LANTUS) 100 units/mL subcutaneous injection Inject 5 Units under the skin daily at bedtime 11/23/22  Yes Brinda Hammond PA-C   insulin lispro (HumaLOG) 100 units/mL injection Inject 2-12 Units under the skin 3 (three) times a day before meals 10/24/22  Yes Joe Baron MD   oxyCODONE (OXY-IR) 5 MG capsule Take 5 mg by mouth every 4 (four) hours as needed for moderate pain or severe pain   Yes Historical Provider, MD   polyethylene glycol (MIRALAX) 17 g packet Take 17 g by mouth 2 (two) times a day   Yes Historical Provider, MD   pregabalin (LYRICA) 150 mg capsule Take 150 mg by mouth 2 (two) times a day 3/16/22  Yes Historical Provider, MD   senna-docusate sodium (SENOKOT S) 8 6-50 mg per tablet Take 1 tablet by mouth 2 (two) times a day 3/17/22  Yes Historical Provider, MD   sevelamer carbonate (RENVELA) 800 mg tablet Take 800 mg by mouth in the morning   Yes Historical Provider, MD   sodium bicarbonate 650 mg tablet Take 650 mg by mouth 2 (two) times a day   Yes Historical Provider, MD   tamsulosin (FLOMAX) 0 4 mg Take by mouth 5/26/22  Yes Historical Provider, MD   venlafaxine (EFFEXOR-XR) 37 5 mg 24 hr capsule Take 37 5 mg by mouth 3 (three) times a day   Yes Historical Provider, MD   acetaminophen (TYLENOL) 325 mg tablet Take 650 mg by mouth every 4 (four) hours as needed for mild pain    Historical Provider, MD   allopurinol (ZYLOPRIM) 100 mg tablet Take 1 tablet (100 mg total) by mouth daily 10/24/22 11/23/22  Joe Baron MD   thiamine 100 MG tablet Take 1 tablet (100 mg total) by mouth daily 12/28/22 1/27/23  Zully Agrawal PA-C   lidocaine (LIDODERM) 5 % Apply 1 patch topically daily Remove & Discard patch within 12 hours or as directed by MD  Patient not taking: Reported on 3/2/2023 6/25/22 3/2/23  Kam Chisholm MD   pregabalin (LYRICA) 75 mg capsule Take 75 mg by mouth 2 (two) times a day  Patient not taking: Reported on 3/2/2023 11/28/22 3/2/23  Historical Provider, MD     I have reviewed home medications with patient family member  Allergies: Allergies   Allergen Reactions   • Bupropion Delirium     "went nuts," prior to 2012  "went nuts," prior to 2012  "went nuts," prior to 2012  "went nuts," prior to 2012     • Metformin Other (See Comments)     Other reaction(s): kidney disease  Other reaction(s): kidney disease  Other reaction(s): kidney disease     • Medical Tape Rash       Social History:  Marital Status: /Civil Union   Patient Pre-hospital Living Situation: With spouse  Patient Pre-hospital Level of Mobility: unable to be assessed at time of evaluation  Patient Pre-hospital Diet Restrictions: None  Substance Use History:   Social History     Substance and Sexual Activity   Alcohol Use Not Currently     Social History     Tobacco Use   Smoking Status Every Day   • Packs/day: 0 25   • Types: Cigarettes   Smokeless Tobacco Never     Social History     Substance and Sexual Activity   Drug Use Yes   • Types: Marijuana       Family History:  Family History   Problem Relation Age of Onset   • Hypertension Father        Physical Exam:     Vitals:   Blood Pressure: 111/73 (03/03/23 0655)  Pulse: 79 (03/03/23 0655)  Temperature: 98 6 °F (37 °C) (03/03/23 0655)  Temp Source: Oral (03/02/23 1426)  Respirations: 18 (03/03/23 0655)  Height: 5' 11 5" (181 6 cm) (03/02/23 1426)  Weight - Scale: 65 1 kg (143 lb 8 3 oz) (03/02/23 1426)  SpO2: 94 % (03/03/23 0655)    Physical Exam  Vitals and nursing note reviewed  Constitutional:       General: He is not in acute distress  Appearance: He is cachectic  He is ill-appearing  HENT:      Head: Normocephalic and atraumatic  Mouth/Throat:      Mouth: Mucous membranes are dry  Eyes:      Conjunctiva/sclera: Conjunctivae normal    Cardiovascular:      Rate and Rhythm: Regular rhythm  Tachycardia present  Heart sounds: No murmur heard  Pulmonary:      Effort: Pulmonary effort is normal  No respiratory distress  Breath sounds: Normal breath sounds  Abdominal:      Palpations: Abdomen is soft  Tenderness: There is no abdominal tenderness  Musculoskeletal:         General: No swelling  Cervical back: Neck supple  Comments: Left heel wound   Skin:     General: Skin is warm and dry  Capillary Refill: Capillary refill takes less than 2 seconds  Comments: Left heel wound boggy with eschar see media for picture   Neurological:      General: No focal deficit present  Mental Status: He is alert  He is disoriented     Psychiatric:      Comments: Actively hallucinating              Additional Data:     Lab Results:  Results from last 7 days   Lab Units 03/03/23  0458   WBC Thousand/uL 13 56*   HEMOGLOBIN g/dL 9 0*   HEMATOCRIT % 28 3*   PLATELETS Thousands/uL 130*   NEUTROS PCT % 78*   LYMPHS PCT % 9*   MONOS PCT % 12   EOS PCT % 0     Results from last 7 days   Lab Units 03/02/23  1452   SODIUM mmol/L 141   POTASSIUM mmol/L 4 7   CHLORIDE mmol/L 105   CO2 mmol/L 30   BUN mg/dL 42*   CREATININE mg/dL 3 37*   ANION GAP mmol/L 6   CALCIUM mg/dL 10 7*   ALBUMIN g/dL 3 8   TOTAL BILIRUBIN mg/dL 0 48   ALK PHOS U/L 77   ALT U/L <3*   AST U/L 8*   GLUCOSE RANDOM mg/dL 107         Results from last 7 days   Lab Units 03/03/23  0656 03/02/23  2204   POC GLUCOSE mg/dl 94 98         Results from last 7 days   Lab Units 03/02/23  1452   LACTIC ACID mmol/L 0 8   PROCALCITONIN ng/ml 0 30*       Lines/Drains:  Invasive Devices     Peripheral Intravenous Line  Duration           Peripheral IV 03/02/23 Dorsal (posterior); Right Hand <1 day    Peripheral IV 03/02/23 Left Antecubital <1 day          Drain  Duration           Urethral Catheter Coude 16 Fr  69 days              Urinary Catheter:  Goal for removal: N/A - Chronic Ortiz             Imaging: Reviewed radiology reports from this admission including: chest CT scan and abdominal/pelvic CT  CT chest abdomen pelvis wo contrast   Final Result by Riri Greer MD (03/02 1924)   Consolidation seen at the left lung base in the region of the previously noted parenchymal abnormality may be due to scarring or due to nonresolved consolidation, follow-up chest CT at 3 months suggested      No intra-abdominal fluid collection seen      No bowel obstruction      No obstructing calculus      Mild prominence of the left pelvicalyceal system and mild dilation of the left ureter without any obstructing calculus      The study was marked in EPIC for significant notification  Workstation performed: [id number]         CT head without contrast   Final Result by Hari Martínez MD (03/02 1642)      No acute intracranial abnormality  Workstation performed: [id number]         XR foot 2 views LEFT    (Results Pending)   MRI inpatient order    (Results Pending)       EKG and Other Studies Reviewed on Admission:   · All    ** Please Note: This note has been constructed using a voice recognition system   **

## 2023-03-03 NOTE — UTILIZATION REVIEW
NOTIFICATION OF INPATIENT ADMISSION   AUTHORIZATION REQUEST   SERVICING FACILITY:   [address] [place] [address]  Juana Hercules [address] [place] [address], [address]  Tax ID: [id number]  NPI: [id number] ATTENDING PROVIDER:  Attending Name and NPI#: Kan Quinteros [place], [place] [[id number]]  Address: [facility]  Juana Hercules [address] [place] [address], [facility] Moris Covarrubias  Phone: [phone]   ADMISSION INFORMATION:  Place of Service: [place] [address]  Place of Service Code: 21  Inpatient Admission Date/Time: 3/2/23  7:56 PM  Discharge Date/Time: No discharge date for patient encounter  Admitting Diagnosis Code/Description:  UTI (urinary tract infection) [N39 0]  KELLY (acute kidney injury) ([facility] ) [N17 9]  AMS (altered mental status) [R41 82]     UTILIZATION REVIEW CONTACT:  Raffi Caraballo Utilization   Network Utilization Review Department  Phone: [phone]  Fax [phone]  Email: [email or web address]  Contact for approvals/pending authorizations, clinical reviews, and discharge  PHYSICIAN ADVISORY SERVICES:  Medical Necessity Denial & Tgpx-hy-Ufvl Review  Phone: [phone]  Fax: [phone]  Email: [email or web address]

## 2023-03-03 NOTE — WOUND OSTOMY CARE
Consult Note - Wound   Jaqueline Elias  62 y o  male MRN: [id number]  Unit/Bed#: -01 Encounter: [id number]      History and Present Illness:  62year old male admitted with acute metabolic encephalopathy, seen for wound consult for sacrum  PMH:PAD Diabetic foot ulcer, sepsis CKD,gout , osteomyelitis of vertebra region sever protein calorie malnutrition      Assessment and Findings  1)Left heel chronic unstageable wound to heel  Brown black eschar intact  Dr Socrates Dennison DPM present, assessed pt  Bet dsd applied heels offloaded  2)Right heel intact pink blanchable  3)Sacrum dry intact slow to renee no open areas noted pt has history of MASD of gluteal fold and pressure injury to same  Allevyn bordered foam applied as protective measure  Positioned on side with foam wedges     No induration, fluctuance, odor, warmth/temperature differences, redness, or purulence noted to the above noted wounds and skin areas assessed  New dressings applied per orders listed below  Patient tolerated well- no s/s of non-verbal pain or discomfort observed during the encounter  Bedside nurse aware of plan of care  See flow sheets for more detailed assessment findings  Wound care will continue to follow  Skin care Plan:  1-Protect sacrum w/Allevyn foam, iva with P, change q3d and PRN, check skin q-shift  2-Turn/reposition q2h or when medically stable for pressure re-distribution on skin   Use foam wedges  3-Elevate heels to offload pressure  4-Moisturize skin daily with skin nourishing cream  5-Ehob cushion in chair when out of bed  6-Hydraguard to right  bilateral heel BID and PRN      Wound 10/20/22 Pressure Injury Heel Left (Active)   Wound Image   03/03/23 1238   Wound Description Intact;Dry;Brown;Black 03/03/23 1238   Pressure Injury Stage U 03/03/23 1238   [place]-wound Assessment Intact 03/03/23 1238   Drainage Amount None 03/03/23 1238   Treatments Cleansed;Site care;Elevated 03/03/23 1238   Dressing ABD;Dry dressing; Other (Comment) 03/03/23 1238   Dressing Changed Changed by provider 03/03/23 1238   Patient Tolerance Tolerated well 03/03/23 1238   Dressing Status Clean;Dry; Intact 03/03/23 1238       Wound 10/21/22 Pressure Injury Coccyx (Active)   Wound Image   03/03/23 1242   Wound Description Fragile; White 03/03/23 1242   Pressure Injury Stage O 03/03/23 1242   Wound Length (cm) 0 cm 03/03/23 1242   Wound Width (cm) 0 cm 03/03/23 1242   Wound Depth (cm) 0 cm 03/03/23 1242   Wound Surface Area (cm^2) 0 cm^2 03/03/23 1242   Wound Volume (cm^3) 0 cm^3 03/03/23 1242   Calculated Wound Volume (cm^3) 0 cm^3 03/03/23 1242   Change in Wound Size % 100 03/03/23 1242   Treatments Site care 03/03/23 1242   Dressing Foam, Silicon (eg  Allevyn, etc) 03/03/23 1242   Dressing Changed New 03/03/23 1242   Patient Tolerance Tolerated poorly 03/03/23 1242   Dressing Status Clean; Intact;Dry 03/03/23 1242           Call or tigertext with any questions  Wound Care will continue to follow    Jovon OCHOAN RN

## 2023-03-04 ENCOUNTER — APPOINTMENT (INPATIENT)
Dept: CT IMAGING | Facility: HOSPITAL | Age: 58
End: 2023-03-04

## 2023-03-04 LAB
ALBUMIN SERPL BCP-MCNC: 3.4 G/DL (ref 3.5–5)
ALP SERPL-CCNC: 73 U/L (ref 34–104)
ALT SERPL W P-5'-P-CCNC: 3 U/L (ref 7–52)
ANION GAP SERPL CALCULATED.3IONS-SCNC: 7 MMOL/L (ref 4–13)
AST SERPL W P-5'-P-CCNC: 7 U/L (ref 13–39)
BASOPHILS # BLD AUTO: 0.04 THOUSANDS/ÂΜL (ref 0–0.1)
BASOPHILS NFR BLD AUTO: 0 % (ref 0–1)
BILIRUB SERPL-MCNC: 0.49 MG/DL (ref 0.2–1)
BUN SERPL-MCNC: 47 MG/DL (ref 5–25)
CALCIUM ALBUM COR SERPL-MCNC: 10.9 MG/DL (ref 8.3–10.1)
CALCIUM SERPL-MCNC: 10.4 MG/DL (ref 8.4–10.2)
CHLORIDE SERPL-SCNC: 103 MMOL/L (ref 96–108)
CO2 SERPL-SCNC: 26 MMOL/L (ref 21–32)
CREAT SERPL-MCNC: 3.16 MG/DL (ref 0.6–1.3)
EOSINOPHIL # BLD AUTO: 0.16 THOUSAND/ÂΜL (ref 0–0.61)
EOSINOPHIL NFR BLD AUTO: 2 % (ref 0–6)
ERYTHROCYTE [DISTWIDTH] IN BLOOD BY AUTOMATED COUNT: 17 % (ref 11.6–15.1)
GFR SERPL CREATININE-BSD FRML MDRD: 20 ML/MIN/1.73SQ M
GLUCOSE SERPL-MCNC: 148 MG/DL (ref 65–140)
GLUCOSE SERPL-MCNC: 173 MG/DL (ref 65–140)
GLUCOSE SERPL-MCNC: 177 MG/DL (ref 65–140)
GLUCOSE SERPL-MCNC: 91 MG/DL (ref 65–140)
GLUCOSE SERPL-MCNC: 92 MG/DL (ref 65–140)
HCT VFR BLD AUTO: 28.4 % (ref 36.5–49.3)
HGB BLD-MCNC: 9.2 G/DL (ref 12–17)
IMM GRANULOCYTES # BLD AUTO: 0.09 THOUSAND/UL (ref 0–0.2)
IMM GRANULOCYTES NFR BLD AUTO: 1 % (ref 0–2)
LYMPHOCYTES # BLD AUTO: 0.83 THOUSANDS/ÂΜL (ref 0.6–4.47)
LYMPHOCYTES NFR BLD AUTO: 8 % (ref 14–44)
MAGNESIUM SERPL-MCNC: 2 MG/DL (ref 1.9–2.7)
MCH RBC QN AUTO: 30 PG (ref 26.8–34.3)
MCHC RBC AUTO-ENTMCNC: 32.4 G/DL (ref 31.4–37.4)
MCV RBC AUTO: 93 FL (ref 82–98)
MONOCYTES # BLD AUTO: 1.4 THOUSAND/ÂΜL (ref 0.17–1.22)
MONOCYTES NFR BLD AUTO: 13 % (ref 4–12)
MRSA NOSE QL CULT: NORMAL
NEUTROPHILS # BLD AUTO: 8.3 THOUSANDS/ÂΜL (ref 1.85–7.62)
NEUTS SEG NFR BLD AUTO: 76 % (ref 43–75)
NRBC BLD AUTO-RTO: 0 /100 WBCS
PLATELET # BLD AUTO: 141 THOUSANDS/UL (ref 149–390)
PMV BLD AUTO: 11.7 FL (ref 8.9–12.7)
POTASSIUM SERPL-SCNC: 4 MMOL/L (ref 3.5–5.3)
PROCALCITONIN SERPL-MCNC: 1.15 NG/ML
PROT SERPL-MCNC: 6.1 G/DL (ref 6.4–8.4)
RBC # BLD AUTO: 3.07 MILLION/UL (ref 3.88–5.62)
SODIUM SERPL-SCNC: 136 MMOL/L (ref 135–147)
URATE SERPL-MCNC: 7 MG/DL (ref 3.5–8.5)
VANCOMYCIN SERPL-MCNC: 19.5 UG/ML (ref 10–20)
WBC # BLD AUTO: 10.82 THOUSAND/UL (ref 4.31–10.16)

## 2023-03-04 RX ADMIN — SEVELAMER HYDROCHLORIDE 800 MG: 800 TABLET ORAL at 12:23

## 2023-03-04 RX ADMIN — VENLAFAXINE HYDROCHLORIDE 37.5 MG: 37.5 CAPSULE, EXTENDED RELEASE ORAL at 08:33

## 2023-03-04 RX ADMIN — MEROPENEM 1000 MG: 1 INJECTION, POWDER, FOR SOLUTION INTRAVENOUS at 00:22

## 2023-03-04 RX ADMIN — TAMSULOSIN HYDROCHLORIDE 0.4 MG: 0.4 CAPSULE ORAL at 16:12

## 2023-03-04 RX ADMIN — BUPRENORPHINE HYDROCHLORIDE 900 MCG: 900 FILM, SOLUBLE BUCCAL at 08:41

## 2023-03-04 RX ADMIN — INSULIN GLARGINE 5 UNITS: 100 INJECTION, SOLUTION SUBCUTANEOUS at 21:31

## 2023-03-04 RX ADMIN — HEPARIN SODIUM 5000 UNITS: 5000 INJECTION INTRAVENOUS; SUBCUTANEOUS at 16:08

## 2023-03-04 RX ADMIN — OXYCODONE HYDROCHLORIDE 5 MG: 5 TABLET ORAL at 08:42

## 2023-03-04 RX ADMIN — INSULIN LISPRO 1 UNITS: 100 INJECTION, SOLUTION INTRAVENOUS; SUBCUTANEOUS at 21:32

## 2023-03-04 RX ADMIN — ALLOPURINOL 100 MG: 100 TABLET ORAL at 08:32

## 2023-03-04 RX ADMIN — PREGABALIN 150 MG: 75 CAPSULE ORAL at 17:51

## 2023-03-04 RX ADMIN — CARVEDILOL 6.25 MG: 6.25 TABLET, FILM COATED ORAL at 16:12

## 2023-03-04 RX ADMIN — AMLODIPINE BESYLATE 10 MG: 10 TABLET ORAL at 08:32

## 2023-03-04 RX ADMIN — CARVEDILOL 6.25 MG: 6.25 TABLET, FILM COATED ORAL at 06:46

## 2023-03-04 RX ADMIN — MEROPENEM 1000 MG: 1 INJECTION, POWDER, FOR SOLUTION INTRAVENOUS at 23:20

## 2023-03-04 RX ADMIN — DOCUSATE SODIUM 100 MG: 100 CAPSULE ORAL at 17:51

## 2023-03-04 RX ADMIN — CYCLOBENZAPRINE 5 MG: 10 TABLET, FILM COATED ORAL at 16:11

## 2023-03-04 RX ADMIN — POLYETHYLENE GLYCOL 3350 17 G: 17 POWDER, FOR SOLUTION ORAL at 17:50

## 2023-03-04 RX ADMIN — DOCUSATE SODIUM AND SENNOSIDES 1 TABLET: 8.6; 5 TABLET, FILM COATED ORAL at 08:33

## 2023-03-04 RX ADMIN — SEVELAMER HYDROCHLORIDE 800 MG: 800 TABLET ORAL at 16:12

## 2023-03-04 RX ADMIN — THIAMINE HCL TAB 100 MG 100 MG: 100 TAB at 08:33

## 2023-03-04 RX ADMIN — HEPARIN SODIUM 5000 UNITS: 5000 INJECTION INTRAVENOUS; SUBCUTANEOUS at 21:31

## 2023-03-04 RX ADMIN — VENLAFAXINE HYDROCHLORIDE 37.5 MG: 37.5 CAPSULE, EXTENDED RELEASE ORAL at 21:31

## 2023-03-04 RX ADMIN — ATORVASTATIN CALCIUM 40 MG: 40 TABLET, FILM COATED ORAL at 16:12

## 2023-03-04 RX ADMIN — HEPARIN SODIUM 5000 UNITS: 5000 INJECTION INTRAVENOUS; SUBCUTANEOUS at 06:46

## 2023-03-04 RX ADMIN — FERROUS SULFATE TAB 325 MG (65 MG ELEMENTAL FE) 325 MG: 325 (65 FE) TAB at 06:46

## 2023-03-04 RX ADMIN — MEROPENEM 1000 MG: 1 INJECTION, POWDER, FOR SOLUTION INTRAVENOUS at 12:23

## 2023-03-04 RX ADMIN — CYCLOBENZAPRINE 5 MG: 10 TABLET, FILM COATED ORAL at 21:31

## 2023-03-04 RX ADMIN — DOCUSATE SODIUM 100 MG: 100 CAPSULE ORAL at 08:33

## 2023-03-04 RX ADMIN — INSULIN LISPRO 1 UNITS: 100 INJECTION, SOLUTION INTRAVENOUS; SUBCUTANEOUS at 12:28

## 2023-03-04 RX ADMIN — SEVELAMER HYDROCHLORIDE 800 MG: 800 TABLET ORAL at 06:46

## 2023-03-04 RX ADMIN — POLYETHYLENE GLYCOL 3350 17 G: 17 POWDER, FOR SOLUTION ORAL at 08:34

## 2023-03-04 RX ADMIN — CYCLOBENZAPRINE 5 MG: 10 TABLET, FILM COATED ORAL at 08:34

## 2023-03-04 RX ADMIN — BUPRENORPHINE HYDROCHLORIDE 900 MCG: 900 FILM, SOLUBLE BUCCAL at 21:32

## 2023-03-04 RX ADMIN — PREGABALIN 150 MG: 75 CAPSULE ORAL at 08:33

## 2023-03-04 RX ADMIN — VENLAFAXINE HYDROCHLORIDE 37.5 MG: 37.5 CAPSULE, EXTENDED RELEASE ORAL at 16:12

## 2023-03-04 RX ADMIN — DOCUSATE SODIUM AND SENNOSIDES 1 TABLET: 8.6; 5 TABLET, FILM COATED ORAL at 17:51

## 2023-03-04 NOTE — ASSESSMENT & PLAN NOTE
· History of PAD  · Diabetes mellitus  · Nonhealing left heel ulcer  · Arterial duplex 12/27 with diffuse disease but no significant focal stenosis  · Continue atorvastatin  · OP vascular surgery referral

## 2023-03-04 NOTE — ASSESSMENT & PLAN NOTE
· Recent hx of chronic osteodiscitis L4-L5 and wife notes had admission with spinal abscess  · Check CT scan lumbar spine

## 2023-03-04 NOTE — PROGRESS NOTES
Don Lutz Sr  is a 62 y o  male who is currently ordered Vancomycin IV with management by the Pharmacy Consult service  Relevant clinical data and objective / subjective history reviewed  Vancomycin Assessment:  Indication and Goal AUC/Trough: Soft tissue (goal -600, trough >10), soft tissue ( goal random level < or = 15)  Clinical Status: stable  Micro:     Renal Function:  SCr: 3 36 mg/dL  CrCl: 21 3 mL/min  Renal replacement: Not on dialysis  Days of Therapy: 2  Current Dose: Pulse dosing-- 12 5 mg/kg (750 mg) iv once prn random level < or = 15  Vancomycin Plan:  New Dosing: Pulse dosing-- 15 mg/kg (1000 mg) iv once prn randol level < or = 15  Estimated AUC: N/A pulse dosing   Estimated Trough: N/A pulse dosing   Next Level: 3/4/23 @ 1300  Renal Function Monitoring: Daily BMP and UOP  Pharmacy will continue to follow closely for s/sx of nephrotoxicity, infusion reactions and appropriateness of therapy  BMP and CBC will be ordered per protocol  We will continue to follow the patient’s culture results and clinical progress daily      Jaydon George, Pharmacist

## 2023-03-04 NOTE — ASSESSMENT & PLAN NOTE
· POA from home with wife with acute onset hallucinations, confusion  Wife was concerned he had an underlying infection as this is his normal response    · Chronic indwelling Ortiz catheter without evidence of pyuria  · CT brain no acute abnormality  · Left heel ulcer questionable source of infection  · Empiric antibiotics  · IV hydration- completed  · Currently alert, no hallucinations present - resolved at baseline

## 2023-03-04 NOTE — ASSESSMENT & PLAN NOTE
Lab Results   Component Value Date    EGFR 20 03/04/2023    EGFR 19 03/03/2023    EGFR 19 03/02/2023    CREATININE 3 16 (H) 03/04/2023    CREATININE 3 36 (H) 03/03/2023    CREATININE 3 37 (H) 03/02/2023   · Presents with KELLY on CKD in the setting of sepsis  · Baseline appears to be 2 5-2 9 follows with Nephrology OP  · Creatinine trending down toward baseline  · Continue chronic Ortiz catheter  · Renally dose medications, avoid hypotension, nephrotoxic medications

## 2023-03-04 NOTE — PLAN OF CARE
Problem: Nutrition/Hydration-ADULT  Goal: Nutrient/Hydration intake appropriate for improving, restoring or maintaining nutritional needs  Description: Monitor and assess patient's nutrition/hydration status for malnutrition  Collaborate with interdisciplinary team and initiate plan and interventions as ordered  Monitor patient's weight and dietary intake as ordered or per policy  Utilize nutrition screening tool and intervene as necessary  Determine patient's food preferences and provide high-protein, high-caloric foods as appropriate       INTERVENTIONS:  - Monitor oral intake, urinary output, labs, and treatment plans  - Assess nutrition and hydration status and recommend course of action  - Evaluate amount of meals eaten  - Assist patient with eating if necessary   - Allow adequate time for meals  - Recommend/ encourage appropriate diets, oral nutritional supplements, and vitamin/mineral supplements  - Order, calculate, and assess calorie counts as needed  - Recommend, monitor, and adjust tube feedings and TPN/PPN based on assessed needs  - Assess need for intravenous fluids  - Provide specific nutrition/hydration education as appropriate  - Include patient/family/caregiver in decisions related to nutrition  Outcome: Progressing     Problem: MOBILITY - ADULT  Goal: Maintain or return to baseline ADL function  Description: INTERVENTIONS:  -  Assess patient's ability to carry out ADLs; assess patient's baseline for ADL function and identify physical deficits which impact ability to perform ADLs (bathing, care of mouth/teeth, toileting, grooming, dressing, etc )  - Assess/evaluate cause of self-care deficits   - Assess range of motion  - Assess patient's mobility; develop plan if impaired  - Assess patient's need for assistive devices and provide as appropriate  - Encourage maximum independence but intervene and supervise when necessary  - Involve family in performance of ADLs  - Assess for home care needs following discharge   - Consider OT consult to assist with ADL evaluation and planning for discharge  - Provide patient education as appropriate  Outcome: Progressing  Goal: Maintains/Returns to pre admission functional level  Description: INTERVENTIONS:  - Perform BMAT or MOVE assessment daily    - Set and communicate daily mobility goal to care team and patient/family/caregiver  - Collaborate with rehabilitation services on mobility goals if consulted  - Perform Range of Motion 3 times a day  - Reposition patient every 2 hours    - Record patient progress and toleration of activity level   Outcome: Progressing     Problem: PAIN - ADULT  Goal: Verbalizes/displays adequate comfort level or baseline comfort level  Description: Interventions:  - Encourage patient to monitor pain and request assistance  - Assess pain using appropriate pain scale  - Administer analgesics based on type and severity of pain and evaluate response  - Implement non-pharmacological measures as appropriate and evaluate response  - Consider cultural and social influences on pain and pain management  - Notify physician/advanced practitioner if interventions unsuccessful or patient reports new pain  Outcome: Progressing     Problem: INFECTION - ADULT  Goal: Absence or prevention of progression during hospitalization  Description: INTERVENTIONS:  - Assess and monitor for signs and symptoms of infection  - Monitor lab/diagnostic results  - Monitor all insertion sites, i e  indwelling lines, tubes, and drains  - Monitor endotracheal if appropriate and nasal secretions for changes in amount and color  - [place] appropriate cooling/warming therapies per order  - Administer medications as ordered  - Instruct and encourage patient and family to use good hand hygiene technique  - Identify and instruct in appropriate isolation precautions for identified infection/condition  Outcome: Progressing  Goal: Absence of fever/infection during neutropenic period  Description: INTERVENTIONS:  - Monitor WBC    Outcome: Progressing     Problem: SAFETY ADULT  Goal: Maintain or return to baseline ADL function  Description: INTERVENTIONS:  -  Assess patient's ability to carry out ADLs; assess patient's baseline for ADL function and identify physical deficits which impact ability to perform ADLs (bathing, care of mouth/teeth, toileting, grooming, dressing, etc )  - Assess/evaluate cause of self-care deficits   - Assess range of motion  - Assess patient's mobility; develop plan if impaired  - Assess patient's need for assistive devices and provide as appropriate  - Encourage maximum independence but intervene and supervise when necessary  - Involve family in performance of ADLs  - Assess for home care needs following discharge   - Consider OT consult to assist with ADL evaluation and planning for discharge  - Provide patient education as appropriate  Outcome: Progressing    Outcome: Progressing  Goal: Patient will remain free of falls  Description: INTERVENTIONS:  -  Assess patient's ability to carry out ADLs; assess patient's baseline for ADL function and identify physical deficits which impact ability to perform ADLs (bathing, care of mouth/teeth, toileting, grooming, dressing, etc )  - Assess/evaluate cause of self-care deficits   - Assess range of motion  - Assess patient's mobility; develop plan if impaired  - Assess patient's need for assistive devices and provide as appropriate  - Encourage maximum independence but intervene and supervise when necessary  - Involve family in performance of ADLs  - Assess for home care needs following discharge   - Consider OT consult to assist with ADL evaluation and planning for discharge  - Provide patient education as appropriate  Outcome: Progressing     Problem: DISCHARGE PLANNING  Goal: Discharge to home or other facility with appropriate resources  Description: INTERVENTIONS:  - Identify barriers to discharge w/patient and caregiver  - Arrange for needed discharge resources and transportation as appropriate  - Identify discharge learning needs (meds, wound care, etc )  - Arrange for interpretive services to assist at discharge as needed  - Refer to Case Management Department for coordinating discharge planning if the patient needs post-hospital services based on physician/advanced practitioner order or complex needs related to functional status, cognitive ability, or social support system  Outcome: Progressing     Problem: Knowledge Deficit  Goal: Patient/family/caregiver demonstrates understanding of disease process, treatment plan, medications, and discharge instructions  Description: Complete learning assessment and assess knowledge base    Interventions:  - Provide teaching at level of understanding  - Provide teaching via preferred learning methods  Outcome: Progressing     Problem: Prexisting or High Potential for Compromised Skin Integrity  Goal: Skin integrity is maintained or improved  Description: INTERVENTIONS:  - Identify patients at risk for skin breakdown  - Assess and monitor skin integrity  - Assess and monitor nutrition and hydration status  - Monitor labs   - Assess for incontinence   - Turn and reposition patient  - Assist with mobility/ambulation  - Relieve pressure over bony prominences  - Avoid friction and shearing  - Provide appropriate hygiene as needed including keeping skin clean and dry  - Evaluate need for skin moisturizer/barrier cream  - Collaborate with interdisciplinary team   - Patient/family teaching  - Consider wound care consult   Outcome: Progressing

## 2023-03-04 NOTE — ASSESSMENT & PLAN NOTE
· Presents with nonhealing left diabetic foot wound on left heel that is being followed by outpatient wound care, home care nurse and podiatry  · boggy left heel wound covered with eschar that appears moisture underneath the eschar  · X-ray possible osteomyelitis changes  · MRI showing ulcer but no evidence of osteomyelitis  · Empiric vancomycin for history MRSA pneumonia/ empiric meropenem recent tx Pseudomonas MARIA TERESA with Levaquin in the outpatient setting  · MRSA pending,   · BCx no growth 24hr  · Podiatry recommendations as per note  · Vascular surgery as outpatient  · Heel offloading w/prevalon boot, or two pillows

## 2023-03-04 NOTE — PLAN OF CARE
Problem: Nutrition/Hydration-ADULT  Goal: Nutrient/Hydration intake appropriate for improving, restoring or maintaining nutritional needs  Description: Monitor and assess patient's nutrition/hydration status for malnutrition  Collaborate with interdisciplinary team and initiate plan and interventions as ordered  Monitor patient's weight and dietary intake as ordered or per policy  Utilize nutrition screening tool and intervene as necessary  Determine patient's food preferences and provide high-protein, high-caloric foods as appropriate       INTERVENTIONS:  - Monitor oral intake, urinary output, labs, and treatment plans  - Assess nutrition and hydration status and recommend course of action  - Evaluate amount of meals eaten  - Assist patient with eating if necessary   - Allow adequate time for meals  - Recommend/ encourage appropriate diets, oral nutritional supplements, and vitamin/mineral supplements  - Order, calculate, and assess calorie counts as needed  - Recommend, monitor, and adjust tube feedings and TPN/PPN based on assessed needs  - Assess need for intravenous fluids  - Provide specific nutrition/hydration education as appropriate  - Include patient/family/caregiver in decisions related to nutrition  Outcome: Progressing     Problem: MOBILITY - ADULT  Goal: Maintain or return to baseline ADL function  Description: INTERVENTIONS:  -  Assess patient's ability to carry out ADLs; assess patient's baseline for ADL function and identify physical deficits which impact ability to perform ADLs (bathing, care of mouth/teeth, toileting, grooming, dressing, etc )  - Assess/evaluate cause of self-care deficits   - Assess range of motion  - Assess patient's mobility; develop plan if impaired  - Assess patient's need for assistive devices and provide as appropriate  - Encourage maximum independence but intervene and supervise when necessary  - Involve family in performance of ADLs  - Assess for home care needs following discharge   - Consider OT consult to assist with ADL evaluation and planning for discharge  - Provide patient education as appropriate  Outcome: Progressing  Goal: Maintains/Returns to pre admission functional level  Description: INTERVENTIONS:  - Perform BMAT or MOVE assessment daily    - Set and communicate daily mobility goal to care team and patient/family/caregiver     - Collaborate with rehabilitation services on mobility goals if consulted  - Out of bed for toileting  - Record patient progress and toleration of activity level   Outcome: Progressing     Problem: PAIN - ADULT  Goal: Verbalizes/displays adequate comfort level or baseline comfort level  Description: Interventions:  - Encourage patient to monitor pain and request assistance  - Assess pain using appropriate pain scale  - Administer analgesics based on type and severity of pain and evaluate response  - Implement non-pharmacological measures as appropriate and evaluate response  - Consider cultural and social influences on pain and pain management  - Notify physician/advanced practitioner if interventions unsuccessful or patient reports new pain  Outcome: Progressing     Problem: INFECTION - ADULT  Goal: Absence or prevention of progression during hospitalization  Description: INTERVENTIONS:  - Assess and monitor for signs and symptoms of infection  - Monitor lab/diagnostic results  - Monitor all insertion sites, i e  indwelling lines, tubes, and drains  - Monitor endotracheal if appropriate and nasal secretions for changes in amount and color  - [place] appropriate cooling/warming therapies per order  - Administer medications as ordered  - Instruct and encourage patient and family to use good hand hygiene technique  - Identify and instruct in appropriate isolation precautions for identified infection/condition  Outcome: Progressing  Goal: Absence of fever/infection during neutropenic period  Description: INTERVENTIONS:  - Monitor WBC    Outcome: Progressing     Problem: SAFETY ADULT  Goal: Maintain or return to baseline ADL function  Description: INTERVENTIONS:  -  Assess patient's ability to carry out ADLs; assess patient's baseline for ADL function and identify physical deficits which impact ability to perform ADLs (bathing, care of mouth/teeth, toileting, grooming, dressing, etc )  - Assess/evaluate cause of self-care deficits   - Assess range of motion  - Assess patient's mobility; develop plan if impaired  - Assess patient's need for assistive devices and provide as appropriate  - Encourage maximum independence but intervene and supervise when necessary  - Involve family in performance of ADLs  - Assess for home care needs following discharge   - Consider OT consult to assist with ADL evaluation and planning for discharge  - Provide patient education as appropriate  Outcome: Progressing  Goal: Maintains/Returns to pre admission functional level  Description: INTERVENTIONS:  - Perform BMAT or MOVE assessment daily    - Set and communicate daily mobility goal to care team and patient/family/caregiver     - Collaborate with rehabilitation services on mobility goals if consulted  - Out of bed for toileting  - Record patient progress and toleration of activity level   Outcome: Progressing  Goal: Patient will remain free of falls  Description: INTERVENTIONS:  - Educate patient/family on patient safety including physical limitations  - Instruct patient to call for assistance with activity   - Consult OT/PT to assist with strengthening/mobility   - Keep Call bell within reach  - Keep bed low and locked with side rails adjusted as appropriate  - Keep care items and personal belongings within reach  - Initiate and maintain comfort rounds  - Make Fall Risk Sign visible to staff  - Apply yellow socks and bracelet for high fall risk patients  - Consider moving patient to room near nurses station  Outcome: Progressing     Problem: DISCHARGE PLANNING  Goal: Discharge to home or other facility with appropriate resources  Description: INTERVENTIONS:  - Identify barriers to discharge w/patient and caregiver  - Arrange for needed discharge resources and transportation as appropriate  - Identify discharge learning needs (meds, wound care, etc )  - Arrange for interpretive services to assist at discharge as needed  - Refer to Case Management Department for coordinating discharge planning if the patient needs post-hospital services based on physician/advanced practitioner order or complex needs related to functional status, cognitive ability, or social support system  Outcome: Progressing     Problem: Knowledge Deficit  Goal: Patient/family/caregiver demonstrates understanding of disease process, treatment plan, medications, and discharge instructions  Description: Complete learning assessment and assess knowledge base    Interventions:  - Provide teaching at level of understanding  - Provide teaching via preferred learning methods  Outcome: Progressing     Problem: Prexisting or High Potential for Compromised Skin Integrity  Goal: Skin integrity is maintained or improved  Description: INTERVENTIONS:  - Identify patients at risk for skin breakdown  - Assess and monitor skin integrity  - Assess and monitor nutrition and hydration status  - Monitor labs   - Assess for incontinence   - Turn and reposition patient  - Assist with mobility/ambulation  - Relieve pressure over bony prominences  - Avoid friction and shearing  - Provide appropriate hygiene as needed including keeping skin clean and dry  - Evaluate need for skin moisturizer/barrier cream  - Collaborate with interdisciplinary team   - Patient/family teaching  - Consider wound care consult   Outcome: Progressing

## 2023-03-04 NOTE — ASSESSMENT & PLAN NOTE
· Criteria with tachycardia, leukocytosis  · Urine negative  · Blood cultures no growth 24hr  · CT chest abdomen pelvis no obvious infection  · suspected source left heel wound with eschar, erythema   · ABX: vanco/meropenem   · Follow fever curve, t-max 100 6 last 24hr  · Leukocytosis trending down   · Procalcitonin 0 3, will repeat

## 2023-03-04 NOTE — ASSESSMENT & PLAN NOTE
Lab Results   Component Value Date    HGBA1C 6 3 (H) 03/03/2023       Recent Labs     03/03/23  1607 03/03/23  2108 03/04/23  0655 03/04/23  1135   POCGLU 104 195* 92 173*       Blood Sugar Average: Last 72 hrs:  (P) 121 [id number]     · Basal insulin 5 units at bedtime, SSI AC/HS  · Hypoglycemia protocol  · Carbohydrate controlled diet

## 2023-03-04 NOTE — PROGRESS NOTES
Whitney López Sr  is a 62 y o  male who is currently ordered Vancomycin IV with management by the Pharmacy Consult service  Relevant clinical data and objective / subjective history reviewed  Vancomycin Assessment:  Indication and Goal AUC/Trough: Soft tissue (goal -600, trough >10), soft tissue (goal random level < or = 15)  Clinical Status: stable  Micro:     Renal Function:  SCr: 3 16 mg/dL  CrCl: 22 6 mL/min  Renal replacement: Not on dialysis  Days of Therapy: 3  Current Dose: Pulse dosing--1000 mg iv once prn random level < or = 15  Vancomycin Plan:  New Dosing: Pulse dosing--750 mg iv once prn random level < or = 15  Estimated AUC: N/A pulse dosing   Estimated Trough: N/A pulse dosing   Next Level: 3/5/23 @ 0600  Renal Function Monitoring: Daily BMP and [place] will continue to follow closely for s/sx of nephrotoxicity, infusion reactions and appropriateness of therapy  BMP and CBC will be ordered per protocol  We will continue to follow the patient’s culture results and clinical progress daily      Cecily Escoto, Pharmacist

## 2023-03-05 LAB
ANION GAP SERPL CALCULATED.3IONS-SCNC: 5 MMOL/L (ref 4–13)
BASOPHILS # BLD AUTO: 0.02 THOUSANDS/ÂΜL (ref 0–0.1)
BASOPHILS NFR BLD AUTO: 0 % (ref 0–1)
BUN SERPL-MCNC: 50 MG/DL (ref 5–25)
CALCIUM SERPL-MCNC: 10.3 MG/DL (ref 8.4–10.2)
CHLORIDE SERPL-SCNC: 105 MMOL/L (ref 96–108)
CO2 SERPL-SCNC: 28 MMOL/L (ref 21–32)
CREAT SERPL-MCNC: 3.17 MG/DL (ref 0.6–1.3)
EOSINOPHIL # BLD AUTO: 0.41 THOUSAND/ÂΜL (ref 0–0.61)
EOSINOPHIL NFR BLD AUTO: 5 % (ref 0–6)
ERYTHROCYTE [DISTWIDTH] IN BLOOD BY AUTOMATED COUNT: 16.8 % (ref 11.6–15.1)
GFR SERPL CREATININE-BSD FRML MDRD: 20 ML/MIN/1.73SQ M
GLUCOSE SERPL-MCNC: 129 MG/DL (ref 65–140)
GLUCOSE SERPL-MCNC: 131 MG/DL (ref 65–140)
GLUCOSE SERPL-MCNC: 140 MG/DL (ref 65–140)
GLUCOSE SERPL-MCNC: 200 MG/DL (ref 65–140)
GLUCOSE SERPL-MCNC: 226 MG/DL (ref 65–140)
HCT VFR BLD AUTO: 24.9 % (ref 36.5–49.3)
HGB BLD-MCNC: 8 G/DL (ref 12–17)
IMM GRANULOCYTES # BLD AUTO: 0.03 THOUSAND/UL (ref 0–0.2)
IMM GRANULOCYTES NFR BLD AUTO: 0 % (ref 0–2)
LYMPHOCYTES # BLD AUTO: 0.68 THOUSANDS/ÂΜL (ref 0.6–4.47)
LYMPHOCYTES NFR BLD AUTO: 8 % (ref 14–44)
MCH RBC QN AUTO: 29.4 PG (ref 26.8–34.3)
MCHC RBC AUTO-ENTMCNC: 32.1 G/DL (ref 31.4–37.4)
MCV RBC AUTO: 92 FL (ref 82–98)
MONOCYTES # BLD AUTO: 1.32 THOUSAND/ÂΜL (ref 0.17–1.22)
MONOCYTES NFR BLD AUTO: 16 % (ref 4–12)
NEUTROPHILS # BLD AUTO: 5.81 THOUSANDS/ÂΜL (ref 1.85–7.62)
NEUTS SEG NFR BLD AUTO: 71 % (ref 43–75)
NRBC BLD AUTO-RTO: 0 /100 WBCS
PLATELET # BLD AUTO: 133 THOUSANDS/UL (ref 149–390)
PMV BLD AUTO: 12.4 FL (ref 8.9–12.7)
POTASSIUM SERPL-SCNC: 3.9 MMOL/L (ref 3.5–5.3)
RBC # BLD AUTO: 2.72 MILLION/UL (ref 3.88–5.62)
SODIUM SERPL-SCNC: 138 MMOL/L (ref 135–147)
VANCOMYCIN SERPL-MCNC: 15.6 UG/ML (ref 10–20)
WBC # BLD AUTO: 8.27 THOUSAND/UL (ref 4.31–10.16)

## 2023-03-05 RX ORDER — INSULIN LISPRO 100 [IU]/ML
2-12 INJECTION, SOLUTION INTRAVENOUS; SUBCUTANEOUS
Status: DISCONTINUED | OUTPATIENT
Start: 2023-03-05 | End: 2023-03-10 | Stop reason: HOSPADM

## 2023-03-05 RX ADMIN — MEROPENEM 1000 MG: 1 INJECTION, POWDER, FOR SOLUTION INTRAVENOUS at 10:39

## 2023-03-05 RX ADMIN — OXYCODONE HYDROCHLORIDE 5 MG: 5 TABLET ORAL at 08:29

## 2023-03-05 RX ADMIN — ATORVASTATIN CALCIUM 40 MG: 40 TABLET, FILM COATED ORAL at 17:09

## 2023-03-05 RX ADMIN — VENLAFAXINE HYDROCHLORIDE 37.5 MG: 37.5 CAPSULE, EXTENDED RELEASE ORAL at 22:21

## 2023-03-05 RX ADMIN — DOCUSATE SODIUM 100 MG: 100 CAPSULE ORAL at 17:09

## 2023-03-05 RX ADMIN — VENLAFAXINE HYDROCHLORIDE 37.5 MG: 37.5 CAPSULE, EXTENDED RELEASE ORAL at 17:09

## 2023-03-05 RX ADMIN — CARVEDILOL 6.25 MG: 6.25 TABLET, FILM COATED ORAL at 06:34

## 2023-03-05 RX ADMIN — SEVELAMER HYDROCHLORIDE 800 MG: 800 TABLET ORAL at 13:47

## 2023-03-05 RX ADMIN — FERROUS SULFATE TAB 325 MG (65 MG ELEMENTAL FE) 325 MG: 325 (65 FE) TAB at 06:34

## 2023-03-05 RX ADMIN — HEPARIN SODIUM 5000 UNITS: 5000 INJECTION INTRAVENOUS; SUBCUTANEOUS at 22:19

## 2023-03-05 RX ADMIN — BUPRENORPHINE HYDROCHLORIDE 900 MCG: 900 FILM, SOLUBLE BUCCAL at 08:26

## 2023-03-05 RX ADMIN — PREGABALIN 150 MG: 75 CAPSULE ORAL at 17:09

## 2023-03-05 RX ADMIN — POLYETHYLENE GLYCOL 3350 17 G: 17 POWDER, FOR SOLUTION ORAL at 08:25

## 2023-03-05 RX ADMIN — THIAMINE HCL TAB 100 MG 100 MG: 100 TAB at 08:29

## 2023-03-05 RX ADMIN — INSULIN LISPRO 4 UNITS: 100 INJECTION, SOLUTION INTRAVENOUS; SUBCUTANEOUS at 13:48

## 2023-03-05 RX ADMIN — CYCLOBENZAPRINE 5 MG: 10 TABLET, FILM COATED ORAL at 08:28

## 2023-03-05 RX ADMIN — AMLODIPINE BESYLATE 10 MG: 10 TABLET ORAL at 08:29

## 2023-03-05 RX ADMIN — TAMSULOSIN HYDROCHLORIDE 0.4 MG: 0.4 CAPSULE ORAL at 17:11

## 2023-03-05 RX ADMIN — SEVELAMER HYDROCHLORIDE 800 MG: 800 TABLET ORAL at 17:09

## 2023-03-05 RX ADMIN — HEPARIN SODIUM 5000 UNITS: 5000 INJECTION INTRAVENOUS; SUBCUTANEOUS at 06:36

## 2023-03-05 RX ADMIN — VENLAFAXINE HYDROCHLORIDE 37.5 MG: 37.5 CAPSULE, EXTENDED RELEASE ORAL at 08:28

## 2023-03-05 RX ADMIN — CARVEDILOL 6.25 MG: 6.25 TABLET, FILM COATED ORAL at 17:09

## 2023-03-05 RX ADMIN — PREGABALIN 150 MG: 75 CAPSULE ORAL at 08:29

## 2023-03-05 RX ADMIN — SEVELAMER HYDROCHLORIDE 800 MG: 800 TABLET ORAL at 06:34

## 2023-03-05 RX ADMIN — SODIUM BICARBONATE 650 MG TABLET 650 MG: at 08:28

## 2023-03-05 RX ADMIN — HEPARIN SODIUM 5000 UNITS: 5000 INJECTION INTRAVENOUS; SUBCUTANEOUS at 13:47

## 2023-03-05 RX ADMIN — CYCLOBENZAPRINE 5 MG: 10 TABLET, FILM COATED ORAL at 22:21

## 2023-03-05 RX ADMIN — DOCUSATE SODIUM 100 MG: 100 CAPSULE ORAL at 08:27

## 2023-03-05 RX ADMIN — INSULIN LISPRO 2 UNITS: 100 INJECTION, SOLUTION INTRAVENOUS; SUBCUTANEOUS at 22:20

## 2023-03-05 RX ADMIN — BUPRENORPHINE HYDROCHLORIDE 900 MCG: 900 FILM, SOLUBLE BUCCAL at 22:19

## 2023-03-05 RX ADMIN — DOCUSATE SODIUM AND SENNOSIDES 1 TABLET: 8.6; 5 TABLET, FILM COATED ORAL at 08:28

## 2023-03-05 RX ADMIN — ALLOPURINOL 100 MG: 100 TABLET ORAL at 08:30

## 2023-03-05 RX ADMIN — INSULIN GLARGINE 5 UNITS: 100 INJECTION, SOLUTION SUBCUTANEOUS at 22:20

## 2023-03-05 RX ADMIN — CYCLOBENZAPRINE 5 MG: 10 TABLET, FILM COATED ORAL at 17:08

## 2023-03-05 RX ADMIN — VANCOMYCIN HYDROCHLORIDE 750 MG: 750 INJECTION, SOLUTION INTRAVENOUS at 13:51

## 2023-03-05 RX ADMIN — DOCUSATE SODIUM AND SENNOSIDES 1 TABLET: 8.6; 5 TABLET, FILM COATED ORAL at 17:09

## 2023-03-05 RX ADMIN — POLYETHYLENE GLYCOL 3350 17 G: 17 POWDER, FOR SOLUTION ORAL at 17:10

## 2023-03-05 RX ADMIN — OXYCODONE HYDROCHLORIDE 5 MG: 5 TABLET ORAL at 13:46

## 2023-03-05 RX ADMIN — MEROPENEM 1000 MG: 1 INJECTION, POWDER, FOR SOLUTION INTRAVENOUS at 22:22

## 2023-03-05 NOTE — ASSESSMENT & PLAN NOTE
Lab Results   Component Value Date    HGBA1C 6 3 (H) 03/03/2023       Recent Labs     03/04/23  1549 03/04/23  2104 03/05/23  0631 03/05/23  1130   POCGLU 148* 177* 131 226*       Blood Sugar Average: Last 72 hrs:  (P) 989 [id number]     · Basal insulin 5 units at bedtime, SSI AC/HS  · Hypoglycemia protocol  · Carbohydrate controlled diet

## 2023-03-05 NOTE — PROGRESS NOTES
[address]  Progress Note - Nikko Sgiala 1965, 62 y o  male MRN: [id number]  Unit/Bed#: -[facility] Encounter: [id number]  Primary Care Provider: Barbara Ibarra DO   Date and time admitted to hospital: 3/2/2023  2:23 PM    * Acute metabolic encephalopathy  Assessment & Plan  · POA from home with wife with acute onset hallucinations, confusion  Wife was concerned he had an underlying infection as this is his normal response    · Chronic indwelling Ortiz catheter without evidence of pyuria  · CT brain no acute abnormality  · Left heel ulcer questionable source of infection  · Empiric antibiotics  · IV hydration- completed  · Currently alert, no hallucinations present - wife reports pt can hold conversation and seems oriented but then says things that dont make sense    Sepsis ([facility] )  Assessment & Plan  · Criteria with tachycardia, leukocytosis  · Urine negative  · Blood cultures no growth 48hr  · CT chest abdomen pelvis no obvious infection  · suspected source left heel wound with eschar, erythema   · ABX: vanco/meropenem >   · Follow fever curve, t-max 99 3 last 24hr  · Leukocytosis trending down - now normalized  · Procalcitonin 0 3> 1 15    Diabetic foot ulcer (HCC)  Assessment & Plan  · Presents with nonhealing left diabetic foot wound on left heel that is being followed by outpatient wound care, home care nurse and podiatry  · boggy left heel wound covered with eschar that appears moisture underneath the eschar  · X-ray possible osteomyelitis changes  · MRI showing ulcer but no evidence of osteomyelitis  · Empiric vancomycin for history MRSA pneumonia/ empiric meropenem recent tx Pseudomonas MARIA TERESA with Levaquin in the outpatient setting  · MRSA negative-we will DC Vanco  · BCx no growth 48hr  · Podiatry recommendations as per note  · Vascular surgery as outpatient  · Heel offloading w/prevalon boot, or two pillows    CKD (chronic kidney disease)  Assessment & Plan  Lab Results Component Value Date    EGFR 20 03/05/2023    EGFR 20 03/04/2023    EGFR 19 03/03/2023    CREATININE 3 17 (H) 03/05/2023    CREATININE 3 16 (H) 03/04/2023    CREATININE 3 36 (H) 03/03/2023   · Presents with KELLY on CKD in the setting of sepsis  · Baseline 3 6-3 9 per outpatient Nephrology note  · Creatinine trending down toward baseline  · Continue chronic Ortiz catheter  · Renally dose medications, avoid hypotension, nephrotoxic medications    PAD (peripheral artery disease) ([address] )  Assessment & Plan  · History of PAD  · Diabetes mellitus  · Nonhealing left heel ulcer  · Arterial duplex 12/27 with diffuse disease but no significant focal stenosis  · Continue atorvastatin  · OP vascular surgery referral    Chronic back pain  Assessment & Plan  · hx of chronic osteodiscitis L4-L5 and wife notes had admission with spinal abscess  · 3/4 CT scan lumbar spine- healing chronic L4-5 colitis/osteomyelitis with chronic pathologic Check compression fractures    Limited abscess evaluation with noncontrast exam   No new sites of discitis/osteomyelitis of lumbar spine    Chronic anemia  Assessment & Plan  · Chronic anemia noted previous iron deficiency with p o  iron supplementation, also likely anemia of chronic disease with CKD 3  · Baseline Hgb 9-10  · Continue PO supplmentation    Retention of urine  Assessment & Plan  · Chronic Ortiz due to neurogenic bladder  · No pyuria on urinalysis      Essential hypertension  Assessment & Plan  · Continue PTA amlodipine, carvedilol  · Remains normotensive     Gout  Assessment & Plan  · Patient complaining of hand pain and foot thinks he is having a gout flare  · Uric acid level normal at 7-continue with allopurinol, no colchicine needed    Diabetes mellitus type 2, insulin dependent [facility])  Assessment & Plan  Lab Results   Component Value Date    HGBA1C 6 3 (H) 03/03/2023       Recent Labs     03/04/23  1549 03/04/23  2104 03/05/23  0631 03/05/23  1130   POCGLU 148* 177* 131 226* Blood Sugar Average: Last 72 hrs:  (P) [id number]     · Basal insulin 5 units at bedtime, SSI AC/HS  · Hypoglycemia protocol  · Carbohydrate controlled diet        VTE Pharmacologic Prophylaxis: VTE Score: 3 High Risk (Score >/= 5) - Pharmacological DVT Prophylaxis Ordered: heparin  Sequential Compression Devices Ordered  Patient Centered Rounds: I performed bedside rounds with nursing staff today  Discussions with Specialists or Other Care Team Provider:     Education and Discussions with Family / Patient: Updated  (wife) via phone  Total Time Spent on Date of Encounter in care of patient: 45 minutes This time was spent on one or more of the following: performing physical exam; counseling and coordination of care; obtaining or reviewing history; documenting in the medical record; reviewing/ordering tests, medications or procedures; communicating with other healthcare professionals and discussing with patient's family/caregivers  Current Length of Stay: 3 day(s)  Current Patient Status: Inpatient   Certification Statement: The patient will continue to require additional inpatient hospital stay due to sepsis, diabetic foot infection  Discharge Plan: Anticipate discharge in 48-72 hrs to home with home services  Code Status: Level 1 - Full Code    Subjective:   Resting in bed complains of leg and back pain  Discussed ruling out worsening osteomyelitis/discitis with history  Oriented to questioning but does intermittently offer conversation that does not apply or fit scenario  Objective:     Vitals:   Temp (24hrs), Av 8 °F (37 1 °C), Min:98 6 °F (37 °C), Max:99 3 °F (37 4 °C)    Temp:  [98 6 °F (37 °C)-99 3 °F (37 4 °C)] 98 6 °F (37 °C)  HR:  [78-80] 78  Resp:  [12-20] 12  BP: (114-119)/(58-62) 114/59  SpO2:  [91 %-94 %] 91 %  Body mass index is 19 28 kg/m²  Input and Output Summary (last 24 hours):      Intake/Output Summary (Last 24 hours) at 3/5/2023 1208  Last data filed at 3/5/2023 1132  Gross per 24 hour   Intake 360 ml   Output 2050 ml   Net -1690 ml       Physical Exam:   Physical Exam  Vitals and nursing note reviewed  Constitutional:       General: He is not in acute distress  Appearance: He is well-developed  HENT:      Head: Normocephalic and atraumatic  Mouth/Throat:      Mouth: Mucous membranes are dry  Eyes:      Conjunctiva/sclera: Conjunctivae normal       Pupils: Pupils are equal, round, and reactive to light  Cardiovascular:      Rate and Rhythm: Normal rate and regular rhythm  Pulses: Normal pulses  Heart sounds: Normal heart sounds  No murmur heard  Pulmonary:      Effort: Pulmonary effort is normal  No respiratory distress  Breath sounds: Normal breath sounds  No wheezing  Abdominal:      General: Bowel sounds are normal       Palpations: Abdomen is soft  Tenderness: There is no abdominal tenderness  Musculoskeletal:         General: Swelling (left foot) present  Cervical back: Neck supple  Right lower leg: No edema  Left lower leg: No edema  Skin:     General: Skin is warm and dry  Capillary Refill: Capillary refill takes less than 2 seconds  Findings: Erythema (left heel) and wound (left heel eschar, surrounding erythema) present  Neurological:      General: No focal deficit present  Mental Status: He is alert and oriented to person, place, and time     Psychiatric:         Mood and Affect: Mood normal           Additional Data:     Labs:  Results from last 7 days   Lab Units 03/05/23  0550   WBC Thousand/uL 8 27   HEMOGLOBIN g/dL 8 0*   HEMATOCRIT % 24 9*   PLATELETS Thousands/uL 133*   NEUTROS PCT % 71   LYMPHS PCT % 8*   MONOS PCT % 16*   EOS PCT % 5     Results from last 7 days   Lab Units 03/05/23  0550 03/04/23  0701   SODIUM mmol/L 138 136   POTASSIUM mmol/L 3 9 4 0   CHLORIDE mmol/L 105 103   CO2 mmol/L 28 26   BUN mg/dL 50* 47*   CREATININE mg/dL 3 17* 3 16*   ANION GAP mmol/L 5 7   CALCIUM mg/dL 10 3* 10 4*   ALBUMIN g/dL  --  3 4*   TOTAL BILIRUBIN mg/dL  --  0 49   ALK PHOS U/L  --  73   ALT U/L  --  3*   AST U/L  --  7*   GLUCOSE RANDOM mg/dL 129 91         Results from last 7 days   Lab Units 03/05/23  1130 03/05/23  0631 03/04/23  2104 03/04/23  1549 03/04/23  1135 03/04/23  0655 03/03/23  2108 03/03/23  1607 03/03/23  1125 03/03/23  0656 03/02/23  2204   POC GLUCOSE mg/dl 226* 131 177* 148* 173* 92 195* 104 95 94 98     Results from last 7 days   Lab Units 03/03/23  0458   HEMOGLOBIN A1C % 6 3*     Results from last 7 days   Lab Units 03/04/23  0701 03/02/23  1452   LACTIC ACID mmol/L  --  0 8   PROCALCITONIN ng/ml 1 15* 0 30*       Lines/Drains:  Invasive Devices     Peripheral Intravenous Line  Duration           Peripheral IV 03/03/23 Distal;Dorsal (posterior); Right Forearm 1 day    Peripheral IV 03/03/23 Distal;Right;Upper;Ventral (anterior) Arm 1 day          Drain  Duration           Urethral Catheter Coude 16 Fr  71 days              Urinary Catheter:  Goal for removal: N/A - Chronic Ortiz               Imaging: Reviewed radiology reports from this admission including: CT spine    Recent Cultures (last 7 days):   Results from last 7 days   Lab Units 03/02/23  1526 03/02/23  1452   BLOOD CULTURE  No Growth at 48 hrs  No Growth at 48 hrs         Last 24 Hours Medication List:   Current Facility-Administered Medications   Medication Dose Route Frequency Provider Last Rate   • acetaminophen  650 mg Oral Q4H PRN Thao S June, CRNP     • allopurinol  100 mg Oral Daily Thao S June, CRNP     • amLODIPine  10 mg Oral Daily Thao S June, CRNP     • atorvastatin  40 mg Oral Daily With Dinner Thao S June, CRNP     • Buprenorphine HCl  900 mcg Buccal BID Thao S June, CRNP     • carvedilol  6 25 mg Oral BID With Meals Thao S June, CRNP     • cyclobenzaprine  5 mg Oral TID Thao S June, CRNP     • docusate sodium  100 mg Oral BID Thao S June, CRNP     • ferrous sulfate 325 mg Oral Daily With Breakfast Thao S June, CRNP     • heparin (porcine)  5,000 Units Subcutaneous Q8H [facility] Thao S June, CRNP     • HYDROmorphone  1 mg Intravenous Q6H PRN Nichole Marion MD     • insulin glargine  5 Units Subcutaneous HS Thao S June, CRNP     • insulin lispro  1-6 Units Subcutaneous HS Thao S June, CRNP     • insulin lispro  2-12 Units Subcutaneous TID AC YURIDIA Perez     • LORazepam  1 mg Intravenous Once Nichole Marion MD     • meropenem  1,000 mg Intravenous Q12H Thao S June, CRNP 1,000 mg (03/05/23 1039)   • oxyCODONE  5 mg Oral Q4H PRN Nichole Marion MD     • polyethylene glycol  17 g Oral BID Thao S June, CRNP     • pregabalin  150 mg Oral BID Thao S June, CRNP     • senna-docusate sodium  1 tablet Oral BID Thao S June, CRNP     • sevelamer  800 mg Oral TID With Meals Thao S June, CRNP     • sodium bicarbonate  650 mg Oral Every Other Day Thao S June, CRNP     • tamsulosin  0 4 mg Oral Daily With Dinner Thao S June, CRNP     • thiamine  100 mg Oral Daily Thao S June, CRNP     • vancomycin  750 mg Intravenous Once PRN Edgar Sun MD     • venlafaxine  37 5 mg Oral TID Thao S June, CRNP          Today, Patient Was Seen By: YURIDIA Perez    **Please Note: This note may have been constructed using a voice recognition system  **

## 2023-03-05 NOTE — ASSESSMENT & PLAN NOTE
· POA from home with wife with acute onset hallucinations, confusion  Wife was concerned he had an underlying infection as this is his normal response    · Chronic indwelling Ortiz catheter without evidence of pyuria  · CT brain no acute abnormality  · Left heel ulcer questionable source of infection  · Empiric antibiotics  · IV hydration- completed  · Currently alert, no hallucinations present - wife reports pt can hold conversation and seems oriented but then says things that dont make sense

## 2023-03-05 NOTE — PROGRESS NOTES
Patient having intermittent visual hallucinations during shift  At times patient is alert and oriented other times confused  Patient tolerated po intake well  Alarms are on and functioning no incautious behavior

## 2023-03-05 NOTE — ASSESSMENT & PLAN NOTE
· Presents with nonhealing left diabetic foot wound on left heel that is being followed by outpatient wound care, home care nurse and podiatry  · boggy left heel wound covered with eschar that appears moisture underneath the eschar  · X-ray possible osteomyelitis changes  · MRI showing ulcer but no evidence of osteomyelitis  · Empiric vancomycin for history MRSA pneumonia/ empiric meropenem recent tx Pseudomonas MARIA TERESA with Levaquin in the outpatient setting  · MRSA negative-we will DC Vanco  · BCx no growth 48hr  · Podiatry recommendations as per note  · Vascular surgery as outpatient  · Heel offloading w/prevalon boot, or two pillows

## 2023-03-05 NOTE — ASSESSMENT & PLAN NOTE
· Criteria with tachycardia, leukocytosis  · Urine negative  · Blood cultures no growth 48hr  · CT chest abdomen pelvis no obvious infection  · suspected source left heel wound with eschar, erythema   · ABX: vanco/meropenem >   · Follow fever curve, t-max 99 3 last 24hr  · Leukocytosis trending down - now normalized  · Procalcitonin 0 3> 1 15

## 2023-03-05 NOTE — PLAN OF CARE
Problem: Nutrition/Hydration-ADULT  Goal: Nutrient/Hydration intake appropriate for improving, restoring or maintaining nutritional needs  Description: Monitor and assess patient's nutrition/hydration status for malnutrition  Collaborate with interdisciplinary team and initiate plan and interventions as ordered  Monitor patient's weight and dietary intake as ordered or per policy  Utilize nutrition screening tool and intervene as necessary  Determine patient's food preferences and provide high-protein, high-caloric foods as appropriate       INTERVENTIONS:  - Monitor oral intake, urinary output, labs, and treatment plans  - Assess nutrition and hydration status and recommend course of action  - Evaluate amount of meals eaten  - Assist patient with eating if necessary   - Allow adequate time for meals  - Recommend/ encourage appropriate diets, oral nutritional supplements, and vitamin/mineral supplements  - Order, calculate, and assess calorie counts as needed  - Recommend, monitor, and adjust tube feedings and TPN/PPN based on assessed needs  - Assess need for intravenous fluids  - Provide specific nutrition/hydration education as appropriate  - Include patient/family/caregiver in decisions related to nutrition  Outcome: Progressing     Problem: MOBILITY - ADULT  Goal: Maintain or return to baseline ADL function  Description: INTERVENTIONS:  -  Assess patient's ability to carry out ADLs; assess patient's baseline for ADL function and identify physical deficits which impact ability to perform ADLs (bathing, care of mouth/teeth, toileting, grooming, dressing, etc )  - Assess/evaluate cause of self-care deficits   - Assess range of motion  - Assess patient's mobility; develop plan if impaired  - Assess patient's need for assistive devices and provide as appropriate  - Encourage maximum independence but intervene and supervise when necessary  - Involve family in performance of ADLs  - Assess for home care needs following discharge   - Consider OT consult to assist with ADL evaluation and planning for discharge  - Provide patient education as appropriate  Outcome: Progressing  Goal: Maintains/Returns to pre admission functional level  Description: INTERVENTIONS:  - Perform BMAT or MOVE assessment daily    - Set and communicate daily mobility goal to care team and patient/family/caregiver     - Collaborate with rehabilitation services on mobility goals if consulted  - Out of bed for toileting  - Record patient progress and toleration of activity level   Outcome: Progressing     Problem: PAIN - ADULT  Goal: Verbalizes/displays adequate comfort level or baseline comfort level  Description: Interventions:  - Encourage patient to monitor pain and request assistance  - Assess pain using appropriate pain scale  - Administer analgesics based on type and severity of pain and evaluate response  - Implement non-pharmacological measures as appropriate and evaluate response  - Consider cultural and social influences on pain and pain management  - Notify physician/advanced practitioner if interventions unsuccessful or patient reports new pain  Outcome: Progressing     Problem: INFECTION - ADULT  Goal: Absence or prevention of progression during hospitalization  Description: INTERVENTIONS:  - Assess and monitor for signs and symptoms of infection  - Monitor lab/diagnostic results  - Monitor all insertion sites, i e  indwelling lines, tubes, and drains  - Monitor endotracheal if appropriate and nasal secretions for changes in amount and color  - [place] appropriate cooling/warming therapies per order  - Administer medications as ordered  - Instruct and encourage patient and family to use good hand hygiene technique  - Identify and instruct in appropriate isolation precautions for identified infection/condition  Outcome: Progressing  Goal: Absence of fever/infection during neutropenic period  Description: INTERVENTIONS:  - Monitor WBC    Outcome: Progressing     Problem: SAFETY ADULT  Goal: Maintain or return to baseline ADL function  Description: INTERVENTIONS:  -  Assess patient's ability to carry out ADLs; assess patient's baseline for ADL function and identify physical deficits which impact ability to perform ADLs (bathing, care of mouth/teeth, toileting, grooming, dressing, etc )  - Assess/evaluate cause of self-care deficits   - Assess range of motion  - Assess patient's mobility; develop plan if impaired  - Assess patient's need for assistive devices and provide as appropriate  - Encourage maximum independence but intervene and supervise when necessary  - Involve family in performance of ADLs  - Assess for home care needs following discharge   - Consider OT consult to assist with ADL evaluation and planning for discharge  - Provide patient education as appropriate  Outcome: Progressing  Goal: Maintains/Returns to pre admission functional level  Description: INTERVENTIONS:  - Perform BMAT or MOVE assessment daily    - Set and communicate daily mobility goal to care team and patient/family/caregiver     - Out of bed for toileting  - Record patient progress and toleration of activity level   Outcome: Progressing  Goal: Patient will remain free of falls  Description: INTERVENTIONS:  - Educate patient/family on patient safety including physical limitations  - Instruct patient to call for assistance with activity   - Consult OT/PT to assist with strengthening/mobility   - Keep Call bell within reach  - Keep bed low and locked with side rails adjusted as appropriate  - Keep care items and personal belongings within reach  - Initiate and maintain comfort rounds  - Make Fall Risk Sign visible to staff  - Apply yellow socks and bracelet for high fall risk patients  - Consider moving patient to room near nurses station  Outcome: Progressing     Problem: DISCHARGE PLANNING  Goal: Discharge to home or other facility with appropriate resources  Description: INTERVENTIONS:  - Identify barriers to discharge w/patient and caregiver  - Arrange for needed discharge resources and transportation as appropriate  - Identify discharge learning needs (meds, wound care, etc )  - Arrange for interpretive services to assist at discharge as needed  - Refer to Case Management Department for coordinating discharge planning if the patient needs post-hospital services based on physician/advanced practitioner order or complex needs related to functional status, cognitive ability, or social support system  Outcome: Progressing     Problem: Knowledge Deficit  Goal: Patient/family/caregiver demonstrates understanding of disease process, treatment plan, medications, and discharge instructions  Description: Complete learning assessment and assess knowledge base    Interventions:  - Provide teaching at level of understanding  - Provide teaching via preferred learning methods  Outcome: Progressing     Problem: Prexisting or High Potential for Compromised Skin Integrity  Goal: Skin integrity is maintained or improved  Description: INTERVENTIONS:  - Identify patients at risk for skin breakdown  - Assess and monitor skin integrity  - Assess and monitor nutrition and hydration status  - Monitor labs   - Assess for incontinence   - Turn and reposition patient  - Assist with mobility/ambulation  - Relieve pressure over bony prominences  - Avoid friction and shearing  - Provide appropriate hygiene as needed including keeping skin clean and dry  - Evaluate need for skin moisturizer/barrier cream  - Collaborate with interdisciplinary team   - Patient/family teaching  - Consider wound care consult   Outcome: Progressing

## 2023-03-05 NOTE — ASSESSMENT & PLAN NOTE
· hx of chronic osteodiscitis L4-L5 and wife notes had admission with spinal abscess  · 3/4 CT scan lumbar spine- healing chronic L4-5 colitis/osteomyelitis with chronic pathologic Check compression fractures    Limited abscess evaluation with noncontrast exam   No new sites of discitis/osteomyelitis of lumbar spine

## 2023-03-05 NOTE — ASSESSMENT & PLAN NOTE
· Chronic anemia noted previous iron deficiency with p o  iron supplementation, also likely anemia of chronic disease with CKD 3  · Baseline Hgb 9-10  · Continue PO supplmentation

## 2023-03-05 NOTE — PROGRESS NOTES
Quita Ernst Sr  is a 62 y o  male who is currently ordered Vancomycin IV with management by the Pharmacy Consult service  Relevant clinical data and objective / subjective history reviewed  Vancomycin Assessment:  Indication and Goal AUC/Trough: Soft tissue (goal -600, trough >10), soft tissue (goal random level < or = 15)  Clinical Status: stable  Micro:     Renal Function:  SCr: 3 17 mg/dL  CrCl: 22 6 mL/min  Renal replacement: Not on dialysis  Days of Therapy: 4  Current Dose: 750 mg IV once  Vancomycin Plan:  New Dosin 5 mg/kg (750 mg) IV once PRN random level < or = 15  Estimated AUC: N/A due to pulse dosing  Estimated Trough: N/A due to pulse dosing  Next Level: 23@   Renal Function Monitoring: Daily BMP and UOP  Pharmacy will continue to follow closely for s/sx of nephrotoxicity, infusion reactions and appropriateness of therapy  BMP and CBC will be ordered per protocol  We will continue to follow the patient’s culture results and clinical progress daily      Joshua Hwang, Pharmacist

## 2023-03-05 NOTE — ASSESSMENT & PLAN NOTE
Lab Results   Component Value Date    EGFR 20 03/05/2023    EGFR 20 03/04/2023    EGFR 19 03/03/2023    CREATININE 3 17 (H) 03/05/2023    CREATININE 3 16 (H) 03/04/2023    CREATININE 3 36 (H) 03/03/2023   · Presents with KELLY on CKD in the setting of sepsis  · Baseline 3 6-3 9 per outpatient Nephrology note  · Creatinine trending down toward baseline  · Continue chronic Ortiz catheter  · Renally dose medications, avoid hypotension, nephrotoxic medications

## 2023-03-06 ENCOUNTER — APPOINTMENT (INPATIENT)
Dept: RADIOLOGY | Facility: HOSPITAL | Age: 58
End: 2023-03-06

## 2023-03-06 PROBLEM — M25.561 RIGHT KNEE PAIN: Status: ACTIVE | Noted: 2023-03-06

## 2023-03-06 LAB
ANION GAP SERPL CALCULATED.3IONS-SCNC: 4 MMOL/L (ref 4–13)
BASOPHILS # BLD AUTO: 0.04 THOUSANDS/ÂΜL (ref 0–0.1)
BASOPHILS NFR BLD AUTO: 1 % (ref 0–1)
BUN SERPL-MCNC: 48 MG/DL (ref 5–25)
CALCIUM SERPL-MCNC: 10.6 MG/DL (ref 8.4–10.2)
CHLORIDE SERPL-SCNC: 102 MMOL/L (ref 96–108)
CO2 SERPL-SCNC: 30 MMOL/L (ref 21–32)
CREAT SERPL-MCNC: 3.01 MG/DL (ref 0.6–1.3)
CRP SERPL QL: 141.8 MG/L
EOSINOPHIL # BLD AUTO: 0.74 THOUSAND/ÂΜL (ref 0–0.61)
EOSINOPHIL NFR BLD AUTO: 9 % (ref 0–6)
ERYTHROCYTE [DISTWIDTH] IN BLOOD BY AUTOMATED COUNT: 16 % (ref 11.6–15.1)
GFR SERPL CREATININE-BSD FRML MDRD: 21 ML/MIN/1.73SQ M
GLUCOSE SERPL-MCNC: 129 MG/DL (ref 65–140)
GLUCOSE SERPL-MCNC: 138 MG/DL (ref 65–140)
GLUCOSE SERPL-MCNC: 145 MG/DL (ref 65–140)
GLUCOSE SERPL-MCNC: 154 MG/DL (ref 65–140)
GLUCOSE SERPL-MCNC: 163 MG/DL (ref 65–140)
GLUCOSE SERPL-MCNC: 183 MG/DL (ref 65–140)
HCT VFR BLD AUTO: 25.4 % (ref 36.5–49.3)
HGB BLD-MCNC: 8.1 G/DL (ref 12–17)
IMM GRANULOCYTES # BLD AUTO: 0.04 THOUSAND/UL (ref 0–0.2)
IMM GRANULOCYTES NFR BLD AUTO: 1 % (ref 0–2)
LYMPHOCYTES # BLD AUTO: 1.01 THOUSANDS/ÂΜL (ref 0.6–4.47)
LYMPHOCYTES NFR BLD AUTO: 13 % (ref 14–44)
MCH RBC QN AUTO: 28.8 PG (ref 26.8–34.3)
MCHC RBC AUTO-ENTMCNC: 31.9 G/DL (ref 31.4–37.4)
MCV RBC AUTO: 90 FL (ref 82–98)
MONOCYTES # BLD AUTO: 1.36 THOUSAND/ÂΜL (ref 0.17–1.22)
MONOCYTES NFR BLD AUTO: 17 % (ref 4–12)
NEUTROPHILS # BLD AUTO: 4.65 THOUSANDS/ÂΜL (ref 1.85–7.62)
NEUTS SEG NFR BLD AUTO: 59 % (ref 43–75)
NRBC BLD AUTO-RTO: 0 /100 WBCS
PLATELET # BLD AUTO: 140 THOUSANDS/UL (ref 149–390)
PMV BLD AUTO: 11.6 FL (ref 8.9–12.7)
POTASSIUM SERPL-SCNC: 3.8 MMOL/L (ref 3.5–5.3)
PROCALCITONIN SERPL-MCNC: 0.79 NG/ML
RBC # BLD AUTO: 2.81 MILLION/UL (ref 3.88–5.62)
SODIUM SERPL-SCNC: 136 MMOL/L (ref 135–147)
WBC # BLD AUTO: 7.84 THOUSAND/UL (ref 4.31–10.16)

## 2023-03-06 RX ORDER — CEFTRIAXONE 1 G/50ML
1000 INJECTION, SOLUTION INTRAVENOUS EVERY 24 HOURS
Status: DISCONTINUED | OUTPATIENT
Start: 2023-03-06 | End: 2023-03-07

## 2023-03-06 RX ORDER — CEFEPIME HYDROCHLORIDE 1 G/50ML
1000 INJECTION, SOLUTION INTRAVENOUS EVERY 24 HOURS
Status: DISCONTINUED | OUTPATIENT
Start: 2023-03-06 | End: 2023-03-06

## 2023-03-06 RX ADMIN — CYCLOBENZAPRINE 5 MG: 10 TABLET, FILM COATED ORAL at 21:33

## 2023-03-06 RX ADMIN — THIAMINE HCL TAB 100 MG 100 MG: 100 TAB at 08:55

## 2023-03-06 RX ADMIN — PREGABALIN 150 MG: 75 CAPSULE ORAL at 17:38

## 2023-03-06 RX ADMIN — VENLAFAXINE HYDROCHLORIDE 37.5 MG: 37.5 CAPSULE, EXTENDED RELEASE ORAL at 08:55

## 2023-03-06 RX ADMIN — DOCUSATE SODIUM AND SENNOSIDES 1 TABLET: 8.6; 5 TABLET, FILM COATED ORAL at 17:38

## 2023-03-06 RX ADMIN — HEPARIN SODIUM 5000 UNITS: 5000 INJECTION INTRAVENOUS; SUBCUTANEOUS at 21:33

## 2023-03-06 RX ADMIN — PREGABALIN 150 MG: 75 CAPSULE ORAL at 08:55

## 2023-03-06 RX ADMIN — FERROUS SULFATE TAB 325 MG (65 MG ELEMENTAL FE) 325 MG: 325 (65 FE) TAB at 06:35

## 2023-03-06 RX ADMIN — INSULIN LISPRO 2 UNITS: 100 INJECTION, SOLUTION INTRAVENOUS; SUBCUTANEOUS at 11:58

## 2023-03-06 RX ADMIN — CARVEDILOL 6.25 MG: 6.25 TABLET, FILM COATED ORAL at 06:35

## 2023-03-06 RX ADMIN — HEPARIN SODIUM 5000 UNITS: 5000 INJECTION INTRAVENOUS; SUBCUTANEOUS at 15:13

## 2023-03-06 RX ADMIN — OXYCODONE HYDROCHLORIDE 5 MG: 5 TABLET ORAL at 09:01

## 2023-03-06 RX ADMIN — INSULIN GLARGINE 5 UNITS: 100 INJECTION, SOLUTION SUBCUTANEOUS at 21:33

## 2023-03-06 RX ADMIN — BUPRENORPHINE HYDROCHLORIDE 900 MCG: 900 FILM, SOLUBLE BUCCAL at 21:35

## 2023-03-06 RX ADMIN — CYCLOBENZAPRINE 5 MG: 10 TABLET, FILM COATED ORAL at 08:55

## 2023-03-06 RX ADMIN — CARVEDILOL 6.25 MG: 6.25 TABLET, FILM COATED ORAL at 17:38

## 2023-03-06 RX ADMIN — AMLODIPINE BESYLATE 10 MG: 10 TABLET ORAL at 08:55

## 2023-03-06 RX ADMIN — SEVELAMER HYDROCHLORIDE 800 MG: 800 TABLET ORAL at 17:38

## 2023-03-06 RX ADMIN — DOCUSATE SODIUM AND SENNOSIDES 1 TABLET: 8.6; 5 TABLET, FILM COATED ORAL at 08:54

## 2023-03-06 RX ADMIN — VENLAFAXINE HYDROCHLORIDE 37.5 MG: 37.5 CAPSULE, EXTENDED RELEASE ORAL at 21:33

## 2023-03-06 RX ADMIN — SEVELAMER HYDROCHLORIDE 800 MG: 800 TABLET ORAL at 11:57

## 2023-03-06 RX ADMIN — INSULIN LISPRO 1 UNITS: 100 INJECTION, SOLUTION INTRAVENOUS; SUBCUTANEOUS at 21:34

## 2023-03-06 RX ADMIN — POLYETHYLENE GLYCOL 3350 17 G: 17 POWDER, FOR SOLUTION ORAL at 08:55

## 2023-03-06 RX ADMIN — DOCUSATE SODIUM 100 MG: 100 CAPSULE ORAL at 08:54

## 2023-03-06 RX ADMIN — CEFTRIAXONE 1000 MG: 1 INJECTION, SOLUTION INTRAVENOUS at 11:58

## 2023-03-06 RX ADMIN — POLYETHYLENE GLYCOL 3350 17 G: 17 POWDER, FOR SOLUTION ORAL at 17:38

## 2023-03-06 RX ADMIN — CYCLOBENZAPRINE 5 MG: 10 TABLET, FILM COATED ORAL at 17:38

## 2023-03-06 RX ADMIN — VENLAFAXINE HYDROCHLORIDE 37.5 MG: 37.5 CAPSULE, EXTENDED RELEASE ORAL at 17:38

## 2023-03-06 RX ADMIN — OXYCODONE HYDROCHLORIDE 5 MG: 5 TABLET ORAL at 17:39

## 2023-03-06 RX ADMIN — DOCUSATE SODIUM 100 MG: 100 CAPSULE ORAL at 17:38

## 2023-03-06 RX ADMIN — HEPARIN SODIUM 5000 UNITS: 5000 INJECTION INTRAVENOUS; SUBCUTANEOUS at 06:35

## 2023-03-06 RX ADMIN — SEVELAMER HYDROCHLORIDE 800 MG: 800 TABLET ORAL at 06:35

## 2023-03-06 RX ADMIN — ALLOPURINOL 100 MG: 100 TABLET ORAL at 08:54

## 2023-03-06 RX ADMIN — INSULIN LISPRO 2 UNITS: 100 INJECTION, SOLUTION INTRAVENOUS; SUBCUTANEOUS at 17:39

## 2023-03-06 RX ADMIN — BUPRENORPHINE HYDROCHLORIDE 900 MCG: 900 FILM, SOLUBLE BUCCAL at 09:28

## 2023-03-06 RX ADMIN — TAMSULOSIN HYDROCHLORIDE 0.4 MG: 0.4 CAPSULE ORAL at 17:38

## 2023-03-06 RX ADMIN — ATORVASTATIN CALCIUM 40 MG: 40 TABLET, FILM COATED ORAL at 17:38

## 2023-03-06 NOTE — PLAN OF CARE
Problem: Nutrition/Hydration-ADULT  Goal: Nutrient/Hydration intake appropriate for improving, restoring or maintaining nutritional needs  Description: Monitor and assess patient's nutrition/hydration status for malnutrition  Collaborate with interdisciplinary team and initiate plan and interventions as ordered  Monitor patient's weight and dietary intake as ordered or per policy  Utilize nutrition screening tool and intervene as necessary  Determine patient's food preferences and provide high-protein, high-caloric foods as appropriate       INTERVENTIONS:  - Monitor oral intake, urinary output, labs, and treatment plans  - Assess nutrition and hydration status and recommend course of action  - Evaluate amount of meals eaten  - Assist patient with eating if necessary   - Allow adequate time for meals  - Recommend/ encourage appropriate diets, oral nutritional supplements, and vitamin/mineral supplements  - Order, calculate, and assess calorie counts as needed  - Recommend, monitor, and adjust tube feedings and TPN/PPN based on assessed needs  - Assess need for intravenous fluids  - Provide specific nutrition/hydration education as appropriate  - Include patient/family/caregiver in decisions related to nutrition  Outcome: Progressing     Problem: MOBILITY - ADULT  Goal: Maintain or return to baseline ADL function  Description: INTERVENTIONS:  -  Assess patient's ability to carry out ADLs; assess patient's baseline for ADL function and identify physical deficits which impact ability to perform ADLs (bathing, care of mouth/teeth, toileting, grooming, dressing, etc )  - Assess/evaluate cause of self-care deficits   - Assess range of motion  - Assess patient's mobility; develop plan if impaired  - Assess patient's need for assistive devices and provide as appropriate  - Encourage maximum independence but intervene and supervise when necessary  - Involve family in performance of ADLs  - Assess for home care needs following discharge   - Consider OT consult to assist with ADL evaluation and planning for discharge  - Provide patient education as appropriate  Outcome: Progressing  Goal: Maintains/Returns to pre admission functional level  Description: INTERVENTIONS:  - Perform BMAT or MOVE assessment daily    - Set and communicate daily mobility goal to care team and patient/family/caregiver     - Collaborate with rehabilitation services on mobility goals if consulted  - Record patient progress and toleration of activity level   Outcome: Progressing     Problem: PAIN - ADULT  Goal: Verbalizes/displays adequate comfort level or baseline comfort level  Description: Interventions:  - Encourage patient to monitor pain and request assistance  - Assess pain using appropriate pain scale  - Administer analgesics based on type and severity of pain and evaluate response  - Implement non-pharmacological measures as appropriate and evaluate response  - Consider cultural and social influences on pain and pain management  - Notify physician/advanced practitioner if interventions unsuccessful or patient reports new pain  Outcome: Progressing     Problem: INFECTION - ADULT  Goal: Absence or prevention of progression during hospitalization  Description: INTERVENTIONS:  - Assess and monitor for signs and symptoms of infection  - Monitor lab/diagnostic results  - Monitor all insertion sites, i e  indwelling lines, tubes, and drains  - Monitor endotracheal if appropriate and nasal secretions for changes in amount and color  - [place] appropriate cooling/warming therapies per order  - Administer medications as ordered  - Instruct and encourage patient and family to use good hand hygiene technique  - Identify and instruct in appropriate isolation precautions for identified infection/condition  Outcome: Progressing  Goal: Absence of fever/infection during neutropenic period  Description: INTERVENTIONS:  - Monitor WBC    Outcome: Progressing Problem: SAFETY ADULT  Goal: Maintain or return to baseline ADL function  Description: INTERVENTIONS:  -  Assess patient's ability to carry out ADLs; assess patient's baseline for ADL function and identify physical deficits which impact ability to perform ADLs (bathing, care of mouth/teeth, toileting, grooming, dressing, etc )  - Assess/evaluate cause of self-care deficits   - Assess range of motion  - Assess patient's mobility; develop plan if impaired  - Assess patient's need for assistive devices and provide as appropriate  - Encourage maximum independence but intervene and supervise when necessary  - Involve family in performance of ADLs  - Assess for home care needs following discharge   - Consider OT consult to assist with ADL evaluation and planning for discharge  - Provide patient education as appropriate  Outcome: Progressing  Goal: Maintains/Returns to pre admission functional level  Description: INTERVENTIONS:  - Perform BMAT or MOVE assessment daily    - Set and communicate daily mobility goal to care team and patient/family/caregiver     - Collaborate with rehabilitation services on mobility goals if consulted  - Out of bed for toileting  - Record patient progress and toleration of activity level   Outcome: Progressing  Goal: Patient will remain free of falls  Description: INTERVENTIONS:  - Educate patient/family on patient safety including physical limitations  - Instruct patient to call for assistance with activity   - Consult OT/PT to assist with strengthening/mobility   - Keep Call bell within reach  - Keep bed low and locked with side rails adjusted as appropriate  - Keep care items and personal belongings within reach  - Initiate and maintain comfort rounds  - Make Fall Risk Sign visible to staff  - Apply yellow socks and bracelet for high fall risk patients  - Consider moving patient to room near nurses station  Outcome: Progressing     Problem: DISCHARGE PLANNING  Goal: Discharge to home or other facility with appropriate resources  Description: INTERVENTIONS:  - Identify barriers to discharge w/patient and caregiver  - Arrange for needed discharge resources and transportation as appropriate  - Identify discharge learning needs (meds, wound care, etc )  - Arrange for interpretive services to assist at discharge as needed  - Refer to Case Management Department for coordinating discharge planning if the patient needs post-hospital services based on physician/advanced practitioner order or complex needs related to functional status, cognitive ability, or social support system  Outcome: Progressing     Problem: Knowledge Deficit  Goal: Patient/family/caregiver demonstrates understanding of disease process, treatment plan, medications, and discharge instructions  Description: Complete learning assessment and assess knowledge base    Interventions:  - Provide teaching at level of understanding  - Provide teaching via preferred learning methods  Outcome: Progressing     Problem: Prexisting or High Potential for Compromised Skin Integrity  Goal: Skin integrity is maintained or improved  Description: INTERVENTIONS:  - Identify patients at risk for skin breakdown  - Assess and monitor skin integrity  - Assess and monitor nutrition and hydration status  - Monitor labs   - Assess for incontinence   - Turn and reposition patient  - Assist with mobility/ambulation  - Relieve pressure over bony prominences  - Avoid friction and shearing  - Provide appropriate hygiene as needed including keeping skin clean and dry  - Evaluate need for skin moisturizer/barrier cream  - Collaborate with interdisciplinary team   - Patient/family teaching  - Consider wound care consult   Outcome: Progressing

## 2023-03-06 NOTE — OCCUPATIONAL THERAPY NOTE
Occupational Therapy Evaluation     Patient Name: Arian Perez  Today's Date: 3/6/2023  Problem List  Principal Problem:    Acute metabolic encephalopathy  Active Problems:    Diabetes mellitus type 2, insulin dependent (HCC)    Gout    Essential hypertension    Retention of urine    Chronic anemia    Chronic back pain    PAD (peripheral artery disease) (HCC)    Sepsis (HCC)    Diabetic foot ulcer (HCC)    CKD (chronic kidney disease)    Right knee pain    Past Medical History  Past Medical History:   Diagnosis Date    Chronic kidney disease     Diabetes mellitus ([facility] )     Gout     Hypertension     Renal disorder     Retroperitoneal abscess (HCC)     Stroke ([facility] )     UTI (urinary tract infection)     Vertebral osteomyelitis ([facility] )      Past Surgical History  Past Surgical History:   Procedure Laterality Date    BACK SURGERY      ORCHIECTOMY          03/06/23 0957   Note Type   Note type Evaluation   Pain Assessment   Pain Assessment Tool 0-10   Pain Score 10 - Worst Possible Pain   Pain Location/Orientation Orientation: Right;Location: Leg   Restrictions/Precautions   Weight Bearing Precautions Per Order Yes   LLE Weight Bearing Per Order WBAT  (Spoke with DPM Sherryle Moro via [employer] who requested pt WBAT LLE with heel off-loading shoe; pt not given heel off-loading shoe at this time as he reports he has one at home and has no plans to complete mobility throughout LOS d/t pain)   Other Precautions Bed Alarm; Fall Risk;Multiple lines;WBS   Home Living   Type of [address] [place] [address] One level;Performs ADLs on one level; Able to live on main level with bedroom/bathroom; Ramped entrance   Bathroom Shower/Tub Walk-in shower  (sponge bathes)   Bathroom Toilet Raised   Bathroom Equipment Commode;Grab bars around toilet   Home Equipment Wheelchair-manual;Walker;Electric scooter   Additional Comments Pt reports living in a [facility] with spouse   Pt reports he is primarily bed-bound d/t back pain but completes SPT at baseline <> W/C  Prior Function   Level of [place] Needs assistance with ADLs; Needs assistance with IADLS;Needs assistance with functional mobility   Lives With Spouse   Receives Help From Family   IADLs Family/Friend/Other provides transportation; Family/Friend/Other provides meals; Family/Friend/Other provides medication management   Falls in the last 6 months 1 to 4   Subjective   Subjective "My pain is off the charts, it's never been this bad before"   ADL   UB Dressing Assistance 5  Supervision/Setup   UB Dressing Deficit Setup;Verbal cueing;Supervision/safety; Increased time to complete   LB Dressing Assistance 1  Total Assistance   LB Dressing Deficit Setup; Requires assistive device for steadying;Verbal cueing;Supervision/safety; Increased time to complete   Additional Comments UB ADLs @ S/set-up while seated at EOB  LB ADLs @ Total A while supine in bed  Pt required assist to don R sock  Bed Mobility   Supine to Sit 4  Minimal assistance   Additional items Assist x 1; Increased time required;Verbal cues;LE management   Sit to Supine 5  Supervision   Additional items Increased time required;Verbal cues;LE management   Additional Comments Pt supine in bed at beginning of session  Agreeable to complete supine to sit after significant encouragement and reassurance  Supine to sit @ Min A for RLE management  Once pt seated at EOB, pt unable to flex R knee without screaming in pain and threw BLE back in bed d/t pain  Declined any further mobility at this time d/t pain  Pt supine in bed at end of session with bed alarm intact, call bell within reach and all needs met     Transfers   Sit to Stand Unable to assess   Balance   Static Sitting Fair +   Activity Tolerance   Activity Tolerance Patient limited by pain   Medical Staff Made Aware Spoke with PT Arianna KERN Assessment   RUE Assessment WFL   LUE Assessment   LUE Assessment WFL   Hand Function   Gross Motor Coordination Functional   Fine Motor Coordination Functional   Cognition   Overall Cognitive Status WFL   Arousal/Participation Alert; Responsive; Cooperative   Attention Attends with cues to redirect   Orientation Level Oriented to person;Oriented to place;Oriented to time;Disoriented to situation   Memory Decreased recall of recent events   Following Commands Follows one step commands inconsistently   Comments Significant time spent educating pt in importance of participating in therapy for improved functional outcomes  Assessment   Limitation Decreased ADL status; Decreased endurance;Decreased self-care trans;Decreased high-level ADLs;Mood limitation   Prognosis Fair   Assessment Pt is a 62 y o  male, admitted to [address] [place] [address] 3/2/2023 d/t experiencing increased confusion and hallunications  Dx: acute metabolic encephalopathy  Pt with PMHx impacting their performance during ADL tasks, including: CKD, DM, gout, hypertension, renal disorder, retroperitoneal abscess, stroke, UTI, vertebral osteomyelitis  Prior to admission to the hospital Pt was performing ADLs with physical assistance  IADLs with physical assistance  Functional transfers/ambulation with physical assistance  Cognitive status was PTA was Intact  OT order placed to assess Pt's ADLs, cognitive status, and performance during functional tasks in order to maximize safety and independence while making most appropriate d/c recommendations  PT/OT co-evaluation completed at this time d/t significant mobility deficits and safety concerns   Pt's clinical presentation is currently evolving given new onset deficits that effect Pt's occupational performance and ability to safely return to [facility] including decrease activity tolerance, decrease standing balance, decrease sitting balance, decrease performance during ADL tasks, increase impulsiveness, increased pain, decrease generalized strength, decrease activity engagement and decrease performance during functional transfers combined with medical complications of pain impacting overall mobility status, abnormal renal lab values, abnormal H&H, abnormal CBC, abnormal blood sugars and need for input for mobility technique/safety  Personal factors affecting Pt at time of initial evaluation include: inability to perform ADLs, inability to ambulate household distances, decreased initiation and engagement and questionable non-compliance  Pt will benefit from continued skilled OT services to address deficits as defined above and to maximize level independence/participation during ADLs and functional tasks to facilitate return toward PLOF and improved quality of life  From an occupational therapy standpoint, recommendation at time of d/c would be HHOT vs  PAR  Plan   Treatment Interventions ADL retraining;Visual perceptual retraining;Functional transfer training;UE strengthening/ROM; Endurance training;Patient/family training;Cognitive reorientation;Equipment evaluation/education; Neuromuscular reeducation; Fine motor coordination activities; Compensatory technique education;UE splinting;Continued evaluation;Cardiac education; Energy conservation; Activityengagement   Goal Expiration Date 03/20/23   OT Frequency 2-3x/wk   Recommendation   OT Discharge Recommendation   (HHOT vs  post acute rehabilitation services)   Additional Comments  Pt with several visits in the last few months  Pt significantly limited by back pain at baseline and is typically able to complete sit pivot @ Mod A  Presume functional mobility will improve once pain improves     AM-PAC Daily Activity Inpatient   Lower Body Dressing 1   Bathing 2   Toileting 2   Upper Body Dressing 3   Grooming 3   Eating 4   Daily Activity Raw Score 15   Daily Activity Standardized Score (Calc for Raw Score >=11) 34 69   AM-PAC Applied Cognition Inpatient   Following a Speech/Presentation 3   Understanding Ordinary Conversation 4   Taking Medications 3   Remembering Where Things Are Placed or Put Away 3   Remembering List of 4-5 Errands 2   Taking Care of Complicated Tasks 2   Applied Cognition Raw Score 17   Applied Cognition Standardized Score 36 52     The patient's raw score on the AM-PAC Daily Activity Inpatient Short Form is 15  A raw score of less than 19 suggests the patient may benefit from discharge to post-acute rehabilitation services  Please refer to the recommendation of the Occupational Therapist for safe discharge planning  Pt goals to be met by 3/20/23    Pt will demonstrate ability to complete grooming/hygiene tasks @ Mod I while seated after set-up  Pt will demonstrate ability to complete supine<>sit @ Mod I in order to increase safety and independence during ADL tasks  Pt will demonstrate ability to complete UB ADLs including washing/dressing @ Mod I in order to increase performance and participation during meaningful tasks  Pt will demonstrate ability to complete LB dressing @ Mod A in order to increase safety and independence during meaningful tasks  Pt will demonstrate ability to complete toileting tasks including CM and pericare @ Mod A in order to increase safety and independence during meaningful tasks  Pt will demonstrate ability to complete EOB, chair, toilet/commode transfers @ Mod A in order to increase performance and participation during functional tasks  Pt will attend to continued cognitive assessments 100% of the time in order to provide most appropriate d/c recommendations  Pt will follow 100% simple 2-step commands and be A&O x4 consistently with environmental cues to increase participation in functional activities  Pt will identify 3 areas of interest/hobbies and 1 intervention on how to incorporate into daily life in order to increase interaction with environment and peers as well as increase participation in meaningful tasks  Pt will demonstrate 100% carryover of BUE HEP in order to increase BUE MS and increase performance during functional tasks upon d/c home      Tasha Clark OTR/L

## 2023-03-06 NOTE — PLAN OF CARE
Problem: PHYSICAL THERAPY ADULT  Goal: Performs mobility at highest level of function for planned discharge setting  See evaluation for individualized goals  Description: Treatment/Interventions: Functional transfer training, LE strengthening/ROM, Therapeutic exercise, Endurance training, Patient/family training, Equipment eval/education, Bed mobility, Compensatory technique education, Spoke to nursing, OT  Equipment Recommended:  (Pt owns W/C and scooter)       See flowsheet documentation for full assessment, interventions and recommendations  Note: Prognosis: Fair  Problem List: Decreased strength, Decreased range of motion, Decreased endurance, Impaired balance, Decreased mobility, Impaired judgement, Decreased safety awareness, Pain, Decreased skin integrity, Orthopedic restrictions  Assessment: Pt is a 62 y o  male seen for PT evaluation s/p admission to [address] [place] [address] on 3/2/2023 with Acute metabolic encephalopathy  Order placed for PT services  Upon evaluation: Pt is presenting with impaired functional mobility due to pain, decreased strength, decreased ROM, decreased endurance, impaired balance, decreased safety awareness, impaired judgment, orthopedic restrictions, fall risk, and impaired skin integrity requiring  min assistance for bed mobility  Pt's clinical presentation is currently unstable/unpredictable given the functional mobility deficits above coupled with fall risks as indicated by AM-PAC 6-Clicks: 92/14 as well as impulsivity, impaired balance, polypharmacy, impaired judgement, and decreased safety awareness and combined with medical complications of hypertension , pain impacting overall mobility status, abnormal renal lab values, abnormal H&H, abnormal CBC, fear/retreat, and need for input for mobility technique/safety  Pt's PMHx and comorbidities that may affect physical performance and progress include: CAD, CKD, DM, HTN, and PVD   Personal factors affecting pt at time of IE include: inaccessible home environment, past experience, behavioral pattern, inability to perform IADLs, inability to perform ADLs, inability to navigate level surfaces without external assistance, inability to ambulate household distances, inability to navigate community distances, limited insight into impairments, and decreased initiation and engagement  Pt will benefit from continued skilled PT services to address deficits as defined above and to maximize level of functional mobility to facilitate return toward PLOF and improved QOL  From PT/mobility standpoint, recommendation at time of d/c would be Home PT vs  post acute rehab (PAR) pending progress in order to reduce fall risk and maximize pt's functional independence and consistency with mobility in order to facilitate return to PLOF  Recommend trial with walker next 1-2 sessions and ther ex next 1-2 sessions  Barriers to Discharge: None     PT Discharge Recommendation:  (HHPT v STR)    See flowsheet documentation for full assessment

## 2023-03-06 NOTE — PHYSICAL THERAPY NOTE
PHYSICAL THERAPY EVALUATION  NAME:  Feroz Vincent   DATE: 03/06/23    AGE:   62 y o  Mrn:   [id number]  ADMIT DX:  UTI (urinary tract infection) [N39 0]  KELLY (acute kidney injury) ([facility] ) [N17 9]  AMS (altered mental status) [R41 82]    Past Medical History:   Diagnosis Date    Chronic kidney disease     Diabetes mellitus ([facility] )     Gout     Hypertension     Renal disorder     Retroperitoneal abscess ([facility] )     Stroke ([place] [address] )     UTI (urinary tract infection)     Vertebral osteomyelitis ([place] [address] )      Length Of Stay: 4  Performed at least 2 patient identifiers during session: Name and Birthday  PHYSICAL THERAPY EVALUATION :        03/06/23 0956   Note Type   Note type Evaluation   Pain Assessment   Pain Assessment Tool 0-10   Pain Score 10 - Worst Possible Pain   Pain Location/Orientation Orientation: Right;Location: Foot; Location: Leg   Restrictions/Precautions   Weight Bearing Precautions Per Order Yes   LLE Weight Bearing Per Order WBAT   Braces or Orthoses   (heel offloading shoe (pt sts he owns one, not provided at this time as pt declining to participate in OOB mobility))   Other Precautions Bed Alarm;Multiple lines; Fall Risk;Pain;WBS   Home Living   Type of Home House  (ramped entry)   Home Layout One level;Performs ADLs on one level; Able to live on main level with bedroom/bathroom  (mechanical lift to enter)   Bathroom Shower/Tub Walk-in shower  (sponge  bathes)   Bathroom Toilet Raised   Bathroom Equipment Commode;Grab bars around toilet   Home Equipment Wheelchair-manual;Walker;Electric scooter   Additional Comments Pt reports living with spouse in 1   Pt reports he doesn't walk at baseline, primarily stays in bed but when he transfers OOB has assistance from 2 people to wheelchair   Prior Function   Level of [place] Needs assistance with ADLs; Needs assistance with functional mobility; Needs assistance with IADLS   Lives With Spouse   Receives Help From Family   IADLs Family/Friend/Other provides transportation; Family/Friend/Other provides meals; Family/Friend/Other provides medication management   Falls in the last 6 months 1 to 4   Comments Pt reports having assistance for ADLs, IADLs, mobility, and transportation   Cognition   Orientation Level Oriented to person;Oriented to time;Oriented to place; Disoriented to situation   Following Commands Follows one step commands without difficulty   Comments Max encouragement provided for participation as pt reports he is unable to complete due to pain however ultimately agreeable to participate   RLE Assessment   RLE Assessment X  (MARIA TERESA due to pain)   LLE Assessment   LLE Assessment WFL  (grossly assessed 3/5 strength)   Bed Mobility   Supine to Sit 4  Minimal assistance   Additional items Assist x 1; Increased time required;Verbal cues;LE management   Sit to Supine 5  Supervision   Additional items Increased time required;Verbal cues   Additional Comments Supine > sit with min x 1 for RLE management (pain per pt), VC for technique and pacing  Pt seated upright screaming in pain if R knee not in full extension  Pt sat EOB x 1-2 sec prior to returning self to supine refusing to participate further with therapy with supervision for safety   Transfers   Sit to Stand Unable to assess   Balance   Static Sitting Fair +   Endurance Deficit   Endurance Deficit Yes   Endurance Deficit Description pain, self   Activity Tolerance   Activity Tolerance Patient limited by pain   Medical Staff Made Aware Fadi TABOR Call   Nurse Made Aware RN aware   Assessment   Prognosis Fair   Problem List Decreased strength;Decreased range of motion;Decreased endurance; Impaired balance;Decreased mobility; Impaired judgement;Decreased safety awareness;Pain;Decreased skin integrity;Orthopedic restrictions   Barriers to Discharge None   Goals   Patient Goals Stay in bed   STG Expiration Date 03/20/23   Plan   Treatment/Interventions Functional transfer training;LE strengthening/ROM; Therapeutic exercise; Endurance training;Patient/family training;Equipment eval/education; Bed mobility; Compensatory technique education;Spoke to nursing;OT   PT Frequency 2-3x/wk   Recommendation   PT Discharge Recommendation   (HHPT v STR)   Equipment Recommended   (Pt owns W/C and scooter)   Additional Comments Anticipate pt to be able to return home with HHPT however if pt's family is unable to provide care, recommend STR   AM-PAC Basic Mobility Inpatient   Turning in Flat Bed Without Bedrails 3   Lying on Back to Sitting on Edge of Flat Bed Without Bedrails 3   Moving Bed to Chair 1   Standing Up From Chair Using Arms 1   Walk in Room 1   Climb 3-5 Stairs With Railing 1   Basic Mobility Inpatient Raw Score 10   Turning Head Towards Sound 4   Follow Simple Instructions 3   Low Function Basic Mobility Raw Score 17   Low Function Basic Mobility Standardized Score 27 46   Highest Level Of Mobility   -HLM Goal 4: Move to chair/commode   JH-HLM Achieved 3: Sit at edge of bed   End of Consult   Patient Position at End of Consult Supine;Bed/Chair alarm activated; All needs within reach     The patient's AM-PAC Basic Mobility Inpatient Short Form Raw Score is 10    A Raw score of less than or equal to 16 suggests the patient may benefit from discharge to post-acute rehabilitation services  Please also refer to the recommendation of the Physical Therapist for safe discharge planning  (Please find full objective findings from PT assessment regarding body systems outlined above)  Assessment: Pt is a 62 y o  male seen for PT evaluation s/p admission to [address] [place] [address] on 3/2/2023 with Acute metabolic encephalopathy  Order placed for PT services    Upon evaluation: Pt is presenting with impaired functional mobility due to pain, decreased strength, decreased ROM, decreased endurance, impaired balance, decreased safety awareness, impaired judgment, orthopedic restrictions, fall risk, and impaired skin integrity requiring  min assistance for bed mobility  Pt's clinical presentation is currently unstable/unpredictable given the functional mobility deficits above coupled with fall risks as indicated by AM-PAC 6-Clicks: 34/24 as well as impulsivity, impaired balance, polypharmacy, impaired judgement, and decreased safety awareness and combined with medical complications of hypertension , pain impacting overall mobility status, abnormal renal lab values, abnormal H&H, abnormal CBC, fear/retreat, and need for input for mobility technique/safety  Pt's PMHx and comorbidities that may affect physical performance and progress include: CAD, CKD, DM, HTN, and PVD  Personal factors affecting pt at time of IE include: inaccessible home environment, past experience, behavioral pattern, inability to perform IADLs, inability to perform ADLs, inability to navigate level surfaces without external assistance, inability to ambulate household distances, inability to navigate community distances, limited insight into impairments, and decreased initiation and engagement  Pt will benefit from continued skilled PT services to address deficits as defined above and to maximize level of functional mobility to facilitate return toward PLOF and improved QOL  From PT/mobility standpoint, recommendation at time of d/c would be Home PT vs  post acute rehab (PAR) pending progress in order to reduce fall risk and maximize pt's functional independence and consistency with mobility in order to facilitate return to PLOF  Recommend trial with walker next 1-2 sessions and ther ex next 1-2 sessions  Goals: Pt will: Perform bed mobility tasks with modified I to reposition in bed and prepare for transfers  Pt will perform transfers with consistent min A of 1 to decrease burden of care and prepare for ambulation   Increase bilateral LE strength 1/2 grade to facilitate independent mobility, Increase all balance 1/2 grade to decrease risk for falls, and Tolerate seated at EOB 15 minutes independently to facilitate functional task performance          Zahra Tubbs, PT,DPT

## 2023-03-06 NOTE — UTILIZATION REVIEW
Continued Stay Review    Date: 3/6/23                           Current Patient Class: IP  Current Level of Care: MS    HPI:57 y o  male initially admitted on 3/2/23 - DX:  Acute metabolic encephalopathy / CKD (chronic kidney disease) / Diabetic foot ulcer /Sepsis     Assessment/Plan:   Alert but disoriented; c/o rt knee pain and swelling over the last few days; BC no growth x72 hrs; H/H 8 1  25 4; BUN 48; Cr 3 01; Ca 10 6; Procal 0 79  Plan: cont iv abx;  Accuchecks with ssic; pain control; f/u xray; monitor labs; PT / OT eval / tx    Vital Signs: /67   Pulse 79   Temp 98 1 °F (36 7 °C)   Resp 18   Ht 5' 11" (1 803 m)   Wt 62 7 kg (138 lb 3 7 oz)   SpO2 95%   BMI 19 28 kg/m²     Pertinent Labs/Diagnostic Results:   Results from last 7 days   Lab Units 03/02/23  1454   SARS-COV-2  Negative     Results from last 7 days   Lab Units 03/06/23  0432 03/05/23  0550 03/04/23  0701 03/03/23  0458 03/02/23  1452   WBC Thousand/uL 7 84 8 27 10 82* 13 56* 14 51*   HEMOGLOBIN g/dL 8 1* 8 0* 9 2* 9 0* 10 0*   HEMATOCRIT % 25 4* 24 9* 28 4* 28 3* 31 7*   PLATELETS Thousands/uL 140* 133* 141* 130* 145*   NEUTROS ABS Thousands/µL 4 65 5 81 8 30* 10 49* 12 14*         Results from last 7 days   Lab Units 03/06/23  0432 03/05/23  0550 03/04/23  0701 03/03/23  0458 03/02/23  1452   SODIUM mmol/L 136 138 136 141 141   POTASSIUM mmol/L 3 8 3 9 4 0 4 4 4 7   CHLORIDE mmol/L 102 105 103 108 105   CO2 mmol/L 30 28 26 27 30   ANION GAP mmol/L 4 5 7 6 6   BUN mg/dL 48* 50* 47* 43* 42*   CREATININE mg/dL 3 01* 3 17* 3 16* 3 36* 3 37*   EGFR ml/min/1 73sq m 21 20 20 19 19   CALCIUM mg/dL 10 6* 10 3* 10 4* 10 2 10 7*   MAGNESIUM mg/dL  --   --  2 0 1 8*  --    PHOSPHORUS mg/dL  --   --   --  5 1*  --      Results from last 7 days   Lab Units 03/04/23  0701 03/02/23  1715 03/02/23  1452   AST U/L 7*  --  8*   ALT U/L 3*  --  <3*   ALK PHOS U/L 73  --  77   TOTAL PROTEIN g/dL 6 1*  --  6 5   ALBUMIN g/dL 3 4*  --  3 8   TOTAL BILIRUBIN mg/dL 0 49  --  0 48   AMMONIA umol/L  --  30  --      Results from last 7 days   Lab Units 03/06/23  1115 03/06/23  0743 03/06/23  0556 03/05/23  2059 03/05/23  1558 03/05/23  1130 03/05/23  0631 03/04/23  2104 03/04/23  1549 03/04/23  1135 03/04/23  0655 03/03/23  2108   POC GLUCOSE mg/dl 154* 145* 138 200* 140 226* 131 177* 148* 173* 92 195*     Results from last 7 days   Lab Units 03/06/23  0432 03/05/23  0550 03/04/23  0701 03/03/23  0458 03/02/23  1452   GLUCOSE RANDOM mg/dL 129 129 91 96 107         Results from last 7 days   Lab Units 03/03/23  0458   HEMOGLOBIN A1C % 6 3*   EAG mg/dl 134     No results found for: BETA-HYDROXYBUTYRATE       Results from last 7 days   Lab Units 03/02/23  1715   PH CRISTOFER  7 443*   PCO2 CRISTOFER mm Hg 36 9*   PO2 CRISTOFER mm Hg 38 9   HCO3 CRISTOFER mmol/L 24 7   BASE EXC CRISTOFER mmol/L 0 7   O2 CONTENT CRISTOFER ml/dL 10 7   O2 HGB, VENOUS % 72 4             Results from last 7 days   Lab Units 03/02/23  1715 03/02/23  1452   HS TNI 0HR ng/L  --  4   HS TNI 2HR ng/L 3  --    HSTNI D2 ng/L -1  --                  Results from last 7 days   Lab Units 03/06/23  0432 03/04/23  0701 03/02/23  1452   PROCALCITONIN ng/ml 0 79* 1 15* 0 30*     Results from last 7 days   Lab Units 03/02/23  1452   LACTIC ACID mmol/L 0 8                             Results from last 7 days   Lab Units 03/06/23  0432   CRP mg/L 141 8*             Results from last 7 days   Lab Units 03/02/23  1458   CLARITY UA  Slightly Cloudy   COLOR UA  Yellow   SPEC GRAV UA  1 015   PH UA  7 5   GLUCOSE UA mg/dl Negative   KETONES UA mg/dl Negative   BLOOD UA  Small*   PROTEIN UA mg/dl 100 (2+)*   NITRITE UA  Negative   BILIRUBIN UA  Negative   UROBILINOGEN UA E U /dl 0 2   LEUKOCYTES UA  Small*   WBC UA /hpf 2-4   RBC UA /hpf 0-1   BACTERIA UA /hpf Occasional   EPITHELIAL CELLS WET PREP /hpf Occasional     Results from last 7 days   Lab Units 03/02/23  1454   INFLUENZA A PCR  Negative   INFLUENZA B PCR  Negative   RSV PCR  Negative Results from last 7 days   Lab Units 03/02/23  1526 03/02/23  1452   BLOOD CULTURE  No Growth at 72 hrs  No Growth at 72 hrs  Medications:   Scheduled Medications:  allopurinol, 100 mg, Oral, Daily  amLODIPine, 10 mg, Oral, Daily  atorvastatin, 40 mg, Oral, Daily With Dinner  Buprenorphine HCl, 900 mcg, Buccal, BID  carvedilol, 6 25 mg, Oral, BID With Meals  cefTRIAXone, 1,000 mg, Intravenous, Q24H  cyclobenzaprine, 5 mg, Oral, TID  docusate sodium, 100 mg, Oral, BID  ferrous sulfate, 325 mg, Oral, Daily With Breakfast  heparin (porcine), 5,000 Units, Subcutaneous, Q8H [address]  insulin glargine, 5 Units, Subcutaneous, HS  insulin lispro, 1-6 Units, Subcutaneous, HS  insulin lispro, 2-12 Units, Subcutaneous, TID AC  LORazepam, 1 mg, Intravenous, Once  polyethylene glycol, 17 g, Oral, BID  pregabalin, 150 mg, Oral, BID  senna-docusate sodium, 1 tablet, Oral, BID  sevelamer, 800 mg, Oral, TID With Meals  sodium bicarbonate, 650 mg, Oral, Every Other Day  tamsulosin, 0 4 mg, Oral, Daily With Dinner  thiamine, 100 mg, Oral, Daily  venlafaxine, 37 5 mg, Oral, TID      Continuous IV Infusions: None     PRN Meds:  acetaminophen, 650 mg, Oral, Q4H PRN  HYDROmorphone, 1 mg, Intravenous, Q6H PRN  oxyCODONE, 5 mg, Oral, Q4H PRN(3/6 rec'd x 1 so far today)         Discharge Plan: D    Network Utilization Review Department  ATTENTION: Please call with any questions or concerns to [phone] and carefully listen to the prompts so that you are directed to the right person  All voicemails are confidential   Esthela Cobb all requests for admission clinical reviews, approved or denied determinations and any other requests to dedicated fax number below belonging to the campus where the patient is receiving treatment   List of dedicated fax numbers for the Facilities:  [address] DENIALS (Administrative/Medical Necessity) [phone]   [address] [place] [address] (Maternity/NICU/Pediatrics) Jennifer Servin [address] Marci Man  [phone]   [address] [place] [address] [address] Castro[address]arnass[address] [facility] 134 565 [facility] [phone]

## 2023-03-06 NOTE — PROGRESS NOTES
Progress Note - Podiatry  Marino Lagos Sr  62 y o  male MRN: [id number]  Unit/Bed#: -01 Encounter: [id number]    Assessment:  1  Left diabetic heel ulcer w/ pressure component, POA  2  Type 2 diabetes with neuropathy, A1c 6 7% (2/7/23)  3  PAD    Plan:  - MRI left ankle/heel 3/3/23 w/o evidence of OM  Patient unfortunately is at continued high risk for limb loss  F/u outpt wound care center and vascular surgery  - VSS, no leukocytosis  - Strict heel offloading with prevalon boot, green foam wedge or 2 pillows  Betadine soaked 4x4, dsd   - WBAT heel unloading shoe  - Abx and rest of care per SLIM    Subjective/Objective   Chief Complaint:   Chief Complaint   Patient presents with   • Possible UTI     Patient from home, family reports possible UTI  State he has an indwelling camarena and last time he had a UTI he acted the same  State he has been altered with tremors  Subjective: 62 y o  y/o male was seen and evaluated at bedside  Feels just ok  Blood pressure 126/66, pulse 78, temperature 98 6 °F (37 °C), temperature source Tympanic, resp  rate 19, height 5' 11" (1 803 m), weight 62 7 kg (138 lb 3 7 oz), SpO2 96 %  ,Body mass index is 19 28 kg/m²  Invasive Devices     Peripheral Intravenous Line  Duration           Peripheral IV 03/06/23 Dorsal (posterior); Left Hand <1 day          Drain  Duration           Urethral Catheter Coude 16 Fr  72 days                Physical Exam:   General: Alert, cooperative and no distress  Lungs: Non labored breathing  Heart: Positive S1, S2  Abdomen: Soft, non-tender  Extremity: gross msk and nvs baseline  Left heel ulceration with hard, intact necrotic eschar without bogginess or malodor  No purulence or lifting of edges  No erythema             Lab, Imaging and other studies:   CBC:   Lab Results   Component Value Date    WBC 7 84 03/06/2023    HGB 8 1 (L) 03/06/2023    HCT 25 4 (L) 03/06/2023    MCV 90 03/06/2023     (L) 03/06/2023    MCH 28 8 03/06/2023 MCHC 31 9 03/06/2023    RDW 16 0 (H) 03/06/2023    MPV 11 6 03/06/2023    NRBC 0 03/06/2023   , CMP:   Lab Results   Component Value Date    SODIUM 136 03/06/2023    K 3 8 03/06/2023     03/06/2023    CO2 30 03/06/2023    BUN 48 (H) 03/06/2023    CREATININE 3 01 (H) 03/06/2023    CALCIUM 10 6 (H) 03/06/2023    EGFR 21 03/06/2023       Imaging: I have personally reviewed pertinent films in PACS  EKG, Pathology, and Other Studies: I have personally reviewed pertinent reports

## 2023-03-06 NOTE — PLAN OF CARE
Problem: OCCUPATIONAL THERAPY ADULT  Goal: Performs self-care activities at highest level of function for planned discharge setting  See evaluation for individualized goals  Description: Treatment Interventions: ADL retraining, Visual perceptual retraining, Functional transfer training, UE strengthening/ROM, Endurance training, Patient/family training, Cognitive reorientation, Equipment evaluation/education, Neuromuscular reeducation, Fine motor coordination activities, Compensatory technique education, UE splinting, Continued evaluation, Cardiac education, Energy conservation, Activityengagement          See flowsheet documentation for full assessment, interventions and recommendations  Note: Limitation: Decreased ADL status, Decreased endurance, Decreased self-care trans, Decreased high-level ADLs, Mood limitation  Prognosis: Fair  Assessment: Pt is a 62 y o  male, admitted to [facility] 3/2/2023 d/t experiencing increased confusion and hallunications  Dx: acute metabolic encephalopathy  Pt with PMHx impacting their performance during ADL tasks, including: CKD, DM, gout, hypertension, renal disorder, retroperitoneal abscess, stroke, UTI, vertebral osteomyelitis  Prior to admission to the hospital Pt was performing ADLs with physical assistance  IADLs with physical assistance  Functional transfers/ambulation with physical assistance  Cognitive status was PTA was Intact  OT order placed to assess Pt's ADLs, cognitive status, and performance during functional tasks in order to maximize safety and independence while making most appropriate d/c recommendations  PT/OT co-evaluation completed at this time d/t significant mobility deficits and safety concerns   Pt's clinical presentation is currently evolving given new onset deficits that effect Pt's occupational performance and ability to safely return to PLOF including decrease activity tolerance, decrease standing balance, decrease sitting balance, decrease performance during ADL tasks, increase impulsiveness, increased pain, decrease generalized strength, decrease activity engagement and decrease performance during functional transfers combined with medical complications of pain impacting overall mobility status, abnormal renal lab values, abnormal H&H, abnormal CBC, abnormal blood sugars and need for input for mobility technique/safety  Personal factors affecting Pt at time of initial evaluation include: inability to perform ADLs, inability to ambulate household distances, decreased initiation and engagement and questionable non-compliance  Pt will benefit from continued skilled OT services to address deficits as defined above and to maximize level independence/participation during ADLs and functional tasks to facilitate return toward PLOF and improved quality of life  From an occupational therapy standpoint, recommendation at time of d/c would be HHOT vs  PAR       OT Discharge Recommendation:  (HHOT vs  post acute rehabilitation services)

## 2023-03-06 NOTE — PROGRESS NOTES
[address]  Progress Note - Denise Olsen 1965, 62 y o  male MRN: [id number]  Unit/Bed#: -01 Encounter: [id number]  Primary Care Provider: Bud Delatorre DO   Date and time admitted to hospital: 3/2/2023  2:23 PM    * Acute metabolic encephalopathy  Assessment & Plan  · POA from home with wife with acute onset hallucinations, confusion  Wife was concerned he had an underlying infection as this is his normal response    · Chronic indwelling Ortiz catheter without evidence of pyuria  · CT brain no acute abnormality  · Left heel ulcer questionable source of infection  · Empiric antibiotics  · IV hydration- completed  · Wife reports pt can hold conversation and seems oriented but then says things that dont make sense  · Patient is alert without hallucinations this morning, not oriented     Retention of urine  Assessment & Plan  · Chronic Ortiz due to neurogenic bladder  · No pyuria on urinalysis      Right knee pain  Assessment & Plan  · Patient states he has been having right knee pain and swelling for the last few days  · Has history of gout-uric acid done over the weekend normal  · We will add CRP to morning labs  · X-ray of right knee  · Potentially consult orthopedics for possible effusion    CKD (chronic kidney disease)  Assessment & Plan  Lab Results   Component Value Date    EGFR 21 03/06/2023    EGFR 20 03/05/2023    EGFR 20 03/04/2023    CREATININE 3 01 (H) 03/06/2023    CREATININE 3 17 (H) 03/05/2023    CREATININE 3 16 (H) 03/04/2023   · Presents with KELLY on CKD in the setting of sepsis  · Baseline 3 6-3 9 per outpatient Nephrology note  · Creatinine trending down toward baseline  · Continue chronic Ortiz catheter  · Renally dose medications, avoid hypotension, nephrotoxic medications    Diabetic foot ulcer ([facility] [address] )  Assessment & Plan  · Presents with nonhealing left diabetic foot wound on left heel that is being followed by outpatient wound care, home care nurse and podiatry  · boggy left heel wound covered with eschar that appears moisture underneath the eschar  · X-ray possible osteomyelitis changes  · MRI showing ulcer but no evidence of osteomyelitis  · Empiric vancomycin for history MRSA pneumonia/ empiric meropenem recent tx Pseudomonas UTI  · De-escalate to ceftriaxone   · MRSA negative-we will DC Vanco  · BCx no growth 72hr  · Podiatry recommendations as per note  · Vascular surgery as outpatient  · Heel offloading w/prevalon boot, or two pillows  Pending PT/OT evals    Sepsis ([address] )  Assessment & Plan  · Criteria with tachycardia, leukocytosis  · Urine negative  · Blood cultures no growth 72hr  · CT chest abdomen pelvis no obvious infection  · suspected source left heel wound with eschar, erythema   · ABX: vanco/meropenem > now off vancomycin, dc meropenem and start ceftriaxone, hopeful to start oral tomorrow if stable WBC   · Follow fever curve, afeberile x 24 hours  · Leukocytosis trending down - now normalized  · Procalcitonin 0 3> 1 15>0 79 - improving     PAD (peripheral artery disease) ([facility])  Assessment & Plan  · History of PAD  · Diabetes mellitus  · Nonhealing left heel ulcer  · Arterial duplex 12/27 with diffuse disease but no significant focal stenosis  · Continue atorvastatin  · OP vascular surgery referral    Chronic back pain  Assessment & Plan  · hx of chronic osteodiscitis L4-L5 and wife notes had admission with spinal abscess  · 3/4 CT scan lumbar spine- healing chronic L4-5 colitis/osteomyelitis with chronic pathologic Check compression fractures    Limited abscess evaluation with noncontrast exam   No new sites of discitis/osteomyelitis of lumbar spine    Chronic anemia  Assessment & Plan  · Chronic anemia noted previous iron deficiency with p o  iron supplementation, also likely anemia of chronic disease with CKD 3  · Baseline Hgb 9-10  · Continue PO supplmentation    Essential hypertension  Assessment & Plan  · Continue PTA amlodipine, carvedilol  · Remains normotensive     Gout  Assessment & Plan  · Patient complaining of hand pain and foot thinks he is having a gout flare  · Uric acid level normal at 7-continue with allopurinol, no colchicine needed    Diabetes mellitus type 2, insulin dependent [facility])  Assessment & Plan  Lab Results   Component Value Date    HGBA1C 6 3 (H) 03/03/2023       Recent Labs     03/05/23  1558 03/05/23  2059 03/06/23  0556 03/06/23  0743   POCGLU 140 200* 138 145*       Blood Sugar Average: Last 72 hrs:  (P) 147     · Basal insulin 5 units at bedtime, SSI AC/HS  · Hypoglycemia protocol  · Carbohydrate controlled diet      VTE Pharmacologic Prophylaxis: VTE Score: 3 Moderate Risk (Score 3-4) - Pharmacological DVT Prophylaxis Ordered: heparin  Patient Centered Rounds: I performed bedside rounds with nursing staff today  Discussions with Specialists or Other Care Team Provider: JOHN    Education and Discussions with Family / Patient: Updated  (wife) via phone  Total Time Spent on Date of Encounter in care of patient: 35 minutes This time was spent on one or more of the following: performing physical exam; counseling and coordination of care; obtaining or reviewing history; documenting in the medical record; reviewing/ordering tests, medications or procedures; communicating with other healthcare professionals and discussing with patient's family/caregivers  Current Length of Stay: 4 day(s)  Current Patient Status: Inpatient   Certification Statement: The patient will continue to require additional inpatient hospital stay due to wound infection   Discharge Plan: Anticipate discharge in 24-48 hrs to discharge location to be determined pending rehab evaluations  Code Status: Level 1 - Full Code    Subjective:   Seen and examined ,  Is still disoriented to place and time  Answers other questions appropriately    Is stating he is having pain and decreased range of motion in his right knee, unsure of when this started  No other new concerns    Objective:     Vitals:   Temp (24hrs), Av 4 °F (36 9 °C), Min:98 2 °F (36 8 °C), Max:98 6 °F (37 °C)    Temp:  [98 2 °F (36 8 °C)-98 6 °F (37 °C)] 98 6 °F (37 °C)  HR:  [76-79] 78  Resp:  [18-20] 19  BP: (119-131)/(63-68) 126/66  SpO2:  [95 %-96 %] 96 %  Body mass index is 19 28 kg/m²  Input and Output Summary (last 24 hours): Intake/Output Summary (Last 24 hours) at 3/6/2023 1230  Last data filed at 3/6/2023 0001  Gross per 24 hour   Intake 540 ml   Output 750 ml   Net -210 ml       Physical Exam:   Physical Exam  Vitals and nursing note reviewed  Constitutional:       General: He is not in acute distress  Appearance: Normal appearance  HENT:      Head: Normocephalic and atraumatic  Nose: No congestion  Mouth/Throat:      Mouth: Mucous membranes are moist    Eyes:      Conjunctiva/sclera: Conjunctivae normal    Cardiovascular:      Rate and Rhythm: Normal rate and regular rhythm  Pulses: Normal pulses  Heart sounds: Normal heart sounds  No murmur heard  Pulmonary:      Effort: Pulmonary effort is normal  No respiratory distress  Breath sounds: Normal breath sounds  Abdominal:      General: Bowel sounds are normal       Palpations: Abdomen is soft  Tenderness: There is no abdominal tenderness  Musculoskeletal:         General: Swelling (right knee) and tenderness (right knee and left knee) present  Normal range of motion  Right lower leg: No edema  Left lower leg: No edema  Skin:     General: Skin is warm and dry  Findings: Lesion (ulceration of left heel ) present  Neurological:      Mental Status: He is alert  He is disoriented            Additional Data:     Labs:  Results from last 7 days   Lab Units 23  0432   WBC Thousand/uL 7 84   HEMOGLOBIN g/dL 8 1*   HEMATOCRIT % 25 4*   PLATELETS Thousands/uL 140*   NEUTROS PCT % 59   LYMPHS PCT % 13*   MONOS PCT % 17*   EOS PCT % 9*     Results from last 7 days   Lab Units 03/06/23  0432 03/05/23  0550 03/04/23  0701   SODIUM mmol/L 136   < > 136   POTASSIUM mmol/L 3 8   < > 4 0   CHLORIDE mmol/L 102   < > 103   CO2 mmol/L 30   < > 26   BUN mg/dL 48*   < > 47*   CREATININE mg/dL 3 01*   < > 3 16*   ANION GAP mmol/L 4   < > 7   CALCIUM mg/dL 10 6*   < > 10 4*   ALBUMIN g/dL  --   --  3 4*   TOTAL BILIRUBIN mg/dL  --   --  0 49   ALK PHOS U/L  --   --  73   ALT U/L  --   --  3*   AST U/L  --   --  7*   GLUCOSE RANDOM mg/dL 129   < > 91    < > = values in this interval not displayed  Results from last 7 days   Lab Units 03/06/23  1115 03/06/23  0743 03/06/23  0556 03/05/23  2059 03/05/23  1558 03/05/23  1130 03/05/23  0631 03/04/23  2104 03/04/23  1549 03/04/23  1135 03/04/23  0655 03/03/23  2108   POC GLUCOSE mg/dl 154* 145* 138 200* 140 226* 131 177* 148* 173* 92 195*     Results from last 7 days   Lab Units 03/03/23  0458   HEMOGLOBIN A1C % 6 3*     Results from last 7 days   Lab Units 03/06/23  0432 03/04/23  0701 03/02/23  1452   LACTIC ACID mmol/L  --   --  0 8   PROCALCITONIN ng/ml 0 79* 1 15* 0 30*       Lines/Drains:  Invasive Devices     Peripheral Intravenous Line  Duration           Peripheral IV 03/06/23 Dorsal (posterior); Left Hand <1 day          Drain  Duration           Urethral Catheter Coude 16 Fr  72 days              Urinary Catheter:  Goal for removal: N/A - Chronic Ortiz               Imaging: Reviewed radiology reports from this admission including: MRI    Recent Cultures (last 7 days):   Results from last 7 days   Lab Units 03/02/23  1526 03/02/23  1452   BLOOD CULTURE  No Growth at 72 hrs  No Growth at 72 hrs         Last 24 Hours Medication List:   Current Facility-Administered Medications   Medication Dose Route Frequency Provider Last Rate   • acetaminophen  650 mg Oral Q4H PRN Thao S June, CRNP     • allopurinol  100 mg Oral Daily Thao MOON June, CRNP     • amLODIPine  10 mg Oral Daily Thao MOON June, CRNP     • atorvastatin 40 mg Oral Daily With [employer] S June, CRNP     • Buprenorphine HCl  900 mcg Buccal BID Thao S June, CRNP     • carvedilol  6 25 mg Oral BID With Meals Thao S June, CRNP     • cefTRIAXone  1,000 mg Intravenous Q24H Shannan Pereyra PA-C 1,000 mg (03/06/23 1153)   • cyclobenzaprine  5 mg Oral TID Thao S June, CRNP     • docusate sodium  100 mg Oral BID Thao S June, CRNP     • ferrous sulfate  325 mg Oral Daily With Breakfast Thao S June, CRNP     • heparin (porcine)  5,000 Units Subcutaneous Q8H [address] Thao S June, CRNP     • HYDROmorphone  1 mg Intravenous Q6H PRN Liza Marion MD     • insulin glargine  5 Units Subcutaneous HS Thao S June, CRNP     • insulin lispro  1-6 Units Subcutaneous HS Thao S June, CRNP     • insulin lispro  2-12 Units Subcutaneous TID AC YURIDIA Connor     • LORazepam  1 mg Intravenous Once Liza Marion MD     • oxyCODONE  5 mg Oral Q4H PRN Liza Marion MD     • polyethylene glycol  17 g Oral BID Thao S June, CRNP     • pregabalin  150 mg Oral BID Thao S June, CRNP     • senna-docusate sodium  1 tablet Oral BID Thao S June, CRNP     • sevelamer  800 mg Oral TID With Meals Thao S June, CRNP     • sodium bicarbonate  650 mg Oral Every Other Day Thao S June, CRNP     • tamsulosin  0 4 mg Oral Daily With Dinner Thao S June, CRNP     • thiamine  100 mg Oral Daily Thao S June, CRNP     • venlafaxine  37 5 mg Oral TID Thao S June, CRNP          Today, Patient Was Seen By: Shannan Pereyra PA-C    **Please Note: This note may have been constructed using a voice recognition system  **

## 2023-03-06 NOTE — ASSESSMENT & PLAN NOTE
· Criteria with tachycardia, leukocytosis  · Urine negative  · Blood cultures no growth 72hr  · CT chest abdomen pelvis no obvious infection  · suspected source left heel wound with eschar, erythema   · ABX: vanco/meropenem > now off vancomycin, dc meropenem and start ceftriaxone, hopeful to start oral tomorrow if stable WBC   · Follow fever curve, afeberile x 24 hours  · Leukocytosis trending down - now normalized  · Procalcitonin 0 3> 1 15>0 79 - improving

## 2023-03-06 NOTE — ASSESSMENT & PLAN NOTE
· Patient complaining of hand pain and foot thinks he is having a gout flare  · Uric acid level normal at 7-continue with allopurinol, no colchicine needed

## 2023-03-06 NOTE — ASSESSMENT & PLAN NOTE
Lab Results   Component Value Date    EGFR 21 03/06/2023    EGFR 20 03/05/2023    EGFR 20 03/04/2023    CREATININE 3 01 (H) 03/06/2023    CREATININE 3 17 (H) 03/05/2023    CREATININE 3 16 (H) 03/04/2023   · Presents with KELLY on CKD in the setting of sepsis  · Baseline 3 6-3 9 per outpatient Nephrology note  · Creatinine trending down toward baseline  · Continue chronic Ortiz catheter  · Renally dose medications, avoid hypotension, nephrotoxic medications

## 2023-03-06 NOTE — ASSESSMENT & PLAN NOTE
· Presents with nonhealing left diabetic foot wound on left heel that is being followed by outpatient wound care, home care nurse and podiatry  · boggy left heel wound covered with eschar that appears moisture underneath the eschar  · X-ray possible osteomyelitis changes  · MRI showing ulcer but no evidence of osteomyelitis  · Empiric vancomycin for history MRSA pneumonia/ empiric meropenem recent tx Pseudomonas UTI  · De-escalate to ceftriaxone   · MRSA negative-we will DC Vanco  · BCx no growth 72hr  · Podiatry recommendations as per note  · Vascular surgery as outpatient  · Heel offloading w/prevalon boot, or two pillows  Pending PT/OT evals

## 2023-03-06 NOTE — ASSESSMENT & PLAN NOTE
Lab Results   Component Value Date    HGBA1C 6 3 (H) 03/03/2023       Recent Labs     03/05/23  1558 03/05/23 2059 03/06/23  0556 03/06/23  0743   POCGLU 140 200* 138 145*       Blood Sugar Average: Last 72 hrs:  (P) 147     · Basal insulin 5 units at bedtime, SSI AC/HS  · Hypoglycemia protocol  · Carbohydrate controlled diet

## 2023-03-06 NOTE — ASSESSMENT & PLAN NOTE
· Patient states he has been having right knee pain and swelling for the last few days  · Has history of gout-uric acid done over the weekend normal  · We will add CRP to morning labs  · X-ray of right knee  · Potentially consult orthopedics for possible effusion

## 2023-03-06 NOTE — PLAN OF CARE
Problem: Nutrition/Hydration-ADULT  Goal: Nutrient/Hydration intake appropriate for improving, restoring or maintaining nutritional needs  Description: Monitor and assess patient's nutrition/hydration status for malnutrition  Collaborate with interdisciplinary team and initiate plan and interventions as ordered  Monitor patient's weight and dietary intake as ordered or per policy  Utilize nutrition screening tool and intervene as necessary  Determine patient's food preferences and provide high-protein, high-caloric foods as appropriate       INTERVENTIONS:  - Monitor oral intake, urinary output, labs, and treatment plans  - Assess nutrition and hydration status and recommend course of action  - Evaluate amount of meals eaten  - Assist patient with eating if necessary   - Allow adequate time for meals  - Recommend/ encourage appropriate diets, oral nutritional supplements, and vitamin/mineral supplements  - Order, calculate, and assess calorie counts as needed  - Recommend, monitor, and adjust tube feedings and TPN/PPN based on assessed needs  - Assess need for intravenous fluids  - Provide specific nutrition/hydration education as appropriate  - Include patient/family/caregiver in decisions related to nutrition  Outcome: Progressing     Problem: MOBILITY - ADULT  Goal: Maintain or return to baseline ADL function  Description: INTERVENTIONS:  -  Assess patient's ability to carry out ADLs; assess patient's baseline for ADL function and identify physical deficits which impact ability to perform ADLs (bathing, care of mouth/teeth, toileting, grooming, dressing, etc )  - Assess/evaluate cause of self-care deficits   - Assess range of motion  - Assess patient's mobility; develop plan if impaired  - Assess patient's need for assistive devices and provide as appropriate  - Encourage maximum independence but intervene and supervise when necessary  - Involve family in performance of ADLs  - Assess for home care needs following discharge   - Consider OT consult to assist with ADL evaluation and planning for discharge  - Provide patient education as appropriate  Outcome: Progressing  Goal: Maintains/Returns to pre admission functional level  Description: INTERVENTIONS:  - Perform BMAT or MOVE assessment daily    - Set and communicate daily mobility goal to care team and patient/family/caregiver  - Collaborate with rehabilitation services on mobility goals if consulted  - Perform Range of Motion 3 times a day  - Reposition patient every 2 hours    - Dangle patient 3 times a day  - Stand patient 3 times a day  - Ambulate patient 3 times a day  - Out of bed to chair 3 times a day   - Out of bed for meals 3 times a day  - Out of bed for toileting  - Record patient progress and toleration of activity level   Outcome: Progressing     Problem: PAIN - ADULT  Goal: Verbalizes/displays adequate comfort level or baseline comfort level  Description: Interventions:  - Encourage patient to monitor pain and request assistance  - Assess pain using appropriate pain scale  - Administer analgesics based on type and severity of pain and evaluate response  - Implement non-pharmacological measures as appropriate and evaluate response  - Consider cultural and social influences on pain and pain management  - Notify physician/advanced practitioner if interventions unsuccessful or patient reports new pain  Outcome: Progressing     Problem: INFECTION - ADULT  Goal: Absence or prevention of progression during hospitalization  Description: INTERVENTIONS:  - Assess and monitor for signs and symptoms of infection  - Monitor lab/diagnostic results  - Monitor all insertion sites, i e  indwelling lines, tubes, and drains  - Monitor endotracheal if appropriate and nasal secretions for changes in amount and color  - [place] appropriate cooling/warming therapies per order  - Administer medications as ordered  - Instruct and encourage patient and family to use good hand hygiene technique  - Identify and instruct in appropriate isolation precautions for identified infection/condition  Outcome: Progressing  Goal: Absence of fever/infection during neutropenic period  Description: INTERVENTIONS:  - Monitor WBC    Outcome: Progressing     Problem: SAFETY ADULT  Goal: Maintain or return to baseline ADL function  Description: INTERVENTIONS:  -  Assess patient's ability to carry out ADLs; assess patient's baseline for ADL function and identify physical deficits which impact ability to perform ADLs (bathing, care of mouth/teeth, toileting, grooming, dressing, etc )  - Assess/evaluate cause of self-care deficits   - Assess range of motion  - Assess patient's mobility; develop plan if impaired  - Assess patient's need for assistive devices and provide as appropriate  - Encourage maximum independence but intervene and supervise when necessary  - Involve family in performance of ADLs  - Assess for home care needs following discharge   - Consider OT consult to assist with ADL evaluation and planning for discharge  - Provide patient education as appropriate  Outcome: Progressing  Goal: Maintains/Returns to pre admission functional level  Description: INTERVENTIONS:  - Perform BMAT or MOVE assessment daily    - Set and communicate daily mobility goal to care team and patient/family/caregiver  - Collaborate with rehabilitation services on mobility goals if consulted  - Perform Range of Motion 2 times a day  - Reposition patient every 2 hours    - Dangle patient 3 times a day  - Stand patient 3 times a day  - Ambulate patient 3 times a day  - Out of bed to chair 3 times a day   - Out of bed for meals 3 times a day  - Out of bed for toileting  - Record patient progress and toleration of activity level   Outcome: Progressing  Goal: Patient will remain free of falls  Description: INTERVENTIONS:  - Educate patient/family on patient safety including physical limitations  - Instruct patient to call for assistance with activity   - Consult OT/PT to assist with strengthening/mobility   - Keep Call bell within reach  - Keep bed low and locked with side rails adjusted as appropriate  - Keep care items and personal belongings within reach  - Initiate and maintain comfort rounds  - Make Fall Risk Sign visible to staff  - Offer Toileting every 2 Hours, in advance of need  - Initiate/Maintain bed alarm  - Apply yellow socks and bracelet for high fall risk patients  - Consider moving patient to room near nurses station  Outcome: Progressing     Problem: DISCHARGE PLANNING  Goal: Discharge to home or other facility with appropriate resources  Description: INTERVENTIONS:  - Identify barriers to discharge w/patient and caregiver  - Arrange for needed discharge resources and transportation as appropriate  - Identify discharge learning needs (meds, wound care, etc )  - Arrange for interpretive services to assist at discharge as needed  - Refer to Case Management Department for coordinating discharge planning if the patient needs post-hospital services based on physician/advanced practitioner order or complex needs related to functional status, cognitive ability, or social support system  Outcome: Progressing

## 2023-03-06 NOTE — ASSESSMENT & PLAN NOTE
· POA from home with wife with acute onset hallucinations, confusion  Wife was concerned he had an underlying infection as this is his normal response    · Chronic indwelling Ortiz catheter without evidence of pyuria  · CT brain no acute abnormality  · Left heel ulcer questionable source of infection  · Empiric antibiotics  · IV hydration- completed  · Wife reports pt can hold conversation and seems oriented but then says things that dont make sense  · Patient is alert without hallucinations this morning, not oriented

## 2023-03-07 LAB
ANION GAP SERPL CALCULATED.3IONS-SCNC: 2 MMOL/L (ref 4–13)
APPEARANCE FLD: ABNORMAL
BACTERIA BLD CULT: NORMAL
BACTERIA BLD CULT: NORMAL
BASOPHILS # BLD AUTO: 0.03 THOUSANDS/ÂΜL (ref 0–0.1)
BASOPHILS NFR BLD AUTO: 0 % (ref 0–1)
BUN SERPL-MCNC: 53 MG/DL (ref 5–25)
CALCIUM SERPL-MCNC: 10.8 MG/DL (ref 8.4–10.2)
CHLORIDE SERPL-SCNC: 100 MMOL/L (ref 96–108)
CO2 SERPL-SCNC: 32 MMOL/L (ref 21–32)
COLOR FLD: ABNORMAL
CREAT SERPL-MCNC: 2.86 MG/DL (ref 0.6–1.3)
CRYSTALS SNV QL MICRO: NORMAL
EOSINOPHIL # BLD AUTO: 0.49 THOUSAND/ÂΜL (ref 0–0.61)
EOSINOPHIL NFR BLD AUTO: 7 % (ref 0–6)
ERYTHROCYTE [DISTWIDTH] IN BLOOD BY AUTOMATED COUNT: 16 % (ref 11.6–15.1)
GFR SERPL CREATININE-BSD FRML MDRD: 23 ML/MIN/1.73SQ M
GLUCOSE SERPL-MCNC: 119 MG/DL (ref 65–140)
GLUCOSE SERPL-MCNC: 138 MG/DL (ref 65–140)
GLUCOSE SERPL-MCNC: 157 MG/DL (ref 65–140)
GLUCOSE SERPL-MCNC: 157 MG/DL (ref 65–140)
GLUCOSE SERPL-MCNC: 253 MG/DL (ref 65–140)
HCT VFR BLD AUTO: 26.1 % (ref 36.5–49.3)
HGB BLD-MCNC: 8.5 G/DL (ref 12–17)
IMM GRANULOCYTES # BLD AUTO: 0.02 THOUSAND/UL (ref 0–0.2)
IMM GRANULOCYTES NFR BLD AUTO: 0 % (ref 0–2)
LYMPHOCYTES # BLD AUTO: 0.97 THOUSANDS/ÂΜL (ref 0.6–4.47)
LYMPHOCYTES NFR BLD AUTO: 15 % (ref 14–44)
MCH RBC QN AUTO: 29.6 PG (ref 26.8–34.3)
MCHC RBC AUTO-ENTMCNC: 32.6 G/DL (ref 31.4–37.4)
MCV RBC AUTO: 91 FL (ref 82–98)
MONOCYTES # BLD AUTO: 1.26 THOUSAND/ÂΜL (ref 0.17–1.22)
MONOCYTES NFR BLD AUTO: 19 % (ref 4–12)
MONOCYTES NFR SNV MANUAL: 1 %
NEUTROPHILS # BLD AUTO: 3.9 THOUSANDS/ÂΜL (ref 1.85–7.62)
NEUTROPHILS NFR SNV MANUAL: 99 %
NEUTS SEG NFR BLD AUTO: 59 % (ref 43–75)
NRBC BLD AUTO-RTO: 0 /100 WBCS
PLATELET # BLD AUTO: 155 THOUSANDS/UL (ref 149–390)
PMV BLD AUTO: 11.2 FL (ref 8.9–12.7)
POTASSIUM SERPL-SCNC: 4 MMOL/L (ref 3.5–5.3)
RBC # BLD AUTO: 2.87 MILLION/UL (ref 3.88–5.62)
SITE: ABNORMAL
SODIUM SERPL-SCNC: 134 MMOL/L (ref 135–147)
TOTAL CELLS COUNTED SPEC: 100
WBC # BLD AUTO: 6.67 THOUSAND/UL (ref 4.31–10.16)
WBC # FLD MANUAL: ABNORMAL /UL (ref 0–200)

## 2023-03-07 PROCEDURE — 0S9C3ZX DRAINAGE OF RIGHT KNEE JOINT, PERCUTANEOUS APPROACH, DIAGNOSTIC: ICD-10-PCS | Performed by: ORTHOPAEDIC SURGERY

## 2023-03-07 RX ORDER — ONDANSETRON 2 MG/ML
4 INJECTION INTRAMUSCULAR; INTRAVENOUS ONCE
Status: COMPLETED | OUTPATIENT
Start: 2023-03-07 | End: 2023-03-07

## 2023-03-07 RX ADMIN — PREGABALIN 150 MG: 75 CAPSULE ORAL at 08:12

## 2023-03-07 RX ADMIN — CARVEDILOL 6.25 MG: 6.25 TABLET, FILM COATED ORAL at 17:26

## 2023-03-07 RX ADMIN — CARVEDILOL 6.25 MG: 6.25 TABLET, FILM COATED ORAL at 08:12

## 2023-03-07 RX ADMIN — SEVELAMER HYDROCHLORIDE 800 MG: 800 TABLET ORAL at 12:20

## 2023-03-07 RX ADMIN — INSULIN LISPRO 3 UNITS: 100 INJECTION, SOLUTION INTRAVENOUS; SUBCUTANEOUS at 21:29

## 2023-03-07 RX ADMIN — INSULIN GLARGINE 5 UNITS: 100 INJECTION, SOLUTION SUBCUTANEOUS at 21:27

## 2023-03-07 RX ADMIN — CYCLOBENZAPRINE 5 MG: 10 TABLET, FILM COATED ORAL at 21:28

## 2023-03-07 RX ADMIN — SEVELAMER HYDROCHLORIDE 800 MG: 800 TABLET ORAL at 17:26

## 2023-03-07 RX ADMIN — SEVELAMER HYDROCHLORIDE 800 MG: 800 TABLET ORAL at 06:41

## 2023-03-07 RX ADMIN — TAMSULOSIN HYDROCHLORIDE 0.4 MG: 0.4 CAPSULE ORAL at 17:26

## 2023-03-07 RX ADMIN — ONDANSETRON 4 MG: 2 INJECTION INTRAMUSCULAR; INTRAVENOUS at 21:31

## 2023-03-07 RX ADMIN — VENLAFAXINE HYDROCHLORIDE 37.5 MG: 37.5 CAPSULE, EXTENDED RELEASE ORAL at 17:31

## 2023-03-07 RX ADMIN — ALLOPURINOL 100 MG: 100 TABLET ORAL at 08:12

## 2023-03-07 RX ADMIN — VENLAFAXINE HYDROCHLORIDE 37.5 MG: 37.5 CAPSULE, EXTENDED RELEASE ORAL at 08:12

## 2023-03-07 RX ADMIN — AMLODIPINE BESYLATE 10 MG: 10 TABLET ORAL at 08:12

## 2023-03-07 RX ADMIN — BUPRENORPHINE HYDROCHLORIDE 900 MCG: 900 FILM, SOLUBLE BUCCAL at 09:46

## 2023-03-07 RX ADMIN — PREGABALIN 150 MG: 75 CAPSULE ORAL at 17:27

## 2023-03-07 RX ADMIN — OXYCODONE HYDROCHLORIDE 5 MG: 5 TABLET ORAL at 08:19

## 2023-03-07 RX ADMIN — CYCLOBENZAPRINE 5 MG: 10 TABLET, FILM COATED ORAL at 17:26

## 2023-03-07 RX ADMIN — THIAMINE HCL TAB 100 MG 100 MG: 100 TAB at 08:12

## 2023-03-07 RX ADMIN — INSULIN LISPRO 2 UNITS: 100 INJECTION, SOLUTION INTRAVENOUS; SUBCUTANEOUS at 17:27

## 2023-03-07 RX ADMIN — DOCUSATE SODIUM AND SENNOSIDES 1 TABLET: 8.6; 5 TABLET, FILM COATED ORAL at 08:12

## 2023-03-07 RX ADMIN — PREDNISONE 30 MG: 20 TABLET ORAL at 17:26

## 2023-03-07 RX ADMIN — DOCUSATE SODIUM 100 MG: 100 CAPSULE ORAL at 17:27

## 2023-03-07 RX ADMIN — HEPARIN SODIUM 5000 UNITS: 5000 INJECTION INTRAVENOUS; SUBCUTANEOUS at 17:26

## 2023-03-07 RX ADMIN — CYCLOBENZAPRINE 5 MG: 10 TABLET, FILM COATED ORAL at 08:12

## 2023-03-07 RX ADMIN — FERROUS SULFATE TAB 325 MG (65 MG ELEMENTAL FE) 325 MG: 325 (65 FE) TAB at 08:12

## 2023-03-07 RX ADMIN — POLYETHYLENE GLYCOL 3350 17 G: 17 POWDER, FOR SOLUTION ORAL at 08:13

## 2023-03-07 RX ADMIN — CEFTRIAXONE 1000 MG: 1 INJECTION, SOLUTION INTRAVENOUS at 12:21

## 2023-03-07 RX ADMIN — INSULIN LISPRO 2 UNITS: 100 INJECTION, SOLUTION INTRAVENOUS; SUBCUTANEOUS at 12:21

## 2023-03-07 RX ADMIN — HEPARIN SODIUM 5000 UNITS: 5000 INJECTION INTRAVENOUS; SUBCUTANEOUS at 21:27

## 2023-03-07 RX ADMIN — SODIUM BICARBONATE 650 MG TABLET 650 MG: at 08:12

## 2023-03-07 RX ADMIN — POLYETHYLENE GLYCOL 3350 17 G: 17 POWDER, FOR SOLUTION ORAL at 17:27

## 2023-03-07 RX ADMIN — HEPARIN SODIUM 5000 UNITS: 5000 INJECTION INTRAVENOUS; SUBCUTANEOUS at 06:41

## 2023-03-07 RX ADMIN — BUPRENORPHINE HYDROCHLORIDE 900 MCG: 900 FILM, SOLUBLE BUCCAL at 21:23

## 2023-03-07 RX ADMIN — ATORVASTATIN CALCIUM 40 MG: 40 TABLET, FILM COATED ORAL at 17:26

## 2023-03-07 RX ADMIN — ACETAMINOPHEN 325MG 650 MG: 325 TABLET ORAL at 12:20

## 2023-03-07 RX ADMIN — DOCUSATE SODIUM AND SENNOSIDES 1 TABLET: 8.6; 5 TABLET, FILM COATED ORAL at 17:27

## 2023-03-07 RX ADMIN — VENLAFAXINE HYDROCHLORIDE 37.5 MG: 37.5 CAPSULE, EXTENDED RELEASE ORAL at 21:28

## 2023-03-07 RX ADMIN — DOCUSATE SODIUM 100 MG: 100 CAPSULE ORAL at 08:12

## 2023-03-07 NOTE — PLAN OF CARE
Problem: Nutrition/Hydration-ADULT  Goal: Nutrient/Hydration intake appropriate for improving, restoring or maintaining nutritional needs  Description: Monitor and assess patient's nutrition/hydration status for malnutrition  Collaborate with interdisciplinary team and initiate plan and interventions as ordered  Monitor patient's weight and dietary intake as ordered or per policy  Utilize nutrition screening tool and intervene as necessary  Determine patient's food preferences and provide high-protein, high-caloric foods as appropriate       INTERVENTIONS:  - Monitor oral intake, urinary output, labs, and treatment plans  - Assess nutrition and hydration status and recommend course of action  - Evaluate amount of meals eaten  - Assist patient with eating if necessary   - Allow adequate time for meals  - Recommend/ encourage appropriate diets, oral nutritional supplements, and vitamin/mineral supplements  - Order, calculate, and assess calorie counts as needed  - Recommend, monitor, and adjust tube feedings and TPN/PPN based on assessed needs  - Assess need for intravenous fluids  - Provide specific nutrition/hydration education as appropriate  - Include patient/family/caregiver in decisions related to nutrition  Outcome: Progressing     Problem: MOBILITY - ADULT  Goal: Maintain or return to baseline ADL function  Description: INTERVENTIONS:  -  Assess patient's ability to carry out ADLs; assess patient's baseline for ADL function and identify physical deficits which impact ability to perform ADLs (bathing, care of mouth/teeth, toileting, grooming, dressing, etc )  - Assess/evaluate cause of self-care deficits   - Assess range of motion  - Assess patient's mobility; develop plan if impaired  - Assess patient's need for assistive devices and provide as appropriate  - Encourage maximum independence but intervene and supervise when necessary  - Involve family in performance of ADLs  - Assess for home care needs following discharge   - Consider OT consult to assist with ADL evaluation and planning for discharge  - Provide patient education as appropriate  Outcome: Progressing  Goal: Maintains/Returns to pre admission functional level  Description: INTERVENTIONS:  - Perform BMAT or MOVE assessment daily    - Set and communicate daily mobility goal to care team and patient/family/caregiver  - Collaborate with rehabilitation services on mobility goals if consulted  - Perform Range of Motion 3 times a day  - Reposition patient every 2 hours    - Dangle patient 3 times a day  - Stand patient 3 times a day  - Ambulate patient 3 times a day  - Out of bed to chair 3 times a day   - Out of bed for meals 3 times a day  - Out of bed for toileting  - Record patient progress and toleration of activity level   Outcome: Progressing     Problem: PAIN - ADULT  Goal: Verbalizes/displays adequate comfort level or baseline comfort level  Description: Interventions:  - Encourage patient to monitor pain and request assistance  - Assess pain using appropriate pain scale  - Administer analgesics based on type and severity of pain and evaluate response  - Implement non-pharmacological measures as appropriate and evaluate response  - Consider cultural and social influences on pain and pain management  - Notify physician/advanced practitioner if interventions unsuccessful or patient reports new pain  Outcome: Progressing     Problem: INFECTION - ADULT  Goal: Absence or prevention of progression during hospitalization  Description: INTERVENTIONS:  - Assess and monitor for signs and symptoms of infection  - Monitor lab/diagnostic results  - Monitor all insertion sites, i e  indwelling lines, tubes, and drains  - Monitor endotracheal if appropriate and nasal secretions for changes in amount and color  - [place] appropriate cooling/warming therapies per order  - Administer medications as ordered  - Instruct and encourage patient and family to use good hand hygiene technique  - Identify and instruct in appropriate isolation precautions for identified infection/condition  Outcome: Progressing  Goal: Absence of fever/infection during neutropenic period  Description: INTERVENTIONS:  - Monitor WBC    Outcome: Progressing     Problem: SAFETY ADULT  Goal: Maintain or return to baseline ADL function  Description: INTERVENTIONS:  -  Assess patient's ability to carry out ADLs; assess patient's baseline for ADL function and identify physical deficits which impact ability to perform ADLs (bathing, care of mouth/teeth, toileting, grooming, dressing, etc )  - Assess/evaluate cause of self-care deficits   - Assess range of motion  - Assess patient's mobility; develop plan if impaired  - Assess patient's need for assistive devices and provide as appropriate  - Encourage maximum independence but intervene and supervise when necessary  - Involve family in performance of ADLs  - Assess for home care needs following discharge   - Consider OT consult to assist with ADL evaluation and planning for discharge  - Provide patient education as appropriate  Outcome: Progressing  Goal: Maintains/Returns to pre admission functional level  Description: INTERVENTIONS:  - Perform BMAT or MOVE assessment daily    - Set and communicate daily mobility goal to care team and patient/family/caregiver  - Collaborate with rehabilitation services on mobility goals if consulted  - Perform Range of Motion 3 times a day  - Reposition patient every 2 hours    - Dangle patient 3 times a day  - Stand patient 3 times a day  - Ambulate patient 3 times a day  - Out of bed to chair 3 times a day   - Out of bed for meals 3 times a day  - Out of bed for toileting  - Record patient progress and toleration of activity level   Outcome: Progressing  Goal: Patient will remain free of falls  Description: INTERVENTIONS:  - Educate patient/family on patient safety including physical limitations  - Instruct patient to call for assistance with activity   - Consult OT/PT to assist with strengthening/mobility   - Keep Call bell within reach  - Keep bed low and locked with side rails adjusted as appropriate  - Keep care items and personal belongings within reach  - Initiate and maintain comfort rounds  - Make Fall Risk Sign visible to staff  - Offer Toileting every 2 Hours, in advance of need  - Initiate/Maintain bed alarm  - Obtain necessary fall risk management equipment: walker  - Apply yellow socks and bracelet for high fall risk patients  - Consider moving patient to room near nurses station  Outcome: Progressing     Problem: DISCHARGE PLANNING  Goal: Discharge to home or other facility with appropriate resources  Description: INTERVENTIONS:  - Identify barriers to discharge w/patient and caregiver  - Arrange for needed discharge resources and transportation as appropriate  - Identify discharge learning needs (meds, wound care, etc )  - Arrange for interpretive services to assist at discharge as needed  - Refer to Case Management Department for coordinating discharge planning if the patient needs post-hospital services based on physician/advanced practitioner order or complex needs related to functional status, cognitive ability, or social support system  Outcome: Progressing     Problem: Knowledge Deficit  Goal: Patient/family/caregiver demonstrates understanding of disease process, treatment plan, medications, and discharge instructions  Description: Complete learning assessment and assess knowledge base    Interventions:  - Provide teaching at level of understanding  - Provide teaching via preferred learning methods  Outcome: Progressing

## 2023-03-07 NOTE — ASSESSMENT & PLAN NOTE
· Presents with nonhealing left diabetic foot wound on left heel that is being followed by outpatient wound care, home care nurse and podiatry  · boggy left heel wound covered with eschar that appears moisture underneath the eschar  · X-ray possible osteomyelitis changes  · MRI showing ulcer but no evidence of osteomyelitis  · Empiric vancomycin for history MRSA pneumonia/ empiric meropenem recent tx Pseudomonas UTI  · Discontinue antibiotics per ID  · MRSA negative-we will DC Vanco  · BCx no growth 72hr  · Podiatry recommendations as per note  · Appreciate ID consult  · Vascular surgery as outpatient  · Heel offloading w/prevalon boot, or two pillows  Pending PT/OT evals

## 2023-03-07 NOTE — ASSESSMENT & PLAN NOTE
· Criteria with tachycardia, leukocytosis  · Urine negative  · Blood cultures no growth 72hr  · CT chest abdomen pelvis no obvious infection  · suspected source left heel wound with eschar, erythema   · ABX: vanco/meropenem > now off vancomycin, dc meropenem and start ceftriaxone  · Monitor off antibiotics per ID  · Follow fever curve, afeberile x 24 hours  · Leukocytosis trending down - now normalized  · Procalcitonin 0 3> 1 15>0 79 - improving

## 2023-03-07 NOTE — PROGRESS NOTES
[address]  Progress Note - Nisha Wiley 1965, 62 y o  male MRN: [id number]  Unit/Bed#: -[facility] Encounter: [id number]  Primary Care Provider: Virginia Saxena DO   Date and time admitted to hospital: 3/2/2023  2:23 PM    * Acute metabolic encephalopathy  Assessment & Plan  · POA from home with wife with acute onset hallucinations, confusion  Wife was concerned he had an underlying infection as this is his normal response    · Chronic indwelling Ortiz catheter without evidence of pyuria  · CT brain no acute abnormality  · Left heel ulcer questionable source of infection  · Empiric antibiotics  · IV hydration- completed  · Wife reports pt can hold conversation and seems oriented but then says things that dont make sense  · Patient is alert without hallucinations this morning    Retention of urine  Assessment & Plan  · Chronic Ortiz due to neurogenic bladder  · No pyuria on urinalysis      Right knee pain  Assessment & Plan  · Patient states he has been having right knee pain and swelling for the last few days  · Has history of gout-uric acid done over the weekend normal  · We will add CRP to morning labs -elevated  · X-ray of right knee with effusion  · Orthopedics performed arthrocentesis this morning, patient notes improvement in his pain post procedure  · See under gout for treatment as fluid was positive for monosodium urate crystals and likely gout flare cause for pain  · Follow-up on cultures    CKD (chronic kidney disease)  Assessment & Plan  Lab Results   Component Value Date    EGFR 23 03/07/2023    EGFR 21 03/06/2023    EGFR 20 03/05/2023    CREATININE 2 86 (H) 03/07/2023    CREATININE 3 01 (H) 03/06/2023    CREATININE 3 17 (H) 03/05/2023   · Presents with KELLY on CKD in the setting of sepsis  · Baseline 3 6-3 9 per outpatient Nephrology note  · Creatinine trending down toward baseline  · Continue chronic Oritz catheter  · Renally dose medications, avoid hypotension, nephrotoxic medications    Diabetic foot ulcer ([facility] )  Assessment & Plan  · Presents with nonhealing left diabetic foot wound on left heel that is being followed by outpatient wound care, home care nurse and podiatry  · boggy left heel wound covered with eschar that appears moisture underneath the eschar  · X-ray possible osteomyelitis changes  · MRI showing ulcer but no evidence of osteomyelitis  · Empiric vancomycin for history MRSA pneumonia/ empiric meropenem recent tx Pseudomonas UTI  · Discontinue antibiotics per ID  · MRSA negative-we will DC Vanco  · BCx no growth 72hr  · Podiatry recommendations as per note  · Appreciate ID consult  · Vascular surgery as outpatient  · Heel offloading w/prevalon boot, or two pillows  Pending PT/OT evals    Sepsis ([facility] )  Assessment & Plan  · Criteria with tachycardia, leukocytosis  · Urine negative  · Blood cultures no growth 72hr  · CT chest abdomen pelvis no obvious infection  · suspected source left heel wound with eschar, erythema   · ABX: vanco/meropenem > now off vancomycin, dc meropenem and start ceftriaxone  · Monitor off antibiotics per ID  · Follow fever curve, afeberile x 24 hours  · Leukocytosis trending down - now normalized  · Procalcitonin 0 3> 1 15>0 79 - improving     PAD (peripheral artery disease) ([facility])  Assessment & Plan  · History of PAD  · Diabetes mellitus  · Nonhealing left heel ulcer  · Arterial duplex 12/27 with diffuse disease but no significant focal stenosis  · Continue atorvastatin  · OP vascular surgery referral    Chronic back pain  Assessment & Plan  · hx of chronic osteodiscitis L4-L5 and wife notes had admission with spinal abscess  · 3/4 CT scan lumbar spine- healing chronic L4-5 colitis/osteomyelitis with chronic pathologic Check compression fractures    Limited abscess evaluation with noncontrast exam   No new sites of discitis/osteomyelitis of lumbar spine    Chronic anemia  Assessment & Plan  · Chronic anemia noted previous iron deficiency with p o  iron supplementation, also likely anemia of chronic disease with CKD 3  · Baseline Hgb 9-10  · Continue PO supplmentation    Essential hypertension  Assessment & Plan  · Continue PTA amlodipine, carvedilol  · Remains normotensive     Gout  Assessment & Plan  · Patient complaining of hand pain and foot thinks he is having a gout flare  · Uric acid level normal at 7 over the weekend  · With increased right knee pain yesterday, orthopedic consult placed and had arthrocentesis of right knee this morning  · Cell count with high WBC, crystals with monosodium urate crystals  · Likely gout flare  · Due to kidney function options are limited  · Would start prednisone    Diabetes mellitus type 2, insulin dependent [facility])  Assessment & Plan  Lab Results   Component Value Date    HGBA1C 6 3 (H) 03/03/2023       Recent Labs     03/06/23  1548 03/06/23  2116 03/07/23  0733 03/07/23  1111   POCGLU 163* 183* 119 157*       Blood Sugar Average: Last 72 hrs:  (P) 156 4     · Basal insulin 5 units at bedtime, SSI AC/HS  · Hypoglycemia protocol  · Carbohydrate controlled diet      VTE Pharmacologic Prophylaxis: VTE Score: 3 Moderate Risk (Score 3-4) - Pharmacological DVT Prophylaxis Ordered: heparin  Patient Centered Rounds: I performed bedside rounds with nursing staff today  Discussions with Specialists or Other Care Team Provider: CM, ortho, ID    Education and Discussions with Family / Patient: Updated  (wife) via phone  Total Time Spent on Date of Encounter in care of patient: 35 minutes This time was spent on one or more of the following: performing physical exam; counseling and coordination of care; obtaining or reviewing history; documenting in the medical record; reviewing/ordering tests, medications or procedures; communicating with other healthcare professionals and discussing with patient's family/caregivers      Current Length of Stay: 5 day(s)  Current Patient Status: Inpatient Certification Statement: The patient will continue to require additional inpatient hospital stay due to Pending cultures  Discharge Plan: Anticipate discharge in 24-48 hrs to home with home services  Code Status: Level 1 - Full Code    Subjective:   Seen and examined  Had arthrocentesis this morning and reports decrease in his pain  Nursing reports that when his wife visited yesterday evening patient was much more confused, this morning seems to be more at his baseline mentation  Continue to monitor for waxing waning mentation  No events overnight, no acute concerns this morning reported  Objective:     Vitals:   Temp (24hrs), Av 2 °F (36 8 °C), Min:97 9 °F (36 6 °C), Max:98 6 °F (37 °C)    Temp:  [97 9 °F (36 6 °C)-98 6 °F (37 °C)] 98 2 °F (36 8 °C)  HR:  [66-84] 72  Resp:  [16-20] 18  BP: (117-141)/(60-69) 124/65  SpO2:  [93 %-95 %] 95 %  Body mass index is 19 28 kg/m²  Input and Output Summary (last 24 hours): Intake/Output Summary (Last 24 hours) at 3/7/2023 1443  Last data filed at 3/7/2023 0537  Gross per 24 hour   Intake 240 ml   Output 1650 ml   Net -1410 ml       Physical Exam:   Physical Exam  Vitals and nursing note reviewed  Constitutional:       General: He is not in acute distress  Appearance: Normal appearance  He is ill-appearing  HENT:      Head: Normocephalic and atraumatic  Nose: No congestion  Mouth/Throat:      Mouth: Mucous membranes are moist    Eyes:      Conjunctiva/sclera: Conjunctivae normal    Cardiovascular:      Rate and Rhythm: Normal rate and regular rhythm  Pulses: Normal pulses  Heart sounds: Normal heart sounds  No murmur heard  Pulmonary:      Effort: Pulmonary effort is normal  No respiratory distress  Breath sounds: Normal breath sounds  Abdominal:      General: Bowel sounds are normal       Palpations: Abdomen is soft  Tenderness: There is no abdominal tenderness     Musculoskeletal:         General: Normal range of motion  Right lower leg: No edema  Left lower leg: No edema  Comments: Right knee with Ace wrap postprocedure   Skin:     General: Skin is warm and dry  Findings: Lesion (Left heel wound) present  Neurological:      Mental Status: He is alert and oriented to person, place, and time  Additional Data:     Labs:  Results from last 7 days   Lab Units 03/07/23  0522   WBC Thousand/uL 6 67   HEMOGLOBIN g/dL 8 5*   HEMATOCRIT % 26 1*   PLATELETS Thousands/uL 155   NEUTROS PCT % 59   LYMPHS PCT % 15   MONOS PCT % 19*   EOS PCT % 7*     Results from last 7 days   Lab Units 03/07/23  0522 03/05/23  0550 03/04/23  0701   SODIUM mmol/L 134*   < > 136   POTASSIUM mmol/L 4 0   < > 4 0   CHLORIDE mmol/L 100   < > 103   CO2 mmol/L 32   < > 26   BUN mg/dL 53*   < > 47*   CREATININE mg/dL 2 86*   < > 3 16*   ANION GAP mmol/L 2*   < > 7   CALCIUM mg/dL 10 8*   < > 10 4*   ALBUMIN g/dL  --   --  3 4*   TOTAL BILIRUBIN mg/dL  --   --  0 49   ALK PHOS U/L  --   --  73   ALT U/L  --   --  3*   AST U/L  --   --  7*   GLUCOSE RANDOM mg/dL 138   < > 91    < > = values in this interval not displayed           Results from last 7 days   Lab Units 03/07/23  1111 03/07/23  0733 03/06/23  2116 03/06/23  1548 03/06/23  1115 03/06/23  0743 03/06/23  0556 03/05/23  2059 03/05/23  1558 03/05/23  1130 03/05/23  0631 03/04/23  2104   POC GLUCOSE mg/dl 157* 119 183* 163* 154* 145* 138 200* 140 226* 131 177*     Results from last 7 days   Lab Units 03/03/23  0458   HEMOGLOBIN A1C % 6 3*     Results from last 7 days   Lab Units 03/06/23  0432 03/04/23  0701 03/02/23  1452   LACTIC ACID mmol/L  --   --  0 8   PROCALCITONIN ng/ml 0 79* 1 15* 0 30*       Lines/Drains:  Invasive Devices     Peripheral Intravenous Line  Duration           Peripheral IV 03/06/23 Distal;Right;Upper;Ventral (anterior) Arm <1 day          Drain  Duration           Urethral Catheter Coude 16 Fr  73 days              Urinary Catheter:  Goal for removal: N/A - Chronic Ortiz               Imaging: No pertinent imaging reviewed  Recent Cultures (last 7 days):   Results from last 7 days   Lab Units 03/02/23  1526 03/02/23  1452   BLOOD CULTURE  No Growth After 4 Days  No Growth After 4 Days         Last 24 Hours Medication List:   Current Facility-Administered Medications   Medication Dose Route Frequency Provider Last Rate   • acetaminophen  650 mg Oral Q4H PRN Thao S June, CRNP     • allopurinol  100 mg Oral Daily Thao S June, CRNP     • amLODIPine  10 mg Oral Daily Thao S June, CRNP     • atorvastatin  40 mg Oral Daily With Dinner Thao S June, CRNP     • Buprenorphine HCl  900 mcg Buccal BID Thao S June, CRNP     • carvedilol  6 25 mg Oral BID With Meals Thao S June, CRNP     • cyclobenzaprine  5 mg Oral TID Thao S June, CRNP     • docusate sodium  100 mg Oral BID Thao S June, CRNP     • ferrous sulfate  325 mg Oral Daily With Breakfast Thao S June, CRNP     • heparin (porcine)  5,000 Units Subcutaneous Q8H [address] Thao S June, CRNP     • HYDROmorphone  1 mg Intravenous Q6H PRN Anita Marion MD     • insulin glargine  5 Units Subcutaneous HS Thao S June, CRNP     • insulin lispro  1-6 Units Subcutaneous HS Thao S June, CRNP     • insulin lispro  2-12 Units Subcutaneous TID AC Mace Brianna CRNP     • LORazepam  1 mg Intravenous Once Anita Marion MD     • oxyCODONE  5 mg Oral Q4H PRN Anita Marion MD     • polyethylene glycol  17 g Oral BID Thao S June, CRNP     • predniSONE  30 mg Oral Daily Teena Damico PA-C     • pregabalin  150 mg Oral BID Thao S June, CRNP     • senna-docusate sodium  1 tablet Oral BID Thao S June, CRNP     • sevelamer  800 mg Oral TID With Meals Thao S June, CRNP     • sodium bicarbonate  650 mg Oral Every Other Day Thao S June, CRNP     • tamsulosin  0 4 mg Oral Daily With Dinner Thao S June, CRNP     • thiamine  100 mg Oral Daily YURIDIA Chirinos     • venlafaxine  37 5 mg Oral TID YURIDIA Chirinos          Today, Patient Was Seen By: Ben Arita PA-C    **Please Note: This note may have been constructed using a voice recognition system  **

## 2023-03-07 NOTE — ASSESSMENT & PLAN NOTE
· Patient complaining of hand pain and foot thinks he is having a gout flare  · Uric acid level normal at 7 over the weekend  · With increased right knee pain yesterday, orthopedic consult placed and had arthrocentesis of right knee this morning  · Cell count with high WBC, crystals with monosodium urate crystals  · Likely gout flare  · Due to kidney function options are limited  · Would start prednisone

## 2023-03-07 NOTE — ASSESSMENT & PLAN NOTE
· Patient states he has been having right knee pain and swelling for the last few days  · Has history of gout-uric acid done over the weekend normal  · We will add CRP to morning labs -elevated  · X-ray of right knee with effusion  · Orthopedics performed arthrocentesis this morning, patient notes improvement in his pain post procedure  · See under gout for treatment as fluid was positive for monosodium urate crystals and likely gout flare cause for pain  · Follow-up on cultures

## 2023-03-07 NOTE — ASSESSMENT & PLAN NOTE
Lab Results   Component Value Date    HGBA1C 6 3 (H) 03/03/2023       Recent Labs     03/06/23  1548 03/06/23  2116 03/07/23  0733 03/07/23  1111   POCGLU 163* 183* 119 157*       Blood Sugar Average: Last 72 hrs:  (P) 156 4     · Basal insulin 5 units at bedtime, SSI AC/HS  · Hypoglycemia protocol  · Carbohydrate controlled diet

## 2023-03-07 NOTE — ASSESSMENT & PLAN NOTE
Lab Results   Component Value Date    EGFR 23 03/07/2023    EGFR 21 03/06/2023    EGFR 20 03/05/2023    CREATININE 2 86 (H) 03/07/2023    CREATININE 3 01 (H) 03/06/2023    CREATININE 3 17 (H) 03/05/2023   · Presents with KELLY on CKD in the setting of sepsis  · Baseline 3 6-3 9 per outpatient Nephrology note  · Creatinine trending down toward baseline  · Continue chronic Ortiz catheter  · Renally dose medications, avoid hypotension, nephrotoxic medications

## 2023-03-07 NOTE — ASSESSMENT & PLAN NOTE
· POA from home with wife with acute onset hallucinations, confusion  Wife was concerned he had an underlying infection as this is his normal response    · Chronic indwelling Ortiz catheter without evidence of pyuria  · CT brain no acute abnormality  · Left heel ulcer questionable source of infection  · Empiric antibiotics  · IV hydration- completed  · Wife reports pt can hold conversation and seems oriented but then says things that dont make sense  · Patient is alert without hallucinations this morning

## 2023-03-07 NOTE — CONSULTS
REQUIRED DOCUMENTATION:     1  This service was provided via Telemedicine  2  Provider located at [facility]  3  TeleMed provider: Aroldo Dickerson MD   4  Identify all parties in room with patient during tele consult:  Miranda, the virtual care assistant  5  After connecting through [employer], patient was identified by name and date of birth and assistant checked wristband  Patient was then informed that this was a Telemedicine visit and that the exam was being conducted confidentially over secure lines  My office door was closed  No one else was in the room  Patient acknowledged consent and understanding of privacy and security of the Telemedicine visit, and gave us permission to have the assistant stay in the room in order to assist with the history and to conduct the exam   I informed the patient that I have reviewed their record in Epic and presented the opportunity for them to ask any questions regarding the visit today  The patient agreed to participate  TeleConsultation - Infectious Disease   Melissa Flanagan  62 y o  male MRN: [id number]  Unit/Bed#: -01 Encounter: [id number]      IMPRESSION & RECOMMENDATIONS:   1  Acute monoarticular arthritis of the right knee  With an elevated left shifted cell count but not consistent with an acute septic joint  Suspect a crystalline arthropathy as the patient does have a history of gout  Less likely but possible would be an acute septic arthritis that is partially treated  Consideration for the possibility of chronic Lyme arthropathy  -No directed antibiotics  -Follow-up synovial crystal analysis  -Follow-up synovial cultures  -Check Lyme screen  -Orthopedics follow-up  -Serial exams    2  Left diabetic heel ulcer  With pressure component  Patient has had this for quite some time and by his report the heel ulcer is improving  No clear clinical or radiographic evidence of a deeper infection    No definitive cellulitis on exam  Regardless, the patient is already received more than 5 days of antibiotics  Certainly at risk of breaking down and leading to limb loss and becoming secondarily infected  -Discontinue ceftriaxone  -Local wound care  -Close podiatry follow-up    3  Chronic kidney disease  Stage IV  Renal function relatively stable  -Volume management  -Recheck BMP    4  Acute encephalopathy  Possibly metabolic issues with the patient having worsening renal function  Possibly medication effect  CT of the brain with no acute abnormality  No clear infectious etiology  Patient's cognition has apparently improved  -Monitor cognition  -Treatment of metabolic issues  -No additional ID work-up for now    Extensive review of the medical records in epic including review of the notes, radiographs, and laboratory results    Discussed the above management plan detail primary service    I spent 90 minutes in evaluation of the patient of which 50 minutes was in counseling/coordination of care    HISTORY OF PRESENT ILLNESS:  Reason for Consult: Right knee effusion  HPI: Gerry Tinajero  is a 62y o  year old male with neurogenic bladder with chronic Ortiz catheter in place, diabetes mellitus, chronic kidney disease, nonhealing left heel ulcer, failed back surgery awaiting repeat surgical intervention admitted to [facility] with acute encephalopathy we are asked to assist with management of a right knee joint effusion  On initial presentation the patient was found to be afebrile but with a leukocytosis  Did have an isolated low-grade fever soon after admission  He was thought to have a secondarily infected left heel ulcer and was treated with vancomycin and meropenem while awaiting additional data  He had been struggling with some chronic right knee pain however it apparently worsened over the last several days therefore he underwent arthrocentesis which revealed more than 25,000 white cells with a left shift    Of note his antibiotics have been narrowed to ceftriaxone  The patient tells me his left heel ulcer is actually been healing and doing better  See initial isolated low-grade fever the patient is remained afebrile  His leukocytosis has resolved  His encephalopathy apparently has resolved  He denies any nausea vomiting or diarrhea, denies any cough or shortness of breath, denies any dysuria or hematuria  Overall he is feeling much better and is in good spirits  MRI of the heel did not reveal any evidence of an abscess or osteomyelitis  Of note the patient has a history of recurrent gout involving his knees and feet in the past    REVIEW OF SYSTEMS:  A complete 12 point system-based review of systems is negative other than that noted in the HPI  PAST MEDICAL HISTORY:  Past Medical History:   Diagnosis Date   • Chronic kidney disease    • Diabetes mellitus ([facility] )    • Gout    • Hypertension    • Renal disorder    • Retroperitoneal abscess ([facility] )    • Stroke ([facility] )    • UTI (urinary tract infection)    • Vertebral osteomyelitis ([facility] )      Past Surgical History:   Procedure Laterality Date   • BACK SURGERY     • ORCHIECTOMY         FAMILY HISTORY:  Non-contributory    SOCIAL HISTORY:  Social History   Social History     Substance and Sexual Activity   Alcohol Use Not Currently     Social History     Substance and Sexual Activity   Drug Use Yes   • Types: Marijuana     Social History     Tobacco Use   Smoking Status Every Day   • Packs/day: 0 25   • Types: Cigarettes   Smokeless Tobacco Never       ALLERGIES:  Allergies   Allergen Reactions   • Bupropion Delirium     "went nuts," prior to 2012  "went nuts," prior to 2012  "went nuts," prior to 2012  "went nuts," prior to 2012     • Metformin Other (See Comments)     Other reaction(s): kidney disease  Other reaction(s): kidney disease  Other reaction(s): kidney disease     • Medical Tape Rash       MEDICATIONS:  All current active medications have been reviewed    Antibiotics: Ceftriaxone 2 status post 4 days of vancomycin and meropenem    PHYSICAL EXAM:  Temp:  [97 9 °F (36 6 °C)-98 6 °F (37 °C)] 97 9 °F (36 6 °C)  HR:  [66-84] 66  Resp:  [16-20] 16  BP: (117-141)/(60-69) 117/60  SpO2:  [93 %-95 %] 95 %  Temp (24hrs), Av 2 °F (36 8 °C), Min:97 9 °F (36 6 °C), Max:98 6 °F (37 °C)  Current: Temperature: 97 9 °F (36 6 °C)    Intake/Output Summary (Last 24 hours) at 3/7/2023 1205  Last data filed at 3/7/2023 5267  Gross per 24 hour   Intake 650 ml   Output 1650 ml   Net -1000 ml       Physical exam has been primarily done by the patient's nurse and/or the primary service due to limited examination abilities on telemedicine    General Appearance:  Appearing well, nontoxic, and in no distress   Head:  Normocephalic, without obvious abnormality, atraumatic   Eyes:  Conjunctiva pink and sclera anicteric, both eyes   Nose: Nares normal, mucosa normal, no drainage   Throat: Oropharynx moist without lesions   Neck: Supple, symmetrical, no adenopathy   Back:   Symmetric, no curvature, ROM normal, no CVA tenderness   Lungs:   Clear to auscultation bilaterally, respirations unlabored   Chest Wall:  No tenderness or deformity   Heart:  RRR; no murmur, rub or gallop   Abdomen:   Soft, non-tender, non-distended, positive bowel sounds,    Extremities: No cyanosis, clubbing  Right knee swelling  Left heel eschar with faint surrounding erythema but without drainage   Skin: No rashes or lesions  No draining wounds noted  Lymph nodes: Cervical, supraclavicular nodes normal   Neurologic: Alert and oriented times 3, extremity strength 5/5 and symmetric       LABS, IMAGING, & OTHER STUDIES:  Lab Results:  I have personally reviewed pertinent labs    Results from last 7 days   Lab Units 23  0522 23  0432 23  0550   WBC Thousand/uL 6 67 7 84 8 27   HEMOGLOBIN g/dL 8 5* 8 1* 8 0*   PLATELETS Thousands/uL 155 140* 133*     Results from last 7 days   Lab Units 23  0522 23  0550 03/04/23  0701 03/03/23  0458 03/02/23  1452   POTASSIUM mmol/L 4 0   < > 4 0   < > 4 7   CHLORIDE mmol/L 100   < > 103   < > 105   CO2 mmol/L 32   < > 26   < > 30   BUN mg/dL 53*   < > 47*   < > 42*   CREATININE mg/dL 2 86*   < > 3 16*   < > 3 37*   EGFR ml/min/1 73sq m 23   < > 20   < > 19   CALCIUM mg/dL 10 8*   < > 10 4*   < > 10 7*   AST U/L  --   --  7*  --  8*   ALT U/L  --   --  3*  --  <3*   ALK PHOS U/L  --   --  73  --  77    < > = values in this interval not displayed  Results from last 7 days   Lab Units 03/02/23  2227 03/02/23  1526 03/02/23  1452   BLOOD CULTURE   --  No Growth After 4 Days  No Growth After 4 Days  MRSA CULTURE ONLY  No Methicillin Resistant Staphlyococcus aureus (MRSA) isolated  --   --      Results from last 7 days   Lab Units 03/06/23  0432 03/04/23  0701 03/02/23  1452   PROCALCITONIN ng/ml 0 79* 1 15* 0 30*     Results from last 7 days   Lab Units 03/06/23  0432   CRP mg/L 141 8*               Imaging Studies:     MRI left ankle heel-no abscess or osteomyelitis    X-ray right knee- moderate to large joint effusion    No acute osseous abnormality    Images personally reviewed by me in PACS and interpreted by me

## 2023-03-07 NOTE — PROCEDURES
Under aseptic technique, after permission was granted from the patient, the right knee was aspirated of approximately 8 cc of slightly cloudy yellow fluid  The patient tolerated this well  A compressive dressing was applied at the conclusion of the procedure  Fluid was sent to the lab for cultures, cell count and crystal analysis

## 2023-03-08 LAB
ANION GAP SERPL CALCULATED.3IONS-SCNC: 8 MMOL/L (ref 4–13)
B BURGDOR IGG+IGM SER-ACNC: <0.2 AI
BUN SERPL-MCNC: 65 MG/DL (ref 5–25)
CALCIUM SERPL-MCNC: 10.8 MG/DL (ref 8.4–10.2)
CHLORIDE SERPL-SCNC: 101 MMOL/L (ref 96–108)
CO2 SERPL-SCNC: 29 MMOL/L (ref 21–32)
CREAT SERPL-MCNC: 3.03 MG/DL (ref 0.6–1.3)
ERYTHROCYTE [DISTWIDTH] IN BLOOD BY AUTOMATED COUNT: 15.9 % (ref 11.6–15.1)
GFR SERPL CREATININE-BSD FRML MDRD: 21 ML/MIN/1.73SQ M
GLUCOSE SERPL-MCNC: 154 MG/DL (ref 65–140)
GLUCOSE SERPL-MCNC: 188 MG/DL (ref 65–140)
GLUCOSE SERPL-MCNC: 220 MG/DL (ref 65–140)
GLUCOSE SERPL-MCNC: 234 MG/DL (ref 65–140)
GLUCOSE SERPL-MCNC: 272 MG/DL (ref 65–140)
HCT VFR BLD AUTO: 26.5 % (ref 36.5–49.3)
HGB BLD-MCNC: 8.5 G/DL (ref 12–17)
MCH RBC QN AUTO: 29.2 PG (ref 26.8–34.3)
MCHC RBC AUTO-ENTMCNC: 32.1 G/DL (ref 31.4–37.4)
MCV RBC AUTO: 91 FL (ref 82–98)
PLATELET # BLD AUTO: 174 THOUSANDS/UL (ref 149–390)
PMV BLD AUTO: 10.6 FL (ref 8.9–12.7)
POTASSIUM SERPL-SCNC: 4.2 MMOL/L (ref 3.5–5.3)
RBC # BLD AUTO: 2.91 MILLION/UL (ref 3.88–5.62)
SODIUM SERPL-SCNC: 138 MMOL/L (ref 135–147)
WBC # BLD AUTO: 7.26 THOUSAND/UL (ref 4.31–10.16)

## 2023-03-08 RX ORDER — LANOLIN ALCOHOL/MO/W.PET/CERES
3 CREAM (GRAM) TOPICAL
Status: DISCONTINUED | OUTPATIENT
Start: 2023-03-08 | End: 2023-03-10 | Stop reason: HOSPADM

## 2023-03-08 RX ADMIN — DOCUSATE SODIUM AND SENNOSIDES 1 TABLET: 8.6; 5 TABLET, FILM COATED ORAL at 17:42

## 2023-03-08 RX ADMIN — SEVELAMER HYDROCHLORIDE 800 MG: 800 TABLET ORAL at 08:24

## 2023-03-08 RX ADMIN — PREGABALIN 150 MG: 75 CAPSULE ORAL at 08:24

## 2023-03-08 RX ADMIN — CARVEDILOL 6.25 MG: 6.25 TABLET, FILM COATED ORAL at 17:42

## 2023-03-08 RX ADMIN — CYCLOBENZAPRINE 5 MG: 10 TABLET, FILM COATED ORAL at 08:24

## 2023-03-08 RX ADMIN — FERROUS SULFATE TAB 325 MG (65 MG ELEMENTAL FE) 325 MG: 325 (65 FE) TAB at 08:24

## 2023-03-08 RX ADMIN — VENLAFAXINE HYDROCHLORIDE 37.5 MG: 37.5 CAPSULE, EXTENDED RELEASE ORAL at 08:24

## 2023-03-08 RX ADMIN — VENLAFAXINE HYDROCHLORIDE 37.5 MG: 37.5 CAPSULE, EXTENDED RELEASE ORAL at 21:30

## 2023-03-08 RX ADMIN — BUPRENORPHINE HYDROCHLORIDE 900 MCG: 900 FILM, SOLUBLE BUCCAL at 21:31

## 2023-03-08 RX ADMIN — CYCLOBENZAPRINE 5 MG: 10 TABLET, FILM COATED ORAL at 21:30

## 2023-03-08 RX ADMIN — BUPRENORPHINE HYDROCHLORIDE 900 MCG: 900 FILM, SOLUBLE BUCCAL at 09:07

## 2023-03-08 RX ADMIN — PREGABALIN 150 MG: 75 CAPSULE ORAL at 17:42

## 2023-03-08 RX ADMIN — INSULIN LISPRO 4 UNITS: 100 INJECTION, SOLUTION INTRAVENOUS; SUBCUTANEOUS at 17:46

## 2023-03-08 RX ADMIN — VENLAFAXINE HYDROCHLORIDE 37.5 MG: 37.5 CAPSULE, EXTENDED RELEASE ORAL at 17:42

## 2023-03-08 RX ADMIN — AMLODIPINE BESYLATE 10 MG: 10 TABLET ORAL at 08:24

## 2023-03-08 RX ADMIN — HEPARIN SODIUM 5000 UNITS: 5000 INJECTION INTRAVENOUS; SUBCUTANEOUS at 21:31

## 2023-03-08 RX ADMIN — POLYETHYLENE GLYCOL 3350 17 G: 17 POWDER, FOR SOLUTION ORAL at 08:24

## 2023-03-08 RX ADMIN — SEVELAMER HYDROCHLORIDE 800 MG: 800 TABLET ORAL at 11:51

## 2023-03-08 RX ADMIN — PREDNISONE 30 MG: 20 TABLET ORAL at 08:24

## 2023-03-08 RX ADMIN — SEVELAMER HYDROCHLORIDE 800 MG: 800 TABLET ORAL at 17:42

## 2023-03-08 RX ADMIN — CARVEDILOL 6.25 MG: 6.25 TABLET, FILM COATED ORAL at 08:24

## 2023-03-08 RX ADMIN — DOCUSATE SODIUM 100 MG: 100 CAPSULE ORAL at 08:24

## 2023-03-08 RX ADMIN — POLYETHYLENE GLYCOL 3350 17 G: 17 POWDER, FOR SOLUTION ORAL at 17:42

## 2023-03-08 RX ADMIN — ALLOPURINOL 100 MG: 100 TABLET ORAL at 08:24

## 2023-03-08 RX ADMIN — HEPARIN SODIUM 5000 UNITS: 5000 INJECTION INTRAVENOUS; SUBCUTANEOUS at 15:04

## 2023-03-08 RX ADMIN — THIAMINE HCL TAB 100 MG 100 MG: 100 TAB at 08:24

## 2023-03-08 RX ADMIN — TAMSULOSIN HYDROCHLORIDE 0.4 MG: 0.4 CAPSULE ORAL at 17:42

## 2023-03-08 RX ADMIN — INSULIN LISPRO 2 UNITS: 100 INJECTION, SOLUTION INTRAVENOUS; SUBCUTANEOUS at 08:27

## 2023-03-08 RX ADMIN — DOCUSATE SODIUM 100 MG: 100 CAPSULE ORAL at 17:42

## 2023-03-08 RX ADMIN — ATORVASTATIN CALCIUM 40 MG: 40 TABLET, FILM COATED ORAL at 17:42

## 2023-03-08 RX ADMIN — HEPARIN SODIUM 5000 UNITS: 5000 INJECTION INTRAVENOUS; SUBCUTANEOUS at 05:35

## 2023-03-08 RX ADMIN — INSULIN GLARGINE 5 UNITS: 100 INJECTION, SOLUTION SUBCUTANEOUS at 21:31

## 2023-03-08 RX ADMIN — CYCLOBENZAPRINE 5 MG: 10 TABLET, FILM COATED ORAL at 17:42

## 2023-03-08 RX ADMIN — INSULIN LISPRO 4 UNITS: 100 INJECTION, SOLUTION INTRAVENOUS; SUBCUTANEOUS at 21:32

## 2023-03-08 RX ADMIN — MELATONIN TAB 3 MG 3 MG: 3 TAB at 21:29

## 2023-03-08 RX ADMIN — DOCUSATE SODIUM AND SENNOSIDES 1 TABLET: 8.6; 5 TABLET, FILM COATED ORAL at 08:24

## 2023-03-08 RX ADMIN — INSULIN LISPRO 2 UNITS: 100 INJECTION, SOLUTION INTRAVENOUS; SUBCUTANEOUS at 11:51

## 2023-03-08 NOTE — PROGRESS NOTES
REQUIRED DOCUMENTATION:     1  This service was provided via Telemedicine  2  Provider located at [facility]  3  TeleMed provider: Dajuan Osorio MD   4  Identify all parties in room with patient during tele consult:  Santos Esquivel, the virtual care assistant  5  After connecting through [employer], patient was identified by name and date of birth and assistant checked wristband  Patient was then informed that this was a Telemedicine visit and that the exam was being conducted confidentially over secure lines  My office door was closed  No one else was in the room  Patient acknowledged consent and understanding of privacy and security of the Telemedicine visit, and gave us permission to have the assistant stay in the room in order to assist with the history and to conduct the exam   I informed the patient that I have reviewed their record in Epic and presented the opportunity for them to ask any questions regarding the visit today  The patient agreed to participate  Progress Note - Infectious Disease   Maniliana Dasilva  62 y o  male MRN: [id number]  Unit/Bed#: -01 Encounter: [id number]      Impression/Plan:  1  Acute monoarticular arthritis of the right knee  Appears to be secondary to gout  With an elevated left shifted cell count but not consistent with an acute septic joint  Synovial crystals positive for uric acid crystals  Less likely but possible would be an acute septic arthritis that is partially treated  Consideration for the possibility of chronic Lyme arthropathy although I doubt  -No directed antibiotics  -Follow-up synovial cultures  -Follow-up Lyme screen  -Orthopedics follow-up  -Serial exams  -Treatment of gout as per the primary service     2  Left diabetic heel ulcer  With pressure component  Patient has had this for quite some time and by his report the heel ulcer is improving  No clear clinical or radiographic evidence of a deeper infection    No definitive cellulitis on exam   Regardless, the patient is already received more than 5 days of antibiotics  Certainly at risk of breaking down and leading to limb loss and becoming secondarily infected  -No additional antibiotics for now  -Local wound care  -Close podiatry follow-up     3  Chronic kidney disease  Stage IV  Renal function relatively stable but a bit worse today  -Volume management  -Recheck BMP     4  Acute encephalopathy  Possibly metabolic issues with the patient having worsening renal function  Possibly medication effect  CT of the brain with no acute abnormality  No clear infectious etiology  Patient's cognition has apparently improved  -Monitor cognition  -Treatment of metabolic issues  -No additional ID work-up for now    I spent 50 minutes in evaluation of the patient of which 30 minutes was in counseling/coordination of care    Antibiotics:  Off antibiotics 2    Subjective:  Patient has no fever, chills, sweats; no nausea, vomiting, diarrhea; no cough, shortness of breath; no pain  No new symptoms  Objective:  Vitals:  Temp:  [98 1 °F (36 7 °C)-98 6 °F (37 °C)] 98 1 °F (36 7 °C)  HR:  [72-90] 80  Resp:  [17-18] 17  BP: (108-124)/(62-65) 116/65  SpO2:  [94 %-96 %] 96 %  Temp (24hrs), Av 3 °F (36 8 °C), Min:98 1 °F (36 7 °C), Max:98 6 °F (37 °C)  Current: Temperature: 98 1 °F (36 7 °C)    Documented physical exam has been primarily done by the patient's nurse and/or the primary service due to limited examination abilities on telemedicine    Physical Exam:   General Appearance:  Alert, interactive, nontoxic, no acute distress  Throat: Oropharynx moist without lesions  Lungs:   Clear to auscultation bilaterally; no wheezes, rhonchi or rales; respirations unlabored   Heart:  RRR; no murmur, rub or gallop   Abdomen:   Soft, non-tender, non-distended, positive bowel sounds  Extremities: No clubbing, cyanosis  Knee swelling without change   Skin: No new rashes or lesions   No draining wounds noted  Labs, Imaging, & Other studies:   All pertinent labs and imaging studies were personally reviewed  Results from last 7 days   Lab Units 03/08/23  0539 03/07/23  0522 03/06/23  0432   WBC Thousand/uL 7 26 6 67 7 84   HEMOGLOBIN g/dL 8 5* 8 5* 8 1*   PLATELETS Thousands/uL 174 155 140*     Results from last 7 days   Lab Units 03/08/23  0539 03/07/23  0522 03/06/23  0432 03/05/23  0550 03/04/23  0701 03/03/23  0458 03/02/23  1452   SODIUM mmol/L 138 134* 136   < > 136   < > 141   POTASSIUM mmol/L 4 2 4 0 3 8   < > 4 0   < > 4 7   CHLORIDE mmol/L 101 100 102   < > 103   < > 105   CO2 mmol/L 29 32 30   < > 26   < > 30   BUN mg/dL 65* 53* 48*   < > 47*   < > 42*   CREATININE mg/dL 3 03* 2 86* 3 01*   < > 3 16*   < > 3 37*   EGFR ml/min/1 73sq m 21 23 21   < > 20   < > 19   CALCIUM mg/dL 10 8* 10 8* 10 6*   < > 10 4*   < > 10 7*   AST U/L  --   --   --   --  7*  --  8*   ALT U/L  --   --   --   --  3*  --  <3*   ALK PHOS U/L  --   --   --   --  73  --  77    < > = values in this interval not displayed  Results from last 7 days   Lab Units 03/07/23  1226 03/02/23  2227 03/02/23  1526 03/02/23  1452   BLOOD CULTURE   --   --  No Growth After 5 Days  No Growth After 5 Days     GRAM STAIN RESULT  No Polys or Bacteria seen  --   --   --    MRSA CULTURE ONLY   --  No Methicillin Resistant Staphlyococcus aureus (MRSA) isolated  --   --      Results from last 7 days   Lab Units 03/06/23  0432 03/04/23  0701 03/02/23  1452   PROCALCITONIN ng/ml 0 79* 1 15* 0 30*     Results from last 7 days   Lab Units 03/06/23  0432   CRP mg/L 141 8*

## 2023-03-08 NOTE — ASSESSMENT & PLAN NOTE
· Patient states he has been having right knee pain and swelling for the last few days  · Has history of gout-uric acid done over the weekend normal  · CRP -elevated  · X-ray of right knee with effusion  · Orthopedics performed arthrocentesis this morning, w/ improvement in pain post procedure  · See under gout for treatment as fluid was positive for monosodium urate crystals and likely gout flare cause for pain  · Follow-up on cultures- no polys or bacteria

## 2023-03-08 NOTE — ASSESSMENT & PLAN NOTE
· Criteria with tachycardia, leukocytosis  · Urine negative  · Blood cultures NGTD  · CT chest abdomen pelvis no obvious infection  · suspected source left heel wound with eschar, erythema   · ABX: vanco/meropenem > now off vancomycin/meropenem/ceftriaxone  · Monitor off antibiotics per ID  · Follow fever curve, afeberile x 24 hours  · Leukocytosis trending down - now normalized  · Procalcitonin 0 3> 1 15>0 79 - improving   · Lyme antibodies negative

## 2023-03-08 NOTE — NURSING NOTE
Patient daughter requesting a phone call from Meme Jenkins discuss plan of care  Sharmaine FARR notified via tiger text

## 2023-03-08 NOTE — PLAN OF CARE
Problem: Nutrition/Hydration-ADULT  Goal: Nutrient/Hydration intake appropriate for improving, restoring or maintaining nutritional needs  Description: Monitor and assess patient's nutrition/hydration status for malnutrition  Collaborate with interdisciplinary team and initiate plan and interventions as ordered  Monitor patient's weight and dietary intake as ordered or per policy  Utilize nutrition screening tool and intervene as necessary  Determine patient's food preferences and provide high-protein, high-caloric foods as appropriate       INTERVENTIONS:  - Monitor oral intake, urinary output, labs, and treatment plans  - Assess nutrition and hydration status and recommend course of action  - Evaluate amount of meals eaten  - Assist patient with eating if necessary   - Allow adequate time for meals  - Recommend/ encourage appropriate diets, oral nutritional supplements, and vitamin/mineral supplements  - Order, calculate, and assess calorie counts as needed  - Recommend, monitor, and adjust tube feedings and TPN/PPN based on assessed needs  - Assess need for intravenous fluids  - Provide specific nutrition/hydration education as appropriate  - Include patient/family/caregiver in decisions related to nutrition  Outcome: Progressing     Problem: MOBILITY - ADULT  Goal: Maintain or return to baseline ADL function  Description: INTERVENTIONS:  -  Assess patient's ability to carry out ADLs; assess patient's baseline for ADL function and identify physical deficits which impact ability to perform ADLs (bathing, care of mouth/teeth, toileting, grooming, dressing, etc )  - Assess/evaluate cause of self-care deficits   - Assess range of motion  - Assess patient's mobility; develop plan if impaired  - Assess patient's need for assistive devices and provide as appropriate  - Encourage maximum independence but intervene and supervise when necessary  - Involve family in performance of ADLs  - Assess for home care needs following discharge   - Consider OT consult to assist with ADL evaluation and planning for discharge  - Provide patient education as appropriate  Outcome: Progressing     Problem: PAIN - ADULT  Goal: Verbalizes/displays adequate comfort level or baseline comfort level  Description: Interventions:  - Encourage patient to monitor pain and request assistance  - Assess pain using appropriate pain scale0-10  - Administer analgesics based on type and severity of pain and evaluate response  - Implement non-pharmacological measures as appropriate and evaluate response  - Consider cultural and social influences on pain and pain management  - Notify physician/advanced practitioner if interventions unsuccessful or patient reports new pain  Outcome: Progressing

## 2023-03-08 NOTE — ASSESSMENT & PLAN NOTE
· Presents with nonhealing left diabetic foot wound on left heel that is being followed by outpatient wound care, home care nurse and podiatry  · boggy left heel wound covered with eschar that appears moisture underneath the eschar  · X-ray possible osteomyelitis changes  · MRI showing ulcer but no evidence of osteomyelitis  · Empiric vancomycin for history MRSA pneumonia/ empiric meropenem recent tx Pseudomonas UTI  · Discontinue antibiotics per ID  · MRSA negative-DC Vanco  · BCx NGTD  · Podiatry recommendations as per note  · Appreciate ID consult  · Vascular surgery as outpatient  · Heel offloading w/prevalon boot, or two pillows  · [facility]

## 2023-03-08 NOTE — WOUND OSTOMY CARE
Progress Note - Wound   Snow Harmon  62 y o  male MRN: [id number]  Unit/Bed#: -01 Encounter: [id number]  History and Present Illness:  62year old male admitted with acute metabolic encephalopathy, seen for wound consult for sacrum  PMH:PAD Diabetic foot ulcer, sepsis CKD,gout , osteomyelitis of vertebra region sever protein calorie malnutrition      Assessment:   1)Left heel chronic unstageable wound followed by Podiatry and Orthopedics ID  2)Sacrum wound MASD resolved  Seen for assessment with RN   Patient follow with [facility] wound in [address]  No induration, fluctuance, odor, warmth/temperature differences, redness, or purulence noted to the above noted wounds and skin areas assessed  New dressings applied per orders listed below  Patient tolerated well- no s/s of non-verbal pain or discomfort observed during the encounter  Bedside nurse aware of plan wound care to sign off  See flow sheets for more detailed assessment findings  Wound care will continue to follow            Skin care Plan:  1-Protect sacrum w/Allevyn foam, iva with P, change q3d and PRN, check skin q-shift  2-Turn/reposition q2h or when medically stable for pressure re-distribution on skin   Use foam wedges  3-Elevate heels to offload pressure  4-Moisturize skin daily with skin nourishing cream  5-Ehob cushion in chair when out of bed  6-Hydraguard to right  bilateral heel BID and PRN  Wound Care will sign off  Call or Tigertext with any questions    Deanne OCHOAN RN    Wound 10/20/22 Pressure Injury Heel Left (Active)   Wound Image   03/03/23 1238   Wound Description MARIA TERESA 03/07/23 1443   Pressure Injury Stage U 03/07/23 1443   [place]-wound Assessment MARIA TERESA 03/07/23 1443   Drainage Amount None 03/05/23 1000   Treatments Site care;Elevated 03/07/23 2100   Dressing ABD;Gauze 03/05/23 1000   Dressing Changed Changed 03/08/23 1527   Patient Tolerance Tolerated well 03/07/23 2100   Dressing Status Clean;Dry; Intact 03/07/23 2100 Wound 10/21/22 Pressure Injury Coccyx (Active)   Wound Image   03/03/23 1242   Wound Description Dry;Clean; Intact 03/08/23 1527   Pressure Injury Stage O 03/03/23 1242   [place]-wound Assessment Clean;Dry; Intact 03/08/23 1527   Wound Length (cm) 0 cm 03/08/23 1527   Wound Width (cm) 0 cm 03/08/23 1527   Wound Depth (cm) 0 cm 03/03/23 1242   Wound Surface Area (cm^2) 0 cm^2 03/08/23 1527   Wound Volume (cm^3) 0 cm^3 03/03/23 1242   Calculated Wound Volume (cm^3) 0 cm^3 03/03/23 1242   Change in Wound Size % 100 03/03/23 1242   Drainage Amount None 03/08/23 1527   Treatments Site care 03/08/23 1527   Dressing Foam, Silicon (eg  Allevyn, etc) 03/08/23 1527   Dressing Changed New 03/08/23 1527   Patient Tolerance Tolerated well 03/08/23 1527   Dressing Status Clean;Dry; Intact 03/08/23 1527

## 2023-03-08 NOTE — CASE MANAGEMENT
Case Management Discharge Planning Note    Patient name Savita Velazquez  Location /-23 MRN [id number]  : 1965 Date 3/8/2023       Current Admission Date: 3/2/2023  Current Admission Diagnosis:Acute metabolic encephalopathy   Patient Active Problem List    Diagnosis Date Noted   • Right knee pain 2023   • Sepsis ([address] ) 2023   • Diabetic foot ulcer ([address] ) 2023   • CKD (chronic kidney disease) 2023   • Acute metabolic encephalopathy    • PAD (peripheral artery disease) ([address] ) 2022   • Chronic back pain 2022   • Moderate protein-calorie malnutrition ([address] ) 2022   • Chronic anemia 2022   • RSV infection 2022   • Chronic ulcer of left heel ([address] ) 10/20/2022   • Hypercalcemia 2022   • Low back pain 2022   • Ambulatory dysfunction 2022   • Stage 3a chronic kidney disease ([address] ) 2022   • Retention of urine 2022   • Severe protein-calorie malnutrition ([address] ) 2022   • Iron deficiency anemia secondary to inadequate dietary iron intake 2022   • Hip pain, acute, left 2022   • Hyperkalemia 2022   • Diabetes mellitus type 2, insulin dependent ([address] )    • Gout    • Essential hypertension    • History of CVA (cerebrovascular accident)    • Osteomyelitis of vertebra of lumbar region ([address] )       LOS (days): 6  Geometric Mean LOS (GMLOS) (days): 5 00  Days to GMLOS:-0 6     OBJECTIVE:  Risk of Unplanned Readmission Score: 39 77         Current admission status: Inpatient   Preferred Pharmacy:   [facility]/pharmacy #[id number] [facility] [address]  [address]  [address] [address]  Phone: [phone] Fax: [phone]    Primary Care Provider: Forrest Ortiz DO    Primary Insurance: [facility] [employer] [facility]  Secondary Insurance:     DISCHARGE DETAILS:     CM at bedside to give IMM  Pt signed and verbalized understanding   CM contacted pt's spouse to rely the same and also update pt's d/c plan                                                                                     IMM Given (Date):: 03/08/23  IMM Given to[de-identified] Patient

## 2023-03-08 NOTE — ASSESSMENT & PLAN NOTE
Lab Results   Component Value Date    EGFR 21 03/08/2023    EGFR 23 03/07/2023    EGFR 21 03/06/2023    CREATININE 3 03 (H) 03/08/2023    CREATININE 2 86 (H) 03/07/2023    CREATININE 3 01 (H) 03/06/2023   · Presents with KELLY on CKD in the setting of sepsis  · Baseline 3 6-3 9 per outpatient Nephrology note  · Creatinine trending down toward baseline  · Continue chronic Ortiz catheter  · Renally dose medications, avoid hypotension, nephrotoxic medications  · Slightly increased from yesterday but remains within baseline

## 2023-03-08 NOTE — ASSESSMENT & PLAN NOTE
Lab Results   Component Value Date    HGBA1C 6 3 (H) 03/03/2023       Recent Labs     03/07/23  1556 03/07/23  2113 03/08/23  0714 03/08/23  1059   POCGLU 157* 253* 188* 154*       Blood Sugar Average: Last 72 hrs:  (P) 167 2     · Basal insulin 5 units at bedtime, SSI AC/HS  · Hypoglycemia protocol  · Carbohydrate controlled diet  · Adjust as needed with steroid dosing

## 2023-03-08 NOTE — PROGRESS NOTES
[address]  Progress Note - Shweta Fang 1965, 62 y o  male MRN: [id number]  Unit/Bed#: -01 Encounter: [id number]  Primary Care Provider: Isidoro Suarez DO   Date and time admitted to hospital: 3/2/2023  2:23 PM    * Acute metabolic encephalopathy  Assessment & Plan  · POA from home with wife with acute onset hallucinations, confusion  Wife was concerned he had an underlying infection as this is his normal response    · Chronic indwelling Ortiz catheter without evidence of pyuria  · CT brain no acute abnormality  · Left heel ulcer questionable source of infection  · Empiric antibiotics  · IV hydration- completed  · Resolved-Patient is alert without hallucinations    Sepsis ([facility] [address] )  Assessment & Plan  · Criteria with tachycardia, leukocytosis  · Urine negative  · Blood cultures NGTD  · CT chest abdomen pelvis no obvious infection  · suspected source left heel wound with eschar, erythema   · ABX: vanco/meropenem > now off vancomycin/meropenem/ceftriaxone  · Monitor off antibiotics per ID  · Follow fever curve, afeberile x 24 hours  · Leukocytosis trending down - now normalized  · Procalcitonin 0 3> 1 15>0 79 - improving   · Lyme antibodies negative    Diabetic foot ulcer (HCC)  Assessment & Plan  · Presents with nonhealing left diabetic foot wound on left heel that is being followed by outpatient wound care, home care nurse and podiatry  · boggy left heel wound covered with eschar that appears moisture underneath the eschar  · X-ray possible osteomyelitis changes  · MRI showing ulcer but no evidence of osteomyelitis  · Empiric vancomycin for history MRSA pneumonia/ empiric meropenem recent tx Pseudomonas UTI  · Discontinue antibiotics per ID  · MRSA negative-DC Vanco  · BCx NGTD  · Podiatry recommendations as per note  · Appreciate ID consult  · Vascular surgery as outpatient  · Heel offloading w/prevalon boot, or two pillows  · PT/OT evals- [facility]    Right knee pain  Assessment & Plan  · Patient states he has been having right knee pain and swelling for the last few days  · Has history of gout-uric acid done over the weekend normal  · CRP -elevated  · X-ray of right knee with effusion  · Orthopedics performed arthrocentesis this morning, w/ improvement in pain post procedure  · See under gout for treatment as fluid was positive for monosodium urate crystals and likely gout flare cause for pain  · Follow-up on cultures- no polys or bacteria    CKD (chronic kidney disease)  Assessment & Plan  Lab Results   Component Value Date    EGFR 21 03/08/2023    EGFR 23 03/07/2023    EGFR 21 03/06/2023    CREATININE 3 03 (H) 03/08/2023    CREATININE 2 86 (H) 03/07/2023    CREATININE 3 01 (H) 03/06/2023   · Presents with KELLY on CKD in the setting of sepsis  · Baseline 3 6-3 9 per outpatient Nephrology note  · Creatinine trending down toward baseline  · Continue chronic Ortiz catheter  · Renally dose medications, avoid hypotension, nephrotoxic medications  · Slightly increased from yesterday but remains within baseline    PAD (peripheral artery disease) ([facility] [address] )  Assessment & Plan  · History of PAD  · Diabetes mellitus  · Nonhealing left heel ulcer  · Arterial duplex 12/27 with diffuse disease but no significant focal stenosis  · Continue atorvastatin  · OP vascular surgery referral    Chronic back pain  Assessment & Plan  · hx of chronic osteodiscitis L4-L5 and wife notes had admission with spinal abscess  · 3/4 CT scan lumbar spine- healing chronic L4-5 colitis/osteomyelitis with chronic pathologic Check compression fractures    Limited abscess evaluation with noncontrast exam   No new sites of discitis/osteomyelitis of lumbar spine    Chronic anemia  Assessment & Plan  · Chronic anemia noted previous iron deficiency with p o  iron supplementation, also likely anemia of chronic disease with CKD 3  · Baseline Hgb 9-10  · Continue PO supplmentation    Retention of urine  Assessment & Plan  · Chronic Ortiz due to neurogenic bladder  · No pyuria on urinalysis      Essential hypertension  Assessment & Plan  · Continue PTA amlodipine, carvedilol  · Remains normotensive     Gout  Assessment & Plan  · Patient complaining of hand pain and foot thinks he is having a gout flare  · Uric acid level normal at 7 over the weekend  · With increased right knee pain  · orthopedic consult placed and had arthrocentesis of right knee this morning  · Cell count WBC 12528, + crystals with monosodium urate crystals  · Gram stain no polys or bacteria  · Likely gout flare  · Due to kidney function options are limited  · Would start prednisone    Diabetes mellitus type 2, insulin dependent [facility])  Assessment & Plan  Lab Results   Component Value Date    HGBA1C 6 3 (H) 03/03/2023       Recent Labs     03/07/23  1556 03/07/23  2113 03/08/23  0714 03/08/23  1059   POCGLU 157* 253* 188* 154*       Blood Sugar Average: Last 72 hrs:  (P) 167 2     · Basal insulin 5 units at bedtime, SSI AC/HS  · Hypoglycemia protocol  · Carbohydrate controlled diet  · Adjust as needed with steroid dosing        VTE Pharmacologic Prophylaxis: VTE Score: 3 Moderate Risk (Score 3-4) - Pharmacological DVT Prophylaxis Ordered: heparin  Patient Centered Rounds: I performed bedside rounds with nursing staff today  Discussions with Specialists or Other Care Team Provider: ortho, Cm    Education and Discussions with Family / Patient: Updated  (wife) via phone  Total Time Spent on Date of Encounter in care of patient: 45 minutes This time was spent on one or more of the following: performing physical exam; counseling and coordination of care; obtaining or reviewing history; documenting in the medical record; reviewing/ordering tests, medications or procedures; communicating with other healthcare professionals and discussing with patient's family/caregivers      Current Length of Stay: 6 day(s)  Current Patient Status: Inpatient Certification Statement: The patient will continue to require additional inpatient hospital stay due to pending final cultures  Discharge Plan: Anticipate discharge in 24-48 hrs to home with home services  Code Status: Level 1 - Full Code    Subjective:   Resting comfortable does continue to have right and left knee pain  Pain in left knee without notable effusion but does feel warm  Discussed pending results from arthrocentesis    Objective:     Vitals:   Temp (24hrs), Av 3 °F (36 8 °C), Min:98 1 °F (36 7 °C), Max:98 6 °F (37 °C)    Temp:  [98 1 °F (36 7 °C)-98 6 °F (37 °C)] 98 1 °F (36 7 °C)  HR:  [72-90] 80  Resp:  [17-18] 17  BP: (108-124)/(62-65) 116/65  SpO2:  [94 %-96 %] 96 %  Body mass index is 19 28 kg/m²  Input and Output Summary (last 24 hours): Intake/Output Summary (Last 24 hours) at 3/8/2023 1432  Last data filed at 3/8/2023 1230  Gross per 24 hour   Intake 300 ml   Output 1350 ml   Net -1050 ml       Physical Exam:   Physical Exam  Vitals and nursing note reviewed  Constitutional:       General: He is not in acute distress  Appearance: He is well-developed  He is not ill-appearing  HENT:      Head: Normocephalic and atraumatic  Mouth/Throat:      Mouth: Mucous membranes are moist    Eyes:      Conjunctiva/sclera: Conjunctivae normal       Pupils: Pupils are equal, round, and reactive to light  Cardiovascular:      Rate and Rhythm: Normal rate and regular rhythm  Heart sounds: Normal heart sounds  No murmur heard  Pulmonary:      Effort: Pulmonary effort is normal  No respiratory distress  Breath sounds: Normal breath sounds  Abdominal:      General: Bowel sounds are normal  There is no distension  Palpations: Abdomen is soft  Tenderness: There is no abdominal tenderness  Musculoskeletal:      Cervical back: Neck supple  Right knee: Swelling and effusion present  Tenderness (warm) present  Left knee: Tenderness (warm) present  Comments: Left heel ulcer   Skin:     General: Skin is warm and dry  Capillary Refill: Capillary refill takes less than 2 seconds  Neurological:      Mental Status: He is alert  Psychiatric:         Mood and Affect: Mood normal           Additional Data:     Labs:  Results from last 7 days   Lab Units 03/08/23  0539 03/07/23  0522   WBC Thousand/uL 7 26 6 67   HEMOGLOBIN g/dL 8 5* 8 5*   HEMATOCRIT % 26 5* 26 1*   PLATELETS Thousands/uL 174 155   NEUTROS PCT %  --  59   LYMPHS PCT %  --  15   MONOS PCT %  --  19*   EOS PCT %  --  7*     Results from last 7 days   Lab Units 03/08/23  0539 03/05/23  0550 03/04/23  0701   SODIUM mmol/L 138   < > 136   POTASSIUM mmol/L 4 2   < > 4 0   CHLORIDE mmol/L 101   < > 103   CO2 mmol/L 29   < > 26   BUN mg/dL 65*   < > 47*   CREATININE mg/dL 3 03*   < > 3 16*   ANION GAP mmol/L 8   < > 7   CALCIUM mg/dL 10 8*   < > 10 4*   ALBUMIN g/dL  --   --  3 4*   TOTAL BILIRUBIN mg/dL  --   --  0 49   ALK PHOS U/L  --   --  73   ALT U/L  --   --  3*   AST U/L  --   --  7*   GLUCOSE RANDOM mg/dL 220*   < > 91    < > = values in this interval not displayed           Results from last 7 days   Lab Units 03/08/23  1059 03/08/23  0714 03/07/23  2113 03/07/23  1556 03/07/23  1111 03/07/23  0733 03/06/23  2116 03/06/23  1548 03/06/23  1115 03/06/23  0743 03/06/23  0556 03/05/23  2059   POC GLUCOSE mg/dl 154* 188* 253* 157* 157* 119 183* 163* 154* 145* 138 200*     Results from last 7 days   Lab Units 03/03/23  0458   HEMOGLOBIN A1C % 6 3*     Results from last 7 days   Lab Units 03/06/23  0432 03/04/23  0701 03/02/23  1452   LACTIC ACID mmol/L  --   --  0 8   PROCALCITONIN ng/ml 0 79* 1 15* 0 30*       Lines/Drains:  Invasive Devices     Peripheral Intravenous Line  Duration           Peripheral IV 03/06/23 Distal;Right;Upper;Ventral (anterior) Arm 1 day          Drain  Duration           Urethral Catheter Coude 16 Fr  74 days              Urinary Catheter:  Goal for removal: N/A - Chronic Ortiz               Imaging: Reviewed radiology reports from this admission including: xray(s)    Recent Cultures (last 7 days):   Results from last 7 days   Lab Units 03/07/23  1226 03/02/23  1526 03/02/23  1452   BLOOD CULTURE   --  No Growth After 5 Days  No Growth After 5 Days     GRAM STAIN RESULT  No Polys or Bacteria seen  --   --    BODY FLUID CULTURE, STERILE  No growth  --   --        Last 24 Hours Medication List:   Current Facility-Administered Medications   Medication Dose Route Frequency Provider Last Rate   • acetaminophen  650 mg Oral Q4H PRN Thao S June, CRNP     • allopurinol  100 mg Oral Daily Thao S June, CRNP     • amLODIPine  10 mg Oral Daily Thao S June, CRNP     • atorvastatin  40 mg Oral Daily With [employer] S June, CRNP     • Buprenorphine HCl  900 mcg Buccal BID Thao S June, CRNP     • carvedilol  6 25 mg Oral BID With Meals Thao S June, CRNP     • cyclobenzaprine  5 mg Oral TID Thao S June, CRNP     • docusate sodium  100 mg Oral BID Thao S June, CRNP     • ferrous sulfate  325 mg Oral Daily With Breakfast Thao S June, CRNP     • heparin (porcine)  5,000 Units Subcutaneous Q8H [facility] Thao S June, CRNP     • HYDROmorphone  1 mg Intravenous Q6H PRN Stephanie Marion MD     • insulin glargine  5 Units Subcutaneous HS Thao S June, CRNP     • insulin lispro  1-6 Units Subcutaneous HS Thao S June, CRNP     • insulin lispro  2-12 Units Subcutaneous TID  YURIDIA Shea     • LORazepam  1 mg Intravenous Once Stephanie Marion MD     • oxyCODONE  5 mg Oral Q4H PRN Stephanie Marion MD     • polyethylene glycol  17 g Oral BID Thao S June, CRNP     • predniSONE  30 mg Oral Daily Wesley Sams PA-C     • pregabalin  150 mg Oral BID Thao S June, CRNP     • senna-docusate sodium  1 tablet Oral BID Thao S June, CRNP     • sevelamer  800 mg Oral TID With Meals Thao S June, CRNP     • sodium bicarbonate  650 mg Oral Every Other Day YURIDIA Chirinos     • tamsulosin  0 4 mg Oral Daily With Dinner YURIDIA Chirinos     • thiamine  100 mg Oral Daily YURIDIA Chirinos     • venlafaxine  37 5 mg Oral TID YURIDIA Chirinos          Today, Patient Was Seen By: YURIDIA Stallings    **Please Note: This note may have been constructed using a voice recognition system  **

## 2023-03-08 NOTE — ASSESSMENT & PLAN NOTE
· POA from home with wife with acute onset hallucinations, confusion  Wife was concerned he had an underlying infection as this is his normal response    · Chronic indwelling Ortiz catheter without evidence of pyuria  · CT brain no acute abnormality  · Left heel ulcer questionable source of infection  · Empiric antibiotics  · IV hydration- completed  · Resolved-Patient is alert without hallucinations

## 2023-03-08 NOTE — PROGRESS NOTES
Progress Note - Orthopedics   Bill Suero Sr  62 y o  male MRN: [id number]  Unit/Bed#: -01      Subjective:    62 y  o male with right knee pain and effusion status post aspiration and culture yesterday, positive for monosodium urate crystals, synovial WBCs only 25,805, Gram stain no polys or bacteria  No new events  Patient has gout in the past and states this 1 feels different  No acute events  Continues with some right knee pain      Labs:  0   Lab Value Date/Time    HCT 26 5 (L) 03/08/2023 0539    HCT 26 1 (L) 03/07/2023 0522    HCT 25 4 (L) 03/06/2023 0432    HGB 8 5 (L) 03/08/2023 0539    HGB 8 5 (L) 03/07/2023 0522    HGB 8 1 (L) 03/06/2023 0432    INR 1 05 12/23/2022 2217    WBC 7 26 03/08/2023 0539    WBC 6 67 03/07/2023 0522    WBC 7 84 03/06/2023 0432    ESR 64 (H) 10/20/2022 1135     8 (H) 03/06/2023 0432     Meds:    Current Facility-Administered Medications:   •  acetaminophen (TYLENOL) tablet 650 mg, 650 mg, Oral, Q4H PRN, Thao S June, CRNP, 650 mg at 03/07/23 1220  •  allopurinol (ZYLOPRIM) tablet 100 mg, 100 mg, Oral, Daily, Thao S June, CRNP, 100 mg at 03/08/23 0824  •  amLODIPine (NORVASC) tablet 10 mg, 10 mg, Oral, Daily, Thao S June, CRNP, 10 mg at 03/08/23 2650  •  atorvastatin (LIPITOR) tablet 40 mg, 40 mg, Oral, Daily With Dinner, Thao S June, CRNP, 40 mg at 03/07/23 1726  •  Buprenorphine HCl FILM 900 mcg, 900 mcg, Buccal, BID, Thao S June, CRNP, 900 mcg at 03/08/23 0907  •  carvedilol (COREG) tablet 6 25 mg, 6 25 mg, Oral, BID With Meals, Thao S June, CRNP, 6 25 mg at 03/08/23 1270  •  cyclobenzaprine (FLEXERIL) tablet 5 mg, 5 mg, Oral, TID, Thao S June, CRNP, 5 mg at 03/08/23 8013  •  docusate sodium (COLACE) capsule 100 mg, 100 mg, Oral, BID, Thao S June, CRNP, 100 mg at 03/08/23 510  •  ferrous sulfate tablet 325 mg, 325 mg, Oral, Daily With Breakfast, Thao S June, CRNP, 325 mg at 03/08/23 0824  •  heparin (porcine) subcutaneous injection 5,000 Units, 5,000 Units, Subcutaneous, Q8H [address], Thao Leonex, CRNP, 5,000 Units at 03/08/23 0535  •  HYDROmorphone (DILAUDID) injection 1 mg, 1 mg, Intravenous, Q6H PRN, Shai Marion MD, 1 mg at 03/03/23 1455  •  insulin glargine (LANTUS) subcutaneous injection 5 Units 0 05 mL, 5 Units, Subcutaneous, HS, Thao Leonex, CRNP, 5 Units at 03/07/23 2127  •  insulin lispro (HumaLOG) 100 units/mL subcutaneous injection 1-6 Units, 1-6 Units, Subcutaneous, HS, Thao Leonex, CRNP, 3 Units at 03/07/23 2129  •  insulin lispro (HumaLOG) 100 units/mL subcutaneous injection 2-12 Units, 2-12 Units, Subcutaneous, TID AC, 2 Units at 03/08/23 0827 **AND** Fingerstick Glucose (POCT), , , TID AC, [facility] [place], YURIDIA  •  LORazepam (ATIVAN) injection 1 mg, 1 mg, Intravenous, Once, Shai Marion MD  •  oxyCODONE (ROXICODONE) IR tablet 5 mg, 5 mg, Oral, Q4H PRN, Shai Marion MD, 5 mg at 03/07/23 1276  •  polyethylene glycol (MIRALAX) packet 17 g, 17 g, Oral, BID, Thao Watkins, SHOAIBNP, 17 g at 03/08/23 3240  •  predniSONE tablet 30 mg, 30 mg, Oral, Daily, Jonny Gold PA-C, 30 mg at 03/08/23 5044  •  pregabalin (LYRICA) capsule 150 mg, 150 mg, Oral, BID, Thao Watkins, CRNP, 150 mg at 03/08/23 9681  •  senna-docusate sodium (SENOKOT S) 8 6-50 mg per tablet 1 tablet, 1 tablet, Oral, BID, Thao Leonex, CRNP, 1 tablet at 03/08/23 3934  •  sevelamer (RENAGEL) tablet 800 mg, 800 mg, Oral, TID With Meals, Thao S June, CRNP, 800 mg at 03/08/23 4712  •  sodium bicarbonate tablet 650 mg, 650 mg, Oral, Every Other Day, Thao S June, CRNP, 650 mg at 03/07/23 8017  •  tamsulosin (FLOMAX) capsule 0 4 mg, 0 4 mg, Oral, Daily With Dinner, Thao S June, CRNP, 0 4 mg at 03/07/23 1726  •  thiamine tablet 100 mg, 100 mg, Oral, Daily, Thao S June, CRNP, 100 mg at 03/08/23 0824  •  venlafaxine (EFFEXOR-XR) 24 hr capsule 37 5 mg, 37 5 mg, Oral, TID, Thao S June, CRNP, 37 5 mg at 23 0824    Blood Culture:   Lab Results   Component Value Date    BLOODCX No Growth After 5 Days  2023     Wound Culture:   No results found for: WOUNDCULT    Ins and Outs:  I/O last 24 hours: In: -   Out: 1350 [IUPK]    Physical:  Vitals:    23 0714   BP: 116/65   Pulse: 80   Resp: 17   Temp: 98 1 °F (36 7 °C)   SpO2: 96%     Musculoskeletal: right knee  · Skin no erythema or ecchymosis  No lymphangitic streaking  · Ace wrap in place  As of effusion and continues with tenderness   · Sensation grossly intact  · Pain with the range of motion of the knee  · Ankle plantar and dorsiflexion intact  · 2+ DP pulse, symmetric bilaterally    Assessment:    62 y  o male with right knee pain and swelling with preliminary findings as gout, Lyme and final cultures are still pending       Plan:  · WBAT RLE  · Await final cultures  · May start anti-inflammatory medication per primary service  · DVT ppx per primary service  · Dispo: Ortho will follow    Stefanie Frank PA-C

## 2023-03-09 LAB
ANION GAP SERPL CALCULATED.3IONS-SCNC: 8 MMOL/L (ref 4–13)
BUN SERPL-MCNC: 73 MG/DL (ref 5–25)
CALCIUM SERPL-MCNC: 10.8 MG/DL (ref 8.4–10.2)
CHLORIDE SERPL-SCNC: 104 MMOL/L (ref 96–108)
CO2 SERPL-SCNC: 27 MMOL/L (ref 21–32)
CREAT SERPL-MCNC: 2.86 MG/DL (ref 0.6–1.3)
ERYTHROCYTE [DISTWIDTH] IN BLOOD BY AUTOMATED COUNT: 15.9 % (ref 11.6–15.1)
GFR SERPL CREATININE-BSD FRML MDRD: 23 ML/MIN/1.73SQ M
GLUCOSE SERPL-MCNC: 142 MG/DL (ref 65–140)
GLUCOSE SERPL-MCNC: 143 MG/DL (ref 65–140)
GLUCOSE SERPL-MCNC: 199 MG/DL (ref 65–140)
GLUCOSE SERPL-MCNC: 222 MG/DL (ref 65–140)
GLUCOSE SERPL-MCNC: 374 MG/DL (ref 65–140)
HCT VFR BLD AUTO: 25 % (ref 36.5–49.3)
HGB BLD-MCNC: 8 G/DL (ref 12–17)
MCH RBC QN AUTO: 29.1 PG (ref 26.8–34.3)
MCHC RBC AUTO-ENTMCNC: 32 G/DL (ref 31.4–37.4)
MCV RBC AUTO: 91 FL (ref 82–98)
PLATELET # BLD AUTO: 204 THOUSANDS/UL (ref 149–390)
PMV BLD AUTO: 11.4 FL (ref 8.9–12.7)
POTASSIUM SERPL-SCNC: 4.3 MMOL/L (ref 3.5–5.3)
RBC # BLD AUTO: 2.75 MILLION/UL (ref 3.88–5.62)
SODIUM SERPL-SCNC: 139 MMOL/L (ref 135–147)
WBC # BLD AUTO: 8.19 THOUSAND/UL (ref 4.31–10.16)

## 2023-03-09 RX ORDER — SODIUM CHLORIDE 9 MG/ML
75 INJECTION, SOLUTION INTRAVENOUS CONTINUOUS
Status: DISCONTINUED | OUTPATIENT
Start: 2023-03-09 | End: 2023-03-10

## 2023-03-09 RX ORDER — OXYCODONE HYDROCHLORIDE 5 MG/1
5 TABLET ORAL EVERY 6 HOURS PRN
Status: DISCONTINUED | OUTPATIENT
Start: 2023-03-09 | End: 2023-03-09

## 2023-03-09 RX ORDER — PREGABALIN 75 MG/1
75 CAPSULE ORAL 2 TIMES DAILY
Status: DISCONTINUED | OUTPATIENT
Start: 2023-03-09 | End: 2023-03-09

## 2023-03-09 RX ORDER — PREGABALIN 75 MG/1
150 CAPSULE ORAL 2 TIMES DAILY
Status: DISCONTINUED | OUTPATIENT
Start: 2023-03-09 | End: 2023-03-09

## 2023-03-09 RX ORDER — OXYCODONE HYDROCHLORIDE 5 MG/1
5 TABLET ORAL EVERY 6 HOURS PRN
Status: DISCONTINUED | OUTPATIENT
Start: 2023-03-09 | End: 2023-03-10 | Stop reason: HOSPADM

## 2023-03-09 RX ORDER — CYCLOBENZAPRINE HCL 10 MG
5 TABLET ORAL 3 TIMES DAILY PRN
Status: DISCONTINUED | OUTPATIENT
Start: 2023-03-09 | End: 2023-03-10 | Stop reason: HOSPADM

## 2023-03-09 RX ORDER — PREGABALIN 75 MG/1
75 CAPSULE ORAL 2 TIMES DAILY
Status: DISCONTINUED | OUTPATIENT
Start: 2023-03-09 | End: 2023-03-10 | Stop reason: HOSPADM

## 2023-03-09 RX ADMIN — AMLODIPINE BESYLATE 10 MG: 10 TABLET ORAL at 08:06

## 2023-03-09 RX ADMIN — OXYCODONE HYDROCHLORIDE 5 MG: 5 TABLET ORAL at 18:02

## 2023-03-09 RX ADMIN — FERROUS SULFATE TAB 325 MG (65 MG ELEMENTAL FE) 325 MG: 325 (65 FE) TAB at 08:05

## 2023-03-09 RX ADMIN — SEVELAMER HYDROCHLORIDE 800 MG: 800 TABLET ORAL at 12:31

## 2023-03-09 RX ADMIN — PREGABALIN 150 MG: 75 CAPSULE ORAL at 08:03

## 2023-03-09 RX ADMIN — DOCUSATE SODIUM AND SENNOSIDES 1 TABLET: 8.6; 5 TABLET, FILM COATED ORAL at 08:05

## 2023-03-09 RX ADMIN — HEPARIN SODIUM 5000 UNITS: 5000 INJECTION INTRAVENOUS; SUBCUTANEOUS at 05:12

## 2023-03-09 RX ADMIN — BUPRENORPHINE HYDROCHLORIDE 900 MCG: 900 FILM, SOLUBLE BUCCAL at 10:07

## 2023-03-09 RX ADMIN — ACETAMINOPHEN 325MG 650 MG: 325 TABLET ORAL at 21:47

## 2023-03-09 RX ADMIN — CARVEDILOL 6.25 MG: 6.25 TABLET, FILM COATED ORAL at 16:39

## 2023-03-09 RX ADMIN — CARVEDILOL 6.25 MG: 6.25 TABLET, FILM COATED ORAL at 08:07

## 2023-03-09 RX ADMIN — THIAMINE HCL TAB 100 MG 100 MG: 100 TAB at 08:06

## 2023-03-09 RX ADMIN — INSULIN LISPRO 2 UNITS: 100 INJECTION, SOLUTION INTRAVENOUS; SUBCUTANEOUS at 12:32

## 2023-03-09 RX ADMIN — PREGABALIN 75 MG: 75 CAPSULE ORAL at 16:38

## 2023-03-09 RX ADMIN — DOCUSATE SODIUM AND SENNOSIDES 1 TABLET: 8.6; 5 TABLET, FILM COATED ORAL at 17:03

## 2023-03-09 RX ADMIN — VENLAFAXINE HYDROCHLORIDE 37.5 MG: 37.5 CAPSULE, EXTENDED RELEASE ORAL at 21:37

## 2023-03-09 RX ADMIN — CYCLOBENZAPRINE 5 MG: 10 TABLET, FILM COATED ORAL at 08:06

## 2023-03-09 RX ADMIN — SODIUM BICARBONATE 650 MG TABLET 650 MG: at 08:06

## 2023-03-09 RX ADMIN — HYDROMORPHONE HYDROCHLORIDE 1 MG: 1 INJECTION, SOLUTION INTRAMUSCULAR; INTRAVENOUS; SUBCUTANEOUS at 13:09

## 2023-03-09 RX ADMIN — INSULIN LISPRO 2 UNITS: 100 INJECTION, SOLUTION INTRAVENOUS; SUBCUTANEOUS at 21:38

## 2023-03-09 RX ADMIN — BUPRENORPHINE HYDROCHLORIDE 900 MCG: 900 FILM, SOLUBLE BUCCAL at 21:37

## 2023-03-09 RX ADMIN — VENLAFAXINE HYDROCHLORIDE 37.5 MG: 37.5 CAPSULE, EXTENDED RELEASE ORAL at 08:07

## 2023-03-09 RX ADMIN — INSULIN GLARGINE 5 UNITS: 100 INJECTION, SOLUTION SUBCUTANEOUS at 21:38

## 2023-03-09 RX ADMIN — HEPARIN SODIUM 5000 UNITS: 5000 INJECTION INTRAVENOUS; SUBCUTANEOUS at 15:23

## 2023-03-09 RX ADMIN — INSULIN LISPRO 10 UNITS: 100 INJECTION, SOLUTION INTRAVENOUS; SUBCUTANEOUS at 17:01

## 2023-03-09 RX ADMIN — PREDNISONE 30 MG: 20 TABLET ORAL at 08:04

## 2023-03-09 RX ADMIN — SODIUM CHLORIDE 75 ML/HR: 0.9 INJECTION, SOLUTION INTRAVENOUS at 15:25

## 2023-03-09 RX ADMIN — DOCUSATE SODIUM 100 MG: 100 CAPSULE ORAL at 08:07

## 2023-03-09 RX ADMIN — POLYETHYLENE GLYCOL 3350 17 G: 17 POWDER, FOR SOLUTION ORAL at 16:39

## 2023-03-09 RX ADMIN — MELATONIN TAB 3 MG 3 MG: 3 TAB at 21:38

## 2023-03-09 RX ADMIN — SEVELAMER HYDROCHLORIDE 800 MG: 800 TABLET ORAL at 16:38

## 2023-03-09 RX ADMIN — DOCUSATE SODIUM 100 MG: 100 CAPSULE ORAL at 16:39

## 2023-03-09 RX ADMIN — ATORVASTATIN CALCIUM 40 MG: 40 TABLET, FILM COATED ORAL at 16:39

## 2023-03-09 RX ADMIN — TAMSULOSIN HYDROCHLORIDE 0.4 MG: 0.4 CAPSULE ORAL at 16:39

## 2023-03-09 RX ADMIN — ALLOPURINOL 100 MG: 100 TABLET ORAL at 08:07

## 2023-03-09 RX ADMIN — SEVELAMER HYDROCHLORIDE 800 MG: 800 TABLET ORAL at 08:04

## 2023-03-09 RX ADMIN — POLYETHYLENE GLYCOL 3350 17 G: 17 POWDER, FOR SOLUTION ORAL at 08:05

## 2023-03-09 NOTE — ASSESSMENT & PLAN NOTE
Lab Results   Component Value Date    EGFR 23 03/09/2023    EGFR 21 03/08/2023    EGFR 23 03/07/2023    CREATININE 2 86 (H) 03/09/2023    CREATININE 3 03 (H) 03/08/2023    CREATININE 2 86 (H) 03/07/2023   · Presents with KELLY on CKD in the setting of sepsis  · Baseline 3 6-3 9 per outpatient Nephrology note  · Creatinine trending down toward baseline  · Continue chronic Ortiz catheter  · Renally dose medications, avoid hypotension, nephrotoxic medications  · Slightly increased from yesterday but remains within baseline  · BUN rising - gentle IVF   · nephro consult

## 2023-03-09 NOTE — PROGRESS NOTES
Chart review of weight hx: 7/29/21 185lb, 7/5/22 151lb, 11/17/22 139lb, 12/26/22 131lb, 3/2/23 143lb (stretcher scale), 3/3/23 138lb  Weight appears to have increased as of recent  Recommend daily weights for nutrition monitoring  Pt appears to be drinking glucerna supplements, will increase to TID  Will continue to send Felix BID  Meal intakes at 25-50% completions per nursing flowsheets  Will maintain CCD 2 as ordered

## 2023-03-09 NOTE — PROGRESS NOTES
Progress Note - Orthopedics   Ricykpamella Davey Sr  62 y o  male MRN: [id number]  Unit/Bed#: -01 Encounter: [id number]    Assessment:  Effusion and pain right knee; gouty arthropathy right knee with acute gouty flare; ambulatory dysfunction    Plan:  Continue current treatment plan  Final culture report has not yet been received  If cultures remain negative for bacterial growth, the patient can be discharged without antibiotic treatment  Weight bearing: WBAT        Subjective: Kelby Cole continues to complain of some right knee pain  However, he does admit to improvement  Vitals: Blood pressure 126/67, pulse 62, temperature 98 1 °F (36 7 °C), resp  rate 17, height 5' 11" (1 803 m), weight 62 7 kg (138 lb 3 7 oz), SpO2 98 %  ,Body mass index is 19 28 kg/m²  Intake/Output Summary (Last 24 hours) at 3/9/2023 0754  Last data filed at 3/9/2023 0703  Gross per 24 hour   Intake 300 ml   Output 2100 ml   Net -1800 ml       Invasive Devices     Peripheral Intravenous Line  Duration           Peripheral IV 03/06/23 Distal;Right;Upper;Ventral (anterior) Arm 2 days          Drain  Duration           Urethral Catheter Coude 16 Fr  75 days                Physical Exam: Exam today demonstrates the patient to be sitting in bed with his knee flexed at about 90 degrees  I was able to extend the knee to within about 15 degrees of full extension without resistance although the patient does indicate some pain  I was able to flex the knee to 90 degrees, again without resistance but complains of some pain  He had no localized tenderness to palpation  There is no erythema or rubor  There is no ascending lymphangitis or adenopathy  Lab, Imaging and other studies: Final culture report still pending

## 2023-03-09 NOTE — ASSESSMENT & PLAN NOTE
· POA from home with wife with acute onset hallucinations, confusion  Wife was concerned he had an underlying infection as this is his normal response  · Chronic indwelling Ortiz catheter without evidence of pyuria  · CT brain no acute abnormality  · Left heel ulcer questionable source of infection  · Empiric antibiotics  · IV hydration- completed  · AAO x3  to direct questioning, does has have fluctuating mentation sounding like hallucinations with family, and during exam  This morning asked about the docs following him in the hallway-patient provided reorientation  · Discussed with his outpatient palliative medicine provider [facility] with chronic pain regimen and noted PDMP recent increase of buprenorphine monthly, last being in February, with outpatient notes starting February 15 stating some confusion Per family  ·  recommend Flexeril as needed, keep Lyrica 150 twice daily, decrease buprenorphine to 600 mcg twice daily and monitor or keep buprenorphine at 900 mcg and lyrica 75 mg and monitor  · Unable to provide 600 mcg film for buprenorphine, nonformulary at this hospital patient provided current dosing  And unable to split film,  Discussed with pharmacist   Intermittent confusion unlikely from polypharmresolve during this hospitalization and Recommend addressing with outpatient prescribe after discharge polypharmacy     · BUN also rising, start gentle IVF

## 2023-03-09 NOTE — ASSESSMENT & PLAN NOTE
· Patient states he has been having right knee pain and swelling for the last few days  · Has history of gout-uric acid done over the weekend normal  · CRP -elevated  · X-ray of right knee with effusion  · Orthopedics performed arthrocentesis this morning, w/ improvement in pain post procedure  · See under gout for treatment as fluid was positive for monosodium urate crystals and likely gout flare cause for pain  · Follow-up on cultures- no polys or bacteria  · Lyme negative

## 2023-03-09 NOTE — PROGRESS NOTES
[address]  Progress Note - Ty Nurse 1965, 62 y o  male MRN: [id number]  Unit/Bed#: -01 Encounter: [id number]  Primary Care Provider: Leona Ngo DO   Date and time admitted to hospital: 3/2/2023  2:23 PM    * Acute metabolic encephalopathy  Assessment & Plan  · POA from home with wife with acute onset hallucinations, confusion  Wife was concerned he had an underlying infection as this is his normal response  · Chronic indwelling Ortiz catheter without evidence of pyuria  · CT brain no acute abnormality  · Left heel ulcer questionable source of infection  · Empiric antibiotics  · IV hydration- completed  · AAO x3  to direct questioning, does has have fluctuating mentation sounding like hallucinations with family, and during exam  This morning asked about the docs following him in the hallway-patient provided reorientation  · Discussed with his outpatient palliative medicine provider [facility] with chronic pain regimen and noted PDMP recent increase of buprenorphine monthly, last being in February, with outpatient notes starting February 15 stating some confusion Per family  ·  recommend Flexeril as needed, keep Lyrica 150 twice daily, decrease buprenorphine to 600 mcg twice daily and monitor or keep buprenorphine at 900 mcg and lyrica 75 mg and monitor  · Unable to provide 600 mcg film for buprenorphine, nonformulary at this hospital patient provided current dosing  And unable to split film,  Discussed with pharmacist   Intermittent confusion unlikely from polypharmresolve during this hospitalization and Recommend addressing with outpatient prescribe after discharge polypharmacy     · BUN also rising, start gentle IVF    Sepsis ([address] )  Assessment & Plan  · Criteria with tachycardia, leukocytosis  · Urine negative  · Blood cultures NGTD  · CT chest abdomen pelvis no obvious infection  · suspected source left heel wound with eschar, erythema   · ABX: vanco/meropenem > now off vancomycin/meropenem/ceftriaxone  · Monitor off antibiotics per ID  · Follow fever curve, afeberile x 24 hours  · Leukocytosis trending down - now normalized  · Procalcitonin 0 3> 1 15>0 79 - improving   · Lyme antibodies negative    Diabetic foot ulcer (HCC)  Assessment & Plan  · Presents with nonhealing left diabetic foot wound on left heel that is being followed by outpatient wound care, home care nurse and podiatry  · boggy left heel wound covered with eschar that appears moisture underneath the eschar  · X-ray possible osteomyelitis changes  · MRI showing ulcer but no evidence of osteomyelitis  · Empiric vancomycin for history MRSA pneumonia/ empiric meropenem recent tx Pseudomonas UTI  · Discontinue antibiotics per ID  · MRSA negative-DC Vanco  · BCx NGTD  · Podiatry recommendations as per note  · Appreciate ID consult  · Vascular surgery as outpatient  · Heel offloading w/prevalon boot, or two pillows  · PT/OT evals- [facility]    Right knee pain  Assessment & Plan  · Patient states he has been having right knee pain and swelling for the last few days  · Has history of gout-uric acid done over the weekend normal  · CRP -elevated  · X-ray of right knee with effusion  · Orthopedics performed arthrocentesis this morning, w/ improvement in pain post procedure  · See under gout for treatment as fluid was positive for monosodium urate crystals and likely gout flare cause for pain  · Follow-up on cultures- no polys or bacteria  · Lyme negative    CKD (chronic kidney disease)  Assessment & Plan  Lab Results   Component Value Date    EGFR 23 03/09/2023    EGFR 21 03/08/2023    EGFR 23 03/07/2023    CREATININE 2 86 (H) 03/09/2023    CREATININE 3 03 (H) 03/08/2023    CREATININE 2 86 (H) 03/07/2023   · Presents with KELLY on CKD in the setting of sepsis  · Baseline 3 6-3 9 per outpatient Nephrology note  · Creatinine trending down toward baseline  · Continue chronic Ortiz catheter  · Renally dose medications, avoid hypotension, nephrotoxic medications  · Slightly increased from yesterday but remains within baseline  · BUN rising - gentle IVF   · nephro consult    PAD (peripheral artery disease) ([facility])  Assessment & Plan  · History of PAD  · Diabetes mellitus  · Nonhealing left heel ulcer  · Arterial duplex 12/27 with diffuse disease but no significant focal stenosis  · Continue atorvastatin  · OP vascular surgery referral    Chronic back pain  Assessment & Plan  · hx of chronic osteodiscitis L4-L5 and wife notes had admission with spinal abscess  · 3/4 CT scan lumbar spine- healing chronic L4-5 colitis/osteomyelitis with chronic pathologic Check compression fractures    Limited abscess evaluation with noncontrast exam   No new sites of discitis/osteomyelitis of lumbar spine    Chronic anemia  Assessment & Plan  · Chronic anemia noted previous iron deficiency with p o  iron supplementation, also likely anemia of chronic disease with CKD 3  · Baseline Hgb 9-10  · Continue PO supplmentation    Retention of urine  Assessment & Plan  · Chronic Ortiz due to neurogenic bladder  · No pyuria on urinalysis      Essential hypertension  Assessment & Plan  · Continue PTA amlodipine, carvedilol  · Remains normotensive     Gout  Assessment & Plan  · Patient complaining of hand pain and foot thinks he is having a gout flare  · Uric acid level normal at 7 over the weekend  · With increased right knee pain  · orthopedic consult placed and had arthrocentesis of right knee this morning  · Cell count WBC 92296, + crystals with monosodium urate crystals  · Gram stain no polys or bacteria  · Likely gout flare  · Due to kidney function options are limited  · start prednisone x5 days    Diabetes mellitus type 2, insulin dependent [facility])  Assessment & Plan  Lab Results   Component Value Date    HGBA1C 6 3 (H) 03/03/2023       Recent Labs     03/08/23  1548 03/08/23  2115 03/09/23  0705 03/09/23  1103   POCGLU 234* 272* 142* 199* Blood Sugar Average: Last 72 hrs:  (P) 177 2     · Basal insulin 5 units at bedtime, SSI AC/HS  · Hypoglycemia protocol  · Carbohydrate controlled diet  · Adjust as needed with steroid dosing      VTE Pharmacologic Prophylaxis: VTE Score: 3 Moderate Risk (Score 3-4) - Pharmacological DVT Prophylaxis Ordered: heparin  Patient Centered Rounds: I performed bedside rounds with nursing staff today  Discussions with Specialists or Other Care Team Provider: Cm, pain management, OP palliative care provider, nephrology    Education and Discussions with Family / Patient: Updated  (wife) via phone  Total Time Spent on Date of Encounter in care of patient: 45 minutes This time was spent on one or more of the following: performing physical exam; counseling and coordination of care; obtaining or reviewing history; documenting in the medical record; reviewing/ordering tests, medications or procedures; communicating with other healthcare professionals and discussing with patient's family/caregivers  Current Length of Stay: 7 day(s)  Current Patient Status: Inpatient   Certification Statement: The patient will continue to require additional inpatient hospital stay due to Encephalopathy, uremia  Discharge Plan: Anticipate discharge in 24-48 hrs to home with home services  Code Status: Level 1 - Full Code    Subjective:   Patient alert and oriented this morning to direct questioning, during conversation patient asked if I saw the ducks that were following him this morning while in the halls  Patient reoriented and explained there were no ducks as he is in the hospital   Having right knee pain which is improving from prior, explained that still has a moderate sized effusion present in the knee and is on current prednisone for treatment    Otherwise offers no complaints    Objective:     Vitals:   Temp (24hrs), Av 4 °F (36 9 °C), Min:98 1 °F (36 7 °C), Max:99 3 °F (37 4 °C)    Temp:  [98 1 °F (36 7 °C)-99 3 °F (37 4 °C)] 98 1 °F (36 7 °C)  HR:  [62-77] 67  Resp:  [13-18] 18  BP: (124-136)/(67-71) 136/68  SpO2:  [95 %-98 %] 96 %  Body mass index is 19 28 kg/m²  Input and Output Summary (last 24 hours): Intake/Output Summary (Last 24 hours) at 3/9/2023 1440  Last data filed at 3/9/2023 0703  Gross per 24 hour   Intake --   Output 2100 ml   Net -2100 ml       Physical Exam:   Physical Exam  Vitals and nursing note reviewed  Constitutional:       General: He is not in acute distress  Appearance: He is well-developed  HENT:      Head: Normocephalic and atraumatic  Mouth/Throat:      Mouth: Mucous membranes are dry  Eyes:      Conjunctiva/sclera: Conjunctivae normal       Pupils: Pupils are equal, round, and reactive to light  Cardiovascular:      Rate and Rhythm: Normal rate and regular rhythm  Pulses: Normal pulses  Heart sounds: No murmur heard  Pulmonary:      Effort: Pulmonary effort is normal  No respiratory distress  Breath sounds: Normal breath sounds  Abdominal:      General: Bowel sounds are normal       Palpations: Abdomen is soft  Tenderness: There is no abdominal tenderness  Musculoskeletal:         General: No swelling  Cervical back: Neck supple  Right lower leg: No edema  Left lower leg: No edema  Skin:     General: Skin is warm and dry  Capillary Refill: Capillary refill takes less than 2 seconds  Coloration: Skin is pale  Neurological:      Mental Status: He is alert and oriented to person, place, and time  Psychiatric:         Attention and Perception: Attention normal  He perceives visual hallucinations           Mood and Affect: Mood normal          Additional Data:     Labs:  Results from last 7 days   Lab Units 03/09/23  0441 03/08/23  0539 03/07/23  0522   WBC Thousand/uL 8 19   < > 6 67   HEMOGLOBIN g/dL 8 0*   < > 8 5*   HEMATOCRIT % 25 0*   < > 26 1*   PLATELETS Thousands/uL 204   < > 155   NEUTROS PCT %  -- --  59   LYMPHS PCT %  --   --  15   MONOS PCT %  --   --  19*   EOS PCT %  --   --  7*    < > = values in this interval not displayed  Results from last 7 days   Lab Units 03/09/23  0441 03/05/23  0550 03/04/23  0701   SODIUM mmol/L 139   < > 136   POTASSIUM mmol/L 4 3   < > 4 0   CHLORIDE mmol/L 104   < > 103   CO2 mmol/L 27   < > 26   BUN mg/dL 73*   < > 47*   CREATININE mg/dL 2 86*   < > 3 16*   ANION GAP mmol/L 8   < > 7   CALCIUM mg/dL 10 8*   < > 10 4*   ALBUMIN g/dL  --   --  3 4*   TOTAL BILIRUBIN mg/dL  --   --  0 49   ALK PHOS U/L  --   --  73   ALT U/L  --   --  3*   AST U/L  --   --  7*   GLUCOSE RANDOM mg/dL 143*   < > 91    < > = values in this interval not displayed  Results from last 7 days   Lab Units 03/09/23  1103 03/09/23  0705 03/08/23  2115 03/08/23  1548 03/08/23  1059 03/08/23  0714 03/07/23  2113 03/07/23  1556 03/07/23  1111 03/07/23  0733 03/06/23  2116 03/06/23  1548   POC GLUCOSE mg/dl 199* 142* 272* 234* 154* 188* 253* 157* 157* 119 183* 163*     Results from last 7 days   Lab Units 03/03/23  0458   HEMOGLOBIN A1C % 6 3*     Results from last 7 days   Lab Units 03/06/23  0432 03/04/23  0701 03/02/23  1452   LACTIC ACID mmol/L  --   --  0 8   PROCALCITONIN ng/ml 0 79* 1 15* 0 30*       Lines/Drains:  Invasive Devices     Peripheral Intravenous Line  Duration           Peripheral IV 03/06/23 Distal;Right;Upper;Ventral (anterior) Arm 2 days          Drain  Duration           Urethral Catheter Coude 16 Fr  75 days              Urinary Catheter:  Goal for removal: N/A - Chronic Ortiz               Imaging: Reviewed radiology reports from this admission including: xray(s)    Recent Cultures (last 7 days):   Results from last 7 days   Lab Units 03/07/23  1226 03/02/23  1526 03/02/23  1452   BLOOD CULTURE   --  No Growth After 5 Days  No Growth After 5 Days     GRAM STAIN RESULT  No Polys or Bacteria seen  --   --    BODY FLUID CULTURE, STERILE  No growth  --   --        Last 24 Hours Medication List:   Current Facility-Administered Medications   Medication Dose Route Frequency Provider Last Rate   • acetaminophen  650 mg Oral Q4H PRN Thao S June, CRNP     • allopurinol  100 mg Oral Daily Thao S June, CRNP     • amLODIPine  10 mg Oral Daily Thao S June, CRNP     • atorvastatin  40 mg Oral Daily With [employer] S June, CRNP     • Buprenorphine HCl  900 mcg Buccal BID SHOAIB AvendañoNP     • carvedilol  6 25 mg Oral BID With Meals Thao S June, CRNP     • cyclobenzaprine  5 mg Oral TID PRN SHOAIB AvendañoNP     • docusate sodium  100 mg Oral BID Thao S June, CRNP     • ferrous sulfate  325 mg Oral Daily With Breakfast Thao S June, CRNP     • heparin (porcine)  5,000 Units Subcutaneous Q8H [facility] Thao S June, CRNP     • insulin glargine  5 Units Subcutaneous HS Thao S June, CRNP     • insulin lispro  1-6 Units Subcutaneous HS Thao S June, CRNP     • insulin lispro  2-12 Units Subcutaneous TID AC YURIDIA Avendaño     • LORazepam  1 mg Intravenous Once Meenakshi Marion MD     • melatonin  3 mg Oral HS Larry De Dios, SHOAIBNP     • oxyCODONE  5 mg Oral Q6H PRN YURIDIA Avendaño     • polyethylene glycol  17 g Oral BID Thao S June, CRNP     • predniSONE  30 mg Oral Daily Ad Baires PA-C     • pregabalin  75 mg Oral BID YURIDIA Avendaño     • senna-docusate sodium  1 tablet Oral BID Thao S June, CRNP     • sevelamer  800 mg Oral TID With Meals Thao S June, CRNP     • sodium bicarbonate  650 mg Oral Every Other Day Thao S June, CRNP     • sodium chloride  75 mL/hr Intravenous Continuous YURIDIA Avendaño     • tamsulosin  0 4 mg Oral Daily With Dinner Thao S June, CRNP     • thiamine  100 mg Oral Daily Thao S June, CRNP     • venlafaxine  37 5 mg Oral TID Thao S June, CRNP          Today, Patient Was Seen By: YURIDIA Avendaño    **Please Note: This note may have been constructed using a voice recognition system  **

## 2023-03-09 NOTE — PLAN OF CARE
Problem: Nutrition/Hydration-ADULT  Goal: Nutrient/Hydration intake appropriate for improving, restoring or maintaining nutritional needs  Description: Monitor and assess patient's nutrition/hydration status for malnutrition  Collaborate with interdisciplinary team and initiate plan and interventions as ordered  Monitor patient's weight and dietary intake as ordered or per policy  Utilize nutrition screening tool and intervene as necessary  Determine patient's food preferences and provide high-protein, high-caloric foods as appropriate       INTERVENTIONS:  - Monitor oral intake, urinary output, labs, and treatment plans  - Assess nutrition and hydration status and recommend course of action  - Evaluate amount of meals eaten  - Assist patient with eating if necessary   - Allow adequate time for meals  - Recommend/ encourage appropriate diets, oral nutritional supplements, and vitamin/mineral supplements  - Order, calculate, and assess calorie counts as needed  - Recommend, monitor, and adjust tube feedings and TPN/PPN based on assessed needs  - Assess need for intravenous fluids  - Provide specific nutrition/hydration education as appropriate  - Include patient/family/caregiver in decisions related to nutrition  Outcome: Progressing     Problem: MOBILITY - ADULT  Goal: Maintain or return to baseline ADL function  Description: INTERVENTIONS:  -  Assess patient's ability to carry out ADLs; assess patient's baseline for ADL function and identify physical deficits which impact ability to perform ADLs (bathing, care of mouth/teeth, toileting, grooming, dressing, etc )  - Assess/evaluate cause of self-care deficits   - Assess range of motion  - Assess patient's mobility; develop plan if impaired  - Assess patient's need for assistive devices and provide as appropriate  - Encourage maximum independence but intervene and supervise when necessary  - Involve family in performance of ADLs  - Assess for home care needs following discharge   - Consider OT consult to assist with ADL evaluation and planning for discharge  - Provide patient education as appropriate  Outcome: Progressing  Goal: Maintains/Returns to pre admission functional level  Description: INTERVENTIONS:  - Perform BMAT or MOVE assessment daily    - Set and communicate daily mobility goal to care team and patient/family/caregiver  - Collaborate with rehabilitation services on mobility goals if consulted  - Perform Range of Motion 3 times a day  - Reposition patient every 2 hours    - Dangle patient 3 times a day  - Stand patient 3 times a day  - Ambulate patient 3 times a day  - Out of bed to chair 3 times a day   - Out of bed for meals 3 times a day  - Out of bed for toileting  - Record patient progress and toleration of activity level   Outcome: Progressing     Problem: PAIN - ADULT  Goal: Verbalizes/displays adequate comfort level or baseline comfort level  Description: Interventions:  - Encourage patient to monitor pain and request assistance  - Assess pain using appropriate pain scale0-10  - Administer analgesics based on type and severity of pain and evaluate response  - Implement non-pharmacological measures as appropriate and evaluate response  - Consider cultural and social influences on pain and pain management  - Notify physician/advanced practitioner if interventions unsuccessful or patient reports new pain  Outcome: Progressing     Problem: INFECTION - ADULT  Goal: Absence or prevention of progression during hospitalization  Description: INTERVENTIONS:  - Assess and monitor for signs and symptoms of infection  - Monitor lab/diagnostic results  - Monitor all insertion sites, i e  indwelling lines, tubes, and drains  - Monitor endotracheal if appropriate and nasal secretions for changes in amount and color  - Gary appropriate cooling/warming therapies per order  - Administer medications as ordered  - Instruct and encourage patient and family to use good hand hygiene technique  - Identify and instruct in appropriate isolation precautions for identified infection/condition  Outcome: Progressing  Goal: Absence of fever/infection during neutropenic period  Description: INTERVENTIONS:  - Monitor WBC    Outcome: Progressing     Problem: SAFETY ADULT  Goal: Maintain or return to baseline ADL function  Description: INTERVENTIONS:  -  Assess patient's ability to carry out ADLs; assess patient's baseline for ADL function and identify physical deficits which impact ability to perform ADLs (bathing, care of mouth/teeth, toileting, grooming, dressing, etc )  - Assess/evaluate cause of self-care deficits   - Assess range of motion  - Assess patient's mobility; develop plan if impaired  - Assess patient's need for assistive devices and provide as appropriate  - Encourage maximum independence but intervene and supervise when necessary  - Involve family in performance of ADLs  - Assess for home care needs following discharge   - Consider OT consult to assist with ADL evaluation and planning for discharge  - Provide patient education as appropriate  Outcome: Progressing  Goal: Maintains/Returns to pre admission functional level  Description: INTERVENTIONS:  - Perform BMAT or MOVE assessment daily    - Set and communicate daily mobility goal to care team and patient/family/caregiver  - Collaborate with rehabilitation services on mobility goals if consulted  - Perform Range of Motion 3 times a day  - Reposition patient every 2 hours    - Dangle patient 3 times a day  - Stand patient 3 times a day  - Ambulate patient 3 times a day  - Out of bed to chair 3 times a day   - Out of bed for meals 3 times a day  - Out of bed for toileting  - Record patient progress and toleration of activity level   Outcome: Progressing  Goal: Patient will remain free of falls  Description: INTERVENTIONS:  - Educate patient/family on patient safety including physical limitations  - Instruct patient to call for assistance with activity   - Consult OT/PT to assist with strengthening/mobility   - Keep Call bell within reach  - Keep bed low and locked with side rails adjusted as appropriate  - Keep care items and personal belongings within reach  - Initiate and maintain comfort rounds  - Make Fall Risk Sign visible to staff  - Offer Toileting every 2 Hours, in advance of need  - Initiate/Maintain bed alarm  - Obtain necessary fall risk management equipment  - Apply yellow socks and bracelet for high fall risk patients  - Consider moving patient to room near nurses station  Outcome: Progressing     Problem: DISCHARGE PLANNING  Goal: Discharge to home or other facility with appropriate resources  Description: INTERVENTIONS:  - Identify barriers to discharge w/patient and caregiver  - Arrange for needed discharge resources and transportation as appropriate  - Identify discharge learning needs (meds, wound care, etc )  - Arrange for interpretive services to assist at discharge as needed  - Refer to Case Management Department for coordinating discharge planning if the patient needs post-hospital services based on physician/advanced practitioner order or complex needs related to functional status, cognitive ability, or social support system  Outcome: Progressing     Problem: Knowledge Deficit  Goal: Patient/family/caregiver demonstrates understanding of disease process, treatment plan, medications, and discharge instructions  Description: Complete learning assessment and assess knowledge base    Interventions:  - Provide teaching at level of understanding  - Provide teaching via preferred learning methods  Outcome: Progressing     Problem: Prexisting or High Potential for Compromised Skin Integrity  Goal: Skin integrity is maintained or improved  Description: INTERVENTIONS:  - Identify patients at risk for skin breakdown  - Assess and monitor skin integrity  - Assess and monitor nutrition and hydration status  - Monitor labs   - Assess for incontinence   - Turn and reposition patient  - Assist with mobility/ambulation  - Relieve pressure over bony prominences  - Avoid friction and shearing  - Provide appropriate hygiene as needed including keeping skin clean and dry  - Evaluate need for skin moisturizer/barrier cream  - Collaborate with interdisciplinary team   - Patient/family teaching  - Consider wound care consult   Outcome: Progressing

## 2023-03-09 NOTE — ASSESSMENT & PLAN NOTE
· Patient complaining of hand pain and foot thinks he is having a gout flare  · Uric acid level normal at 7 over the weekend  · With increased right knee pain  · orthopedic consult placed and had arthrocentesis of right knee this morning  · Cell count WBC 12109, + crystals with monosodium urate crystals  · Gram stain no polys or bacteria  · Likely gout flare  · Due to kidney function options are limited  · start prednisone x5 days

## 2023-03-09 NOTE — ASSESSMENT & PLAN NOTE
Lab Results   Component Value Date    HGBA1C 6 3 (H) 03/03/2023       Recent Labs     03/08/23  1548 03/08/23  2115 03/09/23  0705 03/09/23  1103   POCGLU 234* 272* 142* 199*       Blood Sugar Average: Last 72 hrs:  (P) 177 2     · Basal insulin 5 units at bedtime, SSI AC/HS  · Hypoglycemia protocol  · Carbohydrate controlled diet  · Adjust as needed with steroid dosing

## 2023-03-09 NOTE — ASSESSMENT & PLAN NOTE
· Presents with nonhealing left diabetic foot wound on left heel that is being followed by outpatient wound care, home care nurse and podiatry  · boggy left heel wound covered with eschar that appears moisture underneath the eschar  · X-ray possible osteomyelitis changes  · MRI showing ulcer but no evidence of osteomyelitis  · Empiric vancomycin for history MRSA pneumonia/ empiric meropenem recent tx Pseudomonas UTI  · Discontinue antibiotics per ID  · MRSA negative-DC Vanco  · BCx NGTD  · Podiatry recommendations as per note  · Appreciate ID consult  · Vascular surgery as outpatient  · Heel offloading w/prevalon boot, or two pillows  · [facility]

## 2023-03-10 VITALS
RESPIRATION RATE: 15 BRPM | BODY MASS INDEX: 19.35 KG/M2 | TEMPERATURE: 98.6 F | SYSTOLIC BLOOD PRESSURE: 143 MMHG | HEIGHT: 71 IN | DIASTOLIC BLOOD PRESSURE: 70 MMHG | WEIGHT: 138.23 LBS | OXYGEN SATURATION: 96 % | HEART RATE: 70 BPM

## 2023-03-10 LAB
ANION GAP SERPL CALCULATED.3IONS-SCNC: 7 MMOL/L (ref 4–13)
BACTERIA SPEC BFLD CULT: NO GROWTH
BUN SERPL-MCNC: 72 MG/DL (ref 5–25)
CALCIUM SERPL-MCNC: 10.3 MG/DL (ref 8.4–10.2)
CHLORIDE SERPL-SCNC: 105 MMOL/L (ref 96–108)
CO2 SERPL-SCNC: 24 MMOL/L (ref 21–32)
CREAT SERPL-MCNC: 2.49 MG/DL (ref 0.6–1.3)
ERYTHROCYTE [DISTWIDTH] IN BLOOD BY AUTOMATED COUNT: 15.7 % (ref 11.6–15.1)
GFR SERPL CREATININE-BSD FRML MDRD: 27 ML/MIN/1.73SQ M
GLUCOSE SERPL-MCNC: 113 MG/DL (ref 65–140)
GLUCOSE SERPL-MCNC: 127 MG/DL (ref 65–140)
GLUCOSE SERPL-MCNC: 183 MG/DL (ref 65–140)
GRAM STN SPEC: NORMAL
HCT VFR BLD AUTO: 26 % (ref 36.5–49.3)
HGB BLD-MCNC: 8.3 G/DL (ref 12–17)
MCH RBC QN AUTO: 28.9 PG (ref 26.8–34.3)
MCHC RBC AUTO-ENTMCNC: 31.9 G/DL (ref 31.4–37.4)
MCV RBC AUTO: 91 FL (ref 82–98)
PLATELET # BLD AUTO: 225 THOUSANDS/UL (ref 149–390)
PMV BLD AUTO: 11.1 FL (ref 8.9–12.7)
POTASSIUM SERPL-SCNC: 4.5 MMOL/L (ref 3.5–5.3)
PROCALCITONIN SERPL-MCNC: 0.32 NG/ML
RBC # BLD AUTO: 2.87 MILLION/UL (ref 3.88–5.62)
SODIUM SERPL-SCNC: 136 MMOL/L (ref 135–147)
WBC # BLD AUTO: 7.36 THOUSAND/UL (ref 4.31–10.16)

## 2023-03-10 RX ORDER — PREGABALIN 75 MG/1
75 CAPSULE ORAL 2 TIMES DAILY
Qty: 20 CAPSULE | Refills: 0 | Status: SHIPPED | OUTPATIENT
Start: 2023-03-10 | End: 2023-03-20

## 2023-03-10 RX ORDER — SODIUM BICARBONATE 650 MG/1
650 TABLET ORAL EVERY OTHER DAY
Qty: 7 TABLET | Refills: 0 | Status: SHIPPED | OUTPATIENT
Start: 2023-03-10 | End: 2023-03-25

## 2023-03-10 RX ORDER — PREDNISONE 10 MG/1
30 TABLET ORAL DAILY
Qty: 3 TABLET | Refills: 0 | Status: SHIPPED | OUTPATIENT
Start: 2023-03-11 | End: 2023-03-12

## 2023-03-10 RX ORDER — LANOLIN ALCOHOL/MO/W.PET/CERES
3 CREAM (GRAM) TOPICAL
Qty: 14 TABLET | Refills: 0 | Status: SHIPPED | OUTPATIENT
Start: 2023-03-10 | End: 2023-03-24

## 2023-03-10 RX ORDER — CYCLOBENZAPRINE HCL 5 MG
5 TABLET ORAL 3 TIMES DAILY PRN
Qty: 20 TABLET | Refills: 0 | Status: SHIPPED | OUTPATIENT
Start: 2023-03-10 | End: 2023-03-17

## 2023-03-10 RX ADMIN — BUPRENORPHINE HYDROCHLORIDE 900 MCG: 900 FILM, SOLUBLE BUCCAL at 09:22

## 2023-03-10 RX ADMIN — SODIUM CHLORIDE 75 ML/HR: 0.9 INJECTION, SOLUTION INTRAVENOUS at 05:51

## 2023-03-10 RX ADMIN — THIAMINE HCL TAB 100 MG 100 MG: 100 TAB at 08:40

## 2023-03-10 RX ADMIN — POLYETHYLENE GLYCOL 3350 17 G: 17 POWDER, FOR SOLUTION ORAL at 08:40

## 2023-03-10 RX ADMIN — INSULIN LISPRO 2 UNITS: 100 INJECTION, SOLUTION INTRAVENOUS; SUBCUTANEOUS at 12:37

## 2023-03-10 RX ADMIN — FERROUS SULFATE TAB 325 MG (65 MG ELEMENTAL FE) 325 MG: 325 (65 FE) TAB at 08:41

## 2023-03-10 RX ADMIN — CARVEDILOL 6.25 MG: 6.25 TABLET, FILM COATED ORAL at 08:41

## 2023-03-10 RX ADMIN — PREDNISONE 30 MG: 20 TABLET ORAL at 08:41

## 2023-03-10 RX ADMIN — DOCUSATE SODIUM 100 MG: 100 CAPSULE ORAL at 08:41

## 2023-03-10 RX ADMIN — PREGABALIN 75 MG: 75 CAPSULE ORAL at 08:40

## 2023-03-10 RX ADMIN — AMLODIPINE BESYLATE 10 MG: 10 TABLET ORAL at 08:41

## 2023-03-10 RX ADMIN — DOCUSATE SODIUM AND SENNOSIDES 1 TABLET: 8.6; 5 TABLET, FILM COATED ORAL at 08:41

## 2023-03-10 RX ADMIN — ALLOPURINOL 100 MG: 100 TABLET ORAL at 08:41

## 2023-03-10 RX ADMIN — SEVELAMER HYDROCHLORIDE 800 MG: 800 TABLET ORAL at 08:41

## 2023-03-10 RX ADMIN — HEPARIN SODIUM 5000 UNITS: 5000 INJECTION INTRAVENOUS; SUBCUTANEOUS at 12:36

## 2023-03-10 NOTE — ASSESSMENT & PLAN NOTE
· Patient states he has been having right knee pain and swelling for the last few days  · Has history of gout-uric acid done over the weekend normal  · CRP -elevated  · X-ray of right knee with effusion  · Orthopedics performed arthrocentesis this morning, w/ improvement in pain post procedure  · See under gout for treatment as fluid was positive for monosodium urate crystals and likely gout flare cause for pain  · cultures- no polys or bacteria  · Lyme negative  · Pain improved with prednisone tx, no antibiotics required

## 2023-03-10 NOTE — DISCHARGE INSTR - AVS FIRST PAGE
Dear Uche Pradhan ,     It was our pleasure to care for you here at [facility]   At this time we provide for you here, the most important instructions / recommendations at discharge:     Notable Medication Adjustments -   Decrease Lyrica to 75 mg twice daily  Only use Flexeril as needed, up to 3 times daily  Discontinue prednisone after tomorrow's dose  Only take sodium bicarb tablet every other day  Testing Required after Discharge -   None  Important follow up information -   Call the pcp and schedule follow-up  Referral placed to podiatry, wound care, vascular surgery please continue follow-up with these providers  Please schedule follow-up with palliative care for pain management/regimen review      Sincerely,   YURIDIA Ying

## 2023-03-10 NOTE — CASE MANAGEMENT
Case Management Discharge Planning Note    Patient name Primitivo Robles  Location /-50 MRN [id number]  : 1965 Date 3/10/2023       Current Admission Date: 3/2/2023  Current Admission Diagnosis:Acute metabolic encephalopathy   Patient Active Problem List    Diagnosis Date Noted   • Right knee pain 2023   • Sepsis ([address] ) 2023   • Diabetic foot ulcer ([address] ) 2023   • CKD (chronic kidney disease) 2023   • Acute metabolic encephalopathy    • PAD (peripheral artery disease) ([address] ) 2022   • Chronic back pain 2022   • Moderate protein-calorie malnutrition ([address] ) 2022   • Chronic anemia 2022   • RSV infection 2022   • Chronic ulcer of left heel ([address] ) 10/20/2022   • Hypercalcemia 2022   • Low back pain 2022   • Ambulatory dysfunction 2022   • Stage 3a chronic kidney disease ([address] ) 2022   • Retention of urine 2022   • Severe protein-calorie malnutrition ([address] ) 2022   • Iron deficiency anemia secondary to inadequate dietary iron intake 2022   • Hip pain, acute, left 2022   • Hyperkalemia 2022   • Diabetes mellitus type 2, insulin dependent ([address] )    • Gout    • Essential hypertension    • History of CVA (cerebrovascular accident)    • Osteomyelitis of vertebra of lumbar region ([address] )       LOS (days): 8  Geometric Mean LOS (GMLOS) (days): 5 00  Days to GMLOS:-2 8     OBJECTIVE:  Risk of Unplanned Readmission Score: 40 38         Current admission status: Inpatient   Preferred Pharmacy:   [facility]/pharmacy #[id number] [facility] [place] [facility] [place] [address] [place] [address]  Phone: [phone] Fax: [phone]    Primary Care Provider: Eddy Davis DO    Primary Insurance: Efrain Miranda [facility] REP  Secondary Insurance:     DISCHARGE DETAILS:     AVS sent to [address]

## 2023-03-10 NOTE — ASSESSMENT & PLAN NOTE
Lab Results   Component Value Date    HGBA1C 6 3 (H) 03/03/2023       Recent Labs     03/09/23  1103 03/09/23  1549 03/09/23  2124 03/10/23  0706   POCGLU 199* 374* 222* 113       Blood Sugar Average: Last 72 hrs:  (P) 198 [id number]     · Basal insulin 5 units at bedtime, SSI AC/HS  · Hypoglycemia protocol  · Carbohydrate controlled diet  · Adjust as needed with steroid dosing

## 2023-03-10 NOTE — PLAN OF CARE
Problem: Nutrition/Hydration-ADULT  Goal: Nutrient/Hydration intake appropriate for improving, restoring or maintaining nutritional needs  Description: Monitor and assess patient's nutrition/hydration status for malnutrition  Collaborate with interdisciplinary team and initiate plan and interventions as ordered  Monitor patient's weight and dietary intake as ordered or per policy  Utilize nutrition screening tool and intervene as necessary  Determine patient's food preferences and provide high-protein, high-caloric foods as appropriate       INTERVENTIONS:  - Monitor oral intake, urinary output, labs, and treatment plans  - Assess nutrition and hydration status and recommend course of action  - Evaluate amount of meals eaten  - Assist patient with eating if necessary   - Allow adequate time for meals  - Recommend/ encourage appropriate diets, oral nutritional supplements, and vitamin/mineral supplements  - Order, calculate, and assess calorie counts as needed  - Recommend, monitor, and adjust tube feedings and TPN/PPN based on assessed needs  - Assess need for intravenous fluids  - Provide specific nutrition/hydration education as appropriate  - Include patient/family/caregiver in decisions related to nutrition  Outcome: Progressing     Problem: MOBILITY - ADULT  Goal: Maintain or return to baseline ADL function  Description: INTERVENTIONS:  -  Assess patient's ability to carry out ADLs; assess patient's baseline for ADL function and identify physical deficits which impact ability to perform ADLs (bathing, care of mouth/teeth, toileting, grooming, dressing, etc )  - Assess/evaluate cause of self-care deficits   - Assess range of motion  - Assess patient's mobility; develop plan if impaired  - Assess patient's need for assistive devices and provide as appropriate  - Encourage maximum independence but intervene and supervise when necessary  - Involve family in performance of ADLs  - Assess for home care needs following discharge   - Consider OT consult to assist with ADL evaluation and planning for discharge  - Provide patient education as appropriate  Outcome: Progressing  Goal: Maintains/Returns to pre admission functional level  Description: INTERVENTIONS:  - Perform BMAT or MOVE assessment daily    - Set and communicate daily mobility goal to care team and patient/family/caregiver  - Collaborate with rehabilitation services on mobility goals if consulted  - Perform Range of Motion 3 times a day  - Reposition patient every 2 hours    - Dangle patient 3 times a day  - Stand patient 3 times a day  - Ambulate patient 3 times a day  - Out of bed to chair 3 times a day   - Out of bed for meals 3 times a day  - Out of bed for toileting  - Record patient progress and toleration of activity level   Outcome: Progressing     Problem: PAIN - ADULT  Goal: Verbalizes/displays adequate comfort level or baseline comfort level  Description: Interventions:  - Encourage patient to monitor pain and request assistance  - Assess pain using appropriate pain scale0-10  - Administer analgesics based on type and severity of pain and evaluate response  - Implement non-pharmacological measures as appropriate and evaluate response  - Consider cultural and social influences on pain and pain management  - Notify physician/advanced practitioner if interventions unsuccessful or patient reports new pain  Outcome: Progressing     Problem: INFECTION - ADULT  Goal: Absence or prevention of progression during hospitalization  Description: INTERVENTIONS:  - Assess and monitor for signs and symptoms of infection  - Monitor lab/diagnostic results  - Monitor all insertion sites, i e  indwelling lines, tubes, and drains  - Monitor endotracheal if appropriate and nasal secretions for changes in amount and color  - [place] appropriate cooling/warming therapies per order  - Administer medications as ordered  - Instruct and encourage patient and family to use good hand hygiene technique  - Identify and instruct in appropriate isolation precautions for identified infection/condition  Outcome: Progressing  Goal: Absence of fever/infection during neutropenic period  Description: INTERVENTIONS:  - Monitor WBC    Outcome: Progressing     Problem: SAFETY ADULT  Goal: Maintain or return to baseline ADL function  Description: INTERVENTIONS:  -  Assess patient's ability to carry out ADLs; assess patient's baseline for ADL function and identify physical deficits which impact ability to perform ADLs (bathing, care of mouth/teeth, toileting, grooming, dressing, etc )  - Assess/evaluate cause of self-care deficits   - Assess range of motion  - Assess patient's mobility; develop plan if impaired  - Assess patient's need for assistive devices and provide as appropriate  - Encourage maximum independence but intervene and supervise when necessary  - Involve family in performance of ADLs  - Assess for home care needs following discharge   - Consider OT consult to assist with ADL evaluation and planning for discharge  - Provide patient education as appropriate  Outcome: Progressing  Goal: Maintains/Returns to pre admission functional level  Description: INTERVENTIONS:  - Perform BMAT or MOVE assessment daily    - Set and communicate daily mobility goal to care team and patient/family/caregiver  - Collaborate with rehabilitation services on mobility goals if consulted  - Perform Range of Motion 3 times a day  - Reposition patient every 2 hours    - Dangle patient 3 times a day  - Stand patient 3 times a day  - Ambulate patient 3 times a day  - Out of bed to chair 3 times a day   - Out of bed for meals 3 times a day  - Out of bed for toileting  - Record patient progress and toleration of activity level   Outcome: Progressing  Goal: Patient will remain free of falls  Description: INTERVENTIONS:  - Educate patient/family on patient safety including physical limitations  - Instruct patient to call for assistance with activity   - Consult OT/PT to assist with strengthening/mobility   - Keep Call bell within reach  - Keep bed low and locked with side rails adjusted as appropriate  - Keep care items and personal belongings within reach  - Initiate and maintain comfort rounds  - Make Fall Risk Sign visible to staff  - Offer Toileting every 2 Hours, in advance of need  - Initiate/Maintain bed alarm  - Obtain necessary fall risk management equipment  - Apply yellow socks and bracelet for high fall risk patients  - Consider moving patient to room near nurses station  Outcome: Progressing     Problem: DISCHARGE PLANNING  Goal: Discharge to home or other facility with appropriate resources  Description: INTERVENTIONS:  - Identify barriers to discharge w/patient and caregiver  - Arrange for needed discharge resources and transportation as appropriate  - Identify discharge learning needs (meds, wound care, etc )  - Arrange for interpretive services to assist at discharge as needed  - Refer to Case Management Department for coordinating discharge planning if the patient needs post-hospital services based on physician/advanced practitioner order or complex needs related to functional status, cognitive ability, or social support system  Outcome: Progressing     Problem: Knowledge Deficit  Goal: Patient/family/caregiver demonstrates understanding of disease process, treatment plan, medications, and discharge instructions  Description: Complete learning assessment and assess knowledge base    Interventions:  - Provide teaching at level of understanding  - Provide teaching via preferred learning methods  Outcome: Progressing     Problem: Prexisting or High Potential for Compromised Skin Integrity  Goal: Skin integrity is maintained or improved  Description: INTERVENTIONS:  - Identify patients at risk for skin breakdown  - Assess and monitor skin integrity  - Assess and monitor nutrition and hydration status  - Monitor labs   - Assess for incontinence   - Turn and reposition patient  - Assist with mobility/ambulation  - Relieve pressure over bony prominences  - Avoid friction and shearing  - Provide appropriate hygiene as needed including keeping skin clean and dry  - Evaluate need for skin moisturizer/barrier cream  - Collaborate with interdisciplinary team   - Patient/family teaching  - Consider wound care consult   Outcome: Progressing

## 2023-03-10 NOTE — ASSESSMENT & PLAN NOTE
· Presents with nonhealing left diabetic foot wound on left heel that is being followed by outpatient wound care, home care nurse and podiatry  · boggy left heel wound covered with eschar that appears moisture underneath the eschar  · X-ray possible osteomyelitis changes  · MRI showing ulcer but no evidence of osteomyelitis  · Empiric vancomycin for history MRSA pneumonia/ empiric meropenem recent tx Pseudomonas UTI  · Discontinue antibiotics per ID  · MRSA negative-DC Vanco  · BCx NGTD  · Podiatry recommendations as per note  · Appreciate ID consult  · Vascular surgery as outpatient  · Heel offloading w/prevalon boot, or two pillows  · [facility]

## 2023-03-10 NOTE — PHYSICAL THERAPY NOTE
PHYSICAL THERAPY TREATMENT NOTE  NAME:  Farzad Vela Sr  DATE: 03/10/23    Length Of Stay: 8  Performed at least 2 patient identifiers during session: Name and ID bracelet    TREATMENT FLOWSHEET:    03/10/23 1046   PT Last Visit   PT Visit Date 03/10/23   Note Type   Note Type Treatment   Pain Assessment   Pain Assessment Tool 0-10   Pain Score 9   Pain Location/Orientation Orientation: Right;Location: Knee   Pain Onset/Description Frequency: Constant/Continuous; Descriptor: Aching;Descriptor: Discomfort   Patient's Stated Pain Goal No pain   Hospital Pain Intervention(s) Repositioned; Ambulation/increased activity; Emotional support   Restrictions/Precautions   Weight Bearing Precautions Per Order Yes   LLE Weight Bearing Per Order WBAT   Braces or Orthoses Other (Comment)  (LLE Off loading shoe not here)   Other Precautions Bed Alarm; Chair Alarm; Fall Risk;WBS;Multiple lines   General   Chart Reviewed Yes   Response to Previous Treatment Patient with no complaints from previous session  Family/Caregiver Present No   Cognition   Overall Cognitive Status WFL   Arousal/Participation Alert; Cooperative   Attention Attends with cues to redirect   Orientation Level Oriented to person;Oriented to place   Memory Decreased recall of precautions;Decreased recall of recent events   Following Commands Follows one step commands with increased time or repetition   Comments Pt  stating he has gout in R knee  Pt  tearful at conclusion of treatment d/t mobility status   Subjective   Subjective " was walking with an AD at home and eating lots of lobster, that how I got gout "   Bed Mobility   Rolling R 5  Supervision   Additional items Assist x 1;HOB elevated; Increased time required;Verbal cues   Rolling L 5  Supervision   Additional items Assist x 1;HOB elevated; Increased time required;Verbal cues   Supine to Sit 5  Supervision   Additional items Assist x 1;HOB elevated; Increased time required;Verbal cues   Additional Comments Pt  reported dizziness upon sittng, /76 cleared after 2 mins   Transfers   Sit to Stand 3  Moderate assistance   Additional items Assist x 2; Increased time required;Verbal cues; Impulsive  (RW)   Stand to Sit 3  Moderate assistance   Additional items Assist x 2;Armrests; Increased time required;Verbal cues   Stand pivot 3  Moderate assistance   Additional items Assist x 2;Armrests; Increased time required; Impulsive  (RW)   Additional Comments Provided VC's for proper hand placement, technique, and safety   Ambulation/Elevation   Gait pattern Improper Weight shift;Decreased foot clearance;Narrow NICK; Forward Flexion; Short stride; Step to;Excessively slow;Decreased heel strike;Decreased toe off;Knees flexed; Shuffling   Gait Assistance 3  Moderate assist   Additional items Assist x 2;Verbal cues; Tactile cues   Assistive Device Rolling walker   Distance 5 ft   Ambulation/Elevation Additional Comments Fatigues suddenly with extensive progressive forward flexion   Balance   Static Sitting Fair +   Dynamic Sitting Fair -   Static Standing Poor   Dynamic Standing Poor -   Ambulatory Poor -   Endurance Deficit   Endurance Deficit Yes   Endurance Deficit Description sudden fatigue   Activity Tolerance   Activity Tolerance Patient limited by fatigue;Patient limited by pain   Medical Staff Made Aware PCA aware and assisted with mobility   Nurse Made Aware Rn aware   Assessment   Prognosis Fair   Problem List Decreased strength;Decreased endurance; Impaired balance;Decreased coordination;Decreased mobility; Impaired judgement;Decreased safety awareness;Decreased skin integrity;Pain;Orthopedic restrictions   Goals   Patient Goals to move like he used to   PT Treatment Day 1   Plan   Treatment/Interventions Functional transfer training;LE strengthening/ROM; Therapeutic exercise; Endurance training;Cognitive reorientation;Patient/family training;Equipment eval/education; Bed mobility;Gait training; Compensatory technique education;Spoke to nursing   Progress Slow progress, decreased activity tolerance   PT Frequency 2-3x/wk   Recommendation   PT Discharge Recommendation   (HHPT vs STR)   Additional Comments Anticipate pt to be able to return home with HHPT however if pt's family is unable to provide care, recommend STR   AM-PAC Basic Mobility Inpatient   Turning in Flat Bed Without Bedrails 3   Lying on Back to Sitting on Edge of Flat Bed Without Bedrails 3   Moving Bed to Chair 1   Standing Up From Chair Using Arms 1   Walk in Room 1   Climb 3-5 Stairs With Railing 1   Basic Mobility Inpatient Raw Score 10   Highest Level Of Mobility   JH-HLM Goal 4: Move to chair/commode   JH-HLM Achieved 4: Move to chair/commode       The patient's AM-PAC Basic Mobility Inpatient Short Form Raw Score is 10  A raw score less than 16 suggests the patient may benefit from discharge to post-acute rehabilitation services  Please also refer to the recommendation of the Physical Therapist for safe discharge planning  Pt seen for PT treatment session this date with interventions consisting of bed mobility tasks, transfer training, monitoring of vitals, gait training, and education provided as needed for safety and direction to improve functional mobility, safety awareness, and activity tolerance  Pt agreeable to PT treatment session upon arrival, pt found resting in bed  At end of session, pt left sitting OOB in recliner with BLE elevated with chair alarm activated and with all needs in reach  In comparison to previous session, pt with improvements in willingness to participate and ambulating short distance   Continue to recommend Home PT vs  STR  at time of d/c in order to maximize pt's functional independence and safety w/ mobility  Pt continues to be functioning below baseline level  PT will continue to see pt while here in order to address the deficits listed above and provide interventions consistent w/ POC in effort to achieve STGs      José Luis Rosales [place], [place]

## 2023-03-10 NOTE — ASSESSMENT & PLAN NOTE
· Criteria with tachycardia, leukocytosis  · Urine negative  · Blood cultures NGTD  · CT chest abdomen pelvis no obvious infection  · suspected source left heel wound with eschar, erythema   · ABX: vanco/meropenem > now off vancomycin/meropenem/ceftriaxone  · Monitor off antibiotics per ID  · Follow fever curve, afeberile x 24 hours  · Leukocytosis trending down - now normalized  · Procalcitonin 0 3> 1 15>0 79, 0 32 - improving   · Lyme antibodies negative

## 2023-03-10 NOTE — DISCHARGE SUMMARY
[address]  Discharge- Claretha Lines Sr  1965, 62 y o  male MRN: [id number]  Unit/Bed#: -01 Encounter: [id number]  Primary Care Provider: Adolfo Cramer DO   Date and time admitted to hospital: 3/2/2023  2:23 PM    * Acute metabolic encephalopathy  Assessment & Plan  · POA from home with wife with acute onset hallucinations, confusion  Wife was concerned he had an underlying infection as this is his normal response    · Chronic indwelling Ortiz catheter without evidence of pyuria  · CT brain no acute abnormality  · Left heel ulcer questionable source of infection  · Empiric antibiotics  · IV hydration- completed  · AAO x3  to direct questioning, does has have fluctuating mentation sounding like hallucinations with family, and during exam  This morning asked about the docs following him in the hallway-patient provided reorientation  · Discussed with his outpatient palliative medicine provider [facility] with chronic pain regimen and noted PDMP recent increase of buprenorphine monthly, last being in February, with outpatient notes starting February 15 stating some confusion Per family  ·  recommend Flexeril as needed, keep Lyrica 150 twice daily, decrease buprenorphine to 600 mcg twice daily and monitor or keep buprenorphine at 900 mcg and lyrica 75 mg and monitor  · Unable to provide 600 mcg film for buprenorphine, nonformulary at this hospital patient provided current dosing  And unable to split film,  Discussed with pharmacist   Intermittent confusion unlikely from polypharmresolve during this hospitalization and Recommend addressing with outpatient prescribe after discharge polypharmacy- wife calling to schedule video follow-up  · BUN also rising, nephro consulted- d/c IVF appears euvolemic, likely effect from steroids    Sepsis ([facility] [address] )  Assessment & Plan  · Criteria with tachycardia, leukocytosis  · Urine negative  · Blood cultures NGTD  · CT chest abdomen pelvis no obvious infection  · suspected source left heel wound with eschar, erythema   · ABX: vanco/meropenem > now off vancomycin/meropenem/ceftriaxone  · Monitor off antibiotics per ID  · Follow fever curve, afeberile x 24 hours  · Leukocytosis trending down - now normalized  · Procalcitonin 0 3> 1 15>0 79, 0 32 - improving   · Lyme antibodies negative    Diabetic foot ulcer (HCC)  Assessment & Plan  · Presents with nonhealing left diabetic foot wound on left heel that is being followed by outpatient wound care, home care nurse and podiatry  · boggy left heel wound covered with eschar that appears moisture underneath the eschar  · X-ray possible osteomyelitis changes  · MRI showing ulcer but no evidence of osteomyelitis  · Empiric vancomycin for history MRSA pneumonia/ empiric meropenem recent tx Pseudomonas UTI  · Discontinue antibiotics per ID  · MRSA negative-DC Vanco  · BCx NGTD  · Podiatry recommendations as per note  · Appreciate ID consult  · Vascular surgery as outpatient  · Heel offloading w/prevalon boot, or two pillows  · [facility]    Right knee pain  Assessment & Plan  · Patient states he has been having right knee pain and swelling for the last few days  · Has history of gout-uric acid done over the weekend normal  · CRP -elevated  · X-ray of right knee with effusion  · Orthopedics performed arthrocentesis this morning, w/ improvement in pain post procedure  · See under gout for treatment as fluid was positive for monosodium urate crystals and likely gout flare cause for pain  · cultures- no polys or bacteria  · Lyme negative  · Pain improved with prednisone tx, no antibiotics required    CKD (chronic kidney disease)  Assessment & Plan  Lab Results   Component Value Date    EGFR 27 03/10/2023    EGFR 23 03/09/2023    EGFR 21 03/08/2023    CREATININE 2 49 (H) 03/10/2023    CREATININE 2 86 (H) 03/09/2023    CREATININE 3 03 (H) 03/08/2023   · Presents with KELLY on CKD in the setting of sepsis  · Baseline 3 6-3 9 per outpatient Nephrology note  · Creatinine trending down toward baseline  · Continue chronic Ortiz catheter  · Renally dose medications, avoid hypotension, nephrotoxic medications  · Slightly increased from yesterday but remains within baseline  · BUN rising - gentle IVF completed- likely effect from steroid  · nephro consult- may benefit from 24hr Crcl in outpatient setting    PAD (peripheral artery disease) ([facility] [address] )  Assessment & Plan  · History of PAD  · Diabetes mellitus  · Nonhealing left heel ulcer  · Arterial duplex 12/27 with diffuse disease but no significant focal stenosis  · Continue atorvastatin  · OP vascular surgery referral    Chronic back pain  Assessment & Plan  · hx of chronic osteodiscitis L4-L5 and wife notes had admission with spinal abscess  · 3/4 CT scan lumbar spine- healing chronic L4-5 colitis/osteomyelitis with chronic pathologic Check compression fractures    Limited abscess evaluation with noncontrast exam   No new sites of discitis/osteomyelitis of lumbar spine  · Continue close follow-up with palliative care for pain manamgent    Chronic anemia  Assessment & Plan  · Chronic anemia noted previous iron deficiency with p o  iron supplementation, also likely anemia of chronic disease with CKD 3  · Baseline Hgb 9-10  · Continue PO supplmentation    Retention of urine  Assessment & Plan  · Chronic Ortiz due to neurogenic bladder  · No pyuria on urinalysis      Essential hypertension  Assessment & Plan  · Continue PTA amlodipine, carvedilol  · Remains normotensive     Gout  Assessment & Plan  · Patient complaining of hand pain and foot thinks he is having a gout flare  · Uric acid level normal at 7 over the weekend  · With increased right knee pain  · orthopedic consult placed and had arthrocentesis of right knee this morning  · Cell count WBC 71430, + crystals with monosodium urate crystals  · Gram stain no polys or bacteria  · Likely gout flare  · Due to kidney function options are limited  · prednisone x5 days- having improvement in knee pain with prednisone    Diabetes mellitus type 2, insulin dependent [facility])  Assessment & Plan  Lab Results   Component Value Date    HGBA1C 6 3 (H) 03/03/2023       Recent Labs     03/09/23  1103 03/09/23  1549 03/09/23  2124 03/10/23  0706   POCGLU 199* 374* 222* 113       Blood Sugar Average: Last 72 hrs:  (P) 198 [id number]     · Basal insulin 5 units at bedtime, SSI AC/HS  · Hypoglycemia protocol  · Carbohydrate controlled diet  · Adjust as needed with steroid dosing      Medical Problems     Resolved Problems  Date Reviewed: 3/8/2023   None       Discharging Physician / Practitioner: Blas Mcdonough  PCP: Fredy Landon DO  Admission Date:   Admission Orders (From admission, onward)     Ordered        03/02/23 1956  INPATIENT ADMISSION  Once                      Discharge Date: 03/10/23    Consultations During Hospital Stay:  · Podiatry  · Case management  · Nutrition  · Wound care  · Pharmacy  · Occupational Therapy  · Physical therapy  · Orthopedic surgery  · Infectious disease  · Nephrology    Procedures Performed:   · 3/7 right knee arthrocentesis-8 cc slightly cloudy yellow fluid removed    Significant Findings / Test Results:   · CT head no acute intracranial abnormality  · CT chest abdomen pelvis-  · consolidation seen left lung base region of previously noted parenchymal abnormality may be due to scarring or nonresolving consolidation follow-up chest CT 3 months  · No intra-abdominal fluid collection, no bowel obstruction, no calculi, mild prominence left pelvicalyceal system and mild dilation left ureter without obstructing calculi  · X-ray foot left-no findings of osteomyelitis  · MRI ankle heel left-posterior heel ulcer no evidence of osteomyelitis in the adjacent calcaneal tubercle  · CT spine lumbar-  · healing chronic L4-L5 discitis/osteomyelitis with chronic pathologic compression fractures L4 inferior endplate, L5 superior endplate with mild height loss  Evaluation for abscess limited on noncontrast exam   No new sites of discitis/osteomyelitis of lumbar spine  · Blood cultures no growth to date  · Body fluid Gram stain no growth, no polys or bacteria  · MRSA negative  · Lyme antibody profile negative  · Synovial fluid crystal positive for monosodium urate crystals  Incidental Findings:   CT chest abdomen pelvis wo contrast: Consolidation seen at the left lung base in the region of the previously noted parenchymal abnormality may be due to scarring or due to nonresolved consolidation, follow-up chest CT at 3 months suggested, No intra-abdominal fluid collection seen, No bowel obstruction, No obstructing calculus, Mild prominence of the left pelvicalyceal system and mild dilation of the left ureter without any obstructing calculus, The study was marked in EPIC for significant notification    · I reviewed the above mentioned incidental findings with the patient and/or family and they expressed understanding  Test Results Pending at Discharge (will require follow up): · None     Outpatient Tests Requested:  · Follow-up CT chest in 3 months    Complications: None    Reason for Admission: Acute metabolic encephalopathy    Hospital Course:   Cecilio Mac  is a 62 y o  male patient who originally presented to the hospital on 3/2/2023 due to acute metabolic encephalopathy noted at home with acute onset of hallucinations and confusion patient was placed on empiric antibiotics with left heel ulcer as questionable source  Patient met sepsis criteria with tachycardia and leukocytosis infectious disease assistance with antibiotic directive completed 5 days of IV empiric antibiotics  Monitored off of antibiotics without return of leukocytosis  Patient also managed in conjunction with wound care, podiatry who recommended vascular surgery outpatient evaluation    Patient to continue wound care outpatient, with no osteomyelitis seen on imaging  Patient's mentation had improved with orientation to direct questioning however does have intermittent periods of hallucinations, able to be redirected and reoriented  Discussed with outpatient palliative provider who is prescribing chronic pain regimen regarding polypharmacy as possible cause for patient's encephalopathy  Unable to decrease buprenorphine while inpatient as nonformulary, recommended to decrease Lyrica to 75 twice daily, Flexeril as needed  Discussed with wife recommendations along with making close interval follow-up to review medications for further management and outpatient regimen adjustments  Patient had right knee effusion with arthrocentesis performed by orthopedic surgery without bacteria growth in cultures  Monosodium urate crystals present in cultures was placed on prednisone x5 days for acute gout treatment, with improvement of pain  PT/OT evaluation recommending home health care    Please see above list of diagnoses and related plan for additional information  Condition at Discharge: stable    Discharge Day Visit / Exam:   Subjective: Patient was resting comfortably in bed this morning denies any complaints or concerns is oriented x3  Discussed recommendations as above patient is feeling ready for discharge at this time  Vitals: Blood Pressure: 143/70 (03/10/23 0843)  Pulse: 70 (03/10/23 0843)  Temperature: 98 6 °F (37 °C) (03/10/23 0843)  Temp Source: Tympanic (03/06/23 0741)  Respirations: 15 (03/10/23 0843)  Height: 5' 11" (180 3 cm) (03/03/23 1423)  Weight - Scale: 62 7 kg (138 lb 3 7 oz) (03/03/23 1459)  SpO2: 96 % (03/10/23 0843)  Exam:   Physical Exam  Vitals and nursing note reviewed  Constitutional:       General: He is not in acute distress  Appearance: He is underweight  HENT:      Head: Normocephalic and atraumatic        Mouth/Throat:      Mouth: Mucous membranes are moist    Eyes:      Conjunctiva/sclera: Conjunctivae normal       Pupils: Pupils are equal, round, and reactive to light  Cardiovascular:      Rate and Rhythm: Normal rate and regular rhythm  Pulses: Normal pulses  Heart sounds: No murmur heard  Pulmonary:      Effort: Pulmonary effort is normal  No respiratory distress  Breath sounds: Normal breath sounds  No wheezing  Abdominal:      General: There is no distension  Palpations: Abdomen is soft  Tenderness: There is no abdominal tenderness  Musculoskeletal:         General: No swelling  Cervical back: Neck supple  Right knee: Effusion present  No erythema  Normal range of motion  Tenderness present  Right lower leg: No edema  Left lower leg: No edema  Skin:     General: Skin is warm and dry  Capillary Refill: Capillary refill takes less than 2 seconds  Findings: Wound (left heel) present  Neurological:      General: No focal deficit present  Mental Status: He is alert and oriented to person, place, and time  Mental status is at baseline  Psychiatric:         Mood and Affect: Mood normal           Discussion with Family: Attempted to update  (wife) via phone  Left voicemail  Discharge instructions/Information to patient and family:   See after visit summary for information provided to patient and family  Provisions for Follow-Up Care:  See after visit summary for information related to follow-up care and any pertinent home health orders  Disposition:   Home with [facility] Services (Reminder: Complete face to face encounter)    Planned Readmission: none     Discharge Statement:  I spent 45 minutes discharging the patient  This time was spent on the day of discharge  I had direct contact with the patient on the day of discharge  Greater than 50% of the total time was spent examining patient, answering all patient questions, arranging and discussing plan of care with patient as well as directly providing post-discharge instructions    Additional time then spent on discharge activities  Discharge Medications:  See after visit summary for reconciled discharge medications provided to patient and/or family        **Please Note: This note may have been constructed using a voice recognition system**

## 2023-03-10 NOTE — ASSESSMENT & PLAN NOTE
· hx of chronic osteodiscitis L4-L5 and wife notes had admission with spinal abscess  · 3/4 CT scan lumbar spine- healing chronic L4-5 colitis/osteomyelitis with chronic pathologic Check compression fractures    Limited abscess evaluation with noncontrast exam   No new sites of discitis/osteomyelitis of lumbar spine  · Continue close follow-up with palliative care for pain manamgent

## 2023-03-10 NOTE — CONSULTS
Consultation - Nephrology   Missael Esteban  62 y o  male MRN: [id number]  Unit/Bed#: -01 Encounter: [id number]      A/P:  1  Acute kidney injury on top of chronic kidney disease present on admission   Patient is back to his typical baseline, please refer below  Continue to avoid potential for toxins and maximize overall care  2   Chronic kidney disease stage IV with baseline creatinine around 2 5 mg/dL   Patient's true creatinine clearance likely lower than estimated GFR due to significant reduction of muscle mass  May benefit from a 24-hour creatinine clearance in the outpatient setting in his usual state of health in order to determine kidney function which may help in dose adjustment of various medications depending on the clinical circumstance  3   Elevated blood urea nitrogen   Most likely secondary to steroid effect, there is no significant impact on the patient's mental status at this time  Although the patient may also be experiencing additional catabolic pressures, I believe that the patient's improvement in oral intake should mitigate those effects at some point in the near future if they are actively occurring  4   Volume status   Patient currently on normal saline at 75 mL/hr, will discontinue this as the patient appears to be euvolemic and is eating and drinking appropriately  5   Metabolic acidosis   Likely due to reduced kidney function, continue with sodium bicarbonate only on 650 mg every other day at this time, bicarbonate level was appropriate at 24 mmol/L  6   Chronic Ortiz catheter secondary to urinary retention  7  Acute metabolic encephalopathy-resolved  8  Diabetic foot ulcer   Status post oral antibiotics for 5 days, continue local care according to our infectious disease and hospitalist recommendations  Thank you for allowing us to participate in the care of your patient       Please feel free to contact us regarding the care of this patient, or any other questions/concerns that may be applicable  Patient Active Problem List   Diagnosis   • Hyperkalemia   • Diabetes mellitus type 2, insulin dependent (HCC)   • Gout   • Essential hypertension   • History of CVA (cerebrovascular accident)   • Osteomyelitis of vertebra of lumbar region [facility])   • Severe protein-calorie malnutrition (HCC)   • Iron deficiency anemia secondary to inadequate dietary iron intake   • Hip pain, acute, left   • Retention of urine   • Hypercalcemia   • Low back pain   • Ambulatory dysfunction   • Stage 3a chronic kidney disease (HCC)   • Chronic ulcer of left heel (HCC)   • Chronic anemia   • RSV infection   • Moderate protein-calorie malnutrition (HCC)   • Chronic back pain   • PAD (peripheral artery disease) (HCC)   • Acute metabolic encephalopathy   • Sepsis (HCC)   • Diabetic foot ulcer (HCC)   • CKD (chronic kidney disease)   • Right knee pain       History of Present Illness   Physician Requesting Consult: Fiorella Mart MD  Reason for Consult / Principal Problem: Chronic kidney disease/elevated blood urea nitrogen  Hx and PE limited by:   HPI: Scarlett Lam  is a 62y o  year old male who presented to the emergency department on March 2 due to a change in mental status  At the time of presentation, the patient had significant recent surgical history awaiting repeat surgery, as a consequence he said he has a neurogenic bladder and has chronic indwelling Ortiz catheter  Patient has been essentially bedridden since that time  Presentation, the patient was thought to have infection of the left diabetic heel ulcer which was treated with antibiotics, after 5 days antibiotics were discontinued with patient now having local care to the area  Patient also had acute encephalopathy which improved as the patient's kidney function improved, creatinine was around 3 1 mg/dL and it is currently down to 2 5 mg/dL which is at the patient's baseline      Further from the renal standpoint, there is concern about the patient's elevated BUN  Patient's BUN was stable at around 50 mg/dL up until March 7, since that time it increased up to 65 and 73 and today's 72 mg/dL  This is in the setting of improvement in kidney function  At the same time, the patient's encephalopathy has also been improving as the patient's BUN has been increasing  Of note, the patient was placed on 30 mg of oral prednisone on March 7 and noted to help with gouty arthritis flare  History obtained from chart review and the patient    Constitutional ROS- Denies fever, chills, night sweats, weight changes  Patient admits to significant fatigue  HEENT ROS- Denies history of eye surgeries, glaucoma, headaches or history of trauma, blurred vision     Endocrine ROS- No history diabetes mellitus or thyroid disease  Cardiovascular ROS- Denies chest pain, palpitation, dyspnea exertion, orthopnea, claudication  Pulmonary ROS- Denies history of COPD, asthma  Denies cough, hemoptysis, shortness of breath  GI ROS- Denies abdominal pain, diarrhea, nausea, swallowing problems, vomiting, constipation, blood in stools, fecal incontinence  Hematological ROS- Denies history of easy bruising, blood clots, bleeding or blood transfusions  Genitourinary ROS- Denies recent hematuria, pyuria, flank pain, change in urinary stream, decreased urinary output, increased urinary frequency, nocturia, foamy urine, or urinary incontinence  Lymphatic ROS- Denies lymphadenopathy  Musculoskeletal ROS- positive for left knee pain which has been improving, as well as significant muscular weakness  Dermatological ROS- Denies rash, wounds, ulcers, itching, jaundice  Psychiatric ROS- Denies anxiety, depression, hallucinations, disorientation  Neurological ROS- No stroke or TIA symptoms        Historical Information   Past Medical History:   Diagnosis Date   • Chronic kidney disease    • Diabetes mellitus ([facility] [address] )    • Gout    • Hypertension    • Renal disorder    • Retroperitoneal abscess (HCC)    • Stroke [facility])    • UTI (urinary tract infection)    • Vertebral osteomyelitis (HCC)      Past Surgical History:   Procedure Laterality Date   • BACK SURGERY     • ORCHIECTOMY       Social History   Social History     Substance and Sexual Activity   Alcohol Use Not Currently     Social History     Substance and Sexual Activity   Drug Use Yes   • Types: Marijuana     Social History     Tobacco Use   Smoking Status Every Day   • Packs/day: 0 25   • Types: Cigarettes   Smokeless Tobacco Never     Family History   Problem Relation Age of Onset   • Hypertension Father        Meds/Allergies   all current active meds have been reviewed, current meds:   Current Facility-Administered Medications   Medication Dose Route Frequency   • acetaminophen (TYLENOL) tablet 650 mg  650 mg Oral Q4H PRN   • allopurinol (ZYLOPRIM) tablet 100 mg  100 mg Oral Daily   • amLODIPine (NORVASC) tablet 10 mg  10 mg Oral Daily   • atorvastatin (LIPITOR) tablet 40 mg  40 mg Oral Daily With Dinner   • Buprenorphine HCl FILM 900 mcg  900 mcg Buccal BID   • carvedilol (COREG) tablet 6 25 mg  6 25 mg Oral BID With Meals   • cyclobenzaprine (FLEXERIL) tablet 5 mg  5 mg Oral TID PRN   • docusate sodium (COLACE) capsule 100 mg  100 mg Oral BID   • ferrous sulfate tablet 325 mg  325 mg Oral Daily With Breakfast   • heparin (porcine) subcutaneous injection 5,000 Units  5,000 Units Subcutaneous Q8H [address]   • insulin glargine (LANTUS) subcutaneous injection 5 Units 0 05 mL  5 Units Subcutaneous HS   • insulin lispro (HumaLOG) 100 units/mL subcutaneous injection 1-6 Units  1-6 Units Subcutaneous HS   • insulin lispro (HumaLOG) 100 units/mL subcutaneous injection 2-12 Units  2-12 Units Subcutaneous TID AC   • melatonin tablet 3 mg  3 mg Oral HS   • oxyCODONE (ROXICODONE) IR tablet 5 mg  5 mg Oral Q6H PRN   • polyethylene glycol (MIRALAX) packet 17 g  17 g Oral BID   • predniSONE tablet 30 mg  30 mg Oral Daily   • pregabalin (LYRICA) capsule 75 mg  75 mg Oral BID   • senna-docusate sodium (SENOKOT S) 8 6-50 mg per tablet 1 tablet  1 tablet Oral BID   • sevelamer (RENAGEL) tablet 800 mg  800 mg Oral TID With Meals   • sodium bicarbonate tablet 650 mg  650 mg Oral Every Other Day   • sodium chloride 0 9 % infusion  75 mL/hr Intravenous Continuous   • tamsulosin (FLOMAX) capsule 0 4 mg  0 4 mg Oral Daily With Dinner   • thiamine tablet 100 mg  100 mg Oral Daily   • venlafaxine (EFFEXOR-XR) 24 hr capsule 37 5 mg  37 5 mg Oral TID    and PTA meds:    Medications Prior to Admission   Medication   • amLODIPine (NORVASC) 10 mg tablet   • atorvastatin (LIPITOR) 40 mg tablet   • Belbuca 450 MCG FILM   • Buprenorphine HCl (Belbuca) 300 MCG FILM   • carvedilol (COREG) 6 25 mg tablet   • colchicine (COLCRYS) 0 6 mg tablet   • cyclobenzaprine (FLEXERIL) 5 mg tablet   • docusate sodium (COLACE) 100 mg capsule   • ferrous sulfate 325 (65 Fe) mg tablet   • insulin glargine (LANTUS) 100 units/mL subcutaneous injection   • insulin lispro (HumaLOG) 100 units/mL injection   • oxyCODONE (OXY-IR) 5 MG capsule   • polyethylene glycol (MIRALAX) 17 g packet   • pregabalin (LYRICA) 150 mg capsule   • senna-docusate sodium (SENOKOT S) 8 6-50 mg per tablet   • sevelamer carbonate (RENVELA) 800 mg tablet   • sodium bicarbonate 650 mg tablet   • tamsulosin (FLOMAX) 0 4 mg   • venlafaxine (EFFEXOR-XR) 37 5 mg 24 hr capsule   • acetaminophen (TYLENOL) 325 mg tablet   • allopurinol (ZYLOPRIM) 100 mg tablet   • thiamine 100 MG tablet         Allergies   Allergen Reactions   • Bupropion Delirium     "went nuts," prior to 2012  "went nuts," prior to 2012  "went nuts," prior to 2012  "went nuts," prior to 2012     • Metformin Other (See Comments)     Other reaction(s): kidney disease  Other reaction(s): kidney disease  Other reaction(s): kidney disease     • Medical Tape Rash       Objective     Intake/Output Summary (Last 24 hours) at 3/10/2023 1009  Last data filed at 3/10/2023 0700  Gross per 24 hour   Intake 402 ml   Output 2500 ml   Net -2098 ml       Invasive Devices:   Urethral Catheter Coude 16 Fr  (Active)   Reasons to continue Urinary Catheter  Chronic urinary catheter 03/09/23 2145   Goal for Removal N/A- chronic camarena 03/09/23 2145   Site Assessment Clean;Skin intact 03/09/23 2145   Camarena Care Done 03/10/23 0828   Collection Container Standard drainage bag 03/09/23 2145   Securement Method Leg strap 03/08/23 0830   Output (mL) 700 mL 03/10/23 0700       Physical Exam      I/O last 3 completed shifts: In: 402 [P O :402]  Out: 4600 [Urine:4600]    Vitals:    03/10/23 0843   BP: 143/70   Pulse: 70   Resp: 15   Temp: 98 6 °F (37 °C)   SpO2: 96%       General Appearance:    No acute distress  Cooperative  Appears stated age  Head:    Normocephalic  Atraumatic  Normal jaw occlusion  Eyes:    Lids, conjunctiva normal  No scleral icterus  Ears:    Normal external ears  Nose:   Nares normal  No drainage  Mouth:   Lips, tongue normal  Mucosa normal  Phonation normal    Neck:   Supple  Symmetrical    Back:     Symmetric  No CVA tenderness  Lungs:     Normal respiratory effort  Clear to auscultation bilaterally  Chest wall:    No tenderness or deformity  Heart:    Regular rate and rhythm  Normal S1 and S2  No murmur  No JVD  No edema  Abdomen:     Soft  Non-tender  Bowel sounds active  Genitourinary:   No Camarena catheter present  Extremities:   Extremities normal  Atraumatic  No cyanosis  Skin:   Warm and dry  No pallor, jaundice, rash, ecchymoses  Neurologic:   Alert and oriented to person, place, time  No focal deficit  Current Weight: Weight - Scale: 62 7 kg (138 lb 3 7 oz)  First Weight: Weight - Scale: 65 1 kg (143 lb 8 3 oz)    Lab Results:  I have personally reviewed pertinent labs      CBC:   Lab Results   Component Value Date    WBC 7 36 03/10/2023    HGB 8 3 (L) 03/10/2023    HCT 26 0 (L) 03/10/2023    MCV 91 03/10/2023     03/10/2023    MCH 28 9 03/10/2023    MCHC 31 9 03/10/2023    RDW 15 7 (H) 03/10/2023    MPV 11 1 03/10/2023     CMP:   Lab Results   Component Value Date    K 4 5 03/10/2023     03/10/2023    CO2 24 03/10/2023    BUN 72 (H) 03/10/2023    CREATININE 2 49 (H) 03/10/2023    CALCIUM 10 3 (H) 03/10/2023    EGFR 27 03/10/2023     Phosphorus: No results found for: PHOS  Magnesium: No results found for: MG  Urinalysis: No results found for: Snehal Marguerite, SPECGRAV, PHUR, LEUKOCYTESUR, NITRITE, PROTEINUA, GLUCOSEU, KETONESU, BILIRUBINUR, BLOODU  Ionized Calcium: No results found for: CAION  Coagulation: No results found for: PT, INR, APTT  Troponin: No results found for: TROPONINI  ABG: No results found for: PHART, TQP6NOM, PO2ART, NTV2VTT, T3AURRDE, BEART, SOURCE    Results from last 7 days   Lab Units 03/10/23  0530 03/09/23  0441 03/08/23  0539 03/05/23  0550 03/04/23  0701   POTASSIUM mmol/L 4 5 4 3 4 2   < > 4 0   CHLORIDE mmol/L 105 104 101   < > 103   CO2 mmol/L 24 27 29   < > 26   BUN mg/dL 72* 73* 65*   < > 47*   CREATININE mg/dL 2 49* 2 86* 3 03*   < > 3 16*   CALCIUM mg/dL 10 3* 10 8* 10 8*   < > 10 4*   ALK PHOS U/L  --   --   --   --  73   ALT U/L  --   --   --   --  3*   AST U/L  --   --   --   --  7*    < > = values in this interval not displayed  Radiology review:  No results found  Jihan Conrad DO      This consultation note was produced in part using a dictation device which may document imprecise wording from author's original intent

## 2023-03-10 NOTE — INCIDENTAL FINDINGS
The following findings require follow up:  Radiographic finding   Finding: CT chest abdomen pelvis wo contrast: Consolidation seen at the left lung base in the region of the previously noted parenchymal abnormality may be due to scarring or due to nonresolved consolidation, follow-up chest CT at 3 months suggested, No intra-abdominal fluid collection seen, No bowel obstruction, No obstructing calculus, Mild prominence of the left pelvicalyceal system and mild dilation of the left ureter without any obstructing calculus, The study was marked in EPIC for significant notification     Follow up required: CT chest   Follow up should be done within 3 month(s)    Please notify the following clinician to assist with the follow up:  Quita Ceballos, [address] [facility] - Internal Medicine     [phone]

## 2023-03-10 NOTE — PLAN OF CARE
Problem: Nutrition/Hydration-ADULT  Goal: Nutrient/Hydration intake appropriate for improving, restoring or maintaining nutritional needs  Description: Monitor and assess patient's nutrition/hydration status for malnutrition  Collaborate with interdisciplinary team and initiate plan and interventions as ordered  Monitor patient's weight and dietary intake as ordered or per policy  Utilize nutrition screening tool and intervene as necessary  Determine patient's food preferences and provide high-protein, high-caloric foods as appropriate       INTERVENTIONS:  - Monitor oral intake, urinary output, labs, and treatment plans  - Assess nutrition and hydration status and recommend course of action  - Evaluate amount of meals eaten  - Assist patient with eating if necessary   - Allow adequate time for meals  - Recommend/ encourage appropriate diets, oral nutritional supplements, and vitamin/mineral supplements  - Order, calculate, and assess calorie counts as needed  - Recommend, monitor, and adjust tube feedings and TPN/PPN based on assessed needs  - Assess need for intravenous fluids  - Provide specific nutrition/hydration education as appropriate  - Include patient/family/caregiver in decisions related to nutrition  Outcome: Progressing     Problem: MOBILITY - ADULT  Goal: Maintain or return to baseline ADL function  Description: INTERVENTIONS:  -  Assess patient's ability to carry out ADLs; assess patient's baseline for ADL function and identify physical deficits which impact ability to perform ADLs (bathing, care of mouth/teeth, toileting, grooming, dressing, etc )  - Assess/evaluate cause of self-care deficits   - Assess range of motion  - Assess patient's mobility; develop plan if impaired  - Assess patient's need for assistive devices and provide as appropriate  - Encourage maximum independence but intervene and supervise when necessary  - Involve family in performance of ADLs  - Assess for home care needs following discharge   - Consider OT consult to assist with ADL evaluation and planning for discharge  - Provide patient education as appropriate  Outcome: Progressing  Goal: Maintains/Returns to pre admission functional level  Description: INTERVENTIONS:  - Perform BMAT or MOVE assessment daily    - Set and communicate daily mobility goal to care team and patient/family/caregiver  - Collaborate with rehabilitation services on mobility goals if consulted  - Perform Range of Motion 3 times a day  - Reposition patient every 3 hours    - Dangle patient 3 times a day  - Stand patient 3 times a day  - Ambulate patient 3 times a day  - Out of bed to chair 3 times a day   - Out of bed for meals 3 times a day  - Out of bed for toileting  - Record patient progress and toleration of activity level   Outcome: Progressing     Problem: PAIN - ADULT  Goal: Verbalizes/displays adequate comfort level or baseline comfort level  Description: Interventions:  - Encourage patient to monitor pain and request assistance  - Assess pain using appropriate pain scale0-10  - Administer analgesics based on type and severity of pain and evaluate response  - Implement non-pharmacological measures as appropriate and evaluate response  - Consider cultural and social influences on pain and pain management  - Notify physician/advanced practitioner if interventions unsuccessful or patient reports new pain  Outcome: Progressing     Problem: INFECTION - ADULT  Goal: Absence or prevention of progression during hospitalization  Description: INTERVENTIONS:  - Assess and monitor for signs and symptoms of infection  - Monitor lab/diagnostic results  - Monitor all insertion sites, i e  indwelling lines, tubes, and drains  - Monitor endotracheal if appropriate and nasal secretions for changes in amount and color  - [place] appropriate cooling/warming therapies per order  - Administer medications as ordered  - Instruct and encourage patient and family to use good hand hygiene technique  - Identify and instruct in appropriate isolation precautions for identified infection/condition  Outcome: Progressing  Goal: Absence of fever/infection during neutropenic period  Description: INTERVENTIONS:  - Monitor WBC    Outcome: Progressing     Problem: SAFETY ADULT  Goal: Maintain or return to baseline ADL function  Description: INTERVENTIONS:  -  Assess patient's ability to carry out ADLs; assess patient's baseline for ADL function and identify physical deficits which impact ability to perform ADLs (bathing, care of mouth/teeth, toileting, grooming, dressing, etc )  - Assess/evaluate cause of self-care deficits   - Assess range of motion  - Assess patient's mobility; develop plan if impaired  - Assess patient's need for assistive devices and provide as appropriate  - Encourage maximum independence but intervene and supervise when necessary  - Involve family in performance of ADLs  - Assess for home care needs following discharge   - Consider OT consult to assist with ADL evaluation and planning for discharge  - Provide patient education as appropriate  Outcome: Progressing  Goal: Maintains/Returns to pre admission functional level  Description: INTERVENTIONS:  - Perform BMAT or MOVE assessment daily    - Set and communicate daily mobility goal to care team and patient/family/caregiver  - Collaborate with rehabilitation services on mobility goals if consulted  - Perform Range of Motion 3 times a day  - Reposition patient every 3 hours    - Dangle patient 3 times a day  - Stand patient 3 times a day  - Ambulate patient 3 times a day  - Out of bed to chair 3 times a day   - Out of bed for meals 3 times a day  - Out of bed for toileting  - Record patient progress and toleration of activity level   Outcome: Progressing  Goal: Patient will remain free of falls  Description: INTERVENTIONS:  - Educate patient/family on patient safety including physical limitations  - Instruct patient to call for assistance with activity   - Consult OT/PT to assist with strengthening/mobility   - Keep Call bell within reach  - Keep bed low and locked with side rails adjusted as appropriate  - Keep care items and personal belongings within reach  - Initiate and maintain comfort rounds  - Make Fall Risk Sign visible to staff  - Offer Toileting every 2 Hours, in advance of need  - Initiate/Maintain bed and chair alarm  - Obtain necessary fall risk management equipment: walker  - Apply yellow socks and bracelet for high fall risk patients  - Consider moving patient to room near nurses station  Outcome: Progressing     Problem: DISCHARGE PLANNING  Goal: Discharge to home or other facility with appropriate resources  Description: INTERVENTIONS:  - Identify barriers to discharge w/patient and caregiver  - Arrange for needed discharge resources and transportation as appropriate  - Identify discharge learning needs (meds, wound care, etc )  - Arrange for interpretive services to assist at discharge as needed  - Refer to Case Management Department for coordinating discharge planning if the patient needs post-hospital services based on physician/advanced practitioner order or complex needs related to functional status, cognitive ability, or social support system  Outcome: Progressing     Problem: Knowledge Deficit  Goal: Patient/family/caregiver demonstrates understanding of disease process, treatment plan, medications, and discharge instructions  Description: Complete learning assessment and assess knowledge base    Interventions:  - Provide teaching at level of understanding  - Provide teaching via preferred learning methods  Outcome: Progressing     Problem: Prexisting or High Potential for Compromised Skin Integrity  Goal: Skin integrity is maintained or improved  Description: INTERVENTIONS:  - Identify patients at risk for skin breakdown  - Assess and monitor skin integrity  - Assess and monitor nutrition and hydration status  - Monitor labs   - Assess for incontinence   - Turn and reposition patient  - Assist with mobility/ambulation  - Relieve pressure over bony prominences  - Avoid friction and shearing  - Provide appropriate hygiene as needed including keeping skin clean and dry  - Evaluate need for skin moisturizer/barrier cream  - Collaborate with interdisciplinary team   - Patient/family teaching  - Consider wound care consult   Outcome: Progressing

## 2023-03-10 NOTE — ASSESSMENT & PLAN NOTE
· POA from home with wife with acute onset hallucinations, confusion  Wife was concerned he had an underlying infection as this is his normal response    · Chronic indwelling Ortiz catheter without evidence of pyuria  · CT brain no acute abnormality  · Left heel ulcer questionable source of infection  · Empiric antibiotics  · IV hydration- completed  · AAO x3  to direct questioning, does has have fluctuating mentation sounding like hallucinations with family, and during exam  This morning asked about the docs following him in the hallway-patient provided reorientation  · Discussed with his outpatient palliative medicine provider [facility] with chronic pain regimen and noted PDMP recent increase of buprenorphine monthly, last being in February, with outpatient notes starting February 15 stating some confusion Per family  ·  recommend Flexeril as needed, keep Lyrica 150 twice daily, decrease buprenorphine to 600 mcg twice daily and monitor or keep buprenorphine at 900 mcg and lyrica 75 mg and monitor  · Unable to provide 600 mcg film for buprenorphine, nonformulary at this hospital patient provided current dosing  And unable to split film,  Discussed with pharmacist   Intermittent confusion unlikely from polypharmresolve during this hospitalization and Recommend addressing with outpatient prescribe after discharge polypharmacy- wife calling to schedule video follow-up  · BUN also rising, nephro consulted- d/c IVF appears euvolemic, likely effect from steroids

## 2023-03-10 NOTE — ASSESSMENT & PLAN NOTE
· Patient complaining of hand pain and foot thinks he is having a gout flare  · Uric acid level normal at 7 over the weekend  · With increased right knee pain  · orthopedic consult placed and had arthrocentesis of right knee this morning  · Cell count WBC 56561, + crystals with monosodium urate crystals  · Gram stain no polys or bacteria  · Likely gout flare  · Due to kidney function options are limited  · prednisone x5 days- having improvement in knee pain with prednisone

## 2023-03-10 NOTE — PROGRESS NOTES
PCM notified that family had left the [address] (buprenorphine buccal film; 1 "Film" left in the box) at the bedside after she had signed it off and returned it to them  SALENA fox attempted to catch the family before leaving the hospital but was unsuccessful  SALENA Nam called Cherie Moore (Spouse) to notify them that they had left the patients medication and to return to pick them up  Cherie Moore said that she did not want to return to  the medication  This PCM called pharmacy who stated that the medication can be held for 60 days in pharmacy at which point if it is not picked up it can be discarded  This PCM called Cherie Moore (Spouse) at [phone] and left a message stating that we would hold the medication for 60 days and if she wishes to pick it up she may  Medication placed in pharmacy Med bag with patient labels and taken to pharmacy by this PCM

## 2023-03-10 NOTE — ASSESSMENT & PLAN NOTE
Lab Results   Component Value Date    EGFR 27 03/10/2023    EGFR 23 03/09/2023    EGFR 21 03/08/2023    CREATININE 2 49 (H) 03/10/2023    CREATININE 2 86 (H) 03/09/2023    CREATININE 3 03 (H) 03/08/2023   · Presents with KELLY on CKD in the setting of sepsis  · Baseline 3 6-3 9 per outpatient Nephrology note  · Creatinine trending down toward baseline  · Continue chronic Ortiz catheter  · Renally dose medications, avoid hypotension, nephrotoxic medications  · Slightly increased from yesterday but remains within baseline  · BUN rising - gentle IVF completed- likely effect from steroid  · nephro consult- may benefit from 24hr Crcl in outpatient setting

## 2023-03-10 NOTE — PLAN OF CARE
Problem: PHYSICAL THERAPY ADULT  Goal: Performs mobility at highest level of function for planned discharge setting  See evaluation for individualized goals  Description: Treatment/Interventions: Functional transfer training, LE strengthening/ROM, Therapeutic exercise, Endurance training, Patient/family training, Equipment eval/education, Bed mobility, Compensatory technique education, Spoke to nursing, OT  Equipment Recommended:  (Pt owns W/C and scooter)       See flowsheet documentation for full assessment, interventions and recommendations  Outcome: Progressing  Note: Prognosis: Fair  Problem List: Decreased strength, Decreased endurance, Impaired balance, Decreased coordination, Decreased mobility, Impaired judgement, Decreased safety awareness, Decreased skin integrity, Pain, Orthopedic restrictions  Assessment: Pt seen for PT treatment session this date with interventions consisting of bed mobility tasks, transfer training, monitoring of vitals, gait training, and education provided as needed for safety and direction to improve functional mobility, safety awareness, and activity tolerance  Pt agreeable to PT treatment session upon arrival, pt found resting in bed  At end of session, pt left sitting OOB in recliner with BLE elevated with chair alarm activated and with all needs in reach  In comparison to previous session, pt with improvements in willingness to participate and ambulating short distance   Continue to recommend Home PT vs  STR  at time of d/c in order to maximize pt's functional independence and safety w/ mobility  Pt continues to be functioning below baseline level  PT will continue to see pt while here in order to address the deficits listed above and provide interventions consistent w/ POC in effort to achieve STGs  Barriers to Discharge: None     PT Discharge Recommendation:  (HHPT vs STR)    See flowsheet documentation for full assessment

## 2023-03-13 LAB
ATRIAL RATE: 91 BPM
P AXIS: 71 DEGREES
PR INTERVAL: 144 MS
QRS AXIS: -39 DEGREES
QRSD INTERVAL: 90 MS
QT INTERVAL: 350 MS
QTC INTERVAL: 430 MS
T WAVE AXIS: 77 DEGREES
VENTRICULAR RATE: 91 BPM

## 2023-03-13 NOTE — UTILIZATION REVIEW
----- Message from Umer Moise M.D. sent at 2/6/2020  8:15 PM PST -----  Please call patient and let patient know her A1c improved to 5.6 so good job with that and your GFR kidney improved as well and your vitamin B12 is mildly high but this is water-soluble vitamin and if you get too much you will urinate it out so just keep the same dose of vitamin B12 as it is within acceptable ranges, you can do additional 2000 units of vitamin D as we like vitamin D levels to be above 50 for osteoporosis prevention, and your calcium was mildly elevated and I would recheck this in the future in 6 months and you could follow-up with your new provider February 27 to establish care and to go over your lab work in more details in person as I will be leaving [facility] in February 27 will be my last in clinic, thank you.   NOTIFICATION OF ADMISSION DISCHARGE   This is a Notification of Discharge from [address]  Please be advised that this patient has been discharge from our facility  Below you will find the admission and discharge date and time including the patient’s disposition  UTILIZATION REVIEW CONTACT:  [address] [place]  Utilization   Network Utilization Review Department  Phone: [phone] x carefully listen to the prompts  All voicemails are confidential   Email: [email or web address]     ADMISSION INFORMATION  PRESENTATION DATE: 3/2/2023  2:23 PM  OBERVATION ADMISSION DATE:   INPATIENT ADMISSION DATE: 3/2/23  7:56 PM   DISCHARGE DATE: 3/10/2023  2:19 PM   DISPOSITION:Home with Home Health Care    IMPORTANT INFORMATION:  Send all requests for admission clinical reviews, approved or denied determinations and any other requests to dedicated fax number below belonging to the campus where the patient is receiving treatment   List of dedicated fax numbers:  [address] DENIALS (Administrative/Medical Necessity) [phone]   [address] (Maternity/NICU/Pediatrics) [phone]   [facility] [phone]   [place] [address] [phone]   [address] [phone]   220 [facility] [phone] 90 [facility] [phone]   [address] [place] [address] 119 [phone]   [facility]  [phone]   [address] [facility] [phone]   [address] [place] [address] [address] [facility] [phone]

## 2023-03-16 ENCOUNTER — OFFICE VISIT (OUTPATIENT)
Dept: WOUND CARE | Facility: CLINIC | Age: 58
End: 2023-03-16

## 2023-03-16 VITALS
RESPIRATION RATE: 18 BRPM | SYSTOLIC BLOOD PRESSURE: 110 MMHG | DIASTOLIC BLOOD PRESSURE: 60 MMHG | HEART RATE: 72 BPM | TEMPERATURE: 95.9 F

## 2023-03-16 DIAGNOSIS — E11.9 DIABETES MELLITUS TYPE 2, INSULIN DEPENDENT (HCC): Primary | ICD-10-CM

## 2023-03-16 DIAGNOSIS — E11.621 DIABETIC ULCER OF LEFT HEEL ASSOCIATED WITH TYPE 2 DIABETES MELLITUS, WITH OTHER ULCER SEVERITY (HCC): ICD-10-CM

## 2023-03-16 DIAGNOSIS — L97.428 DIABETIC ULCER OF LEFT HEEL ASSOCIATED WITH TYPE 2 DIABETES MELLITUS, WITH OTHER ULCER SEVERITY (HCC): ICD-10-CM

## 2023-03-16 DIAGNOSIS — Z79.4 DIABETES MELLITUS TYPE 2, INSULIN DEPENDENT (HCC): Primary | ICD-10-CM

## 2023-03-16 DIAGNOSIS — I73.9 PAD (PERIPHERAL ARTERY DISEASE) (HCC): ICD-10-CM

## 2023-03-16 NOTE — PROGRESS NOTES
Patient ID: Valencia Duran  is a 62 y o  male Date of Birth 1965       Chief Complaint   Patient presents with   • New Patient Visit     Left heel wound        Allergies  Bupropion, Metformin, and Medical tape    Diagnosis:  1  Diabetes mellitus type 2, insulin dependent ([facility])  -     Wound cleansing and dressings; Future    2  PAD (peripheral artery disease) ([facility])  -     Wound cleansing and dressings; Future    3  Diabetic ulcer of left heel associated with type 2 diabetes mellitus, with other ulcer severity ([facility] )  -     Wound cleansing and dressings; Future        Diagnosis ICD-10-CM Associated Orders   1  Diabetes mellitus type 2, insulin dependent ([facility])  E11 9 Wound cleansing and dressings    Z79 4       2  PAD (peripheral artery disease) ([facility])  I73 9 Wound cleansing and dressings      3  Diabetic ulcer of left heel associated with type 2 diabetes mellitus, with other ulcer severity ([facility])  E11 621 Wound cleansing and dressings    L97 428              Assessment & Plan:  See wound care orders  - Left heel diabetic ulcer with partially lifted eschar - this was surgically debrided as below  The remaining eschar is dry and intact  No SOI noted  C/w betadine dsd until vascular surgery appt   - LEADs 12/27/22: Diffuse dz B/L throughout fem-pop w/o focal stenosis  RLE diffuse tibioperoneal dz w/ absence of flow to PTA, NC/153/129 within healing  LLE NC/130/100 within healing  Has appt 3/22/23 with vascular surgery  - Advised better blood sugar control  - Patient had not been wearing his prevalon boot nor offloading the heel  I again discussed this with great detail   - I discussed risk of amputation with patient as below  - F/u 1 week      Diabetic Foot Ulcer Risks  - Between 10-15% of diabetic foot ulcers do not heal [v]  - Of diabetic foot ulcers that do not heal, 25% will require amputation  [v]    iv Rodney Lozano V , Colby Hayes , and Kesha MUIR , Foot Ulcers in the Diabetic Patient, Prevention and Treatment  Vascular Health Risk Management  2007 Feb; 3(1): 65-76  v BLACK Kennedy , MINAL Jara , ISADORA Morrison , JAN Petersen , BLACK Barton T , Risk factors for lower extremity amputation in patients with diabetic foot ulcers: a hospital-based case-control study  Diabetic Foot & Ankle, 2015  6:10 3402      Subjective:   Mahogany Valles presents to wound care today concerning left heel ulceration  This has been present for many months  Recent MRI negative for OM  He feels well  Has vasc appt this coming week  Is having back surgery Monday as well        The following portions of the patient's history were reviewed and updated as appropriate:   Patient Active Problem List   Diagnosis   • Hyperkalemia   • Diabetes mellitus type 2, insulin dependent ([facility] [address] )   • Gout   • Essential hypertension   • History of CVA (cerebrovascular accident)   • Osteomyelitis of vertebra of lumbar region [facility])   • Severe protein-calorie malnutrition (HCC)   • Iron deficiency anemia secondary to inadequate dietary iron intake   • Hip pain, acute, left   • Retention of urine   • Hypercalcemia   • Low back pain   • Ambulatory dysfunction   • Stage 3a chronic kidney disease (HCC)   • Chronic ulcer of left heel (HCC)   • Chronic anemia   • RSV infection   • Moderate protein-calorie malnutrition (HCC)   • Chronic back pain   • PAD (peripheral artery disease) ([facility])   • Acute metabolic encephalopathy   • Sepsis (HCC)   • Diabetic foot ulcer ([address] )   • CKD (chronic kidney disease)   • Right knee pain     Past Medical History:   Diagnosis Date   • Chronic kidney disease    • Diabetes mellitus ([address] )    • Gout    • Hypertension    • Renal disorder    • Retroperitoneal abscess ([address] )    • Stroke ([facility] [address] )    • UTI (urinary tract infection)    • Vertebral osteomyelitis ([facility] [address] )      Past Surgical History:   Procedure Laterality Date   • BACK SURGERY     • ORCHIECTOMY       Social History     Socioeconomic History   • Marital status: /Civil Union     Spouse name: Not on file   • Number of children: Not on file   • Years of education: Not on file   • Highest education level: Not on file   Occupational History   • Not on file   Tobacco Use   • Smoking status: Every Day     Packs/day: 0 25     Types: Cigarettes   • Smokeless tobacco: Never   Vaping Use   • Vaping Use: Never used   Substance and Sexual Activity   • Alcohol use: Not Currently   • Drug use: Yes     Types: Marijuana   • Sexual activity: Not on file   Other Topics Concern   • Not on file   Social History Narrative   • Not on file     Social Determinants of Health     Financial Resource Strain: Not on file   Food Insecurity: No Food Insecurity   • Worried About Running Out of Food in the Last Year: Never true   • Ran Out of Food in the Last Year: Never true   Transportation Needs: No Transportation Needs   • Lack of Transportation (Medical): No   • Lack of Transportation (Non-Medical):  No   Physical Activity: Not on file   Stress: Not on file   Social Connections: Not on file   Intimate Partner Violence: Not on file   Housing Stability: High Risk   • Unable to Pay for Housing in the Last Year: No   • Number of Places Lived in the Last Year: 5   • Unstable Housing in the Last Year: No        Current Outpatient Medications:   •  acetaminophen (TYLENOL) 325 mg tablet, Take 650 mg by mouth every 4 (four) hours as needed for mild pain, Disp: , Rfl:   •  allopurinol (ZYLOPRIM) 100 mg tablet, Take 1 tablet (100 mg total) by mouth daily, Disp: 30 tablet, Rfl: 0  •  amLODIPine (NORVASC) 10 mg tablet, Take 1 tablet by mouth daily, Disp: , Rfl:   •  atorvastatin (LIPITOR) 40 mg tablet, Take 40 mg by mouth, Disp: , Rfl:   •  Belbuca 450 MCG FILM, ADMINISTER 1 FILM TO INSIDE OF CHEEK IN THE MORNING AND 1 FILM BEFORE BEDTIME , Disp: , Rfl:   •  Buprenorphine HCl (Belbuca) 300 MCG FILM, Apply 300 mcg to cheek 2 (two) times a day, Disp: , Rfl:   •  carvedilol (COREG) 6 25 mg tablet, Take 6 25 mg by mouth 2 (two) times a day with meals, Disp: , Rfl:   •  cyclobenzaprine (FLEXERIL) 5 mg tablet, Take 1 tablet (5 mg total) by mouth 3 (three) times a day as needed for muscle spasms (AS NEEDED FOR MUSCLE SPASM) for up to 7 days, Disp: 20 tablet, Rfl: 0  •  docusate sodium (COLACE) 100 mg capsule, TAKE BY MOUTH 1 CAPSULE IN THE MORNING AND 1 CAPSULE BEFORE BEDTIME , Disp: , Rfl:   •  ferrous sulfate 325 (65 Fe) mg tablet, Take 1 tablet (325 mg total) by mouth daily with breakfast Do not start before October 25, 2022 , Disp: , Rfl: 0  •  insulin glargine (LANTUS) 100 units/mL subcutaneous injection, Inject 5 Units under the skin daily at bedtime, Disp: 10 mL, Rfl: 0  •  insulin lispro (HumaLOG) 100 units/mL injection, Inject 2-12 Units under the skin 3 (three) times a day before meals, Disp: , Rfl: 0  •  melatonin 3 mg, Take 1 tablet (3 mg total) by mouth daily at bedtime for 14 days, Disp: 14 tablet, Rfl: 0  •  oxyCODONE (OXY-IR) 5 MG capsule, Take 5 mg by mouth every 4 (four) hours as needed for moderate pain or severe pain, Disp: , Rfl:   •  polyethylene glycol (MIRALAX) 17 g packet, Take 17 g by mouth 2 (two) times a day, Disp: , Rfl:   •  pregabalin (LYRICA) 75 mg capsule, Take 1 capsule (75 mg total) by mouth 2 (two) times a day for 10 days, Disp: 20 capsule, Rfl: 0  •  senna-docusate sodium (SENOKOT S) 8 6-50 mg per tablet, Take 1 tablet by mouth 2 (two) times a day, Disp: , Rfl:   •  sevelamer carbonate (RENVELA) 800 mg tablet, Take 800 mg by mouth in the morning, Disp: , Rfl:   •  sodium bicarbonate 650 mg tablet, Take 1 tablet (650 mg total) by mouth every other day for 15 days, Disp: 7 tablet, Rfl: 0  •  tamsulosin (FLOMAX) 0 4 mg, Take by mouth, Disp: , Rfl:   •  thiamine 100 MG tablet, Take 1 tablet (100 mg total) by mouth daily, Disp: 30 tablet, Rfl: 0  •  venlafaxine (EFFEXOR-XR) 37 5 mg 24 hr capsule, Take 37 5 mg by mouth 3 (three) times a day, Disp: , Rfl:   Family History   Problem Relation Age of Onset   • Hypertension Father Review of Systems   Constitutional: Negative for activity change, chills and fever  HENT: Negative  Respiratory: Negative for cough, chest tightness and shortness of breath  Cardiovascular: Negative for chest pain and leg swelling  Endocrine: Negative  Genitourinary: Negative  Musculoskeletal: Positive for back pain and gait problem  Skin: Positive for wound (Left heel)  Neurological: Negative for numbness  Psychiatric/Behavioral: Negative  Negative for agitation and behavioral problems  Objective:  /60   Pulse 72   Temp (!) 95 9 °F (35 5 °C)   Resp 18     Physical Exam  Constitutional:       General: He is not in acute distress  Appearance: Normal appearance  He is not ill-appearing  Cardiovascular:      Comments: Bilateral DP and PT pulses diminished/nonpalpable  Pedal hair is absent  Legs to toes warm to cool  Pulmonary:      Effort: No respiratory distress  Musculoskeletal:         General: No tenderness or deformity  Normal range of motion  Comments: B/L LE are thin and with muscle atrophy   Skin:     Capillary Refill: Capillary refill takes less than 2 seconds  Findings: Lesion (Left posterior heel ulceration with mixed fibrogranular base (30%) with remaining dry stable eschar (70%)  No soi noted  No deep probe to bone noted) present  Comments: B/L LE skin is atrophic - thin, dry and shiny in appearance  Neurological:      General: No focal deficit present  Mental Status: He is alert and oriented to person, place, and time  Comments: Gross sensation to feet intact   PN   Psychiatric:         Mood and Affect: Mood normal          Behavior: Behavior normal              Wound 10/20/22 Diabetic Ulcer Heel Left (Active)   Wound Image   03/16/23 1355   Wound Description Eschar 03/16/23 1358   [place]-wound Assessment Dry;Scaly;Erythema 03/16/23 1358   Wound Length (cm) 3 3 cm 03/16/23 1358   Wound Width (cm) 3 4 cm 03/16/23 1358   Wound Depth (cm) 0 4 cm 03/16/23 1358   Wound Surface Area (cm^2) 11 22 cm^2 03/16/23 1358   Wound Volume (cm^3) 4 488 cm^3 03/16/23 1358   Calculated Wound Volume (cm^3) 4 49 cm^3 03/16/23 1358   Drainage Amount Scant 03/16/23 1358   Drainage Description Tan 03/16/23 1358   Non-staged Wound Description Full thickness 03/16/23 1358   Treatments Cleansed 03/16/23 1358         Debridement   Wound 10/20/22 Diabetic Ulcer Heel Left    Universal Protocol:  Consent: Verbal consent obtained  Risks and benefits: risks, benefits and alternatives were discussed  Consent given by: patient  Patient understanding: patient states understanding of the procedure being performed  Patient consent: the patient's understanding of the procedure matches consent given  Patient identity confirmed: verbally with patient      Performed by: physician  Debridement type: selective    Pre-debridement measurements  Length (cm): 3 3  Width (cm): 3 4  Depth (cm): 0 4  Surface Area (cm^2): 11 22  Volume (cm^3): 4 49    Post-debridement measurements  Length (cm): 3 3  Width (cm): 3 4  Depth (cm): 0 4  Percent debrided: 30%  Surface Area (cm^2): 11 22  Area debrided (cm^2): 3 37  Volume (cm^3): 4 49  Devitalized tissue debrided: eschar  Instrument(s) utilized: blade and forceps  Bleeding: none  Hemostasis obtained with: not applicable  Procedural pain (0-10): insensate  Post-procedural pain: insensate   Response to treatment: procedure was tolerated well                 Wound Instructions:  Orders Placed This Encounter   Procedures   • Wound cleansing and dressings     Wash your hands with soap and water  Remove old dressing, discard into plastic bag and place in trash  Cleanse the wound with NSS prior to applying a clean dressing  Do not use tissue or cotton balls  Do not scrub the wound  Pat dry using gauze  Shower no   Apply betadine to the left heel wound  Cover with dry dressing  Secure with emmanuel  Change dressing daily        Control blood sugar, < 150 is goal     Offload heel with podus boots  Return in 1 week if unable to make appointment, please call wound center  Standing Status:   Future     Standing Expiration Date:   3/16/2024         Arthor Sever, DPM    Portions of the record may have been created with voice recognition software  Occasional wrong word or "sound a like" substitutions may have occurred due to the inherent limitations of voice recognition software  Read the chart carefully and recognize, using context, where substitutions have occurred

## 2023-03-16 NOTE — ASSESSMENT & PLAN NOTE
· Patient complaining of hand pain and foot thinks he is having a gout flare  · Uric acid level normal at 7-continue with allopurinol, no colchicine needed (4) no impairment

## 2023-03-16 NOTE — PATIENT INSTRUCTIONS
Orders Placed This Encounter   Procedures    Wound cleansing and dressings     Wash your hands with soap and water  Remove old dressing, discard into plastic bag and place in trash  Cleanse the wound with NSS prior to applying a clean dressing  Do not use tissue or cotton balls  Do not scrub the wound  Pat dry using gauze  Shower no   Apply betadine to the left heel wound  Cover with dry dressing  Secure with emmanuel  Change dressing daily  Control blood sugar, < 150 is goal     Offload heel with podus boots  Return in 1 week if unable to make appointment, please call wound center       Standing Status:   Future     Standing Expiration Date:   3/16/2024

## 2023-03-30 ENCOUNTER — OFFICE VISIT (OUTPATIENT)
Dept: WOUND CARE | Facility: CLINIC | Age: 58
End: 2023-03-30

## 2023-03-30 VITALS
RESPIRATION RATE: 20 BRPM | HEART RATE: 76 BPM | TEMPERATURE: 98.7 F | DIASTOLIC BLOOD PRESSURE: 76 MMHG | SYSTOLIC BLOOD PRESSURE: 122 MMHG

## 2023-03-30 DIAGNOSIS — E11.9 DIABETES MELLITUS TYPE 2, INSULIN DEPENDENT (HCC): ICD-10-CM

## 2023-03-30 DIAGNOSIS — I73.9 PAD (PERIPHERAL ARTERY DISEASE) (HCC): ICD-10-CM

## 2023-03-30 DIAGNOSIS — Z79.4 DIABETES MELLITUS TYPE 2, INSULIN DEPENDENT (HCC): ICD-10-CM

## 2023-03-30 DIAGNOSIS — E11.621 DIABETIC ULCER OF LEFT HEEL ASSOCIATED WITH TYPE 2 DIABETES MELLITUS, WITH OTHER ULCER SEVERITY (HCC): Primary | ICD-10-CM

## 2023-03-30 DIAGNOSIS — L97.428 DIABETIC ULCER OF LEFT HEEL ASSOCIATED WITH TYPE 2 DIABETES MELLITUS, WITH OTHER ULCER SEVERITY (HCC): Primary | ICD-10-CM

## 2023-03-30 RX ORDER — LIDOCAINE 40 MG/G
CREAM TOPICAL ONCE
Status: COMPLETED | OUTPATIENT
Start: 2023-03-30 | End: 2023-03-30

## 2023-03-30 RX ORDER — TIZANIDINE 4 MG/1
TABLET ORAL
COMMUNITY
Start: 2023-03-24

## 2023-03-30 RX ADMIN — LIDOCAINE: 40 CREAM TOPICAL at 14:19

## 2023-03-30 NOTE — PROGRESS NOTES
Patient ID: Bertin Calle is a 62 y o  male Date of Birth 1965       Chief Complaint   Patient presents with   • Follow Up Wound Care Visit     Left heel       Allergies:  Bupropion, Metformin, and Medical tape    Diagnosis:  1  Diabetic ulcer of left heel associated with type 2 diabetes mellitus, with other ulcer severity ([facility])  -     lidocaine (LMX) 4 % cream  -     XR heel / calcaneus 2+ vw left; Future; Expected date: 03/30/2023  -     Wound cleansing and dressings; Future    2  Diabetes mellitus type 2, insulin dependent ([facility])  -     lidocaine (LMX) 4 % cream  -     Wound cleansing and dressings; Future    3  PAD (peripheral artery disease) ([facility])  -     lidocaine (LMX) 4 % cream  -     Wound cleansing and dressings; Future       Diagnosis ICD-10-CM Associated Orders   1  Diabetic ulcer of left heel associated with type 2 diabetes mellitus, with other ulcer severity ([facility])  E11 621 lidocaine (LMX) 4 % cream    L97 428 XR heel / calcaneus 2+ vw left     Wound cleansing and dressings      2  Diabetes mellitus type 2, insulin dependent ([facility])  E11 9 lidocaine (LMX) 4 % cream    Z79 4 Wound cleansing and dressings      3  PAD (peripheral artery disease) ([facility])  I73 9 lidocaine (LMX) 4 % cream     Wound cleansing and dressings           Assessment & Plan:  See wound orders  - Left heel diabetic ulcer appears baseline  No SOI noted  C/w betadine dsd until vascular surgery appt   - LEADs 12/27/22: Diffuse dz B/L throughout fem-pop w/o focal stenosis  RLE diffuse tibioperoneal dz w/ absence of flow to PTA, NC/153/129 within healing  LLE NC/130/100 within healing  Has appt 3/22/23 with vascular surgery  - Advised better blood sugar control and better offloading  - Patient has still not been wearing his prevalon boot nor offloading the heel on a regular basis   I again discussed this with great detail   - I discussed risk of amputation with patient as well  - Left heel XR ordered  - F/u 2 weeks     Subjective: Pina Wells presents to wound care today concerning f/u left heel ulceration  Been doing betadine dsd daily  Not been wearing his prevalon boot in bet  Blood sugars are routinely over 300         The following portions of the patient's history were reviewed and updated as appropriate:   Patient Active Problem List   Diagnosis   • Hyperkalemia   • Diabetes mellitus type 2, insulin dependent ([facility] )   • Gout   • Essential hypertension   • History of CVA (cerebrovascular accident)   • Osteomyelitis of vertebra of lumbar region [facility])   • Severe protein-calorie malnutrition (HCC)   • Iron deficiency anemia secondary to inadequate dietary iron intake   • Hip pain, acute, left   • Retention of urine   • Hypercalcemia   • Low back pain   • Ambulatory dysfunction   • Stage 3a chronic kidney disease (HCC)   • Chronic ulcer of left heel (HCC)   • Chronic anemia   • RSV infection   • Moderate protein-calorie malnutrition (HCC)   • Chronic back pain   • PAD (peripheral artery disease) (HCC)   • Acute metabolic encephalopathy   • Sepsis (HCC)   • Diabetic foot ulcer ([facility])   • CKD (chronic kidney disease)   • Right knee pain     Past Medical History:   Diagnosis Date   • Chronic kidney disease    • Diabetes mellitus ([facility] )    • Gout    • Hypertension    • Renal disorder    • Retroperitoneal abscess ([facility] )    • Stroke ([place] [address] )    • UTI (urinary tract infection)    • Vertebral osteomyelitis ([facility])      Past Surgical History:   Procedure Laterality Date   • BACK SURGERY     • ORCHIECTOMY       Social History     Socioeconomic History   • Marital status: /Civil Union     Spouse name: Not on file   • Number of children: Not on file   • Years of education: Not on file   • Highest education level: Not on file   Occupational History   • Not on file   Tobacco Use   • Smoking status: Every Day     Packs/day: 0 25     Types: Cigarettes   • Smokeless tobacco: Never   Vaping Use   • Vaping Use: Never used   Substance and Sexual Activity   • Alcohol use: Not Currently   • Drug use: Yes     Types: Marijuana   • Sexual activity: Not on file   Other Topics Concern   • Not on file   Social History Narrative   • Not on file     Social Determinants of Health     Financial Resource Strain: Not on file   Food Insecurity: No Food Insecurity   • Worried About Running Out of Food in the Last Year: Never true   • Ran Out of Food in the Last Year: Never true   Transportation Needs: No Transportation Needs   • Lack of Transportation (Medical): No   • Lack of Transportation (Non-Medical):  No   Physical Activity: Not on file   Stress: Not on file   Social Connections: Not on file   Intimate Partner Violence: Not on file   Housing Stability: High Risk   • Unable to Pay for Housing in the Last Year: No   • Number of Places Lived in the Last Year: 5   • Unstable Housing in the Last Year: No        Current Outpatient Medications:   •  acetaminophen (TYLENOL) 325 mg tablet, Take 650 mg by mouth every 4 (four) hours as needed for mild pain, Disp: , Rfl:   •  allopurinol (ZYLOPRIM) 100 mg tablet, Take 1 tablet (100 mg total) by mouth daily, Disp: 30 tablet, Rfl: 0  •  amLODIPine (NORVASC) 10 mg tablet, Take 1 tablet by mouth daily, Disp: , Rfl:   •  atorvastatin (LIPITOR) 40 mg tablet, Take 40 mg by mouth, Disp: , Rfl:   •  Belbuca 450 MCG FILM, ADMINISTER 1 FILM TO INSIDE OF CHEEK IN THE MORNING AND 1 FILM BEFORE BEDTIME , Disp: , Rfl:   •  Buprenorphine HCl (Belbuca) 300 MCG FILM, Apply 300 mcg to cheek 2 (two) times a day, Disp: , Rfl:   •  carvedilol (COREG) 6 25 mg tablet, Take 6 25 mg by mouth 2 (two) times a day with meals, Disp: , Rfl:   •  cyclobenzaprine (FLEXERIL) 5 mg tablet, Take 1 tablet (5 mg total) by mouth 3 (three) times a day as needed for muscle spasms (AS NEEDED FOR MUSCLE SPASM) for up to 7 days, Disp: 20 tablet, Rfl: 0  •  docusate sodium (COLACE) 100 mg capsule, TAKE BY MOUTH 1 CAPSULE IN THE MORNING AND 1 CAPSULE BEFORE BEDTIME , Disp: , Rfl:   • ferrous sulfate 325 (65 Fe) mg tablet, Take 1 tablet (325 mg total) by mouth daily with breakfast Do not start before October 25, 2022 , Disp: , Rfl: 0  •  insulin glargine (LANTUS) 100 units/mL subcutaneous injection, Inject 5 Units under the skin daily at bedtime, Disp: 10 mL, Rfl: 0  •  insulin lispro (HumaLOG) 100 units/mL injection, Inject 2-12 Units under the skin 3 (three) times a day before meals, Disp: , Rfl: 0  •  oxyCODONE (OXY-IR) 5 MG capsule, Take 5 mg by mouth every 4 (four) hours as needed for moderate pain or severe pain, Disp: , Rfl:   •  polyethylene glycol (MIRALAX) 17 g packet, Take 17 g by mouth 2 (two) times a day, Disp: , Rfl:   •  pregabalin (LYRICA) 75 mg capsule, Take 1 capsule (75 mg total) by mouth 2 (two) times a day for 10 days, Disp: 20 capsule, Rfl: 0  •  senna-docusate sodium (SENOKOT S) 8 6-50 mg per tablet, Take 1 tablet by mouth 2 (two) times a day, Disp: , Rfl:   •  sevelamer carbonate (RENVELA) 800 mg tablet, Take 800 mg by mouth in the morning, Disp: , Rfl:   •  tamsulosin (FLOMAX) 0 4 mg, Take by mouth, Disp: , Rfl:   •  thiamine 100 MG tablet, Take 1 tablet (100 mg total) by mouth daily, Disp: 30 tablet, Rfl: 0  •  tiZANidine (ZANAFLEX) 4 mg tablet, TAKE 1 TABLET BY MOUTH EVERY 8 HOURS AS NEEDED FOR MUSCLE SPASM, Disp: , Rfl:   •  venlafaxine (EFFEXOR-XR) 37 5 mg 24 hr capsule, Take 37 5 mg by mouth 3 (three) times a day, Disp: , Rfl:   No current facility-administered medications for this visit  Family History   Problem Relation Age of Onset   • Hypertension Father       Review of Systems   Constitutional: Negative for activity change, chills and fever  HENT: Negative  Respiratory: Negative for cough, chest tightness and shortness of breath  Cardiovascular: Negative for chest pain and leg swelling  Endocrine: Negative  Genitourinary: Negative  Musculoskeletal: Positive for back pain and gait problem  Skin: Positive for wound (Left heel)     Neurological: Negative for numbness  Psychiatric/Behavioral: Negative  Negative for agitation and behavioral problems  Objective:  /76   Pulse 76   Temp 98 7 °F (37 1 °C)   Resp 20     Physical Exam  Constitutional:       General: He is not in acute distress  Appearance: Normal appearance  He is not ill-appearing  Cardiovascular:      Comments: Bilateral DP and PT pulses diminished/nonpalpable  Pedal hair is absent  Legs to toes warm to cool  Pulmonary:      Effort: No respiratory distress  Musculoskeletal:         General: No tenderness or deformity  Normal range of motion  Comments: B/L LE are thin and with muscle atrophy   Skin:     Capillary Refill: Capillary refill takes less than 2 seconds  Findings: Lesion (Left posterior heel ulceration with mixed fibrogranular base (10%) with remaining dry stable eschar (70%)  No soi noted  No deep probe to bone noted) present  Comments: B/L LE skin is atrophic - thin, dry and shiny in appearance  Neurological:      General: No focal deficit present  Mental Status: He is alert and oriented to person, place, and time  Comments: Gross sensation to feet intact   PN   Psychiatric:         Mood and Affect: Mood normal          Behavior: Behavior normal              Wound 10/20/22 Diabetic Ulcer Heel Left (Active)   Wound Image   03/30/23 1401   Wound Description Eschar;Granulation tissue 03/30/23 1406   [place]-wound Assessment Dry;Scaly;Erythema 03/30/23 1406   Wound Length (cm) 3 2 cm 03/30/23 1406   Wound Width (cm) 3 5 cm 03/30/23 1406   Wound Depth (cm) 0 3 cm 03/30/23 1406   Wound Surface Area (cm^2) 11 2 cm^2 03/30/23 1406   Wound Volume (cm^3) 3 36 cm^3 03/30/23 1406   Calculated Wound Volume (cm^3) 3 36 cm^3 03/30/23 1406   Change in Wound Size % 25 17 03/30/23 1406   Drainage Amount Small 03/30/23 1406   Drainage Description Tan;Serosanguineous 03/30/23 1406   Non-staged Wound Description Full thickness 03/30/23 1406   Treatments "Cleansed 03/30/23 1406                 Wound Instructions:  Orders Placed This Encounter   Procedures   • Wound cleansing and dressings     Wound cleansing and dressings       Wash your hands with soap and water  Remove old dressing, discard into plastic bag and place in trash  Cleanse the wound with NSS prior to applying a clean dressing  Do not use tissue or cotton balls  Do not scrub the wound  Pat dry using gauze  Shower no   Apply betadine to the left heel wound  Cover with dry dressing  Secure with emmanuel  Change dressing daily      Control blood sugar, < 150 is goal      Offload heel with podus boots  You really need to keep pressure off the wound even when in bed  The heel should not be touching any surfaces to decrease the pressure        Return in 1 week if unable to make appointment, please call     Standing Status:   Future     Standing Expiration Date:   3/30/2024   • XR heel / calcaneus 2+ vw left     Standing Status:   Future     Standing Expiration Date:   3/30/2027     Scheduling Instructions:      Bring along any outside films relating to this procedure  Ozzie Crystal DPM      Portions of the record may have been created with voice recognition software  Occasional wrong word or \"sound a like\" substitutions may have occurred due to the inherent limitations of voice recognition software  Read the chart carefully and recognize, using context, where substitutions have occurred      "

## 2023-03-30 NOTE — PATIENT INSTRUCTIONS
Orders Placed This Encounter   Procedures    Wound cleansing and dressings     Wound cleansing and dressings       Wash your hands with soap and water  Remove old dressing, discard into plastic bag and place in trash  Cleanse the wound with NSS prior to applying a clean dressing  Do not use tissue or cotton balls  Do not scrub the wound  Pat dry using gauze  Shower no   Apply betadine to the left heel wound  Cover with dry dressing  Secure with emmanuel  Change dressing daily  Control blood sugar, < 150 is goal      Offload heel with podus boots  You really need to keep pressure off the wound even when in bed  The heel should not be touching any surfaces to decrease the pressure  Return in 1 week if unable to make appointment, please call     Standing Status:   Future     Standing Expiration Date:   3/30/2024    XR heel / calcaneus 2+ vw left     Standing Status:   Future     Standing Expiration Date:   3/30/2027     Scheduling Instructions:      Bring along any outside films relating to this procedure

## 2023-04-27 ENCOUNTER — OFFICE VISIT (OUTPATIENT)
Dept: WOUND CARE | Facility: CLINIC | Age: 58
End: 2023-04-27

## 2023-04-27 VITALS
TEMPERATURE: 96.4 F | DIASTOLIC BLOOD PRESSURE: 60 MMHG | RESPIRATION RATE: 18 BRPM | SYSTOLIC BLOOD PRESSURE: 102 MMHG | HEART RATE: 66 BPM

## 2023-04-27 DIAGNOSIS — Z79.4 DIABETES MELLITUS TYPE 2, INSULIN DEPENDENT (HCC): Primary | ICD-10-CM

## 2023-04-27 DIAGNOSIS — L97.428 DIABETIC ULCER OF LEFT HEEL ASSOCIATED WITH TYPE 2 DIABETES MELLITUS, WITH OTHER ULCER SEVERITY (HCC): ICD-10-CM

## 2023-04-27 DIAGNOSIS — I73.9 PAD (PERIPHERAL ARTERY DISEASE) (HCC): ICD-10-CM

## 2023-04-27 DIAGNOSIS — E11.621 DIABETIC ULCER OF LEFT HEEL ASSOCIATED WITH TYPE 2 DIABETES MELLITUS, WITH OTHER ULCER SEVERITY (HCC): ICD-10-CM

## 2023-04-27 DIAGNOSIS — E11.9 DIABETES MELLITUS TYPE 2, INSULIN DEPENDENT (HCC): Primary | ICD-10-CM

## 2023-04-27 RX ORDER — LIDOCAINE HYDROCHLORIDE 40 MG/ML
5 SOLUTION TOPICAL ONCE
Status: COMPLETED | OUTPATIENT
Start: 2023-04-27 | End: 2023-04-27

## 2023-04-27 RX ADMIN — LIDOCAINE HYDROCHLORIDE 5 ML: 40 SOLUTION TOPICAL at 13:13

## 2023-04-27 NOTE — PROGRESS NOTES
Patient ID: Amado Willoughby is a 62 y o  male Date of Birth 1965       Chief Complaint   Patient presents with   • Follow Up Wound Care Visit     DM ulcers       Allergies:  Bupropion, Metformin, and Medical tape    Diagnosis:  1  Diabetes mellitus type 2, insulin dependent ([facility])  -     Wound cleansing and dressings; Future    2  Diabetic ulcer of left heel associated with type 2 diabetes mellitus, with other ulcer severity ([address] )  -     Wound cleansing and dressings; Future  -     collagenase (SANTYL) ointment; Apply topically daily    3  PAD (peripheral artery disease) ([facility])  -     lidocaine (XYLOCAINE) 4 % topical solution 5 mL  -     Wound cleansing and dressings; Future       Diagnosis ICD-10-CM Associated Orders   1  Diabetes mellitus type 2, insulin dependent ([facility])  E11 9 Wound cleansing and dressings    Z79 4       2  Diabetic ulcer of left heel associated with type 2 diabetes mellitus, with other ulcer severity ([facility])  E11 621 Wound cleansing and dressings    L97 428 collagenase (SANTYL) ointment      3  PAD (peripheral artery disease) ([facility])  I73 9 lidocaine (XYLOCAINE) 4 % topical solution 5 mL     Wound cleansing and dressings           Assessment & Plan:  See wound orders  dermagran dsd  Will look in to santyl  - Left heel diabetic ulceration appears with mixed fibrous-granular-necrotic base  No acute SOI noted today   - LEADs 12/27/22: Diffuse dz B/L throughout fem-pop w/o focal stenosis  RLE diffuse tibioperoneal dz w/ absence of flow to PTA, NC/153/129 within healing  LLE NC/130/100 within healing  Has appt 5/22/23 with vascular surgery  - Advised better blood sugar control and better offloading  - Strict offloading of heels while NWB   Heel unloading shoe recommended with WB     - Left heel XR from 4/12/23: Official read returned w/o acute osseous abnormality  - I rx'd santyl today  - F/u 1 week for recheck; call sooner if issues arise      Subjective:   Celestino Ortiz presents to wound care today concerning f/u left heel ulceration  Been using alginate QOD  Feels well  Has been trying of offload his heels more  Has heel unloading shoe with ambulation         The following portions of the patient's history were reviewed and updated as appropriate:   Patient Active Problem List   Diagnosis   • Hyperkalemia   • Diabetes mellitus type 2, insulin dependent ([facility] )   • Gout   • Essential hypertension   • History of CVA (cerebrovascular accident)   • Osteomyelitis of vertebra of lumbar region [facility])   • Severe protein-calorie malnutrition (HCC)   • Iron deficiency anemia secondary to inadequate dietary iron intake   • Hip pain, acute, left   • Retention of urine   • Hypercalcemia   • Low back pain   • Ambulatory dysfunction   • Stage 3a chronic kidney disease (HCC)   • Chronic ulcer of left heel ([facility])   • Chronic anemia   • RSV infection   • Moderate protein-calorie malnutrition (HCC)   • Chronic back pain   • PAD (peripheral artery disease) ([facility])   • Acute metabolic encephalopathy   • Sepsis ([facility])   • Diabetic foot ulcer ([facility])   • CKD (chronic kidney disease)   • Right knee pain     Past Medical History:   Diagnosis Date   • Chronic kidney disease    • Diabetes mellitus ([facility] )    • Gout    • Hypertension    • Renal disorder    • Retroperitoneal abscess ([place] [address] )    • Stroke ([place] [address] )    • UTI (urinary tract infection)    • Vertebral osteomyelitis ([facility])      Past Surgical History:   Procedure Laterality Date   • BACK SURGERY     • ORCHIECTOMY       Social History     Socioeconomic History   • Marital status: /Civil Union     Spouse name: Not on file   • Number of children: Not on file   • Years of education: Not on file   • Highest education level: Not on file   Occupational History   • Not on file   Tobacco Use   • Smoking status: Every Day     Packs/day: 0 25     Types: Cigarettes   • Smokeless tobacco: Never   Vaping Use   • Vaping Use: Never used   Substance and Sexual Activity   • Alcohol use: Not Currently   • Drug use: Yes     Types: Marijuana   • Sexual activity: Not on file   Other Topics Concern   • Not on file   Social History Narrative   • Not on file     Social Determinants of Health     Financial Resource Strain: Not on file   Food Insecurity: No Food Insecurity   • Worried About Running Out of Food in the Last Year: Never true   • Ran Out of Food in the Last Year: Never true   Transportation Needs: No Transportation Needs   • Lack of Transportation (Medical): No   • Lack of Transportation (Non-Medical):  No   Physical Activity: Not on file   Stress: Not on file   Social Connections: Not on file   Intimate Partner Violence: Not on file   Housing Stability: High Risk   • Unable to Pay for Housing in the Last Year: No   • Number of Places Lived in the Last Year: 5   • Unstable Housing in the Last Year: No        Current Outpatient Medications:   •  collagenase (SANTYL) ointment, Apply topically daily, Disp: 15 g, Rfl: 0  •  acetaminophen (TYLENOL) 325 mg tablet, Take 650 mg by mouth every 4 (four) hours as needed for mild pain, Disp: , Rfl:   •  allopurinol (ZYLOPRIM) 100 mg tablet, Take 1 tablet (100 mg total) by mouth daily, Disp: 30 tablet, Rfl: 0  •  amLODIPine (NORVASC) 10 mg tablet, Take 1 tablet by mouth daily, Disp: , Rfl:   •  atorvastatin (LIPITOR) 40 mg tablet, Take 40 mg by mouth, Disp: , Rfl:   •  Belbuca 450 MCG FILM, ADMINISTER 1 FILM TO INSIDE OF CHEEK IN THE MORNING AND 1 FILM BEFORE BEDTIME , Disp: , Rfl:   •  Buprenorphine HCl (Belbuca) 300 MCG FILM, Apply 300 mcg to cheek 2 (two) times a day, Disp: , Rfl:   •  carvedilol (COREG) 6 25 mg tablet, Take 6 25 mg by mouth 2 (two) times a day with meals, Disp: , Rfl:   •  cyclobenzaprine (FLEXERIL) 5 mg tablet, Take 1 tablet (5 mg total) by mouth 3 (three) times a day as needed for muscle spasms (AS NEEDED FOR MUSCLE SPASM) for up to 7 days, Disp: 20 tablet, Rfl: 0  •  docusate sodium (COLACE) 100 mg capsule, TAKE BY MOUTH 1 CAPSULE IN THE MORNING AND 1 CAPSULE BEFORE BEDTIME , Disp: , Rfl:   •  ferrous sulfate 325 (65 Fe) mg tablet, Take 1 tablet (325 mg total) by mouth daily with breakfast Do not start before October 25, 2022 , Disp: , Rfl: 0  •  insulin glargine (LANTUS) 100 units/mL subcutaneous injection, Inject 5 Units under the skin daily at bedtime, Disp: 10 mL, Rfl: 0  •  insulin lispro (HumaLOG) 100 units/mL injection, Inject 2-12 Units under the skin 3 (three) times a day before meals, Disp: , Rfl: 0  •  oxyCODONE (OXY-IR) 5 MG capsule, Take 5 mg by mouth every 4 (four) hours as needed for moderate pain or severe pain, Disp: , Rfl:   •  polyethylene glycol (MIRALAX) 17 g packet, Take 17 g by mouth 2 (two) times a day, Disp: , Rfl:   •  pregabalin (LYRICA) 75 mg capsule, Take 1 capsule (75 mg total) by mouth 2 (two) times a day for 10 days, Disp: 20 capsule, Rfl: 0  •  senna-docusate sodium (SENOKOT S) 8 6-50 mg per tablet, Take 1 tablet by mouth 2 (two) times a day, Disp: , Rfl:   •  sevelamer carbonate (RENVELA) 800 mg tablet, Take 800 mg by mouth in the morning, Disp: , Rfl:   •  tamsulosin (FLOMAX) 0 4 mg, Take by mouth, Disp: , Rfl:   •  thiamine 100 MG tablet, Take 1 tablet (100 mg total) by mouth daily, Disp: 30 tablet, Rfl: 0  •  tiZANidine (ZANAFLEX) 4 mg tablet, TAKE 1 TABLET BY MOUTH EVERY 8 HOURS AS NEEDED FOR MUSCLE SPASM, Disp: , Rfl:   •  venlafaxine (EFFEXOR-XR) 37 5 mg 24 hr capsule, Take 37 5 mg by mouth 3 (three) times a day, Disp: , Rfl:   No current facility-administered medications for this visit  Family History   Problem Relation Age of Onset   • Hypertension Father       Review of Systems   Constitutional: Negative for activity change, chills and fever  HENT: Negative  Respiratory: Negative for cough, chest tightness and shortness of breath  Cardiovascular: Negative for chest pain and leg swelling  Endocrine: Negative  Genitourinary: Negative  Musculoskeletal: Positive for back pain and gait problem     Skin: Positive for wound (Left heel)  Neurological: Negative for numbness  Psychiatric/Behavioral: Negative  Negative for agitation and behavioral problems  Objective:  /60   Pulse 66   Temp (!) 96 4 °F (35 8 °C)   Resp 18     Physical Exam  Constitutional:       General: He is not in acute distress  Appearance: Normal appearance  He is not ill-appearing  Cardiovascular:      Comments: Bilateral DP and PT pulses diminished/nonpalpable  Pedal hair is absent  Legs to toes warm to cool  Pulmonary:      Effort: No respiratory distress  Musculoskeletal:         General: No tenderness or deformity  Normal range of motion  Comments: B/L LE are thin and with muscle atrophy   Skin:     Capillary Refill: Capillary refill takes less than 2 seconds  Findings: Lesion (Left posterior heel ulceration with mixed fibrogranular, necrotic base  No soi noted  No deep probe to bone noted No purulence noted) present  Comments: B/L LE skin is atrophic - thin, dry and shiny in appearance  Neurological:      General: No focal deficit present  Mental Status: He is alert and oriented to person, place, and time  Comments: Gross sensation to feet intact   PN   Psychiatric:         Mood and Affect: Mood normal          Behavior: Behavior normal              Wound 10/20/22 Diabetic Ulcer Heel Left (Active)   Wound Image   04/27/23 1310   Wound Description Eschar;Granulation tissue;Slough;Brown 04/27/23 1312   [place]-wound Assessment Dry;Scaly;Erythema 04/27/23 1312   Wound Length (cm) 3 cm 04/27/23 1312   Wound Width (cm) 3 3 cm 04/27/23 1312   Wound Depth (cm) 0 3 cm 04/27/23 1312   Wound Surface Area (cm^2) 9 9 cm^2 04/27/23 1312   Wound Volume (cm^3) 2 97 cm^3 04/27/23 1312   Calculated Wound Volume (cm^3) 2 97 cm^3 04/27/23 1312   Change in Wound Size % 33 85 04/27/23 1312   Drainage Amount None 04/27/23 1312   Drainage Description Tan;Serosanguineous 04/13/23 1326   Non-staged Wound Description Full thickness 04/27/23 1312   Treatments Cleansed 04/27/23 1312         Debridement   Wound 10/20/22 Diabetic Ulcer Heel Left    Universal Protocol:  Consent: Verbal consent obtained  Risks and benefits: risks, benefits and alternatives were discussed  Consent given by: patient  Patient understanding: patient states understanding of the procedure being performed  Patient consent: the patient's understanding of the procedure matches consent given  Patient identity confirmed: verbally with patient      Performed by: physician  Debridement type: surgical  Level of debridement: subcutaneous tissue    Pre-debridement measurements  Length (cm): 3  Width (cm): 3 3  Depth (cm): 0 3  Surface Area (cm^2): 9 9  Volume (cm^3): 2 97    Post-debridement measurements  Length (cm): 3  Width (cm): 3 3  Depth (cm): 0 35  Percent debrided: 50%  Surface Area (cm^2): 9 9  Area debrided (cm^2): 4 95  Volume (cm^3): 3 47  Tissue and other material debrided: subcutaneous tissue  Devitalized tissue debrided: biofilm, fibrin and necrotic debris  Instrument(s) utilized: curette  Bleeding: small  Hemostasis obtained with: not applicable  Procedural pain (0-10): insensate  Post-procedural pain: insensate   Response to treatment: procedure was tolerated well                   Wound Instructions:  Orders Placed This Encounter   Procedures   • Wound cleansing and dressings     Wound cleansing and dressings       Wound cleansing and dressings                           Wound cleansing and dressings                                   Wash your hands with soap and water  Remove old dressing, discard into plastic bag and place in trash  Cleanse the wound with NSS prior to applying a clean dressing  Do not use tissue or cotton balls  Do not scrub the wound  Pat dry using gauze  Shower no   Apply  dermagran to the wound bed, Cover with gauze  Secure with emmanuel and paper tape    Change dressing every other day and as needed for excessive "drainage    Will order santyl (depending on price), apply nickel thick layer to wound bed  Change daily and PRN     Continue with your globopedi shoe when bearing weight     VNA to continue with dressing changes as ordered      Offload heel with podus boots  You really need to keep pressure off the wound even when in bed  The heel should not be touching any surfaces to decrease the pressure        Right foot: use diabetic gold bond lotion  Return in 1 week if unable to make appointment, please call     Standing Status:   Future     Standing Expiration Date:   4/27/2024   • Debridement     This order was created via procedure documentation         Steffanie Mccormick DPM      Portions of the record may have been created with voice recognition software  Occasional wrong word or \"sound a like\" substitutions may have occurred due to the inherent limitations of voice recognition software  Read the chart carefully and recognize, using context, where substitutions have occurred      "

## 2023-05-03 PROBLEM — A41.9 SEPSIS (HCC): Status: RESOLVED | Noted: 2023-03-03 | Resolved: 2023-05-03

## 2023-05-04 ENCOUNTER — OFFICE VISIT (OUTPATIENT)
Dept: WOUND CARE | Facility: CLINIC | Age: 58
End: 2023-05-04

## 2023-05-04 VITALS
TEMPERATURE: 97.2 F | HEART RATE: 72 BPM | DIASTOLIC BLOOD PRESSURE: 62 MMHG | RESPIRATION RATE: 18 BRPM | SYSTOLIC BLOOD PRESSURE: 116 MMHG

## 2023-05-04 DIAGNOSIS — L97.428 DIABETIC ULCER OF LEFT HEEL ASSOCIATED WITH TYPE 2 DIABETES MELLITUS, WITH OTHER ULCER SEVERITY (HCC): Primary | ICD-10-CM

## 2023-05-04 DIAGNOSIS — I73.9 PAD (PERIPHERAL ARTERY DISEASE) (HCC): ICD-10-CM

## 2023-05-04 DIAGNOSIS — E11.621 DIABETIC ULCER OF LEFT HEEL ASSOCIATED WITH TYPE 2 DIABETES MELLITUS, WITH OTHER ULCER SEVERITY (HCC): Primary | ICD-10-CM

## 2023-05-04 RX ORDER — LIDOCAINE 40 MG/G
CREAM TOPICAL ONCE
Status: COMPLETED | OUTPATIENT
Start: 2023-05-04 | End: 2023-05-04

## 2023-05-04 RX ADMIN — LIDOCAINE: 40 CREAM TOPICAL at 15:23

## 2023-05-04 NOTE — PATIENT INSTRUCTIONS
Orders Placed This Encounter   Procedures    Wound cleansing and dressings     Wound cleansing and dressings                                    Wash your hands with soap and water  Remove old dressing, discard into plastic bag and place in trash  Cleanse the wound with NSS prior to applying a clean dressing  Do not use tissue or cotton balls  Do not scrub the wound  Pat dry using gauze  Shower no   Apply santyl to the wound bed, Cover with gauze  Secure with emmanuel and paper tape  Change dressing everyday and as needed for excessive drainage       Continue with your globopedi shoe when bearing weight       VNA to continue with dressing changes as ordered       Offload heel with podus boots  You really need to keep pressure off the wound even when in bed  The heel should not be touching any surfaces to decrease the pressure           Right foot: use diabetic gold bond lotion    Next visit in 1 week at wound care center     Standing Status:   Future     Standing Expiration Date:   5/4/2024

## 2023-05-04 NOTE — PROGRESS NOTES
Patient ID: Isak Cardona is a 62 y o  male Date of Birth 1965       Chief Complaint   Patient presents with    Follow Up Wound Care Visit     Left heel       Allergies:  Bupropion, Metformin, and Medical tape    Diagnosis:  1  Diabetic ulcer of left heel associated with type 2 diabetes mellitus, with other ulcer severity ([facility] )    2  PAD (peripheral artery disease) ([facility] )       Diagnosis ICD-10-CM Associated Orders   1  Diabetic ulcer of left heel associated with type 2 diabetes mellitus, with other ulcer severity ([facility] )  E11 621     L97 428       2  PAD (peripheral artery disease) ([facility])  I73 9            Assessment & Plan:  See wound orders  Santyl dsd  - Left heel diabetic ulceration appears with mixed fibrous-granular base  No acute SOI noted today   - LEADs 12/27/22: Diffuse dz B/L throughout fem-pop w/o focal stenosis  RLE diffuse tibioperoneal dz w/ absence of flow to PTA, NC/153/129 within healing  LLE NC/130/100 within healing  Has appt 5/22/23 with vascular surgery  - Advised better blood sugar control and better offloading  - Strict offloading of heels while NWB  Heel unloading shoe recommended with WB     - F/u 1 week for recheck; call sooner if issues arise      Subjective:   Rossy Holland presents to wound care today concerning f/u left heel ulceration  Got santyl  Feels well  Has been trying of offload his heels more  Has heel unloading shoe with ambulation         The following portions of the patient's history were reviewed and updated as appropriate:   Patient Active Problem List   Diagnosis    Hyperkalemia    Diabetes mellitus type 2, insulin dependent ([facility] )    Gout    Essential hypertension    History of CVA (cerebrovascular accident)    [facility]omyel[facility] of lumbar [facility]    Severe protein-calorie malnutrition (HCC)    Iron deficiency anemia secondary to inadequate dietary iron intake    Hip pain, acute, left    Retention of urine    Hypercalcemia    Low back pain    Ambulatory dysfunction    Stage 3a chronic kidney disease (HCC)    Chronic ulcer of left heel (HCC)    Chronic anemia    RSV infection    Moderate protein-calorie malnutrition (HCC)    Chronic back pain    PAD (peripheral artery disease) (HCC)    Acute metabolic encephalopathy    Diabetic foot ulcer (HCC)    CKD (chronic kidney disease)    Right knee pain     Past Medical History:   Diagnosis Date    Chronic kidney disease     Diabetes mellitus ([facility] )     Gout     Hypertension     Renal disorder     Retroperitoneal abscess ([facility] )     Stroke ([facility] )     UTI (urinary tract infection)     Vertebral osteomyelitis (HCC)      Past Surgical History:   Procedure Laterality Date    BACK SURGERY      ORCHIECTOMY       Social History     Socioeconomic History    Marital status: /Civil Union     Spouse name: Not on file    Number of children: Not on file    Years of education: Not on file    Highest education level: Not on file   Occupational History    Not on file   Tobacco Use    Smoking status: Every Day     Packs/day: 0 25     Types: Cigarettes    Smokeless tobacco: Never   Vaping Use    Vaping Use: Never used   Substance and Sexual Activity    Alcohol use: Not Currently    Drug use: Yes     Types: Marijuana    Sexual activity: Not on file   Other Topics Concern    Not on file   Social History Narrative    Not on file     Social Determinants of Health     Financial Resource Strain: Not on file   Food Insecurity: No Food Insecurity    Worried About Running Out of Food in the Last Year: Never true    Eri of Food in the Last Year: Never true   Transportation Needs: No Transportation Needs    Lack of Transportation (Medical): No    Lack of Transportation (Non-Medical):  No   Physical Activity: Not on file   Stress: Not on file   Social Connections: Not on file   Intimate Partner Violence: Not on file   Housing Stability: High Risk    Unable to Pay for Housing in the Last Year: No  Number of Places Lived in the Last Year: 5    Unstable Housing in the Last Year: No        Current Outpatient Medications:     acetaminophen (TYLENOL) 325 mg tablet, Take 650 mg by mouth every 4 (four) hours as needed for mild pain, Disp: , Rfl:     allopurinol (ZYLOPRIM) 100 mg tablet, Take 1 tablet (100 mg total) by mouth daily, Disp: 30 tablet, Rfl: 0    amLODIPine (NORVASC) 10 mg tablet, Take 1 tablet by mouth daily, Disp: , Rfl:     atorvastatin (LIPITOR) 40 mg tablet, Take 40 mg by mouth, Disp: , Rfl:     Belbuca 450 MCG FILM, ADMINISTER 1 FILM TO INSIDE OF CHEEK IN THE MORNING AND 1 FILM BEFORE BEDTIME , Disp: , Rfl:     Buprenorphine HCl (Belbuca) 300 MCG FILM, Apply 300 mcg to cheek 2 (two) times a day, Disp: , Rfl:     carvedilol (COREG) 6 25 mg tablet, Take 6 25 mg by mouth 2 (two) times a day with meals, Disp: , Rfl:     collagenase (SANTYL) ointment, Apply topically daily, Disp: 15 g, Rfl: 0    cyclobenzaprine (FLEXERIL) 5 mg tablet, Take 1 tablet (5 mg total) by mouth 3 (three) times a day as needed for muscle spasms (AS NEEDED FOR MUSCLE SPASM) for up to 7 days, Disp: 20 tablet, Rfl: 0    docusate sodium (COLACE) 100 mg capsule, TAKE BY MOUTH 1 CAPSULE IN THE MORNING AND 1 CAPSULE BEFORE BEDTIME , Disp: , Rfl:     ferrous sulfate 325 (65 Fe) mg tablet, Take 1 tablet (325 mg total) by mouth daily with breakfast Do not start before October 25, 2022 , Disp: , Rfl: 0    insulin glargine (LANTUS) 100 units/mL subcutaneous injection, Inject 5 Units under the skin daily at bedtime, Disp: 10 mL, Rfl: 0    insulin lispro (HumaLOG) 100 units/mL injection, Inject 2-12 Units under the skin 3 (three) times a day before meals, Disp: , Rfl: 0    oxyCODONE (OXY-IR) 5 MG capsule, Take 5 mg by mouth every 4 (four) hours as needed for moderate pain or severe pain, Disp: , Rfl:     polyethylene glycol (MIRALAX) 17 g packet, Take 17 g by mouth 2 (two) times a day, Disp: , Rfl:     pregabalin (LYRICA) 75 mg capsule, Take 1 capsule (75 mg total) by mouth 2 (two) times a day for 10 days, Disp: 20 capsule, Rfl: 0    senna-docusate sodium (SENOKOT S) 8 6-50 mg per tablet, Take 1 tablet by mouth 2 (two) times a day, Disp: , Rfl:     sevelamer carbonate (RENVELA) 800 mg tablet, Take 800 mg by mouth in the morning, Disp: , Rfl:     tamsulosin (FLOMAX) 0 4 mg, Take by mouth, Disp: , Rfl:     thiamine 100 MG tablet, Take 1 tablet (100 mg total) by mouth daily, Disp: 30 tablet, Rfl: 0    tiZANidine (ZANAFLEX) 4 mg tablet, TAKE 1 TABLET BY MOUTH EVERY 8 HOURS AS NEEDED FOR MUSCLE SPASM, Disp: , Rfl:     venlafaxine (EFFEXOR-XR) 37 5 mg 24 hr capsule, Take 37 5 mg by mouth 3 (three) times a day, Disp: , Rfl:   Family History   Problem Relation Age of Onset    Hypertension Father       Review of Systems   Constitutional: Negative for activity change, chills and fever  HENT: Negative  Respiratory: Negative for cough, chest tightness and shortness of breath  Cardiovascular: Negative for chest pain and leg swelling  Endocrine: Negative  Genitourinary: Negative  Musculoskeletal: Positive for back pain and gait problem  Skin: Positive for wound (Left heel)  Neurological: Negative for numbness  Psychiatric/Behavioral: Negative  Negative for agitation and behavioral problems  Objective:  /62   Pulse 72   Temp (!) 97 2 °F (36 2 °C)   Resp 18     Physical Exam  Constitutional:       General: He is not in acute distress  Appearance: Normal appearance  He is not ill-appearing  Cardiovascular:      Comments: Bilateral DP and PT pulses diminished/nonpalpable  Pedal hair is absent  Legs to toes warm to cool  Pulmonary:      Effort: No respiratory distress  Musculoskeletal:         General: No tenderness or deformity  Normal range of motion  Comments: B/L LE are thin and with muscle atrophy   Skin:     Capillary Refill: Capillary refill takes less than 2 seconds        Findings: "Lesion (Left posterior heel ulceration with mixed fibrogranular base  No soi noted  No deep probe to bone noted however there is thin layer of soft tissue over  No purulence noted) present  Comments: B/L LE skin is atrophic - thin, dry and shiny in appearance  Neurological:      General: No focal deficit present  Mental Status: He is alert and oriented to person, place, and time  Comments: Gross sensation to feet intact  PN   Psychiatric:         Mood and Affect: Mood normal          Behavior: Behavior normal              Wound 10/20/22 Diabetic Ulcer Heel Left (Active)   Wound Image   05/04/23 1441   Wound Description Slough;Brown;Yellow 05/04/23 1444   [place]-wound Assessment Dry;Scaly;Erythema 04/27/23 1312   Wound Length (cm) 2 8 cm 05/04/23 1444   Wound Width (cm) 3 3 cm 05/04/23 1444   Wound Depth (cm) 0 1 cm 05/04/23 1444   Wound Surface Area (cm^2) 9 24 cm^2 05/04/23 1444   Wound Volume (cm^3) 0 924 cm^3 05/04/23 1444   Calculated Wound Volume (cm^3) 0 92 cm^3 05/04/23 1444   Change in Wound Size % 79 51 05/04/23 1444   Drainage Amount Scant 05/04/23 1444   Drainage Description Yellow;Serous 05/04/23 1444   Non-staged Wound Description Full thickness 05/04/23 1444   Treatments Cleansed 05/04/23 1444                 Wound Instructions:  No orders of the defined types were placed in this encounter  Daisy Jaquez DPM      Portions of the record may have been created with voice recognition software  Occasional wrong word or \"sound a like\" substitutions may have occurred due to the inherent limitations of voice recognition software  Read the chart carefully and recognize, using context, where substitutions have occurred      "

## 2023-05-11 ENCOUNTER — OFFICE VISIT (OUTPATIENT)
Dept: WOUND CARE | Facility: CLINIC | Age: 58
End: 2023-05-11

## 2023-05-11 VITALS
SYSTOLIC BLOOD PRESSURE: 102 MMHG | DIASTOLIC BLOOD PRESSURE: 60 MMHG | HEART RATE: 72 BPM | TEMPERATURE: 96.1 F | RESPIRATION RATE: 16 BRPM

## 2023-05-11 DIAGNOSIS — L97.428 DIABETIC ULCER OF LEFT HEEL ASSOCIATED WITH TYPE 2 DIABETES MELLITUS, WITH OTHER ULCER SEVERITY (HCC): Primary | ICD-10-CM

## 2023-05-11 DIAGNOSIS — E11.621 DIABETIC ULCER OF LEFT HEEL ASSOCIATED WITH TYPE 2 DIABETES MELLITUS, WITH OTHER ULCER SEVERITY (HCC): Primary | ICD-10-CM

## 2023-05-11 RX ORDER — LIDOCAINE 40 MG/G
CREAM TOPICAL ONCE
Status: COMPLETED | OUTPATIENT
Start: 2023-05-11 | End: 2023-05-11

## 2023-05-11 RX ADMIN — LIDOCAINE: 40 CREAM TOPICAL at 15:37

## 2023-05-11 NOTE — PATIENT INSTRUCTIONS
Orders Placed This Encounter   Procedures    Wound cleansing and dressings     Wash your hands with soap and water  Remove old dressing, discard into plastic bag and place in trash  Cleanse the wound with NSS prior to applying a clean dressing  Do not use tissue or cotton balls  Do not scrub the wound  Pat dry using gauze  Shower no     Left Heel Wound:  Apply santyl to the wound bed, Cover with gauze  Secure with emmanuel and paper tape  Change dressing everyday and as needed for excessive drainage  Continue with your globopedi shoe when bearing weight       VNA to continue with dressing changes as ordered       Offload heel with podus boots  You really need to keep pressure off the wound even when in bed  The heel should not be touching any surfaces to decrease the pressure  Right foot: use diabetic gold bond lotion     Next visit in 2 week at wound care center     Standing Status:   Future     Standing Expiration Date:   5/11/2024    XR heel / calcaneus 2+ vw left     Standing Status:   Future     Standing Expiration Date:   5/11/2027     Scheduling Instructions:      Bring along any outside films relating to this procedure

## 2023-05-11 NOTE — PROGRESS NOTES
Patient ID: Stormy Bell  is a 62 y o  male Date of Birth 1965       Chief Complaint   Patient presents with   • Follow Up Wound Care Visit     Palliative follow up visit for wound to left heel  Allergies:  Bupropion, Metformin, and Medical tape    Diagnosis:  1  Diabetic ulcer of left heel associated with type 2 diabetes mellitus, with other ulcer severity (HCC)  -     lidocaine (LMX) 4 % cream  -     XR heel / calcaneus 2+ vw left; Future; Expected date: 05/11/2023  -     Wound cleansing and dressings; Future       Diagnosis ICD-10-CM Associated Orders   1  Diabetic ulcer of left heel associated with type 2 diabetes mellitus, with other ulcer severity (HCC)  E11 621 lidocaine (LMX) 4 % cream    L97 428 XR heel / calcaneus 2+ vw left     Wound cleansing and dressings           Assessment & Plan:  See wound orders  Santyl dsd  - Left heel diabetic ulceration appears with mixed fibrous-granular base  No acute SOI noted today  I selectively debrided  - LEADs 12/27/22: Diffuse dz B/L throughout fem-pop w/o focal stenosis  RLE diffuse tibioperoneal dz w/ absence of flow to PTA, NC/153/129 within healing  LLE NC/130/100 within healing  Has appt 5/22/23 with vascular surgery  - Advised better blood sugar control and better offloading  - Strict offloading of heels while NWB  Heel unloading shoe recommended with WB     - Repeat XR ordered  - F/u 2 week for recheck; call sooner if issues arise      Subjective:   Rocío Olivas presents to wound care today concerning f/u left heel ulceration  Got santyl  Feels well  Has been trying of offload his heels more  Has heel unloading shoe with ambulation         The following portions of the patient's history were reviewed and updated as appropriate:   Patient Active Problem List   Diagnosis   • Hyperkalemia   • Diabetes mellitus type 2, insulin dependent ([facility] [address] )   • Gout   • Essential hypertension   • History of CVA (cerebrovascular accident)   • Osteomyelitis of vertebra of lumbar region [facility])   • Severe protein-calorie malnutrition (HCC)   • Iron deficiency anemia secondary to inadequate dietary iron intake   • Hip pain, acute, left   • Retention of urine   • Hypercalcemia   • Low back pain   • Ambulatory dysfunction   • Stage 3a chronic kidney disease (HCC)   • Chronic ulcer of left heel (HCC)   • Chronic anemia   • RSV infection   • Moderate protein-calorie malnutrition (HCC)   • Chronic back pain   • PAD (peripheral artery disease) (HCC)   • Acute metabolic encephalopathy   • Diabetic foot ulcer (HCC)   • CKD (chronic kidney disease)   • Right knee pain     Past Medical History:   Diagnosis Date   • Chronic kidney disease    • Diabetes mellitus ([facility] [address] )    • Gout    • Hypertension    • Renal disorder    • Retroperitoneal abscess (HCC)    • Stroke (HCC)    • UTI (urinary tract infection)    • Vertebral osteomyelitis (HCC)      Past Surgical History:   Procedure Laterality Date   • BACK SURGERY     • ORCHIECTOMY       Social History     Socioeconomic History   • Marital status: /Civil Union     Spouse name: Not on file   • Number of children: Not on file   • Years of education: Not on file   • Highest education level: Not on file   Occupational History   • Not on file   Tobacco Use   • Smoking status: Every Day     Packs/day: 0 25     Types: Cigarettes   • Smokeless tobacco: Never   Vaping Use   • Vaping Use: Never used   Substance and Sexual Activity   • Alcohol use: Not Currently   • Drug use: Yes     Types: Marijuana   • Sexual activity: Not on file   Other Topics Concern   • Not on file   Social History Narrative   • Not on file     Social Determinants of Health     Financial Resource Strain: Not on file   Food Insecurity: No Food Insecurity   • Worried About Running Out of Food in the Last Year: Never true   • Ran Out of Food in the Last Year: Never true   Transportation Needs: No Transportation Needs   • Lack of Transportation (Medical):  No   • Lack of Transportation (Non-Medical):  No   Physical Activity: Not on file   Stress: Not on file   Social Connections: Not on file   Intimate Partner Violence: Not on file   Housing Stability: High Risk   • Unable to Pay for Housing in the Last Year: No   • Number of Places Lived in the Last Year: 5   • Unstable Housing in the Last Year: No        Current Outpatient Medications:   •  acetaminophen (TYLENOL) 325 mg tablet, Take 650 mg by mouth every 4 (four) hours as needed for mild pain, Disp: , Rfl:   •  allopurinol (ZYLOPRIM) 100 mg tablet, Take 1 tablet (100 mg total) by mouth daily, Disp: 30 tablet, Rfl: 0  •  amLODIPine (NORVASC) 10 mg tablet, Take 1 tablet by mouth daily, Disp: , Rfl:   •  atorvastatin (LIPITOR) 40 mg tablet, Take 40 mg by mouth, Disp: , Rfl:   •  Belbuca 450 MCG FILM, ADMINISTER 1 FILM TO INSIDE OF CHEEK IN THE MORNING AND 1 FILM BEFORE BEDTIME , Disp: , Rfl:   •  Buprenorphine HCl (Belbuca) 300 MCG FILM, Apply 300 mcg to cheek 2 (two) times a day, Disp: , Rfl:   •  carvedilol (COREG) 6 25 mg tablet, Take 6 25 mg by mouth 2 (two) times a day with meals, Disp: , Rfl:   •  collagenase (SANTYL) ointment, Apply topically daily, Disp: 15 g, Rfl: 0  •  cyclobenzaprine (FLEXERIL) 5 mg tablet, Take 1 tablet (5 mg total) by mouth 3 (three) times a day as needed for muscle spasms (AS NEEDED FOR MUSCLE SPASM) for up to 7 days, Disp: 20 tablet, Rfl: 0  •  docusate sodium (COLACE) 100 mg capsule, TAKE BY MOUTH 1 CAPSULE IN THE MORNING AND 1 CAPSULE BEFORE BEDTIME , Disp: , Rfl:   •  ferrous sulfate 325 (65 Fe) mg tablet, Take 1 tablet (325 mg total) by mouth daily with breakfast Do not start before October 25, 2022 , Disp: , Rfl: 0  •  insulin glargine (LANTUS) 100 units/mL subcutaneous injection, Inject 5 Units under the skin daily at bedtime, Disp: 10 mL, Rfl: 0  •  insulin lispro (HumaLOG) 100 units/mL injection, Inject 2-12 Units under the skin 3 (three) times a day before meals, Disp: , Rfl: 0  •  oxyCODONE (OXY-IR) 5 MG capsule, Take 5 mg by mouth every 4 (four) hours as needed for moderate pain or severe pain, Disp: , Rfl:   •  polyethylene glycol (MIRALAX) 17 g packet, Take 17 g by mouth 2 (two) times a day, Disp: , Rfl:   •  pregabalin (LYRICA) 75 mg capsule, Take 1 capsule (75 mg total) by mouth 2 (two) times a day for 10 days, Disp: 20 capsule, Rfl: 0  •  senna-docusate sodium (SENOKOT S) 8 6-50 mg per tablet, Take 1 tablet by mouth 2 (two) times a day, Disp: , Rfl:   •  sevelamer carbonate (RENVELA) 800 mg tablet, Take 800 mg by mouth in the morning, Disp: , Rfl:   •  tamsulosin (FLOMAX) 0 4 mg, Take by mouth, Disp: , Rfl:   •  thiamine 100 MG tablet, Take 1 tablet (100 mg total) by mouth daily, Disp: 30 tablet, Rfl: 0  •  tiZANidine (ZANAFLEX) 4 mg tablet, TAKE 1 TABLET BY MOUTH EVERY 8 HOURS AS NEEDED FOR MUSCLE SPASM, Disp: , Rfl:   •  venlafaxine (EFFEXOR-XR) 37 5 mg 24 hr capsule, Take 37 5 mg by mouth 3 (three) times a day, Disp: , Rfl:   No current facility-administered medications for this visit  Family History   Problem Relation Age of Onset   • Hypertension Father       Review of Systems   Constitutional: Negative for activity change, chills and fever  HENT: Negative  Respiratory: Negative for cough, chest tightness and shortness of breath  Cardiovascular: Negative for chest pain and leg swelling  Endocrine: Negative  Genitourinary: Negative  Musculoskeletal: Positive for back pain and gait problem  Skin: Positive for wound (Left heel)  Neurological: Negative for numbness  Psychiatric/Behavioral: Negative  Negative for agitation and behavioral problems  Objective:  /60   Pulse 72   Temp (!) 96 1 °F (35 6 °C) (Tympanic)   Resp 16     Physical Exam  Constitutional:       General: He is not in acute distress  Appearance: Normal appearance  He is not ill-appearing  Cardiovascular:      Comments: Bilateral DP and PT pulses diminished/nonpalpable  Pedal hair is absent   Legs to toes warm to cool  Pulmonary:      Effort: No respiratory distress  Musculoskeletal:         General: No tenderness or deformity  Normal range of motion  Comments: B/L LE are thin and with muscle atrophy   Skin:     Capillary Refill: Capillary refill takes less than 2 seconds  Findings: Lesion (Left posterior heel ulceration with mixed fibrogranular base  No soi noted  No deep probe to bone noted however there is thin layer of soft tissue over  No purulence noted) present  Comments: B/L LE skin is atrophic - thin, dry and shiny in appearance  Neurological:      General: No focal deficit present  Mental Status: He is alert and oriented to person, place, and time  Comments: Gross sensation to feet intact  PN   Psychiatric:         Mood and Affect: Mood normal          Behavior: Behavior normal              Wound 10/20/22 Diabetic Ulcer Heel Left (Active)   Wound Image   05/11/23 1528   Wound Description Pink;Slough; Yellow 05/11/23 1531   [place]-wound Assessment Dry;Erythema 05/11/23 1531   Wound Length (cm) 2 8 cm 05/11/23 1531   Wound Width (cm) 3 1 cm 05/11/23 1531   Wound Depth (cm) 0 1 cm 05/11/23 1531   Wound Surface Area (cm^2) 8 68 cm^2 05/11/23 1531   Wound Volume (cm^3) 0 868 cm^3 05/11/23 1531   Calculated Wound Volume (cm^3) 0 87 cm^3 05/11/23 1531   Change in Wound Size % 80 62 05/11/23 1531   Drainage Amount Large 05/11/23 1531   Drainage Description Serous;Tan;Yellow 05/11/23 1531   Non-staged Wound Description Full thickness 05/11/23 1531   Treatments Irrigation with NSS 05/11/23 1531   Patient Tolerance Tolerated well 05/11/23 1531   Dressing Status Removed 05/11/23 1531           Debridement   Wound 10/20/22 Diabetic Ulcer Heel Left    Universal Protocol:  Consent: Verbal consent obtained    Risks and benefits: risks, benefits and alternatives were discussed  Consent given by: patient  Patient understanding: patient states understanding of the procedure being performed  Patient consent: the patient's understanding of the procedure matches consent given  Patient identity confirmed: verbally with patient      Performed by: physician  Debridement type: selective    Pre-debridement measurements  Length (cm): 2 8  Width (cm): 3 1  Depth (cm): 0 1  Surface Area (cm^2): 8 68  Volume (cm^3): 0 87    Post-debridement measurements  Length (cm): 2 8  Width (cm): 3 1  Depth (cm): 0 1  Percent debrided: 20%  Surface Area (cm^2): 8 68  Area debrided (cm^2): 1 74  Volume (cm^3): 0 87  Devitalized tissue debrided: biofilm and slough  Instrument(s) utilized: blade and forceps  Bleeding: small  Hemostasis obtained with: pressure  Procedural pain (0-10): insensate  Post-procedural pain: insensate   Response to treatment: procedure was tolerated well          Wound Instructions:  Orders Placed This Encounter   Procedures   • Wound cleansing and dressings     Wash your hands with soap and water   Remove old dressing, discard into plastic bag and place in trash     Cleanse the wound with NSS prior to applying a clean dressing  Do not use tissue or cotton balls  Do not scrub the wound  Pat dry using gauze  Shower no     Left Heel Wound:  Apply santyl to the wound bed, Cover with gauze  Secure with emmanuel and paper tape  Change dressing everyday and as needed for excessive drainage       Continue with your globopedi shoe when bearing weight       VNA to continue with dressing changes as ordered       Offload heel with podus boots   You really need to keep pressure off the wound even when in bed     The heel should not be touching any surfaces to decrease the pressure          Right foot: use diabetic gold bond lotion     Next visit in 2 week at wound care center     Standing Status:   Future     Standing Expiration Date:   5/11/2024   • Debridement     This order was created via procedure documentation   • XR heel / calcaneus 2+ vw left     Standing Status:   Future     Standing "Expiration Date:   5/11/2027     Scheduling Instructions:      Bring along any outside films relating to this procedure  Lonza Daubs, DPM      Portions of the record may have been created with voice recognition software  Occasional wrong word or \"sound a like\" substitutions may have occurred due to the inherent limitations of voice recognition software  Read the chart carefully and recognize, using context, where substitutions have occurred      "

## 2023-05-11 NOTE — LETTER
[address] [place] [address] WOUND CARE  [address] [place] [address]  Phone#  [phone]  Fax#  [phone]    Patient:  Taras Lefort Sr  YOB: 1965  Phone:  [phone]  Date of Visit:  5/11/2023    Orders Placed This Encounter   Procedures   • Wound cleansing and dressings     Wash your hands with soap and water  Lisette Res old dressing, discard into plastic bag and place in trash     Cleanse the wound with NSS prior to applying a clean dressing  Do not use tissue or cotton balls  Do not scrub the wound  Pat dry using gauze  Shower no     Left Heel Wound:  Apply santyl to the wound bed, Cover with gauze  Secure with emmanuel and paper tape  Change dressing everyday and as needed for excessive drainage       Continue with your globopedi shoe when bearing weight       VNA to continue with dressing changes as ordered       Offload heel with podus boots  You really need to keep pressure off the wound even when in bed     The heel should not be touching any surfaces to decrease the pressure          Right foot: use diabetic gold bond lotion     Next visit in 2 week at wound care center     Standing Status:   Future     Standing Expiration Date:   5/11/2024   • XR heel / calcaneus 2+ vw left     Standing Status:   Future     Standing Expiration Date:   5/11/2027     Scheduling Instructions:      Bring along any outside films relating to this procedure                 Electronically signed by Kelley Teague DPM

## 2023-05-22 ENCOUNTER — CONSULT (OUTPATIENT)
Dept: VASCULAR SURGERY | Facility: HOSPITAL | Age: 58
End: 2023-05-22

## 2023-05-22 VITALS
TEMPERATURE: 97.3 F | OXYGEN SATURATION: 99 % | HEART RATE: 73 BPM | SYSTOLIC BLOOD PRESSURE: 124 MMHG | DIASTOLIC BLOOD PRESSURE: 80 MMHG

## 2023-05-22 DIAGNOSIS — I73.9 PAD (PERIPHERAL ARTERY DISEASE) (HCC): ICD-10-CM

## 2023-05-22 DIAGNOSIS — M46.26 OSTEOMYELITIS OF VERTEBRA OF LUMBAR REGION (HCC): Primary | ICD-10-CM

## 2023-05-22 DIAGNOSIS — L97.428 DIABETIC ULCER OF LEFT HEEL ASSOCIATED WITH TYPE 2 DIABETES MELLITUS, WITH OTHER ULCER SEVERITY (HCC): ICD-10-CM

## 2023-05-22 DIAGNOSIS — S91.302A NON-HEALING WOUND OF LEFT HEEL: ICD-10-CM

## 2023-05-22 DIAGNOSIS — E11.621 DIABETIC ULCER OF LEFT HEEL ASSOCIATED WITH TYPE 2 DIABETES MELLITUS, WITH OTHER ULCER SEVERITY (HCC): ICD-10-CM

## 2023-05-22 RX ORDER — NALOXEGOL OXALATE 12.5 MG/1
12.5 TABLET, FILM COATED ORAL DAILY
COMMUNITY
Start: 2023-04-28

## 2023-05-22 RX ORDER — PREGABALIN 150 MG/1
150 CAPSULE ORAL 2 TIMES DAILY
COMMUNITY
Start: 2023-04-14

## 2023-05-22 RX ORDER — TRAMADOL HYDROCHLORIDE 50 MG/1
TABLET ORAL
COMMUNITY
Start: 2023-04-28

## 2023-05-22 RX ORDER — PHENOL 1.4 %
AEROSOL, SPRAY (ML) MUCOUS MEMBRANE
COMMUNITY

## 2023-05-22 RX ORDER — SODIUM PHOSPHATE,MONO-DIBASIC 19G-7G/118
1 ENEMA (ML) RECTAL
COMMUNITY

## 2023-05-22 RX ORDER — SODIUM ZIRCONIUM CYCLOSILICATE 10 G/10G
POWDER, FOR SUSPENSION ORAL
COMMUNITY
Start: 2023-05-04

## 2023-05-22 RX ORDER — INSULIN ASPART 100 [IU]/ML
INJECTION, SOLUTION INTRAVENOUS; SUBCUTANEOUS
COMMUNITY
Start: 2023-04-28

## 2023-05-22 RX ORDER — COLCHICINE 0.6 MG/1
TABLET ORAL
COMMUNITY
Start: 2023-04-20

## 2023-05-22 NOTE — ASSESSMENT & PLAN NOTE
Longstanding Left hell pressure induces ulceration  Patient with extensive past medical history including history of lumbar spine osteomyelitis status post surgery in the past   He is minimally ambulatory  Patient arterial duplex has suggested diffuse disease without evidence of large vessel occlusive disease in the fem-pop segments  TREVON noncompressible  While duplex to suggest a distal PT occlusion he has a palpable DP pulse with a PT Doppler signal   I believe the risks outweigh the benefits of proceeding with an arteriogram at this time due to his underlying chronic kidney disease with risk of worsening renal function  The wound has minimal fibrinous slough today in the office and certainly improved from the photo in the epic  Recommended continued local wound care and offloading of the heels  Patient may follow-up on an as-needed basis

## 2023-05-25 ENCOUNTER — OFFICE VISIT (OUTPATIENT)
Dept: WOUND CARE | Facility: CLINIC | Age: 58
End: 2023-05-25

## 2023-05-25 VITALS
RESPIRATION RATE: 18 BRPM | DIASTOLIC BLOOD PRESSURE: 60 MMHG | TEMPERATURE: 96.4 F | SYSTOLIC BLOOD PRESSURE: 114 MMHG | HEART RATE: 64 BPM

## 2023-05-25 DIAGNOSIS — E11.621 DIABETIC ULCER OF LEFT HEEL ASSOCIATED WITH TYPE 2 DIABETES MELLITUS, WITH OTHER ULCER SEVERITY (HCC): Primary | ICD-10-CM

## 2023-05-25 DIAGNOSIS — L97.428 DIABETIC ULCER OF LEFT HEEL ASSOCIATED WITH TYPE 2 DIABETES MELLITUS, WITH OTHER ULCER SEVERITY (HCC): Primary | ICD-10-CM

## 2023-05-25 RX ORDER — LIDOCAINE 40 MG/G
CREAM TOPICAL ONCE
Status: COMPLETED | OUTPATIENT
Start: 2023-05-25 | End: 2023-05-25

## 2023-05-25 RX ADMIN — LIDOCAINE 1 APPLICATION.: 40 CREAM TOPICAL at 14:33

## 2023-05-25 NOTE — PROGRESS NOTES
Patient ID: Meghan Kaur  is a 62 y o  male Date of Birth 1965       Chief Complaint   Patient presents with   • Follow Up Wound Care Visit     Left foot wound  Allergies:  Bupropion, Metformin, and Medical tape    Diagnosis:  1  Diabetic ulcer of left heel associated with type 2 diabetes mellitus, with other ulcer severity (HCC)  -     lidocaine (LMX) 4 % cream  -     Wound cleansing and dressings; Future       Diagnosis ICD-10-CM Associated Orders   1  Diabetic ulcer of left heel associated with type 2 diabetes mellitus, with other ulcer severity (HCC)  E11 621 lidocaine (LMX) 4 % cream    L97 428 Wound cleansing and dressings           Assessment & Plan:  See wound orders  Santyl dsd  - Left heel diabetic ulceration appears with mixed fibrous-granular base  No acute SOI noted today  I selectively debrided  - LEADs 12/27/22: Diffuse dz B/L throughout fem-pop w/o focal stenosis  RLE diffuse tibioperoneal dz w/ absence of flow to PTA, NC/153/129 within healing  LLE NC/130/100 within healing    - Had appt 5/22/23 with vascular surgery who recommended monitoring as agram would be high risk due to kidney function  - Advised better blood sugar control and better offloading  - Strict offloading of heels while NWB  Heel unloading shoe recommended with WB     - Repeat XR ordered  - F/u 2-3 week for recheck; call sooner if issues arise  Will consider graft once wound bed appears more granular      Subjective:   Amy Moffett presents to wound care today concerning f/u left heel ulceration  Using santyl  Feels well  Has been offloading his heels more  Has heel unloading shoe with ambulation   Had vasc appt yesterday      The following portions of the patient's history were reviewed and updated as appropriate:   Patient Active Problem List   Diagnosis   • Hyperkalemia   • Diabetes mellitus type 2, insulin dependent ([facility] [address] )   • Gout   • Essential hypertension   • History of CVA (cerebrovascular accident)   • Osteomyelitis of vertebra of lumbar region [facility])   • Severe protein-calorie malnutrition (HCC)   • Iron deficiency anemia secondary to inadequate dietary iron intake   • Hip pain, acute, left   • Retention of urine   • Hypercalcemia   • Low back pain   • Ambulatory dysfunction   • Stage 3a chronic kidney disease (HCC)   • Chronic ulcer of left heel (HCC)   • Chronic anemia   • RSV infection   • Moderate protein-calorie malnutrition (HCC)   • Chronic back pain   • PAD (peripheral artery disease) (HCC)   • Acute metabolic encephalopathy   • Diabetic foot ulcer (HCC)   • CKD (chronic kidney disease)   • Right knee pain     Past Medical History:   Diagnosis Date   • Chronic kidney disease    • Diabetes mellitus ([facility] [address] )    • Gout    • Hypertension    • Renal disorder    • Retroperitoneal abscess (HCC)    • Stroke (HCC)    • UTI (urinary tract infection)    • Vertebral osteomyelitis (HCC)      Past Surgical History:   Procedure Laterality Date   • BACK SURGERY     • ORCHIECTOMY       Social History     Socioeconomic History   • Marital status: /Civil Union     Spouse name: None   • Number of children: None   • Years of education: None   • Highest education level: None   Occupational History   • None   Tobacco Use   • Smoking status: Every Day     Packs/day: 0 25     Types: Cigarettes   • Smokeless tobacco: Never   Vaping Use   • Vaping Use: Never used   Substance and Sexual Activity   • Alcohol use: Not Currently   • Drug use: Yes     Types: Marijuana   • Sexual activity: None   Other Topics Concern   • None   Social History Narrative   • None     Social Determinants of Health     Financial Resource Strain: Not on file   Food Insecurity: No Food Insecurity (3/3/2023)    Hunger Vital Sign    • Worried About Running Out of Food in the Last Year: Never true    • Ran Out of Food in the Last Year: Never true   Transportation Needs: No Transportation Needs (3/3/2023)    PRAPARE - Transportation    • Lack of Transportation (Medical): No    • Lack of Transportation (Non-Medical):  No   Physical Activity: Not on file   Stress: Not on file   Social Connections: Not on file   Intimate Partner Violence: Not on file   Housing Stability: High Risk (3/3/2023)    Housing Stability Vital Sign    • Unable to Pay for Housing in the Last Year: No    • Number of Places Lived in the Last Year: 5    • Unstable Housing in the Last Year: No        Current Outpatient Medications:   •  acetaminophen (TYLENOL) 325 mg tablet, Take 650 mg by mouth every 4 (four) hours as needed for mild pain, Disp: , Rfl:   •  allopurinol (ZYLOPRIM) 100 mg tablet, Take 1 tablet (100 mg total) by mouth daily, Disp: 30 tablet, Rfl: 0  •  amLODIPine (NORVASC) 10 mg tablet, Take 1 tablet by mouth daily, Disp: , Rfl:   •  Ascorbic Acid (Vitamin C Immune Health) 500 MG CHEW, Chew, Disp: , Rfl:   •  atorvastatin (LIPITOR) 40 mg tablet, Take 40 mg by mouth, Disp: , Rfl:   •  Belbuca 450 MCG FILM, ADMINISTER 1 FILM TO INSIDE OF CHEEK IN THE MORNING AND 1 FILM BEFORE BEDTIME , Disp: , Rfl:   •  Buprenorphine HCl (Belbuca) 300 MCG FILM, Apply 300 mcg to cheek 2 (two) times a day, Disp: , Rfl:   •  carvedilol (COREG) 6 25 mg tablet, Take 6 25 mg by mouth 2 (two) times a day with meals, Disp: , Rfl:   •  colchicine (COLCRYS) 0 6 mg tablet, PLEASE SEE ATTACHED FOR DETAILED DIRECTIONS, Disp: , Rfl:   •  collagenase (SANTYL) ointment, Apply topically daily, Disp: 15 g, Rfl: 0  •  cyclobenzaprine (FLEXERIL) 5 mg tablet, Take 1 tablet (5 mg total) by mouth 3 (three) times a day as needed for muscle spasms (AS NEEDED FOR MUSCLE SPASM) for up to 7 days, Disp: 20 tablet, Rfl: 0  •  docusate sodium (COLACE) 100 mg capsule, TAKE BY MOUTH 1 CAPSULE IN THE MORNING AND 1 CAPSULE BEFORE BEDTIME , Disp: , Rfl:   •  ferrous sulfate 325 (65 Fe) mg tablet, Take 1 tablet (325 mg total) by mouth daily with breakfast Do not start before October 25, 2022 , Disp: , Rfl: 0  •  insulin glargine (LANTUS) 100 units/mL subcutaneous injection, Inject 5 Units under the skin daily at bedtime, Disp: 10 mL, Rfl: 0  •  insulin lispro (HumaLOG) 100 units/mL injection, Inject 2-12 Units under the skin 3 (three) times a day before meals, Disp: , Rfl: 0  •  Lokelma 10 g, , Disp: , Rfl:   •  magnesium hydroxide (MILK OF MAGNESIA) 400 mg/5 mL oral suspension, Take 30 mL by mouth, Disp: , Rfl:   •  Naloxegol Oxalate (Movantik) 12 5 MG TABS, Take 12 5 mg by mouth daily, Disp: , Rfl:   •  NovoLOG FlexPen 100 units/mL injection pen, , Disp: , Rfl:   •  oxyCODONE (OXY-IR) 5 MG capsule, Take 5 mg by mouth every 4 (four) hours as needed for moderate pain or severe pain, Disp: , Rfl:   •  polyethylene glycol (MIRALAX) 17 g packet, Take 17 g by mouth 2 (two) times a day, Disp: , Rfl:   •  pregabalin (LYRICA) 150 mg capsule, Take 150 mg by mouth 2 (two) times a day, Disp: , Rfl:   •  pregabalin (LYRICA) 75 mg capsule, Take 1 capsule (75 mg total) by mouth 2 (two) times a day for 10 days, Disp: 20 capsule, Rfl: 0  •  senna-docusate sodium (SENOKOT S) 8 6-50 mg per tablet, Take 1 tablet by mouth 2 (two) times a day, Disp: , Rfl:   •  sevelamer carbonate (RENVELA) 800 mg tablet, Take 800 mg by mouth in the morning, Disp: , Rfl:   •  sodium phosphate-biphosphate (FLEET) 7-19 g 118 mL enema, Insert 1 enema into the rectum, Disp: , Rfl:   •  Sodium Zirconium Cyclosilicate (Lokelma) 10 g, Take 1 packet by mouth, Disp: , Rfl:   •  tamsulosin (FLOMAX) 0 4 mg, Take by mouth (Patient not taking: Reported on 5/22/2023), Disp: , Rfl:   •  thiamine 100 MG tablet, Take 1 tablet (100 mg total) by mouth daily, Disp: 30 tablet, Rfl: 0  •  tiZANidine (ZANAFLEX) 4 mg tablet, TAKE 1 TABLET BY MOUTH EVERY 8 HOURS AS NEEDED FOR MUSCLE SPASM, Disp: , Rfl:   •  traMADol (ULTRAM) 50 mg tablet, TAKE 1 TABLET BY MOUTH EVERY 6 HOURS AS NEEDED FOR PAIN, MODERATE OR PAIN, SEVERE , Disp: , Rfl:   •  venlafaxine (EFFEXOR-XR) 37 5 mg 24 hr capsule, Take 37 5 mg by mouth 3 (three) times a day, Disp: , Rfl:   No current facility-administered medications for this visit  Family History   Problem Relation Age of Onset   • Hypertension Father       Review of Systems   Constitutional: Negative for activity change, chills and fever  HENT: Negative  Respiratory: Negative for cough, chest tightness and shortness of breath  Cardiovascular: Negative for chest pain and leg swelling  Endocrine: Negative  Genitourinary: Negative  Musculoskeletal: Positive for back pain and gait problem  Skin: Positive for wound (Left heel)  Neurological: Negative for numbness  Psychiatric/Behavioral: Negative  Negative for agitation and behavioral problems  Objective:  /60   Pulse 64   Temp (!) 96 4 °F (35 8 °C)   Resp 18     Physical Exam  Constitutional:       General: He is not in acute distress  Appearance: Normal appearance  He is not ill-appearing  Cardiovascular:      Comments: Bilateral DP and PT pulses diminished/nonpalpable  Pedal hair is absent  Legs to toes warm to cool  Pulmonary:      Effort: No respiratory distress  Musculoskeletal:         General: No tenderness or deformity  Normal range of motion  Comments: B/L LE are thin and with muscle atrophy   Skin:     Capillary Refill: Capillary refill takes less than 2 seconds  Findings: Lesion (Left posterior heel ulceration with mixed fibrogranular base  No soi noted  No deep probe to bone noted however there is thin layer of soft tissue over  No purulence noted) present  Comments: B/L LE skin is atrophic - thin, dry and shiny in appearance  Neurological:      General: No focal deficit present  Mental Status: He is alert and oriented to person, place, and time  Comments: Gross sensation to feet intact   PN   Psychiatric:         Mood and Affect: Mood normal          Behavior: Behavior normal              Wound 10/20/22 Diabetic Ulcer Heel Left (Active)   Wound Image   05/25/23 1423   Wound Description Pink;Slough; Yellow 05/25/23 1426   [place]-wound Assessment Dry;Erythema 05/25/23 1426   Wound Length (cm) 2 83 cm 05/25/23 1426   Wound Width (cm) 3 4 cm 05/25/23 1426   Wound Depth (cm) 0 1 cm 05/25/23 1426   Wound Surface Area (cm^2) 9 622 cm^2 05/25/23 1426   Wound Volume (cm^3) 0 9622 cm^3 05/25/23 1426   Calculated Wound Volume (cm^3) 0 96 cm^3 05/25/23 1426   Change in Wound Size % 78 62 05/25/23 1426   Drainage Amount Moderate 05/25/23 1426   Drainage Description Yellow;Tan;Serous 05/25/23 1426   Non-staged Wound Description Full thickness 05/25/23 1426   Treatments Cleansed 05/25/23 1426   Patient Tolerance Tolerated well 05/11/23 1531   Dressing Status Removed 05/11/23 1531         Debridement   Wound 10/20/22 Diabetic Ulcer Heel Left    Universal Protocol:  Consent: Verbal consent obtained  Risks and benefits: risks, benefits and alternatives were discussed  Consent given by: patient  Patient understanding: patient states understanding of the procedure being performed  Patient consent: the patient's understanding of the procedure matches consent given  Patient identity confirmed: verbally with patient      Performed by: physician  Debridement type: selective    Pre-debridement measurements  Length (cm): 2 8  Width (cm): 3 4  Depth (cm): 0 1  Surface Area (cm^2): 9 52  Volume (cm^3): 0 95    Post-debridement measurements  Length (cm): 2 8  Width (cm): 3 4  Depth (cm): 0 1  Percent debrided: 20%  Surface Area (cm^2): 9 52  Area debrided (cm^2): 1 9  Volume (cm^3): 0 95  Devitalized tissue debrided: biofilm and fibrin  Instrument(s) utilized: blade and forceps  Bleeding: small  Hemostasis obtained with: pressure  Procedural pain (0-10): insensate  Post-procedural pain: insensate   Response to treatment: procedure was tolerated well                   Wound Instructions:  Orders Placed This Encounter   Procedures   • Wound cleansing and dressings     Wash your hands with soap and water  " Remove old dressing, discard into plastic bag and place in trash     Cleanse the wound with NSS prior to applying a clean dressing  Do not use tissue or cotton balls  Do not scrub the wound  Pat dry using gauze       Shower no      Left Heel Wound:  Apply santyl to the wound bed, Cover with gauze  Secure with emmanuel and paper tape  Change dressing everyday and as needed for excessive drainage       Continue with your globopedi shoe when bearing weight       VNA to continue with dressing changes as ordered       Offload heel with podus boots  You really need to keep pressure off the wound even when in bed     The heel should not be touching any surfaces to decrease the pressure           Right foot: use diabetic gold bond lotion     Next visit in 2-3 weeks at wound care center  Standing Status:   Future     Standing Expiration Date:   5/25/2024   • Debridement     This order was created via procedure documentation         Nicolette Cervantes DPM      Portions of the record may have been created with voice recognition software  Occasional wrong word or \"sound a like\" substitutions may have occurred due to the inherent limitations of voice recognition software  Read the chart carefully and recognize, using context, where substitutions have occurred      "

## 2023-05-25 NOTE — PATIENT INSTRUCTIONS
Orders Placed This Encounter   Procedures    Wound cleansing and dressings     Wash your hands with soap and water  Remove old dressing, discard into plastic bag and place in trash  Cleanse the wound with NSS prior to applying a clean dressing  Do not use tissue or cotton balls  Do not scrub the wound  Pat dry using gauze  Shower no      Left Heel Wound:  Apply santyl to the wound bed, Cover with gauze  Secure with emmanuel and paper tape  Change dressing everyday and as needed for excessive drainage  Continue with your globopedi shoe when bearing weight       VNA to continue with dressing changes as ordered       Offload heel with podus boots  You really need to keep pressure off the wound even when in bed  The heel should not be touching any surfaces to decrease the pressure  Right foot: use diabetic gold bond lotion     Next visit in 2-3 weeks at wound care center       Standing Status:   Future     Standing Expiration Date:   5/25/2024

## 2023-06-22 ENCOUNTER — OFFICE VISIT (OUTPATIENT)
Dept: WOUND CARE | Facility: CLINIC | Age: 58
End: 2023-06-22
Payer: COMMERCIAL

## 2023-06-22 VITALS
SYSTOLIC BLOOD PRESSURE: 128 MMHG | TEMPERATURE: 96.3 F | HEART RATE: 66 BPM | RESPIRATION RATE: 18 BRPM | DIASTOLIC BLOOD PRESSURE: 62 MMHG

## 2023-06-22 DIAGNOSIS — E11.9 DIABETES MELLITUS TYPE 2, INSULIN DEPENDENT (HCC): ICD-10-CM

## 2023-06-22 DIAGNOSIS — L97.428 DIABETIC ULCER OF LEFT HEEL ASSOCIATED WITH TYPE 2 DIABETES MELLITUS, WITH OTHER ULCER SEVERITY (HCC): Primary | ICD-10-CM

## 2023-06-22 DIAGNOSIS — E11.621 DIABETIC ULCER OF LEFT HEEL ASSOCIATED WITH TYPE 2 DIABETES MELLITUS, WITH OTHER ULCER SEVERITY (HCC): Primary | ICD-10-CM

## 2023-06-22 DIAGNOSIS — I73.9 PAD (PERIPHERAL ARTERY DISEASE) (HCC): ICD-10-CM

## 2023-06-22 DIAGNOSIS — Z79.4 DIABETES MELLITUS TYPE 2, INSULIN DEPENDENT (HCC): ICD-10-CM

## 2023-06-22 PROCEDURE — 97597 DBRDMT OPN WND 1ST 20 CM/<: CPT | Performed by: STUDENT IN AN ORGANIZED HEALTH CARE EDUCATION/TRAINING PROGRAM

## 2023-06-22 PROCEDURE — 97598 DBRDMT OPN WND ADDL 20CM/<: CPT | Performed by: STUDENT IN AN ORGANIZED HEALTH CARE EDUCATION/TRAINING PROGRAM

## 2023-06-22 NOTE — PROGRESS NOTES
Patient ID: Yelena Flanagan  is a 62 y o  male Date of Birth 1965       Chief Complaint   Patient presents with   • Follow Up Wound Care Visit     Left heel ulcer  Allergies:  Bupropion, Metformin, and Medical tape    Diagnosis:  1  Diabetic ulcer of left heel associated with type 2 diabetes mellitus, with other ulcer severity ([facility] )    2  PAD (peripheral artery disease) ([facility] )    3  Diabetes mellitus type 2, insulin dependent ([facility] )       Diagnosis ICD-10-CM Associated Orders   1  Diabetic ulcer of left heel associated with type 2 diabetes mellitus, with other ulcer severity ([facility] )  E11 621     [id number]       2  PAD (peripheral artery disease) ([facility])  I73 9       3  Diabetes mellitus type 2, insulin dependent ([facility] )  E11 9     Z79 4            Assessment & Plan:  See wound orders  Santyl dsd  - Left heel diabetic ulceration appears with mixed fibrous-granular base  No acute SOI noted today however wound is a bit deeper (in one specific area possibly to calcaneal periosteum) and more boggy today    - LEADs 12/27/22: Diffuse dz B/L throughout fem-pop w/o focal stenosis  RLE diffuse tibioperoneal dz w/ absence of flow to PTA, NC/153/129 within healing  LLE NC/130/100 within healing    - Appt 5/22/23 with vascular surgery who recommended monitoring as agram would be high risk due to kidney function  - Advised better blood sugar control and better offloading  I advised him to stop smoking    - Strict offloading of heels while NWB  Heel unloading shoe recommended with WB     - Report for left heel/calc XR  - F/u 2 weeks for recheck; call sooner if issues arise  Possibly order MRI if no improvement in depth next visit  Will consider graft once wound bed appears more granular however he needs to stop smoking     Subjective:   Roberto Decker presents to wound care today concerning f/u left heel ulceration  Using santyl  Feels well  Has been offloading his heels more  Has heel unloading shoe with ambulation         The following portions of the patient's history were reviewed and updated as appropriate:   Patient Active Problem List   Diagnosis   • Hyperkalemia   • Diabetes mellitus type 2, insulin dependent ([place] [address] )   • Gout   • Essential hypertension   • History of CVA (cerebrovascular accident)   • Osteomyelitis of vertebra of lumbar region [facility])   • Severe protein-calorie malnutrition (HCC)   • Iron deficiency anemia secondary to inadequate dietary iron intake   • Hip pain, acute, left   • Retention of urine   • Hypercalcemia   • Low back pain   • Ambulatory dysfunction   • Stage 3a chronic kidney disease (HCC)   • Chronic ulcer of left heel (HCC)   • Chronic anemia   • RSV infection   • Moderate protein-calorie malnutrition (HCC)   • Chronic back pain   • PAD (peripheral artery disease) ([facility])   • Acute metabolic encephalopathy   • Diabetic foot ulcer ([facility])   • CKD (chronic kidney disease)   • Right knee pain     Past Medical History:   Diagnosis Date   • Chronic kidney disease    • Diabetes mellitus ([place] [address] )    • Gout    • Hypertension    • Renal disorder    • Retroperitoneal abscess ([place] [address] )    • Stroke ([place] [address] )    • UTI (urinary tract infection)    • Vertebral osteomyelitis ([facility])      Past Surgical History:   Procedure Laterality Date   • BACK SURGERY     • ORCHIECTOMY       Social History     Socioeconomic History   • Marital status: /Civil Union     Spouse name: Not on file   • Number of children: Not on file   • Years of education: Not on file   • Highest education level: Not on file   Occupational History   • Not on file   Tobacco Use   • Smoking status: Every Day     Packs/day: 0 25     Types: Cigarettes   • Smokeless tobacco: Never   Vaping Use   • Vaping Use: Never used   Substance and Sexual Activity   • Alcohol use: Not Currently   • Drug use: Yes     Types: Marijuana   • Sexual activity: Not on file   Other Topics Concern   • Not on file   Social History Narrative   • Not on file     Social Determinants of Health Financial Resource Strain: Not on file   Food Insecurity: No Food Insecurity (3/3/2023)    Hunger Vital Sign    • Worried About Running Out of Food in the Last Year: Never true    • Ran Out of Food in the Last Year: Never true   Transportation Needs: No Transportation Needs (3/3/2023)    PRAPARE - Transportation    • Lack of Transportation (Medical): No    • Lack of Transportation (Non-Medical):  No   Physical Activity: Not on file   Stress: Not on file   Social Connections: Not on file   Intimate Partner Violence: Not on file   Housing Stability: High Risk (3/3/2023)    Housing Stability Vital Sign    • Unable to Pay for Housing in the Last Year: No    • Number of Places Lived in the Last Year: 5    • Unstable Housing in the Last Year: No        Current Outpatient Medications:   •  acetaminophen (TYLENOL) 325 mg tablet, Take 650 mg by mouth every 4 (four) hours as needed for mild pain, Disp: , Rfl:   •  allopurinol (ZYLOPRIM) 100 mg tablet, Take 1 tablet (100 mg total) by mouth daily, Disp: 30 tablet, Rfl: 0  •  amLODIPine (NORVASC) 10 mg tablet, Take 1 tablet by mouth daily, Disp: , Rfl:   •  Ascorbic Acid (Vitamin C Immune Health) 500 MG CHEW, Chew, Disp: , Rfl:   •  atorvastatin (LIPITOR) 40 mg tablet, Take 40 mg by mouth, Disp: , Rfl:   •  Belbuca 450 MCG FILM, ADMINISTER 1 FILM TO INSIDE OF CHEEK IN THE MORNING AND 1 FILM BEFORE BEDTIME , Disp: , Rfl:   •  Buprenorphine HCl (Belbuca) 300 MCG FILM, Apply 300 mcg to cheek 2 (two) times a day, Disp: , Rfl:   •  carvedilol (COREG) 6 25 mg tablet, Take 6 25 mg by mouth 2 (two) times a day with meals, Disp: , Rfl:   •  colchicine (COLCRYS) 0 6 mg tablet, PLEASE SEE ATTACHED FOR DETAILED DIRECTIONS, Disp: , Rfl:   •  collagenase (SANTYL) ointment, Apply topically daily, Disp: 15 g, Rfl: 0  •  cyclobenzaprine (FLEXERIL) 5 mg tablet, Take 1 tablet (5 mg total) by mouth 3 (three) times a day as needed for muscle spasms (AS NEEDED FOR MUSCLE SPASM) for up to 7 days, Disp: 20 tablet, Rfl: 0  •  docusate sodium (COLACE) 100 mg capsule, TAKE BY MOUTH 1 CAPSULE IN THE MORNING AND 1 CAPSULE BEFORE BEDTIME , Disp: , Rfl:   •  ferrous sulfate 325 (65 Fe) mg tablet, Take 1 tablet (325 mg total) by mouth daily with breakfast Do not start before October 25, 2022 , Disp: , Rfl: 0  •  insulin glargine (LANTUS) 100 units/mL subcutaneous injection, Inject 5 Units under the skin daily at bedtime, Disp: 10 mL, Rfl: 0  •  insulin lispro (HumaLOG) 100 units/mL injection, Inject 2-12 Units under the skin 3 (three) times a day before meals, Disp: , Rfl: 0  •  Lokelma 10 g, , Disp: , Rfl:   •  magnesium hydroxide (MILK OF MAGNESIA) 400 mg/5 mL oral suspension, Take 30 mL by mouth, Disp: , Rfl:   •  Naloxegol Oxalate (Movantik) 12 5 MG TABS, Take 12 5 mg by mouth daily, Disp: , Rfl:   •  NovoLOG FlexPen 100 units/mL injection pen, , Disp: , Rfl:   •  oxyCODONE (OXY-IR) 5 MG capsule, Take 5 mg by mouth every 4 (four) hours as needed for moderate pain or severe pain, Disp: , Rfl:   •  polyethylene glycol (MIRALAX) 17 g packet, Take 17 g by mouth 2 (two) times a day, Disp: , Rfl:   •  pregabalin (LYRICA) 150 mg capsule, Take 150 mg by mouth 2 (two) times a day, Disp: , Rfl:   •  pregabalin (LYRICA) 75 mg capsule, Take 1 capsule (75 mg total) by mouth 2 (two) times a day for 10 days, Disp: 20 capsule, Rfl: 0  •  senna-docusate sodium (SENOKOT S) 8 6-50 mg per tablet, Take 1 tablet by mouth 2 (two) times a day, Disp: , Rfl:   •  sevelamer carbonate (RENVELA) 800 mg tablet, Take 800 mg by mouth in the morning, Disp: , Rfl:   •  sodium phosphate-biphosphate (FLEET) 7-19 g 118 mL enema, Insert 1 enema into the rectum, Disp: , Rfl:   •  Sodium Zirconium Cyclosilicate (Lokelma) 10 g, Take 1 packet by mouth, Disp: , Rfl:   •  tamsulosin (FLOMAX) 0 4 mg, Take by mouth (Patient not taking: Reported on 5/22/2023), Disp: , Rfl:   •  thiamine 100 MG tablet, Take 1 tablet (100 mg total) by mouth daily, Disp: 30 tablet, Rfl: 0  •  tiZANidine (ZANAFLEX) 4 mg tablet, TAKE 1 TABLET BY MOUTH EVERY 8 HOURS AS NEEDED FOR MUSCLE SPASM, Disp: , Rfl:   •  traMADol (ULTRAM) 50 mg tablet, TAKE 1 TABLET BY MOUTH EVERY 6 HOURS AS NEEDED FOR PAIN, MODERATE OR PAIN, SEVERE , Disp: , Rfl:   •  venlafaxine (EFFEXOR-XR) 37 5 mg 24 hr capsule, Take 37 5 mg by mouth 3 (three) times a day, Disp: , Rfl:   Family History   Problem Relation Age of Onset   • Hypertension Father       Review of Systems   Constitutional: Negative for activity change, chills and fever  HENT: Negative  Respiratory: Negative for cough, chest tightness and shortness of breath  Cardiovascular: Negative for chest pain and leg swelling  Endocrine: Negative  Genitourinary: Negative  Musculoskeletal: Positive for back pain and gait problem  Skin: Positive for wound (Left heel)  Neurological: Negative for numbness  Psychiatric/Behavioral: Negative  Negative for agitation and behavioral problems  Objective:  /62   Pulse 66   Temp (!) 96 3 °F (35 7 °C)   Resp 18     Physical Exam  Constitutional:       General: He is not in acute distress  Appearance: Normal appearance  He is not ill-appearing  Cardiovascular:      Comments: Bilateral DP and PT pulses diminished/nonpalpable  Pedal hair is absent  Legs to toes warm to cool  Pulmonary:      Effort: No respiratory distress  Musculoskeletal:         General: No tenderness or deformity  Normal range of motion  Comments: B/L LE are thin and with muscle atrophy   Skin:     Capillary Refill: Capillary refill takes less than 2 seconds  Findings: Lesion (Left posterior heel ulceration with mixed fibrogranular base  No soi noted  One area of deep probe to likely periosteum noted however there is thin layer of soft tissue over  No purulence noted) present  Comments: B/L LE skin is atrophic - thin, dry and shiny in appearance      Neurological:      General: No focal deficit present  Mental Status: He is alert and oriented to person, place, and time  Comments: Gross sensation to feet intact  PN   Psychiatric:         Mood and Affect: Mood normal          Behavior: Behavior normal              Wound 10/20/22 Diabetic Ulcer Heel Left (Active)   Wound Image   06/22/23 1356   Wound Description Pink;Slough; Yellow;Eschar 06/22/23 1404   [place]-wound Assessment Dry;Erythema 06/22/23 1404   Wound Length (cm) 2 cm 06/22/23 1404   Wound Width (cm) 3 5 cm 06/22/23 1404   Wound Depth (cm) 0 4 cm 06/22/23 1404   Wound Surface Area (cm^2) 7 cm^2 06/22/23 1404   Wound Volume (cm^3) 2 8 cm^3 06/22/23 1404   Calculated Wound Volume (cm^3) 2 8 cm^3 06/22/23 1404   Change in Wound Size % 37 64 06/22/23 1404   Drainage Amount Moderate 06/22/23 1404   Drainage Description Serosanguineous 06/22/23 1404   Non-staged Wound Description Full thickness 06/22/23 1404   Treatments Cleansed 05/25/23 1426   Patient Tolerance Tolerated well 05/11/23 1531   Dressing Status Removed 05/11/23 1531       Debridement   Wound 10/20/22 Diabetic Ulcer Heel Left    Universal Protocol:  Consent: Verbal consent obtained    Risks and benefits: risks, benefits and alternatives were discussed  Consent given by: patient  Patient understanding: patient states understanding of the procedure being performed  Patient consent: the patient's understanding of the procedure matches consent given  Patient identity confirmed: verbally with patient      Performed by: physician  Debridement type: selective    Pre-debridement measurements  Length (cm): 2  Width (cm): 3 5  Depth (cm): 0 4  Surface Area (cm^2): 7  Volume (cm^3): 2 8    Post-debridement measurements  Length (cm): 2  Width (cm): 3 5  Depth (cm): 0 4  Percent debrided: 10%  Surface Area (cm^2): 7  Area debrided (cm^2): 0 7  Volume (cm^3): 2 8  Devitalized tissue debrided: fibrin and necrotic debris  Instrument(s) utilized: nippers  Bleeding: small  Hemostasis obtained with: not "applicable  Procedural pain (0-10): insensate  Post-procedural pain: insensate   Response to treatment: procedure was tolerated well                  Wound Instructions:  Orders Placed This Encounter   Procedures   • Debridement     This order was created via procedure documentation         Will Velásquez DPM      Portions of the record may have been created with voice recognition software  Occasional wrong word or \"sound a like\" substitutions may have occurred due to the inherent limitations of voice recognition software  Read the chart carefully and recognize, using context, where substitutions have occurred      "

## 2023-06-22 NOTE — PATIENT INSTRUCTIONS
Orders Placed This Encounter   Procedures    Debridement     This order was created via procedure documentation    Wound cleansing and dressings     Wash your hands with soap and water  Remove old dressing, discard into plastic bag and place in trash  Cleanse the wound with NSS prior to applying a clean dressing  Do not use tissue or cotton balls  Do not scrub the wound  Pat dry using gauze  Shower no       Left Heel Wound:   Apply santyl to the wound bed, Cover with gauze  Secure with emmanuel and paper tape  Change dressing everyday and as needed for excessive drainage  Continue with your globopedi shoe when bearing weight       VNA to continue with dressing changes as ordered       Offload heel with podus boots  You really need to keep pressure off the wound even when in bed  The heel should not be touching any surfaces to decrease the pressure  Right foot: use diabetic gold bond lotion       Next visit in 2-3 weeks at wound care center      Get Xray    Stop smoking     Standing Status:   Future     Standing Expiration Date:   6/22/2024

## 2023-07-05 ENCOUNTER — HOSPITAL ENCOUNTER (OUTPATIENT)
Dept: RADIOLOGY | Facility: CLINIC | Age: 58
Discharge: HOME | End: 2023-07-05
Payer: COMMERCIAL

## 2023-07-05 DIAGNOSIS — L97.428 DIABETIC ULCER OF LEFT HEEL ASSOCIATED WITH TYPE 2 DIABETES MELLITUS, WITH OTHER ULCER SEVERITY (HCC): ICD-10-CM

## 2023-07-05 DIAGNOSIS — E11.621 DIABETIC ULCER OF LEFT HEEL ASSOCIATED WITH TYPE 2 DIABETES MELLITUS, WITH OTHER ULCER SEVERITY (HCC): ICD-10-CM

## 2023-07-05 PROCEDURE — 73650 X-RAY EXAM OF HEEL: CPT

## 2023-07-06 ENCOUNTER — OFFICE VISIT (OUTPATIENT)
Dept: WOUND CARE | Facility: CLINIC | Age: 58
End: 2023-07-06
Payer: COMMERCIAL

## 2023-07-06 VITALS
TEMPERATURE: 97.1 F | DIASTOLIC BLOOD PRESSURE: 70 MMHG | HEART RATE: 80 BPM | RESPIRATION RATE: 16 BRPM | SYSTOLIC BLOOD PRESSURE: 126 MMHG

## 2023-07-06 DIAGNOSIS — L97.428 DIABETIC ULCER OF LEFT HEEL ASSOCIATED WITH TYPE 2 DIABETES MELLITUS, WITH OTHER ULCER SEVERITY (HCC): Primary | ICD-10-CM

## 2023-07-06 DIAGNOSIS — E11.9 DIABETES MELLITUS TYPE 2, INSULIN DEPENDENT (HCC): ICD-10-CM

## 2023-07-06 DIAGNOSIS — Z79.4 DIABETES MELLITUS TYPE 2, INSULIN DEPENDENT (HCC): ICD-10-CM

## 2023-07-06 DIAGNOSIS — E11.621 DIABETIC ULCER OF LEFT HEEL ASSOCIATED WITH TYPE 2 DIABETES MELLITUS, WITH OTHER ULCER SEVERITY (HCC): Primary | ICD-10-CM

## 2023-07-06 DIAGNOSIS — I73.9 PAD (PERIPHERAL ARTERY DISEASE) (HCC): ICD-10-CM

## 2023-07-06 PROCEDURE — 97597 DBRDMT OPN WND 1ST 20 CM/<: CPT | Performed by: STUDENT IN AN ORGANIZED HEALTH CARE EDUCATION/TRAINING PROGRAM

## 2023-07-06 PROCEDURE — 99214 OFFICE O/P EST MOD 30 MIN: CPT | Performed by: STUDENT IN AN ORGANIZED HEALTH CARE EDUCATION/TRAINING PROGRAM

## 2023-07-06 RX ORDER — LIDOCAINE 40 MG/G
CREAM TOPICAL ONCE
Status: COMPLETED | OUTPATIENT
Start: 2023-07-06 | End: 2023-07-06

## 2023-07-06 RX ADMIN — LIDOCAINE: 40 CREAM TOPICAL at 15:04

## 2023-07-06 NOTE — PROGRESS NOTES
Patient ID: Jennifer Trevino is a 62 y.o. male Date of Birth 1965       Chief Complaint   Patient presents with   • Follow Up Wound Care Visit     Follow up visit for palliative wound to left heel         Allergies:  Bupropion, Metformin, and Medical tape    Diagnosis:  1. Diabetic ulcer of left heel associated with type 2 diabetes mellitus, with other ulcer severity (HCC)  -     lidocaine (LMX) 4 % cream    2. PAD (peripheral artery disease) (HCC)  -     lidocaine (LMX) 4 % cream    3. Diabetes mellitus type 2, insulin dependent (HCC)  -     lidocaine (LMX) 4 % cream       Diagnosis ICD-10-CM Associated Orders   1. Diabetic ulcer of left heel associated with type 2 diabetes mellitus, with other ulcer severity ([facility])  E11.621 lidocaine (LMX) 4 % cream    L97.428       2. PAD (peripheral artery disease) ([facility])  I73.9 lidocaine (LMX) 4 % cream      3. Diabetes mellitus type 2, insulin dependent ([facility])  E11.9 lidocaine (LMX) 4 % cream    Z79.4            Assessment & Plan:  See wound orders. Santyl dsd  - Left heel diabetic ulceration appears with mixed fibrous-granular base. No acute SOI noted today and size/depth is with improvement  - LEADs 12/27/22: Diffuse dz B/L throughout fem-pop w/o focal stenosis. RLE diffuse tibioperoneal dz w/ absence of flow to PTA, NC/153/129 within healing. LLE NC/130/100 within healing. Appt 5/22/23 with vascular surgery who recommended monitoring as agram would be high risk due to kidney function  - Advised better blood sugar control and better offloading. I advised him to stop smoking.   - Strict offloading of heels while NWB. Heel unloading shoe recommended with WB.    - Left heel/calc XR images reviewed and independently interpreted (official read pending): no acute osseous erosion noted nor BRUCE  - F/u 2 weeks for recheck; call sooner if issues arise. Possibly order MRI if no improvement.  Will consider graft once wound bed appears more granular however he needs to stop smoking     Subjective:   Nahomy Dobbins presents to wound care today concerning f/u left heel ulceration. Using santyl. Feels well. Has been offloading his heels more. Has heel unloading shoe with ambulation. Still smoking however does express interest in stopping.       The following portions of the patient's history were reviewed and updated as appropriate:   Patient Active Problem List   Diagnosis   • Hyperkalemia   • Diabetes mellitus type 2, insulin dependent ([address])   • Gout   • Essential hypertension   • History of CVA (cerebrovascular accident)   • Osteomyelitis of vertebra of lumbar region [facility])   • Severe protein-calorie malnutrition (HCC)   • Iron deficiency anemia secondary to inadequate dietary iron intake   • Hip pain, acute, left   • Retention of urine   • Hypercalcemia   • Low back pain   • Ambulatory dysfunction   • Stage 3a chronic kidney disease (HCC)   • Chronic ulcer of left heel (HCC)   • Chronic anemia   • RSV infection   • Moderate protein-calorie malnutrition (HCC)   • Chronic back pain   • PAD (peripheral artery disease) (HCC)   • Acute metabolic encephalopathy   • Diabetic foot ulcer (HCC)   • CKD (chronic kidney disease)   • Right knee pain     Past Medical History:   Diagnosis Date   • Chronic kidney disease    • Diabetes mellitus ([address])    • Gout    • Hypertension    • Renal disorder    • Retroperitoneal abscess ([address])    • Stroke ([address])    • UTI (urinary tract infection)    • Vertebral osteomyelitis (HCC)      Past Surgical History:   Procedure Laterality Date   • BACK SURGERY     • ORCHIECTOMY       Social History     Socioeconomic History   • Marital status: /Civil Union     Spouse name: Not on file   • Number of children: Not on file   • Years of education: Not on file   • Highest education level: Not on file   Occupational History   • Not on file   Tobacco Use   • Smoking status: Every Day     Packs/day: 0.25     Types: Cigarettes   • Smokeless tobacco: Never   Vaping Use   • Vaping Use: Never used   Substance and Sexual Activity   • Alcohol use: Not Currently   • Drug use: Yes     Types: Marijuana   • Sexual activity: Not on file   Other Topics Concern   • Not on file   Social History Narrative   • Not on file     Social Determinants of Health     Financial Resource Strain: Not on file   Food Insecurity: No Food Insecurity (3/3/2023)    Hunger Vital Sign    • Worried About Running Out of Food in the Last Year: Never true    • Ran Out of Food in the Last Year: Never true   Transportation Needs: No Transportation Needs (3/3/2023)    PRAPARE - Transportation    • Lack of Transportation (Medical): No    • Lack of Transportation (Non-Medical):  No   Physical Activity: Not on file   Stress: Not on file   Social Connections: Not on file   Intimate Partner Violence: Not on file   Housing Stability: High Risk (3/3/2023)    Housing Stability Vital Sign    • Unable to Pay for Housing in the Last Year: No    • Number of Places Lived in the Last Year: 5    • Unstable Housing in the Last Year: No        Current Outpatient Medications:   •  acetaminophen (TYLENOL) 325 mg tablet, Take 650 mg by mouth every 4 (four) hours as needed for mild pain, Disp: , Rfl:   •  allopurinol (ZYLOPRIM) 100 mg tablet, Take 1 tablet (100 mg total) by mouth daily, Disp: 30 tablet, Rfl: 0  •  amLODIPine (NORVASC) 10 mg tablet, Take 1 tablet by mouth daily, Disp: , Rfl:   •  Ascorbic Acid (Vitamin C Immune Health) 500 MG CHEW, Chew, Disp: , Rfl:   •  atorvastatin (LIPITOR) 40 mg tablet, Take 40 mg by mouth, Disp: , Rfl:   •  Belbuca 450 MCG FILM, ADMINISTER 1 FILM TO INSIDE OF CHEEK IN THE MORNING AND 1 FILM BEFORE BEDTIME., Disp: , Rfl:   •  Buprenorphine HCl (Belbuca) 300 MCG FILM, Apply 300 mcg to cheek 2 (two) times a day, Disp: , Rfl:   •  carvedilol (COREG) 6.25 mg tablet, Take 6.25 mg by mouth 2 (two) times a day with meals, Disp: , Rfl:   •  colchicine (COLCRYS) 0.6 mg tablet, PLEASE SEE ATTACHED FOR DETAILED DIRECTIONS, Disp: , Rfl:   •  collagenase (SANTYL) ointment, Apply topically daily, Disp: 15 g, Rfl: 0  •  cyclobenzaprine (FLEXERIL) 5 mg tablet, Take 1 tablet (5 mg total) by mouth 3 (three) times a day as needed for muscle spasms (AS NEEDED FOR MUSCLE SPASM) for up to 7 days, Disp: 20 tablet, Rfl: 0  •  docusate sodium (COLACE) 100 mg capsule, TAKE BY MOUTH 1 CAPSULE IN THE MORNING AND 1 CAPSULE BEFORE BEDTIME., Disp: , Rfl:   •  ferrous sulfate 325 (65 Fe) mg tablet, Take 1 tablet (325 mg total) by mouth daily with breakfast Do not start before October 25, 2022., Disp: , Rfl: 0  •  insulin glargine (LANTUS) 100 units/mL subcutaneous injection, Inject 5 Units under the skin daily at bedtime, Disp: 10 mL, Rfl: 0  •  insulin lispro (HumaLOG) 100 units/mL injection, Inject 2-12 Units under the skin 3 (three) times a day before meals, Disp: , Rfl: 0  •  Lokelma 10 g, , Disp: , Rfl:   •  magnesium hydroxide (MILK OF MAGNESIA) 400 mg/5 mL oral suspension, Take 30 mL by mouth, Disp: , Rfl:   •  Naloxegol Oxalate (Movantik) 12.5 MG TABS, Take 12.5 mg by mouth daily, Disp: , Rfl:   •  NovoLOG FlexPen 100 units/mL injection pen, , Disp: , Rfl:   •  oxyCODONE (OXY-IR) 5 MG capsule, Take 5 mg by mouth every 4 (four) hours as needed for moderate pain or severe pain, Disp: , Rfl:   •  polyethylene glycol (MIRALAX) 17 g packet, Take 17 g by mouth 2 (two) times a day, Disp: , Rfl:   •  pregabalin (LYRICA) 150 mg capsule, Take 150 mg by mouth 2 (two) times a day, Disp: , Rfl:   •  pregabalin (LYRICA) 75 mg capsule, Take 1 capsule (75 mg total) by mouth 2 (two) times a day for 10 days, Disp: 20 capsule, Rfl: 0  •  senna-docusate sodium (SENOKOT S) 8.6-50 mg per tablet, Take 1 tablet by mouth 2 (two) times a day, Disp: , Rfl:   •  sevelamer carbonate (RENVELA) 800 mg tablet, Take 800 mg by mouth in the morning, Disp: , Rfl:   •  sodium phosphate-biphosphate (FLEET) 7-19 g 118 mL enema, Insert 1 enema into the rectum, Disp: , Rfl:   •  Sodium Zirconium Cyclosilicate (Lokelma) 10 g, Take 1 packet by mouth, Disp: , Rfl:   •  tamsulosin (FLOMAX) 0.4 mg, Take by mouth (Patient not taking: Reported on 5/22/2023), Disp: , Rfl:   •  thiamine 100 MG tablet, Take 1 tablet (100 mg total) by mouth daily, Disp: 30 tablet, Rfl: 0  •  tiZANidine (ZANAFLEX) 4 mg tablet, TAKE 1 TABLET BY MOUTH EVERY 8 HOURS AS NEEDED FOR MUSCLE SPASM, Disp: , Rfl:   •  traMADol (ULTRAM) 50 mg tablet, TAKE 1 TABLET BY MOUTH EVERY 6 HOURS AS NEEDED FOR PAIN, MODERATE OR PAIN, SEVERE., Disp: , Rfl:   •  venlafaxine (EFFEXOR-XR) 37.5 mg 24 hr capsule, Take 37.5 mg by mouth 3 (three) times a day, Disp: , Rfl:     Current Facility-Administered Medications:   •  lidocaine (LMX) 4 % cream, , Topical, Once, Olimpia Booker DPM  Family History   Problem Relation Age of Onset   • Hypertension Father       Review of Systems   Constitutional: Negative for activity change, chills and fever. HENT: Negative. Respiratory: Negative for cough, chest tightness and shortness of breath. Cardiovascular: Negative for chest pain and leg swelling. Endocrine: Negative. Genitourinary: Negative. Musculoskeletal: Positive for back pain and gait problem. Skin: Positive for wound (Left heel). Neurological: Negative for numbness. Psychiatric/Behavioral: Negative. Negative for agitation and behavioral problems. Objective:  /70   Pulse 80   Temp (!) 97.1 °F (36.2 °C) (Tympanic)   Resp 16     Physical Exam  Constitutional:       General: He is not in acute distress. Appearance: Normal appearance. He is not ill-appearing. Cardiovascular:      Comments: Bilateral DP and PT pulses diminished/nonpalpable. Pedal hair is absent. Legs to toes warm to cool. Pulmonary:      Effort: No respiratory distress. Musculoskeletal:         General: No tenderness or deformity. Normal range of motion.       Comments: B/L LE are thin and with muscle atrophy   Skin:     Capillary Refill: Capillary refill takes less than 2 seconds. Findings: Lesion (Left posterior heel ulceration with mixed fibrogranular base. No soi noted. No deep probe however there is thin layer of soft tissue over. No purulence noted) present. Comments: B/L LE skin is atrophic - thin, dry and shiny in appearance. Neurological:      General: No focal deficit present. Mental Status: He is alert and oriented to person, place, and time. Comments: Gross sensation to feet intact. PN   Psychiatric:         Mood and Affect: Mood normal.         Behavior: Behavior normal.             Wound 10/20/22 Diabetic Ulcer Heel Left (Active)   Wound Image    07/06/23 1434   Wound Description Pink;Slough; Yellow;Granulation tissue 07/06/23 1435   [place]-wound Assessment Intact 07/06/23 1435   Wound Length (cm) 1.2 cm 07/06/23 1435   Wound Width (cm) 3.5 cm 07/06/23 1435   Wound Depth (cm) 0.2 cm 07/06/23 1435   Wound Surface Area (cm^2) 4.2 cm^2 07/06/23 1435   Wound Volume (cm^3) 0.84 cm^3 07/06/23 1435   Calculated Wound Volume (cm^3) 0.84 cm^3 07/06/23 1435   Change in Wound Size % 81.29 07/06/23 1435   Drainage Amount Moderate 07/06/23 1435   Drainage Description Serous 07/06/23 1435   Non-staged Wound Description Full thickness 07/06/23 1435   Treatments Irrigation with NSS 07/06/23 1435   Patient Tolerance Tolerated well 07/06/23 1435   Dressing Status Removed 07/06/23 1435       Debridement   Wound 10/20/22 Diabetic Ulcer Heel Left    Universal Protocol:  Consent: Verbal consent obtained.   Risks and benefits: risks, benefits and alternatives were discussed  Consent given by: patient  Patient understanding: patient states understanding of the procedure being performed  Patient consent: the patient's understanding of the procedure matches consent given  Patient identity confirmed: verbally with patient      Performed by: physician  Debridement type: selective    Pre-debridement measurements  Length (cm): 1.2  Width (cm): 3.5  Depth (cm): 0.2  Surface Area (cm^2): 4.2  Volume (cm^3): 0.84    Post-debridement measurements  Length (cm): 1.2  Width (cm): 3.5  Depth (cm): 0.2  Percent debrided: 25%  Surface Area (cm^2): 4.2  Area debrided (cm^2): 1.05  Volume (cm^3): 0.84  Devitalized tissue debrided: biofilm, fibrin and slough  Instrument(s) utilized: blade and forceps  Bleeding: small  Hemostasis obtained with: not applicable  Procedural pain (0-10): insensate  Post-procedural pain: insensate   Response to treatment: procedure was tolerated well                   Wound Instructions:  Orders Placed This Encounter   Procedures   • Debridement     This order was created via procedure documentation         Ezra Greco DPM      Portions of the record may have been created with voice recognition software. Occasional wrong word or "sound a like" substitutions may have occurred due to the inherent limitations of voice recognition software. Read the chart carefully and recognize, using context, where substitutions have occurred.

## 2023-07-06 NOTE — PATIENT INSTRUCTIONS
Orders Placed This Encounter   Procedures    Debridement     This order was created via procedure documentation    Wound cleansing and dressings     Wash your hands with soap and water. Remove old dressing, discard into plastic bag and place in trash. Cleanse the wound with NSS prior to applying a clean dressing. Do not use tissue or cotton balls. Do not scrub the wound. Pat dry using gauze. Shower no       Left Heel Wound:   Apply santyl to the wound bed, Cover with gauze. Secure with emmanuel and paper tape. Change dressing everyday and as needed for excessive drainage. Continue with your globopedi shoe when bearing weight       Offload heel with podus boots. You really need to keep pressure off the wound even when in bed. The heel should not be touching any surfaces to decrease the pressure. Right foot: use diabetic gold bond lotion       Next visit in 2-3 weeks at wound care center. Please consider stopping smoking. If you quit smoking we can consider getting a graft.      Standing Status:   Future     Standing Expiration Date:   7/6/2024

## 2023-07-20 ENCOUNTER — OFFICE VISIT (OUTPATIENT)
Dept: WOUND CARE | Facility: CLINIC | Age: 58
End: 2023-07-20
Payer: COMMERCIAL

## 2023-07-20 VITALS
RESPIRATION RATE: 16 BRPM | TEMPERATURE: 97.2 F | DIASTOLIC BLOOD PRESSURE: 58 MMHG | SYSTOLIC BLOOD PRESSURE: 100 MMHG | HEART RATE: 76 BPM

## 2023-07-20 DIAGNOSIS — Z79.4 DIABETES MELLITUS TYPE 2, INSULIN DEPENDENT (HCC): ICD-10-CM

## 2023-07-20 DIAGNOSIS — E11.621 DIABETIC ULCER OF LEFT HEEL ASSOCIATED WITH TYPE 2 DIABETES MELLITUS, WITH OTHER ULCER SEVERITY (HCC): Primary | ICD-10-CM

## 2023-07-20 DIAGNOSIS — I73.9 PAD (PERIPHERAL ARTERY DISEASE) (HCC): ICD-10-CM

## 2023-07-20 DIAGNOSIS — E11.9 DIABETES MELLITUS TYPE 2, INSULIN DEPENDENT (HCC): ICD-10-CM

## 2023-07-20 DIAGNOSIS — L97.428 DIABETIC ULCER OF LEFT HEEL ASSOCIATED WITH TYPE 2 DIABETES MELLITUS, WITH OTHER ULCER SEVERITY (HCC): Primary | ICD-10-CM

## 2023-07-20 PROCEDURE — 97597 DBRDMT OPN WND 1ST 20 CM/<: CPT | Performed by: STUDENT IN AN ORGANIZED HEALTH CARE EDUCATION/TRAINING PROGRAM

## 2023-07-20 RX ORDER — LIDOCAINE 40 MG/G
CREAM TOPICAL ONCE
Status: COMPLETED | OUTPATIENT
Start: 2023-07-20 | End: 2023-07-20

## 2023-07-20 RX ADMIN — LIDOCAINE 1 APPLICATION: 40 CREAM TOPICAL at 14:22

## 2023-07-20 NOTE — PROGRESS NOTES
Patient ID: Enrike Martin is a 62 y.o. male Date of Birth 1965       Chief Complaint   Patient presents with   • Follow Up Wound Care Visit     Left heel wound       Allergies:  Bupropion, Metformin, and Medical tape    Diagnosis:  1. Diabetic ulcer of left heel associated with type 2 diabetes mellitus, with other ulcer severity ([address])  -     Wound cleansing and dressings; Future  -     lidocaine (LMX) 4 % cream    2. PAD (peripheral artery disease) ([address])    3. Diabetes mellitus type 2, insulin dependent ([address])       Diagnosis ICD-10-CM Associated Orders   1. Diabetic ulcer of left heel associated with type 2 diabetes mellitus, with other ulcer severity ([facility])  E11.621 Wound cleansing and dressings    L97.428 lidocaine (LMX) 4 % cream      2. PAD (peripheral artery disease) ([facility])  I73.9       3. Diabetes mellitus type 2, insulin dependent ([address])  E11.9     Z79.4            Assessment & Plan:  See wound orders. Santyl dsd  - Left heel diabetic ulceration appears with mixed fibrous-granular base. No acute SOI noted today and size/depth is baseline. I selectively debrided today.  - LEADs 12/27/22: Diffuse dz B/L throughout fem-pop w/o focal stenosis. RLE diffuse tibioperoneal dz w/ absence of flow to PTA, NC/153/129 within healing. LLE NC/130/100 within healing. Appt 5/22/23 with vascular surgery who recommended monitoring as agram would be high risk due to kidney function  - Advised better blood sugar control and better offloading. I advised him to stop smoking.   - Strict offloading of heels while NWB. Heel unloading shoe recommended with WB.    - F/u 3 weeks for recheck; call sooner if issues arise. Possibly order MRI if no improvement. Will consider graft once wound bed appears more granular however he needs to stop smoking. New XR next appt. Possible nicotine test if patient stopped smoking.       Subjective:   Bety Baker presents to wound care today concerning f/u left heel ulceration. Using santyl. Feels well. Has been offloading his heels more. Has heel unloading shoe with ambulation. Still smoking however does express interest in stopping.       The following portions of the patient's history were reviewed and updated as appropriate:   Patient Active Problem List   Diagnosis   • Hyperkalemia   • Diabetes mellitus type 2, insulin dependent ([address])   • Gout   • Essential hypertension   • History of CVA (cerebrovascular accident)   • Osteomyelitis of vertebra of lumbar region [facility])   • Severe protein-calorie malnutrition (HCC)   • Iron deficiency anemia secondary to inadequate dietary iron intake   • Hip pain, acute, left   • Retention of urine   • Hypercalcemia   • Low back pain   • Ambulatory dysfunction   • Stage 3a chronic kidney disease (HCC)   • Chronic ulcer of left heel (HCC)   • Chronic anemia   • RSV infection   • Moderate protein-calorie malnutrition (HCC)   • Chronic back pain   • PAD (peripheral artery disease) (HCC)   • Acute metabolic encephalopathy   • Diabetic foot ulcer (HCC)   • CKD (chronic kidney disease)   • Right knee pain     Past Medical History:   Diagnosis Date   • Chronic kidney disease    • Diabetes mellitus ([address])    • Gout    • Hypertension    • Renal disorder    • Retroperitoneal abscess ([address])    • Stroke ([address])    • UTI (urinary tract infection)    • Vertebral osteomyelitis (HCC)      Past Surgical History:   Procedure Laterality Date   • BACK SURGERY     • ORCHIECTOMY       Social History     Socioeconomic History   • Marital status: /Civil Union     Spouse name: Not on file   • Number of children: Not on file   • Years of education: Not on file   • Highest education level: Not on file   Occupational History   • Not on file   Tobacco Use   • Smoking status: Every Day     Packs/day: 0.25     Types: Cigarettes   • Smokeless tobacco: Never   Vaping Use   • Vaping Use: Never used   Substance and Sexual Activity   • Alcohol use: Not Currently   • Drug use: Yes     Types: Marijuana   • Sexual activity: Not on file   Other Topics Concern   • Not on file   Social History Narrative   • Not on file     Social Determinants of Health     Financial Resource Strain: Not on file   Food Insecurity: No Food Insecurity (3/3/2023)    Hunger Vital Sign    • Worried About Running Out of Food in the Last Year: Never true    • Ran Out of Food in the Last Year: Never true   Transportation Needs: No Transportation Needs (3/3/2023)    PRAPARE - Transportation    • Lack of Transportation (Medical): No    • Lack of Transportation (Non-Medical):  No   Physical Activity: Not on file   Stress: Not on file   Social Connections: Not on file   Intimate Partner Violence: Not on file   Housing Stability: High Risk (3/3/2023)    Housing Stability Vital Sign    • Unable to Pay for Housing in the Last Year: No    • Number of Places Lived in the Last Year: 5    • Unstable Housing in the Last Year: No        Current Outpatient Medications:   •  acetaminophen (TYLENOL) 325 mg tablet, Take 650 mg by mouth every 4 (four) hours as needed for mild pain, Disp: , Rfl:   •  allopurinol (ZYLOPRIM) 100 mg tablet, Take 1 tablet (100 mg total) by mouth daily, Disp: 30 tablet, Rfl: 0  •  amLODIPine (NORVASC) 10 mg tablet, Take 1 tablet by mouth daily, Disp: , Rfl:   •  Ascorbic Acid (Vitamin C Immune Health) 500 MG CHEW, Chew, Disp: , Rfl:   •  atorvastatin (LIPITOR) 40 mg tablet, Take 40 mg by mouth, Disp: , Rfl:   •  Belbuca 450 MCG FILM, ADMINISTER 1 FILM TO INSIDE OF CHEEK IN THE MORNING AND 1 FILM BEFORE BEDTIME., Disp: , Rfl:   •  Buprenorphine HCl (Belbuca) 300 MCG FILM, Apply 300 mcg to cheek 2 (two) times a day, Disp: , Rfl:   •  carvedilol (COREG) 6.25 mg tablet, Take 6.25 mg by mouth 2 (two) times a day with meals, Disp: , Rfl:   •  colchicine (COLCRYS) 0.6 mg tablet, PLEASE SEE ATTACHED FOR DETAILED DIRECTIONS, Disp: , Rfl:   •  collagenase (SANTYL) ointment, Apply topically daily, Disp: 15 g, Rfl: 0  •  cyclobenzaprine (FLEXERIL) 5 mg tablet, Take 1 tablet (5 mg total) by mouth 3 (three) times a day as needed for muscle spasms (AS NEEDED FOR MUSCLE SPASM) for up to 7 days, Disp: 20 tablet, Rfl: 0  •  docusate sodium (COLACE) 100 mg capsule, TAKE BY MOUTH 1 CAPSULE IN THE MORNING AND 1 CAPSULE BEFORE BEDTIME., Disp: , Rfl:   •  ferrous sulfate 325 (65 Fe) mg tablet, Take 1 tablet (325 mg total) by mouth daily with breakfast Do not start before October 25, 2022., Disp: , Rfl: 0  •  insulin glargine (LANTUS) 100 units/mL subcutaneous injection, Inject 5 Units under the skin daily at bedtime, Disp: 10 mL, Rfl: 0  •  insulin lispro (HumaLOG) 100 units/mL injection, Inject 2-12 Units under the skin 3 (three) times a day before meals, Disp: , Rfl: 0  •  Lokelma 10 g, , Disp: , Rfl:   •  magnesium hydroxide (MILK OF MAGNESIA) 400 mg/5 mL oral suspension, Take 30 mL by mouth, Disp: , Rfl:   •  Naloxegol Oxalate (Movantik) 12.5 MG TABS, Take 12.5 mg by mouth daily, Disp: , Rfl:   •  NovoLOG FlexPen 100 units/mL injection pen, , Disp: , Rfl:   •  oxyCODONE (OXY-IR) 5 MG capsule, Take 5 mg by mouth every 4 (four) hours as needed for moderate pain or severe pain, Disp: , Rfl:   •  polyethylene glycol (MIRALAX) 17 g packet, Take 17 g by mouth 2 (two) times a day, Disp: , Rfl:   •  pregabalin (LYRICA) 150 mg capsule, Take 150 mg by mouth 2 (two) times a day, Disp: , Rfl:   •  pregabalin (LYRICA) 75 mg capsule, Take 1 capsule (75 mg total) by mouth 2 (two) times a day for 10 days, Disp: 20 capsule, Rfl: 0  •  senna-docusate sodium (SENOKOT S) 8.6-50 mg per tablet, Take 1 tablet by mouth 2 (two) times a day, Disp: , Rfl:   •  sevelamer carbonate (RENVELA) 800 mg tablet, Take 800 mg by mouth in the morning, Disp: , Rfl:   •  sodium phosphate-biphosphate (FLEET) 7-19 g 118 mL enema, Insert 1 enema into the rectum, Disp: , Rfl:   •  Sodium Zirconium Cyclosilicate (Lokelma) 10 g, Take 1 packet by mouth, Disp: , Rfl:   •  tamsulosin (FLOMAX) 0.4 mg, Take by mouth (Patient not taking: Reported on 5/22/2023), Disp: , Rfl:   •  thiamine 100 MG tablet, Take 1 tablet (100 mg total) by mouth daily, Disp: 30 tablet, Rfl: 0  •  tiZANidine (ZANAFLEX) 4 mg tablet, TAKE 1 TABLET BY MOUTH EVERY 8 HOURS AS NEEDED FOR MUSCLE SPASM, Disp: , Rfl:   •  traMADol (ULTRAM) 50 mg tablet, TAKE 1 TABLET BY MOUTH EVERY 6 HOURS AS NEEDED FOR PAIN, MODERATE OR PAIN, SEVERE., Disp: , Rfl:   •  venlafaxine (EFFEXOR-XR) 37.5 mg 24 hr capsule, Take 37.5 mg by mouth 3 (three) times a day, Disp: , Rfl:   No current facility-administered medications for this visit. Family History   Problem Relation Age of Onset   • Hypertension Father       Review of Systems   Constitutional: Negative for activity change, chills and fever. HENT: Negative. Respiratory: Negative for cough, chest tightness and shortness of breath. Cardiovascular: Negative for chest pain and leg swelling. Endocrine: Negative. Genitourinary: Negative. Musculoskeletal: Positive for back pain and gait problem. Skin: Positive for wound (Left heel). Neurological: Negative for numbness. Psychiatric/Behavioral: Negative. Negative for agitation and behavioral problems. Objective:  /58   Pulse 76   Temp (!) 97.2 °F (36.2 °C)   Resp 16     Physical Exam  Constitutional:       General: He is not in acute distress. Appearance: Normal appearance. He is not ill-appearing. Cardiovascular:      Comments: Bilateral DP and PT pulses diminished/nonpalpable. Pedal hair is absent. Legs to toes warm to cool. Pulmonary:      Effort: No respiratory distress. Musculoskeletal:         General: No tenderness or deformity. Normal range of motion. Comments: B/L LE are thin and with muscle atrophy   Skin:     Capillary Refill: Capillary refill takes less than 2 seconds. Findings: Lesion (Left posterior heel ulceration with mixed fibrogranular base. No soi noted.  No deep probe however there is thin layer of soft tissue over calcaenal bone. No purulence noted) present. Comments: B/L LE skin is atrophic - thin, dry and shiny in appearance. Neurological:      General: No focal deficit present. Mental Status: He is alert and oriented to person, place, and time. Comments: Gross sensation to feet intact. PN   Psychiatric:         Mood and Affect: Mood normal.         Behavior: Behavior normal.             Wound 10/20/22 Diabetic Ulcer Heel Left (Active)   Wound Image   07/20/23 1400   Wound Description Granulation tissue; Epithelialization 07/20/23 1401   [place]-wound Assessment Fragile;Scar Tissue 07/20/23 1401   Wound Length (cm) 1.5 cm 07/20/23 1401   Wound Width (cm) 3.5 cm 07/20/23 1401   Wound Depth (cm) 0.1 cm 07/20/23 1401   Wound Surface Area (cm^2) 5.25 cm^2 07/20/23 1401   Wound Volume (cm^3) 0.525 cm^3 07/20/23 1401   Calculated Wound Volume (cm^3) 0.53 cm^3 07/20/23 1401   Change in Wound Size % 88.2 07/20/23 1401   Drainage Amount Small 07/20/23 1401   Drainage Description Serosanguineous; [place] 07/20/23 1401   Non-staged Wound Description Full thickness 07/20/23 1401   Treatments Irrigation with NSS 07/20/23 1401   Patient Tolerance Tolerated well 07/06/23 1435   Dressing Status Removed 07/06/23 1435         Debridement   Wound 10/20/22 Diabetic Ulcer Heel Left    Universal Protocol:  Consent: Verbal consent obtained.   Risks and benefits: risks, benefits and alternatives were discussed  Consent given by: patient  Patient understanding: patient states understanding of the procedure being performed  Patient consent: the patient's understanding of the procedure matches consent given  Patient identity confirmed: verbally with patient      Performed by: physician  Debridement type: selective    Pre-debridement measurements  Length (cm): 1.5  Width (cm): 3.5  Depth (cm): 0.1  Surface Area (cm^2): 5.25  Volume (cm^3): 0.53    Post-debridement measurements  Length (cm): 1.5  Width (cm): 3.5  Depth (cm): 0.1  Percent debrided: 100%  Surface Area (cm^2): 5.25  Area debrided (cm^2): 5.25  Volume (cm^3): 0.53  Devitalized tissue debrided: biofilm and slough  Instrument(s) utilized: blade  Bleeding: small  Hemostasis obtained with: pressure  Procedural pain (0-10): insensate  Post-procedural pain: insensate   Response to treatment: procedure was tolerated well                   Wound Instructions:  Orders Placed This Encounter   Procedures   • Wound cleansing and dressings     Wound cleansing and dressings   Wash your hands with soap and water. Rondel January old dressing, discard into plastic bag and place in trash.     Cleanse the wound with NSS prior to applying a clean dressing. Do not use tissue or cotton balls. Do not scrub the wound. Pat dry using gauze.       Shower no       Left Heel Wound:   Apply santyl to the wound bed, Cover with gauze. Secure with emmanuel and paper tape. Change dressing everyday and as needed for excessive drainage.        Continue with your globopedi shoe when bearing weight       Offload heel with podus boots. You really need to keep pressure off the wound even when in bed.     The heel should not be touching any surfaces to decrease the pressure.       Right foot: use diabetic gold bond lotion       Next visit in 3 weeks at wound care center.      Please consider stopping smoking. If you quit smoking we can consider getting a graft. Increase the dietary protein in your diet. Above dressing was completed today with medihoney. Standing Status:   Future     Standing Expiration Date:   7/20/2024   • Debridement     This order was created via procedure documentation         Monica Lal DPM      Portions of the record may have been created with voice recognition software. Occasional wrong word or "sound a like" substitutions may have occurred due to the inherent limitations of voice recognition software.  Read the chart carefully and recognize, using context, where substitutions have occurred.

## 2023-07-20 NOTE — PATIENT INSTRUCTIONS
Orders Placed This Encounter   Procedures    Wound cleansing and dressings     Wound cleansing and dressings   Wash your hands with soap and water. Remove old dressing, discard into plastic bag and place in trash. Cleanse the wound with NSS prior to applying a clean dressing. Do not use tissue or cotton balls. Do not scrub the wound. Pat dry using gauze. Shower no       Left Heel Wound:   Apply santyl to the wound bed, Cover with gauze. Secure with emmanuel and paper tape. Change dressing everyday and as needed for excessive drainage. Continue with your globopedi shoe when bearing weight       Offload heel with podus boots. You really need to keep pressure off the wound even when in bed. The heel should not be touching any surfaces to decrease the pressure. Right foot: use diabetic gold bond lotion       Next visit in 3 weeks at wound care center. Please consider stopping smoking. If you quit smoking we can consider getting a graft. Increase the dietary protein in your diet. Above dressing was completed today with medihoney.      Standing Status:   Future     Standing Expiration Date:   7/20/2024    Debridement     This order was created via procedure documentation

## 2023-08-10 ENCOUNTER — OFFICE VISIT (OUTPATIENT)
Dept: WOUND CARE | Facility: CLINIC | Age: 58
End: 2023-08-10
Payer: COMMERCIAL

## 2023-08-10 VITALS
TEMPERATURE: 96 F | HEART RATE: 68 BPM | RESPIRATION RATE: 18 BRPM | SYSTOLIC BLOOD PRESSURE: 146 MMHG | DIASTOLIC BLOOD PRESSURE: 78 MMHG

## 2023-08-10 DIAGNOSIS — L97.428 DIABETIC ULCER OF LEFT HEEL ASSOCIATED WITH TYPE 2 DIABETES MELLITUS, WITH OTHER ULCER SEVERITY (HCC): Primary | ICD-10-CM

## 2023-08-10 DIAGNOSIS — Z79.4 DIABETES MELLITUS TYPE 2, INSULIN DEPENDENT (HCC): ICD-10-CM

## 2023-08-10 DIAGNOSIS — E11.621 DIABETIC ULCER OF LEFT HEEL ASSOCIATED WITH TYPE 2 DIABETES MELLITUS, WITH OTHER ULCER SEVERITY (HCC): Primary | ICD-10-CM

## 2023-08-10 DIAGNOSIS — E11.9 DIABETES MELLITUS TYPE 2, INSULIN DEPENDENT (HCC): ICD-10-CM

## 2023-08-10 DIAGNOSIS — I73.9 PAD (PERIPHERAL ARTERY DISEASE) (HCC): ICD-10-CM

## 2023-08-10 PROCEDURE — 97597 DBRDMT OPN WND 1ST 20 CM/<: CPT | Performed by: STUDENT IN AN ORGANIZED HEALTH CARE EDUCATION/TRAINING PROGRAM

## 2023-08-10 RX ORDER — LIDOCAINE 40 MG/G
CREAM TOPICAL ONCE
Status: COMPLETED | OUTPATIENT
Start: 2023-08-10 | End: 2023-08-10

## 2023-08-10 RX ADMIN — LIDOCAINE: 40 CREAM TOPICAL at 14:20

## 2023-08-10 NOTE — PATIENT INSTRUCTIONS
Orders Placed This Encounter   Procedures    Wound cleansing and dressings     Wash your hands with soap and water. Remove old dressing, discard into plastic bag and place in trash. Cleanse the wound with NSS prior to applying a clean dressing. Do not use tissue or cotton balls. Do not scrub the wound. Pat dry using gauze. Shower no       Left Heel Wound:   Betadine paint to periwound  Apply santyl to the wound bed, Cover with gauze. Secure with emmanuel and paper tape. Change dressing everyday and as needed for excessive drainage. Continue with your globopedi shoe when bearing weight       Offload heel with podus boots. You really need to keep pressure off the wound even when in bed. The heel should not be touching any surfaces to decrease the pressure. Right foot: use diabetic gold bond lotion     Obtain repeat xray as ordered before next visit. Next visit in 3 weeks at wound care center. Please consider stopping smoking. If you quit smoking we can consider getting a graft. Increase the dietary protein in your diet. Above dressing was completed today with medihoney. Standing Status:   Future     Standing Expiration Date:   8/10/2024    XR heel / calcaneus 2+ vw left     Standing Status:   Future     Standing Expiration Date:   8/10/2027     Scheduling Instructions:      Bring along any outside films relating to this procedure.

## 2023-08-10 NOTE — PROGRESS NOTES
Patient ID: Hiwot Black is a 62 y.o. male Date of Birth 1965       Chief Complaint   Patient presents with   • Follow Up Wound Care Visit     Left heel wound       Allergies:  Bupropion, Metformin, and Medical tape    Diagnosis:  1. Diabetic ulcer of left heel associated with type 2 diabetes mellitus, with other ulcer severity (HCC)  -     lidocaine (LMX) 4 % cream  -     Wound cleansing and dressings; Future  -     XR heel / calcaneus 2+ vw left; Future; Expected date: 08/10/2023    2. PAD (peripheral artery disease) ([facility])  -     Wound cleansing and dressings; Future    3. Diabetes mellitus type 2, insulin dependent ([facility])  -     Wound cleansing and dressings; Future       Diagnosis ICD-10-CM Associated Orders   1. Diabetic ulcer of left heel associated with type 2 diabetes mellitus, with other ulcer severity ([facility])  E11.621 lidocaine (LMX) 4 % cream    L97.428 Wound cleansing and dressings     XR heel / calcaneus 2+ vw left      2. PAD (peripheral artery disease) ([facility])  I73.9 Wound cleansing and dressings      3. Diabetes mellitus type 2, insulin dependent ([facility])  E11.9 Wound cleansing and dressings    Z79.4            Assessment & Plan:  See wound orders. (Santyl dsd)  - Left heel diabetic ulceration appears with mixed fibrous-granular base. No acute SOI noted today and size/depth has improved a bit. I selectively debrided today. I recommended betadine periwound for maceration.  - LEADs 12/27/22: Diffuse dz B/L throughout fem-pop w/o focal stenosis. RLE diffuse tibioperoneal dz w/ absence of flow to PTA, NC/153/129 within healing. LLE NC/130/100 within healing. Appt 5/22/23 with vascular surgery who recommended monitoring as agram would be high risk due to kidney function  - Advised better blood sugar control and better offloading. I advised him to stop smoking.   - Strict offloading of heels while NWB.  Heel unloading shoe recommended with WB.   - Left calc XR ordered   - F/u 3 weeks for recheck; call sooner if issues arise. Possibly order MRI if no improvement. Would consider graft once wound bed however he needs to stop smoking. Possible nicotine test if patient stopped smoking. Subjective:   Jessie Taylor presents to wound care today concerning f/u left heel ulceration. Using santyl. Feels well. Has been offloading his heels more. Has heel unloading shoe with ambulation. Still smoking however does express interest in stopping.       The following portions of the patient's history were reviewed and updated as appropriate:   Patient Active Problem List   Diagnosis   • Hyperkalemia   • Diabetes mellitus type 2, insulin dependent ([address])   • Gout   • Essential hypertension   • History of CVA (cerebrovascular accident)   • Osteomyelitis of vertebra of lumbar region [facility])   • Severe protein-calorie malnutrition (HCC)   • Iron deficiency anemia secondary to inadequate dietary iron intake   • Hip pain, acute, left   • Retention of urine   • Hypercalcemia   • Low back pain   • Ambulatory dysfunction   • Stage 3a chronic kidney disease (HCC)   • Chronic ulcer of left heel (HCC)   • Chronic anemia   • RSV infection   • Moderate protein-calorie malnutrition (HCC)   • Chronic back pain   • PAD (peripheral artery disease) (HCC)   • Acute metabolic encephalopathy   • Diabetic foot ulcer (HCC)   • CKD (chronic kidney disease)   • Right knee pain     Past Medical History:   Diagnosis Date   • Chronic kidney disease    • Diabetes mellitus ([address])    • Gout    • Hypertension    • Renal disorder    • Retroperitoneal abscess ([address])    • Stroke ([address])    • UTI (urinary tract infection)    • Vertebral osteomyelitis ([address])      Past Surgical History:   Procedure Laterality Date   • BACK SURGERY     • ORCHIECTOMY       Social History     Socioeconomic History   • Marital status: /Civil Union     Spouse name: None   • Number of children: None   • Years of education: None   • Highest education level: None   Occupational History   • None Tobacco Use   • Smoking status: Every Day     Packs/day: 0.25     Types: Cigarettes   • Smokeless tobacco: Never   Vaping Use   • Vaping Use: Never used   Substance and Sexual Activity   • Alcohol use: Not Currently   • Drug use: Yes     Types: Marijuana   • Sexual activity: None   Other Topics Concern   • None   Social History Narrative   • None     Social Determinants of Health     Financial Resource Strain: Not on file   Food Insecurity: No Food Insecurity (3/3/2023)    Hunger Vital Sign    • Worried About Running Out of Food in the Last Year: Never true    • Ran Out of Food in the Last Year: Never true   Transportation Needs: No Transportation Needs (3/3/2023)    PRAPARE - Transportation    • Lack of Transportation (Medical): No    • Lack of Transportation (Non-Medical):  No   Physical Activity: Not on file   Stress: Not on file   Social Connections: Not on file   Intimate Partner Violence: Not on file   Housing Stability: High Risk (3/3/2023)    Housing Stability Vital Sign    • Unable to Pay for Housing in the Last Year: No    • Number of Places Lived in the Last Year: 5    • Unstable Housing in the Last Year: No        Current Outpatient Medications:   •  acetaminophen (TYLENOL) 325 mg tablet, Take 650 mg by mouth every 4 (four) hours as needed for mild pain, Disp: , Rfl:   •  allopurinol (ZYLOPRIM) 100 mg tablet, Take 1 tablet (100 mg total) by mouth daily, Disp: 30 tablet, Rfl: 0  •  amLODIPine (NORVASC) 10 mg tablet, Take 1 tablet by mouth daily, Disp: , Rfl:   •  Ascorbic Acid (Vitamin C Immune Health) 500 MG CHEW, Chew, Disp: , Rfl:   •  atorvastatin (LIPITOR) 40 mg tablet, Take 40 mg by mouth, Disp: , Rfl:   •  Belbuca 450 MCG FILM, ADMINISTER 1 FILM TO INSIDE OF CHEEK IN THE MORNING AND 1 FILM BEFORE BEDTIME., Disp: , Rfl:   •  Buprenorphine HCl (Belbuca) 300 MCG FILM, Apply 300 mcg to cheek 2 (two) times a day, Disp: , Rfl:   •  carvedilol (COREG) 6.25 mg tablet, Take 6.25 mg by mouth 2 (two) times a day with meals, Disp: , Rfl:   •  colchicine (COLCRYS) 0.6 mg tablet, PLEASE SEE ATTACHED FOR DETAILED DIRECTIONS, Disp: , Rfl:   •  collagenase (SANTYL) ointment, Apply topically daily, Disp: 15 g, Rfl: 1  •  cyclobenzaprine (FLEXERIL) 5 mg tablet, Take 1 tablet (5 mg total) by mouth 3 (three) times a day as needed for muscle spasms (AS NEEDED FOR MUSCLE SPASM) for up to 7 days, Disp: 20 tablet, Rfl: 0  •  docusate sodium (COLACE) 100 mg capsule, TAKE BY MOUTH 1 CAPSULE IN THE MORNING AND 1 CAPSULE BEFORE BEDTIME., Disp: , Rfl:   •  ferrous sulfate 325 (65 Fe) mg tablet, Take 1 tablet (325 mg total) by mouth daily with breakfast Do not start before October 25, 2022., Disp: , Rfl: 0  •  insulin glargine (LANTUS) 100 units/mL subcutaneous injection, Inject 5 Units under the skin daily at bedtime, Disp: 10 mL, Rfl: 0  •  insulin lispro (HumaLOG) 100 units/mL injection, Inject 2-12 Units under the skin 3 (three) times a day before meals, Disp: , Rfl: 0  •  Lokelma 10 g, , Disp: , Rfl:   •  magnesium hydroxide (MILK OF MAGNESIA) 400 mg/5 mL oral suspension, Take 30 mL by mouth, Disp: , Rfl:   •  Naloxegol Oxalate (Movantik) 12.5 MG TABS, Take 12.5 mg by mouth daily, Disp: , Rfl:   •  NovoLOG FlexPen 100 units/mL injection pen, , Disp: , Rfl:   •  oxyCODONE (OXY-IR) 5 MG capsule, Take 5 mg by mouth every 4 (four) hours as needed for moderate pain or severe pain, Disp: , Rfl:   •  polyethylene glycol (MIRALAX) 17 g packet, Take 17 g by mouth 2 (two) times a day, Disp: , Rfl:   •  pregabalin (LYRICA) 150 mg capsule, Take 150 mg by mouth 2 (two) times a day, Disp: , Rfl:   •  pregabalin (LYRICA) 75 mg capsule, Take 1 capsule (75 mg total) by mouth 2 (two) times a day for 10 days, Disp: 20 capsule, Rfl: 0  •  senna-docusate sodium (SENOKOT S) 8.6-50 mg per tablet, Take 1 tablet by mouth 2 (two) times a day, Disp: , Rfl:   •  sevelamer carbonate (RENVELA) 800 mg tablet, Take 800 mg by mouth in the morning, Disp: , Rfl:   • sodium phosphate-biphosphate (FLEET) 7-19 g 118 mL enema, Insert 1 enema into the rectum, Disp: , Rfl:   •  Sodium Zirconium Cyclosilicate (Lokelma) 10 g, Take 1 packet by mouth, Disp: , Rfl:   •  tamsulosin (FLOMAX) 0.4 mg, Take by mouth (Patient not taking: Reported on 5/22/2023), Disp: , Rfl:   •  thiamine 100 MG tablet, Take 1 tablet (100 mg total) by mouth daily, Disp: 30 tablet, Rfl: 0  •  tiZANidine (ZANAFLEX) 4 mg tablet, TAKE 1 TABLET BY MOUTH EVERY 8 HOURS AS NEEDED FOR MUSCLE SPASM, Disp: , Rfl:   •  traMADol (ULTRAM) 50 mg tablet, TAKE 1 TABLET BY MOUTH EVERY 6 HOURS AS NEEDED FOR PAIN, MODERATE OR PAIN, SEVERE., Disp: , Rfl:   •  venlafaxine (EFFEXOR-XR) 37.5 mg 24 hr capsule, Take 37.5 mg by mouth 3 (three) times a day, Disp: , Rfl:   No current facility-administered medications for this visit. Family History   Problem Relation Age of Onset   • Hypertension Father       Review of Systems   Constitutional: Negative for activity change, chills and fever. HENT: Negative. Respiratory: Negative for cough, chest tightness and shortness of breath. Cardiovascular: Negative for chest pain and leg swelling. Endocrine: Negative. Genitourinary: Negative. Musculoskeletal: Positive for back pain and gait problem. Skin: Positive for wound (Left heel). Neurological: Negative for numbness. Psychiatric/Behavioral: Negative. Negative for agitation and behavioral problems. Objective:  /78   Pulse 68   Temp (!) 96 °F (35.6 °C)   Resp 18     Physical Exam  Constitutional:       General: He is not in acute distress. Appearance: Normal appearance. He is not ill-appearing. Cardiovascular:      Comments: Bilateral DP and PT pulses diminished/nonpalpable. Pedal hair is absent. Legs to toes warm to cool. Pulmonary:      Effort: No respiratory distress. Musculoskeletal:         General: No tenderness or deformity. Normal range of motion.       Comments: B/L LE are thin and with muscle atrophy   Skin:     Capillary Refill: Capillary refill takes less than 2 seconds. Findings: Lesion (Left posterior heel ulceration with mixed fibrogranular base and mild periwound maceration. No soi noted. No deep probe however there is thin layer of soft tissue over calcaenal bone. No purulence noted) present. Comments: B/L LE skin is atrophic - thin, dry and shiny in appearance. Neurological:      General: No focal deficit present. Mental Status: He is alert and oriented to person, place, and time. Comments: Gross sensation to feet intact. PN   Psychiatric:         Mood and Affect: Mood normal.         Behavior: Behavior normal.             Wound 10/20/22 Diabetic Ulcer Heel Left (Active)   Wound Image   08/10/23 1410   Wound Description Granulation tissue; Epithelialization;Slough 08/10/23 1415   [place]-wound Assessment Fragile;Scar Tissue; Maceration 08/10/23 1415   Wound Length (cm) 1.4 cm 08/10/23 1415   Wound Width (cm) 3 cm 08/10/23 1415   Wound Depth (cm) 0.1 cm 08/10/23 1415   Wound Surface Area (cm^2) 4.2 cm^2 08/10/23 1415   Wound Volume (cm^3) 0.42 cm^3 08/10/23 1415   Calculated Wound Volume (cm^3) 0.42 cm^3 08/10/23 1415   Change in Wound Size % 90.65 08/10/23 1415   Drainage Amount Small 08/10/23 1415   Drainage Description Serosanguineous; [place] 08/10/23 1415   Non-staged Wound Description Full thickness 08/10/23 1415   Treatments Cleansed 08/10/23 1415   Patient Tolerance Tolerated well 07/06/23 1435   Dressing Status Removed 07/06/23 1435             Debridement   Wound 10/20/22 Diabetic Ulcer Heel Left    Universal Protocol:  Consent: Verbal consent obtained.   Risks and benefits: risks, benefits and alternatives were discussed  Consent given by: patient  Patient understanding: patient states understanding of the procedure being performed  Patient consent: the patient's understanding of the procedure matches consent given  Patient identity confirmed: verbally with patient      Performed by: physician  Debridement type: selective    Pre-debridement measurements  Length (cm): 1.4  Width (cm): 3  Depth (cm): 0.1  Surface Area (cm^2): 4.2  Volume (cm^3): 0.42      Post-debridement measurements  Length (cm): 1.4  Width (cm): 3  Depth (cm): 0.1  Percent debrided: 100%  Surface Area (cm^2): 4.2  Area debrided (cm^2): 4.2  Volume (cm^3): 0.42    Devitalized tissue debrided: biofilm and slough  Instrument(s) utilized: blade  Bleeding: small  Hemostasis obtained with: pressure  Procedural pain (0-10): insensate  Post-procedural pain: insensate   Response to treatment: procedure was tolerated well                   Wound Instructions:  Orders Placed This Encounter   Procedures   • Wound cleansing and dressings     Wash your hands with soap and water.  Remove old dressing, discard into plastic bag and place in trash.     Cleanse the wound with NSS prior to applying a clean dressing. Do not use tissue or cotton balls. Do not scrub the wound. Pat dry using gauze.       Shower no       Left Heel Wound:   Apply santyl to the wound bed, Cover with gauze. Secure with emmanuel and paper tape. Change dressing everyday and as needed for excessive drainage.        Continue with your globopedi shoe when bearing weight       Offload heel with podus boots. You really need to keep pressure off the wound even when in bed.     The heel should not be touching any surfaces to decrease the pressure.       Right foot: use diabetic gold bond lotion       Next visit in 3 weeks at wound care center.      Please consider stopping smoking. If you quit smoking we can consider getting a graft.     Increase the dietary protein in your diet.     Above dressing was completed today with medihoney.      Standing Status:   Future     Standing Expiration Date:   8/10/2024   • XR heel / calcaneus 2+ vw left     Standing Status:   Future     Standing Expiration Date:   8/10/2027     Scheduling Instructions:      Bring along any outside films relating to this procedure. Vanna Stamp, DPM      Portions of the record may have been created with voice recognition software. Occasional wrong word or "sound a like" substitutions may have occurred due to the inherent limitations of voice recognition software. Read the chart carefully and recognize, using context, where substitutions have occurred.

## 2023-08-22 NOTE — ASSESSMENT & PLAN NOTE
Medical Necessity Clause: This procedure was medically necessary because the lesion that was treated was: · History of hypercalcemia-was recently worked up while inpatient in July at [facility]  · Workup negative, thought to be due to immobilization - PTH intact this admission wnl  · Will treat with fluid and monitor, improving

## 2023-08-31 ENCOUNTER — OFFICE VISIT (OUTPATIENT)
Dept: WOUND CARE | Facility: CLINIC | Age: 58
End: 2023-08-31
Payer: COMMERCIAL

## 2023-08-31 VITALS
RESPIRATION RATE: 18 BRPM | TEMPERATURE: 97.8 F | HEART RATE: 62 BPM | DIASTOLIC BLOOD PRESSURE: 48 MMHG | SYSTOLIC BLOOD PRESSURE: 108 MMHG

## 2023-08-31 DIAGNOSIS — Z79.4 DIABETES MELLITUS TYPE 2, INSULIN DEPENDENT (HCC): ICD-10-CM

## 2023-08-31 DIAGNOSIS — E11.621 DIABETIC ULCER OF LEFT HEEL ASSOCIATED WITH TYPE 2 DIABETES MELLITUS, WITH OTHER ULCER SEVERITY (HCC): Primary | ICD-10-CM

## 2023-08-31 DIAGNOSIS — I73.9 PAD (PERIPHERAL ARTERY DISEASE) (HCC): ICD-10-CM

## 2023-08-31 DIAGNOSIS — E11.9 DIABETES MELLITUS TYPE 2, INSULIN DEPENDENT (HCC): ICD-10-CM

## 2023-08-31 DIAGNOSIS — L97.428 DIABETIC ULCER OF LEFT HEEL ASSOCIATED WITH TYPE 2 DIABETES MELLITUS, WITH OTHER ULCER SEVERITY (HCC): Primary | ICD-10-CM

## 2023-08-31 PROCEDURE — 97597 DBRDMT OPN WND 1ST 20 CM/<: CPT | Performed by: STUDENT IN AN ORGANIZED HEALTH CARE EDUCATION/TRAINING PROGRAM

## 2023-08-31 RX ORDER — LIDOCAINE 40 MG/G
CREAM TOPICAL ONCE
Status: COMPLETED | OUTPATIENT
Start: 2023-08-31 | End: 2023-08-31

## 2023-08-31 RX ADMIN — LIDOCAINE: 40 CREAM TOPICAL at 13:26

## 2023-08-31 NOTE — PATIENT INSTRUCTIONS
Orders Placed This Encounter   Procedures    Wound cleansing and dressings     Wound cleansing and dressings       Wash your hands with soap and water. Remove old dressing, discard into plastic bag and place in trash. Cleanse the wound with NSS prior to applying a clean dressing. Do not use tissue or cotton balls. Do not scrub the wound. Pat dry using gauze. Shower no       Left Heel Wound:   Apply santyl to the wound bed, Cover with gauze. Secure with emmanuel and paper tape. Change dressing everyday and as needed for excessive drainage. Continue with your globopedi shoe when bearing weight       Offload heel with podus boots. You really need to keep pressure off the wound even when in bed. The heel should not be touching any surfaces to decrease the pressure. Right foot: use diabetic gold bond lotion      Please consider stopping smoking. If you quit smoking we can consider getting a graft. Increase the dietary protein in your diet.      Above dressing was completed today with medihoney    obtain X-Ray that is ordered     Follow up 4 weeks       Standing Status:   Future     Standing Expiration Date:   8/31/2024

## 2023-08-31 NOTE — PROGRESS NOTES
Patient ID: Zafar Gunter is a 62 y.o. male Date of Birth 1965       Chief Complaint   Patient presents with   • Follow Up Wound Care Visit     Left heel wound       Allergies:  Bupropion, Metformin, and Medical tape    Diagnosis:  1. Diabetic ulcer of left heel associated with type 2 diabetes mellitus, with other ulcer severity (HCC)  -     lidocaine (LMX) 4 % cream  -     Wound cleansing and dressings; Future; Expected date: 08/31/2023    2. PAD (peripheral artery disease) ([address])    3. Diabetes mellitus type 2, insulin dependent ([address])       Diagnosis ICD-10-CM Associated Orders   1. Diabetic ulcer of left heel associated with type 2 diabetes mellitus, with other ulcer severity (HCC)  E11.621 lidocaine (LMX) 4 % cream    L97.428 Wound cleansing and dressings      2. PAD (peripheral artery disease) ([facility])  I73.9       3. Diabetes mellitus type 2, insulin dependent ([address])  E11.9     Z79.4            Assessment & Plan:  See wound orders. (Santyl dsd)   - Left heel diabetic ulceration appears with mixed fibrous-granular base. No acute SOI noted today and size/depth w/o improvement today. I selectively debrided today. This is a palliative care wound (due to smoking - noncompliance w/ quitting)  - LEADs 12/27/22: Diffuse dz B/L throughout fem-pop w/o focal stenosis. RLE diffuse tibioperoneal dz w/ absence of flow to PTA, NC/153/129 within healing. LLE NC/130/100 within healing. Appt 5/22/23 with vascular surgery who recommended monitoring as agram would be high risk due to kidney function  - Advised better blood sugar control and better offloading. I advised him to stop smoking which he does not express want to.   - Strict offloading of heels while NWB. Heel unloading shoe recommended with WB.   - Left calc XR pending   - F/u 4 weeks for recheck; call sooner if issues arise.  Possibly order MRI if no improvement or acutely decompensates.     Subjective:   Candelaria Rival presents to wound care today concerning f/u left heel ulceration. Using santyl. Feels well. Has been offloading his heels more. Has heel unloading shoe with ambulation. Still smoking, does not express interest in stopping.       The following portions of the patient's history were reviewed and updated as appropriate:   Patient Active Problem List   Diagnosis   • Hyperkalemia   • Diabetes mellitus type 2, insulin dependent ([address])   • Gout   • Essential hypertension   • History of CVA (cerebrovascular accident)   • Osteomyelitis of vertebra of lumbar region [facility])   • Severe protein-calorie malnutrition (HCC)   • Iron deficiency anemia secondary to inadequate dietary iron intake   • Hip pain, acute, left   • Retention of urine   • Hypercalcemia   • Low back pain   • Ambulatory dysfunction   • Stage 3a chronic kidney disease (HCC)   • Chronic ulcer of left heel (HCC)   • Chronic anemia   • RSV infection   • Moderate protein-calorie malnutrition (HCC)   • Chronic back pain   • PAD (peripheral artery disease) (HCC)   • Acute metabolic encephalopathy   • Diabetic foot ulcer (HCC)   • CKD (chronic kidney disease)   • Right knee pain     Past Medical History:   Diagnosis Date   • Chronic kidney disease    • Diabetes mellitus ([address])    • Gout    • Hypertension    • Renal disorder    • Retroperitoneal abscess ([address])    • Stroke ([address])    • UTI (urinary tract infection)    • Vertebral osteomyelitis (HCC)      Past Surgical History:   Procedure Laterality Date   • BACK SURGERY     • ORCHIECTOMY       Social History     Socioeconomic History   • Marital status: /Civil Union     Spouse name: Not on file   • Number of children: Not on file   • Years of education: Not on file   • Highest education level: Not on file   Occupational History   • Not on file   Tobacco Use   • Smoking status: Every Day     Packs/day: 0.25     Types: Cigarettes   • Smokeless tobacco: Never   Vaping Use   • Vaping Use: Never used   Substance and Sexual Activity   • Alcohol use: Not Currently   • Drug use: Yes     Types: Marijuana   • Sexual activity: Not on file   Other Topics Concern   • Not on file   Social History Narrative   • Not on file     Social Determinants of Health     Financial Resource Strain: Not on file   Food Insecurity: No Food Insecurity (3/3/2023)    Hunger Vital Sign    • Worried About Running Out of Food in the Last Year: Never true    • Ran Out of Food in the Last Year: Never true   Transportation Needs: No Transportation Needs (3/3/2023)    PRAPARE - Transportation    • Lack of Transportation (Medical): No    • Lack of Transportation (Non-Medical):  No   Physical Activity: Not on file   Stress: Not on file   Social Connections: Not on file   Intimate Partner Violence: Not on file   Housing Stability: High Risk (3/3/2023)    Housing Stability Vital Sign    • Unable to Pay for Housing in the Last Year: No    • Number of Places Lived in the Last Year: 5    • Unstable Housing in the Last Year: No        Current Outpatient Medications:   •  acetaminophen (TYLENOL) 325 mg tablet, Take 650 mg by mouth every 4 (four) hours as needed for mild pain, Disp: , Rfl:   •  allopurinol (ZYLOPRIM) 100 mg tablet, Take 1 tablet (100 mg total) by mouth daily, Disp: 30 tablet, Rfl: 0  •  amLODIPine (NORVASC) 10 mg tablet, Take 1 tablet by mouth daily, Disp: , Rfl:   •  Ascorbic Acid (Vitamin C Immune Health) 500 MG CHEW, Chew, Disp: , Rfl:   •  atorvastatin (LIPITOR) 40 mg tablet, Take 40 mg by mouth, Disp: , Rfl:   •  Belbuca 450 MCG FILM, ADMINISTER 1 FILM TO INSIDE OF CHEEK IN THE MORNING AND 1 FILM BEFORE BEDTIME., Disp: , Rfl:   •  Buprenorphine HCl (Belbuca) 300 MCG FILM, Apply 300 mcg to cheek 2 (two) times a day, Disp: , Rfl:   •  carvedilol (COREG) 6.25 mg tablet, Take 6.25 mg by mouth 2 (two) times a day with meals, Disp: , Rfl:   •  colchicine (COLCRYS) 0.6 mg tablet, PLEASE SEE ATTACHED FOR DETAILED DIRECTIONS, Disp: , Rfl:   •  collagenase (SANTYL) ointment, Apply topically daily, Disp: 15 g, Rfl: 1  • cyclobenzaprine (FLEXERIL) 5 mg tablet, Take 1 tablet (5 mg total) by mouth 3 (three) times a day as needed for muscle spasms (AS NEEDED FOR MUSCLE SPASM) for up to 7 days, Disp: 20 tablet, Rfl: 0  •  docusate sodium (COLACE) 100 mg capsule, TAKE BY MOUTH 1 CAPSULE IN THE MORNING AND 1 CAPSULE BEFORE BEDTIME., Disp: , Rfl:   •  ferrous sulfate 325 (65 Fe) mg tablet, Take 1 tablet (325 mg total) by mouth daily with breakfast Do not start before October 25, 2022., Disp: , Rfl: 0  •  insulin glargine (LANTUS) 100 units/mL subcutaneous injection, Inject 5 Units under the skin daily at bedtime, Disp: 10 mL, Rfl: 0  •  insulin lispro (HumaLOG) 100 units/mL injection, Inject 2-12 Units under the skin 3 (three) times a day before meals, Disp: , Rfl: 0  •  Lokelma 10 g, , Disp: , Rfl:   •  magnesium hydroxide (MILK OF MAGNESIA) 400 mg/5 mL oral suspension, Take 30 mL by mouth, Disp: , Rfl:   •  Naloxegol Oxalate (Movantik) 12.5 MG TABS, Take 12.5 mg by mouth daily, Disp: , Rfl:   •  NovoLOG FlexPen 100 units/mL injection pen, , Disp: , Rfl:   •  oxyCODONE (OXY-IR) 5 MG capsule, Take 5 mg by mouth every 4 (four) hours as needed for moderate pain or severe pain, Disp: , Rfl:   •  polyethylene glycol (MIRALAX) 17 g packet, Take 17 g by mouth 2 (two) times a day, Disp: , Rfl:   •  pregabalin (LYRICA) 150 mg capsule, Take 150 mg by mouth 2 (two) times a day, Disp: , Rfl:   •  pregabalin (LYRICA) 75 mg capsule, Take 1 capsule (75 mg total) by mouth 2 (two) times a day for 10 days, Disp: 20 capsule, Rfl: 0  •  senna-docusate sodium (SENOKOT S) 8.6-50 mg per tablet, Take 1 tablet by mouth 2 (two) times a day, Disp: , Rfl:   •  sevelamer carbonate (RENVELA) 800 mg tablet, Take 800 mg by mouth in the morning, Disp: , Rfl:   •  sodium phosphate-biphosphate (FLEET) 7-19 g 118 mL enema, Insert 1 enema into the rectum, Disp: , Rfl:   •  Sodium Zirconium Cyclosilicate (Lokelma) 10 g, Take 1 packet by mouth, Disp: , Rfl:   •  tamsulosin [facility]) 0.4 mg, Take by mouth (Patient not taking: Reported on 5/22/2023), Disp: , Rfl:   •  thiamine 100 MG tablet, Take 1 tablet (100 mg total) by mouth daily, Disp: 30 tablet, Rfl: 0  •  tiZANidine (ZANAFLEX) 4 mg tablet, TAKE 1 TABLET BY MOUTH EVERY 8 HOURS AS NEEDED FOR MUSCLE SPASM, Disp: , Rfl:   •  traMADol (ULTRAM) 50 mg tablet, TAKE 1 TABLET BY MOUTH EVERY 6 HOURS AS NEEDED FOR PAIN, MODERATE OR PAIN, SEVERE., Disp: , Rfl:   •  venlafaxine (EFFEXOR-XR) 37.5 mg 24 hr capsule, Take 37.5 mg by mouth 3 (three) times a day, Disp: , Rfl:   No current facility-administered medications for this visit. Family History   Problem Relation Age of Onset   • Hypertension Father       Review of Systems   Constitutional: Negative for activity change, chills and fever. HENT: Negative. Respiratory: Negative for cough, chest tightness and shortness of breath. Cardiovascular: Negative for chest pain and leg swelling. Endocrine: Negative. Genitourinary: Negative. Musculoskeletal: Positive for back pain and gait problem. Skin: Positive for wound (Left heel). Neurological: Negative for numbness. Psychiatric/Behavioral: Negative. Negative for agitation and behavioral problems. Objective:  BP (!) 108/48   Pulse 62   Temp 97.8 °F (36.6 °C) (Tympanic)   Resp 18     Physical Exam  Constitutional:       General: He is not in acute distress. Appearance: Normal appearance. He is not ill-appearing. Cardiovascular:      Comments: Bilateral DP and PT pulses diminished/nonpalpable. Pedal hair is absent. Legs to toes warm to cool. Pulmonary:      Effort: No respiratory distress. Musculoskeletal:         General: No tenderness or deformity. Normal range of motion. Comments: B/L LE are thin and with muscle atrophy   Skin:     Capillary Refill: Capillary refill takes less than 2 seconds.       Findings: Lesion (Left posterior heel ulceration with mixed fibrogranular base and mild periwound maceration. No soi noted. No deep probe however there is thin layer of soft tissue over calcaenal bone. No purulence noted) present. Comments: B/L LE skin is atrophic - thin, dry and shiny in appearance. Neurological:      General: No focal deficit present. Mental Status: He is alert and oriented to person, place, and time. Comments: Gross sensation to feet intact. PN   Psychiatric:         Mood and Affect: Mood normal.         Behavior: Behavior normal.             Wound 10/20/22 Diabetic Ulcer Heel Left (Active)   Wound Image   08/31/23 1317   Wound Description Granulation tissue; Epithelialization;Slough;Pink;Yellow 08/31/23 1315   [place]-wound Assessment Dry;Scar Tissue;Callus 08/31/23 1315   Wound Length (cm) 1.5 cm 08/31/23 1315   Wound Width (cm) 2.6 cm 08/31/23 1315   Wound Depth (cm) 0.1 cm 08/31/23 1315   Wound Surface Area (cm^2) 3.9 cm^2 08/31/23 1315   Wound Volume (cm^3) 0.39 cm^3 08/31/23 1315   Calculated Wound Volume (cm^3) 0.39 cm^3 08/31/23 1315   Change in Wound Size % 91.31 08/31/23 1315   Undermining 0.2 08/31/23 1315   Undermining is depth extending from 6-8 o'clock 08/31/23 1315   Drainage Amount Moderate 08/31/23 1315   Drainage Description Serosanguineous 08/31/23 1315   Non-staged Wound Description Full thickness 08/31/23 1315   Treatments Cleansed 08/31/23 1315   Patient Tolerance Tolerated well 07/06/23 1435   Dressing Status Removed 08/31/23 1315                         Debridement   Wound 10/20/22 Diabetic Ulcer Heel Left    Universal Protocol:  Consent: Verbal consent obtained.   Risks and benefits: risks, benefits and alternatives were discussed  Consent given by: patient  Patient understanding: patient states understanding of the procedure being performed  Patient consent: the patient's understanding of the procedure matches consent given  Patient identity confirmed: verbally with patient      Performed by: physician  Debridement type: selective    Pre-debridement measurements  Length (cm): 1.5  Width (cm): 2.6  Depth (cm): 0.1  Surface Area (cm^2): 3.9  Volume (cm^3): 0.39    Post-debridement measurements  Length (cm): 1.5  Width (cm): 2.6  Depth (cm): 0.1  Percent debrided: 100%  Surface Area (cm^2): 3.9  Area debrided (cm^2): 3.9  Volume (cm^3): 0.39  Devitalized tissue debrided: biofilm  Instrument(s) utilized: blade  Bleeding: small  Hemostasis obtained with: pressure  Procedural pain (0-10): insensate  Post-procedural pain: insensate   Response to treatment: procedure was tolerated well                   Wound Instructions:  Orders Placed This Encounter   Procedures   • Wound cleansing and dressings     Wound cleansing and dressings       Wash your hands with soap and water. Gabriela Eugenia old dressing, discard into plastic bag and place in trash.     Cleanse the wound with NSS prior to applying a clean dressing. Do not use tissue or cotton balls. Do not scrub the wound. Pat dry using gauze.       Shower no       Left Heel Wound:   Apply santyl to the wound bed, Cover with gauze. Secure with emmanuel and paper tape. Change dressing everyday and as needed for excessive drainage.        Continue with your globopedi shoe when bearing weight       Offload heel with podus boots. You really need to keep pressure off the wound even when in bed.     The heel should not be touching any surfaces to decrease the pressure.       Right foot: use diabetic gold bond lotion      Please consider stopping smoking.   If you quit smoking we can consider getting a graft.     Increase the dietary protein in your diet.     Above dressing was completed today with medihoney    obtain X-Ray that is ordered     Follow up 4 weeks       Standing Status:   Future     Standing Expiration Date:   8/31/2024   • Lesion Destruction     This order was created via procedure documentation   • Debridement     This order was created via procedure documentation         Razia Kingsley DPM      Portions of the record may have been created with voice recognition software. Occasional wrong word or "sound a like" substitutions may have occurred due to the inherent limitations of voice recognition software. Read the chart carefully and recognize, using context, where substitutions have occurred.

## 2023-09-28 ENCOUNTER — TELEPHONE (OUTPATIENT)
Dept: WOUND CARE | Facility: CLINIC | Age: 58
End: 2023-09-28

## 2023-09-28 DIAGNOSIS — L97.428 DIABETIC ULCER OF LEFT HEEL ASSOCIATED WITH TYPE 2 DIABETES MELLITUS, WITH OTHER ULCER SEVERITY (HCC): ICD-10-CM

## 2023-09-28 DIAGNOSIS — E11.621 DIABETIC ULCER OF LEFT HEEL ASSOCIATED WITH TYPE 2 DIABETES MELLITUS, WITH OTHER ULCER SEVERITY (HCC): ICD-10-CM

## 2023-09-28 NOTE — TELEPHONE ENCOUNTER
Patient's wife called to cancel today's wound management center appointment due to patient feeling sick. She is requesting a refill for Santyl.

## 2023-10-01 NOTE — ASSESSMENT & PLAN NOTE
· Presents with 2 L supplemental oxygen for hypoxia ED% on room air at home  · Daughter is a nurse and found him in respiratory distress, checked his pulse oximetry it was 80%, this was verified by EMS  · No history of supplemental oxygen use  · Weaned off to room air No cyanosis, no pallor, no jaundice, no rash

## 2024-02-26 ENCOUNTER — APPOINTMENT (INPATIENT)
Dept: ULTRASOUND IMAGING | Facility: HOSPITAL | Age: 59
DRG: 684 | End: 2024-02-26
Payer: COMMERCIAL

## 2024-02-26 ENCOUNTER — HOSPITAL ENCOUNTER (INPATIENT)
Facility: HOSPITAL | Age: 59
LOS: 1 days | Discharge: HOME | DRG: 684 | End: 2024-02-27
Attending: EMERGENCY MEDICINE | Admitting: FAMILY MEDICINE
Payer: COMMERCIAL

## 2024-02-26 DIAGNOSIS — N17.9 ACUTE KIDNEY INJURY (HCC): ICD-10-CM

## 2024-02-26 DIAGNOSIS — M10.9 GOUT, UNSPECIFIED CAUSE, UNSPECIFIED CHRONICITY, UNSPECIFIED SITE: ICD-10-CM

## 2024-02-26 DIAGNOSIS — N28.9 KIDNEY DISEASE: Primary | ICD-10-CM

## 2024-02-26 LAB
ALBUMIN SERPL BCP-MCNC: 3.9 G/DL (ref 3.5–5)
ALP SERPL-CCNC: 84 U/L (ref 34–104)
ALT SERPL W P-5'-P-CCNC: 7 U/L (ref 7–52)
ANION GAP SERPL CALCULATED.3IONS-SCNC: 8 MMOL/L
AST SERPL W P-5'-P-CCNC: 7 U/L (ref 13–39)
BASOPHILS # BLD AUTO: 0.07 THOUSANDS/ÂΜL (ref 0–0.1)
BASOPHILS NFR BLD AUTO: 1 % (ref 0–1)
BILIRUB SERPL-MCNC: 0.26 MG/DL (ref 0.2–1)
BUN SERPL-MCNC: 48 MG/DL (ref 5–25)
CALCIUM SERPL-MCNC: 10.5 MG/DL (ref 8.4–10.2)
CHLORIDE SERPL-SCNC: 103 MMOL/L (ref 96–108)
CO2 SERPL-SCNC: 24 MMOL/L (ref 21–32)
CREAT SERPL-MCNC: 4.36 MG/DL (ref 0.6–1.3)
EOSINOPHIL # BLD AUTO: 0.38 THOUSAND/ÂΜL (ref 0–0.61)
EOSINOPHIL NFR BLD AUTO: 5 % (ref 0–6)
ERYTHROCYTE [DISTWIDTH] IN BLOOD BY AUTOMATED COUNT: 14.7 % (ref 11.6–15.1)
GFR SERPL CREATININE-BSD FRML MDRD: 13 ML/MIN/1.73SQ M
GLUCOSE SERPL-MCNC: 240 MG/DL (ref 65–140)
GLUCOSE SERPL-MCNC: 241 MG/DL (ref 65–140)
GLUCOSE SERPL-MCNC: 83 MG/DL (ref 65–140)
HCT VFR BLD AUTO: 34.6 % (ref 36.5–49.3)
HGB BLD-MCNC: 11.5 G/DL (ref 12–17)
IMM GRANULOCYTES # BLD AUTO: 0.04 THOUSAND/UL (ref 0–0.2)
IMM GRANULOCYTES NFR BLD AUTO: 1 % (ref 0–2)
LYMPHOCYTES # BLD AUTO: 2.01 THOUSANDS/ÂΜL (ref 0.6–4.47)
LYMPHOCYTES NFR BLD AUTO: 26 % (ref 14–44)
MCH RBC QN AUTO: 31.8 PG (ref 26.8–34.3)
MCHC RBC AUTO-ENTMCNC: 33.2 G/DL (ref 31.4–37.4)
MCV RBC AUTO: 96 FL (ref 82–98)
MONOCYTES # BLD AUTO: 0.89 THOUSAND/ÂΜL (ref 0.17–1.22)
MONOCYTES NFR BLD AUTO: 12 % (ref 4–12)
NEUTROPHILS # BLD AUTO: 4.34 THOUSANDS/ÂΜL (ref 1.85–7.62)
NEUTS SEG NFR BLD AUTO: 55 % (ref 43–75)
NRBC BLD AUTO-RTO: 0 /100 WBCS
PLATELET # BLD AUTO: 167 THOUSANDS/UL (ref 149–390)
PMV BLD AUTO: 10.9 FL (ref 8.9–12.7)
POTASSIUM SERPL-SCNC: 4.3 MMOL/L (ref 3.5–5.3)
PROT SERPL-MCNC: 6.5 G/DL (ref 6.4–8.4)
RBC # BLD AUTO: 3.62 MILLION/UL (ref 3.88–5.62)
SODIUM SERPL-SCNC: 135 MMOL/L (ref 135–147)
URATE SERPL-MCNC: 9.4 MG/DL (ref 3.5–8.5)
WBC # BLD AUTO: 7.73 THOUSAND/UL (ref 4.31–10.16)

## 2024-02-26 PROCEDURE — 76775 US EXAM ABDO BACK WALL LIM: CPT

## 2024-02-26 PROCEDURE — 99222 1ST HOSP IP/OBS MODERATE 55: CPT | Performed by: FAMILY MEDICINE

## 2024-02-26 PROCEDURE — 80053 COMPREHEN METABOLIC PANEL: CPT | Performed by: EMERGENCY MEDICINE

## 2024-02-26 PROCEDURE — 36415 COLL VENOUS BLD VENIPUNCTURE: CPT | Performed by: EMERGENCY MEDICINE

## 2024-02-26 PROCEDURE — 82948 REAGENT STRIP/BLOOD GLUCOSE: CPT

## 2024-02-26 PROCEDURE — 99285 EMERGENCY DEPT VISIT HI MDM: CPT | Performed by: EMERGENCY MEDICINE

## 2024-02-26 PROCEDURE — 99283 EMERGENCY DEPT VISIT LOW MDM: CPT

## 2024-02-26 PROCEDURE — 85025 COMPLETE CBC W/AUTO DIFF WBC: CPT | Performed by: EMERGENCY MEDICINE

## 2024-02-26 PROCEDURE — 84550 ASSAY OF BLOOD/URIC ACID: CPT | Performed by: FAMILY MEDICINE

## 2024-02-26 RX ORDER — ACETAMINOPHEN 325 MG/1
650 TABLET ORAL EVERY 6 HOURS PRN
Status: DISCONTINUED | OUTPATIENT
Start: 2024-02-26 | End: 2024-02-27 | Stop reason: HOSPADM

## 2024-02-26 RX ORDER — INSULIN LISPRO 100 [IU]/ML
2-12 INJECTION, SOLUTION INTRAVENOUS; SUBCUTANEOUS
Status: DISCONTINUED | OUTPATIENT
Start: 2024-02-26 | End: 2024-02-27 | Stop reason: HOSPADM

## 2024-02-26 RX ORDER — INSULIN GLARGINE 100 [IU]/ML
10 INJECTION, SOLUTION SUBCUTANEOUS
Status: DISCONTINUED | OUTPATIENT
Start: 2024-02-26 | End: 2024-02-27 | Stop reason: HOSPADM

## 2024-02-26 RX ORDER — OXYCODONE HYDROCHLORIDE 5 MG/1
5 TABLET ORAL EVERY 6 HOURS PRN
Status: DISCONTINUED | OUTPATIENT
Start: 2024-02-26 | End: 2024-02-27 | Stop reason: HOSPADM

## 2024-02-26 RX ORDER — NICOTINE 21 MG/24HR
1 PATCH, TRANSDERMAL 24 HOURS TRANSDERMAL DAILY
Status: DISCONTINUED | OUTPATIENT
Start: 2024-02-27 | End: 2024-02-27 | Stop reason: HOSPADM

## 2024-02-26 RX ORDER — PREGABALIN 75 MG/1
75 CAPSULE ORAL 2 TIMES DAILY
Status: DISCONTINUED | OUTPATIENT
Start: 2024-02-26 | End: 2024-02-27 | Stop reason: HOSPADM

## 2024-02-26 RX ORDER — INSULIN LISPRO 100 [IU]/ML
1-6 INJECTION, SOLUTION INTRAVENOUS; SUBCUTANEOUS
Status: DISCONTINUED | OUTPATIENT
Start: 2024-02-26 | End: 2024-02-27 | Stop reason: HOSPADM

## 2024-02-26 RX ORDER — AMLODIPINE BESYLATE 5 MG/1
5 TABLET ORAL DAILY
Status: DISCONTINUED | OUTPATIENT
Start: 2024-02-27 | End: 2024-02-27 | Stop reason: HOSPADM

## 2024-02-26 RX ORDER — HEPARIN SODIUM 5000 [USP'U]/ML
5000 INJECTION, SOLUTION INTRAVENOUS; SUBCUTANEOUS EVERY 8 HOURS SCHEDULED
Status: DISCONTINUED | OUTPATIENT
Start: 2024-02-26 | End: 2024-02-27 | Stop reason: HOSPADM

## 2024-02-26 RX ORDER — SODIUM CHLORIDE, SODIUM GLUCONATE, SODIUM ACETATE, POTASSIUM CHLORIDE, MAGNESIUM CHLORIDE, SODIUM PHOSPHATE, DIBASIC, AND POTASSIUM PHOSPHATE .53; .5; .37; .037; .03; .012; .00082 G/100ML; G/100ML; G/100ML; G/100ML; G/100ML; G/100ML; G/100ML
75 INJECTION, SOLUTION INTRAVENOUS CONTINUOUS
Status: DISCONTINUED | OUTPATIENT
Start: 2024-02-26 | End: 2024-02-27

## 2024-02-26 RX ADMIN — SODIUM CHLORIDE, SODIUM GLUCONATE, SODIUM ACETATE, POTASSIUM CHLORIDE, MAGNESIUM CHLORIDE, SODIUM PHOSPHATE, DIBASIC, AND POTASSIUM PHOSPHATE 75 ML/HR: .53; .5; .37; .037; .03; .012; .00082 INJECTION, SOLUTION INTRAVENOUS at 18:07

## 2024-02-26 RX ADMIN — HEPARIN SODIUM 5000 UNITS: 5000 INJECTION INTRAVENOUS; SUBCUTANEOUS at 22:52

## 2024-02-26 RX ADMIN — OXYCODONE HYDROCHLORIDE 5 MG: 5 TABLET ORAL at 22:52

## 2024-02-26 RX ADMIN — INSULIN LISPRO 4 UNITS: 100 INJECTION, SOLUTION INTRAVENOUS; SUBCUTANEOUS at 17:00

## 2024-02-26 RX ADMIN — HEPARIN SODIUM 5000 UNITS: 5000 INJECTION INTRAVENOUS; SUBCUTANEOUS at 18:07

## 2024-02-26 RX ADMIN — PREGABALIN 75 MG: 75 CAPSULE ORAL at 18:07

## 2024-02-26 NOTE — ASSESSMENT & PLAN NOTE
Lab Results   Component Value Date    HGBA1C 7.0 (H) 03/16/2023       Recent Labs     02/26/24  1659   POCGLU 241*       Blood Sugar Average: Last 72 hrs:  (P) 241  He only takes his Lantus and has stopped taking his short acting insulin.  Will continue Lantus and insulin sliding scale for now and further adjust

## 2024-02-26 NOTE — PLAN OF CARE
Problem: PAIN - ADULT  Goal: Verbalizes/displays adequate comfort level or baseline comfort level  Description: Interventions:  - Encourage patient to monitor pain and request assistance  - Assess pain using appropriate pain scale  - Administer analgesics based on type and severity of pain and evaluate response  - Implement non-pharmacological measures as appropriate and evaluate response  - Consider cultural and social influences on pain and pain management  - Notify physician/advanced practitioner if interventions unsuccessful or patient reports new pain  Outcome: Progressing     Problem: INFECTION - ADULT  Goal: Absence or prevention of progression during hospitalization  Description: INTERVENTIONS:  - Assess and monitor for signs and symptoms of infection  - Monitor lab/diagnostic results  - Monitor all insertion sites, i.e. indwelling lines, tubes, and drains  - Monitor endotracheal if appropriate and nasal secretions for changes in amount and color  - [place] appropriate cooling/warming therapies per order  - Administer medications as ordered  - Instruct and encourage patient and family to use good hand hygiene technique  - Identify and instruct in appropriate isolation precautions for identified infection/condition  Outcome: Progressing  Goal: Absence of fever/infection during neutropenic period  Description: INTERVENTIONS:  - Monitor WBC    Outcome: Progressing     Problem: SAFETY ADULT  Goal: Patient will remain free of falls  Description: INTERVENTIONS:  - Educate patient/family on patient safety including physical limitations  - Instruct patient to call for assistance with activity   - Consult OT/PT to assist with strengthening/mobility   - Keep Call bell within reach  - Keep bed low and locked with side rails adjusted as appropriate  - Keep care items and personal belongings within reach  - Initiate and maintain comfort rounds  - Make Fall Risk Sign visible to staff  - Offer Toileting every    Hours,  in advance of need  - Initiate/Maintain   alarm  - Obtain necessary fall risk management equipment:     - Apply yellow socks and bracelet for high fall risk patients  - Consider moving patient to room near nurses station  Outcome: Progressing  Goal: Maintain or return to baseline ADL function  Description: INTERVENTIONS:  -  Assess patient's ability to carry out ADLs; assess patient's baseline for ADL function and identify physical deficits which impact ability to perform ADLs (bathing, care of mouth/teeth, toileting, grooming, dressing, etc.)  - Assess/evaluate cause of self-care deficits   - Assess range of motion  - Assess patient's mobility; develop plan if impaired  - Assess patient's need for assistive devices and provide as appropriate  - Encourage maximum independence but intervene and supervise when necessary  - Involve family in performance of ADLs  - Assess for home care needs following discharge   - Consider OT consult to assist with ADL evaluation and planning for discharge  - Provide patient education as appropriate  Outcome: Progressing  Goal: Maintains/Returns to pre admission functional level  Description: INTERVENTIONS:  - Perform AM-PAC 6 Click Basic Mobility/ Daily Activity assessment daily.  - Set and communicate daily mobility goal to care team and patient/family/caregiver.   - Collaborate with rehabilitation services on mobility goals if consulted  - Perform Range of Motion    times a day.  - Reposition patient every    hours.  - Dangle patient    times a day  - Stand patient    times a day  - Ambulate patient    times a day  - Out of bed to chair    times a day   - Out of bed for meals    times a day  - Out of bed for toileting  - Record patient progress and toleration of activity level   Outcome: Progressing     Problem: DISCHARGE PLANNING  Goal: Discharge to home or other facility with appropriate resources  Description: INTERVENTIONS:  - Identify barriers to discharge w/patient and  caregiver  - Arrange for needed discharge resources and transportation as appropriate  - Identify discharge learning needs (meds, wound care, etc.)  - Arrange for interpretive services to assist at discharge as needed  - Refer to Case Management Department for coordinating discharge planning if the patient needs post-hospital services based on physician/advanced practitioner order or complex needs related to functional status, cognitive ability, or social support system  Outcome: Progressing     Problem: Knowledge Deficit  Goal: Patient/family/caregiver demonstrates understanding of disease process, treatment plan, medications, and discharge instructions  Description: Complete learning assessment and assess knowledge base.  Interventions:  - Provide teaching at level of understanding  - Provide teaching via preferred learning methods  Outcome: Progressing     Problem: PAIN - ADULT  Goal: Verbalizes/displays adequate comfort level or baseline comfort level  Description: Interventions:  - Encourage patient to monitor pain and request assistance  - Assess pain using appropriate pain scale  - Administer analgesics based on type and severity of pain and evaluate response  - Implement non-pharmacological measures as appropriate and evaluate response  - Consider cultural and social influences on pain and pain management  - Notify physician/advanced practitioner if interventions unsuccessful or patient reports new pain  Outcome: Progressing     Problem: INFECTION - ADULT  Goal: Absence or prevention of progression during hospitalization  Description: INTERVENTIONS:  - Assess and monitor for signs and symptoms of infection  - Monitor lab/diagnostic results  - Monitor all insertion sites, i.e. indwelling lines, tubes, and drains  - Monitor endotracheal if appropriate and nasal secretions for changes in amount and color  - [place] appropriate cooling/warming therapies per order  - Administer medications as ordered  -  Instruct and encourage patient and family to use good hand hygiene technique  - Identify and instruct in appropriate isolation precautions for identified infection/condition  Outcome: Progressing  Goal: Absence of fever/infection during neutropenic period  Description: INTERVENTIONS:  - Monitor WBC    Outcome: Progressing     Problem: SAFETY ADULT  Goal: Patient will remain free of falls  Description: INTERVENTIONS:  - Educate patient/family on patient safety including physical limitations  - Instruct patient to call for assistance with activity   - Consult OT/PT to assist with strengthening/mobility   - Keep Call bell within reach  - Keep bed low and locked with side rails adjusted as appropriate  - Keep care items and personal belongings within reach  - Initiate and maintain comfort rounds  - Make Fall Risk Sign visible to staff  - Offer Toileting every    Hours, in advance of need  - Initiate/Maintain   alarm  - Obtain necessary fall risk management equipment:     - Apply yellow socks and bracelet for high fall risk patients  - Consider moving patient to room near nurses station  Outcome: Progressing  Goal: Maintain or return to baseline ADL function  Description: INTERVENTIONS:  -  Assess patient's ability to carry out ADLs; assess patient's baseline for ADL function and identify physical deficits which impact ability to perform ADLs (bathing, care of mouth/teeth, toileting, grooming, dressing, etc.)  - Assess/evaluate cause of self-care deficits   - Assess range of motion  - Assess patient's mobility; develop plan if impaired  - Assess patient's need for assistive devices and provide as appropriate  - Encourage maximum independence but intervene and supervise when necessary  - Involve family in performance of ADLs  - Assess for home care needs following discharge   - Consider OT consult to assist with ADL evaluation and planning for discharge  - Provide patient education as appropriate  Outcome:  Progressing  Goal: Maintains/Returns to pre admission functional level  Description: INTERVENTIONS:  - Perform AM-PAC 6 Click Basic Mobility/ Daily Activity assessment daily.  - Set and communicate daily mobility goal to care team and patient/family/caregiver.   - Collaborate with rehabilitation services on mobility goals if consulted  - Perform Range of Motion         ]   times a day.  - Reposition patient every    hours.  - Dangle patient    times a day  - Stand patient    times a day  - Ambulate patient    times a day  - Out of bed to chair    times a day   - Out of bed for meals    times a day  - Out of bed for toileting  - Record patient progress and toleration of activity level   Outcome: Progressing     Problem: DISCHARGE PLANNING  Goal: Discharge to home or other facility with appropriate resources  Description: INTERVENTIONS:  - Identify barriers to discharge w/patient and caregiver  - Arrange for needed discharge resources and transportation as appropriate  - Identify discharge learning needs (meds, wound care, etc.)  - Arrange for interpretive services to assist at discharge as needed  - Refer to Case Management Department for coordinating discharge planning if the patient needs post-hospital services based on physician/advanced practitioner order or complex needs related to functional status, cognitive ability, or social support system  Outcome: Progressing     Problem: Knowledge Deficit  Goal: Patient/family/caregiver demonstrates understanding of disease process, treatment plan, medications, and discharge instructions  Description: Complete learning assessment and assess knowledge base.  Interventions:  - Provide teaching at level of understanding  - Provide teaching via preferred learning methods  Outcome: Progressing     Problem: PAIN - ADULT  Goal: Verbalizes/displays adequate comfort level or baseline comfort level  Description: Interventions:  - Encourage patient to monitor pain and request  assistance  - Assess pain using appropriate pain scale  - Administer analgesics based on type and severity of pain and evaluate response  - Implement non-pharmacological measures as appropriate and evaluate response  - Consider cultural and social influences on pain and pain management  - Notify physician/advanced practitioner if interventions unsuccessful or patient reports new pain  Outcome: Progressing     Problem: INFECTION - ADULT  Goal: Absence or prevention of progression during hospitalization  Description: INTERVENTIONS:  - Assess and monitor for signs and symptoms of infection  - Monitor lab/diagnostic results  - Monitor all insertion sites, i.e. indwelling lines, tubes, and drains  - Monitor endotracheal if appropriate and nasal secretions for changes in amount and color  - [place] appropriate cooling/warming therapies per order  - Administer medications as ordered  - Instruct and encourage patient and family to use good hand hygiene technique  - Identify and instruct in appropriate isolation precautions for identified infection/condition  Outcome: Progressing  Goal: Absence of fever/infection during neutropenic period  Description: INTERVENTIONS:  - Monitor WBC    Outcome: Progressing     Problem: SAFETY ADULT  Goal: Patient will remain free of falls  Description: INTERVENTIONS:  - Educate patient/family on patient safety including physical limitations  - Instruct patient to call for assistance with activity   - Consult OT/PT to assist with strengthening/mobility   - Keep Call bell within reach  - Keep bed low and locked with side rails adjusted as appropriate  - Keep care items and personal belongings within reach  - Initiate and maintain comfort rounds  - Make Fall Risk Sign visible to staff  - Offer Toileting every    Hours, in advance of need  - Initiate/Maintain   alarm  - Obtain necessary fall risk management equipment:     - Apply yellow socks and bracelet for high fall risk patients  - Consider  moving patient to room near nurses station  Outcome: Progressing  Goal: Maintain or return to baseline ADL function  Description: INTERVENTIONS:  -  Assess patient's ability to carry out ADLs; assess patient's baseline for ADL function and identify physical deficits which impact ability to perform ADLs (bathing, care of mouth/teeth, toileting, grooming, dressing, etc.)  - Assess/evaluate cause of self-care deficits   - Assess range of motion  - Assess patient's mobility; develop plan if impaired  - Assess patient's need for assistive devices and provide as appropriate  - Encourage maximum independence but intervene and supervise when necessary  - Involve family in performance of ADLs  - Assess for home care needs following discharge   - Consider OT consult to assist with ADL evaluation and planning for discharge  - Provide patient education as appropriate  Outcome: Progressing  Goal: Maintains/Returns to pre admission functional level  Description: INTERVENTIONS:  - Perform AM-PAC 6 Click Basic Mobility/ Daily Activity assessment daily.  - Set and communicate daily mobility goal to care team and patient/family/caregiver.   - Collaborate with rehabilitation services on mobility goals if consulted  - Perform Range of Motion    times a day.  - Reposition patient every    hours.  - Dangle patient    times a day  - Stand patient    times a day  - Ambulate patient    times a day  - Out of bed to chair    times a day   - Out of bed for meals    times a day  - Out of bed for toileting  - Record patient progress and toleration of activity level   Outcome: Progressing     Problem: DISCHARGE PLANNING  Goal: Discharge to home or other facility with appropriate resources  Description: INTERVENTIONS:  - Identify barriers to discharge w/patient and caregiver  - Arrange for needed discharge resources and transportation as appropriate  - Identify discharge learning needs (meds, wound care, etc.)  - Arrange for interpretive services  to assist at discharge as needed  - Refer to Case Management Department for coordinating discharge planning if the patient needs post-hospital services based on physician/advanced practitioner order or complex needs related to functional status, cognitive ability, or social support system  Outcome: Progressing     Problem: Knowledge Deficit  Goal: Patient/family/caregiver demonstrates understanding of disease process, treatment plan, medications, and discharge instructions  Description: Complete learning assessment and assess knowledge base.  Interventions:  - Provide teaching at level of understanding  - Provide teaching via preferred learning methods  Outcome: Progressing     Problem: PAIN - ADULT  Goal: Verbalizes/displays adequate comfort level or baseline comfort level  Description: Interventions:  - Encourage patient to monitor pain and request assistance  - Assess pain using appropriate pain scale  - Administer analgesics based on type and severity of pain and evaluate response  - Implement non-pharmacological measures as appropriate and evaluate response  - Consider cultural and social influences on pain and pain management  - Notify physician/advanced practitioner if interventions unsuccessful or patient reports new pain  Outcome: Progressing     Problem: INFECTION - ADULT  Goal: Absence or prevention of progression during hospitalization  Description: INTERVENTIONS:  - Assess and monitor for signs and symptoms of infection  - Monitor lab/diagnostic results  - Monitor all insertion sites, i.e. indwelling lines, tubes, and drains  - Monitor endotracheal if appropriate and nasal secretions for changes in amount and color  - [place] appropriate cooling/warming therapies per order  - Administer medications as ordered  - Instruct and encourage patient and family to use good hand hygiene technique  - Identify and instruct in appropriate isolation precautions for identified infection/condition  Outcome:  Progressing  Goal: Absence of fever/infection during neutropenic period  Description: INTERVENTIONS:  - Monitor WBC    Outcome: Progressing     Problem: SAFETY ADULT  Goal: Patient will remain free of falls  Description: INTERVENTIONS:  - Educate patient/family on patient safety including physical limitations  - Instruct patient to call for assistance with activity   - Consult OT/PT to assist with strengthening/mobility   - Keep Call bell within reach  - Keep bed low and locked with side rails adjusted as appropriate  - Keep care items and personal belongings within reach  - Initiate and maintain comfort rounds  - Make Fall Risk Sign visible to staff  - Offer Toileting every    Hours, in advance of need  - Initiate/Maintain   alarm  - Obtain necessary fall risk management equipment:     - Apply yellow socks and bracelet for high fall risk patients  - Consider moving patient to room near nurses station  Outcome: Progressing  Goal: Maintain or return to baseline ADL function  Description: INTERVENTIONS:  -  Assess patient's ability to carry out ADLs; assess patient's baseline for ADL function and identify physical deficits which impact ability to perform ADLs (bathing, care of mouth/teeth, toileting, grooming, dressing, etc.)  - Assess/evaluate cause of self-care deficits   - Assess range of motion  - Assess patient's mobility; develop plan if impaired  - Assess patient's need for assistive devices and provide as appropriate  - Encourage maximum independence but intervene and supervise when necessary  - Involve family in performance of ADLs  - Assess for home care needs following discharge   - Consider OT consult to assist with ADL evaluation and planning for discharge  - Provide patient education as appropriate  Outcome: Progressing  Goal: Maintains/Returns to pre admission functional level  Description: INTERVENTIONS:  - Perform AM-PAC 6 Click Basic Mobility/ Daily Activity assessment daily.  - Set and communicate  daily mobility goal to care team and patient/family/caregiver.   - Collaborate with rehabilitation services on mobility goals if consulted  - Perform Range of Motion    times a day.  - Reposition patient every    hours.  - Dangle patient    times a day  - Stand patient    times a day  - Ambulate patient    times a day  - Out of bed to chair    times a day   - Out of bed for meals    times a day  - Out of bed for toileting  - Record patient progress and toleration of activity level   Outcome: Progressing     Problem: DISCHARGE PLANNING  Goal: Discharge to home or other facility with appropriate resources  Description: INTERVENTIONS:  - Identify barriers to discharge w/patient and caregiver  - Arrange for needed discharge resources and transportation as appropriate  - Identify discharge learning needs (meds, wound care, etc.)  - Arrange for interpretive services to assist at discharge as needed  - Refer to Case Management Department for coordinating discharge planning if the patient needs post-hospital services based on physician/advanced practitioner order or complex needs related to functional status, cognitive ability, or social support system  Outcome: Progressing     Problem: Knowledge Deficit  Goal: Patient/family/caregiver demonstrates understanding of disease process, treatment plan, medications, and discharge instructions  Description: Complete learning assessment and assess knowledge base.  Interventions:  - Provide teaching at level of understanding  - Provide teaching via preferred learning methods  Outcome: Progressing     Problem: PAIN - ADULT  Goal: Verbalizes/displays adequate comfort level or baseline comfort level  Description: Interventions:  - Encourage patient to monitor pain and request assistance  - Assess pain using appropriate pain scale  - Administer analgesics based on type and severity of pain and evaluate response  - Implement non-pharmacological measures as appropriate and evaluate  response  - Consider cultural and social influences on pain and pain management  - Notify physician/advanced practitioner if interventions unsuccessful or patient reports new pain  Outcome: Progressing     Problem: INFECTION - ADULT  Goal: Absence or prevention of progression during hospitalization  Description: INTERVENTIONS:  - Assess and monitor for signs and symptoms of infection  - Monitor lab/diagnostic results  - Monitor all insertion sites, i.e. indwelling lines, tubes, and drains  - Monitor endotracheal if appropriate and nasal secretions for changes in amount and color  - [place] appropriate cooling/warming therapies per order  - Administer medications as ordered  - Instruct and encourage patient and family to use good hand hygiene technique  - Identify and instruct in appropriate isolation precautions for identified infection/condition  Outcome: Progressing  Goal: Absence of fever/infection during neutropenic period  Description: INTERVENTIONS:  - Monitor WBC    Outcome: Progressing     Problem: SAFETY ADULT  Goal: Patient will remain free of falls  Description: INTERVENTIONS:  - Educate patient/family on patient safety including physical limitations  - Instruct patient to call for assistance with activity   - Consult OT/PT to assist with strengthening/mobility   - Keep Call bell within reach  - Keep bed low and locked with side rails adjusted as appropriate  - Keep care items and personal belongings within reach  - Initiate and maintain comfort rounds  - Make Fall Risk Sign visible to staff  - Offer Toileting every    Hours, in advance of need  - Initiate/Maintain   alarm  - Obtain necessary fall risk management equipment:     - Apply yellow socks and bracelet for high fall risk patients  - Consider moving patient to room near nurses station  Outcome: Progressing  Goal: Maintain or return to baseline ADL function  Description: INTERVENTIONS:  -  Assess patient's ability to carry out ADLs; assess  patient's baseline for ADL function and identify physical deficits which impact ability to perform ADLs (bathing, care of mouth/teeth, toileting, grooming, dressing, etc.)  - Assess/evaluate cause of self-care deficits   - Assess range of motion  - Assess patient's mobility; develop plan if impaired  - Assess patient's need for assistive devices and provide as appropriate  - Encourage maximum independence but intervene and supervise when necessary  - Involve family in performance of ADLs  - Assess for home care needs following discharge   - Consider OT consult to assist with ADL evaluation and planning for discharge  - Provide patient education as appropriate  Outcome: Progressing  Goal: Maintains/Returns to pre admission functional level  Description: INTERVENTIONS:  - Perform AM-PAC 6 Click Basic Mobility/ Daily Activity assessment daily.  - Set and communicate daily mobility goal to care team and patient/family/caregiver.   - Collaborate with rehabilitation services on mobility goals if consulted  - Perform Range of Motion    times a day.  - Reposition patient every    hours.  - Dangle patient    times a day  - Stand patient    times a day  - Ambulate patient    times a day  - Out of bed to chair    times a day   - Out of bed for meals    times a day  - Out of bed for toileting  - Record patient progress and toleration of activity level   Outcome: Progressing     Problem: DISCHARGE PLANNING  Goal: Discharge to home or other facility with appropriate resources  Description: INTERVENTIONS:  - Identify barriers to discharge w/patient and caregiver  - Arrange for needed discharge resources and transportation as appropriate  - Identify discharge learning needs (meds, wound care, etc.)  - Arrange for interpretive services to assist at discharge as needed  - Refer to Case Management Department for coordinating discharge planning if the patient needs post-hospital services based on physician/advanced practitioner order  or complex needs related to functional status, cognitive ability, or social support system  Outcome: Progressing     Problem: Knowledge Deficit  Goal: Patient/family/caregiver demonstrates understanding of disease process, treatment plan, medications, and discharge instructions  Description: Complete learning assessment and assess knowledge base.  Interventions:  - Provide teaching at level of understanding  - Provide teaching via preferred learning methods  Outcome: Progressing     Problem: PAIN - ADULT  Goal: Verbalizes/displays adequate comfort level or baseline comfort level  Description: Interventions:  - Encourage patient to monitor pain and request assistance  - Assess pain using appropriate pain scale  - Administer analgesics based on type and severity of pain and evaluate response  - Implement non-pharmacological measures as appropriate and evaluate response  - Consider cultural and social influences on pain and pain management  - Notify physician/advanced practitioner if interventions unsuccessful or patient reports new pain  Outcome: Progressing     Problem: INFECTION - ADULT  Goal: Absence or prevention of progression during hospitalization  Description: INTERVENTIONS:  - Assess and monitor for signs and symptoms of infection  - Monitor lab/diagnostic results  - Monitor all insertion sites, i.e. indwelling lines, tubes, and drains  - Monitor endotracheal if appropriate and nasal secretions for changes in amount and color  - [place] appropriate cooling/warming therapies per order  - Administer medications as ordered  - Instruct and encourage patient and family to use good hand hygiene technique  - Identify and instruct in appropriate isolation precautions for identified infection/condition  Outcome: Progressing  Goal: Absence of fever/infection during neutropenic period  Description: INTERVENTIONS:  - Monitor WBC    Outcome: Progressing     Problem: SAFETY ADULT  Goal: Patient will remain free of  falls  Description: INTERVENTIONS:  - Educate patient/family on patient safety including physical limitations  - Instruct patient to call for assistance with activity   - Consult OT/PT to assist with strengthening/mobility   - Keep Call bell within reach  - Keep bed low and locked with side rails adjusted as appropriate  - Keep care items and personal belongings within reach  - Initiate and maintain comfort rounds  - Make Fall Risk Sign visible to staff  - Offer Toileting every    Hours, in advance of need  - Initiate/Maintain   alarm  - Obtain necessary fall risk management equipment:     - Apply yellow socks and bracelet for high fall risk patients  - Consider moving patient to room near nurses station  Outcome: Progressing  Goal: Maintain or return to baseline ADL function  Description: INTERVENTIONS:  -  Assess patient's ability to carry out ADLs; assess patient's baseline for ADL function and identify physical deficits which impact ability to perform ADLs (bathing, care of mouth/teeth, toileting, grooming, dressing, etc.)  - Assess/evaluate cause of self-care deficits   - Assess range of motion  - Assess patient's mobility; develop plan if impaired  - Assess patient's need for assistive devices and provide as appropriate  - Encourage maximum independence but intervene and supervise when necessary  - Involve family in performance of ADLs  - Assess for home care needs following discharge   - Consider OT consult to assist with ADL evaluation and planning for discharge  - Provide patient education as appropriate  Outcome: Progressing  Goal: Maintains/Returns to pre admission functional level  Description: INTERVENTIONS:  - Perform AM-PAC 6 Click Basic Mobility/ Daily Activity assessment daily.  - Set and communicate daily mobility goal to care team and patient/family/caregiver.   - Collaborate with rehabilitation services on mobility goals if consulted  - Perform Range of Motion    times a day.  - Reposition  patient every    hours.  - Dangle patient    times a day  - Stand patient    times a day  - Ambulate patient    times a day  - Out of bed to chair    times a day   - Out of bed for meals    times a day  - Out of bed for toileting  - Record patient progress and toleration of activity level   Outcome: Progressing     Problem: DISCHARGE PLANNING  Goal: Discharge to home or other facility with appropriate resources  Description: INTERVENTIONS:  - Identify barriers to discharge w/patient and caregiver  - Arrange for needed discharge resources and transportation as appropriate  - Identify discharge learning needs (meds, wound care, etc.)  - Arrange for interpretive services to assist at discharge as needed  - Refer to Case Management Department for coordinating discharge planning if the patient needs post-hospital services based on physician/advanced practitioner order or complex needs related to functional status, cognitive ability, or social support system  Outcome: Progressing     Problem: Knowledge Deficit  Goal: Patient/family/caregiver demonstrates understanding of disease process, treatment plan, medications, and discharge instructions  Description: Complete learning assessment and assess knowledge base.  Interventions:  - Provide teaching at level of understanding  - Provide teaching via preferred learning methods  Outcome: Progressing

## 2024-02-26 NOTE — H&P
[facility]  H&P  Name: Stef Pack Sr. 58 y.o. male I MRN: [id number]  Unit/Bed#: -01 I Date of Admission: 2/26/2024   Date of Service: 2/26/2024 I Hospital Day: 0      Assessment/Plan   * KELLY (acute kidney injury) ([facility])  Assessment & Plan  Baseline Creatinine around 3.2 currently elevated up to 4.36.  Sent to the ED by nephrologist due to worsening labs.  States he has not been eating and drinking well the last few days.  Has been dealing with a lot of back pain related issues.  He stopped taking most of his medications a few weeks ago.  Will place on IV fluid hydration consulted nephrology renal ultrasound ordered.  Monitor electrolytes and renal function closely.  Patient states he does not use any NSAIDs.    Chronic back pain  Assessment & Plan  Patient complains of chronic back pain has been recently getting worse.  He is scheduled to have a back stimulator placed in March.  Continue OxyContin and Tylenol for pain control.  Avoid NSAIDs avoid colchicine for now    Essential hypertension  Assessment & Plan  Blood Pressures currently controlled.  Patient states he stopped taking all of his blood pressure medicines a few weeks ago.  Will continue Norvasc 5 mg daily for now and monitor    Gout  Assessment & Plan  Check uric acid.  Monitor for now    Diabetes mellitus type 2, insulin dependent ([facility])  Assessment & Plan  Lab Results   Component Value Date    HGBA1C 7.0 (H) 03/16/2023       Recent Labs     02/26/24  1659   POCGLU 241*       Blood Sugar Average: Last 72 hrs:  (P) 241  He only takes his Lantus and has stopped taking his short acting insulin.  Will continue Lantus and insulin sliding scale for now and further adjust         VTE Prophylaxis: Heparin  / sequential compression device   Code Status: full code  POLST: There is no POLST form on file for this patient (pre-hospital)  Discussion with family: dw wife    Anticipated Length of Stay:  Patient will be admitted on an  Inpatient basis with an anticipated length of stay of  more than 2 midnights.   Justification for Hospital Stay: mary    Total Time for Visit, including Counseling / Coordination of Care: 60 minutes.  Greater than 50% of this total time spent on direct patient counseling and coordination of care.    Chief Complaint:   abNormal labs    History of Present Illness:    Stef Pack Sr. is a 58 y.o. male who presents with normal labs.  Patient states that he follows outpatient with [facility] nephrology and he is recently been having a lot of issues with back pain had labs done recently told that his renal function is getting worse and told to go to the nearest emergency room for hydration.  Patient denies any recent fevers chills nausea vomiting or diarrhea however states that he has been dealing with a lot of back pain and recently stopped taking most of his medications and does not drink enough fluids.    Review of Systems:    Review of Systems   Constitutional:  Positive for appetite change and fatigue. Negative for chills and fever.   HENT:  Negative for hearing loss, sore throat and trouble swallowing.    Eyes:  Negative for photophobia, discharge and visual disturbance.   Respiratory:  Negative for chest tightness and shortness of breath.    Cardiovascular:  Negative for chest pain and palpitations.   Gastrointestinal:  Negative for abdominal pain, blood in stool and vomiting.   Endocrine: Negative for polydipsia and polyuria.   Genitourinary:  Negative for difficulty urinating, dysuria, flank pain and hematuria.   Musculoskeletal:  Positive for arthralgias. Negative for back pain and gait problem.   Skin:  Negative for rash.   Allergic/Immunologic: Negative for environmental allergies and food allergies.   Neurological:  Negative for dizziness, seizures, syncope and headaches.   Hematological:  Does not bruise/bleed easily.   Psychiatric/Behavioral:  Negative for behavioral problems.    All other systems  reviewed and are negative.      Past Medical and Surgical History:     Past Medical History:   Diagnosis Date    Chronic kidney disease     Diabetes mellitus (HCC)     Gout     Hypertension     Renal disorder     Retroperitoneal abscess (HCC)     Stroke (HCC)     UTI (urinary tract infection)     Vertebral osteomyelitis (HCC)        Past Surgical History:   Procedure Laterality Date    BACK SURGERY      ORCHIECTOMY         Meds/Allergies:    Prior to Admission medications    Medication Sig Start Date End Date Taking? Authorizing Provider   acetaminophen (TYLENOL) 325 mg tablet Take 650 mg by mouth every 4 (four) hours as needed for mild pain    Historical Provider, MD   allopurinol (ZYLOPRIM) 100 mg tablet Take 1 tablet (100 mg total) by mouth daily 10/24/22 11/23/22  Adrianne Horn MD   amLODIPine (NORVASC) 10 mg tablet Take 1 tablet by mouth daily 2/22/22   Historical Provider, MD   Ascorbic Acid (Vitamin C Immune Health) 500 MG CHEW Chew    Historical Provider, MD   atorvastatin (LIPITOR) 40 mg tablet Take 40 mg by mouth 1/3/22   Historical Provider, MD   Belbuca 450 MCG FILM ADMINISTER 1 FILM TO INSIDE OF CHEEK IN THE MORNING AND 1 FILM BEFORE BEDTIME. 12/15/22   Historical Provider, MD   Buprenorphine HCl (Belbuca) 300 MCG FILM Apply 300 mcg to cheek 2 (two) times a day 10/24/22   Historical Provider, MD   carvedilol (COREG) 6.25 mg tablet Take 6.25 mg by mouth 2 (two) times a day with meals    Historical Provider, MD   colchicine (COLCRYS) 0.6 mg tablet PLEASE SEE ATTACHED FOR DETAILED DIRECTIONS 4/20/23   Historical Provider, MD   collagenase (SANTYL) ointment Apply topically daily 9/28/23   Mary Shields DPM   cyclobenzaprine (FLEXERIL) 5 mg tablet Take 1 tablet (5 mg total) by mouth 3 (three) times a day as needed for muscle spasms (AS NEEDED FOR MUSCLE SPASM) for up to 7 days 3/10/23 3/17/23  YURIDIA Scott   docusate sodium (COLACE) 100 mg capsule TAKE BY MOUTH 1 CAPSULE IN THE MORNING AND 1  CAPSULE BEFORE BEDTIME. 12/21/22   Historical Provider, MD   ferrous sulfate 325 (65 Fe) mg tablet Take 1 tablet (325 mg total) by mouth daily with breakfast Do not start before October 25, 2022. 10/25/22   Adrianne Horn MD   insulin glargine (LANTUS) 100 units/mL subcutaneous injection Inject 5 Units under the skin daily at bedtime 11/23/22   Joana Mejias PA-C   insulin lispro (HumaLOG) 100 units/mL injection Inject 2-12 Units under the skin 3 (three) times a day before meals 10/24/22   Adrianne Horn MD   Lokelma 10 g  5/4/23   Historical Provider, MD   magnesium hydroxide (MILK OF MAGNESIA) 400 mg/5 mL oral suspension Take 30 mL by mouth    Historical Provider, MD   Naloxegol Oxalate (Movantik) 12.5 MG TABS Take 12.5 mg by mouth daily 4/28/23   Historical Provider, MD   NovoLOG FlexPen 100 units/mL injection pen  4/28/23   Historical Provider, MD   oxyCODONE (OXY-IR) 5 MG capsule Take 5 mg by mouth every 4 (four) hours as needed for moderate pain or severe pain    Historical Provider, MD   polyethylene glycol (MIRALAX) 17 g packet Take 17 g by mouth 2 (two) times a day    Historical Provider, MD   pregabalin (LYRICA) 150 mg capsule Take 150 mg by mouth 2 (two) times a day 4/14/23   Historical Provider, MD   pregabalin (LYRICA) 75 mg capsule Take 1 capsule (75 mg total) by mouth 2 (two) times a day for 10 days 3/10/23 3/20/23  YURIDIA Scott   senna-docusate sodium (SENOKOT S) 8.6-50 mg per tablet Take 1 tablet by mouth 2 (two) times a day 3/17/22   Historical Provider, MD   sevelamer carbonate (RENVELA) 800 mg tablet Take 800 mg by mouth in the morning    Historical Provider, MD   sodium phosphate-biphosphate (FLEET) 7-19 g 118 mL enema Insert 1 enema into the rectum    Historical Provider, MD   Sodium Zirconium Cyclosilicate (Lokelma) 10 g Take 1 packet by mouth 3/24/23   Historical Provider, MD   tamsulosin (FLOMAX) 0.4 mg Take by mouth  Patient not taking: Reported on 5/22/2023 5/26/22   Historical  "Provider, MD   thiamine 100 MG tablet Take 1 tablet (100 mg total) by mouth daily 12/28/22 1/27/23  Zully Agrawal PA-C   tiZANidine (ZANAFLEX) 4 mg tablet TAKE 1 TABLET BY MOUTH EVERY 8 HOURS AS NEEDED FOR MUSCLE SPASM 3/24/23   Historical Provider, MD   traMADol (ULTRAM) 50 mg tablet TAKE 1 TABLET BY MOUTH EVERY 6 HOURS AS NEEDED FOR PAIN, MODERATE OR PAIN, SEVERE. 4/28/23   Historical Provider, MD   venlafaxine (EFFEXOR-XR) 37.5 mg 24 hr capsule Take 37.5 mg by mouth 3 (three) times a day    Historical Provider, MD     I have reviewed home medications with patient personally.    Allergies:   Allergies   Allergen Reactions    Bupropion Delirium     \"went nuts,\" prior to 2012  \"went nuts,\" prior to 2012  \"went nuts,\" prior to 2012  \"went nuts,\" prior to 2012      Metformin Other (See Comments)     Other reaction(s): kidney disease  Other reaction(s): kidney disease  Other reaction(s): kidney disease      Medical Tape Rash       Social History:     Marital Status: /Civil Union     Social History     Substance and Sexual Activity   Alcohol Use Not Currently     Social History     Tobacco Use   Smoking Status Every Day    Current packs/day: 0.25    Types: Cigarettes   Smokeless Tobacco Never     Social History     Substance and Sexual Activity   Drug Use Yes    Types: Marijuana       Family History:    Family History   Problem Relation Age of Onset    Hypertension Father        Physical Exam:     Vitals:   Blood Pressure: 143/77 (02/26/24 1635)  Pulse: 63 (02/26/24 1635)  Temperature: 97.5 °F (36.4 °C) (02/26/24 1635)  Temp Source: Temporal (02/26/24 1313)  Respirations: 18 (02/26/24 1635)  SpO2: 96 % (02/26/24 1635)    Physical Exam  Vitals and nursing note reviewed.   Constitutional:       Appearance: Normal appearance.   HENT:      Head: Normocephalic and atraumatic.      Right Ear: External ear normal.      Left Ear: External ear normal.      Nose: Nose normal.      Mouth/Throat:      Pharynx: Oropharynx is " clear.   Eyes:      Pupils: Pupils are equal, round, and reactive to light.   Cardiovascular:      Rate and Rhythm: Normal rate and regular rhythm.      Heart sounds: Normal heart sounds.   Pulmonary:      Effort: Pulmonary effort is normal.      Breath sounds: Normal breath sounds.   Abdominal:      General: Bowel sounds are normal.      Palpations: Abdomen is soft.      Tenderness: There is no abdominal tenderness.   Musculoskeletal:         General: Tenderness present. Normal range of motion.      Cervical back: Normal range of motion and neck supple.   Skin:     General: Skin is warm and dry.      Capillary Refill: Capillary refill takes less than 2 seconds.   Neurological:      General: No focal deficit present.      Mental Status: He is alert and oriented to person, place, and time.   Psychiatric:         Mood and Affect: Mood normal.           Additional Data:     Lab Results: I have personally reviewed pertinent reports.      Results from last 7 days   Lab Units 02/26/24  1352   WBC Thousand/uL 7.73   HEMOGLOBIN g/dL 11.5*   HEMATOCRIT % 34.6*   PLATELETS Thousands/uL 167   NEUTROS PCT % 55   LYMPHS PCT % 26   MONOS PCT % 12   EOS PCT % 5     Results from last 7 days   Lab Units 02/26/24  1352   SODIUM mmol/L 135   POTASSIUM mmol/L 4.3   CHLORIDE mmol/L 103   CO2 mmol/L 24   BUN mg/dL 48*   CREATININE mg/dL 4.36*   ANION GAP mmol/L 8   CALCIUM mg/dL 10.5*   ALBUMIN g/dL 3.9   TOTAL BILIRUBIN mg/dL 0.26   ALK PHOS U/L 84   ALT U/L 7   AST U/L 7*   GLUCOSE RANDOM mg/dL 240*         Results from last 7 days   Lab Units 02/26/24  1659   POC GLUCOSE mg/dl 241*               Imaging: I have personally reviewed pertinent reports.      US kidney and bladder    (Results Pending)       EKG, Pathology, and Other Studies Reviewed on Admission:   EKG: None    Allscripts / Epic Records Reviewed: Yes     ** Please Note: This note has been constructed using a voice recognition system. **

## 2024-02-26 NOTE — ASSESSMENT & PLAN NOTE
Patient complains of chronic back pain has been recently getting worse.  He is scheduled to have a back stimulator placed in March.  Continue OxyContin and Tylenol for pain control.  Avoid NSAIDs avoid colchicine for now

## 2024-02-26 NOTE — ASSESSMENT & PLAN NOTE
Baseline Creatinine around 3.2 currently elevated up to 4.36.  Sent to the ED by nephrologist due to worsening labs.  States he has not been eating and drinking well the last few days.  Has been dealing with a lot of back pain related issues.  He stopped taking most of his medications a few weeks ago.  Will place on IV fluid hydration consulted nephrology renal ultrasound ordered.  Monitor electrolytes and renal function closely.  Patient states he does not use any NSAIDs.

## 2024-02-26 NOTE — ASSESSMENT & PLAN NOTE
Blood Pressures currently controlled.  Patient states he stopped taking all of his blood pressure medicines a few weeks ago.  Will continue Norvasc 5 mg daily for now and monitor

## 2024-02-26 NOTE — CONSULTS
NEPHROLOGY HOSPITAL CONSULTATION   Stef Pack Sr. 58 y.o. male MRN: [id number]  Unit/Bed#: Z1 H3 Encounter: [id number]    ASSESSMENT and PLAN:  Stef Pack Sr. is a 58 y.o. male who was admitted to [facility] after presenting with abnormal labs. A renal consultation is requested today for assistance in the management of KELLY on CKD.    KELLY on CKD.  Baseline creatinine appears to be around 3.0-3.4.  Creatinine on admission 4.36.  There have been no labs since 06/2023.  Consider progression of CKD.  Rule out KELLY in setting of prerenal azotemia from mild hypercalcemia.  Check kidney ultrasound to rule out obstructive etiology.  Check urinalysis with microscopy.  Start Isolyte at 75 cc/h today.  No indication for renal replacement therapy.  He needs close outpatient follow-up with nephrology regarding modality education.    2.  CKD stage IV.  Etiology of CKD thought to be due to diabetes, previous ischemic insults, renovascular disease, obstructive uropathy in setting of neurogenic bladder and arterionephrosclerosis.  Outpatient nephrologist Dr. Diaz at [facility] nephrology in [place].    3.  History of chronic hyperkalemia.  Based on notes from outpatient nephrologist he is on Lokelma 10 g daily.  Serum potassium level today 4.3 within normal limits.    4.  History of hypercalcemia.  Previous evaluation showed low PTH, low 1, 25 dihydroxy vitamin D, no M spike, free light chain ratio 1.6 per notes from outpatient nephrologist. Serum calcium level today 10.5.  Continue to monitor serum calcium.  If remains elevated, will start workup by checking PTH.    5.  Hypertension.  Home medications include amlodipine 10 mg daily, carvedilol 6.25 mg twice daily, Flomax 0.4 mg daily.  Blood pressure is currently stable and continue home medications.    6.  History of obstructive uropathy with bilateral hydronephrosis.  History of neurogenic bladder due to diabetes.  He previously had a Ortiz catheter but that was  "removed.  Check kidney/bladder ultrasound to rule out obstructive uropathy.    7.  Anemia in CKD.  Goal hemoglobin more than 10.  Current hemoglobin 11.5.  No indication for ZACKARY.    Discussed with ED team.  After discussion, we agreed that it is possible that patient has some progression of CKD but would also like to rule out KELLY and will start with kidney ultrasound and IV hydration.    HISTORY OF PRESENT ILLNESS:  Requesting Physician: Jennifer Hooks,*  Reason for Consult: KELLY on CKD    Stef Pack Sr. is a 58 y.o. male who was admitted to [facility] after presenting with abnormal labs. A renal consultation is requested today for assistance in the management of KELLY on CKD.  Patient was sent in today due to rise in his creatinine from baseline.  He is currently asymptomatic.  He denies dyspnea.  He denies leg swelling.  He has good appetite.  He has history of obstructive uropathy and had a Ortiz catheter placed previously.    PAST MEDICAL HISTORY:  Past Medical History:   Diagnosis Date    Chronic kidney disease     Diabetes mellitus (HCC)     Gout     Hypertension     Renal disorder     Retroperitoneal abscess (HCC)     Stroke (HCC)     UTI (urinary tract infection)     Vertebral osteomyelitis (HCC)        PAST SURGICAL HISTORY:  Past Surgical History:   Procedure Laterality Date    BACK SURGERY      ORCHIECTOMY         ALLERGIES:  Allergies   Allergen Reactions    Bupropion Delirium     \"went nuts,\" prior to 2012  \"went nuts,\" prior to 2012  \"went nuts,\" prior to 2012  \"went nuts,\" prior to 2012      Metformin Other (See Comments)     Other reaction(s): kidney disease  Other reaction(s): kidney disease  Other reaction(s): kidney disease      Medical Tape Rash       SOCIAL HISTORY:  Social History     Substance and Sexual Activity   Alcohol Use Not Currently     Social History     Substance and Sexual Activity   Drug Use Yes    Types: Marijuana     Social History     Tobacco Use   Smoking " Status Every Day    Current packs/day: 0.25    Types: Cigarettes   Smokeless Tobacco Never       FAMILY HISTORY:  Family History   Problem Relation Age of Onset    Hypertension Father        MEDICATIONS:  No current facility-administered medications for this encounter.    Current Outpatient Medications:     acetaminophen (TYLENOL) 325 mg tablet, Take 650 mg by mouth every 4 (four) hours as needed for mild pain, Disp: , Rfl:     allopurinol (ZYLOPRIM) 100 mg tablet, Take 1 tablet (100 mg total) by mouth daily, Disp: 30 tablet, Rfl: 0    amLODIPine (NORVASC) 10 mg tablet, Take 1 tablet by mouth daily, Disp: , Rfl:     Ascorbic Acid (Vitamin C Immune Health) 500 MG CHEW, Chew, Disp: , Rfl:     atorvastatin (LIPITOR) 40 mg tablet, Take 40 mg by mouth, Disp: , Rfl:     Belbuca 450 MCG FILM, ADMINISTER 1 FILM TO INSIDE OF CHEEK IN THE MORNING AND 1 FILM BEFORE BEDTIME., Disp: , Rfl:     Buprenorphine HCl (Belbuca) 300 MCG FILM, Apply 300 mcg to cheek 2 (two) times a day, Disp: , Rfl:     carvedilol (COREG) 6.25 mg tablet, Take 6.25 mg by mouth 2 (two) times a day with meals, Disp: , Rfl:     colchicine (COLCRYS) 0.6 mg tablet, PLEASE SEE ATTACHED FOR DETAILED DIRECTIONS, Disp: , Rfl:     collagenase (SANTYL) ointment, Apply topically daily, Disp: 15 g, Rfl: 1    cyclobenzaprine (FLEXERIL) 5 mg tablet, Take 1 tablet (5 mg total) by mouth 3 (three) times a day as needed for muscle spasms (AS NEEDED FOR MUSCLE SPASM) for up to 7 days, Disp: 20 tablet, Rfl: 0    docusate sodium (COLACE) 100 mg capsule, TAKE BY MOUTH 1 CAPSULE IN THE MORNING AND 1 CAPSULE BEFORE BEDTIME., Disp: , Rfl:     ferrous sulfate 325 (65 Fe) mg tablet, Take 1 tablet (325 mg total) by mouth daily with breakfast Do not start before October 25, 2022., Disp: , Rfl: 0    insulin glargine (LANTUS) 100 units/mL subcutaneous injection, Inject 5 Units under the skin daily at bedtime, Disp: 10 mL, Rfl: 0    insulin lispro (HumaLOG) 100 units/mL injection, Inject  2-12 Units under the skin 3 (three) times a day before meals, Disp: , Rfl: 0    Lokelma 10 g, , Disp: , Rfl:     magnesium hydroxide (MILK OF MAGNESIA) 400 mg/5 mL oral suspension, Take 30 mL by mouth, Disp: , Rfl:     Naloxegol Oxalate (Movantik) 12.5 MG TABS, Take 12.5 mg by mouth daily, Disp: , Rfl:     NovoLOG FlexPen 100 units/mL injection pen, , Disp: , Rfl:     oxyCODONE (OXY-IR) 5 MG capsule, Take 5 mg by mouth every 4 (four) hours as needed for moderate pain or severe pain, Disp: , Rfl:     polyethylene glycol (MIRALAX) 17 g packet, Take 17 g by mouth 2 (two) times a day, Disp: , Rfl:     pregabalin (LYRICA) 150 mg capsule, Take 150 mg by mouth 2 (two) times a day, Disp: , Rfl:     pregabalin (LYRICA) 75 mg capsule, Take 1 capsule (75 mg total) by mouth 2 (two) times a day for 10 days, Disp: 20 capsule, Rfl: 0    senna-docusate sodium (SENOKOT S) 8.6-50 mg per tablet, Take 1 tablet by mouth 2 (two) times a day, Disp: , Rfl:     sevelamer carbonate (RENVELA) 800 mg tablet, Take 800 mg by mouth in the morning, Disp: , Rfl:     sodium phosphate-biphosphate (FLEET) 7-19 g 118 mL enema, Insert 1 enema into the rectum, Disp: , Rfl:     Sodium Zirconium Cyclosilicate (Lokelma) 10 g, Take 1 packet by mouth, Disp: , Rfl:     tamsulosin (FLOMAX) 0.4 mg, Take by mouth (Patient not taking: Reported on 5/22/2023), Disp: , Rfl:     thiamine 100 MG tablet, Take 1 tablet (100 mg total) by mouth daily, Disp: 30 tablet, Rfl: 0    tiZANidine (ZANAFLEX) 4 mg tablet, TAKE 1 TABLET BY MOUTH EVERY 8 HOURS AS NEEDED FOR MUSCLE SPASM, Disp: , Rfl:     traMADol (ULTRAM) 50 mg tablet, TAKE 1 TABLET BY MOUTH EVERY 6 HOURS AS NEEDED FOR PAIN, MODERATE OR PAIN, SEVERE., Disp: , Rfl:     venlafaxine (EFFEXOR-XR) 37.5 mg 24 hr capsule, Take 37.5 mg by mouth 3 (three) times a day, Disp: , Rfl:     REVIEW OF SYSTEMS:  Review of Systems   Constitutional:  Negative for chills and fever.   HENT:  Negative for ear pain and sore throat.    Eyes:   Negative for pain and visual disturbance.   Respiratory:  Negative for cough and shortness of breath.    Cardiovascular:  Negative for chest pain and palpitations.   Gastrointestinal:  Negative for abdominal pain and vomiting.   Genitourinary:  Negative for dysuria and hematuria.   Musculoskeletal:  Negative for arthralgias and back pain.   Skin:  Negative for color change and rash.   Neurological:  Negative for seizures and syncope.   All other systems reviewed and are negative.      PHYSICAL EXAM:  Current Weight:    First Weight:    Vitals:    02/26/24 1313   BP: 144/81   BP Location: Left arm   Pulse: 62   Resp: 18   Temp: (!) 97.1 °F (36.2 °C)   TempSrc: Temporal   SpO2: 98%     No intake or output data in the 24 hours ending 02/26/24 1501  Physical Exam  Constitutional:       Appearance: Normal appearance.   HENT:      Head: Normocephalic and atraumatic.   Cardiovascular:      Rate and Rhythm: Normal rate and regular rhythm.      Pulses: Normal pulses.      Heart sounds: Normal heart sounds.   Pulmonary:      Effort: Pulmonary effort is normal.      Breath sounds: Normal breath sounds.   Musculoskeletal:         General: Normal range of motion.      Right lower leg: No edema.      Left lower leg: No edema.   Skin:     General: Skin is warm.   Neurological:      Mental Status: He is alert and oriented to person, place, and time. Mental status is at baseline.   Psychiatric:         Mood and Affect: Mood normal.       Invasive Devices:   Urethral Catheter Coude 16 Fr. (Active)     Lab Results:   Results from last 7 days   Lab Units 02/26/24  1352   WBC Thousand/uL 7.73   HEMOGLOBIN g/dL 11.5*   HEMATOCRIT % 34.6*   PLATELETS Thousands/uL 167   POTASSIUM mmol/L 4.3   CHLORIDE mmol/L 103   CO2 mmol/L 24   BUN mg/dL 48*   CREATININE mg/dL 4.36*   CALCIUM mg/dL 10.5*   ALK PHOS U/L 84   ALT U/L 7   AST U/L 7*     Portions of the record may have been created with voice recognition software. Occasional wrong word or  "\"sound a like\" substitutions may have occurred due to the inherent limitations of voice recognition software. Read the chart carefully and recognize, using context, where substitutions have occurred. If you have any questions, please contact the dictating provider.    "

## 2024-02-27 VITALS
WEIGHT: 155.87 LBS | HEIGHT: 72 IN | RESPIRATION RATE: 18 BRPM | OXYGEN SATURATION: 94 % | BODY MASS INDEX: 21.11 KG/M2 | TEMPERATURE: 97.9 F | SYSTOLIC BLOOD PRESSURE: 147 MMHG | HEART RATE: 62 BPM | DIASTOLIC BLOOD PRESSURE: 71 MMHG

## 2024-02-27 LAB
ANION GAP SERPL CALCULATED.3IONS-SCNC: 12 MMOL/L
BACTERIA UR QL AUTO: ABNORMAL /HPF
BILIRUB UR QL STRIP: NEGATIVE
BUN SERPL-MCNC: 47 MG/DL (ref 5–25)
CALCIUM SERPL-MCNC: 9.8 MG/DL (ref 8.4–10.2)
CHLORIDE SERPL-SCNC: 107 MMOL/L (ref 96–108)
CLARITY UR: CLEAR
CO2 SERPL-SCNC: 19 MMOL/L (ref 21–32)
COLOR UR: YELLOW
CREAT SERPL-MCNC: 4.15 MG/DL (ref 0.6–1.3)
GFR SERPL CREATININE-BSD FRML MDRD: 14 ML/MIN/1.73SQ M
GLUCOSE SERPL-MCNC: 108 MG/DL (ref 65–140)
GLUCOSE SERPL-MCNC: 92 MG/DL (ref 65–140)
GLUCOSE SERPL-MCNC: 94 MG/DL (ref 65–140)
GLUCOSE UR STRIP-MCNC: NEGATIVE MG/DL
HGB UR QL STRIP.AUTO: NEGATIVE
KETONES UR STRIP-MCNC: NEGATIVE MG/DL
LEUKOCYTE ESTERASE UR QL STRIP: ABNORMAL
NITRITE UR QL STRIP: NEGATIVE
NON-SQ EPI CELLS URNS QL MICRO: ABNORMAL /HPF
PH UR STRIP.AUTO: 7 [PH]
POTASSIUM SERPL-SCNC: 4.7 MMOL/L (ref 3.5–5.3)
PROT UR STRIP-MCNC: ABNORMAL MG/DL
RBC #/AREA URNS AUTO: ABNORMAL /HPF
SODIUM SERPL-SCNC: 138 MMOL/L (ref 135–147)
SP GR UR STRIP.AUTO: 1.01 (ref 1–1.03)
TSH SERPL DL<=0.05 MIU/L-ACNC: 1.68 UIU/ML (ref 0.45–4.5)
UROBILINOGEN UR QL STRIP.AUTO: 0.2 E.U./DL
WBC #/AREA URNS AUTO: ABNORMAL /HPF

## 2024-02-27 PROCEDURE — 81001 URINALYSIS AUTO W/SCOPE: CPT | Performed by: INTERNAL MEDICINE

## 2024-02-27 PROCEDURE — 97163 PT EVAL HIGH COMPLEX 45 MIN: CPT

## 2024-02-27 PROCEDURE — 97167 OT EVAL HIGH COMPLEX 60 MIN: CPT

## 2024-02-27 PROCEDURE — 82948 REAGENT STRIP/BLOOD GLUCOSE: CPT

## 2024-02-27 PROCEDURE — 99239 HOSP IP/OBS DSCHRG MGMT >30: CPT | Performed by: FAMILY MEDICINE

## 2024-02-27 PROCEDURE — 80048 BASIC METABOLIC PNL TOTAL CA: CPT | Performed by: INTERNAL MEDICINE

## 2024-02-27 PROCEDURE — 84443 ASSAY THYROID STIM HORMONE: CPT | Performed by: FAMILY MEDICINE

## 2024-02-27 PROCEDURE — 97116 GAIT TRAINING THERAPY: CPT

## 2024-02-27 RX ORDER — NICOTINE 21 MG/24HR
1 PATCH, TRANSDERMAL 24 HOURS TRANSDERMAL DAILY
Qty: 28 PATCH | Refills: 0 | Status: SHIPPED | OUTPATIENT
Start: 2024-02-28

## 2024-02-27 RX ORDER — SODIUM CHLORIDE, SODIUM GLUCONATE, SODIUM ACETATE, POTASSIUM CHLORIDE, MAGNESIUM CHLORIDE, SODIUM PHOSPHATE, DIBASIC, AND POTASSIUM PHOSPHATE .53; .5; .37; .037; .03; .012; .00082 G/100ML; G/100ML; G/100ML; G/100ML; G/100ML; G/100ML; G/100ML
75 INJECTION, SOLUTION INTRAVENOUS CONTINUOUS
Status: DISCONTINUED | OUTPATIENT
Start: 2024-02-27 | End: 2024-02-27 | Stop reason: HOSPADM

## 2024-02-27 RX ORDER — SODIUM BICARBONATE 650 MG/1
650 TABLET ORAL DAILY
Status: DISCONTINUED | OUTPATIENT
Start: 2024-02-27 | End: 2024-02-27 | Stop reason: HOSPADM

## 2024-02-27 RX ORDER — SODIUM BICARBONATE 650 MG/1
650 TABLET ORAL 2 TIMES DAILY
Qty: 60 TABLET | Refills: 0 | Status: SHIPPED | OUTPATIENT
Start: 2024-02-27 | End: 2024-03-28

## 2024-02-27 RX ORDER — ALLOPURINOL 100 MG/1
100 TABLET ORAL DAILY
Qty: 30 TABLET | Refills: 0 | Status: SHIPPED | OUTPATIENT
Start: 2024-02-27 | End: 2024-03-28

## 2024-02-27 RX ORDER — AMLODIPINE BESYLATE 5 MG/1
5 TABLET ORAL DAILY
Qty: 30 TABLET | Refills: 0 | Status: SHIPPED | OUTPATIENT
Start: 2024-02-27 | End: 2024-03-28

## 2024-02-27 RX ORDER — TAMSULOSIN HYDROCHLORIDE 0.4 MG/1
0.4 CAPSULE ORAL
Qty: 30 CAPSULE | Refills: 0 | Status: SHIPPED | OUTPATIENT
Start: 2024-02-27 | End: 2024-03-28

## 2024-02-27 RX ADMIN — AMLODIPINE BESYLATE 5 MG: 5 TABLET ORAL at 07:57

## 2024-02-27 RX ADMIN — SODIUM CHLORIDE, SODIUM GLUCONATE, SODIUM ACETATE, POTASSIUM CHLORIDE, MAGNESIUM CHLORIDE, SODIUM PHOSPHATE, DIBASIC, AND POTASSIUM PHOSPHATE 75 ML/HR: .53; .5; .37; .037; .03; .012; .00082 INJECTION, SOLUTION INTRAVENOUS at 10:15

## 2024-02-27 RX ADMIN — OXYCODONE HYDROCHLORIDE 5 MG: 5 TABLET ORAL at 07:56

## 2024-02-27 RX ADMIN — PREGABALIN 75 MG: 75 CAPSULE ORAL at 07:57

## 2024-02-27 RX ADMIN — HEPARIN SODIUM 5000 UNITS: 5000 INJECTION INTRAVENOUS; SUBCUTANEOUS at 07:55

## 2024-02-27 NOTE — PLAN OF CARE
Problem: PHYSICAL THERAPY ADULT  Goal: Performs mobility at highest level of function for planned discharge setting.  See evaluation for individualized goals.  Description: Treatment/Interventions: ADL retraining, Functional transfer training, LE strengthening/ROM, Elevations, Therapeutic exercise, Endurance training, Patient/family training, Equipment eval/education, Bed mobility, Gait training, Compensatory technique education, Spoke to nursing, Spoke to case management, OT, Cognitive reorientation  Equipment Recommended:  (pt has walker and cane to use prn)       See flowsheet documentation for full assessment, interventions and recommendations.  Note: Prognosis: Good  Problem List: Decreased strength, Decreased endurance, Impaired balance, Decreased mobility, Decreased cognition, Decreased coordination, Impaired judgement, Decreased safety awareness, Pain  Assessment: Pt is a 58 y.o. male seen for PT evaluation s/p admission to [facility] on 2/26/2024 with KELLY (acute kidney injury) (HCC).  Order placed for PT services.  Upon evaluation: Pt is presenting with impaired functional mobility due to pain, decreased strength, decreased ROM, decreased endurance, impaired balance, impaired coordination, gait deviations, impaired cognition, decreased safety awareness, impaired judgment, and fall risk requiring  supervision assistance for bed mobility, stand by assistance for transfers, and stand by to steadying assistance for ambulation with hurry cane . Pt's clinical presentation is currently unpredictable given the functional mobility deficits above, especially weakness, decreased ROM, decreased endurance, impaired coordination, impaired balance, gait deviations, pain, decreased activity tolerance, decreased functional mobility tolerance, decreased safety awareness, impaired judgement, and decreased cognition, coupled with fall risks as indicated by AM-PAC 6-Clicks: 17/24 as well as hx of falls,  impulsivity, impaired balance, polypharmacy, decreased safety awareness, and decreased cognition and combined with medical complications of abnormal renal lab values, abnormal H&H, abnormal blood sugars, impulsivity during admission, need for input for mobility technique/safety, and abnormal uric acid, KELLY .  Pt's PMHx and comorbidities that may affect physical performance and progress include:  chronic back pain with plan for stimulator to be placed in March, HTN, gout, DM, CKD, h/o retroperitoneal abscess, CVA, vertebral osteomyelitis . Personal factors affecting pt at time of IE include: impulsivity, inaccessible home environment, multi-level environment, inability to perform IADLs, inability to perform ADLs, inability to navigate level surfaces without external assistance, inability to ambulate household distances, and recent fall(s)/fall history. Pt will benefit from continued skilled PT services to address deficits as defined above and to maximize level of functional mobility to facilitate return toward PLOF and improved QOL. From PT/mobility standpoint, recommendation at time of d/c would be Level II (Moderate Resource Intensity in order to reduce fall risk and maximize pt's functional independence and consistency with mobility. Recommend trial with walker next 1-2 sessions, trial with cane next 1-2 sessions, and ther ex next 1-2 sessions.  Barriers to Discharge: Inaccessible home environment  Barriers to Discharge Comments: increased fall risk, safety concerns, impulsive, 2nd floor bedroom and bathroom  Rehab Resource Intensity Level, PT: II (Moderate Resource Intensity)    See flowsheet documentation for full assessment.

## 2024-02-27 NOTE — ASSESSMENT & PLAN NOTE
Baseline Creatinine around 3.2 currently elevated up to 4.36.  Sent to the ED by nephrologist due to worsening labs.  States he has not been eating and drinking well the last few days.  Has been dealing with a lot of back pain related issues.  He stopped taking most of his medications a few weeks ago.  received IV fluid hydration with modest improvement in creatinine from 4.36-4.15.  Will discharge him home and monitor electrolytes and renal function closely outpatient and needs follow-up with his primary nephrologist soon.  Patient states he does not use any NSAIDs.  Renal Ultrasound done and report is pending at this time  Discharge on sodium bicarb 650 mg oral twice daily

## 2024-02-27 NOTE — DISCHARGE INSTR - AVS FIRST PAGE
Avoid any NSAIDs or colchicine for pain control.  Keep yourself well-hydrated  Please do blood work in 1 week (bmp).  Please follow-up with your nephrologist in 1 to 2 weeks as her renal function is getting worse and they will need to do further interventions outpatient

## 2024-02-27 NOTE — PLAN OF CARE
Problem: OCCUPATIONAL THERAPY ADULT  Goal: Performs self-care activities at highest level of function for planned discharge setting.  See evaluation for individualized goals.  Description: Treatment Interventions: ADL retraining, Functional transfer training, UE strengthening/ROM, Endurance training, Patient/family training, Equipment evaluation/education, Compensatory technique education, Continued evaluation, Energy conservation, Activityengagement          See flowsheet documentation for full assessment, interventions and recommendations.   Outcome: Progressing  Note: Limitation: Decreased ADL status, Decreased cognition, Decreased UE strength, Decreased Safe judgement during ADL, Decreased endurance, Decreased self-care trans, Decreased high-level ADLs  Prognosis: Fair  Assessment: Ventura is a 58 y.o. male admitted to [facility] 2/26/2024 with admitting diagnosis: KELLY. Pt with PMHx impacting their performance during ADL tasks, including: chronic back pain, HTN, gout, DM Type II insulin dependent. Prior to admission to the hospital pt was performing ADLs without physical assistance. IADLs without physical assistance. Functional transfers/ambulation without physical assistance. Cognitive status was PTA was WFL. OT order placed to assess pt's ADLs, cognitive status, and performance during functional tasks in order to maximize safety and independence while making most appropriate d/c recommendations. PT/OT co-evaluation completed at this time d/t mobility deficits and safety concerns. Pt's clinical presentation is currently unstable/unpredictable given new onset deficits that effect pt's occupational performance and ability to safely return to PLOF including decrease activity tolerance, decrease standing balance, decrease performance during ADL tasks, decrease cognition, decrease safety awareness , increase impulsiveness, decrease activity engagement, decrease performance during functional transfers, limited insight to  deficits, inability to express needs, and decreased alertness combined with medical complications of hypertension , impulsivity during admission, and need for input for mobility technique/safety. Personal factors affecting pt at time of initial evaluation include: limited home support, inability to perform IADLs, inability to perform ADLs, inability to ambulate household distances, inability to navigate community distances, limited insight into impairments, and decreased initiation and engagement. Pt will benefit from continued acute skilled OT services to address deficits as defined above and to maximize level independence/participation during ADLs and functional tasks to facilitate return toward PLOF and improved quality of life.     From an occupational therapy standpoint, recommendation at time of d/c would be Level II: Moderate Resource Intensity . The patient's raw score on the AM-PAC Daily Activity Inpatient Short Form is 21. A raw score of greater than or equal to 19 suggests the patient may benefit from discharge to post-acute rehabilitation services. Please refer to the recommendation of the Occupational Therapist for safe discharge planning.     Rehab Resource Intensity Level, OT: II (Moderate Resource Intensity)

## 2024-02-27 NOTE — PLAN OF CARE
Problem: PAIN - ADULT  Goal: Verbalizes/displays adequate comfort level or baseline comfort level  Description: Interventions:  - Encourage patient to monitor pain and request assistance  - Assess pain using appropriate pain scale  - Administer analgesics based on type and severity of pain and evaluate response  - Implement non-pharmacological measures as appropriate and evaluate response  - Consider cultural and social influences on pain and pain management  - Notify physician/advanced practitioner if interventions unsuccessful or patient reports new pain  Outcome: Progressing     Problem: INFECTION - ADULT  Goal: Absence or prevention of progression during hospitalization  Description: INTERVENTIONS:  - Assess and monitor for signs and symptoms of infection  - Monitor lab/diagnostic results  - Monitor all insertion sites, i.e. indwelling lines, tubes, and drains  - Monitor endotracheal if appropriate and nasal secretions for changes in amount and color  - [place] appropriate cooling/warming therapies per order  - Administer medications as ordered  - Instruct and encourage patient and family to use good hand hygiene technique  - Identify and instruct in appropriate isolation precautions for identified infection/condition  Outcome: Progressing

## 2024-02-27 NOTE — UTILIZATION REVIEW
NOTIFICATION OF INPATIENT ADMISSION   AUTHORIZATION REQUEST   SERVICING FACILITY:   [place] [address]  Tax ID: [id number]  NPI: [id number] ATTENDING PROVIDER:  Attending Name and NPI#: Adrianne Horn Md [[id number]]  Address: [address] [place] [address]  Phone: [phone]   ADMISSION INFORMATION:  Place of Service: Inpatient [facility]  Place of Service Code: 21  Inpatient Admission Date/Time: 2/26/24  3:25 PM  Discharge Date/Time: No discharge date for patient encounter.  Admitting Diagnosis Code/Description:  Kidney disease [N28.9]  Abnormal laboratory test result [R89.9]  Acute kidney injury (HCC) [N17.9]     UTILIZATION REVIEW CONTACT:  Ana Mendoza Utilization   Network Utilization Review Department  Phone: [phone]  Fax [phone]  Email: [email or web address]  Contact for approvals/pending authorizations, clinical reviews, and discharge.     PHYSICIAN ADVISORY SERVICES:  Medical Necessity Denial & Uvcu-vj-Blct Review  Phone: [phone]  Fax: [phone]  Email: [email or web address]     DISCHARGE SUPPORT TEAM:  For Patients Discharge Needs & Updates  Phone: [phone] opt. 2 Fax: [phone]  Email: [email or web address]

## 2024-02-27 NOTE — CASE MANAGEMENT
Case Management Assessment & Discharge Planning Note    Patient name Stef Pack Sr.  Location /-01 MRN [id number]  : 1965 Date 2024       Current Admission Date: 2024  Current Admission Diagnosis:KELLY (acute kidney injury) (HCC)   Patient Active Problem List    Diagnosis Date Noted    Right knee pain 2023    Diabetic foot ulcer (HCC) 2023    CKD (chronic kidney disease) 2023    Acute metabolic encephalopathy 2023    PAD (peripheral artery disease) (HCC) 2022    Chronic back pain 2022    Moderate protein-calorie malnutrition (HCC) 2022    Chronic anemia 2022    RSV infection 2022    Chronic ulcer of left heel (HCC) 10/20/2022    Hypercalcemia 2022    Low back pain 2022    Ambulatory dysfunction 2022    Stage 3a chronic kidney disease (HCC) 2022    Retention of urine 2022    Severe protein-calorie malnutrition (HCC) 2022    Iron deficiency anemia secondary to inadequate dietary iron intake 2022    Hip pain, acute, left 2022    KELLY (acute kidney injury) (HCC) 2022    Hyperkalemia 2022    Diabetes mellitus type 2, insulin dependent (HCC)     Gout     Essential hypertension     History of CVA (cerebrovascular accident)     Osteomyelitis of vertebra of lumbar region (HCC)       LOS (days): 1  Geometric Mean LOS (GMLOS) (days): 2.2  Days to GMLOS:1.4     OBJECTIVE:    Risk of Unplanned Readmission Score: 16.41         Current admission status: Inpatient       Preferred Pharmacy:   [facility]/pharmacy #[id number] - [place] - [address] [facility]  [address]  [facility] [address]  Phone: [phone] Fax: [phone]    Primary Care Provider: Rehan Mancia DO    Primary Insurance: [employer] REP  Secondary Insurance:     ASSESSMENT:  Active Health Care Proxies       [facility]ley [facility] Care Representative - Spouse   Primary Phone: [phone] (Home)                  Advance Directives  Does patient have a Health Care POA?: Yes  Does patient have Advance Directives?: No  Was patient offered paperwork?: Yes  Primary Contact: Charley, spouse              Patient Information  Admitted from:: Home  Mental Status: Alert  During Assessment patient was accompanied by: Not accompanied during assessment  Assessment information provided by:: Patient  Primary Caregiver: Child  Caregiver's Name:: lalo Whitehead  Caregiver's Relationship to Patient:: Family Member  Caregiver's Telephone Number:: [phone]  Support Systems: Spouse/significant other  [place] Res[place]: [facility]  What city do you live in?: [place]  Home entry access options. Select all that apply.: Other access (Comment) (chair lift)  Type of Current Residence: 3 story home  Upon entering residence, is there a bedroom on the main floor (no further steps)?: Yes  Upon entering residence, is there a bathroom on the main floor (no further steps)?: Yes  Living Arrangements: Lives w/ Spouse/significant other  Is patient a ?: Yes  Is patient active with [facility] ([facility])?: No    Activities of Daily Living Prior to Admission  Functional Status: Assistance  Completes ADLs independently?: No  Level of ADL dependence: Assistance  Ambulates independently?: No  Level of ambulatory dependence: Assistance  Does patient use assisted devices?: Yes  Assisted Devices (DME) used: Walker, Wheelchair, Shower Chair, Stair Chair/Glide, Bedside Commode  Does patient currently own DME?: Yes  Does patient have a history of Outpatient Therapy (PT/OT)?: No  Does the patient have a history of Short-Term Rehab?: Yes  Does patient have a history of HHC?: Yes  Does patient currently have HHC?: No    Patient Information Continued  Income Source: SSI/SSD  Does patient have prescription coverage?: Yes  Does patient receive dialysis treatments?: No  Does patient have a history of Mental Health Diagnosis?: No      Means of  Transportation  Means of Transport to Appts:: Family transport      Social Determinants of Health (SDOH)      Flowsheet Row Most Recent Value   Housing Stability    In the last 12 months, was there a time when you were not able to pay the mortgage or rent on time? N   In the last 12 months, how many places have you lived? 1   In the last 12 months, was there a time when you did not have a steady place to sleep or slept in a shelter (including now)? N   Transportation Needs    In the past 12 months, has lack of transportation kept you from medical appointments or from getting medications? no   In the past 12 months, has lack of transportation kept you from meetings, work, or from getting things needed for daily living? No   Food Insecurity    Within the past 12 months, you worried that your food would run out before you got the money to buy more. Never true   Within the past 12 months, the food you bought just didn't last and you didn't have money to get more. Never true   Utilities    In the past 12 months has the electric, gas, oil, or water company threatened to shut off services in your home? No            DISCHARGE DETAILS:    Met with patient at bedside.  Patient with hx of prior stroke.  Lives with spouse, has FWW, WC, BSC, lift chair in home. Hx of rehab and HH but not able to recall names of locations where care has been given.      Chart review shows care in OP wound clinic.  Patient reports no recent hx of HH, ARU, or SNF.  Patient prefers dc home and is not open to SNF or ARU referrals.  Reports family will assist with transportation at time of dc.     DCP: Possible HH vs return home with family.      Discharge planning discussed with:: Patient  Freedom of Choice: Yes  Comments - Freedom of Choice: Agreeable to blanket referrals. [facility] is preference for HH services  CM contacted family/caregiver?: No- see comments  Were Treatment Team discharge recommendations reviewed with patient/caregiver?:  Yes  Did patient/caregiver verbalize understanding of patient care needs?: Yes  Were patient/caregiver advised of the risks associated with not following Treatment Team discharge recommendations?: Yes    Contacts  Patient Contacts: Charley (spouse)    UPDATE  DC orders in.  Review plan with patient who reports plan of DC home, declines HH at this time.  PT recommending ST rehab for patient.  CM asked patient about all post acute services on admission and patient declined.  At 1227, CM reminded by therapy dept that recommendation is for rehab.  CM asked patient a second time if he wants HH or SNF.  Patient reports wife on the way to pick him up, declines any services and prefers to dc home, self care.     DCP: Home

## 2024-02-27 NOTE — ASSESSMENT & PLAN NOTE
Lab Results   Component Value Date    HGBA1C 7.0 (H) 03/16/2023       Recent Labs     02/26/24  1659 02/26/24  2100 02/27/24  0735   POCGLU 241* 83 92         Blood Sugar Average: Last 72 hrs:  (P) 138.0474441422419890  He only takes his Lantus and has stopped taking his short acting insulin.  Will continue Lantus and insulin sliding scale for now and further adjust

## 2024-02-27 NOTE — DISCHARGE SUMMARY
[facility]  Discharge- Stef Pack Sr. 1965, 58 y.o. male MRN: [id number]  Unit/Bed#: -Rosmery Encounter: [id number]  Primary Care Provider: Rehan Mancia DO   Date and time admitted to hospital: 2/26/2024  1:14 PM    * KELLY (acute kidney injury) (HCC)  Assessment & Plan  Baseline Creatinine around 3.2 currently elevated up to 4.36.  Sent to the ED by nephrologist due to worsening labs.  States he has not been eating and drinking well the last few days.  Has been dealing with a lot of back pain related issues.  He stopped taking most of his medications a few weeks ago.  received IV fluid hydration with modest improvement in creatinine from 4.36-4.15.  Will discharge him home and monitor electrolytes and renal function closely outpatient and needs follow-up with his primary nephrologist soon.  Patient states he does not use any NSAIDs.  Renal Ultrasound done and report is pending at this time  Discharge on sodium bicarb 650 mg oral twice daily    Chronic back pain  Assessment & Plan  Patient complains of chronic back pain has been recently getting worse.  He is scheduled to have a back stimulator placed in March.  Continue OxyContin and Tylenol for pain control.  Avoid NSAIDs avoid colchicine for now    Essential hypertension  Assessment & Plan  Blood Pressures currently controlled.  Patient states he stopped taking all of his blood pressure medicines a few weeks ago.  Will continue Norvasc 5 mg daily for now and monitor    Gout  Assessment & Plan  Uric Acid elevated at 9.4.  Restart allopurinol    Diabetes mellitus type 2, insulin dependent (HCC)  Assessment & Plan  Lab Results   Component Value Date    HGBA1C 7.0 (H) 03/16/2023       Recent Labs     02/26/24  1659 02/26/24  2100 02/27/24  0735   POCGLU 241* 83 92         Blood Sugar Average: Last 72 hrs:  (P) 138.8929029225996831  He only takes his Lantus and has stopped taking his short acting insulin.  Will continue Lantus  and insulin sliding scale for now and further adjust      Discharging Physician / Practitioner: Adrianne Horn MD  PCP: Rehan Mancia DO  Admission Date:   Admission Orders (From admission, onward)       Ordered        02/26/24 1525  INPATIENT ADMISSION  Once                          Discharge Date: 02/27/24    Medical Problems       Resolved Problems  Date Reviewed: 2/27/2024   None         Consultations During Hospital Stay:  nephrology    Procedures Performed:   none    Significant Findings / Test Results:   No results found.     Incidental Findings:   none    Test Results Pending at Discharge (will require follow up):   none     Outpatient Tests Requested:  Bmp in 1 week  Outpt nephrology follow up    Complications:  none    Reason for Admission: Abnormal labs    Hospital Course:     Stef Pack Sr. is a 58 y.o. male patient who originally presented to the hospital on 2/26/2024 due to abnormal labs found to have worsening renal function.  His baseline was around 3.2.  It has worsened to 4.36.  Patient states that he stopped taking a lot of his medications a few weeks ago on his own.  Denies any recent illnesses or however his oral intake is a little decreased due to recent the having issues with back pain.  Placed on IV fluid hydration with minimal improvement in creatinine.  Seen by nephrology and recommend outpatient close follow-up with his nephrologist . No indictation at this time for dialysis inpatient however will need further plans to be set up for outpatient.    The patient, initially admitted to the hospital as inpatient, was discharged earlier than expected given the following:  mild improvement in patient.    Please see above list of diagnoses and related plan for additional information.     Condition at Discharge: good     Discharge Day Visit / Exam:     Subjective: Patient denies any chest pain or shortness of breath or nausea or vomiting has chronic back pain as usual  Vitals: Blood Pressure:  147/71 (02/27/24 0718)  Pulse: 62 (02/27/24 0718)  Temperature: 97.9 °F (36.6 °C) (02/27/24 0718)  Temp Source: Temporal (02/26/24 2300)  Respirations: 18 (02/27/24 0718)  Height: 6' (182.9 cm) (02/26/24 1635)  Weight - Scale: 70.7 kg (155 lb 13.8 oz) (02/27/24 0533)  SpO2: 94 % (02/27/24 0755)  Exam:   Physical Exam  Vitals and nursing note reviewed.   Constitutional:       Appearance: Normal appearance.   HENT:      Head: Normocephalic and atraumatic.      Right Ear: External ear normal.      Left Ear: External ear normal.      Nose: Nose normal.      Mouth/Throat:      Pharynx: Oropharynx is clear.   Eyes:      Pupils: Pupils are equal, round, and reactive to light.   Cardiovascular:      Rate and Rhythm: Normal rate and regular rhythm.      Heart sounds: Normal heart sounds.   Pulmonary:      Effort: Pulmonary effort is normal.      Breath sounds: Normal breath sounds.   Abdominal:      General: Bowel sounds are normal.      Palpations: Abdomen is soft.      Tenderness: There is no abdominal tenderness.   Musculoskeletal:         General: Normal range of motion.      Cervical back: Normal range of motion and neck supple.   Skin:     General: Skin is warm and dry.      Capillary Refill: Capillary refill takes less than 2 seconds.   Neurological:      Mental Status: He is alert and oriented to person, place, and time. Mental status is at baseline.   Psychiatric:         Mood and Affect: Mood normal.         Discussion with Family: discussed with wife    Discharge instructions/Information to patient and family:   See after visit summary for information provided to patient and family.      Provisions for Follow-Up Care:  See after visit summary for information related to follow-up care and any pertinent home health orders.      Disposition:     Home    For Discharges to [facility] SNF:   Not Applicable to this Patient - Not Applicable to this Patient    Planned Readmission: none     Discharge  Statement:  I spent 35 minutes discharging the patient. This time was spent on the day of discharge. I had direct contact with the patient on the day of discharge. Greater than 50% of the total time was spent examining patient, answering all patient questions, arranging and discussing plan of care with patient as well as directly providing post-discharge instructions.  Additional time then spent on discharge activities.    Discharge Medications:  See after visit summary for reconciled discharge medications provided to patient and family.      ** Please Note: This note has been constructed using a voice recognition system **

## 2024-02-27 NOTE — TELEMEDICINE
TeleProgress Note -   Stef Pack Sr. 58 y.o. male MRN: [id number]  Unit/Bed#: -01 Encounter: [id number]      REQUIRED DOCUMENTATION:     1. This service was provided via Telemedicine.  2. Provider located at [facility]  3. TeleMed provider: Joselyn Reyes Bahamonde, MD.  4. Identify all parties in room with patient during tele consult:  Mr. Pack  5. After connecting through televideo, patient was identified by name and date of birth and assistant checked wristband.  Patient was then informed that this was a Telemedicine visit and that the exam was being conducted confidentially over secure lines. My office door was closed. No one else was in the room.  Patient acknowledged consent and understanding of privacy and security of the Telemedicine visit, and gave us permission to have the assistant stay in the room in order to assist with the history and to conduct the exam.  I informed the patient that I have reviewed their record in Epic and presented the opportunity for them to ask any questions regarding the visit today.  The patient agreed to participate.       Assessment:  59 yo man with PMH of CKD G4 baseline creatinine 3 to 3.4 mg/dL.  Patient presented with abnormal labs.  Nephrology is consulted for management of KELLY    Plan:  #Non-Oliguric KDIGO KELLY stage 1 on CKD G4A3 versus progression of CKD  Etiology: Likely secondary to hemodynamic changes in the settings of poor intake  Baseline creatinine 3 to 3.4 mg/dL  Peak creatinine: 4.36 mg/dL  Current creatinine: 4.1 mg/dL, improved with IV fluids,  UA: No hematuria  UACR 2122 in 2022  Renal imaging : Pending report  Treatment:  No urgent indication of dialysis at this time.  Patient does not have any uremic symptoms  Maintain MAP:  Over 65 mmHg if possible/avoid hypoperfusion:  Hold parameters on blood pressure medications  Avoid nephrotoxic agents such as NSAIDs, and IV contrast if possible. Avoid opioids   Adjust medications to  GFR  Patient would like to go home.  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      #Acid-base Disorder  serum HCO3 19 mmol/L  Recommend to start sodium bicarbonate 650 mg daily for now to avoid sodium load    #Volume status/hypertension:  Volume: Euvolemic  Blood pressure:  Hypertension, /71, goal less than 140/90  Recommend:  Low-sodium diet  Can discontinue IV fluids today  Continue Lidopin 5 mg  Advised to maintain a good BP control to prevent progression of CKD     #Anemia:  Current hemoglobin:11.5  Secondary to CKD  Treatment:  No indication of Epogen at this time  Transfuse for hemoglobin less than 7.0 per primary service      # Obstructive uropathy  Secondary to neurogenic bladder complicated with hydronephrosis  Pending repeat ultrasound    Subjective:   Patient feels better, no shortness of breath, no chest pain.  Received IV fluids overnight.  Eating and drinking well    Objective:     Temp:  [97.1 °F (36.2 °C)-98.1 °F (36.7 °C)] 97.9 °F (36.6 °C)  HR:  [62-70] 62  Resp:  [18] 18  BP: (143-153)/(71-81) 147/71  SpO2:  [92 %-98 %] 94 %      Intake/Output Summary (Last 24 hours) at 2/27/2024 1043  Last data filed at 2/26/2024 2100  Gross per 24 hour   Intake --   Output 750 ml   Net -750 ml       Physical Exam:   General:  no acute distress at this time  Skin:  No acute rash  Eyes:  No scleral icterus and noninjected  ENT:  mucous membranes moist  Chest:  good respiratory effort, no use of accessory respiratory muscles  CVS:  Regular rate and rhythm on the monitor, no JVD  Abdomen: Nondistended  Extremities: no significant lower extremity edema  Neuro:  No gross focality  Psych:  Alert , cooperative       The highlighted and/or bolded points in my assessment, plan, and disposition were discussed with the primary  team and they agree with those points and the plan.  Previous records were personally reviewed by me to obtain a baseline creatinine.   The images (CXR) were personally reviewed by me in PACS

## 2024-02-27 NOTE — PLAN OF CARE
Problem: PHYSICAL THERAPY ADULT  Goal: Performs mobility at highest level of function for planned discharge setting.  See evaluation for individualized goals.  Description: Treatment/Interventions: ADL retraining, Functional transfer training, LE strengthening/ROM, Elevations, Therapeutic exercise, Endurance training, Patient/family training, Equipment eval/education, Bed mobility, Gait training, Compensatory technique education, Spoke to nursing, Spoke to case management, OT, Cognitive reorientation  Equipment Recommended:  (pt has walker and cane to use prn)       See flowsheet documentation for full assessment, interventions and recommendations.  2/27/2024 1231 by Kandace Marx, PT  Note: Prognosis: Good  Problem List: Decreased strength, Decreased endurance, Impaired balance, Decreased mobility, Decreased cognition, Decreased coordination, Impaired judgement, Decreased safety awareness, Pain  Pt tolerated session poorly requiring increased assisatnce as distance ambulation increased and requiring use of RW to return safety to room wiht increasing assistance. He is limited by decrease strength, balance, endurance, coordination and saftey concerns. He is impulsive with all mobility. He requires increased time and effort to complete mobility. He will continue to beenfit from PT services to maximize LOF.  Barriers to Discharge: Inaccessible home environment  Barriers to Discharge Comments: increased fall risk, safety concerns, impulsive, 2nd floor bedroom and bathroom  Rehab Resource Intensity Level, PT: II (Moderate Resource Intensity)    See flowsheet documentation for full assessment.

## 2024-02-27 NOTE — PHYSICAL THERAPY NOTE
PHYSICAL THERAPY EVALUATION  NAME:  Stef Pack Sr.  DATE: 02/27/24    AGE:   58 y.o.  Mrn:   [id number]  ADMIT DX:  Kidney disease [N28.9]  Abnormal laboratory test result [R89.9]  Acute kidney injury (HCC) [N17.9]    Past Medical History:   Diagnosis Date    Chronic kidney disease     Diabetes mellitus (HCC)     Gout     Hypertension     Renal disorder     Retroperitoneal abscess (HCC)     Stroke (HCC)     UTI (urinary tract infection)     Vertebral osteomyelitis (HCC)      Length Of Stay: 1  Performed at least 2 patient identifiers during session: Name and Birthday  PHYSICAL THERAPY EVALUATION :    02/27/24 0819   PT Last Visit   PT Visit Date 02/27/24   Note Type   Note type Evaluation   Pain Assessment   Pain Assessment Tool FLACC   Pain Score 8   Pain Location/Orientation Orientation: Lower;Location: Back   Pain Rating: FLACC (Rest) - Face 1   Pain Rating: FLACC (Rest) - Legs 0   Pain Rating: FLACC (Rest) - Activity 0   Pain Rating: FLACC (Rest) - Cry 0   Pain Rating: FLACC (Rest) - Consolability 0   Score: FLACC (Rest) 1   Pain Rating: FLACC (Activity) - Face 1   Pain Rating: FLACC (Activity) - Legs 0   Pain Rating: FLACC (Activity) - Activity 0   Pain Rating: FLACC (Activity) - Cry 1   Pain Rating: FLACC (Activity) - Consolability 0   Score: FLACC (Activity) 2   Restrictions/Precautions   Other Precautions Cognitive;Chair Alarm;Bed Alarm;Fall Risk;Pain;Multiple lines   Home Living   Type of Home House  (15 BRUCE R HR, also reports steel lift to access home that he uses at times.)   Home Layout Two level;Bed/bath upstairs;1/2 bath on main level  (FF R HR. can stay on 1st floor if needed. reports takes him 20' to ascend steps.)   Bathroom Shower/Tub Tub/shower unit  (and walk in shower. reports using tub/shower majority of time)   Bathroom Toilet Raised   Bathroom Equipment Shower chair;Grab bars in shower;Grab bars around toilet;Tub transfer bench  (doesn't use tub transfer bench)   Home Equipment  "Cane;Electric scooter;Walker;Wheelchair-manual;Hospital bed  (rollator)   Additional Comments Reports living in a 2Sh with BRUCE or lift and FF to 2nd floor. Uses hurry cane for mobility. >/= 1 year ago, patient nonambulatory only completing squat pivot transfers.    Prior Function   Level of [place] Independent with ADLs;Independent with functional mobility;Independent with IADLS   Lives With Spouse   Receives Help From Family   IADLs Independent with driving;Independent with meal prep;Family/Friend/Other provides medication management  (short distance driving. has assistance from spouse for medication management)   Falls in the last 6 months 1 to 4   Comments Reports being independent with mobility, ADLs and IADLs. Pt questionable with report.   Cognition   Orientation Level Oriented to person;Oriented to place;Oriented to time;Disoriented to situation   Following Commands Follows one step commands without difficulty   Comments impulsive wiht mobility, decreased insight to deficits and saftey. increased fall risk.   Subjective   Subjective \"I need to get out of here. I don't know why I am here.\"   RLE Assessment   RLE Assessment WFL  (3+/5)   LLE Assessment   LLE Assessment WFL  (except ankle DF to < neutral. strength 2-/5, foot drp noted, knee and hip AROm WFL, 3-/5)   Coordination   Movements are Fluid and Coordinated 0   Coordination and Movement Description impaired left LE with alt toe tapping and swing through wiht gait   Rapid Alternating Movements Impaired   Bed Mobility   Supine to Sit 5  Supervision   Additional items HOB elevated;Impulsive   Sit to Supine 5  Supervision   Additional items HOB elevated;Impulsive   Additional Comments supervision for safety, pt impulsive to complete repeatedly laying down and returning to sitting on EOB. upon arrival to room, pt sitting on EOB stating \"I am leaving at 9, I don't know why I am here. I feel great and the food sucks\". Pt reoriented to situation and " "agreeable to stay at this time. Pt siting on EOB with IV line taught and cues for safety.   Transfers   Sit to Stand   (SBA)   Additional items Impulsive;Verbal cues   Stand to Sit   (SBA)   Additional items Impulsive;Verbal cues   Stand pivot   (SBA)   Additional items Impulsive;Verbal cues   Additional Comments use of hurry cane. SBA for safety wiht pt impulsive. cues for safety. wide NICK, left foot drop with left LE advancement. pt impulsive throughout requiring cues for safety.   Ambulation/Elevation   Gait pattern Wide NICK;Decreased foot clearance;Short stride;Excessively slow;Ataxia  (left foot drop)   Gait Assistance   (SBAto steayding assistance)   Additional items Verbal cues   Assistive Device   (hurry cane)   Distance ambulated 46'x1 with hurry cane with SBA to steadying assistance with wide NICK, left foot drop, arms guarded, cues for inc step length and safte completion. pt visibly off balance with inc path deviation. offered RW, pt declined.   Balance   Static Sitting Fair +   Dynamic Sitting Fair   Static Standing Fair -   Dynamic Standing Fair -   Ambulatory Fair -   Activity Tolerance   Activity Tolerance Patient limited by fatigue   Medical Staff Made Aware Anna TABOR   Assessment   Prognosis Good   Problem List Decreased strength;Decreased endurance;Impaired balance;Decreased mobility;Decreased cognition;Decreased coordination;Impaired judgement;Decreased safety awareness;Pain   Barriers to Discharge Inaccessible home environment   Barriers to Discharge Comments increased fall risk, safety concerns, impulsive, 2nd floor bedroom and bathroom   Goals   Patient Goals \"Go home\"   STG Expiration Date 03/12/24   PT Treatment Day 1   Plan   Treatment/Interventions ADL retraining;Functional transfer training;LE strengthening/ROM;Elevations;Therapeutic exercise;Endurance training;Patient/family training;Equipment eval/education;Bed mobility;Gait training;Compensatory technique education;Spoke to " nursing;Spoke to case management;OT;Cognitive reorientation   PT Frequency 3-5x/wk   Discharge Recommendation   Rehab Resource Intensity Level, PT II (Moderate Resource Intensity)   Equipment Recommended   (pt has walker and cane to use prn)   Additional Comments should patient decline post acute rehab given LOF during treatment session and return to home upon discharge, can stay on 1st floor if needed. has RW to use. unsafe to trial stairs this date. see treatment note. pt with decreased insight to deficits.    AM-PAC Basic Mobility Inpatient   Turning in Flat Bed Without Bedrails 3   Lying on Back to Sitting on Edge of Flat Bed Without Bedrails 3   Moving Bed to Chair 3   Standing Up From Chair Using Arms 3   Walk in Room 3   Climb 3-5 Stairs With Railing 2   Basic Mobility Inpatient Raw Score 17   Basic Mobility Standardized Score 39.67   Highest Level Of Mobility   -HLM Goal 5: Stand one or more mins   JH-HLM Achieved 7: Walk 25 feet or more   Additional Treatment Session   Start Time 0832   End Time 0845   Treatment Assessment Pt tolerated session poorly requiring increased assisatnce as distance ambulation increased and requiring use of RW to return safety to room wiht increasing assistance. He is limited by decrease strength, balance, endurance, coordination and saftey concerns. He is impulsive with all mobility. He requires increased time and effort to complete mobility. He will continue to beenfit from PT services to maximize LOF.   Equipment Use use of spc. sit<>stand with SBA. spt with SBA with pt impulsive to complete. ambulated 135'x1 with RW with SBA--->modAx1 with quick onset muscle fatigue requiring modAx1, leaning against wall to rest, then needing chair to sit. pt wiht left foot drop, stiffness of LEs noted. difficulty advancing LEs and inc L knee flexion with c/o slight knee buckling. intiiated RW. ambulated 40'x1 with RW with steadying--->modAx1 with manual cues for wt shifting, balance and mod  verbal cues to stay wihtin NICK of RW to faiclitate LE advancement. sit<>stand wiht RW with SBA. min cues for hand placement. spt with RW with steayding-> minAx1 with manual cues for balance and wt shifting. ambulated an additional 14'x1 wiht RW with minAx1 with manual cues for balance and saftey. cues to stay wihtin NICK of RW and inc foot clearance. pt with increasing knee flexion. discussed potential benefit for post acute rehab. pt not receptive.   End of Consult   Patient Position at End of Consult Bedside chair;Bed/Chair alarm activated;All needs within reach       Pt requires PT/OT co-eval due to medical complexity, safety concerns, fall risk, significant assistance with mobility and/or cognitive-behavioral impairments.    (Please find full objective findings from PT assessment regarding body systems outlined above).     Assessment: Pt is a 58 y.o. male seen for PT evaluation s/p admission to [facility] on 2/26/2024 with KELLY (acute kidney injury) (HCC).  Order placed for PT services.  Upon evaluation: Pt is presenting with impaired functional mobility due to pain, decreased strength, decreased ROM, decreased endurance, impaired balance, impaired coordination, gait deviations, impaired cognition, decreased safety awareness, impaired judgment, and fall risk requiring  supervision assistance for bed mobility, stand by assistance for transfers, and stand by to steadying assistance for ambulation with hurry cane . Pt's clinical presentation is currently unpredictable given the functional mobility deficits above, especially weakness, decreased ROM, decreased endurance, impaired coordination, impaired balance, gait deviations, pain, decreased activity tolerance, decreased functional mobility tolerance, decreased safety awareness, impaired judgement, and decreased cognition, coupled with fall risks as indicated by AM-PAC 6-Clicks: 17/24 as well as hx of falls, impulsivity, impaired balance,  polypharmacy, decreased safety awareness, and decreased cognition and combined with medical complications of abnormal renal lab values, abnormal H&H, abnormal blood sugars, impulsivity during admission, need for input for mobility technique/safety, and abnormal uric acid, KELLY .  Pt's PMHx and comorbidities that may affect physical performance and progress include:  chronic back pain with plan for stimulator to be placed in March, HTN, gout, DM, CKD, h/o retroperitoneal abscess, CVA, vertebral osteomyelitis . Personal factors affecting pt at time of IE include: impulsivity, inaccessible home environment, multi-level environment, inability to perform IADLs, inability to perform ADLs, inability to navigate level surfaces without external assistance, inability to ambulate household distances, and recent fall(s)/fall history. Pt will benefit from continued skilled PT services to address deficits as defined above and to maximize level of functional mobility to facilitate return toward PLOF and improved QOL. From PT/mobility standpoint, recommendation at time of d/c would be Level II (Moderate Resource Intensity in order to reduce fall risk and maximize pt's functional independence and consistency with mobility. Recommend trial with walker next 1-2 sessions, trial with cane next 1-2 sessions, and ther ex next 1-2 sessions.       The patient's AM-PAC Basic Mobility Inpatient Short Form Raw Score is 17. A Raw score of greater than 16 suggests the patient may benefit from discharge to home. Please also refer to the recommendation of the Physical Therapist for safe discharge planning.       Goals: Pt will: Perform bed mobility tasks with modified Independent to reposition in bed and prepare for transfers. Pt will perform transfers with supervision to decrease burden of care, decrease risk for falls, and improve activity tolerance and prepare for ambulation. Pt will ambulate with LRAD for >/= 150' with  supervision  to decrease  burden of care, decrease risk for falls, improve activity tolerance, and improve gait quality and to access home environment. Pt will complete 1 step wth LRAD and >/= 15 steps with unilateral handrail with steadying assistance to decrease burden of care, return to home with BRUCE, return to multilevel home, decrease risk for falls, and improve activity tolerance. Pt will participate in objective balance assessment to determine baseline fall risk. Pt will participate in SSWS assessment to determine level of mobility. Pt will increase B LE strength >/= 1/2 MMT grade to facilitate functional mobility.      Kandace Marx, PT,DPT

## 2024-02-27 NOTE — OCCUPATIONAL THERAPY NOTE
"    Occupational Therapy Evaluation     Patient Name: Stef Pack Sr.  Today's Date: 2/27/2024  Problem List  Principal Problem:    KELYL (acute kidney injury) (HCC)  Active Problems:    Diabetes mellitus type 2, insulin dependent (HCC)    Gout    Essential hypertension    Chronic back pain    Past Medical History  Past Medical History:   Diagnosis Date    Chronic kidney disease     Diabetes mellitus (HCC)     Gout     Hypertension     Renal disorder     Retroperitoneal abscess (HCC)     Stroke (HCC)     UTI (urinary tract infection)     Vertebral osteomyelitis (HCC)      Past Surgical History  Past Surgical History:   Procedure Laterality Date    BACK SURGERY      ORCHIECTOMY             02/27/24 0820   OT Last Visit   OT Visit Date 02/27/24   Note Type   Note type Evaluation   Pain Assessment   Pain Assessment Tool 0-10   Pain Score 8   Pain Location/Orientation Orientation: Lower;Location: Back   Restrictions/Precautions   Other Precautions Cognitive;Chair Alarm;Bed Alarm;Fall Risk;Pain;Multiple lines   Home Living   Type of Home House  (15 BRUCE R HR)   Home Layout Two level;Bed/bath upstairs;1/2 bath on main level  (FF R HR)   Bathroom Shower/Tub Tub/shower unit  (and walk in shower)   Bathroom Toilet Raised   Bathroom Equipment Shower chair;Grab bars in shower;Grab bars around toilet;Tub transfer bench   Home Equipment Cane;Electric scooter;Walker  (rollator)   Prior Function   Level of [place] Independent with ADLs;Independent with functional mobility;Independent with IADLS   Lives With Spouse   Receives Help From Family   IADLs Independent with driving;Independent with meal prep;Family/Friend/Other provides medication management  (short distance driving. has assistance from spouse for medication management)   Falls in the last 6 months 1 to 4   Comments Uses cane for mobility. Uses shower chair set up within tub shower mostly. Reports having a tub bench too but doesn't use.   Subjective   Subjective \"I " "use the treadmill everyday. It takes me about 20 minutes to get up my stairs.\"   ADL   Where Assessed Edge of bed   Grooming Assistance   (steadying assistance)   Grooming Deficit Steadying;Wash/dry hands;Wash/dry face;Teeth care;Supervision/safety  (standing at sink x 2 minutes with narrow base of support and observed leaning hips on sink)   LB Dressing Assistance 5  Supervision/Setup   LB Dressing Deficit Don/doff R sock;Don/doff L sock;Don/doff R shoe;Don/doff L shoe   Bed Mobility   Supine to Sit 5  Supervision   Additional items HOB elevated   Sit to Supine 5  Supervision   Additional items HOB elevated   Transfers   Sit to Stand   (SBA)   Additional items Verbal cues   Stand to Sit   (SBA)   Additional items Verbal cues   Stand pivot   (SBA)   Additional items Verbal cues   Toilet transfer   (SBA)   Additional items Standard toilet;Verbal cues   Additional Comments Impulsivity noted while transferring; cues for hand placement, decrease pace, and improve overall safety   Functional Mobility   Functional Mobility   (SBA -> Mod A)   Additional Comments SBA -> Mod A for functional mobility with PT within room and hallway   Additional items Rolling walker;SPC   Balance   Static Sitting Fair   Dynamic Sitting Fair -   Static Standing Fair -   Dynamic Standing Fair -   Activity Tolerance   Activity Tolerance Patient limited by fatigue   Medical Staff Made Aware PT, Kandace   RUE Assessment   RUE Assessment WFL  (4/5)   LUE Assessment   LUE Assessment WFL  (4/5)   Cognition   Overall Cognitive Status Impaired   Arousal/Participation Alert   Attention Attends with cues to redirect   Orientation Level Oriented to person;Oriented to place;Oriented to time;Disoriented to situation   Memory Decreased recall of precautions;Decreased recall of recent events   Following Commands Follows one step commands without difficulty   Comments impulsive, decreased insight into safety and functional deficits   Assessment   Limitation " Decreased ADL status;Decreased cognition;Decreased UE strength;Decreased Safe judgement during ADL;Decreased endurance;Decreased self-care trans;Decreased high-level ADLs   Prognosis Fair   Assessment Ventura is a 58 y.o. male admitted to [facility] 2/26/2024 with admitting diagnosis: KELLY. Pt with PMHx impacting their performance during ADL tasks, including: chronic back pain, HTN, gout, DM Type II insulin dependent. Prior to admission to the hospital pt was performing ADLs without physical assistance. IADLs without physical assistance. Functional transfers/ambulation without physical assistance. Cognitive status was PTA was WFL. OT order placed to assess pt's ADLs, cognitive status, and performance during functional tasks in order to maximize safety and independence while making most appropriate d/c recommendations. PT/OT co-evaluation completed at this time d/t mobility deficits and safety concerns. Pt's clinical presentation is currently unstable/unpredictable given new onset deficits that effect pt's occupational performance and ability to safely return to OF including decrease activity tolerance, decrease standing balance, decrease performance during ADL tasks, decrease cognition, decrease safety awareness , increase impulsiveness, decrease activity engagement, decrease performance during functional transfers, limited insight to deficits, inability to express needs, and decreased alertness combined with medical complications of hypertension , impulsivity during admission, and need for input for mobility technique/safety. Personal factors affecting pt at time of initial evaluation include: limited home support, inability to perform IADLs, inability to perform ADLs, inability to ambulate household distances, inability to navigate community distances, limited insight into impairments, and decreased initiation and engagement. Pt will benefit from continued acute skilled OT services to address deficits as defined above and  to maximize level independence/participation during ADLs and functional tasks to facilitate return toward PLOF and improved quality of life.     From an occupational therapy standpoint, recommendation at time of d/c would be Level II: Moderate Resource Intensity . The patient's raw score on the -PAC Daily Activity Inpatient Short Form is 21. A raw score of greater than or equal to 19 suggests the patient may benefit from discharge to post-acute rehabilitation services. Please refer to the recommendation of the Occupational Therapist for safe discharge planning.   Plan   Treatment Interventions ADL retraining;Functional transfer training;UE strengthening/ROM;Endurance training;Patient/family training;Equipment evaluation/education;Compensatory technique education;Continued evaluation;Energy conservation;Activityengagement   Goal Expiration Date 03/12/24   OT Frequency 3-5x/wk   Discharge Recommendation   Rehab Resource Intensity Level, OT II (Moderate Resource Intensity)   AM-PAC Daily Activity Inpatient   Lower Body Dressing 3   Bathing 3   Toileting 3   Upper Body Dressing 4   Grooming 4   Eating 4   Daily Activity Raw Score 21   Daily Activity Standardized Score (Calc for Raw Score >=11) 44.27   AM-PAC Applied Cognition Inpatient   Following a Speech/Presentation 4   Understanding Ordinary Conversation 4   Taking Medications 3   Remembering Where Things Are Placed or Put Away 3   Remembering List of 4-5 Errands 2   Taking Care of Complicated Tasks 2   Applied Cognition Raw Score 18   Applied Cognition Standardized Score 38.07     Assessment:  Ventura is a 58 y.o. male admitted [facility] 2/26/2024 with admitting diagnosis: KELLY. Pt with PMHx impacting their performance during ADL tasks, including: chronic back pain, HTN, gout, DM Type II insulin dependent. From an occupational therapy standpoint, recommendation at time of d/c would be Level II: Moderate Resource Intensity . The patient's raw score on the AM-PAC Daily  Activity Inpatient Short Form is 21. A raw score of greater than or equal to 19 suggests the patient may benefit from discharge to post-acute rehabilitation services. Please refer to the recommendation of the Occupational Therapist for safe discharge planning.    Goals to be met by 3/12/2024:  Pt will demonstrate ability to complete grooming/hygiene tasks @ Mod I after set-up.  Pt will demonstrate ability to complete supine<>sit @ Mod I in order to increase safety and independence during ADL tasks.  Pt will demonstrate ability to complete UB ADLs including washing/dressing @ Mod I in order to increase performance and participation during meaningful tasks  Pt will demonstrate ability to complete LB dressing @ Mod I with LHAE PRN in order to increase safety and independence during meaningful tasks.   Pt will demonstrate ability to herb/doff socks/shoes while sitting @ Mod I in order to increase safety and independence during meaningful tasks.   Pt will demonstrate ability to complete toileting tasks including CM and pericare @ Mod I in order to increase safety and independence during meaningful tasks.  Pt will demonstrate ability to complete EOB, chair, toilet/commode transfers @ Mod I in order to increase performance and participation during functional tasks.  Pt will demonstrate ability to stand for 5-10 minutes while maintaining Fair+ balance with use of least restrictive AD for UB support PRN.  Pt will demonstrate ability to tolerate 30-35 minute OT session with no vc'ing for deep breathing or use of energy conservation techniques in order to increase activity tolerance during functional tasks.   Pt will demonstrate Good carryover of use of energy conservation/compensatory strategies during ADLs and functional tasks in order to increase safety and reduce risk for falls.   Pt will demonstrate Good attention and participation in continued evaluation of functional ambulation house hold distances in order to assist  with safe d/c planning.  Pt will attend to continued cognitive assessments 100% of the time in order to provide most appropriate d/c recommendations.   Pt will follow 100% simple 2-step commands and be A&O x4 consistently with environmental cues to increase participation in functional activities.   Pt will identify 3 areas of interest/hobbies and 1 intervention on how to incorporate into daily life in order to increase interaction with environment and peers as well as increase participation in meaningful tasks.   Pt will demonstrate 100% carryover of BUE HEP in order to increase BUE MS and increase performance during functional tasks upon d/c home.      Anna Giron MS, OTR/L

## 2024-02-27 NOTE — UTILIZATION REVIEW
Initial Clinical Review    Admission: Date/Time/Statement:   Admission Orders (From admission, onward)       Ordered        02/26/24 1525  INPATIENT ADMISSION  Once                          Orders Placed This Encounter   Procedures    INPATIENT ADMISSION     Standing Status:   Standing     Number of Occurrences:   1     Order Specific Question:   Level of Care     Answer:   Med Surg [16]     Order Specific Question:   Estimated length of stay     Answer:   More than 2 Midnights     Order Specific Question:   Certification     Answer:   I certify that inpatient services are medically necessary for this patient for a duration of greater than two midnights. See H&P and MD Progress Notes for additional information about the patient's course of treatment.     ED Arrival Information       Expected   -    Arrival   2/26/2024 13:01    Acuity   Urgent              Means of arrival   Walk-In    Escorted by   Spouse    Service   Hospitalist    Admission type   Emergency              Arrival complaint   Abnormal Lab             Chief Complaint   Patient presents with    Abnormal Lab     Patient sent from PCP for multiple abnormal labs        Initial Presentation: 58 y.o. male to ED via walk-in from home   Sent to the ED by nephrologist due to worsening labs. States he has not been eating and drinking well the last few days.  Has been dealing with a lot of back pain related issues.  He stopped taking most of his medications a few weeks ago.  PMHX: CKD; DM; HTN; Stroke; Vertebral osteomyelitis; Gout  Admitted to [facility] with DX: KELYL  on exam: Elevated Cr 4.36 (baseline 3.2)   PLAN: cont ivf; f/u renal U/S; monitor labs; pain control; f/u uric acid; Accuchecks with ssic      Date: 2/27/24      Day 2  Discharge Summary  Hospital Course:      Stef Pack Sr. is a 58 y.o. male patient who originally presented to the hospital on 2/26/2024 due to abnormal labs found to have worsening renal function.  His baseline was around 3.2.  It has  worsened to 4.36.  Patient states that he stopped taking a lot of his medications a few weeks ago on his own.  Denies any recent illnesses or however his oral intake is a little decreased due to recent the having issues with back pain.  Placed on IV fluid hydration with minimal improvement in creatinine.  Seen by nephrology and recommend outpatient close follow-up with his nephrologist . No indictation at this time for dialysis inpatient however will need further plans to be set up for outpatient.     The patient, initially admitted to the hospital as inpatient, was discharged earlier than expected given the following:  mild improvement in patient.    Disposition: Home / self        ED Triage Vitals   Temperature Pulse Respirations Blood Pressure SpO2   02/26/24 1313 02/26/24 1313 02/26/24 1313 02/26/24 1313 02/26/24 1313   (!) 97.1 °F (36.2 °C) 62 18 144/81 98 %      Temp Source Heart Rate Source Patient Position - Orthostatic VS BP Location FiO2 (%)   02/26/24 1313 02/26/24 1313 02/26/24 1313 02/26/24 1313 --   Temporal Monitor Sitting Left arm       Pain Score       02/26/24 1823       5          Wt Readings from Last 1 Encounters:   02/27/24 70.7 kg (155 lb 13.8 oz)     Additional Vital Signs:   Date/Time Temp Pulse Resp BP MAP (mmHg) SpO2 O2 Device Patient Position - Orthostatic VS   02/27/24 0755 -- -- -- -- -- 94 % None (Room air) --   02/27/24 07:18:29 97.9 °F (36.6 °C) 62 18 147/71 96 92 % -- --   02/26/24 23:00:03 98.1 °F (36.7 °C) 70 18 153/79 104 94 % None (Room air) Lying   02/26/24 1935 -- -- -- -- -- 95 % None (Room air) --   02/26/24 1823 -- -- -- -- -- -- None (Room air) --   02/26/24 16:35:18 97.5 °F (36.4 °C) 63 18 143/77 99 96 % -- --   02/26/24 1633 97.5 °F (36.4 °C) -- -- 143/77 99 -- -- --   02/26/24 1313 97.1 °F (36.2 °C) Abnormal  62 18 144/81 -- 98 % None (Room air) Sitting       EKG: Baseline artifact  Normal sinus rhythm  Left axis deviation  Abnormal ECG  Repeat tracing  recommended      Pertinent Labs/Diagnostic Test Results:   US kidney and bladder    (Results Pending)        Results from last 7 days   Lab Units 02/26/24  1352   WBC Thousand/uL 7.73   HEMOGLOBIN g/dL 11.5*   HEMATOCRIT % 34.6*   PLATELETS Thousands/uL 167   NEUTROS ABS Thousands/µL 4.34        Results from last 7 days   Lab Units 02/27/24  0456 02/26/24  1352   SODIUM mmol/L 138 135   POTASSIUM mmol/L 4.7 4.3   CHLORIDE mmol/L 107 103   CO2 mmol/L 19* 24   ANION GAP mmol/L 12 8   BUN mg/dL 47* 48*   CREATININE mg/dL 4.15* 4.36*   EGFR ml/min/1.73sq m 14 13   CALCIUM mg/dL 9.8 10.5*     Results from last 7 days   Lab Units 02/26/24  1352   AST U/L 7*   ALT U/L 7   ALK PHOS U/L 84   TOTAL PROTEIN g/dL 6.5   ALBUMIN g/dL 3.9   TOTAL BILIRUBIN mg/dL 0.26     Results from last 7 days   Lab Units 02/27/24  1134 02/27/24  0735 02/26/24  2100 02/26/24  1659   POC GLUCOSE mg/dl 94 92 83 241*     Results from last 7 days   Lab Units 02/27/24  0456 02/26/24  1352   GLUCOSE RANDOM mg/dL 108 240*           Results from last 7 days   Lab Units 02/27/24  0456   TSH 3RD GENERATON uIU/mL 1.684      Results from last 7 days   Lab Units 02/27/24  0449   CLARITY UA  Clear   COLOR UA  Yellow   SPEC GRAV UA  1.015   PH UA  7.0   GLUCOSE UA mg/dl Negative   KETONES UA mg/dl Negative   BLOOD UA  Negative   PROTEIN UA mg/dl 100 (2+)*   NITRITE UA  Negative   BILIRUBIN UA  Negative   UROBILINOGEN UA E.U./dl 0.2   LEUKOCYTES UA  Small*   WBC UA /hpf 4-10*   RBC UA /hpf None Seen   BACTERIA UA /hpf Occasional   EPITHELIAL CELLS WET PREP /hpf Occasional      Present on Admission:   Chronic back pain   Essential hypertension   KELLY (acute kidney injury) (HCC)   Gout      Admitting Diagnosis: Kidney disease [N28.9]  Abnormal laboratory test result [R89.9]  Acute kidney injury (HCC) [N17.9]    Age/Sex: 58 y.o. male    Admission Orders: SCDs; I/O; Daily wts; Consistent Carbohydrate Diet. Blood glucose checks ACHS.      Scheduled  Medications:  amLODIPine, 5 mg, Oral, Daily  heparin (porcine), 5,000 Units, Subcutaneous, Q8H DELIO  insulin glargine, 10 Units, Subcutaneous, HS  insulin lispro, 1-6 Units, Subcutaneous, HS  insulin lispro, 2-12 Units, Subcutaneous, TID AC  nicotine, 1 patch, Transdermal, Daily  pregabalin, 75 mg, Oral, BID  sodium bicarbonate, 650 mg, Oral, Daily      Continuous IV Infusions:  multi-electrolyte, 75 mL/hr, Intravenous, Continuous      PRN Meds:  acetaminophen, 650 mg, Oral, Q6H PRN  oxyCODONE, 5 mg, Oral, Q6H PRN  (2/26 rec'd x1)  (2/27 rec'd x1 so far today)         IP CONSULT TO NEPHROLOGY    Network Utilization Review Department  ATTENTION: Please call with any questions or concerns to [phone] and carefully listen to the prompts so that you are directed to the right person. All voicemails are confidential.   For Discharge needs, contact Care Management DC Support Team at [phone] opt. 2  Send all requests for admission clinical reviews, approved or denied determinations and any other requests to dedicated fax number below belonging to the campus where the patient is receiving treatment. List of dedicated fax numbers for the Facilities:  FACILITY NAME UR FAX NUMBER   ADMISSION DENIALS (Administrative/Medical Necessity) [phone]   DISCHARGE SUPPORT TEAM (NETWORK) [phone]   PARENT CHILD HEALTH (Maternity/NICU/Pediatrics) [phone]   [facility] [phone]   [facility] [phone]   [facility] [phone]   [facility] [phone]   [facility] [phone]   [facility] [phone]   [facility] [phone]   [facility] [phone]   [facility] [phone]   [facility] [phone]   [facility] [phone]   [facility] [phone]

## 2024-02-28 NOTE — UTILIZATION REVIEW
NOTIFICATION OF ADMISSION DISCHARGE   This is a Notification of Discharge from [facility]. Please be advised that this patient has been discharge from our facility. Below you will find the admission and discharge date and time including the patient’s disposition.   UTILIZATION REVIEW CONTACT:  Ana Mendoza  Utilization   Network Utilization Review Department  Phone: [phone] x carefully listen to the prompts. All voicemails are confidential.  Email: [email or web address]     ADMISSION INFORMATION  PRESENTATION DATE: 2/26/2024  1:14 PM  OBERVATION ADMISSION DATE:   INPATIENT ADMISSION DATE: 2/26/24  3:25 PM   DISCHARGE DATE: 2/27/2024  2:03 PM   DISPOSITION:Home/Self Care    Network Utilization Review Department  ATTENTION: Please call with any questions or concerns to [phone] and carefully listen to the prompts so that you are directed to the right person. All voicemails are confidential.   For Discharge needs, contact Care Management DC Support Team at [phone] opt. 2  Send all requests for admission clinical reviews, approved or denied determinations and any other requests to dedicated fax number below belonging to the campus where the patient is receiving treatment. List of dedicated fax numbers for the Facilities:  FACILITY NAME UR FAX NUMBER   ADMISSION DENIALS (Administrative/Medical Necessity) [phone]   DISCHARGE SUPPORT TEAM ([facility]) [phone]   PARENT CHILD HEALTH (Maternity/NICU/Pediatrics) [phone]   [facility] [phone]   [facility] [phone]   Critical access hospital [phone]   [facility] [phone]   [facility] [phone]   General acute hospital [phone]   [facility] [phone]   [facility]  [phone]   [facility] [phone]   [facility] [phone]   [facility] [phone]   [facility] [phone]

## 2024-04-03 ENCOUNTER — TELEPHONE (OUTPATIENT)
Age: 59
End: 2024-04-03

## 2024-04-03 NOTE — TELEPHONE ENCOUNTER
Called patient and spoke with Charley went over the above message. Charley stating that she will call Dr. Ambrose office but they want to keep this appointment until they called Dr. Ambrose office. No further questions at this time.

## 2024-04-03 NOTE — TELEPHONE ENCOUNTER
Patient had an appointment for today for a HFU, however he had to cancel because he just lost power. The first appointment in [place] isn't until end of may. The patient wanted to know if it is okay to wait this long for a HFU or if he needs to be seen sooner. Please advise. Thank you.

## 2024-05-13 ENCOUNTER — APPOINTMENT (OUTPATIENT)
Dept: ULTRASOUND IMAGING | Facility: HOSPITAL | Age: 59
DRG: 074 | End: 2024-05-13
Payer: COMMERCIAL

## 2024-05-13 ENCOUNTER — HOSPITAL ENCOUNTER (INPATIENT)
Facility: HOSPITAL | Age: 59
LOS: 2 days | Discharge: HOME HEALTH | DRG: 074 | End: 2024-05-16
Attending: EMERGENCY MEDICINE | Admitting: FAMILY MEDICINE
Payer: COMMERCIAL

## 2024-05-13 DIAGNOSIS — M79.2 NEUROPATHIC PAIN: ICD-10-CM

## 2024-05-13 DIAGNOSIS — E83.41 HYPERMAGNESEMIA: ICD-10-CM

## 2024-05-13 DIAGNOSIS — J44.9 CHRONIC OBSTRUCTIVE PULMONARY DISEASE, UNSPECIFIED COPD TYPE (HCC): ICD-10-CM

## 2024-05-13 DIAGNOSIS — N18.9 CHRONIC RENAL FAILURE: Primary | ICD-10-CM

## 2024-05-13 LAB
ANION GAP SERPL CALCULATED.3IONS-SCNC: 8 MMOL/L (ref 4–13)
BASOPHILS # BLD AUTO: 0.08 THOUSANDS/ÂΜL (ref 0–0.1)
BASOPHILS NFR BLD AUTO: 1 % (ref 0–1)
BUN SERPL-MCNC: 62 MG/DL (ref 5–25)
CALCIUM SERPL-MCNC: 8.9 MG/DL (ref 8.4–10.2)
CHLORIDE SERPL-SCNC: 101 MMOL/L (ref 96–108)
CO2 SERPL-SCNC: 25 MMOL/L (ref 21–32)
CREAT SERPL-MCNC: 4.57 MG/DL (ref 0.6–1.3)
EOSINOPHIL # BLD AUTO: 0.35 THOUSAND/ÂΜL (ref 0–0.61)
EOSINOPHIL NFR BLD AUTO: 3 % (ref 0–6)
ERYTHROCYTE [DISTWIDTH] IN BLOOD BY AUTOMATED COUNT: 16.1 % (ref 11.6–15.1)
GFR SERPL CREATININE-BSD FRML MDRD: 13 ML/MIN/1.73SQ M
GLUCOSE SERPL-MCNC: 102 MG/DL (ref 65–140)
GLUCOSE SERPL-MCNC: 122 MG/DL (ref 65–140)
GLUCOSE SERPL-MCNC: 175 MG/DL (ref 65–140)
HCT VFR BLD AUTO: 31.6 % (ref 36.5–49.3)
HGB BLD-MCNC: 10 G/DL (ref 12–17)
IMM GRANULOCYTES # BLD AUTO: 0.09 THOUSAND/UL (ref 0–0.2)
IMM GRANULOCYTES NFR BLD AUTO: 1 % (ref 0–2)
LYMPHOCYTES # BLD AUTO: 1.43 THOUSANDS/ÂΜL (ref 0.6–4.47)
LYMPHOCYTES NFR BLD AUTO: 12 % (ref 14–44)
MAGNESIUM SERPL-MCNC: 5.3 MG/DL (ref 1.9–2.7)
MCH RBC QN AUTO: 31 PG (ref 26.8–34.3)
MCHC RBC AUTO-ENTMCNC: 31.6 G/DL (ref 31.4–37.4)
MCV RBC AUTO: 98 FL (ref 82–98)
MONOCYTES # BLD AUTO: 1.32 THOUSAND/ÂΜL (ref 0.17–1.22)
MONOCYTES NFR BLD AUTO: 11 % (ref 4–12)
NEUTROPHILS # BLD AUTO: 8.46 THOUSANDS/ÂΜL (ref 1.85–7.62)
NEUTS SEG NFR BLD AUTO: 72 % (ref 43–75)
NRBC BLD AUTO-RTO: 0 /100 WBCS
PLATELET # BLD AUTO: 196 THOUSANDS/UL (ref 149–390)
PMV BLD AUTO: 11 FL (ref 8.9–12.7)
POTASSIUM SERPL-SCNC: 5.3 MMOL/L (ref 3.5–5.3)
RBC # BLD AUTO: 3.23 MILLION/UL (ref 3.88–5.62)
SODIUM SERPL-SCNC: 134 MMOL/L (ref 135–147)
WBC # BLD AUTO: 11.73 THOUSAND/UL (ref 4.31–10.16)

## 2024-05-13 PROCEDURE — 99244 OFF/OP CNSLTJ NEW/EST MOD 40: CPT | Performed by: INTERNAL MEDICINE

## 2024-05-13 PROCEDURE — 85025 COMPLETE CBC W/AUTO DIFF WBC: CPT | Performed by: EMERGENCY MEDICINE

## 2024-05-13 PROCEDURE — 76775 US EXAM ABDO BACK WALL LIM: CPT

## 2024-05-13 PROCEDURE — 80048 BASIC METABOLIC PNL TOTAL CA: CPT | Performed by: EMERGENCY MEDICINE

## 2024-05-13 PROCEDURE — 99222 1ST HOSP IP/OBS MODERATE 55: CPT | Performed by: INTERNAL MEDICINE

## 2024-05-13 PROCEDURE — 96374 THER/PROPH/DIAG INJ IV PUSH: CPT

## 2024-05-13 PROCEDURE — 82948 REAGENT STRIP/BLOOD GLUCOSE: CPT

## 2024-05-13 PROCEDURE — 83735 ASSAY OF MAGNESIUM: CPT | Performed by: EMERGENCY MEDICINE

## 2024-05-13 PROCEDURE — 93005 ELECTROCARDIOGRAM TRACING: CPT

## 2024-05-13 PROCEDURE — 36415 COLL VENOUS BLD VENIPUNCTURE: CPT | Performed by: EMERGENCY MEDICINE

## 2024-05-13 PROCEDURE — 99283 EMERGENCY DEPT VISIT LOW MDM: CPT

## 2024-05-13 PROCEDURE — NC001 PR NO CHARGE: Performed by: INTERNAL MEDICINE

## 2024-05-13 PROCEDURE — 99285 EMERGENCY DEPT VISIT HI MDM: CPT | Performed by: EMERGENCY MEDICINE

## 2024-05-13 PROCEDURE — 99222 1ST HOSP IP/OBS MODERATE 55: CPT | Performed by: FAMILY MEDICINE

## 2024-05-13 RX ORDER — AMOXICILLIN 250 MG
1 CAPSULE ORAL 2 TIMES DAILY
Status: DISCONTINUED | OUTPATIENT
Start: 2024-05-13 | End: 2024-05-16 | Stop reason: HOSPADM

## 2024-05-13 RX ORDER — KETOROLAC TROMETHAMINE 30 MG/ML
7.5 INJECTION, SOLUTION INTRAMUSCULAR; INTRAVENOUS ONCE
Status: DISCONTINUED | OUTPATIENT
Start: 2024-05-13 | End: 2024-05-13

## 2024-05-13 RX ORDER — INSULIN LISPRO 100 [IU]/ML
1-6 INJECTION, SOLUTION INTRAVENOUS; SUBCUTANEOUS
Status: DISCONTINUED | OUTPATIENT
Start: 2024-05-13 | End: 2024-05-16 | Stop reason: HOSPADM

## 2024-05-13 RX ORDER — INSULIN GLARGINE 100 [IU]/ML
5 INJECTION, SOLUTION SUBCUTANEOUS
Status: DISCONTINUED | OUTPATIENT
Start: 2024-05-13 | End: 2024-05-16 | Stop reason: HOSPADM

## 2024-05-13 RX ORDER — ONDANSETRON 2 MG/ML
4 INJECTION INTRAMUSCULAR; INTRAVENOUS EVERY 6 HOURS PRN
Status: DISCONTINUED | OUTPATIENT
Start: 2024-05-13 | End: 2024-05-16 | Stop reason: HOSPADM

## 2024-05-13 RX ORDER — GABAPENTIN 100 MG/1
100 CAPSULE ORAL 3 TIMES DAILY
Status: DISCONTINUED | OUTPATIENT
Start: 2024-05-13 | End: 2024-05-13

## 2024-05-13 RX ORDER — PREGABALIN 150 MG/1
150 CAPSULE ORAL 2 TIMES DAILY
COMMUNITY
End: 2024-05-16

## 2024-05-13 RX ORDER — OXYCODONE HYDROCHLORIDE AND ACETAMINOPHEN 5; 325 MG/1; MG/1
1 TABLET ORAL EVERY 4 HOURS PRN
Status: DISCONTINUED | OUTPATIENT
Start: 2024-05-13 | End: 2024-05-16 | Stop reason: HOSPADM

## 2024-05-13 RX ORDER — ALLOPURINOL 100 MG/1
100 TABLET ORAL DAILY
Status: DISCONTINUED | OUTPATIENT
Start: 2024-05-14 | End: 2024-05-16 | Stop reason: HOSPADM

## 2024-05-13 RX ORDER — PREGABALIN 75 MG/1
75 CAPSULE ORAL 2 TIMES DAILY
Status: DISCONTINUED | OUTPATIENT
Start: 2024-05-13 | End: 2024-05-16 | Stop reason: HOSPADM

## 2024-05-13 RX ORDER — FUROSEMIDE 10 MG/ML
80 INJECTION INTRAMUSCULAR; INTRAVENOUS ONCE
Status: COMPLETED | OUTPATIENT
Start: 2024-05-13 | End: 2024-05-13

## 2024-05-13 RX ORDER — TAMSULOSIN HYDROCHLORIDE 0.4 MG/1
0.4 CAPSULE ORAL
Status: DISCONTINUED | OUTPATIENT
Start: 2024-05-13 | End: 2024-05-16 | Stop reason: HOSPADM

## 2024-05-13 RX ORDER — HYDRALAZINE HYDROCHLORIDE 20 MG/ML
5 INJECTION INTRAMUSCULAR; INTRAVENOUS EVERY 6 HOURS PRN
Status: DISCONTINUED | OUTPATIENT
Start: 2024-05-13 | End: 2024-05-16 | Stop reason: HOSPADM

## 2024-05-13 RX ORDER — LIDOCAINE 50 MG/G
1 PATCH TOPICAL DAILY
Status: DISCONTINUED | OUTPATIENT
Start: 2024-05-14 | End: 2024-05-16 | Stop reason: HOSPADM

## 2024-05-13 RX ORDER — NICOTINE 21 MG/24HR
1 PATCH, TRANSDERMAL 24 HOURS TRANSDERMAL DAILY
Status: DISCONTINUED | OUTPATIENT
Start: 2024-05-14 | End: 2024-05-16 | Stop reason: HOSPADM

## 2024-05-13 RX ORDER — AMLODIPINE BESYLATE 5 MG/1
5 TABLET ORAL DAILY
Status: DISCONTINUED | OUTPATIENT
Start: 2024-05-14 | End: 2024-05-16 | Stop reason: HOSPADM

## 2024-05-13 RX ORDER — SEVELAMER HYDROCHLORIDE 400 MG/1
400 TABLET, FILM COATED ORAL DAILY
COMMUNITY
End: 2024-05-16

## 2024-05-13 RX ORDER — DULOXETIN HYDROCHLORIDE 20 MG/1
20 CAPSULE, DELAYED RELEASE ORAL DAILY
Status: DISCONTINUED | OUTPATIENT
Start: 2024-05-14 | End: 2024-05-16 | Stop reason: HOSPADM

## 2024-05-13 RX ORDER — ZOLPIDEM TARTRATE 12.5 MG/1
10 TABLET, FILM COATED, EXTENDED RELEASE ORAL
COMMUNITY
End: 2024-05-22

## 2024-05-13 RX ORDER — SODIUM CHLORIDE, SODIUM GLUCONATE, SODIUM ACETATE, POTASSIUM CHLORIDE, MAGNESIUM CHLORIDE, SODIUM PHOSPHATE, DIBASIC, AND POTASSIUM PHOSPHATE .53; .5; .37; .037; .03; .012; .00082 G/100ML; G/100ML; G/100ML; G/100ML; G/100ML; G/100ML; G/100ML
50 INJECTION, SOLUTION INTRAVENOUS CONTINUOUS
Status: DISCONTINUED | OUTPATIENT
Start: 2024-05-13 | End: 2024-05-15

## 2024-05-13 RX ORDER — ACETAMINOPHEN 325 MG/1
500 TABLET ORAL EVERY 4 HOURS PRN
Status: DISCONTINUED | OUTPATIENT
Start: 2024-05-13 | End: 2024-05-16 | Stop reason: HOSPADM

## 2024-05-13 RX ADMIN — SODIUM CHLORIDE, SODIUM GLUCONATE, SODIUM ACETATE, POTASSIUM CHLORIDE, MAGNESIUM CHLORIDE, SODIUM PHOSPHATE, DIBASIC, AND POTASSIUM PHOSPHATE 50 ML/HR: .53; .5; .37; .037; .03; .012; .00082 INJECTION, SOLUTION INTRAVENOUS at 17:39

## 2024-05-13 RX ADMIN — PREGABALIN 75 MG: 75 CAPSULE ORAL at 19:54

## 2024-05-13 RX ADMIN — MORPHINE SULFATE 2 MG: 2 INJECTION, SOLUTION INTRAMUSCULAR; INTRAVENOUS at 14:53

## 2024-05-13 RX ADMIN — TAMSULOSIN HYDROCHLORIDE 0.4 MG: 0.4 CAPSULE ORAL at 19:21

## 2024-05-13 RX ADMIN — INSULIN LISPRO 1 UNITS: 100 INJECTION, SOLUTION INTRAVENOUS; SUBCUTANEOUS at 21:12

## 2024-05-13 RX ADMIN — FUROSEMIDE 80 MG: 10 INJECTION, SOLUTION INTRAMUSCULAR; INTRAVENOUS at 17:39

## 2024-05-13 RX ADMIN — OXYCODONE HYDROCHLORIDE AND ACETAMINOPHEN 1 TABLET: 5; 325 TABLET ORAL at 17:39

## 2024-05-13 RX ADMIN — SENNOSIDES AND DOCUSATE SODIUM 1 TABLET: 8.6; 5 TABLET ORAL at 19:21

## 2024-05-13 RX ADMIN — INSULIN GLARGINE 5 UNITS: 100 INJECTION, SOLUTION SUBCUTANEOUS at 21:13

## 2024-05-13 NOTE — ASSESSMENT & PLAN NOTE
Magnesium level is 5.3  Likely secondary to decreased clearance due to CKD stage v  Was taking magnesium citrate which remain on hold  EKG showing normal sinus rhythm, normal MO interval QRS duration normal, QTc 437  Evaluated by nephrology, recommending start IV hydration, also provide 1 dose of Lasix  Continue to monitor patient on telemetry.

## 2024-05-13 NOTE — PLAN OF CARE
Problem: Potential for Falls  Goal: Patient will remain free of falls  Description: INTERVENTIONS:  - Educate patient/family on patient safety including physical limitations  - Instruct patient to call for assistance with activity   - Consult OT/PT to assist with strengthening/mobility   - Keep Call bell within reach  - Keep bed low and locked with side rails adjusted as appropriate  - Keep care items and personal belongings within reach  - Initiate and maintain comfort rounds  - Make Fall Risk Sign visible to staff  - Offer Toileting every 2 Hours, in advance of need  - Initiate/Maintain 2alarm  - Obtain necessary fall risk management equipment: 2  - Apply yellow socks and bracelet for high fall risk patients  - Consider moving patient to room near nurses station  5/13/2024 1849 by Bessie Barragan RN  Outcome: Progressing  5/13/2024 1849 by Bessie Barragan RN  Outcome: Progressing     Problem: PAIN - ADULT  Goal: Verbalizes/displays adequate comfort level or baseline comfort level  Description: Interventions:  - Encourage patient to monitor pain and request assistance  - Assess pain using appropriate pain scale  - Administer analgesics based on type and severity of pain and evaluate response  - Implement non-pharmacological measures as appropriate and evaluate response  - Consider cultural and social influences on pain and pain management  - Notify physician/advanced practitioner if interventions unsuccessful or patient reports new pain  5/13/2024 1849 by Bessie Barragan RN  Outcome: Progressing  5/13/2024 1849 by Bessie Barragan RN  Outcome: Progressing     Problem: SAFETY ADULT  Goal: Patient will remain free of falls  Description: INTERVENTIONS:  - Educate patient/family on patient safety including physical limitations  - Instruct patient to call for assistance with activity   - Consult OT/PT to assist with strengthening/mobility   - Keep Call bell within reach  - Keep bed low and locked with side rails adjusted as  appropriate  - Keep care items and personal belongings within reach  - Initiate and maintain comfort rounds  - Make Fall Risk Sign visible to staff  - Offer Toileting every 2 Hours, in advance of need  - Initiate/Maintain 2alarm  - Obtain necessary fall risk management equipment: 2  - Apply yellow socks and bracelet for high fall risk patients  - Consider moving patient to room near nurses station  5/13/2024 1849 by Bessie Barragan RN  Outcome: Progressing  5/13/2024 1849 by Bessie Barragan RN  Outcome: Progressing  Goal: Maintain or return to baseline ADL function  Description: INTERVENTIONS:  -  Assess patient's ability to carry out ADLs; assess patient's baseline for ADL function and identify physical deficits which impact ability to perform ADLs (bathing, care of mouth/teeth, toileting, grooming, dressing, etc.)  - Assess/evaluate cause of self-care deficits   - Assess range of motion  - Assess patient's mobility; develop plan if impaired  - Assess patient's need for assistive devices and provide as appropriate  - Encourage maximum independence but intervene and supervise when necessary  - Involve family in performance of ADLs  - Assess for home care needs following discharge   - Consider OT consult to assist with ADL evaluation and planning for discharge  - Provide patient education as appropriate  5/13/2024 1849 by Bessie Barragan RN  Outcome: Progressing  5/13/2024 1849 by Bessie Barragan RN  Outcome: Progressing  Goal: Maintains/Returns to pre admission functional level  Description: INTERVENTIONS:  - Perform AM-PAC 6 Click Basic Mobility/ Daily Activity assessment daily.  - Set and communicate daily mobility goal to care team and patient/family/caregiver.   - Collaborate with rehabilitation services on mobility goals if consulted  - Perform Range of Motion 2 times a day.  - Reposition patient every 2 hours.  - Dangle patient 2 times a day  - Stand patient 2 times a day  - Ambulate patient 2 times a day  - Out of bed to  chair 2 times a day   - Out of bed for meals 2 times a day  - Out of bed for toileting  - Record patient progress and toleration of activity level   5/13/2024 1849 by Bessie Barragan RN  Outcome: Progressing  5/13/2024 1849 by Bessie Barragan RN  Outcome: Progressing     Problem: DISCHARGE PLANNING  Goal: Discharge to home or other facility with appropriate resources  Description: INTERVENTIONS:  - Identify barriers to discharge w/patient and caregiver  - Arrange for needed discharge resources and transportation as appropriate  - Identify discharge learning needs (meds, wound care, etc.)  - Arrange for interpretive services to assist at discharge as needed  - Refer to Case Management Department for coordinating discharge planning if the patient needs post-hospital services based on physician/advanced practitioner order or complex needs related to functional status, cognitive ability, or social support system  5/13/2024 1849 by Bessie Barragan RN  Outcome: Progressing  5/13/2024 1849 by Bessie Barragan RN  Outcome: Progressing     Problem: Knowledge Deficit  Goal: Patient/family/caregiver demonstrates understanding of disease process, treatment plan, medications, and discharge instructions  Description: Complete learning assessment and assess knowledge base.  Interventions:  - Provide teaching at level of understanding  - Provide teaching via preferred learning methods  5/13/2024 1849 by Bessie Barragan RN  Outcome: Progressing  5/13/2024 1849 by Bessie Barragan RN  Outcome: Progressing

## 2024-05-13 NOTE — ASSESSMENT & PLAN NOTE
Continue narcotics, patient takes Lyrica, continue  Will add Cymbalta, risk versus benefits, side effects and alternative discussed in details with patient.  Check arterial duplex.

## 2024-05-13 NOTE — ASSESSMENT & PLAN NOTE
"Lab Results   Component Value Date    HGBA1C 7.0 (H) 03/16/2023       No results for input(s): \"POCGLU\" in the last 72 hours.    Blood Sugar Average: Last 72 hrs:    Sliding scale, hypoglycemia precaution.  "

## 2024-05-13 NOTE — CONSULTS
NEPHROLOGY HOSPITAL CONSULTATION   Stef Pack Sr. 59 y.o. male MRN: [id number]  Unit/Bed#: Z1 H3 Encounter: [id number]    ASSESSMENT and PLAN:  Stef Pack Sr. is a 59 y.o. male who was admitted to [facility] after presenting with neuropathy. A renal consultation is requested today for assistance in the management of CKD.    CKD stage V.  Outpatient nephrologist Dr. Sepideh Bowser, [facility] nephrology.  Etiology thought to be due to diabetes, previous ischemic consultants, neuromuscular disease, obstructive uropathy in setting of neurogenic bladder and arterionephrosclerosis.  He has progressive worsening of kidney function, most recent outpatient creatinine 4.83.  Creatinine today 4.57.  Kidney function currently at baseline.  No indication for emergent renal replacement therapy.  His nephrologist has started the process for vascular access placement, he was supposed to get vein mapping today.  Trend BMP and monitor needs for renal replacement therapy.    2.  Hypermagnesemia.  Most likely due to decreased clearance in setting of advanced CKD.  He did take magnesium citrate for constipation few weeks ago.  He has worsening neuropathy but no paralysis.  EKG showing NSR, MD interval 152, QRS duration 90, QTc 437.  Monitor on telemetry.  Give gentle hydration with Isolyte at 50 cc/h and provide IV Lasix 80 mg x 1.  Monitor serum magnesium level.  No emergent indication for renal replacement therapy.  Rule out obstruction given previous history of obstructive uropathy.    3.  History of hyperkalemia.  Serum potassium level today 5.3.  Monitor serum potassium level.    4.  Hypertension.  Continue amlodipine 5 mg daily.    5.  Secondary hyperparathyroidism of renal origin.  Most recent serum phosphorus level 9.2.  Continue sevelamer with meals.  Low phosphorus diet.    6.  Anemia in CKD.  Hemoglobin currently at goal, 10.0 today.  Continue outpatient surveillance.    Discussed with ED team.  After  "discussion, we agreed to observe patient in the hospital due to hypomagnesemia and to provide IV fluids and diuretics for clearance of magnesium.    HISTORY OF PRESENT ILLNESS:  Requesting Physician: Kelvin Raya DO  Reason for Consult: CKD    Stef Pack Sr. is a 59 y.o. male who was admitted to [facility] after presenting with neuropathy. A renal consultation is requested today for assistance in the management of CKD.  He has been struggling with neuropathy in his feet to a point that he presented to emergency room.  Urine output has decreased over the course of his chronic kidney disease.  Appetite is low.  He denies dyspnea.  He denies leg swelling.    PAST MEDICAL HISTORY:  Past Medical History:   Diagnosis Date    Chronic kidney disease     Diabetes mellitus (HCC)     Gout     Hypertension     Renal disorder     Retroperitoneal abscess (HCC)     Stroke (HCC)     UTI (urinary tract infection)     Vertebral osteomyelitis (HCC)        PAST SURGICAL HISTORY:  Past Surgical History:   Procedure Laterality Date    BACK SURGERY      ORCHIECTOMY         ALLERGIES:  Allergies   Allergen Reactions    Bupropion Delirium     \"went nuts,\" prior to 2012  \"went nuts,\" prior to 2012  \"went nuts,\" prior to 2012  \"went nuts,\" prior to 2012      Metformin Other (See Comments)     Other reaction(s): kidney disease  Other reaction(s): kidney disease  Other reaction(s): kidney disease      Medical Tape Rash       SOCIAL HISTORY:  Social History     Substance and Sexual Activity   Alcohol Use Not Currently     Social History     Substance and Sexual Activity   Drug Use Yes    Types: Marijuana     Social History     Tobacco Use   Smoking Status Every Day    Current packs/day: 0.25    Types: Cigarettes   Smokeless Tobacco Never       FAMILY HISTORY:  Family History   Problem Relation Age of Onset    Hypertension Father        MEDICATIONS:  No current facility-administered medications for this " encounter.    Current Outpatient Medications:     acetaminophen (TYLENOL) 325 mg tablet, Take 650 mg by mouth every 4 (four) hours as needed for mild pain, Disp: , Rfl:     allopurinol (ZYLOPRIM) 100 mg tablet, Take 1 tablet (100 mg total) by mouth daily, Disp: 30 tablet, Rfl: 0    amLODIPine (NORVASC) 5 mg tablet, Take 1 tablet (5 mg total) by mouth daily, Disp: 30 tablet, Rfl: 0    docusate sodium (COLACE) 100 mg capsule, TAKE BY MOUTH 1 CAPSULE IN THE MORNING AND 1 CAPSULE BEFORE BEDTIME., Disp: , Rfl:     insulin glargine (LANTUS) 100 units/mL subcutaneous injection, Inject 5 Units under the skin daily at bedtime, Disp: 10 mL, Rfl: 0    insulin lispro (HumaLOG) 100 units/mL injection, Inject 2-12 Units under the skin 3 (three) times a day before meals, Disp: , Rfl: 0    nicotine (NICODERM CQ) 14 mg/24hr TD 24 hr patch, Place 1 patch on the skin over 24 hours daily, Disp: 28 patch, Rfl: 0    oxyCODONE (OXY-IR) 5 MG capsule, Take 5 mg by mouth every 4 (four) hours as needed for moderate pain or severe pain, Disp: , Rfl:     senna-docusate sodium (SENOKOT S) 8.6-50 mg per tablet, Take 1 tablet by mouth 2 (two) times a day, Disp: , Rfl:     tamsulosin (FLOMAX) 0.4 mg, Take 1 capsule (0.4 mg total) by mouth daily with dinner, Disp: 30 capsule, Rfl: 0    REVIEW OF SYSTEMS:  Review of Systems   Constitutional:  Negative for chills and fever.   HENT:  Negative for ear pain and sore throat.    Eyes:  Negative for pain and visual disturbance.   Respiratory:  Negative for cough and shortness of breath.    Cardiovascular:  Negative for chest pain and palpitations.   Gastrointestinal:  Negative for abdominal pain and vomiting.   Genitourinary:  Negative for dysuria and hematuria.   Musculoskeletal:  Negative for arthralgias and back pain.   Skin:  Negative for color change and rash.   Neurological:  Negative for seizures and syncope.   All other systems reviewed and are negative.    PHYSICAL EXAM:  Current Weight: Weight -  "Scale: 72.6 kg (160 lb)  First Weight: Weight - Scale: 72.6 kg (160 lb)  Vitals:    05/13/24 1405   BP: (!) 186/91   BP Location: Left arm   Pulse: 82   Resp: 19   Temp: 98.7 °F (37.1 °C)   TempSrc: Temporal   SpO2: 98%   Weight: 72.6 kg (160 lb)   Height: 6' (1.829 m)     No intake or output data in the 24 hours ending 05/13/24 1623  Physical Exam  Constitutional:       Appearance: Normal appearance.   HENT:      Head: Normocephalic and atraumatic.   Cardiovascular:      Rate and Rhythm: Normal rate and regular rhythm.      Pulses: Normal pulses.      Heart sounds: Normal heart sounds.   Pulmonary:      Effort: Pulmonary effort is normal.      Breath sounds: Normal breath sounds.   Abdominal:      Palpations: Abdomen is soft.   Musculoskeletal:         General: Normal range of motion.      Right lower leg: No edema.      Left lower leg: No edema.   Skin:     General: Skin is warm.   Neurological:      Mental Status: He is alert and oriented to person, place, and time. Mental status is at baseline.   Psychiatric:         Mood and Affect: Mood normal.       Invasive Devices:   Urethral Catheter Coude 16 Fr. (Active)     Lab Results:   Results from last 7 days   Lab Units 05/13/24  1453   WBC Thousand/uL 11.73*   HEMOGLOBIN g/dL 10.0*   HEMATOCRIT % 31.6*   PLATELETS Thousands/uL 196   POTASSIUM mmol/L 5.3   CHLORIDE mmol/L 101   CO2 mmol/L 25   BUN mg/dL 62*   CREATININE mg/dL 4.57*   CALCIUM mg/dL 8.9   MAGNESIUM mg/dL 5.3*     Portions of the record may have been created with voice recognition software. Occasional wrong word or \"sound a like\" substitutions may have occurred due to the inherent limitations of voice recognition software. Read the chart carefully and recognize, using context, where substitutions have occurred. If you have any questions, please contact the dictating provider.    "

## 2024-05-13 NOTE — ED PROVIDER NOTES
History  Chief Complaint   Patient presents with    Foot Pain     Patient reports bilateral foot pain, worse in left. Neuropathy. Patient reports no relief from Lyrica at home. Patient has been unable to sleep.     59-year-old male with a history of diabetes and chronic neuropathy and chronic renal sufficiency is presenting to the emergency room with chief complaint of bilateral worsening lower extremity pain and discomfort which is no longer being controlled by Lyrica.  Usually prescribed by neurologist who has recently retired.  Patient has not been able to resecure care.  Pain is increasing.  Patient reports that he cannot get any rest.  He also has decreased urinary output.      History provided by:  Patient and spouse      Prior to Admission Medications   Prescriptions Last Dose Informant Patient Reported? Taking?   acetaminophen (TYLENOL) 325 mg tablet   Yes No   Sig: Take 650 mg by mouth every 4 (four) hours as needed for mild pain   allopurinol (ZYLOPRIM) 100 mg tablet   No No   Sig: Take 1 tablet (100 mg total) by mouth daily   amLODIPine (NORVASC) 5 mg tablet   No No   Sig: Take 1 tablet (5 mg total) by mouth daily   docusate sodium (COLACE) 100 mg capsule   Yes No   Sig: TAKE BY MOUTH 1 CAPSULE IN THE MORNING AND 1 CAPSULE BEFORE BEDTIME.   insulin glargine (LANTUS) 100 units/mL subcutaneous injection   No No   Sig: Inject 5 Units under the skin daily at bedtime   insulin lispro (HumaLOG) 100 units/mL injection   No No   Sig: Inject 2-12 Units under the skin 3 (three) times a day before meals   nicotine (NICODERM CQ) 14 mg/24hr TD 24 hr patch   No No   Sig: Place 1 patch on the skin over 24 hours daily   oxyCODONE (OXY-IR) 5 MG capsule   Yes No   Sig: Take 5 mg by mouth every 4 (four) hours as needed for moderate pain or severe pain   senna-docusate sodium (SENOKOT S) 8.6-50 mg per tablet   Yes No   Sig: Take 1 tablet by mouth 2 (two) times a day   tamsulosin (FLOMAX) 0.4 mg   No No   Sig: Take 1 capsule  (0.4 mg total) by mouth daily with dinner      Facility-Administered Medications: None       Past Medical History:   Diagnosis Date    Chronic kidney disease     Diabetes mellitus (HCC)     Gout     Hypertension     Renal disorder     Retroperitoneal abscess (HCC)     Stroke (HCC)     UTI (urinary tract infection)     Vertebral osteomyelitis (HCC)        Past Surgical History:   Procedure Laterality Date    BACK SURGERY      ORCHIECTOMY         Family History   Problem Relation Age of Onset    Hypertension Father      I have reviewed and agree with the history as documented.    E-Cigarette/Vaping    E-Cigarette Use Never User      E-Cigarette/Vaping Substances     Social History     Tobacco Use    Smoking status: Every Day     Current packs/day: 0.25     Types: Cigarettes    Smokeless tobacco: Never   Vaping Use    Vaping status: Never Used   Substance Use Topics    Alcohol use: Not Currently    Drug use: Yes     Types: Marijuana       Review of Systems   Respiratory: Negative.     Cardiovascular: Negative.    Gastrointestinal:  Negative for diarrhea, nausea and vomiting.   Genitourinary:  Positive for difficulty urinating. Negative for urgency.   Musculoskeletal:  Positive for arthralgias and myalgias. Negative for back pain, gait problem and joint swelling.       Physical Exam  Physical Exam  Vitals and nursing note reviewed.   Constitutional:       General: He is not in acute distress.     Appearance: He is normal weight. He is not ill-appearing or toxic-appearing.   HENT:      Head: Normocephalic and atraumatic.      Right Ear: External ear normal.      Left Ear: External ear normal.      Nose: Nose normal.      Mouth/Throat:      Mouth: Mucous membranes are moist.   Pulmonary:      Effort: Pulmonary effort is normal. No respiratory distress.   Musculoskeletal:         General: Swelling and tenderness present. No deformity or signs of injury.   Skin:     Coloration: Skin is not pale.   Neurological:       General: No focal deficit present.      Mental Status: He is alert.   Psychiatric:         Mood and Affect: Mood normal.         Thought Content: Thought content normal.         Judgment: Judgment normal.         Vital Signs  ED Triage Vitals [05/13/24 1405]   Temperature Pulse Respirations Blood Pressure SpO2   98.7 °F (37.1 °C) 82 19 (!) 186/91 98 %      Temp Source Heart Rate Source Patient Position - Orthostatic VS BP Location FiO2 (%)   Temporal Monitor Sitting Left arm --      Pain Score       9           Vitals:    05/13/24 1405 05/13/24 1643   BP: (!) 186/91 (!) 182/93   Pulse: 82 86   Patient Position - Orthostatic VS: Sitting Sitting         Visual Acuity      ED Medications  Medications   morphine injection 2 mg (2 mg Intravenous Given 5/13/24 1453)       Diagnostic Studies  Results Reviewed       Procedure Component Value Units Date/Time    Basic metabolic panel [[id number]]  (Abnormal) Collected: 05/13/24 1453    Lab Status: Final result Specimen: Blood from Arm, Left Updated: 05/13/24 1514     Sodium 134 mmol/L      Potassium 5.3 mmol/L      Chloride 101 mmol/L      CO2 25 mmol/L      ANION GAP 8 mmol/L      BUN 62 mg/dL      Creatinine 4.57 mg/dL      Glucose 122 mg/dL      Calcium 8.9 mg/dL      eGFR 13 ml/min/1.73sq m     Narrative:      National Kidney Disease Foundation guidelines for Chronic Kidney Disease (CKD):     Stage 1 with normal or high GFR (GFR > 90 mL/min/1.73 square meters)    Stage 2 Mild CKD (GFR = 60-89 mL/min/1.73 square meters)    Stage 3A Moderate CKD (GFR = 45-59 mL/min/1.73 square meters)    Stage 3B Moderate CKD (GFR = 30-44 mL/min/1.73 square meters)    Stage 4 Severe CKD (GFR = 15-29 mL/min/1.73 square meters)    Stage 5 End Stage CKD (GFR <15 mL/min/1.73 square meters)  Note: GFR calculation is accurate only with a steady state creatinine    Magnesium [[id number]]  (Abnormal) Collected: 05/13/24 1453    Lab Status: Final result Specimen: Blood from Arm, Left Updated: 05/13/24  1514     Magnesium 5.3 mg/dL     CBC and differential [[id number]]  (Abnormal) Collected: 05/13/24 1453    Lab Status: Final result Specimen: Blood from Arm, Left Updated: 05/13/24 1500     WBC 11.73 Thousand/uL      RBC 3.23 Million/uL      Hemoglobin 10.0 g/dL      Hematocrit 31.6 %      MCV 98 fL      MCH 31.0 pg      MCHC 31.6 g/dL      RDW 16.1 %      MPV 11.0 fL      Platelets 196 Thousands/uL      nRBC 0 /100 WBCs      Segmented % 72 %      Immature Grans % 1 %      Lymphocytes % 12 %      Monocytes % 11 %      Eosinophils Relative 3 %      Basophils Relative 1 %      Absolute Neutrophils 8.46 Thousands/µL      Absolute Immature Grans 0.09 Thousand/uL      Absolute Lymphocytes 1.43 Thousands/µL      Absolute Monocytes 1.32 Thousand/µL      Eosinophils Absolute 0.35 Thousand/µL      Basophils Absolute 0.08 Thousands/µL                    No orders to display              Procedures  Procedures         ED Course  ED Course as of 05/13/24 1651   Mon May 13, 2024   1609 MAGNESIUM(!): 5.3  Will discuss this with nephrology   1632 Discussed the case with nephrology.  They are recommending observation admission.  They are coming to see the patient.                               SBIRT 22yo+      Flowsheet Row Most Recent Value   Initial Alcohol Screen: US AUDIT-C     1. How often do you have a drink containing alcohol? 0 Filed at: 05/13/2024 1408   2. How many drinks containing alcohol do you have on a typical day you are drinking?  0 Filed at: 05/13/2024 1408   3a. Male UNDER 65: How often do you have five or more drinks on one occasion? 0 Filed at: 05/13/2024 1408   3b. FEMALE Any Age, or MALE 65+: How often do you have 4 or more drinks on one occassion? 0 Filed at: 05/13/2024 1408   Audit-C Score 0 Filed at: 05/13/2024 1408   JOANNE: How many times in the past year have you...    Used an illegal drug or used a prescription medication for non-medical reasons? Never Filed at: 05/13/2024 1408                       Medical Decision Making  Problems Addressed:  Chronic renal failure: chronic illness or injury with exacerbation, progression, or side effects of treatment  Hypermagnesemia: acute illness or injury  Neuropathic pain: chronic illness or injury with exacerbation, progression, or side effects of treatment    Amount and/or Complexity of Data Reviewed  Labs: ordered. Decision-making details documented in ED Course.    Risk  Prescription drug management.  Decision regarding hospitalization.             Disposition  Final diagnoses:   Chronic renal failure   Hypermagnesemia   Neuropathic pain     Time reflects when diagnosis was documented in both MDM as applicable and the Disposition within this note       Time User Action Codes Description Comment    5/13/2024  4:22 PM Kelvin Raya [N18.9] Chronic renal failure     5/13/2024  4:22 PM Kelvin Raya [E83.41] Hypermagnesemia     5/13/2024  4:27 PM Kelvin Raya [M79.2] Neuropathic pain           ED Disposition       ED Disposition   Admit    Condition   Stable    Date/Time   Mon May 13, 2024 5703    Comment   --             Follow-up Information    None         Patient's Medications   Discharge Prescriptions    No medications on file       No discharge procedures on file.    PDMP Review         Value Time User    PDMP Reviewed  Yes 5/13/2024  2:33 PM Kelvin Raya DO            ED Provider  Electronically Signed by             Kelvin Raya DO  05/13/24 9793

## 2024-05-13 NOTE — H&P
"[facility]  H&P  Name: Stef Pack Sr. 59 y.o. male I MRN: [id number]  Unit/Bed#: -01 I Date of Admission: 5/13/2024   Date of Service: 5/13/2024 I Hospital Day: 0      Assessment/Plan   * Hypermagnesemia  Assessment & Plan  Magnesium level is 5.3  Likely secondary to decreased clearance due to CKD stage v  Was taking magnesium citrate which remain on hold  EKG showing normal sinus rhythm, normal HI interval QRS duration normal, QTc 437  Evaluated by nephrology, recommending start IV hydration, also provide 1 dose of Lasix  Continue to monitor patient on telemetry.    Neuropathic pain  Assessment & Plan  Continue narcotics, patient takes Lyrica, continue  Will add Cymbalta, risk versus benefits, side effects and alternative discussed in details with patient.  Check arterial duplex.    Diabetes mellitus type 2, insulin dependent (HCC)  Assessment & Plan  Lab Results   Component Value Date    HGBA1C 7.0 (H) 03/16/2023       No results for input(s): \"POCGLU\" in the last 72 hours.    Blood Sugar Average: Last 72 hrs:    Sliding scale, hypoglycemia precaution.         VTE Pharmacologic Prophylaxis: VTE Score: 2 Low Risk (Score 0-2) - Encourage Ambulation.  Code Status: Level 1 - Full Code per patient  Discussion with family:  With patient.     Anticipated Length of Stay: Patient will be admitted on an observation basis with an anticipated length of stay of less than 2 midnights secondary to monitorable conditions.      Chief Complaint: Foot pain    History of Present Illness:  Stef Pack Sr. is a 59 y.o. male with a PMH of diabetes with neuropathic pain, CKD who presents with foot pain.  Patient initially presented to the ER, for foot pain, during the workup, patient found elevated magnesium level.  Denies any chest pain, nausea, vomiting, dizziness, lightheadedness.  Denies any cramping..    Review of Systems:  Review of Systems   Constitutional:  Positive for activity change. " Negative for appetite change, chills, diaphoresis, fatigue and fever.   Respiratory:  Negative for apnea, choking and chest tightness.    Cardiovascular:  Negative for chest pain and leg swelling.   Gastrointestinal:  Negative for abdominal distention and abdominal pain.   Neurological:  Negative for dizziness and facial asymmetry.   Psychiatric/Behavioral:  Negative for agitation and behavioral problems.    All other systems reviewed and are negative.      Past Medical and Surgical History:   Past Medical History:   Diagnosis Date    Chronic kidney disease     Diabetes mellitus (HCC)     Gout     Hypertension     Renal disorder     Retroperitoneal abscess (HCC)     Stroke (HCC)     UTI (urinary tract infection)     Vertebral osteomyelitis (HCC)        Past Surgical History:   Procedure Laterality Date    BACK SURGERY      ORCHIECTOMY         Meds/Allergies:  Prior to Admission medications    Medication Sig Start Date End Date Taking? Authorizing Provider   acetaminophen (TYLENOL) 325 mg tablet Take 500 mg by mouth every 4 (four) hours as needed for mild pain   Yes Historical Provider, MD   docusate sodium (COLACE) 100 mg capsule TAKE BY MOUTH 1 CAPSULE IN THE MORNING AND 1 CAPSULE BEFORE BEDTIME. 12/21/22  Yes Historical Provider, MD   insulin glargine (LANTUS) 100 units/mL subcutaneous injection Inject 5 Units under the skin daily at bedtime 11/23/22  Yes Joana Mejias PA-C   insulin lispro (HumaLOG) 100 units/mL injection Inject 2-12 Units under the skin 3 (three) times a day before meals 10/24/22  Yes Adrianne Horn MD   magnesium hydroxide (MILK OF MAGNESIA) 400 mg/5 mL oral suspension Take 5 mL by mouth daily as needed for constipation   Yes Historical Provider, MD   oxyCODONE (OXY-IR) 5 MG capsule Take 5 mg by mouth every 4 (four) hours as needed for moderate pain or severe pain   Yes Historical Provider, MD   pregabalin (LYRICA) 150 mg capsule Take 150 mg by mouth 2 (two) times a day   Yes Historical  "Provider, MD   senna-docusate sodium (SENOKOT S) 8.6-50 mg per tablet Take 1 tablet by mouth 2 (two) times a day 3/17/22  Yes Historical Provider, MD   sevelamer (RENAGEL) 400 mg tablet Take 400 mg by mouth in the morning At dinner time   Yes Historical Provider, MD   zolpidem (AMBIEN CR) 12.5 MG CR tablet Take 10 mg by mouth daily at bedtime as needed for sleep   Yes Historical Provider, MD   allopurinol (ZYLOPRIM) 100 mg tablet Take 1 tablet (100 mg total) by mouth daily 2/27/24 3/28/24  Adrianne Horn MD   amLODIPine (NORVASC) 5 mg tablet Take 1 tablet (5 mg total) by mouth daily 2/27/24 3/28/24  Adrianne Horn MD   tamsulosin (FLOMAX) 0.4 mg Take 1 capsule (0.4 mg total) by mouth daily with dinner 2/27/24 3/28/24  Adrianne Horn MD   nicotine (NICODERM CQ) 14 mg/24hr TD 24 hr patch Place 1 patch on the skin over 24 hours daily  Patient not taking: Reported on 5/13/2024 2/28/24 5/13/24  Adrianne Horn MD     I have reviewed home medications with patient personally.    Allergies:   Allergies   Allergen Reactions    Bupropion Delirium     \"went nuts,\" prior to 2012  \"went nuts,\" prior to 2012  \"went nuts,\" prior to 2012  \"went nuts,\" prior to 2012      Metformin Other (See Comments)     Other reaction(s): kidney disease  Other reaction(s): kidney disease  Other reaction(s): kidney disease      Medical Tape Rash       Social History:  Marital Status: /Civil Union   Occupation: Unknown  Patient Pre-hospital Living Situation: Home  Patient Pre-hospital Level of Mobility: walks  Patient Pre-hospital Diet Restrictions: Cardiac/diabetic diet  Substance Use History:   Social History     Substance and Sexual Activity   Alcohol Use Not Currently     Social History     Tobacco Use   Smoking Status Every Day    Current packs/day: 0.25    Types: Cigarettes   Smokeless Tobacco Never     Social History     Substance and Sexual Activity   Drug Use Yes    Types: Marijuana       Family History:  Family History   Problem Relation Age " of Onset    Hypertension Father        Physical Exam:     Vitals:   Blood Pressure: (!) 179/84 (05/13/24 1857)  Pulse: 68 (05/13/24 1857)  Temperature: 97.7 °F (36.5 °C) (05/13/24 1857)  Temp Source: Temporal (05/13/24 1405)  Respirations: 18 (05/13/24 1857)  Height: 6' (182.9 cm) (05/13/24 1405)  Weight - Scale: 72.6 kg (160 lb) (05/13/24 1405)  SpO2: 96 % (05/13/24 1857)    Physical Exam  Vitals and nursing note reviewed.   Constitutional:       Appearance: Normal appearance. He is not ill-appearing.   Eyes:      General: No scleral icterus.        Left eye: No discharge.      Extraocular Movements: Extraocular movements intact.      Conjunctiva/sclera: Conjunctivae normal.      Pupils: Pupils are equal, round, and reactive to light.   Cardiovascular:      Rate and Rhythm: Normal rate.      Heart sounds: No murmur heard.     No friction rub. No gallop.   Pulmonary:      Effort: Pulmonary effort is normal. No respiratory distress.      Breath sounds: No stridor. No wheezing or rhonchi.   Abdominal:      General: There is no distension.      Palpations: There is no mass.      Tenderness: There is no abdominal tenderness.      Hernia: No hernia is present.   Musculoskeletal:      Right lower leg: No edema.      Left lower leg: No edema.      Comments: Constant needle sensation in both lower extremities.   Neurological:      Mental Status: He is alert.      Cranial Nerves: No cranial nerve deficit.      Sensory: No sensory deficit.      Motor: No weakness.      Coordination: Coordination normal.          Additional Data:     Lab Results:  Results from last 7 days   Lab Units 05/13/24  1453   WBC Thousand/uL 11.73*   HEMOGLOBIN g/dL 10.0*   HEMATOCRIT % 31.6*   PLATELETS Thousands/uL 196   SEGS PCT % 72   LYMPHO PCT % 12*   MONO PCT % 11   EOS PCT % 3     Results from last 7 days   Lab Units 05/13/24  1453   SODIUM mmol/L 134*   POTASSIUM mmol/L 5.3   CHLORIDE mmol/L 101   CO2 mmol/L 25   BUN mg/dL 62*   CREATININE  mg/dL 4.57*   ANION GAP mmol/L 8   CALCIUM mg/dL 8.9   GLUCOSE RANDOM mg/dL 122         Results from last 7 days   Lab Units 05/13/24  1817   POC GLUCOSE mg/dl 102               Lines/Drains:  Invasive Devices       Peripheral Intravenous Line  Duration             Peripheral IV 05/13/24 Left;Ventral (anterior) Forearm <1 day              Drain  Duration             Urethral Catheter Coude 16 Fr. 506 days                  Urinary Catheter:  Goal for removal: Remove after 48 hrs of I/O monitoring             Imaging: No pertinent imaging reviewed.  US kidney and bladder    (Results Pending)   VAS ARTERIAL DUPLEX- LOWER LIMB BILATERAL    (Results Pending)       EKG and Other Studies Reviewed on Admission:   EKG:  Normal sinus rhythm, no acute elongation, QRS normal..    ** Please Note: This note has been constructed using a voice recognition system. **

## 2024-05-14 ENCOUNTER — APPOINTMENT (OUTPATIENT)
Dept: NON INVASIVE DIAGNOSTICS | Facility: HOSPITAL | Age: 59
DRG: 074 | End: 2024-05-14
Payer: COMMERCIAL

## 2024-05-14 ENCOUNTER — TELEPHONE (OUTPATIENT)
Dept: NEPHROLOGY | Facility: CLINIC | Age: 59
End: 2024-05-14

## 2024-05-14 PROBLEM — N18.5 CKD (CHRONIC KIDNEY DISEASE), STAGE V (HCC): Status: ACTIVE | Noted: 2022-09-12

## 2024-05-14 LAB
ANION GAP SERPL CALCULATED.3IONS-SCNC: 7 MMOL/L (ref 4–13)
ATRIAL RATE: 64 BPM
BUN SERPL-MCNC: 62 MG/DL (ref 5–25)
CALCIUM SERPL-MCNC: 8.7 MG/DL (ref 8.4–10.2)
CHLORIDE SERPL-SCNC: 105 MMOL/L (ref 96–108)
CO2 SERPL-SCNC: 27 MMOL/L (ref 21–32)
CREAT SERPL-MCNC: 4.86 MG/DL (ref 0.6–1.3)
ERYTHROCYTE [DISTWIDTH] IN BLOOD BY AUTOMATED COUNT: 16.3 % (ref 11.6–15.1)
GFR SERPL CREATININE-BSD FRML MDRD: 12 ML/MIN/1.73SQ M
GLUCOSE P FAST SERPL-MCNC: 89 MG/DL (ref 65–99)
GLUCOSE SERPL-MCNC: 105 MG/DL (ref 65–140)
GLUCOSE SERPL-MCNC: 121 MG/DL (ref 65–140)
GLUCOSE SERPL-MCNC: 130 MG/DL (ref 65–140)
GLUCOSE SERPL-MCNC: 146 MG/DL (ref 65–140)
GLUCOSE SERPL-MCNC: 89 MG/DL (ref 65–140)
HCT VFR BLD AUTO: 31.3 % (ref 36.5–49.3)
HGB BLD-MCNC: 9.9 G/DL (ref 12–17)
MAGNESIUM SERPL-MCNC: 4.7 MG/DL (ref 1.9–2.7)
MCH RBC QN AUTO: 31.2 PG (ref 26.8–34.3)
MCHC RBC AUTO-ENTMCNC: 31.6 G/DL (ref 31.4–37.4)
MCV RBC AUTO: 99 FL (ref 82–98)
P AXIS: 43 DEGREES
PHOSPHATE SERPL-MCNC: 7.2 MG/DL (ref 2.7–4.5)
PLATELET # BLD AUTO: 180 THOUSANDS/UL (ref 149–390)
PMV BLD AUTO: 10.8 FL (ref 8.9–12.7)
POTASSIUM SERPL-SCNC: 5 MMOL/L (ref 3.5–5.3)
PR INTERVAL: 152 MS
QRS AXIS: -35 DEGREES
QRSD INTERVAL: 90 MS
QT INTERVAL: 424 MS
QTC INTERVAL: 437 MS
RBC # BLD AUTO: 3.17 MILLION/UL (ref 3.88–5.62)
SODIUM SERPL-SCNC: 139 MMOL/L (ref 135–147)
T WAVE AXIS: 38 DEGREES
VENTRICULAR RATE: 64 BPM
WBC # BLD AUTO: 8.72 THOUSAND/UL (ref 4.31–10.16)

## 2024-05-14 PROCEDURE — 97163 PT EVAL HIGH COMPLEX 45 MIN: CPT

## 2024-05-14 PROCEDURE — 93925 LOWER EXTREMITY STUDY: CPT

## 2024-05-14 PROCEDURE — 93922 UPR/L XTREMITY ART 2 LEVELS: CPT | Performed by: SURGERY

## 2024-05-14 PROCEDURE — 82948 REAGENT STRIP/BLOOD GLUCOSE: CPT

## 2024-05-14 PROCEDURE — 84100 ASSAY OF PHOSPHORUS: CPT | Performed by: FAMILY MEDICINE

## 2024-05-14 PROCEDURE — 85027 COMPLETE CBC AUTOMATED: CPT | Performed by: FAMILY MEDICINE

## 2024-05-14 PROCEDURE — 97116 GAIT TRAINING THERAPY: CPT

## 2024-05-14 PROCEDURE — 99232 SBSQ HOSP IP/OBS MODERATE 35: CPT

## 2024-05-14 PROCEDURE — 97167 OT EVAL HIGH COMPLEX 60 MIN: CPT

## 2024-05-14 PROCEDURE — 83735 ASSAY OF MAGNESIUM: CPT | Performed by: FAMILY MEDICINE

## 2024-05-14 PROCEDURE — 80048 BASIC METABOLIC PNL TOTAL CA: CPT | Performed by: FAMILY MEDICINE

## 2024-05-14 PROCEDURE — 93923 UPR/LXTR ART STDY 3+ LVLS: CPT

## 2024-05-14 PROCEDURE — 93925 LOWER EXTREMITY STUDY: CPT | Performed by: SURGERY

## 2024-05-14 PROCEDURE — 99214 OFFICE O/P EST MOD 30 MIN: CPT | Performed by: INTERNAL MEDICINE

## 2024-05-14 RX ORDER — SEVELAMER HYDROCHLORIDE 800 MG/1
800 TABLET, FILM COATED ORAL
Status: DISCONTINUED | OUTPATIENT
Start: 2024-05-14 | End: 2024-05-15

## 2024-05-14 RX ORDER — METOLAZONE 5 MG/1
5 TABLET ORAL ONCE
Status: COMPLETED | OUTPATIENT
Start: 2024-05-14 | End: 2024-05-14

## 2024-05-14 RX ORDER — FUROSEMIDE 10 MG/ML
80 INJECTION INTRAMUSCULAR; INTRAVENOUS ONCE
Status: COMPLETED | OUTPATIENT
Start: 2024-05-14 | End: 2024-05-14

## 2024-05-14 RX ORDER — METOLAZONE 5 MG/1
5 TABLET ORAL DAILY
Status: DISCONTINUED | OUTPATIENT
Start: 2024-05-14 | End: 2024-05-14

## 2024-05-14 RX ADMIN — ALLOPURINOL 100 MG: 100 TABLET ORAL at 08:59

## 2024-05-14 RX ADMIN — METOLAZONE 5 MG: 5 TABLET ORAL at 11:34

## 2024-05-14 RX ADMIN — SENNOSIDES AND DOCUSATE SODIUM 1 TABLET: 8.6; 5 TABLET ORAL at 08:59

## 2024-05-14 RX ADMIN — OXYCODONE HYDROCHLORIDE AND ACETAMINOPHEN 1 TABLET: 5; 325 TABLET ORAL at 17:02

## 2024-05-14 RX ADMIN — SEVELAMER HYDROCHLORIDE 800 MG: 800 TABLET, FILM COATED ORAL at 17:02

## 2024-05-14 RX ADMIN — OXYCODONE HYDROCHLORIDE AND ACETAMINOPHEN 1 TABLET: 5; 325 TABLET ORAL at 09:01

## 2024-05-14 RX ADMIN — PREGABALIN 75 MG: 75 CAPSULE ORAL at 08:59

## 2024-05-14 RX ADMIN — INSULIN GLARGINE 5 UNITS: 100 INJECTION, SOLUTION SUBCUTANEOUS at 21:33

## 2024-05-14 RX ADMIN — SEVELAMER HYDROCHLORIDE 800 MG: 800 TABLET, FILM COATED ORAL at 11:34

## 2024-05-14 RX ADMIN — DULOXETINE HYDROCHLORIDE 20 MG: 20 CAPSULE, DELAYED RELEASE ORAL at 08:59

## 2024-05-14 RX ADMIN — LIDOCAINE 1 PATCH: 50 PATCH CUTANEOUS at 08:59

## 2024-05-14 RX ADMIN — TAMSULOSIN HYDROCHLORIDE 0.4 MG: 0.4 CAPSULE ORAL at 17:02

## 2024-05-14 RX ADMIN — PREGABALIN 75 MG: 75 CAPSULE ORAL at 17:02

## 2024-05-14 RX ADMIN — FUROSEMIDE 80 MG: 10 INJECTION, SOLUTION INTRAMUSCULAR; INTRAVENOUS at 11:34

## 2024-05-14 RX ADMIN — AMLODIPINE BESYLATE 5 MG: 5 TABLET ORAL at 08:59

## 2024-05-14 RX ADMIN — SENNOSIDES AND DOCUSATE SODIUM 1 TABLET: 8.6; 5 TABLET ORAL at 17:02

## 2024-05-14 NOTE — PROGRESS NOTES
[facility]  Progress Note  Name: Stef George I  MRN: [id number]  Unit/Bed#: -01 I Date of Admission: 5/13/2024   Date of Service: 5/14/2024 I Hospital Day: 0    Assessment/Plan   * Hypermagnesemia  Assessment & Plan  Magnesium level is 5.3  Likely secondary to decreased clearance due to CKD stage v  Was taking magnesium citrate which remain on hold  EKG showing normal sinus rhythm, normal TN interval QRS duration normal, QTc 437  Evaluated by nephrology, recommending start IV hydration, also provide 1 dose of Lasix; on 5/14: add lasix 80 mg x 1, metolazone 5 mg x 1 dose. Will need loop diuretic outpatient for ongoing management.   Continue to monitor patient on telemetry.    Neuropathic pain  Assessment & Plan  Continue narcotics, patient takes Lyrica, continue  Will add Cymbalta, risk versus benefits, side effects and alternative discussed in details with patient.  With improvement of pain with Cymbalta, continue on discharge.   Arterial duplex pending    CKD (chronic kidney disease), stage V (HCC)  Assessment & Plan  Lab Results   Component Value Date    EGFR 12 05/14/2024    EGFR 13 05/13/2024    EGFR 13 (L) 04/25/2024    CREATININE 4.86 (H) 05/14/2024    CREATININE 4.57 (H) 05/13/2024    CREATININE 4.83 (H) 04/25/2024     Outpatient creatinine appears to be around 4.83 most recently, today creatinine is 4.86, but still within baseline. No indication for emergent dialysis.   Outpatient nephrologist has started process for vascular access placement.   Monitor creatinine  Avoid nephrotoxic medications, NSAIDs, hypotension    Essential hypertension  Assessment & Plan  Continue norvasc 5 mg qd.     Diabetes mellitus type 2, insulin dependent (HCC)  Assessment & Plan  Lab Results   Component Value Date    HGBA1C 7.0 (H) 03/16/2023       Recent Labs     05/13/24  1817 05/13/24  2105 05/14/24  0819 05/14/24  1108   POCGLU 102 175* 105 121       Blood Sugar Average: Last 72  hrs:  (P) 125.75  Sliding scale, hypoglycemia precaution.         VTE Pharmacologic Prophylaxis: VTE Score: 2 Low Risk (Score 0-2) - Encourage Ambulation.    Mobility:   Basic Mobility Inpatient Raw Score: 23  JH-HLM Goal: 7: Walk 25 feet or more  JH-HLM Achieved: 7: Walk 25 feet or more  JH-HLM Goal achieved. Continue to encourage appropriate mobility.    Patient Centered Rounds: I performed bedside rounds with nursing staff today.   Discussions with Specialists or Other Care Team Provider: nursing, case management, nephrology    Education and Discussions with Family / Patient: Patient declined call to .     Total Time Spent on Date of Encounter in care of patient:  mins. This time was spent on one or more of the following: performing physical exam; counseling and coordination of care; obtaining or reviewing history; documenting in the medical record; reviewing/ordering tests, medications or procedures; communicating with other healthcare professionals and discussing with patient's family/caregivers.    Current Length of Stay: 0 day(s)  Current Patient Status: Observation   Certification Statement: The patient will continue to require additional inpatient hospital stay due to hypermagnesemia  Discharge Plan: Anticipate discharge in 24-48 hrs to home.    Code Status: Level 1 - Full Code    Subjective:   Seen and examined today. Feeling better than he did yesterday. Said that the pain in his legs is much better. No nausea, vomiting, diarrhea, constipation, abdominal pain, CP, SOB, fever, chills. Feels that he was able to sleep better because the pain in his legs was not as bad.     Objective:     Vitals:   Temp (24hrs), Av °F (36.7 °C), Min:97.7 °F (36.5 °C), Max:98.8 °F (37.1 °C)    Temp:  [97.7 °F (36.5 °C)-98.8 °F (37.1 °C)] 97.9 °F (36.6 °C)  HR:  [65-86] 65  Resp:  [16-18] 16  BP: (142-186)/(65-93) 154/78  SpO2:  [92 %-98 %] 95 %  Body mass index is 21.7 kg/m².     Input and Output Summary  (last 24 hours):     Intake/Output Summary (Last 24 hours) at 5/14/2024 1452  Last data filed at 5/14/2024 1245  Gross per 24 hour   Intake 120 ml   Output 2775 ml   Net -2655 ml       Physical Exam:   Physical Exam  Vitals reviewed.   Constitutional:       General: He is not in acute distress.     Appearance: He is not ill-appearing or toxic-appearing.   HENT:      Head: Normocephalic and atraumatic.      Mouth/Throat:      Mouth: Mucous membranes are moist.   Cardiovascular:      Rate and Rhythm: Normal rate and regular rhythm.      Heart sounds: No murmur heard.  Pulmonary:      Effort: No respiratory distress.      Breath sounds: No stridor. No wheezing.   Abdominal:      General: Bowel sounds are normal. There is no distension.      Palpations: Abdomen is soft. There is no mass.      Tenderness: There is no abdominal tenderness.   Musculoskeletal:      Right lower leg: No edema.      Left lower leg: No edema.   Skin:     General: Skin is warm and dry.   Neurological:      General: No focal deficit present.      Mental Status: He is alert and oriented to person, place, and time.   Psychiatric:         Mood and Affect: Mood normal.         Behavior: Behavior normal.          Additional Data:     Labs:  Results from last 7 days   Lab Units 05/14/24  0517 05/13/24  1453   WBC Thousand/uL 8.72 11.73*   HEMOGLOBIN g/dL 9.9* 10.0*   HEMATOCRIT % 31.3* 31.6*   PLATELETS Thousands/uL 180 196   SEGS PCT %  --  72   LYMPHO PCT %  --  12*   MONO PCT %  --  11   EOS PCT %  --  3     Results from last 7 days   Lab Units 05/14/24  0517   SODIUM mmol/L 139   POTASSIUM mmol/L 5.0   CHLORIDE mmol/L 105   CO2 mmol/L 27   BUN mg/dL 62*   CREATININE mg/dL 4.86*   ANION GAP mmol/L 7   CALCIUM mg/dL 8.7   GLUCOSE RANDOM mg/dL 89         Results from last 7 days   Lab Units 05/14/24  1108 05/14/24  0819 05/13/24  2105 05/13/24  1817   POC GLUCOSE mg/dl 121 105 175* 102               Lines/Drains:  Invasive Devices       Peripheral  Intravenous Line  Duration             Peripheral IV 05/13/24 Left;Ventral (anterior) Forearm <1 day              Drain  Duration             Urethral Catheter Coude 16 Fr. 507 days                  Urinary Catheter:  Goal for removal:  does not have camarena catheter           Telemetry:  Telemetry Orders (From admission, onward)               24 Hour Telemetry Monitoring  Continuous x 24 Hours (Telem)        Question:  Reason for 24 Hour Telemetry  Answer:  Arrhythmias requiring acute medical intervention / PPM or ICD malfunction                     Telemetry Reviewed: Normal Sinus Rhythm  Indication for Continued Telemetry Use: Metabolic/electrolyte disturbance with high probability of dysrhythmia             Imaging: Reviewed radiology reports from this admission including: ultrasound(s)    Recent Cultures (last 7 days):         Last 24 Hours Medication List:   Current Facility-Administered Medications   Medication Dose Route Frequency Provider Last Rate    acetaminophen  488 mg Oral Q4H PRN Madison Ely MD      allopurinol  100 mg Oral Daily Madison Ely MD      amLODIPine  5 mg Oral Daily Madison Ely MD      DULoxetine  20 mg Oral Daily Madison Ely MD      hydrALAZINE  5 mg Intravenous Q6H PRN Madison Ely MD      insulin glargine  5 Units Subcutaneous HS Madison Ely MD      insulin lispro  1-6 Units Subcutaneous TID AC Madison Ely MD      insulin lispro  1-6 Units Subcutaneous HS Madison Ely MD      lidocaine  1 patch Topical Daily Madison Ely MD      multi-electrolyte  50 mL/hr Intravenous Continuous Madison Ely MD 50 mL/hr (05/13/24 1739)    nicotine  1 patch Transdermal Daily Madison Ely MD      ondansetron  4 mg Intravenous Q6H PRN Madison Ely MD      oxyCODONE-acetaminophen  1 tablet Oral Q4H PRN Madison Ely MD      pregabalin  75 mg Oral BID Madison Ely MD      senna-docusate sodium  1 tablet Oral BID Madison Ely MD       sevelamer  800 mg Oral TID With Meals Branden Smiley MD      tamsulosin  0.4 mg Oral Daily With Dinner Madison Ely MD          Today, Patient Was Seen By: Charlotte Villegas PA-C    **Please Note: This note may have been constructed using a voice recognition system.**

## 2024-05-14 NOTE — ASSESSMENT & PLAN NOTE
Lab Results   Component Value Date    EGFR 12 05/14/2024    EGFR 13 05/13/2024    EGFR 13 (L) 04/25/2024    CREATININE 4.86 (H) 05/14/2024    CREATININE 4.57 (H) 05/13/2024    CREATININE 4.83 (H) 04/25/2024     Outpatient creatinine appears to be around 4.83 most recently, today creatinine is 4.86, but still within baseline. No indication for emergent dialysis.   Outpatient nephrologist has started process for vascular access placement.   Monitor creatinine  Avoid nephrotoxic medications, NSAIDs, hypotension

## 2024-05-14 NOTE — ASSESSMENT & PLAN NOTE
Lab Results   Component Value Date    HGBA1C 7.0 (H) 03/16/2023       Recent Labs     05/13/24  1817 05/13/24  2105 05/14/24  0819 05/14/24  1108   POCGLU 102 175* 105 121       Blood Sugar Average: Last 72 hrs:  (P) 125.75  Sliding scale, hypoglycemia precaution.

## 2024-05-14 NOTE — PLAN OF CARE
Problem: Potential for Falls  Goal: Patient will remain free of falls  Description: INTERVENTIONS:  - Educate patient/family on patient safety including physical limitations  - Instruct patient to call for assistance with activity   - Consult OT/PT to assist with strengthening/mobility   - Keep Call bell within reach  - Keep bed low and locked with side rails adjusted as appropriate  - Keep care items and personal belongings within reach  - Initiate and maintain comfort rounds  - Make Fall Risk Sign visible to staff  - Offer Toileting every 2 Hours, in advance of need  - Initiate/Maintain bed alarm  - Obtain necessary fall risk management equipment walker   - Apply yellow socks and bracelet for high fall risk patients  - Consider moving patient to room near nurses station  Outcome: Progressing     Problem: PAIN - ADULT  Goal: Verbalizes/displays adequate comfort level or baseline comfort level  Description: Interventions:  - Encourage patient to monitor pain and request assistance  - Assess pain using appropriate pain scale  - Administer analgesics based on type and severity of pain and evaluate response  - Implement non-pharmacological measures as appropriate and evaluate response  - Consider cultural and social influences on pain and pain management  - Notify physician/advanced practitioner if interventions unsuccessful or patient reports new pain  Outcome: Progressing     Problem: SAFETY ADULT  Goal: Patient will remain free of falls  Description: INTERVENTIONS:  - Educate patient/family on patient safety including physical limitations  - Instruct patient to call for assistance with activity   - Consult OT/PT to assist with strengthening/mobility   - Keep Call bell within reach  - Keep bed low and locked with side rails adjusted as appropriate  - Keep care items and personal belongings within reach  - Initiate and maintain comfort rounds  - Make Fall Risk Sign visible to staff  - Offer Toileting every 2 Hours,  in advance of need  - Initiate/Maintain bed alarm  - Obtain necessary fall risk management equipment walker   - Apply yellow socks and bracelet for high fall risk patients  - Consider moving patient to room near nurses station  Outcome: Progressing  Goal: Maintain or return to baseline ADL function  Description: INTERVENTIONS:  -  Assess patient's ability to carry out ADLs; assess patient's baseline for ADL function and identify physical deficits which impact ability to perform ADLs (bathing, care of mouth/teeth, toileting, grooming, dressing, etc.)  - Assess/evaluate cause of self-care deficits   - Assess range of motion  - Assess patient's mobility; develop plan if impaired  - Assess patient's need for assistive devices and provide as appropriate  - Encourage maximum independence but intervene and supervise when necessary  - Involve family in performance of ADLs  - Assess for home care needs following discharge   - Consider OT consult to assist with ADL evaluation and planning for discharge  - Provide patient education as appropriate  Outcome: Progressing  Goal: Maintains/Returns to pre admission functional level  Description: INTERVENTIONS:  - Perform AM-PAC 6 Click Basic Mobility/ Daily Activity assessment daily.  - Set and communicate daily mobility goal to care team and patient/family/caregiver.   - Collaborate with rehabilitation services on mobility goals if consulted  - Perform Range of Motion 3 times a day.  - Reposition patient every 2 hours.  - Dangle patient 3 times a day  - Stand patient 3 times a day  - Ambulate patient 3 times a day  - Out of bed to chair 3 times a day   - Out of bed for meals 3 times a day  - Out of bed for toileting  - Record patient progress and toleration of activity level   Outcome: Progressing     Problem: DISCHARGE PLANNING  Goal: Discharge to home or other facility with appropriate resources  Description: INTERVENTIONS:  - Identify barriers to discharge w/patient and  caregiver  - Arrange for needed discharge resources and transportation as appropriate  - Identify discharge learning needs (meds, wound care, etc.)  - Arrange for interpretive services to assist at discharge as needed  - Refer to Case Management Department for coordinating discharge planning if the patient needs post-hospital services based on physician/advanced practitioner order or complex needs related to functional status, cognitive ability, or social support system  Outcome: Progressing     Problem: Knowledge Deficit  Goal: Patient/family/caregiver demonstrates understanding of disease process, treatment plan, medications, and discharge instructions  Description: Complete learning assessment and assess knowledge base.  Interventions:  - Provide teaching at level of understanding  - Provide teaching via preferred learning methods  Outcome: Progressing

## 2024-05-14 NOTE — PROGRESS NOTES
NEPHROLOGY HOSPITAL PROGRESS NOTE   Stef Pack Sr. 59 y.o. male MRN: [id number]  Unit/Bed#: -01 Encounter: [id number]  Reason for Consult: CKD stage V, hypermagnesemia    ASSESSMENT and PLAN:  Stef Pack Sr. is a 59 y.o. male who was admitted to [facility] after presenting with neuropathy. A renal consultation is requested today for assistance in the management of CKD.     CKD stage V.  Outpatient nephrologist Dr. Sepideh Bowser, [facility] nephrology.  Etiology thought to be due to diabetes, previous ischemic consultants, neuromuscular disease, obstructive uropathy in setting of neurogenic bladder and arterionephrosclerosis.  He has progressive worsening of kidney function, most recent outpatient creatinine 4.83.  Creatinine today 4.86.  Kidney function currently at baseline.  No indication for emergent renal replacement therapy.  His nephrologist has started the process for vascular access placement, he was supposed to get vein mapping yesterday.  Trend BMP and monitor needs for renal replacement therapy.     2.  Hypermagnesemia.  Most likely due to decreased clearance in setting of advanced CKD.  He did take magnesium citrate for constipation few weeks ago.  He has worsening neuropathy but no paralysis.  EKG showing NSR, MT interval 152, QRS duration 90, QTc 437.  Monitor on telemetry.  Status post administration of IV fluids and IV Lasix on 05/13 with improvement in serum magnesium level to 4.7 today from 5.3 on admission.  Follow kidney ultrasound to rule out obstructive etiology.  Repeat IV Lasix 80 mg x 1 and provide metolazone 5 mg x 1 to cause excretion of magnesium.  No indication for renal replacement therapy.  He will need loop diuretic as outpatient for ongoing management of electrolyte disturbances.    3.  History of hyperkalemia.  Serum potassium level today 5.0.  Continue low potassium diet.     4.  Hypertension.  Blood pressure currently improved since admission.  Continue  amlodipine 5 mg daily.     5.  Secondary hyperparathyroidism of renal origin.  Serum phosphorus level 7.2.  Add sevelamer 1 tablet 3 times daily with meals.  Low phosphorus diet.  Monitor PTH as outpatient.     6.  Anemia in CKD.  Hemoglobin currently at goal, 9.9 today.  Continue outpatient surveillance.    Discussed with internal medicine team.  After discussion, we agreed that there are no indications for renal replacement therapy and to provide IV fluids and diuretics as above for excretion of magnesium.    SUBJECTIVE / 24H INTERVAL HISTORY:  Urine output recorded as 1600 cc.  Another 575 cc recorded from this morning.  He is feeling better today.  He denies dyspnea.  He denies leg swelling.  Neuropathy pain has improved since yesterday.    OBJECTIVE:  Current Weight: Weight - Scale: 72.6 kg (160 lb)  Vitals:    05/13/24 1941 05/13/24 2301 05/14/24 0252 05/14/24 0822   BP: 169/82 169/80 143/72 144/70   BP Location:  Right arm Right arm    Pulse: 70 65 70 76   Resp:  18 18 18   Temp:  98.8 °F (37.1 °C) 98.2 °F (36.8 °C) 97.9 °F (36.6 °C)   TempSrc:  Temporal Temporal    SpO2: 95% 97% 95% 92%   Weight:       Height:           Intake/Output Summary (Last 24 hours) at 5/14/2024 0858  Last data filed at 5/14/2024 0822  Gross per 24 hour   Intake 120 ml   Output 2175 ml   Net -2055 ml     Review of Systems   Constitutional:  Negative for chills and fever.   HENT:  Negative for ear pain and sore throat.    Eyes:  Negative for pain and visual disturbance.   Respiratory:  Negative for cough and shortness of breath.    Cardiovascular:  Negative for chest pain and palpitations.   Gastrointestinal:  Negative for abdominal pain and vomiting.   Genitourinary:  Negative for dysuria and hematuria.   Musculoskeletal:  Negative for arthralgias and back pain.   Skin:  Negative for color change and rash.   Neurological:  Negative for seizures and syncope.   All other systems reviewed and are negative.    Physical Exam  Vitals and  nursing note reviewed.   Constitutional:       General: He is not in acute distress.     Appearance: He is well-developed.   HENT:      Head: Normocephalic and atraumatic.   Eyes:      Conjunctiva/sclera: Conjunctivae normal.   Cardiovascular:      Rate and Rhythm: Normal rate and regular rhythm.      Pulses: Normal pulses.      Heart sounds: Normal heart sounds. No murmur heard.  Pulmonary:      Effort: Pulmonary effort is normal. No respiratory distress.      Breath sounds: Normal breath sounds.   Abdominal:      Palpations: Abdomen is soft.      Tenderness: There is no abdominal tenderness.   Musculoskeletal:         General: No swelling.      Cervical back: Neck supple.      Right lower leg: No edema.      Left lower leg: No edema.   Skin:     General: Skin is warm and dry.      Capillary Refill: Capillary refill takes less than 2 seconds.   Neurological:      Mental Status: He is alert.   Psychiatric:         Mood and Affect: Mood normal.         Medications:    Current Facility-Administered Medications:     acetaminophen (TYLENOL) tablet 488 mg, 488 mg, Oral, Q4H PRN, Madison Ely MD    allopurinol (ZYLOPRIM) tablet 100 mg, 100 mg, Oral, Daily, Madison Ely MD    amLODIPine (NORVASC) tablet 5 mg, 5 mg, Oral, Daily, Madison Ely MD    DULoxetine (CYMBALTA) delayed release capsule 20 mg, 20 mg, Oral, Daily, Madison Ely MD    hydrALAZINE (APRESOLINE) injection 5 mg, 5 mg, Intravenous, Q6H PRN, Madison Ely MD    insulin glargine (LANTUS) subcutaneous injection 5 Units 0.05 mL, 5 Units, Subcutaneous, HS, Madison Ely MD, 5 Units at 05/13/24 2113    insulin lispro (HumALOG/ADMELOG) 100 units/mL subcutaneous injection 1-6 Units, 1-6 Units, Subcutaneous, TID AC **AND** Fingerstick Glucose (POCT), , , TID AC, Madison Ely MD    insulin lispro (HumALOG/ADMELOG) 100 units/mL subcutaneous injection 1-6 Units, 1-6 Units, Subcutaneous, HS, Madison Ely MD, 1 Units at 05/13/24 2112    " lidocaine (LIDODERM) 5 % patch 1 patch, 1 patch, Topical, Daily, Madison Ely MD    multi-electrolyte (PLASMALYTE-A/ISOLYTE-S PH 7.4) IV solution, 50 mL/hr, Intravenous, Continuous, Madison Ely MD, Last Rate: 50 mL/hr at 05/13/24 1739, 50 mL/hr at 05/13/24 1739    nicotine (NICODERM CQ) 14 mg/24hr TD 24 hr patch 1 patch, 1 patch, Transdermal, Daily, Madison Ely MD    ondansetron (ZOFRAN) injection 4 mg, 4 mg, Intravenous, Q6H PRN, Madison Ely MD    oxyCODONE-acetaminophen (PERCOCET) 5-325 mg per tablet 1 tablet, 1 tablet, Oral, Q4H PRN, Madison Ely MD, 1 tablet at 05/13/24 1739    pregabalin (LYRICA) capsule 75 mg, 75 mg, Oral, BID, Madison Ely MD, 75 mg at 05/13/24 1954    senna-docusate sodium (SENOKOT S) 8.6-50 mg per tablet 1 tablet, 1 tablet, Oral, BID, Madison Ely MD, 1 tablet at 05/13/24 1921    sevelamer (RENAGEL) tablet 800 mg, 800 mg, Oral, TID With Meals, Branden Smiley MD    tamsulosin (FLOMAX) capsule 0.4 mg, 0.4 mg, Oral, Daily With Dinner, Madison Ely MD, 0.4 mg at 05/13/24 1921    Laboratory Results:  Results from last 7 days   Lab Units 05/14/24  0517 05/13/24  1453   WBC Thousand/uL 8.72 11.73*   HEMOGLOBIN g/dL 9.9* 10.0*   HEMATOCRIT % 31.3* 31.6*   PLATELETS Thousands/uL 180 196   POTASSIUM mmol/L 5.0 5.3   CHLORIDE mmol/L 105 101   CO2 mmol/L 27 25   BUN mg/dL 62* 62*   CREATININE mg/dL 4.86* 4.57*   CALCIUM mg/dL 8.7 8.9   MAGNESIUM mg/dL 4.7* 5.3*   PHOSPHORUS mg/dL 7.2*  --        Portions of the record may have been created with voice recognition software. Occasional wrong word or \"sound a like\" substitutions may have occurred due to the inherent limitations of voice recognition software. Read the chart carefully and recognize, using context, where substitutions have occurred. If you have any questions, please contact the dictating provider.    "

## 2024-05-14 NOTE — ASSESSMENT & PLAN NOTE
Continue narcotics, patient takes Lyrica, continue  Will add Cymbalta, risk versus benefits, side effects and alternative discussed in details with patient.  With improvement of pain with Cymbalta, continue on discharge.   Arterial duplex pending

## 2024-05-14 NOTE — UTILIZATION REVIEW
Initial Clinical Review    WAS OBSERVATION 5/13/24 @ 1632 CONVERTED TO INPATIENT ADMISSION 5/14/24 @ 1533 DUE TO CONTINUED STAY REQUIRED TO CARE FOR PATIENT WITH DX: HYPERMAGNESEMIA.      Admission: Date/Time/Statement:   Admission Orders (From admission, onward)       Ordered        05/14/24 1533  INPATIENT ADMISSION  Once            05/13/24 1632  Place in Observation  Once                          Orders Placed This Encounter   Procedures    INPATIENT ADMISSION     Standing Status:   Standing     Number of Occurrences:   1     Order Specific Question:   Level of Care     Answer:   Med Surg [16]     Order Specific Question:   Estimated length of stay     Answer:   More than 2 Midnights     Order Specific Question:   Certification     Answer:   I certify that inpatient services are medically necessary for this patient for a duration of greater than two midnights. See H&P and MD Progress Notes for additional information about the patient's course of treatment.     ED Arrival Information       Expected   -    Arrival   5/13/2024 13:47    Acuity   Urgent              Means of arrival   Walk-In    Escorted by   Self    Service   Hospitalist    Admission type   Emergency              Arrival complaint   Foot Pain             Chief Complaint   Patient presents with    Foot Pain     Patient reports bilateral foot pain, worse in left. Neuropathy. Patient reports no relief from Lyrica at home. Patient has been unable to sleep.       Initial Presentation: 59 y.o. male to ED via walk-in from home  Present to ED with foot pain. Patient initially presented to the ER, for foot pain, during the workup, patient found elevated magnesium level.   PMHX: diabetes with neuropathic pain, CKD, HTN  Admitted to OBS with DX: Hypermagnesemia   on exam: hypertensive; Constant needle sensation in both lower extremities. Pain 9/10; WBC 11.73; Na 134; Cr 4.57; Mg 5.3  EKG showing normal sinus rhythm, normal CO interval QRS duration normal, QTc  437   PLAN: tele monitoring; cont ivf; monitor labs; Accuchecks with ssic; pain control; consult nephrology; start cymbalta; f/u arterial duplex      Anticipated Length of Stay/Certification Statement: Patient will be admitted on an observation basis with an anticipated length of stay of less than 2 midnights secondary to monitorable conditions.     NEPHROLOGY CONSUL T  CKD stage V:  Outpatient nephrologist Dr. Sepideh Bowser, [facility] nephrology.  Etiology thought to be due to diabetes, previous ischemic consultants, neuromuscular disease, obstructive uropathy in setting of neurogenic bladder and arterionephrosclerosis.  He has progressive worsening of kidney function, most recent outpatient creatinine 4.83.  Creatinine today 4.57.  Kidney function currently at baseline.  No indication for emergent renal replacement therapy.  His nephrologist has started the process for vascular access placement, he was supposed to get vein mapping today.  Trend BMP and monitor needs for renal replacement therapy.     Hypermagnesemia.  Most likely due to decreased clearance in setting of advanced CKD.  He did take magnesium citrate for constipation few weeks ago.  He has worsening neuropathy but no paralysis.  EKG showing NSR, WI interval 152, QRS duration 90, QTc 437.  Monitor on telemetry.  Give gentle hydration with Isolyte at 50 cc/h and provide IV Lasix 80 mg x 1.  Monitor serum magnesium level.  No emergent indication for renal replacement therapy.  Rule out obstruction given previous history of obstructive uropathy.      Date: 5/14/24 - CHANGED TO INPATIENT   Feeling better than he did yesterday. Said that the pain in his legs is much better.  Pain 2-8/10. improvement of pain with Cymbalta; Cr worse 4.86 (yest 4.57); I/O net -2655; Mg 4.7; phos 7.2  Plan: rec'd lasix 80 mg iv x1; rec'd metolazone 5 mg x 1 dose; tele monitoring; cont ivf; monitor labs; Accuchecks with ssic; pain control; cont cymbalta; f/u arterial duplex      Date:  5/15/24      Day 3: Has surpassed a 2nd midnight with active treatments and services. Require additional inpatient hospital stay due to monitoring magnesium, creatinine   Feeling much better today. States that his pins and needles, numbness and tingling in his feet is significantly improved. Cr worse today 5.05 (yest 4.86); Arterial duplex: Diffuse disease. Neuropathic pain: Discussed with vascular, no concern over acute vascular disease, appears neuropathic pain as patient improved with cymbalta. Exam: I/O net -2720; exp. Wheezing with decreased breath sounds; Heme 9.8  Plan: cont ivf; tele monitoring; monitor labs; Accuchecks with ssic; pain control; cont cymbalta; cont norvasc      ED Triage Vitals [05/13/24 1405]   Temperature Pulse Respirations Blood Pressure SpO2   98.7 °F (37.1 °C) 82 19 (!) 186/91 98 %      Temp Source Heart Rate Source Patient Position - Orthostatic VS BP Location FiO2 (%)   Temporal Monitor Sitting Left arm --      Pain Score       9          Wt Readings from Last 1 Encounters:   05/13/24 72.6 kg (160 lb)     Additional Vital Signs:   Date/Time Temp Pulse Resp BP MAP (mmHg) SpO2 O2 Device Patient Position - Orthostatic VS   05/15/24 0700 98.1 °F (36.7 °C) -- -- 146/74 98 -- -- --   05/15/24 06:38:47 -- -- -- -- -- 95 % -- --   05/15/24 03:08:13 97.9 °F (36.6 °C) 72 -- 149/74 99 93 % -- --   05/14/24 22:52:14 97.9 °F (36.6 °C) 67 -- 149/76 100 95 % -- --   05/14/24 1920 -- -- -- -- -- 95 % None (Room air) --   05/14/24 14:50:20 97.9 °F (36.6 °C) 65 16 154/78 103 95 % -- --   05/14/24 11:10:37 97.9 °F (36.6 °C) 69 -- 142/65 91 95 % -- --   05/14/24 0845 -- -- -- -- -- -- None (Room air)      Date/Time Temp Pulse Resp BP MAP (mmHg) SpO2 O2 Device Patient Position - Orthostatic VS   05/14/24 08:22:04 97.9 °F (36.6 °C) 76 18 144/70 95 92 % -- --   05/14/24 02:52:22 98.2 °F (36.8 °C) 70 18 143/72 96 95 % None (Room air) Lying   05/13/24 23:01:39 98.8 °F (37.1 °C) 65 18 169/80 110 97 % None (Room  air) Lying   05/13/24 19:41:13 -- 70 -- 169/82 111 95 % None (Room air) --   05/13/24 18:57:11 97.7 °F (36.5 °C) 68 18 179/84 Abnormal  116 96 % -- --   05/13/24 1844 -- -- -- -- -- 98 % None (Room air) --   05/13/24 16:58:52 97.7 °F (36.5 °C) 67 16 186/84 Abnormal  118 98 % -- --   05/13/24 1643 -- 86 16 182/93 Abnormal  -- 98 % None (Room air) Sitting   05/13/24 1405 98.7 °F (37.1 °C) 82 19 186/91 Abnormal  -- 98 % None (Room air) Sitting       EKG: Normal sinus rhythm  Left axis deviation  Abnormal ECG  When compared with ECG of 02-MAR-2023 15:16,  Nonspecific T wave abnormality no longer evident in Lateral leads        Pertinent Labs/Diagnostic Test Results:   VAS ARTERIAL DUPLEX- LOWER LIMB BILATERAL   Final Result by Jack Mascorro MD (05/14 1452)      US kidney and bladder   Final Result by Bello Malagon MD (05/14 0857)      Mildly increased bilateral renal parenchymal echogenicity consistent with medical renal disease. No hydronephrosis.      Unchanged benign-appearing left renal cysts.            Workstation performed: [id number]              Results from last 7 days   Lab Units 05/15/24  0508 05/14/24  0517 05/13/24  1453   WBC Thousand/uL 8.87 8.72 11.73*   HEMOGLOBIN g/dL 9.8* 9.9* 10.0*   HEMATOCRIT % 30.2* 31.3* 31.6*   PLATELETS Thousands/uL 188 180 196   TOTAL NEUT ABS Thousands/µL  --   --  8.46*        Results from last 7 days   Lab Units 05/15/24  0508 05/14/24  0517 05/13/24  1453   SODIUM mmol/L 137 139 134*   POTASSIUM mmol/L 4.8 5.0 5.3   CHLORIDE mmol/L 101 105 101   CO2 mmol/L 26 27 25   ANION GAP mmol/L 10 7 8   BUN mg/dL 67* 62* 62*   CREATININE mg/dL 5.05* 4.86* 4.57*   EGFR ml/min/1.73sq m 11 12 13   CALCIUM mg/dL 9.0 8.7 8.9   MAGNESIUM mg/dL 4.4* 4.7* 5.3*   PHOSPHORUS mg/dL 7.4* 7.2*  --         Results from last 7 days   Lab Units 05/15/24  0746 05/14/24  2125 05/14/24  1601 05/14/24  1108 05/14/24  0819 05/13/24  2105 05/13/24  1817   POC GLUCOSE mg/dl 123 146*  130 121 105 175* 102     Results from last 7 days   Lab Units 05/15/24  0508 05/14/24  0517 05/13/24  1453   GLUCOSE RANDOM mg/dL 118 89 122           ED Treatment:   Medication Administration from 05/13/2024 1346 to 05/13/2024 1654         Date/Time Order Dose Route Action     05/13/2024 1453 EDT morphine injection 2 mg 2 mg Intravenous Given            Present on Admission:   Hypermagnesemia   Neuropathic pain   Essential hypertension   CKD (chronic kidney disease), stage V (HCC)      Admitting Diagnosis: Hypermagnesemia [E83.41]  Foot pain [M79.673]  Chronic renal failure [N18.9]  Neuropathic pain [M79.2]    Age/Sex: 59 y.o. male    Admission Orders: SCDs; I/O; tele monitoring; Consistent Carbohydrate Diet. Blood glucose checks ACHS.    Scheduled Medications:  allopurinol, 100 mg, Oral, Daily  amLODIPine, 5 mg, Oral, Daily  DULoxetine, 20 mg, Oral, Daily  insulin glargine, 5 Units, Subcutaneous, HS  insulin lispro, 1-6 Units, Subcutaneous, TID AC  insulin lispro, 1-6 Units, Subcutaneous, HS  lidocaine, 1 patch, Topical, Daily  nicotine, 1 patch, Transdermal, Daily  pregabalin, 75 mg, Oral, BID  senna-docusate sodium, 1 tablet, Oral, BID  sevelamer, 800 mg, Oral, TID With Meals  tamsulosin, 0.4 mg, Oral, Daily With Dinner    furosemide (LASIX) injection 80 mg  Dose: 80 mg  Freq: Once Route: IV  Start: 05/14/24 1130 End: 05/14/24 1134    metolazone (ZAROXOLYN) tablet 5 mg  Dose: 5 mg  Freq: Once Route: PO  Start: 05/14/24 1130 End: 05/14/24 1134         Continuous IV Infusions:  multi-electrolyte, 50 mL/hr, Intravenous, Continuous      PRN Meds:  acetaminophen, 488 mg, Oral, Q4H PRN  hydrALAZINE, 5 mg, Intravenous, Q6H PRN  ondansetron, 4 mg, Intravenous, Q6H PRN  oxyCODONE-acetaminophen, 1 tablet, Oral, Q4H PRN  (5/13 recd x1)  (5/14 recd x2)  (5/15/ recd x2 so far today)         IP CONSULT TO NEPHROLOGY  IP CONSULT TO CASE MANAGEMENT    Network Utilization Review Department  ATTENTION: Please call with any  questions or concerns to [phone] and carefully listen to the prompts so that you are directed to the right person. All voicemails are confidential.   For Discharge needs, contact Care Management DC Support Team at [phone] opt. [phone]  Send all requests for admission clinical reviews, approved or denied determinations and any other requests to dedicated fax number below belonging to the [place] where the patient is receiving treatment. List of dedicated fax numbers for the Facilities:  FACILITY NAME UR FAX NUMBER   ADMISSION DENIALS (Administrative/Medical Necessity) [phone]   DISCHARGE SUPPORT TEAM (NETWORK) [phone]   PARENT CHILD HEALTH (Maternity/NICU/Pediatrics) [phone]   [facility] [phone]   [facility] [phone]   [facility] [phone]   [facility] [phone]   Critical access hospital [phone]   [facility] [phone]   [facility] [phone]   [facility] [phone]   [facility] [phone]   [facility] [phone]   [facility] [phone]   [facility] [phone]

## 2024-05-14 NOTE — PLAN OF CARE
Problem: Potential for Falls  Goal: Patient will remain free of falls  Description: INTERVENTIONS:  - Educate patient/family on patient safety including physical limitations  - Instruct patient to call for assistance with activity   - Consult OT/PT to assist with strengthening/mobility   - Keep Call bell within reach  - Keep bed low and locked with side rails adjusted as appropriate  - Keep care items and personal belongings within reach  - Initiate and maintain comfort rounds  - Make Fall Risk Sign visible to staff  - Offer Toileting every 2 Hours, in advance of need  - Initiate/Maintain bedalarm  - Obtain necessary fall risk management equipment: walker  - Apply yellow socks and bracelet for high fall risk patients  - Consider moving patient to room near nurses station  Outcome: Progressing     Problem: PAIN - ADULT  Goal: Verbalizes/displays adequate comfort level or baseline comfort level  Description: Interventions:  - Encourage patient to monitor pain and request assistance  - Assess pain using appropriate pain scale  - Administer analgesics based on type and severity of pain and evaluate response  - Implement non-pharmacological measures as appropriate and evaluate response  - Consider cultural and social influences on pain and pain management  - Notify physician/advanced practitioner if interventions unsuccessful or patient reports new pain  Outcome: Progressing     Problem: SAFETY ADULT  Goal: Patient will remain free of falls  Description: INTERVENTIONS:  - Educate patient/family on patient safety including physical limitations  - Instruct patient to call for assistance with activity   - Consult OT/PT to assist with strengthening/mobility   - Keep Call bell within reach  - Keep bed low and locked with side rails adjusted as appropriate  - Keep care items and personal belongings within reach  - Initiate and maintain comfort rounds    - Apply yellow socks and bracelet for high fall risk patients  -  Consider moving patient to room near nurses station  Outcome: Progressing  Goal: Maintain or return to baseline ADL function  Description: INTERVENTIONS:  -  Assess patient's ability to carry out ADLs; assess patient's baseline for ADL function and identify physical deficits which impact ability to perform ADLs (bathing, care of mouth/teeth, toileting, grooming, dressing, etc.)  - Assess/evaluate cause of self-care deficits   - Assess range of motion  - Assess patient's mobility; develop plan if impaired  - Assess patient's need for assistive devices and provide as appropriate  - Encourage maximum independence but intervene and supervise when necessary  - Involve family in performance of ADLs  - Assess for home care needs following discharge   - Consider OT consult to assist with ADL evaluation and planning for discharge  - Provide patient education as appropriate  Outcome: Progressing  Goal: Maintains/Returns to pre admission functional level  Description: INTERVENTIONS:  - Perform AM-PAC 6 Click Basic Mobility/ Daily Activity assessment daily.  - Set and communicate daily mobility goal to care team and patient/family/caregiver.   - Collaborate with rehabilitation services on mobility goals if consulted  - Perform Range of Motion 2 times a day.  - Reposition patient every 2 hours.  - Dangle patient 2 times a day  - Stand patient 2 times a day  - Ambulate patient 2 times a day  - Out of bed to chair 2 times a day   - Out of bed for meals 2 times a day  - Out of bed for toileting  - Record patient progress and toleration of activity level   Outcome: Progressing     Problem: DISCHARGE PLANNING  Goal: Discharge to home or other facility with appropriate resources  Description: INTERVENTIONS:  - Identify barriers to discharge w/patient and caregiver  - Arrange for needed discharge resources and transportation as appropriate  - Identify discharge learning needs (meds, wound care, etc.)  - Arrange for interpretive  services to assist at discharge as needed  - Refer to Case Management Department for coordinating discharge planning if the patient needs post-hospital services based on physician/advanced practitioner order or complex needs related to functional status, cognitive ability, or social support system  Outcome: Progressing

## 2024-05-14 NOTE — PLAN OF CARE
Problem: OCCUPATIONAL THERAPY ADULT  Goal: Performs self-care activities at highest level of function for planned discharge setting.  See evaluation for individualized goals.  Description: Treatment Interventions: ADL retraining, UE strengthening/ROM, Functional transfer training, Endurance training, Cognitive reorientation, Patient/family training, Equipment evaluation/education, Compensatory technique education, Energy conservation, Activityengagement          See flowsheet documentation for full assessment, interventions and recommendations.   Note: Limitation: Decreased ADL status, Decreased UE ROM, Decreased UE strength, Decreased Safe judgement during ADL, Decreased cognition, Decreased endurance, Decreased self-care trans, Decreased high-level ADLs  Prognosis: Fair  Assessment: Pt is a 59 y.o. male who presented to [facility] on 5/13/2024 with foot pain. Pt with diagnosis of hypermagnesemia and neuropathic pain. Pt  has a past medical history of Chronic kidney disease, Diabetes mellitus (HCC), Gout, Hypertension, Renal disorder, Retroperitoneal abscess (HCC), Stroke (HCC), UTI (urinary tract infection), and Vertebral osteomyelitis ([facility]). Pt greeted bedside for OT evaluation on 05/14/24. Pt resides with spouse in a H with 0 BRUCE. PTA, Pt reports being I with ADLs/functional mobility/split IADLs/use of cane v. RW for functional mobility. Pt demonstrating the following occupational performance levels:S with UB ADLs, mod A with LB ADLs, S with bed mobility, min A with functional transfers, and mod Ax1 with functional mobility with RW. Limitations that impact functional performance include decreased ADL status, decreased UE ROM, decreased UE strength, decreased safe judgement during ADLs, decreased cognition, decreased endurance, decreased self care transfers, decreased high level ADLs, and pain. Occupational performance areas to address ADL retraining, functional transfer training, UE strengthening/ROM, endurance  training, cognitive reorientation, Pt/caregiver education, equipment evaluation/education, compensatory technique education, energy conservation, and activity engagement . Pt would benefit from continued skilled OT services while in hospital to maximize independence with ADLs. Will continue to follow Pt's progress. Pt would benefit from level II resources (moderate intensity) upon DC to maximize safety and independence with ADLs and functional tasks of choice.     Rehab Resource Intensity Level, OT: II (Moderate Resource Intensity)

## 2024-05-14 NOTE — OCCUPATIONAL THERAPY NOTE
Occupational Therapy Evaluation     Patient Name: Stef Pack .  Today's Date: 5/14/2024  Problem List  Principal Problem:    Hypermagnesemia  Active Problems:    Diabetes mellitus type 2, insulin dependent (HCC)    Essential hypertension    Stage 3a chronic kidney disease (HCC)    Neuropathic pain    Past Medical History  Past Medical History:   Diagnosis Date    Chronic kidney disease     Diabetes mellitus (HCC)     Gout     Hypertension     Renal disorder     Retroperitoneal abscess (HCC)     Stroke (HCC)     UTI (urinary tract infection)     Vertebral osteomyelitis (HCC)      Past Surgical History  Past Surgical History:   Procedure Laterality Date    BACK SURGERY      ORCHIECTOMY             05/14/24 1128   OT Last Visit   OT Visit Date 05/14/24   Note Type   Note type Evaluation   Pain Assessment   Pain Assessment Tool 0-10   Pain Score 4   Pain Location/Orientation Orientation: Bilateral;Location: Foot   Hospital Pain Intervention(s) Repositioned;Ambulation/increased activity   Restrictions/Precautions   Weight Bearing Precautions Per Order No   Braces or Orthoses Other (Comment)  (h/o L foot drop, AFO not present)   Other Precautions Chair Alarm;Bed Alarm;Pain;Fall Risk;Multiple lines;Impulsive   Home Living   Type of Home House   Home Layout Two level;Bed/bath upstairs  (FF R HR)   Bathroom Shower/Tub Tub/shower unit   Bathroom Toilet Raised   Bathroom Equipment Shower chair;Grab bars in shower;Grab bars around toilet   Home Equipment Stair glide;Walker;Cane;Other (Comment)  (rollator)   Additional Comments Pt resides with spouse in a 2SH with 0 BRUCE.   Prior Function   Level of [place] Independent with ADLs;Independent with functional mobility;Independent with IADLS   Lives With Spouse   Receives Help From Family   IADLs Independent with meal prep;Independent with driving;Family/Friend/Other provides transportation;Family/Friend/Other provides medication management  (pt drives sometimes but  spouse mostly drives, spouse assists with medications)   Falls in the last 6 months 1 to 4   Vocational Retired   Comments PTA, Pt reports being I with ADLs/functional mobility/split IADLs/use of cane v. RW for functional mobility.   Lifestyle   Autonomy I with ADLs/functional mobility/split IADLs/use of cane v. RW for functional mobility   Reciprocal Relationships Supportive spouse, however, Pt + home alone   Service to Others Retired   Intrinsic Gratification Will cont to assess   ADL   Where Assessed Edge of bed   Eating Assistance 7  Independent   Grooming Assistance 7  Independent   UB Bathing Assistance 5  Supervision/Setup   LB Bathing Assistance 3  Moderate Assistance   UB Dressing Assistance 5  Supervision/Setup   LB Dressing Assistance 3  Moderate Assistance   LB Dressing Deficit Don/doff L sock;Don/doff R sock   Toileting Assistance  3  Moderate Assistance   Functional Assistance 3  Moderate Assistance   Functional Deficit Supervision/safety;Increased time to complete;Setup   Additional Comments Pt limited in ADLs 2* to LE weakness, decreased standing tolerance, and impulsivity.   Bed Mobility   Supine to Sit 5  Supervision  (flat bed)   Additional items Increased time required;Verbal cues   Sit to Supine Unable to assess   Additional Comments Pt greeted supine in bed.   Transfers   Sit to Stand 4  Minimal assistance   Additional items Assist x 1;Increased time required;Verbal cues   Stand to Sit 4  Minimal assistance   Additional items Assist x 1;Increased time required;Verbal cues   Additional Comments with RW   Functional Mobility   Functional Mobility 3  Moderate assistance   Additional Comments Mod Ax1 with SPC short within room distances. Pt then utilized a RW for further distances with mod Ax1 with RW. Pt progress to min AX1 with RW wtih max cues for safety. Pt limited by B/L LE weakness, foot drop, neuropathy, and impulsivity. Pt benefits for cues to slow.   Additional items Rolling walker    Balance   Static Sitting Fair +   Dynamic Sitting Fair   Static Standing Poor +   Dynamic Standing Poor   Ambulatory Poor   Activity Tolerance   Activity Tolerance Patient tolerated treatment well   Medical Staff Made Aware Co-eval with ANDREEA Jeronimo 2* to Pt's medical complexity and decreased endurance,   Nurse Made Aware RN cleared/updated.   RUE Assessment   RUE Assessment WFL   LUE Assessment   LUE Assessment WFL   Hand Function   Gross Motor Coordination Functional   Fine Motor Coordination Functional   Sensation   Light Touch Severe deficits in the LLE;Severe deficits in the RLE   Vision-Basic Assessment   Current Vision Wears glasses only for reading   Psychosocial   Psychosocial (WDL) WDL   Cognition   Overall Cognitive Status WFL   Arousal/Participation Alert;Cooperative   Attention Attends with cues to redirect   Orientation Level Oriented X4   Memory Decreased recall of precautions   Following Commands Follows one step commands with increased time or repetition   Comments Pt pleasant and cooperative during OT session. Pt with a lack of insight into functional deficits and wtih decreased safety awareness. Pt impulsive and benefits from one-step simple commands with repetition and VCs.   Assessment   Limitation Decreased ADL status;Decreased UE ROM;Decreased UE strength;Decreased Safe judgement during ADL;Decreased cognition;Decreased endurance;Decreased self-care trans;Decreased high-level ADLs   Prognosis Fair   Assessment Pt is a 59 y.o. male who presented to [facility] on 5/13/2024 with foot pain. Pt with diagnosis of hypermagnesemia and neuropathic pain. Pt  has a past medical history of Chronic kidney disease, Diabetes mellitus (HCC), Gout, Hypertension, Renal disorder, Retroperitoneal abscess (HCC), Stroke (HCC), UTI (urinary tract infection), and Vertebral osteomyelitis (HCC). Pt greeted bedside for OT evaluation on 05/14/24. Pt resides with spouse in a 2SH with 0 BRUCE. PTA, Pt reports being I with  ADLs/functional mobility/split IADLs/use of cane v. RW for functional mobility. Pt demonstrating the following occupational performance levels:S with UB ADLs, mod A with LB ADLs, S with bed mobility, min A with functional transfers, and mod Ax1 with functional mobility with RW. Limitations that impact functional performance include decreased ADL status, decreased UE ROM, decreased UE strength, decreased safe judgement during ADLs, decreased cognition, decreased endurance, decreased self care transfers, decreased high level ADLs, and pain. Occupational performance areas to address ADL retraining, functional transfer training, UE strengthening/ROM, endurance training, cognitive reorientation, Pt/caregiver education, equipment evaluation/education, compensatory technique education, energy conservation, and activity engagement . Pt would benefit from continued skilled OT services while in hospital to maximize independence with ADLs. Will continue to follow Pt's progress. Pt would benefit from level II resources (moderate intensity) upon DC to maximize safety and independence with ADLs and functional tasks of choice.   Goals   Patient Goals To go home.   LTG Time Frame 10-14   Long Term Goal #1 See goals listed below.   Plan   Treatment Interventions ADL retraining;UE strengthening/ROM;Functional transfer training;Endurance training;Cognitive reorientation;Patient/family training;Equipment evaluation/education;Compensatory technique education;Energy conservation;Activityengagement   Goal Expiration Date 05/28/24   OT Frequency 2-3x/wk   Discharge Recommendation   Rehab Resource Intensity Level, OT II (Moderate Resource Intensity)   Additional Comments  The patient's raw score on the -PAC Daily Activity Inpatient Short Form is 17. A raw score of less than 19 suggests the patient may benefit from discharge to post-acute rehabilitation services. Please refer to the recommendation of the Occupational Therapist for safe  discharge planning.   AM-PAC Daily Activity Inpatient   Lower Body Dressing 2   Bathing 2   Toileting 2   Upper Body Dressing 3   Grooming 4   Eating 4   Daily Activity Raw Score 17   Daily Activity Standardized Score (Calc for Raw Score >=11) 37.26   AM-PAC Applied Cognition Inpatient   Following a Speech/Presentation 4   Understanding Ordinary Conversation 4   Taking Medications 4   Remembering Where Things Are Placed or Put Away 4   Remembering List of 4-5 Errands 3   Taking Care of Complicated Tasks 3   Applied Cognition Raw Score 22   Applied Cognition Standardized Score 47.83   End of Consult   Education Provided Yes   Patient Position at End of Consult Bedside chair;All needs within reach;Bed/Chair alarm activated   Nurse Communication Nurse aware of consult       GOALS:  Pt will complete UB ADLs with I in order to maximize participation with ADLs.   Pt will complete LB ADLs with I in order to maximize safety with ADLs.   Pt will complete toileting routine (transfer, hygiene, and clothing management) with I in order to return to prior level of function.  Pt will complete bed mobility with I in order to maximize participation with ADLs.   Pt will complete functional transfers at I level in order to increase participation with ADLs.  Pt will increase dynamic standing balance to F+ in order to increase safety with ADLs.  Pt will increase standing tolerance x10 min in order to increase participation with ADLs.   Pt will complete functional mobility with AD PRN for item retrieval task at [place] level in order to increase participation with ADLs.  Pt will complete IADL tasks/simulation of IADLs tasks with I in order to return to PLOF.  Pt will demonstrate G energy conservation techniques with ADLs/IADLs in order to reduce the risk of falls.  Pt will be attentive 100% of the time for ongoing functional/formal cognitive assessment to assist with safe dc planning prn.        Pretty Ivory, MS, OTR/L

## 2024-05-14 NOTE — CASE MANAGEMENT
Case Management Assessment & Discharge Planning Note    Patient name Stef Pack Sr.  Location /-01 MRN [id number]  : 1965 Date 2024       Current Admission Date: 2024  Current Admission Diagnosis:Hypermagnesemia   Patient Active Problem List    Diagnosis Date Noted    Hypermagnesemia 2024    Neuropathic pain 2024    Right knee pain 2023    Diabetic foot ulcer (HCC) 2023    CKD (chronic kidney disease) 2023    Acute metabolic encephalopathy 2023    PAD (peripheral artery disease) (HCC) 2022    Chronic back pain 2022    Moderate protein-calorie malnutrition (HCC) 2022    Chronic anemia 2022    RSV infection 2022    Chronic ulcer of left heel (HCC) 10/20/2022    Hypercalcemia 2022    Low back pain 2022    Ambulatory dysfunction 2022    Stage 3a chronic kidney disease (HCC) 2022    Retention of urine 2022    Severe protein-calorie malnutrition (HCC) 2022    Iron deficiency anemia secondary to inadequate dietary iron intake 2022    Hip pain, acute, left 2022    KELLY (acute kidney injury) (HCC) 2022    Hyperkalemia 2022    Diabetes mellitus type 2, insulin dependent (HCC)     Gout     Essential hypertension     History of CVA (cerebrovascular accident)     Osteomyelitis of vertebra of lumbar region (HCC)       LOS (days): 0  Geometric Mean LOS (GMLOS) (days):   Days to GMLOS:     OBJECTIVE:              Current admission status: Observation  Referral Reason: Other (discharge planning)    Preferred Pharmacy:   [id number] [place] - [address] [facility]  [address]  [place] [address]  Phone: [phone] Fax: [phone]    Primary Care Provider: Rehan Mancia DO    Primary Insurance: [facility] REP  Secondary Insurance:     ASSESSMENT:  Active Health Care Proxies       Charley Pack Health Care Representative - Spouse    Primary Phone: [phone] (Home)                 Advance Directives  Does patient have a Health Care POA?: Yes  Does patient have Advance Directives?: No  Was patient offered paperwork?: Yes (Declined)  Primary Contact: Charley (Spouse)         Readmission Root Cause  30 Day Readmission: No    Patient Information  Admitted from:: Home  Mental Status: Alert  During Assessment patient was accompanied by: Not accompanied during assessment  Assessment information provided by:: Patient  Primary Caregiver: Spouse  Caregiver's Name:: Requesting transportation to [facility]. Patient requires O2, unable to self-administer.  Caregiver's Relationship to Patient:: Significant Other  Caregiver's Telephone Number:: Charley Pack (Spouse)  [phone]  Support Systems: Spouse/significant other, Daughter, Son, Friend  [place] [facility] [place]idence: [facility]  [place] [facility] do you live in?: [place]  Home entry access options. Select all that apply.: Other access (Comment) (Chair lift to enter)  Type of Current Residence: [address] [place] home  Upon entering residence, is there a bedroom on the main floor (no further steps)?: Yes  Upon entering residence, is there a bathroom on the main floor (no further steps)?: Yes  Living Arrangements: Lives w/ Spouse/significant other  Is patient a ?: Yes ([employer] - 4 years)  Is patient active with [facility] ([facility])?: No    Activities of Daily Living Prior to Admission  Functional Status: Assistance  Completes ADLs independently?: No  Level of ADL dependence: Assistance  Ambulates independently?: No  Level of ambulatory dependence: Assistance  Does patient use assisted devices?: Yes  Assisted Devices (DME) used: Walker, Wheelchair, Shower Chair, Stair Chair/Glide, Bedside Commode  Does patient currently own DME?: Yes  What DME does the patient currently own?: Bedside Commode, Shower Chair, Stair Chair/Glide, Walker, Wheelchair  Does patient have a history of Outpatient Therapy (PT/OT)?: No  Does the  patient have a history of Short-Term Rehab?: Yes ([facility])  Does patient have a history of HHC?: No  Does patient currently have HHC?: No         Patient Information Continued  Income Source: SSI/SSD  Does patient have prescription coverage?: Yes  Does patient receive dialysis treatments?: No  Does patient have a history of substance abuse?: No  Does patient have a history of Mental Health Diagnosis?: No         Means of Transportation  Means of Transport to Appts:: Family transport      Social Determinants of Health (SDOH)      Flowsheet Row Most Recent Value   Housing Stability    In the last 12 months, was there a time when you were not able to pay the mortgage or rent on time? N   In the last 12 months, how many places have you lived? 1   In the last 12 months, was there a time when you did not have a steady place to sleep or slept in a shelter (including now)? N   Transportation Needs    In the past 12 months, has lack of transportation kept you from medical appointments or from getting medications? no   In the past 12 months, has lack of transportation kept you from meetings, work, or from getting things needed for daily living? No   Food Insecurity    Within the past 12 months, you worried that your food would run out before you got the money to buy more. Never true   Within the past 12 months, the food you bought just didn't last and you didn't have money to get more. Never true   Utilities    In the past 12 months has the electric, gas, oil, or water company threatened to shut off services in your home? No            DISCHARGE DETAILS:    Discharge planning discussed with:: Patient  Freedom of Choice: Yes  Comments - Freedom of Choice: Patient is agreeable to HHC blanket referral but prefers not to have STR.  CM contacted family/caregiver?: No- see comments (Declined)  Were Treatment Team discharge recommendations reviewed with patient/caregiver?: Yes  Did patient/caregiver verbalize understanding of  patient care needs?: Yes  Were patient/caregiver advised of the risks associated with not following Treatment Team discharge recommendations?: Yes                   Other Referral/Resources/Interventions Provided:  Interventions: Short Term Rehab  Referral Comments: [employer] referral in [place]    Would you like to participate in our [facility] service program?  : No - Declined    Treatment Team Recommendation: Short Term Rehab  Discharge Destination Plan:: Short Term Rehab                        CM met with patient at the bedside,baseline information  was obtained. CM discussed the role of CM in helping the patient develop a discharge plan and assist the patient in carry out their plan.  Patient lives in a 3 story home with a 1st floor set up. Patient lives with their spouse whom is their primary caregiver. Patient has a stairglide to enter the home. Patient reports they have been doing better at home and gaining more independence such as going to the bathroom on their own.   CM reviewed discharge planing - patient's preference is [facility] blanket referral.     CM completed referral in [facility] for STR based on PT/OT recommendations. PT/OT said they talked to patient and they are agreeable to STR. CM to review choices with patient.     CM to follow patient's care and discharge needs.

## 2024-05-14 NOTE — PLAN OF CARE
Problem: PHYSICAL THERAPY ADULT  Goal: Performs mobility at highest level of function for planned discharge setting.  See evaluation for individualized goals.  Description: Treatment/Interventions: ADL retraining, Functional transfer training, LE strengthening/ROM, Elevations, Therapeutic exercise, Endurance training, Patient/family training, Equipment eval/education, Bed mobility, Gait training, Compensatory technique education, Continued evaluation, Spoke to nursing, Spoke to case management, OT  Equipment Recommended:  (walker-pt has)       See flowsheet documentation for full assessment, interventions and recommendations.  5/14/2024 1538 by Kandace Marx, PT  Note: Prognosis: Fair  Problem List: Decreased strength, Decreased range of motion, Decreased endurance, Impaired balance, Decreased mobility, Decreased coordination, Impaired judgement, Decreased safety awareness, Pain  Pt tolerated session fairly. He was able to ambulate with decreased asisstance and increased distance wiht use of RW compared to cane. He requires increased verbal cues for hand placement, LE placement and tehcnique for safe transfers and dmeonstrated improved technique wiht verbal cues. he requires increased verbal cues for saftey wiht ambulation but was able to demonstrate improved gait pattern with cues. He is limited by decreased strength, balance, endurance and increased pain. He will continue to beneift form PT services to maximize LOF.     Barriers to Discharge: Decreased caregiver support, Inaccessible home environment  Barriers to Discharge Comments: has support from spouse, but requires assistance wiht all mobility, increased fall risk, decreased insight to deficits  Rehab Resource Intensity Level, PT: II (Moderate Resource Intensity)    See flowsheet documentation for full assessment.         [de-identified] : The patient is a 65 year old female presenting for FUV for the L knee. \par Pain: At Rest: 1/10 \par With Activity: 3/10 \par Mechanism of injury: 2009- hit in the knee by a chair and then suffered a fall \par This is a Work Related Injury being treated under Worker's Compensation. 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 Date of Surgery: 6/23/2022 \par  \par

## 2024-05-14 NOTE — PHYSICAL THERAPY NOTE
PHYSICAL THERAPY EVALUATION  NAME:  Stef Pack .  DATE: 05/14/24    AGE:   59 y.o.  Mrn:   [id number]  ADMIT DX:  Hypermagnesemia [E83.41]  Foot pain [M79.673]  Chronic renal failure [N18.9]  Neuropathic pain [M79.2]    Past Medical History:   Diagnosis Date    Chronic kidney disease     Diabetes mellitus (HCC)     Gout     Hypertension     Renal disorder     Retroperitoneal abscess (HCC)     Stroke (HCC)     UTI (urinary tract infection)     Vertebral osteomyelitis (HCC)      Length Of Stay: 0  Performed at least 2 patient identifiers during session: Name and Birthday  PHYSICAL THERAPY EVALUATION :      05/14/24 1007   PT Last Visit   PT Visit Date 05/14/24   Note Type   Note type Evaluation   Pain Assessment   Pain Assessment Tool 0-10   Pain Score 4   Pain Location/Orientation Orientation: Bilateral  (feet)   Restrictions/Precautions   Other Precautions Chair Alarm;Bed Alarm;Fall Risk;Pain;Multiple lines;Impulsive   Home Living   Type of Home House  (no BRUCE)   Home Layout Two level;Bed/bath upstairs  (FF R HR. has stair glide to 2nd floor that he doesn't.)   Bathroom Shower/Tub Tub/shower unit   Bathroom Toilet Raised   Bathroom Equipment Shower chair;Grab bars in shower;Grab bars around toilet;Tub transfer bench  (varied report intiially stated not using bench, then reports uses either.)   Home Equipment Cane;Walker;Electric scooter;Wheelchair-manual;Hospital bed  (rollator. uses cane more. pt intially not reporting having wheelchair or electric scooter. information form previous evaluation.)   Additional Comments Reports livign in a 2SH with no BRUCE and FF R HR or stair glide to 2nd floor, but doesn't use stair glide.   Prior Function   Level of [place] Independent with ADLs;Independent with functional mobility;Independent with IADLS   Lives With Spouse   Receives Help From Family   IADLs Independent with meal prep;Independent with driving;Family/Friend/Other provides  "transportation;Family/Friend/Other provides medication management  (pt drives sometimes but spouse mostly drives, spouse assists with medications)   Falls in the last 6 months 1 to 4   Comments Reports being independent with mobility, ADLs and IADLs, uses cane for mobility mostly.   General   Additional Pertinent History Pt wiht h/o left foot drop, no MAFO. recently admitted in February 2024. Recommended post acute rehab, pt declined.   Cognition   Orientation Level Oriented X4   Following Commands Follows one step commands with increased time or repetition   Comments impulsive wiht mobility with decreased saftey concerns. decreased insight to deficits.   Subjective   Subjective \"I use the bike.\"   RLE Assessment   RLE Assessment   (knee ext AROM WFL, strength 3-/5, knee flexion 3+/5, ankle DF 3+/5, hip flexion 2/5)   LLE Assessment   LLE Assessment   (knee ext AROM WFL, strength 3-/5, knee flexion 3-/5, ankle DF AROM < neutral 2-/5, hip flexion 2-/5)   Coordination   Rapid Alternating Movements Impaired  (left LE)   Light Touch   RLE Light Touch Impaired   RLE Light Touch Comments diminished/absent right LE distal to knee. therapist light touch bilateral LEs and pt reporting feeling light touch left LE only.   LLE Light Touch Grossly intact   Bed Mobility   Supine to Sit 5  Supervision   Additional items Increased time required;Verbal cues   Additional Comments HOB flat without bedrail. inc timeto ahcieve sitting on EOB.   Transfers   Sit to Stand 4  Minimal assistance   Additional items Assist x 1;Verbal cues;Impulsive   Stand to Sit 4  Minimal assistance   Additional items Assist x 1;Increased time required;Verbal cues   Stand pivot 3  Moderate assistance   Additional items Assist x 1;Verbal cues;Impulsive   Additional Comments use of hurry. sit<>stand with minAx1 with NBOS and cues for upright posture. spt with hurry cane with modAx1 with varied NICK, inc knee flexion, cues for turnign completewly and manual cues " "for trunk management and wt shifting.   Ambulation/Elevation   Gait pattern Narrow NICK;Improper Weight shift;Decreased foot clearance;Short stride;Foward flexed;Knees flexed   Gait Assistance 3  Moderate assist   Additional items Assist x 1;Verbal cues   Assistive Device   (hurry cane)   Distance ambulated 10'x1 wiht hurry cane with modAx1 with varied NICK, inc knee flexion,d ecreased foot clearance and step length, inc trunk flexion. mabnual cues for wt shifting and balance and verbal cues for inc step length and foot clearance. pt reahcing for externla support.   Stair Management Assistance   (deferred due to safety concerns)   Balance   Static Sitting Fair +   Dynamic Sitting Fair   Static Standing Poor +   Dynamic Standing Poor   Ambulatory Poor   Endurance Deficit   Endurance Deficit   (fatigue)   Activity Tolerance   Activity Tolerance Patient limited by fatigue;Patient limited by pain   Medical Staff Made Aware Pretty TABOR   Assessment   Prognosis Fair   Problem List Decreased strength;Decreased range of motion;Decreased endurance;Impaired balance;Decreased mobility;Decreased coordination;Impaired judgement;Decreased safety awareness;Pain   Barriers to Discharge Decreased caregiver support;Inaccessible home environment   Barriers to Discharge Comments has support from spouse, but requires assistance wiht all mobility, increased fall risk, decreased insight to deficits   Goals   Patient Goals \"Go home\"   STG Expiration Date 05/28/24   PT Treatment Day 1   Plan   Treatment/Interventions ADL retraining;Functional transfer training;LE strengthening/ROM;Elevations;Therapeutic exercise;Endurance training;Patient/family training;Equipment eval/education;Bed mobility;Gait training;Compensatory technique education;Continued evaluation;Spoke to nursing;Spoke to case management;OT   PT Frequency 3-5x/wk   Discharge Recommendation   Rehab Resource Intensity Level, PT II (Moderate Resource Intensity)   Equipment " Recommended   (walker-pt has)   Additional Comments should patient reutrn to home and defer post acute rehab, will require asisstance wiht all mobility, 1st floor set up and use of wheelchair to access home environment.   AM-PAC Basic Mobility Inpatient   Turning in Flat Bed Without Bedrails 3   Lying on Back to Sitting on Edge of Flat Bed Without Bedrails 3   Moving Bed to Chair 2   Standing Up From Chair Using Arms 3   Walk in Room 3   Climb 3-5 Stairs With Railing 1   Basic Mobility Inpatient Raw Score 15   Basic Mobility Standardized Score 36.97   [facility] Highest Level Of Mobility   -[facility] Goal 4: Move to chair/commode   -[facility] Achieved 7: Walk 25 feet or more   Additional Treatment Session   Start Time 1119   End Time 1129   Treatment Assessment Pt tolerated session fairly. He was able to ambulate with decreased asisstance and increased distance wiht use of RW compared to cane. He requires increased verbal cues for hand placement, LE placement and tehcnique for safe transfers and dmeonstrated improved technique wiht verbal cues. he requires increased verbal cues for saftey wiht ambulation but was able to demonstrate improved gait pattern with cues. He is limited by decreased strength, balance, endurance and increased pain. He will continue to beneift form PT services to maximize LOF.   Equipment Use initiated use of RW. cues for hand placement for saftey with sit<>stand transfer as pt impulsively stood pulling on RW with in knee flexion. minAx1 to ahcieve standing. spt with RW wiht mod to minAx1 with manual cues for RW management and verbal cues for turnign completely prior to sitting. ambulated 28'x1 with RW with mod to miNAx1 with manual cues for wt shifting and verbal cues for staying wihtin NICK of RW, inc uprihgt posture. pt pushing RW anteiror to COG reuqiring cues to stop and adjust for safety. mod manual cues for balance and wt shifting especially when turning. pt impulsive to sit due to fatigue  limiting further ambulation. then completed sit<>stand with steadying assistance wiht cues to slow down, for foot placement and technique. spt with RW with min/modAx1 wiht manual cues for RW management and wt shifting. ambulated 14'x1 wiht RW with minAx1 with verbal cues for staying wihtin NICK of RW and min manual cues for balance and wt shifting. pt wiht improved tehcnique. discussed recommendation for post acute rehab. pt currently agreeable. Case management aware.   End of Consult   Patient Position at End of Consult Bedside chair;Bed/Chair alarm activated;All needs within reach       Pt requires PT/OT co-eval due to medical complexity, safety concerns, fall risk, significant assistance with mobility and/or cognitive-behavioral impairments.    (Please find full objective findings from PT assessment regarding body systems outlined above).     Assessment: Pt is a 59 y.o. male seen for PT evaluation s/p admission to [facility] on 5/13/2024 with Hypermagnesemia.  Order placed for PT services. Upon evaluation: Pt is presenting with impaired functional mobility due to pain, decreased strength, decreased ROM, decreased endurance, impaired balance, impaired coordination, gait deviations, decreased safety awareness, impaired judgment, and fall risk requiring  supervision assistance for bed mobility, minimal to moderate assistance for transfers, and moderate assistance for ambulation with hurry cane . Pt's clinical presentation is currently unpredictable given the functional mobility deficits above, especially weakness, decreased ROM, decreased endurance, impaired balance, gait deviations, pain, decreased activity tolerance, decreased functional mobility tolerance, decreased safety awareness, and impaired judgement, coupled with fall risks as indicated by AM-PAC 6-Clicks: 15/24 as well as hx of falls, impulsivity, impaired balance, polypharmacy, impaired coordination, impaired judgement, decreased safety  awareness, and limited sensation/neuropathy and combined with medical complications of pain impacting overall mobility status, abnormal renal lab values, abnormal H&H, abnormal WBCs, abnormal sodium values, impulsivity during admission, need for input for mobility technique/safety, and hypermagnesemia .  Pt's PMHx and comorbidities that may affect physical performance and progress include: CKD, DM, HTN, and gout, h/o retroperitoneal abscess, CVA, vertebral osteomyelitis, h/o back sx . Personal factors affecting pt at time of IE include: impulsivity, inaccessible home environment, step(s) to enter environment, multi-level environment, limited home support, inability to perform IADLs, inability to perform ADLs, inability to navigate level surfaces without external assistance, inability to ambulate household distances, limited insight into impairments, and recent fall(s)/fall history. Pt will benefit from continued skilled PT services to address deficits as defined above and to maximize level of functional mobility to facilitate return toward PLOF and improved QOL. From PT/mobility standpoint, recommendation at time of d/c would be Level II (Moderate Resource Intensity in order to reduce fall risk and maximize pt's functional independence and consistency with mobility. Recommend trial with walker next 1-2 sessions and ther ex next 1-2 sessions.       The patient's AM-PAC Basic Mobility Inpatient Short Form Raw Score is 15. A Raw score of less than or equal to 16 suggests the patient may benefit from discharge to post-acute rehabilitation services. Please also refer to the recommendation of the Physical Therapist for safe discharge planning.       Goals: Pt will: Perform bed mobility tasks with modified Independent to reposition in bed and prepare for transfers. Pt will perform transfers with supervision to decrease burden of care, decrease risk for falls, and improve activity tolerance and prepare for ambulation. Pt  will ambulate with RW or LRAD for >/= 75' with  supervision  to decrease burden of care, decrease risk for falls, improve activity tolerance, and improve gait quality and to access home environment. Pt will complete 1 step with LRAD and >/= 12 steps with unilateral handrail with consistent min A of 1 to decrease burden of care, return to home with BRUCE, decrease risk for falls, and improve activity tolerance. Pt will participate in objective balance assessment to determine baseline fall risk. Pt will participate in SSWS assessment to determine level of mobility. Pt will increase B LE strength >/= 1/2 MMT grade to facilitate functional mobility.      Kandace Marx, PT,DPT

## 2024-05-14 NOTE — PLAN OF CARE
Problem: PHYSICAL THERAPY ADULT  Goal: Performs mobility at highest level of function for planned discharge setting.  See evaluation for individualized goals.  Description: Treatment/Interventions: ADL retraining, Functional transfer training, LE strengthening/ROM, Elevations, Therapeutic exercise, Endurance training, Patient/family training, Equipment eval/education, Bed mobility, Gait training, Compensatory technique education, Continued evaluation, Spoke to nursing, Spoke to case management, OT  Equipment Recommended:  (walker-pt has)       See flowsheet documentation for full assessment, interventions and recommendations.  Note: Prognosis: Fair  Problem List: Decreased strength, Decreased range of motion, Decreased endurance, Impaired balance, Decreased mobility, Decreased coordination, Impaired judgement, Decreased safety awareness, Pain  Assessment: Pt is a 59 y.o. male seen for PT evaluation s/p admission to [facility] on 5/13/2024 with Hypermagnesemia.  Order placed for PT services. Upon evaluation: Pt is presenting with impaired functional mobility due to pain, decreased strength, decreased ROM, decreased endurance, impaired balance, impaired coordination, gait deviations, decreased safety awareness, impaired judgment, and fall risk requiring  supervision assistance for bed mobility, minimal to moderate assistance for transfers, and moderate assistance for ambulation with hurry cane . Pt's clinical presentation is currently unpredictable given the functional mobility deficits above, especially weakness, decreased ROM, decreased endurance, impaired balance, gait deviations, pain, decreased activity tolerance, decreased functional mobility tolerance, decreased safety awareness, and impaired judgement, coupled with fall risks as indicated by AM-PAC 6-Clicks: 15/24 as well as hx of falls, impulsivity, impaired balance, polypharmacy, impaired coordination, impaired judgement, decreased  safety awareness, and limited sensation/neuropathy and combined with medical complications of pain impacting overall mobility status, abnormal renal lab values, abnormal H&H, abnormal WBCs, abnormal sodium values, impulsivity during admission, need for input for mobility technique/safety, and hypermagnesemia .  Pt's PMHx and comorbidities that may affect physical performance and progress include: CKD, DM, HTN, and gout, h/o retroperitoneal abscess, CVA, vertebral osteomyelitis, h/o back sx . Personal factors affecting pt at time of IE include: impulsivity, inaccessible home environment, step(s) to enter environment, multi-level environment, limited home support, inability to perform IADLs, inability to perform ADLs, inability to navigate level surfaces without external assistance, inability to ambulate household distances, limited insight into impairments, and recent fall(s)/fall history. Pt will benefit from continued skilled PT services to address deficits as defined above and to maximize level of functional mobility to facilitate return toward PLOF and improved QOL. From PT/mobility standpoint, recommendation at time of d/c would be Level II (Moderate Resource Intensity in order to reduce fall risk and maximize pt's functional independence and consistency with mobility. Recommend trial with walker next 1-2 sessions and ther ex next 1-2 sessions.  Barriers to Discharge: Decreased caregiver support, Inaccessible home environment  Barriers to Discharge Comments: has support from spouse, but requires assistance wiht all mobility, increased fall risk, decreased insight to deficits  Rehab Resource Intensity Level, PT: II (Moderate Resource Intensity)    See flowsheet documentation for full assessment.

## 2024-05-14 NOTE — ASSESSMENT & PLAN NOTE
Magnesium level is 5.3  Likely secondary to decreased clearance due to CKD stage v  Was taking magnesium citrate which remain on hold  EKG showing normal sinus rhythm, normal WI interval QRS duration normal, QTc 437  Evaluated by nephrology, recommending start IV hydration, also provide 1 dose of Lasix; on 5/14: add lasix 80 mg x 1, metolazone 5 mg x 1 dose. Will need loop diuretic outpatient for ongoing management.   Continue to monitor patient on telemetry.

## 2024-05-15 LAB
ANION GAP SERPL CALCULATED.3IONS-SCNC: 10 MMOL/L (ref 4–13)
ATRIAL RATE: 66 BPM
BUN SERPL-MCNC: 67 MG/DL (ref 5–25)
CALCIUM SERPL-MCNC: 9 MG/DL (ref 8.4–10.2)
CHLORIDE SERPL-SCNC: 101 MMOL/L (ref 96–108)
CO2 SERPL-SCNC: 26 MMOL/L (ref 21–32)
CREAT SERPL-MCNC: 5.05 MG/DL (ref 0.6–1.3)
ERYTHROCYTE [DISTWIDTH] IN BLOOD BY AUTOMATED COUNT: 16.2 % (ref 11.6–15.1)
GFR SERPL CREATININE-BSD FRML MDRD: 11 ML/MIN/1.73SQ M
GLUCOSE SERPL-MCNC: 118 MG/DL (ref 65–140)
GLUCOSE SERPL-MCNC: 123 MG/DL (ref 65–140)
GLUCOSE SERPL-MCNC: 137 MG/DL (ref 65–140)
GLUCOSE SERPL-MCNC: 163 MG/DL (ref 65–140)
GLUCOSE SERPL-MCNC: 203 MG/DL (ref 65–140)
HCT VFR BLD AUTO: 30.2 % (ref 36.5–49.3)
HGB BLD-MCNC: 9.8 G/DL (ref 12–17)
MAGNESIUM SERPL-MCNC: 4.4 MG/DL (ref 1.9–2.7)
MCH RBC QN AUTO: 31.5 PG (ref 26.8–34.3)
MCHC RBC AUTO-ENTMCNC: 32.5 G/DL (ref 31.4–37.4)
MCV RBC AUTO: 97 FL (ref 82–98)
P AXIS: 31 DEGREES
PHOSPHATE SERPL-MCNC: 7.4 MG/DL (ref 2.7–4.5)
PLATELET # BLD AUTO: 188 THOUSANDS/UL (ref 149–390)
PMV BLD AUTO: 10.2 FL (ref 8.9–12.7)
POTASSIUM SERPL-SCNC: 4.8 MMOL/L (ref 3.5–5.3)
PR INTERVAL: 154 MS
QRS AXIS: -31 DEGREES
QRSD INTERVAL: 94 MS
QT INTERVAL: 430 MS
QTC INTERVAL: 450 MS
RBC # BLD AUTO: 3.11 MILLION/UL (ref 3.88–5.62)
SODIUM SERPL-SCNC: 137 MMOL/L (ref 135–147)
T WAVE AXIS: 43 DEGREES
VENTRICULAR RATE: 66 BPM
WBC # BLD AUTO: 8.87 THOUSAND/UL (ref 4.31–10.16)

## 2024-05-15 PROCEDURE — 84100 ASSAY OF PHOSPHORUS: CPT | Performed by: INTERNAL MEDICINE

## 2024-05-15 PROCEDURE — 97530 THERAPEUTIC ACTIVITIES: CPT

## 2024-05-15 PROCEDURE — 99232 SBSQ HOSP IP/OBS MODERATE 35: CPT | Performed by: INTERNAL MEDICINE

## 2024-05-15 PROCEDURE — 83735 ASSAY OF MAGNESIUM: CPT | Performed by: INTERNAL MEDICINE

## 2024-05-15 PROCEDURE — 93005 ELECTROCARDIOGRAM TRACING: CPT

## 2024-05-15 PROCEDURE — 99232 SBSQ HOSP IP/OBS MODERATE 35: CPT

## 2024-05-15 PROCEDURE — 80048 BASIC METABOLIC PNL TOTAL CA: CPT | Performed by: INTERNAL MEDICINE

## 2024-05-15 PROCEDURE — 85027 COMPLETE CBC AUTOMATED: CPT

## 2024-05-15 PROCEDURE — 97116 GAIT TRAINING THERAPY: CPT | Performed by: PHYSICAL THERAPIST

## 2024-05-15 PROCEDURE — 82948 REAGENT STRIP/BLOOD GLUCOSE: CPT

## 2024-05-15 RX ORDER — METOLAZONE 5 MG/1
5 TABLET ORAL ONCE
Status: COMPLETED | OUTPATIENT
Start: 2024-05-15 | End: 2024-05-15

## 2024-05-15 RX ORDER — SEVELAMER HYDROCHLORIDE 800 MG/1
1600 TABLET, FILM COATED ORAL
Status: DISCONTINUED | OUTPATIENT
Start: 2024-05-15 | End: 2024-05-16 | Stop reason: HOSPADM

## 2024-05-15 RX ORDER — GUAIFENESIN 600 MG/1
600 TABLET, EXTENDED RELEASE ORAL EVERY 12 HOURS SCHEDULED
Status: DISCONTINUED | OUTPATIENT
Start: 2024-05-15 | End: 2024-05-16 | Stop reason: HOSPADM

## 2024-05-15 RX ORDER — TORSEMIDE 20 MG/1
20 TABLET ORAL DAILY
Status: DISCONTINUED | OUTPATIENT
Start: 2024-05-15 | End: 2024-05-16 | Stop reason: HOSPADM

## 2024-05-15 RX ADMIN — SEVELAMER HYDROCHLORIDE 1600 MG: 800 TABLET, FILM COATED ORAL at 11:58

## 2024-05-15 RX ADMIN — SEVELAMER HYDROCHLORIDE 1600 MG: 800 TABLET, FILM COATED ORAL at 08:02

## 2024-05-15 RX ADMIN — ALLOPURINOL 100 MG: 100 TABLET ORAL at 08:03

## 2024-05-15 RX ADMIN — GUAIFENESIN 600 MG: 600 TABLET ORAL at 11:58

## 2024-05-15 RX ADMIN — PREGABALIN 75 MG: 75 CAPSULE ORAL at 17:17

## 2024-05-15 RX ADMIN — TORSEMIDE 20 MG: 20 TABLET ORAL at 11:58

## 2024-05-15 RX ADMIN — AMLODIPINE BESYLATE 5 MG: 5 TABLET ORAL at 08:03

## 2024-05-15 RX ADMIN — SODIUM CHLORIDE, SODIUM GLUCONATE, SODIUM ACETATE, POTASSIUM CHLORIDE, MAGNESIUM CHLORIDE, SODIUM PHOSPHATE, DIBASIC, AND POTASSIUM PHOSPHATE 50 ML/HR: .53; .5; .37; .037; .03; .012; .00082 INJECTION, SOLUTION INTRAVENOUS at 06:44

## 2024-05-15 RX ADMIN — INSULIN LISPRO 2 UNITS: 100 INJECTION, SOLUTION INTRAVENOUS; SUBCUTANEOUS at 11:59

## 2024-05-15 RX ADMIN — SENNOSIDES AND DOCUSATE SODIUM 1 TABLET: 8.6; 5 TABLET ORAL at 17:17

## 2024-05-15 RX ADMIN — TAMSULOSIN HYDROCHLORIDE 0.4 MG: 0.4 CAPSULE ORAL at 17:17

## 2024-05-15 RX ADMIN — INSULIN GLARGINE 5 UNITS: 100 INJECTION, SOLUTION SUBCUTANEOUS at 21:13

## 2024-05-15 RX ADMIN — LIDOCAINE 1 PATCH: 50 PATCH CUTANEOUS at 08:03

## 2024-05-15 RX ADMIN — INSULIN LISPRO 1 UNITS: 100 INJECTION, SOLUTION INTRAVENOUS; SUBCUTANEOUS at 21:13

## 2024-05-15 RX ADMIN — SEVELAMER HYDROCHLORIDE 1600 MG: 800 TABLET, FILM COATED ORAL at 17:17

## 2024-05-15 RX ADMIN — GUAIFENESIN 600 MG: 600 TABLET ORAL at 21:13

## 2024-05-15 RX ADMIN — SENNOSIDES AND DOCUSATE SODIUM 1 TABLET: 8.6; 5 TABLET ORAL at 08:03

## 2024-05-15 RX ADMIN — PREGABALIN 75 MG: 75 CAPSULE ORAL at 08:02

## 2024-05-15 RX ADMIN — OXYCODONE HYDROCHLORIDE AND ACETAMINOPHEN 1 TABLET: 5; 325 TABLET ORAL at 08:03

## 2024-05-15 RX ADMIN — METOLAZONE 5 MG: 5 TABLET ORAL at 11:58

## 2024-05-15 RX ADMIN — DULOXETINE HYDROCHLORIDE 20 MG: 20 CAPSULE, DELAYED RELEASE ORAL at 08:02

## 2024-05-15 RX ADMIN — OXYCODONE HYDROCHLORIDE AND ACETAMINOPHEN 1 TABLET: 5; 325 TABLET ORAL at 03:08

## 2024-05-15 NOTE — CASE MANAGEMENT
Case Management Discharge Planning Note    Patient name Stef Pack Sr.  Location /-01 MRN [id number]  : 1965 Date 5/15/2024       Current Admission Date: 2024  Current Admission Diagnosis:Hypermagnesemia   Patient Active Problem List    Diagnosis Date Noted Date Diagnosed    Hypermagnesemia 2024     Neuropathic pain 2024     Right knee pain 2023     Diabetic foot ulcer (HCC) 2023     CKD (chronic kidney disease) 2023     Acute metabolic encephalopathy 2023     PAD (peripheral artery disease) (HCC) 2022     Chronic back pain 2022     Moderate protein-calorie malnutrition (HCC) 2022     Chronic anemia 2022     RSV infection 2022     Chronic ulcer of left heel (HCC) 10/20/2022     Hypercalcemia 2022     Low back pain 2022     Ambulatory dysfunction 2022     CKD (chronic kidney disease), stage V (HCC) 2022     Retention of urine 2022     Severe protein-calorie malnutrition (HCC) 2022     Iron deficiency anemia secondary to inadequate dietary iron intake 2022     Hip pain, acute, left 2022     KELLY (acute kidney injury) (HCC) 2022     Hyperkalemia 2022     Diabetes mellitus type 2, insulin dependent (HCC)      Gout      Essential hypertension      History of CVA (cerebrovascular accident)      Osteomyelitis of vertebra of lumbar region (HCC)        LOS (days): 1  Geometric Mean LOS (GMLOS) (days): 2.9  Days to GMLOS:2     OBJECTIVE:  Risk of Unplanned Readmission Score: 19.64         Current admission status: Inpatient   Preferred Pharmacy:   [facility]/pharmacy #[id number] - [place] - [address]  [address] [place] [address]  Phone: [phone] Fax: [phone]    Primary Care Provider: Rehan Mancia DO    Primary Insurance: [employer] [facility] RE[facility]  Secondary Insurance:     DISCHARGE DETAILS:    Discharge planning discussed with:: patient                            Requested Home Health Care         Is the patient interested in HHC at discharge?: Yes  Home Health Discipline requested:: Occupational Therapy, Physical Therapy, Nursing  Home Health Follow-Up Provider:: PCP  Home Health Services Needed:: Evaluate Functional Status and Safety, Gait/ADL Training, Strengthening/Theraputic Exercises to Improve Function, Other (comment) (Medication Management)  Homebound Criteria Met:: Uses an Assist Device (i.e. cane, walker, etc), Requires the Assistance of Another Person for Safe Ambulation or to Leave the Home  Supporting Clincal Findings:: Limited Endurance    DME Referral Provided  Referral made for DME?: No    Other Referral/Resources/Interventions Provided:  Interventions: HHC    Would you like to participate in our [facility] service program?  : No - Declined    Treatment Team Recommendation: Home with Home Health Care  Discharge Destination Plan:: Home with Home Health Care  Transport at Discharge : Family            CM met with patient to review therapy recommendation for STR. Patient declined STR indicating he is agreeable to HHC which he has had before.  CM discussed with patient 24/7 recommendation from therapy and patient indicated he is alone while spouse works outside home. Patient indicated he has come along way with mobility in home but does have rollator, WC and precious to use, as needed. Patient indicated he can typically get around his home on his own and cook his own food. Patient indicated he did not do well with therapy at [facility] yesterday but feels his feet are getting better and does not desire STR.    CM submitted [facility] referral for HHC. CM to review options with patient. Patient has prior involvement with [facility] and patient choses [facility], if available.

## 2024-05-15 NOTE — PLAN OF CARE
Problem: OCCUPATIONAL THERAPY ADULT  Goal: Performs self-care activities at highest level of function for planned discharge setting.  See evaluation for individualized goals.  Description: Treatment Interventions: ADL retraining, UE strengthening/ROM, Functional transfer training, Endurance training, Cognitive reorientation, Patient/family training, Equipment evaluation/education, Compensatory technique education, Energy conservation, Activityengagement          See flowsheet documentation for full assessment, interventions and recommendations.   Note: Limitation: Decreased ADL status, Decreased UE ROM, Decreased UE strength, Decreased Safe judgement during ADL, Decreased cognition, Decreased endurance, Decreased self-care trans, Decreased high-level ADLs  Prognosis: Fair  Assessment: Pt tolerated today's session fair and was pleasant and cooperative throughout. Pt supine in bed at start of session and reported lightheadedness upon sitting EOB. BP assessed and within normal limits. Pt ambulated in room/hallway with RW and required min A and consistent verbal cueing for body positioning in relation to the RW. Pt noted to be impulsive during functional mobility and was very excited as he had sensation in B feet today for the first time in recent months. Pt required seated break d/t lightheadedness and unsteadiness on feet. Pt required A to complete transfer off of chair to continue to ambulate back to his room. Pts lunch tray arrived and he requested to eat while seated upright in bed despite being encouraged to sit in recliner chair.     Rehab Resource Intensity Level, OT: II (Moderate Resource Intensity)

## 2024-05-15 NOTE — PLAN OF CARE
Problem: Potential for Falls  Goal: Patient will remain free of falls  Description: INTERVENTIONS:  - Educate patient/family on patient safety including physical limitations  - Instruct patient to call for assistance with activity   - Consult OT/PT to assist with strengthening/mobility   - Keep Call bell within reach  - Keep bed low and locked with side rails adjusted as appropriate  - Keep care items and personal belongings within reach  - Initiate and maintain comfort rounds  - Make Fall Risk Sign visible to staff  - Offer Toileting every 2 Hours, in advance of need    - Apply yellow socks and bracelet for high fall risk patients  - Consider moving patient to room near nurses station  Outcome: Progressing     Problem: PAIN - ADULT  Goal: Verbalizes/displays adequate comfort level or baseline comfort level  Description: Interventions:  - Encourage patient to monitor pain and request assistance  - Assess pain using appropriate pain scale  - Administer analgesics based on type and severity of pain and evaluate response  - Implement non-pharmacological measures as appropriate and evaluate response  - Consider cultural and social influences on pain and pain management  - Notify physician/advanced practitioner if interventions unsuccessful or patient reports new pain  Outcome: Progressing     Problem: SAFETY ADULT  Goal: Patient will remain free of falls  Description: INTERVENTIONS:  - Educate patient/family on patient safety including physical limitations  - Instruct patient to call for assistance with activity   - Consult OT/PT to assist with strengthening/mobility   - Keep Call bell within reach  - Keep bed low and locked with side rails adjusted as appropriate  - Keep care items and personal belongings within reach  - Initiate and maintain comfort rounds  - Make Fall Risk Sign visible to staff    - Apply yellow socks and bracelet for high fall risk patients  - Consider moving patient to room near nurses  station  Outcome: Progressing     Problem: DISCHARGE PLANNING  Goal: Discharge to home or other facility with appropriate resources  Description: INTERVENTIONS:  - Identify barriers to discharge w/patient and caregiver  - Arrange for needed discharge resources and transportation as appropriate  - Identify discharge learning needs (meds, wound care, etc.)  - Arrange for interpretive services to assist at discharge as needed  - Refer to Case Management Department for coordinating discharge planning if the patient needs post-hospital services based on physician/advanced practitioner order or complex needs related to functional status, cognitive ability, or social support system  Outcome: Progressing     Problem: Knowledge Deficit  Goal: Patient/family/caregiver demonstrates understanding of disease process, treatment plan, medications, and discharge instructions  Description: Complete learning assessment and assess knowledge base.  Interventions:  - Provide teaching at level of understanding  - Provide teaching via preferred learning methods  Outcome: Progressing

## 2024-05-15 NOTE — ASSESSMENT & PLAN NOTE
Continue narcotics, patient takes Lyrica, continue  Will add Cymbalta, risk versus benefits, side effects and alternative discussed in details with patient.  With improvement of pain with Cymbalta, continue on discharge.   Arterial duplex: Diffuse disease   Discussed with vascular, no concern over acute vascular disease, appears neuropathic pain as patient improved with cymbalta.

## 2024-05-15 NOTE — UTILIZATION REVIEW
NOTIFICATION OF INPATIENT ADMISSION   AUTHORIZATION REQUEST   SERVICING FACILITY:   [place] [address]  Tax ID: [id number]  NPI: [id number] ATTENDING PROVIDER:  Attending Name and NPI#: Adrianne Horn Md [[id number]]  Address: [address] [place] [address]  Phone: [phone]   ADMISSION INFORMATION:  Place of Service: Inpatient [facility]  Place of Service Code: 21  Inpatient Admission Date/Time: 5/14/24  3:33 PM  Discharge Date/Time: No discharge date for patient encounter.  Admitting Diagnosis Code/Description:  Hypermagnesemia [E83.41]  Foot pain [M79.673]  Chronic renal failure [N18.9]  Neuropathic pain [M79.2]     UTILIZATION REVIEW CONTACT:  Ana Mendoza, Utilization   Network Utilization Review Department  Phone: [phone]  Fax [phone]  Email: [email or web address]  Contact for approvals/pending authorizations, clinical reviews, and discharge.     PHYSICIAN ADVISORY SERVICES:  Medical Necessity Denial & [id number] Review  Phone: [phone]  Fax: [phone]  Email: [email or web address]     DISCHARGE SUPPORT TEAM:  For Patients Discharge Needs & Updates  Phone: [phone] opt. 2 Fax: [phone]  Email: [email or web address]

## 2024-05-15 NOTE — PROGRESS NOTES
[facility]  Progress Note  Name: Stef George I  MRN: [id number]  Unit/Bed#: -01 I Date of Admission: 5/13/2024   Date of Service: 5/15/2024 I Hospital Day: 1    Assessment & Plan   * Hypermagnesemia  Assessment & Plan  Magnesium level is 5.3  Likely secondary to decreased clearance due to CKD stage v  Was taking magnesium citrate which remain on hold  EKG showing normal sinus rhythm, normal WI interval QRS duration normal, QTc 437  Evaluated by nephrology, recommending start IV hydration, also provide 1 dose of Lasix; on 5/14: add lasix 80 mg x 1, metolazone 5 mg x 1 dose. Will need loop diuretic outpatient for ongoing management.   Nephrology to add oral diuretics on 5/15, if magnesium stable tomorrow, anticipate discharge tomorrow.   Continue to monitor patient on telemetry.    Neuropathic pain  Assessment & Plan  Continue narcotics, patient takes Lyrica, continue  Will add Cymbalta, risk versus benefits, side effects and alternative discussed in details with patient.  With improvement of pain with Cymbalta, continue on discharge.   Arterial duplex: Diffuse disease   Discussed with vascular, no concern over acute vascular disease, appears neuropathic pain as patient improved with cymbalta.     CKD (chronic kidney disease), stage V (HCC)  Assessment & Plan  Lab Results   Component Value Date    EGFR 11 05/15/2024    EGFR 12 05/14/2024    EGFR 13 05/13/2024    CREATININE 5.05 (H) 05/15/2024    CREATININE 4.86 (H) 05/14/2024    CREATININE 4.57 (H) 05/13/2024     Outpatient creatinine appears to be around 4.83 most recently, today creatinine is 4.86, but still within baseline. No indication for emergent dialysis.   Outpatient nephrologist has started process for vascular access placement.   Monitor creatinine  Avoid nephrotoxic medications, NSAIDs, hypotension    Essential hypertension  Assessment & Plan  Continue norvasc 5 mg qd.     Diabetes mellitus type 2, insulin dependent  ([facility])  Assessment & Plan  Lab Results   Component Value Date    HGBA1C 7.0 (H) 2023       Recent Labs     24  1108 24  1601 24  2125 05/15/24  0746   POCGLU 121 130 146* 123         Blood Sugar Average: Last 72 hrs:  (P) 128.5716441885476790  Sliding scale, hypoglycemia precaution.         VTE Pharmacologic Prophylaxis: VTE Score: 2 Low Risk (Score 0-2) - Encourage Ambulation.    Mobility:   Basic Mobility Inpatient Raw Score: 15  JH-HLM Goal: 4: Move to chair/commode  JH-HLM Achieved: 6: Walk 10 steps or more  JH-HLM Goal achieved. Continue to encourage appropriate mobility.    Patient Centered Rounds: I performed bedside rounds with nursing staff today.   Discussions with Specialists or Other Care Team Provider: nursing, case management, nephrology    Education and Discussions with Family / Patient: Patient declined call to .     Total Time Spent on Date of Encounter in care of patient:  mins. This time was spent on one or more of the following: performing physical exam; counseling and coordination of care; obtaining or reviewing history; documenting in the medical record; reviewing/ordering tests, medications or procedures; communicating with other healthcare professionals and discussing with patient's family/caregivers.    Current Length of Stay: 1 day(s)  Current Patient Status: Inpatient   Certification Statement: The patient will continue to require additional inpatient hospital stay due to monitoring magnesium, creatinine  Discharge Plan: Anticipate discharge tomorrow to home.    Code Status: Level 1 - Full Code    Subjective:   Seen and examined. Feeling much better today. States that his pins and needles, numbness and tingling in his feet is significantly improved. No nausea, vomiting, diarrhea, constipation, abdominal pain, CP, SOB, fever, chills.     Objective:     Vitals:   Temp (24hrs), Av.9 °F (36.6 °C), Min:97.9 °F (36.6 °C), Max:98.1 °F (36.7 °C)    Temp:   [97.9 °F (36.6 °C)-98.1 °F (36.7 °C)] 98.1 °F (36.7 °C)  HR:  [65-72] 72  Resp:  [16] 16  BP: (142-154)/(65-78) 146/74  SpO2:  [93 %-95 %] 95 %  Body mass index is 21.7 kg/m².     Input and Output Summary (last 24 hours):     Intake/Output Summary (Last 24 hours) at 5/15/2024 1029  Last data filed at 5/15/2024 0807  Gross per 24 hour   Intake 280 ml   Output 3000 ml   Net -2720 ml       Physical Exam:   Physical Exam  Vitals reviewed.   Constitutional:       General: He is not in acute distress.     Appearance: He is not ill-appearing or toxic-appearing.   HENT:      Head: Normocephalic and atraumatic.      Mouth/Throat:      Mouth: Mucous membranes are moist.   Cardiovascular:      Rate and Rhythm: Normal rate and regular rhythm.      Heart sounds: No murmur heard.  Pulmonary:      Effort: No respiratory distress.      Breath sounds: No stridor. No rhonchi or rales.      Comments: Faint expiratory wheeze, decreased breath sounds, saturating well on room air  Abdominal:      General: Bowel sounds are normal. There is no distension.      Palpations: Abdomen is soft. There is no mass.      Tenderness: There is no abdominal tenderness.   Musculoskeletal:      Right lower leg: No edema.      Left lower leg: No edema.   Skin:     General: Skin is warm and dry.   Neurological:      General: No focal deficit present.      Mental Status: He is alert and oriented to person, place, and time.   Psychiatric:         Mood and Affect: Mood normal.         Behavior: Behavior normal.          Additional Data:     Labs:  Results from last 7 days   Lab Units 05/15/24  0508 05/14/24  0517 05/13/24  1453   WBC Thousand/uL 8.87   < > 11.73*   HEMOGLOBIN g/dL 9.8*   < > 10.0*   HEMATOCRIT % 30.2*   < > 31.6*   PLATELETS Thousands/uL 188   < > 196   SEGS PCT %  --   --  72   LYMPHO PCT %  --   --  12*   MONO PCT %  --   --  11   EOS PCT %  --   --  3    < > = values in this interval not displayed.     Results from last 7 days   Lab Units  05/15/24  0508   SODIUM mmol/L 137   POTASSIUM mmol/L 4.8   CHLORIDE mmol/L 101   CO2 mmol/L 26   BUN mg/dL 67*   CREATININE mg/dL 5.05*   ANION GAP mmol/L 10   CALCIUM mg/dL 9.0   GLUCOSE RANDOM mg/dL 118         Results from last 7 days   Lab Units 05/15/24  0746 05/14/24  2125 05/14/24  1601 05/14/24  1108 05/14/24  0819 05/13/24  2105 05/13/24  1817   POC GLUCOSE mg/dl 123 146* 130 121 105 175* 102               Lines/Drains:  Invasive Devices       Peripheral Intravenous Line  Duration             Peripheral IV 05/13/24 Left;Ventral (anterior) Forearm 1 day                      Telemetry:  Telemetry Orders (From admission, onward)               24 Hour Telemetry Monitoring  Continuous x 24 Hours (Telem)        Question:  Reason for 24 Hour Telemetry  Answer:  Arrhythmias requiring acute medical intervention / PPM or ICD malfunction                     Telemetry Reviewed: Normal Sinus Rhythm  Indication for Continued Telemetry Use: Metabolic/electrolyte disturbance with high probability of dysrhythmia             Imaging: No pertinent imaging reviewed.    Recent Cultures (last 7 days):         Last 24 Hours Medication List:   Current Facility-Administered Medications   Medication Dose Route Frequency Provider Last Rate    acetaminophen  488 mg Oral Q4H PRN Madison Ely MD      allopurinol  100 mg Oral Daily Madison Ely MD      amLODIPine  5 mg Oral Daily Madiosn Ely MD      DULoxetine  20 mg Oral Daily Madison Ely MD      guaiFENesin  600 mg Oral Q12H [facility] Charlotte Villegas PA-C      hydrALAZINE  5 mg Intravenous Q6H PRN Madison Ely MD      insulin glargine  5 Units Subcutaneous HS Madison Ely MD      insulin lispro  1-6 Units Subcutaneous TID AC Madison Ely MD      insulin lispro  1-6 Units Subcutaneous HS Madison Ely MD      lidocaine  1 patch Topical Daily Madison Ely MD      multi-electrolyte  50 mL/hr Intravenous Continuous Madison Ely MD 50 mL/hr  (05/15/24 0644)    nicotine  1 patch Transdermal Daily Madison Ely MD      ondansetron  4 mg Intravenous Q6H PRN Madison Ely MD      oxyCODONE-acetaminophen  1 tablet Oral Q4H PRN Madison Ely MD      pregabalin  75 mg Oral BID Madison Ely MD      senna-docusate sodium  1 tablet Oral BID Madison Ely MD      sevelamer  1,600 mg Oral TID With Meals Branden Smiley MD      tamsulosin  0.4 mg Oral Daily With Dinner Madison Ely MD          Today, Patient Was Seen By: Charlotte Villegas PA-C    **Please Note: This note may have been constructed using a voice recognition system.**

## 2024-05-15 NOTE — ASSESSMENT & PLAN NOTE
Lab Results   Component Value Date    HGBA1C 7.0 (H) 03/16/2023       Recent Labs     05/14/24  1108 05/14/24  1601 05/14/24  2125 05/15/24  0746   POCGLU 121 130 146* 123         Blood Sugar Average: Last 72 hrs:  (P) 128.2259078785351084  Sliding scale, hypoglycemia precaution.

## 2024-05-15 NOTE — OCCUPATIONAL THERAPY NOTE
"  Occupational Therapy Progress Note     Patient Name: Stef Pack Sr.  Today's Date: 5/15/2024  Problem List  Principal Problem:    Hypermagnesemia  Active Problems:    Diabetes mellitus type 2, insulin dependent (HCC)    Essential hypertension    CKD (chronic kidney disease), stage V (HCC)    Neuropathic pain          05/15/24 1134   OT Last Visit   OT Visit Date 05/15/24   Note Type   Note Type Treatment   Pain Assessment   Pain Assessment Tool 0-10   Pain Score No Pain   Restrictions/Precautions   Other Precautions Chair Alarm;Bed Alarm;Multiple lines;Fall Risk;Pain;Impulsive   ADL   Where Assessed Edge of bed   LB Dressing Assistance 5  Supervision/Setup   LB Dressing Deficit Setup;Supervision/safety;Increased time to complete   Bed Mobility   Supine to Sit 5  Supervision   Additional items Increased time required   Sit to Supine 5  Supervision   Additional items Increased time required   Additional Comments Pt supine in bed at start of session and requested to get back into bed at end of session.   Transfers   Sit to Stand 4  Minimal assistance   Additional items Assist x 1;Impulsive;Verbal cues   Stand to Sit 4  Minimal assistance   Additional items Assist x 1;Verbal cues;Impulsive   Stand pivot 4  Minimal assistance   Additional items Assist x 1;Verbal cues;Impulsive   Additional Comments c RW   Functional Mobility   Functional Mobility 4  Minimal assistance   Additional Comments household distances in room/hallway, frequent verbal cues needed for body position in relation to the RW, pt was very distracted by the sensation in B feet as he noted this was the first time he was able to ambulate without a \"pins and needles\" feeling.   Additional items Rolling walker   Subjective   Subjective \"I feel the best that I have felt in awhile\"   Cognition   Overall Cognitive Status WFL   Arousal/Participation Alert;Cooperative   Attention Difficulty attending to directions   Orientation Level Oriented X4   Comments " impulsive   Activity Tolerance   Activity Tolerance Patient tolerated treatment well   Medical Staff Made Aware PT, Guerline   Assessment   Assessment Pt tolerated today's session fair and was pleasant and cooperative throughout. Pt supine in bed at start of session and reported lightheadedness upon sitting EOB. BP assessed and within normal limits. Pt ambulated in room/hallway with RW and required min A and consistent verbal cueing for body positioning in relation to the RW. Pt noted to be impulsive during functional mobility and was very excited as he had sensation in B feet today for the first time in recent months. Pt required seated break d/t lightheadedness and unsteadiness on feet. Pt required A to complete transfer off of chair to continue to ambulate back to his room. Pts lunch tray arrived and he requested to eat while seated upright in bed despite being encouraged to sit in recliner chair.   Plan   Treatment Interventions ADL retraining;Functional transfer training;UE strengthening/ROM;Endurance training;Continued evaluation;Energy conservation;Activityengagement   Goal Expiration Date 05/28/24   OT Treatment Day 1   OT Frequency 2-3x/wk   Discharge Recommendation   Rehab Resource Intensity Level, OT III (Min Resource Intensity)   AM-PAC Daily Activity Inpatient   Lower Body Dressing 3   Bathing 3   Toileting 3   Upper Body Dressing 4   Grooming 4   Eating 4   Daily Activity Raw Score 21   Daily Activity Standardized Score (Calc for Raw Score >=11) 44.27   AM-PAC Applied Cognition Inpatient   Following a Speech/Presentation 3   Understanding Ordinary Conversation 4   Taking Medications 3   Remembering Where Things Are Placed or Put Away 4   Remembering List of 4-5 Errands 3   Taking Care of Complicated Tasks 3   Applied Cognition Raw Score 20   Applied Cognition Standardized Score 41.76         The patient's raw score on the AM-PAC Daily Activity inpatient short form is 21, standardized score is 44.27,  greater than 39.4. Patients at this level are likely to benefit from DC to home. Please refer to the recommendation of the Occupational Therapist for safe DC planning.      GOALS:  Pt will complete UB ADLs with I in order to maximize participation with ADLs.   Pt will complete LB ADLs with I in order to maximize safety with ADLs.   Pt will complete toileting routine (transfer, hygiene, and clothing management) with I in order to return to prior level of function.  Pt will complete bed mobility with I in order to maximize participation with ADLs.   Pt will complete functional transfers at I level in order to increase participation with ADLs.  Pt will increase dynamic standing balance to F+ in order to increase safety with ADLs.  Pt will increase standing tolerance x10 min in order to increase participation with ADLs.   Pt will complete functional mobility with AD PRN for item retrieval task at Guero level in order to increase participation with ADLs.  Pt will complete IADL tasks/simulation of IADLs tasks with I in order to return to PLOF.  Pt will demonstrate G energy conservation techniques with ADLs/IADLs in order to reduce the risk of falls.  Pt will be attentive 100% of the time for ongoing functional/formal cognitive assessment to assist with safe dc planning prn.    Pt seated in bed eating lunch with all needs in reach, alarm activated, and no further questions/concerns.     Divina Torres MS OTR/L

## 2024-05-15 NOTE — PHYSICAL THERAPY NOTE
PT PROGRESS NOTE    Name: Stef Pack Sr.  AGE: 59 y.o.  MRN: [id number]  LENGTH OF STAY: 1     05/15/24 1148   PT Last Visit   PT Visit Date 05/15/24   Note Type   Note Type Treatment   Pain Assessment   Pain Assessment Tool 0-10   Pain Score No Pain   Restrictions/Precautions   Weight Bearing Precautions Per Order No   Other Precautions Chair Alarm;Bed Alarm;Impulsive;Multiple lines;Fall Risk;Telemetry   General   Chart Reviewed Yes   Response to Previous Treatment Patient with no complaints from previous session.   Family/Caregiver Present No   Cognition   Overall Cognitive Status WFL   Arousal/Participation Alert;Cooperative   Attention Difficulty attending to directions   Orientation Level Oriented X4   Following Commands Follows one step commands with increased time or repetition   Comments very impulsive throughout session   Subjective   Subjective My tingling in my feet is finally gone   Bed Mobility   Supine to Sit 5  Supervision   Additional items HOB elevated;Bedrails   Sit to Supine 5  Supervision   Additional items Increased time required   Additional Comments Pt greeted in supine. BP at /77 with inc lightheadedness.   Transfers   Sit to Stand 4  Minimal assistance   Additional items Assist x 1;Bedrails;Impulsive;Verbal cues  (w/ RW)   Stand to Sit 4  Minimal assistance   Additional items Assist x 1;Impulsive;Verbal cues  (w/ RW)   Additional Comments impulsive with poor carryover of cues for safety   Ambulation/Elevation   Gait pattern Forward Flexion;Decreased foot clearance;Step through pattern  (impulsive with bilateral out toeing)   Gait Assistance 4  Minimal assist   Additional items Assist x 1;Verbal cues;Tactile cues   Assistive Device Rolling walker   Distance 50'x2   Ambulation/Elevation Additional Comments inc forward lurching with RW with poor RW management. significant cues to dec gait speed for improved safety with poor carryover. inc lightheadedness when ambulating requiring  seated rest break. BP post ambulation 145/79 EOB.   Balance   Static Sitting Fair +   Dynamic Sitting Fair   Static Standing Fair -  (w/ RW)   Dynamic Standing Poor  (w/ RW)   Ambulatory Poor  (w/ RW)   Activity Tolerance   Activity Tolerance Patient tolerated treatment well   Medical Staff Made Aware OT Divina   Nurse Made Aware RN yes   Assessment   Prognosis Fair   Problem List Decreased strength;Decreased endurance;Impaired balance;Decreased mobility;Impaired judgement;Decreased safety awareness   Assessment Patient was seen today per POC. Overall, pt demonstrated improved mobility and inc tolerance to activity. Pt impulsive throughout session with significant cues provided for RW management, posture, and hand placement for improved safety. Required S for supine to sit, minAx1 for sit<>stand, and ambulation. Pt able to ambulate 50'x2 with RW. Inc forward lurching with RW, bilateral out toeing with poor RW management. Significant cues to dec gait speed for improved safety with poor carryover. Inc lightheadedness when ambulating requiring seated rest break. BP post ambulation 145/79 EOB. Reinforcement of cues required for all mobility for improved safety. Will continue to see pt per POC as tolerated. Based on pt presentation and impaired function, pt would benefit from level III, (minimum resource intensity) at D/C. The patient's AM-PAC Basic Mobility Inpatient Short Form Raw Score is 20. A Raw score of greater than 16 suggests the patient may benefit from discharge to home. Please also refer to the recommendation of the Physical Therapist for safe discharge planning. [facility] staff to continue to mobilized pt (OOB in chair for all meals & ambulate in room/unit) as tolerated to prevent further decline in function. [facility] staff notified.   Goals   Patient Goals to go home   STG Expiration Date 05/28/24   PT Treatment Day 2   Plan   Treatment/Interventions Functional transfer training;LE  strengthening/ROM;Elevations;Therapeutic exercise;Endurance training;Patient/family training;Bed mobility;Gait training;Spoke to nursing;OT   Progress Progressing toward goals   PT Frequency 3-5x/wk   Discharge Recommendation   Rehab Resource Intensity Level, PT III (Minimum Resource Intensity)   Equipment Recommended Walker  (pt owns)   AM-PAC Basic Mobility Inpatient   Turning in Flat Bed Without Bedrails 4   Lying on Back to Sitting on Edge of Flat Bed Without Bedrails 4   Moving Bed to Chair 3   Standing Up From Chair Using Arms 3   Walk in Room 3   Climb 3-5 Stairs With Railing 3   Basic Mobility Inpatient Raw Score 20   Basic Mobility Standardized Score 43.99   [facility] Highest Level Of Mobility   JH-HLM Goal 6: Walk 10 steps or more   JH-HLM Achieved 7: Walk 25 feet or more     Guerline Zavala, PT

## 2024-05-15 NOTE — PLAN OF CARE
Problem: PHYSICAL THERAPY ADULT  Goal: Performs mobility at highest level of function for planned discharge setting.  See evaluation for individualized goals.  Description: Treatment/Interventions: ADL retraining, Functional transfer training, LE strengthening/ROM, Elevations, Therapeutic exercise, Endurance training, Patient/family training, Equipment eval/education, Bed mobility, Gait training, Compensatory technique education, Continued evaluation, Spoke to nursing, Spoke to case management, OT  Equipment Recommended:  (walker-pt has)       See flowsheet documentation for full assessment, interventions and recommendations.  Outcome: Progressing  Note: Prognosis: Fair  Problem List: Decreased strength, Decreased endurance, Impaired balance, Decreased mobility, Impaired judgement, Decreased safety awareness  Assessment: Patient was seen today per POC. Overall, pt demonstrated improved mobility and inc tolerance to activity. Pt impulsive throughout session with significant cues provided for RW management, posture, and hand placement for improved safety. Required S for supine to sit, minAx1 for sit<>stand, and ambulation. Pt able to ambulate 50'x2 with RW. Inc forward lurching with RW, bilateral out toeing with poor RW management. Significant cues to dec gait speed for improved safety with poor carryover. Inc lightheadedness when ambulating requiring seated rest break. BP post ambulation 145/79 EOB. Reinforcement of cues required for all mobility for improved safety. Will continue to see pt per POC as tolerated. Based on pt presentation and impaired function, pt would benefit from level III, (minimum resource intensity) at D/C. The patient's AM-PAC Basic Mobility Inpatient Short Form Raw Score is 20. A Raw score of greater than 16 suggests the patient may benefit from discharge to home. Please also refer to the recommendation of the Physical Therapist for safe discharge planning. [facility] [place] to [place]  pt (OOB in chair for all meals & ambulate in room/unit) as tolerated to prevent further decline in function. Nsg staff notified.  Barriers to Discharge: Decreased caregiver support, Inaccessible home environment  Barriers to Discharge Comments: has support from spouse, but requires assistance wiht all mobility, increased fall risk, decreased insight to deficits  Rehab Resource Intensity Level, PT: III (Minimum Resource Intensity)    See flowsheet documentation for full assessment.

## 2024-05-15 NOTE — PROGRESS NOTES
NEPHROLOGY HOSPITAL PROGRESS NOTE   Stef Pack Sr. 59 y.o. male MRN: [id number]  Unit/Bed#: -01 Encounter: [id number]  Reason for Consult: CKD stage V, hypermagnesemia    ASSESSMENT and PLAN:  Stef Pack Sr. is a 59 y.o. male who was admitted to [facility] after presenting with neuropathy. A renal consultation is requested today for assistance in the management of CKD.     CKD stage V.  Outpatient nephrologist Dr. Sepideh Bowser, [facility] nephrology.  Etiology thought to be due to diabetes, previous ischemic insults, obstructive uropathy in setting of neurogenic bladder and arterionephrosclerosis.  He has progressive worsening of kidney function, most recent outpatient creatinine 4.83.  Creatinine today 5.05.  No indication for emergent renal replacement therapy.  His nephrologist has started the process for vascular access placement, he was supposed to get vein mapping yesterday.  Trend BMP and monitor needs for renal replacement therapy.     2.  Hypermagnesemia.  Most likely due to decreased clearance in setting of advanced CKD.  He did take magnesium citrate for constipation few weeks ago.  He has worsening neuropathy but no paralysis.  Admission EKG showing NSR, ND interval 152, QRS duration 90, QTc 437.  EKG 05/15 showing NSR, ND interval 154, QRS duration 94, QTc 450.  No events on telemetry.  Status post administration of IV fluids and IV Lasix on 05/13 and 05/14 with improvement in serum magnesium level to 4.4 today from 5.3 on admission.  Kidney ultrasound without renal obstruction.  Start torsemide 20 mg daily.  Give a dose of metolazone 5 mg x 1.  Monitor serum magnesium level.    3.  History of hyperkalemia.  Serum potassium level today 4.8.  Continue low potassium diet.     4.  Hypertension.  Blood pressure currently improved since admission.  Continue amlodipine 5 mg daily.     5.  Secondary hyperparathyroidism of renal origin.  Serum phosphorus level 7.4.  Increase sevelamer to  2 tablet 3 times daily with meals.  Low phosphorus diet.  Monitor PTH as outpatient.     6.  Anemia in CKD.  Hemoglobin very close to goal, 9.8 today.  Continue outpatient surveillance.    Discussed with internal medicine team.  After discussion, we agreed that serum magnesium level is currently improving with diuretics and to start oral diuretics and if serum magnesium continues to improve to discharge patient tomorrow with close follow-up with outpatient nephrologist.    SUBJECTIVE / 24H INTERVAL HISTORY:  Urine output recorded as 3125 cc.  Denies dyspnea.  Denies leg swelling.  Neuropathy pain has almost resolved.  Denies weakness in his extremities.    OBJECTIVE:  Current Weight: Weight - Scale: 72.6 kg (160 lb)  Vitals:    05/14/24 1920 05/14/24 2252 05/15/24 0308 05/15/24 0638   BP:  149/76 149/74 146/74   BP Location:       Pulse:  67 72 72   Resp:       Temp:  97.9 °F (36.6 °C) 97.9 °F (36.6 °C) 98.1 °F (36.7 °C)   TempSrc:       SpO2: 95% 95% 93% 95%   Weight:       Height:           Intake/Output Summary (Last 24 hours) at 5/15/2024 0650  Last data filed at 5/15/2024 0301  Gross per 24 hour   Intake 280 ml   Output 3125 ml   Net -2845 ml     Review of Systems   Constitutional:  Negative for chills and fever.   HENT:  Negative for ear pain and sore throat.    Eyes:  Negative for pain and visual disturbance.   Respiratory:  Negative for cough and shortness of breath.    Cardiovascular:  Negative for chest pain and palpitations.   Gastrointestinal:  Negative for abdominal pain and vomiting.   Genitourinary:  Negative for dysuria and hematuria.   Musculoskeletal:  Negative for arthralgias and back pain.   Skin:  Negative for color change and rash.   Neurological:  Negative for seizures and syncope.   All other systems reviewed and are negative.    Physical Exam  Vitals and nursing note reviewed.   Constitutional:       General: He is not in acute distress.     Appearance: He is well-developed.   HENT:       Head: Normocephalic and atraumatic.   Eyes:      Conjunctiva/sclera: Conjunctivae normal.   Cardiovascular:      Rate and Rhythm: Normal rate and regular rhythm.      Pulses: Normal pulses.      Heart sounds: Normal heart sounds. No murmur heard.  Pulmonary:      Effort: Pulmonary effort is normal. No respiratory distress.      Breath sounds: Normal breath sounds.   Abdominal:      Palpations: Abdomen is soft.      Tenderness: There is no abdominal tenderness.   Musculoskeletal:         General: No swelling.      Cervical back: Neck supple.   Skin:     General: Skin is warm and dry.      Capillary Refill: Capillary refill takes less than 2 seconds.   Neurological:      General: No focal deficit present.      Mental Status: He is alert and oriented to person, place, and time. Mental status is at baseline.   Psychiatric:         Mood and Affect: Mood normal.       Medications:    Current Facility-Administered Medications:     acetaminophen (TYLENOL) tablet 488 mg, 488 mg, Oral, Q4H PRN, Madison Ely MD    allopurinol (ZYLOPRIM) tablet 100 mg, 100 mg, Oral, Daily, Madison Ely MD, 100 mg at 05/14/24 0859    amLODIPine (NORVASC) tablet 5 mg, 5 mg, Oral, Daily, Madison Ely MD, 5 mg at 05/14/24 0859    DULoxetine (CYMBALTA) delayed release capsule 20 mg, 20 mg, Oral, Daily, Madison Ely MD, 20 mg at 05/14/24 0859    hydrALAZINE (APRESOLINE) injection 5 mg, 5 mg, Intravenous, Q6H PRN, Madison Ely MD    insulin glargine (LANTUS) subcutaneous injection 5 Units 0.05 mL, 5 Units, Subcutaneous, HS, Madison Ely MD, 5 Units at 05/14/24 2133    insulin lispro (HumALOG/ADMELOG) 100 units/mL subcutaneous injection 1-6 Units, 1-6 Units, Subcutaneous, TID AC **AND** Fingerstick Glucose (POCT), , , TID AC, Madison Ely MD    insulin lispro (HumALOG/ADMELOG) 100 units/mL subcutaneous injection 1-6 Units, 1-6 Units, Subcutaneous, HS, Madison Ely MD, 1 Units at 05/13/24 2112    lidocaine  "(LIDODERM) 5 % patch 1 patch, 1 patch, Topical, Daily, Madison Ely MD, 1 patch at 05/14/24 0859    multi-electrolyte (PLASMALYTE-A/ISOLYTE-S PH 7.4) IV solution, 50 mL/hr, Intravenous, Continuous, Madison Ely MD, Last Rate: 50 mL/hr at 05/15/24 0644, 50 mL/hr at 05/15/24 0644    nicotine (NICODERM CQ) 14 mg/24hr TD 24 hr patch 1 patch, 1 patch, Transdermal, Daily, Madison Ely MD    ondansetron (ZOFRAN) injection 4 mg, 4 mg, Intravenous, Q6H PRN, Madison Ely MD    oxyCODONE-acetaminophen (PERCOCET) 5-325 mg per tablet 1 tablet, 1 tablet, Oral, Q4H PRN, Madison Ely MD, 1 tablet at 05/15/24 0308    pregabalin (LYRICA) capsule 75 mg, 75 mg, Oral, BID, Madison Ely MD, 75 mg at 05/14/24 1702    senna-docusate sodium (SENOKOT S) 8.6-50 mg per tablet 1 tablet, 1 tablet, Oral, BID, Madison Ely MD, 1 tablet at 05/14/24 1702    sevelamer (RENAGEL) tablet 800 mg, 800 mg, Oral, TID With Meals, Branden Smiley MD, 800 mg at 05/14/24 1702    tamsulosin (FLOMAX) capsule 0.4 mg, 0.4 mg, Oral, Daily With Dinner, Madison Ely MD, 0.4 mg at 05/14/24 1702    Laboratory Results:  Results from last 7 days   Lab Units 05/15/24  0508 05/14/24  0517 05/13/24  1453   WBC Thousand/uL 8.87 8.72 11.73*   HEMOGLOBIN g/dL 9.8* 9.9* 10.0*   HEMATOCRIT % 30.2* 31.3* 31.6*   PLATELETS Thousands/uL 188 180 196   POTASSIUM mmol/L 4.8 5.0 5.3   CHLORIDE mmol/L 101 105 101   CO2 mmol/L 26 27 25   BUN mg/dL 67* 62* 62*   CREATININE mg/dL 5.05* 4.86* 4.57*   CALCIUM mg/dL 9.0 8.7 8.9   MAGNESIUM mg/dL 4.4* 4.7* 5.3*   PHOSPHORUS mg/dL 7.4* 7.2*  --        Portions of the record may have been created with voice recognition software. Occasional wrong word or \"sound a like\" substitutions may have occurred due to the inherent limitations of voice recognition software. Read the chart carefully and recognize, using context, where substitutions have occurred. If you have any questions, please contact the dictating " provider.

## 2024-05-15 NOTE — ASSESSMENT & PLAN NOTE
Magnesium level is 5.3  Likely secondary to decreased clearance due to CKD stage v  Was taking magnesium citrate which remain on hold  EKG showing normal sinus rhythm, normal NH interval QRS duration normal, QTc 437  Evaluated by nephrology, recommending start IV hydration, also provide 1 dose of Lasix; on 5/14: add lasix 80 mg x 1, metolazone 5 mg x 1 dose. Will need loop diuretic outpatient for ongoing management.   Nephrology to add oral diuretics on 5/15, if magnesium stable tomorrow, anticipate discharge tomorrow.   Continue to monitor patient on telemetry.

## 2024-05-15 NOTE — PLAN OF CARE
Problem: Potential for Falls  Goal: Patient will remain free of falls  Description: INTERVENTIONS:  - Educate patient/family on patient safety including physical limitations  - Instruct patient to call for assistance with activity   - Consult OT/PT to assist with strengthening/mobility   - Keep Call bell within reach  - Keep bed low and locked with side rails adjusted as appropriate  - Keep care items and personal belongings within reach  - Initiate and maintain comfort rounds  - Make Fall Risk Sign visible to staff  - Offer Toileting every 2 Hours, in advance of need  - Initiate/Maintain bed alarm  - Obtain necessary fall risk management equipment walker   - Apply yellow socks and bracelet for high fall risk patients  - Consider moving patient to room near nurses station  Outcome: Progressing     Problem: PAIN - ADULT  Goal: Verbalizes/displays adequate comfort level or baseline comfort level  Description: Interventions:  - Encourage patient to monitor pain and request assistance  - Assess pain using appropriate pain scale  - Administer analgesics based on type and severity of pain and evaluate response  - Implement non-pharmacological measures as appropriate and evaluate response  - Consider cultural and social influences on pain and pain management  - Notify physician/advanced practitioner if interventions unsuccessful or patient reports new pain  Outcome: Progressing     Problem: SAFETY ADULT  Goal: Patient will remain free of falls  Description: INTERVENTIONS:  - Educate patient/family on patient safety including physical limitations  - Instruct patient to call for assistance with activity   - Consult OT/PT to assist with strengthening/mobility   - Keep Call bell within reach  - Keep bed low and locked with side rails adjusted as appropriate  - Keep care items and personal belongings within reach  - Initiate and maintain comfort rounds  - Make Fall Risk Sign visible to staff  - Offer Toileting every 2 Hours,  in advance of need  - Initiate/Maintain bed alarm  - Obtain necessary fall risk management equipment   - Apply yellow socks and bracelet for high fall risk patients  - Consider moving patient to room near nurses station  Outcome: Progressing  Goal: Maintain or return to baseline ADL function  Description: INTERVENTIONS:  -  Assess patient's ability to carry out ADLs; assess patient's baseline for ADL function and identify physical deficits which impact ability to perform ADLs (bathing, care of mouth/teeth, toileting, grooming, dressing, etc.)  - Assess/evaluate cause of self-care deficits   - Assess range of motion  - Assess patient's mobility; develop plan if impaired  - Assess patient's need for assistive devices and provide as appropriate  - Encourage maximum independence but intervene and supervise when necessary  - Involve family in performance of ADLs  - Assess for home care needs following discharge   - Consider OT consult to assist with ADL evaluation and planning for discharge  - Provide patient education as appropriate  Outcome: Progressing  Goal: Maintains/Returns to pre admission functional level  Description: INTERVENTIONS:  - Perform AM-PAC 6 Click Basic Mobility/ Daily Activity assessment daily.  - Set and communicate daily mobility goal to care team and patient/family/caregiver.   - Collaborate with rehabilitation services on mobility goals if consulted  - Perform Range of Motion 3 times a day.  - Reposition patient every 2 hours.  - Dangle patient 3 times a day  - Stand patient 3 times a day  - Ambulate patient 3 times a day  - Out of bed to chair 3 times a day   - Out of bed for meals 3 times a day  - Out of bed for toileting  - Record patient progress and toleration of activity level   Outcome: Progressing     Problem: DISCHARGE PLANNING  Goal: Discharge to home or other facility with appropriate resources  Description: INTERVENTIONS:  - Identify barriers to discharge w/patient and caregiver  -  Arrange for needed discharge resources and transportation as appropriate  - Identify discharge learning needs (meds, wound care, etc.)  - Arrange for interpretive services to assist at discharge as needed  - Refer to Case Management Department for coordinating discharge planning if the patient needs post-hospital services based on physician/advanced practitioner order or complex needs related to functional status, cognitive ability, or social support system  Outcome: Progressing     Problem: Knowledge Deficit  Goal: Patient/family/caregiver demonstrates understanding of disease process, treatment plan, medications, and discharge instructions  Description: Complete learning assessment and assess knowledge base.  Interventions:  - Provide teaching at level of understanding  - Provide teaching via preferred learning methods  Outcome: Progressing

## 2024-05-15 NOTE — ASSESSMENT & PLAN NOTE
Lab Results   Component Value Date    EGFR 11 05/15/2024    EGFR 12 05/14/2024    EGFR 13 05/13/2024    CREATININE 5.05 (H) 05/15/2024    CREATININE 4.86 (H) 05/14/2024    CREATININE 4.57 (H) 05/13/2024     Outpatient creatinine appears to be around 4.83 most recently, today creatinine is 4.86, but still within baseline. No indication for emergent dialysis.   Outpatient nephrologist has started process for vascular access placement.   Monitor creatinine  Avoid nephrotoxic medications, NSAIDs, hypotension

## 2024-05-16 ENCOUNTER — TELEPHONE (OUTPATIENT)
Dept: NEPHROLOGY | Facility: CLINIC | Age: 59
End: 2024-05-16

## 2024-05-16 VITALS
SYSTOLIC BLOOD PRESSURE: 179 MMHG | TEMPERATURE: 97.9 F | HEART RATE: 74 BPM | DIASTOLIC BLOOD PRESSURE: 87 MMHG | HEIGHT: 72 IN | BODY MASS INDEX: 21.67 KG/M2 | RESPIRATION RATE: 17 BRPM | WEIGHT: 160 LBS | OXYGEN SATURATION: 96 %

## 2024-05-16 LAB
ANION GAP SERPL CALCULATED.3IONS-SCNC: 11 MMOL/L (ref 4–13)
BUN SERPL-MCNC: 70 MG/DL (ref 5–25)
CALCIUM SERPL-MCNC: 9.1 MG/DL (ref 8.4–10.2)
CHLORIDE SERPL-SCNC: 100 MMOL/L (ref 96–108)
CO2 SERPL-SCNC: 25 MMOL/L (ref 21–32)
CREAT SERPL-MCNC: 5.17 MG/DL (ref 0.6–1.3)
ERYTHROCYTE [DISTWIDTH] IN BLOOD BY AUTOMATED COUNT: 15.6 % (ref 11.6–15.1)
GFR SERPL CREATININE-BSD FRML MDRD: 11 ML/MIN/1.73SQ M
GLUCOSE SERPL-MCNC: 101 MG/DL (ref 65–140)
GLUCOSE SERPL-MCNC: 111 MG/DL (ref 65–140)
GLUCOSE SERPL-MCNC: 131 MG/DL (ref 65–140)
HCT VFR BLD AUTO: 31.6 % (ref 36.5–49.3)
HGB BLD-MCNC: 10.2 G/DL (ref 12–17)
MAGNESIUM SERPL-MCNC: 4 MG/DL (ref 1.9–2.7)
MCH RBC QN AUTO: 30.9 PG (ref 26.8–34.3)
MCHC RBC AUTO-ENTMCNC: 32.3 G/DL (ref 31.4–37.4)
MCV RBC AUTO: 96 FL (ref 82–98)
PHOSPHATE SERPL-MCNC: 7.7 MG/DL (ref 2.7–4.5)
PLATELET # BLD AUTO: 197 THOUSANDS/UL (ref 149–390)
PMV BLD AUTO: 10.8 FL (ref 8.9–12.7)
POTASSIUM SERPL-SCNC: 4.6 MMOL/L (ref 3.5–5.3)
RBC # BLD AUTO: 3.3 MILLION/UL (ref 3.88–5.62)
SODIUM SERPL-SCNC: 136 MMOL/L (ref 135–147)
WBC # BLD AUTO: 7.51 THOUSAND/UL (ref 4.31–10.16)

## 2024-05-16 PROCEDURE — 99232 SBSQ HOSP IP/OBS MODERATE 35: CPT | Performed by: INTERNAL MEDICINE

## 2024-05-16 PROCEDURE — 84100 ASSAY OF PHOSPHORUS: CPT

## 2024-05-16 PROCEDURE — 85027 COMPLETE CBC AUTOMATED: CPT

## 2024-05-16 PROCEDURE — 83735 ASSAY OF MAGNESIUM: CPT

## 2024-05-16 PROCEDURE — 80048 BASIC METABOLIC PNL TOTAL CA: CPT

## 2024-05-16 PROCEDURE — 99239 HOSP IP/OBS DSCHRG MGMT >30: CPT

## 2024-05-16 PROCEDURE — 82948 REAGENT STRIP/BLOOD GLUCOSE: CPT

## 2024-05-16 RX ORDER — SEVELAMER HYDROCHLORIDE 800 MG/1
1600 TABLET, FILM COATED ORAL
Qty: 180 TABLET | Refills: 0 | Status: ON HOLD | OUTPATIENT
Start: 2024-05-16 | End: 2024-06-15

## 2024-05-16 RX ORDER — ALBUTEROL SULFATE 90 UG/1
2 AEROSOL, METERED RESPIRATORY (INHALATION) EVERY 6 HOURS PRN
Qty: 8.5 G | Refills: 0 | Status: ON HOLD | OUTPATIENT
Start: 2024-05-16

## 2024-05-16 RX ORDER — DULOXETIN HYDROCHLORIDE 20 MG/1
20 CAPSULE, DELAYED RELEASE ORAL DAILY
Qty: 30 CAPSULE | Refills: 0 | Status: ON HOLD | OUTPATIENT
Start: 2024-05-17

## 2024-05-16 RX ORDER — PREGABALIN 75 MG/1
75 CAPSULE ORAL 2 TIMES DAILY
Status: ON HOLD
Start: 2024-05-16 | End: 2024-05-31

## 2024-05-16 RX ORDER — TORSEMIDE 20 MG/1
20 TABLET ORAL DAILY
Qty: 30 TABLET | Refills: 0 | Status: ON HOLD | OUTPATIENT
Start: 2024-05-17 | End: 2024-05-22

## 2024-05-16 RX ADMIN — GUAIFENESIN 600 MG: 600 TABLET ORAL at 09:38

## 2024-05-16 RX ADMIN — DULOXETINE HYDROCHLORIDE 20 MG: 20 CAPSULE, DELAYED RELEASE ORAL at 09:38

## 2024-05-16 RX ADMIN — AMLODIPINE BESYLATE 5 MG: 5 TABLET ORAL at 09:38

## 2024-05-16 RX ADMIN — ALLOPURINOL 100 MG: 100 TABLET ORAL at 09:38

## 2024-05-16 RX ADMIN — PREGABALIN 75 MG: 75 CAPSULE ORAL at 09:38

## 2024-05-16 RX ADMIN — SENNOSIDES AND DOCUSATE SODIUM 1 TABLET: 8.6; 5 TABLET ORAL at 09:38

## 2024-05-16 RX ADMIN — LIDOCAINE 1 PATCH: 50 PATCH CUTANEOUS at 09:39

## 2024-05-16 RX ADMIN — OXYCODONE HYDROCHLORIDE AND ACETAMINOPHEN 1 TABLET: 5; 325 TABLET ORAL at 00:52

## 2024-05-16 RX ADMIN — SEVELAMER HYDROCHLORIDE 1600 MG: 800 TABLET, FILM COATED ORAL at 07:43

## 2024-05-16 RX ADMIN — TORSEMIDE 20 MG: 20 TABLET ORAL at 09:38

## 2024-05-16 RX ADMIN — SEVELAMER HYDROCHLORIDE 1600 MG: 800 TABLET, FILM COATED ORAL at 11:36

## 2024-05-16 NOTE — DISCHARGE SUMMARY
[facility]  Discharge- Stef Pack Sr. 1965, 59 y.o. male MRN: [id number]  Unit/Bed#: -01 Encounter: [id number]  Primary Care Provider: Rehan Mancia DO   Date and time admitted to hospital: 5/13/2024  2:25 PM    * Hypermagnesemia  Assessment & Plan  Magnesium level is 5.3  Likely secondary to decreased clearance due to CKD stage v  Was taking magnesium citrate which remain on hold  EKG showing normal sinus rhythm, normal MD interval QRS duration normal, QTc 437  Evaluated by nephrology, recommending start IV hydration, also provide 1 dose of Lasix; on 5/14: add lasix 80 mg x 1, metolazone 5 mg x 1 dose. Will need loop diuretic outpatient for ongoing management.   Nephrology: discharge on torsemide 20 mg qd, continue sevelamer 2 tabs tid with meals outpatient labs with nephrology    Neuropathic pain  Assessment & Plan  Continue narcotics, patient takes Lyrica, continue  Will add Cymbalta, risk versus benefits, side effects and alternative discussed in details with patient.  With improvement of pain with Cymbalta, continue on discharge.   Arterial duplex: Diffuse disease  Discussed with vascular, no concern over acute vascular disease, appears neuropathic pain as patient improved with cymbalta.   Continue outpatient pcp follow up    CKD (chronic kidney disease), stage V ([facility])  Assessment & Plan  Lab Results   Component Value Date    EGFR 11 05/16/2024    EGFR 11 05/15/2024    EGFR 12 05/14/2024    CREATININE 5.17 (H) 05/16/2024    CREATININE 5.05 (H) 05/15/2024    CREATININE 4.86 (H) 05/14/2024     Outpatient creatinine appears to be around 4.83 most recently, today creatinine is 4.86, but still within baseline. No indication for emergent dialysis.   Outpatient nephrologist has started process for vascular access placement.   Monitor creatinine  Avoid nephrotoxic medications, NSAIDs, hypotension  Outpatient follow up with nephrology    Essential hypertension  Assessment &  Plan  Continue norvasc 5 mg qd.     Diabetes mellitus type 2, insulin dependent (HCC)  Assessment & Plan  Lab Results   Component Value Date    HGBA1C 7.0 (H) 2023       Recent Labs     05/15/24  1119 05/15/24  1548 05/15/24  20524  0727   POCGLU 203* 137 163* 111         Blood Sugar Average: Last 72 hrs:  (P) [id number]  Sliding scale, hypoglycemia precaution.      Medical Problems       Resolved Problems  Date Reviewed: 2024   None       Discharging Physician / Practitioner: Charlotte Villegas PA-C  PCP: Rehan Mancia DO  Admission Date:   Admission Orders (From admission, onward)       Ordered        24 1533  INPATIENT ADMISSION  Once            24 1632  Place in Observation  Once                          Discharge Date: 24    Consultations During Hospital Stay:  Nephrology    Procedures Performed:   none    Significant Findings / Test Results:   VAS ARTERIAL DUPLEX- LOWER LIMB BILATERAL   Final Result by Jack Mascorro MD ( [id number])      US kidney and bladder   Final Result by Bello Malagon MD ( [id number])      Mildly increased bilateral renal parenchymal echogenicity consistent with medical renal disease. No hydronephrosis.      Unchanged benign-appearing left renal cysts.            Workstation performed: [id number]           Ma.3 > 4.7 > 4.4 > 4.0  Creatinine: 5.17    Incidental Findings:   none     Test Results Pending at Discharge (will require follow up):   none     Outpatient Tests Requested:  Follow up with PCP in 1 week  Follow up with nephrology  Follow up with repeat bmp and mag per nephrology recs    Complications:  none    Reason for Admission: hypermagnesemia     Hospital Course:   Stef Pack Sr. is a 59 y.o. male patient who originally presented to the hospital on 2024 due to foot pain that has been ongoing for sometime. When being worked up, found to have elevated mag level. Nephrology consulted in the ED and  recommended starting on IVF rehydration and IV lasix. US kidney and bladder was ordered as well. Results above. Had improvement of mag level with IV lasix, was given another dose of IV lasix and iv metolazone which assisted with excretion of mag. Mag continued to decrease and patient was transitioned to oral torsemide 20 mg qd. Was also given 1 more dose of metolazone. Noted to have elevated phos level as well and sevelamer was increased to 2 tablets tid with meals on discharge per nephrology recs. Due to having the leg pain, arterial duplex was ordered and patient started on cymbalta. Patient had significant improvement of his leg pain with this. Had some diffuse vascular disease in the legs, discussed with vascular and stated that no vascular concern for the pain as his pain improved with the cymbalta, likely neuropathy. Stable for discharge from nephrology stance regarding mag and phos levels. Should continue with torsemide on discharge. Should follow up with PCP in 1 week. Follow up with nephrology outpatient. Follow up has been arranged with his nephrologist already along with labs outpatient. Instructed patient to return to the ED if there are any new or worsening symptoms. Verbalized understanding and agreement to the plans above.     Please see above list of diagnoses and related plan for additional information.     Condition at Discharge: fair    Discharge Day Visit / Exam:   Subjective:  Seen and examined today, patient states he is doing much better today. Said that his foot pain is significantly better. Overall he is feeling great and looking forward to going home.   Vitals: Blood Pressure: (!) 179/87 (05/16/24 1119)  Pulse: 74 (05/16/24 1119)  Temperature: 97.9 °F (36.6 °C) (05/16/24 1119)  Temp Source: Temporal (05/14/24 0252)  Respirations: 17 (05/16/24 1119)  Height: 6' (182.9 cm) (05/13/24 1405)  Weight - Scale: 72.6 kg (160 lb) (05/13/24 1405)  SpO2: 96 % (05/16/24 1119)  Exam:   Physical  Exam  Vitals reviewed.   Constitutional:       General: He is not in acute distress.     Appearance: He is not ill-appearing or toxic-appearing.   HENT:      Head: Normocephalic and atraumatic.      Mouth/Throat:      Mouth: Mucous membranes are moist.   Cardiovascular:      Rate and Rhythm: Normal rate and regular rhythm.      Heart sounds: No murmur heard.  Pulmonary:      Effort: No respiratory distress.      Breath sounds: No stridor. No wheezing, rhonchi or rales.      Comments: Decreased breath sounds bilaterally, slightly coarse breath sounds  Abdominal:      General: Bowel sounds are normal. There is no distension.      Palpations: Abdomen is soft. There is no mass.      Tenderness: There is no abdominal tenderness.   Musculoskeletal:      Right lower leg: No edema.      Left lower leg: No edema.   Skin:     General: Skin is warm and dry.   Neurological:      General: No focal deficit present.      Mental Status: He is alert and oriented to person, place, and time.   Psychiatric:         Mood and Affect: Mood normal.         Behavior: Behavior normal.          Discussion with Family: Updated  (significant other) at bedside.    Discharge instructions/Information to patient and family:   See after visit summary for information provided to patient and family.      Provisions for Follow-Up Care:  See after visit summary for information related to follow-up care and any pertinent home health orders.      Mobility at time of Discharge:   Basic Mobility Inpatient Raw Score: 20  JH-HLM Goal: 6: Walk 10 steps or more  JH-HLM Achieved: 6: Walk 10 steps or more  HLM Goal achieved. Continue to encourage appropriate mobility.     Disposition:   Home with VNA Services (Reminder: Complete face to face encounter)    Planned Readmission: no     Discharge Statement:  I spent 46 minutes discharging the patient. This time was spent on the day of discharge. I had direct contact with the patient on the day of  discharge. Greater than 50% of the total time was spent examining patient, answering all patient questions, arranging and discussing plan of care with patient as well as directly providing post-discharge instructions.  Additional time then spent on discharge activities.    Discharge Medications:  See after visit summary for reconciled discharge medications provided to patient and/or family.      **Please Note: This note may have been constructed using a voice recognition system**

## 2024-05-16 NOTE — ASSESSMENT & PLAN NOTE
Lab Results   Component Value Date    EGFR 11 05/16/2024    EGFR 11 05/15/2024    EGFR 12 05/14/2024    CREATININE 5.17 (H) 05/16/2024    CREATININE 5.05 (H) 05/15/2024    CREATININE 4.86 (H) 05/14/2024     Outpatient creatinine appears to be around 4.83 most recently, today creatinine is 4.86, but still within baseline. No indication for emergent dialysis.   Outpatient nephrologist has started process for vascular access placement.   Monitor creatinine  Avoid nephrotoxic medications, NSAIDs, hypotension  Outpatient follow up with nephrology

## 2024-05-16 NOTE — PROGRESS NOTES
NEPHROLOGY HOSPITAL PROGRESS NOTE   Stef Pack Sr. 59 y.o. male MRN: [id number]  Unit/Bed#: -01 Encounter: [id number]  Reason for Consult: CKD stage V, hypermagnesemia    ASSESSMENT and PLAN:  Stef Pack Sr. is a 59 y.o. male who was admitted to [facility] after presenting with neuropathy. A renal consultation is requested today for assistance in the management of CKD.     CKD stage V.  Outpatient nephrologist Dr. Sepideh Bowser, [facility] nephrology.  Etiology thought to be due to diabetes, previous ischemic insults, obstructive uropathy in setting of neurogenic bladder and arterionephrosclerosis.  He has progressive worsening of kidney function, most recent outpatient creatinine 4.83.  Creatinine today 5.17.  No indication for emergent renal replacement therapy.  His outpatient nephrologist has started the process for vascular access placement, he was supposed to get vein mapping this week which will need to be rescheduled.  He will need close follow-up with his nephrologist.  I have called his nephrologist and they will see him in the office within the next few weeks.     2.  Hypermagnesemia.  Most likely due to decreased clearance in setting of advanced CKD.  He did take magnesium citrate for constipation few weeks ago.  He has worsening neuropathy but no paralysis.  Serum magnesium level 5.3 on admission, serum magnesium level today 4.0.  Admission EKG showing NSR, IN interval 152, QRS duration 90, QTc 437.  EKG 05/15 showing NSR, IN interval 154, QRS duration 94, QTc 450.  Status post administration of IV fluids and IV diuretics.  Continue torsemide 20 mg daily at time of discharge.  No magnesium supplements.  No magnesium containing laxatives.  He will need to repeat magnesium level as outpatient with his nephrologist, this was discussed with his outpatient nephrologist.    3.  History of hyperkalemia.  Serum potassium level today 4.6.  Continue loop diuretic for kaliuresis.  Continue  low-dose potassium diet.     4.  Hypertension.  Pressure currently acceptable.  Continue amlodipine 5 mg daily and torsemide 20 mg daily.     5.  Secondary hyperparathyroidism of renal origin.  Sevelamer increased to 2 tablet 3 times daily with meals.  Continue low phosphorus diet.  Monitor PTH as outpatient.     6.  Anemia in CKD.  Hemoglobin today 10.2.  Continue outpatient surveillance.    Discussed with internal medicine team.  After discussion, we agreed that patient is currently stable and there are no indications to start renal replacement therapy and to discharge on torsemide 20 mg daily and sevelamer 2 tablet 3 times daily with meals.    SUBJECTIVE / 24H INTERVAL HISTORY:  Urine output recorded as 2600 cc.  Denies dyspnea.  Denies leg swelling.  Appetite is good.  He has ambulated in the hallways without issue.    OBJECTIVE:  Current Weight: Weight - Scale: 72.6 kg (160 lb)  Vitals:    05/15/24 1508 05/15/24 2009 05/15/24 2229 05/16/24 0541   BP: 148/71 153/72 152/72 147/74   Pulse: 65 71 72 68   Resp: 17      Temp: 97.9 °F (36.6 °C)  97.9 °F (36.6 °C) 97.9 °F (36.6 °C)   TempSrc:       SpO2: 92% 95% 96% 92%   Weight:       Height:           Intake/Output Summary (Last 24 hours) at 5/16/2024 0644  Last data filed at 5/16/2024 0201  Gross per 24 hour   Intake 480 ml   Output 2600 ml   Net -2120 ml     Review of Systems   Constitutional:  Negative for chills and fever.   HENT:  Negative for ear pain and sore throat.    Eyes:  Negative for pain and visual disturbance.   Respiratory:  Negative for cough and shortness of breath.    Cardiovascular:  Negative for chest pain and palpitations.   Gastrointestinal:  Negative for abdominal pain and vomiting.   Genitourinary:  Negative for dysuria and hematuria.   Musculoskeletal:  Negative for arthralgias and back pain.   Skin:  Negative for color change and rash.   Neurological:  Negative for seizures and syncope.   All other systems reviewed and are  negative.    Physical Exam  Vitals and nursing note reviewed.   Constitutional:       General: He is not in acute distress.     Appearance: He is well-developed.   HENT:      Head: Normocephalic and atraumatic.   Eyes:      Conjunctiva/sclera: Conjunctivae normal.   Cardiovascular:      Rate and Rhythm: Normal rate and regular rhythm.      Pulses: Normal pulses.      Heart sounds: Normal heart sounds. No murmur heard.  Pulmonary:      Effort: Pulmonary effort is normal. No respiratory distress.      Breath sounds: Normal breath sounds.   Abdominal:      Palpations: Abdomen is soft.      Tenderness: There is no abdominal tenderness.   Musculoskeletal:         General: No swelling.      Cervical back: Neck supple.      Right lower leg: No edema.      Left lower leg: No edema.   Skin:     General: Skin is warm and dry.      Capillary Refill: Capillary refill takes less than 2 seconds.   Neurological:      General: No focal deficit present.      Mental Status: He is alert and oriented to person, place, and time.   Psychiatric:         Mood and Affect: Mood normal.       Medications:    Current Facility-Administered Medications:     acetaminophen (TYLENOL) tablet 488 mg, 488 mg, Oral, Q4H PRN, Madison Ely MD    allopurinol (ZYLOPRIM) tablet 100 mg, 100 mg, Oral, Daily, Madison Ely MD, 100 mg at 05/15/24 0803    amLODIPine (NORVASC) tablet 5 mg, 5 mg, Oral, Daily, Madison Ely MD, 5 mg at 05/15/24 0803    DULoxetine (CYMBALTA) delayed release capsule 20 mg, 20 mg, Oral, Daily, Madison Ely MD, 20 mg at 05/15/24 0802    guaiFENesin (MUCINEX) 12 hr tablet 600 mg, 600 mg, Oral, Q12H [facility] PA-C, 600 mg at 05/15/24 2113    hydrALAZINE (APRESOLINE) injection 5 mg, 5 mg, Intravenous, Q6H PRN, Madison Ely MD    insulin glargine (LANTUS) subcutaneous injection 5 Units 0.05 mL, 5 Units, Subcutaneous, HS, Madison Ely MD, 5 Units at 05/15/24 2113    insulin lispro (HumALOG/ADMELOG)  100 units/mL subcutaneous injection 1-6 Units, 1-6 Units, Subcutaneous, TID AC, 2 Units at 05/15/24 1159 **AND** Fingerstick Glucose (POCT), , , TID AC, Madison Ely MD    insulin lispro (HumALOG/ADMELOG) 100 units/mL subcutaneous injection 1-6 Units, 1-6 Units, Subcutaneous, HS, Madison Ely MD, 1 Units at 05/15/24 2113    lidocaine (LIDODERM) 5 % patch 1 patch, 1 patch, Topical, Daily, Madison Ely MD, 1 patch at 05/15/24 0803    nicotine (NICODERM CQ) 14 mg/24hr TD 24 hr patch 1 patch, 1 patch, Transdermal, Daily, Madison Ely MD    ondansetron (ZOFRAN) injection 4 mg, 4 mg, Intravenous, Q6H PRN, Madison Ely MD    oxyCODONE-acetaminophen (PERCOCET) 5-325 mg per tablet 1 tablet, 1 tablet, Oral, Q4H PRN, Madison Ely MD, 1 tablet at 05/16/24 0052    pregabalin (LYRICA) capsule 75 mg, 75 mg, Oral, BID, Madison Ely MD, 75 mg at 05/15/24 1717    senna-docusate sodium (SENOKOT S) 8.6-50 mg per tablet 1 tablet, 1 tablet, Oral, BID, Madison Ely MD, 1 tablet at 05/15/24 1717    sevelamer (RENAGEL) tablet 1,600 mg, 1,600 mg, Oral, TID With Meals, Branden Smiley MD, 1,600 mg at 05/15/24 1717    tamsulosin (FLOMAX) capsule 0.4 mg, 0.4 mg, Oral, Daily With Dinner, Madison Ely MD, 0.4 mg at 05/15/24 1717    torsemide (DEMADEX) tablet 20 mg, 20 mg, Oral, Daily, Branden Smiley MD, 20 mg at 05/15/24 1158    Laboratory Results:  Results from last 7 days   Lab Units 05/16/24  0542 05/15/24  0508 05/14/24  0517 05/13/24  1453   WBC Thousand/uL 7.51 8.87 8.72 11.73*   HEMOGLOBIN g/dL 10.2* 9.8* 9.9* 10.0*   HEMATOCRIT % 31.6* 30.2* 31.3* 31.6*   PLATELETS Thousands/uL 197 188 180 196   POTASSIUM mmol/L  --  4.8 5.0 5.3   CHLORIDE mmol/L  --  101 105 101   CO2 mmol/L  --  26 27 25   BUN mg/dL  --  67* 62* 62*   CREATININE mg/dL  --  5.05* 4.86* 4.57*   CALCIUM mg/dL  --  9.0 8.7 8.9   MAGNESIUM mg/dL  --  4.4* 4.7* 5.3*   PHOSPHORUS mg/dL  --  7.4* 7.2*  --        Portions of the  "record may have been created with voice recognition software. Occasional wrong word or \"sound a like\" substitutions may have occurred due to the inherent limitations of voice recognition software. Read the chart carefully and recognize, using context, where substitutions have occurred. If you have any questions, please contact the dictating provider.    "

## 2024-05-16 NOTE — ASSESSMENT & PLAN NOTE
Continue narcotics, patient takes Lyrica, continue  Will add Cymbalta, risk versus benefits, side effects and alternative discussed in details with patient.  With improvement of pain with Cymbalta, continue on discharge.   Arterial duplex: Diffuse disease  Discussed with vascular, no concern over acute vascular disease, appears neuropathic pain as patient improved with cymbalta.   Continue outpatient pcp follow up

## 2024-05-16 NOTE — CASE MANAGEMENT
Case Management Discharge Planning Note    Patient name Stef Pack Sr.  Location /-01 MRN [id number]  : 1965 Date 2024       Current Admission Date: 2024  Current Admission Diagnosis:Hypermagnesemia   Patient Active Problem List    Diagnosis Date Noted Date Diagnosed    Hypermagnesemia 2024     Neuropathic pain 2024     Right knee pain 2023     Diabetic foot ulcer (HCC) 2023     CKD (chronic kidney disease) 2023     Acute metabolic encephalopathy 2023     PAD (peripheral artery disease) (HCC) 2022     Chronic back pain 2022     Moderate protein-calorie malnutrition (HCC) 2022     Chronic anemia 2022     RSV infection 2022     Chronic ulcer of left heel (HCC) 10/20/2022     Hypercalcemia 2022     Low back pain 2022     Ambulatory dysfunction 2022     CKD (chronic kidney disease), stage V (HCC) 2022     Retention of urine 2022     Severe protein-calorie malnutrition (HCC) 2022     Iron deficiency anemia secondary to inadequate dietary iron intake 2022     Hip pain, acute, left 2022     KELLY (acute kidney injury) (HCC) 2022     Hyperkalemia 2022     Diabetes mellitus type 2, insulin dependent (HCC)      Gout      Essential hypertension      History of CVA (cerebrovascular accident)      Osteomyelitis of vertebra of lumbar region (HCC)        LOS (days): 2  Geometric Mean LOS (GMLOS) (days): 2.9  Days to GMLOS:1.1     OBJECTIVE:  Risk of Unplanned Readmission Score: 19.87         Current admission status: Inpatient   Preferred Pharmacy:   [id number] [place] [facility] [address]  [address]  [facility] [address]  Phone: [phone] Fax: [phone]    Primary Care Provider: Rehan Mancia DO    Primary Insurance: [facility] REP  Secondary Insurance:     DISCHARGE DETAILS:     AVS to [facility] order uploaded in  SUSAN

## 2024-05-16 NOTE — ASSESSMENT & PLAN NOTE
Lab Results   Component Value Date    HGBA1C 7.0 (H) 03/16/2023       Recent Labs     05/15/24  1119 05/15/24  1548 05/15/24  2050 05/16/24  0727   POCGLU 203* 137 163* 111         Blood Sugar Average: Last 72 hrs:  (P) [id number]  Sliding scale, hypoglycemia precaution.

## 2024-05-16 NOTE — TELEPHONE ENCOUNTER
Left voicemail for the nurse at Dr. Bowser's office requesting a call back to review message above.

## 2024-05-16 NOTE — ASSESSMENT & PLAN NOTE
Magnesium level is 5.3  Likely secondary to decreased clearance due to CKD stage v  Was taking magnesium citrate which remain on hold  EKG showing normal sinus rhythm, normal NE interval QRS duration normal, QTc 437  Evaluated by nephrology, recommending start IV hydration, also provide 1 dose of Lasix; on 5/14: add lasix 80 mg x 1, metolazone 5 mg x 1 dose. Will need loop diuretic outpatient for ongoing management.   Nephrology: discharge on torsemide 20 mg qd, continue sevelamer 2 tabs tid with meals outpatient labs with nephrology

## 2024-05-16 NOTE — DISCHARGE INSTR - AVS FIRST PAGE
Dear Stef Pack Sr.,     It was our pleasure to care for you here at [facility]. For follow up as well as any medication refills, we recommend that you follow up with your primary care physician. Here are the most important instructions/ recommendations at discharge:     Notable Medication Adjustments -   Start taking torsemide 20 mg daily  Start taking Cymbalta 20 mg daily   Testing Required after Discharge -   Repeat BMP and magnesium in 3 days  Important follow up information -   Follow up with PCP in 1 week  Follow up with nephrology outpatient  Other Instructions -   Please continue taking medications as prescribed. Please return to the ED if you have any new or worsening symptoms.   Please review this entire after visit summary as additional general instructions including medication list, appointments, activity, diet, any pertinent wound care, and other additional recommendations from your care team that may be provided for you.      Sincerely,     Charlotte Villegas PA-C

## 2024-05-16 NOTE — PROGRESS NOTES
Patient:    MRN:  [id number]    Suad Request ID:  [id number]    Level of care reserved:  Home Health Agency    Partner Reserved:  [facility], [place] [address] [phone]    Clinical needs requested:    Geography searched:  [id number]    Start of Service:    Request sent:  1:36pm EDT on 5/15/2024 by Mandi Bennett    Partner reserved:  9:30am EDT on 5/16/2024 by Mandi Bennett    Choice list shared:  8:43am EDT on 5/16/2024 by Mandi Bennett

## 2024-05-16 NOTE — PLAN OF CARE
Problem: Potential for Falls  Goal: Patient will remain free of falls  Description: INTERVENTIONS:  - Educate patient/family on patient safety including physical limitations  - Instruct patient to call for assistance with activity   - Consult OT/PT to assist with strengthening/mobility   - Keep Call bell within reach  - Keep bed low and locked with side rails adjusted as appropriate  - Keep care items and personal belongings within reach  - Initiate and maintain comfort rounds  - Make Fall Risk Sign visible to staff  - Apply yellow socks and bracelet for high fall risk patients  - Consider moving patient to room near nurses station  Outcome: Progressing     Problem: PAIN - ADULT  Goal: Verbalizes/displays adequate comfort level or baseline comfort level  Description: Interventions:  - Encourage patient to monitor pain and request assistance  - Assess pain using appropriate pain scale  - Administer analgesics based on type and severity of pain and evaluate response  - Implement non-pharmacological measures as appropriate and evaluate response  - Consider cultural and social influences on pain and pain management  - Notify physician/advanced practitioner if interventions unsuccessful or patient reports new pain  Outcome: Progressing     Problem: SAFETY ADULT  Goal: Patient will remain free of falls  Description: INTERVENTIONS:  - Educate patient/family on patient safety including physical limitations  - Instruct patient to call for assistance with activity   - Consult OT/PT to assist with strengthening/mobility   - Keep Call bell within reach  - Keep bed low and locked with side rails adjusted as appropriate  - Keep care items and personal belongings within reach  - Initiate and maintain comfort rounds  - Make Fall Risk Sign visible to staff  - Apply yellow socks and bracelet for high fall risk patients  - Consider moving patient to room near nurses station  Outcome: Progressing  Goal: Maintain or return to baseline  ADL function  Description: INTERVENTIONS:  -  Assess patient's ability to carry out ADLs; assess patient's baseline for ADL function and identify physical deficits which impact ability to perform ADLs (bathing, care of mouth/teeth, toileting, grooming, dressing, etc.)  - Assess/evaluate cause of self-care deficits   - Assess range of motion  - Assess patient's mobility; develop plan if impaired  - Assess patient's need for assistive devices and provide as appropriate  - Encourage maximum independence but intervene and supervise when necessary  - Involve family in performance of ADLs  - Assess for home care needs following discharge   - Consider OT consult to assist with ADL evaluation and planning for discharge  - Provide patient education as appropriate  Outcome: Progressing  Goal: Maintains/Returns to pre admission functional level  Description: INTERVENTIONS:  - Perform AM-PAC 6 Click Basic Mobility/ Daily Activity assessment daily.  - Set and communicate daily mobility goal to care team and patient/family/caregiver.   - Collaborate with rehabilitation services on mobility goals if consulted  - Out of bed for toileting  - Record patient progress and toleration of activity level   Outcome: Progressing     Problem: DISCHARGE PLANNING  Goal: Discharge to home or other facility with appropriate resources  Description: INTERVENTIONS:  - Identify barriers to discharge w/patient and caregiver  - Arrange for needed discharge resources and transportation as appropriate  - Identify discharge learning needs (meds, wound care, etc.)  - Arrange for interpretive services to assist at discharge as needed  - Refer to Case Management Department for coordinating discharge planning if the patient needs post-hospital services based on physician/advanced practitioner order or complex needs related to functional status, cognitive ability, or social support system  Outcome: Progressing     Problem: Knowledge Deficit  Goal:  Patient/family/caregiver demonstrates understanding of disease process, treatment plan, medications, and discharge instructions  Description: Complete learning assessment and assess knowledge base.  Interventions:  - Provide teaching at level of understanding  - Provide teaching via preferred learning methods  Outcome: Progressing

## 2024-05-16 NOTE — CASE MANAGEMENT
Case Management Discharge Planning Note    Patient name Stef Pack Sr.  Location /-01 MRN [id number]  : 1965 Date 2024       Current Admission Date: 2024  Current Admission Diagnosis:Hypermagnesemia   Patient Active Problem List    Diagnosis Date Noted Date Diagnosed    Hypermagnesemia 2024     Neuropathic pain 2024     Right knee pain 2023     Diabetic foot ulcer (HCC) 2023     CKD (chronic kidney disease) 2023     Acute metabolic encephalopathy 2023     PAD (peripheral artery disease) (HCC) 2022     Chronic back pain 2022     Moderate protein-calorie malnutrition (HCC) 2022     Chronic anemia 2022     RSV infection 2022     Chronic ulcer of left heel (HCC) 10/20/2022     Hypercalcemia 2022     Low back pain 2022     Ambulatory dysfunction 2022     CKD (chronic kidney disease), stage V (HCC) 2022     Retention of urine 2022     Severe protein-calorie malnutrition (HCC) 2022     Iron deficiency anemia secondary to inadequate dietary iron intake 2022     Hip pain, acute, left 2022     KELLY (acute kidney injury) (HCC) 2022     Hyperkalemia 2022     Diabetes mellitus type 2, insulin dependent (HCC)      Gout      Essential hypertension      History of CVA (cerebrovascular accident)      Osteomyelitis of vertebra of lumbar region (HCC)        LOS (days): 2  Geometric Mean LOS (GMLOS) (days): 2.9  Days to GMLOS:1.2     OBJECTIVE:  Risk of Unplanned Readmission Score: 19.87         Current admission status: Inpatient   Preferred Pharmacy:   [id number] [address]  [address]  [facility] [address]  Phone: [phone] Fax: [phone]    Primary Care Provider: Rehan Mancia DO    Primary Insurance: [employer] [facility] RE[facility]  Secondary Insurance:     DISCHARGE DETAILS:    Discharge planning discussed with:: patient                            Requested Home Health Care         Home Health Agency Name:: [facility]  HHA External Referral Reason (only applicable if external HHA name selected): Services not provided in network or near patient location         Other Referral/Resources/Interventions Provided:  Referral Comments: [facility]                                             IMM Given (Date):: 05/16/24  IMM Given to:: Patient  Family notified:: appeal rights provided to patient           CM met with patient to review [facility] Provider Choice List for HHC.  Patient agreeable for either agency, indicating to go with agency who can start first, no preference. CM secured [facility].    CM spoke to patient at the bedside, reviewed DC IMM with patient and informed that patient can stay an additional 4 hours for reconsidering appealing the discharge as the medicare rights were review on the day of discharge. Pt verbalized understanding and feels ready to go home and does not intend to stay 4 hours to reconsider.  IMM placed in bin for filing

## 2024-05-16 NOTE — TELEPHONE ENCOUNTER
----- Message from Branden Smiley MD sent at 5/16/2024  1:07 PM EDT -----  Regarding: Important communication for [facility] nephrology  Patient was seen in the hospital for advanced CKD and hypermagnesemia.  Patient follows with Dr. Bowser at [facility] nephrology, [phone].  I had discussed with Dr. Bowser yesterday.  Magnesium level was improving at the time of discharge (4.0 at time of discharge, 5.3 at time of admission).  He was discharged on torsemide 20 mg daily.  Please let Dr. Bowser's office know that patient should get renal function panel and magnesium level checked by Monday to follow-up on kidney function and magnesium level.  Thank you.

## 2024-05-17 NOTE — TELEPHONE ENCOUNTER
Left second voicemail for the nurse at Dr. Bowser's office requesting a call back to review message above.

## 2024-05-17 NOTE — UTILIZATION REVIEW
NOTIFICATION OF ADMISSION DISCHARGE   This is a Notification of Discharge from [facility]. Please be advised that this patient has been discharge from our facility. Below you will find the admission and discharge date and time including the patient’s disposition.   UTILIZATION REVIEW CONTACT:  Ana Mendoza  Utilization   Network Utilization Review Department  Phone: [phone] x carefully listen to the prompts. All voicemails are confidential.  Email: [email or web address]     ADMISSION INFORMATION  PRESENTATION DATE: 5/13/2024  2:25 PM  OBERVATION ADMISSION DATE:   INPATIENT ADMISSION DATE: 5/14/24  3:33 PM   DISCHARGE DATE: 5/16/2024 12:36 PM   DISPOSITION:Home with Home Health Care    Network Utilization Review Department  ATTENTION: Please call with any questions or concerns to [phone] and carefully listen to the prompts so that you are directed to the right person. All voicemails are confidential.   For Discharge needs, contact Care Management DC Support Team at [phone] opt. 2  Send all requests for admission clinical reviews, approved or denied determinations and any other requests to dedicated fax number below belonging to the campus where the patient is receiving treatment. List of dedicated fax numbers for the Facilities:  FACILITY NAME UR FAX NUMBER   ADMISSION DENIALS (Administrative/Medical Necessity) [phone]   DISCHARGE SUPPORT TEAM ([facility]) [phone]   PARENT CHILD HEALTH (Maternity/NICU/Pediatrics) [phone]   [facility] [phone]   [facility] [phone]   [facility] [phone]   [facility] [phone]   [facility] [phone]   [facility] [phone]   [facility] [phone]   [facility]  [place] [phone]   [facility] [phone]   [facility] [phone]   [facility] [phone]   [facility] [phone]

## 2024-05-19 ENCOUNTER — HOSPITAL ENCOUNTER (INPATIENT)
Facility: HOSPITAL | Age: 59
LOS: 3 days | Discharge: HOME HEALTH | DRG: 641 | End: 2024-05-22
Attending: EMERGENCY MEDICINE | Admitting: FAMILY MEDICINE
Payer: COMMERCIAL

## 2024-05-19 ENCOUNTER — APPOINTMENT (EMERGENCY)
Dept: RADIOLOGY | Facility: HOSPITAL | Age: 59
DRG: 641 | End: 2024-05-19
Payer: COMMERCIAL

## 2024-05-19 DIAGNOSIS — N18.9 CHRONIC KIDNEY DISEASE: Primary | ICD-10-CM

## 2024-05-19 DIAGNOSIS — M46.26 OSTEOMYELITIS OF VERTEBRA OF LUMBAR REGION (HCC): ICD-10-CM

## 2024-05-19 DIAGNOSIS — E83.42 HYPOMAGNESEMIA: ICD-10-CM

## 2024-05-19 DIAGNOSIS — R25.9 INVOLUNTARY MOVEMENTS: ICD-10-CM

## 2024-05-19 DIAGNOSIS — M79.2 NEUROPATHIC PAIN: ICD-10-CM

## 2024-05-19 DIAGNOSIS — N18.9 CHRONIC RENAL FAILURE: ICD-10-CM

## 2024-05-19 DIAGNOSIS — E83.41 HYPERMAGNESEMIA: ICD-10-CM

## 2024-05-19 LAB
ALBUMIN SERPL BCP-MCNC: 4.2 G/DL (ref 3.5–5)
ALP SERPL-CCNC: 93 U/L (ref 34–104)
ALT SERPL W P-5'-P-CCNC: 11 U/L (ref 7–52)
ANION GAP SERPL CALCULATED.3IONS-SCNC: 11 MMOL/L (ref 4–13)
AST SERPL W P-5'-P-CCNC: 11 U/L (ref 13–39)
BASOPHILS # BLD AUTO: 0.07 THOUSANDS/ÂΜL (ref 0–0.1)
BASOPHILS NFR BLD AUTO: 1 % (ref 0–1)
BILIRUB SERPL-MCNC: 0.32 MG/DL (ref 0.2–1)
BUN SERPL-MCNC: 73 MG/DL (ref 5–25)
CALCIUM SERPL-MCNC: 9 MG/DL (ref 8.4–10.2)
CARDIAC TROPONIN I PNL SERPL HS: 6 NG/L
CHLORIDE SERPL-SCNC: 92 MMOL/L (ref 96–108)
CK SERPL-CCNC: 82 U/L (ref 39–308)
CO2 SERPL-SCNC: 30 MMOL/L (ref 21–32)
CREAT SERPL-MCNC: 5.45 MG/DL (ref 0.6–1.3)
EOSINOPHIL # BLD AUTO: 0.34 THOUSAND/ÂΜL (ref 0–0.61)
EOSINOPHIL NFR BLD AUTO: 3 % (ref 0–6)
ERYTHROCYTE [DISTWIDTH] IN BLOOD BY AUTOMATED COUNT: 15.5 % (ref 11.6–15.1)
GFR SERPL CREATININE-BSD FRML MDRD: 10 ML/MIN/1.73SQ M
GLUCOSE SERPL-MCNC: 151 MG/DL (ref 65–140)
GLUCOSE SERPL-MCNC: 181 MG/DL (ref 65–140)
HCT VFR BLD AUTO: 32.4 % (ref 36.5–49.3)
HGB BLD-MCNC: 10.5 G/DL (ref 12–17)
IMM GRANULOCYTES # BLD AUTO: 0.05 THOUSAND/UL (ref 0–0.2)
IMM GRANULOCYTES NFR BLD AUTO: 1 % (ref 0–2)
LACTATE SERPL-SCNC: 1.5 MMOL/L (ref 0.5–2)
LIPASE SERPL-CCNC: 9 U/L (ref 11–82)
LYMPHOCYTES # BLD AUTO: 1.23 THOUSANDS/ÂΜL (ref 0.6–4.47)
LYMPHOCYTES NFR BLD AUTO: 12 % (ref 14–44)
MAGNESIUM SERPL-MCNC: 5.2 MG/DL (ref 1.9–2.7)
MCH RBC QN AUTO: 31.2 PG (ref 26.8–34.3)
MCHC RBC AUTO-ENTMCNC: 32.4 G/DL (ref 31.4–37.4)
MCV RBC AUTO: 96 FL (ref 82–98)
MONOCYTES # BLD AUTO: 0.76 THOUSAND/ÂΜL (ref 0.17–1.22)
MONOCYTES NFR BLD AUTO: 7 % (ref 4–12)
NEUTROPHILS # BLD AUTO: 8.04 THOUSANDS/ÂΜL (ref 1.85–7.62)
NEUTS SEG NFR BLD AUTO: 76 % (ref 43–75)
NRBC BLD AUTO-RTO: 0 /100 WBCS
PLATELET # BLD AUTO: 211 THOUSANDS/UL (ref 149–390)
PMV BLD AUTO: 10.8 FL (ref 8.9–12.7)
POTASSIUM SERPL-SCNC: 3.9 MMOL/L (ref 3.5–5.3)
PROT SERPL-MCNC: 7.1 G/DL (ref 6.4–8.4)
RBC # BLD AUTO: 3.37 MILLION/UL (ref 3.88–5.62)
SODIUM SERPL-SCNC: 133 MMOL/L (ref 135–147)
WBC # BLD AUTO: 10.49 THOUSAND/UL (ref 4.31–10.16)

## 2024-05-19 PROCEDURE — 84484 ASSAY OF TROPONIN QUANT: CPT | Performed by: EMERGENCY MEDICINE

## 2024-05-19 PROCEDURE — 36415 COLL VENOUS BLD VENIPUNCTURE: CPT | Performed by: EMERGENCY MEDICINE

## 2024-05-19 PROCEDURE — 82948 REAGENT STRIP/BLOOD GLUCOSE: CPT

## 2024-05-19 PROCEDURE — 71045 X-RAY EXAM CHEST 1 VIEW: CPT

## 2024-05-19 PROCEDURE — 83605 ASSAY OF LACTIC ACID: CPT | Performed by: EMERGENCY MEDICINE

## 2024-05-19 PROCEDURE — 83735 ASSAY OF MAGNESIUM: CPT | Performed by: EMERGENCY MEDICINE

## 2024-05-19 PROCEDURE — 99284 EMERGENCY DEPT VISIT MOD MDM: CPT

## 2024-05-19 PROCEDURE — 96360 HYDRATION IV INFUSION INIT: CPT

## 2024-05-19 PROCEDURE — 99285 EMERGENCY DEPT VISIT HI MDM: CPT | Performed by: EMERGENCY MEDICINE

## 2024-05-19 PROCEDURE — 80053 COMPREHEN METABOLIC PANEL: CPT | Performed by: EMERGENCY MEDICINE

## 2024-05-19 PROCEDURE — 99223 1ST HOSP IP/OBS HIGH 75: CPT | Performed by: NURSE PRACTITIONER

## 2024-05-19 PROCEDURE — 82550 ASSAY OF CK (CPK): CPT | Performed by: EMERGENCY MEDICINE

## 2024-05-19 PROCEDURE — 83690 ASSAY OF LIPASE: CPT | Performed by: EMERGENCY MEDICINE

## 2024-05-19 PROCEDURE — 93005 ELECTROCARDIOGRAM TRACING: CPT

## 2024-05-19 PROCEDURE — 85025 COMPLETE CBC W/AUTO DIFF WBC: CPT | Performed by: EMERGENCY MEDICINE

## 2024-05-19 RX ORDER — DULOXETIN HYDROCHLORIDE 20 MG/1
20 CAPSULE, DELAYED RELEASE ORAL DAILY
Status: DISCONTINUED | OUTPATIENT
Start: 2024-05-20 | End: 2024-05-22 | Stop reason: HOSPADM

## 2024-05-19 RX ORDER — AMOXICILLIN 250 MG
2 CAPSULE ORAL 2 TIMES DAILY
Status: DISCONTINUED | OUTPATIENT
Start: 2024-05-19 | End: 2024-05-22 | Stop reason: HOSPADM

## 2024-05-19 RX ORDER — HEPARIN SODIUM 5000 [USP'U]/ML
5000 INJECTION, SOLUTION INTRAVENOUS; SUBCUTANEOUS EVERY 8 HOURS SCHEDULED
Status: DISCONTINUED | OUTPATIENT
Start: 2024-05-19 | End: 2024-05-22 | Stop reason: HOSPADM

## 2024-05-19 RX ORDER — OXYCODONE HYDROCHLORIDE 5 MG/1
5 TABLET ORAL EVERY 6 HOURS PRN
Status: DISCONTINUED | OUTPATIENT
Start: 2024-05-19 | End: 2024-05-22 | Stop reason: HOSPADM

## 2024-05-19 RX ORDER — PREGABALIN 75 MG/1
75 CAPSULE ORAL 2 TIMES DAILY
Status: DISCONTINUED | OUTPATIENT
Start: 2024-05-19 | End: 2024-05-22 | Stop reason: HOSPADM

## 2024-05-19 RX ORDER — NICOTINE 21 MG/24HR
1 PATCH, TRANSDERMAL 24 HOURS TRANSDERMAL DAILY
Status: DISCONTINUED | OUTPATIENT
Start: 2024-05-20 | End: 2024-05-22 | Stop reason: HOSPADM

## 2024-05-19 RX ORDER — SODIUM CHLORIDE, SODIUM GLUCONATE, SODIUM ACETATE, POTASSIUM CHLORIDE, MAGNESIUM CHLORIDE, SODIUM PHOSPHATE, DIBASIC, AND POTASSIUM PHOSPHATE .53; .5; .37; .037; .03; .012; .00082 G/100ML; G/100ML; G/100ML; G/100ML; G/100ML; G/100ML; G/100ML
1000 INJECTION, SOLUTION INTRAVENOUS ONCE
Status: COMPLETED | OUTPATIENT
Start: 2024-05-19 | End: 2024-05-20

## 2024-05-19 RX ORDER — SODIUM CHLORIDE, SODIUM GLUCONATE, SODIUM ACETATE, POTASSIUM CHLORIDE, MAGNESIUM CHLORIDE, SODIUM PHOSPHATE, DIBASIC, AND POTASSIUM PHOSPHATE .53; .5; .37; .037; .03; .012; .00082 G/100ML; G/100ML; G/100ML; G/100ML; G/100ML; G/100ML; G/100ML
50 INJECTION, SOLUTION INTRAVENOUS CONTINUOUS
Status: DISCONTINUED | OUTPATIENT
Start: 2024-05-19 | End: 2024-05-19

## 2024-05-19 RX ORDER — SEVELAMER HYDROCHLORIDE 800 MG/1
1600 TABLET, FILM COATED ORAL
Status: DISCONTINUED | OUTPATIENT
Start: 2024-05-20 | End: 2024-05-22 | Stop reason: HOSPADM

## 2024-05-19 RX ORDER — INSULIN LISPRO 100 [IU]/ML
1-6 INJECTION, SOLUTION INTRAVENOUS; SUBCUTANEOUS
Status: DISCONTINUED | OUTPATIENT
Start: 2024-05-19 | End: 2024-05-22 | Stop reason: HOSPADM

## 2024-05-19 RX ORDER — FUROSEMIDE 10 MG/ML
80 INJECTION INTRAMUSCULAR; INTRAVENOUS ONCE
Status: COMPLETED | OUTPATIENT
Start: 2024-05-19 | End: 2024-05-19

## 2024-05-19 RX ORDER — DOCUSATE SODIUM 100 MG/1
100 CAPSULE, LIQUID FILLED ORAL 2 TIMES DAILY
Status: DISCONTINUED | OUTPATIENT
Start: 2024-05-19 | End: 2024-05-22 | Stop reason: HOSPADM

## 2024-05-19 RX ORDER — ALLOPURINOL 100 MG/1
100 TABLET ORAL DAILY
Status: DISCONTINUED | OUTPATIENT
Start: 2024-05-20 | End: 2024-05-22 | Stop reason: HOSPADM

## 2024-05-19 RX ORDER — ALBUTEROL SULFATE 90 UG/1
2 AEROSOL, METERED RESPIRATORY (INHALATION) EVERY 6 HOURS PRN
Status: DISCONTINUED | OUTPATIENT
Start: 2024-05-19 | End: 2024-05-22 | Stop reason: HOSPADM

## 2024-05-19 RX ORDER — INSULIN LISPRO 100 [IU]/ML
INJECTION, SOLUTION INTRAVENOUS; SUBCUTANEOUS
Status: DISPENSED
Start: 2024-05-19 | End: 2024-05-20

## 2024-05-19 RX ORDER — ACETAMINOPHEN 325 MG/1
650 TABLET ORAL EVERY 6 HOURS PRN
Status: DISCONTINUED | OUTPATIENT
Start: 2024-05-19 | End: 2024-05-22 | Stop reason: HOSPADM

## 2024-05-19 RX ORDER — LACTULOSE 10 G/15ML
10 SOLUTION ORAL ONCE
Status: COMPLETED | OUTPATIENT
Start: 2024-05-19 | End: 2024-05-19

## 2024-05-19 RX ORDER — SODIUM CHLORIDE, SODIUM GLUCONATE, SODIUM ACETATE, POTASSIUM CHLORIDE, MAGNESIUM CHLORIDE, SODIUM PHOSPHATE, DIBASIC, AND POTASSIUM PHOSPHATE .53; .5; .37; .037; .03; .012; .00082 G/100ML; G/100ML; G/100ML; G/100ML; G/100ML; G/100ML; G/100ML
50 INJECTION, SOLUTION INTRAVENOUS CONTINUOUS
Status: DISCONTINUED | OUTPATIENT
Start: 2024-05-20 | End: 2024-05-20

## 2024-05-19 RX ORDER — TAMSULOSIN HYDROCHLORIDE 0.4 MG/1
0.4 CAPSULE ORAL
Status: DISCONTINUED | OUTPATIENT
Start: 2024-05-20 | End: 2024-05-22 | Stop reason: HOSPADM

## 2024-05-19 RX ORDER — AMLODIPINE BESYLATE 5 MG/1
5 TABLET ORAL DAILY
Status: DISCONTINUED | OUTPATIENT
Start: 2024-05-20 | End: 2024-05-22 | Stop reason: HOSPADM

## 2024-05-19 RX ORDER — TORSEMIDE 20 MG/1
20 TABLET ORAL DAILY
Status: DISCONTINUED | OUTPATIENT
Start: 2024-05-20 | End: 2024-05-20

## 2024-05-19 RX ORDER — INSULIN LISPRO 100 [IU]/ML
1-6 INJECTION, SOLUTION INTRAVENOUS; SUBCUTANEOUS
Status: DISCONTINUED | OUTPATIENT
Start: 2024-05-20 | End: 2024-05-22 | Stop reason: HOSPADM

## 2024-05-19 RX ADMIN — SODIUM CHLORIDE 1000 ML: 0.9 INJECTION, SOLUTION INTRAVENOUS at 17:54

## 2024-05-19 RX ADMIN — SODIUM CHLORIDE, SODIUM GLUCONATE, SODIUM ACETATE, POTASSIUM CHLORIDE, MAGNESIUM CHLORIDE, SODIUM PHOSPHATE, DIBASIC, AND POTASSIUM PHOSPHATE 1000 ML: .53; .5; .37; .037; .03; .012; .00082 INJECTION, SOLUTION INTRAVENOUS at 20:53

## 2024-05-19 RX ADMIN — SODIUM CHLORIDE, SODIUM GLUCONATE, SODIUM ACETATE, POTASSIUM CHLORIDE, MAGNESIUM CHLORIDE, SODIUM PHOSPHATE, DIBASIC, AND POTASSIUM PHOSPHATE 50 ML/HR: .53; .5; .37; .037; .03; .012; .00082 INJECTION, SOLUTION INTRAVENOUS at 20:01

## 2024-05-19 RX ADMIN — LACTULOSE 10 G: 20 SOLUTION ORAL at 21:04

## 2024-05-19 RX ADMIN — HEPARIN SODIUM 5000 UNITS: 5000 INJECTION INTRAVENOUS; SUBCUTANEOUS at 21:05

## 2024-05-19 RX ADMIN — DOCUSATE SODIUM 100 MG: 100 CAPSULE, LIQUID FILLED ORAL at 21:04

## 2024-05-19 RX ADMIN — SENNOSIDES AND DOCUSATE SODIUM 2 TABLET: 8.6; 5 TABLET ORAL at 21:04

## 2024-05-19 RX ADMIN — FUROSEMIDE 80 MG: 10 INJECTION, SOLUTION INTRAMUSCULAR; INTRAVENOUS at 20:01

## 2024-05-19 RX ADMIN — INSULIN LISPRO 1 UNITS: 100 INJECTION, SOLUTION INTRAVENOUS; SUBCUTANEOUS at 21:51

## 2024-05-19 RX ADMIN — OXYCODONE HYDROCHLORIDE 5 MG: 5 TABLET ORAL at 21:04

## 2024-05-19 RX ADMIN — PREGABALIN 75 MG: 75 CAPSULE ORAL at 21:05

## 2024-05-19 NOTE — ED PROVIDER NOTES
History  Chief Complaint   Patient presents with    Involuntary Movements     Pt arrives from home with c/o involuntary 'twitching' starting yesterday. Per wife, pt unable to hold things or walk properly since last night. Pt started on cymbalta and diuretic on Thursday.      Patient complains of involuntary twitching/jerking movements since yesterday.  States these are causing him to drop things and be unable to walk.  Patient was recently discharged from inpatient at this facility after admission for CKD and hypomagnesemia.      History provided by:  Patient   used: No    Medical Problem  Quality:  Involuntary twitching/jerking movements  Severity:  Moderate  Onset quality:  Gradual  Duration:  2 days  Timing:  Intermittent  Progression:  Unchanged  Chronicity:  Recurrent  Relieved by:  Nothing  Worsened by:  Nothing  Associated symptoms: no abdominal pain, no chest pain, no cough, no diarrhea, no ear pain, no fever, no headaches, no loss of consciousness, no myalgias, no nausea, no rash, no shortness of breath, no sore throat, no vomiting and no wheezing        Prior to Admission Medications   Prescriptions Last Dose Informant Patient Reported? Taking?   DULoxetine (CYMBALTA) 20 mg capsule   No No   Sig: Take 1 capsule (20 mg total) by mouth daily   acetaminophen (TYLENOL) 325 mg tablet   Yes No   Sig: Take 500 mg by mouth every 4 (four) hours as needed for mild pain   albuterol (ProAir HFA) 90 mcg/act inhaler   No No   Sig: Inhale 2 puffs every 6 (six) hours as needed for wheezing   allopurinol (ZYLOPRIM) 100 mg tablet   No No   Sig: Take 1 tablet (100 mg total) by mouth daily   amLODIPine (NORVASC) 5 mg tablet   No No   Sig: Take 1 tablet (5 mg total) by mouth daily   docusate sodium (COLACE) 100 mg capsule   Yes No   Sig: TAKE BY MOUTH 1 CAPSULE IN THE MORNING AND 1 CAPSULE BEFORE BEDTIME.   insulin glargine (LANTUS) 100 units/mL subcutaneous injection   No No   Sig: Inject 5 Units under the  skin daily at bedtime   insulin lispro (HumaLOG) 100 units/mL injection   No No   Sig: Inject 2-12 Units under the skin 3 (three) times a day before meals   magnesium hydroxide (MILK OF MAGNESIA) 400 mg/5 mL oral suspension   Yes No   Sig: Take 5 mL by mouth daily as needed for constipation   oxyCODONE (OXY-IR) 5 MG capsule   Yes No   Sig: Take 5 mg by mouth every 4 (four) hours as needed for moderate pain or severe pain   pregabalin (LYRICA) 75 mg capsule   No No   Sig: Take 1 capsule (75 mg total) by mouth 2 (two) times a day for 10 days   senna-docusate sodium (SENOKOT S) 8.6-50 mg per tablet   Yes No   Sig: Take 1 tablet by mouth 2 (two) times a day   sevelamer (RENAGEL) 800 mg tablet   No No   Sig: Take 2 tablets (1,600 mg total) by mouth 3 (three) times a day with meals   tamsulosin (FLOMAX) 0.4 mg   No No   Sig: Take 1 capsule (0.4 mg total) by mouth daily with dinner   torsemide (DEMADEX) 20 mg tablet   No No   Sig: Take 1 tablet (20 mg total) by mouth daily   zolpidem (AMBIEN CR) 12.5 MG CR tablet   Yes No   Sig: Take 10 mg by mouth daily at bedtime as needed for sleep      Facility-Administered Medications: None       Past Medical History:   Diagnosis Date    Chronic kidney disease     Diabetes mellitus (HCC)     Gout     Hypertension     Renal disorder     Retroperitoneal abscess (HCC)     Stroke (HCC)     UTI (urinary tract infection)     Vertebral osteomyelitis (HCC)        Past Surgical History:   Procedure Laterality Date    BACK SURGERY      ORCHIECTOMY         Family History   Problem Relation Age of Onset    Hypertension Father      I have reviewed and agree with the history as documented.    E-Cigarette/Vaping    E-Cigarette Use Never User      E-Cigarette/Vaping Substances     Social History     Tobacco Use    Smoking status: Every Day     Current packs/day: 0.25     Types: Cigarettes    Smokeless tobacco: Never   Vaping Use    Vaping status: Never Used   Substance Use Topics    Alcohol use: Not  Currently    Drug use: Yes     Types: Marijuana       Review of Systems   Constitutional:  Negative for chills and fever.   HENT:  Negative for ear pain, hearing loss, sore throat, trouble swallowing and voice change.    Eyes:  Negative for pain and discharge.   Respiratory:  Negative for cough, shortness of breath and wheezing.    Cardiovascular:  Negative for chest pain and palpitations.   Gastrointestinal:  Negative for abdominal pain, blood in stool, constipation, diarrhea, nausea and vomiting.   Genitourinary:  Negative for dysuria, flank pain, frequency and hematuria.   Musculoskeletal:  Negative for joint swelling, myalgias, neck pain and neck stiffness.   Skin:  Negative for rash and wound.   Neurological:  Negative for dizziness, seizures, loss of consciousness, syncope, facial asymmetry and headaches.   Psychiatric/Behavioral:  Negative for hallucinations, self-injury and suicidal ideas.    All other systems reviewed and are negative.      Physical Exam  Physical Exam  Vitals and nursing note reviewed.   Constitutional:       General: He is not in acute distress.     Appearance: He is well-developed.   HENT:      Head: Normocephalic and atraumatic.      Right Ear: External ear normal.      Left Ear: External ear normal.   Eyes:      General: No scleral icterus.        Right eye: No discharge.         Left eye: No discharge.      Extraocular Movements: Extraocular movements intact.      Conjunctiva/sclera: Conjunctivae normal.   Cardiovascular:      Rate and Rhythm: Normal rate and regular rhythm.      Heart sounds: Normal heart sounds. No murmur heard.  Pulmonary:      Effort: Pulmonary effort is normal.      Breath sounds: Normal breath sounds. No wheezing or rales.   Abdominal:      General: Bowel sounds are normal. There is no distension.      Palpations: Abdomen is soft.      Tenderness: There is no abdominal tenderness. There is no guarding or rebound.   Musculoskeletal:         General: No deformity.  Normal range of motion.      Cervical back: Normal range of motion and neck supple.   Skin:     General: Skin is warm and dry.      Findings: No rash.   Neurological:      General: No focal deficit present.      Mental Status: He is alert and oriented to person, place, and time.      Cranial Nerves: No cranial nerve deficit.      Motor: No weakness.      Coordination: Coordination normal.      Comments: No focal motor deficits but patient does have some twitching/clonic jerking movements which are involuntary.   Psychiatric:         Mood and Affect: Mood normal.         Behavior: Behavior normal.         Thought Content: Thought content normal.         Judgment: Judgment normal.         Vital Signs  ED Triage Vitals [05/19/24 1744]   Temperature Pulse Respirations Blood Pressure SpO2   98.1 °F (36.7 °C) 68 16 (!) 156/101 92 %      Temp Source Heart Rate Source Patient Position - Orthostatic VS BP Location FiO2 (%)   Temporal Monitor Sitting Right arm --      Pain Score       No Pain           Vitals:    05/19/24 1744 05/19/24 1800 05/19/24 1830 05/19/24 1900   BP: (!) 156/101 160/76 142/64 140/67   Pulse: 68 63 61 61   Patient Position - Orthostatic VS: Sitting Sitting Sitting          Visual Acuity  Visual Acuity      Flowsheet Row Most Recent Value   L Pupil Size (mm) 3   R Pupil Size (mm) 3            ED Medications  Medications   sodium chloride 0.9 % bolus 1,000 mL (1,000 mL Intravenous New Bag 5/19/24 1754)       Diagnostic Studies  Results Reviewed       Procedure Component Value Units Date/Time    HS Troponin I 2hr [[id number]]     Lab Status: No result Specimen: Blood     HS Troponin 0hr (reflex protocol) [[id number]]  (Normal) Collected: 05/19/24 1757    Lab Status: Final result Specimen: Blood from Arm, Left Updated: 05/19/24 1832     hs TnI 0hr 6 ng/L     Comprehensive metabolic panel [[id number]]  (Abnormal) Collected: 05/19/24 1757    Lab Status: Final result Specimen: Blood from Arm, Left Updated:  05/19/24 1825     Sodium 133 mmol/L      Potassium 3.9 mmol/L      Chloride 92 mmol/L      CO2 30 mmol/L      ANION GAP 11 mmol/L      BUN 73 mg/dL      Creatinine 5.45 mg/dL      Glucose 181 mg/dL      Calcium 9.0 mg/dL      AST 11 U/L      ALT 11 U/L      Alkaline Phosphatase 93 U/L      Total Protein 7.1 g/dL      Albumin 4.2 g/dL      Total Bilirubin 0.32 mg/dL      eGFR 10 ml/min/1.73sq m     Narrative:      National Kidney Disease Foundation guidelines for Chronic Kidney Disease (CKD):     Stage 1 with normal or high GFR (GFR > 90 mL/min/1.73 square meters)    Stage 2 Mild CKD (GFR = 60-89 mL/min/1.73 square meters)    Stage 3A Moderate CKD (GFR = 45-59 mL/min/1.73 square meters)    Stage 3B Moderate CKD (GFR = 30-44 mL/min/1.73 square meters)    Stage 4 Severe CKD (GFR = 15-29 mL/min/1.73 square meters)    Stage 5 End Stage CKD (GFR <15 mL/min/1.73 square meters)  Note: GFR calculation is accurate only with a steady state creatinine    CK [[id number]]  (Normal) Collected: 05/19/24 1757    Lab Status: Final result Specimen: Blood from Arm, Left Updated: 05/19/24 1825     Total CK 82 U/L     Lipase [[id number]]  (Abnormal) Collected: 05/19/24 1757    Lab Status: Final result Specimen: Blood from Arm, Left Updated: 05/19/24 1825     Lipase 9 u/L     Magnesium [[id number]]  (Abnormal) Collected: 05/19/24 1757    Lab Status: Final result Specimen: Blood from Arm, Left Updated: 05/19/24 1825     Magnesium 5.2 mg/dL     Lactic acid, plasma (w/reflex if result > 2.0) [[id number]]  (Normal) Collected: 05/19/24 1757    Lab Status: Final result Specimen: Blood from Arm, Left Updated: 05/19/24 1825     LACTIC ACID 1.5 mmol/L     Narrative:      Result may be elevated if tourniquet was used during collection.    CBC and differential [[id number]]  (Abnormal) Collected: 05/19/24 1757    Lab Status: Final result Specimen: Blood from Arm, Left Updated: 05/19/24 1806     WBC 10.49 Thousand/uL      RBC 3.37 Million/uL       Hemoglobin 10.5 g/dL      Hematocrit 32.4 %      MCV 96 fL      MCH 31.2 pg      MCHC 32.4 g/dL      RDW 15.5 %      MPV 10.8 fL      Platelets 211 Thousands/uL      nRBC 0 /100 WBCs      Segmented % 76 %      Immature Grans % 1 %      Lymphocytes % 12 %      Monocytes % 7 %      Eosinophils Relative 3 %      Basophils Relative 1 %      Absolute Neutrophils 8.04 Thousands/µL      Absolute Immature Grans 0.05 Thousand/uL      Absolute Lymphocytes 1.23 Thousands/µL      Absolute Monocytes 0.76 Thousand/µL      Eosinophils Absolute 0.34 Thousand/µL      Basophils Absolute 0.07 Thousands/µL     UA w Reflex to Microscopic w Reflex to Culture [[id number]]     Lab Status: No result Specimen: Urine                    XR chest portable   ED Interpretation by Galindo Olmos MD (05/19 1910)   No acute finding                 Procedures  ECG 12 Lead Documentation Only    Date/Time: 5/19/2024 5:53 PM    Performed by: Galindo Olmos MD  Authorized by: Galindo Olmos MD    ECG reviewed by me, the ED Provider: yes    Patient location:  ED  Previous ECG:     Previous ECG:  Unavailable  Interpretation:     Interpretation: normal    Rate:     ECG rate:  64    ECG rate assessment: normal    Rhythm:     Rhythm: sinus rhythm    Ectopy:     Ectopy: none    QRS:     QRS axis:  Left    QRS intervals:  Normal  Conduction:     Conduction: normal    ST segments:     ST segments:  Normal  T waves:     T waves: normal             ED Course                               SBIRT 20yo+      Flowsheet Row Most Recent Value   Initial Alcohol Screen: US AUDIT-C     1. How often do you have a drink containing alcohol? 0 Filed at: 05/19/2024 1811   2. How many drinks containing alcohol do you have on a typical day you are drinking?  0 Filed at: 05/19/2024 1811   3a. Male UNDER 65: How often do you have five or more drinks on one occasion? 0 Filed at: 05/19/2024 1811   Audit-C Score 0 Filed at: 05/19/2024 1811   JOANNE: How many times in the past  year have you...    Used an illegal drug or used a prescription medication for non-medical reasons? Never Filed at: 05/19/2024 1811                      Medical Decision Making  Patient presents to the emergency department with clonic jerking movements.      Based on the MSE and the history provided, diagnostic considerations include but are not limited to hypomagnesemia, electrolyte abnormality, chronic kidney disease, dehydration    Based on the work-up performed in the emergency department which includes physical examination, laboratory testing, imaging which may include advanced imaging as necessary such as CT scan or ultrasound, it is deemed that the patient will require admission to the hospital for treatment of CKD with hypermagnesemia.      Amount and/or Complexity of Data Reviewed  Labs: ordered. Decision-making details documented in ED Course.     Details: Hypermagnesemia noted  Radiology: ordered and independent interpretation performed. Decision-making details documented in ED Course.     Details: Chest x-ray negative  ECG/medicine tests: ordered and independent interpretation performed. Decision-making details documented in ED Course.     Details: Normal sinus rhythm rate 64 with left axis deviation    Risk  Decision regarding hospitalization.             Disposition  Final diagnoses:   Chronic kidney disease   Hypomagnesemia   Involuntary movements     Time reflects when diagnosis was documented in both MDM as applicable and the Disposition within this note       Time User Action Codes Description Comment    5/19/2024  6:35 PM Galindo Olmos [N18.9] Chronic kidney disease     5/19/2024  6:35 PM Galindo Olmos [E83.42] Hypomagnesemia     5/19/2024  6:35 PM Galindo Olmos [R25.9] Involuntary movements           ED Disposition       ED Disposition   Admit    Condition   Stable    Date/Time   Sun May 19, 2024 8284    Comment                  Follow-up Information    None          Patient's Medications   Discharge Prescriptions    No medications on file       No discharge procedures on file.    PDMP Review         Value Time User    PDMP Reviewed  Yes 5/16/2024  9:52 AM Charlotte Villegas PA-C            ED Provider  Electronically Signed by             Galindo Olmos MD  05/19/24 9763

## 2024-05-20 LAB
ALBUMIN SERPL BCP-MCNC: 3.6 G/DL (ref 3.5–5)
ALP SERPL-CCNC: 83 U/L (ref 34–104)
ALT SERPL W P-5'-P-CCNC: 9 U/L (ref 7–52)
ANION GAP SERPL CALCULATED.3IONS-SCNC: 11 MMOL/L (ref 4–13)
AST SERPL W P-5'-P-CCNC: 10 U/L (ref 13–39)
ATRIAL RATE: 64 BPM
BACTERIA UR QL AUTO: NORMAL /HPF
BILIRUB SERPL-MCNC: 0.24 MG/DL (ref 0.2–1)
BILIRUB UR QL STRIP: NEGATIVE
BUN SERPL-MCNC: 73 MG/DL (ref 5–25)
CALCIUM SERPL-MCNC: 8.6 MG/DL (ref 8.4–10.2)
CHLORIDE SERPL-SCNC: 96 MMOL/L (ref 96–108)
CLARITY UR: CLEAR
CO2 SERPL-SCNC: 29 MMOL/L (ref 21–32)
COLOR UR: YELLOW
CREAT SERPL-MCNC: 5.41 MG/DL (ref 0.6–1.3)
ERYTHROCYTE [DISTWIDTH] IN BLOOD BY AUTOMATED COUNT: 15.7 % (ref 11.6–15.1)
EST. AVERAGE GLUCOSE BLD GHB EST-MCNC: 166 MG/DL
GFR SERPL CREATININE-BSD FRML MDRD: 10 ML/MIN/1.73SQ M
GLUCOSE SERPL-MCNC: 149 MG/DL (ref 65–140)
GLUCOSE SERPL-MCNC: 153 MG/DL (ref 65–140)
GLUCOSE SERPL-MCNC: 189 MG/DL (ref 65–140)
GLUCOSE SERPL-MCNC: 208 MG/DL (ref 65–140)
GLUCOSE SERPL-MCNC: 213 MG/DL (ref 65–140)
GLUCOSE UR STRIP-MCNC: ABNORMAL MG/DL
HBA1C MFR BLD: 7.4 %
HCT VFR BLD AUTO: 27.8 % (ref 36.5–49.3)
HGB BLD-MCNC: 9.2 G/DL (ref 12–17)
HGB UR QL STRIP.AUTO: ABNORMAL
KETONES UR STRIP-MCNC: NEGATIVE MG/DL
LEUKOCYTE ESTERASE UR QL STRIP: ABNORMAL
MAGNESIUM SERPL-MCNC: 5.2 MG/DL (ref 1.9–2.7)
MCH RBC QN AUTO: 31.7 PG (ref 26.8–34.3)
MCHC RBC AUTO-ENTMCNC: 33.1 G/DL (ref 31.4–37.4)
MCV RBC AUTO: 96 FL (ref 82–98)
NITRITE UR QL STRIP: NEGATIVE
NON-SQ EPI CELLS URNS QL MICRO: NORMAL /HPF
P AXIS: 42 DEGREES
PH UR STRIP.AUTO: 7 [PH]
PHOSPHATE SERPL-MCNC: 7.1 MG/DL (ref 2.7–4.5)
PLATELET # BLD AUTO: 181 THOUSANDS/UL (ref 149–390)
PMV BLD AUTO: 11 FL (ref 8.9–12.7)
POTASSIUM SERPL-SCNC: 3.5 MMOL/L (ref 3.5–5.3)
PR INTERVAL: 158 MS
PROT SERPL-MCNC: 6.2 G/DL (ref 6.4–8.4)
PROT UR STRIP-MCNC: ABNORMAL MG/DL
QRS AXIS: -32 DEGREES
QRSD INTERVAL: 102 MS
QT INTERVAL: 456 MS
QTC INTERVAL: 470 MS
RBC # BLD AUTO: 2.9 MILLION/UL (ref 3.88–5.62)
RBC #/AREA URNS AUTO: NORMAL /HPF
SODIUM SERPL-SCNC: 136 MMOL/L (ref 135–147)
SP GR UR STRIP.AUTO: 1.01 (ref 1–1.03)
T WAVE AXIS: 43 DEGREES
UROBILINOGEN UR QL STRIP.AUTO: 0.2 E.U./DL
VENTRICULAR RATE: 64 BPM
WBC # BLD AUTO: 7.19 THOUSAND/UL (ref 4.31–10.16)
WBC #/AREA URNS AUTO: NORMAL /HPF

## 2024-05-20 PROCEDURE — 83735 ASSAY OF MAGNESIUM: CPT | Performed by: NURSE PRACTITIONER

## 2024-05-20 PROCEDURE — 83036 HEMOGLOBIN GLYCOSYLATED A1C: CPT | Performed by: NURSE PRACTITIONER

## 2024-05-20 PROCEDURE — 99222 1ST HOSP IP/OBS MODERATE 55: CPT | Performed by: INTERNAL MEDICINE

## 2024-05-20 PROCEDURE — 85027 COMPLETE CBC AUTOMATED: CPT | Performed by: NURSE PRACTITIONER

## 2024-05-20 PROCEDURE — NC001 PR NO CHARGE: Performed by: INTERNAL MEDICINE

## 2024-05-20 PROCEDURE — 93010 ELECTROCARDIOGRAM REPORT: CPT | Performed by: INTERNAL MEDICINE

## 2024-05-20 PROCEDURE — 80053 COMPREHEN METABOLIC PANEL: CPT | Performed by: NURSE PRACTITIONER

## 2024-05-20 PROCEDURE — 84100 ASSAY OF PHOSPHORUS: CPT | Performed by: NURSE PRACTITIONER

## 2024-05-20 PROCEDURE — 82948 REAGENT STRIP/BLOOD GLUCOSE: CPT

## 2024-05-20 PROCEDURE — 99232 SBSQ HOSP IP/OBS MODERATE 35: CPT | Performed by: FAMILY MEDICINE

## 2024-05-20 PROCEDURE — 81001 URINALYSIS AUTO W/SCOPE: CPT | Performed by: NURSE PRACTITIONER

## 2024-05-20 PROCEDURE — 93005 ELECTROCARDIOGRAM TRACING: CPT

## 2024-05-20 RX ORDER — FUROSEMIDE 10 MG/ML
60 INJECTION INTRAMUSCULAR; INTRAVENOUS
Status: DISCONTINUED | OUTPATIENT
Start: 2024-05-20 | End: 2024-05-21

## 2024-05-20 RX ORDER — SODIUM CHLORIDE 9 MG/ML
150 INJECTION, SOLUTION INTRAVENOUS CONTINUOUS
Status: DISCONTINUED | OUTPATIENT
Start: 2024-05-20 | End: 2024-05-22 | Stop reason: HOSPADM

## 2024-05-20 RX ORDER — LACTULOSE 10 G/15ML
10 SOLUTION ORAL 3 TIMES DAILY
Status: COMPLETED | OUTPATIENT
Start: 2024-05-20 | End: 2024-05-20

## 2024-05-20 RX ORDER — LACTULOSE 10 G/15ML
10 SOLUTION ORAL DAILY PRN
Status: DISCONTINUED | OUTPATIENT
Start: 2024-05-21 | End: 2024-05-20

## 2024-05-20 RX ORDER — POLYETHYLENE GLYCOL 3350 17 G/17G
17 POWDER, FOR SOLUTION ORAL DAILY PRN
Status: DISCONTINUED | OUTPATIENT
Start: 2024-05-20 | End: 2024-05-22 | Stop reason: HOSPADM

## 2024-05-20 RX ORDER — LACTULOSE 10 G/15ML
10 SOLUTION ORAL DAILY PRN
Status: DISCONTINUED | OUTPATIENT
Start: 2024-05-20 | End: 2024-05-20

## 2024-05-20 RX ADMIN — TAMSULOSIN HYDROCHLORIDE 0.4 MG: 0.4 CAPSULE ORAL at 17:01

## 2024-05-20 RX ADMIN — INSULIN LISPRO 1 UNITS: 100 INJECTION, SOLUTION INTRAVENOUS; SUBCUTANEOUS at 22:08

## 2024-05-20 RX ADMIN — SODIUM CHLORIDE 125 ML/HR: 0.9 INJECTION, SOLUTION INTRAVENOUS at 12:03

## 2024-05-20 RX ADMIN — TORSEMIDE 20 MG: 20 TABLET ORAL at 08:20

## 2024-05-20 RX ADMIN — HEPARIN SODIUM 5000 UNITS: 5000 INJECTION INTRAVENOUS; SUBCUTANEOUS at 05:55

## 2024-05-20 RX ADMIN — FUROSEMIDE 60 MG: 10 INJECTION, SOLUTION INTRAMUSCULAR; INTRAVENOUS at 12:07

## 2024-05-20 RX ADMIN — LACTULOSE 10 G: 20 SOLUTION ORAL at 17:01

## 2024-05-20 RX ADMIN — LACTULOSE 10 G: 20 SOLUTION ORAL at 22:08

## 2024-05-20 RX ADMIN — AMLODIPINE BESYLATE 5 MG: 5 TABLET ORAL at 08:20

## 2024-05-20 RX ADMIN — DULOXETINE HYDROCHLORIDE 20 MG: 20 CAPSULE, DELAYED RELEASE ORAL at 08:20

## 2024-05-20 RX ADMIN — DOCUSATE SODIUM 100 MG: 100 CAPSULE, LIQUID FILLED ORAL at 17:00

## 2024-05-20 RX ADMIN — PREGABALIN 75 MG: 75 CAPSULE ORAL at 08:19

## 2024-05-20 RX ADMIN — OXYCODONE HYDROCHLORIDE 5 MG: 5 TABLET ORAL at 17:06

## 2024-05-20 RX ADMIN — ALLOPURINOL 100 MG: 100 TABLET ORAL at 08:19

## 2024-05-20 RX ADMIN — FUROSEMIDE 60 MG: 10 INJECTION, SOLUTION INTRAMUSCULAR; INTRAVENOUS at 17:05

## 2024-05-20 RX ADMIN — INSULIN LISPRO 2 UNITS: 100 INJECTION, SOLUTION INTRAVENOUS; SUBCUTANEOUS at 12:05

## 2024-05-20 RX ADMIN — SEVELAMER HYDROCHLORIDE 1600 MG: 800 TABLET, FILM COATED ORAL at 12:05

## 2024-05-20 RX ADMIN — INSULIN LISPRO 1 UNITS: 100 INJECTION, SOLUTION INTRAVENOUS; SUBCUTANEOUS at 08:17

## 2024-05-20 RX ADMIN — SODIUM CHLORIDE 125 ML/HR: 0.9 INJECTION, SOLUTION INTRAVENOUS at 20:17

## 2024-05-20 RX ADMIN — PREGABALIN 75 MG: 75 CAPSULE ORAL at 17:00

## 2024-05-20 RX ADMIN — HEPARIN SODIUM 5000 UNITS: 5000 INJECTION INTRAVENOUS; SUBCUTANEOUS at 14:50

## 2024-05-20 RX ADMIN — DOCUSATE SODIUM 100 MG: 100 CAPSULE, LIQUID FILLED ORAL at 08:19

## 2024-05-20 RX ADMIN — HEPARIN SODIUM 5000 UNITS: 5000 INJECTION INTRAVENOUS; SUBCUTANEOUS at 22:08

## 2024-05-20 RX ADMIN — OXYCODONE HYDROCHLORIDE 5 MG: 5 TABLET ORAL at 05:55

## 2024-05-20 RX ADMIN — INSULIN LISPRO 2 UNITS: 100 INJECTION, SOLUTION INTRAVENOUS; SUBCUTANEOUS at 16:59

## 2024-05-20 RX ADMIN — SEVELAMER HYDROCHLORIDE 1600 MG: 800 TABLET, FILM COATED ORAL at 17:01

## 2024-05-20 RX ADMIN — LACTULOSE 10 G: 20 SOLUTION ORAL at 08:20

## 2024-05-20 RX ADMIN — SENNOSIDES AND DOCUSATE SODIUM 2 TABLET: 8.6; 5 TABLET ORAL at 17:00

## 2024-05-20 RX ADMIN — SODIUM CHLORIDE, SODIUM GLUCONATE, SODIUM ACETATE, POTASSIUM CHLORIDE, MAGNESIUM CHLORIDE, SODIUM PHOSPHATE, DIBASIC, AND POTASSIUM PHOSPHATE 50 ML/HR: .53; .5; .37; .037; .03; .012; .00082 INJECTION, SOLUTION INTRAVENOUS at 00:30

## 2024-05-20 RX ADMIN — SEVELAMER HYDROCHLORIDE 1600 MG: 800 TABLET, FILM COATED ORAL at 06:59

## 2024-05-20 RX ADMIN — SENNOSIDES AND DOCUSATE SODIUM 2 TABLET: 8.6; 5 TABLET ORAL at 08:19

## 2024-05-20 NOTE — PROGRESS NOTES
[facility]  Progress Note  Name: Stef George I  MRN: [id number]  Unit/Bed#: -01 I Date of Admission: 5/19/2024   Date of Service: 5/20/2024 I Hospital Day: 1    Assessment & Plan   * Hypermagnesemia  Assessment & Plan  Presented with muscle tremors similar to previous episode of hypermagnesemia-increased neuropathic pain without paralysis  Magnesium 5.2 on admission.  Last Admission also had elevated magnesium which dropped to 4 on discharge on 5/16  Denies taking any home medications with magnesium, still complains of severe constipation but denies recent magnesium containing laxative use  Previously seen by nephrology, and most likely due to decreased clearance in setting of advanced CKD   Receiving IV furosemide and normal saline.monitor response  Trend magnesium  Appreciate nephrology consultation    Chronic back pain  Assessment & Plan  Chronic pain syndrome  Continue PTA Cymbalta, Lyrica, oxycodone  Complaining of constipation - aggressive bowel regimen without magnesium products    Chronic anemia  Assessment & Plan  Hemoglobin stable  CKD induced anemia  Monitor closely    Chronic kidney disease  Assessment & Plan  Lab Results   Component Value Date    EGFR 10 05/20/2024    EGFR 10 05/19/2024    EGFR 11 05/16/2024    CREATININE 5.41 (H) 05/20/2024    CREATININE 5.45 (H) 05/19/2024    CREATININE 5.17 (H) 05/16/2024   Elevated creatinine from baseline not meeting KELLY criteria  Baseline creatinine 5.17  Continue sevelamer, allopurinol .  Torsemide on hold and placed on iv lasix to help force diuresis to decrease hypermagnesemia  Intake output  Urinary retention protocol  Appreciate nephrology consultation  Following with outpatient nephrology.he wants to establish care with [facility] nephrology    Essential hypertension  Assessment & Plan  Continue amlodipine  Trend blood pressures.so far controlled    Diabetes mellitus type 2, insulin dependent (HCC)  Assessment &  Plan  Lab Results   Component Value Date    HGBA1C 7.4 (H) 2024       Recent Labs     24  2127 24  0754 24  1118   POCGLU 151* 153* 208*         Blood Sugar Average: Last 72 hrs:  (P) 170.5721287777907162  Update hemoglobin A1c 7.4  PTA Lantus 5 units at bedtime-holding for now  SSI with Accu-Cheks  Hypoglycemia protocol  Carbohydrate controlled diet               VTE Pharmacologic Prophylaxis:   Moderate Risk (Score 3-4) - Pharmacological DVT Prophylaxis Ordered: heparin.    Mobility:   Basic Mobility Inpatient Raw Score: 18  JH-HLM Goal: 6: Walk 10 steps or more  JH-HLM Achieved: 5: Stand (1 or more minutes)  JH-HLM Goal achieved. Continue to encourage appropriate mobility.    Patient Centered Rounds: I performed bedside rounds with nursing staff today.   Discussions with Specialists or Other Care Team Provider: anamika nephrology    Education and Discussions with Family / Patient: Updated  (wife) via phone.    Total Time Spent on Date of Encounter in care of patient: 35 mins. This time was spent on one or more of the following: performing physical exam; counseling and coordination of care; obtaining or reviewing history; documenting in the medical record; reviewing/ordering tests, medications or procedures; communicating with other healthcare professionals and discussing with patient's family/caregivers.    Current Length of Stay: 1 day(s)  Current Patient Status: Inpatient   Certification Statement: The patient will continue to require additional inpatient hospital stay due to hypermagnesemia  Discharge Plan: Anticipate discharge in 24-48 hrs to home with home services.    Code Status: Level 1 - Full Code    Subjective:   States that the pain in his legs is better but he just feels very sick and not well    Objective:     Vitals:   Temp (24hrs), Av °F (36.7 °C), Min:97.7 °F (36.5 °C), Max:98.2 °F (36.8 °C)    Temp:  [97.7 °F (36.5 °C)-98.2 °F (36.8 °C)] 98.2 °F (36.8 °C)  HR:   [61-74] 68  Resp:  [14-20] 15  BP: (140-180)/() 147/67  SpO2:  [92 %-98 %] 95 %  Body mass index is 23.52 kg/m².     Input and Output Summary (last 24 hours):     Intake/Output Summary (Last 24 hours) at 5/20/2024 1214  Last data filed at 5/20/2024 1211  Gross per 24 hour   Intake 1340 ml   Output 3325 ml   Net -1985 ml       Physical Exam:   Physical Exam  Vitals and nursing note reviewed.   Constitutional:       Appearance: He is ill-appearing.   HENT:      Head: Normocephalic and atraumatic.      Right Ear: External ear normal.      Left Ear: External ear normal.      Nose: Nose normal.      Mouth/Throat:      Pharynx: Oropharynx is clear.   Eyes:      Pupils: Pupils are equal, round, and reactive to light.   Cardiovascular:      Rate and Rhythm: Normal rate and regular rhythm.      Heart sounds: Normal heart sounds.   Pulmonary:      Effort: Pulmonary effort is normal.      Breath sounds: Normal breath sounds.   Abdominal:      General: Bowel sounds are normal.      Palpations: Abdomen is soft.      Tenderness: There is no abdominal tenderness.   Musculoskeletal:         General: Normal range of motion.      Cervical back: Normal range of motion and neck supple.   Skin:     General: Skin is warm and dry.      Capillary Refill: Capillary refill takes less than 2 seconds.   Neurological:      General: No focal deficit present.      Mental Status: He is alert and oriented to person, place, and time.   Psychiatric:         Mood and Affect: Mood normal.            Additional Data:     Labs:  Results from last 7 days   Lab Units 05/20/24  0558 05/19/24  1757   WBC Thousand/uL 7.19 10.49*   HEMOGLOBIN g/dL 9.2* 10.5*   HEMATOCRIT % 27.8* 32.4*   PLATELETS Thousands/uL 181 211   SEGS PCT %  --  76*   LYMPHO PCT %  --  12*   MONO PCT %  --  7   EOS PCT %  --  3     Results from last 7 days   Lab Units 05/20/24  0558   SODIUM mmol/L 136   POTASSIUM mmol/L 3.5   CHLORIDE mmol/L 96   CO2 mmol/L 29   BUN mg/dL 73*    CREATININE mg/dL 5.41*   ANION GAP mmol/L 11   CALCIUM mg/dL 8.6   ALBUMIN g/dL 3.6   TOTAL BILIRUBIN mg/dL 0.24   ALK PHOS U/L 83   ALT U/L 9   AST U/L 10*   GLUCOSE RANDOM mg/dL 149*         Results from last 7 days   Lab Units 05/20/24  1118 05/20/24  0754 05/19/24 2127 05/16/24  1117 05/16/24  0727 05/15/24  2050 05/15/24  1548 05/15/24  1119 05/15/24  0746 05/14/24 2125 05/14/24  1601 05/14/24  1108   POC GLUCOSE mg/dl 208* 153* 151* 131 111 163* 137 203* 123 146* 130 121     Results from last 7 days   Lab Units 05/20/24  0558   HEMOGLOBIN A1C % 7.4*     Results from last 7 days   Lab Units 05/19/24  1757   LACTIC ACID mmol/L 1.5       Lines/Drains:  Invasive Devices       Peripheral Intravenous Line  Duration             Peripheral IV 05/19/24 Left;Ventral (anterior) Forearm <1 day                          Imaging: Reviewed radiology reports from this admission including: chest xray    Recent Cultures (last 7 days):         Last 24 Hours Medication List:   Current Facility-Administered Medications   Medication Dose Route Frequency Provider Last Rate    acetaminophen  650 mg Oral Q6H PRN Thao S June, CRNP      albuterol  2 puff Inhalation Q6H PRN Thao S June, CRNP      allopurinol  100 mg Oral Daily Thao S June, CRNP      amLODIPine  5 mg Oral Daily Thao S June, CRNP      docusate sodium  100 mg Oral BID Thao S June, CRNP      DULoxetine  20 mg Oral Daily Thao S June, CRNP      furosemide  60 mg Intravenous BID (diuretic) Jason Barboza MD      heparin (porcine)  5,000 Units Subcutaneous Q8H DELIO Thao S June, CRNP      insulin lispro  1-6 Units Subcutaneous TID AC Thao S June, CRNP      insulin lispro  1-6 Units Subcutaneous HS Thao S June, CRNP      lactulose  10 g Oral TID Thao S June, CRNP      [START ON 5/21/2024] lactulose  10 g Oral Daily PRN Thao S June, CRNP      nicotine  1 patch Transdermal Daily Thao S June, CRNP      oxyCODONE  5 mg Oral Q6H PRN Thao Watkins, SHOAIBNP       pregabalin  75 mg Oral BID Thao S June, CRNP      senna-docusate sodium  2 tablet Oral BID Thao S June, CRNP      sevelamer  1,600 mg Oral TID With Meals Thao S June, CRNP      sodium chloride  125 mL/hr Intravenous Continuous Jason Barboza  mL/hr (05/20/24 1203)    tamsulosin  0.4 mg Oral Daily With Dinner Thao S June, CRNP          Today, Patient Was Seen By: Adrianne Horn MD    **Please Note: This note may have been constructed using a voice recognition system.**

## 2024-05-20 NOTE — PROGRESS NOTES
Patient:    MRN:  [id number]    Suad Request ID:  [id number]    Level of care reserved:  Home Health Agency    Partner Reserved:  [facility], [facility], [place] [address] [phone]    Clinical needs requested:    Geography searched:  [phone]    Start of Service:    Request sent:  10:12am EDT on 5/20/2024 by Caroline Null    Partner reserved:  11:15am EDT on 5/20/2024 by Caroline Null    Choice list shared:  11:15am EDT on 5/20/2024 by Caroline Null

## 2024-05-20 NOTE — PLAN OF CARE

## 2024-05-20 NOTE — ASSESSMENT & PLAN NOTE
Lab Results   Component Value Date    HGBA1C 7.0 (H) 03/16/2023       Recent Labs     05/19/24 2127   POCGLU 151*       Blood Sugar Average: Last 72 hrs:  (P) 151  Update hemoglobin A1c  PTA Lantus 5 units at bedtime-holding for now  SSI with Accu-Cheks  Hypoglycemia protocol  Carbohydrate controlled diet

## 2024-05-20 NOTE — ASSESSMENT & PLAN NOTE
Lab Results   Component Value Date    EGFR 10 05/19/2024    EGFR 11 05/16/2024    EGFR 11 05/15/2024    CREATININE 5.45 (H) 05/19/2024    CREATININE 5.17 (H) 05/16/2024    CREATININE 5.05 (H) 05/15/2024   Elevated creatinine from baseline not meeting KELLY criteria  Continue sevelamer, allopurinol, torsemide  Intake output  Urinary retention protocol  Appreciate nephrology consultation  Following with outpatient nephrology

## 2024-05-20 NOTE — ASSESSMENT & PLAN NOTE
Lab Results   Component Value Date    HGBA1C 7.4 (H) 05/20/2024       Recent Labs     05/19/24  2127 05/20/24  0754 05/20/24  1118   POCGLU 151* 153* 208*         Blood Sugar Average: Last 72 hrs:  (P) 170.3918020373367779  Update hemoglobin A1c 7.4  PTA Lantus 5 units at bedtime-holding for now  SSI with Accu-Cheks  Hypoglycemia protocol  Carbohydrate controlled diet

## 2024-05-20 NOTE — ASSESSMENT & PLAN NOTE
Lab Results   Component Value Date    EGFR 10 05/19/2024    EGFR 11 05/16/2024    EGFR 11 05/15/2024    CREATININE 5.45 (H) 05/19/2024    CREATININE 5.17 (H) 05/16/2024    CREATININE 5.05 (H) 05/15/2024

## 2024-05-20 NOTE — CASE MANAGEMENT
Case Management Discharge Planning Note    Patient name Stef Pack Sr.  Location /-01 MRN [id number]  : 1965 Date 2024       Current Admission Date: 2024  Current Admission Diagnosis:Hypermagnesemia   Patient Active Problem List    Diagnosis Date Noted Date Diagnosed    Hypermagnesemia 2024     Neuropathic pain 2024     Right knee pain 2023     Diabetic foot ulcer (HCC) 2023     Acute metabolic encephalopathy 2023     PAD (peripheral artery disease) (HCC) 2022     Chronic back pain 2022     Moderate protein-calorie malnutrition (HCC) 2022     Chronic anemia 2022     RSV infection 2022     Chronic ulcer of left heel (HCC) 10/20/2022     Hypercalcemia 2022     Low back pain 2022     Ambulatory dysfunction 2022     CKD (chronic kidney disease), stage V (HCC) 2022     Retention of urine 2022     Severe protein-calorie malnutrition (HCC) 2022     Iron deficiency anemia secondary to inadequate dietary iron intake 2022     Hip pain, acute, left 2022     KELLY (acute kidney injury) ([facility]) 2022     Hyperkalemia 2022     Diabetes mellitus type 2, insulin dependent (HCC)      Gout      Essential hypertension      History of CVA (cerebrovascular accident)      Osteomyelitis of vertebra of lumbar region (HCC)        LOS (days): 1  Geometric Mean LOS (GMLOS) (days):   Days to GMLOS:     OBJECTIVE:  Risk of Unplanned Readmission Score: 25.28         Current admission status: Inpatient   Preferred Pharmacy:   [facility]/pharmacy #[id number] - [place], [place] - [address] [facility]  [address] [facility] [address]  Phone: [phone] Fax: [phone]    Primary Care Provider: Rehan Mancia DO    Primary Insurance: [facility] RE[facility]  Secondary Insurance:     DISCHARGE DETAILS:     CM received a call from [facility] - they are active with patient for nursing, just started . JOHN  to put referral in Aidin so they can follow patient and resume care a discharge.

## 2024-05-20 NOTE — ASSESSMENT & PLAN NOTE
Chronic pain syndrome  Continue PTA Cymbalta, Lyrica, oxycodone  Complaining of constipation - aggressive bowel regimen without magnesium products

## 2024-05-20 NOTE — PLAN OF CARE

## 2024-05-20 NOTE — ASSESSMENT & PLAN NOTE
Presented with muscle tremors similar to previous episode of hypermagnesemia-increased neuropathic pain without paralysis  Magnesium 5.2 on admission.  Last Admission also had elevated magnesium which dropped to 4 on discharge on 5/16  Denies taking any home medications with magnesium, still complains of severe constipation but denies recent magnesium containing laxative use  Previously seen by nephrology, and most likely due to decreased clearance in setting of advanced CKD   Receiving IV furosemide and normal saline.monitor response  Trend magnesium  Appreciate nephrology consultation

## 2024-05-20 NOTE — ASSESSMENT & PLAN NOTE
Presented with muscle tremors similar to previous episode of hyperagnesemia-increased neuropathy pain without paralysis  Magnesium 5.2 on admission  Denies taking any home medications with magnesium, still complains of severe constipation but denies recent laxative use  Previously seen by nephrology, and most likely due to decreased clearance in setting of advanced CKD   Received IV hydration and furosemide  Trend magnesium  Appreciate nephrology consultation

## 2024-05-20 NOTE — ASSESSMENT & PLAN NOTE
Lab Results   Component Value Date    EGFR 10 05/20/2024    EGFR 10 05/19/2024    EGFR 11 05/16/2024    CREATININE 5.41 (H) 05/20/2024    CREATININE 5.45 (H) 05/19/2024    CREATININE 5.17 (H) 05/16/2024   Elevated creatinine from baseline not meeting KELLY criteria  Baseline creatinine 5.17  Continue sevelamer, allopurinol .  Torsemide on hold and placed on iv lasix to help force diuresis to decrease hypermagnesemia  Intake output  Urinary retention protocol  Appreciate nephrology consultation  Following with outpatient nephrology.he wants to establish care with [facility] nephrology

## 2024-05-20 NOTE — UTILIZATION REVIEW
Initial Clinical Review    Admission: Date/Time/Statement:   Admission Orders (From admission, onward)       Ordered        05/19/24 1911  INPATIENT ADMISSION  Once                          Orders Placed This Encounter   Procedures    INPATIENT ADMISSION     Standing Status:   Standing     Number of Occurrences:   1     Order Specific Question:   Level of Care     Answer:   Med Surg [16]     Order Specific Question:   Estimated length of stay     Answer:   More than 2 Midnights     Order Specific Question:   Certification     Answer:   I certify that inpatient services are medically necessary for this patient for a duration of greater than two midnights. See H&P and MD Progress Notes for additional information about the patient's course of treatment.     ED Arrival Information       Expected   -    Arrival   5/19/2024 17:37    Acuity   Emergent              Means of arrival   Wheelchair    Escorted by   Spouse    Service   Hospitalist    Admission type   Emergency              Arrival complaint   allergic reaction             Chief Complaint   Patient presents with    Involuntary Movements     Pt arrives from home with c/o involuntary 'twitching' starting yesterday. Per wife, pt unable to hold things or walk properly since last night. Pt started on cymbalta and diuretic on Thursday.        Initial Presentation: 59 y.o. male to ER from home  for evaluation of  (see above c/o)      involuntary twitching/jerking movements since yesterday.  States these are causing him to drop things and be unable to walk.  Patient was recently discharged from inpatient at this facility after admission for CKD and hypomagnesemia - most likely due to decreased clearance in setting of advanced CKD  (recent crt baseline  4.5-4.8)  AND    pt drank bottle of mag citrate for constipation)     today   Mg 5.2       5/19    Admit INPT status,  MS Level of care for  management of  hypermagnesemia:  treat w/IVF Hydration and IV  lasix  (80 mg IV  x1)   trend Mg daily,  consult Nephrology.   Aggressive bowel regimen  (lactulose TID)   for c/o  constipation.  Closely monitor volume status w/I/O, post void bladder scans.  VS q4H.  Lo carb diet.  IVF isolyte @  50 cc/hr.      Anticipated Length of Stay/Certification Statement:  > 2 midnights     Date: 5/20       Day 2:  States that the pain in his legs is better but he just feels very sick and not well   5/20    PER NEPHROLOGY:  Will change to normal saline 125 cc/h - Lasix 60 mg IV twice daily.  patient does not appear volume overloaded at all and at this point no indication for dialysis.  Restart flomax and demadex         ED Triage Vitals [05/19/24 1744]   Temperature Pulse Respirations Blood Pressure SpO2   98.1 °F (36.7 °C) 68 16 (!) 156/101 92 %      Temp Source Heart Rate Source Patient Position - Orthostatic VS BP Location FiO2 (%)   Temporal Monitor Sitting Right arm --      Pain Score       No Pain          Wt Readings from Last 1 Encounters:   05/19/24 78.7 kg (173 lb 6.4 oz)     Additional Vital Signs:     05/20/24 14:49:55 98.1 °F (36.7 °C) 72 16 154/73 100 95 % -- --   05/20/24 0900 -- -- -- -- -- 95 % -- --   05/20/24 0820 -- -- -- -- -- 94 % None (Room air) --   05/20/24 07:20:27 98.2 °F (36.8 °C) 68 15 147/67 94 94 % -- --   05/19/24 23:52:13 98.1 °F (36.7 °C) 74 16 154/67 96 98 % -- --   05/19/24 2001 -- -- -- -- -- -- None (Room air) --   05/19/24 19:50:08 97.7 °F (36.5 °C) 69 18 180/76 Abnormal  111 95 % -- Lying   05/19/24 1900 -- 61 20 140/67 96 97 % None (Room air) --   05/19/24 1830 -- 61 14 142/64 92 95 % None (Room air) Sitting   05/19/24 1806 -- -- -- -- -- -- None (Room air) --   05/19/24 1800 -- 63 14 160/76 109 97 % None (Room air) Sitting     Pertinent Labs/Diagnostic Test Results:   5/19  EKG:   NSR     XR chest portable   ED Interpretation by Galindo Olmos MD (05/19 1910)   No acute finding      Final Result by Paty Gamboa MD (05/19 2052)      Lungs clear.             Workstation performed: [id number]               Results from last 7 days   Lab Units 05/20/24  0558 05/19/24  1757 05/16/24  0542 05/15/24  0508 05/14/24  0517   WBC Thousand/uL 7.19 10.49* 7.51 8.87 8.72   HEMOGLOBIN g/dL 9.2* 10.5* 10.2* 9.8* 9.9*   HEMATOCRIT % 27.8* 32.4* 31.6* 30.2* 31.3*   PLATELETS Thousands/uL 181 211 197 188 180   TOTAL NEUT ABS Thousands/µL  --  8.04*  --   --   --          Results from last 7 days   Lab Units 05/20/24  0558 05/19/24  1757 05/16/24  0542 05/15/24  0508 05/14/24  0517   SODIUM mmol/L 136 133* 136 137 139   POTASSIUM mmol/L 3.5 3.9 4.6 4.8 5.0   CHLORIDE mmol/L 96 92* 100 101 105   CO2 mmol/L 29 30 25 26 27   ANION GAP mmol/L 11 11 11 10 7   BUN mg/dL 73* 73* 70* 67* 62*   CREATININE mg/dL 5.41* 5.45* 5.17* 5.05* 4.86*   EGFR ml/min/1.73sq m 10 10 11 11 12   CALCIUM mg/dL 8.6 9.0 9.1 9.0 8.7   MAGNESIUM mg/dL 5.2* 5.2* 4.0* 4.4* 4.7*   PHOSPHORUS mg/dL 7.1*  --  7.7* 7.4* 7.2*     Results from last 7 days   Lab Units 05/20/24  0558 05/19/24  1757   AST U/L 10* 11*   ALT U/L 9 11   ALK PHOS U/L 83 93   TOTAL PROTEIN g/dL 6.2* 7.1   ALBUMIN g/dL 3.6 4.2   TOTAL BILIRUBIN mg/dL 0.24 0.32     Results from last 7 days   Lab Units 05/20/24  1613 05/20/24  1118 05/20/24  0754 05/19/24 2127 05/16/24  1117 05/16/24  0727 05/15/24  2050 05/15/24  1548 05/15/24  1119 05/15/24  0746 05/14/24  2125 05/14/24  1601   POC GLUCOSE mg/dl 213* 208* 153* 151* 131 111 163* 137 203* 123 146* 130     Results from last 7 days   Lab Units 05/20/24  0558 05/19/24  1757 05/16/24  0542 05/15/24  0508 05/14/24  0517   GLUCOSE RANDOM mg/dL 149* 181* 101 118 89         Results from last 7 days   Lab Units 05/20/24  0558   HEMOGLOBIN A1C % 7.4*   EAG mg/dl 166     Results from last 7 days   Lab Units 05/19/24  1757   CK TOTAL U/L 82     Results from last 7 days   Lab Units 05/19/24  1757   HS TNI 0HR ng/L 6     Results from last 7 days   Lab Units 05/19/24  1757   LACTIC ACID mmol/L 1.5                                  Results from last 7 days   Lab Units 05/19/24  1757   LIPASE u/L 9*     Results from last 7 days   Lab Units 05/20/24  0715   CLARITY UA  Clear   COLOR UA  Yellow   SPEC GRAV UA  1.010   PH UA  7.0   GLUCOSE UA mg/dl 100 (1/10%)*   KETONES UA mg/dl Negative   BLOOD UA  Trace-Intact*   PROTEIN UA mg/dl 30 (1+)*   NITRITE UA  Negative   BILIRUBIN UA  Negative   UROBILINOGEN UA E.U./dl 0.2   LEUKOCYTES UA  Small*   WBC UA /hpf 0-1   RBC UA /hpf 0-1   BACTERIA UA /hpf None Seen   EPITHELIAL CELLS WET PREP /hpf None Seen     ED Treatment:   Medication Administration from 05/19/2024 1736 to 05/19/2024 1945         Date/Time Order Dose Route Action     05/19/2024 1754 EDT sodium chloride 0.9 % bolus 1,000 mL 1,000 mL Intravenous New Bag          Past Medical History:   Diagnosis Date    Chronic kidney disease     Diabetes mellitus (HCC)     Gout     Hypertension     Renal disorder     Retroperitoneal abscess (HCC)     Stroke (HCC)     UTI (urinary tract infection)     Vertebral osteomyelitis (HCC)      Present on Admission:   Hypermagnesemia   Chronic anemia   Chronic kidney disease   Essential hypertension   Chronic back pain      Admitting Diagnosis: Hypomagnesemia [E83.42]  Hypermagnesemia [E83.41]  Allergic reaction [T78.40XA]  Chronic kidney disease [N18.9]  Involuntary movements [R25.9]  Age/Sex: 59 y.o. male  Admission Orders:   see above     Scheduled Medications:  allopurinol, 100 mg, Oral, Daily  amLODIPine, 5 mg, Oral, Daily  docusate sodium, 100 mg, Oral, BID  DULoxetine, 20 mg, Oral, Daily  furosemide, 60 mg, Intravenous, BID (diuretic)  heparin (porcine), 5,000 Units, Subcutaneous, Q8H DELIO  insulin lispro, 1-6 Units, Subcutaneous, TID AC  insulin lispro, 1-6 Units, Subcutaneous, HS  lactulose, 10 g, Oral, TID  nicotine, 1 patch, Transdermal, Daily  pregabalin, 75 mg, Oral, BID  senna-docusate sodium, 2 tablet, Oral, BID  sevelamer, 1,600 mg, Oral, TID With Meals  tamsulosin, 0.4 mg, Oral, Daily  With Dinner      Continuous IV Infusions:  sodium chloride, 125 mL/hr, Intravenous, Continuous      PRN Meds:  acetaminophen, 650 mg, Oral, Q6H PRN  albuterol, 2 puff, Inhalation, Q6H PRN  oxyCODONE, 5 mg, Oral, Q6H PRN   ... 5/19 x1 and  5/20 x2   polyethylene glycol, 17 g, Oral, Daily PRN        IP CONSULT TO NEPHROLOGY    Network Utilization Review Department  ATTENTION: Please call with any questions or concerns to [phone] and carefully listen to the prompts so that you are directed to the right person. All voicemails are confidential.   For Discharge needs, contact Care Management DC Support Team at [phone] opt. [phone]  Send all requests for admission clinical reviews, approved or denied determinations and any other requests to dedicated fax number below belonging to the [facility] where the patient is receiving treatment. List of dedicated fax numbers for the Facilities:  FACILITY NAME UR FAX NUMBER   ADMISSION DENIALS (Administrative/Medical Necessity) [phone]   DISCHARGE SUPPORT TEAM (NETWORK) [phone]   PARENT CHILD HEALTH (Maternity/NICU/Pediatrics) [phone]   [facility] [phone]   [facility] [phone]   [facility] [phone]   [facility] [phone]   [facility] [phone]   [facility] [phone]   [facility] [phone]   [facility] [phone]   [facility] [phone]   [facility] [phone]   [facility] [phone]   [facility] [phone]

## 2024-05-20 NOTE — H&P
[facility]  H&P  Name: Stef Pack Sr. 59 y.o. male I MRN: [id number]  Unit/Bed#: [facility]-01 I Date of Admission: 5/19/2024   Date of Service: 5/20/2024 I Hospital Day: 1      Assessment & Plan   * Hypermagnesemia  Assessment & Plan  Presented with muscle tremors similar to previous episode of hyperagnesemia-increased neuropathy pain without paralysis  Magnesium 5.2 on admission  Denies taking any home medications with magnesium, still complains of severe constipation but denies recent laxative use  Previously seen by nephrology, and most likely due to decreased clearance in setting of advanced CKD   Received IV hydration and furosemide  Trend magnesium  Appreciate nephrology consultation    Chronic back pain  Assessment & Plan  Chronic pain syndrome  Continue PTA Cymbalta, Lyrica, oxycodone  Complaining of constipation - aggressive bowel regimen without magnesium products    Chronic anemia  Assessment & Plan  Hemoglobin stable  CKD induced anemia  Monitor closely    CKD (chronic kidney disease), stage V ([facility])  Assessment & Plan  Lab Results   Component Value Date    EGFR 10 05/19/2024    EGFR 11 05/16/2024    EGFR 11 05/15/2024    CREATININE 5.45 (H) 05/19/2024    CREATININE 5.17 (H) 05/16/2024    CREATININE 5.05 (H) 05/15/2024   Elevated creatinine from baseline not meeting KELLY criteria  Continue sevelamer, allopurinol, torsemide  Intake output  Urinary retention protocol  Appreciate nephrology consultation  Following with outpatient nephrology    Essential hypertension  Assessment & Plan  Continue amlodipine  Trend blood pressures    Diabetes mellitus type 2, insulin dependent (HCC)  Assessment & Plan  Lab Results   Component Value Date    HGBA1C 7.0 (H) 03/16/2023       Recent Labs     05/19/24 2127   POCGLU 151*       Blood Sugar Average: Last 72 hrs:  (P) 151  Update hemoglobin A1c  PTA Lantus 5 units at bedtime-holding for now  SSI with Accu-Cheks  Hypoglycemia  protocol  Carbohydrate controlled diet           VTE Pharmacologic Prophylaxis:   High Risk (Score >/= 5) - Pharmacological DVT Prophylaxis Ordered: heparin. Sequential Compression Devices Ordered.  Code Status: Level 1 - Full Code   Discussion with family: Updated  (wife) at bedside.    Anticipated Length of Stay: Patient will be admitted on an inpatient basis with an anticipated length of stay of greater than 2 midnights secondary to hypermagnesemia.    Total Time Spent on Date of Encounter in care of patient: 45 mins. This time was spent on one or more of the following: performing physical exam; counseling and coordination of care; obtaining or reviewing history; documenting in the medical record; reviewing/ordering tests, medications or procedures; communicating with other healthcare professionals and discussing with patient's family/caregivers.    Chief Complaint: Tremors    History of Present Illness:  Stef Pack Sr. is a 59 y.o. male with a PMH of CKD following with outpatient nephrology preparing for future dialysis, gout, IDDM, urinary retention, vertebral osteomyelitis, chronic back pain who presents with tremors.  Previously evaluated and admitted for hypermagnesemia treated with IV fluid and furosemide.  Tonight found again to have magnesium 5.2 and presented to the medical service for further evaluation and treatment.    Review of Systems:  Review of Systems   Constitutional:  Negative for chills and fever.   HENT:  Negative for ear pain and sore throat.    Eyes:  Negative for pain and visual disturbance.   Respiratory:  Negative for cough and shortness of breath.    Cardiovascular:  Negative for chest pain and palpitations.   Gastrointestinal:  Positive for constipation. Negative for abdominal pain and vomiting.   Genitourinary:  Negative for dysuria and hematuria.   Musculoskeletal:  Positive for arthralgias and back pain.   Skin:  Negative for color change and rash.   Neurological:   Positive for tremors and weakness. Negative for seizures and syncope.   All other systems reviewed and are negative.      Past Medical and Surgical History:   Past Medical History:   Diagnosis Date    Chronic kidney disease     Diabetes mellitus (HCC)     Gout     Hypertension     Renal disorder     Retroperitoneal abscess (HCC)     Stroke (HCC)     UTI (urinary tract infection)     Vertebral osteomyelitis (HCC)        Past Surgical History:   Procedure Laterality Date    BACK SURGERY      ORCHIECTOMY         Meds/Allergies:  Prior to Admission medications    Medication Sig Start Date End Date Taking? Authorizing Provider   DULoxetine (CYMBALTA) 20 mg capsule Take 1 capsule (20 mg total) by mouth daily 5/17/24  Yes Charlotte Villegas PA-C   insulin glargine (LANTUS) 100 units/mL subcutaneous injection Inject 5 Units under the skin daily at bedtime 11/23/22  Yes Joana Mejias PA-C   insulin lispro (HumaLOG) 100 units/mL injection Inject 2-12 Units under the skin 3 (three) times a day before meals 10/24/22  Yes Adrianne Horn MD   pregabalin (LYRICA) 75 mg capsule Take 1 capsule (75 mg total) by mouth 2 (two) times a day for 10 days 5/16/24 5/26/24 Yes Charlotte Villegas PA-C   sevelamer (RENAGEL) 800 mg tablet Take 2 tablets (1,600 mg total) by mouth 3 (three) times a day with meals 5/16/24 6/15/24 Yes Charlotte Villegas PA-C   torsemide (DEMADEX) 20 mg tablet Take 1 tablet (20 mg total) by mouth daily 5/17/24  Yes Charlotte Villegas PA-C   albuterol (ProAir HFA) 90 mcg/act inhaler Inhale 2 puffs every 6 (six) hours as needed for wheezing 5/16/24   Charlotte Villegas PA-C   allopurinol (ZYLOPRIM) 100 mg tablet Take 1 tablet (100 mg total) by mouth daily 2/27/24 3/28/24  Adrianne Horn MD   amLODIPine (NORVASC) 5 mg tablet Take 1 tablet (5 mg total) by mouth daily 2/27/24 3/28/24  Adrianne Horn MD   docusate sodium (COLACE) 100 mg capsule TAKE BY MOUTH 1 CAPSULE IN THE MORNING AND 1 CAPSULE BEFORE BEDTIME. 12/21/22    "Historical Provider, MD   oxyCODONE (OXY-IR) 5 MG capsule Take 5 mg by mouth every 4 (four) hours as needed for moderate pain or severe pain    Historical Provider, MD   senna-docusate sodium (SENOKOT S) 8.6-50 mg per tablet Take 1 tablet by mouth 2 (two) times a day 3/17/22   Historical Provider, MD   tamsulosin (FLOMAX) 0.4 mg Take 1 capsule (0.4 mg total) by mouth daily with dinner 2/27/24 3/28/24  Adrianne Horn MD   zolpidem (AMBIEN CR) 12.5 MG CR tablet Take 10 mg by mouth daily at bedtime as needed for sleep  Patient not taking: Reported on 5/19/2024    Historical Provider, MD   acetaminophen (TYLENOL) 325 mg tablet Take 500 mg by mouth every 4 (four) hours as needed for mild pain  5/19/24  Historical Provider, MD   magnesium hydroxide (MILK OF MAGNESIA) 400 mg/5 mL oral suspension Take 5 mL by mouth daily as needed for constipation  5/19/24  Historical Provider, MD     I have reviewed home medications with patient personally.    Allergies:   Allergies   Allergen Reactions    Bupropion Delirium     \"went nuts,\" prior to 2012  \"went nuts,\" prior to 2012  \"went nuts,\" prior to 2012  \"went nuts,\" prior to 2012      Metformin Other (See Comments)     Other reaction(s): kidney disease  Other reaction(s): kidney disease  Other reaction(s): kidney disease      Medical Tape Rash       Social History:  Marital Status: /Civil Union   Patient Pre-hospital Living Situation: Home  Patient Pre-hospital Level of Mobility: unable to be assessed at time of evaluation  Patient Pre-hospital Diet Restrictions: none  Substance Use History:   Social History     Substance and Sexual Activity   Alcohol Use Not Currently     Social History     Tobacco Use   Smoking Status Every Day    Current packs/day: 0.25    Types: Cigarettes   Smokeless Tobacco Never     Social History     Substance and Sexual Activity   Drug Use Yes    Types: Marijuana       Family History:  Family History   Problem Relation Age of Onset    Hypertension " Father        Physical Exam:     Vitals:   Blood Pressure: 154/67 (05/19/24 2352)  Pulse: 74 (05/19/24 2352)  Temperature: 98.1 °F (36.7 °C) (05/19/24 2352)  Temp Source: Temporal (05/19/24 1950)  Respirations: 16 (05/19/24 2352)  Height: 6' (182.9 cm) (05/19/24 1946)  Weight - Scale: 78.7 kg (173 lb 6.4 oz) (05/19/24 1946)  SpO2: 98 % (05/19/24 2352)    Physical Exam  Vitals and nursing note reviewed.   Constitutional:       General: He is not in acute distress.     Appearance: He is ill-appearing.   HENT:      Head: Normocephalic and atraumatic.      Mouth/Throat:      Mouth: Mucous membranes are dry.   Eyes:      Conjunctiva/sclera: Conjunctivae normal.   Cardiovascular:      Rate and Rhythm: Normal rate and regular rhythm.      Heart sounds: No murmur heard.  Pulmonary:      Effort: Pulmonary effort is normal. No respiratory distress.      Breath sounds: Normal breath sounds.   Abdominal:      Palpations: Abdomen is soft.      Tenderness: There is no abdominal tenderness.   Musculoskeletal:         General: No swelling.      Cervical back: Neck supple.   Skin:     General: Skin is warm and dry.      Capillary Refill: Capillary refill takes less than 2 seconds.   Neurological:      General: No focal deficit present.      Mental Status: He is alert and oriented to person, place, and time.   Psychiatric:         Mood and Affect: Mood normal.         Behavior: Behavior normal.        Additional Data:     Lab Results:  Results from last 7 days   Lab Units 05/19/24  1757   WBC Thousand/uL 10.49*   HEMOGLOBIN g/dL 10.5*   HEMATOCRIT % 32.4*   PLATELETS Thousands/uL 211   SEGS PCT % 76*   LYMPHO PCT % 12*   MONO PCT % 7   EOS PCT % 3     Results from last 7 days   Lab Units 05/19/24  1757   SODIUM mmol/L 133*   POTASSIUM mmol/L 3.9   CHLORIDE mmol/L 92*   CO2 mmol/L 30   BUN mg/dL 73*   CREATININE mg/dL 5.45*   ANION GAP mmol/L 11   CALCIUM mg/dL 9.0   ALBUMIN g/dL 4.2   TOTAL BILIRUBIN mg/dL 0.32   ALK PHOS U/L 93    ALT U/L 11   AST U/L 11*   GLUCOSE RANDOM mg/dL 181*         Results from last 7 days   Lab Units 05/19/24 2127 05/16/24  1117 05/16/24  0727 05/15/24  2050 05/15/24  1548 05/15/24  1119 05/15/24  0746 05/14/24 2125 05/14/24  1601 05/14/24  1108 05/14/24  0819 05/13/24  2105   POC GLUCOSE mg/dl 151* 131 111 163* 137 203* 123 146* 130 121 105 175*         Results from last 7 days   Lab Units 05/19/24  1757   LACTIC ACID mmol/L 1.5       Lines/Drains:  Invasive Devices       Peripheral Intravenous Line  Duration             Peripheral IV 05/19/24 Left;Ventral (anterior) Forearm <1 day                        Imaging: Reviewed radiology reports from this admission including: chest xray  XR chest portable   ED Interpretation by Galindo Olmos MD (05/19 1910)   No acute finding      Final Result by Paty Gamboa MD (05/19 2052)      Lungs clear.            Workstation performed: [id number]             EKG and Other Studies Reviewed on Admission:   EKG: NSR. HR 64.    ** Please Note: This note has been constructed using a voice recognition system. **

## 2024-05-20 NOTE — CASE MANAGEMENT
Case Management Assessment & Discharge Planning Note    Patient name Stef Pack Sr.  Location /-01 MRN [id number]  : 1965 Date 2024       Current Admission Date: 2024  Current Admission Diagnosis:Hypermagnesemia   Patient Active Problem List    Diagnosis Date Noted Date Diagnosed    Hypermagnesemia 2024     Neuropathic pain 2024     Right knee pain 2023     Diabetic foot ulcer (HCC) 2023     Acute metabolic encephalopathy 2023     PAD (peripheral artery disease) (HCC) 2022     Chronic back pain 2022     Moderate protein-calorie malnutrition (HCC) 2022     Chronic anemia 2022     RSV infection 2022     Chronic ulcer of left heel (HCC) 10/20/2022     Hypercalcemia 2022     Low back pain 2022     Ambulatory dysfunction 2022     CKD (chronic kidney disease), stage V (HCC) 2022     Retention of urine 2022     Severe protein-calorie malnutrition (HCC) 2022     Iron deficiency anemia secondary to inadequate dietary iron intake 2022     Hip pain, acute, left 2022     KELLY (acute kidney injury) ([facility]) 2022     Hyperkalemia 2022     Diabetes mellitus type 2, insulin dependent (HCC)      Gout      Essential hypertension      History of CVA (cerebrovascular accident)      Osteomyelitis of vertebra of lumbar region (HCC)        LOS (days): 1  Geometric Mean LOS (GMLOS) (days):   Days to GMLOS:     OBJECTIVE:  PATIENT READMITTED TO HOSPITAL  Risk of Unplanned Readmission Score: 25.28         Current admission status: Inpatient  Referral Reason: Other    Preferred Pharmacy:   [id number] [place] [address]  [address]  [facility] [address]  Phone: [phone] Fax: [phone]    Primary Care Provider: Rehan Mancia DO    Primary Insurance: [facility] REP  Secondary Insurance:     ASSESSMENT:  CM met with patient at the  bedside,baseline information  was obtained. CM discussed the role of CM in helping the patient develop a discharge plan and assist the patient in carry out their plan.   Active Health Care Proxies       Charley Pack Health Care Representative - Spouse   Primary Phone: [phone] (Mobile)                 Advance Directives  Does patient have a Health Care POA?: No  Was patient offered paperwork?: Yes  Does patient currently have a Health Care decision maker?: Yes, please see Health Care Proxy section  Does patient have Advance Directives?: No  Was patient offered paperwork?: Yes  Primary Contact: Charley Pack (Spouse)  [phone]         Readmission Root Cause  30 Day Readmission: Yes  Who directed you to return to the hospital?: Self  Did you understand whom to contact if you had questions or problems?: Yes  Did you get your prescriptions before you left the hospital?: Yes  Were you able to get your prescriptions filled when you left the hospital?: Yes  Did you take your medications as prescribed?: Yes  Were you able to get to your follow-up appointments?: No  Reason:: Other (comment)  During previous admission, was a post-acute recommendation made?: Yes  What post-acute resources were offered?: [facility]    Patient Information  Admitted from:: Home  Mental Status: Alert  During Assessment patient was accompanied by: Not accompanied during assessment  Assessment information provided by:: Patient  Primary Caregiver: Self  Support Systems: Spouse/significant other, Daughter, Son, Friend  County of Residence: [facility]  [place] [facility] do you live in?: [place]  Home entry access options. Select all that apply.: Stairs  Do the steps have railings?: Yes  Type of Current Residence: [address]  Upon entering residence, is there a bedroom on the main floor (no further steps)?: Yes  Upon entering residence, is there a bathroom on the main floor (no further steps)?: Yes  Living Arrangements: Lives w/ Spouse/significant  other  Is patient a ?: Yes  Is patient active with VA ([place] [facility])?: No    Activities of Daily Living Prior to Admission  Functional Status: Assistance  Completes ADLs independently?: No  Level of ADL dependence: Assistance  Ambulates independently?: No  Level of ambulatory dependence: Assistance  Does patient use assisted devices?: Yes  Assisted Devices (DME) used: Walker, Wheelchair, Stair Chair/Glide, Shower Chair, Bedside Commode  Does patient currently own DME?: Yes  What DME does the patient currently own?: Bedside Commode, Shower Chair, Stair Chair/Glide, Wheelchair, Walker  Does patient have a history of Outpatient Therapy (PT/OT)?: No  Does the patient have a history of Short-Term Rehab?: Yes  Does patient have a history of HHC?: Yes  Does patient currently have HHC?: Yes    Current Home Health Care  Type of Current Home Care Services: Nurse visit  Current Home Health Agency:: Other (please enter name in comment) (all care)  Current Home Health Follow-Up Provider:: PCP    Patient Information Continued  Does patient have prescription coverage?: Yes  Does patient receive dialysis treatments?: No  Does patient have a history of substance abuse?: No         Means of Transportation  Means of Transport to Appts:: Drives Self      Social Determinants of Health (SDOH)      Flowsheet Row Most Recent Value   Housing Stability    In the last 12 months, was there a time when you were not able to pay the mortgage or rent on time? N   In the past 12 months, how many times have you moved where you were living? 1   At any time in the past 12 months, were you homeless or living in a shelter (including now)? N   Transportation Needs    In the past 12 months, has lack of transportation kept you from medical appointments or from getting medications? no   In the past 12 months, has lack of transportation kept you from meetings, work, or from getting things needed for daily living? No   Food Insecurity    Within the  past 12 months, you worried that your food would run out before you got the money to buy more. Never true   Within the past 12 months, the food you bought just didn't last and you didn't have money to get more. Never true   Utilities    In the past 12 months has the electric, gas, oil, or water company threatened to shut off services in your home? No            DISCHARGE DETAILS:  Cm met with pt to review dc needs. Pt recently dc from hospital with all care [facility] nurse visit. Pt agreeable to continue upon dc. Pt indicating no other dc needs. Cm will follow for any dc recommendations.     Discharge planning discussed with:: patient  Freedom of Choice: Yes  Comments - Freedom of Choice: pt to resume care with all care at dc  CM contacted family/caregiver?: No- see comments (n\a)  Were Treatment Team discharge recommendations reviewed with patient/caregiver?: Yes  Did patient/caregiver verbalize understanding of patient care needs?: Yes  Were patient/caregiver advised of the risks associated with not following Treatment Team discharge recommendations?: Yes

## 2024-05-21 ENCOUNTER — TELEPHONE (OUTPATIENT)
Dept: NEPHROLOGY | Facility: CLINIC | Age: 59
End: 2024-05-21

## 2024-05-21 LAB
ANION GAP SERPL CALCULATED.3IONS-SCNC: 10 MMOL/L (ref 4–13)
ATRIAL RATE: 63 BPM
BUN SERPL-MCNC: 66 MG/DL (ref 5–25)
CALCIUM SERPL-MCNC: 8.6 MG/DL (ref 8.4–10.2)
CHLORIDE SERPL-SCNC: 99 MMOL/L (ref 96–108)
CO2 SERPL-SCNC: 28 MMOL/L (ref 21–32)
CREAT SERPL-MCNC: 5.12 MG/DL (ref 0.6–1.3)
GFR SERPL CREATININE-BSD FRML MDRD: 11 ML/MIN/1.73SQ M
GLUCOSE SERPL-MCNC: 143 MG/DL (ref 65–140)
GLUCOSE SERPL-MCNC: 165 MG/DL (ref 65–140)
GLUCOSE SERPL-MCNC: 195 MG/DL (ref 65–140)
GLUCOSE SERPL-MCNC: 200 MG/DL (ref 65–140)
GLUCOSE SERPL-MCNC: 219 MG/DL (ref 65–140)
MAGNESIUM SERPL-MCNC: 4.7 MG/DL (ref 1.9–2.7)
P AXIS: 55 DEGREES
POTASSIUM SERPL-SCNC: 3.7 MMOL/L (ref 3.5–5.3)
PR INTERVAL: 158 MS
QRS AXIS: -24 DEGREES
QRSD INTERVAL: 102 MS
QT INTERVAL: 448 MS
QTC INTERVAL: 458 MS
SODIUM SERPL-SCNC: 137 MMOL/L (ref 135–147)
T WAVE AXIS: 49 DEGREES
VENTRICULAR RATE: 63 BPM

## 2024-05-21 PROCEDURE — 97167 OT EVAL HIGH COMPLEX 60 MIN: CPT

## 2024-05-21 PROCEDURE — 93010 ELECTROCARDIOGRAM REPORT: CPT | Performed by: INTERNAL MEDICINE

## 2024-05-21 PROCEDURE — 80048 BASIC METABOLIC PNL TOTAL CA: CPT | Performed by: INTERNAL MEDICINE

## 2024-05-21 PROCEDURE — 97116 GAIT TRAINING THERAPY: CPT

## 2024-05-21 PROCEDURE — 83735 ASSAY OF MAGNESIUM: CPT | Performed by: INTERNAL MEDICINE

## 2024-05-21 PROCEDURE — 97163 PT EVAL HIGH COMPLEX 45 MIN: CPT

## 2024-05-21 PROCEDURE — 82948 REAGENT STRIP/BLOOD GLUCOSE: CPT

## 2024-05-21 PROCEDURE — 99232 SBSQ HOSP IP/OBS MODERATE 35: CPT | Performed by: FAMILY MEDICINE

## 2024-05-21 PROCEDURE — 99232 SBSQ HOSP IP/OBS MODERATE 35: CPT | Performed by: INTERNAL MEDICINE

## 2024-05-21 RX ORDER — FUROSEMIDE 10 MG/ML
60 INJECTION INTRAMUSCULAR; INTRAVENOUS
Status: DISCONTINUED | OUTPATIENT
Start: 2024-05-21 | End: 2024-05-22 | Stop reason: HOSPADM

## 2024-05-21 RX ADMIN — DOCUSATE SODIUM 100 MG: 100 CAPSULE, LIQUID FILLED ORAL at 17:25

## 2024-05-21 RX ADMIN — SEVELAMER HYDROCHLORIDE 1600 MG: 800 TABLET, FILM COATED ORAL at 12:04

## 2024-05-21 RX ADMIN — HEPARIN SODIUM 5000 UNITS: 5000 INJECTION INTRAVENOUS; SUBCUTANEOUS at 13:56

## 2024-05-21 RX ADMIN — SODIUM CHLORIDE 125 ML/HR: 0.9 INJECTION, SOLUTION INTRAVENOUS at 04:35

## 2024-05-21 RX ADMIN — SENNOSIDES AND DOCUSATE SODIUM 2 TABLET: 8.6; 5 TABLET ORAL at 08:32

## 2024-05-21 RX ADMIN — HEPARIN SODIUM 5000 UNITS: 5000 INJECTION INTRAVENOUS; SUBCUTANEOUS at 21:35

## 2024-05-21 RX ADMIN — INSULIN LISPRO 2 UNITS: 100 INJECTION, SOLUTION INTRAVENOUS; SUBCUTANEOUS at 17:25

## 2024-05-21 RX ADMIN — INSULIN LISPRO 2 UNITS: 100 INJECTION, SOLUTION INTRAVENOUS; SUBCUTANEOUS at 12:05

## 2024-05-21 RX ADMIN — PREGABALIN 75 MG: 75 CAPSULE ORAL at 08:33

## 2024-05-21 RX ADMIN — FUROSEMIDE 60 MG: 10 INJECTION, SOLUTION INTRAMUSCULAR; INTRAVENOUS at 12:08

## 2024-05-21 RX ADMIN — ALLOPURINOL 100 MG: 100 TABLET ORAL at 08:32

## 2024-05-21 RX ADMIN — AMLODIPINE BESYLATE 5 MG: 5 TABLET ORAL at 08:33

## 2024-05-21 RX ADMIN — SENNOSIDES AND DOCUSATE SODIUM 2 TABLET: 8.6; 5 TABLET ORAL at 17:25

## 2024-05-21 RX ADMIN — SEVELAMER HYDROCHLORIDE 1600 MG: 800 TABLET, FILM COATED ORAL at 08:36

## 2024-05-21 RX ADMIN — DOCUSATE SODIUM 100 MG: 100 CAPSULE, LIQUID FILLED ORAL at 08:33

## 2024-05-21 RX ADMIN — FUROSEMIDE 60 MG: 10 INJECTION, SOLUTION INTRAMUSCULAR; INTRAVENOUS at 17:26

## 2024-05-21 RX ADMIN — SEVELAMER HYDROCHLORIDE 1600 MG: 800 TABLET, FILM COATED ORAL at 17:25

## 2024-05-21 RX ADMIN — SODIUM CHLORIDE 150 ML/HR: 0.9 INJECTION, SOLUTION INTRAVENOUS at 12:04

## 2024-05-21 RX ADMIN — FUROSEMIDE 60 MG: 10 INJECTION, SOLUTION INTRAMUSCULAR; INTRAVENOUS at 08:11

## 2024-05-21 RX ADMIN — TAMSULOSIN HYDROCHLORIDE 0.4 MG: 0.4 CAPSULE ORAL at 17:25

## 2024-05-21 RX ADMIN — PREGABALIN 75 MG: 75 CAPSULE ORAL at 17:25

## 2024-05-21 RX ADMIN — SODIUM CHLORIDE 150 ML/HR: 0.9 INJECTION, SOLUTION INTRAVENOUS at 20:06

## 2024-05-21 RX ADMIN — OXYCODONE HYDROCHLORIDE 5 MG: 5 TABLET ORAL at 04:27

## 2024-05-21 RX ADMIN — HEPARIN SODIUM 5000 UNITS: 5000 INJECTION INTRAVENOUS; SUBCUTANEOUS at 05:24

## 2024-05-21 RX ADMIN — DULOXETINE HYDROCHLORIDE 20 MG: 20 CAPSULE, DELAYED RELEASE ORAL at 08:32

## 2024-05-21 RX ADMIN — INSULIN LISPRO 2 UNITS: 100 INJECTION, SOLUTION INTRAVENOUS; SUBCUTANEOUS at 21:35

## 2024-05-21 NOTE — ASSESSMENT & PLAN NOTE
Lab Results   Component Value Date    HGBA1C 7.4 (H) 05/20/2024       Recent Labs     05/20/24  1613 05/20/24  2127 05/21/24  0732 05/21/24  1106   POCGLU 213* 189* 143* 195*         Blood Sugar Average: Last 72 hrs:  (P) [id number]  Follow hypoglycemia precaution, continue sliding scale.  While patient remain in-house, target blood sugar in between 1 40-1 80.

## 2024-05-21 NOTE — PROGRESS NOTES
NEPHROLOGY PROGRESS NOTE    Stef Pack Sr. 59 y.o. male MRN: [id number]  Unit/Bed#: -01 Encounter: [id number]  Reason for Consult: Chronic kidney disease and hypermagnesemia    The patient is awake and alert says that he is feeling better with respect to less twitching feels a little more steady on his feet although back pain limits his ambulation.  Otherwise no shortness of breath no muscle spasms.    ASSESSMENT/PLAN:  1.  Renal    The patient is acute on chronic kidney disease creatinine baseline was ranging 4.5-5.  Admitted and creatinine was 5.4 it is down to 5.1.  He does not really have uremic symptoms we are arranging to have care chronically as he is approaching dialysis and has initiated planning for ESRD care and transplant evaluation at [facility].  With fluids and diuresis our goal is to increase urinary excretion of magnesium and his creatinine is holding steady.    Continue IV fluids and diuretics    2.  Hypermagnesemia    Patient had significant hypermagnesemia due to ingestion of magnesium citrate at home.  He likely drank an entire 10 ounce bottle that has a large amount of elemental magnesium present with his renal disease is going to take increased time to excrete this.  We initiated an attempted forced diuresis with IV fluids and diuretic with Lasix magnesium is down to 4.7.  Urine output yesterday over 3 L.    Continue IV fluids increase saline to 150 cc/h  Lasix 60 mg IV 3 times daily today  Magnesium in the a.m.      Patient was just admitted for similar event and then readmitted very quickly with no improvement so would like to see a couple days of reduction of magnesium prior to discharge and I explained this to the patient.      SUBJECTIVE:  Review of Systems   Constitutional: Negative for chills, diaphoresis and fever.   HENT: Negative.     Eyes: Negative.    Cardiovascular:  Negative for chest pain, dyspnea on exertion, leg swelling and orthopnea.   Respiratory:  Negative for  cough, shortness of breath, sputum production and wheezing.    Gastrointestinal:  Negative for abdominal pain, diarrhea, nausea and vomiting.   Genitourinary: Negative.    Neurological:  Negative for dizziness and headaches.   Psychiatric/Behavioral:  Negative for altered mental status, hallucinations and hypervigilance.        OBJECTIVE:  Current Weight: Weight - Scale: 78.7 kg (173 lb 6.4 oz)  Vitals:Temp (24hrs), Av °F (36.7 °C), Min:97.7 °F (36.5 °C), Max:98.2 °F (36.8 °C)  Current: Temperature: 97.7 °F (36.5 °C)   Blood pressure 142/69, pulse 74, temperature 97.7 °F (36.5 °C), temperature source Temporal, resp. rate 19, height 6' (1.829 m), weight 78.7 kg (173 lb 6.4 oz), SpO2 95%. , Body mass index is 23.52 kg/m².      Intake/Output Summary (Last 24 hours) at 2024 1022  Last data filed at 2024 2000  Gross per 24 hour   Intake 780 ml   Output 2100 ml   Net -1320 ml       Physical Exam: /69 (BP Location: Right arm)   Pulse 74   Temp 97.7 °F (36.5 °C) (Temporal)   Resp 19   Ht 6' (1.829 m)   Wt 78.7 kg (173 lb 6.4 oz)   SpO2 95%   BMI 23.52 kg/m²   Physical Exam  Constitutional:       General: He is not in acute distress.     Appearance: He is not toxic-appearing.   HENT:      Head: Normocephalic and atraumatic.      Nose: Nose normal.      Mouth/Throat:      Mouth: Mucous membranes are moist.   Eyes:      General: No scleral icterus.     Extraocular Movements: Extraocular movements intact.   Cardiovascular:      Rate and Rhythm: Normal rate and regular rhythm.      Heart sounds:      No friction rub. No gallop.   Pulmonary:      Effort: Pulmonary effort is normal. No respiratory distress.      Breath sounds: No wheezing or rales.   Abdominal:      General: Bowel sounds are normal. There is no distension.      Palpations: Abdomen is soft.      Tenderness: There is no abdominal tenderness. There is no rebound.   Musculoskeletal:      Cervical back: Normal range of motion and neck supple.    Neurological:      Mental Status: He is alert and oriented to person, place, and time.   Psychiatric:         Mood and Affect: Mood normal.         Behavior: Behavior normal.         Thought Content: Thought content normal.         Medications:    Current Facility-Administered Medications:     acetaminophen (TYLENOL) tablet 650 mg, 650 mg, Oral, Q6H PRN, Thao S June, CRNP    albuterol (PROVENTIL HFA,VENTOLIN HFA) inhaler 2 puff, 2 puff, Inhalation, Q6H PRN, Thao S June, CRNP    allopurinol (ZYLOPRIM) tablet 100 mg, 100 mg, Oral, Daily, Thao S June, CRNP, 100 mg at 05/21/24 0832    amLODIPine (NORVASC) tablet 5 mg, 5 mg, Oral, Daily, Thao S June, CRNP, 5 mg at 05/21/24 0833    docusate sodium (COLACE) capsule 100 mg, 100 mg, Oral, BID, Thao S June, CRNP, 100 mg at 05/21/24 0833    DULoxetine (CYMBALTA) delayed release capsule 20 mg, 20 mg, Oral, Daily, Thao S June, CRNP, 20 mg at 05/21/24 0832    furosemide (LASIX) injection 60 mg, 60 mg, Intravenous, BID (diuretic), Jason Barboza MD, 60 mg at 05/21/24 0811    heparin (porcine) subcutaneous injection 5,000 Units, 5,000 Units, Subcutaneous, Q8H DELIO, Thao S June, CRNP, 5,000 Units at 05/21/24 0524    insulin lispro (HumALOG/ADMELOG) 100 units/mL subcutaneous injection 1-6 Units, 1-6 Units, Subcutaneous, TID AC, 2 Units at 05/20/24 1659 **AND** Fingerstick Glucose (POCT), , , TID AC, Thao S June, CRNP    insulin lispro (HumALOG/ADMELOG) 100 units/mL subcutaneous injection 1-6 Units, 1-6 Units, Subcutaneous, HS, Thao S June, CRNP, 1 Units at 05/20/24 2208    nicotine (NICODERM CQ) 21 mg/24 hr TD 24 hr patch 1 patch, 1 patch, Transdermal, Daily, Thao S June, CRNP    oxyCODONE (ROXICODONE) IR tablet 5 mg, 5 mg, Oral, Q6H PRN, Thao Leonex, CRNP, 5 mg at 05/21/24 5458    polyethylene glycol (MIRALAX) packet 17 g, 17 g, Oral, Daily PRN, Adrianne Horn MD    pregabalin (LYRICA) capsule 75 mg, 75 mg, Oral, BID, Thao Leonex, CRNP, 75 mg at 05/21/24 0014     "senna-docusate sodium (SENOKOT S) 8.6-50 mg per tablet 2 tablet, 2 tablet, Oral, BID, Thao S June, CRNP, 2 tablet at 05/21/24 0832    sevelamer (RENAGEL) tablet 1,600 mg, 1,600 mg, Oral, TID With Meals, Thao S June, CRNP, 1,600 mg at 05/21/24 0836    sodium chloride 0.9 % infusion, 125 mL/hr, Intravenous, Continuous, Jason Barboza MD, Last Rate: 125 mL/hr at 05/21/24 0435, 125 mL/hr at 05/21/24 0435    tamsulosin (FLOMAX) capsule 0.4 mg, 0.4 mg, Oral, Daily With Dinner, Thao S June, CRNP, 0.4 mg at 05/20/24 1701    Laboratory Results:  Lab Results   Component Value Date    WBC 7.19 05/20/2024    HGB 9.2 (L) 05/20/2024    HCT 27.8 (L) 05/20/2024    MCV 96 05/20/2024     05/20/2024     Lab Results   Component Value Date    SODIUM 137 05/21/2024    K 3.7 05/21/2024    CL 99 05/21/2024    CO2 28 05/21/2024    BUN 66 (H) 05/21/2024    CREATININE 5.12 (H) 05/21/2024    GLUC 165 (H) 05/21/2024    CALCIUM 8.6 05/21/2024     Lab Results   Component Value Date    CALCIUM 8.6 05/21/2024    PHOS 7.1 (H) 05/20/2024     No results found for: \"LABPROT\"    "

## 2024-05-21 NOTE — NURSING NOTE
"Pt stated \"My heart feels like it's beating out of my chest\". Vital signs and EKG obtained at this time. Provider notified. Patient has no further complaints and no longer feels that his heart is beating out of his chest.   "

## 2024-05-21 NOTE — PLAN OF CARE
Problem: PHYSICAL THERAPY ADULT  Goal: Performs mobility at highest level of function for planned discharge setting.  See evaluation for individualized goals.  Description: Treatment/Interventions: ADL retraining, Functional transfer training, LE strengthening/ROM, Elevations, Therapeutic exercise, Endurance training, Patient/family training, Equipment eval/education, Bed mobility, Gait training, Compensatory technique education, Spoke to nursing, Spoke to case management, OT  Equipment Recommended:  (walker-pt has)       See flowsheet documentation for full assessment, interventions and recommendations.  Note: Prognosis: Good  Problem List: Decreased strength, Decreased range of motion, Decreased endurance, Impaired balance, Decreased mobility, Decreased coordination, Impaired judgement, Decreased safety awareness, Pain  Assessment: Pt is a 59 y.o. male seen for PT evaluation s/p admission to [facility] on 5/19/2024 with Hypermagnesemia.  Order placed for PT services.  Upon evaluation: Pt is presenting with impaired functional mobility due to pain, decreased strength, decreased ROM, decreased endurance, impaired balance, impaired coordination, impaired proprioception, gait deviations, altered sensation, decreased safety awareness, impaired judgment, and fall risk requiring  minimal to moderate assistance for transfers and moderate assistance for ambulation with HHA and minimal assistance with spc for transfers and ambulation . Pt's clinical presentation is currently unpredictable given the functional mobility deficits above, especially weakness, decreased ROM, decreased endurance, impaired coordination, impaired balance, impaired proprioception, gait deviations, pain, decreased activity tolerance, decreased functional mobility tolerance, altered sensation, decreased safety awareness, and impaired judgement, coupled with fall risks as indicated by AM-PAC 6-Clicks: 18/24 as well as hx of falls,  impulsivity, impaired balance, polypharmacy, impaired coordination, impaired judgement, decreased safety awareness, and limited sensation/neuropathy and combined with medical complications of pain impacting overall mobility status, abnormal renal lab values, abnormal CBC, abnormal blood sugars, abnormal sodium values, need for input for mobility technique/safety, and abnormal magnesium .  Pt's PMHx and comorbidities that may affect physical performance and progress include: CKD, DM, and chronic anemia, HTN, and gout, h/o retroperitoneal abscess, CVA, vertebral osteomyelitis, h/o back sx . Personal factors affecting pt at time of IE include: impulsivity, inaccessible home environment, multi-level environment, limited home support, inability to perform IADLs, inability to perform ADLs, inability to navigate level surfaces without external assistance, inability to ambulate household distances, limited insight into impairments, and recent fall(s)/fall history. Pt will benefit from continued skilled PT services to address deficits as defined above and to maximize level of functional mobility to facilitate return toward PLOF and improved QOL. From PT/mobility standpoint, recommendation at time of d/c would be Level II (Moderate Resource Intensity, with family and/or caregiver support, and with walker in order to reduce fall risk and maximize pt's functional independence and consiste  Barriers to Discharge: Decreased caregiver support, Inaccessible home environment  Barriers to Discharge Comments: alone during the day, requires assistance to complete mobility, increased fall risk, decreased insight to deficits. intiially declining chair alarm as he wishes to get up on his own-nt advised  Rehab Resource Intensity Level, PT: II (Moderate Resource Intensity)    See flowsheet documentation for full assessment.

## 2024-05-21 NOTE — TELEPHONE ENCOUNTER
Patient currently hospitalized 5/21/24      ----- Message from Jason Barboza MD sent at 5/20/2024 11:43 AM EDT -----  This patient has advanced chronic kidney disease and he wants to become a patient of the [facility].  Could you please contact his wife to arrange outpatient follow-up with one of the docs and nephrology.  Creatinine baseline is around 5 and they are aware you will contact them for the appointment.  Currently he is in the hospital but should just be here a couple days.    Thank you, Jason Barboza MD

## 2024-05-21 NOTE — PLAN OF CARE
Problem: OCCUPATIONAL THERAPY ADULT  Goal: Performs self-care activities at highest level of function for planned discharge setting.  See evaluation for individualized goals.  Description: Treatment Interventions: ADL retraining, Visual perceptual retraining, Functional transfer training, UE strengthening/ROM, Endurance training, Cognitive reorientation, Patient/family training, Equipment evaluation/education, Neuromuscular reeducation, UE splinting, Fine motor coordination activities, Compensatory technique education, Continued evaluation, Cardiac education, Energy conservation, Activityengagement          See flowsheet documentation for full assessment, interventions and recommendations.   Note: Limitation: Decreased ADL status, Decreased Safe judgement during ADL, Decreased endurance, Decreased self-care trans, Decreased high-level ADLs  Prognosis: Good  Assessment: Pt is a 59 y.o. male, admitted to [facility] 5/19/2024 d/t experiencing tremors. Dx: hypermagnesemia. Pt with PMHx impacting their performance during ADL tasks, including: CKD, DM, gout, hypertension, renal disorder, retroperitoneal abscess, stroke, UTI, vertebral osteomyelitis. Prior to admission to the hospital Pt was performing ADLs without physical assistance. IADLs with physical assistance. Functional transfers/ambulation without physical assistance. Cognitive status was PTA was Intact. OT order placed to assess Pt's ADLs, cognitive status, and performance during functional tasks in order to maximize safety and independence while making most appropriate d/c recommendations. Pt's clinical presentation is currently unstable/unpredictable given new onset deficits that effect Pt's occupational performance and ability to safely return to PLOF including decrease activity tolerance, decrease standing balance, decrease performance during ADL tasks, decrease safety awareness , increase impulsiveness, decrease generalized strength, decrease activity engagement,  decrease performance during functional transfers, and high fall risk combined with medical complications of hypertension , abnormal renal lab values, abnormal H&H, abnormal CBC, abnormal blood sugars, impulsivity during admission, and need for input for mobility technique/safety. Personal factors affecting Pt at time of initial evaluation include: multi-level environment, past experience, inability to perform IADLs, new need for AD, limited insight into impairments, recent fall(s)/fall history, and questionable non-compliance. Pt will benefit from continued skilled OT services to address deficits as defined above and to maximize level independence/participation during ADLs and functional tasks to facilitate return toward PLOF and improved quality of life. From an occupational therapy standpoint, recommendation at time of d/c would be Level II: Moderate Resource Intensity Therapy.     Rehab Resource Intensity Level, OT: II (Moderate Resource Intensity)

## 2024-05-21 NOTE — PLAN OF CARE

## 2024-05-21 NOTE — ASSESSMENT & PLAN NOTE
Presented with muscle tremors similar to previous episode of hypermagnesemia-increased neuropathic pain without paralysis  Magnesium 5.2 on admission.  Last Admission also had elevated magnesium which dropped to 4 on discharge on 5/16  Magnesium level is improving, after discussion is done with nephrology, will continue current treatment with IV fluid and IV diuretics for forced diuresis.

## 2024-05-21 NOTE — PHYSICAL THERAPY NOTE
PHYSICAL THERAPY EVALUATION  NAME:  Stef Pack Sr.  DATE: 05/21/24    AGE:   59 y.o.  Mrn:   [id number]  ADMIT DX:  Hypomagnesemia [E83.42]  Hypermagnesemia [E83.41]  Allergic reaction [T78.40XA]  Chronic kidney disease [N18.9]  Involuntary movements [R25.9]    Past Medical History:   Diagnosis Date    Chronic kidney disease     Diabetes mellitus (HCC)     Gout     Hypertension     Renal disorder     Retroperitoneal abscess (HCC)     Stroke (HCC)     UTI (urinary tract infection)     Vertebral osteomyelitis (HCC)      Length Of Stay: 2  Performed at least 2 patient identifiers during session: Name and Birthday  PHYSICAL THERAPY EVALUATION :    05/21/24 1033   PT Last Visit   PT Visit Date 05/21/24   Note Type   Note type Evaluation   Pain Assessment   Pain Assessment Tool 0-10   Pain Score 7   Pain Location/Orientation Orientation: Lower;Location: Back   Restrictions/Precautions   Other Precautions Fall Risk;Pain;Multiple lines;Chair Alarm   Home Living   Type of Home House  (no BRUCE)   Home Layout Two level;Bed/bath upstairs;1/2 bath on main level  (FF R HR, does have stair glide to use prn)   Bathroom Shower/Tub Tub/shower unit   Bathroom Toilet Raised   Bathroom Equipment Shower chair;Grab bars in shower;Grab bars around toilet   Home Equipment Walker;Cane;Stair glide  (rollator. uses hurry cane for mobility)   Additional Comments Reports livign in a 2SH without BRUCE and FF to 2nd floor. reports using hurry cane for mobility.   Prior Function   Level of [place] Independent with ADLs;Independent with functional mobility;Independent with IADLS   Lives With Spouse   Receives Help From Family   IADLs Independent with meal prep;Independent with driving;Family/Friend/Other provides medication management   Falls in the last 6 months 1 to 4  (one fall since last admit)   Comments Reports being independent with mobility, ADLs and IADLs.   General   Additional Pertinent History Recently admitted 5/13/24 to 5/16/24  "with hypermagnesemia.   Cognition   Orientation Level Oriented X4   Following Commands Follows one step commands without difficulty   Comments limited insight to deficits, impulsive with mobility requiring verbal cues for safety throughout.   Subjective   Subjective \"I don't want to go back to using a walker.\"   RLE Assessment   RLE Assessment   (knee ext AROM WFL, strength 3/5, knee flexion 3+/5, ankle DF 3+/5, hip flexion 2+/5)   LLE Assessment   LLE Assessment   (knee ext AROM WFL, strength 3-/5, knee flexion 3-/5, ankle DF AROM < neutral 2-/5, hip flexion 2/5)   Coordination   Rapid Alternating Movements Impaired  (left LE)   Light Touch   RLE Light Touch Impaired   RLE Light Touch Comments reports numbness biltaeral LEs distal to knees and down   LLE Light Touch Impaired   LLE Light Touch Comments reports numbness biltaeral LEs distal to knees and down   Bed Mobility   Additional Comments Pt standing at window in hallway upon arrival to room. Pt reporting using IV pole for UE support with mobility.   Transfers   Sit to Stand 4  Minimal assistance   Additional items Assist x 1;Increased time required;Verbal cues   Stand to Sit 4  Minimal assistance   Additional items Assist x 1;Increased time required;Verbal cues   Stand pivot 3  Moderate assistance   Additional items Assist x 1;Increased time required;Verbal cues   Additional Comments no AD. sit<>stand with minAx1 wiht manual cues for controlled descent and verbal cues for LE placement for safety with transfers. spt with HHA at modAx1 with manual cues for wt shifting and safe completion. then intiiated spc. sit<>stand with spc with minAx1 with cues for inc NICK and cues for LE positioning for safe transfer without increased anterior translation. spt with spc with minAx1 with manual cues for wt shifting.   Ambulation/Elevation   Gait pattern Wide NICK;Improper Weight shift;Decreased foot clearance;Short stride;Excessively slow;Decreased heel strike  (left foot " "drop)   Gait Assistance 3  Moderate assist  (HHA and minAx1 with spc.)   Additional items Assist x 1;Verbal cues   Assistive Device Straight cane  (HHA)   Distance ambulated 22'x1 with HHA ta modAx1 with manual cues for wt shifting and balance and verbal cues for inc foot clearance. pt with left foot drop. ambulated 22'x1 wiht spc with miNAx1 with NBOS, decreased step length, foot clearance, poor placement of spc and varied LE placement. cues for uprihgt posture and sequence with spc.   Balance   Static Sitting Normal   Dynamic Sitting Good   Static Standing Fair -   Dynamic Standing Poor +   Ambulatory Poor +   Activity Tolerance   Activity Tolerance Patient limited by fatigue;Patient limited by pain   Medical Staff Made Aware Gerda TABOR   Nurse Made Aware Juni RITTER   Assessment   Prognosis Good   Problem List Decreased strength;Decreased range of motion;Decreased endurance;Impaired balance;Decreased mobility;Decreased coordination;Impaired judgement;Decreased safety awareness;Pain   Barriers to Discharge Decreased caregiver support;Inaccessible home environment   Barriers to Discharge Comments alone during the day, requires assistance to complete mobility, increased fall risk, decreased insight to deficits. intiially declining chair alarm as he wishes to get up on his own-nt advised   Goals   Patient Goals \"Go home\"   STG Expiration Date 06/04/24   PT Treatment Day 1   Plan   Treatment/Interventions ADL retraining;Functional transfer training;LE strengthening/ROM;Elevations;Therapeutic exercise;Endurance training;Patient/family training;Equipment eval/education;Bed mobility;Gait training;Compensatory technique education;Spoke to nursing;Spoke to case management;OT   PT Frequency 3-5x/wk   Discharge Recommendation   Rehab Resource Intensity Level, PT II (Moderate Resource Intensity)   Equipment Recommended   (walker-pt has)   Additional Comments increased support and assistance recommended from spouse. use of RW. " pt verbalize dunderstanding but resistant.   AM-PAC Basic Mobility Inpatient   Turning in Flat Bed Without Bedrails 3   Lying on Back to Sitting on Edge of Flat Bed Without Bedrails 3   Moving Bed to Chair 3   Standing Up From Chair Using Arms 3   Walk in Room 3   Climb 3-5 Stairs With Railing 3   Basic Mobility Inpatient Raw Score 18   Basic Mobility Standardized Score 41.05   [facility] Highest Level Of Mobility   -HLM Goal 6: Walk 10 steps or more   -HLM Achieved 7: Walk 25 feet or more   Additional Treatment Session   Start Time 1046   End Time 1056   Treatment Assessment Pt tolerated sesison fairly. He was able to ambulate increased distance with decreasing assistance wiht improved gait pattern with verbal cues and was able to trial stairs this date. He requires assistance for stair completion and verbal cues for safe completion. He is impulsive wiht mobility requiring cues throughout to take his time and cues for safety with transfers and gait pattern. He is limited byd ecreased strenght, balance, endurance and increaased pain as well as decreased safety with impulsiveness. He will continue to benefit from PT serices to maximize LOF.   Equipment Use iniitated use of RW. min verbal cues for hand placement, LE placement and technique wiht transfer. sit<>stand with minAx1-->steadying assistance and last trial SBA with improved tehcnique and LE placement. spt with RW with minimal to steadying asisstance with cues for staying wihtin NICK of RW and for turning completely. completed simulated car transfer with minimal assistance for sit<>stand and supervision to swing LEs in and out. progressed sit<>stand to steadying asisstance with cues for tehcnique. then ambulated 30'x1, 20'x1 with minimal to steadying assistance with cues for staying wihtin NICK of RW and for inc foot clearance. and 50'x1 with steadying asisstance last trial with improved LE placement and staying wihtin NICK of RW requiring decreased  assistance for completion and min to mod verbal cues for positoning wihtin RW. discussed use of RW for safe return to home. pt resistance, but ultimately agreeable after education. completed 4 steps ascendign with B HRs with right LE then left LE up with minimal to steadying assistance and descending with B hands on right HR, facing rail with steadying assistance. discussed having assistance for stair completion upon return to home. pt verbalizes understanding.   End of Consult   Patient Position at End of Consult Bedside chair;Bed/Chair alarm activated;All needs within reach       Pt requires PT/OT co-eval due to medical complexity, safety concerns, fall risk, significant assistance with mobility and/or cognitive-behavioral impairments.    (Please find full objective findings from PT assessment regarding body systems outlined above).     Assessment: Pt is a 59 y.o. male seen for PT evaluation s/p admission [facility]24 with Hypermagnesemia.  Order placed for PT services.  Upon evaluation: Pt is presenting with impaired functional mobility due to pain, decreased strength, decreased ROM, decreased endurance, impaired balance, impaired coordination, impaired proprioception, gait deviations, altered sensation, decreased safety awareness, impaired judgment, and fall risk requiring  minimal to moderate assistance for transfers and moderate assistance for ambulation with HHA and minimal assistance with spc for transfers and ambulation . Pt's clinical presentation is currently unpredictable given the functional mobility deficits above, especially weakness, decreased ROM, decreased endurance, impaired coordination, impaired balance, impaired proprioception, gait deviations, pain, decreased activity tolerance, decreased functional mobility tolerance, altered sensation, decreased safety awareness, and impaired judgement, coupled with fall risks as indicated by AM-PAC 6-Clicks: 18/24 as well as  hx of falls, impulsivity, impaired balance, polypharmacy, impaired coordination, impaired judgement, decreased safety awareness, and limited sensation/neuropathy and combined with medical complications of pain impacting overall mobility status, abnormal renal lab values, abnormal CBC, abnormal blood sugars, abnormal sodium values, need for input for mobility technique/safety, and abnormal magnesium .  Pt's PMHx and comorbidities that may affect physical performance and progress include: CKD, DM, and chronic anemia, HTN, and gout, h/o retroperitoneal abscess, CVA, vertebral osteomyelitis, h/o back sx . Personal factors affecting pt at time of IE include: impulsivity, inaccessible home environment, multi-level environment, limited home support, inability to perform IADLs, inability to perform ADLs, inability to navigate level surfaces without external assistance, inability to ambulate household distances, limited insight into impairments, and recent fall(s)/fall history. Pt will benefit from continued skilled PT services to address deficits as defined above and to maximize level of functional mobility to facilitate return toward PLOF and improved QOL. From PT/mobility standpoint, recommendation at time of d/c would be Level II (Moderate Resource Intensity, with family and/or caregiver support, and with walker in order to reduce fall risk and maximize pt's functional independence and consistency with mobility. Recommend trial with walker next 1-2 sessions and ther ex next 1-2 sessions.       The patient's AM-PAC Basic Mobility Inpatient Short Form Raw Score is 18. A Raw score of greater than 16 suggests the patient may benefit from discharge to home. Please also refer to the recommendation of the Physical Therapist for safe discharge planning.       Goals: Pt will: Perform bed mobility tasks with modified Independent to reposition in bed and prepare for transfers. Pt will perform transfers with modified Independent to  decrease burden of care and decrease risk for falls and prepare for ambulation. Pt will ambulate with RW or LRAD for >/= 150' with  modified Independent  to decrease burden of care, decrease risk for falls, improve activity tolerance, and improve gait quality and to access home environment. Pt will complete 1 step with LRAD and >/= 12 steps with unilateral handrail with supervision to decrease burden of care, return to multilevel home, decrease risk for falls, improve activity tolerance, and improve gait quality. Pt will participate in objective balance assessment to determine baseline fall risk. Pt will participate in SSWS assessment to determine level of mobility. Pt will increase B LE strength >/= 1/2 MMT grade to facilitate functional mobility.      Kandace Marx, PT,DPT

## 2024-05-21 NOTE — PLAN OF CARE
Problem: PHYSICAL THERAPY ADULT  Goal: Performs mobility at highest level of function for planned discharge setting.  See evaluation for individualized goals.  Description: Treatment/Interventions: ADL retraining, Functional transfer training, LE strengthening/ROM, Elevations, Therapeutic exercise, Endurance training, Patient/family training, Equipment eval/education, Bed mobility, Gait training, Compensatory technique education, Spoke to nursing, Spoke to case management, OT  Equipment Recommended:  (walker-pt has)       See flowsheet documentation for full assessment, interventions and recommendations.  5/21/2024 1415 by Kandace Marx, PT  Note: Prognosis: Good  Problem List: Decreased strength, Decreased range of motion, Decreased endurance, Impaired balance, Decreased mobility, Decreased coordination, Impaired judgement, Decreased safety awareness, Pain  Pt tolerated sesison fairly. He was able to ambulate increased distance with decreasing assistance wiht improved gait pattern with verbal cues and was able to trial stairs this date. He requires assistance for stair completion and verbal cues for safe completion. He is impulsive wiht mobility requiring cues throughout to take his time and cues for safety with transfers and gait pattern. He is limited byd ecreased strenght, balance, endurance and increaased pain as well as decreased safety with impulsiveness. He will continue to benefit from PT serices to maximize LOF.     Barriers to Discharge: Decreased caregiver support, Inaccessible home environment  Barriers to Discharge Comments: alone during the day, requires assistance to complete mobility, increased fall risk, decreased insight to deficits. intiially declining chair alarm as he wishes to get up on his own-nt advised  Rehab Resource Intensity Level, PT: II (Moderate Resource Intensity)    See flowsheet documentation for full assessment.

## 2024-05-21 NOTE — UTILIZATION REVIEW
Continued Stay Review    SEE INITIAL REVIEW AT BOTTOM    Date: 5/21/24      Day 3: Has surpassed a 2nd midnight with active treatments and services.  Magnesium level is improving; per Nephrology - will continue current treatment with IV fluid and IV diuretics for forced diuresis. Cr 5.12; I/O net -1475  Plan: cont iv lasix; start ivf; monitor labs; pain control (see below); Accuchecks with ssic      Vital Signs:   Date/Time Temp Pulse Resp BP MAP (mmHg) SpO2 O2 Device Patient Position - Orthostatic VS   05/21/24 1210 -- 69 -- 176/76 Abnormal  109 -- -- --   05/21/24 0830 -- -- -- -- -- -- None (Room air) --   05/21/24 08:10:36 97.7 °F (36.5 °C) 74 19 142/69 93 95 % -- Lying   05/21/24 0730 98.1 °F (36.7 °C) 65 18 143/67 92 93 % -- --   05/20/24 23:31:37 97.9 °F (36.6 °C) 67 -- 151/80 104 97 % -- --   05/20/24 22:49:06 98.2 °F (36.8 °C) 65 17 161/77 105 96 % -- --   05/20/24 2000 -- -- -- -- -- -- None (Room air) --   05/20/24 14:49:55 98.1 °F (36.7 °C) 72 16 154/73 100 95 %         Pertinent Labs/Diagnostic Results:      Results from last 7 days   Lab Units 05/20/24  0558 05/19/24 1757 05/16/24  0542 05/15/24  0508   WBC Thousand/uL 7.19 10.49* 7.51 8.87   HEMOGLOBIN g/dL 9.2* 10.5* 10.2* 9.8*   HEMATOCRIT % 27.8* 32.4* 31.6* 30.2*   PLATELETS Thousands/uL 181 211 197 188   TOTAL NEUT ABS Thousands/µL  --  8.04*  --   --         Results from last 7 days   Lab Units 05/21/24  0434 05/20/24  0558 05/19/24 1757 05/16/24  0542 05/15/24  0508   SODIUM mmol/L 137 136 133* 136 137   POTASSIUM mmol/L 3.7 3.5 3.9 4.6 4.8   CHLORIDE mmol/L 99 96 92* 100 101   CO2 mmol/L 28 29 30 25 26   ANION GAP mmol/L 10 11 11 11 10   BUN mg/dL 66* 73* 73* 70* 67*   CREATININE mg/dL 5.12* 5.41* 5.45* 5.17* 5.05*   EGFR ml/min/1.73sq m 11 10 10 11 11   CALCIUM mg/dL 8.6 8.6 9.0 9.1 9.0   MAGNESIUM mg/dL 4.7* 5.2* 5.2* 4.0* 4.4*   PHOSPHORUS mg/dL  --  7.1*  --  7.7* 7.4*     Results from last 7 days   Lab Units 05/20/24  0543  05/19/24  1757   AST U/L 10* 11*   ALT U/L 9 11   ALK PHOS U/L 83 93   TOTAL PROTEIN g/dL 6.2* 7.1   ALBUMIN g/dL 3.6 4.2   TOTAL BILIRUBIN mg/dL 0.24 0.32     Results from last 7 days   Lab Units 05/21/24  1106 05/21/24  0732 05/20/24  2127 05/20/24  1613 05/20/24  1118 05/20/24  0754 05/19/24  2127 05/16/24  1117 05/16/24  0727 05/15/24  2050 05/15/24  1548 05/15/24  1119   POC GLUCOSE mg/dl 195* 143* 189* 213* 208* 153* 151* 131 111 163* 137 203*     Results from last 7 days   Lab Units 05/21/24  0434 05/20/24  0558 05/19/24  1757 05/16/24  0542 05/15/24  0508   GLUCOSE RANDOM mg/dL 165* 149* 181* 101 118        Results from last 7 days   Lab Units 05/20/24  0558   HEMOGLOBIN A1C % 7.4*   EAG mg/dl 166        Results from last 7 days   Lab Units 05/19/24  1757   CK TOTAL U/L 82     Results from last 7 days   Lab Units 05/19/24  1757   HS TNI 0HR ng/L 6        Results from last 7 days   Lab Units 05/19/24  1757   LACTIC ACID mmol/L 1.5        Results from last 7 days   Lab Units 05/19/24  1757   LIPASE u/L 9*        Results from last 7 days   Lab Units 05/20/24  0715   CLARITY UA  Clear   COLOR UA  Yellow   SPEC GRAV UA  1.010   PH UA  7.0   GLUCOSE UA mg/dl 100 (1/10%)*   KETONES UA mg/dl Negative   BLOOD UA  Trace-Intact*   PROTEIN UA mg/dl 30 (1+)*   NITRITE UA  Negative   BILIRUBIN UA  Negative   UROBILINOGEN UA E.U./dl 0.2   LEUKOCYTES UA  Small*   WBC UA /hpf 0-1   RBC UA /hpf 0-1   BACTERIA UA /hpf None Seen   EPITHELIAL CELLS WET PREP /hpf None Seen       Medications:   Scheduled Medications:  allopurinol, 100 mg, Oral, Daily  amLODIPine, 5 mg, Oral, Daily  docusate sodium, 100 mg, Oral, BID  DULoxetine, 20 mg, Oral, Daily  furosemide, 60 mg, Intravenous, TID (diuretic)  heparin (porcine), 5,000 Units, Subcutaneous, Q8H DELIO  insulin lispro, 1-6 Units, Subcutaneous, TID AC  insulin lispro, 1-6 Units, Subcutaneous, HS  nicotine, 1 patch, Transdermal, Daily  pregabalin, 75 mg, Oral, BID  senna-docusate  sodium, 2 tablet, Oral, BID  sevelamer, 1,600 mg, Oral, TID With Meals  tamsulosin, 0.4 mg, Oral, Daily With Dinner      Continuous IV Infusions:  sodium chloride, 150 mL/hr, Intravenous, Continuous      PRN Meds:  acetaminophen, 650 mg, Oral, Q6H PRN  albuterol, 2 puff, Inhalation, Q6H PRN  oxyCODONE, 5 mg, Oral, Q6H PRN  (5/21 recd x1 so far today)   polyethylene glycol, 17 g, Oral, Daily PRN        Discharge Plan: TBD    Network Utilization Review Department  ATTENTION: Please call with any questions or concerns to [phone] and carefully listen to the prompts so that you are directed to the right person. All voicemails are confidential.   For Discharge needs, contact Care Management DC Support Team at [phone] opt. 2  Send all requests for admission clinical reviews, approved or denied determinations and any other requests to dedicated fax number below belonging to the [place] where the patient is receiving treatment. List of dedicated fax numbers for the Facilities:  FACILITY NAME UR FAX NUMBER   ADMISSION DENIALS (Administrative/Medical Necessity) [phone]   DISCHARGE SUPPORT TEAM (NETWORK) [phone]   PARENT CHILD HEALTH (Maternity/NICU/Pediatrics) [phone]   [facility] [phone]   [facility] [phone]   [facility] [phone]   [facility] [phone]   [facility] [phone]   [facility] [phone]   [facility] [phone]   [facility] [phone]   [facility] [phone]   [facility] [phone]   [facility] [phone]   [facility] [phone]

## 2024-05-21 NOTE — PLAN OF CARE

## 2024-05-21 NOTE — PROGRESS NOTES
[facility]  Progress Note  Name: Stef George I  MRN: [id number]  Unit/Bed#: MS 227Susannah01 I Date of Admission: 5/19/2024   Date of Service: 5/21/2024 I Hospital Day: 2    Assessment & Plan   * Hypermagnesemia  Assessment & Plan  Presented with muscle tremors similar to previous episode of hypermagnesemia-increased neuropathic pain without paralysis  Magnesium 5.2 on admission.  Last Admission also had elevated magnesium which dropped to 4 on discharge on 5/16  Magnesium level is improving, after discussion is done with nephrology, will continue current treatment with IV fluid and IV diuretics for forced diuresis.    Chronic back pain  Assessment & Plan  Chronic pain syndrome  Continue PTA Cymbalta, Lyrica, oxycodone  Complaining of constipation - aggressive bowel regimen without magnesium products    Chronic anemia  Assessment & Plan  Hemoglobin stable  CKD induced anemia  Monitor closely    Chronic kidney disease  Assessment & Plan  Lab Results   Component Value Date    EGFR 11 05/21/2024    EGFR 10 05/20/2024    EGFR 10 05/19/2024    CREATININE 5.12 (H) 05/21/2024    CREATININE 5.41 (H) 05/20/2024    CREATININE 5.45 (H) 05/19/2024   Elevated creatinine from baseline not meeting KELLY criteria  Baseline creatinine 5.17  Continue sevelamer, allopurinol .  Torsemide on hold and placed on iv lasix to help force diuresis to decrease hypermagnesemia  Intake output  Urinary retention protocol  Appreciate nephrology consultation  Following with outpatient nephrology.he wants to establish care with [facility] nephrology    Diabetes mellitus type 2, insulin dependent (HCC)  Assessment & Plan  Lab Results   Component Value Date    HGBA1C 7.4 (H) 05/20/2024       Recent Labs     05/20/24  1613 05/20/24  2127 05/21/24  0732 05/21/24  1106   POCGLU 213* 189* 143* 195*         Blood Sugar Average: Last 72 hrs:  (P) [id number]  Follow hypoglycemia precaution, continue sliding scale.  While  patient remain in-house, target blood sugar in between 1 40-1 80.           VTE Pharmacologic Prophylaxis:   Moderate Risk (Score 3-4) - Pharmacological DVT Prophylaxis Ordered: heparin.    Mobility:   Basic Mobility Inpatient Raw Score: 18  JH-HLM Goal: 6: Walk 10 steps or more  JH-HLM Achieved: 7: Walk 25 feet or more  JH-HLM Goal achieved. Continue to encourage appropriate mobility.    Patient Centered Rounds: I performed bedside rounds with nursing staff today.   Discussions with Specialists or Other Care Team Provider: Nephrology    Education and Discussions with Family / Patient:  With patient.     Current Length of Stay: 2 day(s)  Current Patient Status: Inpatient   Certification Statement: The patient will continue to require additional inpatient hospital stay due to monitor above conditions  Discharge Plan: Anticipate discharge in 24-48 hrs to home.    Code Status: Level 1 - Full Code    Subjective:   Seen and evaluated and examined.  Resting comfortably but denies any chest pain, short of breath, nausea, vomiting.  Has chronic back pain.    Objective:     Vitals:   Temp (24hrs), Av °F (36.7 °C), Min:97.7 °F (36.5 °C), Max:98.2 °F (36.8 °C)    Temp:  [97.7 °F (36.5 °C)-98.2 °F (36.8 °C)] 97.7 °F (36.5 °C)  HR:  [65-74] 69  Resp:  [16-19] 19  BP: (142-176)/(67-80) 176/76  SpO2:  [93 %-97 %] 95 %  Body mass index is 23.52 kg/m².     Input and Output Summary (last 24 hours):     Intake/Output Summary (Last 24 hours) at 2024 1239  Last data filed at 2024 1144  Gross per 24 hour   Intake 900 ml   Output 2375 ml   Net -1475 ml       Physical Exam:   Physical Exam  Vitals and nursing note reviewed.   HENT:      Nose: Nose normal.   Eyes:      Extraocular Movements: Extraocular movements intact.      Conjunctiva/sclera: Conjunctivae normal.      Pupils: Pupils are equal, round, and reactive to light.   Cardiovascular:      Rate and Rhythm: Normal rate.      Heart sounds:      No friction rub. No  gallop.   Pulmonary:      Effort: Pulmonary effort is normal. No respiratory distress.      Breath sounds: No stridor. No wheezing or rhonchi.   Neurological:      Mental Status: He is oriented to person, place, and time.      Cranial Nerves: No cranial nerve deficit.      Sensory: No sensory deficit.      Motor: No weakness.      Coordination: Coordination normal.          Additional Data:     Labs:  Results from last 7 days   Lab Units 05/20/24  0558 05/19/24  1757   WBC Thousand/uL 7.19 10.49*   HEMOGLOBIN g/dL 9.2* 10.5*   HEMATOCRIT % 27.8* 32.4*   PLATELETS Thousands/uL 181 211   SEGS PCT %  --  76*   LYMPHO PCT %  --  12*   MONO PCT %  --  7   EOS PCT %  --  3     Results from last 7 days   Lab Units 05/21/24  0434 05/20/24  0558   SODIUM mmol/L 137 136   POTASSIUM mmol/L 3.7 3.5   CHLORIDE mmol/L 99 96   CO2 mmol/L 28 29   BUN mg/dL 66* 73*   CREATININE mg/dL 5.12* 5.41*   ANION GAP mmol/L 10 11   CALCIUM mg/dL 8.6 8.6   ALBUMIN g/dL  --  3.6   TOTAL BILIRUBIN mg/dL  --  0.24   ALK PHOS U/L  --  83   ALT U/L  --  9   AST U/L  --  10*   GLUCOSE RANDOM mg/dL 165* 149*         Results from last 7 days   Lab Units 05/21/24  1106 05/21/24  0732 05/20/24 2127 05/20/24  1613 05/20/24  1118 05/20/24  0754 05/19/24 2127 05/16/24  1117 05/16/24  0727 05/15/24  2050 05/15/24  1548 05/15/24  1119   POC GLUCOSE mg/dl 195* 143* 189* 213* 208* 153* 151* 131 111 163* 137 203*     Results from last 7 days   Lab Units 05/20/24  0558   HEMOGLOBIN A1C % 7.4*     Results from last 7 days   Lab Units 05/19/24  1757   LACTIC ACID mmol/L 1.5       Lines/Drains:  Invasive Devices       Peripheral Intravenous Line  Duration             Peripheral IV 05/19/24 Left;Ventral (anterior) Forearm 1 day                          Imaging: No pertinent imaging reviewed.    Recent Cultures (last 7 days):         Last 24 Hours Medication List:   Current Facility-Administered Medications   Medication Dose Route Frequency Provider Last Rate     acetaminophen  650 mg Oral Q6H PRN Thao S June, CRNP      albuterol  2 puff Inhalation Q6H PRN Thao S June, CRNP      allopurinol  100 mg Oral Daily Thao S June, CRNP      amLODIPine  5 mg Oral Daily Thao S June, CRNP      docusate sodium  100 mg Oral BID Thao S June, CRNP      DULoxetine  20 mg Oral Daily Thao S June, CRNP      furosemide  60 mg Intravenous TID (diuretic) Jason Barboza MD      heparin (porcine)  5,000 Units Subcutaneous Q8H DELIO Thao S June, CRNP      insulin lispro  1-6 Units Subcutaneous TID AC Thao S June, CRNP      insulin lispro  1-6 Units Subcutaneous HS Thao S June, CRNP      nicotine  1 patch Transdermal Daily Thao S June, CRNP      oxyCODONE  5 mg Oral Q6H PRN Thao S June, CRNP      polyethylene glycol  17 g Oral Daily PRN Adrianne Horn MD      pregabalin  75 mg Oral BID Thao S June, CRNP      senna-docusate sodium  2 tablet Oral BID Thao S June, CRNP      sevelamer  1,600 mg Oral TID With Meals Thao S June, CRNP      sodium chloride  150 mL/hr Intravenous Continuous Jason Barboza  mL/hr (05/21/24 1204)    tamsulosin  0.4 mg Oral Daily With Dinner Thao S June, CRNP          Today, Patient Was Seen By: Madison Ely MD    **Please Note: This note may have been constructed using a voice recognition system.**

## 2024-05-21 NOTE — UTILIZATION REVIEW
NOTIFICATION OF INPATIENT ADMISSION   AUTHORIZATION REQUEST   SERVICING FACILITY:   [place] [address]  Tax ID: [id number]  NPI: [id number] ATTENDING PROVIDER:  Attending Name and NPI#: Madison Ely Md [[id number]]  Address: [address] [place] [address]  Phone: [phone]   ADMISSION INFORMATION:  Place of Service: Inpatient [facility]  Place of Service Code: 21  Inpatient Admission Date/Time: 5/19/24  7:11 PM  Discharge Date/Time: No discharge date for patient encounter.  Admitting Diagnosis Code/Description:  Hypomagnesemia [E83.42]  Hypermagnesemia [E83.41]  Allergic reaction [T78.40XA]  Chronic kidney disease [N18.9]  Involuntary movements [R25.9]     UTILIZATION REVIEW CONTACT:  Ana Mendoza, Utilization   Network Utilization Review Department  Phone: [phone]  Fax [phone]  Email: [email or web address]  Contact for approvals/pending authorizations, clinical reviews, and discharge.     PHYSICIAN ADVISORY SERVICES:  Medical Necessity Denial & [place] Review  Phone: [phone]  Fax: [phone]  Email: [email or web address]     DISCHARGE SUPPORT TEAM:  For Patients Discharge Needs & Updates  Phone: [phone] opt. 2 Fax: [phone]  Email: [email or web address]

## 2024-05-21 NOTE — ASSESSMENT & PLAN NOTE
Lab Results   Component Value Date    EGFR 11 05/21/2024    EGFR 10 05/20/2024    EGFR 10 05/19/2024    CREATININE 5.12 (H) 05/21/2024    CREATININE 5.41 (H) 05/20/2024    CREATININE 5.45 (H) 05/19/2024   Elevated creatinine from baseline not meeting KELLY criteria  Baseline creatinine 5.17  Continue sevelamer, allopurinol .  Torsemide on hold and placed on iv lasix to help force diuresis to decrease hypermagnesemia  Intake output  Urinary retention protocol  Appreciate nephrology consultation  Following with outpatient nephrology.he wants to establish care with [facility] nephrology

## 2024-05-22 VITALS
BODY MASS INDEX: 23.49 KG/M2 | WEIGHT: 173.4 LBS | OXYGEN SATURATION: 97 % | SYSTOLIC BLOOD PRESSURE: 157 MMHG | RESPIRATION RATE: 18 BRPM | HEART RATE: 71 BPM | DIASTOLIC BLOOD PRESSURE: 82 MMHG | HEIGHT: 72 IN | TEMPERATURE: 98.6 F

## 2024-05-22 LAB
ANION GAP SERPL CALCULATED.3IONS-SCNC: 9 MMOL/L (ref 4–13)
BUN SERPL-MCNC: 60 MG/DL (ref 5–25)
CALCIUM SERPL-MCNC: 8.6 MG/DL (ref 8.4–10.2)
CHLORIDE SERPL-SCNC: 101 MMOL/L (ref 96–108)
CO2 SERPL-SCNC: 27 MMOL/L (ref 21–32)
CREAT SERPL-MCNC: 4.61 MG/DL (ref 0.6–1.3)
GFR SERPL CREATININE-BSD FRML MDRD: 12 ML/MIN/1.73SQ M
GLUCOSE SERPL-MCNC: 141 MG/DL (ref 65–140)
GLUCOSE SERPL-MCNC: 165 MG/DL (ref 65–140)
GLUCOSE SERPL-MCNC: 184 MG/DL (ref 65–140)
MAGNESIUM SERPL-MCNC: 3.7 MG/DL (ref 1.9–2.7)
POTASSIUM SERPL-SCNC: 3.5 MMOL/L (ref 3.5–5.3)
SODIUM SERPL-SCNC: 137 MMOL/L (ref 135–147)

## 2024-05-22 PROCEDURE — 83735 ASSAY OF MAGNESIUM: CPT | Performed by: INTERNAL MEDICINE

## 2024-05-22 PROCEDURE — 82948 REAGENT STRIP/BLOOD GLUCOSE: CPT

## 2024-05-22 PROCEDURE — 99232 SBSQ HOSP IP/OBS MODERATE 35: CPT | Performed by: INTERNAL MEDICINE

## 2024-05-22 PROCEDURE — 99239 HOSP IP/OBS DSCHRG MGMT >30: CPT | Performed by: FAMILY MEDICINE

## 2024-05-22 PROCEDURE — 80048 BASIC METABOLIC PNL TOTAL CA: CPT | Performed by: INTERNAL MEDICINE

## 2024-05-22 RX ORDER — TORSEMIDE 20 MG/1
40 TABLET ORAL 2 TIMES DAILY
Qty: 30 TABLET | Refills: 0 | Status: ON HOLD | OUTPATIENT
Start: 2024-05-22

## 2024-05-22 RX ORDER — POLYETHYLENE GLYCOL 3350 17 G/17G
17 POWDER, FOR SOLUTION ORAL DAILY
Qty: 30 EACH | Refills: 0 | Status: ON HOLD | OUTPATIENT
Start: 2024-05-22

## 2024-05-22 RX ADMIN — FUROSEMIDE 60 MG: 10 INJECTION, SOLUTION INTRAMUSCULAR; INTRAVENOUS at 05:13

## 2024-05-22 RX ADMIN — SEVELAMER HYDROCHLORIDE 1600 MG: 800 TABLET, FILM COATED ORAL at 08:46

## 2024-05-22 RX ADMIN — ALLOPURINOL 100 MG: 100 TABLET ORAL at 08:46

## 2024-05-22 RX ADMIN — DOCUSATE SODIUM 100 MG: 100 CAPSULE, LIQUID FILLED ORAL at 08:46

## 2024-05-22 RX ADMIN — AMLODIPINE BESYLATE 5 MG: 5 TABLET ORAL at 08:46

## 2024-05-22 RX ADMIN — SEVELAMER HYDROCHLORIDE 1600 MG: 800 TABLET, FILM COATED ORAL at 12:04

## 2024-05-22 RX ADMIN — PREGABALIN 75 MG: 75 CAPSULE ORAL at 08:46

## 2024-05-22 RX ADMIN — INSULIN LISPRO 1 UNITS: 100 INJECTION, SOLUTION INTRAVENOUS; SUBCUTANEOUS at 12:04

## 2024-05-22 RX ADMIN — DULOXETINE HYDROCHLORIDE 20 MG: 20 CAPSULE, DELAYED RELEASE ORAL at 08:46

## 2024-05-22 RX ADMIN — HEPARIN SODIUM 5000 UNITS: 5000 INJECTION INTRAVENOUS; SUBCUTANEOUS at 05:13

## 2024-05-22 RX ADMIN — SENNOSIDES AND DOCUSATE SODIUM 2 TABLET: 8.6; 5 TABLET ORAL at 08:46

## 2024-05-22 NOTE — ASSESSMENT & PLAN NOTE
Lab Results   Component Value Date    HGBA1C 7.4 (H) 05/20/2024       Recent Labs     05/21/24  1617 05/21/24  2118 05/22/24  0819 05/22/24  1131   POCGLU 200* 219* 141* 184*         Blood Sugar Average: Last 72 hrs:  (P) 181.5616322998165927  Follow hypoglycemia precaution, continue sliding scale.  While patient remain in-house, target blood sugar in between 1 40-1 80.  Can resume home medication after discharge.

## 2024-05-22 NOTE — PROGRESS NOTES
NEPHROLOGY PROGRESS NOTE    Stef Pack Sr. 59 y.o. male MRN: [id number]  Unit/Bed#: -01 Encounter: [id number]  Reason for Consult: Acute on chronic kidney disease and hypermagnesemia    Patient is awake and alert says that he is feeling better hoping to be able to go home today.  Eating okay urinating without difficulty no shortness of breath no new neurological complaints.  No confusion.  Wife is at bedside.    ASSESSMENT/PLAN:  1.  Renal    Patient has acute on chronic kidney disease baseline creatinine was around 4.5-5.  Admitted with severe hypermagnesemia with mild symptoms.  Has been treated with aggressive isotonic fluids and aggressive IV diuresis.  Was running saline at 150 mL/h and Lasix 60 mg IV every 8.  Excellent urine output.  Fortunately creatinine is actually improved is down to 4.6 show the patient did become further volume depleted.  Potassium and sodium concentrations normal.    I have contacted the [facility] nephrology tomorrow, and his wife and the patient are aware as they want to establish care through the [facility] nephrology system.  Hopefully they will reach out and set up an appointment.  His wife is aware.    2.  Hypermagnesemia    Patient with advanced kidney disease and ingested large amount as he drank at least a 10 ounce bottle of magnesium citrate at home.  Instructed him that this was the problem.  He had been admitted received gentle hydration a couple doses of diuretic magnesium had reduced he went home it rebounded up and he came back in.  I gave more aggressive fluids diuretics to come down nicely over the last couple days to 3.7 he has no symptoms.    I feel the patient is stable for discharge today and I would send him on torsemide 40 mg p.o. twice daily encouraged him to make sure he maintains his good diet and hydration.  He was not on diuretics prior chronically.  This is an attempt just to continue magnesium losses in the urine.    Avoid magnesium containing  products over-the-counter they are aware.    Recommend:    Torsemide 40 mg p.o. twice daily  Obtain BMP and magnesium Monday nephrology will follow-up.    I will communicate this to the primary service and if everything else is stable patient can be discharged.            SUBJECTIVE:  Review of Systems   Constitutional: Negative for chills, diaphoresis and fever.   HENT: Negative.     Eyes: Negative.    Cardiovascular:  Negative for chest pain, dyspnea on exertion, leg swelling and orthopnea.   Respiratory: Negative.  Negative for cough, shortness of breath, sputum production and wheezing.    Musculoskeletal:  Negative for muscle cramps.        Chronic back pain.  No further involuntary movements.   Gastrointestinal:  Negative for abdominal pain, diarrhea, nausea and vomiting.   Genitourinary: Negative.    Neurological:  Negative for dizziness and headaches.   Psychiatric/Behavioral:  Negative for altered mental status and hypervigilance. The patient is not nervous/anxious.        OBJECTIVE:  Current Weight: Weight - Scale: 78.7 kg (173 lb 6.4 oz)  Vitals:Temp (24hrs), Av.1 °F (36.7 °C), Min:97.9 °F (36.6 °C), Max:98.6 °F (37 °C)  Current: Temperature: 98.6 °F (37 °C)   Blood pressure 157/82, pulse 71, temperature 98.6 °F (37 °C), resp. rate 18, height 6' (1.829 m), weight 78.7 kg (173 lb 6.4 oz), SpO2 97%. , Body mass index is 23.52 kg/m².      Intake/Output Summary (Last 24 hours) at 2024 1018  Last data filed at 2024 0901  Gross per 24 hour   Intake 360 ml   Output 4525 ml   Net -4165 ml       Physical Exam: /82   Pulse 71   Temp 98.6 °F (37 °C)   Resp 18   Ht 6' (1.829 m)   Wt 78.7 kg (173 lb 6.4 oz)   SpO2 97%   BMI 23.52 kg/m²   Physical Exam  Constitutional:       General: He is not in acute distress.     Appearance: He is not toxic-appearing.   HENT:      Head: Normocephalic and atraumatic.      Nose: Nose normal.      Mouth/Throat:      Mouth: Mucous membranes are moist.   Eyes:       General: No scleral icterus.     Extraocular Movements: Extraocular movements intact.   Cardiovascular:      Rate and Rhythm: Normal rate and regular rhythm.      Heart sounds:      No friction rub. No gallop.   Pulmonary:      Effort: Pulmonary effort is normal. No respiratory distress.      Breath sounds: No wheezing or rales.   Abdominal:      General: Bowel sounds are normal. There is no distension.      Palpations: Abdomen is soft.      Tenderness: There is no abdominal tenderness. There is no rebound.   Musculoskeletal:      Cervical back: Normal range of motion and neck supple.   Neurological:      Mental Status: He is alert and oriented to person, place, and time. Mental status is at baseline.   Psychiatric:         Mood and Affect: Mood normal.         Behavior: Behavior normal.         Thought Content: Thought content normal.         Medications:    Current Facility-Administered Medications:     acetaminophen (TYLENOL) tablet 650 mg, 650 mg, Oral, Q6H PRN, Thao S June, CRNP    albuterol (PROVENTIL HFA,VENTOLIN HFA) inhaler 2 puff, 2 puff, Inhalation, Q6H PRN, Thao S June, CRNP    allopurinol (ZYLOPRIM) tablet 100 mg, 100 mg, Oral, Daily, Thao S June, CRNP, 100 mg at 05/22/24 0846    amLODIPine (NORVASC) tablet 5 mg, 5 mg, Oral, Daily, Htao S June, CRNP, 5 mg at 05/22/24 0846    docusate sodium (COLACE) capsule 100 mg, 100 mg, Oral, BID, Thao S June, CRNP, 100 mg at 05/22/24 0846    DULoxetine (CYMBALTA) delayed release capsule 20 mg, 20 mg, Oral, Daily, Thao S June, CRNP, 20 mg at 05/22/24 0846    furosemide (LASIX) injection 60 mg, 60 mg, Intravenous, TID (diuretic), Jason Barboza MD, 60 mg at 05/22/24 0513    heparin (porcine) subcutaneous injection 5,000 Units, 5,000 Units, Subcutaneous, Q8H DELIO, Thao S June, CRNP, 5,000 Units at 05/22/24 0513    insulin lispro (HumALOG/ADMELOG) 100 units/mL subcutaneous injection 1-6 Units, 1-6 Units, Subcutaneous, TID AC, 2 Units at 05/21/24 0401  "**AND** Fingerstick Glucose (POCT), , , TID AC, Thao S June, CRNP    insulin lispro (HumALOG/ADMELOG) 100 units/mL subcutaneous injection 1-6 Units, 1-6 Units, Subcutaneous, HS, Thao S June, CRNP, 2 Units at 05/21/24 2135    nicotine (NICODERM CQ) 21 mg/24 hr TD 24 hr patch 1 patch, 1 patch, Transdermal, Daily, Thao S June, CRNP    oxyCODONE (ROXICODONE) IR tablet 5 mg, 5 mg, Oral, Q6H PRN, Thao S June, CRNP, 5 mg at 05/21/24 0427    polyethylene glycol (MIRALAX) packet 17 g, 17 g, Oral, Daily PRN, Adrianne Horn MD    pregabalin (LYRICA) capsule 75 mg, 75 mg, Oral, BID, Thao S June, CRNP, 75 mg at 05/22/24 0846    senna-docusate sodium (SENOKOT S) 8.6-50 mg per tablet 2 tablet, 2 tablet, Oral, BID, Thao S June, CRNP, 2 tablet at 05/22/24 0846    sevelamer (RENAGEL) tablet 1,600 mg, 1,600 mg, Oral, TID With Meals, Thao S June, CRNP, 1,600 mg at 05/22/24 0846    sodium chloride 0.9 % infusion, 150 mL/hr, Intravenous, Continuous, Jason Barboza MD, Last Rate: 150 mL/hr at 05/21/24 2006, 150 mL/hr at 05/21/24 2006    tamsulosin (FLOMAX) capsule 0.4 mg, 0.4 mg, Oral, Daily With Dinner, Thao S June, CRNP, 0.4 mg at 05/21/24 1725    Laboratory Results:  Lab Results   Component Value Date    WBC 7.19 05/20/2024    HGB 9.2 (L) 05/20/2024    HCT 27.8 (L) 05/20/2024    MCV 96 05/20/2024     05/20/2024     Lab Results   Component Value Date    SODIUM 137 05/22/2024    K 3.5 05/22/2024     05/22/2024    CO2 27 05/22/2024    BUN 60 (H) 05/22/2024    CREATININE 4.61 (H) 05/22/2024    GLUC 165 (H) 05/22/2024    CALCIUM 8.6 05/22/2024     Lab Results   Component Value Date    CALCIUM 8.6 05/22/2024    PHOS 7.1 (H) 05/20/2024     No results found for: \"LABPROT\"    "

## 2024-05-22 NOTE — ASSESSMENT & PLAN NOTE
Lab Results   Component Value Date    EGFR 12 05/22/2024    EGFR 11 05/21/2024    EGFR 10 05/20/2024    CREATININE 4.61 (H) 05/22/2024    CREATININE 5.12 (H) 05/21/2024    CREATININE 5.41 (H) 05/20/2024   Elevated creatinine from baseline not meeting KELLY criteria  Baseline creatinine 5.17  Continue sevelamer, allopurinol .    Nephrology consult appreciated, recommending to resume torsemide 40 mg twice daily, recheck BMP in 3 to 5 days and follow-up with nephrology outpatient basis

## 2024-05-22 NOTE — PLAN OF CARE

## 2024-05-22 NOTE — NURSING NOTE
Birth certificate has been completed.   Patient's IV removed and catheter intact. AVS reviewed with patient including follow up appointments, new medications, and s/s to monitor for. Concerns addressed and verbalized understanding. Patient escorted out via wheelchair by PCA.

## 2024-05-22 NOTE — PLAN OF CARE

## 2024-05-22 NOTE — ASSESSMENT & PLAN NOTE
Presented with muscle tremors similar to previous episode of hypermagnesemia-increased neuropathic pain without paralysis  Magnesium 5.2 on admission.  Last Admission also had elevated magnesium which dropped to 4 on discharge on 5/16  Magnesium level improved, today magnesium is 3.7.  Discussed with nephrology, cleared the patient to be discharged home with continual of torsemide 40 mg twice daily.  Recheck BMP and magnesium in 3 to 5 days with PCP and follow-up with nephrology outpatient basis.  And also educated to avoid any kind of magnesium containing medication or food at this time.

## 2024-05-22 NOTE — CASE MANAGEMENT
Case Management Discharge Planning Note    Patient name Stef Pack Sr.  Location /-01 MRN [id number]  : 1965 Date 2024       Current Admission Date: 2024  Current Admission Diagnosis:Hypermagnesemia   Patient Active Problem List    Diagnosis Date Noted Date Diagnosed    Hypermagnesemia 2024     Neuropathic pain 2024     Right knee pain 2023     Diabetic foot ulcer (HCC) 2023     Acute metabolic encephalopathy 2023     PAD (peripheral artery disease) (HCC) 2022     Chronic back pain 2022     Moderate protein-calorie malnutrition (HCC) 2022     Chronic anemia 2022     RSV infection 2022     Chronic ulcer of left heel (HCC) 10/20/2022     Hypercalcemia 2022     Low back pain 2022     Ambulatory dysfunction 2022     Chronic kidney disease 2022     Retention of urine 2022     Severe protein-calorie malnutrition (HCC) 2022     Iron deficiency anemia secondary to inadequate dietary iron intake 2022     Hip pain, acute, left 2022     KELLY (acute kidney injury) (HCC) 2022     Hyperkalemia 2022     Diabetes mellitus type 2, insulin dependent (HCC)      Gout      History of CVA (cerebrovascular accident)      Osteomyelitis of vertebra of lumbar region (HCC)        LOS (days): 3  Geometric Mean LOS (GMLOS) (days): 2.6  Days to GMLOS:-0.1     OBJECTIVE:  Risk of Unplanned Readmission Score: 25.72         Current admission status: Inpatient   Preferred Pharmacy:   [id number] - [place], [place] - [address] [facility]  [address] [facility] [address]  Phone: [phone] Fax: [phone]    Primary Care Provider: Rehan Mancia DO    Primary Insurance: [facility] REP  Secondary Insurance:     DISCHARGE DETAILS:     Did notified [facility] of dc.  AVS/ orders were faxed to them as well.

## 2024-05-23 NOTE — UTILIZATION REVIEW
NOTIFICATION OF ADMISSION DISCHARGE   This is a Notification of Discharge from [facility]. Please be advised that this patient has been discharge from our facility. Below you will find the admission and discharge date and time including the patient’s disposition.   UTILIZATION REVIEW CONTACT:  Ana Mendoza  Utilization   Network Utilization Review Department  Phone: [phone] x carefully listen to the prompts. All voicemails are confidential.  Email: [email or web address]     ADMISSION INFORMATION  PRESENTATION DATE: 5/19/2024  5:41 PM  OBERVATION ADMISSION DATE:   INPATIENT ADMISSION DATE: 5/19/24  7:11 PM   DISCHARGE DATE: 5/22/2024 12:34 PM   DISPOSITION:Home with Home Health Care    Network Utilization Review Department  ATTENTION: Please call with any questions or concerns to [phone] and carefully listen to the prompts so that you are directed to the right person. All voicemails are confidential.   For Discharge needs, contact Care Management DC Support Team at [phone] opt. 2  Send all requests for admission clinical reviews, approved or denied determinations and any other requests to dedicated fax number below belonging to the campus where the patient is receiving treatment. List of dedicated fax numbers for the Facilities:  FACILITY NAME UR FAX NUMBER   ADMISSION DENIALS (Administrative/Medical Necessity) [phone]   DISCHARGE SUPPORT TEAM ([facility]) [phone]   PARENT CHILD HEALTH (Maternity/NICU/Pediatrics) [phone]   [facility] [phone]   [facility] [phone]   [facility] [phone]   [facility] [phone]   Critical access hospital [phone]   General acute hospital [phone]   [facility] [phone]   [facility]  [place] [phone]   [facility] [phone]   [facility] [phone]   [facility] [phone]   [facility] [phone]

## 2024-05-28 ENCOUNTER — LAB REQUISITION (OUTPATIENT)
Dept: LAB | Facility: HOSPITAL | Age: 59
End: 2024-05-28
Payer: COMMERCIAL

## 2024-05-28 DIAGNOSIS — N18.5 CHRONIC KIDNEY DISEASE, STAGE 5 (HCC): ICD-10-CM

## 2024-05-28 DIAGNOSIS — D63.1 ANEMIA IN CHRONIC KIDNEY DISEASE (CODE): ICD-10-CM

## 2024-05-28 DIAGNOSIS — E83.41 HYPERMAGNESEMIA: ICD-10-CM

## 2024-05-28 LAB
ANION GAP SERPL CALCULATED.3IONS-SCNC: 11 MMOL/L (ref 4–13)
BUN SERPL-MCNC: 76 MG/DL (ref 5–25)
CALCIUM SERPL-MCNC: 9.8 MG/DL (ref 8.4–10.2)
CHLORIDE SERPL-SCNC: 101 MMOL/L (ref 96–108)
CO2 SERPL-SCNC: 29 MMOL/L (ref 21–32)
CREAT SERPL-MCNC: 5.69 MG/DL (ref 0.6–1.3)
GFR SERPL CREATININE-BSD FRML MDRD: 10 ML/MIN/1.73SQ M
GLUCOSE SERPL-MCNC: 70 MG/DL (ref 65–140)
MAGNESIUM SERPL-MCNC: 3.9 MG/DL (ref 1.9–2.7)
POTASSIUM SERPL-SCNC: 4.7 MMOL/L (ref 3.5–5.3)
SODIUM SERPL-SCNC: 141 MMOL/L (ref 135–147)

## 2024-05-28 PROCEDURE — 80048 BASIC METABOLIC PNL TOTAL CA: CPT | Performed by: INTERNAL MEDICINE

## 2024-05-28 PROCEDURE — 83735 ASSAY OF MAGNESIUM: CPT | Performed by: INTERNAL MEDICINE

## 2024-05-29 ENCOUNTER — TELEPHONE (OUTPATIENT)
Dept: NEPHROLOGY | Facility: CLINIC | Age: 59
End: 2024-05-29

## 2024-05-29 NOTE — TELEPHONE ENCOUNTER
----- Message from Jason Barboza MD sent at 5/28/2024  1:30 PM EDT -----  Call and let him know creatinine 5.6, near his baseline and magnesium 3.9,  same as when he left.  Continue water pill once a day.  Ask him if he heard from kidney doctors yet for appointment where he lives.      Let me know, Jason.

## 2024-05-29 NOTE — TELEPHONE ENCOUNTER
Called patient and went over the following information:    Patients creatinine 5.6, near his baseline and magnesium 3.9,  same as when he left. Continue water pill once a day. I did ask him if he heard from kidney doctors yet for appointment where he lives.      Patient verbally understood and had no further questions for me at this time.  Patient stated he has not heard form them yet but will message us on mychart if he does.

## 2024-05-30 NOTE — TELEPHONE ENCOUNTER
Patients PCP office called- they wanted to know if we followed up on his recent labs. I read her Dr. Barboza's note. She verified that he does have an appointment with the kidney doctor on 6/28.

## 2024-05-31 ENCOUNTER — HOSPITAL ENCOUNTER (INPATIENT)
Facility: HOSPITAL | Age: 59
LOS: 4 days | Discharge: HOME HEALTH | DRG: 674 | End: 2024-06-04
Attending: EMERGENCY MEDICINE | Admitting: FAMILY MEDICINE
Payer: COMMERCIAL

## 2024-05-31 ENCOUNTER — APPOINTMENT (EMERGENCY)
Dept: RADIOLOGY | Facility: HOSPITAL | Age: 59
DRG: 674 | End: 2024-05-31
Payer: COMMERCIAL

## 2024-05-31 DIAGNOSIS — N18.6 ESRD ON HEMODIALYSIS (HCC): ICD-10-CM

## 2024-05-31 DIAGNOSIS — E83.41 HYPERMAGNESEMIA: ICD-10-CM

## 2024-05-31 DIAGNOSIS — N18.6 ESRD (END STAGE RENAL DISEASE) (HCC): ICD-10-CM

## 2024-05-31 DIAGNOSIS — N17.9 AKI (ACUTE KIDNEY INJURY) (HCC): ICD-10-CM

## 2024-05-31 DIAGNOSIS — R11.2 NAUSEA AND VOMITING: Primary | ICD-10-CM

## 2024-05-31 DIAGNOSIS — Z99.2 CKD (CHRONIC KIDNEY DISEASE) STAGE V REQUIRING CHRONIC DIALYSIS (HCC): ICD-10-CM

## 2024-05-31 DIAGNOSIS — N18.9 CHRONIC RENAL FAILURE: ICD-10-CM

## 2024-05-31 DIAGNOSIS — Z99.2 ESRD ON HEMODIALYSIS (HCC): ICD-10-CM

## 2024-05-31 DIAGNOSIS — N18.6 CKD (CHRONIC KIDNEY DISEASE) STAGE V REQUIRING CHRONIC DIALYSIS (HCC): ICD-10-CM

## 2024-05-31 LAB
2HR DELTA HS TROPONIN: 0 NG/L
ALBUMIN SERPL BCP-MCNC: 4 G/DL (ref 3.5–5)
ALP SERPL-CCNC: 78 U/L (ref 34–104)
ALT SERPL W P-5'-P-CCNC: 7 U/L (ref 7–52)
ANION GAP SERPL CALCULATED.3IONS-SCNC: 9 MMOL/L (ref 4–13)
AST SERPL W P-5'-P-CCNC: 11 U/L (ref 13–39)
ATRIAL RATE: 65 BPM
BACTERIA UR QL AUTO: NORMAL /HPF
BASOPHILS # BLD AUTO: 0.07 THOUSANDS/ÂΜL (ref 0–0.1)
BASOPHILS NFR BLD AUTO: 1 % (ref 0–1)
BILIRUB SERPL-MCNC: 0.33 MG/DL (ref 0.2–1)
BILIRUB UR QL STRIP: NEGATIVE
BUN SERPL-MCNC: 71 MG/DL (ref 5–25)
CALCIUM SERPL-MCNC: 9.5 MG/DL (ref 8.4–10.2)
CARDIAC TROPONIN I PNL SERPL HS: 6 NG/L
CARDIAC TROPONIN I PNL SERPL HS: 6 NG/L
CHLORIDE SERPL-SCNC: 102 MMOL/L (ref 96–108)
CLARITY UR: CLEAR
CO2 SERPL-SCNC: 27 MMOL/L (ref 21–32)
COLOR UR: YELLOW
CREAT SERPL-MCNC: 5.55 MG/DL (ref 0.6–1.3)
EOSINOPHIL # BLD AUTO: 0.46 THOUSAND/ÂΜL (ref 0–0.61)
EOSINOPHIL NFR BLD AUTO: 4 % (ref 0–6)
ERYTHROCYTE [DISTWIDTH] IN BLOOD BY AUTOMATED COUNT: 15.6 % (ref 11.6–15.1)
GFR SERPL CREATININE-BSD FRML MDRD: 10 ML/MIN/1.73SQ M
GLUCOSE SERPL-MCNC: 107 MG/DL (ref 65–140)
GLUCOSE SERPL-MCNC: 112 MG/DL (ref 65–140)
GLUCOSE UR STRIP-MCNC: NEGATIVE MG/DL
HCT VFR BLD AUTO: 30.9 % (ref 36.5–49.3)
HGB BLD-MCNC: 10 G/DL (ref 12–17)
HGB UR QL STRIP.AUTO: ABNORMAL
IMM GRANULOCYTES # BLD AUTO: 0.04 THOUSAND/UL (ref 0–0.2)
IMM GRANULOCYTES NFR BLD AUTO: 0 % (ref 0–2)
KETONES UR STRIP-MCNC: NEGATIVE MG/DL
LEUKOCYTE ESTERASE UR QL STRIP: NEGATIVE
LYMPHOCYTES # BLD AUTO: 1.47 THOUSANDS/ÂΜL (ref 0.6–4.47)
LYMPHOCYTES NFR BLD AUTO: 14 % (ref 14–44)
MAGNESIUM SERPL-MCNC: 3.4 MG/DL (ref 1.9–2.7)
MCH RBC QN AUTO: 31.5 PG (ref 26.8–34.3)
MCHC RBC AUTO-ENTMCNC: 32.4 G/DL (ref 31.4–37.4)
MCV RBC AUTO: 98 FL (ref 82–98)
MONOCYTES # BLD AUTO: 0.97 THOUSAND/ÂΜL (ref 0.17–1.22)
MONOCYTES NFR BLD AUTO: 9 % (ref 4–12)
NEUTROPHILS # BLD AUTO: 7.4 THOUSANDS/ÂΜL (ref 1.85–7.62)
NEUTS SEG NFR BLD AUTO: 72 % (ref 43–75)
NITRITE UR QL STRIP: NEGATIVE
NON-SQ EPI CELLS URNS QL MICRO: NORMAL /HPF
NRBC BLD AUTO-RTO: 0 /100 WBCS
P AXIS: 54 DEGREES
PH UR STRIP.AUTO: 7 [PH]
PLATELET # BLD AUTO: 206 THOUSANDS/UL (ref 149–390)
PMV BLD AUTO: 10.3 FL (ref 8.9–12.7)
POTASSIUM SERPL-SCNC: 4.6 MMOL/L (ref 3.5–5.3)
PR INTERVAL: 150 MS
PROT SERPL-MCNC: 6.9 G/DL (ref 6.4–8.4)
PROT UR STRIP-MCNC: ABNORMAL MG/DL
QRS AXIS: -28 DEGREES
QRSD INTERVAL: 106 MS
QT INTERVAL: 428 MS
QTC INTERVAL: 445 MS
RBC # BLD AUTO: 3.17 MILLION/UL (ref 3.88–5.62)
RBC #/AREA URNS AUTO: NORMAL /HPF
SODIUM SERPL-SCNC: 138 MMOL/L (ref 135–147)
SP GR UR STRIP.AUTO: 1.01 (ref 1–1.03)
T WAVE AXIS: 51 DEGREES
UROBILINOGEN UR QL STRIP.AUTO: 0.2 E.U./DL
VENTRICULAR RATE: 65 BPM
WBC # BLD AUTO: 10.41 THOUSAND/UL (ref 4.31–10.16)
WBC #/AREA URNS AUTO: NORMAL /HPF

## 2024-05-31 PROCEDURE — 99222 1ST HOSP IP/OBS MODERATE 55: CPT | Performed by: FAMILY MEDICINE

## 2024-05-31 PROCEDURE — 93005 ELECTROCARDIOGRAM TRACING: CPT

## 2024-05-31 PROCEDURE — 71045 X-RAY EXAM CHEST 1 VIEW: CPT

## 2024-05-31 PROCEDURE — 83735 ASSAY OF MAGNESIUM: CPT | Performed by: EMERGENCY MEDICINE

## 2024-05-31 PROCEDURE — 99285 EMERGENCY DEPT VISIT HI MDM: CPT | Performed by: EMERGENCY MEDICINE

## 2024-05-31 PROCEDURE — 81001 URINALYSIS AUTO W/SCOPE: CPT | Performed by: EMERGENCY MEDICINE

## 2024-05-31 PROCEDURE — 96375 TX/PRO/DX INJ NEW DRUG ADDON: CPT

## 2024-05-31 PROCEDURE — 80053 COMPREHEN METABOLIC PANEL: CPT | Performed by: EMERGENCY MEDICINE

## 2024-05-31 PROCEDURE — 99284 EMERGENCY DEPT VISIT MOD MDM: CPT

## 2024-05-31 PROCEDURE — 85025 COMPLETE CBC W/AUTO DIFF WBC: CPT | Performed by: EMERGENCY MEDICINE

## 2024-05-31 PROCEDURE — 96374 THER/PROPH/DIAG INJ IV PUSH: CPT

## 2024-05-31 PROCEDURE — 5A1D70Z PERFORMANCE OF URINARY FILTRATION, INTERMITTENT, LESS THAN 6 HOURS PER DAY: ICD-10-PCS | Performed by: INTERNAL MEDICINE

## 2024-05-31 PROCEDURE — 36415 COLL VENOUS BLD VENIPUNCTURE: CPT | Performed by: EMERGENCY MEDICINE

## 2024-05-31 PROCEDURE — 84484 ASSAY OF TROPONIN QUANT: CPT | Performed by: EMERGENCY MEDICINE

## 2024-05-31 PROCEDURE — 0JH63XZ INSERTION OF TUNNELED VASCULAR ACCESS DEVICE INTO CHEST SUBCUTANEOUS TISSUE AND FASCIA, PERCUTANEOUS APPROACH: ICD-10-PCS | Performed by: RADIOLOGY

## 2024-05-31 PROCEDURE — 02H633Z INSERTION OF INFUSION DEVICE INTO RIGHT ATRIUM, PERCUTANEOUS APPROACH: ICD-10-PCS | Performed by: RADIOLOGY

## 2024-05-31 PROCEDURE — 82948 REAGENT STRIP/BLOOD GLUCOSE: CPT

## 2024-05-31 RX ORDER — SEVELAMER HYDROCHLORIDE 800 MG/1
1600 TABLET, FILM COATED ORAL
Status: DISCONTINUED | OUTPATIENT
Start: 2024-05-31 | End: 2024-06-04 | Stop reason: HOSPADM

## 2024-05-31 RX ORDER — ONDANSETRON 2 MG/ML
4 INJECTION INTRAMUSCULAR; INTRAVENOUS ONCE
Status: COMPLETED | OUTPATIENT
Start: 2024-05-31 | End: 2024-05-31

## 2024-05-31 RX ORDER — TAMSULOSIN HYDROCHLORIDE 0.4 MG/1
0.4 CAPSULE ORAL
Status: DISCONTINUED | OUTPATIENT
Start: 2024-05-31 | End: 2024-06-04 | Stop reason: HOSPADM

## 2024-05-31 RX ORDER — INSULIN LISPRO 100 [IU]/ML
2-12 INJECTION, SOLUTION INTRAVENOUS; SUBCUTANEOUS
Status: DISCONTINUED | OUTPATIENT
Start: 2024-05-31 | End: 2024-05-31

## 2024-05-31 RX ORDER — ALLOPURINOL 100 MG/1
100 TABLET ORAL DAILY
Status: DISCONTINUED | OUTPATIENT
Start: 2024-06-01 | End: 2024-06-04 | Stop reason: HOSPADM

## 2024-05-31 RX ORDER — AMLODIPINE BESYLATE 5 MG/1
5 TABLET ORAL DAILY
Status: DISCONTINUED | OUTPATIENT
Start: 2024-06-01 | End: 2024-06-02

## 2024-05-31 RX ORDER — DULOXETIN HYDROCHLORIDE 20 MG/1
20 CAPSULE, DELAYED RELEASE ORAL DAILY
Status: DISCONTINUED | OUTPATIENT
Start: 2024-06-01 | End: 2024-06-04 | Stop reason: HOSPADM

## 2024-05-31 RX ORDER — ALBUTEROL SULFATE 90 UG/1
2 AEROSOL, METERED RESPIRATORY (INHALATION) EVERY 6 HOURS PRN
Status: DISCONTINUED | OUTPATIENT
Start: 2024-05-31 | End: 2024-06-04 | Stop reason: HOSPADM

## 2024-05-31 RX ORDER — INSULIN GLARGINE 100 [IU]/ML
5 INJECTION, SOLUTION SUBCUTANEOUS
Status: DISCONTINUED | OUTPATIENT
Start: 2024-05-31 | End: 2024-05-31

## 2024-05-31 RX ORDER — TORSEMIDE 20 MG/1
40 TABLET ORAL 2 TIMES DAILY
Status: DISCONTINUED | OUTPATIENT
Start: 2024-05-31 | End: 2024-06-04 | Stop reason: HOSPADM

## 2024-05-31 RX ORDER — INSULIN LISPRO 100 [IU]/ML
2-12 INJECTION, SOLUTION INTRAVENOUS; SUBCUTANEOUS
Status: DISCONTINUED | OUTPATIENT
Start: 2024-05-31 | End: 2024-06-04 | Stop reason: HOSPADM

## 2024-05-31 RX ORDER — AMOXICILLIN 250 MG
1 CAPSULE ORAL 2 TIMES DAILY
Status: DISCONTINUED | OUTPATIENT
Start: 2024-05-31 | End: 2024-06-04 | Stop reason: HOSPADM

## 2024-05-31 RX ORDER — OXYCODONE HYDROCHLORIDE 5 MG/1
5 TABLET ORAL EVERY 6 HOURS PRN
Status: DISCONTINUED | OUTPATIENT
Start: 2024-05-31 | End: 2024-06-04 | Stop reason: HOSPADM

## 2024-05-31 RX ORDER — PREGABALIN 75 MG/1
75 CAPSULE ORAL 2 TIMES DAILY
Status: DISCONTINUED | OUTPATIENT
Start: 2024-05-31 | End: 2024-06-04 | Stop reason: HOSPADM

## 2024-05-31 RX ORDER — POLYETHYLENE GLYCOL 3350 17 G/17G
17 POWDER, FOR SOLUTION ORAL DAILY
Status: DISCONTINUED | OUTPATIENT
Start: 2024-06-01 | End: 2024-06-04 | Stop reason: HOSPADM

## 2024-05-31 RX ADMIN — PREGABALIN 75 MG: 75 CAPSULE ORAL at 18:27

## 2024-05-31 RX ADMIN — MORPHINE SULFATE 2 MG: 2 INJECTION, SOLUTION INTRAMUSCULAR; INTRAVENOUS at 15:41

## 2024-05-31 RX ADMIN — OXYCODONE HYDROCHLORIDE 5 MG: 5 TABLET ORAL at 23:44

## 2024-05-31 RX ADMIN — ONDANSETRON 4 MG: 2 INJECTION INTRAMUSCULAR; INTRAVENOUS at 15:13

## 2024-05-31 RX ADMIN — TORSEMIDE 40 MG: 20 TABLET ORAL at 18:27

## 2024-05-31 NOTE — PLAN OF CARE
Problem: PAIN - ADULT  Goal: Verbalizes/displays adequate comfort level or baseline comfort level  Description: Interventions:  - Encourage patient to monitor pain and request assistance  - Assess pain using appropriate pain scale  - Administer analgesics based on type and severity of pain and evaluate response  - Implement non-pharmacological measures as appropriate and evaluate response  - Consider cultural and social influences on pain and pain management  - Notify physician/advanced practitioner if interventions unsuccessful or patient reports new pain  Outcome: Progressing     Problem: INFECTION - ADULT  Goal: Absence or prevention of progression during hospitalization  Description: INTERVENTIONS:  - Assess and monitor for signs and symptoms of infection  - Monitor lab/diagnostic results  - Monitor all insertion sites, i.e. indwelling lines, tubes, and drains  - Monitor endotracheal if appropriate and nasal secretions for changes in amount and color  - [place] appropriate cooling/warming therapies per order  - Administer medications as ordered  - Instruct and encourage patient and family to use good hand hygiene technique  - Identify and instruct in appropriate isolation precautions for identified infection/condition  Outcome: Progressing  Goal: Absence of fever/infection during neutropenic period  Description: INTERVENTIONS:  - Monitor WBC    Outcome: Progressing     Problem: SAFETY ADULT  Goal: Patient will remain free of falls  Description: INTERVENTIONS:  - Educate patient/family on patient safety including physical limitations  - Instruct patient to call for assistance with activity   - Consult OT/PT to assist with strengthening/mobility   - Keep Call bell within reach  - Keep bed low and locked with side rails adjusted as appropriate  - Keep care items and personal belongings within reach  - Initiate and maintain comfort rounds  - Apply yellow socks and bracelet for high fall risk patients  - Consider  moving patient to room near nurses station  Outcome: Progressing     Problem: DISCHARGE PLANNING  Goal: Discharge to home or other facility with appropriate resources  Description: INTERVENTIONS:  - Identify barriers to discharge w/patient and caregiver  - Arrange for needed discharge resources and transportation as appropriate  - Identify discharge learning needs (meds, wound care, etc.)  - Arrange for interpretive services to assist at discharge as needed  - Refer to Case Management Department for coordinating discharge planning if the patient needs post-hospital services based on physician/advanced practitioner order or complex needs related to functional status, cognitive ability, or social support system  Outcome: Progressing     Problem: Knowledge Deficit  Goal: Patient/family/caregiver demonstrates understanding of disease process, treatment plan, medications, and discharge instructions  Description: Complete learning assessment and assess knowledge base.  Interventions:  - Provide teaching at level of understanding  - Provide teaching via preferred learning methods  Outcome: Progressing     Problem: GASTROINTESTINAL - ADULT  Goal: Minimal or absence of nausea and/or vomiting  Description: INTERVENTIONS:  - Administer IV fluids if ordered to ensure adequate hydration  - Maintain NPO status until nausea and vomiting are resolved  - Nasogastric tube if ordered  - Administer ordered antiemetic medications as needed  - Provide nonpharmacologic comfort measures as appropriate  - Advance diet as tolerated, if ordered  - Consider nutrition services referral to assist patient with adequate nutrition and appropriate food choices  Outcome: Progressing  Goal: Maintains or returns to baseline bowel function  Description: INTERVENTIONS:  - Assess bowel function  - Encourage oral fluids to ensure adequate hydration  - Administer IV fluids if ordered to ensure adequate hydration  - Administer ordered medications as  needed  - Encourage mobilization and activity  - Consider nutritional services referral to assist patient with adequate nutrition and appropriate food choices  Outcome: Progressing  Goal: Maintains adequate nutritional intake  Description: INTERVENTIONS:  - Monitor percentage of each meal consumed  - Identify factors contributing to decreased intake, treat as appropriate  - Assist with meals as needed  - Monitor I&O, weight, and lab values if indicated  - Obtain nutrition services referral as needed  Outcome: Progressing  Goal: Oral mucous membranes remain intact  Description: INTERVENTIONS  - Assess oral mucosa and hygiene practices  - Implement preventative oral hygiene regimen  - Implement oral medicated treatments as ordered  - Initiate Nutrition services referral as needed  Outcome: Progressing     Problem: METABOLIC, FLUID AND ELECTROLYTES - ADULT  Goal: Electrolytes maintained within normal limits  Description: INTERVENTIONS:  - Monitor labs and assess patient for signs and symptoms of electrolyte imbalances  - Administer electrolyte replacement as ordered  - Monitor response to electrolyte replacements, including repeat lab results as appropriate  - Instruct patient on fluid and nutrition as appropriate  Outcome: Progressing  Goal: Fluid balance maintained  Description: INTERVENTIONS:  - Monitor labs   - Monitor I/O and WT  - Instruct patient on fluid and nutrition as appropriate  - Assess for signs & symptoms of volume excess or deficit  Outcome: Progressing  Goal: Glucose maintained within target range  Description: INTERVENTIONS:  - Monitor Blood Glucose as ordered  - Assess for signs and symptoms of hyperglycemia and hypoglycemia  - Administer ordered medications to maintain glucose within target range  - Assess nutritional intake and initiate nutrition service referral as needed  Outcome: Progressing

## 2024-05-31 NOTE — H&P
"[facility]  H&P  Name: Stef Pack Sr. 59 y.o. male I MRN: [id number]  Unit/Bed#: -01 I Date of Admission: 5/31/2024   Date of Service: 5/31/2024 I Hospital Day: 0      Assessment & Plan   KELLY (acute kidney injury) (HCC)  Assessment & Plan  Patient's previous baseline creatinine was around 4-4.6 however now creatinine is up to 5.5.  He had several hospital admissions secondary to uremic symptoms now is being planned for outpatient IR catheter placement and initiating dialysis outpatient by his primary nephrologist.  ask critical care to place temporary dialysis catheter plan for dialysis to start in the next few days if okay with nephrology nephrology consulted    Hypermagnesemia  Assessment & Plan  High magnesium noted again hopefully will correct with dialysis    Chronic back pain  Assessment & Plan  Currently stable continue home meds    Diabetes mellitus type 2, insulin dependent (HCC)  Assessment & Plan  Lab Results   Component Value Date    HGBA1C 7.4 (H) 05/20/2024       No results for input(s): \"POCGLU\" in the last 72 hours.    Blood Sugar Average: Last 72 hrs:  Hold Lantus and placed on insulin sliding scale           VTE Prophylaxis: Pharmacologic VTE Prophylaxis contraindicated due to encourage ambulation   / sequential compression device   Code Status: Full code  POLST: There is no POLST form on file for this patient (pre-hospital)  Discussion with family: None    Anticipated Length of Stay:  Patient will be admitted on an Inpatient basis with an anticipated length of stay of more than 2 midnights.   Justification for Hospital Stay: Acute kidney injury    Total Time for Visit, including Counseling / Coordination of Care: 60 minutes.  Greater than 50% of this total time spent on direct patient counseling and coordination of care.    Chief Complaint:   I feel sick    History of Present Illness:    Stef Pack Sr. is a 59 y.o. male who presents with feeling sick.  " Patient has had recurrent admissions to the hospital because of generalized weakness fatigue nausea decreased oral intake and muscle cramps which continues he was seen by his outpatient nephrologist and recommended to start outpatient dialysis soon and came in again to the hospital because he just felt bad again.  He was supposed to get outpatient IR appointment with [facility] and start outpatient dialysis however it might need to be started inpatient.  Denies any fevers or chills or chest pain.    Review of Systems:    Review of Systems   Constitutional:  Positive for appetite change and fatigue. Negative for chills and fever.   HENT:  Negative for hearing loss, sore throat and trouble swallowing.    Eyes:  Negative for photophobia, discharge and visual disturbance.   Respiratory:  Negative for chest tightness and shortness of breath.    Cardiovascular:  Negative for chest pain and palpitations.   Gastrointestinal:  Positive for nausea and vomiting. Negative for abdominal pain and blood in stool.   Endocrine: Negative for polydipsia and polyuria.   Genitourinary:  Negative for difficulty urinating, dysuria, flank pain and hematuria.   Musculoskeletal:  Negative for back pain and gait problem.   Skin:  Negative for rash.   Allergic/Immunologic: Negative for environmental allergies and food allergies.   Neurological:  Positive for weakness. Negative for dizziness, seizures, syncope and headaches.   Hematological:  Does not bruise/bleed easily.   Psychiatric/Behavioral:  Negative for behavioral problems.    All other systems reviewed and are negative.      Past Medical and Surgical History:     Past Medical History:   Diagnosis Date    Chronic kidney disease     Diabetes mellitus (HCC)     Gout     Hypertension     Renal disorder     Retroperitoneal abscess (HCC)     Stroke (HCC)     UTI (urinary tract infection)     Vertebral osteomyelitis (HCC)        Past Surgical History:   Procedure Laterality Date    BACK  SURGERY      ORCHIECTOMY         Meds/Allergies:    Prior to Admission medications    Medication Sig Start Date End Date Taking? Authorizing Provider   albuterol (ProAir HFA) 90 mcg/act inhaler Inhale 2 puffs every 6 (six) hours as needed for wheezing 5/16/24  Yes Charlotte Villegas PA-C   allopurinol (ZYLOPRIM) 100 mg tablet Take 1 tablet (100 mg total) by mouth daily 2/27/24 5/31/24 Yes Adrianne Horn MD   amLODIPine (NORVASC) 5 mg tablet Take 1 tablet (5 mg total) by mouth daily 2/27/24 5/31/24 Yes Adrianne Horn MD   docusate sodium (COLACE) 100 mg capsule TAKE BY MOUTH 1 CAPSULE IN THE MORNING AND 1 CAPSULE BEFORE BEDTIME. 12/21/22  Yes Historical Provider, MD   DULoxetine (CYMBALTA) 20 mg capsule Take 1 capsule (20 mg total) by mouth daily 5/17/24  Yes Cahrlotte Villegas PA-C   insulin glargine (LANTUS) 100 units/mL subcutaneous injection Inject 5 Units under the skin daily at bedtime 11/23/22  Yes Joana Mejias PA-C   insulin lispro (HumaLOG) 100 units/mL injection Inject 2-12 Units under the skin 3 (three) times a day before meals 10/24/22  Yes Adrianne Horn MD   oxyCODONE (OXY-IR) 5 MG capsule Take 5 mg by mouth every 4 (four) hours as needed for moderate pain or severe pain   Yes Historical Provider, MD   polyethylene glycol (MIRALAX) 17 g packet Take 17 g by mouth daily 5/22/24  Yes Madison Ely MD   pregabalin (LYRICA) 75 mg capsule Take 1 capsule (75 mg total) by mouth 2 (two) times a day for 10 days 5/16/24 5/31/24 Yes Charlotte Villegas PA-C   senna-docusate sodium (SENOKOT S) 8.6-50 mg per tablet Take 1 tablet by mouth 2 (two) times a day 3/17/22  Yes Historical Provider, MD   sevelamer (RENAGEL) 800 mg tablet Take 2 tablets (1,600 mg total) by mouth 3 (three) times a day with meals 5/16/24 6/15/24 Yes Charlotte Villegas PA-C   tamsulosin (FLOMAX) 0.4 mg Take 1 capsule (0.4 mg total) by mouth daily with dinner 2/27/24 5/31/24 Yes Adrianne Horn MD   torsemide (DEMADEX) 20 mg tablet Take 2 tablets  "(40 mg total) by mouth 2 (two) times a day 5/22/24  Yes Madison Ely MD     I have reviewed home medications with patient personally.    Allergies:   Allergies   Allergen Reactions    Bupropion Delirium     \"went nuts,\" prior to 2012  \"went nuts,\" prior to 2012  \"went nuts,\" prior to 2012  \"went nuts,\" prior to 2012      Metformin Other (See Comments)     Other reaction(s): kidney disease  Other reaction(s): kidney disease  Other reaction(s): kidney disease      Medical Tape Rash       Social History:     Marital Status: /Civil Union     Social History     Substance and Sexual Activity   Alcohol Use Not Currently     Social History     Tobacco Use   Smoking Status Former    Current packs/day: 0.25    Types: Cigarettes   Smokeless Tobacco Never     Social History     Substance and Sexual Activity   Drug Use Yes    Types: Marijuana       Family History:    Family History   Problem Relation Age of Onset    Hypertension Father        Physical Exam:     Vitals:   Blood Pressure: 138/77 (05/31/24 1714)  Pulse: 61 (05/31/24 1714)  Temperature: (!) 97.3 °F (36.3 °C) (05/31/24 1714)  Temp Source: Temporal (05/31/24 1451)  Respirations: 20 (05/31/24 1714)  Height: 6' (182.9 cm) (05/31/24 1451)  Weight - Scale: 81.1 kg (178 lb 12.7 oz) (05/31/24 1451)  SpO2: 95 % (05/31/24 1715)    Physical Exam  Vitals and nursing note reviewed.   Constitutional:       Appearance: He is ill-appearing.   HENT:      Head: Normocephalic and atraumatic.      Right Ear: External ear normal.      Left Ear: External ear normal.      Nose: Nose normal.      Mouth/Throat:      Pharynx: Oropharynx is clear.   Eyes:      Pupils: Pupils are equal, round, and reactive to light.   Cardiovascular:      Rate and Rhythm: Normal rate and regular rhythm.      Heart sounds: Normal heart sounds.   Pulmonary:      Effort: Pulmonary effort is normal.      Breath sounds: Normal breath sounds.   Abdominal:      General: Bowel sounds are normal.      " Palpations: Abdomen is soft.      Tenderness: There is no abdominal tenderness.   Musculoskeletal:         General: Normal range of motion.      Cervical back: Normal range of motion and neck supple.   Skin:     General: Skin is warm and dry.      Capillary Refill: Capillary refill takes less than 2 seconds.   Neurological:      General: No focal deficit present.      Mental Status: He is alert and oriented to person, place, and time.   Psychiatric:         Mood and Affect: Mood normal.           Additional Data:     Lab Results: I have personally reviewed pertinent reports.      Results from last 7 days   Lab Units 05/31/24  1513   WBC Thousand/uL 10.41*   HEMOGLOBIN g/dL 10.0*   HEMATOCRIT % 30.9*   PLATELETS Thousands/uL 206   SEGS PCT % 72   LYMPHO PCT % 14   MONO PCT % 9   EOS PCT % 4     Results from last 7 days   Lab Units 05/31/24  1513   SODIUM mmol/L 138   POTASSIUM mmol/L 4.6   CHLORIDE mmol/L 102   CO2 mmol/L 27   BUN mg/dL 71*   CREATININE mg/dL 5.55*   ANION GAP mmol/L 9   CALCIUM mg/dL 9.5   ALBUMIN g/dL 4.0   TOTAL BILIRUBIN mg/dL 0.33   ALK PHOS U/L 78   ALT U/L 7   AST U/L 11*   GLUCOSE RANDOM mg/dL 107                       Imaging: I have personally reviewed pertinent reports.      XR chest 1 view portable    (Results Pending)       EKG, Pathology, and Other Studies Reviewed on Admission:   EKG: Reviewed    Allscripts / Epic Records Reviewed: Yes     ** Please Note: This note has been constructed using a voice recognition system. **

## 2024-05-31 NOTE — ASSESSMENT & PLAN NOTE
Patient's previous baseline creatinine was around 4-4.6 however now creatinine is up to 5.5.  He had several hospital admissions secondary to uremic symptoms now is being planned for outpatient IR catheter placement and initiating dialysis outpatient by his primary nephrologist.  ask critical care to place temporary dialysis catheter plan for dialysis to start in the next few days if okay with nephrology nephrology consulted

## 2024-05-31 NOTE — ED PROVIDER NOTES
History  Chief Complaint   Patient presents with    Vomiting     Pt c/o N/V x3 days. Pt has kidney disease and his nephrologist wanted pt evaluated d/t low kidney function     Patient with known end-stage renal disease who was being established for hemodialysis presents emergency room today with nausea vomiting diarrhea.  Patient was to get dialysis catheter placed by IR on Monday at [facility].  Patient started developing worsening symptoms and subsequently was advised to come to the emergency room for further evaluation.  Patient is reporting some generalized pain and weakness.  No chest pain or shortness of breath no fevers.      History provided by:  Patient and spouse  Vomiting  Severity:  Moderate  Progression:  Worsening  Associated symptoms: arthralgias, headaches and myalgias    Associated symptoms: no abdominal pain        Prior to Admission Medications   Prescriptions Last Dose Informant Patient Reported? Taking?   DULoxetine (CYMBALTA) 20 mg capsule   No No   Sig: Take 1 capsule (20 mg total) by mouth daily   albuterol (ProAir HFA) 90 mcg/act inhaler   No No   Sig: Inhale 2 puffs every 6 (six) hours as needed for wheezing   allopurinol (ZYLOPRIM) 100 mg tablet   No No   Sig: Take 1 tablet (100 mg total) by mouth daily   amLODIPine (NORVASC) 5 mg tablet   No No   Sig: Take 1 tablet (5 mg total) by mouth daily   docusate sodium (COLACE) 100 mg capsule   Yes No   Sig: TAKE BY MOUTH 1 CAPSULE IN THE MORNING AND 1 CAPSULE BEFORE BEDTIME.   insulin glargine (LANTUS) 100 units/mL subcutaneous injection   No No   Sig: Inject 5 Units under the skin daily at bedtime   insulin lispro (HumaLOG) 100 units/mL injection   No No   Sig: Inject 2-12 Units under the skin 3 (three) times a day before meals   oxyCODONE (OXY-IR) 5 MG capsule   Yes No   Sig: Take 5 mg by mouth every 4 (four) hours as needed for moderate pain or severe pain   polyethylene glycol (MIRALAX) 17 g packet   No No   Sig: Take 17 g by mouth daily    pregabalin (LYRICA) 75 mg capsule   No No   Sig: Take 1 capsule (75 mg total) by mouth 2 (two) times a day for 10 days   senna-docusate sodium (SENOKOT S) 8.6-50 mg per tablet   Yes No   Sig: Take 1 tablet by mouth 2 (two) times a day   sevelamer (RENAGEL) 800 mg tablet   No No   Sig: Take 2 tablets (1,600 mg total) by mouth 3 (three) times a day with meals   tamsulosin (FLOMAX) 0.4 mg   No No   Sig: Take 1 capsule (0.4 mg total) by mouth daily with dinner   torsemide (DEMADEX) 20 mg tablet   No No   Sig: Take 2 tablets (40 mg total) by mouth 2 (two) times a day      Facility-Administered Medications: None       Past Medical History:   Diagnosis Date    Chronic kidney disease     Diabetes mellitus (HCC)     Gout     Hypertension     Renal disorder     Retroperitoneal abscess (HCC)     Stroke (HCC)     UTI (urinary tract infection)     Vertebral osteomyelitis (HCC)        Past Surgical History:   Procedure Laterality Date    BACK SURGERY      ORCHIECTOMY         Family History   Problem Relation Age of Onset    Hypertension Father      I have reviewed and agree with the history as documented.    E-Cigarette/Vaping    E-Cigarette Use Never User      E-Cigarette/Vaping Substances     Social History     Tobacco Use    Smoking status: Former     Current packs/day: 0.25     Types: Cigarettes    Smokeless tobacco: Never   Vaping Use    Vaping status: Never Used   Substance Use Topics    Alcohol use: Not Currently    Drug use: Yes     Types: Marijuana       Review of Systems   Constitutional:  Positive for fatigue.   Respiratory:  Negative for shortness of breath and wheezing.    Cardiovascular:  Negative for chest pain and palpitations.   Gastrointestinal:  Positive for vomiting. Negative for abdominal pain.   Musculoskeletal:  Positive for arthralgias and myalgias.   Neurological:  Positive for headaches.   All other systems reviewed and are negative.      Physical Exam  Physical Exam  Vitals and nursing note reviewed.    Constitutional:       General: He is not in acute distress.     Appearance: He is normal weight. He is not ill-appearing or toxic-appearing.   HENT:      Head: Normocephalic and atraumatic.      Right Ear: External ear normal.      Left Ear: External ear normal.      Nose: Nose normal.      Mouth/Throat:      Mouth: Mucous membranes are moist.   Cardiovascular:      Rate and Rhythm: Normal rate.      Heart sounds: No murmur heard.  Pulmonary:      Effort: Pulmonary effort is normal. No respiratory distress.      Breath sounds: No wheezing or rales.   Abdominal:      General: Abdomen is flat. There is no distension.      Palpations: There is no mass.      Tenderness: There is no abdominal tenderness. There is no guarding.   Musculoskeletal:         General: No signs of injury.   Skin:     Coloration: Skin is pale.   Neurological:      General: No focal deficit present.      Mental Status: He is alert.   Psychiatric:         Mood and Affect: Mood normal.         Thought Content: Thought content normal.         Judgment: Judgment normal.         Vital Signs  ED Triage Vitals   Temperature Pulse Respirations Blood Pressure SpO2   05/31/24 1451 05/31/24 1451 05/31/24 1451 05/31/24 1453 05/31/24 1451   97.9 °F (36.6 °C) 66 20 145/77 95 %      Temp Source Heart Rate Source Patient Position - Orthostatic VS BP Location FiO2 (%)   05/31/24 1451 05/31/24 1451 05/31/24 1451 05/31/24 1451 --   Temporal Monitor Lying Left arm       Pain Score       05/31/24 1451       No Pain           Vitals:    05/31/24 1453 05/31/24 1530 05/31/24 1545 05/31/24 1600   BP: 145/77 141/76 140/71 136/71   Pulse:  63 69 62   Patient Position - Orthostatic VS:  Lying Lying Lying         Visual Acuity      ED Medications  Medications   ondansetron (ZOFRAN) injection 4 mg (4 mg Intravenous Given 5/31/24 1513)   morphine injection 2 mg (2 mg Intravenous Given 5/31/24 1541)       Diagnostic Studies  Results Reviewed       Procedure Component Value  Units Date/Time    UA w Reflex to Microscopic w Reflex to Culture [[id number]]  (Abnormal) Collected: 05/31/24 1618    Lab Status: Final result Specimen: Urine, Clean Catch Updated: 05/31/24 1625     Color, UA Yellow     Clarity, UA Clear     Specific Gravity, UA 1.015     pH, UA 7.0     Leukocytes, UA Negative     Nitrite, UA Negative     Protein,  (2+) mg/dl      Glucose, UA Negative mg/dl      Ketones, UA Negative mg/dl      Urobilinogen, UA 0.2 E.U./dl      Bilirubin, UA Negative     Occult Blood, UA Trace-lysed    Urine Microscopic [[id number]] Collected: 05/31/24 1618    Lab Status: In process Specimen: Urine, Clean Catch Updated: 05/31/24 1625    HS Troponin I 4hr [[id number]]     Lab Status: No result Specimen: Blood     HS Troponin I 2hr [[id number]]     Lab Status: No result Specimen: Blood     HS Troponin 0hr (reflex protocol) [[id number]]  (Normal) Collected: 05/31/24 1513    Lab Status: Final result Specimen: Blood from Arm, Right Updated: 05/31/24 1548     hs TnI 0hr 6 ng/L     Comprehensive metabolic panel [[id number]]  (Abnormal) Collected: 05/31/24 1513    Lab Status: Final result Specimen: Blood from Arm, Right Updated: 05/31/24 1536     Sodium 138 mmol/L      Potassium 4.6 mmol/L      Chloride 102 mmol/L      CO2 27 mmol/L      ANION GAP 9 mmol/L      BUN 71 mg/dL      Creatinine 5.55 mg/dL      Glucose 107 mg/dL      Calcium 9.5 mg/dL      AST 11 U/L      ALT 7 U/L      Alkaline Phosphatase 78 U/L      Total Protein 6.9 g/dL      Albumin 4.0 g/dL      Total Bilirubin 0.33 mg/dL      eGFR 10 ml/min/1.73sq m     Narrative:      National Kid[employer] Disease Foundation guidelines for Chronic Kidney Disease (CKD):     Stage 1 with normal or high GFR (GFR > 90 mL/min/1.73 square meters)    Stage 2 Mild CKD (GFR = 60-89 mL/min/1.73 square meters)    Stage 3A Moderate CKD (GFR = 45-59 mL/min/1.73 square meters)    Stage 3B Moderate CKD (GFR = 30-44 mL/min/1.73 square meters)    Stage 4 Severe CKD (GFR = 15-29  mL/min/1.73 square meters)    Stage 5 End Stage CKD (GFR <15 mL/min/1.73 square meters)  Note: GFR calculation is accurate only with a steady state creatinine    Magnesium [[id number]]  (Abnormal) Collected: 05/31/24 1513    Lab Status: Final result Specimen: Blood from Arm, Right Updated: 05/31/24 1536     Magnesium 3.4 mg/dL     CBC and differential [[id number]]  (Abnormal) Collected: 05/31/24 1513    Lab Status: Final result Specimen: Blood from Arm, Right Updated: 05/31/24 1521     WBC 10.41 Thousand/uL      RBC 3.17 Million/uL      Hemoglobin 10.0 g/dL      Hematocrit 30.9 %      MCV 98 fL      MCH 31.5 pg      MCHC 32.4 g/dL      RDW 15.6 %      MPV 10.3 fL      Platelets 206 Thousands/uL      nRBC 0 /100 WBCs      Segmented % 72 %      Immature Grans % 0 %      Lymphocytes % 14 %      Monocytes % 9 %      Eosinophils Relative 4 %      Basophils Relative 1 %      Absolute Neutrophils 7.40 Thousands/µL      Absolute Immature Grans 0.04 Thousand/uL      Absolute Lymphocytes 1.47 Thousands/µL      Absolute Monocytes 0.97 Thousand/µL      Eosinophils Absolute 0.46 Thousand/µL      Basophils Absolute 0.07 Thousands/µL                    XR chest 1 view portable    (Results Pending)              Procedures  ECG 12 Lead Documentation Only    Date/Time: 5/31/2024 4:34 PM    Performed by: Kelvin Raya DO  Authorized by: Kelvin Raya DO    Indications / Diagnosis:  Chest pain  ECG reviewed by me, the ED Provider: yes    Patient location:  ED  Interpretation:     Interpretation: normal    Rate:     ECG rate assessment: normal    Rhythm:     Rhythm: sinus rhythm    Ectopy:     Ectopy: PAC    QRS:     QRS axis:  Normal  Conduction:     Conduction: normal    ST segments:     ST segments:  Normal  T waves:     T waves: normal             ED Course  ED Course as of 05/31/24 1635   Fri May 31, 2024   1552 MAGNESIUM(!): 3.4   1552 BUN(!): 71   1552 Creatinine(!): 5.55   1605 Discussed with nephrology.  They will plan for  dialysis tomorrow.  ICU will place catheter.  Will discuss with medicine for admission                                             Medical Decision Making  Patient presented to the emergency department and a MSE was performed. The patient was evaluated for complaint related to acute nausea and/or vomiting and/or diarrhea.  Patient is potentially at risk for, but not limited to, viral infection, celiac disease, Crohn's disease, irritable bowel syndrome, colitis, malabsorption syndrome, or C. Difficile, or other disease process unrelated to the abdomen which may cause this symptomatology is also considered. Several of these diagnoses have been evaluated and ruled out by history and physical.  As needed, patient will be further evaluated with laboratory and imaging studies.  Higher level diagnostics, such as CT imaging or ultrasound, may also be required.  Please see work-up portion of the note for further evaluation of patient's risk.  Socioeconomic factors were also considered as part of the decision-making process.  Unless otherwise stated in the chart or patient is admitted as elsewhere documented, any previously prescribed medications will be maintained.      Problems Addressed:  CKD (chronic kidney disease) stage V requiring chronic dialysis (HCC): chronic illness or injury with exacerbation, progression, or side effects of treatment that poses a threat to life or bodily functions  Nausea and vomiting: acute illness or injury    Amount and/or Complexity of Data Reviewed  Labs: ordered. Decision-making details documented in ED Course.  Radiology: ordered.  ECG/medicine tests: ordered and independent interpretation performed. Decision-making details documented in ED Course.  Discussion of management or test interpretation with external provider(s): Care coordinated with ICU, nephrology, and hospital medicine.    Risk  Prescription drug management.  Decision regarding hospitalization.  Risk Details: Patient  presented to the emergency department and a MSE was performed. The patient was evaluated and diagnosed with acute exacerbation of chronic kidney disease and renal failure.This is an acute exacerbation of a chronic disease process that will require additional planned work-up and treatment in a hospitalized setting. As may have been required as part of this evaluation, clinical laboratory test, radiology imaging and medical testing (I.e. EKG) were ordered as necessitated by the patient's presentation. I independently reviewed these studies, imaging and testing. This patient's case is considered to be a considerable risk secondary to the above listed disease process and poses a threat to the patient's well-being and baseline function. Further in-patient diagnostic testing and management, which may include the administration of parenteral medications, is required.                    Disposition  Final diagnoses:   Nausea and vomiting   CKD (chronic kidney disease) stage V requiring chronic dialysis (HCC)     Time reflects when diagnosis was documented in both MDM as applicable and the Disposition within this note       Time User Action Codes Description Comment    5/31/2024  4:07 PM Kelvin Raya [R11.2] Nausea and vomiting     5/31/2024  4:07 PM Kelvin Raya [N18.6,  Z99.2] CKD (chronic kidney disease) stage V requiring chronic dialysis (HCC)           ED Disposition       ED Disposition   Admit    Condition   Stable    Date/Time   Fri May 31, 2024  4:07 PM    Comment                  Follow-up Information    None         Patient's Medications   Discharge Prescriptions    No medications on file       No discharge procedures on file.    PDMP Review         Value Time User    PDMP Reviewed  Yes 5/22/2024 11:49 AM Madison Ely MD            ED Provider  Electronically Signed by             Kelvin Raya DO  05/31/24 7921

## 2024-05-31 NOTE — ASSESSMENT & PLAN NOTE
"Lab Results   Component Value Date    HGBA1C 7.4 (H) 05/20/2024       No results for input(s): \"POCGLU\" in the last 72 hours.    Blood Sugar Average: Last 72 hrs:  Hold Lantus and placed on insulin sliding scale    "

## 2024-06-01 ENCOUNTER — APPOINTMENT (INPATIENT)
Dept: RADIOLOGY | Facility: HOSPITAL | Age: 59
DRG: 674 | End: 2024-06-01
Payer: COMMERCIAL

## 2024-06-01 ENCOUNTER — APPOINTMENT (INPATIENT)
Dept: DIALYSIS | Facility: HOSPITAL | Age: 59
DRG: 674 | End: 2024-06-01
Payer: COMMERCIAL

## 2024-06-01 PROBLEM — N18.5 ACUTE RENAL FAILURE SUPERIMPOSED ON STAGE 5 CHRONIC KIDNEY DISEASE, NOT ON CHRONIC DIALYSIS (HCC): Status: ACTIVE | Noted: 2022-06-20

## 2024-06-01 LAB
GLUCOSE SERPL-MCNC: 108 MG/DL (ref 65–140)
GLUCOSE SERPL-MCNC: 145 MG/DL (ref 65–140)
GLUCOSE SERPL-MCNC: 155 MG/DL (ref 65–140)
GLUCOSE SERPL-MCNC: 159 MG/DL (ref 65–140)

## 2024-06-01 PROCEDURE — 99223 1ST HOSP IP/OBS HIGH 75: CPT | Performed by: NURSE PRACTITIONER

## 2024-06-01 PROCEDURE — 02HV33Z INSERTION OF INFUSION DEVICE INTO SUPERIOR VENA CAVA, PERCUTANEOUS APPROACH: ICD-10-PCS | Performed by: INTERNAL MEDICINE

## 2024-06-01 PROCEDURE — 86705 HEP B CORE ANTIBODY IGM: CPT | Performed by: NURSE PRACTITIONER

## 2024-06-01 PROCEDURE — 71045 X-RAY EXAM CHEST 1 VIEW: CPT

## 2024-06-01 PROCEDURE — 87340 HEPATITIS B SURFACE AG IA: CPT | Performed by: NURSE PRACTITIONER

## 2024-06-01 PROCEDURE — 87081 CULTURE SCREEN ONLY: CPT

## 2024-06-01 PROCEDURE — 99232 SBSQ HOSP IP/OBS MODERATE 35: CPT

## 2024-06-01 PROCEDURE — 86704 HEP B CORE ANTIBODY TOTAL: CPT | Performed by: NURSE PRACTITIONER

## 2024-06-01 PROCEDURE — 82948 REAGENT STRIP/BLOOD GLUCOSE: CPT

## 2024-06-01 PROCEDURE — 86803 HEPATITIS C AB TEST: CPT | Performed by: NURSE PRACTITIONER

## 2024-06-01 PROCEDURE — 86706 HEP B SURFACE ANTIBODY: CPT | Performed by: NURSE PRACTITIONER

## 2024-06-01 RX ORDER — HYDROXYZINE HYDROCHLORIDE 25 MG/1
25 TABLET, FILM COATED ORAL EVERY 6 HOURS PRN
Status: DISCONTINUED | OUTPATIENT
Start: 2024-06-01 | End: 2024-06-04 | Stop reason: HOSPADM

## 2024-06-01 RX ADMIN — PREGABALIN 75 MG: 75 CAPSULE ORAL at 22:31

## 2024-06-01 RX ADMIN — SEVELAMER HYDROCHLORIDE 1600 MG: 800 TABLET, FILM COATED ORAL at 16:44

## 2024-06-01 RX ADMIN — TORSEMIDE 40 MG: 20 TABLET ORAL at 16:44

## 2024-06-01 RX ADMIN — DULOXETINE HYDROCHLORIDE 20 MG: 20 CAPSULE, DELAYED RELEASE ORAL at 08:28

## 2024-06-01 RX ADMIN — ALLOPURINOL 100 MG: 100 TABLET ORAL at 08:29

## 2024-06-01 RX ADMIN — TAMSULOSIN HYDROCHLORIDE 0.4 MG: 0.4 CAPSULE ORAL at 22:31

## 2024-06-01 RX ADMIN — SEVELAMER HYDROCHLORIDE 1600 MG: 800 TABLET, FILM COATED ORAL at 08:29

## 2024-06-01 RX ADMIN — POLYETHYLENE GLYCOL 3350 17 G: 17 POWDER, FOR SOLUTION ORAL at 08:30

## 2024-06-01 RX ADMIN — AMLODIPINE BESYLATE 5 MG: 5 TABLET ORAL at 08:28

## 2024-06-01 RX ADMIN — SENNOSIDES AND DOCUSATE SODIUM 1 TABLET: 8.6; 5 TABLET ORAL at 08:28

## 2024-06-01 RX ADMIN — PREGABALIN 75 MG: 75 CAPSULE ORAL at 08:28

## 2024-06-01 RX ADMIN — OXYCODONE HYDROCHLORIDE 5 MG: 5 TABLET ORAL at 22:31

## 2024-06-01 RX ADMIN — TORSEMIDE 40 MG: 20 TABLET ORAL at 08:29

## 2024-06-01 RX ADMIN — SEVELAMER HYDROCHLORIDE 1600 MG: 800 TABLET, FILM COATED ORAL at 11:54

## 2024-06-01 RX ADMIN — INSULIN LISPRO 2 UNITS: 100 INJECTION, SOLUTION INTRAVENOUS; SUBCUTANEOUS at 16:45

## 2024-06-01 RX ADMIN — INSULIN LISPRO 2 UNITS: 100 INJECTION, SOLUTION INTRAVENOUS; SUBCUTANEOUS at 11:56

## 2024-06-01 RX ADMIN — SENNOSIDES AND DOCUSATE SODIUM 1 TABLET: 8.6; 5 TABLET ORAL at 16:45

## 2024-06-01 NOTE — PLAN OF CARE
"First hemodialysis treatment planned for 120 minutes using a 2 K+ bath for potassium 4.6 yesterday.  Fluid goal 500 ml/run even net as ordered.        Post-Dialysis RN Treatment Note    Blood Pressure:  Pre 163/99 mm/Hg  Post 149/95 mmHg   EDW:  TBD    Weight:  Pre 77.2 kg   Post 77 kg   Mode of weight measurement: Bed Scale   Volume Removed:  407 ml/run even net   Treatment duration:  90 minutes    NS given  No    Treatment shortened? Yes, describe: 90\"   Medications given during Rx:  None Reported   Estimated Kt/V:  0.66   Access type: Temporary HD catheter   Access Issues: Yes, describe: frequent loss of bfr     Report called to primary nurse   Yes             Problem: METABOLIC, FLUID AND ELECTROLYTES - ADULT  Goal: Electrolytes maintained within normal limits  Description: INTERVENTIONS:  - Monitor labs and assess patient for signs and symptoms of electrolyte imbalances  - Administer electrolyte replacement as ordered  - Monitor response to electrolyte replacements, including repeat lab results as appropriate  - Instruct patient on fluid and nutrition as appropriate  Outcome: Progressing  Goal: Fluid balance maintained  Description: INTERVENTIONS:  - Monitor labs   - Monitor I/O and WT  - Instruct patient on fluid and nutrition as appropriate  - Assess for signs & symptoms of volume excess or deficit  Outcome: Progressing     "

## 2024-06-01 NOTE — PLAN OF CARE
Problem: PAIN - ADULT  Goal: Verbalizes/displays adequate comfort level or baseline comfort level  Description: Interventions:  - Encourage patient to monitor pain and request assistance  - Assess pain using appropriate pain scale  - Administer analgesics based on type and severity of pain and evaluate response  - Implement non-pharmacological measures as appropriate and evaluate response  - Consider cultural and social influences on pain and pain management  - Notify physician/advanced practitioner if interventions unsuccessful or patient reports new pain  Outcome: Progressing     Problem: INFECTION - ADULT  Goal: Absence or prevention of progression during hospitalization  Description: INTERVENTIONS:  - Assess and monitor for signs and symptoms of infection  - Monitor lab/diagnostic results  - Monitor all insertion sites, i.e. indwelling lines, tubes, and drains  - Monitor endotracheal if appropriate and nasal secretions for changes in amount and color  - [place] appropriate cooling/warming therapies per order  - Administer medications as ordered  - Instruct and encourage patient and family to use good hand hygiene technique  - Identify and instruct in appropriate isolation precautions for identified infection/condition  Outcome: Progressing  Goal: Absence of fever/infection during neutropenic period  Description: INTERVENTIONS:  - Monitor WBC    Outcome: Progressing     Problem: Knowledge Deficit  Goal: Patient/family/caregiver demonstrates understanding of disease process, treatment plan, medications, and discharge instructions  Description: Complete learning assessment and assess knowledge base.  Interventions:  - Provide teaching at level of understanding  - Provide teaching via preferred learning methods  Outcome: Progressing     Problem: DISCHARGE PLANNING  Goal: Discharge to home or other facility with appropriate resources  Description: INTERVENTIONS:  - Identify barriers to discharge w/patient and  caregiver  - Arrange for needed discharge resources and transportation as appropriate  - Identify discharge learning needs (meds, wound care, etc.)  - Arrange for interpretive services to assist at discharge as needed  - Refer to Case Management Department for coordinating discharge planning if the patient needs post-hospital services based on physician/advanced practitioner order or complex needs related to functional status, cognitive ability, or social support system  Outcome: Progressing     Problem: GASTROINTESTINAL - ADULT  Goal: Minimal or absence of nausea and/or vomiting  Description: INTERVENTIONS:  - Administer IV fluids if ordered to ensure adequate hydration  - Maintain NPO status until nausea and vomiting are resolved  - Nasogastric tube if ordered  - Administer ordered antiemetic medications as needed  - Provide nonpharmacologic comfort measures as appropriate  - Advance diet as tolerated, if ordered  - Consider nutrition services referral to assist patient with adequate nutrition and appropriate food choices  Outcome: Progressing  Goal: Maintains or returns to baseline bowel function  Description: INTERVENTIONS:  - Assess bowel function  - Encourage oral fluids to ensure adequate hydration  - Administer IV fluids if ordered to ensure adequate hydration  - Administer ordered medications as needed  - Encourage mobilization and activity  - Consider nutritional services referral to assist patient with adequate nutrition and appropriate food choices  Outcome: Progressing  Goal: Maintains adequate nutritional intake  Description: INTERVENTIONS:  - Monitor percentage of each meal consumed  - Identify factors contributing to decreased intake, treat as appropriate  - Assist with meals as needed  - Monitor I&O, weight, and lab values if indicated  - Obtain nutrition services referral as needed  Outcome: Progressing  Goal: Oral mucous membranes remain intact  Description: INTERVENTIONS  - Assess oral mucosa and  hygiene practices  - Implement preventative oral hygiene regimen  - Implement oral medicated treatments as ordered  - Initiate Nutrition services referral as needed  Outcome: Progressing     Problem: METABOLIC, FLUID AND ELECTROLYTES - ADULT  Goal: Electrolytes maintained within normal limits  Description: INTERVENTIONS:  - Monitor labs and assess patient for signs and symptoms of electrolyte imbalances  - Administer electrolyte replacement as ordered  - Monitor response to electrolyte replacements, including repeat lab results as appropriate  - Instruct patient on fluid and nutrition as appropriate  Outcome: Progressing  Goal: Fluid balance maintained  Description: INTERVENTIONS:  - Monitor labs   - Monitor I/O and WT  - Instruct patient on fluid and nutrition as appropriate  - Assess for signs & symptoms of volume excess or deficit  Outcome: Progressing  Goal: Glucose maintained within target range  Description: INTERVENTIONS:  - Monitor Blood Glucose as ordered  - Assess for signs and symptoms of hyperglycemia and hypoglycemia  - Administer ordered medications to maintain glucose within target range  - Assess nutritional intake and initiate nutrition service referral as needed  Outcome: Progressing

## 2024-06-01 NOTE — ASSESSMENT & PLAN NOTE
Lab Results   Component Value Date    HGBA1C 7.4 (H) 05/20/2024       Recent Labs     05/31/24 2057 06/01/24  0706   POCGLU 112 108       Blood Sugar Average: Last 72 hrs:  (P) 110  Hold Lantus and placed on insulin sliding scale

## 2024-06-01 NOTE — UTILIZATION REVIEW
Initial Clinical Review    Admission: Date/Time/Statement:   Admission Orders (From admission, onward)       Ordered        05/31/24 1608  INPATIENT ADMISSION  Once                          Orders Placed This Encounter   Procedures    INPATIENT ADMISSION     Standing Status:   Standing     Number of Occurrences:   1     Order Specific Question:   Level of Care     Answer:   Med Surg [16]     Order Specific Question:   Estimated length of stay     Answer:   More than 2 Midnights     Order Specific Question:   Certification     Answer:   I certify that inpatient services are medically necessary for this patient for a duration of greater than two midnights. See H&P and MD Progress Notes for additional information about the patient's course of treatment.     ED Arrival Information       Expected   -    Arrival   5/31/2024 14:43    Acuity   Urgent              Means of arrival   Wheelchair    Escorted by   Spouse    Service   Hospitalist    Admission type   Emergency              Arrival complaint   Nausea/Weakness  (in kidney failure)             Chief Complaint   Patient presents with    Vomiting     Pt c/o N/V x3 days. Pt has kidney disease and his nephrologist wanted pt evaluated d/t low kidney function       Initial Presentation: 59 y.o. male presents to ED from home not feeling well.  Has  been feeling ill,  poor oral  intake, muscle cramps, generalized weakness, nausea  and fatigue recently. Follows with  nephrology  and  OP dialysis  start  recommended soon.  Had  an IR  appointment  OP,  may need to do IP.  PMH  is   chronic back pain,  DM2.  Labs reveal elevated  Mag, creatinine  now   5.5.  Admit  Ip with  KELLY, Hypermagnesemia  and plan is  monitor labs,  temporary dialysis catheter, nephrology consult and  start dialysis.     Anticipated Length of Stay/Certification Statement: Patient will be admitted on an Inpatient basis with an anticipated length of stay of more than 2 midnights.   Justification for  Hospital Stay: Acute kidney injury     Date:   6/1   Day 2:   Nephrology consult  CKD  5 with transition to  ESRD, azotemia with presumed  uremia.    Etiology presumed diabetic nephropathy, previous ATN, HTN nephrosclerosis, neurogenic bladder.  Sent to hospital to start on dialysis due to uremia and recurrent hospitalizations in this regard.  Patient does seem overtly uremic on exam with mild asterixis to right hand.   Plan temporary   dialysis access today and first  dialysis session.      6/1  Temporary dialysis catheter placed  First dialysis session  done    The patient had an episode of anger and attempted to leave AGAINST MEDICAL ADVICE this a.m. because water was not brought to him quick enough. I discussed this with patient in detail and he apologized and stated that he gets very anxious and frustrated. He is willing to stay in the hospital throughout this weekend into next week to have dialysis initiated and PermCath ultimately placed.     Date:    6/2  Day 3: Has surpassed a 2nd midnight with active treatments and services.  Plan 2nd  dialysis session  Mon  6/3.  No urgent  need for dialysis today.    Needs  temporary cath converted to perma cath.  Feels tired, no other complains.  No edema of legs.     Chest clear.  Continue current meds.       ED Triage Vitals   Temperature Pulse Respirations Blood Pressure SpO2   05/31/24 1451 05/31/24 1451 05/31/24 1451 05/31/24 1453 05/31/24 1451   97.9 °F (36.6 °C) 66 20 145/77 95 %      Temp Source Heart Rate Source Patient Position - Orthostatic VS BP Location FiO2 (%)   05/31/24 1451 05/31/24 1451 05/31/24 1451 05/31/24 1451 --   Temporal Monitor Lying Left arm       Pain Score       05/31/24 1451       No Pain          Wt Readings from Last 1 Encounters:   05/31/24 81.1 kg (178 lb 12.7 oz)     Additional Vital Signs:   -- -- -- -- -- -- None (Room air) --    05/31/24 21:55:49 97.5 °F (36.4 °C) 69 16 123/54 77 91 % -- --   05/31/24 19:50:18 97.2 °F (36.2 °C)  Abnormal  63 16 144/69 94 95 % -- --   05/31/24 1715 -- -- -- -- -- 95 % None (Room air) --   05/31/24 17:14:30 97.3 °F (36.3 °C) Abnormal  61 20 138/77 97 94 % -- --   05/31/24 1630 -- 64 18 146/69 99 95 % None (Room air) Lying   05/31/24 1600 -- 62 21 136/71 97 94 % None (Room air) Lying   05/31/24 1545 -- 69 12 140/71 99 95 % None (Room air) Lying   05/31/24 1530 -- 63 13 141/76 103 92 % None (Room air) Lying   05/31/24 1458 -- -- -- -- -- -- None (Room air) --   05/31/24 1453 -- -- -- 145/77 -- -- -- --   05/31/24 1451 97.9 °F (36.6 °C) 66 20 -- -- 95 % None (Room air) Lying     Pertinent Labs/Diagnostic Test Results:   XR chest 1 view portable   Final Result by Paty Gamboa MD (06/01 0647)      No acute cardiopulmonary disease.            Workstation performed: [id number]               Results from last 7 days   Lab Units 05/31/24  1513   WBC Thousand/uL 10.41*   HEMOGLOBIN g/dL 10.0*   HEMATOCRIT % 30.9*   PLATELETS Thousands/uL 206   TOTAL NEUT ABS Thousands/µL 7.40         Results from last 7 days   Lab Units 05/31/24  1513 05/28/24  0900   SODIUM mmol/L 138 141   POTASSIUM mmol/L 4.6 4.7   CHLORIDE mmol/L 102 101   CO2 mmol/L 27 29   ANION GAP mmol/L 9 11   BUN mg/dL 71* 76*   CREATININE mg/dL 5.55* 5.69*   EGFR ml/min/1.73sq m 10 10   CALCIUM mg/dL 9.5 9.8   MAGNESIUM mg/dL 3.4* 3.9*     Results from last 7 days   Lab Units 05/31/24  1513   AST U/L 11*   ALT U/L 7   ALK PHOS U/L 78   TOTAL PROTEIN g/dL 6.9   ALBUMIN g/dL 4.0   TOTAL BILIRUBIN mg/dL 0.33     Results from last 7 days   Lab Units 06/01/24  0706 05/31/24  2057   POC GLUCOSE mg/dl 108 112     Results from last 7 days   Lab Units 05/31/24  1513 05/28/24  0900   GLUCOSE RANDOM mg/dL 107 70           Results from last 7 days   Lab Units 05/31/24  1732 05/31/24  1513   HS TNI 0HR ng/L  --  6   HS TNI 2HR ng/L 6  --    HSTNI D2 ng/L 0  --                Results from last 7 days   Lab Units 05/31/24  1618   CLARITY UA  Clear   COLOR UA   Yellow   SPEC GRAV UA  1.015   PH UA  7.0   GLUCOSE UA mg/dl Negative   KETONES UA mg/dl Negative   BLOOD UA  Trace-lysed*   PROTEIN UA mg/dl 100 (2+)*   NITRITE UA  Negative   BILIRUBIN UA  Negative   UROBILINOGEN UA E.U./dl 0.2   LEUKOCYTES UA  Negative   WBC UA /hpf None Seen   RBC UA /hpf None Seen   BACTERIA UA /hpf None Seen   EPITHELIAL CELLS WET PREP /hpf None Seen       ED Treatment:   Medication Administration from 05/31/2024 1441 to 05/31/2024 1705         Date/Time Order Dose Route Action Comments     05/31/2024 1513 EDT ondansetron (ZOFRAN) injection 4 mg 4 mg Intravenous Given --     05/31/2024 1541 EDT morphine injection 2 mg 2 mg Intravenous Given --            Present on Admission:   KELLY (acute kidney injury) ([facility])   Hypermagnesemia   Chronic back pain      Admitting Diagnosis: Vomiting [R11.10]  Nausea and vomiting [R11.2]  CKD (chronic kidney disease) stage V requiring chronic dialysis ([facility]) [N18.6, Z99.2]  Age/Sex: 59 y.o. male  Admission Orders:  Scheduled Medications:  allopurinol, 100 mg, Oral, Daily  amLODIPine, 5 mg, Oral, Daily  DULoxetine, 20 mg, Oral, Daily  insulin lispro, 2-12 Units, Subcutaneous, TID AC  polyethylene glycol, 17 g, Oral, Daily  pregabalin, 75 mg, Oral, BID  senna-docusate sodium, 1 tablet, Oral, BID  sevelamer, 1,600 mg, Oral, TID With Meals  tamsulosin, 0.4 mg, Oral, Daily With Dinner  torsemide, 40 mg, Oral, BID      Continuous IV Infusions:     PRN Meds:  albuterol, 2 puff, Inhalation, Q6H PRN  hydrOXYzine HCL, 25 mg, Oral, Q6H PRN  oxyCODONE, 5 mg, Oral, Q6H PRN        IP CONSULT TO MEDICAL CRITICAL CARE  IP CONSULT TO NEPHROLOGY    Network Utilization Review Department  ATTENTION: Please call with any questions or concerns to [phone] and carefully listen to the prompts so that you are directed to the right person. All voicemails are confidential.   For Discharge needs, contact Care Management DC Support Team at [phone] opt. 2  Send all requests for  admission clinical reviews, approved or denied determinations and any other requests to dedicated fax number below belonging to the campus where the patient is receiving treatment. List of dedicated fax numbers for the Facilities:  FACILITY NAME UR FAX NUMBER   ADMISSION DENIALS (Administrative/Medical Necessity) [phone]   DISCHARGE SUPPORT TEAM (NETWORK) [phone]   PARENT CHILD HEALTH (Maternity/NICU/Pediatrics) [phone]   [facility] [phone]   [facility] [phone]   [facility] [phone]   [facility] [phone]   [facility] [phone]   [facility] [phone]   [facility] [phone]   [facility] [phone]   [facility] [phone]   Pending sale to [facility] [phone]   [facility] [phone]   [facility] [phone]

## 2024-06-01 NOTE — PROCEDURES
Temporary HD Catheter    Date/Time: 6/1/2024 11:51 AM    Performed by: YURIDIA Arceo  Authorized by: YURIDIA Arceo    Patient location:  Bedside  Consent:     Consent obtained:  Verbal and written    Consent given by:  Patient    Risks discussed:  Arterial puncture, incorrect placement, nerve damage, pneumothorax, infection and bleeding    Alternatives discussed:  No treatment and delayed treatment  Universal protocol:     Procedure explained and questions answered to patient or proxy's satisfaction: yes      Relevant documents present and verified: yes      Test results available and properly labeled: yes      Radiology Images displayed and confirmed.  If images not available, report reviewed: yes      Required blood products, implants, devices, and special equipment available: yes      Site/side marked: yes      Immediately prior to procedure, a time out was called: yes      Patient identity confirmed:  Verbally with patient and arm band  Pre-procedure details:     Hand hygiene: Hand hygiene performed prior to insertion      Sterile barrier technique: All elements of maximal sterile technique followed      Skin preparation:  2% chlorhexidine    Skin preparation agent: Skin preparation agent completely dried prior to procedure    Indications:     Central line indications: dialysis      Site selection rationale:  Right IJ, normal body habitus, normal vascular anatomy  Anesthesia (see MAR for exact dosages):     Anesthesia method:  Local infiltration    Local anesthetic:  Lidocaine 1% w/o epi  Procedure details:     Location:  Right internal jugular    Vessel type: vein      Laterality:  Right    Approach: percutaneous technique used      Patient position:  Reverse Trendelenburg    Catheter type:  Triple lumen    Catheter size: 12fr.    Catheter length:  16 cm    Landmarks identified: yes      Ultrasound guidance: yes      Ultrasound image availability:  Not saved    Sterile ultrasound techniques:  Sterile gel and sterile probe covers were used      Number of attempts:  1    Successful placement: yes  Vessel of catheter tip end: atriocaval junction  Post-procedure details:     Post-procedure:  Dressing applied and line sutured    Assessment:  Blood return through all ports, no pneumothorax on x-ray and placement verified by x-ray    Patient tolerance of procedure:  Tolerated well, no immediate complications    Observer name:  Dr. Angus martin

## 2024-06-01 NOTE — PROGRESS NOTES
[facility]  Progress Note  Name: Stef George I  MRN: [id number]  Unit/Bed#: -01 I Date of Admission: 5/31/2024   Date of Service: 6/1/2024  Hospital Day: 1    Assessment & Plan   * Acute renal failure superimposed on stage 5 chronic kidney disease, not on chronic dialysis (HCC)  Assessment & Plan  Previous baseline creatinine around 4-4.6  Creatinine now 5.5 at time of presentation  Has had several hospital admissions secondary to uremic symptoms  Has started outpatient workup for IR catheter placement and initiating dialysis    Will need to start dialysis during this hospitalization  Nephrology on consult  Temp cath placed 6/1 by critical care  Will place order for PermCath on Monday with IR  Attempting to start HD today versus Sunday  Discussed with case management-will need to start planning for outpatient dialysis      Hypermagnesemia  Assessment & Plan  High magnesium noted again hopefully will correct with dialysis    Chronic back pain  Assessment & Plan  Currently stable continue home meds    Diabetes mellitus type 2, insulin dependent (HCC)  Assessment & Plan  Lab Results   Component Value Date    HGBA1C 7.4 (H) 05/20/2024       Recent Labs     05/31/24 2057 06/01/24  0706   POCGLU 112 108       Blood Sugar Average: Last 72 hrs:  (P) 110  Hold Lantus and placed on insulin sliding scale           VTE Pharmacologic Prophylaxis: VTE Score: 1 Low Risk (Score 0-2) - Encourage Ambulation.    Mobility:   Basic Mobility Inpatient Raw Score: 22  JH-HLM Goal: 7: Walk 25 feet or more  JH-HLM Achieved: 7: Walk 25 feet or more  JH-HLM Goal achieved. Continue to encourage appropriate mobility.    Patient Centered Rounds: I performed bedside rounds with nursing staff today.   Discussions with Specialists or Other Care Team Provider: Nephrology, case management    Education and Discussions with Family / Patient: Updated  (wife) at bedside.    Total Time Spent on Date  of Encounter in care of patient:  mins. This time was spent on one or more of the following: performing physical exam; counseling and coordination of care; obtaining or reviewing history; documenting in the medical record; reviewing/ordering tests, medications or procedures; communicating with other healthcare professionals and discussing with patient's family/caregivers.    Current Length of Stay: 1 day(s)  Current Patient Status: Inpatient   Certification Statement: The patient will continue to require additional inpatient hospital stay due to new start dialysis  Discharge Plan: Anticipate discharge in >72 hrs to home.    Code Status: Prior    Subjective:   Seen and examined.  Attempted to elope from the unit today however quickly calmed down and is now agreeable to plan of care.  Feels okay post Temp Cath    Objective:     Vitals:   Temp (24hrs), Av.5 °F (36.4 °C), Min:97.2 °F (36.2 °C), Max:97.9 °F (36.6 °C)    Temp:  [97.2 °F (36.2 °C)-97.9 °F (36.6 °C)] 97.5 °F (36.4 °C)  HR:  [61-69] 63  Resp:  [12-21] 18  BP: (123-154)/(54-77) 154/75  SpO2:  [91 %-95 %] 94 %  Body mass index is 23.03 kg/m².     Input and Output Summary (last 24 hours):     Intake/Output Summary (Last 24 hours) at 2024 1401  Last data filed at 2024 1217  Gross per 24 hour   Intake 534 ml   Output 2100 ml   Net -1566 ml       Physical Exam:   Physical Exam  Vitals and nursing note reviewed.   Constitutional:       General: He is not in acute distress.     Appearance: Normal appearance.   HENT:      Head: Normocephalic and atraumatic.      Nose: No congestion.      Mouth/Throat:      Mouth: Mucous membranes are moist.   Eyes:      Conjunctiva/sclera: Conjunctivae normal.   Cardiovascular:      Rate and Rhythm: Normal rate and regular rhythm.      Pulses: Normal pulses.      Heart sounds: Normal heart sounds. No murmur heard.     Comments: Temp Cath placed  Pulmonary:      Effort: Pulmonary effort is normal. No respiratory distress.       Breath sounds: Normal breath sounds.   Abdominal:      General: Bowel sounds are normal.      Palpations: Abdomen is soft.      Tenderness: There is no abdominal tenderness.   Musculoskeletal:         General: Normal range of motion.      Right lower leg: No edema.      Left lower leg: No edema.   Skin:     General: Skin is warm and dry.   Neurological:      Mental Status: He is alert and oriented to person, place, and time.          Additional Data:     Labs:  Results from last 7 days   Lab Units 05/31/24  1513   WBC Thousand/uL 10.41*   HEMOGLOBIN g/dL 10.0*   HEMATOCRIT % 30.9*   PLATELETS Thousands/uL 206   SEGS PCT % 72   LYMPHO PCT % 14   MONO PCT % 9   EOS PCT % 4     Results from last 7 days   Lab Units 05/31/24  1513   SODIUM mmol/L 138   POTASSIUM mmol/L 4.6   CHLORIDE mmol/L 102   CO2 mmol/L 27   BUN mg/dL 71*   CREATININE mg/dL 5.55*   ANION GAP mmol/L 9   CALCIUM mg/dL 9.5   ALBUMIN g/dL 4.0   TOTAL BILIRUBIN mg/dL 0.33   ALK PHOS U/L 78   ALT U/L 7   AST U/L 11*   GLUCOSE RANDOM mg/dL 107         Results from last 7 days   Lab Units 06/01/24  1133 06/01/24  0706 05/31/24  2057   POC GLUCOSE mg/dl 155* 108 112               Lines/Drains:  Invasive Devices       Peripheral Intravenous Line  Duration             Peripheral IV 05/31/24 Right Antecubital <1 day              Hemodialysis Catheter  Duration             HD Temporary Double Catheter <1 day                      Telemetry:  Telemetry Orders (From admission, onward)               24 Hour Telemetry Monitoring  Continuous x 24 Hours (Telem)        Question:  Reason for 24 Hour Telemetry  Answer:  Arrhythmias requiring acute medical intervention / PPM or ICD malfunction  Comment:  new start hd                     Telemetry Reviewed: Normal Sinus Rhythm  Indication for Continued Telemetry Use: No indication for continued use. Will discontinue.              Imaging: No pertinent imaging reviewed.    Recent Cultures (last 7 days):         Last 24  Hours Medication List:   Current Facility-Administered Medications   Medication Dose Route Frequency Provider Last Rate    albuterol  2 puff Inhalation Q6H PRN Adrianne Horn MD      allopurinol  100 mg Oral Daily Adrianne Horn MD      amLODIPine  5 mg Oral Daily Adrianne Horn MD      DULoxetine  20 mg Oral Daily Adrianne Horn MD      hydrOXYzine HCL  25 mg Oral Q6H PRN Joana Mejias PA-C      insulin lispro  2-12 Units Subcutaneous TID AC Adrianne Horn MD      oxyCODONE  5 mg Oral Q6H PRN Adrianne Horn MD      polyethylene glycol  17 g Oral Daily Adrianne Horn MD      pregabalin  75 mg Oral BID Adrianne Horn MD      senna-docusate sodium  1 tablet Oral BID Adrianne Horn MD      sevelamer  1,600 mg Oral TID With Meals Adrianne Horn MD      tamsulosin  0.4 mg Oral Daily With Dinner Adrianne Horn MD      torsemide  40 mg Oral BID Adrianne Horn MD          Today, Patient Was Seen By: Joana Mejias PA-C    **Please Note: This note may have been constructed using a voice recognition system.**

## 2024-06-01 NOTE — PLAN OF CARE
Problem: METABOLIC, FLUID AND ELECTROLYTES - ADULT  Goal: Electrolytes maintained within normal limits  Description: INTERVENTIONS:  - Monitor labs and assess patient for signs and symptoms of electrolyte imbalances  - Administer electrolyte replacement as ordered  - Monitor response to electrolyte replacements, including repeat lab results as appropriate  - Instruct patient on fluid and nutrition as appropriate  Outcome: Progressing  Goal: Fluid balance maintained  Description: INTERVENTIONS:  - Monitor labs   - Monitor I/O and WT  - Instruct patient on fluid and nutrition as appropriate  - Assess for signs & symptoms of volume excess or deficit  Outcome: Progressing  Goal: Glucose maintained within target range  Description: INTERVENTIONS:  - Monitor Blood Glucose as ordered  - Assess for signs and symptoms of hyperglycemia and hypoglycemia  - Administer ordered medications to maintain glucose within target range  - Assess nutritional intake and initiate nutrition service referral as needed  Outcome: Progressing     Problem: GASTROINTESTINAL - ADULT  Goal: Minimal or absence of nausea and/or vomiting  Description: INTERVENTIONS:  - Administer IV fluids if ordered to ensure adequate hydration  - Maintain NPO status until nausea and vomiting are resolved  - Nasogastric tube if ordered  - Administer ordered antiemetic medications as needed  - Provide nonpharmacologic comfort measures as appropriate  - Advance diet as tolerated, if ordered  - Consider nutrition services referral to assist patient with adequate nutrition and appropriate food choices  Outcome: Progressing

## 2024-06-01 NOTE — ASSESSMENT & PLAN NOTE
Previous baseline creatinine around 4-4.6  Creatinine now 5.5 at time of presentation  Has had several hospital admissions secondary to uremic symptoms  Has started outpatient workup for IR catheter placement and initiating dialysis    Will need to start dialysis during this hospitalization  Nephrology on consult  Temp cath placed 6/1 by critical care  Will place order for PermCath on Monday with IR  First session HD on 6/1  Discussed with case management-will need to start planning for outpatient dialysis

## 2024-06-01 NOTE — ASSESSMENT & PLAN NOTE
Previous baseline creatinine around 4-4.6  Creatinine now 5.5 at time of presentation  Has had several hospital admissions secondary to uremic symptoms  Has started outpatient workup for IR catheter placement and initiating dialysis    Will need to start dialysis during this hospitalization  Nephrology on consult  Temp cath placed 6/1 by critical care  Will place order for PermCath on Monday with IR  Attempting to start HD today versus Sunday  Discussed with case management-will need to start planning for outpatient dialysis

## 2024-06-01 NOTE — CONSULTS
CONSULTATION-NEPHROLOGY   Stef Pack Sr. 59 y.o. male MRN: [id number]  Unit/Bed#: -01 Encounter: [id number]        Assessment and Plan:    1.  CKD 5 with transition to end-stage renal disease on hemodialysis  Nephrologist is Dr. Spain last visit 5/30.  Baseline creatinine 0.0-3.5 mg/dL with progressive worsening.  Etiology presumed diabetic nephropathy, previous ATN, HTN nephrosclerosis, neurogenic bladder.  Sent to hospital to start on dialysis due to uremia and recurrent hospitalizations in this regard.  Patient does seem overtly uremic on exam with mild asterixis to right hand.  He is willing to have temporary hemodialysis line placed today and first HD session.  Will check hepatitis panel.  Will plan for next hemodialysis session Monday and PermCath placement early this week.  He will need outpatient dialysis arrangements.  Otherwise his electrolytes and metabolic's are stable.  Volume status is euvolemic.  Will need to check PVR given history of neurogenic bladder.  As mentioned in HPI, discussion held with ICU team/primary team/patient/wife.  Consent placed in chart.  2.  Azotemia with presumed uremia  Have asked ICU team to place temp catheter and plan for 2-hour treatment today at low blood flow rate to prevent dialysis disequilibrium.  3.  Hypertensive nephrosclerosis, likely  Blood pressure is appropriately controlled, no changes made  4.  Type 2 diabetes mellitus with presumed diabetic kidney disease  Consider placing patient on RAAS I once dialysis dependent  5.  History of neurogenic bladder  Will check PVR and follow urinary retention protocol  6.  Elevated magnesium level  Previously treated himself with magnesium based laxatives.  Magnesium level still remains mildly elevated due to decreased renal excretion.  Avoid exogenous magnesium  7.  Anemia  Consider anemia workup while inpatient  8.  Secondary hyperparathyroidism of renal origin  Will check phosphorus tomorrow    HPI:    Stef  Raomne George is a 59 y.o. male with CKD 5 followed by Dr. Spain, HTN, T2DM uncontrolled, obstructive uropathy in setting of neurogenic bladder, proteinuria who presented to ER yesterday per advice of nephrologist for uremic symptoms with hopes to start hemodialysis today.    Upon discussion with the patient he relays HPI as above in addition: Patient denies any trouble with urination.  He does get intermittently agitated, anxious.  He has been having nausea, vomiting, decreased appetite and generally feeling unwell.    I also spoke to his wife Charley and she reports patient declining over the last several days with anorexia and just overall not feeling well.  She states he has been having personality issues which she blames kidney failure on.    The patient had an episode of anger and attempted to leave AGAINST MEDICAL ADVICE this a.m. because water was not brought to him quick enough.  I discussed this with patient in detail and he apologized and stated that he gets very anxious and frustrated.  He is willing to stay in the hospital throughout this weekend into next week to have dialysis initiated and PermCath ultimately placed.    I obtained consent from patient and his wife to proceed with renal replacement therapy.  Consent form is in chart.    I discussed placing temporary hemodialysis catheter with critical care team with reason of uremia.  I feel that the patient is psychologically appropriate to undergo hemodialysis at this point and will oblige by our instructions however will ultimately leave decision to place temporary line to critical care colleagues.    If temp cath is placed, we will plan on 2-hour hemodialysis treatment later today with low blood flow rate and then next treatment on Monday.  PermCath should be placed earlier this week and outpatient arrangements made.    Reason for Consult: CKD 5 now transition to ESRD, uremia    Review of Systems:  A complete 10-point review of systems was performed.  "Aside from what was mentioned in the HPI, it is otherwise negative.      Historical Information   Past Medical History:   Diagnosis Date    Chronic kidney disease     Diabetes mellitus (HCC)     Gout     Hypertension     Renal disorder     Retroperitoneal abscess (HCC)     Stroke (HCC)     UTI (urinary tract infection)     Vertebral osteomyelitis (HCC)      Past Surgical History:   Procedure Laterality Date    BACK SURGERY      ORCHIECTOMY       Social History   Social History     Substance and Sexual Activity   Alcohol Use Not Currently     Social History     Substance and Sexual Activity   Drug Use Yes    Types: Marijuana     Social History     Tobacco Use   Smoking Status Former    Current packs/day: 0.25    Types: Cigarettes   Smokeless Tobacco Never       Family History:   Family History   Problem Relation Age of Onset    Hypertension Father        Medications:  Pertinent medications were reviewed  Current Facility-Administered Medications   Medication Dose Route Frequency Provider Last Rate    albuterol  2 puff Inhalation Q6H PRN Adrianne Horn MD      allopurinol  100 mg Oral Daily Adrianne Horn MD      amLODIPine  5 mg Oral Daily Adrianne Horn MD      DULoxetine  20 mg Oral Daily Adrianne Horn MD      hydrOXYzine HCL  25 mg Oral Q6H PRN Joana Mejias PA-C      insulin lispro  2-12 Units Subcutaneous TID AC Adrianne Horn MD      oxyCODONE  5 mg Oral Q6H PRN Adrianne Horn MD      polyethylene glycol  17 g Oral Daily Adrianne Horn MD      pregabalin  75 mg Oral BID Adrianne Horn MD      senna-docusate sodium  1 tablet Oral BID Adrianne Horn MD      sevelamer  1,600 mg Oral TID With Meals Adrianne Horn MD      tamsulosin  0.4 mg Oral Daily With Dinner Adrianne Horn MD      torsemide  40 mg Oral BID Adrianne Horn MD           Allergies   Allergen Reactions    Bupropion Delirium     \"went nuts,\" prior to 2012  \"went nuts,\" prior to 2012  \"went nuts,\" prior to 2012  \"went nuts,\" prior to 2012      Metformin Other (See " "Comments)     Other reaction(s): kidney disease  Other reaction(s): kidney disease  Other reaction(s): kidney disease      Medical Tape Rash         Vitals:   /65   Pulse 61   Temp 97.5 °F (36.4 °C)   Resp 18   Ht 6' (1.829 m)   Wt 81.1 kg (178 lb 12.7 oz)   SpO2 92%   BMI 24.25 kg/m²   Body mass index is 24.25 kg/m².  SpO2: 92 %,   SpO2 Activity: At Rest,   O2 Device: None (Room air)      Intake/Output Summary (Last 24 hours) at 6/1/2024 0941  Last data filed at 6/1/2024 0839  Gross per 24 hour   Intake 120 ml   Output 1750 ml   Net -1630 ml     Invasive Devices       Peripheral Intravenous Line  Duration             Peripheral IV 05/31/24 Right Antecubital <1 day                    Physical Exam:  General: conscious, cooperative, in no acute distress  Eyes: conjunctivae pink, anicteric sclerae  ENT: lips and mucous membranes moist  Neck: supple, no JVD, no masses  Chest: clear breath sounds bilateral, no crackles, ronchus or wheezings  CVS: distinct S1 & S2, normal rate, regular rhythm  Abdomen: soft, non-tender, non-distended, normoactive bowel sounds  Extremities: no edema of both legs  Skin: no rash  Neuro: awake, alert, oriented      Diagnostic Data:  Lab: I have personally reviewed pertinent lab results.,   CBC:  Results from last 7 days   Lab Units 05/31/24  1513   WBC Thousand/uL 10.41*   HEMOGLOBIN g/dL 10.0*   HEMATOCRIT % 30.9*   PLATELETS Thousands/uL 206      CMP:   Lab Results   Component Value Date    SODIUM 138 05/31/2024    K 4.6 05/31/2024     05/31/2024    CO2 27 05/31/2024    BUN 71 (H) 05/31/2024    CREATININE 5.55 (H) 05/31/2024    CALCIUM 9.5 05/31/2024    AST 11 (L) 05/31/2024    ALT 7 05/31/2024    ALKPHOS 78 05/31/2024    EGFR 10 05/31/2024   ,   PT/INR: No results found for: \"PT\", \"INR\",   Magnesium: No components found for: \"MAG\",  Phosphorous: No results found for: \"PHOS\"    Microbiology:  @LAB[facility],(urinecx:7)@        [employer]UR[employer]IA Pham    Portions of the record may " "have been created with voice recognition software. Occasional wrong word or \"sound a like\" substitutions may have occurred due to the inherent limitations of voice recognition software. Read the chart carefully and recognize, using context, where substitutions have occurred.      "

## 2024-06-02 LAB
ALBUMIN SERPL BCP-MCNC: 3.8 G/DL (ref 3.5–5)
ALP SERPL-CCNC: 85 U/L (ref 34–104)
ALT SERPL W P-5'-P-CCNC: 8 U/L (ref 7–52)
ANION GAP SERPL CALCULATED.3IONS-SCNC: 9 MMOL/L (ref 4–13)
AST SERPL W P-5'-P-CCNC: 11 U/L (ref 13–39)
BILIRUB SERPL-MCNC: 0.38 MG/DL (ref 0.2–1)
BUN SERPL-MCNC: 48 MG/DL (ref 5–25)
CALCIUM SERPL-MCNC: 9.5 MG/DL (ref 8.4–10.2)
CHLORIDE SERPL-SCNC: 103 MMOL/L (ref 96–108)
CO2 SERPL-SCNC: 24 MMOL/L (ref 21–32)
CREAT SERPL-MCNC: 4.37 MG/DL (ref 0.6–1.3)
ERYTHROCYTE [DISTWIDTH] IN BLOOD BY AUTOMATED COUNT: 15.3 % (ref 11.6–15.1)
GFR SERPL CREATININE-BSD FRML MDRD: 13 ML/MIN/1.73SQ M
GLUCOSE SERPL-MCNC: 120 MG/DL (ref 65–140)
GLUCOSE SERPL-MCNC: 134 MG/DL (ref 65–140)
GLUCOSE SERPL-MCNC: 183 MG/DL (ref 65–140)
GLUCOSE SERPL-MCNC: 196 MG/DL (ref 65–140)
GLUCOSE SERPL-MCNC: 209 MG/DL (ref 65–140)
HBV CORE AB SER QL: ABNORMAL
HBV CORE IGM SER QL: ABNORMAL
HBV SURFACE AB SER-ACNC: 3.08 MIU/ML
HBV SURFACE AG SER QL: ABNORMAL
HCT VFR BLD AUTO: 32.1 % (ref 36.5–49.3)
HCV AB SER QL: REACTIVE
HGB BLD-MCNC: 10.4 G/DL (ref 12–17)
MAGNESIUM SERPL-MCNC: 2.6 MG/DL (ref 1.9–2.7)
MCH RBC QN AUTO: 31.3 PG (ref 26.8–34.3)
MCHC RBC AUTO-ENTMCNC: 32.4 G/DL (ref 31.4–37.4)
MCV RBC AUTO: 97 FL (ref 82–98)
MRSA NOSE QL CULT: NORMAL
PHOSPHATE SERPL-MCNC: 5.2 MG/DL (ref 2.7–4.5)
PLATELET # BLD AUTO: 200 THOUSANDS/UL (ref 149–390)
PMV BLD AUTO: 10.6 FL (ref 8.9–12.7)
POTASSIUM SERPL-SCNC: 4.1 MMOL/L (ref 3.5–5.3)
PROT SERPL-MCNC: 6.8 G/DL (ref 6.4–8.4)
RBC # BLD AUTO: 3.32 MILLION/UL (ref 3.88–5.62)
SODIUM SERPL-SCNC: 136 MMOL/L (ref 135–147)
WBC # BLD AUTO: 9.28 THOUSAND/UL (ref 4.31–10.16)

## 2024-06-02 PROCEDURE — 83735 ASSAY OF MAGNESIUM: CPT | Performed by: NURSE PRACTITIONER

## 2024-06-02 PROCEDURE — 99232 SBSQ HOSP IP/OBS MODERATE 35: CPT

## 2024-06-02 PROCEDURE — 85027 COMPLETE CBC AUTOMATED: CPT

## 2024-06-02 PROCEDURE — 84100 ASSAY OF PHOSPHORUS: CPT | Performed by: NURSE PRACTITIONER

## 2024-06-02 PROCEDURE — 82948 REAGENT STRIP/BLOOD GLUCOSE: CPT

## 2024-06-02 PROCEDURE — 99232 SBSQ HOSP IP/OBS MODERATE 35: CPT | Performed by: NURSE PRACTITIONER

## 2024-06-02 PROCEDURE — 80053 COMPREHEN METABOLIC PANEL: CPT | Performed by: NURSE PRACTITIONER

## 2024-06-02 RX ORDER — AMLODIPINE BESYLATE 5 MG/1
5 TABLET ORAL DAILY
Status: DISCONTINUED | OUTPATIENT
Start: 2024-06-03 | End: 2024-06-04 | Stop reason: HOSPADM

## 2024-06-02 RX ADMIN — AMLODIPINE BESYLATE 5 MG: 5 TABLET ORAL at 07:53

## 2024-06-02 RX ADMIN — POLYETHYLENE GLYCOL 3350 17 G: 17 POWDER, FOR SOLUTION ORAL at 07:53

## 2024-06-02 RX ADMIN — PREGABALIN 75 MG: 75 CAPSULE ORAL at 16:46

## 2024-06-02 RX ADMIN — HYDROXYZINE HYDROCHLORIDE 25 MG: 25 TABLET ORAL at 00:39

## 2024-06-02 RX ADMIN — SEVELAMER HYDROCHLORIDE 1600 MG: 800 TABLET, FILM COATED ORAL at 11:55

## 2024-06-02 RX ADMIN — TORSEMIDE 40 MG: 20 TABLET ORAL at 16:46

## 2024-06-02 RX ADMIN — SEVELAMER HYDROCHLORIDE 1600 MG: 800 TABLET, FILM COATED ORAL at 07:52

## 2024-06-02 RX ADMIN — TORSEMIDE 40 MG: 20 TABLET ORAL at 07:53

## 2024-06-02 RX ADMIN — HYDROXYZINE HYDROCHLORIDE 25 MG: 25 TABLET ORAL at 09:44

## 2024-06-02 RX ADMIN — ALLOPURINOL 100 MG: 100 TABLET ORAL at 07:53

## 2024-06-02 RX ADMIN — SENNOSIDES AND DOCUSATE SODIUM 1 TABLET: 8.6; 5 TABLET ORAL at 07:53

## 2024-06-02 RX ADMIN — DULOXETINE HYDROCHLORIDE 20 MG: 20 CAPSULE, DELAYED RELEASE ORAL at 07:53

## 2024-06-02 RX ADMIN — SENNOSIDES AND DOCUSATE SODIUM 1 TABLET: 8.6; 5 TABLET ORAL at 16:46

## 2024-06-02 RX ADMIN — INSULIN LISPRO 2 UNITS: 100 INJECTION, SOLUTION INTRAVENOUS; SUBCUTANEOUS at 11:18

## 2024-06-02 RX ADMIN — TAMSULOSIN HYDROCHLORIDE 0.4 MG: 0.4 CAPSULE ORAL at 16:46

## 2024-06-02 RX ADMIN — SEVELAMER HYDROCHLORIDE 1600 MG: 800 TABLET, FILM COATED ORAL at 16:46

## 2024-06-02 RX ADMIN — OXYCODONE HYDROCHLORIDE 5 MG: 5 TABLET ORAL at 16:45

## 2024-06-02 RX ADMIN — PREGABALIN 75 MG: 75 CAPSULE ORAL at 07:52

## 2024-06-02 RX ADMIN — INSULIN LISPRO 2 UNITS: 100 INJECTION, SOLUTION INTRAVENOUS; SUBCUTANEOUS at 16:48

## 2024-06-02 NOTE — TELEMEDICINE
e-Consult (IPC)  - Interventional Radiology  Stef Pack Sr. 59 y.o. male MRN: [id number]  Unit/Bed#: -01 Encounter: [id number]          Interventional Radiology has been consulted to evaluate Stef Pack Sr.    We were consulted by LORENA concerning this patient with acute on chronic renal failure.    Inpatient Consult to IR  Consult performed by: Maru Vyas MD  Consult ordered by: Joana Mejias PA-C        06/02/24    Assessment/Recommendation:   59-year-old male with stage V chronic kidney disease presenting with acute renal failure.  Patient had a temporary dialysis catheter placed to start dialysis during this hospitalization, but now requires a tunneled dialysis catheter for long-term access.    Plan to perform this procedure tomorrow.  Orders placed.  Please do not give the patient any anticoagulants until after the procedure is performed.    5-10 minutes, >50% of the total time devoted to medical consultative verbal/EMR discussion between providers. Written report will be generated in the EMR.     Thank you for allowing Interventional Radiology to participate in the care of Stef Pack Sr.. Please don't hesitate to call or TigerText us with any questions.     Maru Vyas MD

## 2024-06-02 NOTE — UTILIZATION REVIEW
NOTIFICATION OF INPATIENT ADMISSION   AUTHORIZATION REQUEST   SERVICING FACILITY:   [place] [address]  Tax ID: [id number]  NPI: [id number] ATTENDING PROVIDER:  Attending Name and NPI#: Adrianne Horn Md [[id number]]  Address: [address] [place] [address]  Phone: [phone]   ADMISSION INFORMATION:  Place of Service: Inpatient [facility]  Place of Service Code: 21  Inpatient Admission Date/Time: 5/31/24  4:08 PM  Discharge Date/Time: No discharge date for patient encounter.  Admitting Diagnosis Code/Description:  Vomiting [R11.10]  Nausea and vomiting [R11.2]  CKD (chronic kidney disease) stage V requiring chronic dialysis (HCC) [N18.6, Z99.2]     UTILIZATION REVIEW CONTACT:  Ana Mendoza, Utilization   Network Utilization Review Department  Phone: [phone]  Fax [phone]  Email: [email or web address]  Contact for approvals/pending authorizations, clinical reviews, and discharge.     PHYSICIAN ADVISORY SERVICES:  Medical Necessity Denial & Rsrl-pd-Alsl Review  Phone: [phone]  Fax: [phone]  Email: [email or web address]     DISCHARGE SUPPORT TEAM:  For Patients Discharge Needs & Updates  Phone: [phone] opt. 2 Fax: [phone]  Email: [email or web address]

## 2024-06-02 NOTE — PLAN OF CARE
Problem: PAIN - ADULT  Goal: Verbalizes/displays adequate comfort level or baseline comfort level  Description: Interventions:  - Encourage patient to monitor pain and request assistance  - Assess pain using appropriate pain scale  - Administer analgesics based on type and severity of pain and evaluate response  - Implement non-pharmacological measures as appropriate and evaluate response  - Consider cultural and social influences on pain and pain management  - Notify physician/advanced practitioner if interventions unsuccessful or patient reports new pain  Outcome: Progressing     Problem: DISCHARGE PLANNING  Goal: Discharge to home or other facility with appropriate resources  Description: INTERVENTIONS:  - Identify barriers to discharge w/patient and caregiver  - Arrange for needed discharge resources and transportation as appropriate  - Identify discharge learning needs (meds, wound care, etc.)  - Arrange for interpretive services to assist at discharge as needed  - Refer to Case Management Department for coordinating discharge planning if the patient needs post-hospital services based on physician/advanced practitioner order or complex needs related to functional status, cognitive ability, or social support system  Outcome: Progressing     Problem: Knowledge Deficit  Goal: Patient/family/caregiver demonstrates understanding of disease process, treatment plan, medications, and discharge instructions  Description: Complete learning assessment and assess knowledge base.  Interventions:  - Provide teaching at level of understanding  - Provide teaching via preferred learning methods  Outcome: Progressing     Problem: GASTROINTESTINAL - ADULT  Goal: Minimal or absence of nausea and/or vomiting  Description: INTERVENTIONS:  - Administer IV fluids if ordered to ensure adequate hydration  - Maintain NPO status until nausea and vomiting are resolved  - Nasogastric tube if ordered  - Administer ordered antiemetic  medications as needed  - Provide nonpharmacologic comfort measures as appropriate  - Advance diet as tolerated, if ordered  - Consider nutrition services referral to assist patient with adequate nutrition and appropriate food choices  Outcome: Progressing  Goal: Maintains or returns to baseline bowel function  Description: INTERVENTIONS:  - Assess bowel function  - Encourage oral fluids to ensure adequate hydration  - Administer IV fluids if ordered to ensure adequate hydration  - Administer ordered medications as needed  - Encourage mobilization and activity  - Consider nutritional services referral to assist patient with adequate nutrition and appropriate food choices  Outcome: Progressing  Goal: Maintains adequate nutritional intake  Description: INTERVENTIONS:  - Monitor percentage of each meal consumed  - Identify factors contributing to decreased intake, treat as appropriate  - Assist with meals as needed  - Monitor I&O, weight, and lab values if indicated  - Obtain nutrition services referral as needed  Outcome: Progressing  Goal: Oral mucous membranes remain intact  Description: INTERVENTIONS  - Assess oral mucosa and hygiene practices  - Implement preventative oral hygiene regimen  - Implement oral medicated treatments as ordered  - Initiate Nutrition services referral as needed  Outcome: Progressing

## 2024-06-02 NOTE — PROGRESS NOTES
NEPHROLOGY PROGRESS NOTE   Stef Pack . 59 y.o. male MRN: [id number]  Unit/Bed#: -01 Encounter: [id number]    Assessment/Plan:    58 yo man with CKD 5, HTN nephrosclerosis, T2DM, history of neurogenic bladder who was advised to come to ER by primary nephrologist for new start dialysis.  First hemodialysis session done 6/1 via temporary catheter    1.  CKD 5 with transition to end-stage renal disease on hemodialysis  Nephrologist is Dr. Spain.  Previous baseline creatinine 3.0-3.5 mg/dL with recurrent hospitalizations and uremic symptoms prompting hospitalization.  Transition to ESRD on hemodialysis.  Etiology presumed diabetic nephropathy, hypertensive nephrosclerosis, recurrent ATN KELLY, neurogenic bladder  6/1-temporary hemodialysis catheter placed and received 2-hour hemodialysis session against low BFR run isovolemic.  Hepatitis studies pending  No urgent need for dialysis today.  Will plan for second hemodialysis session tomorrow.  Patient will need temp cath converted to PermCath.  Please note temp cath was not running well.  Will need outpatient dialysis arrangements  2.  Azotemia with presumed uremia  Dialyze against low blood flow rate for 2 hours yesterday to prevent dialysis disequilibrium syndrome.  No symptoms of uremia today  3.  Hypertensive nephrosclerosis, likely  Pressure is appropriate, no changes made at present  4.  Type 2 diabetes mellitus with presumed diabetic kidney disease  Blood sugar management per primary team  5.  History of neurogenic bladder  Nonoliguric.   mL.  Continue Flomax..  [place] check bladder scan  6.  Elevated magnesium level  Resolved, avoid magnesium based laxatives which patient had previously used  7.  Anemia  Goal hemoglobin 10 to 11 g/dL.  Iron sat 9% 4/25 in addition to presumed Depo deficiency.  Consider addition of Venofer with hemodialysis  8.  Secondary hyperparathyroidism of renal origin  Continue Renagel 2 tabs 3 times daily's with meals.   Periodically check phosphorus level.     Summary of plan: Next hemodialysis session tomorrow, 6/3.  Will PermCath and outpatient dialysis arrangements prior to discharge.      ROS  Examined sitting upright in bed.  States he feels very tired but otherwise no physical complaints.  A complete 10 point review of systems have been performed and are otherwise negative.       Historical Information   Past Medical History:   Diagnosis Date    Chronic kidney disease     Diabetes mellitus (HCC)     Gout     Hypertension     Renal disorder     Retroperitoneal abscess (HCC)     Stroke (HCC)     UTI (urinary tract infection)     Vertebral osteomyelitis (HCC)      Past Surgical History:   Procedure Laterality Date    BACK SURGERY      ORCHIECTOMY       Social History   Social History     Substance and Sexual Activity   Alcohol Use Not Currently     Social History     Substance and Sexual Activity   Drug Use Yes    Types: Marijuana     Social History     Tobacco Use   Smoking Status Former    Current packs/day: 0.25    Types: Cigarettes   Smokeless Tobacco Never       Family History:   Family History   Problem Relation Age of Onset    Hypertension Father        Medications:  Pertinent medications were reviewed  Current Facility-Administered Medications   Medication Dose Route Frequency Provider Last Rate    albuterol  2 puff Inhalation Q6H PRN Adrianne Horn MD      allopurinol  100 mg Oral Daily Adrianne Horn MD      amLODIPine  5 mg Oral Daily Adrianne Horn MD      DULoxetine  20 mg Oral Daily Adrianne Horn MD      hydrOXYzine HCL  25 mg Oral Q6H PRN Joana Mejias PA-C      insulin lispro  2-12 Units Subcutaneous TID AC Adrianne Horn MD      oxyCODONE  5 mg Oral Q6H PRN Adrianne Horn MD      polyethylene glycol  17 g Oral Daily Adrianne Horn MD      pregabalin  75 mg Oral BID Adrianne Horn MD      senna-docusate sodium  1 tablet Oral BID Adrianne Horn MD      sevelamer  1,600 mg Oral TID With Meals Adrianne Horn MD      tamsulosin  0.4  "mg Oral Daily With Dinner Adrianne Horn MD      torsemide  40 mg Oral BID Adrianne Horn MD           Allergies   Allergen Reactions    Bupropion Delirium     \"went nuts,\" prior to 2012  \"went nuts,\" prior to 2012  \"went nuts,\" prior to 2012  \"went nuts,\" prior to 2012      Metformin Other (See Comments)     Other reaction(s): kidney disease  Other reaction(s): kidney disease  Other reaction(s): kidney disease      Medical Tape Rash         Vitals:   /64   Pulse 70   Temp (!) 97.3 °F (36.3 °C)   Resp 19   Ht 6' (1.829 m)   Wt 77 kg (169 lb 12.8 oz)   SpO2 97%   BMI 23.03 kg/m²   Body mass index is 23.03 kg/m².  SpO2: 97 %,   SpO2 Activity: At Rest,   O2 Device: None (Room air)      Intake/Output Summary (Last 24 hours) at 6/2/2024 0843  Last data filed at 6/2/2024 0824  Gross per 24 hour   Intake 1094 ml   Output 1457 ml   Net -363 ml     Invasive Devices       Peripheral Intravenous Line  Duration             Peripheral IV 05/31/24 Right Antecubital 1 day              Hemodialysis Catheter  Duration             HD Temporary Double Catheter <1 day                    Physical Exam  General: conscious, cooperative, in no acute distress  Eyes: conjunctivae pink, anicteric sclerae  ENT: lips and mucous membranes moist  Neck: supple, no JVD, no masses  Chest: clear breath sounds bilaterally, no crackles, ronchus or wheezings  CVS: distinct S1 & S2, normal rate, regular rhythm  Abdomen: soft, non-tender, non-distended, normoactive bowel sounds  Extremities: no edema of both legs  Skin: no rash  Neuro: awake, alert, oriented      Diagnostic Data:  Lab: I have personally reviewed pertinent lab results.,   CBC:  Results from last 7 days   Lab Units 06/02/24  0523   WBC Thousand/uL 9.28   HEMOGLOBIN g/dL 10.4*   HEMATOCRIT % 32.1*   PLATELETS Thousands/uL 200      CMP:   Lab Results   Component Value Date    SODIUM 136 06/02/2024    K 4.1 06/02/2024     06/02/2024    CO2 24 06/02/2024    BUN 48 (H) 06/02/2024 " "   CREATININE 4.37 (H) 06/02/2024    CALCIUM 9.5 06/02/2024    AST 11 (L) 06/02/2024    ALT 8 06/02/2024    ALKPHOS 85 06/02/2024    EGFR 13 06/02/2024   ,   PT/INR: No results found for: \"PT\", \"INR\",   Magnesium: No components found for: \"MAG\",  Phosphorous:   Lab Results   Component Value Date    PHOS 5.2 (H) 06/02/2024       Microbiology:  @[facility],(urinecx:7)@        [employer]IA Pham    Portions of the record may have been created with voice recognition software. Occasional wrong word or \"sound a like\" substitutions may have occurred due to the inherent limitations of voice recognition software. Read the chart carefully and recognize, using context, where substitutions have occurred.  "

## 2024-06-02 NOTE — ASSESSMENT & PLAN NOTE
Lab Results   Component Value Date    HGBA1C 7.4 (H) 05/20/2024       Recent Labs     06/01/24  1133 06/01/24  1551 06/01/24 2128 06/02/24  0701   POCGLU 155* 159* 145* 134         Blood Sugar Average: Last 72 hrs:  (P) 135.5  Hold Lantus and placed on insulin sliding scale

## 2024-06-02 NOTE — PROGRESS NOTES
[facility]  Progress Note  Name: Stef George I  MRN: [id number]  Unit/Bed#: -01 I Date of Admission: 5/31/2024   Date of Service: 6/2/2024 I Hospital Day: 2    Assessment & Plan   * Acute renal failure superimposed on stage 5 chronic kidney disease, not on chronic dialysis (HCC)  Assessment & Plan  Previous baseline creatinine around 4-4.6  Creatinine now 5.5 at time of presentation  Has had several hospital admissions secondary to uremic symptoms  Has started outpatient workup for IR catheter placement and initiating dialysis    Will need to start dialysis during this hospitalization  Nephrology on consult  Temp cath placed 6/1 by critical care  Will place order for PermCath on Monday with IR  First session HD on 6/1  Discussed with case management-will need to start planning for outpatient dialysis      Hypermagnesemia  Assessment & Plan  High magnesium noted again hopefully will correct with dialysis    Chronic back pain  Assessment & Plan  Currently stable continue home meds    Diabetes mellitus type 2, insulin dependent (HCC)  Assessment & Plan  Lab Results   Component Value Date    HGBA1C 7.4 (H) 05/20/2024       Recent Labs     06/01/24  1133 06/01/24  1551 06/01/24  2128 06/02/24  0701   POCGLU 155* 159* 145* 134         Blood Sugar Average: Last 72 hrs:  (P) 135.5  Hold Lantus and placed on insulin sliding scale               VTE Pharmacologic Prophylaxis: VTE Score: 1 Low Risk (Score 0-2) - Encourage Ambulation.    Mobility:   Basic Mobility Inpatient Raw Score: 22  JH-HLM Goal: 7: Walk 25 feet or more  JH-HLM Achieved: 7: Walk 25 feet or more  JH-HLM Goal achieved. Continue to encourage appropriate mobility.    Patient Centered Rounds: I performed bedside rounds with nursing staff today.   Discussions with Specialists or Other Care Team Provider: Cm    Education and Discussions with Family / Patient: Patient declined call to .     Total Time Spent on  Date of Encounter in care of patient:  mins. This time was spent on one or more of the following: performing physical exam; counseling and coordination of care; obtaining or reviewing history; documenting in the medical record; reviewing/ordering tests, medications or procedures; communicating with other healthcare professionals and discussing with patient's family/caregivers.    Current Length of Stay: 2 day(s)  Current Patient Status: Inpatient   Certification Statement: The patient will continue to require additional inpatient hospital stay due to New start HD  Discharge Plan: Anticipate discharge in >72 hrs to home.    Code Status: Prior    Subjective:   Seen and examined.  Had a bad night per patient, difficulty sleeping and had nightmares.  Responded to [facility].     Objective:     Vitals:   Temp (24hrs), Av.3 °F (36.3 °C), Min:97.2 °F (36.2 °C), Max:97.3 °F (36.3 °C)    Temp:  [97.2 °F (36.2 °C)-97.3 °F (36.3 °C)] 97.3 °F (36.3 °C)  HR:  [63-74] 70  Resp:  [18-19] 19  BP: (132-175)/(64-99) 132/64  SpO2:  [93 %-97 %] 97 %  Body mass index is 23.03 kg/m².     Input and Output Summary (last 24 hours):     Intake/Output Summary (Last 24 hours) at 2024 0935  Last data filed at 2024 0824  Gross per 24 hour   Intake 1094 ml   Output 1457 ml   Net -363 ml       Physical Exam:   Physical Exam  Vitals and nursing note reviewed.   Constitutional:       General: He is not in acute distress.     Appearance: Normal appearance. He is ill-appearing.   HENT:      Head: Normocephalic and atraumatic.      Nose: No congestion.      Mouth/Throat:      Mouth: Mucous membranes are moist.   Eyes:      Conjunctiva/sclera: Conjunctivae normal.   Cardiovascular:      Rate and Rhythm: Normal rate and regular rhythm.      Pulses: Normal pulses.      Heart sounds: Normal heart sounds. No murmur heard.     Comments: Right IJ Temp Cath   Pulmonary:      Effort: Pulmonary effort is normal. No respiratory distress.      Breath  sounds: Normal breath sounds.   Abdominal:      General: Bowel sounds are normal.      Palpations: Abdomen is soft.      Tenderness: There is no abdominal tenderness.   Musculoskeletal:         General: Normal range of motion.      Right lower leg: No edema.      Left lower leg: No edema.   Skin:     General: Skin is warm and dry.   Neurological:      Mental Status: He is alert and oriented to person, place, and time.          Additional Data:     Labs:  Results from last 7 days   Lab Units 06/02/24  0523 05/31/24  1513   WBC Thousand/uL 9.28 10.41*   HEMOGLOBIN g/dL 10.4* 10.0*   HEMATOCRIT % 32.1* 30.9*   PLATELETS Thousands/uL 200 206   SEGS PCT %  --  72   LYMPHO PCT %  --  14   MONO PCT %  --  9   EOS PCT %  --  4     Results from last 7 days   Lab Units 06/02/24  0523   SODIUM mmol/L 136   POTASSIUM mmol/L 4.1   CHLORIDE mmol/L 103   CO2 mmol/L 24   BUN mg/dL 48*   CREATININE mg/dL 4.37*   ANION GAP mmol/L 9   CALCIUM mg/dL 9.5   ALBUMIN g/dL 3.8   TOTAL BILIRUBIN mg/dL 0.38   ALK PHOS U/L 85   ALT U/L 8   AST U/L 11*   GLUCOSE RANDOM mg/dL 120         Results from last 7 days   Lab Units 06/02/24  0701 06/01/24  2128 06/01/24  1551 06/01/24  1133 06/01/24  0706 05/31/24  2057   POC GLUCOSE mg/dl 134 145* 159* 155* 108 112               Lines/Drains:  Invasive Devices       Peripheral Intravenous Line  Duration             Peripheral IV 05/31/24 Right Antecubital 1 day              Hemodialysis Catheter  Duration             HD Temporary Double Catheter <1 day                          Imaging: No pertinent imaging reviewed.    Recent Cultures (last 7 days):         Last 24 Hours Medication List:   Current Facility-Administered Medications   Medication Dose Route Frequency Provider Last Rate    albuterol  2 puff Inhalation Q6H PRN Adrianne Horn MD      allopurinol  100 mg Oral Daily Adrianne Horn MD      [START ON 6/3/2024] amLODIPine  5 mg Oral Daily YURIDIA Pham      DULoxetine  20 mg Oral Daily Adrianne  MD Kory      hydrOXYzine HCL  25 mg Oral Q6H PRN Joana Mejias PA-C      insulin lispro  2-12 Units Subcutaneous TID AC Adrianne Horn MD      oxyCODONE  5 mg Oral Q6H PRN Adrianne Horn MD      polyethylene glycol  17 g Oral Daily Adrianne Horn MD      pregabalin  75 mg Oral BID Adrianne Horn MD      senna-docusate sodium  1 tablet Oral BID Adrianne Horn MD      sevelamer  1,600 mg Oral TID With Meals Adrianne Horn MD      tamsulosin  0.4 mg Oral Daily With Dinner Adrianne Horn MD      torsemide  40 mg Oral BID Adrianne Horn MD          Today, Patient Was Seen By: Joana Mejias PA-C    **Please Note: This note may have been constructed using a voice recognition system.**

## 2024-06-03 ENCOUNTER — APPOINTMENT (INPATIENT)
Dept: DIALYSIS | Facility: HOSPITAL | Age: 59
DRG: 674 | End: 2024-06-03
Payer: COMMERCIAL

## 2024-06-03 ENCOUNTER — APPOINTMENT (INPATIENT)
Dept: INTERVENTIONAL RADIOLOGY/VASCULAR | Facility: HOSPITAL | Age: 59
DRG: 674 | End: 2024-06-03
Attending: RADIOLOGY
Payer: COMMERCIAL

## 2024-06-03 LAB
ANION GAP SERPL CALCULATED.3IONS-SCNC: 11 MMOL/L (ref 4–13)
BUN SERPL-MCNC: 57 MG/DL (ref 5–25)
CALCIUM SERPL-MCNC: 9.9 MG/DL (ref 8.4–10.2)
CHLORIDE SERPL-SCNC: 103 MMOL/L (ref 96–108)
CO2 SERPL-SCNC: 23 MMOL/L (ref 21–32)
CREAT SERPL-MCNC: 5.1 MG/DL (ref 0.6–1.3)
ERYTHROCYTE [DISTWIDTH] IN BLOOD BY AUTOMATED COUNT: 15.1 % (ref 11.6–15.1)
GFR SERPL CREATININE-BSD FRML MDRD: 11 ML/MIN/1.73SQ M
GLUCOSE SERPL-MCNC: 115 MG/DL (ref 65–140)
GLUCOSE SERPL-MCNC: 134 MG/DL (ref 65–140)
GLUCOSE SERPL-MCNC: 149 MG/DL (ref 65–140)
GLUCOSE SERPL-MCNC: 160 MG/DL (ref 65–140)
GLUCOSE SERPL-MCNC: 200 MG/DL (ref 65–140)
HCT VFR BLD AUTO: 32.1 % (ref 36.5–49.3)
HGB BLD-MCNC: 10.2 G/DL (ref 12–17)
MAGNESIUM SERPL-MCNC: 2.8 MG/DL (ref 1.9–2.7)
MCH RBC QN AUTO: 30.8 PG (ref 26.8–34.3)
MCHC RBC AUTO-ENTMCNC: 31.8 G/DL (ref 31.4–37.4)
MCV RBC AUTO: 97 FL (ref 82–98)
PLATELET # BLD AUTO: 192 THOUSANDS/UL (ref 149–390)
PMV BLD AUTO: 10.2 FL (ref 8.9–12.7)
POTASSIUM SERPL-SCNC: 4.2 MMOL/L (ref 3.5–5.3)
RBC # BLD AUTO: 3.31 MILLION/UL (ref 3.88–5.62)
SODIUM SERPL-SCNC: 137 MMOL/L (ref 135–147)
WBC # BLD AUTO: 10.1 THOUSAND/UL (ref 4.31–10.16)

## 2024-06-03 PROCEDURE — C1894 INTRO/SHEATH, NON-LASER: HCPCS

## 2024-06-03 PROCEDURE — 77001 FLUOROGUIDE FOR VEIN DEVICE: CPT

## 2024-06-03 PROCEDURE — 36558 INSERT TUNNELED CV CATH: CPT

## 2024-06-03 PROCEDURE — 99152 MOD SED SAME PHYS/QHP 5/>YRS: CPT

## 2024-06-03 PROCEDURE — 80048 BASIC METABOLIC PNL TOTAL CA: CPT

## 2024-06-03 PROCEDURE — 99232 SBSQ HOSP IP/OBS MODERATE 35: CPT | Performed by: INTERNAL MEDICINE

## 2024-06-03 PROCEDURE — 85027 COMPLETE CBC AUTOMATED: CPT

## 2024-06-03 PROCEDURE — 83735 ASSAY OF MAGNESIUM: CPT

## 2024-06-03 PROCEDURE — C1750 CATH, HEMODIALYSIS,LONG-TERM: HCPCS

## 2024-06-03 PROCEDURE — 76937 US GUIDE VASCULAR ACCESS: CPT

## 2024-06-03 PROCEDURE — 82948 REAGENT STRIP/BLOOD GLUCOSE: CPT

## 2024-06-03 PROCEDURE — 99153 MOD SED SAME PHYS/QHP EA: CPT

## 2024-06-03 PROCEDURE — 99232 SBSQ HOSP IP/OBS MODERATE 35: CPT

## 2024-06-03 RX ORDER — LIDOCAINE WITH 8.4% SOD BICARB 0.9%(10ML)
SYRINGE (ML) INJECTION AS NEEDED
Status: COMPLETED | OUTPATIENT
Start: 2024-06-03 | End: 2024-06-03

## 2024-06-03 RX ORDER — FENTANYL CITRATE 50 UG/ML
INJECTION, SOLUTION INTRAMUSCULAR; INTRAVENOUS AS NEEDED
Status: COMPLETED | OUTPATIENT
Start: 2024-06-03 | End: 2024-06-03

## 2024-06-03 RX ORDER — MIDAZOLAM HYDROCHLORIDE 2 MG/2ML
INJECTION, SOLUTION INTRAMUSCULAR; INTRAVENOUS AS NEEDED
Status: COMPLETED | OUTPATIENT
Start: 2024-06-03 | End: 2024-06-03

## 2024-06-03 RX ADMIN — SENNOSIDES AND DOCUSATE SODIUM 1 TABLET: 8.6; 5 TABLET ORAL at 07:54

## 2024-06-03 RX ADMIN — HYDROXYZINE HYDROCHLORIDE 25 MG: 25 TABLET ORAL at 04:40

## 2024-06-03 RX ADMIN — SEVELAMER HYDROCHLORIDE 1600 MG: 800 TABLET, FILM COATED ORAL at 07:53

## 2024-06-03 RX ADMIN — TORSEMIDE 40 MG: 20 TABLET ORAL at 17:09

## 2024-06-03 RX ADMIN — MIDAZOLAM HYDROCHLORIDE 1 MG: 1 INJECTION, SOLUTION INTRAMUSCULAR; INTRAVENOUS at 11:00

## 2024-06-03 RX ADMIN — SENNOSIDES AND DOCUSATE SODIUM 1 TABLET: 8.6; 5 TABLET ORAL at 17:09

## 2024-06-03 RX ADMIN — Medication 10 ML: at 11:05

## 2024-06-03 RX ADMIN — INSULIN LISPRO 4 UNITS: 100 INJECTION, SOLUTION INTRAVENOUS; SUBCUTANEOUS at 17:11

## 2024-06-03 RX ADMIN — SEVELAMER HYDROCHLORIDE 1600 MG: 800 TABLET, FILM COATED ORAL at 17:08

## 2024-06-03 RX ADMIN — HYDROXYZINE HYDROCHLORIDE 25 MG: 25 TABLET ORAL at 21:52

## 2024-06-03 RX ADMIN — OXYCODONE HYDROCHLORIDE 5 MG: 5 TABLET ORAL at 12:03

## 2024-06-03 RX ADMIN — PREGABALIN 75 MG: 75 CAPSULE ORAL at 07:53

## 2024-06-03 RX ADMIN — AMLODIPINE BESYLATE 5 MG: 5 TABLET ORAL at 07:53

## 2024-06-03 RX ADMIN — DULOXETINE HYDROCHLORIDE 20 MG: 20 CAPSULE, DELAYED RELEASE ORAL at 07:53

## 2024-06-03 RX ADMIN — TAMSULOSIN HYDROCHLORIDE 0.4 MG: 0.4 CAPSULE ORAL at 17:09

## 2024-06-03 RX ADMIN — POLYETHYLENE GLYCOL 3350 17 G: 17 POWDER, FOR SOLUTION ORAL at 07:52

## 2024-06-03 RX ADMIN — FENTANYL CITRATE 50 MCG: 50 INJECTION, SOLUTION INTRAMUSCULAR; INTRAVENOUS at 11:00

## 2024-06-03 RX ADMIN — ALLOPURINOL 100 MG: 100 TABLET ORAL at 07:54

## 2024-06-03 RX ADMIN — TORSEMIDE 40 MG: 20 TABLET ORAL at 07:53

## 2024-06-03 RX ADMIN — INSULIN LISPRO 2 UNITS: 100 INJECTION, SOLUTION INTRAVENOUS; SUBCUTANEOUS at 07:57

## 2024-06-03 RX ADMIN — OXYCODONE HYDROCHLORIDE 5 MG: 5 TABLET ORAL at 21:52

## 2024-06-03 RX ADMIN — PREGABALIN 75 MG: 75 CAPSULE ORAL at 17:09

## 2024-06-03 RX ADMIN — SEVELAMER HYDROCHLORIDE 1600 MG: 800 TABLET, FILM COATED ORAL at 11:59

## 2024-06-03 NOTE — CASE MANAGEMENT
Case Management Discharge Planning Note    Patient name Stef Pack Sr.  Location /-01 MRN [id number]  : 1965 Date 6/3/2024       Current Admission Date: 2024  Current Admission Diagnosis:Acute renal failure superimposed on stage 5 chronic kidney disease, not on chronic dialysis (HCC)   Patient Active Problem List    Diagnosis Date Noted Date Diagnosed    Hypermagnesemia 2024     Neuropathic pain 2024     Right knee pain 2023     Diabetic foot ulcer (HCC) 2023     Acute metabolic encephalopathy 2023     PAD (peripheral artery disease) (HCC) 2022     Chronic back pain 2022     Moderate protein-calorie malnutrition (HCC) 2022     Chronic anemia 2022     RSV infection 2022     Chronic ulcer of left heel (HCC) 10/20/2022     Hypercalcemia 2022     Low back pain 2022     Ambulatory dysfunction 2022     Chronic kidney disease 2022     Retention of urine 2022     Severe protein-calorie malnutrition (HCC) 2022     Iron deficiency anemia secondary to inadequate dietary iron intake 2022     Hip pain, acute, left 2022     Acute renal failure superimposed on stage 5 chronic kidney disease, not on chronic dialysis (HCC) 2022     Hyperkalemia 2022     Diabetes mellitus type 2, insulin dependent (HCC)      Gout      History of CVA (cerebrovascular accident)      Osteomyelitis of vertebra of lumbar region (HCC)        LOS (days): 3  Geometric Mean LOS (GMLOS) (days): 4.4  Days to GMLOS:1.6     OBJECTIVE:  Risk of Unplanned Readmission Score: 25.69         Current admission status: Inpatient   [facility]erred Pharmacy:   [id number] [address]  [address]  [facility] [address]  Phone: [phone] Fax: [phone]    Primary Care Provider: Rehan Mancia DO    Primary Insurance: [facility] RE[facility]  Secondary Insurance:     DISCHARGE  DETAILS:        CM received call from Barbara ([facility] - [phone]) to confirm patient is in the hospital, diagnosis and that CM set up referral for KELSY in [facility]. CM provided them this information, informed them of new HD at discharge and let them know CM following case submitted referral in [facility].     CM to follow patient's care and discharge needs.

## 2024-06-03 NOTE — CASE MANAGEMENT
Case Management Discharge Planning Note    Patient name Stef Pack Sr.  Location /-01 MRN [id number]  : 1965 Date 6/3/2024       Current Admission Date: 2024  Current Admission Diagnosis:Acute renal failure superimposed on stage 5 chronic kidney disease, not on chronic dialysis (HCC)   Patient Active Problem List    Diagnosis Date Noted Date Diagnosed    Hypermagnesemia 2024     Neuropathic pain 2024     Right knee pain 2023     Diabetic foot ulcer (HCC) 2023     Acute metabolic encephalopathy 2023     PAD (peripheral artery disease) (HCC) 2022     Chronic back pain 2022     Moderate protein-calorie malnutrition (HCC) 2022     Chronic anemia 2022     RSV infection 2022     Chronic ulcer of left heel (HCC) 10/20/2022     Hypercalcemia 2022     Low back pain 2022     Ambulatory dysfunction 2022     Chronic kidney disease 2022     Retention of urine 2022     Severe protein-calorie malnutrition (HCC) 2022     Iron deficiency anemia secondary to inadequate dietary iron intake 2022     Hip pain, acute, left 2022     Acute renal failure superimposed on stage 5 chronic kidney disease, not on chronic dialysis (HCC) 2022     Hyperkalemia 2022     Diabetes mellitus type 2, insulin dependent (HCC)      Gout      History of CVA (cerebrovascular accident)      Osteomyelitis of vertebra of lumbar region (HCC)        LOS (days): 3  Geometric Mean LOS (GMLOS) (days): 4.4  Days to GMLOS:1.4     OBJECTIVE:  Risk of Unplanned Readmission Score: 26.47         Current admission status: Inpatient   [facility] Pharmacy:   [id number] [address]  [address]  [facility] [address]  Phone: [phone] Fax: [phone]    Primary Care Provider: Rehan Mancia DO    Primary Insurance: [facility] RE[facility]  Secondary Insurance:     DISCHARGE  DETAILS:     [facility] has accepted back upon stability.

## 2024-06-03 NOTE — BRIEF OP NOTE (RAD/CATH)
INTERVENTIONAL RADIOLOGY PROCEDURE NOTE    Date: 6/3/2024    Procedure: Permacath placement  Procedure Summary       Date:  Room / Location:     Anesthesia Start:  Anesthesia Stop:     Procedure:  Diagnosis:     Scheduled Providers:  Responsible Provider:     Anesthesia Type: Not recorded ASA Status: Not recorded            Preoperative diagnosis:   1. Nausea and vomiting    2. CKD (chronic kidney disease) stage V requiring chronic dialysis (HCC)    3. KELLY (acute kidney injury) (HCC)    4. ESRD (end stage renal disease) ([facility])    5. ESRD on hemodialysis (HCC)         Postoperative diagnosis: Same.    Surgeon: Joni Addison MD     Assistant: None. No qualified resident was available.    Blood loss: Minimal    Specimens: None     Findings: Existing right neck catheter accessed high and was removed. New 19 cm tip to Permacath placed via right internal jugular vain. OK to use.    Complications: None immediate.    Anesthesia: conscious sedation

## 2024-06-03 NOTE — ASSESSMENT & PLAN NOTE
Previous baseline creatinine around 4-4.6  Creatinine now 5.5 at time of presentation  Has had several hospital admissions secondary to uremic symptoms  Has started outpatient workup for IR catheter placement and initiating dialysis    Will need to start dialysis during this hospitalization  Nephrology on consult  Temp cath placed 6/1 by critical care  Perm Cath today with IR - currently NPO   First session HD on 6/1 - Second session planned for today 6/3  Discussed with case management-will need to start planning for outpatient dialysis

## 2024-06-03 NOTE — PROGRESS NOTES
[facility]  [facility]  Name: Stef George I  MRN: [id number]  Unit/Bed#: -01 I Date of Admission: 5/31/2024   Date of Service: 6/3/2024 I Hospital Day: 3    Assessment & Plan   * Acute renal failure superimposed on stage 5 chronic kidney disease, not on chronic dialysis (HCC)  Assessment & Plan  Previous baseline creatinine around 4-4.6  Creatinine now 5.5 at time of presentation  Has had several hospital admissions secondary to uremic symptoms  Has started outpatient workup for IR catheter placement and initiating dialysis    Will need to start dialysis during this hospitalization  Nephrology on consult  Temp cath placed 6/1 by critical care  Perm Cath today with IR - currently NPO   First session HD on 6/1 - Second session planned for today 6/3  Discussed with case management-will need to start planning for outpatient dialysis      Hypermagnesemia  Assessment & Plan  High magnesium noted again hopefully will correct with dialysis    Chronic back pain  Assessment & Plan  Currently stable continue home meds    Diabetes mellitus type 2, insulin dependent (HCC)  Assessment & Plan  Lab Results   Component Value Date    HGBA1C 7.4 (H) 05/20/2024       Recent Labs     06/02/24  1104 06/02/24  1600 06/02/24  2051 06/03/24  0718   POCGLU 196* 183* 209* 160*         Blood Sugar Average: Last 72 hrs:  (P) 156.1  Hold Lantus and placed on insulin sliding scale                 VTE Pharmacologic Prophylaxis: VTE Score: 1 Low Risk (Score 0-2) - Encourage Ambulation.    Mobility:   Basic Mobility Inpatient Raw Score: 22  JH-HLM Goal: 7: Walk 25 feet or more  JH-HLM Achieved: 8: Walk 250 feet ot more  JH-HLM Goal achieved. Continue to encourage appropriate mobility.    Patient Centered Rounds: I performed bedside rounds with nursing staff today.   Discussions with Specialists or Other Care Team Provider: CM, Neph, IR    Education and Discussions with Family / Patient: Patient declined  "call to .     Total Time Spent on Date of Encounter in care of patient:  mins. This time was spent on one or more of the following: performing physical exam; counseling and coordination of care; obtaining or reviewing history; documenting in the medical record; reviewing/ordering tests, medications or procedures; communicating with other healthcare professionals and discussing with patient's family/caregivers.    Current Length of Stay: 3 day(s)  Current Patient Status: Inpatient   Certification Statement: The patient will continue to require additional inpatient hospital stay due to initiation of HD  Discharge Plan: Anticipate discharge in 48-72 hrs to home.    Code Status: Prior    Subjective:   Seen and examined. States \"I am irate\". He is NPO for procedure around 1 this afternoon.   Threatened to leave AMA, agreeable to stay for now. Denies pain.     Objective:     Vitals:   Temp (24hrs), Av.8 °F (36.6 °C), Min:97.7 °F (36.5 °C), Max:97.9 °F (36.6 °C)    Temp:  [97.7 °F (36.5 °C)-97.9 °F (36.6 °C)] 97.7 °F (36.5 °C)  HR:  [74-85] 80  Resp:  [20] 20  BP: (133-156)/(67-86) 142/86  SpO2:  [85 %-98 %] 85 %  Body mass index is 22.3 kg/m².     Input and Output Summary (last 24 hours):     Intake/Output Summary (Last 24 hours) at 6/3/2024 0836  Last data filed at 6/3/2024 0639  Gross per 24 hour   Intake 780 ml   Output 1200 ml   Net -420 ml       Physical Exam:   Physical Exam  Vitals and nursing note reviewed.   Constitutional:       General: He is not in acute distress.     Appearance: Normal appearance. He is ill-appearing.   HENT:      Head: Normocephalic and atraumatic.      Nose: No congestion.      Mouth/Throat:      Mouth: Mucous membranes are moist.   Eyes:      Conjunctiva/sclera: Conjunctivae normal.   Cardiovascular:      Rate and Rhythm: Normal rate and regular rhythm.      Pulses: Normal pulses.      Heart sounds: Normal heart sounds. No murmur heard.     Comments: Right IJ  Pulmonary: "      Effort: Pulmonary effort is normal. No respiratory distress.      Breath sounds: Normal breath sounds.   Abdominal:      General: Bowel sounds are normal.      Palpations: Abdomen is soft.      Tenderness: There is no abdominal tenderness.   Musculoskeletal:         General: Normal range of motion.      Right lower leg: No edema.      Left lower leg: No edema.   Skin:     General: Skin is warm and dry.   Neurological:      Mental Status: He is alert and oriented to person, place, and time.   Psychiatric:         Behavior: Behavior is agitated.          Additional Data:     Labs:  Results from last 7 days   Lab Units 06/03/24  0437 06/02/24  0523 05/31/24  1513   WBC Thousand/uL 10.10   < > 10.41*   HEMOGLOBIN g/dL 10.2*   < > 10.0*   HEMATOCRIT % 32.1*   < > 30.9*   PLATELETS Thousands/uL 192   < > 206   SEGS PCT %  --   --  72   LYMPHO PCT %  --   --  14   MONO PCT %  --   --  9   EOS PCT %  --   --  4    < > = values in this interval not displayed.     Results from last 7 days   Lab Units 06/03/24 0437 06/02/24  0523   SODIUM mmol/L 137 136   POTASSIUM mmol/L 4.2 4.1   CHLORIDE mmol/L 103 103   CO2 mmol/L 23 24   BUN mg/dL 57* 48*   CREATININE mg/dL 5.10* 4.37*   ANION GAP mmol/L 11 9   CALCIUM mg/dL 9.9 9.5   ALBUMIN g/dL  --  3.8   TOTAL BILIRUBIN mg/dL  --  0.38   ALK PHOS U/L  --  85   ALT U/L  --  8   AST U/L  --  11*   GLUCOSE RANDOM mg/dL 134 120         Results from last 7 days   Lab Units 06/03/24  0718 06/02/24 2051 06/02/24  1600 06/02/24  1104 06/02/24  0701 06/01/24  2128 06/01/24  1551 06/01/24  1133 06/01/24  0706 05/31/24 2057   POC GLUCOSE mg/dl 160* 209* 183* 196* 134 145* 159* 155* 108 112               Lines/Drains:  Invasive Devices       Peripheral Intravenous Line  Duration             Peripheral IV 05/31/24 Right Antecubital 2 days              Hemodialysis Catheter  Duration             HD Temporary Double Catheter 1 day                          Imaging: No pertinent imaging  reviewed.    Recent Cultures (last 7 days):         Last 24 Hours Medication List:   Current Facility-Administered Medications   Medication Dose Route Frequency Provider Last Rate    albuterol  2 puff Inhalation Q6H PRN Adrianne Horn MD      allopurinol  100 mg Oral Daily Adrianne Horn MD      amLODIPine  5 mg Oral Daily YURIDIA Pham      DULoxetine  20 mg Oral Daily Adrianne Horn MD      hydrOXYzine HCL  25 mg Oral Q6H PRN Joana Mejias PA-C      insulin lispro  2-12 Units Subcutaneous TID AC Adrianne Horn MD      oxyCODONE  5 mg Oral Q6H PRN Adrianne Horn MD      polyethylene glycol  17 g Oral Daily Adrianne Horn MD      pregabalin  75 mg Oral BID Adrianne Horn MD      senna-docusate sodium  1 tablet Oral BID Adrianne Horn MD      sevelamer  1,600 mg Oral TID With Meals Adrianne Horn MD      tamsulosin  0.4 mg Oral Daily With Dinner Adrianne Horn MD      torsemide  40 mg Oral BID Adrianne Horn MD          Today, Patient Was Seen By: Joana Mejias PA-C    **Please Note: This note may have been constructed using a voice recognition system.**

## 2024-06-03 NOTE — ASSESSMENT & PLAN NOTE
Lab Results   Component Value Date    HGBA1C 7.4 (H) 05/20/2024       Recent Labs     06/02/24  1104 06/02/24  1600 06/02/24 2051 06/03/24  0718   POCGLU 196* 183* 209* 160*         Blood Sugar Average: Last 72 hrs:  (P) 156.1  Hold Lantus and placed on insulin sliding scale

## 2024-06-03 NOTE — PLAN OF CARE
Problem: Knowledge Deficit  Goal: Patient/family/caregiver demonstrates understanding of disease process, treatment plan, medications, and discharge instructions  Description: Complete learning assessment and assess knowledge base.  Interventions:  - Provide teaching at level of understanding  - Provide teaching via preferred learning methods  Outcome: Progressing     Problem: GASTROINTESTINAL - ADULT  Goal: Minimal or absence of nausea and/or vomiting  Description: INTERVENTIONS:  - Administer IV fluids if ordered to ensure adequate hydration  - Maintain NPO status until nausea and vomiting are resolved  - Nasogastric tube if ordered  - Administer ordered antiemetic medications as needed  - Provide nonpharmacologic comfort measures as appropriate  - Advance diet as tolerated, if ordered  - Consider nutrition services referral to assist patient with adequate nutrition and appropriate food choices  Outcome: Progressing     Problem: METABOLIC, FLUID AND ELECTROLYTES - ADULT  Goal: Electrolytes maintained within normal limits  Description: INTERVENTIONS:  - Monitor labs and assess patient for signs and symptoms of electrolyte imbalances  - Administer electrolyte replacement as ordered  - Monitor response to electrolyte replacements, including repeat lab results as appropriate  - Instruct patient on fluid and nutrition as appropriate  Outcome: Progressing  Goal: Glucose maintained within target range  Description: INTERVENTIONS:  - Monitor Blood Glucose as ordered  - Assess for signs and symptoms of hyperglycemia and hypoglycemia  - Administer ordered medications to maintain glucose within target range  - Assess nutritional intake and initiate nutrition service referral as needed  Outcome: Progressing

## 2024-06-03 NOTE — CASE MANAGEMENT
Case Management Assessment & Discharge Planning Note    Patient name Stef Pack Sr.  Location /-01 MRN [id number]  : 1965 Date 6/3/2024       Current Admission Date: 2024  Current Admission Diagnosis:Acute renal failure superimposed on stage 5 chronic kidney disease, not on chronic dialysis (HCC)   Patient Active Problem List    Diagnosis Date Noted Date Diagnosed    Hypermagnesemia 2024     Neuropathic pain 2024     Right knee pain 2023     Diabetic foot ulcer (HCC) 2023     Acute metabolic encephalopathy 2023     PAD (peripheral artery disease) (HCC) 2022     Chronic back pain 2022     Moderate protein-calorie malnutrition (HCC) 2022     Chronic anemia 2022     RSV infection 2022     Chronic ulcer of left heel (HCC) 10/20/2022     Hypercalcemia 2022     Low back pain 2022     Ambulatory dysfunction 2022     Chronic kidney disease 2022     Retention of urine 2022     Severe protein-calorie malnutrition (HCC) 2022     Iron deficiency anemia secondary to inadequate dietary iron intake 2022     Hip pain, acute, left 2022     Acute renal failure superimposed on stage 5 chronic kidney disease, not on chronic dialysis (HCC) 2022     Hyperkalemia 2022     Diabetes mellitus type 2, insulin dependent (HCC)      Gout      History of CVA (cerebrovascular accident)      Osteomyelitis of vertebra of lumbar region (HCC)        LOS (days): 3  Geometric Mean LOS (GMLOS) (days):   Days to GMLOS:     OBJECTIVE:  PATIENT READMITTED TO HOSPITAL  Risk of Unplanned Readmission Score: 25.69         Current admission status: Inpatient  Referral Reason: Other (New HD)    Preferred Pharmacy:   [id number] [place] - [address]  [address] [place] [address]  Phone: [phone] Fax: [phone]    Primary Care Provider: Rehan Mancia  DO    Primary Insurance: [employer] [facility]  Secondary Insurance:     CM met with patient at the bedside,baseline information  was obtained. CM discussed the role of CM in helping the patient develop a discharge plan and assist the patient in carry out their plan.    Pt was on the phone with his wife this AM, choice is [facility] for his HD needs.    ASSESSMENT:  Active Health Care Proxies       Charley Pack Health Care Representative - Spouse   Primary Phone: [phone] (Mobile)                 Advance Directives  Does patient have a Health Care POA?: No  Was patient offered paperwork?: No  Does patient currently have a Health Care decision maker?: Yes, please see Health Care Proxy section  Does patient have Advance Directives?: No  Was patient offered paperwork?: No  Primary Contact: RamoneCharley (Spouse)  [phone]         Readmission Root Cause  30 Day Readmission: Yes  Who directed you to return to the hospital?: Self  Did you understand whom to contact if you had questions or problems?: Yes  Did you get your prescriptions before you left the hospital?: No  Reason:: Not preferred pharmacy  Were you able to get your prescriptions filled when you left the hospital?: Yes  Did you take your medications as prescribed?: Yes  Were you able to get to your follow-up appointments?: Yes  During previous admission, was a post-acute recommendation made?: Yes  What post-acute resources were offered?: [facility]  Patient was readmitted due to: N/V with worsening Creat was to get OP  PC this week  Action Plan: had 1st HD on 6/1, needs PC    Patient Information  Admitted from:: Home  Mental Status: Alert  During Assessment patient was accompanied by: Not accompanied during assessment  Support Systems: Spouse/significant other  What city do you live in?: [place]  Type of Current Residence: Other (Comment) (4 story)  Living Arrangements: Lives w/ Spouse/significant other  Is patient a ?: Yes  Is patient active with [facility]  ( Affairs)?: No    Activities of Daily Living Prior to Admission  Functional Status: Independent  Completes ADLs independently?: Yes  Ambulates independently?: Yes  Does patient use assisted devices?: Yes  Assisted Devices (DME) used: Other (Comment), Stair Chair/Glide (hurricane walker, precious)  Does patient currently own DME?: Yes  What DME does the patient currently own?: Stair Chair/[place], Other (Comment), Walker, Wheelchair, Bedside Commode (hurricane cane, precious)  Does patient have a history of Outpatient Therapy (PT/OT)?: No  Does the patient have a history of Short-Term Rehab?: Yes ([facility])  Does patient have a history of HHC?: Yes ([facility] and [facility])  Does patient currently have HHC?: Yes    Current Home Health Care  Type of Current Home Care Services: Home PT, Nurse visit  Current Home Health Agency:: Other (please enter name in comment) ([facility])    Patient Information Continued  Income Source: SSI/SSD  Does patient have prescription coverage?: Yes  Does patient receive dialysis treatments?: No  Does patient have a history of substance abuse?: No  Does patient have a history of Mental Health Diagnosis?: No         Means of Transportation  Means of Transport to Appts:: Drives Self      Social Determinants of Health (SDOH)      Flowsheet Row Most Recent Value   Housing Stability    In the last 12 months, was there a time when you were not able to pay the mortgage or rent on time? N   In the past 12 months, how many times have you moved where you were living? 1   At any time in the past 12 months, were you homeless or living in a shelter (including now)? N   Transportation Needs    In the past 12 months, has lack of transportation kept you from medical appointments or from getting medications? no   In the past 12 months, has lack of transportation kept you from meetings, work, or from getting things needed for daily living? No   Food Insecurity    Within the past 12  months, you worried that your food would run out before you got the money to buy more. Never true   Within the past 12 months, the food you bought just didn't last and you didn't have money to get more. Never true   Utilities    In the past 12 months has the electric, gas, oil, or water company threatened to shut off services in your home? No            DISCHARGE DETAILS:    Discharge planning discussed with:: patient, wife was on the phone  Freedom of Choice: Yes  Comments - Freedom of Choice: 1. Choice is to resume HHC with [facility]. 2) Discussed HD location, choice is [facility] in [place]                Contacts  Patient Contacts: Charley  Relationship to Patient:: Family  Contact Method: Phone  Reason/Outcome: Discharge Planning    Requested Home Health Care         Is the patient interested in HHC at discharge?: Yes  Home Health Discipline requested:: Nursing, Physical Therapy  [facility]  HHA External Referral Reason (only applicable if external HHA name selected): Patient has established relationship with provider  Home Health Follow-Up Provider:: PCP  Home Health Services Needed:: Evaluate Functional Status and Safety (Cardiopulmonary assessment and med managment)  Homebound Criteria Met:: Uses an Assist Device (i.e. cane, walker, etc)  Supporting Clincal Findings:: Limited Endurance, Fatigues Easliy in Short Distances    DME Referral Provided  Referral made for DME?: No    Other Referral/Resources/Interventions Provided:  Interventions: Other (Specify)  Referral Comments: Referral made to Dialysis in [place] to [facility] referral was also made to [facility]    Treatment Team Recommendation: Home with Home Health Care  Discharge Destination Plan:: Home with Home Health Care  Transport at Discharge : Family         CM will follow up on HD schedule and HHC

## 2024-06-03 NOTE — PROGRESS NOTES
NEPHROLOGY PROGRESS NOTE   Stef Pack Sr. 59 y.o. male MRN: [id number]  Unit/Bed#: -01 Encounter: [id number]      ASSESSMENT & PLAN:  #1 CKD stage 5 that progressed to ESRD.  Patient was admitted to the hospital to be started on dialysis.  First HD treatment on 6/1.  Plan for second HD treatment today  IR consulted for PermCath placement.   to arrange for outpatient dialysis.    2 azotemia with presumed uremia, status post first HD treatment on 6/1, plan for second HD treatment today    #3 hypertension, pressure is stable, continue current regimen    #4 diabetes with suspected diabetes kidney disease    5 anemia in the setting of CKD, hemoglobin at goal    6 Access, temporary HD line in place, IR consulted for permacath placement    Recommendation was discussed with primary team, plan for second HD treatment today.  They agree with the plan.  IR consulted for PermCath placement.   to arrange for outpatient dialysis.        SUBJECTIVE:  Seen and examined, denies significant complaints, upset because he is n.p.o. waiting for line placement.  Denies any chest pain, no shortness of breath, no nausea vomiting    OBJECTIVE:  Current Weight: Weight - Scale: 74.6 kg (164 lb 6.4 oz)  Vitals:    06/03/24 0639   BP: 142/86   Pulse: 80   Resp:    Temp: 97.7 °F (36.5 °C)   SpO2: (!) 85%       Intake/Output Summary (Last 24 hours) at 6/3/2024 0947  Last data filed at 6/3/2024 0639  Gross per 24 hour   Intake 780 ml   Output 1200 ml   Net -420 ml     General: conscious, cooperative, in not acute distress  Eyes: conjunctivae pink, anicteric sclerae  ENT: lips and mucous membranes moist  Neck: supple, no JVD  Chest: clear breath sounds bilateral, no crackles, ronchus or wheezings  CVS: distinct S1 & S2, normal rate, regular rhythm  Abdomen: soft, non-tender, non-distended, normoactive bowel sounds  Extremities: no edema of both legs  Skin: no rash  Neuro: awake, alert, oriented  Right IJ temporary  HD line in place      Medications:    Current Facility-Administered Medications:     albuterol (PROVENTIL HFA,VENTOLIN HFA) inhaler 2 puff, 2 puff, Inhalation, Q6H PRN, Adrianne Horn MD    allopurinol (ZYLOPRIM) tablet 100 mg, 100 mg, Oral, Daily, Adrianne Horn MD, 100 mg at 06/03/24 0754    amLODIPine (NORVASC) tablet 5 mg, 5 mg, Oral, Daily, Amy Landaverde CRNP, 5 mg at 06/03/24 0753    DULoxetine (CYMBALTA) delayed release capsule 20 mg, 20 mg, Oral, Daily, Adrianne Horn MD, 20 mg at 06/03/24 0753    hydrOXYzine HCL (ATARAX) tablet 25 mg, 25 mg, Oral, Q6H PRN, Joana Mejias PA-C, 25 mg at 06/03/24 0440    insulin lispro (HumALOG/ADMELOG) 100 units/mL subcutaneous injection 2-12 Units, 2-12 Units, Subcutaneous, TID AC, 2 Units at 06/03/24 0757 **AND** Fingerstick Glucose (POCT), , , TID AC, Adrianne Horn MD    oxyCODONE (ROXICODONE) IR tablet 5 mg, 5 mg, Oral, Q6H PRN, Adrianne Horn MD, 5 mg at 06/02/24 1645    polyethylene glycol (MIRALAX) packet 17 g, 17 g, Oral, Daily, Adrianne Horn MD, 17 g at 06/03/24 0752    pregabalin (LYRICA) capsule 75 mg, 75 mg, Oral, BID, Adrianne Horn MD, 75 mg at 06/03/24 0753    senna-docusate sodium (SENOKOT S) 8.6-50 mg per tablet 1 tablet, 1 tablet, Oral, BID, Adrianne Horn MD, 1 tablet at 06/03/24 0754    sevelamer (RENAGEL) tablet 1,600 mg, 1,600 mg, Oral, TID With Meals, Adrianne Horn MD, 1,600 mg at 06/03/24 0753    tamsulosin (FLOMAX) capsule 0.4 mg, 0.4 mg, Oral, Daily With Dinner, Adrianne Horn MD, 0.4 mg at 06/02/24 1646    torsemide (DEMADEX) tablet 40 mg, 40 mg, Oral, BID, Adrianne Horn MD, 40 mg at 06/03/24 0753    Invasive Devices:        Lab Results:   Results from last 7 days   Lab Units 06/03/24  0437 06/02/24  0523 05/31/24  1513   WBC Thousand/uL 10.10 9.28 10.41*   HEMOGLOBIN g/dL 10.2* 10.4* 10.0*   HEMATOCRIT % 32.1* 32.1* 30.9*   PLATELETS Thousands/uL 192 200 206   SODIUM mmol/L 137 136 138   POTASSIUM mmol/L 4.2 4.1 4.6   CHLORIDE mmol/L 103 103 102   CO2 mmol/L 23 24  "27   BUN mg/dL 57* 48* 71*   CREATININE mg/dL 5.10* 4.37* 5.55*   CALCIUM mg/dL 9.9 9.5 9.5   MAGNESIUM mg/dL 2.8* 2.6 3.4*   PHOSPHORUS mg/dL  --  5.2*  --    ALK PHOS U/L  --  85 78   ALT U/L  --  8 7   AST U/L  --  11* 11*       Previous work up:  See previous notes      Portions of the record may have been created with voice recognition software. Occasional wrong word or \"sound a like\" substitutions may have occurred due to the inherent limitations of voice recognition software. Read the chart carefully and recognize, using context, where substitutions have occurred.If you have any questions, please contact the dictating provider.  "

## 2024-06-03 NOTE — PLAN OF CARE
Target UF Goal  0.5  L, run tx net even, as tolerated. Patient dialyzing for 3 hours on 3 K bath for serum K of  4.2  per protocol. Treatment plan reviewed with Nephrology.       Post-Dialysis RN Treatment Note    Blood Pressure:  Pre 141/84 mm/Hg  Post 152/99 mmHg   EDW  TBD kg    Weight:  Pre 74.6 kg   Post 74.6 kg   Mode of weight measurement: Standing Scale   Volume Removed  0 ml    Treatment duration 3 hours   NS given  No    Treatment shortened? No   Medications given during Rx None Reported   Estimated Kt/V  1.11   Access type: Permacath/TDC   Access Issues: No    Report called to primary nurse   Yes, Edilia RN at bedside        Problem: METABOLIC, FLUID AND ELECTROLYTES - ADULT  Goal: Electrolytes maintained within normal limits  Description: INTERVENTIONS:  - Monitor labs and assess patient for signs and symptoms of electrolyte imbalances  - Administer electrolyte replacement as ordered  - Monitor response to electrolyte replacements, including repeat lab results as appropriate  - Instruct patient on fluid and nutrition as appropriate  Outcome: Progressing  Goal: Fluid balance maintained  Description: INTERVENTIONS:  - Monitor labs   - Monitor I/O and WT  - Instruct patient on fluid and nutrition as appropriate  - Assess for signs & symptoms of volume excess or deficit  Outcome: Progressing

## 2024-06-03 NOTE — PROGRESS NOTES
Patient:    MRN:  [id number]    Suad Request ID:  [id number]    Level of care reserved:  Home Health Agency    Partner Reserved:  [facility], [facility], [place] [address] [phone]    Clinical needs requested:    Geography searched:  [phone]    Start of Service:    Request sent:  9:08am EDT on 6/3/2024 by Monica Tracey    Partner reserved:  3:06pm EDT on 6/3/2024 by Monica Tracey    Choice list shared:  2:21pm EDT on 6/3/2024 by Monica Tracey

## 2024-06-04 VITALS
WEIGHT: 162.2 LBS | DIASTOLIC BLOOD PRESSURE: 69 MMHG | HEART RATE: 73 BPM | HEIGHT: 72 IN | RESPIRATION RATE: 22 BRPM | OXYGEN SATURATION: 97 % | TEMPERATURE: 96.8 F | SYSTOLIC BLOOD PRESSURE: 117 MMHG | BODY MASS INDEX: 21.97 KG/M2

## 2024-06-04 LAB
ANION GAP SERPL CALCULATED.3IONS-SCNC: 9 MMOL/L (ref 4–13)
BUN SERPL-MCNC: 34 MG/DL (ref 5–25)
CALCIUM SERPL-MCNC: 9.7 MG/DL (ref 8.4–10.2)
CHLORIDE SERPL-SCNC: 101 MMOL/L (ref 96–108)
CO2 SERPL-SCNC: 23 MMOL/L (ref 21–32)
CREAT SERPL-MCNC: 3.83 MG/DL (ref 0.6–1.3)
ERYTHROCYTE [DISTWIDTH] IN BLOOD BY AUTOMATED COUNT: 15 % (ref 11.6–15.1)
GFR SERPL CREATININE-BSD FRML MDRD: 16 ML/MIN/1.73SQ M
GLUCOSE SERPL-MCNC: 132 MG/DL (ref 65–140)
GLUCOSE SERPL-MCNC: 140 MG/DL (ref 65–140)
GLUCOSE SERPL-MCNC: 193 MG/DL (ref 65–140)
HCT VFR BLD AUTO: 32.3 % (ref 36.5–49.3)
HGB BLD-MCNC: 10.6 G/DL (ref 12–17)
MCH RBC QN AUTO: 31.5 PG (ref 26.8–34.3)
MCHC RBC AUTO-ENTMCNC: 32.8 G/DL (ref 31.4–37.4)
MCV RBC AUTO: 96 FL (ref 82–98)
PLATELET # BLD AUTO: 174 THOUSANDS/UL (ref 149–390)
PMV BLD AUTO: 10.3 FL (ref 8.9–12.7)
POTASSIUM SERPL-SCNC: 4.4 MMOL/L (ref 3.5–5.3)
RBC # BLD AUTO: 3.37 MILLION/UL (ref 3.88–5.62)
SODIUM SERPL-SCNC: 133 MMOL/L (ref 135–147)
WBC # BLD AUTO: 8.62 THOUSAND/UL (ref 4.31–10.16)

## 2024-06-04 PROCEDURE — 99239 HOSP IP/OBS DSCHRG MGMT >30: CPT

## 2024-06-04 PROCEDURE — 85027 COMPLETE CBC AUTOMATED: CPT

## 2024-06-04 PROCEDURE — 82948 REAGENT STRIP/BLOOD GLUCOSE: CPT

## 2024-06-04 PROCEDURE — 99232 SBSQ HOSP IP/OBS MODERATE 35: CPT | Performed by: INTERNAL MEDICINE

## 2024-06-04 PROCEDURE — 80048 BASIC METABOLIC PNL TOTAL CA: CPT

## 2024-06-04 RX ORDER — TORSEMIDE 20 MG/1
40 TABLET ORAL 2 TIMES DAILY
Start: 2024-06-04

## 2024-06-04 RX ORDER — LORAZEPAM 0.5 MG/1
0.5 TABLET ORAL ONCE
Status: COMPLETED | OUTPATIENT
Start: 2024-06-04 | End: 2024-06-04

## 2024-06-04 RX ADMIN — HYDROXYZINE HYDROCHLORIDE 25 MG: 25 TABLET ORAL at 07:31

## 2024-06-04 RX ADMIN — PREGABALIN 75 MG: 75 CAPSULE ORAL at 08:57

## 2024-06-04 RX ADMIN — OXYCODONE HYDROCHLORIDE 5 MG: 5 TABLET ORAL at 07:31

## 2024-06-04 RX ADMIN — LORAZEPAM 0.5 MG: 0.5 TABLET ORAL at 09:33

## 2024-06-04 RX ADMIN — DULOXETINE HYDROCHLORIDE 20 MG: 20 CAPSULE, DELAYED RELEASE ORAL at 08:54

## 2024-06-04 RX ADMIN — SENNOSIDES AND DOCUSATE SODIUM 1 TABLET: 8.6; 5 TABLET ORAL at 08:53

## 2024-06-04 RX ADMIN — ALLOPURINOL 100 MG: 100 TABLET ORAL at 08:53

## 2024-06-04 RX ADMIN — SEVELAMER HYDROCHLORIDE 1600 MG: 800 TABLET, FILM COATED ORAL at 11:50

## 2024-06-04 RX ADMIN — TORSEMIDE 40 MG: 20 TABLET ORAL at 08:56

## 2024-06-04 RX ADMIN — INSULIN LISPRO 2 UNITS: 100 INJECTION, SOLUTION INTRAVENOUS; SUBCUTANEOUS at 11:50

## 2024-06-04 RX ADMIN — SEVELAMER HYDROCHLORIDE 1600 MG: 800 TABLET, FILM COATED ORAL at 08:05

## 2024-06-04 NOTE — PLAN OF CARE
Problem: PAIN - ADULT  Goal: Verbalizes/displays adequate comfort level or baseline comfort level  Description: Interventions:  - Encourage patient to monitor pain and request assistance  - Assess pain using appropriate pain scale  - Administer analgesics based on type and severity of pain and evaluate response  - Implement non-pharmacological measures as appropriate and evaluate response  - Consider cultural and social influences on pain and pain management  - Notify physician/advanced practitioner if interventions unsuccessful or patient reports new pain  Outcome: Progressing     Problem: INFECTION - ADULT  Goal: Absence or prevention of progression during hospitalization  Description: INTERVENTIONS:  - Assess and monitor for signs and symptoms of infection  - Monitor lab/diagnostic results  - Monitor all insertion sites, i.e. indwelling lines, tubes, and drains  - Monitor endotracheal if appropriate and nasal secretions for changes in amount and color  - [place] appropriate cooling/warming therapies per order  - Administer medications as ordered  - Instruct and encourage patient and family to use good hand hygiene technique  - Identify and instruct in appropriate isolation precautions for identified infection/condition  Outcome: Progressing  Goal: Absence of fever/infection during neutropenic period  Description: INTERVENTIONS:  - Monitor WBC    Outcome: Progressing     Problem: SAFETY ADULT  Goal: Patient will remain free of falls  Description: INTERVENTIONS:  - Educate patient/family on patient safety including physical limitations  - Instruct patient to call for assistance with activity   - Consult OT/PT to assist with strengthening/mobility   - Keep Call bell within reach  - Keep bed low and locked with side rails adjusted as appropriate  - Keep care items and personal belongings within reach  - Initiate and maintain comfort rounds  - Make Fall Risk Sign visible to staff  - Offer Toileting every 3 Hours,  in advance of need  - Initiate/Maintain bed alarm  - Obtain necessary fall risk management equipment  - Apply yellow socks and bracelet for high fall risk patients  - Consider moving patient to room near nurses station  Outcome: Progressing     Problem: DISCHARGE PLANNING  Goal: Discharge to home or other facility with appropriate resources  Description: INTERVENTIONS:  - Identify barriers to discharge w/patient and caregiver  - Arrange for needed discharge resources and transportation as appropriate  - Identify discharge learning needs (meds, wound care, etc.)  - Arrange for interpretive services to assist at discharge as needed  - Refer to Case Management Department for coordinating discharge planning if the patient needs post-hospital services based on physician/advanced practitioner order or complex needs related to functional status, cognitive ability, or social support system  Outcome: Progressing     Problem: Knowledge Deficit  Goal: Patient/family/caregiver demonstrates understanding of disease process, treatment plan, medications, and discharge instructions  Description: Complete learning assessment and assess knowledge base.  Interventions:  - Provide teaching at level of understanding  - Provide teaching via preferred learning methods  Outcome: Progressing     Problem: GASTROINTESTINAL - ADULT  Goal: Minimal or absence of nausea and/or vomiting  Description: INTERVENTIONS:  - Administer IV fluids if ordered to ensure adequate hydration  - Maintain NPO status until nausea and vomiting are resolved  - Nasogastric tube if ordered  - Administer ordered antiemetic medications as needed  - Provide nonpharmacologic comfort measures as appropriate  - Advance diet as tolerated, if ordered  - Consider nutrition services referral to assist patient with adequate nutrition and appropriate food choices  Outcome: Progressing  Goal: Maintains or returns to baseline bowel function  Description: INTERVENTIONS:  - Assess  bowel function  - Encourage oral fluids to ensure adequate hydration  - Administer IV fluids if ordered to ensure adequate hydration  - Administer ordered medications as needed  - Encourage mobilization and activity  - Consider nutritional services referral to assist patient with adequate nutrition and appropriate food choices  Outcome: Progressing  Goal: Maintains adequate nutritional intake  Description: INTERVENTIONS:  - Monitor percentage of each meal consumed  - Identify factors contributing to decreased intake, treat as appropriate  - Assist with meals as needed  - Monitor I&O, weight, and lab values if indicated  - Obtain nutrition services referral as needed  Outcome: Progressing  Goal: Oral mucous membranes remain intact  Description: INTERVENTIONS  - Assess oral mucosa and hygiene practices  - Implement preventative oral hygiene regimen  - Implement oral medicated treatments as ordered  - Initiate Nutrition services referral as needed  Outcome: Progressing     Problem: METABOLIC, FLUID AND ELECTROLYTES - ADULT  Goal: Electrolytes maintained within normal limits  Description: INTERVENTIONS:  - Monitor labs and assess patient for signs and symptoms of electrolyte imbalances  - Administer electrolyte replacement as ordered  - Monitor response to electrolyte replacements, including repeat lab results as appropriate  - Instruct patient on fluid and nutrition as appropriate  Outcome: Progressing  Goal: Fluid balance maintained  Description: INTERVENTIONS:  - Monitor labs   - Monitor I/O and WT  - Instruct patient on fluid and nutrition as appropriate  - Assess for signs & symptoms of volume excess or deficit  Outcome: Progressing  Goal: Glucose maintained within target range  Description: INTERVENTIONS:  - Monitor Blood Glucose as ordered  - Assess for signs and symptoms of hyperglycemia and hypoglycemia  - Administer ordered medications to maintain glucose within target range  - Assess nutritional intake and  initiate nutrition service referral as needed  Outcome: Progressing

## 2024-06-04 NOTE — PROGRESS NOTES
NEPHROLOGY PROGRESS NOTE   Stef Pack Sr. 59 y.o. male MRN: [id number]  Unit/Bed#: -01 Encounter: [id number]      ASSESSMENT & PLAN:  #1 CKD stage 5 that progressed to ESRD.  Patient was admitted to the hospital to be started on dialysis.  First HD treatment on 6/1.  Second HD treatment on 6/3.  Plan for next HD treatment tomorrow if patient remains hospitalized.   working on setting up outpatient dialysis unit, once chair is confirmed patient would be stable from kidney standpoint of view to be discharged as long as okay with primary team.    2 azotemia with presumed uremia, status post first HD treatment on 6/1, status post second HD treatment on 6/3.  Plan for third HD treatment tomorrow if patient remains hospitalized    #3 hypertension, pressure overall is stable, continue current regimen    #4 diabetes with suspected diabetes kidney disease    5 anemia in the setting of CKD, hemoglobin at goal    6 Access, right IJ PermCath placed on 6/3 by IR    Recommendations as above were discussed with primary team, awaiting outpatient dialysis set up by , once chair time is confirmed then patient can be discharged from renal standpoint of view.  If patient remains hospitalized plan for third HD treatment tomorrow    SUBJECTIVE:  Patient seen and examined, wants to go home.  Denies any issue with second HD treatment yesterday.  Have PermCath placed by IR yesterday.  Denies any chest pain, no shortness of breath, no nausea vomiting    OBJECTIVE:  Current Weight: Weight - Scale:  (pt refused at this time)  Vitals:    06/04/24 0705   BP: 117/69   Pulse: 73   Resp: 22   Temp: (!) 96.8 °F (36 °C)   SpO2: 97%       Intake/Output Summary (Last 24 hours) at 6/4/2024 0854  Last data filed at 6/4/2024 0815  Gross per 24 hour   Intake 920 ml   Output 500 ml   Net 420 ml     General: conscious, cooperative, in not acute distress  Eyes: conjunctivae pink, anicteric sclerae  ENT: lips and mucous  membranes moist  Neck: supple, no JVD  Chest: clear breath sounds bilateral, no crackles, ronchus or wheezings  CVS: distinct S1 & S2, normal rate, regular rhythm  Abdomen: soft, non-tender, non-distended, normoactive bowel sounds  Extremities: no edema of both legs  Skin: no rash  Neuro: awake, alert, oriented  Right IJ PermCath in place      Medications:    Current Facility-Administered Medications:     albuterol (PROVENTIL HFA,VENTOLIN HFA) inhaler 2 puff, 2 puff, Inhalation, Q6H PRN, Adrianne Horn MD    allopurinol (ZYLOPRIM) tablet 100 mg, 100 mg, Oral, Daily, Adrianne Horn MD, 100 mg at 06/03/24 0754    amLODIPine (NORVASC) tablet 5 mg, 5 mg, Oral, Daily, Amy Landaverde, CRNP, 5 mg at 06/03/24 0753    DULoxetine (CYMBALTA) delayed release capsule 20 mg, 20 mg, Oral, Daily, Adrianne Horn MD, 20 mg at 06/03/24 0753    hydrOXYzine HCL (ATARAX) tablet 25 mg, 25 mg, Oral, Q6H PRN, Joana Mejias PA-C, 25 mg at 06/04/24 0731    insulin lispro (HumALOG/ADMELOG) 100 units/mL subcutaneous injection 2-12 Units, 2-12 Units, Subcutaneous, TID AC, 4 Units at 06/03/24 1711 **AND** Fingerstick Glucose (POCT), , , TID AC, Adrianne Horn MD    oxyCODONE (ROXICODONE) IR tablet 5 mg, 5 mg, Oral, Q6H PRN, Adrianne Horn MD, 5 mg at 06/04/24 0731    polyethylene glycol (MIRALAX) packet 17 g, 17 g, Oral, Daily, Adrianne Horn MD, 17 g at 06/03/24 0752    pregabalin (LYRICA) capsule 75 mg, 75 mg, Oral, BID, Adrianne Horn MD, 75 mg at 06/03/24 1709    senna-docusate sodium (SENOKOT S) 8.6-50 mg per tablet 1 tablet, 1 tablet, Oral, BID, Adrianne Horn MD, 1 tablet at 06/03/24 1709    sevelamer (RENAGEL) tablet 1,600 mg, 1,600 mg, Oral, TID With Meals, Adrianne Horn MD, 1,600 mg at 06/04/24 0805    tamsulosin (FLOMAX) capsule 0.4 mg, 0.4 mg, Oral, Daily With Dinner, Adrianne Horn MD, 0.4 mg at 06/03/24 1709    torsemide (DEMADEX) tablet 40 mg, 40 mg, Oral, BID, Adrianne Horn MD, 40 mg at 06/03/24 1709    Invasive Devices:        Lab Results:   Results  "from last 7 days   Lab Units 06/04/24  0524 06/03/24  0437 06/02/24  0523 05/31/24  1513   WBC Thousand/uL 8.62 10.10 9.28 10.41*   HEMOGLOBIN g/dL 10.6* 10.2* 10.4* 10.0*   HEMATOCRIT % 32.3* 32.1* 32.1* 30.9*   PLATELETS Thousands/uL 174 192 200 206   SODIUM mmol/L 133* 137 136 138   POTASSIUM mmol/L 4.4 4.2 4.1 4.6   CHLORIDE mmol/L 101 103 103 102   CO2 mmol/L 23 23 24 27   BUN mg/dL 34* 57* 48* 71*   CREATININE mg/dL 3.83* 5.10* 4.37* 5.55*   CALCIUM mg/dL 9.7 9.9 9.5 9.5   MAGNESIUM mg/dL  --  2.8* 2.6 3.4*   PHOSPHORUS mg/dL  --   --  5.2*  --    ALK PHOS U/L  --   --  85 78   ALT U/L  --   --  8 7   AST U/L  --   --  11* 11*       Previous work up:  See previous notes      Portions of the record may have been created with voice recognition software. Occasional wrong word or \"sound a like\" substitutions may have occurred due to the inherent limitations of voice recognition software. Read the chart carefully and recognize, using context, where substitutions have occurred.If you have any questions, please contact the dictating provider.  "

## 2024-06-04 NOTE — CASE MANAGEMENT
Case Management Discharge Planning Note    Patient name Stef Pack Sr.  Location /-01 MRN [id number]  : 1965 Date 2024       Current Admission Date: 2024  Current Admission Diagnosis:Acute renal failure superimposed on stage 5 chronic kidney disease, not on chronic dialysis (HCC)   Patient Active Problem List    Diagnosis Date Noted Date Diagnosed    Hypermagnesemia 2024     Neuropathic pain 2024     Right knee pain 2023     Diabetic foot ulcer (HCC) 2023     Acute metabolic encephalopathy 2023     PAD (peripheral artery disease) (HCC) 2022     Chronic back pain 2022     Moderate protein-calorie malnutrition (HCC) 2022     Chronic anemia 2022     RSV infection 2022     Chronic ulcer of left heel (HCC) 10/20/2022     Hypercalcemia 2022     Low back pain 2022     Ambulatory dysfunction 2022     Chronic kidney disease 2022     Retention of urine 2022     Severe protein-calorie malnutrition (HCC) 2022     Iron deficiency anemia secondary to inadequate dietary iron intake 2022     Hip pain, acute, left 2022     Acute renal failure superimposed on stage 5 chronic kidney disease, not on chronic dialysis (HCC) 2022     Hyperkalemia 2022     Diabetes mellitus type 2, insulin dependent (HCC)      Gout      History of CVA (cerebrovascular accident)      Osteomyelitis of vertebra of lumbar region (HCC)        LOS (days): 4  Geometric Mean LOS (GMLOS) (days): 4.4  Days to GMLOS:0.6     OBJECTIVE:  Risk of Unplanned Readmission Score: 26.75         Current admission status: Inpatient   [facility] Pharmacy:   [id number] [address]  [address]  [facility] [address]  Phone: [phone] Fax: [phone]    Primary Care Provider: Rehan Mancia DO    Primary Insurance: [facility] RE[facility]  Secondary Insurance:     DISCHARGE  DETAILS:     Multiple phone calls were made to determine chair time for pt at [facility] in [place].  Called main referral line, received Cintia's phone number who is working the case  ext [phone] left a message with her.  Called the Center and spoke to Jeanna FREED who is in charge and pt was accepted into [facility] prior to [facility] being [facility] to [facility]. Jeanna is aware pt is medically stable for dc and received his 2nd HD yesterday. Per Jeanna he going to start the [facility] at 10:00 tomorrow and will be seen. Wed/Thur and Friday and next week M/Tues/ Thurs and Friday. The goal is to set pt up at home with HD after about 2 weeks.   JOHN reviewed this with Charley on the phone and she is aware of this and will transport him for 10:00 tomorrow. She will be here after 1 PM to pick pt up as he is anxious to leave.  DC IMM was done and reviewed with Charley she has 4 hours for reconsidering the dc today, Charley said, no patient is ready and anxious for dc.    Will update provider at rounds.    Will update [facility] of dc.

## 2024-06-04 NOTE — PLAN OF CARE
Problem: PAIN - ADULT  Goal: Verbalizes/displays adequate comfort level or baseline comfort level  Description: Interventions:  - Encourage patient to monitor pain and request assistance  - Assess pain using appropriate pain scale  - Administer analgesics based on type and severity of pain and evaluate response  - Implement non-pharmacological measures as appropriate and evaluate response  - Consider cultural and social influences on pain and pain management  - Notify physician/advanced practitioner if interventions unsuccessful or patient reports new pain  Outcome: Progressing     Problem: INFECTION - ADULT  Goal: Absence or prevention of progression during hospitalization  Description: INTERVENTIONS:  - Assess and monitor for signs and symptoms of infection  - Monitor lab/diagnostic results  - Monitor all insertion sites, i.e. indwelling lines, tubes, and drains  - Monitor endotracheal if appropriate and nasal secretions for changes in amount and color  - [place] appropriate cooling/warming therapies per order  - Administer medications as ordered  - Instruct and encourage patient and family to use good hand hygiene technique  - Identify and instruct in appropriate isolation precautions for identified infection/condition  Outcome: Progressing  Goal: Absence of fever/infection during neutropenic period  Description: INTERVENTIONS:  - Monitor WBC    Outcome: Progressing     Problem: SAFETY ADULT  Goal: Patient will remain free of falls  Description: INTERVENTIONS:  - Educate patient/family on patient safety including physical limitations  - Instruct patient to call for assistance with activity   - Consult OT/PT to assist with strengthening/mobility   - Keep Call bell within reach  - Keep bed low and locked with side rails adjusted as appropriate  - Keep care items and personal belongings within reach  - Initiate and maintain comfort rounds  - Make Fall Risk Sign visible to staff  - Offer Toileting every  Hours,  in advance of need  - Initiate/Maintain alarm  - Obtain necessary fall risk management equipment:   - Apply yellow socks and bracelet for high fall risk patients  - Consider moving patient to room near nurses station  Outcome: Progressing     Problem: DISCHARGE PLANNING  Goal: Discharge to home or other facility with appropriate resources  Description: INTERVENTIONS:  - Identify barriers to discharge w/patient and caregiver  - Arrange for needed discharge resources and transportation as appropriate  - Identify discharge learning needs (meds, wound care, etc.)  - Arrange for interpretive services to assist at discharge as needed  - Refer to Case Management Department for coordinating discharge planning if the patient needs post-hospital services based on physician/advanced practitioner order or complex needs related to functional status, cognitive ability, or social support system  Outcome: Progressing     Problem: Knowledge Deficit  Goal: Patient/family/caregiver demonstrates understanding of disease process, treatment plan, medications, and discharge instructions  Description: Complete learning assessment and assess knowledge base.  Interventions:  - Provide teaching at level of understanding  - Provide teaching via preferred learning methods  Outcome: Progressing     Problem: GASTROINTESTINAL - ADULT  Goal: Minimal or absence of nausea and/or vomiting  Description: INTERVENTIONS:  - Administer IV fluids if ordered to ensure adequate hydration  - Maintain NPO status until nausea and vomiting are resolved  - Nasogastric tube if ordered  - Administer ordered antiemetic medications as needed  - Provide nonpharmacologic comfort measures as appropriate  - Advance diet as tolerated, if ordered  - Consider nutrition services referral to assist patient with adequate nutrition and appropriate food choices  Outcome: Progressing  Goal: Maintains or returns to baseline bowel function  Description: INTERVENTIONS:  - Assess  bowel function  - Encourage oral fluids to ensure adequate hydration  - Administer IV fluids if ordered to ensure adequate hydration  - Administer ordered medications as needed  - Encourage mobilization and activity  - Consider nutritional services referral to assist patient with adequate nutrition and appropriate food choices  Outcome: Progressing  Goal: Maintains adequate nutritional intake  Description: INTERVENTIONS:  - Monitor percentage of each meal consumed  - Identify factors contributing to decreased intake, treat as appropriate  - Assist with meals as needed  - Monitor I&O, weight, and lab values if indicated  - Obtain nutrition services referral as needed  Outcome: Progressing  Goal: Oral mucous membranes remain intact  Description: INTERVENTIONS  - Assess oral mucosa and hygiene practices  - Implement preventative oral hygiene regimen  - Implement oral medicated treatments as ordered  - Initiate Nutrition services referral as needed  Outcome: Progressing     Problem: METABOLIC, FLUID AND ELECTROLYTES - ADULT  Goal: Electrolytes maintained within normal limits  Description: INTERVENTIONS:  - Monitor labs and assess patient for signs and symptoms of electrolyte imbalances  - Administer electrolyte replacement as ordered  - Monitor response to electrolyte replacements, including repeat lab results as appropriate  - Instruct patient on fluid and nutrition as appropriate  Outcome: Progressing  Goal: Fluid balance maintained  Description: INTERVENTIONS:  - Monitor labs   - Monitor I/O and WT  - Instruct patient on fluid and nutrition as appropriate  - Assess for signs & symptoms of volume excess or deficit  Outcome: Progressing  Goal: Glucose maintained within target range  Description: INTERVENTIONS:  - Monitor Blood Glucose as ordered  - Assess for signs and symptoms of hyperglycemia and hypoglycemia  - Administer ordered medications to maintain glucose within target range  - Assess nutritional intake and  initiate nutrition service referral as needed  Outcome: Progressing

## 2024-06-04 NOTE — SEPSIS NOTE
Sepsis Note   Stef Pack Sr. 59 y.o. male MRN: [id number]  Unit/Bed#: -01 Encounter: [id number]       Initial Sepsis Screening       Row Name 06/04/24 0711                Is the patient's history suggestive of a new or worsening infection? No  -SS                  User Key  (r) = Recorded By, (t) = Taken By, (c) = Cosigned By      Initials Name Provider Type    SS Joana Mejias PA-C Physician Assistant                        Body mass index is 22 kg/m².  Wt Readings from Last 1 Encounters:   06/04/24 73.6 kg (162 lb 3.2 oz)        Ideal body weight: 77.6 kg (171 lb 1.2 oz)

## 2024-06-04 NOTE — ASSESSMENT & PLAN NOTE
Previous baseline creatinine around 4-4.6  Creatinine now 5.5 at time of presentation  Has had several hospital admissions secondary to uremic symptoms  Has started outpatient workup for IR catheter placement and initiating dialysis    Will need to start dialysis during this hospitalization  Nephrology on consult  Temp cath placed 6/1 by critical care  PermCath with IR 6/3  First session HD on 6/1 - Second 6/3  Discussed with case management-has set up outpatient chair time with [email or web address] in [place]  Will start direct to home program at 10 AM tomorrow 6/5  Will start Wednesday/Thursday and Friday this week and next week start Monday/Tuesday/Thursday and Friday with goal to be set up at home with HD after about 2 weeks

## 2024-06-04 NOTE — DISCHARGE SUMMARY
[facility]  Discharge- Stef Pack Sr. 1965, 59 y.o. male MRN: [id number]  Unit/Bed#: MS Castañeda01 Encounter: [id number]  Primary Care Provider: Rehan Mancia DO   Date and time admitted to hospital: 5/31/2024  2:46 PM    * Acute renal failure superimposed on stage 5 chronic kidney disease, not on chronic dialysis (HCC)  Assessment & Plan  Previous baseline creatinine around 4-4.6  Creatinine now 5.5 at time of presentation  Has had several hospital admissions secondary to uremic symptoms  Has started outpatient workup for IR catheter placement and initiating dialysis    Will need to start dialysis during this hospitalization  Nephrology on consult  Temp cath placed 6/1 by critical care  PermCath with IR 6/3  First session HD on 6/1 - Second 6/3  Discussed with case management-has set up outpatient chair time with [facility] in [place]  Will start direct to home program at 10 AM tomorrow 6/5  Will start Wednesday/Thursday and Friday this week and next week start Monday/Tuesday/Thursday and Friday with goal to be set up at home with HD after about 2 weeks      Hypermagnesemia  Assessment & Plan  High magnesium noted again hopefully will correct with dialysis    Chronic back pain  Assessment & Plan  Currently stable continue home meds    Diabetes mellitus type 2, insulin dependent (HCC)  Assessment & Plan  Lab Results   Component Value Date    HGBA1C 7.4 (H) 05/20/2024       Recent Labs     06/03/24  1127 06/03/24  1546 06/03/24  2207 06/04/24  0736   POCGLU 149* 200* 115 140         Blood Sugar Average: Last 72 hrs:  (P) [id number]  Hold Lantus and placed on insulin sliding scale        Medical Problems       Resolved Problems  Date Reviewed: 5/31/2024   None       Discharging Physician / Practitioner: Joana Mejias PA-C  PCP: Rehan Mancia DO  Admission Date:   Admission Orders (From admission, onward)       Ordered        05/31/24 1608  INPATIENT ADMISSION   Once                          Discharge Date: 06/04/24    Consultations During Hospital Stay:  Nephrology  Interventional radiology  Critical care    Procedures Performed:   Temp cath  PermCath  Hemodialysis    Significant Findings / Test Results:   XR chest portable  Result Date: 6/1/2024  No acute cardiopulmonary disease. Interval insertion of right-sided hemodialysis catheter with tip projecting over the superior cavoatrial junction. No pneumothorax.     XR chest 1 view portable - Result Date: 6/1/2024  No acute cardiopulmonary disease.    Incidental Findings:   None     Test Results Pending at Discharge (will require follow up):   None     Outpatient Tests Requested:  None    Complications:  None    Reason for Admission: End-stage renal disease/KELLY    Hospital Course:   Stef Pack Sr. is a 59 y.o. male patient with history of CKD nearing ESRD, hypomagnesemia chronically, diabetes type 2 and chronic back pain who originally presented to the hospital on 5/31/2024 due to feeling continually a generalized weak.  Recommendation was to start outpatient dialysis however decision was made to admit and start inpatient given with frequent hospitalizations.  Nephrology consulted.  Critical care consulted for temp cath and patient had first dialysis session on 6/1.  He then had PermCath placed on 6/3 with IR and had second dialysis session.  Case management was able to set up chair time for patient and he will be discharged home today with continued outpatient home health.     Please see above list of diagnoses and related plan for additional information.     Condition at Discharge: fair    Discharge Day Visit / Exam:   Subjective: Seen and examined.  Patient is anxious to leave this morning, denies any pain.   Vitals: Blood Pressure: 117/69 (06/04/24 0705)  Pulse: 73 (06/04/24 0705)  Temperature: (!) 96.8 °F (36 °C) (06/04/24 0705)  Temp Source: Temporal (06/03/24 1354)  Respirations: 22 (06/04/24 0705)  Height: 6'  (182.9 cm) (06/02/24 2253)  Weight - Scale: 73.6 kg (162 lb 3.2 oz) (06/04/24 0600)  SpO2: 97 % (06/04/24 0705)  Exam:   Physical Exam  Vitals and nursing note reviewed.   Constitutional:       General: He is not in acute distress.     Appearance: Normal appearance.   HENT:      Head: Normocephalic and atraumatic.      Nose: No congestion.      Mouth/Throat:      Mouth: Mucous membranes are moist.   Eyes:      Conjunctiva/sclera: Conjunctivae normal.   Cardiovascular:      Rate and Rhythm: Normal rate and regular rhythm.      Pulses: Normal pulses.      Heart sounds: Normal heart sounds. No murmur heard.     Comments: PermCath in place  Pulmonary:      Effort: Pulmonary effort is normal. No respiratory distress.      Breath sounds: Normal breath sounds.   Abdominal:      General: Bowel sounds are normal.      Palpations: Abdomen is soft.      Tenderness: There is no abdominal tenderness.   Musculoskeletal:         General: Normal range of motion.      Right lower leg: No edema.      Left lower leg: No edema.   Skin:     General: Skin is warm and dry.   Neurological:      Mental Status: He is alert and oriented to person, place, and time.          Discussion with Family: Patient declined call to .     Discharge instructions/Information to patient and family:   See after visit summary for information provided to patient and family.      Provisions for Follow-Up Care:  See after visit summary for information related to follow-up care and any pertinent home health orders.      Mobility at time of Discharge:   Basic Mobility Inpatient Raw Score: 22  JH-HLM Goal: 7: Walk 25 feet or more  JH-HLM Achieved: 1: Laying in bed  HLM Goal NOT achieved. Continue to encourage mobility in post discharge setting.  Patient has been resting today, previously has met goal with mobility score 22     Disposition:   Home with VNA Services (Reminder: Complete face to face encounter)    Planned Readmission: None     Discharge  Statement:  I spent 45 minutes discharging the patient. This time was spent on the day of discharge. I had direct contact with the patient on the day of discharge. Greater than 50% of the total time was spent examining patient, answering all patient questions, arranging and discussing plan of care with patient as well as directly providing post-discharge instructions.  Additional time then spent on discharge activities.    Discharge Medications:  See after visit summary for reconciled discharge medications provided to patient and/or family.      **Please Note: This note may have been constructed using a voice recognition system**

## 2024-06-04 NOTE — CASE MANAGEMENT
Case Management Discharge Planning Note    Patient name Stef Pack Sr.  Location /-01 MRN [id number]  : 1965 Date 2024       Current Admission Date: 2024  Current Admission Diagnosis:Acute renal failure superimposed on stage 5 chronic kidney disease, not on chronic dialysis (HCC)   Patient Active Problem List    Diagnosis Date Noted Date Diagnosed    Hypermagnesemia 2024     Neuropathic pain 2024     Right knee pain 2023     Diabetic foot ulcer (HCC) 2023     Acute metabolic encephalopathy 2023     PAD (peripheral artery disease) (HCC) 2022     Chronic back pain 2022     Moderate protein-calorie malnutrition (HCC) 2022     Chronic anemia 2022     RSV infection 2022     Chronic ulcer of left heel (HCC) 10/20/2022     Hypercalcemia 2022     Low back pain 2022     Ambulatory dysfunction 2022     Chronic kidney disease 2022     Retention of urine 2022     Severe protein-calorie malnutrition (HCC) 2022     Iron deficiency anemia secondary to inadequate dietary iron intake 2022     Hip pain, acute, left 2022     Acute renal failure superimposed on stage 5 chronic kidney disease, not on chronic dialysis (HCC) 2022     Hyperkalemia 2022     Diabetes mellitus type 2, insulin dependent (HCC)      Gout      History of CVA (cerebrovascular accident)      Osteomyelitis of vertebra of lumbar region (HCC)        LOS (days): 4  Geometric Mean LOS (GMLOS) (days): 4.4  Days to GMLOS:0.5     OBJECTIVE:  Risk of Unplanned Readmission Score: 26.34         Current admission status: Inpatient   [facility]acy[facility]   [id number] [address]  [address]  [facility] [address]  Phone: [phone] Fax: [phone]    Primary Care Provider: Rehan Mancia DO    Primary Insurance: [facility] RE[facility]  Secondary Insurance:     DISCHARGE  DETAILS:                                   AVS was sent to [facility].

## 2024-06-04 NOTE — ASSESSMENT & PLAN NOTE
Lab Results   Component Value Date    HGBA1C 7.4 (H) 05/20/2024       Recent Labs     06/03/24  1127 06/03/24  1546 06/03/24  2207 06/04/24  0736   POCGLU 149* 200* 115 140         Blood Sugar Average: Last 72 hrs:  (P) [id number]  Hold Lantus and placed on insulin sliding scale

## 2024-06-04 NOTE — PROGRESS NOTES
Patient:    MRN:  [id number]    Suad Request ID:  [id number]    Level of care reserved:  Dialysis    Partner Reserved:  [place], [place] [id number] [phone]    Clinical needs requested:    Geography searched:  10 miles around [id number]    Start of Service:    Pickup/appointment time:    Request sent:  9:05am EDT on 6/3/2024 by Monica Tracey    Partner reserved:  7:31pm EDT on 6/4/2024 by Monica Tracey    Choice list shared:

## 2024-06-05 NOTE — UTILIZATION REVIEW
NOTIFICATION OF ADMISSION DISCHARGE   This is a Notification of Discharge from [facility]. Please be advised that this patient has been discharge from our facility. Below you will find the admission and discharge date and time including the patient’s disposition.   UTILIZATION REVIEW CONTACT:  Ana Mendoza  Utilization   Network Utilization Review Department  Phone: [phone] x carefully listen to the prompts. All voicemails are confidential.  Email: [email or web address]     ADMISSION INFORMATION  PRESENTATION DATE: 5/31/2024  2:46 PM  OBERVATION ADMISSION DATE:   INPATIENT ADMISSION DATE: 5/31/24  4:08 PM   DISCHARGE DATE: 6/4/2024  1:45 PM   DISPOSITION:Home with Home Health Care    Network Utilization Review Department  ATTENTION: Please call with any questions or concerns to [phone] and carefully listen to the prompts so that you are directed to the right person. All voicemails are confidential.   For Discharge needs, contact Care Management DC Support Team at [phone] opt. [phone]  Send all requests for admission clinical reviews, approved or denied determinations and any other requests to dedicated fax number below belonging to the campus where the patient is receiving treatment. List of dedicated fax numbers for the Facilities:  FACILITY NAME UR FAX NUMBER   ADMISSION DENIALS (Administrative/Medical Necessity) [phone]   DISCHARGE SUPPORT TEAM ([facility]) [phone]   PARENT CHILD HEALTH (Maternity/NICU/Pediatrics) [phone]   [facility] [phone]   [facility] [phone]   [facility] [phone]   [facility] [phone]   [facility] [phone]   [facility] [phone]   [facility] [phone]   [facility]  [place] [phone]   [facility] [phone]   [facility] [phone]   [facility] [phone]   [facility] [phone]

## 2024-06-18 ENCOUNTER — HOSPITAL ENCOUNTER (EMERGENCY)
Facility: HOSPITAL | Age: 59
Discharge: HOME | End: 2024-06-18
Attending: EMERGENCY MEDICINE
Payer: COMMERCIAL

## 2024-06-18 ENCOUNTER — APPOINTMENT (EMERGENCY)
Dept: NON INVASIVE DIAGNOSTICS | Facility: HOSPITAL | Age: 59
End: 2024-06-18
Payer: COMMERCIAL

## 2024-06-18 ENCOUNTER — APPOINTMENT (EMERGENCY)
Dept: CT IMAGING | Facility: HOSPITAL | Age: 59
End: 2024-06-18
Payer: COMMERCIAL

## 2024-06-18 ENCOUNTER — APPOINTMENT (EMERGENCY)
Dept: RADIOLOGY | Facility: HOSPITAL | Age: 59
End: 2024-06-18
Payer: COMMERCIAL

## 2024-06-18 VITALS
OXYGEN SATURATION: 92 % | DIASTOLIC BLOOD PRESSURE: 92 MMHG | SYSTOLIC BLOOD PRESSURE: 150 MMHG | HEART RATE: 77 BPM | BODY MASS INDEX: 22 KG/M2 | RESPIRATION RATE: 22 BRPM | HEIGHT: 72 IN | TEMPERATURE: 97.7 F

## 2024-06-18 DIAGNOSIS — N18.9 CHRONIC KIDNEY DISEASE, UNSPECIFIED CKD STAGE: Primary | ICD-10-CM

## 2024-06-18 DIAGNOSIS — E83.41 HYPERMAGNESEMIA: Primary | ICD-10-CM

## 2024-06-18 DIAGNOSIS — R07.89 ATYPICAL CHEST PAIN: ICD-10-CM

## 2024-06-18 LAB
ALBUMIN SERPL BCP-MCNC: 4.1 G/DL (ref 3.5–5)
ALP SERPL-CCNC: 81 U/L (ref 34–104)
ALT SERPL W P-5'-P-CCNC: 5 U/L (ref 7–52)
ANION GAP SERPL CALCULATED.3IONS-SCNC: 11 MMOL/L (ref 4–13)
APTT PPP: 29 SECONDS (ref 23–37)
AST SERPL W P-5'-P-CCNC: 30 U/L (ref 13–39)
ATRIAL RATE: 78 BPM
BASOPHILS # BLD AUTO: 0.08 THOUSANDS/ÂΜL (ref 0–0.1)
BASOPHILS NFR BLD AUTO: 1 % (ref 0–1)
BILIRUB SERPL-MCNC: 0.29 MG/DL (ref 0.2–1)
BNP SERPL-MCNC: 179 PG/ML (ref 0–100)
BUN SERPL-MCNC: 45 MG/DL (ref 5–25)
CALCIUM SERPL-MCNC: 10 MG/DL (ref 8.4–10.2)
CARDIAC TROPONIN I PNL SERPL HS: 10 NG/L
CHLORIDE SERPL-SCNC: 91 MMOL/L (ref 96–108)
CO2 SERPL-SCNC: 33 MMOL/L (ref 21–32)
CREAT SERPL-MCNC: 5.76 MG/DL (ref 0.6–1.3)
D DIMER PPP FEU-MCNC: 1.29 UG/ML FEU
EOSINOPHIL # BLD AUTO: 0.4 THOUSAND/ÂΜL (ref 0–0.61)
EOSINOPHIL NFR BLD AUTO: 3 % (ref 0–6)
ERYTHROCYTE [DISTWIDTH] IN BLOOD BY AUTOMATED COUNT: 15.3 % (ref 11.6–15.1)
GFR SERPL CREATININE-BSD FRML MDRD: 9 ML/MIN/1.73SQ M
GLUCOSE SERPL-MCNC: 167 MG/DL (ref 65–140)
HCT VFR BLD AUTO: 30.5 % (ref 36.5–49.3)
HGB BLD-MCNC: 9.9 G/DL (ref 12–17)
IMM GRANULOCYTES # BLD AUTO: 0.07 THOUSAND/UL (ref 0–0.2)
IMM GRANULOCYTES NFR BLD AUTO: 1 % (ref 0–2)
INR PPP: 0.96 (ref 0.84–1.19)
LACTATE SERPL-SCNC: 1.1 MMOL/L (ref 0.5–2)
LIPASE SERPL-CCNC: 7 U/L (ref 11–82)
LYMPHOCYTES # BLD AUTO: 1.11 THOUSANDS/ÂΜL (ref 0.6–4.47)
LYMPHOCYTES NFR BLD AUTO: 8 % (ref 14–44)
MAGNESIUM SERPL-MCNC: 3.6 MG/DL (ref 1.9–2.7)
MCH RBC QN AUTO: 31.6 PG (ref 26.8–34.3)
MCHC RBC AUTO-ENTMCNC: 32.5 G/DL (ref 31.4–37.4)
MCV RBC AUTO: 97 FL (ref 82–98)
MONOCYTES # BLD AUTO: 1.01 THOUSAND/ÂΜL (ref 0.17–1.22)
MONOCYTES NFR BLD AUTO: 7 % (ref 4–12)
NEUTROPHILS # BLD AUTO: 12.16 THOUSANDS/ÂΜL (ref 1.85–7.62)
NEUTS SEG NFR BLD AUTO: 80 % (ref 43–75)
NRBC BLD AUTO-RTO: 0 /100 WBCS
P AXIS: 46 DEGREES
PLATELET # BLD AUTO: 162 THOUSANDS/UL (ref 149–390)
PMV BLD AUTO: 10.6 FL (ref 8.9–12.7)
POTASSIUM SERPL-SCNC: 4 MMOL/L (ref 3.5–5.3)
PR INTERVAL: 150 MS
PROT SERPL-MCNC: 7.2 G/DL (ref 6.4–8.4)
PROTHROMBIN TIME: 13.1 SECONDS (ref 11.6–14.5)
QRS AXIS: -24 DEGREES
QRSD INTERVAL: 98 MS
QT INTERVAL: 422 MS
QTC INTERVAL: 481 MS
RBC # BLD AUTO: 3.13 MILLION/UL (ref 3.88–5.62)
SODIUM SERPL-SCNC: 135 MMOL/L (ref 135–147)
T WAVE AXIS: 43 DEGREES
VENTRICULAR RATE: 78 BPM
WBC # BLD AUTO: 14.83 THOUSAND/UL (ref 4.31–10.16)

## 2024-06-18 PROCEDURE — 93005 ELECTROCARDIOGRAM TRACING: CPT

## 2024-06-18 PROCEDURE — 85025 COMPLETE CBC W/AUTO DIFF WBC: CPT | Performed by: EMERGENCY MEDICINE

## 2024-06-18 PROCEDURE — 71045 X-RAY EXAM CHEST 1 VIEW: CPT

## 2024-06-18 PROCEDURE — 93971 EXTREMITY STUDY: CPT | Performed by: SURGERY

## 2024-06-18 PROCEDURE — 83605 ASSAY OF LACTIC ACID: CPT | Performed by: EMERGENCY MEDICINE

## 2024-06-18 PROCEDURE — 93010 ELECTROCARDIOGRAM REPORT: CPT | Performed by: INTERNAL MEDICINE

## 2024-06-18 PROCEDURE — 83735 ASSAY OF MAGNESIUM: CPT | Performed by: EMERGENCY MEDICINE

## 2024-06-18 PROCEDURE — 99285 EMERGENCY DEPT VISIT HI MDM: CPT | Performed by: EMERGENCY MEDICINE

## 2024-06-18 PROCEDURE — 84484 ASSAY OF TROPONIN QUANT: CPT | Performed by: EMERGENCY MEDICINE

## 2024-06-18 PROCEDURE — 36415 COLL VENOUS BLD VENIPUNCTURE: CPT | Performed by: EMERGENCY MEDICINE

## 2024-06-18 PROCEDURE — 83880 ASSAY OF NATRIURETIC PEPTIDE: CPT | Performed by: EMERGENCY MEDICINE

## 2024-06-18 PROCEDURE — 71275 CT ANGIOGRAPHY CHEST: CPT

## 2024-06-18 PROCEDURE — 96376 TX/PRO/DX INJ SAME DRUG ADON: CPT

## 2024-06-18 PROCEDURE — 85730 THROMBOPLASTIN TIME PARTIAL: CPT | Performed by: EMERGENCY MEDICINE

## 2024-06-18 PROCEDURE — 96374 THER/PROPH/DIAG INJ IV PUSH: CPT

## 2024-06-18 PROCEDURE — 85610 PROTHROMBIN TIME: CPT | Performed by: EMERGENCY MEDICINE

## 2024-06-18 PROCEDURE — 83690 ASSAY OF LIPASE: CPT | Performed by: EMERGENCY MEDICINE

## 2024-06-18 PROCEDURE — 80053 COMPREHEN METABOLIC PANEL: CPT | Performed by: EMERGENCY MEDICINE

## 2024-06-18 PROCEDURE — 93971 EXTREMITY STUDY: CPT

## 2024-06-18 PROCEDURE — 85379 FIBRIN DEGRADATION QUANT: CPT | Performed by: EMERGENCY MEDICINE

## 2024-06-18 PROCEDURE — 99285 EMERGENCY DEPT VISIT HI MDM: CPT

## 2024-06-18 RX ORDER — FENTANYL CITRATE 50 UG/ML
50 INJECTION, SOLUTION INTRAMUSCULAR; INTRAVENOUS ONCE
Status: COMPLETED | OUTPATIENT
Start: 2024-06-18 | End: 2024-06-18

## 2024-06-18 RX ORDER — FENTANYL CITRATE 50 UG/ML
25 INJECTION, SOLUTION INTRAMUSCULAR; INTRAVENOUS ONCE
Status: COMPLETED | OUTPATIENT
Start: 2024-06-18 | End: 2024-06-18

## 2024-06-18 RX ORDER — OXYCODONE HYDROCHLORIDE AND ACETAMINOPHEN 5; 325 MG/1; MG/1
1 TABLET ORAL EVERY 4 HOURS PRN
Qty: 10 TABLET | Refills: 0 | Status: SHIPPED | OUTPATIENT
Start: 2024-06-18

## 2024-06-18 RX ADMIN — FENTANYL CITRATE 50 MCG: 50 INJECTION INTRAMUSCULAR; INTRAVENOUS at 06:35

## 2024-06-18 RX ADMIN — IOHEXOL 85 ML: 350 INJECTION, SOLUTION INTRAVENOUS at 07:11

## 2024-06-18 RX ADMIN — FENTANYL CITRATE 25 MCG: 50 INJECTION INTRAMUSCULAR; INTRAVENOUS at 07:50

## 2024-06-18 NOTE — ED PROVIDER NOTES
History  Chief Complaint   Patient presents with    Post-op Problem     Pt had peritoneal dialysis catheter placed yesterday. Pt woke up at 0530 today saying he was in extreme pain and having a hard time breathing.      Patient due for dialysis today at 8 AM.  Just had a peritoneal dialysis catheter placed yesterday.  Now complaining of shortness of breath and pain in the right upper neck/chest region starting this morning.  No recent cough or cold symptoms.  No nausea or vomiting.      History provided by:  Patient   used: No    Shortness of Breath  Severity:  Moderate  Onset quality:  Sudden  Duration:  1 hour  Timing:  Constant  Progression:  Unchanged  Chronicity:  New  Context: not activity, not emotional upset, not pollens, not strong odors and not weather changes    Relieved by:  Nothing  Worsened by:  Nothing  Ineffective treatments:  None tried  Associated symptoms: no abdominal pain, no chest pain, no cough, no ear pain, no fever, no headaches, no neck pain, no rash, no sore throat, no sputum production, no vomiting and no wheezing        Prior to Admission Medications   Prescriptions Last Dose Informant Patient Reported? Taking?   DULoxetine (CYMBALTA) 20 mg capsule   No No   Sig: Take 1 capsule (20 mg total) by mouth daily   albuterol (ProAir HFA) 90 mcg/act inhaler   No No   Sig: Inhale 2 puffs every 6 (six) hours as needed for wheezing   allopurinol (ZYLOPRIM) 100 mg tablet   No No   Sig: Take 1 tablet (100 mg total) by mouth daily   amLODIPine (NORVASC) 5 mg tablet   No No   Sig: Take 1 tablet (5 mg total) by mouth daily   docusate sodium (COLACE) 100 mg capsule   Yes No   Sig: TAKE BY MOUTH 1 CAPSULE IN THE MORNING AND 1 CAPSULE BEFORE BEDTIME.   insulin glargine (LANTUS) 100 units/mL subcutaneous injection   No No   Sig: Inject 5 Units under the skin daily at bedtime   insulin lispro (HumaLOG) 100 units/mL injection   No No   Sig: Inject 2-12 Units under the skin 3 (three) times  a day before meals   oxyCODONE (OXY-IR) 5 MG capsule   Yes No   Sig: Take 5 mg by mouth every 4 (four) hours as needed for moderate pain or severe pain   polyethylene glycol (MIRALAX) 17 g packet   No No   Sig: Take 17 g by mouth daily   pregabalin (LYRICA) 75 mg capsule   No No   Sig: Take 1 capsule (75 mg total) by mouth 2 (two) times a day for 10 days   senna-docusate sodium (SENOKOT S) 8.6-50 mg per tablet   Yes No   Sig: Take 1 tablet by mouth 2 (two) times a day   sevelamer (RENAGEL) 800 mg tablet   No No   Sig: Take 2 tablets (1,600 mg total) by mouth 3 (three) times a day with meals   tamsulosin (FLOMAX) 0.4 mg   No No   Sig: Take 1 capsule (0.4 mg total) by mouth daily with dinner   torsemide (DEMADEX) 20 mg tablet   No No   Sig: Take 2 tablets (40 mg total) by mouth 2 (two) times a day On nondialysis days      Facility-Administered Medications: None       Past Medical History:   Diagnosis Date    Chronic kidney disease     Diabetes mellitus (HCC)     Gout     Hypertension     Renal disorder     Retroperitoneal abscess (HCC)     Stroke (HCC)     UTI (urinary tract infection)     Vertebral osteomyelitis (HCC)        Past Surgical History:   Procedure Laterality Date    BACK SURGERY      IR TUNNELED DIALYSIS CATHETER PLACEMENT  6/3/2024    ORCHIECTOMY         Family History   Problem Relation Age of Onset    Hypertension Father      I have reviewed and agree with the history as documented.    E-Cigarette/Vaping    E-Cigarette Use Never User      E-Cigarette/Vaping Substances     Social History     Tobacco Use    Smoking status: Former     Current packs/day: 0.25     Types: Cigarettes    Smokeless tobacco: Never   Vaping Use    Vaping status: Never Used   Substance Use Topics    Alcohol use: Not Currently    Drug use: Yes     Types: Marijuana       Review of Systems   Constitutional:  Negative for chills and fever.   HENT:  Negative for ear pain, hearing loss, sore throat, trouble swallowing and voice change.     Eyes:  Negative for pain and discharge.   Respiratory:  Positive for shortness of breath. Negative for cough, sputum production and wheezing.    Cardiovascular:  Negative for chest pain and palpitations.   Gastrointestinal:  Negative for abdominal pain, blood in stool, constipation, diarrhea, nausea and vomiting.   Genitourinary:  Negative for dysuria, flank pain, frequency and hematuria.   Musculoskeletal:  Negative for joint swelling, neck pain and neck stiffness.   Skin:  Negative for rash and wound.   Neurological:  Negative for dizziness, seizures, syncope, facial asymmetry and headaches.   Psychiatric/Behavioral:  Negative for hallucinations, self-injury and suicidal ideas.    All other systems reviewed and are negative.      Physical Exam  Physical Exam  Vitals and nursing note reviewed.   Constitutional:       General: He is not in acute distress.     Appearance: He is well-developed.   HENT:      Head: Normocephalic and atraumatic.      Right Ear: External ear normal.      Left Ear: External ear normal.   Eyes:      General: No scleral icterus.        Right eye: No discharge.         Left eye: No discharge.      Extraocular Movements: Extraocular movements intact.      Conjunctiva/sclera: Conjunctivae normal.   Neck:      Comments: Dialysis catheter site is tender to palpation.  There is no surrounding erythema or warmth.  There is no fluctuance or induration or discharge noted.  The neck area itself is nontender to palpation.  There is no palpable cord.  There is no erythema or warmth.  Cardiovascular:      Rate and Rhythm: Normal rate and regular rhythm.      Heart sounds: Normal heart sounds. No murmur heard.  Pulmonary:      Effort: Pulmonary effort is normal.      Breath sounds: No wheezing or rales.      Comments: Crackles at bases.  Abdominal:      General: Bowel sounds are normal. There is no distension.      Palpations: Abdomen is soft.      Tenderness: There is no abdominal tenderness. There is  no guarding or rebound.      Comments: Peritoneal dialysis catheter site with dried blood noted.  Nontender to palpation.  No active bleeding.  No surrounding erythema or warmth.  No fluctuance or discharge.   Musculoskeletal:         General: No deformity. Normal range of motion.      Cervical back: Normal range of motion and neck supple.   Skin:     General: Skin is warm and dry.      Findings: No rash.   Neurological:      General: No focal deficit present.      Mental Status: He is alert and oriented to person, place, and time.      Cranial Nerves: No cranial nerve deficit.   Psychiatric:         Mood and Affect: Mood normal.         Behavior: Behavior normal.         Thought Content: Thought content normal.         Judgment: Judgment normal.         Vital Signs  ED Triage Vitals [06/18/24 0614]   Temperature Pulse Respirations Blood Pressure SpO2   97.7 °F (36.5 °C) 90 20 160/76 92 %      Temp Source Heart Rate Source Patient Position - Orthostatic VS BP Location FiO2 (%)   Temporal Monitor Lying Left arm --      Pain Score       10 - Worst Possible Pain           Vitals:    06/18/24 0614 06/18/24 0700 06/18/24 0730 06/18/24 0745   BP: 160/76 136/63 170/84 158/76   Pulse: 90 70 80 76   Patient Position - Orthostatic VS: Lying Lying Lying          Visual Acuity      ED Medications  Medications   fentaNYL injection 25 mcg (has no administration in time range)   fentaNYL injection 50 mcg (50 mcg Intravenous Given 6/18/24 0635)   iohexol (OMNIPAQUE) 350 MG/ML injection (MULTI-DOSE) 85 mL (85 mL Intravenous Given 6/18/24 0711)       Diagnostic Studies  Results Reviewed       Procedure Component Value Units Date/Time    B-Type Natriuretic Peptide(BNP) [[id number]]  (Abnormal) Collected: 06/18/24 0631    Lab Status: Final result Specimen: Blood from Arm, Left Updated: 06/18/24 0736      pg/mL     Protime-INR [[id number]]  (Normal) Collected: 06/18/24 0631    Lab Status: Final result Specimen: Blood from Arm, Left  Updated: 06/18/24 0732     Protime 13.1 seconds      INR 0.96    APTT [[id number]]  (Normal) Collected: 06/18/24 0631    Lab Status: Final result Specimen: Blood from Arm, Left Updated: 06/18/24 0732     PTT 29 seconds     HS Troponin I 2hr [[id number]]     Lab Status: No result Specimen: Blood     HS Troponin 0hr (reflex protocol) [[id number]]  (Normal) Collected: 06/18/24 0631    Lab Status: Final result Specimen: Blood from Arm, Left Updated: 06/18/24 0701     hs TnI 0hr 10 ng/L     Comprehensive metabolic panel [[id number]]  (Abnormal) Collected: 06/18/24 0631    Lab Status: Final result Specimen: Blood from Arm, Left Updated: 06/18/24 0659     Sodium 135 mmol/L      Potassium 4.0 mmol/L      Chloride 91 mmol/L      CO2 33 mmol/L      ANION GAP 11 mmol/L      BUN 45 mg/dL      Creatinine 5.76 mg/dL      Glucose 167 mg/dL      Calcium 10.0 mg/dL      AST 30 U/L      ALT 5 U/L      Alkaline Phosphatase 81 U/L      Total Protein 7.2 g/dL      Albumin 4.1 g/dL      Total Bilirubin 0.29 mg/dL      eGFR 9 ml/min/1.73sq m     Narrative:      National Kidney Disease Foundation guidelines for Chronic Kidney Disease (CKD):     Stage 1 with normal or high GFR (GFR > 90 mL/min/1.73 square meters)    Stage 2 Mild CKD (GFR = 60-89 mL/min/1.73 square meters)    Stage 3A Moderate CKD (GFR = 45-59 mL/min/1.73 square meters)    Stage 3B Moderate CKD (GFR = 30-44 mL/min/1.73 square meters)    Stage 4 Severe CKD (GFR = 15-29 mL/min/1.73 square meters)    Stage 5 End Stage CKD (GFR <15 mL/min/1.73 square meters)  Note: GFR calculation is accurate only with a steady state creatinine    Magnesium [[id number]]  (Abnormal) Collected: 06/18/24 0631    Lab Status: Final result Specimen: Blood from Arm, Left Updated: 06/18/24 0659     Magnesium 3.6 mg/dL     Lipase [[id number]]  (Abnormal) Collected: 06/18/24 0631    Lab Status: Final result Specimen: Blood from Arm, Left Updated: 06/18/24 0659     Lipase 7 u/L     Lactic acid, plasma (w/reflex  if result > 2.0) [[id number]]  (Normal) Collected: 06/18/24 0631    Lab Status: Final result Specimen: Blood from Arm, Left Updated: 06/18/24 0657     LACTIC ACID 1.1 mmol/L     Narrative:      Result may be elevated if tourniquet was used during collection.    D-Dimer [[id number]] Collected: 06/18/24 0631    Lab Status: In process Specimen: Blood from Arm, Left Updated: 06/18/24 0644    CBC and differential [[id number]]  (Abnormal) Collected: 06/18/24 0631    Lab Status: Final result Specimen: Blood from Arm, Left Updated: 06/18/24 0638     WBC 14.83 Thousand/uL      RBC 3.13 Million/uL      Hemoglobin 9.9 g/dL      Hematocrit 30.5 %      MCV 97 fL      MCH 31.6 pg      MCHC 32.5 g/dL      RDW 15.3 %      MPV 10.6 fL      Platelets 162 Thousands/uL      nRBC 0 /100 WBCs      Segmented % 80 %      Immature Grans % 1 %      Lymphocytes % 8 %      Monocytes % 7 %      Eosinophils Relative 3 %      Basophils Relative 1 %      Absolute Neutrophils 12.16 Thousands/µL      Absolute Immature Grans 0.07 Thousand/uL      Absolute Lymphocytes 1.11 Thousands/µL      Absolute Monocytes 1.01 Thousand/µL      Eosinophils Absolute 0.40 Thousand/µL      Basophils Absolute 0.08 Thousands/µL                    CTA ED chest PE study   Final Result by Brad Callahan MD (06/18 0744)      Slightly limited study due to suboptimal timing. No definite evidence of pulmonary embolus. Mild bibasilar atelectasis.      Small pneumoperitoneum noted in the visualized upper abdomen, presumably related to recent peritoneal dialysis catheter placement.            Workstation performed: [id number]         XR chest 1 view portable   ED Interpretation by Amos Call MD (06/18 0643)   NAD      VAS upper limb venous duplex scan, unilateral/limited    (Results Pending)              Procedures  ECG 12 Lead Documentation Only    Date/Time: 6/18/2024 6:29 AM    Performed by: Amos Call MD  Authorized by: Amos Call MD    ECG reviewed by  me, the ED Provider: yes    Patient location:  ED  Rate:     ECG rate:  80  Rhythm:     Rhythm: sinus rhythm    Ectopy:     Ectopy: none    QRS:     QRS axis:  Normal           ED Course  ED Course as of 06/18/24 0750   Tue Jun 18, 2024   0736 No DVT per vascular technician.   0749 Discussed with patient and wife about lab and CAT scan results.  White count is slightly elevated.  Afebrile.  Abdomen exam is benign.  Aware of some pneumoperitoneum which is probably related to his surgery from yesterday.  No PE.  No pneumothorax.   0749 No signs of infection at dialysis catheter site.                                             Medical Decision Making  Amount and/or Complexity of Data Reviewed  Labs: ordered.  Radiology: ordered.    Risk  Prescription drug management.             Disposition  Final diagnoses:   Hypermagnesemia   Atypical chest pain - At site of dialysis catheter.  No signs of infection     Time reflects when diagnosis was documented in both MDM as applicable and the Disposition within this note       Time User Action Codes Description Comment    6/18/2024  7:13 AM Amos Call Add [E83.41] Hypermagnesemia     6/18/2024  7:49 AM Amos Call Add [R07.89] Atypical chest pain     6/18/2024  7:49 AM Amos Call Modify [R07.89] Atypical chest pain At site of dialysis catheter.  No signs of infection          ED Disposition       ED Disposition   Discharge    Condition   Stable    Date/Time   Tue Jun 18, 2024  7:48 AM    Comment   Stef Pack Sr. discharge to home/self care.                   Follow-up Information       Follow up With Specialties Details Why Contact Info    Rehan Mancia DO Internal Medicine, Pediatrics Call in 1 day  [address] [place] [address]  [phone]              Patient's Medications   Discharge Prescriptions    OXYCODONE-ACETAMINOPHEN (PERCOCET) 5-325 MG PER TABLET    Take 1 tablet by mouth every 4 (four) hours as needed for moderate pain  for up to 10 doses Max Daily Amount: 6 tablets       Start Date: 6/18/2024 End Date: --       Order Dose: 1 tablet       Quantity: 10 tablet    Refills: 0       No discharge procedures on file.    PDMP Review         Value Time User    PDMP Reviewed  Yes 5/22/2024 11:49 AM Madison Ely MD            ED Provider  Electronically Signed by             Amos Call MD  06/18/24 0751

## 2024-06-25 ENCOUNTER — TELEPHONE (OUTPATIENT)
Dept: NEPHROLOGY | Facility: CLINIC | Age: 59
End: 2024-06-25

## 2024-10-02 ENCOUNTER — HOSPITAL ENCOUNTER (OUTPATIENT)
Dept: PULMONOLOGY | Facility: HOSPITAL | Age: 59
Discharge: HOME | End: 2024-10-02
Payer: COMMERCIAL

## 2024-10-02 DIAGNOSIS — N18.6 END STAGE RENAL DISEASE ON DIALYSIS (HCC): ICD-10-CM

## 2024-10-02 DIAGNOSIS — Z01.818 ENCOUNTER FOR OTHER PREPROCEDURAL EXAMINATION: ICD-10-CM

## 2024-10-02 DIAGNOSIS — Z99.2 END STAGE RENAL DISEASE ON DIALYSIS (HCC): ICD-10-CM

## 2024-10-02 DIAGNOSIS — E11.9 TYPE 2 DIABETES MELLITUS WITHOUT COMPLICATIONS (HCC): ICD-10-CM

## 2024-10-02 PROCEDURE — 94729 DIFFUSING CAPACITY: CPT

## 2024-10-02 PROCEDURE — 94760 N-INVAS EAR/PLS OXIMETRY 1: CPT

## 2024-10-02 PROCEDURE — 94729 DIFFUSING CAPACITY: CPT | Performed by: INTERNAL MEDICINE

## 2024-10-02 PROCEDURE — 94726 PLETHYSMOGRAPHY LUNG VOLUMES: CPT | Performed by: INTERNAL MEDICINE

## 2024-10-02 PROCEDURE — 94060 EVALUATION OF WHEEZING: CPT | Performed by: INTERNAL MEDICINE

## 2024-10-02 PROCEDURE — 94726 PLETHYSMOGRAPHY LUNG VOLUMES: CPT

## 2024-10-02 PROCEDURE — 94060 EVALUATION OF WHEEZING: CPT

## 2024-10-02 RX ORDER — ALBUTEROL SULFATE 0.83 MG/ML
2.5 SOLUTION RESPIRATORY (INHALATION) ONCE AS NEEDED
Status: COMPLETED | OUTPATIENT
Start: 2024-10-02 | End: 2024-10-02

## 2024-10-02 RX ADMIN — ALBUTEROL SULFATE 2.5 MG: 2.5 SOLUTION RESPIRATORY (INHALATION) at 16:24

## 2024-10-03 ENCOUNTER — TELEPHONE (OUTPATIENT)
Age: 59
End: 2024-10-03

## 2024-10-03 NOTE — TELEPHONE ENCOUNTER
New Patient    What is the reason for the patient’s appointment?: pt needing eval for clearance of kidney transplant. Received call from Yani at [facility]. Pt will need clearance for kidney transplant and possible urodynamics testing completed. Yani stated there is no forms to fill out. Called pt then to schedule appt and 1st NP phillip is 10/30/24. Please review if pt can be seen sooner    What office location does the patient prefer?: [place]    Does patient have Imaging/Lab Results:Pt stated he had XR and stress test completed at he thinks [facility] not seeing any info in chart after selecting care everywhere. Pt stated his nerves are shot after receiving news of needing kidney transplant    Have patient records been requested?:  If No, are the records showing in Epic:       HISTORY:   Has the patient had any previous Urologist(s)?: no     Was the patient seen in the ED?: no     Pt call [phone]    Yani with [facility] Cast-[phone]

## 2024-10-04 NOTE — TELEPHONE ENCOUNTER
PT wife called and requesting call back regarding pt care     Pt call [phone] Charley (wife)

## 2024-10-04 NOTE — TELEPHONE ENCOUNTER
THAI left for pt and wife that they will need to call another urology office, could try [facility] for urodynamic testing for clearance for kidney transplant.     THAI left for Yani at [facility] to make her aware that [facility] in [place] cannot do the urodynamics. We do not have the proper equipment.    If pt or wife call back, please relay this message. If they need any further assistance, please transfer back to a nurse.

## 2024-10-10 NOTE — TELEPHONE ENCOUNTER
Left message for patient's wife to call the office back. Awaiting return call. Please relay below message to patient or wife when they call back.

## 2024-10-11 NOTE — TELEPHONE ENCOUNTER
Yani garcia.     [facility] [facility].

## 2024-10-18 ENCOUNTER — OFFICE VISIT (OUTPATIENT)
Dept: UROLOGY | Facility: CLINIC | Age: 59
End: 2024-10-18
Payer: COMMERCIAL

## 2024-10-18 VITALS
BODY MASS INDEX: 23.11 KG/M2 | SYSTOLIC BLOOD PRESSURE: 130 MMHG | HEIGHT: 72 IN | DIASTOLIC BLOOD PRESSURE: 60 MMHG | WEIGHT: 170.6 LBS | RESPIRATION RATE: 16 BRPM | HEART RATE: 80 BPM | TEMPERATURE: 98.7 F | OXYGEN SATURATION: 97 %

## 2024-10-18 DIAGNOSIS — N18.9 CHRONIC KIDNEY DISEASE, UNSPECIFIED CKD STAGE: Primary | ICD-10-CM

## 2024-10-18 DIAGNOSIS — E43 UNSPECIFIED SEVERE PROTEIN-CALORIE MALNUTRITION (HCC): ICD-10-CM

## 2024-10-18 DIAGNOSIS — R33.9 RETENTION OF URINE: ICD-10-CM

## 2024-10-18 LAB
POST-VOID RESIDUAL VOLUME, ML POC: 208 ML
SL AMB  POCT GLUCOSE, UA: ABNORMAL
SL AMB LEUKOCYTE ESTERASE,UA: ABNORMAL
SL AMB POCT BILIRUBIN,UA: ABNORMAL
SL AMB POCT BLOOD,UA: ABNORMAL
SL AMB POCT CLARITY,UA: CLEAR
SL AMB POCT COLOR,UA: YELLOW
SL AMB POCT KETONES,UA: ABNORMAL
SL AMB POCT NITRITE,UA: ABNORMAL
SL AMB POCT PH,UA: 8
SL AMB POCT SPECIFIC GRAVITY,UA: 1.02
SL AMB POCT URINE PROTEIN: ABNORMAL
SL AMB POCT UROBILINOGEN: 0.2

## 2024-10-18 PROCEDURE — 99204 OFFICE O/P NEW MOD 45 MIN: CPT | Performed by: UROLOGY

## 2024-10-18 PROCEDURE — 51798 US URINE CAPACITY MEASURE: CPT | Performed by: UROLOGY

## 2024-10-18 PROCEDURE — 81003 URINALYSIS AUTO W/O SCOPE: CPT | Performed by: UROLOGY

## 2024-10-18 RX ORDER — ATORVASTATIN CALCIUM 10 MG/1
10 TABLET, FILM COATED ORAL DAILY
COMMUNITY
Start: 2024-09-10

## 2024-10-18 RX ORDER — SEVELAMER CARBONATE 800 MG/1
800 TABLET, FILM COATED ORAL
COMMUNITY
Start: 2024-08-21 | End: 2024-10-18 | Stop reason: SDUPTHER

## 2024-10-18 RX ORDER — TAMSULOSIN HYDROCHLORIDE 0.4 MG/1
0.4 CAPSULE ORAL
Qty: 90 CAPSULE | Refills: 3 | Status: SHIPPED | OUTPATIENT
Start: 2024-10-18

## 2024-10-18 RX ORDER — OXYCODONE AND ACETAMINOPHEN 10; 325 MG/1; MG/1
TABLET ORAL
COMMUNITY
Start: 2024-09-26

## 2024-10-18 RX ORDER — INSULIN GLARGINE 100 [IU]/ML
INJECTION, SOLUTION SUBCUTANEOUS
COMMUNITY
Start: 2024-09-27

## 2024-10-18 RX ORDER — ERGOCALCIFEROL 1.25 MG/1
CAPSULE, LIQUID FILLED ORAL
COMMUNITY
Start: 2024-09-25

## 2024-10-18 RX ORDER — MIRTAZAPINE 30 MG/1
30 TABLET, FILM COATED ORAL
COMMUNITY
Start: 2024-05-08

## 2024-10-18 RX ORDER — LACTULOSE 10 G/15ML
SOLUTION ORAL
COMMUNITY
Start: 2024-07-15

## 2024-10-18 RX ORDER — CARVEDILOL 6.25 MG/1
6.25 TABLET ORAL 2 TIMES DAILY WITH MEALS
COMMUNITY
Start: 2024-09-18

## 2024-10-18 RX ORDER — ZOLPIDEM TARTRATE 10 MG/1
10 TABLET ORAL
COMMUNITY
Start: 2024-05-08

## 2024-10-18 RX ORDER — ASPIRIN 81 MG/1
81 TABLET ORAL DAILY
COMMUNITY

## 2024-10-18 RX ORDER — SENNOSIDES A AND B 8.6 MG/1
2 TABLET, FILM COATED ORAL DAILY
COMMUNITY
Start: 2024-06-04

## 2024-10-18 NOTE — PROGRESS NOTES
UROLOGY PROGRESS NOTE         NAME: Stef Pack Sr.  AGE: 59 y.o. SEX: male  : 1965   MRN: [id number]    DATE: 10/17/2024  TIME: 10:30 PM    Assessment and Plan      Impression:   End-stage renal disease.  Patient on peritoneal dialysis.  History of previous UTIs and incomplete bladder emptying.  Diabetic.     Plan: The patient's urinalysis today was normal except for some proteinuria 2+.  There is no evidence of infection or hematuria.  His postvoid residual was somewhat elevated at approximately 200 cc.  He was actually referred for a urodynamic study which I agree he needs.  We do not do that here at our campus.  I discussed options with him and we are going to refer him to the urology team at [facility] for a urodynamic study in preparation for his upcoming renal transplant.  The purpose of this would be to better define his incomplete bladder emptying and optimize bladder emptying before transplant if possible.  I also recommended a renal and bladder sonogram.  Further evaluation with cystoscopy would also be helpful depending on the findings of the UDS-complex CMG bladder testing.  I asked him to continue his tamsulosin and double void.  We will make the referral to [facility] urology regarding the above.  His transplant team is located there as well.  I also requested a PSA level.      Chief Complaint   No chief complaint on file.    History of Present Illness     HPI: Stef Pack Sr. is a 59 y.o. year old male who presents with history of end-stage renal disease and urinary retention.  Referred for possible urodynamic testing preop renal transplantation.  Patient has been seen at [facility] previously and his prior urologist is at [facility] in [place] as well.  He was there and had an orchiectomy performed for severe UTI and epididymoorchitis on the left back in .  He has been doing very well from a urology standpoint over the past year with no UTIs.  He feels as though he empties his  bladder reasonably well.  He has been a diabetic for many years and this is the source for his renal failure.  He is presently on peritoneal dialysis.  He states that he voids about 2 L a day.  He thinks his flow is satisfactory on Flomax.  No gross hematuria no history of kidney stones or other urologic issue.              The following portions of the patient's history were reviewed and updated as appropriate: allergies, current medications, past family history, past medical history, past social history, past surgical history and problem list.  Past Medical History:   Diagnosis Date    Chronic kidney disease     Diabetes mellitus (HCC)     Gout     Hypertension     Renal disorder     Retroperitoneal abscess (HCC)     Stroke (HCC)     UTI (urinary tract infection)     Vertebral osteomyelitis (HCC)      Past Surgical History:   Procedure Laterality Date    BACK SURGERY      IR TUNNELED DIALYSIS CATHETER PLACEMENT  6/3/2024    ORCHIECTOMY       shoulder  Review of Systems     Const: Denies chills, fever and weight loss.  CV: Denies chest pain.  Resp: Denies SOB.  GI: Denies abdominal pain, nausea and vomiting.  : Denies symptoms other than stated above.  Musculo: Denies back pain.    Objective   There were no vitals taken for this visit.    Physical Exam  Const: Appears healthy and well developed. No signs of acute distress present.  Resp: Respirations are regular and unlabored.   CV: Rate is regular. Rhythm is regular.  Abdomen: Abdomen is soft, nontender, and nondistended. Kidneys are not palpable.  : Patient had undergone orchiectomy in the past and the remaining testicle is normal.  There is no mass in the groin.  There is no evidence of hernia there is no tenderness the phallus is circumcised and the meatus is normal.  The prostate is 1-2+ relatively small smooth and there are no nodules.  There is no tenderness.  Psych: Patient's attitude is cooperative. Mood is normal. Affect is normal.    Procedure    Procedures     Current Medications     Current Outpatient Medications:     albuterol (ProAir HFA) 90 mcg/act inhaler, Inhale 2 puffs every 6 (six) hours as needed for wheezing, Disp: 8.5 g, Rfl: 0    allopurinol (ZYLOPRIM) 100 mg tablet, Take 1 tablet (100 mg total) by mouth daily, Disp: 30 tablet, Rfl: 0    amLODIPine (NORVASC) 5 mg tablet, Take 1 tablet (5 mg total) by mouth daily, Disp: 30 tablet, Rfl: 0    docusate sodium (COLACE) 100 mg capsule, TAKE BY MOUTH 1 CAPSULE IN THE MORNING AND 1 CAPSULE BEFORE BEDTIME., Disp: , Rfl:     DULoxetine (CYMBALTA) 20 mg capsule, Take 1 capsule (20 mg total) by mouth daily, Disp: 30 capsule, Rfl: 0    insulin glargine (LANTUS) 100 units/mL subcutaneous injection, Inject 5 Units under the skin daily at bedtime, Disp: 10 mL, Rfl: 0    insulin lispro (HumaLOG) 100 units/mL injection, Inject 2-12 Units under the skin 3 (three) times a day before meals, Disp: , Rfl: 0    oxyCODONE (OXY-IR) 5 MG capsule, Take 5 mg by mouth every 4 (four) hours as needed for moderate pain or severe pain, Disp: , Rfl:     oxyCODONE-acetaminophen (PERCOCET) 5-325 mg per tablet, Take 1 tablet by mouth every 4 (four) hours as needed for moderate pain for up to 10 doses Max Daily Amount: 6 tablets, Disp: 10 tablet, Rfl: 0    polyethylene glycol (MIRALAX) 17 g packet, Take 17 g by mouth daily, Disp: 30 each, Rfl: 0    pregabalin (LYRICA) 75 mg capsule, Take 1 capsule (75 mg total) by mouth 2 (two) times a day for 10 days, Disp: , Rfl:     senna-docusate sodium (SENOKOT S) 8.6-50 mg per tablet, Take 1 tablet by mouth 2 (two) times a day, Disp: , Rfl:     sevelamer (RENAGEL) 800 mg tablet, Take 2 tablets (1,600 mg total) by mouth 3 (three) times a day with meals, Disp: 180 tablet, Rfl: 0    tamsulosin (FLOMAX) 0.4 mg, Take 1 capsule (0.4 mg total) by mouth daily with dinner, Disp: 30 capsule, Rfl: 0    torsemide (DEMADEX) 20 mg tablet, Take 2 tablets (40 mg total) by mouth 2 (two) times a day On  nondialysis days, Disp: , Rfl:         Aniceto Glover MD

## 2024-10-24 ENCOUNTER — TELEPHONE (OUTPATIENT)
Age: 59
End: 2024-10-24

## 2024-10-24 NOTE — TELEPHONE ENCOUNTER
Pt called to reschedule his US Kidney and Bladder appointment, call was connected with CS for further assistance.

## 2024-11-01 ENCOUNTER — HOSPITAL ENCOUNTER (OUTPATIENT)
Dept: ULTRASOUND IMAGING | Facility: HOSPITAL | Age: 59
End: 2024-11-01
Attending: UROLOGY
Payer: COMMERCIAL

## 2024-11-01 DIAGNOSIS — N18.9 CHRONIC KIDNEY DISEASE, UNSPECIFIED CKD STAGE: ICD-10-CM

## 2024-11-01 DIAGNOSIS — R33.9 RETENTION OF URINE: ICD-10-CM

## 2024-11-01 PROCEDURE — 76775 US EXAM ABDO BACK WALL LIM: CPT

## 2024-11-12 NOTE — PRE-PROCEDURE INSTRUCTIONS
Pre-Surgery Instructions:   Medication Instructions    albuterol (ProAir HFA) 90 mcg/act inhaler Uses PRN- OK to take day of surgery    allopurinol (ZYLOPRIM) 100 mg tablet Take day of surgery.    amLODIPine (NORVASC) 5 mg tablet Take day of surgery.    atorvastatin (LIPITOR) 10 mg tablet Take day of surgery.    carvedilol (COREG) 6.25 mg tablet Take day of surgery.    docusate sodium (COLACE) 100 mg capsule Take night before surgery    DULoxetine (CYMBALTA) 20 mg capsule Take day of surgery.    ergocalciferol (VITAMIN D2) 50,000 units Hold day of surgery.    insulin lispro (HumaLOG) 100 units/mL injection Hold day of surgery.    lactulose (CHRONULAC) 10 g/15 mL solution Uses PRN- DO NOT take day of surgery    Lantus SoloStar 100 units/mL SOPN Take day of surgery.    mirtazapine (REMERON) 30 mg tablet Take night before surgery    oxyCODONE-acetaminophen (PERCOCET)  mg per tablet Uses PRN- OK to take day of surgery    polyethylene glycol (MIRALAX) 17 g packet Uses PRN- DO NOT take day of surgery    pregabalin (LYRICA) 75 mg capsule Take day of surgery.    senna (Senokot) 8.6 MG tablet Uses PRN- DO NOT take day of surgery    sertraline (ZOLOFT) 50 mg tablet Take day of surgery.    sevelamer (RENAGEL) 800 mg tablet Hold day of surgery.    tamsulosin (FLOMAX) 0.4 mg Take night before surgery    torsemide (DEMADEX) 20 mg tablet Uses PRN- DO NOT take day of surgery    zolpidem (AMBIEN) 10 mg tablet Take night before surgery   Medication instructions for day surgery reviewed. Please use only a sip of water to take your instructed medications. Avoid all over the counter vitamins, supplements and NSAIDS for one week prior to surgery per anesthesia guidelines. Tylenol is ok to take as needed.     You will receive a call one business day prior to surgery with an arrival time and hospital directions. If your surgery is scheduled on a Monday, the hospital will be calling you on the Friday prior to your surgery. If you have  not heard from anyone by 8pm, please call the hospital supervisor through the hospital  at [phone]. ([place] [phone] or [place] [phone]).    Do not eat or drink anything after midnight the night before your surgery, including candy, mints, lifesavers, or chewing gum. Do not drink alcohol 24hrs before your surgery. Try not to smoke at least 24hrs before your surgery.       Follow the pre surgery showering instructions as listed in the “My Surgical Experience Booklet” or otherwise provided by your surgeon's office. Do not use a blade to shave the surgical area 1 week before surgery. It is okay to use a clean electric clippers up to 24 hours before surgery. Do not apply any lotions, creams, including makeup, cologne, deodorant, or perfumes after showering on the day of your surgery. Do not use dry shampoo, hair spray, hair gel, or any type of hair products.     No contact lenses, eye make-up, or artificial eyelashes. Remove nail polish, including gel polish, and any artificial, gel, or acrylic nails if possible. Remove all jewelry including rings and body piercing jewelry.     Wear causal clothing that is easy to take on and off. Consider your type of surgery.    Keep any valuables, jewelry, piercings at home. Please bring any specially ordered equipment (sling, braces) if indicated.    Arrange for a responsible person to drive you to and from the hospital on the day of your surgery. Please confirm the visitor policy for the day of your procedure when you receive your phone call with an arrival time.     Call the surgeon's office with any new illnesses, exposures, or additional questions prior to surgery.    Please reference your “My Surgical Experience Booklet” for additional information to prepare for your upcoming surgery.

## 2024-11-13 DIAGNOSIS — N28.1 COMPLEX RENAL CYST: Primary | ICD-10-CM

## 2024-11-22 ENCOUNTER — ANESTHESIA EVENT (OUTPATIENT)
Dept: PERIOP | Facility: HOSPITAL | Age: 59
End: 2024-11-22
Payer: COMMERCIAL

## 2024-11-24 NOTE — ANESTHESIA PREPROCEDURE EVALUATION
Procedure:  EXCISION OF CYST OF THE FACE WITH CLOSURE (Face)    Relevant Problems   CARDIO   (+) PAD (peripheral artery disease) (HCC)      ENDO   (+) Diabetes mellitus type 2, insulin dependent (HCC)      /RENAL   (+) Acute renal failure superimposed on stage 5 chronic kidney disease, not on chronic dialysis (HCC)   (+) Chronic kidney disease      HEMATOLOGY   (+) Chronic anemia   (+) Iron deficiency anemia secondary to inadequate dietary iron intake      MUSCULOSKELETAL   (+) Chronic back pain   (+) Gout   (+) Low back pain      NEURO/PSYCH   (+) Chronic back pain      Other   (+) Osteomyelitis of vertebra of lumbar region (HCC)      Peritoneal Dialysis      Physical Exam    Airway    Mallampati score: II  TM Distance: >3 FB  Neck ROM: full     Dental   No notable dental hx     Cardiovascular  Cardiovascular exam normal    Pulmonary  Pulmonary exam normal     Other Findings      Normal sinus rhythm  Prolonged QT  Abnormal ECG  When compared with ECG of 31-MAY-2024 14:52,  Premature atrial complexes are no longer Present       Anesthesia Plan  ASA Score- 4     Anesthesia Type- IV sedation with anesthesia with ASA Monitors.         Additional Monitors:     Airway Plan:            Plan Factors-Exercise tolerance (METS): >4 METS.    Chart reviewed. EKG reviewed. Imaging results reviewed. Existing labs reviewed. Patient summary reviewed.    Patient is a current smoker.      Obstructive sleep apnea risk education given perioperatively.        Induction- intravenous.    Postoperative Plan-     Perioperative Resuscitation Plan - Level 1 - Full Code.       Informed Consent- Anesthetic plan and risks discussed with patient.  I personally reviewed this patient with the CRNA. Discussed and agreed on the Anesthesia Plan with the CRNA..

## 2024-11-25 ENCOUNTER — ANESTHESIA (OUTPATIENT)
Dept: PERIOP | Facility: HOSPITAL | Age: 59
End: 2024-11-25
Payer: COMMERCIAL

## 2024-11-25 ENCOUNTER — HOSPITAL ENCOUNTER (OUTPATIENT)
Facility: HOSPITAL | Age: 59
Setting detail: OUTPATIENT SURGERY
Discharge: HOME | End: 2024-11-25
Attending: OTOLARYNGOLOGY | Admitting: OTOLARYNGOLOGY
Payer: COMMERCIAL

## 2024-11-25 VITALS
TEMPERATURE: 97.8 F | DIASTOLIC BLOOD PRESSURE: 81 MMHG | SYSTOLIC BLOOD PRESSURE: 174 MMHG | HEIGHT: 72 IN | WEIGHT: 170 LBS | RESPIRATION RATE: 18 BRPM | OXYGEN SATURATION: 95 % | HEART RATE: 65 BPM | BODY MASS INDEX: 23.03 KG/M2

## 2024-11-25 DIAGNOSIS — L72.3 SEBACEOUS CYST: ICD-10-CM

## 2024-11-25 LAB
GLUCOSE SERPL-MCNC: 172 MG/DL (ref 65–140)
GLUCOSE SERPL-MCNC: 186 MG/DL (ref 65–140)

## 2024-11-25 PROCEDURE — 82948 REAGENT STRIP/BLOOD GLUCOSE: CPT

## 2024-11-25 PROCEDURE — 88304 TISSUE EXAM BY PATHOLOGIST: CPT | Performed by: STUDENT IN AN ORGANIZED HEALTH CARE EDUCATION/TRAINING PROGRAM

## 2024-11-25 RX ORDER — FENTANYL CITRATE 50 UG/ML
INJECTION, SOLUTION INTRAMUSCULAR; INTRAVENOUS AS NEEDED
Status: DISCONTINUED | OUTPATIENT
Start: 2024-11-25 | End: 2024-11-25

## 2024-11-25 RX ORDER — ONDANSETRON 2 MG/ML
INJECTION INTRAMUSCULAR; INTRAVENOUS AS NEEDED
Status: DISCONTINUED | OUTPATIENT
Start: 2024-11-25 | End: 2024-11-25

## 2024-11-25 RX ORDER — PROPOFOL 10 MG/ML
INJECTION, EMULSION INTRAVENOUS AS NEEDED
Status: DISCONTINUED | OUTPATIENT
Start: 2024-11-25 | End: 2024-11-25

## 2024-11-25 RX ORDER — LIDOCAINE HYDROCHLORIDE AND EPINEPHRINE 10; 10 MG/ML; UG/ML
INJECTION, SOLUTION INFILTRATION; PERINEURAL AS NEEDED
Status: DISCONTINUED | OUTPATIENT
Start: 2024-11-25 | End: 2024-11-25 | Stop reason: HOSPADM

## 2024-11-25 RX ORDER — EPHEDRINE SULFATE 50 MG/ML
INJECTION INTRAVENOUS AS NEEDED
Status: DISCONTINUED | OUTPATIENT
Start: 2024-11-25 | End: 2024-11-25

## 2024-11-25 RX ORDER — MAGNESIUM HYDROXIDE 1200 MG/15ML
LIQUID ORAL AS NEEDED
Status: DISCONTINUED | OUTPATIENT
Start: 2024-11-25 | End: 2024-11-25 | Stop reason: HOSPADM

## 2024-11-25 RX ORDER — CEPHALEXIN 500 MG/1
500 CAPSULE ORAL EVERY 12 HOURS SCHEDULED
Qty: 20 CAPSULE | Refills: 0 | Status: SHIPPED | OUTPATIENT
Start: 2024-11-25 | End: 2024-12-05

## 2024-11-25 RX ORDER — LIDOCAINE HYDROCHLORIDE 10 MG/ML
INJECTION, SOLUTION EPIDURAL; INFILTRATION; INTRACAUDAL; PERINEURAL AS NEEDED
Status: DISCONTINUED | OUTPATIENT
Start: 2024-11-25 | End: 2024-11-25

## 2024-11-25 RX ORDER — SODIUM CHLORIDE, SODIUM LACTATE, POTASSIUM CHLORIDE, CALCIUM CHLORIDE 600; 310; 30; 20 MG/100ML; MG/100ML; MG/100ML; MG/100ML
INJECTION, SOLUTION INTRAVENOUS CONTINUOUS PRN
Status: DISCONTINUED | OUTPATIENT
Start: 2024-11-25 | End: 2024-11-25

## 2024-11-25 RX ADMIN — LIDOCAINE HYDROCHLORIDE 50 MG: 10 INJECTION, SOLUTION EPIDURAL; INFILTRATION; INTRACAUDAL; PERINEURAL at 11:18

## 2024-11-25 RX ADMIN — SODIUM CHLORIDE, SODIUM LACTATE, POTASSIUM CHLORIDE, AND CALCIUM CHLORIDE: .6; .31; .03; .02 INJECTION, SOLUTION INTRAVENOUS at 11:13

## 2024-11-25 RX ADMIN — FENTANYL CITRATE 25 MCG: 50 INJECTION INTRAMUSCULAR; INTRAVENOUS at 11:31

## 2024-11-25 RX ADMIN — PROPOFOL 150 MG: 10 INJECTION, EMULSION INTRAVENOUS at 11:18

## 2024-11-25 RX ADMIN — PROPOFOL 50 MG: 10 INJECTION, EMULSION INTRAVENOUS at 11:19

## 2024-11-25 RX ADMIN — ONDANSETRON 4 MG: 2 INJECTION INTRAMUSCULAR; INTRAVENOUS at 11:41

## 2024-11-25 RX ADMIN — EPHEDRINE SULFATE 5 MG: 50 INJECTION, SOLUTION INTRAVENOUS at 11:28

## 2024-11-25 NOTE — INTERVAL H&P NOTE
H&P reviewed. After examining the patient I find no changes in the patients condition since the H&P had been written.    Vitals:    11/25/24 1000   BP: 135/74   Pulse: 60   Resp: 16   Temp: 98.2 °F (36.8 °C)   SpO2: 94%

## 2024-11-25 NOTE — ANESTHESIA POSTPROCEDURE EVALUATION
Post-Op Assessment Note    CV Status:  Stable  Pain Score: 0    Pain management: adequate       Mental Status:  Sleepy and arousable   Hydration Status:  Stable and euvolemic   PONV Controlled:  None   Airway Patency:  Patent and adequate  Two or more mitigation strategies used for obstructive sleep apnea   Post Op Vitals Reviewed: Yes    No anethesia notable event occurred.    Staff: CRNA           Last Filed PACU Vitals:  Vitals Value Taken Time   Temp 97.3 °F (36.3 °C) 11/25/24 1149   Pulse 64 11/25/24 1152   /71 11/25/24 1150   Resp 16 11/25/24 1149   SpO2 99 % 11/25/24 1152   Vitals shown include unfiled device data.    Modified Caroline:  Activity: 0 (11/25/2024 11:49 AM)  Respiration: 2 (11/25/2024 11:49 AM)  Circulation: 2 (11/25/2024 11:49 AM)  Consciousness: 0 (11/25/2024 11:49 AM)  Oxygen Saturation: 1 (11/25/2024 11:49 AM)  Modified Caroline Score: 5 (11/25/2024 11:49 AM)

## 2024-11-25 NOTE — ANESTHESIA POSTPROCEDURE EVALUATION
Post-Op Assessment Note    Last Filed PACU Vitals:  Vitals Value Taken Time   Temp 97.4 °F (36.3 °C) 11/25/24 1205   Pulse 69 11/25/24 1205   /80 11/25/24 1205   Resp 16 11/25/24 1205   SpO2 93 % 11/25/24 1205       Modified Caroline:  Activity: 2 (11/25/2024 12:35 PM)  Respiration: 2 (11/25/2024 12:35 PM)  Circulation: 2 (11/25/2024 12:35 PM)  Consciousness: 2 (11/25/2024 12:35 PM)  Oxygen Saturation: 2 (11/25/2024 12:35 PM)  Modified Caroline Score: 10 (11/25/2024 12:35 PM)

## 2024-11-25 NOTE — DISCHARGE SUMMARY
Discharge Summary - ENT   Name: Stef Pack Sr. 59 y.o. male I MRN: [id number]  Unit/Bed#: OR POOL I Date of Admission: 11/25/2024   Date of Service: 11/25/2024 I Hospital Day: 0    Admission Date: 11/25/2024 0900  Discharge Date: 11/25/24  Admitting Diagnosis: Sebaceous cyst [L72.3]  Discharge Diagnosis:   Medical Problems       Resolved Problems  Date Reviewed: 11/25/2024   None         HPI: Status post excision of sebaceous cyst left face    Procedures Performed: No orders of the defined types were placed in this encounter.      Summary of Hospital Course:  unremarkable    Significant Findings, Care, Treatment and Services Provided: Surgery    Complications: None    Condition at Discharge: good       Discharge instructions/Information to patient and family:   See After Visit Summary (AVS) for information provided to patient and family.      Provisions for Follow-Up Care:  See after visit summary for information related to follow-up care and any pertinent home health orders.      PCP: Rehan Mancia DO    Disposition: Home    Planned Readmission: No     Discharge Medications:  See after visit summary for reconciled discharge medications provided to patient and family.      Discharge Statement:  I have spent a total time of 15 minutes in caring for this patient on the day of the visit/encounter. .

## 2024-11-25 NOTE — OP NOTE
OPERATIVE REPORT  PATIENT NAME: Stef Pack Sr.    :  1965  MRN: [id number]  Pt Location: [facility] OR ROOM 01    SURGERY DATE: 2024    Surgeons and Role:     * Rehan Gee MD - Primary    Preop Diagnosis:  Sebaceous cyst [L72.3]    Post-Op Diagnosis Codes:     * Sebaceous cyst [L72.3]    Procedure(s):  EXCISION OF CYST OF THE FACE WITH CLOSURE 2.0 cm    Specimen(s):  ID Type Source Tests Collected by Time Destination   1 : SEBACEOUS CYST Tissue Soft Tissue, Other TISSUE EXAM Rehan Gee MD 2024 11:38 AM        Estimated Blood Loss:   Minimal    Drains:  * No LDAs found *    Anesthesia Type:   IV Sedation with Anesthesia    Operative Indications:  Sebaceous cyst [L72.3]      Operative Findings:  Sebaceous cyst left face      Complications:   None    Procedure and Technique:  Patient was identified and taken to the operative suite.  A timeout was called.  After successful induction of general anesthesia via LMA, he was prepped draped in usual fashion.  1% lidocaine, 1-100,000 epinephrine was injected into a curvilinear incision over the pit of the cyst on the left lower face.  A 15 blade was used to excise the ellipse with the pit and sharp dissection with the iris scissors and serial cautery was used to deliver the cyst from the underlying muscular tissue.  Hemostasis was achieved with the bovie electrocautery setting of 20.  No further bleeding was identified after irrigation and the wound was closed in the dermal layer with 4-0 Monocryl and Exiflow for the skin.  The cyst was 2 cm in size and the total excision was 3.0 cm.  The patient was awakened and extubated without incident and taken to the PACU next condition.  Instrument sponge counts were correct x 2 at the end of the case.     I was present for the entire procedure.    Patient Disposition:  PACU              SIGNATURE: Rehan Gee MD  DATE: 2024  TIME: 11:52 AM

## 2024-11-27 ENCOUNTER — PROCEDURE VISIT (OUTPATIENT)
Dept: UROLOGY | Facility: MEDICAL CENTER | Age: 59
End: 2024-11-27
Payer: COMMERCIAL

## 2024-11-27 VITALS
OXYGEN SATURATION: 96 % | WEIGHT: 170 LBS | SYSTOLIC BLOOD PRESSURE: 122 MMHG | DIASTOLIC BLOOD PRESSURE: 70 MMHG | HEIGHT: 72 IN | HEART RATE: 72 BPM | BODY MASS INDEX: 23.03 KG/M2

## 2024-11-27 DIAGNOSIS — R39.15 URGENCY OF URINATION: Primary | ICD-10-CM

## 2024-11-27 DIAGNOSIS — N36.44 DETRUSOR SPHINCTER DYSSYNERGIA: ICD-10-CM

## 2024-11-27 LAB
SL AMB  POCT GLUCOSE, UA: NEGATIVE
SL AMB LEUKOCYTE ESTERASE,UA: NEGATIVE
SL AMB POCT BILIRUBIN,UA: NEGATIVE
SL AMB POCT BLOOD,UA: NEGATIVE
SL AMB POCT CLARITY,UA: CLEAR
SL AMB POCT COLOR,UA: YELLOW
SL AMB POCT KETONES,UA: NEGATIVE
SL AMB POCT NITRITE,UA: NEGATIVE
SL AMB POCT PH,UA: 6.5
SL AMB POCT SPECIFIC GRAVITY,UA: 1.01
SL AMB POCT URINE PROTEIN: 300
SL AMB POCT UROBILINOGEN: NEGATIVE

## 2024-11-27 PROCEDURE — 51728 CYSTOMETROGRAM W/VP: CPT

## 2024-11-27 PROCEDURE — 51797 INTRAABDOMINAL PRESSURE TEST: CPT

## 2024-11-27 PROCEDURE — 51784 ANAL/URINARY MUSCLE STUDY: CPT

## 2024-11-27 PROCEDURE — 81002 URINALYSIS NONAUTO W/O SCOPE: CPT

## 2024-11-27 RX ORDER — GENTAMICIN SULFATE 1 MG/G
CREAM TOPICAL
COMMUNITY
Start: 2024-11-20

## 2024-11-27 NOTE — PROGRESS NOTES
"CC: \"I'm having a transplant, am doing dyalisys.  I do urinate  Last void a couple hours ago    Unable to void, straight catheterized for 60 ml prior to test    CMG:       Position:  sitting           Fill sensation:      9 ml,    pdet    -12 cmH2O       First urge:          35 ml,    pdet   -1 cmH2O        Normal urge:     55 ml,    pdet    0 cmH2O      Must urge:          80 ml,    pdet   -7 cmH2O    Permission to void:             387  ml,     pdet     -17 cmH2O (due to abdominal catheter coming out)       Max pdet during void   68 cm H2O (measured with pdet channel from baseline as abdominal catheter was coming out)       Voided volume:     300.6 ml    Bladder stability:   stable          Compliance:  normal         EMG activity:       Normal during filling,        abnormal at start of void and towards end    Comments:   Sensory urgency   Stable bladder   Void attempts at 101, 200, and 300 ml all unsuccessful with no detrusor activity   Calculated PVR at end of test was 78 ml   Patient did not feel as though he was straining to empty   + EMG activity   Voiding pressure calculated using pves channel from baseline due to abdominal catheter coming out during voiding    Urodynamics    Date/Time: 11/27/2024 9:00 AM    Performed by: Monica Dukes RN  Authorized by: Brad Webber MD  [place] Protocol:  procedure performed by consultantConsent: Verbal consent obtained. Written consent obtained.  Risks and benefits: risks, benefits and alternatives were discussed  Consent given by: patient  Patient understanding: patient states understanding of the procedure being performed  Patient consent: the patient's understanding of the procedure matches consent given  Procedure consent: procedure consent matches procedure scheduled  Patient identity confirmed: verbally with patient  Procedure - Urodynamics:  Procedure details: CMG and EMG      Voiding Pressure Study: Yes    Intra-abdominal Voiding Pressure Study: " Yes    Post-procedure:     Patient tolerance: Patient tolerated procedure well with no immediate complications

## 2024-11-29 PROCEDURE — 88304 TISSUE EXAM BY PATHOLOGIST: CPT | Performed by: STUDENT IN AN ORGANIZED HEALTH CARE EDUCATION/TRAINING PROGRAM

## 2025-01-09 DIAGNOSIS — Z01.818 ENCOUNTER FOR OTHER PREPROCEDURAL EXAMINATION: ICD-10-CM

## 2025-01-13 ENCOUNTER — HOSPITAL ENCOUNTER (OUTPATIENT)
Dept: CT IMAGING | Facility: HOSPITAL | Age: 60
Discharge: HOME | End: 2025-01-13
Payer: COMMERCIAL

## 2025-01-13 DIAGNOSIS — Z01.818 ENCOUNTER FOR OTHER PREPROCEDURAL EXAMINATION: ICD-10-CM

## 2025-01-13 PROCEDURE — 74176 CT ABD & PELVIS W/O CONTRAST: CPT

## 2025-06-06 NOTE — ASSESSMENT & PLAN NOTE
John Montaño MD P Gokalp Yasar, D [facility] Nurse Message Pool  Please let him know that he does not have any evidence of iron deficiency and do not need to take iron supplements.   Secondary to perforated sigmoid diverticulum  Patient remained asymptomatic on clinical exam  Neurosurgery consult appreciated-commend continue conservative management since patient remains high risk for any kind of surgery  Since WBC is trending up, repeat CT scan abdomen in a m  for further planning

## (undated) DEVICE — SUT MONOCRYL PLUS 4-0 PS-2 18 IN MCP496G

## (undated) DEVICE — GLOVE INDICATOR PI UNDERGLOVE SZ 8 BLUE

## (undated) DEVICE — ELECTRODE NEEDLE MOD E-Z CLEAN 2.75IN 7CM -0013M

## (undated) DEVICE — BULB SYRINGE,IRRIGATION WITH PROTECTIVE CAP: Brand: DOVER

## (undated) DEVICE — SUT NYLON 5-0 P-3 18 IN 690G

## (undated) DEVICE — DRAIN JACKSON PRATT 10FR 1/8END: Brand: CARDINAL HEALTH

## (undated) DEVICE — TIBURON SPLIT SHEET: Brand: CONVERTORS

## (undated) DEVICE — SUT VICRYL 4-0 PS-2 18 IN J496G

## (undated) DEVICE — BETHLEHEM UNIVERSAL OUTPATIENT: Brand: CARDINAL HEALTH

## (undated) DEVICE — SUT MONOCRYL 4-0 PS-2 18 IN Y496G

## (undated) DEVICE — MEDI-VAC YANKAUER SUCTION HANDLE W/STRAIGHT TIP & CONTROL VENT: Brand: CARDINAL HEALTH

## (undated) DEVICE — ANTIBACTERIAL UNDYED BRAIDED (POLYGLACTIN 910), SYNTHETIC ABSORBABLE SUTURE: Brand: COATED VICRYL

## (undated) DEVICE — SUT CHROMIC 5-0 P-3 18 IN 687G

## (undated) DEVICE — SYRINGE 10ML LL

## (undated) DEVICE — DECANTER: Brand: UNBRANDED

## (undated) DEVICE — GAUZE SPONGES,16 PLY: Brand: CURITY

## (undated) DEVICE — PAD GROUNDING DUAL ADULT

## (undated) DEVICE — INTENDED FOR TISSUE SEPARATION, AND OTHER PROCEDURES THAT REQUIRE A SHARP SURGICAL BLADE TO PUNCTURE OR CUT.: Brand: BARD-PARKER SAFETY BLADES SIZE 15, STERILE

## (undated) DEVICE — PACK UNIVERSAL NECK

## (undated) DEVICE — JACKSON-PRATT 100CC BULB RESERVOIR: Brand: CARDINAL HEALTH

## (undated) DEVICE — PENCIL ELECTROSURG E-Z CLEAN -0035H

## (undated) DEVICE — SUT ETHILON 3-0 PS-1 18 IN 1663G

## (undated) DEVICE — 10FR FRAZIER SUCTION HANDLE: Brand: CARDINAL HEALTH

## (undated) DEVICE — SUT MONOCRYL PLUS 4-0 P-3 18IN MCP494G

## (undated) DEVICE — UTILITY MARKER,BLACK WITH LABELS: Brand: DEVON

## (undated) DEVICE — NEEDLE 23G X 1 1/2 SAFETY-GLIDE THIN WALL

## (undated) DEVICE — EXOFIN PRECISION PEN HIGH VISCOSITY TOPICAL SKIN ADHESIVE: Brand: EXOFIN PRECISION PEN, 1G

## (undated) DEVICE — SUT MONOCRYL 4-0 P-3 18 IN Y494G

## (undated) DEVICE — TUBING SUCTION 5MM X 12 FT